# Patient Record
Sex: FEMALE | Race: WHITE | NOT HISPANIC OR LATINO | Employment: PART TIME | URBAN - METROPOLITAN AREA
[De-identification: names, ages, dates, MRNs, and addresses within clinical notes are randomized per-mention and may not be internally consistent; named-entity substitution may affect disease eponyms.]

---

## 2017-01-03 ENCOUNTER — OFFICE VISIT (OUTPATIENT)
Dept: SURGERY | Facility: CLINIC | Age: 37
End: 2017-01-03
Payer: COMMERCIAL

## 2017-01-03 VITALS
DIASTOLIC BLOOD PRESSURE: 80 MMHG | HEART RATE: 98 BPM | TEMPERATURE: 97.7 F | WEIGHT: 188 LBS | HEIGHT: 63 IN | BODY MASS INDEX: 33.31 KG/M2 | SYSTOLIC BLOOD PRESSURE: 144 MMHG

## 2017-01-03 DIAGNOSIS — C50.911 MALIGNANT NEOPLASM OF RIGHT BREAST GREATER THAN OR EQUAL TO 2 CM IN GREATEST DIMENSION (H): Primary | ICD-10-CM

## 2017-01-03 PROCEDURE — 99203 OFFICE O/P NEW LOW 30 MIN: CPT | Performed by: SURGERY

## 2017-01-03 NOTE — NURSING NOTE
"Initial /80 mmHg  Pulse 98  Temp(Src) 97.7  F (36.5  C) (Oral)  Ht 1.6 m (5' 3\")  Wt 85.276 kg (188 lb)  BMI 33.31 kg/m2 Estimated body mass index is 33.31 kg/(m^2) as calculated from the following:    Height as of this encounter: 1.6 m (5' 3\").    Weight as of this encounter: 85.276 kg (188 lb). .    Nona Kent MA    "

## 2017-01-03 NOTE — PROGRESS NOTES
Pt comes to see me for a newly diagnosed right breast cancer.  She felt a mass in October, but because she has had a breast reduction in the past, felt that it was a scar only.  In December, she had a mammogram to assess this and was found to have an invasive ductal breast cancer with metastasis to one right axillary LN on biopsy.  On the mammogram and U/S, the mass measures 2 cm but has surrounding architecture abnormalities up to 4 cm in size and multiple right axillary LN enlargement.  Her tumor is ER and DC positive, HER 2 negative, the LN is ER/DC and HER 2 negative.    She has no family history of breast cancer and is only 36 years old.  She started her menstrual cycle at age 11, had has 2 pregnancies, did not nurse her children because of the reduction and still has her ovaries in place.    On examination:   Right breast:  Bruised from the biopsy, large mass palpable in the upper outer quadrant, possibly from the bleeding from the biopsy.    A/P:  Large right breast cancer with LN mets in a young patient.  Because of the size of the tumor and positive LN, she would benefit from getting neoadjuvant chemotherapy prior to surgical treatment.  I have ordered an MRI for her to assess for the size of tumor.    I did discuss the surgical treatment of breast cancer, breast conservation and axillary LN dissection, since her LN's are positive already and post op radiation.  Since her tumor is large, she may not be a candidate for breast conservation.  An MRI would be helpful to make this determination.  If she is not a candidate for breast conservation, then a modified radical mastectomy and reconstruction would be the best option and I would refer her to our plastic surgeon.      I will check on the results of the MRI before making the definitive surgical decision.  She would also like to get a second opinion and an appointment has been set up for her at the [facility] breast clinic.    30 minutes were spent in  consultation with the patient.  Greater than 50% of the time was spent on counseling/coordination of care, discussing the diagnosis and surgical treatment of breast cancer.  Josh Townsend MD, FACS

## 2017-01-05 ENCOUNTER — ONCOLOGY VISIT (OUTPATIENT)
Dept: ONCOLOGY | Facility: CLINIC | Age: 37
End: 2017-01-05
Attending: INTERNAL MEDICINE
Payer: COMMERCIAL

## 2017-01-05 ENCOUNTER — HOSPITAL ENCOUNTER (OUTPATIENT)
Dept: LAB | Facility: CLINIC | Age: 37
Discharge: HOME | End: 2017-01-05
Attending: INTERNAL MEDICINE | Admitting: INTERNAL MEDICINE
Payer: COMMERCIAL

## 2017-01-05 VITALS
HEIGHT: 64 IN | TEMPERATURE: 96.9 F | OXYGEN SATURATION: 97 % | HEART RATE: 78 BPM | WEIGHT: 189.4 LBS | DIASTOLIC BLOOD PRESSURE: 83 MMHG | SYSTOLIC BLOOD PRESSURE: 127 MMHG | RESPIRATION RATE: 18 BRPM | BODY MASS INDEX: 32.33 KG/M2

## 2017-01-05 DIAGNOSIS — C50.411 MALIGNANT NEOPLASM OF UPPER-OUTER QUADRANT OF RIGHT FEMALE BREAST (H): Primary | ICD-10-CM

## 2017-01-05 LAB
ALBUMIN SERPL-MCNC: 3.8 G/DL (ref 3.4–5)
ALP SERPL-CCNC: 64 U/L (ref 40–150)
ALT SERPL W P-5'-P-CCNC: 19 U/L (ref 0–50)
ANION GAP SERPL CALCULATED.3IONS-SCNC: 7 MMOL/L (ref 3–14)
AST SERPL W P-5'-P-CCNC: 10 U/L (ref 0–45)
BASOPHILS # BLD AUTO: 0.1 10E9/L (ref 0–0.2)
BASOPHILS NFR BLD AUTO: 0.5 %
BILIRUB SERPL-MCNC: 0.5 MG/DL (ref 0.2–1.3)
BUN SERPL-MCNC: 7 MG/DL (ref 7–30)
CALCIUM SERPL-MCNC: 8.6 MG/DL (ref 8.5–10.1)
CANCER AG27-29 SERPL-ACNC: 21 U/ML (ref 0–39)
CHLORIDE SERPL-SCNC: 105 MMOL/L (ref 94–109)
CO2 SERPL-SCNC: 28 MMOL/L (ref 20–32)
CREAT SERPL-MCNC: 0.66 MG/DL (ref 0.52–1.04)
DIFFERENTIAL METHOD BLD: NORMAL
EOSINOPHIL # BLD AUTO: 0.2 10E9/L (ref 0–0.7)
EOSINOPHIL NFR BLD AUTO: 1.7 %
ERYTHROCYTE [DISTWIDTH] IN BLOOD BY AUTOMATED COUNT: 12.4 % (ref 10–15)
GFR SERPL CREATININE-BSD FRML MDRD: NORMAL ML/MIN/1.7M2
GLUCOSE SERPL-MCNC: 95 MG/DL (ref 70–99)
HCT VFR BLD AUTO: 43.6 % (ref 35–47)
HGB BLD-MCNC: 14.6 G/DL (ref 11.7–15.7)
IMM GRANULOCYTES # BLD: 0 10E9/L (ref 0–0.4)
IMM GRANULOCYTES NFR BLD: 0.3 %
LYMPHOCYTES # BLD AUTO: 2.4 10E9/L (ref 0.8–5.3)
LYMPHOCYTES NFR BLD AUTO: 23.7 %
MCH RBC QN AUTO: 29.4 PG (ref 26.5–33)
MCHC RBC AUTO-ENTMCNC: 33.5 G/DL (ref 31.5–36.5)
MCV RBC AUTO: 88 FL (ref 78–100)
MONOCYTES # BLD AUTO: 0.7 10E9/L (ref 0–1.3)
MONOCYTES NFR BLD AUTO: 7.1 %
NEUTROPHILS # BLD AUTO: 6.7 10E9/L (ref 1.6–8.3)
NEUTROPHILS NFR BLD AUTO: 66.7 %
PLATELET # BLD AUTO: 306 10E9/L (ref 150–450)
POTASSIUM SERPL-SCNC: 4.1 MMOL/L (ref 3.4–5.3)
PROT SERPL-MCNC: 7.2 G/DL (ref 6.8–8.8)
RBC # BLD AUTO: 4.97 10E12/L (ref 3.8–5.2)
SODIUM SERPL-SCNC: 140 MMOL/L (ref 133–144)
WBC # BLD AUTO: 10.1 10E9/L (ref 4–11)

## 2017-01-05 PROCEDURE — 86300 IMMUNOASSAY TUMOR CA 15-3: CPT | Performed by: INTERNAL MEDICINE

## 2017-01-05 PROCEDURE — 36415 COLL VENOUS BLD VENIPUNCTURE: CPT | Performed by: INTERNAL MEDICINE

## 2017-01-05 PROCEDURE — 85025 COMPLETE CBC W/AUTO DIFF WBC: CPT | Performed by: INTERNAL MEDICINE

## 2017-01-05 PROCEDURE — 80053 COMPREHEN METABOLIC PANEL: CPT | Performed by: INTERNAL MEDICINE

## 2017-01-05 PROCEDURE — 99211 OFF/OP EST MAY X REQ PHY/QHP: CPT

## 2017-01-05 PROCEDURE — 99215 OFFICE O/P EST HI 40 MIN: CPT | Performed by: INTERNAL MEDICINE

## 2017-01-05 ASSESSMENT — PAIN SCALES - GENERAL: PAINLEVEL: MILD PAIN (2)

## 2017-01-05 NOTE — MR AVS SNAPSHOT
After Visit Summary   1/5/2017    Diana Wilson    MRN: [id number]           Patient Information     Date Of Birth          1980        Visit Information        Provider Department      1/5/2017 8:00 AM Maribel Jin MD [facility]        Today's Diagnoses     Malignant neoplasm of upper-outer quadrant of right female breast (H)    -  1       Care Instructions    Dr Jin Order A PET scan to be done ASAP along with blood work today.   We will set you up to see Dr Pearl after PET for placement.   Also a part of the work up prior to initiating treatment you will get a Echocardiogram.   Genetic work up will be starte.     Will see you back after work. If you have any questions in the interim during the work up period please call [phone] Morena or Robert Stewart         Follow-ups after your visit        Your next 10 appointments already scheduled     Jan 06, 2017  3:00 PM   Ech Complete with [id number]   [facility] Echocardiography ([facility])    [address] [address] [phone]           1.  Please bring or wear a comfortable two-piece outfit. 2.  You may eat, drink and take your normal medicines. 3.  For any questions that cannot be answered, please contact the ordering physician            Jan 09, 2017  9:00 AM   MR BREAST BILATERAL W/O & W CONTRAST with [id number]   [facility] MRI ([facility])    [address] [facility] [address] [phone]           Take your medicines as usual, unless your doctor tells you not to. Bring a list of your current medicines to your exam (including vitamins, minerals and over-the-counter drugs).  The timing of your exam may depend on the start of your last period. If you re in menopause, you may have the exam anytime.  Please bring any previous mammograms or breast MRIs from other facilities to the MRI dept. Do not mail these items to us.  You will have contrast for this exam. To  prepare:   The day before your exam, drink extra fluids at least six 8-ounce glasses (unless your doctor tells you to restrict your fluids).   Have a blood test (creatinine test) within 30 days of your exam. Go to your clinic or Diagnostic Imaging Department for this test.  The MRI machine uses a strong magnet. Please wear clothes without metal (snaps, zippers). A sweatsuit works well, or we may give you a hospital gown.  Please remove any body piercings and hair extensions before you arrive. You will also remove watches, jewelry, hairpins, wallets, dentures, partial dental plates and hearing aids. You may wear contact lenses, and you may be able to wear your rings. We have a safe place to keep your personal items, but it is safer to leave them at home.   **IMPORTANT** THE INSTRUCTIONS BELOW ARE ONLY FOR THOSE PATIENTS WHO HAVE BEEN TOLD THEY WILL RECEIVE SEDATION OR GENERAL ANESTHESIA DURING THEIR MRI PROCEDURE:  IF YOU WILL RECEIVE SEDATION (take medicine to help you relax during your exam)   You must get the medicine from your doctor before you arrive. Bring the medicine to the exam. Do not take it at home.   Arrive one hour early. Bring someone who can take you home after the test. Your medicine will make you sleepy. After the exam, you may not drive, take a bus or take a taxi by yourself.   No eating 8 hours before your exam. You may have clear liquids up until 4 hours before your exam. (Clear liquids include water, clear tea, black coffee and fruit juice without pulp.)  IF YOU WILL RECEIVE ANESTHESIA (be asleep for your exam)   Arrive 1 1/2 hours early. Bring someone who can take you home after the test. You may not drive, take a bus or take a taxi by yourself.   No eating 8 hours before your exam. You may have clear liquids up until 4 hours before your exam. (Clear liquids include water, clear tea, black coffee and fruit juice without pulp.)  If you have any questions, please contact your Imaging Department  exam site.            Jimenez 10, 2017  8:30 AM   PE NPET ONCOLOGY (EYES TO THIGHS) with [id number]   [facility] ([facility]    [address] [address] [place] [address] [phone]           Tell your doctor:   If there is any chance you may be pregnant or if you are breastfeeding.   If you have problems lying in small spaces (claustrophobia). If you do, your doctor may give you medicine to help you relax. If you have diabetes:   Have your exam early in the morning. Your blood glucose will go up as the day goes by.   Your glucose level must be 180 or less at the start of the exam. Please take any medicines you need to ensure this blood glucose level. 24 hours before your scan: Don t do any heavy exercise. (No jogging, aerobics or other workouts.) Exercise will make your pictures less accurate. 6 hours before your scan:   Stop all food and liquids (except water).   Do not chew gum or suck on mints.   If you need to take medicine with food, you may take it with a few crackers.  Please call your Imaging Department at your exam site with any questions.            Jan 13, 2017  2:15 PM   (Arrive by 2:00 PM)   NEW WITH ROOM with Claudia Willis GC,  2 116 CONSULT [facility] ([facility])    [address] [address] [phone]            Jan 16, 2017  9:00 AM   Return Visit with Maribel Jin MD   [facility] ([facility])    [facility]  [address]  [facility] [address] [phone]              Future tests that were ordered for you today     Open Future Orders        Priority Expected Expires Ordered    Echocardiogram Routine  1/5/2018 1/5/2017            Who to contact     If you have questions or need follow up information about today's clinic visit or your schedule please contact [facility] directly at [phone].  Normal or  "non-critical lab and imaging results will be communicated to you by MyChart, letter or phone within 4 business days after the clinic has received the results. If you do not hear from us within 7 days, please contact the clinic through Walk-in Appointment Scheduler or phone. If you have a critical or abnormal lab result, we will notify you by phone as soon as possible.  Submit refill requests through Walk-in Appointment Scheduler or call your pharmacy and they will forward the refill request to us. Please allow 3 business days for your refill to be completed.          Additional Information About Your Visit        Walk-in Appointment Scheduler Information     Walk-in Appointment Scheduler gives you secure access to your electronic health record. If you see a primary care provider, you can also send messages to your care team and make appointments. If you have questions, please call your primary care clinic.  If you do not have a primary care provider, please call [phone] and they will assist you.        Care EveryWhere ID     This is your Care EveryWhere ID. This could be used by other organizations to access your [place] medical records  [id number]        Your Vitals Were     Pulse Temperature Respirations    78 96.9  F (36.1  C) (Tympanic) 18    Height BMI (Body Mass Index) Pulse Oximetry    1.626 m (5' 4\") 32.49 kg/m2 97%    Breastfeeding?          No         Blood Pressure from Last 3 Encounters:   01/05/17 127/83   01/03/17 144/80   12/13/16 122/76    Weight from Last 3 Encounters:   01/05/17 85.911 kg (189 lb 6.4 oz)   01/03/17 85.276 kg (188 lb)   12/13/16 85.412 kg (188 lb 4.8 oz)              We Performed the Following     Ca27.29  breast tumor marker     CBC with platelets differential     Comprehensive metabolic panel     PE NPET Oncology (Eyes To Thighs)          Today's Medication Changes          These changes are accurate as of: 1/5/17  9:38 AM.  If you have any questions, ask your nurse or doctor.               Stop taking these medicines if you haven't already. Please contact " your care team if you have questions.     MULTIVITAMIN PO   Stopped by:  Maribel Jin MD           UNABLE TO FIND   Stopped by:  Maribel Jin MD                    Primary Care Provider Office Phone # Fax #    Yenifer Lorraine Ridley [phone]       [facility] [address] [address] [address]        Thank you!     Thank you for choosing [facility] CANCER [facility]  for your care. Our goal is always to provide you with excellent care. Hearing back from our patients is one way we can continue to improve our services. Please take a few minutes to complete the written survey that you may receive in the mail after your visit with us. Thank you!             Your Updated Medication List - Protect others around you: Learn how to safely use, store and throw away your medicines at www.disposemymeds.org.          This list is accurate as of: 1/5/17  9:38 AM.  Always use your most recent med list.                   Brand Name Dispense Instructions for use    ADVIL 200 MG capsule   Generic drug:  ibuprofen      Take 200 mg by mouth every 4 hours as needed.       albuterol 108 (90 BASE) MCG/ACT Inhaler    PROAIR HFA/PROVENTIL HFA/VENTOLIN HFA    1 Inhaler    Inhale 2 puffs into the lungs every 6 hours as needed for shortness of breath / dyspnea       ALPRAZolam 0.5 MG tablet    XANAX    15 tablet    Take 1 tablet (0.5 mg) by mouth 3 times daily as needed for anxiety For Dental appointments       cyclobenzaprine 10 MG tablet    FLEXERIL    30 tablet    Take 1 tablet (10 mg) by mouth 3 times daily as needed for muscle spasms       famotidine 20 MG tablet    PEPCID     Take 20 mg by mouth daily as needed       fluticasone 50 MCG/ACT spray    FLONASE    3 Bottle    Spray 1-2 sprays into both nostrils daily       loratadine 10 MG tablet    CLARITIN    90 tablet    Take 1 tablet (10 mg) by mouth daily       montelukast 10 MG tablet    SINGULAIR    90 tablet    Take 1 tablet (10 mg) by mouth At Bedtime

## 2017-01-05 NOTE — PROGRESS NOTES
" ONCOLOGY Follow up visit       COMPLAINT AND REASON FOR CONSULTATION:  Newly diagnosed right breast cancer, hx of TIFFANY due Celiac disease on IV iron prn     REFERRING/Primay PHYSICIAN:  Dr. Yenifer Ridley       HERB Wilson is a 36-year-old premenopausal woman, self felt breast in her right armpit and right breast in 10/2016.  She observed it and due to persistency of the palpable lesions went on to have workup with diagnostic mammogram end of 12/2016, which identified 2 cm ill-defined density  associated with indeterminate microcalcification measuring about 3.2 cm, so the whole area spans about 4 cm in biggest dimension.   Ultrasound to the site which is the upper outer quadrant of the right breast identified 2 cm irregular solid mass; at the right axilla shows several abnormal lymph nodes, the largest one measures 1.8 cm in maximum dimension.  Ultrasound-guided biopsy of right breast was done 12/27/2016, identified grade 3 invasive ductal cancer, angiolymphatic invasion not identified.  ER/[place] 99% positive, HER-2/kelle negative, associated with high-grade DCIS.    Right axillary ultrasound-guided biopsy indicating metastatic carcinoma positive for spread from breast primary, ER/[place] both negative.  HER-2/kelle is negative.      Other than palpable right breast abnormalities, the patient has no breast pain, no skin changes, no nipple changes on the right side.  No abnormalities identified on the left breast by clinical, self-exam and mammogram.      PAST MEDICAL HISTORY:  Restless legs, mild intermittent asthma, celiac disease diagnosed 02/2016, hx of TIFFANY     SOCIAL HISTORY:  Works part-time.  Lives with her .  They have 2 kids, first pregnancy age 21.  She did not do breast feeding because of bilateral breast reduction.  She does office work.      FAMILY HISTORY:  \"Full of cancer\" from the mother's side including colon, lung, pancreatic, gastric cancer.  According to the patient none of them survive older than " 60 years old.      REVIEW OF SYSTEMS:   GENERAL: She is in her usual state of health.  She has no weight loss.  No B symptoms. NEURO:   No headache, double vision, or focal weakness.  No neuropathy.   SKIN:  No chronic skin rash or skin infection.   CARDIOVASCULAR:  No palpitations, no chest pressure, no dyspnea on exertion.   PULMONARY:  No shortness of breath, no pleurisy, no cough, no hemoptysis. Asthma.   GI:  No abdominal pain, nausea, vomiting, constipation.  No diarrhea.  No bright red blood per rectum.   :  No urgency, frequency.  No recurrent urinary tract infection.  No kidney problems.   RHEUMATOLOGY/MUSCULOSKELETAL SYSTEM:  No arthritis.  No joint pain.  No muscle ache.   ENDOCRINE:  No history of diabetes or thyroid problem.  No complaints of hot flashes.   HEMATOLOGY:  No history of bleeding or thrombosis episode.   IMMUNOLOGY:  No recurrent fever or chills episode.  No recurrent infectious episode.   BREASTS/GYN:  No history of vaginal bleeding/discharge/dryness.  No breast pain or nipple discharge.  Celiac disease diagnosed 02/2016.  Premenopausal diagnosed with breast cancer at age 36 in 12/2016.      PHYSICAL EXAMINATION:   GENERAL APPEARANCE:  He young woman, looks like her stated age, very upbeat, not in acute distress.   HEENT: The patient is normocephalic, atraumatic. Pupils are equally reactive to light.  Sclerae are anicteric.  Moist oral mucosa.  Negative pharynx.  No oral thrush.   NECK:  Supple.  No jugular venous distention.  Thyroid is not palpable.   LYMPH NODES:  Superficial lymphadenopathy is not appreciable in the bilateral cervical, supraclavicular, axillary or inguinal areas.   CARDIOVASCULAR:  S1, S2 regular with no murmurs or gallops.  No carotid or abdominal bruits.   PULMONARY:  Lungs are clear to auscultation and percussion bilaterally.  There is no wheezing or rhonchi.   GASTROINTESTINAL:  Abdomen is soft, nontender.  No hepatosplenomegaly.  No signs of ascites.  No mass  appreciable.   MUSCULOSKELETAL/EXTREMITIES:  No edema.  No cyanotic changes.  No signs of joint deformity.  No lymphedema.   NEUROLOGIC:  Cranial nerves II-XII are grossly intact.  Sensation intact.  Muscle strength and muscle tone symmetrical, 5/5 throughout.   BACK:  No spinal or paraspinal tenderness.  No CVA tenderness.   SKIN:  No petechiae.  No rash.  No signs of cellulitis.   BREASTS:  Bilateral breast exam reviewed.  Glandular tissue on both breasts in general, readily palpable upper outer quadrant of the right breast about 2-3 cm palpable lesion with vague palpable right axillary lymphadenopathy.  She has ecchymosis after the biopsy.      LABORATORY DATA:    Today's labs cbc diff/cmp/marker are nl.      October 2016, ferritin and hemoglobin normal.      End of 12/2016  right breast was done 12/27/2016, identified grade 3 invasive ductal cancer, angiolymphatic invasion not identified.  ER/[place] 99% positive, HER-2/kelle negative, associated with high-grade DCIS.    Right axillary ultrasound-guided biopsy indicating metastatic carcinoma positive for spread from breast primary, ER/[place] both negative.  HER-2/kelle is negative.       CURRENT IMAGING:    diagnostic mammogram end of 12/2016, which identified 2 cm ill-defined density  associated with indeterminate microcalcification measuring about 3.2 cm, so the whole area spans about 4 cm in biggest dimension.   Ultrasound to the site which is the upper outer quadrant of the right breast identified 2 cm irregular solid mass; at the right axilla shows several abnormal lymph nodes, the largest one measures 1.8 cm in maximum dimension.      OLD DATA REVIEWED WITH SUMMARY:    DEXA scan from 03/2016 identified mild osteopenia lumbar spine   left mammogram 2007 that was negative.      ASSESSMENT AND PLAN:    12/2106 newly dx breast cancer is a high-grade invasive ductal cancer on her right breast, reviewed is ER/[place] 99% positive, HER-2/kelle negative cancer with associated  high-grade DCIS.  The lymph nodes biopsy also came back positive breast cancer but is triple negative disease. She is only 36 years old.      She will benefit from further staging workup with pertinent lab work and PET scan, would recommend that.      I indicated to her due to her large tumor volume on presentation, it will be in her benefit to consider neoadjuvant chemotherapy over adjuvant chemotherapy.  She has met with Dr. Townsend and has pretty much made up her mind on the sequence of the treatment.    I indicated to her the standard of care in this country is to use the most effective FDA approved dose-dense AC regimen followed by dose-dense Taxol.  The second portion of this neoadjuvant treatment regimen can also be altered if her genetic testing result is positive for BRCA1 and 2 mutation.    I explained to her how dose-dense AC is given Adriamycin we provided at 60 mg/m2, Cytoxan will be 600 mg/m2, both given on day 1 every 2 weeks with growth factor support and total of 4 cycles.  We talked about the side effects including nausea, vomiting, hair loss.  We talked about the rare but existing side effect in terms of heart damage and a chance of acute leukemia down the line.    She has made an informed decision to proceed with dose-dense AC in the neoadjuvant setting followed by taxane plus or minus platinum depending on her genetic testing without.     She needs to be monitored closely during this chemo.     She will also benefit to seek genetic counseling ASAP.        If the PET is negative for distal disease, we will arrange port placement for her.      She is also getting a [facility].  She is also informed the need of adjuvant radiation based on her initial presentation in terms of lymph node involvement.  She is very upbeat with this difficult diagnosis.  Lots of encouragement is provided.         MARGE MCNEAL MD             D: 01/05/2017 09:09   T: 01/05/2017 09:40   MT: JAYDEN      Name:      NATAN HU   MRN:      [id number]        Account:      [id number]   :      1980           Visit Date:   2017      Document: [id number]

## 2017-01-05 NOTE — PATIENT INSTRUCTIONS
Dr Jin Order A PET scan to be done ASAP along with blood work today.   We will set you up to see Dr Pearl after PET for placement.   Also a part of the work up prior to initiating treatment you will get a Echocardiogram.   Genetic work up will be starte.     Will see you back after work. If you have any questions in the interim during the work up period please call [phone] Morena or Juliana.      Pat

## 2017-01-05 NOTE — NURSING NOTE
"Diana Wilson is a 36 year old female who presents for:  Chief Complaint   Patient presents with     Oncology Clinic Visit     New Patient - Breast CA         Initial Vitals:  /83 mmHg  Pulse 78  Temp(Src) 96.9  F (36.1  C) (Tympanic)  Resp 18  Ht 1.626 m (5' 4\")  Wt 85.911 kg (189 lb 6.4 oz)  BMI 32.49 kg/m2  SpO2 97%  Breastfeeding? No Estimated body mass index is 32.49 kg/(m^2) as calculated from the following:    Height as of this encounter: 1.626 m (5' 4\").    Weight as of this encounter: 85.911 kg (189 lb 6.4 oz).. Body surface area is 1.97 meters squared. BP completed using cuff size: regular  Mild Pain (2) No LMP recorded. Allergies and medications reviewed.     Medications: Medication refills not needed today.  Pharmacy name entered into [facility]: [facility] [place]  [place], [place] - [address]    Comments: New Patient - Breast CA.     10  minutes for nursing intake (face to face time)   Sondra Medina CMA          "

## 2017-01-06 ENCOUNTER — MYC MEDICAL ADVICE (OUTPATIENT)
Dept: FAMILY MEDICINE | Facility: CLINIC | Age: 37
End: 2017-01-06

## 2017-01-06 ENCOUNTER — HOSPITAL ENCOUNTER (OUTPATIENT)
Dept: CARDIOLOGY | Facility: CLINIC | Age: 37
Discharge: HOME | End: 2017-01-06
Attending: INTERNAL MEDICINE | Admitting: INTERNAL MEDICINE
Payer: COMMERCIAL

## 2017-01-06 ENCOUNTER — TELEPHONE (OUTPATIENT)
Dept: ONCOLOGY | Facility: CLINIC | Age: 37
End: 2017-01-06

## 2017-01-06 DIAGNOSIS — C50.411 MALIGNANT NEOPLASM OF UPPER-OUTER QUADRANT OF RIGHT FEMALE BREAST (H): ICD-10-CM

## 2017-01-06 PROCEDURE — 93306 TTE W/DOPPLER COMPLETE: CPT

## 2017-01-06 PROCEDURE — 0399T ZZHC MYOCARDIAL STRAIN IMAGING: CPT | Performed by: INTERNAL MEDICINE

## 2017-01-06 PROCEDURE — 93306 TTE W/DOPPLER COMPLETE: CPT | Mod: 26 | Performed by: INTERNAL MEDICINE

## 2017-01-09 ENCOUNTER — HOSPITAL ENCOUNTER (OUTPATIENT)
Dept: MRI IMAGING | Facility: CLINIC | Age: 37
Discharge: HOME | End: 2017-01-09
Attending: SURGERY | Admitting: SURGERY
Payer: COMMERCIAL

## 2017-01-09 DIAGNOSIS — C50.911 MALIGNANT NEOPLASM OF RIGHT BREAST GREATER THAN OR EQUAL TO 2 CM IN GREATEST DIMENSION (H): ICD-10-CM

## 2017-01-09 PROCEDURE — A9585 GADOBUTROL INJECTION: HCPCS | Performed by: SURGERY

## 2017-01-09 PROCEDURE — 25500045 ZZH RX 255: Performed by: SURGERY

## 2017-01-09 PROCEDURE — 0159T MR BREAST BILATERAL W/O & W CONTRAST: CPT

## 2017-01-09 PROCEDURE — 27210995 ZZH RX 272: Performed by: SURGERY

## 2017-01-09 RX ORDER — ACYCLOVIR 200 MG/1
60 CAPSULE ORAL ONCE
Status: COMPLETED | OUTPATIENT
Start: 2017-01-09 | End: 2017-01-09

## 2017-01-09 RX ORDER — GADOBUTROL 604.72 MG/ML
10 INJECTION INTRAVENOUS ONCE
Status: COMPLETED | OUTPATIENT
Start: 2017-01-09 | End: 2017-01-09

## 2017-01-09 RX ADMIN — SODIUM CHLORIDE 60 ML: 9 INJECTION, SOLUTION INTRAMUSCULAR; INTRAVENOUS; SUBCUTANEOUS at 09:22

## 2017-01-09 RX ADMIN — GADOBUTROL 10 ML: 604.72 INJECTION INTRAVENOUS at 09:22

## 2017-01-10 ENCOUNTER — RADIANT APPOINTMENT (OUTPATIENT)
Dept: PET IMAGING | Facility: CLINIC | Age: 37
End: 2017-01-10
Attending: INTERNAL MEDICINE
Payer: COMMERCIAL

## 2017-01-10 ENCOUNTER — OFFICE VISIT (OUTPATIENT)
Dept: FAMILY MEDICINE | Facility: CLINIC | Age: 37
End: 2017-01-10
Payer: COMMERCIAL

## 2017-01-10 ENCOUNTER — ANESTHESIA EVENT (OUTPATIENT)
Dept: SURGERY | Facility: CLINIC | Age: 37
End: 2017-01-10
Payer: COMMERCIAL

## 2017-01-10 VITALS
HEIGHT: 64 IN | HEART RATE: 79 BPM | BODY MASS INDEX: 32.64 KG/M2 | WEIGHT: 191.2 LBS | DIASTOLIC BLOOD PRESSURE: 91 MMHG | TEMPERATURE: 97.1 F | SYSTOLIC BLOOD PRESSURE: 139 MMHG

## 2017-01-10 DIAGNOSIS — R11.0 NAUSEA: ICD-10-CM

## 2017-01-10 DIAGNOSIS — C50.911 INVASIVE DUCTAL CARCINOMA OF BREAST, RIGHT (H): ICD-10-CM

## 2017-01-10 DIAGNOSIS — C50.411 MALIGNANT NEOPLASM OF UPPER-OUTER QUADRANT OF RIGHT FEMALE BREAST (H): ICD-10-CM

## 2017-01-10 DIAGNOSIS — F41.8 SITUATIONAL ANXIETY: ICD-10-CM

## 2017-01-10 DIAGNOSIS — Z01.818 PREOP GENERAL PHYSICAL EXAM: Primary | ICD-10-CM

## 2017-01-10 DIAGNOSIS — F41.9 ANXIETY: ICD-10-CM

## 2017-01-10 DIAGNOSIS — G47.09 OTHER INSOMNIA: ICD-10-CM

## 2017-01-10 PROCEDURE — 99214 OFFICE O/P EST MOD 30 MIN: CPT | Performed by: FAMILY MEDICINE

## 2017-01-10 PROCEDURE — A9552 F18 FDG: HCPCS | Performed by: RADIOLOGY

## 2017-01-10 PROCEDURE — 78815 PET IMAGE W/CT SKULL-THIGH: CPT | Mod: PI | Performed by: RADIOLOGY

## 2017-01-10 RX ORDER — SCOLOPAMINE TRANSDERMAL SYSTEM 1 MG/1
PATCH, EXTENDED RELEASE TRANSDERMAL
Qty: 1 PATCH | Refills: 0 | Status: SHIPPED
Start: 2017-01-10 | End: 2017-06-30

## 2017-01-10 RX ORDER — ALPRAZOLAM 0.5 MG
0.5 TABLET ORAL 3 TIMES DAILY PRN
Qty: 15 TABLET | Refills: 0 | Status: SHIPPED | OUTPATIENT
Start: 2017-01-10 | End: 2017-01-30

## 2017-01-10 RX ORDER — HYDROXYZINE HYDROCHLORIDE 10 MG/1
10-50 TABLET, FILM COATED ORAL EVERY 6 HOURS PRN
Qty: 60 TABLET | Refills: 1 | Status: SHIPPED | OUTPATIENT
Start: 2017-01-10 | End: 2017-06-30

## 2017-01-10 ASSESSMENT — LIFESTYLE VARIABLES: TOBACCO_USE: 1

## 2017-01-10 NOTE — MR AVS SNAPSHOT
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 This includes signs of a cold or flu (such as a sore throat, runny nose, cough, rash or fever).    Do not smoke, drink alcohol or take over the counter medicine (unless your surgeon or primary care doctor tells you to) for the 24 hours before and after surgery.    If you take prescribed drugs: Follow your doctor s orders about which medicines to take and which to stop until after surgery.    Eating and drinking prior to surgery: follow the instructions from your surgeon    Take a shower or bath the night before surgery. Use the soap your surgeon gave you to gently clean your skin. If you do not have soap from your surgeon, use your regular soap. Do not shave or scrub the surgery site.  Wear clean pajamas and have clean sheets on your bed.         Follow-ups after your visit        Your next 10 appointments already scheduled     Jan 11, 2017   Procedure with Michael Townsend MD   [facility] Per[facility] (--)    [address] [place] [address]   [phone]           The medical center is located at [address] [place] [address]. (between [address] in [place], [place] north of [place]).            Jan 13, 2017  2:15 PM   (Arrive by 2:00 PM)   NEW WITH ROOM with Claudia Willis GC,  2 116 CONSULT [facility])    [address] [facility]  2nd Floor  [facility] [address]   [phone]            Jan 16, 2017  8:00 AM   Level 4 with ROOM 7 [facility] ([facility])    [facility]  [address]  [facility] [address]   [phone]            Jan 16, 2017  8:45 AM   Return Visit with Maribel Jin MD   [facility] ([facility])    [facility]  [address]  [facility] [address]   [phone]              Who to contact     Normal or non-critical lab and imaging results will be communicated to you by Arachnyshart, letter or phone within 4 business days after the clinic has received the results. If you do not hear from us within 7 days, please contact the clinic through Arachnyshart or phone. If you have a critical or abnormal lab result, we will notify you by phone as soon as possible.  Submit refill requests through [employer] or call your pharmacy and they will forward the refill request to us. Please allow 3 business days for your refill to be completed.          If you need to speak with a  for additional information , please call: [phone]             Additional Information About Your Visit        [employer] Information     [employer] gives you secure access to your electronic health record. If you see a primary care provider, you can also send messages to your care team and make appointments. If you have questions, please call your primary care clinic.  If you do not have a primary care provider, please call [phone] and they will assist you.        Care EveryWhere ID     This is your Care EveryWhere ID. This could be used by other organizations to access your [place] medical records  [id number]        Your Vitals Were     Pulse Temperature Height BMI (Body Mass Index)          79 97.1  F (36.2  C) (Tympanic) 5' 4\" (1.626 m) 32.80 kg/m2         Blood " Pressure from Last 3 Encounters:   01/10/17 139/91   01/05/17 127/83   01/03/17 144/80    Weight from Last 3 Encounters:   01/10/17 191 lb 3.2 oz (86.728 kg)   01/05/17 189 lb 6.4 oz (85.911 kg)   01/03/17 188 lb (85.276 kg)              Today, you had the following     No orders found for display         Today's Medication Changes          These changes are accurate as of: 1/10/17  3:34 PM.  If you have any questions, ask your nurse or doctor.               Start taking these medicines.        Dose/Directions    hydrOXYzine 10 MG tablet   Commonly known as:  ATARAX   Used for:  Anxiety, Situational anxiety   Started by:  Yenifer Ridley MD        Dose:  10-50 mg   Take 1-5 tablets (10-50 mg) by mouth every 6 hours as needed for anxiety   Quantity:  60 tablet   Refills:  1       scopolamine 72 hr patch   Commonly known as:  TRANSDERM   Used for:  Preop general physical exam, Nausea   Started by:  Yenifer Ridley MD        Place 1 patch onto the skin every 72 hours.  Apply to hairless area behind one ear at least 4 hours before travel.  Remove old patch and change every 3 days.   Quantity:  1 patch   Refills:  0         These medicines have changed or have updated prescriptions.        Dose/Directions    ALPRAZolam 0.5 MG tablet   Commonly known as:  XANAX   This may have changed:  additional instructions   Used for:  Situational anxiety, Invasive ductal carcinoma of breast, right (H)   Changed by:  Yenifer Ridley MD        Dose:  0.5 mg   Take 1 tablet (0.5 mg) by mouth 3 times daily as needed for anxiety   Quantity:  15 tablet   Refills:  0            Where to get your medicines      These medications were sent to [facility] [place], [place] - [address]  [address] [address] [address]     Phone:  [phone]    - hydrOXYzine 10 MG tablet  - scopolamine 72 hr patch      Some of these will need a paper prescription and others can be bought over the counter.  Ask your  nurse if you have questions.     Bring a paper prescription for each of these medications    - ALPRAZolam 0.5 MG tablet             Primary Care Provider Office Phone # Fax #    Yenifer Ridley [phone]       [facility] [address] [facility] [address]        Thank you!     Thank you for choosing [facility]  for your care. Our goal is always to provide you with excellent care. Hearing back from our patients is one way we can continue to improve our services. Please take a few minutes to complete the written survey that you may receive in the mail after your visit with us. Thank you!             Your Updated Medication List - Protect others around you: Learn how to safely use, store and throw away your medicines at www.disposemymeds.org.          This list is accurate as of: 1/10/17  3:34 PM.  Always use your most recent med list.                   Brand Name Dispense Instructions for use    ADVIL 200 MG capsule   Generic drug:  ibuprofen      Take 200 mg by mouth every 4 hours as needed.       albuterol 108 (90 BASE) MCG/ACT Inhaler    PROAIR HFA/PROVENTIL HFA/VENTOLIN HFA    1 Inhaler    Inhale 2 puffs into the lungs every 6 hours as needed for shortness of breath / dyspnea       ALPRAZolam 0.5 MG tablet    XANAX    15 tablet    Take 1 tablet (0.5 mg) by mouth 3 times daily as needed for anxiety       cyclobenzaprine 10 MG tablet    FLEXERIL    30 tablet    Take 1 tablet (10 mg) by mouth 3 times daily as needed for muscle spasms       famotidine 20 MG tablet    PEPCID     Take 20 mg by mouth daily as needed       fluticasone 50 MCG/ACT spray    FLONASE    3 Bottle    Spray 1-2 sprays into both nostrils daily       hydrOXYzine 10 MG tablet    ATARAX    60 tablet    Take 1-5 tablets (10-50 mg) by mouth every 6 hours as needed for anxiety       loratadine 10 MG tablet    CLARITIN    90 tablet    Take 1 tablet (10 mg) by mouth daily       montelukast 10 MG tablet     SINGULAIR    90 tablet    Take 1 tablet (10 mg) by mouth At Bedtime       scopolamine 72 hr patch    TRANSDERM    1 patch    Place 1 patch onto the skin every 72 hours.  Apply to hairless area behind one ear at least 4 hours before travel.  Remove old patch and change every 3 days.

## 2017-01-10 NOTE — PROGRESS NOTES
[facility]  [address] [facility] [address]  [phone]  Dept: [phone]    PRE-OP EVALUATION:  Today's date: 1/10/2017    Daina Wilson (: 1980) presents for pre-operative evaluation assessment as requested by  .  She requires evaluation and anesthesia risk assessment prior to undergoing surgery/procedure for treatment of right breast cancer .  Proposed procedure: INSERT PORT VASCULAR ACCESS     Date of Surgery/ Procedure: 2017  Time of Surgery/ Procedure: 2:00 pm  Hospital/Surgical Facility: [facility]   Primary Physician: Yenifer Ridley  Type of Anesthesia Anticipated: General    Patient has a Health Care Directive or Living Will:  YES - Patient will send a copy     1. NO - Do you have a history of heart attack, stroke, stent, bypass or surgery on an artery in the head, neck, heart or legs?  2. NO - Do you ever have any pain or discomfort in your chest?  3. NO - Do you have a history of  Heart Failure?  4. NO - Are you troubled by shortness of breath when: walking on the level, up a slight hill or at night?  5. NO - Do you currently have a cold, bronchitis or other respiratory infection?  6. NO - Do you have a cough, shortness of breath or wheezing?  7. NO - Do you sometimes get pains in the calves of your legs when you walk?   8. Yes - Do you or anyone in your family have previous history of blood clots? Mother- post surgical blood clot. No personal blood clot   9. NO - Do you or does anyone in your family have a serious bleeding problem such as prolonged bleeding following surgeries or cuts?  10. Yes - Have you ever had problems with anemia or been told to take iron pills? Had anemia with celiac sprue diagnosis   11. Yes - Have you had any abnormal blood loss such as black, tarry or bloody stools, or abnormal vaginal bleeding? Vaginal bleeding in the past- had ablation done.   12. NO - Have you ever had a blood transfusion?  13. Yes - Have  you or any of your relatives ever had problems with anesthesia? Nausea (patient)   14. NO - Do you have sleep apnea, excessive snoring or daytime drowsiness?  15. NO - Do you have any prosthetic heart valves?  16. NO - Do you have prosthetic joints?  17. NO - Is there any chance that you may be pregnant?      HPI:                                                      Brief HPI related to upcoming procedure: right breast cancer and axillary nodes positive. Getting port placed for chemo     High anxiety -   Struggling to sleep - ambien doesn't work. Trazodone doesn't work. Hasn't tried lunesta.   Melatonin - taking 1-2 tablets  Using intermittent xanax for anxiety. Anxiety has been high since diagnosis     See problem list for active medical problems.  Problems all longstanding and stable, except as noted/documented.  See ROS for pertinent symptoms related to these conditions.                                                                                                  .    MEDICAL HISTORY:                                                      Patient Active Problem List    Diagnosis Date Noted     Invasive ductal carcinoma of breast, right (H) 12/30/2016     Priority: Medium     Intestinal malabsorption 02/16/2016     Priority: Medium     Patient declines flu vaccine 11/01/2013     Priority: Medium     Health Care Home 05/14/2013     Priority: Low     EMERGENCY CARE PLAN  May 14, 2013: No current Care Coordination follow up planned. Please refer if Care Coordination services are needed.    Presenting Problem Signs and Symptoms Treatment Plan   Questions or concerns   during clinic hours   I will call my clinic directly:  [address] [id number] [phone].    Questions or concerns outside clinic hours   I will call the 24 hour nurse line at   [phone] or [facility].   Need to schedule an appointment   I will call the 24 hour scheduling team at [phone] or my  clinic directly at [phone].    Same day treatment     I will call my clinic first, nurse line if after hours, urgent care and express care if needed.   Clinic care coordination services (regular clinic hours)     I will call a clinic care coordinator directly:     Darron Gaming RN  Dariana Arthur, Fri - [phone]  Wed, Thurs - [phone]    Marlin Nelson, SW:    [phone]    Or call my clinic at [phone] and ask to speak with care coordination.   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) 01/06/2017     Anxiety 07/15/2014     Insomnia 07/17/2012     RLS (restless legs syndrome) 06/28/2012     Iron deficiency anemia 03/14/2012     Major depressive disorder, recurrent episode, mild (H) 08/05/2011      mainly postpartum, had tried Zoloft and anafranil    [facility]          CARDIOVASCULAR SCREENING; LDL GOAL LESS THAN 130 10/31/2010     Obesity 09/08/2008     Wt Readings from Last 5 Encounters:   07/11/11 172 lb (78.019 kg)   12/20/10 174 lb (78.926 kg)   07/26/10 166 lb (75.297 kg)   11/12/09 170 lb (77.111 kg)   11/03/09 169 lb (76.658 kg)     Body mass index is 29.99 kg/(m^2).        Smoker 07/14/2008     Mild intermittent asthma             never has had PFT's, MDI with colds and at times exercise       TRICHOTILLOMANIA              well controlled        Past Medical History   Diagnosis Date     Transient hypertension of pregnancy, antepartum      PIH with last birth     Fibroadenosis of breast      Other and unspecified ovarian cyst 03/19/2003     RIGHT ovarian cyst     Nongonococcal urethritis (JALIL) due to  chlamydia trachomatis 01/03/2002     Excessive or frequent menstruation 03/19/2003     Encounter for insertion or removal of intrauterine contraceptive device 1/8/2008     Past Surgical History   Procedure Laterality Date     Surgical history of -   1997     Breast reduction     C appendectomy  1991     Dental surgery  09/13/2010 and 10/12/2010     Root canals     Dilation and curettage, hysteroscopy, ablate endometrium novasure, combined  3/20/2012     Procedure:COMBINED DILATION AND CURETTAGE, HYSTEROSCOPY, ABLATE ENDOMETRIUM NOVASURE; Hysteroscopy with Endometrial Ablation Novasure; Surgeon:GERRI PURCELL; Location:[place] OR     Esophagoscopy, gastroscopy, duodenoscopy (egd), combined N/A 2/18/2016     Procedure: COMBINED ESOPHAGOSCOPY, GASTROSCOPY, DUODENOSCOPY (EGD);  Surgeon: Jose L Wilson MD;  Location: [facility]     Current Outpatient Prescriptions   Medication Sig Dispense Refill     ALPRAZolam (XANAX) 0.5 MG tablet Take 1 tablet (0.5 mg) by mouth 3 times daily as needed for anxiety For Dental appointments 15 tablet 0     albuterol (PROAIR HFA, PROVENTIL HFA, VENTOLIN HFA) 108 (90 BASE) MCG/ACT inhaler Inhale 2 puffs into the lungs every 6 hours as needed for shortness of breath / dyspnea 1 Inhaler 11     fluticasone (FLONASE) 50 MCG/ACT nasal spray Spray 1-2 sprays into both nostrils daily 3 Bottle 3     loratadine (CLARITIN) 10 MG tablet Take 1 tablet (10 mg) by mouth daily 90 tablet 3     montelukast (SINGULAIR) 10 MG tablet Take 1 tablet (10 mg) by mouth At Bedtime 90 tablet 3     famotidine (PEPCID) 20 MG tablet Take 20 mg by mouth daily as needed        cyclobenzaprine (FLEXERIL) 10 MG tablet Take 1 tablet (10 mg) by mouth 3 times daily as needed for muscle spasms 30 tablet 0     ibuprofen (ADVIL) 200 MG capsule Take 200 mg by mouth every 4 hours as needed.       OTC products: no recent use of OTC ASA, NSAIDS or Steroids and no use of herbal medications or other supplements    Allergies   Allergen  "Reactions     Ambien      hallucinations     Amoxicillin GI Disturbance     Erythromycin GI Disturbance     Penicillins Nausea and Vomiting and Hives     Hydrocodone Other (See Comments)     Out of body experience, \"creepy-crawly\" skin       Latex Allergy: NO    Social History   Substance Use Topics     Smoking status: Former Smoker -- 0.10 packs/day     Types: Cigarettes     Smokeless tobacco: Never Used      Comment: Socially- quit smoking 10/01/2016     Alcohol Use: 0.0 oz/week     0 Standard drinks or equivalent per week      Comment: Socially     History   Drug Use No       REVIEW OF SYSTEMS:                                                    Constitutional, neuro, ENT, endocrine, pulmonary, cardiac, gastrointestinal, genitourinary, musculoskeletal, integument and psychiatric systems are negative, except as otherwise noted.    EXAM:                                                    /91 mmHg  Pulse 79  Temp(Src) 97.1  F (36.2  C) (Tympanic)  Ht 5' 4\" (1.626 m)  Wt 191 lb 3.2 oz (86.728 kg)  BMI 32.80 kg/m2    GENERAL APPEARANCE: healthy, alert and no distress     EYES: EOMI,- PERRL     HENT: ear canals and TM's normal and nose and mouth without ulcers or lesions     NECK: no adenopathy, no asymmetry, masses, or scars and thyroid normal to palpation     RESP: lungs clear to auscultation - no rales, rhonchi or wheezes     CV: regular rates and rhythm, normal S1 S2, no S3 or S4 and no murmur, click or rub -     ABDOMEN:  soft, nontender, no HSM or masses and bowel sounds normal     MS: extremities normal- no gross deformities noted, no evidence of inflammation in joints, FROM in all extremities.     NEURO: Normal strength and tone, sensory exam grossly normal, mentation intact and speech normal     PSYCH: mentation appears normal. and affect normal/bright    DIAGNOSTICS:                                                    EKG: Not indicated due to non-vascular surgery and low risk of event (age <65 and " without cardiac risk factors)    Recent Labs   Lab Test  01/05/17   0934  10/24/16   0837   04/19/16   0803  03/02/16   0759   HGB  14.6  14.0   < >  13.2   --    PLT  306   --    --   321   --    INR   --    --    --    --   0.95   NA  140   --    --    --    --    POTASSIUM  4.1   --    --    --    --    CR  0.66   --    --    --    --     < > = values in this interval not displayed.        IMPRESSION:                                                    Reason for surgery/procedure:  right breast cancer -Proposed procedure: INSERT PORT VASCULAR ACCESS   Diagnosis/reason for consult:   Pre-op consultation  Anemia secondary to celiac disease  History of ablation   Anxiety  Insomnia     The proposed surgical procedure is considered INTERMEDIATE risk.    REVISED CARDIAC RISK INDEX  The patient has the following serious cardiovascular risks for perioperative complications such as (MI, PE, VFib and 3  AV Block):  No serious cardiac risks  INTERPRETATION: 0 risks: Class I (very low risk - 0.4% complication rate)    The patient has the following additional risks for perioperative complications:  No identified additional risks    RECOMMENDATIONS:                                                      APPROVAL GIVEN to proceed with proposed procedure, without further diagnostic evaluation     Anxiety - discussed benzos. Ok to use intermittently, for severe anxiety, especially during this difficult time, but we should avoid this med regularly or long term.  I discussed trial of hydroxyzine for anxiety, and for sleep. Discussed risks/benefits/side effects with the patient.     Signed Electronically by: Yenifer Ridley MD    Copy of this evaluation report is provided to requesting physician.    [facility] Preop Guidelines

## 2017-01-10 NOTE — NURSING NOTE
"Chief Complaint   Patient presents with     Pre-Op Exam       Initial /91 mmHg  Pulse 79  Temp(Src) 97.1  F (36.2  C) (Tympanic)  Ht 5' 4\" (1.626 m)  Wt 191 lb 3.2 oz (86.728 kg)  BMI 32.80 kg/m2 Estimated body mass index is 32.8 kg/(m^2) as calculated from the following:    Height as of this encounter: 5' 4\" (1.626 m).    Weight as of this encounter: 191 lb 3.2 oz (86.728 kg).  BP completed using cuff size: mar Villeda LPN    "

## 2017-01-11 ENCOUNTER — APPOINTMENT (OUTPATIENT)
Dept: GENERAL RADIOLOGY | Facility: CLINIC | Age: 37
End: 2017-01-11
Attending: SURGERY
Payer: COMMERCIAL

## 2017-01-11 ENCOUNTER — ANESTHESIA (OUTPATIENT)
Dept: SURGERY | Facility: CLINIC | Age: 37
End: 2017-01-11
Payer: COMMERCIAL

## 2017-01-11 ENCOUNTER — HOSPITAL ENCOUNTER (OUTPATIENT)
Facility: CLINIC | Age: 37
Discharge: HOME | End: 2017-01-11
Attending: SURGERY | Admitting: SURGERY
Payer: COMMERCIAL

## 2017-01-11 ENCOUNTER — SURGERY (OUTPATIENT)
Age: 37
End: 2017-01-11

## 2017-01-11 VITALS
SYSTOLIC BLOOD PRESSURE: 108 MMHG | WEIGHT: 191.2 LBS | TEMPERATURE: 98.1 F | DIASTOLIC BLOOD PRESSURE: 59 MMHG | OXYGEN SATURATION: 94 % | BODY MASS INDEX: 32.64 KG/M2 | HEART RATE: 66 BPM | RESPIRATION RATE: 16 BRPM | HEIGHT: 64 IN

## 2017-01-11 DIAGNOSIS — G89.18 POST-OP PAIN: Primary | ICD-10-CM

## 2017-01-11 LAB — HCG UR QL: NEGATIVE

## 2017-01-11 PROCEDURE — 25000125 ZZHC RX 250: Performed by: NURSE ANESTHETIST, CERTIFIED REGISTERED

## 2017-01-11 PROCEDURE — 40000305 ZZH STATISTIC PRE PROC ASSESS I: Performed by: SURGERY

## 2017-01-11 PROCEDURE — 40000277 XR SURGERY CARM FLUORO LESS THAN 5 MIN W STILLS

## 2017-01-11 PROCEDURE — 40000940 XR CHEST PORT 1 VW

## 2017-01-11 PROCEDURE — 36000054 ZZH SURGERY LEVEL 2 W FLUORO 1ST 30 MIN: Performed by: SURGERY

## 2017-01-11 PROCEDURE — 77001 FLUOROGUIDE FOR VEIN DEVICE: CPT | Mod: 26 | Performed by: SURGERY

## 2017-01-11 PROCEDURE — 25000125 ZZHC RX 250: Performed by: SURGERY

## 2017-01-11 PROCEDURE — 25000128 H RX IP 250 OP 636: Performed by: SURGERY

## 2017-01-11 PROCEDURE — 37000008 ZZH ANESTHESIA TECHNICAL FEE, 1ST 30 MIN: Performed by: SURGERY

## 2017-01-11 PROCEDURE — 25800025 ZZH RX 258: Performed by: NURSE ANESTHETIST, CERTIFIED REGISTERED

## 2017-01-11 PROCEDURE — 36561 INSERT TUNNELED CV CATH: CPT | Performed by: SURGERY

## 2017-01-11 PROCEDURE — C1788 PORT, INDWELLING, IMP: HCPCS | Performed by: SURGERY

## 2017-01-11 PROCEDURE — 71000027 ZZH RECOVERY PHASE 2 EACH 15 MINS: Performed by: SURGERY

## 2017-01-11 PROCEDURE — 37000009 ZZH ANESTHESIA TECHNICAL FEE, EACH ADDTL 15 MIN: Performed by: SURGERY

## 2017-01-11 PROCEDURE — 81025 URINE PREGNANCY TEST: CPT | Performed by: NURSE ANESTHETIST, CERTIFIED REGISTERED

## 2017-01-11 PROCEDURE — 36000052 ZZH SURGERY LEVEL 2 EA 15 ADDTL MIN: Performed by: SURGERY

## 2017-01-11 PROCEDURE — 27210794 ZZH OR GENERAL SUPPLY STERILE: Performed by: SURGERY

## 2017-01-11 PROCEDURE — 27210995 ZZH RX 272: Performed by: SURGERY

## 2017-01-11 DEVICE — CATH PORT MRI POWERPORT 8FR SL 1808000: Type: IMPLANTABLE DEVICE | Site: CHEST  WALL | Status: FUNCTIONAL

## 2017-01-11 RX ORDER — FENTANYL CITRATE 50 UG/ML
25-50 INJECTION, SOLUTION INTRAMUSCULAR; INTRAVENOUS EVERY 5 MIN PRN
Status: DISCONTINUED | OUTPATIENT
Start: 2017-01-11 | End: 2017-01-11 | Stop reason: HOSPADM

## 2017-01-11 RX ORDER — CEFAZOLIN SODIUM 2 G/100ML
2 INJECTION, SOLUTION INTRAVENOUS
Status: COMPLETED | OUTPATIENT
Start: 2017-01-11 | End: 2017-01-11

## 2017-01-11 RX ORDER — KETOROLAC TROMETHAMINE 30 MG/ML
30 INJECTION, SOLUTION INTRAMUSCULAR; INTRAVENOUS EVERY 6 HOURS PRN
Status: DISCONTINUED | OUTPATIENT
Start: 2017-01-11 | End: 2017-01-11 | Stop reason: HOSPADM

## 2017-01-11 RX ORDER — ONDANSETRON 4 MG/1
4 TABLET, ORALLY DISINTEGRATING ORAL EVERY 30 MIN PRN
Status: DISCONTINUED | OUTPATIENT
Start: 2017-01-11 | End: 2017-01-11 | Stop reason: HOSPADM

## 2017-01-11 RX ORDER — LIDOCAINE HYDROCHLORIDE 10 MG/ML
INJECTION, SOLUTION INFILTRATION; PERINEURAL PRN
Status: DISCONTINUED | OUTPATIENT
Start: 2017-01-11 | End: 2017-01-11

## 2017-01-11 RX ORDER — FENTANYL CITRATE 50 UG/ML
25-50 INJECTION, SOLUTION INTRAMUSCULAR; INTRAVENOUS
Status: DISCONTINUED | OUTPATIENT
Start: 2017-01-11 | End: 2017-01-11 | Stop reason: HOSPADM

## 2017-01-11 RX ORDER — MEPERIDINE HYDROCHLORIDE 25 MG/ML
12.5 INJECTION INTRAMUSCULAR; INTRAVENOUS; SUBCUTANEOUS
Status: DISCONTINUED | OUTPATIENT
Start: 2017-01-11 | End: 2017-01-11 | Stop reason: HOSPADM

## 2017-01-11 RX ORDER — SODIUM CHLORIDE, SODIUM LACTATE, POTASSIUM CHLORIDE, CALCIUM CHLORIDE 600; 310; 30; 20 MG/100ML; MG/100ML; MG/100ML; MG/100ML
1000 INJECTION, SOLUTION INTRAVENOUS CONTINUOUS
Status: DISCONTINUED | OUTPATIENT
Start: 2017-01-11 | End: 2017-01-11 | Stop reason: HOSPADM

## 2017-01-11 RX ORDER — HYDROMORPHONE HYDROCHLORIDE 1 MG/ML
.3-.5 INJECTION, SOLUTION INTRAMUSCULAR; INTRAVENOUS; SUBCUTANEOUS EVERY 10 MIN PRN
Status: DISCONTINUED | OUTPATIENT
Start: 2017-01-11 | End: 2017-01-11 | Stop reason: HOSPADM

## 2017-01-11 RX ORDER — ACETAMINOPHEN 650 MG/1
650 SUPPOSITORY RECTAL EVERY 4 HOURS PRN
Status: DISCONTINUED | OUTPATIENT
Start: 2017-01-11 | End: 2017-01-11 | Stop reason: HOSPADM

## 2017-01-11 RX ORDER — ALBUTEROL SULFATE 0.83 MG/ML
2.5 SOLUTION RESPIRATORY (INHALATION) EVERY 4 HOURS PRN
Status: DISCONTINUED | OUTPATIENT
Start: 2017-01-11 | End: 2017-01-11 | Stop reason: HOSPADM

## 2017-01-11 RX ORDER — ONDANSETRON 2 MG/ML
4 INJECTION INTRAMUSCULAR; INTRAVENOUS EVERY 30 MIN PRN
Status: DISCONTINUED | OUTPATIENT
Start: 2017-01-11 | End: 2017-01-11 | Stop reason: HOSPADM

## 2017-01-11 RX ORDER — CEFAZOLIN SODIUM 1 G/3ML
1 INJECTION, POWDER, FOR SOLUTION INTRAMUSCULAR; INTRAVENOUS SEE ADMIN INSTRUCTIONS
Status: DISCONTINUED | OUTPATIENT
Start: 2017-01-11 | End: 2017-01-11 | Stop reason: HOSPADM

## 2017-01-11 RX ORDER — PROCHLORPERAZINE MALEATE 10 MG
10 TABLET ORAL EVERY 6 HOURS PRN
Qty: 30 TABLET | Refills: 3 | Status: SHIPPED | OUTPATIENT
Start: 2017-01-11 | End: 2017-03-01

## 2017-01-11 RX ORDER — FENTANYL CITRATE 50 UG/ML
INJECTION, SOLUTION INTRAMUSCULAR; INTRAVENOUS PRN
Status: DISCONTINUED | OUTPATIENT
Start: 2017-01-11 | End: 2017-01-11

## 2017-01-11 RX ORDER — LIDOCAINE 40 MG/G
CREAM TOPICAL
Status: DISCONTINUED | OUTPATIENT
Start: 2017-01-11 | End: 2017-01-11 | Stop reason: HOSPADM

## 2017-01-11 RX ORDER — PROPOFOL 10 MG/ML
INJECTION, EMULSION INTRAVENOUS CONTINUOUS PRN
Status: DISCONTINUED | OUTPATIENT
Start: 2017-01-11 | End: 2017-01-11

## 2017-01-11 RX ORDER — PROMETHAZINE HYDROCHLORIDE 25 MG/ML
12.5 INJECTION, SOLUTION INTRAMUSCULAR; INTRAVENOUS
Status: DISCONTINUED | OUTPATIENT
Start: 2017-01-11 | End: 2017-01-11 | Stop reason: HOSPADM

## 2017-01-11 RX ORDER — DEXAMETHASONE SODIUM PHOSPHATE 4 MG/ML
4 INJECTION, SOLUTION INTRA-ARTICULAR; INTRALESIONAL; INTRAMUSCULAR; INTRAVENOUS; SOFT TISSUE EVERY 10 MIN PRN
Status: DISCONTINUED | OUTPATIENT
Start: 2017-01-11 | End: 2017-01-11 | Stop reason: HOSPADM

## 2017-01-11 RX ORDER — OXYCODONE AND ACETAMINOPHEN 5; 325 MG/1; MG/1
1 TABLET ORAL EVERY 4 HOURS PRN
Qty: 20 TABLET | Refills: 0 | Status: SHIPPED | OUTPATIENT
Start: 2017-01-11 | End: 2017-06-30

## 2017-01-11 RX ORDER — LORAZEPAM 0.5 MG/1
0.5 TABLET ORAL EVERY 4 HOURS PRN
Qty: 30 TABLET | Refills: 3 | Status: SHIPPED | OUTPATIENT
Start: 2017-01-11 | End: 2017-03-01

## 2017-01-11 RX ORDER — DEXAMETHASONE 4 MG/1
8 TABLET ORAL DAILY
Qty: 6 TABLET | Refills: 3 | Status: SHIPPED | OUTPATIENT
Start: 2017-01-11 | End: 2017-01-14

## 2017-01-11 RX ADMIN — HEPARIN SODIUM 10 ML: 1000 INJECTION, SOLUTION INTRAVENOUS; SUBCUTANEOUS at 15:00

## 2017-01-11 RX ADMIN — LIDOCAINE HYDROCHLORIDE 50 MG: 10 INJECTION, SOLUTION INFILTRATION; PERINEURAL at 14:34

## 2017-01-11 RX ADMIN — CEFAZOLIN SODIUM 2 G: 2 INJECTION, SOLUTION INTRAVENOUS at 14:31

## 2017-01-11 RX ADMIN — FENTANYL CITRATE 100 MCG: 50 INJECTION, SOLUTION INTRAMUSCULAR; INTRAVENOUS at 14:32

## 2017-01-11 RX ADMIN — SODIUM CHLORIDE, POTASSIUM CHLORIDE, SODIUM LACTATE AND CALCIUM CHLORIDE 1000 ML: 600; 310; 30; 20 INJECTION, SOLUTION INTRAVENOUS at 13:42

## 2017-01-11 RX ADMIN — Medication 2 TABLET: at 15:26

## 2017-01-11 RX ADMIN — MIDAZOLAM HYDROCHLORIDE 2 MG: 1 INJECTION, SOLUTION INTRAMUSCULAR; INTRAVENOUS at 14:32

## 2017-01-11 RX ADMIN — PROPOFOL 200 MCG/KG/MIN: 10 INJECTION, EMULSION INTRAVENOUS at 14:34

## 2017-01-11 RX ADMIN — LIDOCAINE HYDROCHLORIDE 1 ML: 10 INJECTION, SOLUTION INFILTRATION; PERINEURAL at 13:42

## 2017-01-11 RX ADMIN — LIDOCAINE HYDROCHLORIDE 10 ML: 10 INJECTION, SOLUTION INFILTRATION; PERINEURAL at 15:00

## 2017-01-11 NOTE — H&P (VIEW-ONLY)
[facility]  [address] [address]  [phone]  Dept: [phone]    PRE-OP EVALUATION:  Today's date: 1/10/2017    Diana Wilson (: 1980) presents for pre-operative evaluation assessment as requested by  .  She requires evaluation and anesthesia risk assessment prior to undergoing surgery/procedure for treatment of right breast cancer .  Proposed procedure: INSERT PORT VASCULAR ACCESS     Date of Surgery/ Procedure: 2017  Time of Surgery/ Procedure: 2:00 pm  Hospital/Surgical Facility: [facility]   Primary Physician: Yenifer Ridley  Type of Anesthesia Anticipated: General    Patient has a Health Care Directive or Living Will:  YES - Patient will send a copy     1. NO - Do you have a history of heart attack, stroke, stent, bypass or surgery on an artery in the head, neck, heart or legs?  2. NO - Do you ever have any pain or discomfort in your chest?  3. NO - Do you have a history of  Heart Failure?  4. NO - Are you troubled by shortness of breath when: walking on the level, up a slight hill or at night?  5. NO - Do you currently have a cold, bronchitis or other respiratory infection?  6. NO - Do you have a cough, shortness of breath or wheezing?  7. NO - Do you sometimes get pains in the calves of your legs when you walk?   8. Yes - Do you or anyone in your family have previous history of blood clots? Mother- post surgical blood clot. No personal blood clot   9. NO - Do you or does anyone in your family have a serious bleeding problem such as prolonged bleeding following surgeries or cuts?  10. Yes - Have you ever had problems with anemia or been told to take iron pills? Had anemia with celiac sprue diagnosis   11. Yes - Have you had any abnormal blood loss such as black, tarry or bloody stools, or abnormal vaginal bleeding? Vaginal bleeding in the past- had ablation done.   12. NO - Have you ever had a blood transfusion?  13. Yes - Have  you or any of your relatives ever had problems with anesthesia? Nausea (patient)   14. NO - Do you have sleep apnea, excessive snoring or daytime drowsiness?  15. NO - Do you have any prosthetic heart valves?  16. NO - Do you have prosthetic joints?  17. NO - Is there any chance that you may be pregnant?      HPI:                                                      Brief HPI related to upcoming procedure: right breast cancer and axillary nodes positive. Getting port placed for chemo     High anxiety -   Struggling to sleep - ambien doesn't work. Trazodone doesn't work. Hasn't tried lunesta.   Melatonin - taking 1-2 tablets  Using intermittent xanax for anxiety. Anxiety has been high since diagnosis     See problem list for active medical problems.  Problems all longstanding and stable, except as noted/documented.  See ROS for pertinent symptoms related to these conditions.                                                                                                  .    MEDICAL HISTORY:                                                      Patient Active Problem List    Diagnosis Date Noted     Invasive ductal carcinoma of breast, right (H) 12/30/2016     Priority: Medium     Intestinal malabsorption 02/16/2016     Priority: Medium     Patient declines flu vaccine 11/01/2013     Priority: Medium     Health Care Home 05/14/2013     Priority: Low     EMERGENCY CARE PLAN  May 14, 2013: No current Care Coordination follow up planned. Please refer if Care Coordination services are needed.    Presenting Problem Signs and Symptoms Treatment Plan   Questions or concerns   during clinic hours   I will call my clinic directly:  [address] [id number] [phone].    Questions or concerns outside clinic hours   I will call the 24 hour nurse line at   [phone] or [facility].   Need to schedule an appointment   I will call the 24 hour scheduling team at [phone] or my  clinic directly at [phone].    Same day treatment     I will call my clinic first, nurse line if after hours, urgent care and express care if needed.   Clinic care coordination services (regular clinic hours)     I will call a clinic care coordinator directly:     Darron Gaming RN  Dariana Arthur, Fri - [phone]  Wed, Thurs - [phone]    Marlin Nelson, SW:    [phone]    Or call my clinic at [phone] and ask to speak with care coordination.   Crisis Services: Behavioral or Mental Health  C[facility] Connection 24 Hour Phone Line  [phone]    [facility] 24 Hour Crisis Services  [phone]    [facility] (Behavioral Health Providers) Network [phone]    [facility]   [phone]       Emergency treatment -- Immediately    CAll 911              Malignant neoplasm of upper-outer quadrant of right female breast (H) 01/06/2017     Anxiety 07/15/2014     Insomnia 07/17/2012     RLS (restless legs syndrome) 06/28/2012     Iron deficiency anemia 03/14/2012     Major depressive disorder, recurrent episode, mild (H) 08/05/2011      mainly postpartum, had tried Zoloft and anafranil    [facility] pt ed program          CARDIOVASCULAR SCREENING; LDL GOAL LESS THAN 130 10/31/2010     Obesity 09/08/2008     Wt Readings from Last 5 Encounters:   07/11/11 172 lb (78.019 kg)   12/20/10 174 lb (78.926 kg)   07/26/10 166 lb (75.297 kg)   11/12/09 170 lb (77.111 kg)   11/03/09 169 lb (76.658 kg)     Body mass index is 29.99 kg/(m^2).        Smoker 07/14/2008     Mild intermittent asthma             never has had PFT's, MDI with colds and at times exercise       TRICHOTILLOMANIA              well controlled        Past Medical History   Diagnosis Date     Transient hypertension of pregnancy, antepartum      PIH with last birth     Fibroadenosis of breast      Other and unspecified ovarian cyst 03/19/2003     RIGHT ovarian cyst     Nongonococcal urethritis (JALIL) due to  chlamydia trachomatis 01/03/2002     Excessive or frequent menstruation 03/19/2003     Encounter for insertion or removal of intrauterine contraceptive device 1/8/2008     Past Surgical History   Procedure Laterality Date     Surgical history of -   1997     Breast reduction     C appendectomy  1991     Dental surgery  09/13/2010 and 10/12/2010     Root canals     Dilation and curettage, hysteroscopy, ablate endometrium novasure, combined  3/20/2012     Procedure:COMBINED DILATION AND CURETTAGE, HYSTEROSCOPY, ABLATE ENDOMETRIUM NOVASURE; Hysteroscopy with Endometrial Ablation Novasure; Surgeon:GERRI PURCELL; Location:[facility] OR     Esophagoscopy, gastroscopy, duodenoscopy (egd), combined N/A 2/18/2016     Procedure: COMBINED ESOPHAGOSCOPY, GASTROSCOPY, DUODENOSCOPY (EGD);  Surgeon: Jose L Wilson MD;  Location: [facility]     Current Outpatient Prescriptions   Medication Sig Dispense Refill     ALPRAZolam (XANAX) 0.5 MG tablet Take 1 tablet (0.5 mg) by mouth 3 times daily as needed for anxiety For Dental appointments 15 tablet 0     albuterol (PROAIR HFA, PROVENTIL HFA, VENTOLIN HFA) 108 (90 BASE) MCG/ACT inhaler Inhale 2 puffs into the lungs every 6 hours as needed for shortness of breath / dyspnea 1 Inhaler 11     fluticasone (FLONASE) 50 MCG/ACT nasal spray Spray 1-2 sprays into both nostrils daily 3 Bottle 3     loratadine (CLARITIN) 10 MG tablet Take 1 tablet (10 mg) by mouth daily 90 tablet 3     montelukast (SINGULAIR) 10 MG tablet Take 1 tablet (10 mg) by mouth At Bedtime 90 tablet 3     famotidine (PEPCID) 20 MG tablet Take 20 mg by mouth daily as needed        cyclobenzaprine (FLEXERIL) 10 MG tablet Take 1 tablet (10 mg) by mouth 3 times daily as needed for muscle spasms 30 tablet 0     ibuprofen (ADVIL) 200 MG capsule Take 200 mg by mouth every 4 hours as needed.       OTC products: no recent use of OTC ASA, NSAIDS or Steroids and no use of herbal medications or other supplements    Allergies   Allergen  "Reactions     Ambien      hallucinations     Amoxicillin GI Disturbance     Erythromycin GI Disturbance     Penicillins Nausea and Vomiting and Hives     Hydrocodone Other (See Comments)     Out of body experience, \"creepy-crawly\" skin       Latex Allergy: NO    Social History   Substance Use Topics     Smoking status: Former Smoker -- 0.10 packs/day     Types: Cigarettes     Smokeless tobacco: Never Used      Comment: Socially- quit smoking 10/01/2016     Alcohol Use: 0.0 oz/week     0 Standard drinks or equivalent per week      Comment: Socially     History   Drug Use No       REVIEW OF SYSTEMS:                                                    Constitutional, neuro, ENT, endocrine, pulmonary, cardiac, gastrointestinal, genitourinary, musculoskeletal, integument and psychiatric systems are negative, except as otherwise noted.    EXAM:                                                    /91 mmHg  Pulse 79  Temp(Src) 97.1  F (36.2  C) (Tympanic)  Ht 5' 4\" (1.626 m)  Wt 191 lb 3.2 oz (86.728 kg)  BMI 32.80 kg/m2    GENERAL APPEARANCE: healthy, alert and no distress     EYES: EOMI,- PERRL     HENT: ear canals and TM's normal and nose and mouth without ulcers or lesions     NECK: no adenopathy, no asymmetry, masses, or scars and thyroid normal to palpation     RESP: lungs clear to auscultation - no rales, rhonchi or wheezes     CV: regular rates and rhythm, normal S1 S2, no S3 or S4 and no murmur, click or rub -     ABDOMEN:  soft, nontender, no HSM or masses and bowel sounds normal     MS: extremities normal- no gross deformities noted, no evidence of inflammation in joints, FROM in all extremities.     NEURO: Normal strength and tone, sensory exam grossly normal, mentation intact and speech normal     PSYCH: mentation appears normal. and affect normal/bright    DIAGNOSTICS:                                                    EKG: Not indicated due to non-vascular surgery and low risk of event (age <65 and " without cardiac risk factors)    Recent Labs   Lab Test  01/05/17   0934  10/24/16   0837   04/19/16   0803  03/02/16   0759   HGB  14.6  14.0   < >  13.2   --    PLT  306   --    --   321   --    INR   --    --    --    --   0.95   NA  140   --    --    --    --    POTASSIUM  4.1   --    --    --    --    CR  0.66   --    --    --    --     < > = values in this interval not displayed.        IMPRESSION:                                                    Reason for surgery/procedure:  right breast cancer -Proposed procedure: INSERT PORT VASCULAR ACCESS   Diagnosis/reason for consult:   Pre-op consultation  Anemia secondary to celiac disease  History of ablation   Anxiety  Insomnia     The proposed surgical procedure is considered INTERMEDIATE risk.    REVISED CARDIAC RISK INDEX  The patient has the following serious cardiovascular risks for perioperative complications such as (MI, PE, VFib and 3  AV Block):  No serious cardiac risks  INTERPRETATION: 0 risks: Class I (very low risk - 0.4% complication rate)    The patient has the following additional risks for perioperative complications:  No identified additional risks    RECOMMENDATIONS:                                                      APPROVAL GIVEN to proceed with proposed procedure, without further diagnostic evaluation     Anxiety - discussed benzos. Ok to use intermittently, for severe anxiety, especially during this difficult time, but we should avoid this med regularly or long term.  I discussed trial of hydroxyzine for anxiety, and for sleep. Discussed risks/benefits/side effects with the patient.     Signed Electronically by: Yenifer Ridley MD    Copy of this evaluation report is provided to requesting physician.    [facility] Preop Guidelines

## 2017-01-11 NOTE — IP AVS SNAPSHOT
MRN:[id number]                      After Visit Summary   1/11/2017    Daina Wilson    MRN: [id number]           Thank you!     Thank you for choosing [facility] for your care. Our goal is always to provide you with excellent care. Hearing back from our patients is one way we can continue to improve our services. Please take a few minutes to complete the written survey that you may receive in the mail after you visit with us. Thank you!        Patient Information     Date Of Birth          1980        About your hospital stay     You were admitted on:  January 11, 2017 You last received care in the:  [facility] PreOP/Phase II    You were discharged on:  January 11, 2017       Who to Call     For medical emergencies, please call 911.  For non-urgent questions about your medical care, please call your primary care provider or clinic, [phone]  For questions related to your surgery, please call your surgery clinic        Attending Provider     Provider    Michael Townsend MD       Primary Care Provider Office Phone # Fax #    Yenifer Lorraine Ridley [phone]       [facility] [address] [facility] [address]        After Care Instructions     Diet Instructions       Resume pre-procedure diet            No Alcohol       For 24 hours post procedure            No driving or operating machinery        until the day after procedure            Shower        No shower for 24 hours post procedure. May shower Postoperative Day (POD)  1            Weight bearing status - As tolerated                 Your next 10 appointments already scheduled     Jan 13, 2017  2:15 PM   (Arrive by 2:00 PM)   NEW WITH ROOM with Claudia Willis GC,  2 116 CONSULT [facility] ([facility])    [address] [place] [address]  2nd Floor  [facility] [address] [phone]            Jan 16, 2017  8:00 AM   Level 4 with ROOM 7 [facility]  Cancer Infusion ([facility])    [facility]  [address]  [facility] [address]   [phone]            Jan 16, 2017  8:45 AM   Return Visit with Maribel Jin MD   [facility] ([facility])    [facility]  [address]  [facility] [address]   [phone]              Further instructions from your care team                           Same Day Surgery Discharge Instructions  Special Precautions After Surgery - Adult    1. It is not unusual to feel lightheaded or faint, up to 24 hours after surgery or while taking pain medication.  If you have these symptoms; sit for a few minutes before standing and have someone assist you when getting up.  2. You should rest and relax for the next 24 hours and must have someone stay with you for at least 24 hours after your discharge.  3. DO NOT DRIVE any vehicle or operate mechanical equipment for 24 hours following the end of your surgery.  DO NOT DRIVE while taking narcotic pain medications that have been prescribed by your physician.  If you had a limb operated on, you must be able to use it fully to drive.  4. DO NOT drink alcoholic beverages for 24 hours following surgery or while taking prescription pain medication.  5. Drink clear liquids (apple juice, ginger ale, broth, 7-Up, etc.).  Progress to your regular diet as you feel able.  6. Any questions call your physician and do not make important decisions for 24 hours.  __________________________________________________________________________________________________________________________________  IMPORTANT NUMBERS:    [facility] Main Number:  [phone], [phone]  Pharmacy:  [phone]  Same Day Surgery:  [phone], Monday - Friday until 8:30 p.m.  Urgent Care:  [phone]  Emergency Room:  [phone]      Surgery Specialty Clinic:  [phone]           Pending Results     No orders found from 1/10/2017  "to 1/12/2017.            Admission Information        Provider Department Dept Phone    1/11/2017 Michael Townsend MD [facility] Preop/Phase -[phone]      Your Vitals Were     Blood Pressure Pulse Temperature    100/61 mmHg 66 98.1  F (36.7  C) (Oral)    Respirations Height Weight    16 1.626 m (5' 4.02\") 86.728 kg (191 lb 3.2 oz)    BMI (Body Mass Index) Pulse Oximetry       32.80 kg/m2 92%       MyChart Information     [employer] gives you secure access to your electronic health record. If you see a primary care provider, you can also send messages to your care team and make appointments. If you have questions, please call your primary care clinic.  If you do not have a primary care provider, please call [phone] and they will assist you.        Care EveryWhere ID     This is your Care EveryWhere ID. This could be used by other organizations to access your [facility] medical records  [id number]           Review of your medicines      START taking        Dose / Directions    dexamethasone 4 MG tablet   Commonly known as:  DECADRON   Used for:  Malignant neoplasm of upper-outer quadrant of right female breast (H)        Dose:  8 mg   Take 2 tablets (8 mg) by mouth daily for 3 days Start on Day 2.   Quantity:  6 tablet   Refills:  3       LORazepam 0.5 MG tablet   Commonly known as:  ATIVAN   Used for:  Malignant neoplasm of upper-outer quadrant of right female breast (H)        Dose:  0.5 mg   Take 1 tablet (0.5 mg) by mouth every 4 hours as needed (Anxiety, Nausea/Vomiting or Sleep)   Quantity:  30 tablet   Refills:  3       oxyCODONE-acetaminophen 5-325 MG per tablet   Commonly known as:  PERCOCET   Used for:  Post-op pain        Dose:  1 tablet   Take 1 tablet by mouth every 4 hours as needed for pain   Quantity:  20 tablet   Refills:  0       prochlorperazine 10 MG tablet   Commonly known as:  COMPAZINE   Used for:  Malignant neoplasm of upper-outer quadrant of right female breast (H)        Dose:  10 mg   Take 1 " tablet (10 mg) by mouth every 6 hours as needed (Nausea/Vomiting)   Quantity:  30 tablet   Refills:  3         CONTINUE these medicines which have NOT CHANGED        Dose / Directions    ADVIL 200 MG capsule   Generic drug:  ibuprofen        Dose:  200 mg   Take 200 mg by mouth every 4 hours as needed.   Refills:  0       albuterol 108 (90 BASE) MCG/ACT Inhaler   Commonly known as:  PROAIR HFA/PROVENTIL HFA/VENTOLIN HFA   Used for:  Mild persistent asthma with exacerbation        Dose:  2 puff   Inhale 2 puffs into the lungs every 6 hours as needed for shortness of breath / dyspnea   Quantity:  1 Inhaler   Refills:  11       ALPRAZolam 0.5 MG tablet   Commonly known as:  XANAX   Used for:  Situational anxiety, Invasive ductal carcinoma of breast, right (H)        Dose:  0.5 mg   Take 1 tablet (0.5 mg) by mouth 3 times daily as needed for anxiety   Quantity:  15 tablet   Refills:  0       cyclobenzaprine 10 MG tablet   Commonly known as:  FLEXERIL   Used for:  Right lumbar pain, Sacroiliac joint dysfunction of right side        Dose:  10 mg   Take 1 tablet (10 mg) by mouth 3 times daily as needed for muscle spasms   Quantity:  30 tablet   Refills:  0       famotidine 20 MG tablet   Commonly known as:  PEPCID        Dose:  20 mg   Take 20 mg by mouth daily as needed   Refills:  0       fluticasone 50 MCG/ACT spray   Commonly known as:  FLONASE   Used for:  Environmental allergies        Dose:  1-2 spray   Spray 1-2 sprays into both nostrils daily   Quantity:  3 Bottle   Refills:  3       hydrOXYzine 10 MG tablet   Commonly known as:  ATARAX   Used for:  Anxiety, Situational anxiety        Dose:  10-50 mg   Take 1-5 tablets (10-50 mg) by mouth every 6 hours as needed for anxiety   Quantity:  60 tablet   Refills:  1       loratadine 10 MG tablet   Commonly known as:  CLARITIN   Used for:  Mild persistent asthma with exacerbation        Dose:  10 mg   Take 1 tablet (10 mg) by mouth daily   Quantity:  90 tablet   Refills:   3       montelukast 10 MG tablet   Commonly known as:  SINGULAIR   Used for:  Environmental allergies        Dose:  10 mg   Take 1 tablet (10 mg) by mouth At Bedtime   Quantity:  90 tablet   Refills:  3       scopolamine 72 hr patch   Commonly known as:  TRANSDERM   Used for:  Preop general physical exam, Nausea        Place 1 patch onto the skin every 72 hours.  Apply to hairless area behind one ear at least 4 hours before travel.  Remove old patch and change every 3 days.   Quantity:  1 patch   Refills:  0            Where to get your medicines      These medications were sent to [facility] [place] - [address]  [address] [facility] [place] [address]     Phone:  [phone]    - dexamethasone 4 MG tablet  - prochlorperazine 10 MG tablet      Some of these will need a paper prescription and others can be bought over the counter. Ask your nurse if you have questions.     Bring a paper prescription for each of these medications    - LORazepam 0.5 MG tablet  - oxyCODONE-acetaminophen 5-325 MG per tablet             Protect others around you: Learn how to safely use, store and throw away your medicines at www.disposemymeds.org.             Medication List: This is a list of all your medications and when to take them. Check marks below indicate your daily home schedule. Keep this list as a reference.      Medications           Morning Afternoon Evening Bedtime As Needed    ADVIL 200 MG capsule   Take 200 mg by mouth every 4 hours as needed.   Generic drug:  ibuprofen                                albuterol 108 (90 BASE) MCG/ACT Inhaler   Commonly known as:  PROAIR HFA/PROVENTIL HFA/VENTOLIN HFA   Inhale 2 puffs into the lungs every 6 hours as needed for shortness of breath / dyspnea                                ALPRAZolam 0.5 MG tablet   Commonly known as:  XANAX   Take 1 tablet (0.5 mg) by mouth 3 times daily as needed for anxiety                                cyclobenzaprine 10 MG  tablet   Commonly known as:  FLEXERIL   Take 1 tablet (10 mg) by mouth 3 times daily as needed for muscle spasms                                dexamethasone 4 MG tablet   Commonly known as:  DECADRON   Take 2 tablets (8 mg) by mouth daily for 3 days Start on Day 2.                                famotidine 20 MG tablet   Commonly known as:  PEPCID   Take 20 mg by mouth daily as needed                                fluticasone 50 MCG/ACT spray   Commonly known as:  FLONASE   Spray 1-2 sprays into both nostrils daily                                hydrOXYzine 10 MG tablet   Commonly known as:  ATARAX   Take 1-5 tablets (10-50 mg) by mouth every 6 hours as needed for anxiety                                loratadine 10 MG tablet   Commonly known as:  CLARITIN   Take 1 tablet (10 mg) by mouth daily                                LORazepam 0.5 MG tablet   Commonly known as:  ATIVAN   Take 1 tablet (0.5 mg) by mouth every 4 hours as needed (Anxiety, Nausea/Vomiting or Sleep)                                montelukast 10 MG tablet   Commonly known as:  SINGULAIR   Take 1 tablet (10 mg) by mouth At Bedtime                                oxyCODONE-acetaminophen 5-325 MG per tablet   Commonly known as:  PERCOCET   Take 1 tablet by mouth every 4 hours as needed for pain                                prochlorperazine 10 MG tablet   Commonly known as:  COMPAZINE   Take 1 tablet (10 mg) by mouth every 6 hours as needed (Nausea/Vomiting)                                scopolamine 72 hr patch   Commonly known as:  TRANSDERM   Place 1 patch onto the skin every 72 hours.  Apply to hairless area behind one ear at least 4 hours before travel.  Remove old patch and change every 3 days.

## 2017-01-11 NOTE — PROGRESS NOTES
Quick Note:    Message left on patient's personal voicemail with results of PET scan. Advised to call back with questions or concerns. Direct line provided.  ______

## 2017-01-11 NOTE — IP AVS SNAPSHOT
[facility] PreOP/Phase II    [address] [facility] [address]    Phone:  [phone]    Fax:  [phone]                                       After Visit Summary   1/11/2017    Diana Wilson    MRN: [id number]           After Visit Summary Signature Page     I have received my discharge instructions, and my questions have been answered. I have discussed any challenges I see with this plan with the nurse or doctor.    ..........................................................................................................................................  Patient/Patient Representative Signature      ..........................................................................................................................................  Patient Representative Print Name and Relationship to Patient    ..................................................               ................................................  Date                                            Time    ..........................................................................................................................................  Reviewed by Signature/Title    ...................................................              ..............................................  Date                                                            Time

## 2017-01-11 NOTE — OP NOTE
Pre Op Diagnosis:  Breast Cancer requiring chemotherapy  Post Op Diagnosis:   Same  Procedure: Power port placement  Anesthesia:  Local MAC  Surgeon:  Cary    Indication for surgery:  This is a 37 yo female with a cancer requiring IV access for chemotherapy.  I discussed power port placement with her.  Risks and benefits were explained, including but not limited to bleeding, infection, and anesthesia. She seems to understand and consented to proceed with the procedure.     Under local MAC anesthesia, the patient's chest was prepped and draped in the usual manner.  1% lidocaine was used to infiltrate the chest wall under the clavicle on the right side.  The patient was placed in Trendelenburg and the subclavian vein was accessed after one pass to the needle.  The wire was placed and the needle was removed over the wire.  The tip of the wire was checked under fluoroscopy and was noted to be in the correct position.    1% lidocaine was then used to infiltrate over the chest wall prior to making the pocket for the port.  An incision was made with a 15 blade knife and a pocket was made with electrocautery.   The port was placed in the pocket.    The catheter was then tunneled from the pocket to the wire insertion site.  The dilator and sheath was placed over the wire, the wire and dilator was removed.  The catheter was placed in the sheath, and the sheath was removed over the catheter.  There was good blood return from the catheter and it flushed easily.  The tip was checked under fluoroscopy and was noted to be in good position.      The catheter was cut at the 20 cm marta and connected to the port.  There was good blood return from the port and the port flushed easily.      The incision was then closed with 3-0 vicryl in the deep tissue and the skin sites were closed with 4-0 subcuticular Monocryl stitch.  Dermabond was applied.  A CXR will be done in the [facility].    Josh Townsend MD, FACS

## 2017-01-11 NOTE — ANESTHESIA POSTPROCEDURE EVALUATION
Patient: Diana Wilson    INSERT PORT VASCULAR ACCESS (N/A Neck)  Additional InformationProcedure(s):  Power Port Placement - Wound Class: I-Clean    Diagnosis:right breast cancer  Diagnosis Additional Information: No value filed.    Anesthesia Type:  MAC    Note:  Anesthesia Post Evaluation    Patient location during evaluation: Bedside  Patient participation: Able to fully participate in evaluation  Level of consciousness: awake and alert  Pain management: adequate  Airway patency: patent  Cardiovascular status: acceptable  Respiratory status: acceptable  Hydration status: acceptable  PONV: none     Anesthetic complications: None          Last vitals:  Filed Vitals:    01/11/17 1300   BP: 133/91   Pulse: 66   Temp: 36.4  C (97.6  F)   Resp: 16   SpO2: 93%       Electronically Signed By: Suad Howard CRNA, APRN CRNA  January 11, 2017  3:01 PM

## 2017-01-11 NOTE — ANESTHESIA PREPROCEDURE EVALUATION
Anesthesia Evaluation     . Pt has had prior anesthetic. Type: General and MAC    History of anesthetic complications  - PONV    ROS/MED HX    ENT/Pulmonary:     (+)tobacco use, Past use asthma , . .    Neurologic: Comment: RLS    (+)other neuro restless leg syndrome    Cardiovascular:     (+) hypertension----. : . . . :. . Previous cardiac testing Echodate:[id number]:Interpretation Summary     The visual ejection fraction is estimated at 60-65%.  Normal global strain -24%.date: results: date: results: date: results:          METS/Exercise Tolerance:     Hematologic:     (+) Anemia, -      Musculoskeletal:         GI/Hepatic: Comment: Celiac sprue  Intestinal malabsorption - neg GI/hepatic ROS       Renal/Genitourinary:  - ROS Renal section negative       Endo:     (+) Obesity, .      Psychiatric: Comment: insomnia    (+) psychiatric history anxiety and depression      Infectious Disease:         Malignancy:   (+) Malignancy History of Breast  Breast CA Active status post. Right breast cancer        Other: Comment: Ovarian cyst  trichotillomania   - neg other ROS           Physical Exam  Normal systems: cardiovascular, pulmonary and dental    Airway   Mallampati: II  TM distance: >3 FB  Neck ROM: full    Dental     Cardiovascular       Pulmonary                     Anesthesia Plan      History & Physical Review  History and physical reviewed and following examination; no interval change.    ASA Status:  2 .    NPO Status:  > 8 hours    Plan for MAC   PONV prophylaxis:  Ondansetron (or other 5HT-3) and Dexamethasone or Solumedrol       Postoperative Care  Postoperative pain management:  IV analgesics and Oral pain medications.      Consents  Anesthetic plan, risks, benefits and alternatives discussed with:  Patient..                          .

## 2017-01-11 NOTE — ADDENDUM NOTE
Addendum  created 01/11/17 1504 by Suad Howard APRN CRNA    Modules edited: Orders, PRL Based Order Sets

## 2017-01-11 NOTE — DISCHARGE INSTRUCTIONS
Same Day Surgery Discharge Instructions  Special Precautions After Surgery - Adult    1. It is not unusual to feel lightheaded or faint, up to 24 hours after surgery or while taking pain medication.  If you have these symptoms; sit for a few minutes before standing and have someone assist you when getting up.  2. You should rest and relax for the next 24 hours and must have someone stay with you for at least 24 hours after your discharge.  3. DO NOT DRIVE any vehicle or operate mechanical equipment for 24 hours following the end of your surgery.  DO NOT DRIVE while taking narcotic pain medications that have been prescribed by your physician.  If you had a limb operated on, you must be able to use it fully to drive.  4. DO NOT drink alcoholic beverages for 24 hours following surgery or while taking prescription pain medication.  5. Drink clear liquids (apple juice, ginger ale, broth, 7-Up, etc.).  Progress to your regular diet as you feel able.  6. Any questions call your physician and do not make important decisions for 24 hours.  __________________________________________________________________________________________________________________________________  IMPORTANT NUMBERS:    [facility] Main Number:  [phone], [phone]  Pharmacy:  [phone]  Same Day Surgery:  [phone], Monday - Friday until 8:30 p.m.  Urgent Care:  [phone]  Emergency Room:  [phone]      Sur[facility] Specialty Clinic:  [phone]

## 2017-01-12 ENCOUNTER — TRANSFERRED RECORDS (OUTPATIENT)
Dept: HEALTH INFORMATION MANAGEMENT | Facility: CLINIC | Age: 37
End: 2017-01-12

## 2017-01-13 ENCOUNTER — OFFICE VISIT (OUTPATIENT)
Dept: ONCOLOGY | Facility: CLINIC | Age: 37
End: 2017-01-13
Attending: GENETIC COUNSELOR, MS
Payer: COMMERCIAL

## 2017-01-13 ENCOUNTER — TRANSFERRED RECORDS (OUTPATIENT)
Dept: HEALTH INFORMATION MANAGEMENT | Facility: CLINIC | Age: 37
End: 2017-01-13

## 2017-01-13 DIAGNOSIS — D50.9 IRON DEFICIENCY ANEMIA, UNSPECIFIED IRON DEFICIENCY ANEMIA TYPE: ICD-10-CM

## 2017-01-13 DIAGNOSIS — Z80.0 FAMILY HISTORY OF PANCREATIC CANCER: ICD-10-CM

## 2017-01-13 DIAGNOSIS — C50.911 MALIGNANT NEOPLASM OF RIGHT FEMALE BREAST, UNSPECIFIED SITE OF BREAST: ICD-10-CM

## 2017-01-13 DIAGNOSIS — C50.911 MALIGNANT NEOPLASM OF RIGHT FEMALE BREAST, UNSPECIFIED SITE OF BREAST: Primary | ICD-10-CM

## 2017-01-13 LAB — MISCELLANEOUS TEST: NORMAL

## 2017-01-13 PROCEDURE — 96040 ZZH GENETIC COUNSELING, EACH 30 MINUTES: CPT | Mod: ZF | Performed by: GENETIC COUNSELOR, MS

## 2017-01-13 PROCEDURE — 36415 COLL VENOUS BLD VENIPUNCTURE: CPT

## 2017-01-13 NOTE — NURSING NOTE
Chief Complaint   Patient presents with     Blood Draw     Patient hx of breast CA. Labs obtained via right arm antecubital area and sent to laboratory department.

## 2017-01-13 NOTE — Clinical Note
1/13/2017       RE: Diana Wilson  [address] [place] [id number]     Dear Colleague,    Thank you for referring your patient, Diana Wilson, to the [facility]. Please see a copy of my visit note below.    1/13/2017    Referring Provider: Maribel Jin MD    Presenting Information:   I met with Diana Wilson today for genetic counseling at the Cancer Risk Management Program at the [facility] to discuss her personal history of breast cancer and family history of pancreatic and several other types of cancer.  She is here today to discuss this history and genetic testing options.    Personal History:  Diana is a 36 year old female. 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 Of note, the tumor cells identified in her lymph nodes are ER/FL/Her2-.  Treatment will most likely begin with chemotherapy. She had her first menstrual period at age 11, her first child at age 22-23, and is premenopausal. She used oral contraceptive pills for less than one year and briefly used hormone replacement therapy in her teenage years to address PMS symptoms. Her uterus and ovaries are intact. Her last pelvic exam in August 2016 and Pap smear in July 2015 were normal. She had one mammogram in August 2007 to address a possible left breast mass that was normal; her most recent mammogram in December 2016 identified this cancer. She recently had a breast MRI in January 2017. She reports having one transvaginal ultrasound, D&C procedure, and uterine ablation in March 2012 to address abnormal bleeding. She had one upper endoscopy in February 2016 that identified signs of Celiac disease. She does not regularly do any other cancer screening. She reports having multiple skin tags. She smoked cigarettes socially and quit in 2016. She drinks alcohol socially.    Family History: (Please see scanned pedigree for detailed family history  "information)    Diana's mother was diagnosed with pancreatic cancer at age 58 and passed away at age 59; treatment included chemotherapy and surgery. She had a history of smoking. Of note, Diana recalls being told that her mother also had squamous cell carcinoma identified in her lymph nodes and an \"internal melanoma\". Records regarding her cancer diagnosis are being requested as Diana's father signed a Release of Information (LIDIA) form today.    Three maternal great uncles (through her grandmother) were diagnosed with lung cancer at older ages and had a history of smoking.    One maternal great uncle (through her grandmother) was diagnosed with colorectal cancer in his 50's and passed away in his 60's. Treatment included chemotherapy.    Several maternal first cousins once removed (through her grandmother) have been diagnosed with cancer, including one diagnosed with breast cancer in her 40's (currently in her 60's), one with a melanoma, and one with colorectal cancer diagnosed at age 55.    Diana reports that there is no paternal family history of cancer.    Her maternal ethnicity is American, English, and Scandinavian. Her paternal ethnicity is Yakut, Turkish, and Upper sorbian.  Ashkenazi Orthodoxy ancestry was denied.    Discussion:    Diana's personal history of breast cancer and family history of pancreatic and several other types of cancer are suggestive of a possible hereditary cancer syndrome.    We discussed the natural history and genetics of several hereditary cancer syndromes, including Hereditary Breast and Ovarian Cancer (HBOC) syndrome. A detailed handout regarding these syndromes and the information we discussed was provided to Diana at the end of our appointment today and can be found in the after visit summary.     Topics included: inheritance pattern, cancer risks, cancer screening recommendations, and also risks, benefits and limitations of testing.    We reviewed the features of sporadic, familial, and " hereditary cancers. Diana's personal and family history presents with several features consistent with hereditary cancers, as she was diagnosed under age 49, her mother was diagnosed with a related cancer (and possibly other primary cancers), and there are multiple maternal extended relatives diagnosed with potentially related cancers. That being said, she also has several relatives that have never been diagnosed with a cancer or were diagnosed with cancers associated with significant environmental risk factors (lung cancer and smoking, etc.).    We reviewed that the most common cause of hereditary breast cancer is HBOC syndrome, caused by mutations in the BRCA1 and BRCA2. We also reviewed the increased risks for other cancers, including pancreatic cancer and melanoma. Based on her personal and family history, Diana meets current National Comprehensive Cancer Network (NCCN) criteria for genetic testing of BRCA1 and BRCA2.    We also reviewed that there are several other genes/syndromes associated with increased risks for the cancers seen in Diana's family. For example, mutations in the PALB2 and TYLER genes are associated with breast and pancreatic cancer risk. As many of these genes/syndromes can present with overlapping features in a family history, it would be reasonable for Diana to consider genetic testing that addresses several of these genes/syndromes at one time.  We reviewed genetic testing options for hereditary breast cancer: guidelines-based high and moderate risk panel testing (BRCAplus-Expanded, 8 genes) and expanded high and moderate risk panel testing (BreastNext, 17 genes). Diana expressed interest in learning as much information as possible from the testing. She opted for the BreastNext panel.  Genetic testing is available for 17 genes associated with increased risk for breast cancer: BreastNext (TYLER, BARD1, BRCA1, BRCA2, BRIP1, CDH1, CHEK2, MRE11A, MUTYH, NBN, NF1, PALB2, PTEN, RAD50, RAD51C,  RAD51D, and TP53).  We discussed that some of the genes in the BreastNext panel are associated with specific syndromes and published management guidelines: Hereditary Breast and Ovarian Cancer syndrome (BRCA1, BRCA2), Hereditary Diffuse Gastric Cancer (CDH1), Cowden syndrome (PTEN), Li Fraumeni syndrome (TP53), MUTYH Associated Polyposis (MUTYH), and Neurofibromatosis type 1 (NF1).   The TYLER, BRIP1, CHEK2, NBN, PALB2, RAD51C, and RAD51D genes are associated with moderate breast cancer risk and have published management guidelines for either breast and/or ovarian cancer risk management.    The remaining genes (BARD1, MRE11A, and RAD50) are associated with moderate breast cancer risk and may allow us to make medical recommendations when mutations are identified.    Diana was provided with a detailed brochure from [facility] explaining the BreastNext testing.    Consent was obtained and genetic testing for BreastNext was sent to [facility]. Turn around time: approximately 4-5 weeks. As treatment decisions may be made based on the BRCA1 and BRCA2 test results, I will request that the laboratory rush this testing. As such, these initial test results should be available in approximately 7-14 days.     The information from this test may determine cancer screening and/or surgical decision-making for Diana and her relatives.    Screening recommendations based upon family history:    Formal cancer screening recommendations will be made after Diana's test results are available.    Plan:  1) Today Diana elected to proceed with genetic testing using the BreastNext gene panel offered by [facility].  2) I will request that genetic testing of the BRCA1 and BRCA2 genes be rushed, meaning the results should be available in approximately 7-14 days. The results of the complete BreastNext genetic test should be available in approximately 4-5 weeks.  3) Diana will first be called with her BRCA1 and BRCA2 genetic  testing results. She will then return to clinic to discuss the complete BreastNext test results.    Face to face time: 45 minutes    Claudia Willis MS, [facility]  Certified Genetic Counselor  Office: [phone]  Pager: [phone]

## 2017-01-13 NOTE — Clinical Note
Cancer Risk Management  Program Locations    [facility]  [facility]  Mailing Address  Cancer Risk Management Program  [address]  [place] [address]    New patient appointments  [phone]  January 20, 2017    Diana Wilson  [address]  [facility] [id number]      Dear Diana,    It was a pleasure meeting with you at the [facility] on January 13, 2017. Here is a copy of the progress note from your recent genetic counseling visit to the Cancer Risk Management Program. If you have any additional questions, please feel free to call.    1/13/2017    Referring Provider: Maribel Jin MD    Presenting Information:   I met with Diana Wilson today for genetic counseling at the Cancer Risk Management Program at the [facility] to discuss her personal history of breast cancer and family history of pancreatic and several other types of cancer.  She is here today to discuss this history and genetic testing options.    Personal History:  Diana is a 36 year old female. She was diagnosed with invasive ductal carcinoma with DCIS of the right breast at age 36; the tumor is ER/MD+ and Her2-. Of note, the tumor cells identified in her lymph nodes are ER/MD/Her2-.  Treatment will most likely begin with chemotherapy. She had her first menstrual period at age 11, her first child at age 22-23, and is premenopausal. She used oral contraceptive pills for less than one year and briefly used hormone replacement therapy in her teenage years to address PMS symptoms. Her uterus and ovaries are intact. Her last pelvic exam in August 2016 and Pap smear in July 2015 were normal. She had one mammogram in August 2007 to address a possible left breast mass that was normal; her most recent mammogram in December  "2016 identified this cancer. She recently had a breast MRI in January 2017. She reports having one transvaginal ultrasound, D&C procedure, and uterine ablation in March 2012 to address abnormal bleeding. She had one upper endoscopy in February 2016 that identified signs of Celiac disease. She does not regularly do any other cancer screening. She reports having multiple skin tags. She smoked cigarettes socially and quit in 2016. She drinks alcohol socially.    Family History: (Please see scanned pedigree for detailed family history information)    Diana's mother was diagnosed with pancreatic cancer at age 58 and passed away at age 59; treatment included chemotherapy and surgery. She had a history of smoking. Of note, Diaan recalls being told that her mother also had squamous cell carcinoma identified in her lymph nodes and an \"internal melanoma\". Records regarding her cancer diagnosis are being requested as Diana's father signed a Release of Information (LIDIA) form today.    Three maternal great uncles (through her grandmother) were diagnosed with lung cancer at older ages and had a history of smoking.    One maternal great uncle (through her grandmother) was diagnosed with colorectal cancer in his 50's and passed away in his 60's. Treatment included chemotherapy.    Several maternal first cousins once removed (through her grandmother) have been diagnosed with cancer, including one diagnosed with breast cancer in her 40's (currently in her 60's), one with a melanoma, and one with colorectal cancer diagnosed at age 55.    Diana reports that there is no paternal family history of cancer.    Her maternal ethnicity is [place]ym[place], English, and Scandinavian. Her paternal ethnicity is Macedonian, Croatian, and Slovenian.  Ashkenazi Temple ancestry was denied.    Discussion:    Diana's personal history of breast cancer and family history of pancreatic and several other types of cancer are suggestive of a possible hereditary cancer " syndrome.    We discussed the natural history and genetics of several hereditary cancer syndromes, including Hereditary Breast and Ovarian Cancer (HBOC) syndrome. A detailed handout regarding these syndromes and the information we discussed was provided to Diana at the end of our appointment today and can be found in the after visit summary.     Topics included: inheritance pattern, cancer risks, cancer screening recommendations, and also risks, benefits and limitations of testing.    We reviewed the features of sporadic, familial, and hereditary cancers. Diana's personal and family history presents with several features consistent with hereditary cancers, as she was diagnosed under age 49, her mother was diagnosed with a related cancer (and possibly other primary cancers), and there are multiple maternal extended relatives diagnosed with potentially related cancers. That being said, she also has several relatives that have never been diagnosed with a cancer or were diagnosed with cancers associated with significant environmental risk factors (lung cancer and smoking, etc.).    We reviewed that the most common cause of hereditary breast cancer is HBOC syndrome, caused by mutations in the BRCA1 and BRCA2. We also reviewed the increased risks for other cancers, including pancreatic cancer and melanoma. Based on her personal and family history, Diana meets current National Comprehensive Cancer Network (NCCN) criteria for genetic testing of BRCA1 and BRCA2.    We also reviewed that there are several other genes/syndromes associated with increased risks for the cancers seen in Diana's family. For example, mutations in the PALB2 and TYLER genes are associated with breast and pancreatic cancer risk. As many of these genes/syndromes can present with overlapping features in a family history, it would be reasonable for Diana to consider genetic testing that addresses several of these genes/syndromes at one time.  We reviewed  genetic testing options for hereditary breast cancer: guidelines-based high and moderate risk panel testing (BRCAplus-Expanded, 8 genes) and expanded high and moderate risk panel testing (BreastNext, 17 genes). Diana expressed interest in learning as much information as possible from the testing. She opted for the BreastNext panel.  Genetic testing is available for 17 genes associated with increased risk for breast cancer: BreastNext (TYLER, BARD1, BRCA1, BRCA2, BRIP1, CDH1, CHEK2, MRE11A, MUTYH, NBN, NF1, PALB2, PTEN, RAD50, RAD51C, RAD51D, and TP53).  We discussed that some of the genes in the BreastNext panel are associated with specific syndromes and published management guidelines: Hereditary Breast and Ovarian Cancer syndrome (BRCA1, BRCA2), Hereditary Diffuse Gastric Cancer (CDH1), Cowden syndrome (PTEN), Li Fraumeni syndrome (TP53), MUTYH Associated Polyposis (MUTYH), and Neurofibromatosis type 1 (NF1).   The TYLER, BRIP1, CHEK2, NBN, PALB2, RAD51C, and RAD51D genes are associated with moderate breast cancer risk and have published management guidelines for either breast and/or ovarian cancer risk management.    The remaining genes (BARD1, MRE11A, and RAD50) are associated with moderate breast cancer risk and may allow us to make medical recommendations when mutations are identified.    Diana was provided with a detailed brochure from [facility] explaining the BreastNext testing.    Consent was obtained and genetic testing for BreastNext was sent to [facility]. Turn around time: approximately 4-5 weeks. As treatment decisions may be made based on the BRCA1 and BRCA2 test results, I will request that the laboratory rush this testing. As such, these initial test results should be available in approximately 7-14 days.     The information from this test may determine cancer screening and/or surgical decision-making for Diana and her relatives.    Screening recommendations based upon family  history:    Formal cancer screening recommendations will be made after Diana's test results are available.    Plan:  1) Today Diana elected to proceed with genetic testing using the BreastNext gene panel offered by [facility].  2) I will request that genetic testing of the BRCA1 and BRCA2 genes be rushed, meaning the results should be available in approximately 7-14 days. The results of the complete BreastNext genetic test should be available in approximately 4-5 weeks.  3) Diana will first be called with her BRCA1 and BRCA2 genetic testing results. She will then return to clinic to discuss the complete BreastNext test results.    Face to face time: 45 minutes    Claudia Willis MS, [facility]  Certified Genetic Counselor  Office: [phone]  Pager: [phone]

## 2017-01-13 NOTE — MR AVS SNAPSHOT
After Visit Summary   1/13/2017    Diana Wilson    MRN: [id number]           Patient Information     Date Of Birth          1980        Visit Information        Provider Department      1/13/2017 2:15 PM Claudia Willis GC;  2 [address] CONSULT [facility]        Today's Diagnoses     Malignant neoplasm of right female breast, unspecified site of breast (H)    -  1     Family history of pancreatic cancer           Care Instructions      Assessing Cancer Risk  Only about 5-10% of cancers are thought to be due to an inherited cancer susceptibility gene.    These families often have:    Several people with the same or related types of cancer    Cancers diagnosed at a young age (before age 50)    Individuals with more than one primary cancer    Multiple generations of the family affected with cancer    Some people may be candidates for genetic testing of more than one gene.  For these families, genetic testing using a multi-gene cancer panel may be offered.  These panels may test genes known to increase the risk for breast (and other) cancers: TYLER, BRCA1, BRCA2, CDH1, CHEK2, PALB2, PTEN, and TP53.  The purpose of this handout is to serve as a brief summary of the high/moderate-risk breast cancer genes which have published clinical management guidelines for individuals who are found to carry a mutation.    Hereditary Breast and Ovarian Cancer Syndrome (HBOC)  A single mutation in one of the copies of BRCA1 or BRCA2 increases the risk for breast and ovarian cancer, among others.  The risk for pancreatic cancer and melanoma may also be slightly increased in some families.  The tables below list the chance that someone with a BRCA mutation would develop cancer in his or her lifetime1,2,3,4.          Women   Men     General Population BRCA+    General Population BRCA+   Breast  12% 40-80%  Breast <1% ~7%   Ovarian  1-2% 10-40%  Prostate 16% 20%     A person s ethnic background is also  important to consider, as individuals of Ashkenazi [employer] ancestry have a higher chance of having a BRCA gene mutation.  There are three BRCA mutations that occur more frequently in this population.    Li-Fraumeni Syndrome (LFS)  LFS is a cancer predisposition syndrome. Individuals with LFS are at an increased risk for developing cancer at a young age. The general lifetime risk for development of cancer is 50% by age 30 and 90% by age 60.  LFS is caused by a mutation in the TP53 gene.  A single mutation in one of the copies of TP53 increases the risk for multiple cancers.     Core Cancers: Sarcomas, Breast, Brain, Lung, Leukemias/Lymphomas, Adrenocortical carcinomas  Other Cancers: Gastrointestinal, Thyroid, Skin, Genitourinary        Cowden Syndrome  Cowden syndrome is a hereditary condition that increases the risk for breast, thyroid, endometrial, and kidney cancer.  Cowden syndrome is caused by a mutation in the PTEN gene.  A single mutation in one of the copies of PTEN causes Cowden syndrome and increases cancer risk.  The table below shows the chance that someone with a PTEN mutation would develop cancer in their lifetime5,6.  Other benign features seen in some individuals with Cowden syndrome include benign skin lesions (facial papules, keratoses, lipomas), learning disability, autism, thyroid nodules, colon polyps, and larger head size.      Lifetime Cancer Risk   Cancer Type General Population Cowden Syndrome   Breast  12% 25-50%*   Thyroid  1% 35%   Renal 1-2% 35%   Endometrial  2-3% 28%   Colon 5% 9%   Melanoma 2-3% >5%     *One recent study found breast cancer risk to be increased to 85%    Hereditary Diffuse Gastric Cancer (HDGC)  Currently, one gene is known to cause hereditary diffuse gastric cancer: CDH1.  Individuals with HDGC are at increased risk for diffuse gastric cancer and lobular breast cancer. Of people diagnosed with HDGC, 30-50% have a mutation in the CDH1 gene.  This suggests there are  likely other genes that may cause HDGC that have not been identified yet.      Lifetime Cancer Risks    General Pop HDGC    Diffuse Gastric  <1% ~80%   Breast 12% 39-52%     Additional Genes Associated with Increased Breast Cancer Risk  PALB2  Mutations in PALB2 have been shown to increase the risk of breast cancer up to 33-58% in some families; where individuals fall within this risk range is dependent upon family history.9 PALB2 mutations have also been associated with increased risk for pancreatic cancer, although this risk has not been quantified yet.  Individuals who inherit two PALB2 mutations--one from their mother and one from their father--have a condition called Fanconi Anemia.  This rare autosomal recessive condition is associated with short stature, developmental delay, bone marrow failure, and increased risk for childhood cancers.    TYLER  TYLER is a moderate-risk breast cancer gene. Women who have a mutation in TYLER can have between a 2-4 fold increased risk for breast cancer compared to the general population.10  TYLER mutations have also been associated with increased risk for pancreatic cancer, however an estimate of this cancer risk is not well understood.11  Individuals who inherit two TYLER mutations have a condition called ataxia-telangiectasia (AT).  This rare autosomal recessive condition affects the nervous system and immune system, and is associated with progressive cerebellar ataxia beginning in childhood.  Individuals with ataxia-telangiectasia often have a weakened immune system and have an increased risk for childhood cancers.           CHEK2   CHEK2 is a moderate-risk breast cancer gene.  Women who have a mutation in CHEK2 have around a 2-fold increased risk for breast cancer compared to the general population, and this risk may be higher depending upon family history.12,13,14  Mutations in CHEK2 have also been shown to increase the risk of a number of other cancers, including colon and  prostate, however these cancer risks are currently not well understood.         Inheritance   All of the genes reviewed above are inherited in an autosomal dominant pattern.  This means that if a parent has a mutation, each of his or her children will have a 50% chance of inheriting that same mutation.  Therefore, each child--male or female--would have a 50% chance of being at increased risk for developing cancer.      Image obtained from Genetics Home Reference, 2013     Mutations in some genes can occur de marley, which means that a person s mutation occurred for the first time in them and was not inherited from a parent.  Now that they have the mutation, however, it can be passed on to future generations.    Genetic Testing  Genetic testing involves a blood test and will look for any harmful mutations within genes that are associated with increased cancer risk.  If possible, it is recommended that the person(s) who has had cancer be tested before other family members.  That person will give us the most useful information about whether or not a specific gene is associated with the cancer in the family.    Results  There are three possible results of genetic testing:    Positive--a harmful mutation was identified in one or more of the genes    Negative--no mutation was identified in any of the genes on this panel    Variant of unknown significance--a variation in one of the genes was identified, but it is unclear how this impacts cancer risk in the family    Advantages and Disadvantages  There are advantages and disadvantages to genetic testing.  Advantages    May clarify your cancer risk    Can help you make medical decisions    May explain the cancers in your family    May give useful information to your family members (if you share your results)    Disadvantages    Possible negative emotional impact of learning about inherited cancer risk    Uncertainty in interpreting a negative test result in some  situations    Possible genetic discrimination concerns (see below)    Genetic Information Nondiscrimination Act (RADHA)  RADHA is a federal law that protects individuals from health insurance or employment discrimination based on a genetic test result alone.  Although rare, there are currently no legal discrimination protections in terms of life insurance, long term care, or disability insurances.  Visit the National Human RedSeguro Research [place] genome.gov/56466644 to learn more.    Reducing Cancer Risk  Each of the genes listed within this handout have nationally recognized cancer screening guidelines that would be recommended for individuals who test positive.  In addition to increased cancer screening, surgeries may be offered or recommended to reduce cancer risk.  Recommendations are based upon an individual s genetic test result as well as their personal and family history of cancer.    Questions to Think About Regarding Genetic Testing    What effect will the test result have on me and my relationship with my family members if I have an inherited gene mutation?  If I don t have a gene mutation?    Should I share my test results, and how will my family react to this news, which may also affect them?    Are my children ready to learn new information that may one day affect their own health?      Resources  FORCE: Facing Our Risk of Cancer Empowered facingourrisk.org   Bright Pink bebrightpink.org   Li-Fraumeni Syndrome Association lfsassociation.org   PTEN World PerMicrowGetAFive.Loopcam   No stomach for cancer, Inc. nostomachforcancer.org   Stomach cancer relief network scrnet.org   Collaborative Group of the Americas on Inherited Colorectal Cancer (CGA) cgaicc.com    Cancer Care cancercare.org   American Cancer Society (ACS) cancer.org   National Cancer [place] (NCI) cancer.gov     Cancer Risk Management Program [phone] [[email or web address]]-CANCER ([phone])  ? Cornelia Duffy MS, [facility]  [phone]  ? Kendy Pedroza MS,  [facility]  [phone]  ? Claudia Willis, MS, [facility]  [phone]  ? Hillary Taylor, MS, [facility]  [phone]    References  1. Kat Shell PDP, Juana S, Ramona RAMIREZ, Angie JE, Teri JL, Chseter N, Tai H, Seema O, India A, Ajini B, Radisusan P, Manjuliano S, Devonte DM, Andrews N, Sowmya E, Thanh H, Haim E, Srini J, Bassem J, Akiko B, Tulinius H, Thorlacius S, Eerola H, Nevgilmar H, Agnieszka K, Regan OP. Average risks of breast and ovarian cancer associated with BRCA1 or BRCA2 mutations detected in case series unselected for family history: a combined analysis of 222 studies. Am J Hum Nat. 2003;72:1117-30.  2. Elvia N, Elvira M, Federico G.  BRCA1 and BRCA2 Hereditary Breast and Ovarian Cancer. Gene Reviews online. 2013.  3. Angel YC, Zander S, Neo G, Strickland S. Breast cancer risk among male BRCA1 and BRCA2 mutation carriers. J Natl Cancer Inst. 2007;99:1811-4.  4. Jose DG, Rashad I, Diego J, Erica E, Adam ER, Shyla F. Risk of breast cancer in male BRCA2 carriers. J Med Nat. 2010;47:710-1.  5. Reza MH, Jorge J, Jd J, Dane LA, Contreras MS, Eng C. Lifetime cancer risks in individuals with germline PTEN mutations. Clin Cancer Res. 2012;18:400-7.  6. Pilgladyski R. Cowden Syndrome: A Critical Review of the Clinical Literature. J Nat . 2009:18:13-27.  7. National Comprehensive Cancer Network. Clinical practice guidelines in oncology, colorectal cancer screening. Available online (registration required). 2013.  8. National Cancer [place]. SEER Cancer Stat Fact Sheets.  December 2013.  9. Juliette VALENZUELA et al. Breast-Cancer Risk in Families with Mutations in PALB2. NEJM. 2014; 371(6):497-506.  10. Marbella MARQUES, Danilo D, Harmony S, Rosy P, Hector T, Conner M, Frederick B, Solange H, Jillian R, Dulce Maria K, José Miguel L, Jose ESQUIVEL, Devonte D, Slim DF, Joe BECK, The Breast Cancer Susceptibility Collaboration (UK) & Truman MOSS. TYLER mutations that cause ataxia-telangiectasia are breast  cancer susceptibility alleles. Nature Genetics. 2006;38:873-875  11. Roe N , Yong Y, Nara J, Teetee L, Luz Maria GM , Jeannie ML, Ada S, Yoo AG, Syngal S, Bang ML, Silviano J , Andrea R, Abisai SZ, Loulou JR, Beatrice VE, Ranjith M, Vongozistein B, Tanya N, Virgie RH, Dorian KW, and Harvey AP. TYLER mutations in patients with hereditary pancreatic cancer. Cancer Discover. 2012;2:41-46  12. CHEK2 Breast Cancer Case-Control Consortium. CHEK2*1100delC and susceptibility to breast cancer: A collaborative analysis involving 10,860 breast cancer cases and 9,065 controls from 10 studies. Am J Hum Nat, 74 (2004), pp. 9636-6483  13. Adama T, Susan S, Jose Cruz K, et al. Spectrum of Mutations in BRCA1, BRCA2, CHEK2, and TP53 in Families at High Risk of Breast Cancer. DUNIA. 2006;295(12):8488-9172.   14. Hanane C, Martin D, Comfort A, et al. Risk of breast cancer in women with a CHEK2 mutation with and without a family history of breast cancer. J Clin Oncol. 2011;29:4723-6105            Follow-ups after your visit        Follow-up notes from your care team     Return in about 4 weeks (around 2/10/2017).      Your next 10 appointments already scheduled     Jan 13, 2017  3:30 PM   [facility] with  [facility]   [facility] ([facility] [place])    [address] [place] [id number] [phone]            Jan 16, 2017  8:00 AM   Level 4 with ROOM 7 [facility])    [facility]  [address]  [facility] [address]   [phone]            Jan 16, 2017  8:45 AM   Return Visit with Maribel Jin MD   [facility] ([facility])    [facility]  [address]  [facility] [address]   [phone]              Future tests that were ordered for you today     Open Future Orders        Priority Expected Expires  Ordered    BreastNext genetic testing, Rolanda test: Laboratory Miscellaneous Order Routine  1/13/2018 1/13/2017            Who to contact     If you have questions or need follow up information about today's clinic visit or your schedule please contact [facility] directly at [phone].  Normal or non-critical lab and imaging results will be communicated to you by MyChart, letter or phone within 4 business days after the clinic has received the results. If you do not hear from us within 7 days, please contact the clinic through MyChart or phone. If you have a critical or abnormal lab result, we will notify you by phone as soon as possible.  Submit refill requests through [employer] or call your pharmacy and they will forward the refill request to us. Please allow 3 business days for your refill to be completed.          Additional Information About Your Visit        MyChart Information     [employer] gives you secure access to your electronic health record. If you see a primary care provider, you can also send messages to your care team and make appointments. If you have questions, please call your primary care clinic.  If you do not have a primary care provider, please call [phone] and they will assist you.        Care EveryWhere ID     This is your Care EveryWhere ID. This could be used by other organizations to access your [place] medical records  [id number]         Blood Pressure from Last 3 Encounters:   01/11/17 108/59   01/10/17 139/91   01/05/17 127/83    Weight from Last 3 Encounters:   01/11/17 86.728 kg (191 lb 3.2 oz)   01/10/17 86.728 kg (191 lb 3.2 oz)   01/05/17 85.911 kg (189 lb 6.4 oz)               Primary Care Provider Office Phone # Fax #    Yenifer Ridley [phone]       [facility] [address] [facility] [address]        Thank you!     Thank you for choosing [facility]  for your care. Our goal is always to provide  you with excellent care. Hearing back from our patients is one way we can continue to improve our services. Please take a few minutes to complete the written survey that you may receive in the mail after your visit with us. Thank you!             Your Updated Medication List - Protect others around you: Learn how to safely use, store and throw away your medicines at www.disposemymeds.org.          This list is accurate as of: 1/13/17  3:21 PM.  Always use your most recent med list.                   Brand Name Dispense Instructions for use    ADVIL 200 MG capsule   Generic drug:  ibuprofen      Take 200 mg by mouth every 4 hours as needed.       albuterol 108 (90 BASE) MCG/ACT Inhaler    PROAIR HFA/PROVENTIL HFA/VENTOLIN HFA    1 Inhaler    Inhale 2 puffs into the lungs every 6 hours as needed for shortness of breath / dyspnea       ALPRAZolam 0.5 MG tablet    XANAX    15 tablet    Take 1 tablet (0.5 mg) by mouth 3 times daily as needed for anxiety       cyclobenzaprine 10 MG tablet    FLEXERIL    30 tablet    Take 1 tablet (10 mg) by mouth 3 times daily as needed for muscle spasms       dexamethasone 4 MG tablet    DECADRON    6 tablet    Take 2 tablets (8 mg) by mouth daily for 3 days Start on Day 2.       famotidine 20 MG tablet    PEPCID     Take 20 mg by mouth daily as needed       fluticasone 50 MCG/ACT spray    FLONASE    3 Bottle    Spray 1-2 sprays into both nostrils daily       hydrOXYzine 10 MG tablet    ATARAX    60 tablet    Take 1-5 tablets (10-50 mg) by mouth every 6 hours as needed for anxiety       loratadine 10 MG tablet    CLARITIN    90 tablet    Take 1 tablet (10 mg) by mouth daily       LORazepam 0.5 MG tablet    ATIVAN    30 tablet    Take 1 tablet (0.5 mg) by mouth every 4 hours as needed (Anxiety, Nausea/Vomiting or Sleep)       montelukast 10 MG tablet    SINGULAIR    90 tablet    Take 1 tablet (10 mg) by mouth At Bedtime       oxyCODONE-acetaminophen 5-325 MG per tablet    PERCOCET    20  tablet    Take 1 tablet by mouth every 4 hours as needed for pain       prochlorperazine 10 MG tablet    COMPAZINE    30 tablet    Take 1 tablet (10 mg) by mouth every 6 hours as needed (Nausea/Vomiting)       scopolamine 72 hr patch    TRANSDERM    1 patch    Place 1 patch onto the skin every 72 hours.  Apply to hairless area behind one ear at least 4 hours before travel.  Remove old patch and change every 3 days.

## 2017-01-13 NOTE — TELEPHONE ENCOUNTER
"Chemotherapy Education    Patient is a 36 year old female here today for chemotherapy education, accompanied by .  Pt has a cancer diagnosis of breast cancer and their main concern is \"surviving\".  Their Oncologist is Adrian Jin, and PCP is Dr. Ridley.  Reviewed the following with the patient and their support person:  Treatment goal: Neoadjuvant  Treatment regimen & duration: (AC) doxorubicin, cytoxan day 1 every 14 days x 4 cycles followed potentially by paclitaxel weekly x 12 (to be determined by pathology) and rationale for strict adherence to this schedule, including specific medication names including pre-treatment medications and at home scheduled or as needed medications, delivery methods,.  Potential side effects: Side effects and management; including skin changes/hand-foot syndrome, anemia, neutropenia, thrombocytopenia, diarrhea/constipation, hair loss syndrome, memory changes/ \"chemobrain\", mouth sores, taste changes, neuropathy, fatigue, myelosuppression, sexuality/infertility, and risk of extravasation or infiltration.  Infection prevention, and monitoring of lab values, what lab tests and what changes of these values meant, along with the possibility of hydration or blood product transfusion, or the need to defer or hold treatment.    Chemotherapy information, including ways it is excreted from the body and cleaning and containment of vomitus or other bodily fluid, use of the bathroom, sexual health and intimacy, what to do if needing to miss a treatment, when to call a provider and the need for staff to wear protective equipment.  Importance of Central line care (port) or IV site care.  Proper use of the take home infusion pump, troubleshooting, and who to call if there is a malfunction.  Written information: Written information including the \"Getting Ready for Chemotherapy: What to Expect, Before, During, and After your Treatment\" booklet, specific drug information guides printed from " "Chemocare.com, NCI booklets \"Eating Hints: Before, During, and After Cancer Treatment\" and \"Chemotherapy and You\".  Also, a folder with information on when to contact the provider, various programs offered at [facility], and our business card with contact information given; Oncology Clinic, RN Case Manager, and the after hours Nurse Advise Line.  General orientation to the Medical Oncology department, Infusion Services department, Huc/scheduling, bathrooms and usual flow of the treatment day provided as well as introduction to the Infusion nurses.  No barriers to learning identified. Patient and family verbalized understanding of all written and verbal information. All questions answered to patients satisfaction.   Other concerns: Reviewed dexamethasone use and take home medications.    Pt instructed to call with further questions or concerns.  Patient states understanding and is in agreement with this plan.  Copy of appointments, and after visit summary (AVS) given to patient. Patient discharged ambulatory.    Face to Face time with patient: 45 min    Juliana Garcia RN, BSN, OCN  Oncology Hematology   Breast Cancer Navigator  [facility]  [email or web address]  Phone [phone]    "

## 2017-01-13 NOTE — PATIENT INSTRUCTIONS
Assessing Cancer Risk  Only about 5-10% of cancers are thought to be due to an inherited cancer susceptibility gene.    These families often have:    Several people with the same or related types of cancer    Cancers diagnosed at a young age (before age 50)    Individuals with more than one primary cancer    Multiple generations of the family affected with cancer    Some people may be candidates for genetic testing of more than one gene.  For these families, genetic testing using a multi-gene cancer panel may be offered.  These panels may test genes known to increase the risk for breast (and other) cancers: TYLER, BRCA1, BRCA2, CDH1, CHEK2, PALB2, PTEN, and TP53.  The purpose of this handout is to serve as a brief summary of the high/moderate-risk breast cancer genes which have published clinical management guidelines for individuals who are found to carry a mutation.    Hereditary Breast and Ovarian Cancer Syndrome (HBOC)  A single mutation in one of the copies of BRCA1 or BRCA2 increases the risk for breast and ovarian cancer, among others.  The risk for pancreatic cancer and melanoma may also be slightly increased in some families.  The tables below list the chance that someone with a BRCA mutation would develop cancer in his or her lifetime1,2,3,4.          Women   Men     General Population BRCA+    General Population BRCA+   Breast  12% 40-80%  Breast <1% ~7%   Ovarian  1-2% 10-40%  Prostate 16% 20%     A person s ethnic background is also important to consider, as individuals of Ashkenazi [place] ancestry have a higher chance of having a BRCA gene mutation.  There are three BRCA mutations that occur more frequently in this population.    Li-Fraumeni Syndrome (LFS)  LFS is a cancer predisposition syndrome. Individuals with LFS are at an increased risk for developing cancer at a young age. The general lifetime risk for development of cancer is 50% by age 30 and 90% by age 60.  LFS is caused by a mutation in the  TP53 gene.  A single mutation in one of the copies of TP53 increases the risk for multiple cancers.     Core Cancers: Sarcomas, Breast, Brain, Lung, Leukemias/Lymphomas, Adrenocortical carcinomas  Other Cancers: Gastrointestinal, Thyroid, Skin, Genitourinary        Cowden Syndrome  Cowden syndrome is a hereditary condition that increases the risk for breast, thyroid, endometrial, and kidney cancer.  Cowden syndrome is caused by a mutation in the PTEN gene.  A single mutation in one of the copies of PTEN causes Cowden syndrome and increases cancer risk.  The table below shows the chance that someone with a PTEN mutation would develop cancer in their lifetime5,6.  Other benign features seen in some individuals with Cowden syndrome include benign skin lesions (facial papules, keratoses, lipomas), learning disability, autism, thyroid nodules, colon polyps, and larger head size.      Lifetime Cancer Risk   Cancer Type General Population Cowden Syndrome   Breast  12% 25-50%*   Thyroid  1% 35%   Renal 1-2% 35%   Endometrial  2-3% 28%   Colon 5% 9%   Melanoma 2-3% >5%     *One recent study found breast cancer risk to be increased to 85%    Hereditary Diffuse Gastric Cancer (HDGC)  Currently, one gene is known to cause hereditary diffuse gastric cancer: CDH1.  Individuals with HDGC are at increased risk for diffuse gastric cancer and lobular breast cancer. Of people diagnosed with HDGC, 30-50% have a mutation in the CDH1 gene.  This suggests there are likely other genes that may cause HDGC that have not been identified yet.      Lifetime Cancer Risks    General Pop HDGC    Diffuse Gastric  <1% ~80%   Breast 12% 39-52%     Additional Genes Associated with Increased Breast Cancer Risk  PALB2  Mutations in PALB2 have been shown to increase the risk of breast cancer up to 33-58% in some families; where individuals fall within this risk range is dependent upon family history.9 PALB2 mutations have also been associated with  increased risk for pancreatic cancer, although this risk has not been quantified yet.  Individuals who inherit two PALB2 mutations--one from their mother and one from their father--have a condition called Fanconi Anemia.  This rare autosomal recessive condition is associated with short stature, developmental delay, bone marrow failure, and increased risk for childhood cancers.    TYLER  TYLER is a moderate-risk breast cancer gene. Women who have a mutation in TYLER can have between a 2-4 fold increased risk for breast cancer compared to the general population.10  TYLER mutations have also been associated with increased risk for pancreatic cancer, however an estimate of this cancer risk is not well understood.11  Individuals who inherit two TYLER mutations have a condition called ataxia-telangiectasia (AT).  This rare autosomal recessive condition affects the nervous system and immune system, and is associated with progressive cerebellar ataxia beginning in childhood.  Individuals with ataxia-telangiectasia often have a weakened immune system and have an increased risk for childhood cancers.           CHEK2   CHEK2 is a moderate-risk breast cancer gene.  Women who have a mutation in CHEK2 have around a 2-fold increased risk for breast cancer compared to the general population, and this risk may be higher depending upon family history.12,13,14  Mutations in CHEK2 have also been shown to increase the risk of a number of other cancers, including colon and prostate, however these cancer risks are currently not well understood.         Inheritance   All of the genes reviewed above are inherited in an autosomal dominant pattern.  This means that if a parent has a mutation, each of his or her children will have a 50% chance of inheriting that same mutation.  Therefore, each child--male or female--would have a 50% chance of being at increased risk for developing cancer.      Image obtained from [employer], 2013      Mutations in some genes can occur de marley, which means that a person s mutation occurred for the first time in them and was not inherited from a parent.  Now that they have the mutation, however, it can be passed on to future generations.    Genetic Testing  Genetic testing involves a blood test and will look for any harmful mutations within genes that are associated with increased cancer risk.  If possible, it is recommended that the person(s) who has had cancer be tested before other family members.  That person will give us the most useful information about whether or not a specific gene is associated with the cancer in the family.    Results  There are three possible results of genetic testing:    Positive--a harmful mutation was identified in one or more of the genes    Negative--no mutation was identified in any of the genes on this panel    Variant of unknown significance--a variation in one of the genes was identified, but it is unclear how this impacts cancer risk in the family    Advantages and Disadvantages  There are advantages and disadvantages to genetic testing.  Advantages    May clarify your cancer risk    Can help you make medical decisions    May explain the cancers in your family    May give useful information to your family members (if you share your results)    Disadvantages    Possible negative emotional impact of learning about inherited cancer risk    Uncertainty in interpreting a negative test result in some situations    Possible genetic discrimination concerns (see below)    Genetic Information Nondiscrimination Act (RADHA)  RADHA is a federal law that protects individuals from health insurance or employment discrimination based on a genetic test result alone.  Although rare, there are currently no legal discrimination protections in terms of life insurance, long term care, or disability insurances.  Visit the National Human Genome Research [place] genome.gov/64025021 to learn  more.    Reducing Cancer Risk  Each of the genes listed within this handout have nationally recognized cancer screening guidelines that would be recommended for individuals who test positive.  In addition to increased cancer screening, surgeries may be offered or recommended to reduce cancer risk.  Recommendations are based upon an individual s genetic test result as well as their personal and family history of cancer.    Questions to Think About Regarding Genetic Testing    What effect will the test result have on me and my relationship with my family members if I have an inherited gene mutation?  If I don t have a gene mutation?    Should I share my test results, and how will my family react to this news, which may also affect them?    Are my children ready to learn new information that may one day affect their own health?      Resources  FORCE: Facing Our Risk of Cancer Empowered facingourrisk.org   Bright Pink bebrightpink.org   Li-Fraumeni Syndrome Association lfsassociation.org   PTEN World PTENworld.com   No stomach for cancer, Inc. nostomachforcancer.org   Stomach cancer relief network scrnet.org   Collaborative Group of the Americas on Inherited Colorectal Cancer (CGA) cgaicc.com    Cancer Care cancercare.org   American Cancer Society (ACS) cancer.org   National Cancer [place] (NCI) cancer.gov     Cancer Risk Management Program [phone] ([phone])  ? Cornelia Clive, MS, [facility]  [phone]  ? Kendy Yovany, MS, [facility]  [phone]  ? Claudia Willis, MS, [facility]  [phone]  ? Hillary Taylor, MS, [facility]  [phone]    References  1. Kat Shell PDP, Juana S, Ramona RAMIREZ, Angie JE, Teri JL, Chester N, Tai H, Seema O, India A, Betty B, Tristen P, Manjuliano S, Devonte DM, Darryl N, Sowmya STEVE, Thanh LUTHER, Haim E, Srini J, Bassem J, Akiko B, Anita H, Thorlacius S, Eerola H, Olman H, Agnieszka K, Regan OP. Average risks of breast and ovarian cancer associated with BRCA1 or BRCA2  mutations detected in case series unselected for family history: a combined analysis of 222 studies. Am J Hum Nat. 2003;72:1117-30.  2. Elvia N, Elvira M, Federico G.  BRCA1 and BRCA2 Hereditary Breast and Ovarian Cancer. Gene Reviews online. 2013.  3. Angel YC, Zander S, Neo G, Em S. Breast cancer risk among male BRCA1 and BRCA2 mutation carriers. J Natl Cancer Inst. 2007;99:1811-4.  4. Jose ESQUIVEL, Rashad I, Diego J, Erica E, Adam ER, Shyla F. Risk of breast cancer in male BRCA2 carriers. J Med Nat. 2010;47:710-1.  5. Reza MH, Jorge J, dJ J, Dane GALE, Contreras BRICE, Monique C. Lifetime cancer risks in individuals with germline PTEN mutations. Clin Cancer Res. 2012;18:400-7.  6. Anjaliki R. Cowden Syndrome: A Critical Review of the Clinical Literature. J Nat . 2009:18:13-27.  7. National Comprehensive Cancer Network. Clinical practice guidelines in oncology, colorectal cancer screening. Available online (registration required). 2013.  8. National Cancer [place]. SEER Cancer Stat Fact Sheets.  December 2013.  9. Juliette VAZ., et al. Breast-Cancer Risk in Families with Mutations in PALB2. NEJM. 2014; 371(6):497-506.  10. Marbella MARQUES, Danilo D, Harmony S, Rosy P, Hector T, Conner M, Frederick B, Solange H, Jillian R, Dulce Maria K, José Miguel L, Jose ESQUIVEL, Devonte D, Slim DF, Joe MR, The Breast Cancer Susceptibility Collaboration (UK) & Truman N. TYLER mutations that cause ataxia-telangiectasia are breast cancer susceptibility alleles. Nature Genetics. 2006;38:873-875  11. Roe N , Yong Y, Nara J, Teetee L, Luz Maria GM , Jeannie ML, Ada S, Yoo AG, Syngal S, Bang ML, Silviano J , Andrea R, Abisai SZ, Loulou JR, Beatrice VE, Ranjith M, Vogelstein B, Tanya N, Virgie RH, Dorian KW, and Harvey AP. TYLER mutations in patients with hereditary pancreatic cancer. Cancer Discover. 2012;2:41-46  12. CHEK2 Breast Cancer Case-Control Consortium. CHEK2*1100delC and susceptibility to  breast cancer: A collaborative analysis involving 10,860 breast cancer cases and 9,065 controls from 10 studies. Am J Hum Nat, 74 (2004), pp. 4926-1232  13. Adama T, Susan S, Jose Cruz K, et al. Spectrum of Mutations in BRCA1, BRCA2, CHEK2, and TP53 in Families at High Risk of Breast Cancer. DUNIA. 2006;295(12):6541-3977.   14. Hanane THIBODEAUX, Martin MACIAS, Comfort MARQUES, et al. Risk of breast cancer in women with a CHEK2 mutation with and without a family history of breast cancer. J Clin Oncol. 2011;29:2544-5118

## 2017-01-13 NOTE — PROGRESS NOTES
1/13/2017    Referring Provider: Maribel Jin MD    Presenting Information:   I met with Diana Wilson today for genetic counseling at the Cancer Risk Management Program at the [facility] to discuss her personal history of breast cancer and family history of pancreatic and several other types of cancer.  She is here today to discuss this history and genetic testing options.    Personal History:  Diana is a 36 year old female. She was diagnosed with invasive ductal carcinoma with DCIS of the right breast at age 36; the tumor is ER/VT+ and Her2-. Of note, the tumor cells identified in her lymph nodes are ER/VT/Her2-.  Treatment will most likely begin with chemotherapy. She had her first menstrual period at age 11, her first child at age 22-23, and is premenopausal. She used oral contraceptive pills for less than one year and briefly used hormone replacement therapy in her teenage years to address PMS symptoms. Her uterus and ovaries are intact. Her last pelvic exam in August 2016 and Pap smear in July 2015 were normal. She had one mammogram in August 2007 to address a possible left breast mass that was normal; her most recent mammogram in December 2016 identified this cancer. She recently had a breast MRI in January 2017. She reports having one transvaginal ultrasound, D&C procedure, and uterine ablation in March 2012 to address abnormal bleeding. She had one upper endoscopy in February 2016 that identified signs of Celiac disease. She does not regularly do any other cancer screening. She reports having multiple skin tags. She smoked cigarettes socially and quit in 2016. She drinks alcohol socially.    Family History: (Please see scanned pedigree for detailed family history information)    Diana's mother was diagnosed with pancreatic cancer at age 58 and passed away at age 59; treatment included chemotherapy and surgery. She had a history of smoking. Of note, Diana recalls being told that her mother also had  "squamous cell carcinoma identified in her lymph nodes and an \"internal melanoma\". Records regarding her cancer diagnosis are being requested as Diana's father signed a Release of Information (LIDIA) form today.    Three maternal great uncles (through her grandmother) were diagnosed with lung cancer at older ages and had a history of smoking.    One maternal great uncle (through her grandmother) was diagnosed with colorectal cancer in his 50's and passed away in his 60's. Treatment included chemotherapy.    Several maternal first cousins once removed (through her grandmother) have been diagnosed with cancer, including one diagnosed with breast cancer in her 40's (currently in her 60's), one with a melanoma, and one with colorectal cancer diagnosed at age 55.    Diana reports that there is no paternal family history of cancer.    Her maternal ethnicity is Micronesian, English, and Scandinavian. Her paternal ethnicity is [place], [place], and Lu[place].  Ashkenazi [place] ancestry was denied.    Discussion:    Diana's personal history of breast cancer and family history of pancreatic and several other types of cancer are suggestive of a possible hereditary cancer syndrome.    We discussed the natural history and genetics of several hereditary cancer syndromes, including Hereditary Breast and Ovarian Cancer (HBOC) syndrome. A detailed handout regarding these syndromes and the information we discussed was provided to Diana at the end of our appointment today and can be found in the after visit summary.     Topics included: inheritance pattern, cancer risks, cancer screening recommendations, and also risks, benefits and limitations of testing.    We reviewed the features of sporadic, familial, and hereditary cancers. Diana's personal and family history presents with several features consistent with hereditary cancers, as she was diagnosed under age 50, her mother was diagnosed with a related cancer (and possibly other primary " cancers), and there are multiple maternal extended relatives diagnosed with potentially related cancers. That being said, she also has several relatives that have never been diagnosed with a cancer or were diagnosed with cancers associated with significant environmental risk factors (lung cancer and smoking, etc.).    We reviewed that the most common cause of hereditary breast cancer is HBOC syndrome, caused by mutations in the BRCA1 and BRCA2. We also reviewed the increased risks for other cancers, including pancreatic cancer and melanoma. Based on her personal and family history, Diana meets current National Comprehensive Cancer Network (NCCN) criteria for genetic testing of BRCA1 and BRCA2.    We also reviewed that there are several other genes/syndromes associated with increased risks for the cancers seen in Diana's family. For example, mutations in the PALB2 and TYLER genes are associated with breast and pancreatic cancer risk. As many of these genes/syndromes can present with overlapping features in a family history, it would be reasonable for Diana to consider genetic testing that addresses several of these genes/syndromes at one time.  We reviewed genetic testing options for hereditary breast cancer: guidelines-based high and moderate risk panel testing (BRCAplus-Expanded, 8 genes) and expanded high and moderate risk panel testing (BreastNext, 17 genes). Diana expressed interest in learning as much information as possible from the testing. She opted for the BreastNext panel.  Genetic testing is available for 17 genes associated with increased risk for breast cancer: BreastNext (TYLER, BARD1, BRCA1, BRCA2, BRIP1, CDH1, CHEK2, MRE11A, MUTYH, NBN, NF1, PALB2, PTEN, RAD50, RAD51C, RAD51D, and TP53).  We discussed that some of the genes in the BreastNext panel are associated with specific syndromes and published management guidelines: Hereditary Breast and Ovarian Cancer syndrome (BRCA1, BRCA2), Hereditary Diffuse  Gastric Cancer (CDH1), Cowden syndrome (PTEN), Li Fraumeni syndrome (TP53), MUTYH Associated Polyposis (MUTYH), and Neurofibromatosis type 1 (NF1).   The TYLER, BRIP1, CHEK2, NBN, PALB2, RAD51C, and RAD51D genes are associated with moderate breast cancer risk and have published management guidelines for either breast and/or ovarian cancer risk management.    The remaining genes (BARD1, MRE11A, and RAD50) are associated with moderate breast cancer risk and may allow us to make medical recommendations when mutations are identified.    Diana was provided with a detailed brochure from [facility] explaining the BreastNext testing.    Consent was obtained and genetic testing for BreastNext was sent to [facility]. Turn around time: approximately 4-5 weeks. As treatment decisions may be made based on the BRCA1 and BRCA2 test results, I will request that the laboratory rush this testing. As such, these initial test results should be available in approximately 7-14 days.     The information from this test may determine cancer screening and/or surgical decision-making for Diana and her relatives.    Screening recommendations based upon family history:    Formal cancer screening recommendations will be made after Diana's test results are available.    Plan:  1) Today Diana elected to proceed with genetic testing using the BreastNext gene panel offered by [facility].  2) I will request that genetic testing of the BRCA1 and BRCA2 genes be rushed, meaning the results should be available in approximately 7-14 days. The results of the complete BreastNext genetic test should be available in approximately 4-5 weeks.  3) Diana will first be called with her BRCA1 and BRCA2 genetic testing results. She will then return to clinic to discuss the complete BreastNext test results.    Face to face time: 45 minutes    Claudia Willis MS, [place]  Certified Genetic Counselor  Office: [phone]  Pager: [phone]

## 2017-01-16 ENCOUNTER — INFUSION THERAPY VISIT (OUTPATIENT)
Dept: INFUSION THERAPY | Facility: CLINIC | Age: 37
End: 2017-01-16
Attending: INTERNAL MEDICINE
Payer: COMMERCIAL

## 2017-01-16 ENCOUNTER — OFFICE VISIT (OUTPATIENT)
Dept: SPIRITUAL SERVICES | Facility: CLINIC | Age: 37
End: 2017-01-16

## 2017-01-16 ENCOUNTER — ONCOLOGY VISIT (OUTPATIENT)
Dept: ONCOLOGY | Facility: CLINIC | Age: 37
End: 2017-01-16
Attending: INTERNAL MEDICINE
Payer: COMMERCIAL

## 2017-01-16 VITALS
WEIGHT: 191.2 LBS | OXYGEN SATURATION: 93 % | TEMPERATURE: 97.6 F | DIASTOLIC BLOOD PRESSURE: 82 MMHG | HEART RATE: 116 BPM | RESPIRATION RATE: 18 BRPM | BODY MASS INDEX: 32.64 KG/M2 | HEIGHT: 64 IN | SYSTOLIC BLOOD PRESSURE: 136 MMHG

## 2017-01-16 DIAGNOSIS — C50.911 MALIGNANT NEOPLASM OF RIGHT FEMALE BREAST, UNSPECIFIED SITE OF BREAST: Primary | ICD-10-CM

## 2017-01-16 DIAGNOSIS — Z95.828 PORTACATH IN PLACE: Primary | ICD-10-CM

## 2017-01-16 DIAGNOSIS — C50.411 MALIGNANT NEOPLASM OF UPPER-OUTER QUADRANT OF RIGHT FEMALE BREAST (H): ICD-10-CM

## 2017-01-16 PROCEDURE — 25000128 H RX IP 250 OP 636: Performed by: INTERNAL MEDICINE

## 2017-01-16 PROCEDURE — 99211 OFF/OP EST MAY X REQ PHY/QHP: CPT

## 2017-01-16 PROCEDURE — 96375 TX/PRO/DX INJ NEW DRUG ADDON: CPT

## 2017-01-16 PROCEDURE — 25000125 ZZHC RX 250: Performed by: INTERNAL MEDICINE

## 2017-01-16 PROCEDURE — 25000125 ZZHC RX 250

## 2017-01-16 PROCEDURE — 96411 CHEMO IV PUSH ADDL DRUG: CPT

## 2017-01-16 PROCEDURE — 96377 APPLICATON ON-BODY INJECTOR: CPT | Mod: XS

## 2017-01-16 PROCEDURE — 99214 OFFICE O/P EST MOD 30 MIN: CPT | Performed by: INTERNAL MEDICINE

## 2017-01-16 PROCEDURE — 96367 TX/PROPH/DG ADDL SEQ IV INF: CPT

## 2017-01-16 PROCEDURE — 40000269 ZZH STATISTIC NO CHARGE FACILITY FEE

## 2017-01-16 PROCEDURE — 96413 CHEMO IV INFUSION 1 HR: CPT

## 2017-01-16 RX ORDER — LIDOCAINE/PRILOCAINE 2.5 %-2.5%
CREAM (GRAM) TOPICAL PRN
Qty: 30 G | Refills: 1 | Status: SHIPPED | OUTPATIENT
Start: 2017-01-16 | End: 2017-07-31

## 2017-01-16 RX ORDER — DOXORUBICIN HYDROCHLORIDE 2 MG/ML
60 INJECTION, SOLUTION INTRAVENOUS ONCE
Status: COMPLETED | OUTPATIENT
Start: 2017-01-16 | End: 2017-01-16

## 2017-01-16 RX ORDER — LIDOCAINE 40 MG/G
CREAM TOPICAL ONCE
Status: COMPLETED | OUTPATIENT
Start: 2017-01-16 | End: 2017-01-16

## 2017-01-16 RX ORDER — LIDOCAINE/PRILOCAINE 2.5 %-2.5%
CREAM (GRAM) TOPICAL ONCE
Status: DISCONTINUED | OUTPATIENT
Start: 2017-01-16 | End: 2017-01-16 | Stop reason: CLARIF

## 2017-01-16 RX ORDER — PALONOSETRON 0.05 MG/ML
0.25 INJECTION, SOLUTION INTRAVENOUS ONCE
Status: COMPLETED | OUTPATIENT
Start: 2017-01-16 | End: 2017-01-16

## 2017-01-16 RX ORDER — DEXAMETHASONE 4 MG/1
4 TABLET ORAL
COMMUNITY
End: 2017-06-30

## 2017-01-16 RX ORDER — HEPARIN SODIUM (PORCINE) LOCK FLUSH IV SOLN 100 UNIT/ML 100 UNIT/ML
SOLUTION INTRAVENOUS
Status: COMPLETED
Start: 2017-01-16 | End: 2017-01-16

## 2017-01-16 RX ORDER — HEPARIN SODIUM (PORCINE) LOCK FLUSH IV SOLN 100 UNIT/ML 100 UNIT/ML
5 SOLUTION INTRAVENOUS
Status: DISCONTINUED | OUTPATIENT
Start: 2017-01-16 | End: 2017-01-16 | Stop reason: HOSPADM

## 2017-01-16 RX ADMIN — SODIUM CHLORIDE 250 ML: 9 INJECTION, SOLUTION INTRAVENOUS at 09:28

## 2017-01-16 RX ADMIN — PALONOSETRON HYDROCHLORIDE 0.25 MG: 0.25 INJECTION INTRAVENOUS at 10:20

## 2017-01-16 RX ADMIN — LIDOCAINE: 40 CREAM TOPICAL at 08:18

## 2017-01-16 RX ADMIN — CYCLOPHOSPHAMIDE 1180 MG: 1 INJECTION, POWDER, FOR SOLUTION INTRAVENOUS; ORAL at 10:40

## 2017-01-16 RX ADMIN — DOXORUBICIN HYDROCHLORIDE 118 MG: 2 INJECTION, SOLUTION INTRAVENOUS at 10:27

## 2017-01-16 RX ADMIN — HEPARIN SODIUM (PORCINE) LOCK FLUSH IV SOLN 100 UNIT/ML 5 ML: 100 SOLUTION at 11:27

## 2017-01-16 RX ADMIN — PEGFILGRASTIM 6 MG: KIT SUBCUTANEOUS at 11:27

## 2017-01-16 RX ADMIN — DEXAMETHASONE SODIUM PHOSPHATE 12 MG: 10 INJECTION, SOLUTION INTRAMUSCULAR; INTRAVENOUS at 09:43

## 2017-01-16 RX ADMIN — SODIUM CHLORIDE, PRESERVATIVE FREE 5 ML: 5 INJECTION INTRAVENOUS at 11:27

## 2017-01-16 RX ADMIN — FAMOTIDINE 20 MG: 20 INJECTION, SOLUTION INTRAVENOUS at 09:29

## 2017-01-16 RX ADMIN — SODIUM CHLORIDE 150 MG: 9 INJECTION, SOLUTION INTRAVENOUS at 09:58

## 2017-01-16 ASSESSMENT — PAIN SCALES - GENERAL: PAINLEVEL: MILD PAIN (2)

## 2017-01-16 NOTE — MR AVS SNAPSHOT
After Visit Summary   1/16/2017    Diana Wilson    MRN: [id number]           Patient Information     Date Of Birth          1980        Visit Information        Provider Department      1/16/2017 8:45 AM Maribel Jin MD [facility]        Care Instructions    We would like to see you back in 2 weeks with Cycle 2.  Continue with Cycle 1 of treatment today.   When you are in need of a refill, please call your pharmacy and they will send us a request.  Copy of appointments, and after visit summary (AVS) given to patient.    If you have any questions during business hours (M-F 8 AM- 4PM), please call Mark Mendez, RN, BSN, OCN or Juliana Garcia RN, BSN, OCN Oncology Hematology Care Coordinators at [facility] [phone].   For questions after business hours, or on holidays/weekends, please call our after hours Nurse Triage line [phone]. Thank you.            Follow-ups after your visit        Your next 10 appointments already scheduled     Jan 30, 2017  8:00 AM   Level 3 with ROOM 1 [facility]    [facility]  [address]  [facility] [address] [phone]            Jan 30, 2017  8:45 AM   Return Visit with Maribel Jin MD   [facility]    [facility]  [address]  [place] [address] [phone]              Who to contact     If you have questions or need follow up information about today's clinic visit or your schedule please contact [facility] directly at [phone].  Normal or non-critical lab and imaging results will be communicated to you by MyChart, letter or phone within 4 business days after the clinic has received the results. If you do not hear from us within 7 days, please contact the clinic through MyChart or phone. If you have a critical or abnormal lab result, we will notify  "you by phone as soon as possible.  Submit refill requests through Meetmeals or call your pharmacy and they will forward the refill request to us. Please allow 3 business days for your refill to be completed.          Additional Information About Your Visit        [employer] Information     Meetmeals gives you secure access to your electronic health record. If you see a primary care provider, you can also send messages to your care team and make appointments. If you have questions, please call your primary care clinic.  If you do not have a primary care provider, please call [phone] and they will assist you.        Care EveryWhere ID     This is your Care EveryWhere ID. This could be used by other organizations to access your [facility] medical records  [id number]        Your Vitals Were     Pulse Temperature Respirations    116 97.6  F (36.4  C) (Tympanic) 18    Height BMI (Body Mass Index) Pulse Oximetry    1.626 m (5' 4.02\") 32.80 kg/m2 93%    Breastfeeding?          No         Blood Pressure from Last 3 Encounters:   01/16/17 136/82   01/11/17 108/59   01/10/17 139/91    Weight from Last 3 Encounters:   01/16/17 86.728 kg (191 lb 3.2 oz)   01/11/17 86.728 kg (191 lb 3.2 oz)   01/10/17 86.728 kg (191 lb 3.2 oz)              Today, you had the following     No orders found for display         Today's Medication Changes          These changes are accurate as of: 1/16/17  9:50 AM.  If you have any questions, ask your nurse or doctor.               Start taking these medicines.        Dose/Directions    lidocaine-prilocaine cream   Commonly known as:  EMLA   Used for:  Portacath in place        Apply topically as needed for other (place dime size amount over port site prior to tx and cover with tegaderm)   Quantity:  30 g   Refills:  1            Where to get your medicines      These medications were sent to [facility] [place], [place] - [address]  [address] [facility] [facility] [address]     Phone:  " [phone]    - lidocaine-prilocaine cream             Primary Care Provider Office Phone # Fax #    Yenifer Ridley [phone]       [facility] [address] [address] [facility] [address] [address]        Thank you!     Thank you for choosing [facility]  for your care. Our goal is always to provide you with excellent care. Hearing back from our patients is one way we can continue to improve our services. Please take a few minutes to complete the written survey that you may receive in the mail after your visit with us. Thank you!             Your Updated Medication List - Protect others around you: Learn how to safely use, store and throw away your medicines at www.disposemymeds.org.          This list is accurate as of: 1/16/17  9:50 AM.  Always use your most recent med list.                   Brand Name Dispense Instructions for use    ADVIL 200 MG capsule   Generic drug:  ibuprofen      Take 200 mg by mouth every 4 hours as needed.       albuterol 108 (90 BASE) MCG/ACT Inhaler    PROAIR HFA/PROVENTIL HFA/VENTOLIN HFA    1 Inhaler    Inhale 2 puffs into the lungs every 6 hours as needed for shortness of breath / dyspnea       ALPRAZolam 0.5 MG tablet    XANAX    15 tablet    Take 1 tablet (0.5 mg) by mouth 3 times daily as needed for anxiety       cyclobenzaprine 10 MG tablet    FLEXERIL    30 tablet    Take 1 tablet (10 mg) by mouth 3 times daily as needed for muscle spasms       dexamethasone 4 MG tablet    DECADRON     Take 4 mg by mouth       famotidine 20 MG tablet    PEPCID     Take 20 mg by mouth daily as needed       fluticasone 50 MCG/ACT spray    FLONASE    3 Bottle    Spray 1-2 sprays into both nostrils daily       hydrOXYzine 10 MG tablet    ATARAX    60 tablet    Take 1-5 tablets (10-50 mg) by mouth every 6 hours as needed for anxiety       lidocaine-prilocaine cream    EMLA    30 g    Apply topically as needed for other (place dime size amount over port site prior to tx  and cover with tegaderm)       loratadine 10 MG tablet    CLARITIN    90 tablet    Take 1 tablet (10 mg) by mouth daily       LORazepam 0.5 MG tablet    ATIVAN    30 tablet    Take 1 tablet (0.5 mg) by mouth every 4 hours as needed (Anxiety, Nausea/Vomiting or Sleep)       montelukast 10 MG tablet    SINGULAIR    90 tablet    Take 1 tablet (10 mg) by mouth At Bedtime       oxyCODONE-acetaminophen 5-325 MG per tablet    PERCOCET    20 tablet    Take 1 tablet by mouth every 4 hours as needed for pain       prochlorperazine 10 MG tablet    COMPAZINE    30 tablet    Take 1 tablet (10 mg) by mouth every 6 hours as needed (Nausea/Vomiting)       scopolamine 72 hr patch    TRANSDERM    1 patch    Place 1 patch onto the skin every 72 hours.  Apply to hairless area behind one ear at least 4 hours before travel.  Remove old patch and change every 3 days.

## 2017-01-16 NOTE — PROGRESS NOTES
" ONCOLOGY Follow up visit       COMPLAINT AND REASON FOR CONSULTATION:  12/2016 diagnosed right breast cancer, hx of TIFFANY due Celiac disease on IV iron prn     REFERRING/[facility] PHYSICIAN:  Dr. Yenifer Ridley       HERB Wilson is a 36-year-old premenopausal woman, self felt breast in her right armpit and right breast in 10/2016.  She observed it and due to persistency of the palpable lesions went on to have workup with diagnostic mammogram end of 12/2016, which identified 2 cm ill-defined density  associated with indeterminate microcalcification measuring about 3.2 cm, so the whole area spans about 4 cm in biggest dimension.   Ultrasound to the site which is the upper outer quadrant of the right breast identified 2 cm irregular solid mass; at the right axilla shows several abnormal lymph nodes, the largest one measures 1.8 cm in maximum dimension.  Ultrasound-guided biopsy of right breast was done 12/27/2016, identified grade 3 invasive ductal cancer, angiolymphatic invasion not identified.  ER/OR 99% positive, HER-2/kelle negative, associated with high-grade DCIS.    Right axillary ultrasound-guided biopsy indicating metastatic carcinoma positive for spread from breast primary, ER/OR both negative.  HER-2/kelle is negative.      She has clinical T1N1 disease. She made informed decision to proceed with neoadjuvant DDAC C1D1 1/16/2017.      PAST MEDICAL HISTORY:  Restless legs, mild intermittent asthma, celiac disease diagnosed 02/2016, hx of TIFFANY     SOCIAL HISTORY:  Works part-time.  Lives with her .  They have 2 kids, first pregnancy age 22.  She did not do breast feeding because of bilateral breast reduction.  She does office work.      FAMILY HISTORY:  \"Full of cancer\" from the mother's side including colon, lung, pancreatic, gastric cancer.  According to the patient none of them survive older than 60 years old.      REVIEW OF SYSTEMS:   GENERAL: She is in her usual state of health.  She has no weight loss. " " No B symptoms. NEURO:   No headache, double vision, or focal weakness.  No neuropathy.   SKIN:  No chronic skin rash or skin infection.   CARDIOVASCULAR:  No palpitations, no chest pressure, no dyspnea on exertion.   PULMONARY:  No shortness of breath, no pleurisy, no cough, no hemoptysis. Asthma.   GI:  No abdominal pain, nausea, vomiting, constipation.  No diarrhea.  No bright red blood per rectum.   :  No urgency, frequency.  No recurrent urinary tract infection.  No kidney problems.   RHEUMATOLOGY/MUSCULOSKELETAL SYSTEM:  No arthritis.  No joint pain.  No muscle ache.   ENDOCRINE:  No history of diabetes or thyroid problem.  No complaints of hot flashes.   HEMATOLOGY:  No history of bleeding or thrombosis episode.   IMMUNOLOGY:  No recurrent fever or chills episode.  No recurrent infectious episode.   BREASTS/GYN:  No history of vaginal bleeding/discharge/dryness.  No breast pain or nipple discharge.  Celiac disease diagnosed 02/2016.  Premenopausal diagnosed with breast cancer at age 36 in 12/2016.      PHYSICAL EXAMINATION:   Blood pressure 136/82, pulse 116, temperature 97.6  F (36.4  C), temperature source Tympanic, resp. rate 18, height 1.626 m (5' 4.02\"), weight 86.728 kg (191 lb 3.2 oz), SpO2 93 %, not currently breastfeeding.  GENERAL APPEARANCE:  He young woman, looks like her stated age, very upbeat, not in acute distress.   HEENT: The patient is normocephalic, atraumatic. Pupils are equally reactive to light.  Sclerae are anicteric.  Moist oral mucosa.  Negative pharynx.  No oral thrush.   NECK:  Supple.  No jugular venous distention.  Thyroid is not palpable.   LYMPH NODES:  Superficial lymphadenopathy is not appreciable in the bilateral cervical, supraclavicular, axillary or inguinal areas.   CARDIOVASCULAR:  S1, S2 regular with no murmurs or gallops.  No carotid or abdominal bruits.   PULMONARY:  Lungs are clear to auscultation and percussion bilaterally.  There is no wheezing or rhonchi. "   GASTROINTESTINAL:  Abdomen is soft, nontender.  No hepatosplenomegaly.  No signs of ascites.  No mass appreciable.   MUSCULOSKELETAL/EXTREMITIES:  No edema.  No cyanotic changes.  No signs of joint deformity.  No lymphedema.   NEUROLOGIC:  Cranial nerves II-XII are grossly intact.  Sensation intact.  Muscle strength and muscle tone symmetrical, 5/5 throughout.   BACK:  No spinal or paraspinal tenderness.  No CVA tenderness.   SKIN:  No petechiae.  No rash.  No signs of cellulitis.   BREASTS:  Bilateral breast exam reviewed.  Glandular tissue on both breasts in general, readily palpable upper outer quadrant of the right breast about 2-3 cm palpable lesion with vague palpable right axillary lymphadenopathy.  She has ecchymosis after the biopsy.      LABORATORY DATA:   baseline labs cbc diff/cmp/marker are nl.     End of 2016  right breast was done 2016, identified grade 3 invasive ductal cancer, angiolymphatic invasion not identified.  ER/RI 99% positive, HER-2/kelle negative, associated with high-grade DCIS.    Right axillary ultrasound-guided biopsy indicating metastatic carcinoma positive for spread from breast primary, ER/RI both negative.  HER-2/kelle is negative.       CURRENT IMAGIN/2017 baseline Echo LV function nl, EF 60-65%     PET 2017  1. Hypermetabolic mass in the right breast consistent with known malignancy.  2. Two enlarged hypermetabolic metastatic lymph nodes in the right axilla.  3. No additional worrisome hypermetabolic areas in the neck, chest, abdomen, or pelvis.    diagnostic mammogram end of 2016, which identified 2 cm ill-defined density  associated with indeterminate microcalcification measuring about 3.2 cm, so the whole area spans about 4 cm in biggest dimension.   Ultrasound to the site which is the upper outer quadrant of the right breast identified 2 cm irregular solid mass; at the right axilla shows several abnormal lymph nodes, the largest one measures 1.8 cm in  maximum dimension.      OLD DATA REVIEWED WITH SUMMARY:    DEXA scan from 03/2016 identified mild osteopenia lumbar spine   left mammogram 2007 that was negative.      ASSESSMENT AND PLAN:    12/2016 newly dx breast cancer,  high-grade invasive ductal cancer on her right breast, reviewed is ER/GA 99% positive, HER-2/kelle negative cancer with associated high-grade DCIS.  The lymph nodes biopsy also came back positive breast cancer but is triple negative disease. She is only 36 years old. She has cT2N1 disesae.      I indicated to her due to her large tumor volume on presentation, it will be in her benefit to consider neoadjuvant chemotherapy over adjuvant chemotherapy.  She has met with Dr. Townsend and has pretty much made up her mind on the sequence of the treatment.   I indicated to her the standard of care in this country is to use the most effective FDA approved dose-dense AC regimen followed by dose-dense Taxol.  The second portion of this neoadjuvant treatment regimen can also be altered if her genetic testing result is positive for BRCA1 and 2 mutation.    I explained to her how dose-dense AC is given Adriamycin we provided at 60 mg/m2, Cytoxan will be 600 mg/m2, both given on day 1 every 2 weeks with growth factor support and total of 4 cycles.  We talked about the side effects including nausea, vomiting, hair loss.  We talked about the rare but existing side effect in terms of heart damage and a chance of acute leukemia down the line.    She has made an informed decision to proceed with dose-dense AC in the neoadjuvant setting followed by taxane plus or minus platinum depending on her genetic testing without.     She needs to be monitored closely during this chemo.     She made informed decision to proceed C1D1 1/16/2017.     She saw genetic counseling and we need to f/u the test results.      She is also informed the need of adjuvant radiation based on her initial presentation in terms of lymph node  involvement.  She is very upbeat with this difficult diagnosis.  Lots of encouragement is provided.     Total time is 25 minutes, more than 15 minutes spent in counseling regarding her work up data, tx recommendation, side effects etc.

## 2017-01-16 NOTE — PATIENT INSTRUCTIONS
We would like to see you back in 2 weeks with Cycle 2.  Continue with Cycle 1 of treatment today.   When you are in need of a refill, please call your pharmacy and they will send us a request.  Copy of appointments, and after visit summary (AVS) given to patient.    If you have any questions during business hours (M-F 8 AM- 4PM), please call Mark Mendez RN, BSN, OCN or Juliana Garcia RN, BSN, OCN Oncology Hematology Care Coordinators at [facility] [phone].   For questions after business hours, or on holidays/weekends, please call our after hours Nurse Triage line [phone]. Thank you.

## 2017-01-16 NOTE — NURSING NOTE
"Diana Wilson is a 36 year old female who presents for:  Chief Complaint   Patient presents with     Oncology Clinic Visit     Recheck Breast CA, review ECHO, PET, Port placement and Genetic visit        Initial Vitals:  /82 mmHg  Pulse 116  Temp(Src) 97.6  F (36.4  C) (Tympanic)  Resp 18  Ht 1.626 m (5' 4.02\")  Wt 86.728 kg (191 lb 3.2 oz)  BMI 32.80 kg/m2  SpO2 93%  Breastfeeding? No Estimated body mass index is 32.8 kg/(m^2) as calculated from the following:    Height as of this encounter: 1.626 m (5' 4.02\").    Weight as of this encounter: 86.728 kg (191 lb 3.2 oz).. Body surface area is 1.98 meters squared. BP completed using cuff size: regular  Mild Pain (2) No LMP recorded. Allergies and medications reviewed.     Medications: Medication refills not needed today.  Pharmacy name entered into [facility]: [facility] [place]  [place], [place] - [address] [address]    Comments: Recheck Breast CA, review ECHO, PET, Port placement and Genetic visit.     10  minutes for nursing intake (face to face time)   Sondra Medina CMA          "

## 2017-01-16 NOTE — PROGRESS NOTES
Infusion Nursing Note:  Diana Wilson presents today for C1D1 Adriamycin, Cytoxan.    Patient seen by provider today: Yes: Dr. Jin    Note: Doses verified with protocol and BSA prior to administration.    Intravenous Access:  Implanted Port. First time accessed.    Treatment Conditions:  HGB     14.6   1/5/2017  HGB     10.0   12/11/2003  WBC     10.1   1/5/2017   ANEU      6.7   1/5/2017  PLT      306   1/5/2017  PLT      335   12/11/2003     NA      140   1/5/2017                POTASSIUM      4.1   1/5/2017        MAG      2.3   3/2/2016         CR     0.66   1/5/2017                DENY      8.6   1/5/2017             BILITOTAL      0.5   1/5/2017        ALBUMIN      3.8   1/5/2017                 ALT       19   1/5/2017        AST       10   1/5/2017  Results reviewed, labs MET treatment parameters, ok to proceed with treatment with baseline results.      Post Infusion Assessment:  Patient tolerated infusion without incident.  Blood return noted pre and post infusion.  Blood return noted during administration every 5cc during Adriamycin administration.  No evidence of extravasations.  Access discontinued per protocol.    Discharge Plan:   On pro Neulasta injector applied to right arm.  Pt given instructions and verbal teaching about the on body injector. Questions answered and pt verbalizes understanding of teaching.  Patient discharged in stable condition accompanied by: .  Departure Mode: Ambulatory.    Rhonda Ovalles RN

## 2017-01-16 NOTE — PROGRESS NOTES
SPIRITUAL HEALTH SERVICES  SPIRITUAL ASSESSMENT Progress Note  [facility]    PRIMARY FOCUS:     Emotional/spiritual/Jehovah's witness distress    Support for coping    ILLNESS CIRCUMSTANCES:   Reflective conversation shared with Diana and , Fabian, which integrated elements of illness and munira narratives.     Context of Serious Illness/Symptom(s) - Breast cancer     Resources for Support -  Fabian and their Orthodox and new  were mentioned.    DISTRESS:     Emotional/Existential/Relational Distress - Recent cancer diagnosis; today was first chemo    Spiritual/Synagogue Distress - None    Social/Cultural/Economic Distress - Fabian stated he was hoping for a job offer    SPIRITUAL/Mu-ism COPING:     Congregation/Munira - Living [facility] in [place]    Spiritual Practice(s) - Diana showed me her prayer quilt and stated how much it meant to her that people are praying and have prayed    Emotional/Existential/Relational Connections - She also stated that she was on the Call Committee for a new  and just a month after  Rekha (not sure of name) arrived - Diana was diagnosed with cancer    GOALS OF CARE:    Goals of Care - Cancer free and a long life is the goal    Meaning/Sense-Making - NA    PLAN: I will check back with pt and family during future infusion appointments    Vijay Jacob M.A, [facility]  Staff   Pager [phone]

## 2017-01-20 ENCOUNTER — TELEPHONE (OUTPATIENT)
Dept: ONCOLOGY | Facility: CLINIC | Age: 37
End: 2017-01-20

## 2017-01-20 NOTE — TELEPHONE ENCOUNTER
1/20/2017    I called Diana today with the results of her BRCA1 and BRCA2 genetic test, which did not identify a pathogenic mutation in either of these two genes. It was normal/negative. As such, she does not carry an identifiable gene mutation associated with a diagnosis of Hereditary Breast and Ovarian Cancer syndrome. She cannot pass on a mutation in these genes to her children based on this test result.    The remaining results from the BreastNext gene panel are pending at this time and should be available in approximately two weeks. Diana will return to clinic at that time to review her complete genetic testing results. A results letter will be dictated at that time.    Diana was surprised, but pleased to hear these results. She denied having any other questions at this time and confirmed that she had my contact information.    Claudia Willis MS, [facility]  Certified Genetic Counselor  Office: [phone]  Pager: [phone]

## 2017-01-30 ENCOUNTER — ONCOLOGY VISIT (OUTPATIENT)
Dept: ONCOLOGY | Facility: CLINIC | Age: 37
End: 2017-01-30
Attending: INTERNAL MEDICINE
Payer: COMMERCIAL

## 2017-01-30 ENCOUNTER — HOSPITAL ENCOUNTER (OUTPATIENT)
Facility: CLINIC | Age: 37
Discharge: HOME | End: 2017-01-30
Attending: INTERNAL MEDICINE | Admitting: INTERNAL MEDICINE
Payer: COMMERCIAL

## 2017-01-30 ENCOUNTER — INFUSION THERAPY VISIT (OUTPATIENT)
Dept: INFUSION THERAPY | Facility: CLINIC | Age: 37
End: 2017-01-30
Attending: INTERNAL MEDICINE
Payer: COMMERCIAL

## 2017-01-30 ENCOUNTER — OFFICE VISIT (OUTPATIENT)
Dept: SPIRITUAL SERVICES | Facility: CLINIC | Age: 37
End: 2017-01-30

## 2017-01-30 VITALS — SYSTOLIC BLOOD PRESSURE: 119 MMHG | DIASTOLIC BLOOD PRESSURE: 64 MMHG | HEART RATE: 86 BPM

## 2017-01-30 VITALS
RESPIRATION RATE: 18 BRPM | HEIGHT: 64 IN | TEMPERATURE: 97.3 F | OXYGEN SATURATION: 97 % | BODY MASS INDEX: 32.93 KG/M2 | WEIGHT: 192.9 LBS | HEART RATE: 88 BPM | SYSTOLIC BLOOD PRESSURE: 122 MMHG | DIASTOLIC BLOOD PRESSURE: 77 MMHG

## 2017-01-30 DIAGNOSIS — C50.911 MALIGNANT NEOPLASM OF RIGHT FEMALE BREAST, UNSPECIFIED SITE OF BREAST: Primary | ICD-10-CM

## 2017-01-30 DIAGNOSIS — M89.8X9 BONE PAIN: ICD-10-CM

## 2017-01-30 DIAGNOSIS — Z80.0 FAMILY HISTORY OF PANCREATIC CANCER: ICD-10-CM

## 2017-01-30 DIAGNOSIS — C50.411 MALIGNANT NEOPLASM OF UPPER-OUTER QUADRANT OF RIGHT FEMALE BREAST (H): Primary | ICD-10-CM

## 2017-01-30 LAB
BASOPHILS # BLD AUTO: 0.1 10E9/L (ref 0–0.2)
BASOPHILS NFR BLD AUTO: 0.3 %
DIFFERENTIAL METHOD BLD: ABNORMAL
EOSINOPHIL # BLD AUTO: 0 10E9/L (ref 0–0.7)
EOSINOPHIL NFR BLD AUTO: 0.1 %
ERYTHROCYTE [DISTWIDTH] IN BLOOD BY AUTOMATED COUNT: 12.4 % (ref 10–15)
HCT VFR BLD AUTO: 41.4 % (ref 35–47)
HGB BLD-MCNC: 13.9 G/DL (ref 11.7–15.7)
IMM GRANULOCYTES # BLD: 0.7 10E9/L (ref 0–0.4)
IMM GRANULOCYTES NFR BLD: 4.4 %
LYMPHOCYTES # BLD AUTO: 1.3 10E9/L (ref 0.8–5.3)
LYMPHOCYTES NFR BLD AUTO: 8.8 %
MCH RBC QN AUTO: 29.6 PG (ref 26.5–33)
MCHC RBC AUTO-ENTMCNC: 33.6 G/DL (ref 31.5–36.5)
MCV RBC AUTO: 88 FL (ref 78–100)
MONOCYTES # BLD AUTO: 0.2 10E9/L (ref 0–1.3)
MONOCYTES NFR BLD AUTO: 1.3 %
NEUTROPHILS # BLD AUTO: 12.8 10E9/L (ref 1.6–8.3)
NEUTROPHILS NFR BLD AUTO: 85.1 %
PLATELET # BLD AUTO: 274 10E9/L (ref 150–450)
RBC # BLD AUTO: 4.7 10E12/L (ref 3.8–5.2)
WBC # BLD AUTO: 15.1 10E9/L (ref 4–11)

## 2017-01-30 PROCEDURE — 96411 CHEMO IV PUSH ADDL DRUG: CPT

## 2017-01-30 PROCEDURE — 25000128 H RX IP 250 OP 636: Performed by: INTERNAL MEDICINE

## 2017-01-30 PROCEDURE — 25000125 ZZHC RX 250: Performed by: INTERNAL MEDICINE

## 2017-01-30 PROCEDURE — 96413 CHEMO IV INFUSION 1 HR: CPT

## 2017-01-30 PROCEDURE — 96377 APPLICATON ON-BODY INJECTOR: CPT | Mod: XU

## 2017-01-30 PROCEDURE — 96375 TX/PRO/DX INJ NEW DRUG ADDON: CPT

## 2017-01-30 PROCEDURE — 85025 COMPLETE CBC W/AUTO DIFF WBC: CPT | Performed by: INTERNAL MEDICINE

## 2017-01-30 PROCEDURE — 99214 OFFICE O/P EST MOD 30 MIN: CPT | Performed by: INTERNAL MEDICINE

## 2017-01-30 PROCEDURE — 96367 TX/PROPH/DG ADDL SEQ IV INF: CPT

## 2017-01-30 PROCEDURE — 99211 OFF/OP EST MAY X REQ PHY/QHP: CPT

## 2017-01-30 RX ORDER — DOXORUBICIN HYDROCHLORIDE 2 MG/ML
60 INJECTION, SOLUTION INTRAVENOUS ONCE
Status: COMPLETED | OUTPATIENT
Start: 2017-01-30 | End: 2017-01-30

## 2017-01-30 RX ORDER — ALPRAZOLAM 0.5 MG
TABLET ORAL
COMMUNITY
Start: 2017-01-10 | End: 2017-06-30

## 2017-01-30 RX ORDER — PALONOSETRON 0.05 MG/ML
0.25 INJECTION, SOLUTION INTRAVENOUS ONCE
Status: COMPLETED | OUTPATIENT
Start: 2017-01-30 | End: 2017-01-30

## 2017-01-30 RX ORDER — HEPARIN SODIUM (PORCINE) LOCK FLUSH IV SOLN 100 UNIT/ML 100 UNIT/ML
5 SOLUTION INTRAVENOUS
Status: DISCONTINUED | OUTPATIENT
Start: 2017-01-30 | End: 2017-01-30 | Stop reason: HOSPADM

## 2017-01-30 RX ADMIN — SODIUM CHLORIDE 150 MG: 9 INJECTION, SOLUTION INTRAVENOUS at 10:36

## 2017-01-30 RX ADMIN — SODIUM CHLORIDE, PRESERVATIVE FREE 5 ML: 5 INJECTION INTRAVENOUS at 12:48

## 2017-01-30 RX ADMIN — CYCLOPHOSPHAMIDE 1180 MG: 1 INJECTION, POWDER, FOR SOLUTION INTRAVENOUS; ORAL at 11:32

## 2017-01-30 RX ADMIN — PEGFILGRASTIM 6 MG: KIT SUBCUTANEOUS at 12:50

## 2017-01-30 RX ADMIN — DEXAMETHASONE SODIUM PHOSPHATE 12 MG: 10 INJECTION, SOLUTION INTRAMUSCULAR; INTRAVENOUS at 10:21

## 2017-01-30 RX ADMIN — DOXORUBICIN HYDROCHLORIDE 118 MG: 2 INJECTION, SOLUTION INTRAVENOUS at 11:20

## 2017-01-30 RX ADMIN — SODIUM CHLORIDE 250 ML: 9 INJECTION, SOLUTION INTRAVENOUS at 10:04

## 2017-01-30 RX ADMIN — FAMOTIDINE 20 MG: 20 INJECTION, SOLUTION INTRAVENOUS at 10:04

## 2017-01-30 RX ADMIN — PALONOSETRON HYDROCHLORIDE 0.25 MG: 0.25 INJECTION INTRAVENOUS at 10:17

## 2017-01-30 ASSESSMENT — PAIN SCALES - GENERAL: PAINLEVEL: SEVERE PAIN (7)

## 2017-01-30 NOTE — PROGRESS NOTES
" ONCOLOGY Follow up visit       COMPLAINT AND REASON FOR CONSULTATION:  12/2016 diagnosed right breast cancer, hx of TIFFANY due Celiac disease on IV iron prn     REFERRING/[facility] PHYSICIAN:  Dr. Yenifer ESTEVEZ  Diana Wilson is a 36-year-old premenopausal woman, self felt breast in her right armpit and right breast in 10/2016.  She observed it and due to persistency of the palpable lesions went on to have workup with diagnostic mammogram end of 12/2016, which identified 2 cm ill-defined density  associated with indeterminate microcalcification measuring about 3.2 cm, so the whole area spans about 4 cm in biggest dimension.   Ultrasound to the site which is the upper outer quadrant of the right breast identified 2 cm irregular solid mass; at the right axilla shows several abnormal lymph nodes, the largest one measures 1.8 cm in maximum dimension.  Ultrasound-guided biopsy of right breast was done 12/27/2016, identified grade 3 invasive ductal cancer, angiolymphatic invasion not identified.  ER/OH 99% positive, HER-2/kelle negative, associated with high-grade DCIS.    Right axillary ultrasound-guided biopsy indicating metastatic carcinoma positive for spread from breast primary, ER/OH both negative.  HER-2/kelle is negative.      She has clinical T1N1 disease. She made informed decision to proceed with neoadjuvant DDAC C1D1 1/16/2017.      PAST MEDICAL HISTORY:  Restless legs, mild intermittent asthma, celiac disease diagnosed 02/2016, hx of TIFFANY     SOCIAL HISTORY:  Works part-time.  Lives with her .  They have 2 kids, first pregnancy age 22.  She did not do breast feeding because of bilateral breast reduction.  She does office work.      FAMILY HISTORY:  \"Full of cancer\" from the mother's side including colon, lung, pancreatic, gastric cancer.  According to the patient none of them survive older than 60 years old.      REVIEW OF SYSTEMS  Slight nausea, lots of bone pain, very upbeat.        PHYSICAL " "EXAMINATION:   Blood pressure 122/77, pulse 88, temperature 97.3  F (36.3  C), temperature source Tympanic, resp. rate 18, height 1.626 m (5' 4.02\"), weight 87.499 kg (192 lb 14.4 oz), SpO2 97 %, not currently breastfeeding.  GENERAL APPEARANCE:  He young woman, looks like her stated age, very upbeat, not in acute distress.   HEENT: The patient is normocephalic, atraumatic. Pupils are equally reactive to light.  Sclerae are anicteric.  Moist oral mucosa.  Negative pharynx.  No oral thrush.   NECK:  Supple.  No jugular venous distention.  Thyroid is not palpable.   LYMPH NODES:  Superficial lymphadenopathy is not appreciable in the bilateral cervical, supraclavicular, axillary or inguinal areas.   CARDIOVASCULAR:  S1, S2 regular with no murmurs or gallops.  No carotid or abdominal bruits.   PULMONARY:  Lungs are clear to auscultation and percussion bilaterally.  There is no wheezing or rhonchi.   GASTROINTESTINAL:  Abdomen is soft, nontender.  No hepatosplenomegaly.  No signs of ascites.  No mass appreciable.   MUSCULOSKELETAL/EXTREMITIES:  No edema.  No cyanotic changes.  No signs of joint deformity.  No lymphedema.   NEUROLOGIC:  Cranial nerves II-XII are grossly intact.  Sensation intact.  Muscle strength and muscle tone symmetrical, 5/5 throughout.   BACK:  No spinal or paraspinal tenderness.  No CVA tenderness.   SKIN:  No petechiae.  No rash.  No signs of cellulitis.   BREASTS:  Bilateral breast exam reviewed.  Glandular tissue on both breasts in general, readily palpable upper outer quadrant of the right breast about 2-3 cm palpable lesion with vague palpable right axillary lymphadenopathy.  She has ecchymosis after the biopsy.      LABORATORY DATA:   C2D1 cbc diff is fine    baseline labs cbc diff/cmp/marker are nl.      CURRENT IMAGIN/2017 baseline Echo LV function nl, EF 60-65%     PET 2017  1. Hypermetabolic mass in the right breast consistent with known malignancy.  2. Two enlarged hypermetabolic " metastatic lymph nodes in the right axilla.  3. No additional worrisome hypermetabolic areas in the neck, chest, abdomen, or pelvis.    Old data reviewed with summary  End of 12/2016  right breast was done 12/27/2016, identified grade 3 invasive ductal cancer, angiolymphatic invasion not identified.  ER/RI 99% positive, HER-2/kelle negative, associated with high-grade DCIS.    Right axillary ultrasound-guided biopsy indicating metastatic carcinoma positive for spread from breast primary, ER/RI both negative.  HER-2/kelle is negative.        diagnostic mammogram end of 12/2016, which identified 2 cm ill-defined density  associated with indeterminate microcalcification measuring about 3.2 cm, so the whole area spans about 4 cm in biggest dimension.   Ultrasound to the site which is the upper outer quadrant of the right breast identified 2 cm irregular solid mass; at the right axilla shows several abnormal lymph nodes, the largest one measures 1.8 cm in maximum dimension.      DEXA scan from 03/2016 identified mild osteopenia lumbar spine        ASSESSMENT AND PLAN:    1.   12/2016 newly dx breast cancer,  high-grade invasive ductal cancer on her right breast, reviewed is ER/RI 99% positive, HER-2/kelle negative cancer with associated high-grade DCIS.  The lymph nodes biopsy also came back positive breast cancer but is triple negative disease. She is only 36 years old. She has cT2N1 disesae.      She has made an informed decision to proceed with neoadjuvant dose-dense AC followed by taxane plus or minus platinum depending on her genetic testing without. C1D1 1/16/2017.     She needs to be monitored closely during this chemo.    She is also informed the need of adjuvant radiation based on her initial presentation in terms of lymph node involvement.  She is very upbeat with this difficult diagnosis.  Lots of encouragement is provided.          2. FH of pancreatic Ca with her personal hx of breast cancer. She saw genetic  counseling and we need to f/u the test results.      3. Severe bone pain from neulasta. Advice claritin and percocet prn.

## 2017-01-30 NOTE — PATIENT INSTRUCTIONS
We would like to see you back with your next cycle (Cycle 3).    When you are in need of a refill, please call your pharmacy and they will send us a request.  Copy of appointments, and after visit summary (AVS) given to patient.    If you have any questions during business hours (M-F 8 AM- 4PM), please call Mark Mendez RN, BSN, OCN or Juliana Garcia RN, BSN, OCN Oncology Hematology Care Coordinators at [facility] [phone].   For questions after business hours, or on holidays/weekends, please call our after hours Nurse Triage line [phone]. Thank you.

## 2017-01-30 NOTE — MR AVS SNAPSHOT
After Visit Summary   1/30/2017    Diana Wilson    MRN: [id number]           Patient Information     Date Of Birth          1980        Visit Information        Provider Department      1/30/2017 8:45 AM Maribel Jin MD [facility]        Today's Diagnoses     Malignant neoplasm of right female breast, unspecified site of breast (H)    -  1     Family history of pancreatic cancer         Bone pain           Care Instructions    We would like to see you back with your next cycle (Cycle 3).    When you are in need of a refill, please call your pharmacy and they will send us a request.  Copy of appointments, and after visit summary (AVS) given to patient.    If you have any questions during business hours (M-F 8 AM- 4PM), please call Mark Mendez RN, BSN, OCN or Juliana Garcia RN, BSN, OCN Oncology Hematology Care Coordinators at [facility] [phone].   For questions after business hours, or on holidays/weekends, please call our after hours Nurse Triage line [phone]. Thank you.            Follow-ups after your visit        Your next 10 appointments already scheduled     Feb 14, 2017  8:00 AM   Level 3 with ROOM 6 [facility] ([facility])    [facility]  [address]  [facility] [address]   [phone]            Feb 14, 2017  8:30 AM   Return Visit with Maribel Jin MD   [facility] ([facility])    [facility]  [address]  [place] [place] [address] [phone]              Who to contact     If you have questions or need follow up information about today's clinic visit or your schedule please contact [facility] directly at [phone].  Normal or non-critical lab and imaging results will be communicated to you by MyChart, letter or phone within 4 business days after the clinic has received the results. If you do not hear  "from us within 7 days, please contact the clinic through [employer] or phone. If you have a critical or abnormal lab result, we will notify you by phone as soon as possible.  Submit refill requests through [employer] or call your pharmacy and they will forward the refill request to us. Please allow 3 business days for your refill to be completed.          Additional Information About Your Visit        [employer] Information     [employer] gives you secure access to your electronic health record. If you see a primary care provider, you can also send messages to your care team and make appointments. If you have questions, please call your primary care clinic.  If you do not have a primary care provider, please call [phone] and they will assist you.        Care EveryWhere ID     This is your Care EveryWhere ID. This could be used by other organizations to access your [facility] medical records  [id number]        Your Vitals Were     Pulse Temperature Respirations    88 97.3  F (36.3  C) (Tympanic) 18    Height BMI (Body Mass Index) Pulse Oximetry    1.626 m (5' 4.02\") 33.09 kg/m2 97%    Breastfeeding?          No         Blood Pressure from Last 3 Encounters:   01/30/17 122/77   01/16/17 136/82   01/11/17 108/59    Weight from Last 3 Encounters:   01/30/17 87.499 kg (192 lb 14.4 oz)   01/16/17 86.728 kg (191 lb 3.2 oz)   01/11/17 86.728 kg (191 lb 3.2 oz)              Today, you had the following     No orders found for display         Today's Medication Changes          These changes are accurate as of: 1/30/17 10:58 AM.  If you have any questions, ask your nurse or doctor.               These medicines have changed or have updated prescriptions.        Dose/Directions    montelukast 10 MG tablet   Commonly known as:  SINGULAIR   This may have changed:  additional instructions   Used for:  Environmental allergies        Dose:  10 mg   Take 1 tablet (10 mg) by mouth At Bedtime   Quantity:  90 tablet   Refills:  3         Stop " taking these medicines if you haven't already. Please contact your care team if you have questions.     ADVIL 200 MG capsule   Generic drug:  ibuprofen   Stopped by:  Maribel Jin MD                    Primary Care Provider Office Phone # Fax #    Yenifer Lorraine Ridley [phone]       [facility] [address] [facility] [place] [address]        Thank you!     Thank you for choosing [facility]  for your care. Our goal is always to provide you with excellent care. Hearing back from our patients is one way we can continue to improve our services. Please take a few minutes to complete the written survey that you may receive in the mail after your visit with us. Thank you!             Your Updated Medication List - Protect others around you: Learn how to safely use, store and throw away your medicines at www.disposemymeds.org.          This list is accurate as of: 1/30/17 10:58 AM.  Always use your most recent med list.                   Brand Name Dispense Instructions for use    albuterol 108 (90 BASE) MCG/ACT Inhaler    PROAIR HFA/PROVENTIL HFA/VENTOLIN HFA    1 Inhaler    Inhale 2 puffs into the lungs every 6 hours as needed for shortness of breath / dyspnea       ALPRAZolam 0.5 MG tablet    XANAX         cyclobenzaprine 10 MG tablet    FLEXERIL    30 tablet    Take 1 tablet (10 mg) by mouth 3 times daily as needed for muscle spasms       dexamethasone 4 MG tablet    DECADRON     Take 4 mg by mouth       famotidine 20 MG tablet    PEPCID     Take 20 mg by mouth daily as needed       fluticasone 50 MCG/ACT spray    FLONASE    3 Bottle    Spray 1-2 sprays into both nostrils daily       hydrOXYzine 10 MG tablet    ATARAX    60 tablet    Take 1-5 tablets (10-50 mg) by mouth every 6 hours as needed for anxiety       lidocaine-prilocaine cream    EMLA    30 g    Apply topically as needed for other (place dime size amount over port site prior to tx and cover with tegaderm)       loratadine 10  MG tablet    CLARITIN    90 tablet    Take 1 tablet (10 mg) by mouth daily       LORazepam 0.5 MG tablet    ATIVAN    30 tablet    Take 1 tablet (0.5 mg) by mouth every 4 hours as needed (Anxiety, Nausea/Vomiting or Sleep)       montelukast 10 MG tablet    SINGULAIR    90 tablet    Take 1 tablet (10 mg) by mouth At Bedtime       NAPROXEN SODIUM PO      Take 220 mg by mouth       oxyCODONE-acetaminophen 5-325 MG per tablet    PERCOCET    20 tablet    Take 1 tablet by mouth every 4 hours as needed for pain       prochlorperazine 10 MG tablet    COMPAZINE    30 tablet    Take 1 tablet (10 mg) by mouth every 6 hours as needed (Nausea/Vomiting)       scopolamine 72 hr patch    TRANSDERM    1 patch    Place 1 patch onto the skin every 72 hours.  Apply to hairless area behind one ear at least 4 hours before travel.  Remove old patch and change every 3 days.

## 2017-01-30 NOTE — PROGRESS NOTES
Infusion Nursing Note:  Diana CROWLEY Wilson presents today for C2D1 A/C.    Patient seen by provider today: Yes: Dr. Jin     present during visit today: Not Applicable.    Note: N/A.    Intravenous Access:  Labs drawn without difficulty.  Implanted Port.    Treatment Conditions:  HGB     13.9   1/30/2017  WBC     15.1   1/30/2017   ANEU     12.8   1/30/2017  PLT      274   1/30/2017     Results reviewed, labs MET treatment parameters, ok to proceed with treatment.      Post Infusion Assessment:  Patient tolerated A/C infusion without incident.  Blood return noted pre and post infusion.  Site patent and intact, free from redness, edema or discomfort.  No evidence of extravasations.  Access discontinued per protocol.    On pro Neulasta injector applied to left upper arm.  Pt given instructions and verbal teaching about the on body injector. Questions answered and pt verbalizes understanding of teaching.    Discharge Plan:   Patient discharged in stable condition accompanied by: Aunt.  Departure Mode: Ambulatory.  Pt to return on 2/14/17 at 8:00 am for C3D1.     Nora Isaac RN

## 2017-01-30 NOTE — PROGRESS NOTES
"SPIRITUAL HEALTH SERVICES  SPIRITUAL ASSESSMENT Progress Note  [place] [facility]    Diana welcomed a follow up visit.  She stated that her , Fabian, wasn't with her because he was starting his new job today.  Her aunt, Tanna, was present.  Diana reflected again on her [employer] support, [employer], and the prayer quilt she has from them.  She also affirmed that she has a good support system.  Her aunt Tanna stated, \"plus she has a great attitude\" and Diana stated, \"I have spunk.\"  She stated she has had minimal side effects - some hair thinning - but she said with confidence \"I'm going to beat this.\"    Vijay Jacob M.A, [facility]  Staff   Pager [phone]    "

## 2017-01-30 NOTE — NURSING NOTE
"Diana Wilson is a 36 year old female who presents for:  Chief Complaint   Patient presents with     Oncology Clinic Visit     2 week recheck Breast CA, Labs & Chemo today        Initial Vitals:  /77 mmHg  Pulse 88  Temp(Src) 97.3  F (36.3  C) (Tympanic)  Resp 18  Ht 1.626 m (5' 4.02\")  Wt 87.499 kg (192 lb 14.4 oz)  BMI 33.09 kg/m2  SpO2 97%  Breastfeeding? No Estimated body mass index is 33.09 kg/(m^2) as calculated from the following:    Height as of this encounter: 1.626 m (5' 4.02\").    Weight as of this encounter: 87.499 kg (192 lb 14.4 oz).. Body surface area is 1.99 meters squared. BP completed using cuff size: large  Severe Pain (7) No LMP recorded. Allergies and medications reviewed.     Medications: Medication refills not needed today.  Pharmacy name entered into [email or web address]: [facility] [place]  [place], [place] - [address] [address]    Comments: 2 week recheck Breast CA, Labs & Chemo today. Patient reports pain 7/10 after Nueasta.   7  minutes for nursing intake (face to face time)   Sondra Medina CMA          "

## 2017-02-01 LAB — LAB SCANNED RESULT: NORMAL

## 2017-02-07 ENCOUNTER — MYC MEDICAL ADVICE (OUTPATIENT)
Dept: ONCOLOGY | Facility: CLINIC | Age: 37
End: 2017-02-07

## 2017-02-07 DIAGNOSIS — C50.911 INVASIVE DUCTAL CARCINOMA OF BREAST, RIGHT (H): Primary | ICD-10-CM

## 2017-02-07 DIAGNOSIS — C50.411 MALIGNANT NEOPLASM OF UPPER-OUTER QUADRANT OF RIGHT FEMALE BREAST (H): ICD-10-CM

## 2017-02-07 RX ORDER — ONDANSETRON 4 MG/1
4-8 TABLET, ORALLY DISINTEGRATING ORAL EVERY 6 HOURS PRN
Qty: 20 TABLET | Refills: 3 | Status: SHIPPED | OUTPATIENT
Start: 2017-02-07 | End: 2017-06-30

## 2017-02-14 ENCOUNTER — ONCOLOGY VISIT (OUTPATIENT)
Dept: ONCOLOGY | Facility: CLINIC | Age: 37
End: 2017-02-14
Attending: INTERNAL MEDICINE
Payer: COMMERCIAL

## 2017-02-14 ENCOUNTER — HOSPITAL ENCOUNTER (OUTPATIENT)
Facility: CLINIC | Age: 37
Discharge: HOME | End: 2017-02-14
Attending: INTERNAL MEDICINE | Admitting: INTERNAL MEDICINE
Payer: COMMERCIAL

## 2017-02-14 ENCOUNTER — INFUSION THERAPY VISIT (OUTPATIENT)
Dept: INFUSION THERAPY | Facility: CLINIC | Age: 37
End: 2017-02-14
Attending: INTERNAL MEDICINE
Payer: COMMERCIAL

## 2017-02-14 VITALS
TEMPERATURE: 97.2 F | HEART RATE: 105 BPM | BODY MASS INDEX: 32.95 KG/M2 | SYSTOLIC BLOOD PRESSURE: 130 MMHG | DIASTOLIC BLOOD PRESSURE: 75 MMHG | HEIGHT: 64 IN | WEIGHT: 193 LBS

## 2017-02-14 DIAGNOSIS — R11.0 NAUSEA: ICD-10-CM

## 2017-02-14 DIAGNOSIS — C50.411 MALIGNANT NEOPLASM OF UPPER-OUTER QUADRANT OF RIGHT FEMALE BREAST (H): Primary | ICD-10-CM

## 2017-02-14 DIAGNOSIS — R19.7 DIARRHEA, UNSPECIFIED TYPE: ICD-10-CM

## 2017-02-14 DIAGNOSIS — M89.8X9 BONE PAIN: ICD-10-CM

## 2017-02-14 LAB
ALBUMIN SERPL-MCNC: 3.8 G/DL (ref 3.4–5)
ALP SERPL-CCNC: 70 U/L (ref 40–150)
ALT SERPL W P-5'-P-CCNC: 34 U/L (ref 0–50)
ANION GAP SERPL CALCULATED.3IONS-SCNC: 9 MMOL/L (ref 3–14)
AST SERPL W P-5'-P-CCNC: 8 U/L (ref 0–45)
BASOPHILS # BLD AUTO: 0 10E9/L (ref 0–0.2)
BASOPHILS NFR BLD AUTO: 0 %
BILIRUB SERPL-MCNC: 0.4 MG/DL (ref 0.2–1.3)
BUN SERPL-MCNC: 8 MG/DL (ref 7–30)
CALCIUM SERPL-MCNC: 8.8 MG/DL (ref 8.5–10.1)
CHLORIDE SERPL-SCNC: 106 MMOL/L (ref 94–109)
CO2 SERPL-SCNC: 24 MMOL/L (ref 20–32)
CREAT SERPL-MCNC: 0.64 MG/DL (ref 0.52–1.04)
DIFFERENTIAL METHOD BLD: ABNORMAL
EOSINOPHIL # BLD AUTO: 0.2 10E9/L (ref 0–0.7)
EOSINOPHIL NFR BLD AUTO: 1 %
ERYTHROCYTE [DISTWIDTH] IN BLOOD BY AUTOMATED COUNT: 12.9 % (ref 10–15)
GFR SERPL CREATININE-BSD FRML MDRD: ABNORMAL ML/MIN/1.7M2
GLUCOSE SERPL-MCNC: 194 MG/DL (ref 70–99)
HCT VFR BLD AUTO: 40.6 % (ref 35–47)
HGB BLD-MCNC: 13.7 G/DL (ref 11.7–15.7)
LYMPHOCYTES # BLD AUTO: 1.5 10E9/L (ref 0.8–5.3)
LYMPHOCYTES NFR BLD AUTO: 9 %
MCH RBC QN AUTO: 29.5 PG (ref 26.5–33)
MCHC RBC AUTO-ENTMCNC: 33.7 G/DL (ref 31.5–36.5)
MCV RBC AUTO: 87 FL (ref 78–100)
METAMYELOCYTES # BLD: 0.2 10E9/L
METAMYELOCYTES NFR BLD MANUAL: 1 %
MONOCYTES # BLD AUTO: 0.3 10E9/L (ref 0–1.3)
MONOCYTES NFR BLD AUTO: 2 %
NEUTROPHILS # BLD AUTO: 14.8 10E9/L (ref 1.6–8.3)
NEUTROPHILS NFR BLD AUTO: 87 %
NRBC # BLD AUTO: 0.2 10*3/UL
NRBC BLD AUTO-RTO: 1 /100
PLATELET # BLD AUTO: 282 10E9/L (ref 150–450)
PLATELET # BLD EST: NORMAL 10*3/UL
POLYCHROMASIA BLD QL SMEAR: SLIGHT
POTASSIUM SERPL-SCNC: 4 MMOL/L (ref 3.4–5.3)
PROT SERPL-MCNC: 7.2 G/DL (ref 6.8–8.8)
RBC # BLD AUTO: 4.65 10E12/L (ref 3.8–5.2)
SODIUM SERPL-SCNC: 139 MMOL/L (ref 133–144)
WBC # BLD AUTO: 17 10E9/L (ref 4–11)

## 2017-02-14 PROCEDURE — 99214 OFFICE O/P EST MOD 30 MIN: CPT | Performed by: INTERNAL MEDICINE

## 2017-02-14 PROCEDURE — 25000125 ZZHC RX 250: Performed by: INTERNAL MEDICINE

## 2017-02-14 PROCEDURE — 96367 TX/PROPH/DG ADDL SEQ IV INF: CPT

## 2017-02-14 PROCEDURE — 25000128 H RX IP 250 OP 636

## 2017-02-14 PROCEDURE — 85025 COMPLETE CBC W/AUTO DIFF WBC: CPT | Performed by: INTERNAL MEDICINE

## 2017-02-14 PROCEDURE — 96375 TX/PRO/DX INJ NEW DRUG ADDON: CPT

## 2017-02-14 PROCEDURE — 96413 CHEMO IV INFUSION 1 HR: CPT

## 2017-02-14 PROCEDURE — 96377 APPLICATON ON-BODY INJECTOR: CPT | Mod: 59

## 2017-02-14 PROCEDURE — 25000128 H RX IP 250 OP 636: Performed by: INTERNAL MEDICINE

## 2017-02-14 PROCEDURE — 80053 COMPREHEN METABOLIC PANEL: CPT | Performed by: INTERNAL MEDICINE

## 2017-02-14 RX ORDER — HEPARIN SODIUM (PORCINE) LOCK FLUSH IV SOLN 100 UNIT/ML 100 UNIT/ML
SOLUTION INTRAVENOUS
Status: DISCONTINUED
Start: 2017-02-14 | End: 2017-02-14 | Stop reason: HOSPADM

## 2017-02-14 RX ORDER — LORAZEPAM 2 MG/ML
0.5 INJECTION INTRAMUSCULAR EVERY 4 HOURS PRN
Status: CANCELLED
Start: 2017-02-14

## 2017-02-14 RX ORDER — MEPERIDINE HYDROCHLORIDE 25 MG/ML
25 INJECTION INTRAMUSCULAR; INTRAVENOUS; SUBCUTANEOUS EVERY 30 MIN PRN
Status: CANCELLED | OUTPATIENT
Start: 2017-02-14

## 2017-02-14 RX ORDER — DOXORUBICIN HYDROCHLORIDE 2 MG/ML
60 INJECTION, SOLUTION INTRAVENOUS ONCE
Status: CANCELLED | OUTPATIENT
Start: 2017-02-14

## 2017-02-14 RX ORDER — METHYLPREDNISOLONE SODIUM SUCCINATE 125 MG/2ML
125 INJECTION, POWDER, LYOPHILIZED, FOR SOLUTION INTRAMUSCULAR; INTRAVENOUS
Status: CANCELLED
Start: 2017-02-14

## 2017-02-14 RX ORDER — SODIUM CHLORIDE 9 MG/ML
1000 INJECTION, SOLUTION INTRAVENOUS CONTINUOUS PRN
Status: CANCELLED
Start: 2017-02-14

## 2017-02-14 RX ORDER — HEPARIN SODIUM (PORCINE) LOCK FLUSH IV SOLN 100 UNIT/ML 100 UNIT/ML
SOLUTION INTRAVENOUS
Status: COMPLETED
Start: 2017-02-14 | End: 2017-02-14

## 2017-02-14 RX ORDER — EPINEPHRINE 0.3 MG/.3ML
0.3 INJECTION SUBCUTANEOUS EVERY 5 MIN PRN
Status: CANCELLED | OUTPATIENT
Start: 2017-02-14

## 2017-02-14 RX ORDER — DOXORUBICIN HYDROCHLORIDE 2 MG/ML
60 INJECTION, SOLUTION INTRAVENOUS ONCE
Status: COMPLETED | OUTPATIENT
Start: 2017-02-14 | End: 2017-02-14

## 2017-02-14 RX ORDER — ALBUTEROL SULFATE 0.83 MG/ML
2.5 SOLUTION RESPIRATORY (INHALATION)
Status: CANCELLED | OUTPATIENT
Start: 2017-02-14

## 2017-02-14 RX ORDER — ALBUTEROL SULFATE 90 UG/1
1-2 AEROSOL, METERED RESPIRATORY (INHALATION)
Status: CANCELLED
Start: 2017-02-14

## 2017-02-14 RX ORDER — PALONOSETRON 0.05 MG/ML
0.25 INJECTION, SOLUTION INTRAVENOUS ONCE
Status: COMPLETED | OUTPATIENT
Start: 2017-02-14 | End: 2017-02-14

## 2017-02-14 RX ORDER — DIPHENHYDRAMINE HYDROCHLORIDE 50 MG/ML
50 INJECTION INTRAMUSCULAR; INTRAVENOUS
Status: CANCELLED
Start: 2017-02-14

## 2017-02-14 RX ADMIN — PALONOSETRON HYDROCHLORIDE 0.25 MG: 0.25 INJECTION INTRAVENOUS at 10:07

## 2017-02-14 RX ADMIN — DOXORUBICIN HYDROCHLORIDE 118 MG: 2 INJECTION, SOLUTION INTRAVENOUS at 10:32

## 2017-02-14 RX ADMIN — SODIUM CHLORIDE 250 ML: 9 INJECTION, SOLUTION INTRAVENOUS at 09:10

## 2017-02-14 RX ADMIN — PEGFILGRASTIM 6 MG: KIT SUBCUTANEOUS at 11:40

## 2017-02-14 RX ADMIN — FAMOTIDINE 20 MG: 20 INJECTION, SOLUTION INTRAVENOUS at 09:10

## 2017-02-14 RX ADMIN — CYCLOPHOSPHAMIDE 1180 MG: 1 INJECTION, POWDER, FOR SOLUTION INTRAVENOUS; ORAL at 10:57

## 2017-02-14 RX ADMIN — SODIUM CHLORIDE, PRESERVATIVE FREE 500 UNITS: 5 INJECTION INTRAVENOUS at 11:36

## 2017-02-14 RX ADMIN — SODIUM CHLORIDE 150 MG: 9 INJECTION, SOLUTION INTRAVENOUS at 09:42

## 2017-02-14 RX ADMIN — DEXAMETHASONE SODIUM PHOSPHATE 12 MG: 10 INJECTION, SOLUTION INTRAMUSCULAR; INTRAVENOUS at 09:26

## 2017-02-14 ASSESSMENT — PAIN SCALES - GENERAL: PAINLEVEL: NO PAIN (0)

## 2017-02-14 NOTE — MR AVS SNAPSHOT
After Visit Summary   2/14/2017    Diana Wilson    MRN: [id number]           Patient Information     Date Of Birth          1980        Visit Information        Provider Department      2/14/2017 8:30 AM Maribel Jin MD [facility]        Today's Diagnoses     Malignant neoplasm of upper-outer quadrant of right female breast (H)    -  1    Bone pain        Nausea        Diarrhea, unspecified type          Care Instructions    We would like to see you back with cycle 4 of chemotherapy.   When you are in need of a refill, please call your pharmacy and they will send us a request.  Copy of appointments, and after visit summary (AVS) given to patient.  If you have any questions please call Morena Henry RN, BSN, OCN Oncology Hematology  [facility] [phone]. For questions after business hours, or on holidays/weekends, please call our after hours Nurse Triage line [phone]. Thank you.           Follow-ups after your visit        Your next 10 appointments already scheduled     Feb 28, 2017  8:00 AM CST   Level 3 with ROOM 5 [facility] ([facility])    [facility] [address] [address]   [phone]            Feb 28, 2017  8:30 AM CST   Return Visit with Maribel Jin MD   [facility] ([facility])    [facility] [address] [address] [phone]            Mar 01, 2017 10:15 AM CST   Return Visit with Claudia Willis GC   [facility] ([facility])    [facility] [address] [address] [phone]              Who to contact     If you have questions or need follow up information about today's clinic visit or your schedule please contact [facility] directly at [phone].  Normal or  "non-critical lab and imaging results will be communicated to you by MyChart, letter or phone within 4 business days after the clinic has received the results. If you do not hear from us within 7 days, please contact the clinic through [employer] or phone. If you have a critical or abnormal lab result, we will notify you by phone as soon as possible.  Submit refill requests through [employer] or call your pharmacy and they will forward the refill request to us. Please allow 3 business days for your refill to be completed.          Additional Information About Your Visit        [employer] Information     [employer] gives you secure access to your electronic health record. If you see a primary care provider, you can also send messages to your care team and make appointments. If you have questions, please call your primary care clinic.  If you do not have a primary care provider, please call [phone] and they will assist you.        Care EveryWhere ID     This is your Care EveryWhere ID. This could be used by other organizations to access your [facility] medical records  [id number]        Your Vitals Were     Pulse Temperature Height Breastfeeding? BMI (Body Mass Index)       105 97.2  F (36.2  C) (Tympanic) 1.626 m (5' 4.02\") No 33.11 kg/m2        Blood Pressure from Last 3 Encounters:   02/14/17 130/75   01/30/17 122/77   01/30/17 119/64    Weight from Last 3 Encounters:   02/14/17 87.5 kg (193 lb)   01/30/17 87.5 kg (192 lb 14.4 oz)   01/16/17 86.7 kg (191 lb 3.2 oz)              Today, you had the following     No orders found for display       Primary Care Provider Office Phone # Fax #    Yenifer Ridley [phone]       [facility] [address] [facility] [address]        Thank you!     Thank you for choosing [facility]  for your care. Our goal is always to provide you with excellent care. Hearing back from our patients is one way we can continue to improve our services. Please " take a few minutes to complete the written survey that you may receive in the mail after your visit with us. Thank you!             Your Updated Medication List - Protect others around you: Learn how to safely use, store and throw away your medicines at www.disposemymeds.org.          This list is accurate as of: 2/14/17  9:10 AM.  Always use your most recent med list.                   Brand Name Dispense Instructions for use    albuterol 108 (90 BASE) MCG/ACT Inhaler    PROAIR HFA/PROVENTIL HFA/VENTOLIN HFA    1 Inhaler    Inhale 2 puffs into the lungs every 6 hours as needed for shortness of breath / dyspnea       ALPRAZolam 0.5 MG tablet    XANAX     Reported on 2/14/2017       cyclobenzaprine 10 MG tablet    FLEXERIL    30 tablet    Take 1 tablet (10 mg) by mouth 3 times daily as needed for muscle spasms       dexamethasone 4 MG tablet    DECADRON     Take 4 mg by mouth       famotidine 20 MG tablet    PEPCID     Take 20 mg by mouth daily as needed       fluticasone 50 MCG/ACT spray    FLONASE    3 Bottle    Spray 1-2 sprays into both nostrils daily       hydrOXYzine 10 MG tablet    ATARAX    60 tablet    Take 1-5 tablets (10-50 mg) by mouth every 6 hours as needed for anxiety       lidocaine-prilocaine cream    EMLA    30 g    Apply topically as needed for other (place dime size amount over port site prior to tx and cover with tegaderm)       loratadine 10 MG tablet    CLARITIN    90 tablet    Take 1 tablet (10 mg) by mouth daily       LORazepam 0.5 MG tablet    ATIVAN    30 tablet    Take 1 tablet (0.5 mg) by mouth every 4 hours as needed (Anxiety, Nausea/Vomiting or Sleep)       montelukast 10 MG tablet    SINGULAIR    90 tablet    Take 1 tablet (10 mg) by mouth At Bedtime       NAPROXEN SODIUM PO      Take 220 mg by mouth Reported on 2/14/2017       ondansetron 4 MG ODT tab    ZOFRAN ODT    20 tablet    Take 1-2 tablets (4-8 mg) by mouth every 6 hours as needed for nausea       oxyCODONE-acetaminophen 5-325  MG per tablet    PERCOCET    20 tablet    Take 1 tablet by mouth every 4 hours as needed for pain       prochlorperazine 10 MG tablet    COMPAZINE    30 tablet    Take 1 tablet (10 mg) by mouth every 6 hours as needed (Nausea/Vomiting)       scopolamine 72 hr patch    TRANSDERM    1 patch    Place 1 patch onto the skin every 72 hours.  Apply to hairless area behind one ear at least 4 hours before travel.  Remove old patch and change every 3 days.

## 2017-02-14 NOTE — PROGRESS NOTES
Infusion Nursing Note:  Diana Wilson presents today for PAC labs, MD appt., and chemotherapy.    Patient seen by provider today: Yes: Dr. Jin.   present during visit today: Not Applicable.    Note: Labs drawn via her port per [place] protocol. MD appt. Today. Labs WNL's. Premeds and chemotherapy infused without complications. Port flushed per [place] protocol. Pt. To return on 2/28/17 for labs and chemotherapy. On pro Neulasta injector applied to left arm.  Pt given instructions and verbal teaching about the on body injector. Questions answered and pt verbalizes understanding of teaching.      Intravenous Access:  Labs drawn without difficulty.  Implanted Port.    Treatment Conditions:  Lab Results   Component Value Date    HGB 13.7 02/14/2017    HGB 4.68 12/11/2003     Lab Results   Component Value Date    WBC 17.0 02/14/2017      Lab Results   Component Value Date    ANEU 14.8 02/14/2017     Lab Results   Component Value Date     02/14/2017    PLT 16.2 12/11/2003      Lab Results   Component Value Date     02/14/2017                   Lab Results   Component Value Date    POTASSIUM 4.0 02/14/2017           Lab Results   Component Value Date    MAG 2.3 03/02/2016            Lab Results   Component Value Date    CR 0.64 02/14/2017                   Lab Results   Component Value Date    DENY 8.8 02/14/2017                Lab Results   Component Value Date    BILITOTAL 0.4 02/14/2017           Lab Results   Component Value Date    ALBUMIN 3.8 02/14/2017                    Lab Results   Component Value Date    ALT 34 02/14/2017           Lab Results   Component Value Date    AST 8 02/14/2017     Results reviewed, labs MET treatment parameters, ok to proceed with treatment.      Post Infusion Assessment:  Patient tolerated infusion without incident.  Blood return noted pre and post infusion.  Blood return noted during administration every 2 cc. Of Adriamycin.  Site patent and intact, free from  redness, edema or discomfort.  No evidence of extravasations.  Access discontinued per protocol.    Discharge Plan:   Patient and/or family verbalized understanding of discharge instructions and all questions answered.  Patient discharged in stable condition accompanied by: father.  Departure Mode: Ambulatory.    Gina Galan RN

## 2017-02-14 NOTE — PATIENT INSTRUCTIONS
We would like to see you back with cycle 4 of chemotherapy.   When you are in need of a refill, please call your pharmacy and they will send us a request.  Copy of appointments, and after visit summary (AVS) given to patient.  If you have any questions please call Morena Henry RN, BSN, OCN Oncology Hematology  [facility] [phone]. For questions after business hours, or on holidays/weekends, please call our after hours Nurse Triage line [phone]. Thank you.

## 2017-02-14 NOTE — PROGRESS NOTES
" ONCOLOGY Follow up visit       COMPLAINT AND REASON FOR CONSULTATION:  12/2016 diagnosed right breast cancer on neoadjuvant DD AC   hx of TIFFANY due Celiac disease on IV iron prn     REFERRING/[facility] PHYSICIAN:  Dr. Yenifer Ridley       HERB Wilson is a 36-year-old premenopausal woman, self felt breast in her right armpit and right breast in 10/2016.  She observed it and due to persistency of the palpable lesions went on to have workup with diagnostic mammogram end of 12/2016, which identified 2 cm ill-defined density  associated with indeterminate microcalcification measuring about 3.2 cm, so the whole area spans about 4 cm in biggest dimension.   Ultrasound to the site which is the upper outer quadrant of the right breast identified 2 cm irregular solid mass; at the right axilla shows several abnormal lymph nodes, the largest one measures 1.8 cm in maximum dimension.  Ultrasound-guided biopsy of right breast was done 12/27/2016, identified grade 3 invasive ductal cancer, angiolymphatic invasion not identified.  ER/SC 99% positive, HER-2/kelle negative, associated with high-grade DCIS.    Right axillary ultrasound-guided biopsy indicating metastatic carcinoma positive for spread from breast primary, ER/SC both negative.  HER-2/kelle is negative.      She has clinical T1N1 disease. She made informed decision to proceed with neoadjuvant DDAC C1D1 1/16/2017.     She is tested negative for BRCA1/2 with [facility].      PAST MEDICAL HISTORY:  Restless legs, mild intermittent asthma, celiac disease diagnosed 02/2016, hx of TIFFANY     SOCIAL HISTORY:  Works part-time.  Lives with her .  They have 2 kids, first pregnancy age 22.  She did not do breast feeding because of bilateral breast reduction.  She does office work.      FAMILY HISTORY:  \"Full of cancer\" from the mother's side including colon, lung, pancreatic, gastric cancer.  According to the patient none of them survive older than 60 years old.      REVIEW OF " "SYSTEMS  severe nausea and diarrhea C2D6, lots of bone pain      PHYSICAL EXAMINATION:   Blood pressure 130/75, pulse 105, temperature 97.2  F (36.2  C), temperature source Tympanic, height 1.626 m (5' 4.02\"), weight 87.5 kg (193 lb), not currently breastfeeding.  GENERAL APPEARANCE:  He young woman, looks like her stated age, very upbeat, not in acute distress.   HEENT: The patient is normocephalic, atraumatic. Pupils are equally reactive to light.  Sclerae are anicteric.  Moist oral mucosa.  Negative pharynx.  No oral thrush.   NECK:  Supple.  No jugular venous distention.  Thyroid is not palpable.   LYMPH NODES:  Superficial lymphadenopathy is not appreciable in the bilateral cervical, supraclavicular, axillary or inguinal areas.   CARDIOVASCULAR:  S1, S2 regular with no murmurs or gallops.  No carotid or abdominal bruits.   PULMONARY:  Lungs are clear to auscultation and percussion bilaterally.  There is no wheezing or rhonchi.   GASTROINTESTINAL:  Abdomen is soft, nontender.  No hepatosplenomegaly.  No signs of ascites.  No mass appreciable.   MUSCULOSKELETAL/EXTREMITIES:  No edema.  No cyanotic changes.  No signs of joint deformity.  No lymphedema.   NEUROLOGIC:  Cranial nerves II-XII are grossly intact.  Sensation intact.  Muscle strength and muscle tone symmetrical, 5/5 throughout.   BACK:  No spinal or paraspinal tenderness.  No CVA tenderness.   SKIN:  No petechiae.  No rash.  No signs of cellulitis.   BREASTS:  Bilateral breast exam reviewed.  Glandular tissue on both breasts in general, readily palpable upper outer quadrant of the right breast about 2-3 cm palpable lesion with vague palpable right axillary lymphadenopathy.  She has ecchymosis after the biopsy.      LABORATORY DATA:   C3D1 cbc diff/cMP are   baseline labs cbc diff/cmp/marker are nl.      CURRENT IMAGIN/2017 baseline Echo LV function nl, EF 60-65%     PET 2017  1. Hypermetabolic mass in the right breast consistent with known " malignancy.  2. Two enlarged hypermetabolic metastatic lymph nodes in the right axilla.  3. No additional worrisome hypermetabolic areas in the neck, chest, abdomen, or pelvis.    Old data reviewed with summary  End of 12/2016  right breast was done 12/27/2016, identified grade 3 invasive ductal cancer, angiolymphatic invasion not identified.  ER/NY 99% positive, HER-2/kelle negative, associated with high-grade DCIS.    Right axillary ultrasound-guided biopsy indicating metastatic carcinoma positive for spread from breast primary, ER/NY both negative.  HER-2/kelle is negative.        diagnostic mammogram end of 12/2016, which identified 2 cm ill-defined density  associated with indeterminate microcalcification measuring about 3.2 cm, so the whole area spans about 4 cm in biggest dimension.   Ultrasound to the site which is the upper outer quadrant of the right breast identified 2 cm irregular solid mass; at the right axilla shows several abnormal lymph nodes, the largest one measures 1.8 cm in maximum dimension.      DEXA scan from 03/2016 identified mild osteopenia lumbar spine        ASSESSMENT AND PLAN:    1. 12/2016 newly dx breast cancer,  high-grade invasive ductal cancer on her right breast, ER/NY 99% positive, HER-2/kelle negative cancer with associated high-grade DCIS.  The lymph nodes biopsy also came back positive breast cancer but is triple negative disease. She is only 36 years old. She has cT2N1 disesae.      She has made an informed decision to proceed with neoadjuvant dose-dense AC C1D1 1/16/2017, followed by taxane. Would still like to add platinum despite her genetic testing is negative. She is young and healthy and likely will be able to handle carbo associated extra BM suppression.      She needs to be monitored closely during this chemo.    She is also informed the need of adjuvant radiation based on her initial presentation in terms of lymph node involvement.  She is very upbeat with this difficult  diagnosis.  Lots of encouragement is provided.     She has scheduled post AC breast MRI at [facility].        2. Delayed nausea on C2D6, needs H2 blocker hydroxyzine for he nausea. She said it works miracle for her base on her prior experience.   She also had diarrhea for one day - encourage po fluid intake.      3. bone pain from neulasta. Advice claritin and percocet prn.

## 2017-02-14 NOTE — NURSING NOTE
"Diana Wilson is a 36 year old female who presents for:  Chief Complaint   Patient presents with     Oncology Clinic Visit     2 wek recheck Breast CA, Labs & Chemo today        Initial Vitals:  /75 (BP Location: Right arm, Patient Position: Chair, Cuff Size: Adult Regular)  Pulse 105  Temp 97.2  F (36.2  C) (Tympanic)  Ht 1.626 m (5' 4.02\")  Wt 87.5 kg (193 lb)  Breastfeeding? No  BMI 33.11 kg/m2 Estimated body mass index is 33.11 kg/(m^2) as calculated from the following:    Height as of this encounter: 1.626 m (5' 4.02\").    Weight as of this encounter: 87.5 kg (193 lb).. Body surface area is 1.99 meters squared. BP completed using cuff size: regular  No Pain (0) No LMP recorded. Allergies and medications reviewed.     Medications: Medication refills not needed today.  Pharmacy name entered into [facility]: [facility] [place], [place] - [address]    Comments: 2 week recheck Breast CA, Labs & Chemo today.     7  minutes for nursing intake (face to face time)   Sondra Medina CMA        "

## 2017-02-14 NOTE — MR AVS SNAPSHOT
After Visit Summary   2/14/2017    Diana Wilson    MRN: [id number]           Patient Information     Date Of Birth          1980        Visit Information        Provider Department      2/14/2017 8:00 AM ROOM 6 [facility]        Today's Diagnoses     Malignant neoplasm of upper-outer quadrant of right female breast (H)    -  1       Follow-ups after your visit        Your next 10 appointments already scheduled     Feb 28, 2017  8:00 AM CST   Level 3 with ROOM 5 [facility] ([facility])    [facility]  [address] [place] [address] [address]  [facility] [address]   [phone]            Feb 28, 2017  8:30 AM CST   Return Visit with Maribel Jin MD   [facility] ([facility])    [facility] [place] [address]  [facility] [address]   [phone]            Mar 01, 2017 10:15 AM CST   Return Visit with Claudia Willis GC   Can[facility] Risk Management [facility] ([facility])    [facility]  [place] [address] [facility] [address]   [phone]              Who to contact     If you have questions or need follow up information about today's clinic visit or your schedule please contact [facility] directly at [phone].  Normal or non-critical lab and imaging results will be communicated to you by MyChart, letter or phone within 4 business days after the clinic has received the results. If you do not hear from us within 7 days, please contact the clinic through MyChart or phone. If you have a critical or abnormal lab result, we will notify you by phone as soon as possible.  Submit refill requests through [employer] or call your pharmacy and they will forward the refill request to us. Please allow 3 business days for your refill to be completed.          Additional Information About Your Visit        SiteWithart Information     [employer] gives you  secure access to your electronic health record. If you see a primary care provider, you can also send messages to your care team and make appointments. If you have questions, please call your primary care clinic.  If you do not have a primary care provider, please call [phone] and they will assist you.        Care EveryWhere ID     This is your Care EveryWhere ID. This could be used by other organizations to access your [facility] medical records  [id number]         Blood Pressure from Last 3 Encounters:   02/14/17 130/75   01/30/17 122/77   01/30/17 119/64    Weight from Last 3 Encounters:   02/14/17 87.5 kg (193 lb)   01/30/17 87.5 kg (192 lb 14.4 oz)   01/16/17 86.7 kg (191 lb 3.2 oz)              We Performed the Following     CBC with platelets differential     Central Venous Catheter: implanted port (Port-A-Cath, Power Port)     Comprehensive metabolic panel        Primary Care Provider Office Phone # Fax #    Yenifer Ridley [phone]       [facility] [address] [facility] [address]        Thank you!     Thank you for choosing [facility]  for your care. Our goal is always to provide you with excellent care. Hearing back from our patients is one way we can continue to improve our services. Please take a few minutes to complete the written survey that you may receive in the mail after your visit with us. Thank you!             Your Updated Medication List - Protect others around you: Learn how to safely use, store and throw away your medicines at www.disposemymeds.org.          This list is accurate as of: 2/14/17  4:17 PM.  Always use your most recent med list.                   Brand Name Dispense Instructions for use    albuterol 108 (90 BASE) MCG/ACT Inhaler    PROAIR HFA/PROVENTIL HFA/VENTOLIN HFA    1 Inhaler    Inhale 2 puffs into the lungs every 6 hours as needed for shortness of breath / dyspnea       ALPRAZolam 0.5 MG tablet    XANAX     Reported  on 2/14/2017       cyclobenzaprine 10 MG tablet    FLEXERIL    30 tablet    Take 1 tablet (10 mg) by mouth 3 times daily as needed for muscle spasms       dexamethasone 4 MG tablet    DECADRON     Take 4 mg by mouth       famotidine 20 MG tablet    PEPCID     Take 20 mg by mouth daily as needed       fluticasone 50 MCG/ACT spray    FLONASE    3 Bottle    Spray 1-2 sprays into both nostrils daily       hydrOXYzine 10 MG tablet    ATARAX    60 tablet    Take 1-5 tablets (10-50 mg) by mouth every 6 hours as needed for anxiety       lidocaine-prilocaine cream    EMLA    30 g    Apply topically as needed for other (place dime size amount over port site prior to tx and cover with tegaderm)       loratadine 10 MG tablet    CLARITIN    90 tablet    Take 1 tablet (10 mg) by mouth daily       LORazepam 0.5 MG tablet    ATIVAN    30 tablet    Take 1 tablet (0.5 mg) by mouth every 4 hours as needed (Anxiety, Nausea/Vomiting or Sleep)       montelukast 10 MG tablet    SINGULAIR    90 tablet    Take 1 tablet (10 mg) by mouth At Bedtime       NAPROXEN SODIUM PO      Take 220 mg by mouth Reported on 2/14/2017       ondansetron 4 MG ODT tab    ZOFRAN ODT    20 tablet    Take 1-2 tablets (4-8 mg) by mouth every 6 hours as needed for nausea       oxyCODONE-acetaminophen 5-325 MG per tablet    PERCOCET    20 tablet    Take 1 tablet by mouth every 4 hours as needed for pain       prochlorperazine 10 MG tablet    COMPAZINE    30 tablet    Take 1 tablet (10 mg) by mouth every 6 hours as needed (Nausea/Vomiting)       scopolamine 72 hr patch    TRANSDERM    1 patch    Place 1 patch onto the skin every 72 hours.  Apply to hairless area behind one ear at least 4 hours before travel.  Remove old patch and change every 3 days.

## 2017-02-23 ENCOUNTER — MYC MEDICAL ADVICE (OUTPATIENT)
Dept: ONCOLOGY | Facility: CLINIC | Age: 37
End: 2017-02-23

## 2017-02-23 ENCOUNTER — HOSPITAL ENCOUNTER (OUTPATIENT)
Dept: LAB | Facility: CLINIC | Age: 37
Discharge: HOME | End: 2017-02-23
Attending: INTERNAL MEDICINE | Admitting: INTERNAL MEDICINE
Payer: COMMERCIAL

## 2017-02-23 DIAGNOSIS — R19.7 DIARRHEA: Primary | ICD-10-CM

## 2017-02-23 DIAGNOSIS — K12.30 MUCOSITIS: ICD-10-CM

## 2017-02-23 LAB
C DIFF TOX B STL QL: NORMAL
SPECIMEN SOURCE: NORMAL

## 2017-02-23 PROCEDURE — 87177 OVA AND PARASITES SMEARS: CPT | Performed by: INTERNAL MEDICINE

## 2017-02-23 PROCEDURE — 87493 C DIFF AMPLIFIED PROBE: CPT | Performed by: INTERNAL MEDICINE

## 2017-02-23 PROCEDURE — 87209 SMEAR COMPLEX STAIN: CPT | Performed by: INTERNAL MEDICINE

## 2017-02-23 NOTE — TELEPHONE ENCOUNTER
Called Pt regarding all of her symptoms and asked that she stop by the clinic before noon today for Dr. Jin to perform a quick assessment. Pt verbalized understanding and stated that she will be here around 10:30 today.

## 2017-02-24 ENCOUNTER — TELEPHONE (OUTPATIENT)
Dept: ONCOLOGY | Facility: CLINIC | Age: 37
End: 2017-02-24

## 2017-02-24 LAB
MICRO REPORT STATUS: NORMAL
O+P STL MICRO: NORMAL
SPECIMEN SOURCE: NORMAL

## 2017-02-24 NOTE — TELEPHONE ENCOUNTER
Called and spoke to Pt regarding the results of her stool specimens and Dr. Jin's recommendations for her ongoing nausea. Informed her that her stool came back negative for C-diff and ova and parasite and instructed her to take immodium OTC for persistent diarrhea and reviewed how to take it. Pt verbalized understanding stating that she is hesitant to take the immodium since she is prone to constipation but will give it a try if needed.     Asked Pt if she was taking ativan in addition to her zofran and compazine for nausea and pt stated that she did only at night since it makes her sleepy and she works during the day. Informed her that Dr. Jin is willing to prescribe her marinol for her nausea if she was interested but pt refused when she heard that it could also be used as an appetite stimulant. PT stated that she feels a little better today and will see how the weekend goes.    Told pt to give us a call with any other que/conerns, confirmed that she had the after hours RN on-call number as well as her f/u appt with Dr. Jin prior to her next infusion on Tuesday 2/28/17. Pt verbalized understanding and agreed with the plan.

## 2017-02-24 NOTE — TELEPHONE ENCOUNTER
Called and left  for Pt to return my call regarding lab result and recommendations per Dr. Jin regarding recent symptoms she has been experiencing.

## 2017-02-28 ENCOUNTER — HOSPITAL ENCOUNTER (OUTPATIENT)
Facility: CLINIC | Age: 37
Discharge: HOME | End: 2017-02-28
Attending: INTERNAL MEDICINE | Admitting: INTERNAL MEDICINE
Payer: COMMERCIAL

## 2017-02-28 ENCOUNTER — ONCOLOGY VISIT (OUTPATIENT)
Dept: ONCOLOGY | Facility: CLINIC | Age: 37
End: 2017-02-28
Attending: INTERNAL MEDICINE
Payer: COMMERCIAL

## 2017-02-28 ENCOUNTER — INFUSION THERAPY VISIT (OUTPATIENT)
Dept: INFUSION THERAPY | Facility: CLINIC | Age: 37
End: 2017-02-28
Attending: INTERNAL MEDICINE
Payer: COMMERCIAL

## 2017-02-28 VITALS — SYSTOLIC BLOOD PRESSURE: 116 MMHG | HEART RATE: 82 BPM | DIASTOLIC BLOOD PRESSURE: 72 MMHG

## 2017-02-28 VITALS
BODY MASS INDEX: 33.03 KG/M2 | RESPIRATION RATE: 18 BRPM | OXYGEN SATURATION: 97 % | SYSTOLIC BLOOD PRESSURE: 115 MMHG | HEART RATE: 106 BPM | TEMPERATURE: 97.9 F | WEIGHT: 193.5 LBS | HEIGHT: 64 IN | DIASTOLIC BLOOD PRESSURE: 76 MMHG

## 2017-02-28 DIAGNOSIS — R11.0 NAUSEA: ICD-10-CM

## 2017-02-28 DIAGNOSIS — R19.7 DIARRHEA, UNSPECIFIED TYPE: ICD-10-CM

## 2017-02-28 DIAGNOSIS — C50.411 MALIGNANT NEOPLASM OF UPPER-OUTER QUADRANT OF RIGHT FEMALE BREAST (H): Primary | ICD-10-CM

## 2017-02-28 DIAGNOSIS — K12.30 MUCOSITIS: ICD-10-CM

## 2017-02-28 DIAGNOSIS — M89.8X9 BONE PAIN: ICD-10-CM

## 2017-02-28 LAB
BASOPHILS # BLD AUTO: 0 10E9/L (ref 0–0.2)
BASOPHILS NFR BLD AUTO: 0 %
DIFFERENTIAL METHOD BLD: ABNORMAL
EOSINOPHIL # BLD AUTO: 0.1 10E9/L (ref 0–0.7)
EOSINOPHIL NFR BLD AUTO: 1 %
ERYTHROCYTE [DISTWIDTH] IN BLOOD BY AUTOMATED COUNT: 13.8 % (ref 10–15)
HCT VFR BLD AUTO: 39.4 % (ref 35–47)
HGB BLD-MCNC: 13.3 G/DL (ref 11.7–15.7)
LYMPHOCYTES # BLD AUTO: 2 10E9/L (ref 0.8–5.3)
LYMPHOCYTES NFR BLD AUTO: 16 %
MCH RBC QN AUTO: 29.8 PG (ref 26.5–33)
MCHC RBC AUTO-ENTMCNC: 33.8 G/DL (ref 31.5–36.5)
MCV RBC AUTO: 88 FL (ref 78–100)
METAMYELOCYTES # BLD: 0.1 10E9/L
METAMYELOCYTES NFR BLD MANUAL: 1 %
MONOCYTES # BLD AUTO: 0.3 10E9/L (ref 0–1.3)
MONOCYTES NFR BLD AUTO: 2 %
MYELOCYTES # BLD: 0.6 10E9/L
MYELOCYTES NFR BLD MANUAL: 5 %
NEUTROPHILS # BLD AUTO: 9.5 10E9/L (ref 1.6–8.3)
NEUTROPHILS NFR BLD AUTO: 75 %
PLATELET # BLD AUTO: 312 10E9/L (ref 150–450)
PLATELET # BLD EST: NORMAL 10*3/UL
RBC # BLD AUTO: 4.47 10E12/L (ref 3.8–5.2)
RBC MORPH BLD: NORMAL
WBC # BLD AUTO: 12.7 10E9/L (ref 4–11)

## 2017-02-28 PROCEDURE — 96413 CHEMO IV INFUSION 1 HR: CPT

## 2017-02-28 PROCEDURE — 85025 COMPLETE CBC W/AUTO DIFF WBC: CPT | Performed by: INTERNAL MEDICINE

## 2017-02-28 PROCEDURE — 25000125 ZZHC RX 250: Performed by: INTERNAL MEDICINE

## 2017-02-28 PROCEDURE — 96375 TX/PRO/DX INJ NEW DRUG ADDON: CPT

## 2017-02-28 PROCEDURE — 96367 TX/PROPH/DG ADDL SEQ IV INF: CPT

## 2017-02-28 PROCEDURE — 96377 APPLICATON ON-BODY INJECTOR: CPT | Mod: XU

## 2017-02-28 PROCEDURE — 25000128 H RX IP 250 OP 636: Performed by: INTERNAL MEDICINE

## 2017-02-28 PROCEDURE — 96411 CHEMO IV PUSH ADDL DRUG: CPT

## 2017-02-28 PROCEDURE — 99215 OFFICE O/P EST HI 40 MIN: CPT | Performed by: INTERNAL MEDICINE

## 2017-02-28 PROCEDURE — 99211 OFF/OP EST MAY X REQ PHY/QHP: CPT | Mod: 25

## 2017-02-28 RX ORDER — DOXORUBICIN HYDROCHLORIDE 2 MG/ML
60 INJECTION, SOLUTION INTRAVENOUS ONCE
Status: CANCELLED | OUTPATIENT
Start: 2017-02-28

## 2017-02-28 RX ORDER — SODIUM CHLORIDE 9 MG/ML
1000 INJECTION, SOLUTION INTRAVENOUS CONTINUOUS PRN
Status: CANCELLED
Start: 2017-02-28

## 2017-02-28 RX ORDER — PALONOSETRON 0.05 MG/ML
0.25 INJECTION, SOLUTION INTRAVENOUS ONCE
Status: CANCELLED
Start: 2017-02-28

## 2017-02-28 RX ORDER — DOXORUBICIN HYDROCHLORIDE 2 MG/ML
60 INJECTION, SOLUTION INTRAVENOUS ONCE
Status: COMPLETED | OUTPATIENT
Start: 2017-02-28 | End: 2017-02-28

## 2017-02-28 RX ORDER — DIPHENHYDRAMINE HYDROCHLORIDE 50 MG/ML
50 INJECTION INTRAMUSCULAR; INTRAVENOUS
Status: CANCELLED
Start: 2017-02-28

## 2017-02-28 RX ORDER — EPINEPHRINE 0.3 MG/.3ML
0.3 INJECTION SUBCUTANEOUS EVERY 5 MIN PRN
Status: CANCELLED | OUTPATIENT
Start: 2017-02-28

## 2017-02-28 RX ORDER — HEPARIN SODIUM (PORCINE) LOCK FLUSH IV SOLN 100 UNIT/ML 100 UNIT/ML
5 SOLUTION INTRAVENOUS
Status: CANCELLED | OUTPATIENT
Start: 2017-02-28

## 2017-02-28 RX ORDER — MEPERIDINE HYDROCHLORIDE 25 MG/ML
25 INJECTION INTRAMUSCULAR; INTRAVENOUS; SUBCUTANEOUS EVERY 30 MIN PRN
Status: CANCELLED | OUTPATIENT
Start: 2017-02-28

## 2017-02-28 RX ORDER — PALONOSETRON 0.05 MG/ML
0.25 INJECTION, SOLUTION INTRAVENOUS ONCE
Status: COMPLETED | OUTPATIENT
Start: 2017-02-28 | End: 2017-02-28

## 2017-02-28 RX ORDER — LORAZEPAM 2 MG/ML
0.5 INJECTION INTRAMUSCULAR EVERY 4 HOURS PRN
Status: CANCELLED
Start: 2017-02-28

## 2017-02-28 RX ORDER — BLACK COHOSH ROOT EXTRACT 80 MG
CAPSULE ORAL
COMMUNITY
End: 2017-06-30

## 2017-02-28 RX ORDER — ALBUTEROL SULFATE 0.83 MG/ML
2.5 SOLUTION RESPIRATORY (INHALATION)
Status: CANCELLED | OUTPATIENT
Start: 2017-02-28

## 2017-02-28 RX ORDER — HEPARIN SODIUM (PORCINE) LOCK FLUSH IV SOLN 100 UNIT/ML 100 UNIT/ML
5 SOLUTION INTRAVENOUS
Status: DISCONTINUED | OUTPATIENT
Start: 2017-02-28 | End: 2017-02-28 | Stop reason: HOSPADM

## 2017-02-28 RX ORDER — METHYLPREDNISOLONE SODIUM SUCCINATE 125 MG/2ML
125 INJECTION, POWDER, LYOPHILIZED, FOR SOLUTION INTRAMUSCULAR; INTRAVENOUS
Status: CANCELLED
Start: 2017-02-28

## 2017-02-28 RX ORDER — ALBUTEROL SULFATE 90 UG/1
1-2 AEROSOL, METERED RESPIRATORY (INHALATION)
Status: CANCELLED
Start: 2017-02-28

## 2017-02-28 RX ADMIN — PALONOSETRON HYDROCHLORIDE 0.25 MG: 0.25 INJECTION INTRAVENOUS at 10:28

## 2017-02-28 RX ADMIN — FAMOTIDINE 20 MG: 20 INJECTION, SOLUTION INTRAVENOUS at 09:28

## 2017-02-28 RX ADMIN — SODIUM CHLORIDE 250 ML: 9 INJECTION, SOLUTION INTRAVENOUS at 09:28

## 2017-02-28 RX ADMIN — DEXAMETHASONE SODIUM PHOSPHATE 12 MG: 10 INJECTION, SOLUTION INTRAMUSCULAR; INTRAVENOUS at 09:40

## 2017-02-28 RX ADMIN — SODIUM CHLORIDE, PRESERVATIVE FREE 5 ML: 5 INJECTION INTRAVENOUS at 11:41

## 2017-02-28 RX ADMIN — SODIUM CHLORIDE 150 MG: 9 INJECTION, SOLUTION INTRAVENOUS at 09:55

## 2017-02-28 RX ADMIN — DOXORUBICIN HYDROCHLORIDE 118 MG: 2 INJECTION, SOLUTION INTRAVENOUS at 10:31

## 2017-02-28 RX ADMIN — PEGFILGRASTIM 6 MG: KIT SUBCUTANEOUS at 11:40

## 2017-02-28 RX ADMIN — CYCLOPHOSPHAMIDE 1180 MG: 1 INJECTION, POWDER, FOR SOLUTION INTRAVENOUS; ORAL at 10:52

## 2017-02-28 ASSESSMENT — PAIN SCALES - GENERAL: PAINLEVEL: NO PAIN (0)

## 2017-02-28 NOTE — MR AVS SNAPSHOT
After Visit Summary   2/28/2017    Diana Wilson    MRN: [id number]           Patient Information     Date Of Birth          1980        Visit Information        Provider Department      2/28/2017 8:30 AM Maribel Jin MD [facility]        Today's Diagnoses     Malignant neoplasm of upper-outer quadrant of right female breast (H)    -  1    Mucositis        Diarrhea, unspecified type        Bone pain        Nausea          Care Instructions    Today is your last day of AC chemotherapy. You will start Carboplatin/taxol on March 14, 2017. And Dr. Jin would like to see you back with your 2nd cycle of this treatment.    When you are in need of a refill, please call your pharmacy and they will send us a request.  Copy of appointments, and after visit summary (AVS) given to patient.  If you have any questions please call Morena Henry RN, BSN, OCN Oncology Hematology  [facility] [phone]. For questions after business hours, or on holidays/weekends, please call our after hours Nurse Triage line [phone]. Thank you.           Follow-ups after your visit        Your next 10 appointments already scheduled     Mar 01, 2017 10:15 AM CST   Return Visit with Claudia Willis GC   Cancer Risk Management Program ([facility])    [facility]  [address] [address] [phone]            Mar 14, 2017  8:00 AM CDT   Level 6 with ROOM 10 [facility] ([facility])    [facility] [address]  [facility] [address]   [phone]              Who to contact     If you have questions or need follow up information about today's clinic visit or your schedule please contact [facility] directly at [phone].  Normal or non-critical lab and imaging results will be communicated to you by MyChart, letter or phone within  "4 business days after the clinic has received the results. If you do not hear from us within 7 days, please contact the clinic through [employer] or phone. If you have a critical or abnormal lab result, we will notify you by phone as soon as possible.  Submit refill requests through [employer] or call your pharmacy and they will forward the refill request to us. Please allow 3 business days for your refill to be completed.          Additional Information About Your Visit        [employer] Information     [employer] gives you secure access to your electronic health record. If you see a primary care provider, you can also send messages to your care team and make appointments. If you have questions, please call your primary care clinic.  If you do not have a primary care provider, please call [phone] and they will assist you.        Care EveryWhere ID     This is your Care EveryWhere ID. This could be used by other organizations to access your [place] medical records  [id number]        Your Vitals Were     Pulse Temperature Respirations Height Pulse Oximetry Breastfeeding?    106 97.9  F (36.6  C) (Tympanic) 18 1.626 m (5' 4.02\") 97% No    BMI (Body Mass Index)                   33.2 kg/m2            Blood Pressure from Last 3 Encounters:   02/28/17 115/76   02/14/17 130/75   01/30/17 122/77    Weight from Last 3 Encounters:   02/28/17 87.8 kg (193 lb 8 oz)   02/14/17 87.5 kg (193 lb)   01/30/17 87.5 kg (192 lb 14.4 oz)              Today, you had the following     No orders found for display       Primary Care Provider Office Phone # Fax #    Yenifer Ridley [phone]       [facility] [address] [facility] [address]        Thank you!     Thank you for choosing [facility]  for your care. Our goal is always to provide you with excellent care. Hearing back from our patients is one way we can continue to improve our services. Please take a few minutes to complete the written " survey that you may receive in the mail after your visit with us. Thank you!             Your Updated Medication List - Protect others around you: Learn how to safely use, store and throw away your medicines at www.disposemymeds.org.          This list is accurate as of: 2/28/17  9:25 AM.  Always use your most recent med list.                   Brand Name Dispense Instructions for use    ACIDOPHILUS PROBIOTIC 100 MG Caps          albuterol 108 (90 BASE) MCG/ACT Inhaler    PROAIR HFA/PROVENTIL HFA/VENTOLIN HFA    1 Inhaler    Inhale 2 puffs into the lungs every 6 hours as needed for shortness of breath / dyspnea       ALPRAZolam 0.5 MG tablet    XANAX     Reported on 2/14/2017       cyclobenzaprine 10 MG tablet    FLEXERIL    30 tablet    Take 1 tablet (10 mg) by mouth 3 times daily as needed for muscle spasms       dexamethasone 4 MG tablet    DECADRON     Take 4 mg by mouth       famotidine 20 MG tablet    PEPCID     Take 20 mg by mouth daily as needed       fluticasone 50 MCG/ACT spray    FLONASE    3 Bottle    Spray 1-2 sprays into both nostrils daily       hydrOXYzine 10 MG tablet    ATARAX    60 tablet    Take 1-5 tablets (10-50 mg) by mouth every 6 hours as needed for anxiety       lidocaine 2 % solution    XYLOCAINE     Reported on 2/28/2017       lidocaine visc 2% 2.5mL/5mL & maalox/mylanta w/ simeth 2.5mL/5mL & diphenhydrAMINE 5mg/5mL Susp suspension    MAGIC Mouthwash    240 mL    Swish and swallow 10 mLs in mouth every 6 hours as needed for mouth sores       lidocaine-prilocaine cream    EMLA    30 g    Apply topically as needed for other (place dime size amount over port site prior to tx and cover with tegaderm)       loratadine 10 MG tablet    CLARITIN    90 tablet    Take 1 tablet (10 mg) by mouth daily       LORazepam 0.5 MG tablet    ATIVAN    30 tablet    Take 1 tablet (0.5 mg) by mouth every 4 hours as needed (Anxiety, Nausea/Vomiting or Sleep)       montelukast 10 MG tablet    SINGULAIR    90  tablet    Take 1 tablet (10 mg) by mouth At Bedtime       MULTI COMPLETE PO          NAPROXEN SODIUM PO      Take 220 mg by mouth Reported on 2/14/2017       ondansetron 4 MG ODT tab    ZOFRAN ODT    20 tablet    Take 1-2 tablets (4-8 mg) by mouth every 6 hours as needed for nausea       oxyCODONE-acetaminophen 5-325 MG per tablet    PERCOCET    20 tablet    Take 1 tablet by mouth every 4 hours as needed for pain       prochlorperazine 10 MG tablet    COMPAZINE    30 tablet    Take 1 tablet (10 mg) by mouth every 6 hours as needed (Nausea/Vomiting)       scopolamine 72 hr patch    TRANSDERM    1 patch    Place 1 patch onto the skin every 72 hours.  Apply to hairless area behind one ear at least 4 hours before travel.  Remove old patch and change every 3 days.       VITAMIN B 12 PO

## 2017-02-28 NOTE — PROGRESS NOTES
Infusion Nursing Note:  Diana Wilson presents today for C4D1 A/C.    Patient seen by provider today: Yes: Dr. Jin   present during visit today: Not Applicable.    Note: N/A.    Intravenous Access:  Implanted Port.    Treatment Conditions:  Lab Results   Component Value Date    HGB 13.3 02/28/2017    HGB 4.68 12/11/2003     Lab Results   Component Value Date    WBC 12.7 02/28/2017      Lab Results   Component Value Date    ANEU 9.5 02/28/2017     Lab Results   Component Value Date     02/28/2017    PLT 16.2 12/11/2003      Results reviewed, labs MET treatment parameters, ok to proceed with treatment.      Post Infusion Assessment:  Patient tolerated infusion without incident.  Blood return noted pre and post infusion.  Blood return noted during administration every 5ml during Adriamycin.  Site patent and intact, free from redness, edema or discomfort.  No evidence of extravasations.  Access discontinued per protocol.    Discharge Plan:   Copy of AVS given to patient and/or family.  Patient will return 3/14/17 for next appointment.  Patient discharged in stable condition accompanied by: sister.  Departure Mode: Ambulatory.    Rhonda Ovalles RN

## 2017-02-28 NOTE — MR AVS SNAPSHOT
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 (H)    -  1       Follow-ups after your visit        Your next 10 appointments already scheduled     Mar 01, 2017 10:15 AM CST   Return Visit with Claudia Willis GC   Cancer Risk Management Program ([facility])    [facility]  [facility]  [address] [facility] [address]   [phone]            Mar 14, 2017  8:00 AM CDT   Level 6 with ROOM 10 [facility] ([facility])    [facility]  [address] [facility] [address]  [facility] [address]   [phone]              Who to contact     If you have questions or need follow up information about today's clinic visit or your schedule please contact [facility] directly at [phone].  Normal or non-critical lab and imaging results will be communicated to you by [employer], letter or phone within 4 business days after the clinic has received the results. If you do not hear from us within 7 days, please contact the clinic through [employer] or phone. If you have a critical or abnormal lab result, we will notify you by phone as soon as possible.  Submit refill requests through [employer] or call your pharmacy and they will forward the refill request to us. Please allow 3 business days for your refill to be completed.          Additional Information About Your Visit        [employer] Information     [employer] gives you secure access to your electronic health record. If you see a primary care provider, you can also send messages to your care team and make appointments. If you have questions, please call your primary care clinic.  If you  do not have a primary care provider, please call [phone] and they will assist you.        Care EveryWhere ID     This is your Care EveryWhere ID. This could be used by other organizations to access your [place] medical records  [id number]        Your Vitals Were     Pulse                   82            Blood Pressure from Last 3 Encounters:   02/28/17 115/76   02/28/17 116/72   02/14/17 130/75    Weight from Last 3 Encounters:   02/28/17 87.8 kg (193 lb 8 oz)   02/14/17 87.5 kg (193 lb)   01/30/17 87.5 kg (192 lb 14.4 oz)              We Performed the Following     CBC with platelets differential     Treatment Conditions        Primary Care Provider Office Phone # Fax #    Yenifer Ridley [phone]       [facility] [address] [facility] [address]        Thank you!     Thank you for choosing [facility]  for your care. Our goal is always to provide you with excellent care. Hearing back from our patients is one way we can continue to improve our services. Please take a few minutes to complete the written survey that you may receive in the mail after your visit with us. Thank you!             Your Updated Medication List - Protect others around you: Learn how to safely use, store and throw away your medicines at www.disposemymeds.org.          This list is accurate as of: 2/28/17  1:38 PM.  Always use your most recent med list.                   Brand Name Dispense Instructions for use    ACIDOPHILUS PROBIOTIC 100 MG Caps          albuterol 108 (90 BASE) MCG/ACT Inhaler    PROAIR HFA/PROVENTIL HFA/VENTOLIN HFA    1 Inhaler    Inhale 2 puffs into the lungs every 6 hours as needed for shortness of breath / dyspnea       ALPRAZolam 0.5 MG tablet    XANAX     Reported on 2/14/2017       cyclobenzaprine 10 MG tablet    FLEXERIL    30 tablet    Take 1 tablet (10 mg) by mouth 3 times daily as needed for muscle spasms       dexamethasone 4 MG tablet    DECADRON      Take 4 mg by mouth       famotidine 20 MG tablet    PEPCID     Take 20 mg by mouth daily as needed       fluticasone 50 MCG/ACT spray    FLONASE    3 Bottle    Spray 1-2 sprays into both nostrils daily       hydrOXYzine 10 MG tablet    ATARAX    60 tablet    Take 1-5 tablets (10-50 mg) by mouth every 6 hours as needed for anxiety       lidocaine 2 % solution    XYLOCAINE     Reported on 2/28/2017       lidocaine visc 2% 2.5mL/5mL & maalox/mylanta w/ simeth 2.5mL/5mL & diphenhydrAMINE 5mg/5mL Susp suspension    MAGIC Mouthwash    240 mL    Swish and swallow 10 mLs in mouth every 6 hours as needed for mouth sores       lidocaine-prilocaine cream    EMLA    30 g    Apply topically as needed for other (place dime size amount over port site prior to tx and cover with tegaderm)       loratadine 10 MG tablet    CLARITIN    90 tablet    Take 1 tablet (10 mg) by mouth daily       LORazepam 0.5 MG tablet    ATIVAN    30 tablet    Take 1 tablet (0.5 mg) by mouth every 4 hours as needed (Anxiety, Nausea/Vomiting or Sleep)       montelukast 10 MG tablet    SINGULAIR    90 tablet    Take 1 tablet (10 mg) by mouth At Bedtime       MULTI COMPLETE PO          NAPROXEN SODIUM PO      Take 220 mg by mouth Reported on 2/14/2017       ondansetron 4 MG ODT tab    ZOFRAN ODT    20 tablet    Take 1-2 tablets (4-8 mg) by mouth every 6 hours as needed for nausea       oxyCODONE-acetaminophen 5-325 MG per tablet    PERCOCET    20 tablet    Take 1 tablet by mouth every 4 hours as needed for pain       prochlorperazine 10 MG tablet    COMPAZINE    30 tablet    Take 1 tablet (10 mg) by mouth every 6 hours as needed (Nausea/Vomiting)       scopolamine 72 hr patch    TRANSDERM    1 patch    Place 1 patch onto the skin every 72 hours.  Apply to hairless area behind one ear at least 4 hours before travel.  Remove old patch and change every 3 days.       VITAMIN B 12 PO

## 2017-02-28 NOTE — PROGRESS NOTES
" ONCOLOGY Follow up visit       COMPLAINT AND REASON FOR CONSULTATION:  12/2016 diagnosed right breast cancer on neoadjuvant DD AC   hx of TIFFANY due Celiac disease on IV iron prn     REFERRING/[facility] PHYSICIAN:  Dr. Yenifer Ridley       HERB Wilson is a 36-year-old premenopausal woman, self felt breast in her right armpit and right breast in 10/2016.  She observed it and due to persistency of the palpable lesions went on to have workup with diagnostic mammogram end of 12/2016, which identified 2 cm ill-defined density  associated with indeterminate microcalcification measuring about 3.2 cm, so the whole area spans about 4 cm in biggest dimension.   Ultrasound to the site which is the upper outer quadrant of the right breast identified 2 cm irregular solid mass; at the right axilla shows several abnormal lymph nodes, the largest one measures 1.8 cm in maximum dimension.  Ultrasound-guided biopsy of right breast was done 12/27/2016, identified grade 3 invasive ductal cancer, angiolymphatic invasion not identified.  ER/NH 99% positive, HER-2/kelle negative, associated with high-grade DCIS.    Right axillary ultrasound-guided biopsy indicating metastatic carcinoma positive for spread from breast primary, ER/NH both negative.  HER-2/kelle is negative.      She has clinical T1N1 disease. She made informed decision to proceed with neoadjuvant DDAC C1D1 1/16/2017.     She is tested negative for BRCA1/2 with [employer].      PAST MEDICAL HISTORY:  Restless legs, mild intermittent asthma, celiac disease diagnosed 02/2016, hx of TIFFANY     SOCIAL HISTORY:  Works part-time.  Lives with her .  They have 2 kids, first pregnancy age 22.  She did not do breast feeding because of bilateral breast reduction.  She does office work.      FAMILY HISTORY:  \"Full of cancer\" from the mother's side including colon, lung, pancreatic, gastric cancer.  According to the patient none of them survive older than 60 years old.      REVIEW OF " "SYSTEMS  severe nausea and diarrhea , lots of bone pain, also reports mouth soar     PHYSICAL EXAMINATION:   Blood pressure 115/76, pulse 106, temperature 97.9  F (36.6  C), temperature source Tympanic, resp. rate 18, height 1.626 m (5' 4.02\"), weight 87.8 kg (193 lb 8 oz), SpO2 97 %, not currently breastfeeding.  GENERAL APPEARANCE:  He young woman, looks like her stated age, very upbeat, not in acute distress.   HEENT: The patient is normocephalic, atraumatic. Pupils are equally reactive to light.  Sclerae are anicteric.  Moist oral mucosa.  Erythematous pharynx.  No oral thrush.   NECK:  Supple.  No jugular venous distention.  Thyroid is not palpable.   LYMPH NODES:  Superficial lymphadenopathy is not appreciable in the bilateral cervical, supraclavicular, axillary or inguinal areas.   CARDIOVASCULAR:  S1, S2 regular with no murmurs or gallops.  No carotid or abdominal bruits.   PULMONARY:  Lungs are clear to auscultation and percussion bilaterally.  There is no wheezing or rhonchi.   GASTROINTESTINAL:  Abdomen is soft, nontender.  No hepatosplenomegaly.  No signs of ascites.  No mass appreciable.   MUSCULOSKELETAL/EXTREMITIES:  No edema.  No cyanotic changes.  No signs of joint deformity.  No lymphedema.   NEUROLOGIC:  Cranial nerves II-XII are grossly intact.  Sensation intact.  Muscle strength and muscle tone symmetrical, 5/5 throughout.   BACK:  No spinal or paraspinal tenderness.  No CVA tenderness.   SKIN:  No petechiae.  No rash.  No signs of cellulitis.   BREASTS:  Bilateral breast exam reviewed.  Glandular tissue on both breasts in general, readily palpable upper outer quadrant of the right breast about 2-3 cm palpable lesion with vague palpable right axillary lymphadenopathy.  She has ecchymosis after the biopsy.      LABORATORY DATA:   C4D1 cbc diff/cMP are   baseline labs cbc diff/cmp/marker are nl.      CURRENT IMAGIN/2017 baseline Echo LV function nl, EF 60-65%     PET 2017  1. " Hypermetabolic mass in the right breast consistent with known malignancy.  2. Two enlarged hypermetabolic metastatic lymph nodes in the right axilla.  3. No additional worrisome hypermetabolic areas in the neck, chest, abdomen, or pelvis.    Old data reviewed with summary  End of 12/2016  right breast was done 12/27/2016, identified grade 3 invasive ductal cancer, angiolymphatic invasion not identified.  ER/NC 99% positive, HER-2/kelle negative, associated with high-grade DCIS.    Right axillary ultrasound-guided biopsy indicating metastatic carcinoma positive for spread from breast primary, ER/NC both negative.  HER-2/kelle is negative.      Diagnostic mammogram end of 12/2016, which identified 2 cm ill-defined density  associated with indeterminate microcalcification measuring about 3.2 cm, so the whole area spans about 4 cm in biggest dimension.   Ultrasound to the site which is the upper outer quadrant of the right breast identified 2 cm irregular solid mass; at the right axilla shows several abnormal lymph nodes, the largest one measures 1.8 cm in maximum dimension.      DEXA scan from 03/2016 identified mild osteopenia lumbar spine        ASSESSMENT AND PLAN:    1. 12/2016 newly dx breast cancer,  high-grade invasive ductal cancer on her right breast, ER/NC 99% positive, HER-2/kelle negative cancer with associated high-grade DCIS.  The lymph nodes biopsy also came back positive breast cancer but is triple negative disease. She is only 36 years old. She has cT2N1 disesae.      She has made an informed decision to proceed with neoadjuvant dose-dense AC C1D1 1/16/2017, followed by taxane. Would still like to add platinum despite her genetic testing is negative. She is young and healthy and likely will be able to handle carbo associated extra BM suppression.      She is to have f/u MRI next wk at [facility].     She needs to be monitored closely during this chemo.    She is also informed the need of adjuvant radiation  based on her initial presentation in terms of lymph node involvement.  She is very upbeat with this difficult diagnosis.  Lots of encouragement is provided.       2. Delayed nausea , H2 blocker hydroxyzine did not work for her post C3. Advice compazine, ODT zofran and ativan.         3. bone pain from neulasta. Advice claritin and percocet prn.         4. Diarrhea - advice imodium prn, and encourage po fluid intake.    5. Mucositis. Advice magic mouth wash.

## 2017-02-28 NOTE — NURSING NOTE
"Diana Wilson is a 36 year old female who presents for:  Chief Complaint   Patient presents with     Oncology Clinic Visit     2 week recheck Breast CA, Labs & Chemo today        Initial Vitals:  /76 (BP Location: Right arm, Patient Position: Chair, Cuff Size: Adult Large)  Pulse 106  Temp 97.9  F (36.6  C) (Tympanic)  Resp 18  Ht 1.626 m (5' 4.02\")  Wt 87.8 kg (193 lb 8 oz)  SpO2 97%  Breastfeeding? No  BMI 33.2 kg/m2 Estimated body mass index is 33.2 kg/(m^2) as calculated from the following:    Height as of this encounter: 1.626 m (5' 4.02\").    Weight as of this encounter: 87.8 kg (193 lb 8 oz).. Body surface area is 1.99 meters squared. BP completed using cuff size: large  No Pain (0) No LMP recorded. Allergies and medications reviewed.     Medications: Medication refills not needed today.  Pharmacy name entered into [facility]: [facility] [place] - [id number]    Comments: 2 week recheck Breast CA, Labs & Chemo today.     7  minutes for nursing intake (face to face time)   Sondra Medina CMA          "

## 2017-02-28 NOTE — PATIENT INSTRUCTIONS
Today is your last day of AC chemotherapy. You will start Carboplatin/taxol on March 14, 2017. And Dr. Jin would like to see you back with your 2nd cycle of this treatment.    When you are in need of a refill, please call your pharmacy and they will send us a request.  Copy of appointments, and after visit summary (AVS) given to patient.  If you have any questions please call Morena Henry RN, BSN, OCN Oncology Hematology  [facility] [phone]. For questions after business hours, or on holidays/weekends, please call our after hours Nurse Triage line [phone]. Thank you.

## 2017-03-01 ENCOUNTER — ONCOLOGY VISIT (OUTPATIENT)
Dept: ONCOLOGY | Facility: CLINIC | Age: 37
End: 2017-03-01
Attending: GENETIC COUNSELOR, MS
Payer: COMMERCIAL

## 2017-03-01 DIAGNOSIS — Z80.0 FAMILY HISTORY OF PANCREATIC CANCER: ICD-10-CM

## 2017-03-01 DIAGNOSIS — C50.911 MALIGNANT NEOPLASM OF RIGHT FEMALE BREAST, UNSPECIFIED SITE OF BREAST: Primary | ICD-10-CM

## 2017-03-01 PROCEDURE — 96040 ZZH GENETIC COUNSELING, EACH 30 MINUTES: CPT | Performed by: GENETIC COUNSELOR, MS

## 2017-03-01 NOTE — LETTER
Cancer Risk Management  Program Locations    [facility]  Mailing Address  Cancer Risk Management Program  [address]  [place] [address]    New patient appointments  [phone]  March 2, 2017    Diana Wilson  [facility] [address] [facility] [address]      Dear Diana,    It was a pleasure meeting with you again at the [facility] on March 1, 2017. Here is a copy of the progress note from your recent genetic counseling visit to the Cancer Risk Management Program. If you have any additional questions, please feel free to call.    3/1/2017    Referring Provider: Maribel Jin MD    Presenting Information:  Diana Wilson returned to the Cancer Risk Management Program at the [facility] to discuss her genetic testing results. Her blood was drawn on 1/13/2017.  BRCA1/2 Analyses with the BreastNext gene panel was ordered from [employer] This testing was done because of her personal history of breast cancer and family history of pancreatic cancer.    She was previously contacted with her negative BRCA1 and BRCA2 genetic testing results on 1/20/2017, as treatment decisions were going to potentially be made based on the results. Please see the telephone note from 1/20/2017 for more information.    Genetic Testing Result: NEGATIVE  Diana is negative for mutations in the TYLER, BARD1, BRCA1, BRCA2, BRIP1, CDH1, CHEK2, MRE11A, MUTYH, NBN, NF1, PALB2, PTEN, RAD50, RAD51C, RAD51D, and TP53 genes by sequencing and deletion/duplication analysis. No mutations were found in any of the 17 genes analyzed.    She does not have an identifiable mutation associated with Hereditary Breast and Ovarian Cancer Syndrome (BRCA1, BRCA2), Li Fraumeni Syndrome (TP53), Hereditary Diffuse Gastric Cancer  "(CDH1), Cowden Syndrome (PTEN), Neurofibromatosis type 1 (NF1) or MUTYH Associated Polyposis (MAP).    A copy of the test report can be found in the Laboratory tab, dated 1/13/2017, and named \"[facility] [place]\". The report is scanned in as a linked document.    Interpretation:  We discussed several different interpretations of this negative test result.    1. One explanation may be that there is a different gene or combination of genes and environment that are associated with the cancers in Diana and/or her relatives. We reviewed that additional genetic testing may be available in the future that does identify a genetic/hereditary explanation for Diana's breast cancer. As such, she is encouraged to contact me annually to review any new genetic testing options that may be appropriate for her.  2. It is possible that another relative, such as her mother and/or maternal first cousin with breast cancer in her 40's, did have a mutation in one of these 17 genes and Diana did not inherit it. If that is the case, Rigobertos breast cancer may be a sporadic cancer caused by random cellular changes.  3. There is also a small possibility that there is a mutation in one of these genes, and we could not find it with our current testing methods.       Screening:  Based on this negative test result, it is important for Diana and her relatives to refer back to the family history for appropriate cancer screening.      Diana should continue to follow all breast cancer treatment and screening recommendations as made by her medical providers.    Diana s close female relatives remain at increased risk for breast cancer given their family history. Breast cancer screening is generally recommended to begin approximately 10 years younger than the earliest age of breast cancer diagnosis in the family, or at age 40, whichever comes first. In this family, screening may begin at age 27. Breast screening options should be discussed with an " individual's primary care provider and a genetic counselor, to determine what screening is appropriate, and if additional screening (such as breast MRI) is necessary based on personal/family history factors. As breast screening recommendations may change in the future, Diana's daughter is encouraged to discuss the most current breast screening recommendations when she is nearing age 27.    Other population cancer screening options, such as those recommended by the American Cancer Society and the National Comprehensive Cancer Network (NCCN), are appropriate for Diana and her family. For Diana, this includes regular pelvic exams and Pap smears as recommended by her primary OB-GYN provider. Also, unless otherwise recommended by her medical providers, Diana should consider having her first screening colonoscopy at age 50 and repeated every 10 years (or based on the results of her exams). These screening recommendations may change if there are changes to Diana's personal and/or family history. Final screening recommendations should be made by each individual's managing physician.      Inheritance:  We reviewed autosomal dominant inheritance and the fact that mutations do not skip generations. Because Diana does not have an identifiable mutation in any of these 17 genes, she did not pass on a mutation in these genes to her children.    Additional Testing:  We also reviewed the records I received regarding her mother's diagnosis of pancreatic cancer. Per these records, her mother was diagnosed with adenosquamous carcinoma of the pancreas. Of note, her mother's records also indicate that there may be a more significant maternal family history of colon cancer than previously reported by Diana. This history now includes two paternal great uncles that passed away from colon cancer in their 50's and a paternal first cousin once removed with colon cancer in his 50's. As such, we discussed that this family history may now be more  concerning for the genetic condition Bhat syndrome.     Bhat syndrome is caused by mutations in one of five different genes (MSH2, MLH1, MSH6, PMS2, and EPCAM) and is associated with increased risk for several different cancers. These cancers include colon, uterine, ovarian, stomach, small bowel, hepatobiliary tract, urinary tract, brain, and pancreatic cancer. Published screening and/or surgery guidelines are available to help manage these risks.    Due to this family history, it may now be reasonable for Diana to consider genetic testing for Bhat syndrome, though her mother's type of pancreatic cancer is not typical for Bhat syndrome. Diana expressed interest in genetic testing for this condition, if covered by insurance. As such, I will contact [employer] to discuss whether testing for Bhat syndrome can be added and expected insurance coverage for this additional testing. If this is not possible, Diana could consider addressing this history at another time. Another family member, such as her sister, could also pursue testing that includes Bhat syndrome. I will contact Diana by the end of this week to discuss her options once I speak to [employer]. Diana was in agreement with this plan.    Summary:  We do not have an explanation for Diana's breast cancer. Because of that, it is important that she continue with cancer screening based on her personal and family history as discussed above.    Genetic testing is rapidly advancing, and new cancer susceptibility genes will most likely be identified in the future.  Therefore, I encouraged Diana to contact me annually or if there are changes in her personal or family history.  This may change how we assess her cancer risk, screening, and the testing we would offer.    Plan:  1. I provided Diana with a copy of her test results today.    2. She plans to follow-up with her medical providers, including Dr. Jin.  3. I will contact [employer] to better  understand the possibility of adding Bhat syndrome to her most recent genetic testing. Diana will be contacted at the end of this week with this information.  3. She should contact me annually, or sooner if her family history changes.    If Diana has any further questions, I encouraged her to contact me at [phone].    Time spent face to face: 25 minutes    Claudia Willis MS, [facility]  Certified Genetic Counselor  Office: [phone]  Pager: [phone]

## 2017-03-01 NOTE — PROGRESS NOTES
Medication Education    Patient is a 36 year old female here today for Chemotherapy education, accompanied by her sister.  Pt has a diagnosis of breast cancer and their main concern is side effects and her treatment regimen.  Their Oncologist/Hematologist is Dr. Jin.  Reviewed the following with the patient and their support person:    Medication regimen; Carboplatin on day 1 and Taxol on days 1,8,15 both every 21 days x 4 cycles and rationale for strict adherence, specific medication names, delivery methods, and side effects and management, Infection prevention, and monitoring of lab values if necessary, and the possibility of the provider needing to change, modify, or discontinue to drug.    General orientation   Patient received written information including  specific drug information guides printed from [employer].  Also, information on when to contact the provider, with contact information given; Oncology Clinic, RN Case Manager, and the after hours Nurse Advise Line.  Other concerns:   Pt instructed to call with further questions or concerns.  Patient states understanding and is in agreement with this plan.  Copy of appointments, and after visit summary (AVS) given to patient. Patient discharged to home by foot.    Face to Face time with patient: 5 min.    Morena Henry RN, BSN, OCN  Oncology Hematology   Breast Cancer Navigator  [facility]  Phone [phone]

## 2017-03-01 NOTE — PROGRESS NOTES
"3/1/2017    Referring Provider: Maribel Jin MD    Presenting Information:  Diana Wilson returned to the Cancer Risk Management Program at the [facility] to discuss her genetic testing results. Her blood was drawn on 1/13/2017.  BRCA1/2 Analyses with the BreastNext gene panel was ordered from [email or web address]. This testing was done because of her personal history of breast cancer and family history of pancreatic cancer.    She was previously contacted with her negative BRCA1 and BRCA2 genetic testing results on 1/20/2017, as treatment decisions were going to potentially be made based on the results. Please see the telephone note from 1/20/2017 for more information.    Genetic Testing Result: NEGATIVE  Diana is negative for mutations in the TYLER, BARD1, BRCA1, BRCA2, BRIP1, CDH1, CHEK2, MRE11A, MUTYH, NBN, NF1, PALB2, PTEN, RAD50, RAD51C, RAD51D, and TP53 genes by sequencing and deletion/duplication analysis. No mutations were found in any of the 17 genes analyzed.    She does not have an identifiable mutation associated with Hereditary Breast and Ovarian Cancer Syndrome (BRCA1, BRCA2), Li Fraumeni Syndrome (TP53), Hereditary Diffuse Gastric Cancer (CDH1), Cowden Syndrome (PTEN), Neurofibromatosis type 1 (NF1) or MUTYH Associated Polyposis (MAP).    A copy of the test report can be found in the Laboratory tab, dated 1/13/2017, and named \"[facility]\". The report is scanned in as a linked document.    Interpretation:  We discussed several different interpretations of this negative test result.    1. One explanation may be that there is a different gene or combination of genes and environment that are associated with the cancers in Diana and/or her relatives. We reviewed that additional genetic testing may be available in the future that does identify a genetic/hereditary explanation for Diana's breast cancer. As such, she is encouraged to contact me annually to review any new genetic testing options that " may be appropriate for her.  2. It is possible that another relative, such as her mother and/or maternal first cousin with breast cancer in her 40's, did have a mutation in one of these 17 genes and Diana did not inherit it. If that is the case, Diana's breast cancer may be a sporadic cancer caused by random cellular changes.  3. There is also a small possibility that there is a mutation in one of these genes, and we could not find it with our current testing methods.       Screening:  Based on this negative test result, it is important for Diana and her relatives to refer back to the family history for appropriate cancer screening.      Diana should continue to follow all breast cancer treatment and screening recommendations as made by her medical providers.    Diana s close female relatives remain at increased risk for breast cancer given their family history. Breast cancer screening is generally recommended to begin approximately 10 years younger than the earliest age of breast cancer diagnosis in the family, or at age 40, whichever comes first. In this family, screening may begin at age 27. Breast screening options should be discussed with an individual's primary care provider and a genetic counselor, to determine what screening is appropriate, and if additional screening (such as breast MRI) is necessary based on personal/family history factors. As breast screening recommendations may change in the future, Diana's daughter is encouraged to discuss the most current breast screening recommendations when she is nearing age 27.    Other population cancer screening options, such as those recommended by the American Cancer Society and the National Comprehensive Cancer Network (NCCN), are appropriate for Diana and her family. For Diana, this includes regular pelvic exams and Pap smears as recommended by her primary OB-GYN provider. Also, unless otherwise recommended by her medical providers, Diana should consider  having her first screening colonoscopy at age 50 and repeated every 10 years (or based on the results of her exams). These screening recommendations may change if there are changes to Diana's personal and/or family history. Final screening recommendations should be made by each individual's managing physician.      Inheritance:  We reviewed autosomal dominant inheritance and the fact that mutations do not skip generations. Because Diana does not have an identifiable mutation in any of these 17 genes, she did not pass on a mutation in these genes to her children.    Additional Testing:  We also reviewed the records I received regarding her mother's diagnosis of pancreatic cancer. Per these records, her mother was diagnosed with adenosquamous carcinoma of the pancreas. Of note, her mother's records also indicate that there may be a more significant maternal family history of colon cancer than previously reported by Diana. This history now includes two paternal great uncles that passed away from colon cancer in their 50's and a paternal first cousin once removed with colon cancer in his 50's. As such, we discussed that this family history may now be more concerning for the genetic condition Bhat syndrome.     Bhat syndrome is caused by mutations in one of five different genes (MSH2, MLH1, MSH6, PMS2, and EPCAM) and is associated with increased risk for several different cancers. These cancers include colon, uterine, ovarian, stomach, small bowel, hepatobiliary tract, urinary tract, brain, and pancreatic cancer. Published screening and/or surgery guidelines are available to help manage these risks.    Due to this family history, it may now be reasonable for Diana to consider genetic testing for Bhat syndrome, though her mother's type of pancreatic cancer is not typical for Bhat syndrome. Diana expressed interest in genetic testing for this condition, if covered by insurance. As such, I will contact [employer] to  discuss whether testing for Bhat syndrome can be added and expected insurance coverage for this additional testing. If this is not possible, Diana could consider addressing this history at another time. Another family member, such as her sister, could also pursue testing that includes Bhat syndrome. I will contact Diana by the end of this week to discuss her options once I speak to [employer]. Diana was in agreement with this plan.    Summary:  We do not have an explanation for Diana's breast cancer. Because of that, it is important that she continue with cancer screening based on her personal and family history as discussed above.    Genetic testing is rapidly advancing, and new cancer susceptibility genes will most likely be identified in the future.  Therefore, I encouraged Diana to contact me annually or if there are changes in her personal or family history.  This may change how we assess her cancer risk, screening, and the testing we would offer.    Plan:  1. I provided Diana with a copy of her test results today.    2. She plans to follow-up with her medical providers, including Dr. Jin.  3. I will contact [employer] to better understand the possibility of adding Bhat syndrome to her most recent genetic testing. Diana will be contacted at the end of this week with this information.  3. She should contact me annually, or sooner if her family history changes.    If Diana has any further questions, I encouraged her to contact me at [phone].    Time spent face to face: 25 minutes    Claudia Willis MS, [facility]  Certified Genetic Counselor  Office: [phone]  Pager: [phone]

## 2017-03-03 ENCOUNTER — TELEPHONE (OUTPATIENT)
Dept: ONCOLOGY | Facility: CLINIC | Age: 37
End: 2017-03-03

## 2017-03-03 ENCOUNTER — TELEPHONE (OUTPATIENT)
Dept: FAMILY MEDICINE | Facility: CLINIC | Age: 37
End: 2017-03-03
Payer: COMMERCIAL

## 2017-03-03 DIAGNOSIS — R30.0 DYSURIA: Primary | ICD-10-CM

## 2017-03-03 DIAGNOSIS — R30.0 DYSURIA: ICD-10-CM

## 2017-03-03 LAB
ALBUMIN UR-MCNC: NEGATIVE MG/DL
APPEARANCE UR: CLEAR
BACTERIA #/AREA URNS HPF: ABNORMAL /HPF
BILIRUB UR QL STRIP: NEGATIVE
COLOR UR AUTO: YELLOW
GLUCOSE UR STRIP-MCNC: NEGATIVE MG/DL
HGB UR QL STRIP: ABNORMAL
KETONES UR STRIP-MCNC: NEGATIVE MG/DL
LEUKOCYTE ESTERASE UR QL STRIP: ABNORMAL
NITRATE UR QL: NEGATIVE
PH UR STRIP: 6 PH (ref 5–7)
RBC #/AREA URNS AUTO: ABNORMAL /HPF (ref 0–2)
SP GR UR STRIP: 1.02 (ref 1–1.03)
URN SPEC COLLECT METH UR: ABNORMAL
UROBILINOGEN UR STRIP-ACNC: 0.2 EU/DL (ref 0.2–1)
WBC #/AREA URNS AUTO: ABNORMAL /HPF (ref 0–2)

## 2017-03-03 PROCEDURE — 81001 URINALYSIS AUTO W/SCOPE: CPT | Performed by: FAMILY MEDICINE

## 2017-03-03 PROCEDURE — 87186 SC STD MICRODIL/AGAR DIL: CPT | Performed by: FAMILY MEDICINE

## 2017-03-03 PROCEDURE — 87086 URINE CULTURE/COLONY COUNT: CPT | Performed by: FAMILY MEDICINE

## 2017-03-03 RX ORDER — CIPROFLOXACIN 250 MG/1
250 TABLET, FILM COATED ORAL 2 TIMES DAILY
Qty: 14 TABLET | Refills: 0 | Status: SHIPPED | OUTPATIENT
Start: 2017-03-03 | End: 2017-03-10

## 2017-03-03 NOTE — TELEPHONE ENCOUNTER
SUBJECTIVE:                                                    Diana Wilson is a 36 year old female who presents to clinic today for the following health issues:    URINARY TRACT SYMPTOMS     Onset: 1    Description:   Painful urination (Dysuria): YES  Blood in urine (Hematuria): no   Delay in urine (Hesitency): no     Intensity: moderate    Progression of Symptoms:  worsening    Accompanying Signs & Symptoms:  Fever/chills: no   Flank pain no   Nausea and vomiting: YES  Any vaginal symptoms: none  Abdominal/Pelvic Pain: no    History:   History of frequent UTI's: YES  History of kidney stones: no   Sexually Active: no   Possibility of pregnancy: No       Therapies Tried and outcome: none    Patient is feeling really crummy today. She does not know if she is able to come in. She had just finished a round of chemo and now having the urinary symptoms on top of it is making her feel miserable. There are no available appointments. Would you like her be seen or have a urine sample or other?  Francia Epps RN

## 2017-03-03 NOTE — TELEPHONE ENCOUNTER
Per Dr. Mcdonald: OK to order cipro 250 mg BID x7 days and order urine culture. Script sent. Culture ordered. Patient notified.   Francia Epps RN

## 2017-03-03 NOTE — TELEPHONE ENCOUNTER
Reason for call:  Patient reporting a symptom    Symptom or request: UTI    Duration (how long have symptoms been present): woke up this morning with it.      Have you been treated for this before? unknown    Additional comments: She states that she just finished with a round of chemotherapy and doesn't feel well to come in.     Phone Number patient can be reached at:  Home number on file [phone] (home)    Best Time:  Did not say    Can we leave a detailed message on this number:  Not Applicable    Call taken on 3/3/2017 at 8:30 AM by Pricila Leal

## 2017-03-03 NOTE — TELEPHONE ENCOUNTER
"Patient calling to report that she will be calling her PCP in regards to her urinary symptoms.  She is experiencing cloudy urine and dysuria.  Denies fever.  Patient reports she experiences bladder infections \"all the time\".  Advised patient to go to urgent care if unable to reach PCP today as Dr. Jin is out of the office and we don't want her symptoms to progress.  Patient states understanding and is in agreement with this plan.  "

## 2017-03-03 NOTE — TELEPHONE ENCOUNTER
Please review labs and advise for next step. Thank you.  Francia Epps RN    Component      Latest Ref Rng & Units 3/3/2017   Color Urine       Yellow   Appearance Urine       Clear   Glucose Urine      NEG mg/dL Negative   Bilirubin Urine      NEG Negative   Ketones Urine      NEG mg/dL Negative   Specific Gravity Urine      1.003 - 1.035 1.025   Blood Urine      NEG Trace (A)   pH Urine      5.0 - 7.0 pH 6.0   Protein Albumin Urine      NEG mg/dL Negative   Urobilinogen Urine      0.2 - 1.0 EU/dL 0.2   Nitrite Urine      NEG Negative   Leukocyte Esterase Urine      NEG Trace (A)   Source       Midstream Urine   WBC Urine      0 - 2 /HPF O - 2 . . .   RBC Urine      0 - 2 /HPF O - 2 . . .   Bacteria Urine      NEG /HPF Few (A) . . .

## 2017-03-03 NOTE — TELEPHONE ENCOUNTER
I am fine if she can just get a urine sample somewhere so we can do a u/a and culture. With her on the chemo she may have a resistant organism. Gwendolyn Mcdonald M.D.'

## 2017-03-10 ENCOUNTER — MYC MEDICAL ADVICE (OUTPATIENT)
Dept: FAMILY MEDICINE | Facility: CLINIC | Age: 37
End: 2017-03-10
Payer: COMMERCIAL

## 2017-03-10 DIAGNOSIS — R30.0 DYSURIA: ICD-10-CM

## 2017-03-10 DIAGNOSIS — N30.01 ACUTE CYSTITIS WITH HEMATURIA: Primary | ICD-10-CM

## 2017-03-10 DIAGNOSIS — B37.31 YEAST INFECTION OF THE VAGINA: ICD-10-CM

## 2017-03-10 LAB
ALBUMIN UR-MCNC: NEGATIVE MG/DL
AMORPH CRY #/AREA URNS HPF: ABNORMAL /HPF
APPEARANCE UR: CLEAR
BACTERIA #/AREA URNS HPF: ABNORMAL /HPF
BILIRUB UR QL STRIP: NEGATIVE
COLOR UR AUTO: YELLOW
GLUCOSE UR STRIP-MCNC: NEGATIVE MG/DL
HGB UR QL STRIP: ABNORMAL
KETONES UR STRIP-MCNC: NEGATIVE MG/DL
LEUKOCYTE ESTERASE UR QL STRIP: NEGATIVE
NITRATE UR QL: NEGATIVE
NON-SQ EPI CELLS #/AREA URNS LPF: ABNORMAL /LPF
PH UR STRIP: 6 PH (ref 5–7)
RBC #/AREA URNS AUTO: ABNORMAL /HPF (ref 0–2)
SP GR UR STRIP: >1.03 (ref 1–1.03)
URN SPEC COLLECT METH UR: ABNORMAL
UROBILINOGEN UR STRIP-ACNC: 0.2 EU/DL (ref 0.2–1)
WBC #/AREA URNS AUTO: ABNORMAL /HPF (ref 0–2)

## 2017-03-10 PROCEDURE — 87086 URINE CULTURE/COLONY COUNT: CPT | Performed by: PHYSICIAN ASSISTANT

## 2017-03-10 PROCEDURE — 87088 URINE BACTERIA CULTURE: CPT | Performed by: FAMILY MEDICINE

## 2017-03-10 PROCEDURE — 81001 URINALYSIS AUTO W/SCOPE: CPT | Performed by: FAMILY MEDICINE

## 2017-03-10 RX ORDER — SULFAMETHOXAZOLE/TRIMETHOPRIM 800-160 MG
1 TABLET ORAL 2 TIMES DAILY
Qty: 6 TABLET | Refills: 0 | Status: SHIPPED | OUTPATIENT
Start: 2017-03-10 | End: 2017-03-13

## 2017-03-10 RX ORDER — CIPROFLOXACIN 250 MG/1
250 TABLET, FILM COATED ORAL 2 TIMES DAILY
Qty: 6 TABLET | Refills: 0 | Status: SHIPPED | OUTPATIENT
Start: 2017-03-10 | End: 2017-03-10

## 2017-03-10 RX ORDER — FLUCONAZOLE 150 MG/1
150 TABLET ORAL ONCE
Qty: 1 TABLET | Refills: 0 | Status: SHIPPED | OUTPATIENT
Start: 2017-03-10 | End: 2017-03-10

## 2017-03-10 NOTE — TELEPHONE ENCOUNTER
Spoke with patient. She is going to be working late tonight and cannot bring in a sample. She also needs an antibiotic that she can take an antacid with because she has chemo on Tuesday. Please advise. Thank you.  Francia Epps RN

## 2017-03-10 NOTE — TELEPHONE ENCOUNTER
Can send in a few more days of cipro. However would be good for her to drop off a urine sample to culture over the weekend to make sure a different bacteria isn't growing.  Ana Laura Hua PA-C

## 2017-03-10 NOTE — TELEPHONE ENCOUNTER
Spoke with Rufina. If she cannot get in to drop off a urine sample, it will have to do. It is ok to give cipro 3 days. She will be off of this before she starts her Chemo on Tuesday.  Called patient back. She was VERY upset about the care she is getting when Dr. Ridley is out of the office. She expected a response sooner than when she did. She sent a message early in the morning through [email or web address] and did not get a response until the afternoon. Patient did not want to be exposed to other patients in the clinic if she has to drop off a urine sample.   Patient will  the Cipro and take it for 3 days. Patient hung up on RN.    Patient called back and said that she has to come in anyway. She cannot take the cipro with the magic mouthwash. She is wanting a different antibiotic. Rufina was on the phone with pharmacy. Pharmacist informed Rufina that it is ok for patient to take cipro and the magic mouth wash as long as it is not taken together at the same time. Patient still would prefer a different antibiotic. Rufina did switch the antibiotic and I informed patient. She will come to [place] to get the new script.     Patient came to clinic, asked patient if she is able to leave a urine sample. She said she would and will  her scripts.  Francia Epps RN

## 2017-03-10 NOTE — TELEPHONE ENCOUNTER
I spoke to pharmacist on the phone - the concern patient is referring to is that magic mouthwash antacid can reduce effectiveness of cipro. She just needs to take them 2 hours apart and if she doesn't swallow it, it should be fine.  Per RN, the patient would just like a different medication than the cipro. She does not want to come in to clinic to be exposed to germs. She will come to pharmacy only.  I sent in bactrim which shows good susceptibilities. Recommended repeat UA to have cultured to make sure there isn't another bacteria causing this. Pharmacy will recommend patient drops off sample.  I also sent in diflucan per patient request. Last liver function tests okay.  I called pharmacy, patient had left - was going to tell her results of urine test. We will just culture it.  Ana Laura Hua PA-C

## 2017-03-11 LAB
BACTERIA SPEC CULT: ABNORMAL
MICRO REPORT STATUS: ABNORMAL
MICROORGANISM SPEC CULT: ABNORMAL
SPECIMEN SOURCE: ABNORMAL

## 2017-03-12 LAB
BACTERIA SPEC CULT: ABNORMAL
MICRO REPORT STATUS: ABNORMAL
SPECIMEN SOURCE: ABNORMAL

## 2017-03-14 ENCOUNTER — INFUSION THERAPY VISIT (OUTPATIENT)
Dept: INFUSION THERAPY | Facility: CLINIC | Age: 37
End: 2017-03-14
Attending: INTERNAL MEDICINE
Payer: COMMERCIAL

## 2017-03-14 ENCOUNTER — HOSPITAL ENCOUNTER (OUTPATIENT)
Facility: CLINIC | Age: 37
Discharge: HOME | End: 2017-03-14
Attending: INTERNAL MEDICINE | Admitting: INTERNAL MEDICINE
Payer: COMMERCIAL

## 2017-03-14 VITALS
DIASTOLIC BLOOD PRESSURE: 67 MMHG | BODY MASS INDEX: 33.84 KG/M2 | HEIGHT: 64 IN | WEIGHT: 198.2 LBS | SYSTOLIC BLOOD PRESSURE: 109 MMHG | HEART RATE: 76 BPM | TEMPERATURE: 96.4 F

## 2017-03-14 DIAGNOSIS — C50.411 MALIGNANT NEOPLASM OF UPPER-OUTER QUADRANT OF RIGHT FEMALE BREAST (H): Primary | ICD-10-CM

## 2017-03-14 LAB
ALBUMIN SERPL-MCNC: 3.6 G/DL (ref 3.4–5)
ALP SERPL-CCNC: 80 U/L (ref 40–150)
ALT SERPL W P-5'-P-CCNC: 25 U/L (ref 0–50)
ANION GAP SERPL CALCULATED.3IONS-SCNC: 6 MMOL/L (ref 3–14)
AST SERPL W P-5'-P-CCNC: 8 U/L (ref 0–45)
BASOPHILS # BLD AUTO: 0.2 10E9/L (ref 0–0.2)
BASOPHILS NFR BLD AUTO: 2 %
BILIRUB SERPL-MCNC: 0.3 MG/DL (ref 0.2–1.3)
BUN SERPL-MCNC: 9 MG/DL (ref 7–30)
CALCIUM SERPL-MCNC: 8.5 MG/DL (ref 8.5–10.1)
CHLORIDE SERPL-SCNC: 107 MMOL/L (ref 94–109)
CO2 SERPL-SCNC: 28 MMOL/L (ref 20–32)
CREAT SERPL-MCNC: 0.62 MG/DL (ref 0.52–1.04)
DIFFERENTIAL METHOD BLD: ABNORMAL
EOSINOPHIL # BLD AUTO: 0 10E9/L (ref 0–0.7)
EOSINOPHIL NFR BLD AUTO: 0 %
ERYTHROCYTE [DISTWIDTH] IN BLOOD BY AUTOMATED COUNT: 14.5 % (ref 10–15)
GFR SERPL CREATININE-BSD FRML MDRD: ABNORMAL ML/MIN/1.7M2
GLUCOSE SERPL-MCNC: 156 MG/DL (ref 70–99)
HCT VFR BLD AUTO: 35.6 % (ref 35–47)
HGB BLD-MCNC: 11.9 G/DL (ref 11.7–15.7)
LYMPHOCYTES # BLD AUTO: 2.1 10E9/L (ref 0.8–5.3)
LYMPHOCYTES NFR BLD AUTO: 22 %
MCH RBC QN AUTO: 30.1 PG (ref 26.5–33)
MCHC RBC AUTO-ENTMCNC: 33.4 G/DL (ref 31.5–36.5)
MCV RBC AUTO: 90 FL (ref 78–100)
METAMYELOCYTES # BLD: 0.4 10E9/L
METAMYELOCYTES NFR BLD MANUAL: 4 %
MONOCYTES # BLD AUTO: 0.5 10E9/L (ref 0–1.3)
MONOCYTES NFR BLD AUTO: 5 %
MYELOCYTES # BLD: 0.1 10E9/L
MYELOCYTES NFR BLD MANUAL: 1 %
NEUTROPHILS # BLD AUTO: 6.4 10E9/L (ref 1.6–8.3)
NEUTROPHILS NFR BLD AUTO: 66 %
PLATELET # BLD AUTO: 210 10E9/L (ref 150–450)
PLATELET # BLD EST: NORMAL 10*3/UL
POTASSIUM SERPL-SCNC: 3.5 MMOL/L (ref 3.4–5.3)
PROT SERPL-MCNC: 6.8 G/DL (ref 6.8–8.8)
RBC # BLD AUTO: 3.95 10E12/L (ref 3.8–5.2)
RBC MORPH BLD: NORMAL
SODIUM SERPL-SCNC: 141 MMOL/L (ref 133–144)
WBC # BLD AUTO: 9.7 10E9/L (ref 4–11)

## 2017-03-14 PROCEDURE — 96417 CHEMO IV INFUS EACH ADDL SEQ: CPT

## 2017-03-14 PROCEDURE — 25000128 H RX IP 250 OP 636: Performed by: INTERNAL MEDICINE

## 2017-03-14 PROCEDURE — 96375 TX/PRO/DX INJ NEW DRUG ADDON: CPT

## 2017-03-14 PROCEDURE — 80053 COMPREHEN METABOLIC PANEL: CPT | Performed by: INTERNAL MEDICINE

## 2017-03-14 PROCEDURE — 25000128 H RX IP 250 OP 636

## 2017-03-14 PROCEDURE — 85025 COMPLETE CBC W/AUTO DIFF WBC: CPT | Performed by: INTERNAL MEDICINE

## 2017-03-14 PROCEDURE — 96413 CHEMO IV INFUSION 1 HR: CPT

## 2017-03-14 PROCEDURE — 96367 TX/PROPH/DG ADDL SEQ IV INF: CPT

## 2017-03-14 PROCEDURE — 25000125 ZZHC RX 250: Performed by: INTERNAL MEDICINE

## 2017-03-14 RX ORDER — METHYLPREDNISOLONE SODIUM SUCCINATE 125 MG/2ML
125 INJECTION, POWDER, LYOPHILIZED, FOR SOLUTION INTRAMUSCULAR; INTRAVENOUS
Status: CANCELLED
Start: 2017-03-21

## 2017-03-14 RX ORDER — MEPERIDINE HYDROCHLORIDE 25 MG/ML
25 INJECTION INTRAMUSCULAR; INTRAVENOUS; SUBCUTANEOUS EVERY 30 MIN PRN
Status: CANCELLED | OUTPATIENT
Start: 2017-03-28

## 2017-03-14 RX ORDER — EPINEPHRINE 0.3 MG/.3ML
0.3 INJECTION SUBCUTANEOUS EVERY 5 MIN PRN
Status: CANCELLED | OUTPATIENT
Start: 2017-03-21

## 2017-03-14 RX ORDER — ALBUTEROL SULFATE 0.83 MG/ML
2.5 SOLUTION RESPIRATORY (INHALATION)
Status: CANCELLED | OUTPATIENT
Start: 2017-03-14

## 2017-03-14 RX ORDER — SODIUM CHLORIDE 9 MG/ML
1000 INJECTION, SOLUTION INTRAVENOUS CONTINUOUS PRN
Status: CANCELLED
Start: 2017-03-28

## 2017-03-14 RX ORDER — LORAZEPAM 2 MG/ML
0.5 INJECTION INTRAMUSCULAR EVERY 4 HOURS PRN
Status: DISCONTINUED | OUTPATIENT
Start: 2017-03-14 | End: 2017-03-14 | Stop reason: HOSPADM

## 2017-03-14 RX ORDER — METHYLPREDNISOLONE SODIUM SUCCINATE 125 MG/2ML
125 INJECTION, POWDER, LYOPHILIZED, FOR SOLUTION INTRAMUSCULAR; INTRAVENOUS
Status: CANCELLED
Start: 2017-03-28

## 2017-03-14 RX ORDER — DIPHENHYDRAMINE HCL 50 MG
50 CAPSULE ORAL ONCE
Status: CANCELLED
Start: 2017-03-21

## 2017-03-14 RX ORDER — LORAZEPAM 2 MG/ML
0.5 INJECTION INTRAMUSCULAR EVERY 4 HOURS PRN
Status: CANCELLED
Start: 2017-03-21

## 2017-03-14 RX ORDER — MEPERIDINE HYDROCHLORIDE 25 MG/ML
25 INJECTION INTRAMUSCULAR; INTRAVENOUS; SUBCUTANEOUS EVERY 30 MIN PRN
Status: CANCELLED | OUTPATIENT
Start: 2017-03-21

## 2017-03-14 RX ORDER — ALBUTEROL SULFATE 0.83 MG/ML
2.5 SOLUTION RESPIRATORY (INHALATION)
Status: CANCELLED | OUTPATIENT
Start: 2017-03-28

## 2017-03-14 RX ORDER — DIPHENHYDRAMINE HYDROCHLORIDE 50 MG/ML
50 INJECTION INTRAMUSCULAR; INTRAVENOUS
Status: CANCELLED
Start: 2017-03-28

## 2017-03-14 RX ORDER — MEPERIDINE HYDROCHLORIDE 25 MG/ML
25 INJECTION INTRAMUSCULAR; INTRAVENOUS; SUBCUTANEOUS EVERY 30 MIN PRN
Status: CANCELLED | OUTPATIENT
Start: 2017-03-14

## 2017-03-14 RX ORDER — ALBUTEROL SULFATE 90 UG/1
1-2 AEROSOL, METERED RESPIRATORY (INHALATION)
Status: CANCELLED
Start: 2017-03-28

## 2017-03-14 RX ORDER — ALBUTEROL SULFATE 90 UG/1
1-2 AEROSOL, METERED RESPIRATORY (INHALATION)
Status: CANCELLED
Start: 2017-03-21

## 2017-03-14 RX ORDER — DIPHENHYDRAMINE HYDROCHLORIDE 50 MG/ML
50 INJECTION INTRAMUSCULAR; INTRAVENOUS
Status: CANCELLED
Start: 2017-03-21

## 2017-03-14 RX ORDER — METHYLPREDNISOLONE SODIUM SUCCINATE 125 MG/2ML
125 INJECTION, POWDER, LYOPHILIZED, FOR SOLUTION INTRAMUSCULAR; INTRAVENOUS
Status: CANCELLED
Start: 2017-03-14

## 2017-03-14 RX ORDER — DIPHENHYDRAMINE HYDROCHLORIDE 50 MG/ML
50 INJECTION INTRAMUSCULAR; INTRAVENOUS
Status: CANCELLED
Start: 2017-03-14

## 2017-03-14 RX ORDER — HEPARIN SODIUM (PORCINE) LOCK FLUSH IV SOLN 100 UNIT/ML 100 UNIT/ML
SOLUTION INTRAVENOUS
Status: COMPLETED
Start: 2017-03-14 | End: 2017-03-14

## 2017-03-14 RX ORDER — SODIUM CHLORIDE 9 MG/ML
1000 INJECTION, SOLUTION INTRAVENOUS CONTINUOUS PRN
Status: CANCELLED
Start: 2017-03-21

## 2017-03-14 RX ORDER — EPINEPHRINE 0.3 MG/.3ML
0.3 INJECTION SUBCUTANEOUS EVERY 5 MIN PRN
Status: CANCELLED | OUTPATIENT
Start: 2017-03-14

## 2017-03-14 RX ORDER — ALBUTEROL SULFATE 0.83 MG/ML
2.5 SOLUTION RESPIRATORY (INHALATION)
Status: CANCELLED | OUTPATIENT
Start: 2017-03-21

## 2017-03-14 RX ORDER — DIPHENHYDRAMINE HCL 50 MG
50 CAPSULE ORAL ONCE
Status: CANCELLED
Start: 2017-03-28

## 2017-03-14 RX ORDER — SODIUM CHLORIDE 9 MG/ML
1000 INJECTION, SOLUTION INTRAVENOUS CONTINUOUS PRN
Status: CANCELLED
Start: 2017-03-14

## 2017-03-14 RX ORDER — ALBUTEROL SULFATE 90 UG/1
1-2 AEROSOL, METERED RESPIRATORY (INHALATION)
Status: CANCELLED
Start: 2017-03-14

## 2017-03-14 RX ORDER — DIPHENHYDRAMINE HCL 50 MG
50 CAPSULE ORAL ONCE
Status: CANCELLED
Start: 2017-03-14

## 2017-03-14 RX ORDER — LORAZEPAM 2 MG/ML
0.5 INJECTION INTRAMUSCULAR EVERY 4 HOURS PRN
Status: CANCELLED
Start: 2017-03-28

## 2017-03-14 RX ORDER — EPINEPHRINE 0.3 MG/.3ML
0.3 INJECTION SUBCUTANEOUS EVERY 5 MIN PRN
Status: CANCELLED | OUTPATIENT
Start: 2017-03-28

## 2017-03-14 RX ADMIN — DEXAMETHASONE SODIUM PHOSPHATE: 10 INJECTION, SOLUTION INTRAMUSCULAR; INTRAVENOUS at 09:50

## 2017-03-14 RX ADMIN — CARBOPLATIN 750 MG: 10 INJECTION, SOLUTION INTRAVENOUS at 11:31

## 2017-03-14 RX ADMIN — SODIUM CHLORIDE 250 ML: 9 INJECTION, SOLUTION INTRAVENOUS at 09:13

## 2017-03-14 RX ADMIN — FAMOTIDINE 20 MG: 20 INJECTION, SOLUTION INTRAVENOUS at 09:13

## 2017-03-14 RX ADMIN — SODIUM CHLORIDE, PRESERVATIVE FREE 500 UNITS: 5 INJECTION INTRAVENOUS at 12:20

## 2017-03-14 RX ADMIN — PACLITAXEL 159 MG: 6 INJECTION, SOLUTION INTRAVENOUS at 10:21

## 2017-03-14 RX ADMIN — LORAZEPAM 0.5 MG: 2 INJECTION, SOLUTION INTRAMUSCULAR; INTRAVENOUS at 09:56

## 2017-03-14 RX ADMIN — DIPHENHYDRAMINE HYDROCHLORIDE 50 MG: 50 INJECTION, SOLUTION INTRAMUSCULAR; INTRAVENOUS at 09:29

## 2017-03-14 NOTE — PROGRESS NOTES
Infusion Nursing Note:  Diana Wilson presents today for C1D1 Carbo/Taxol.     Patient seen by provider today: No   present during visit today: Not Applicable.    Note: Did go over common side effects of carbo and taxol with pt.  Pt verbalized understanding of information given and has no questions or concerns at this time.  Encouraged pt to call us should any arise.     Intravenous Access:  Labs drawn without difficulty.  Implanted Port.    Treatment Conditions:  Lab Results   Component Value Date    HGB 11.9 03/14/2017    HGB 4.68 12/11/2003     Lab Results   Component Value Date    WBC 9.7 03/14/2017      Lab Results   Component Value Date    ANEU 6.4 03/14/2017     Lab Results   Component Value Date     03/14/2017    PLT 16.2 12/11/2003      Lab Results   Component Value Date     03/14/2017                   Lab Results   Component Value Date    POTASSIUM 3.5 03/14/2017           Lab Results   Component Value Date    MAG 2.3 03/02/2016            Lab Results   Component Value Date    CR 0.62 03/14/2017                   Lab Results   Component Value Date    DENY 8.5 03/14/2017                Lab Results   Component Value Date    BILITOTAL 0.3 03/14/2017           Lab Results   Component Value Date    ALBUMIN 3.6 03/14/2017                    Lab Results   Component Value Date    ALT 25 03/14/2017           Lab Results   Component Value Date    AST 8 03/14/2017     Results reviewed, labs MET treatment parameters, ok to proceed with treatment.      Post Infusion Assessment:  Patient tolerated C1D1 Taxol and Carbo infusion without incident.  Blood return noted pre and post infusion.  Site patent and intact, free from redness, edema or discomfort.  No evidence of extravasations.  Access discontinued per protocol.    Discharge Plan:   Patient discharged in stable condition accompanied by: sister.  Departure Mode: Ambulatory.  Pt to return on 3/21/17 at 8:00 a:m for C1D8 Taxol.     Nora ORELLANA  TRACEY Isaac

## 2017-03-14 NOTE — MR AVS SNAPSHOT
After Visit Summary   3/14/2017    Diana Wilson    MRN: [id number]           Patient Information     Date Of Birth          1980        Visit Information        Provider Department      3/14/2017 8:00 AM ROOM 10 [facility] Cancer Infusion        Today's Diagnoses     Malignant neoplasm of upper-outer quadrant of right female breast (H)    -  1       Follow-ups after your visit        Your next 10 appointments already scheduled     Mar 21, 2017  8:00 AM CDT   Level 2 with ROOM 1 [facility] Infusion ([facility])    [facility]  [address]  [place] [address]   [phone]            Mar 27, 2017  9:00 AM CDT   Level 2 with ROOM 5 [facility]fusion ([facility])    [facility]  [address]  [place] [address]   [phone]            Apr 04, 2017  8:00 AM CDT   Level 6 with ROOM 10 [facility] ([facility])    [facility]  [address]  [facility] [address]   [phone]            Apr 04, 2017  9:00 AM CDT   Return Visit with Maribel Jin MD   [facility] ([facility])    [facility]  [address]  [place] [address]   [phone]            Apr 11, 2017  8:00 AM CDT   Level 2 with ROOM 2 [facility] ([facility])    [facility]  [address]  [facility] [address]   [phone]            Apr 18, 2017  8:00 AM CDT   Level 2 with ROOM 2 [facility] ([facility])    [facility]  [address]  [place] [address]   [phone]              Who to contact     If you have questions or need follow up information about today's clinic visit or your schedule please contact [facility] CANCER INFUSION directly at [phone].  Normal or  "non-critical lab and imaging results will be communicated to you by MyChart, letter or phone within 4 business days after the clinic has received the results. If you do not hear from us within 7 days, please contact the clinic through [employer] or phone. If you have a critical or abnormal lab result, we will notify you by phone as soon as possible.  Submit refill requests through [employer] or call your pharmacy and they will forward the refill request to us. Please allow 3 business days for your refill to be completed.          Additional Information About Your Visit        [employer] Information     [employer] gives you secure access to your electronic health record. If you see a primary care provider, you can also send messages to your care team and make appointments. If you have questions, please call your primary care clinic.  If you do not have a primary care provider, please call [phone] and they will assist you.        Care EveryWhere ID     This is your Care EveryWhere ID. This could be used by other organizations to access your [facility] medical records  [id number]        Your Vitals Were     Pulse Temperature Height BMI (Body Mass Index)          76 96.4  F (35.8  C) (Oral) 1.626 m (5' 4.02\") 34 kg/m2         Blood Pressure from Last 3 Encounters:   03/14/17 109/67   02/28/17 115/76   02/28/17 116/72    Weight from Last 3 Encounters:   03/14/17 89.9 kg (198 lb 3.2 oz)   02/28/17 87.8 kg (193 lb 8 oz)   02/14/17 87.5 kg (193 lb)              We Performed the Following     CBC with platelets differential     Comprehensive metabolic panel     Treatment Conditions        Primary Care Provider Office Phone # Fax #    Yenifer Ridley [phone]       [facility] [address] [facility] [address]        Thank you!     Thank you for choosing [facility]  for your care. Our goal is always to provide you with excellent care. Hearing back from our patients is one way we can " continue to improve our services. Please take a few minutes to complete the written survey that you may receive in the mail after your visit with us. Thank you!             Your Updated Medication List - Protect others around you: Learn how to safely use, store and throw away your medicines at www.disposemymeds.org.          This list is accurate as of: 3/14/17  3:49 PM.  Always use your most recent med list.                   Brand Name Dispense Instructions for use    ACIDOPHILUS PROBIOTIC 100 MG Caps          albuterol 108 (90 BASE) MCG/ACT Inhaler    PROAIR HFA/PROVENTIL HFA/VENTOLIN HFA    1 Inhaler    Inhale 2 puffs into the lungs every 6 hours as needed for shortness of breath / dyspnea       ALPRAZolam 0.5 MG tablet    XANAX     Reported on 2/14/2017       cyclobenzaprine 10 MG tablet    FLEXERIL    30 tablet    Take 1 tablet (10 mg) by mouth 3 times daily as needed for muscle spasms       dexamethasone 4 MG tablet    DECADRON     Take 4 mg by mouth       famotidine 20 MG tablet    PEPCID     Take 20 mg by mouth daily as needed       fluticasone 50 MCG/ACT spray    FLONASE    3 Bottle    Spray 1-2 sprays into both nostrils daily       hydrOXYzine 10 MG tablet    ATARAX    60 tablet    Take 1-5 tablets (10-50 mg) by mouth every 6 hours as needed for anxiety       lidocaine 2 % solution    XYLOCAINE     Reported on 2/28/2017       lidocaine visc 2% 2.5mL/5mL & maalox/mylanta w/ simeth 2.5mL/5mL & diphenhydrAMINE 5mg/5mL Susp suspension    MAGIC Mouthwash    240 mL    Swish and swallow 10 mLs in mouth every 6 hours as needed for mouth sores       lidocaine-prilocaine cream    EMLA    30 g    Apply topically as needed for other (place dime size amount over port site prior to tx and cover with tegaderm)       loratadine 10 MG tablet    CLARITIN    90 tablet    Take 1 tablet (10 mg) by mouth daily       montelukast 10 MG tablet    SINGULAIR    90 tablet    Take 1 tablet (10 mg) by mouth At Bedtime       MULTI  COMPLETE PO          NAPROXEN SODIUM PO      Take 220 mg by mouth Reported on 2/14/2017       ondansetron 4 MG ODT tab    ZOFRAN ODT    20 tablet    Take 1-2 tablets (4-8 mg) by mouth every 6 hours as needed for nausea       oxyCODONE-acetaminophen 5-325 MG per tablet    PERCOCET    20 tablet    Take 1 tablet by mouth every 4 hours as needed for pain       scopolamine 72 hr patch    TRANSDERM    1 patch    Place 1 patch onto the skin every 72 hours.  Apply to hairless area behind one ear at least 4 hours before travel.  Remove old patch and change every 3 days.       VITAMIN B 12 PO

## 2017-03-21 ENCOUNTER — HOSPITAL ENCOUNTER (OUTPATIENT)
Facility: CLINIC | Age: 37
Discharge: HOME | End: 2017-03-21
Attending: INTERNAL MEDICINE | Admitting: INTERNAL MEDICINE
Payer: COMMERCIAL

## 2017-03-21 ENCOUNTER — INFUSION THERAPY VISIT (OUTPATIENT)
Dept: INFUSION THERAPY | Facility: CLINIC | Age: 37
End: 2017-03-21
Attending: INTERNAL MEDICINE
Payer: COMMERCIAL

## 2017-03-21 VITALS
BODY MASS INDEX: 34.14 KG/M2 | SYSTOLIC BLOOD PRESSURE: 118 MMHG | DIASTOLIC BLOOD PRESSURE: 74 MMHG | HEART RATE: 83 BPM | WEIGHT: 199 LBS | TEMPERATURE: 96.5 F

## 2017-03-21 DIAGNOSIS — C50.411 MALIGNANT NEOPLASM OF UPPER-OUTER QUADRANT OF RIGHT FEMALE BREAST (H): Primary | ICD-10-CM

## 2017-03-21 LAB
BASOPHILS # BLD AUTO: 0.1 10E9/L (ref 0–0.2)
BASOPHILS NFR BLD AUTO: 1.9 %
DIFFERENTIAL METHOD BLD: ABNORMAL
EOSINOPHIL # BLD AUTO: 0 10E9/L (ref 0–0.7)
EOSINOPHIL NFR BLD AUTO: 0.7 %
ERYTHROCYTE [DISTWIDTH] IN BLOOD BY AUTOMATED COUNT: 14.5 % (ref 10–15)
HCT VFR BLD AUTO: 31.7 % (ref 35–47)
HGB BLD-MCNC: 10.6 G/DL (ref 11.7–15.7)
IMM GRANULOCYTES # BLD: 0 10E9/L (ref 0–0.4)
IMM GRANULOCYTES NFR BLD: 0.5 %
LYMPHOCYTES # BLD AUTO: 1.3 10E9/L (ref 0.8–5.3)
LYMPHOCYTES NFR BLD AUTO: 23 %
MCH RBC QN AUTO: 30 PG (ref 26.5–33)
MCHC RBC AUTO-ENTMCNC: 33.4 G/DL (ref 31.5–36.5)
MCV RBC AUTO: 90 FL (ref 78–100)
MONOCYTES # BLD AUTO: 0.6 10E9/L (ref 0–1.3)
MONOCYTES NFR BLD AUTO: 10.3 %
NEUTROPHILS # BLD AUTO: 3.7 10E9/L (ref 1.6–8.3)
NEUTROPHILS NFR BLD AUTO: 63.6 %
PLATELET # BLD AUTO: 281 10E9/L (ref 150–450)
RBC # BLD AUTO: 3.53 10E12/L (ref 3.8–5.2)
WBC # BLD AUTO: 5.7 10E9/L (ref 4–11)

## 2017-03-21 PROCEDURE — 96375 TX/PRO/DX INJ NEW DRUG ADDON: CPT

## 2017-03-21 PROCEDURE — 85025 COMPLETE CBC W/AUTO DIFF WBC: CPT | Performed by: INTERNAL MEDICINE

## 2017-03-21 PROCEDURE — 25000125 ZZHC RX 250: Performed by: INTERNAL MEDICINE

## 2017-03-21 PROCEDURE — 96413 CHEMO IV INFUSION 1 HR: CPT

## 2017-03-21 PROCEDURE — 25000128 H RX IP 250 OP 636: Performed by: INTERNAL MEDICINE

## 2017-03-21 RX ORDER — HEPARIN SODIUM (PORCINE) LOCK FLUSH IV SOLN 100 UNIT/ML 100 UNIT/ML
5 SOLUTION INTRAVENOUS
Status: DISCONTINUED | OUTPATIENT
Start: 2017-03-21 | End: 2017-03-21 | Stop reason: HOSPADM

## 2017-03-21 RX ADMIN — DEXAMETHASONE SODIUM PHOSPHATE 20 MG: 10 INJECTION, SOLUTION INTRAMUSCULAR; INTRAVENOUS at 09:07

## 2017-03-21 RX ADMIN — PACLITAXEL 159 MG: 6 INJECTION, SOLUTION INTRAVENOUS at 09:21

## 2017-03-21 RX ADMIN — SODIUM CHLORIDE 250 ML: 9 INJECTION, SOLUTION INTRAVENOUS at 08:41

## 2017-03-21 RX ADMIN — SODIUM CHLORIDE, PRESERVATIVE FREE 5 ML: 5 INJECTION INTRAVENOUS at 10:39

## 2017-03-21 RX ADMIN — FAMOTIDINE 20 MG: 20 INJECTION, SOLUTION INTRAVENOUS at 08:53

## 2017-03-21 RX ADMIN — DIPHENHYDRAMINE HYDROCHLORIDE 50 MG: 50 INJECTION, SOLUTION INTRAMUSCULAR; INTRAVENOUS at 08:39

## 2017-03-21 NOTE — MR AVS SNAPSHOT
After Visit Summary   3/21/2017    Diana Wilson    MRN: [id number]           Patient Information     Date Of Birth          1980        Visit Information        Provider Department      3/21/2017 8:00 AM ROOM 1 [facility] Cancer Infusion        Today's Diagnoses     Malignant neoplasm of upper-outer quadrant of right female breast (H)    -  1       Follow-ups after your visit        Your next 10 appointments already scheduled     Mar 27, 2017  9:00 AM CDT   Level 2 with ROOM 5 [facility] Infusion ([facility])    [facility]  [address]  [facility] [address]   [phone]            Apr 04, 2017  8:00 AM CDT   Level 6 with ROOM 10 [facility] Infusion ([facility])    [facility]  [address]  [facility] [address]   [phone]            Apr 04, 2017  9:00 AM CDT   Return Visit with Maribel Jin MD   [facility] ([facility])    [facility]  [address]  [facility] [address]   [phone]            Apr 11, 2017  8:00 AM CDT   Level 2 with ROOM 2 [facility]fusion ([facility])    [facility]  [address]  [facility] [address]   [phone]            Apr 18, 2017  8:00 AM CDT   Level 2 with ROOM 2 [facility] Infusion ([facility])    [facility]  [address]  [facility] [address]   [phone]              Who to contact     If you have questions or need follow up information about today's clinic visit or your schedule please contact [facility] directly at [phone].  Normal or non-critical lab and imaging results will be communicated to you by MyChart, letter or phone within 4 business days after the clinic has received the results. If you do not hear from us within 7 days, please contact the  clinic through [employer] or phone. If you have a critical or abnormal lab result, we will notify you by phone as soon as possible.  Submit refill requests through [employer] or call your pharmacy and they will forward the refill request to us. Please allow 3 business days for your refill to be completed.          Additional Information About Your Visit        "[employer] Information     [employer] gives you secure access to your electronic health record. If you see a primary care provider, you can also send messages to your care team and make appointments. If you have questions, please call your primary care clinic.  If you do not have a primary care provider, please call [phone] and they will assist you.        Care EveryWhere ID     This is your Care EveryWhere ID. This could be used by other organizations to access your [facility] medical records  [id number]        Your Vitals Were     Pulse Temperature BMI (Body Mass Index)             83 96.5  F (35.8  C) (Oral) 34.14 kg/m2          Blood Pressure from Last 3 Encounters:   03/21/17 118/74   03/14/17 109/67   02/28/17 115/76    Weight from Last 3 Encounters:   03/21/17 90.3 kg (199 lb)   03/14/17 89.9 kg (198 lb 3.2 oz)   02/28/17 87.8 kg (193 lb 8 oz)              We Performed the Following     CBC with platelets differential     Central Venous Catheter: implanted port (Port-A-Cath, Power Port)        Primary Care Provider Office Phone # Fax #    Yenifer Ridley [phone]       [facility] [address] [facility] [address]        Thank you!     Thank you for choosing [facility]  for your care. Our goal is always to provide you with excellent care. Hearing back from our patients is one way we can continue to improve our services. Please take a few minutes to complete the written survey that you may receive in the mail after your visit with us. Thank you!             Your Updated Medication List - Protect others around  you: Learn how to safely use, store and throw away your medicines at www.disposemymeds.org.          This list is accurate as of: 3/21/17 10:45 AM.  Always use your most recent med list.                   Brand Name Dispense Instructions for use    ACIDOPHILUS PROBIOTIC 100 MG Caps          albuterol 108 (90 BASE) MCG/ACT Inhaler    PROAIR HFA/PROVENTIL HFA/VENTOLIN HFA    1 Inhaler    Inhale 2 puffs into the lungs every 6 hours as needed for shortness of breath / dyspnea       ALPRAZolam 0.5 MG tablet    XANAX     Reported on 2/14/2017       cyclobenzaprine 10 MG tablet    FLEXERIL    30 tablet    Take 1 tablet (10 mg) by mouth 3 times daily as needed for muscle spasms       dexamethasone 4 MG tablet    DECADRON     Take 4 mg by mouth       famotidine 20 MG tablet    PEPCID     Take 20 mg by mouth daily as needed       fluticasone 50 MCG/ACT spray    FLONASE    3 Bottle    Spray 1-2 sprays into both nostrils daily       hydrOXYzine 10 MG tablet    ATARAX    60 tablet    Take 1-5 tablets (10-50 mg) by mouth every 6 hours as needed for anxiety       lidocaine 2 % solution    XYLOCAINE     Reported on 2/28/2017       lidocaine visc 2% 2.5mL/5mL & maalox/mylanta w/ simeth 2.5mL/5mL & diphenhydrAMINE 5mg/5mL Susp suspension    MAGIC Mouthwash    240 mL    Swish and swallow 10 mLs in mouth every 6 hours as needed for mouth sores       lidocaine-prilocaine cream    EMLA    30 g    Apply topically as needed for other (place dime size amount over port site prior to tx and cover with tegaderm)       loratadine 10 MG tablet    CLARITIN    90 tablet    Take 1 tablet (10 mg) by mouth daily       montelukast 10 MG tablet    SINGULAIR    90 tablet    Take 1 tablet (10 mg) by mouth At Bedtime       MULTI COMPLETE PO          NAPROXEN SODIUM PO      Take 220 mg by mouth Reported on 2/14/2017       ondansetron 4 MG ODT tab    ZOFRAN ODT    20 tablet    Take 1-2 tablets (4-8 mg) by mouth every 6 hours as needed for nausea        oxyCODONE-acetaminophen 5-325 MG per tablet    PERCOCET    20 tablet    Take 1 tablet by mouth every 4 hours as needed for pain       scopolamine 72 hr patch    TRANSDERM    1 patch    Place 1 patch onto the skin every 72 hours.  Apply to hairless area behind one ear at least 4 hours before travel.  Remove old patch and change every 3 days.       VITAMIN B 12 PO

## 2017-03-21 NOTE — PROGRESS NOTES
Infusion Nursing Note:  Diana Wilson presents today for Taxol.    Patient seen by provider today: No   present during visit today: Not Applicable.    Note: N/A.    Intravenous Access:  Implanted Port.    Treatment Conditions:  Results reviewed, labs MET treatment parameters, ok to proceed with treatment.      Post Infusion Assessment:  Patient tolerated infusion without incident.  Blood return noted pre and post infusion.  Site patent and intact, free from redness, edema or discomfort.  No evidence of extravasations.  Access discontinued per protocol.    Discharge Plan:   Patient discharged in stable condition accompanied by: father.    Ana Laura Christopher RN

## 2017-03-22 ENCOUNTER — TELEPHONE (OUTPATIENT)
Dept: ONCOLOGY | Facility: CLINIC | Age: 37
End: 2017-03-22

## 2017-03-22 ENCOUNTER — HOSPITAL ENCOUNTER (OUTPATIENT)
Dept: LAB | Facility: CLINIC | Age: 37
Discharge: HOME | End: 2017-03-22
Attending: INTERNAL MEDICINE | Admitting: INTERNAL MEDICINE
Payer: COMMERCIAL

## 2017-03-22 DIAGNOSIS — R30.9 PAIN WITH URINATION: ICD-10-CM

## 2017-03-22 DIAGNOSIS — R30.9 PAIN WITH URINATION: Primary | ICD-10-CM

## 2017-03-22 LAB
ALBUMIN UR-MCNC: NEGATIVE MG/DL
APPEARANCE UR: CLEAR
BILIRUB UR QL STRIP: NEGATIVE
COLOR UR AUTO: ABNORMAL
GLUCOSE UR STRIP-MCNC: NEGATIVE MG/DL
HGB UR QL STRIP: NEGATIVE
KETONES UR STRIP-MCNC: NEGATIVE MG/DL
LEUKOCYTE ESTERASE UR QL STRIP: NEGATIVE
MUCOUS THREADS #/AREA URNS LPF: PRESENT /LPF
NITRATE UR QL: NEGATIVE
PH UR STRIP: 5.5 PH (ref 5–7)
RBC #/AREA URNS AUTO: 0 /HPF (ref 0–2)
SP GR UR STRIP: 1 (ref 1–1.03)
URN SPEC COLLECT METH UR: ABNORMAL
UROBILINOGEN UR STRIP-MCNC: NORMAL MG/DL (ref 0–2)
WBC #/AREA URNS AUTO: <1 /HPF (ref 0–2)

## 2017-03-22 PROCEDURE — 81001 URINALYSIS AUTO W/SCOPE: CPT | Performed by: INTERNAL MEDICINE

## 2017-03-22 RX ORDER — PHENAZOPYRIDINE HYDROCHLORIDE 200 MG/1
200 TABLET, FILM COATED ORAL 3 TIMES DAILY PRN
Qty: 30 TABLET | Refills: 1 | Status: SHIPPED | OUTPATIENT
Start: 2017-03-22 | End: 2017-06-30

## 2017-03-22 NOTE — TELEPHONE ENCOUNTER
"Called Pt to let her know that her urinalysis came back negative for a UTI. Asked her if she had any vaginal discharge suggesting that maybe this was from a yeast infection. Pt states that she'd had multiple yeast infections in the past and that she doesn't believe that this is the case. Pt states that this burning sensation starting shortly after receiving her taxol infusion yesterday and states that she feels as though there are \"granuales\" in her bladder when she urinates. Told pt that I would further review her symptoms with Dr. Millan and see if he has any other recommendations.     Informed her that Dr. Millan recommends that she try Pyridium for the pain and that we would send over a prescription for this to her pharmacy. Told her to give us a call back if her symptoms worsen and/or do not improve, if she develops a fever or with any other que/concerns. Pt verbalized understanding and agreed with the plan.    "

## 2017-03-24 NOTE — TELEPHONE ENCOUNTER
Called Pt to f/u on her symptoms since prescribing pyridium a few days ago for her painful urination. Pt reports an improvement in her symptoms stating that she has been taking the pyridium regularly but states that it has not completely resolved.     Advised pt to continue the pyridium as needed over the weekend but to try and stop it on Sunday the day before her next chemotherapy infusion to see if her symptoms have improved on their own or if we need to do further testing. Told her that I would touch base with her regarding her symptoms while she is here at that time. Pt verbalized understanding and agreed with the plan.

## 2017-03-27 ENCOUNTER — HOSPITAL ENCOUNTER (OUTPATIENT)
Facility: CLINIC | Age: 37
Discharge: HOME | End: 2017-03-27
Attending: INTERNAL MEDICINE | Admitting: INTERNAL MEDICINE
Payer: COMMERCIAL

## 2017-03-27 ENCOUNTER — INFUSION THERAPY VISIT (OUTPATIENT)
Dept: INFUSION THERAPY | Facility: CLINIC | Age: 37
End: 2017-03-27
Attending: INTERNAL MEDICINE
Payer: COMMERCIAL

## 2017-03-27 VITALS
SYSTOLIC BLOOD PRESSURE: 118 MMHG | HEART RATE: 82 BPM | DIASTOLIC BLOOD PRESSURE: 78 MMHG | TEMPERATURE: 96.3 F | RESPIRATION RATE: 16 BRPM

## 2017-03-27 DIAGNOSIS — C50.411 MALIGNANT NEOPLASM OF UPPER-OUTER QUADRANT OF RIGHT FEMALE BREAST (H): Primary | ICD-10-CM

## 2017-03-27 LAB
BASOPHILS # BLD AUTO: 0 10E9/L (ref 0–0.2)
BASOPHILS NFR BLD AUTO: 0.6 %
DIFFERENTIAL METHOD BLD: ABNORMAL
EOSINOPHIL # BLD AUTO: 0 10E9/L (ref 0–0.7)
EOSINOPHIL NFR BLD AUTO: 0.9 %
ERYTHROCYTE [DISTWIDTH] IN BLOOD BY AUTOMATED COUNT: 15.3 % (ref 10–15)
HCT VFR BLD AUTO: 23.9 % (ref 35–47)
HGB BLD-MCNC: 7.8 G/DL (ref 11.7–15.7)
IMM GRANULOCYTES # BLD: 0 10E9/L (ref 0–0.4)
IMM GRANULOCYTES NFR BLD: 0.3 %
LYMPHOCYTES # BLD AUTO: 0.9 10E9/L (ref 0.8–5.3)
LYMPHOCYTES NFR BLD AUTO: 24.7 %
MCH RBC QN AUTO: 30.6 PG (ref 26.5–33)
MCHC RBC AUTO-ENTMCNC: 32.6 G/DL (ref 31.5–36.5)
MCV RBC AUTO: 94 FL (ref 78–100)
MONOCYTES # BLD AUTO: 0.4 10E9/L (ref 0–1.3)
MONOCYTES NFR BLD AUTO: 10.1 %
NEUTROPHILS # BLD AUTO: 2.2 10E9/L (ref 1.6–8.3)
NEUTROPHILS NFR BLD AUTO: 63.4 %
PLATELET # BLD AUTO: 183 10E9/L (ref 150–450)
RBC # BLD AUTO: 2.55 10E12/L (ref 3.8–5.2)
WBC # BLD AUTO: 3.5 10E9/L (ref 4–11)

## 2017-03-27 PROCEDURE — 25000125 ZZHC RX 250: Performed by: INTERNAL MEDICINE

## 2017-03-27 PROCEDURE — 25000128 H RX IP 250 OP 636: Performed by: INTERNAL MEDICINE

## 2017-03-27 PROCEDURE — 85025 COMPLETE CBC W/AUTO DIFF WBC: CPT | Performed by: INTERNAL MEDICINE

## 2017-03-27 PROCEDURE — 96375 TX/PRO/DX INJ NEW DRUG ADDON: CPT

## 2017-03-27 PROCEDURE — 96413 CHEMO IV INFUSION 1 HR: CPT

## 2017-03-27 PROCEDURE — 25000128 H RX IP 250 OP 636

## 2017-03-27 RX ORDER — HEPARIN SODIUM (PORCINE) LOCK FLUSH IV SOLN 100 UNIT/ML 100 UNIT/ML
SOLUTION INTRAVENOUS
Status: COMPLETED
Start: 2017-03-27 | End: 2017-03-27

## 2017-03-27 RX ADMIN — SODIUM CHLORIDE, PRESERVATIVE FREE 500 UNITS: 5 INJECTION INTRAVENOUS at 11:55

## 2017-03-27 RX ADMIN — FAMOTIDINE 20 MG: 20 INJECTION, SOLUTION INTRAVENOUS at 10:02

## 2017-03-27 RX ADMIN — PACLITAXEL 159 MG: 6 INJECTION, SOLUTION INTRAVENOUS at 10:48

## 2017-03-27 RX ADMIN — DEXAMETHASONE SODIUM PHOSPHATE 20 MG: 10 INJECTION, SOLUTION INTRAMUSCULAR; INTRAVENOUS at 10:16

## 2017-03-27 RX ADMIN — SODIUM CHLORIDE 250 ML: 9 INJECTION, SOLUTION INTRAVENOUS at 10:01

## 2017-03-27 RX ADMIN — DIPHENHYDRAMINE HYDROCHLORIDE 50 MG: 50 INJECTION, SOLUTION INTRAMUSCULAR; INTRAVENOUS at 10:36

## 2017-03-27 NOTE — MR AVS SNAPSHOT
After Visit Summary   3/27/2017    Diana Wilson    MRN: [id number]           Patient Information     Date Of Birth          1980        Visit Information        Provider Department      3/27/2017 9:00 AM ROOM 5 [facility]        Today's Diagnoses     Malignant neoplasm of upper-outer quadrant of right female breast (H)    -  1       Follow-ups after your visit        Your next 10 appointments already scheduled     Apr 04, 2017  8:00 AM CDT   Level 6 with ROOM 10 [facility] ([facility])    [facility]  [address]  [facility] [address]   [phone]            Apr 04, 2017  9:00 AM CDT   Return Visit with Maribel Jin MD   [facility] ([facility])    [facility]  [address]  [facility] [address]   [phone]            Apr 11, 2017  8:00 AM CDT   Level 2 with ROOM 2 [facility] ([facility])    [facility]  [address]  [facility] [address]   [phone]            Apr 18, 2017  8:00 AM CDT   Level 2 with ROOM 2 [facility] ([facility])    [facility]  [address]  [facility] [address]   [phone]              Who to contact     If you have questions or need follow up information about today's clinic visit or your schedule please contact [facility] directly at [phone].  Normal or non-critical lab and imaging results will be communicated to you by MyChart, letter or phone within 4 business days after the clinic has received the results. If you do not hear from us within 7 days, please contact the clinic through [employer] or phone. If you have a critical or abnormal lab result, we will notify you by phone as soon as possible.  Submit refill requests through [email or web address] or call your pharmacy and they will forward the  refill request to us. Please allow 3 business days for your refill to be completed.          Additional Information About Your Visit        [employer] Information     [employer] gives you secure access to your electronic health record. If you see a primary care provider, you can also send messages to your care team and make appointments. If you have questions, please call your primary care clinic.  If you do not have a primary care provider, please call [phone] and they will assist you.        Care EveryWhere ID     This is your Care EveryWhere ID. This could be used by other organizations to access your [place] medical records  [id number]        Your Vitals Were     Pulse Temperature Respirations             82 96.3  F (35.7  C) 16          Blood Pressure from Last 3 Encounters:   03/27/17 118/78   03/21/17 118/74   03/14/17 109/67    Weight from Last 3 Encounters:   03/21/17 90.3 kg (199 lb)   03/14/17 89.9 kg (198 lb 3.2 oz)   02/28/17 87.8 kg (193 lb 8 oz)              We Performed the Following     CBC with platelets differential     Treatment Conditions        Primary Care Provider Office Phone # Fax #    Yenifer Ridley [phone]       [facility] [address] [facility] [address]        Thank you!     Thank you for choosing [facility]  for your care. Our goal is always to provide you with excellent care. Hearing back from our patients is one way we can continue to improve our services. Please take a few minutes to complete the written survey that you may receive in the mail after your visit with us. Thank you!             Your Updated Medication List - Protect others around you: Learn how to safely use, store and throw away your medicines at www.disposemymeds.org.          This list is accurate as of: 3/27/17 12:06 PM.  Always use your most recent med list.                   Brand Name Dispense Instructions for use    ACIDOPHILUS PROBIOTIC 100 MG Caps           albuterol 108 (90 BASE) MCG/ACT Inhaler    PROAIR HFA/PROVENTIL HFA/VENTOLIN HFA    1 Inhaler    Inhale 2 puffs into the lungs every 6 hours as needed for shortness of breath / dyspnea       ALPRAZolam 0.5 MG tablet    XANAX     Reported on 2/14/2017       cyclobenzaprine 10 MG tablet    FLEXERIL    30 tablet    Take 1 tablet (10 mg) by mouth 3 times daily as needed for muscle spasms       dexamethasone 4 MG tablet    DECADRON     Take 4 mg by mouth       famotidine 20 MG tablet    PEPCID     Take 20 mg by mouth daily as needed       fluticasone 50 MCG/ACT spray    FLONASE    3 Bottle    Spray 1-2 sprays into both nostrils daily       hydrOXYzine 10 MG tablet    ATARAX    60 tablet    Take 1-5 tablets (10-50 mg) by mouth every 6 hours as needed for anxiety       lidocaine 2 % solution    XYLOCAINE     Reported on 2/28/2017       lidocaine visc 2% 2.5mL/5mL & maalox/mylanta w/ simeth 2.5mL/5mL & diphenhydrAMINE 5mg/5mL Susp suspension    MAGIC Mouthwash    240 mL    Swish and swallow 10 mLs in mouth every 6 hours as needed for mouth sores       lidocaine-prilocaine cream    EMLA    30 g    Apply topically as needed for other (place dime size amount over port site prior to tx and cover with tegaderm)       loratadine 10 MG tablet    CLARITIN    90 tablet    Take 1 tablet (10 mg) by mouth daily       montelukast 10 MG tablet    SINGULAIR    90 tablet    Take 1 tablet (10 mg) by mouth At Bedtime       MULTI COMPLETE PO          NAPROXEN SODIUM PO      Take 220 mg by mouth Reported on 2/14/2017       ondansetron 4 MG ODT tab    ZOFRAN ODT    20 tablet    Take 1-2 tablets (4-8 mg) by mouth every 6 hours as needed for nausea       oxyCODONE-acetaminophen 5-325 MG per tablet    PERCOCET    20 tablet    Take 1 tablet by mouth every 4 hours as needed for pain       phenazopyridine 200 MG tablet    PYRIDIUM    30 tablet    Take 1 tablet (200 mg) by mouth 3 times daily as needed for irritation       scopolamine 72 hr patch     TRANSDERM    1 patch    Place 1 patch onto the skin every 72 hours.  Apply to hairless area behind one ear at least 4 hours before travel.  Remove old patch and change every 3 days.       VITAMIN B 12 PO

## 2017-03-27 NOTE — PROGRESS NOTES
Infusion Nursing Note:  Diana Wilson presents today for Taxol.    Patient seen by provider today: No   present during visit today: Not Applicable.    Note: N/A.    Intravenous Access:  Implanted Port.    Treatment Conditions:  Results reviewed, labs MET treatment parameters, ok to proceed with treatment.      Post Infusion Assessment:  Patient tolerated infusion without incident.    Discharge Plan:   Patient discharged in stable condition accompanied by: friend.    Elliott Adam RN

## 2017-03-28 ENCOUNTER — TELEPHONE (OUTPATIENT)
Dept: ONCOLOGY | Facility: CLINIC | Age: 37
End: 2017-03-28

## 2017-03-28 NOTE — TELEPHONE ENCOUNTER
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 Per [employer], Diana's deductible and coinsurance has been met 100%. Her estimated out of pocket cost for the additional testing will be $0. There will also be enough remaining sample to do the test.    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 We previously reviewed the cancer risks and screening recommendations associated with Bhat syndrome, as well as the implications of a positive, negative, and variant of uncertain significance result. She denied having any other questions at this time. Results should be available in approximately 3-4 weeks and I will first call her with the results. If interested, she will also be invited back to clinic to review the results in person.    Claudia Willis MS, [facility] [place]  Certified Genetic Counselor  Office: [phone]  Pager: [phone]

## 2017-04-04 ENCOUNTER — ONCOLOGY VISIT (OUTPATIENT)
Dept: ONCOLOGY | Facility: CLINIC | Age: 37
End: 2017-04-04
Attending: INTERNAL MEDICINE
Payer: COMMERCIAL

## 2017-04-04 ENCOUNTER — INFUSION THERAPY VISIT (OUTPATIENT)
Dept: INFUSION THERAPY | Facility: CLINIC | Age: 37
End: 2017-04-04
Attending: INTERNAL MEDICINE
Payer: COMMERCIAL

## 2017-04-04 ENCOUNTER — HOSPITAL ENCOUNTER (OUTPATIENT)
Facility: CLINIC | Age: 37
Discharge: HOME | End: 2017-04-04
Attending: INTERNAL MEDICINE | Admitting: INTERNAL MEDICINE
Payer: COMMERCIAL

## 2017-04-04 VITALS
DIASTOLIC BLOOD PRESSURE: 79 MMHG | TEMPERATURE: 97.8 F | WEIGHT: 202.5 LBS | HEIGHT: 64 IN | SYSTOLIC BLOOD PRESSURE: 131 MMHG | HEART RATE: 94 BPM | BODY MASS INDEX: 34.57 KG/M2 | RESPIRATION RATE: 18 BRPM | OXYGEN SATURATION: 100 %

## 2017-04-04 DIAGNOSIS — C50.411 MALIGNANT NEOPLASM OF UPPER-OUTER QUADRANT OF RIGHT FEMALE BREAST (H): ICD-10-CM

## 2017-04-04 DIAGNOSIS — R53.83 OTHER FATIGUE: ICD-10-CM

## 2017-04-04 DIAGNOSIS — R30.9 PAIN WITH URINATION: ICD-10-CM

## 2017-04-04 DIAGNOSIS — C50.411 MALIGNANT NEOPLASM OF UPPER-OUTER QUADRANT OF RIGHT FEMALE BREAST (H): Primary | ICD-10-CM

## 2017-04-04 DIAGNOSIS — G47.00 INSOMNIA, UNSPECIFIED TYPE: Primary | ICD-10-CM

## 2017-04-04 LAB
ALBUMIN SERPL-MCNC: 3.4 G/DL (ref 3.4–5)
ALP SERPL-CCNC: 72 U/L (ref 40–150)
ALT SERPL W P-5'-P-CCNC: 57 U/L (ref 0–50)
ANION GAP SERPL CALCULATED.3IONS-SCNC: 9 MMOL/L (ref 3–14)
AST SERPL W P-5'-P-CCNC: 19 U/L (ref 0–45)
BASOPHILS # BLD AUTO: 0.1 10E9/L (ref 0–0.2)
BASOPHILS NFR BLD AUTO: 1.1 %
BILIRUB SERPL-MCNC: 0.5 MG/DL (ref 0.2–1.3)
BUN SERPL-MCNC: 7 MG/DL (ref 7–30)
CALCIUM SERPL-MCNC: 8.7 MG/DL (ref 8.5–10.1)
CHLORIDE SERPL-SCNC: 109 MMOL/L (ref 94–109)
CO2 SERPL-SCNC: 26 MMOL/L (ref 20–32)
CREAT SERPL-MCNC: 0.67 MG/DL (ref 0.52–1.04)
DIFFERENTIAL METHOD BLD: ABNORMAL
EOSINOPHIL # BLD AUTO: 0 10E9/L (ref 0–0.7)
EOSINOPHIL NFR BLD AUTO: 0.9 %
ERYTHROCYTE [DISTWIDTH] IN BLOOD BY AUTOMATED COUNT: 17.6 % (ref 10–15)
GFR SERPL CREATININE-BSD FRML MDRD: ABNORMAL ML/MIN/1.7M2
GLUCOSE SERPL-MCNC: 146 MG/DL (ref 70–99)
HCT VFR BLD AUTO: 32.4 % (ref 35–47)
HGB BLD-MCNC: 10.7 G/DL (ref 11.7–15.7)
IMM GRANULOCYTES # BLD: 0 10E9/L (ref 0–0.4)
IMM GRANULOCYTES NFR BLD: 0.4 %
LYMPHOCYTES # BLD AUTO: 1.3 10E9/L (ref 0.8–5.3)
LYMPHOCYTES NFR BLD AUTO: 29.1 %
MCH RBC QN AUTO: 31.4 PG (ref 26.5–33)
MCHC RBC AUTO-ENTMCNC: 33 G/DL (ref 31.5–36.5)
MCV RBC AUTO: 95 FL (ref 78–100)
MONOCYTES # BLD AUTO: 0.5 10E9/L (ref 0–1.3)
MONOCYTES NFR BLD AUTO: 11.8 %
NEUTROPHILS # BLD AUTO: 2.6 10E9/L (ref 1.6–8.3)
NEUTROPHILS NFR BLD AUTO: 56.7 %
PLATELET # BLD AUTO: 207 10E9/L (ref 150–450)
POTASSIUM SERPL-SCNC: 3.5 MMOL/L (ref 3.4–5.3)
PROT SERPL-MCNC: 6.5 G/DL (ref 6.8–8.8)
RBC # BLD AUTO: 3.41 10E12/L (ref 3.8–5.2)
SODIUM SERPL-SCNC: 144 MMOL/L (ref 133–144)
WBC # BLD AUTO: 4.5 10E9/L (ref 4–11)

## 2017-04-04 PROCEDURE — 25000125 ZZHC RX 250: Performed by: INTERNAL MEDICINE

## 2017-04-04 PROCEDURE — 85025 COMPLETE CBC W/AUTO DIFF WBC: CPT | Performed by: INTERNAL MEDICINE

## 2017-04-04 PROCEDURE — 80053 COMPREHEN METABOLIC PANEL: CPT | Performed by: INTERNAL MEDICINE

## 2017-04-04 PROCEDURE — 96375 TX/PRO/DX INJ NEW DRUG ADDON: CPT

## 2017-04-04 PROCEDURE — 99214 OFFICE O/P EST MOD 30 MIN: CPT | Performed by: INTERNAL MEDICINE

## 2017-04-04 PROCEDURE — 96367 TX/PROPH/DG ADDL SEQ IV INF: CPT

## 2017-04-04 PROCEDURE — 96413 CHEMO IV INFUSION 1 HR: CPT

## 2017-04-04 PROCEDURE — 25000128 H RX IP 250 OP 636: Performed by: INTERNAL MEDICINE

## 2017-04-04 PROCEDURE — 96417 CHEMO IV INFUS EACH ADDL SEQ: CPT

## 2017-04-04 PROCEDURE — 25000128 H RX IP 250 OP 636

## 2017-04-04 PROCEDURE — 99211 OFF/OP EST MAY X REQ PHY/QHP: CPT | Mod: 25

## 2017-04-04 RX ORDER — LORAZEPAM 2 MG/ML
0.5 INJECTION INTRAMUSCULAR EVERY 4 HOURS PRN
Status: CANCELLED
Start: 2017-04-04

## 2017-04-04 RX ORDER — LORAZEPAM 0.5 MG/1
TABLET ORAL
COMMUNITY
Start: 2017-03-01 | End: 2017-04-04

## 2017-04-04 RX ORDER — SODIUM CHLORIDE 9 MG/ML
1000 INJECTION, SOLUTION INTRAVENOUS CONTINUOUS PRN
Status: CANCELLED
Start: 2017-04-18

## 2017-04-04 RX ORDER — DIPHENHYDRAMINE HYDROCHLORIDE 50 MG/ML
50 INJECTION INTRAMUSCULAR; INTRAVENOUS
Status: CANCELLED
Start: 2017-04-04

## 2017-04-04 RX ORDER — LORAZEPAM 2 MG/ML
0.5 INJECTION INTRAMUSCULAR EVERY 4 HOURS PRN
Status: CANCELLED
Start: 2017-04-18

## 2017-04-04 RX ORDER — ALBUTEROL SULFATE 0.83 MG/ML
2.5 SOLUTION RESPIRATORY (INHALATION)
Status: CANCELLED | OUTPATIENT
Start: 2017-04-18

## 2017-04-04 RX ORDER — EPINEPHRINE 0.3 MG/.3ML
0.3 INJECTION SUBCUTANEOUS EVERY 5 MIN PRN
Status: CANCELLED | OUTPATIENT
Start: 2017-04-18

## 2017-04-04 RX ORDER — ALBUTEROL SULFATE 90 UG/1
1-2 AEROSOL, METERED RESPIRATORY (INHALATION)
Status: CANCELLED
Start: 2017-04-04

## 2017-04-04 RX ORDER — DIPHENHYDRAMINE HYDROCHLORIDE 50 MG/ML
50 INJECTION INTRAMUSCULAR; INTRAVENOUS
Status: CANCELLED
Start: 2017-04-11

## 2017-04-04 RX ORDER — MEPERIDINE HYDROCHLORIDE 25 MG/ML
25 INJECTION INTRAMUSCULAR; INTRAVENOUS; SUBCUTANEOUS EVERY 30 MIN PRN
Status: CANCELLED | OUTPATIENT
Start: 2017-04-04

## 2017-04-04 RX ORDER — MEPERIDINE HYDROCHLORIDE 25 MG/ML
25 INJECTION INTRAMUSCULAR; INTRAVENOUS; SUBCUTANEOUS EVERY 30 MIN PRN
Status: CANCELLED | OUTPATIENT
Start: 2017-04-11

## 2017-04-04 RX ORDER — DIPHENHYDRAMINE HCL 50 MG
50 CAPSULE ORAL ONCE
Status: CANCELLED
Start: 2017-04-18

## 2017-04-04 RX ORDER — ALBUTEROL SULFATE 0.83 MG/ML
2.5 SOLUTION RESPIRATORY (INHALATION)
Status: CANCELLED | OUTPATIENT
Start: 2017-04-04

## 2017-04-04 RX ORDER — MEPERIDINE HYDROCHLORIDE 25 MG/ML
25 INJECTION INTRAMUSCULAR; INTRAVENOUS; SUBCUTANEOUS EVERY 30 MIN PRN
Status: CANCELLED | OUTPATIENT
Start: 2017-04-18

## 2017-04-04 RX ORDER — LORAZEPAM 2 MG/ML
0.5 INJECTION INTRAMUSCULAR EVERY 4 HOURS PRN
Status: CANCELLED
Start: 2017-04-11

## 2017-04-04 RX ORDER — ALBUTEROL SULFATE 90 UG/1
1-2 AEROSOL, METERED RESPIRATORY (INHALATION)
Status: CANCELLED
Start: 2017-04-18

## 2017-04-04 RX ORDER — DIPHENHYDRAMINE HCL 50 MG
50 CAPSULE ORAL ONCE
Status: CANCELLED
Start: 2017-04-11

## 2017-04-04 RX ORDER — HEPARIN SODIUM (PORCINE) LOCK FLUSH IV SOLN 100 UNIT/ML 100 UNIT/ML
SOLUTION INTRAVENOUS
Status: COMPLETED
Start: 2017-04-04 | End: 2017-04-04

## 2017-04-04 RX ORDER — EPINEPHRINE 0.3 MG/.3ML
0.3 INJECTION SUBCUTANEOUS EVERY 5 MIN PRN
Status: CANCELLED | OUTPATIENT
Start: 2017-04-11

## 2017-04-04 RX ORDER — LORAZEPAM 0.5 MG/1
0.5 TABLET ORAL
Qty: 30 TABLET | Refills: 1 | Status: SHIPPED | OUTPATIENT
Start: 2017-04-04 | End: 2017-11-22

## 2017-04-04 RX ORDER — ALBUTEROL SULFATE 90 UG/1
1-2 AEROSOL, METERED RESPIRATORY (INHALATION)
Status: CANCELLED
Start: 2017-04-11

## 2017-04-04 RX ORDER — SODIUM CHLORIDE 9 MG/ML
1000 INJECTION, SOLUTION INTRAVENOUS CONTINUOUS PRN
Status: CANCELLED
Start: 2017-04-11

## 2017-04-04 RX ORDER — METHYLPREDNISOLONE SODIUM SUCCINATE 125 MG/2ML
125 INJECTION, POWDER, LYOPHILIZED, FOR SOLUTION INTRAMUSCULAR; INTRAVENOUS
Status: CANCELLED
Start: 2017-04-18

## 2017-04-04 RX ORDER — SODIUM CHLORIDE 9 MG/ML
1000 INJECTION, SOLUTION INTRAVENOUS CONTINUOUS PRN
Status: CANCELLED
Start: 2017-04-04

## 2017-04-04 RX ORDER — METHYLPREDNISOLONE SODIUM SUCCINATE 125 MG/2ML
125 INJECTION, POWDER, LYOPHILIZED, FOR SOLUTION INTRAMUSCULAR; INTRAVENOUS
Status: CANCELLED
Start: 2017-04-11

## 2017-04-04 RX ORDER — DIPHENHYDRAMINE HYDROCHLORIDE 50 MG/ML
50 INJECTION INTRAMUSCULAR; INTRAVENOUS
Status: CANCELLED
Start: 2017-04-18

## 2017-04-04 RX ORDER — METHYLPREDNISOLONE SODIUM SUCCINATE 125 MG/2ML
125 INJECTION, POWDER, LYOPHILIZED, FOR SOLUTION INTRAMUSCULAR; INTRAVENOUS
Status: CANCELLED
Start: 2017-04-04

## 2017-04-04 RX ORDER — ALBUTEROL SULFATE 0.83 MG/ML
2.5 SOLUTION RESPIRATORY (INHALATION)
Status: CANCELLED | OUTPATIENT
Start: 2017-04-11

## 2017-04-04 RX ORDER — EPINEPHRINE 0.3 MG/.3ML
0.3 INJECTION SUBCUTANEOUS EVERY 5 MIN PRN
Status: CANCELLED | OUTPATIENT
Start: 2017-04-04

## 2017-04-04 RX ADMIN — FAMOTIDINE 20 MG: 20 INJECTION, SOLUTION INTRAVENOUS at 09:24

## 2017-04-04 RX ADMIN — SODIUM CHLORIDE 250 ML: 9 INJECTION, SOLUTION INTRAVENOUS at 09:16

## 2017-04-04 RX ADMIN — SODIUM CHLORIDE, PRESERVATIVE FREE 500 UNITS: 5 INJECTION INTRAVENOUS at 12:23

## 2017-04-04 RX ADMIN — PACLITAXEL 159 MG: 6 INJECTION, SOLUTION INTRAVENOUS at 10:22

## 2017-04-04 RX ADMIN — DIPHENHYDRAMINE HYDROCHLORIDE 50 MG: 50 INJECTION, SOLUTION INTRAMUSCULAR; INTRAVENOUS at 09:56

## 2017-04-04 RX ADMIN — CARBOPLATIN 300 MG: 10 INJECTION, SOLUTION INTRAVENOUS at 11:48

## 2017-04-04 RX ADMIN — DEXAMETHASONE SODIUM PHOSPHATE: 10 INJECTION, SOLUTION INTRAMUSCULAR; INTRAVENOUS at 09:40

## 2017-04-04 ASSESSMENT — PAIN SCALES - GENERAL: PAINLEVEL: EXTREME PAIN (8)

## 2017-04-04 NOTE — NURSING NOTE
"Diana Wilson is a 36 year old female who presents for:  Chief Complaint   Patient presents with     Oncology Clinic Visit     Recheck Breast CA, Labs & Chemo today        Initial Vitals:  /79 (BP Location: Right arm, Patient Position: Chair, Cuff Size: Adult Large)  Pulse 94  Temp 97.8  F (36.6  C) (Tympanic)  Resp 18  Ht 1.626 m (5' 4.02\")  Wt 91.9 kg (202 lb 8 oz)  SpO2 100%  Breastfeeding? No  BMI 34.74 kg/m2 Estimated body mass index is 34.74 kg/(m^2) as calculated from the following:    Height as of this encounter: 1.626 m (5' 4.02\").    Weight as of this encounter: 91.9 kg (202 lb 8 oz).. Body surface area is 2.04 meters squared. BP completed using cuff size: large  Extreme Pain (8) No LMP recorded. Allergies and medications reviewed.     Medications: Medication refills needed today.  Pharmacy name entered into [facility]: [facility] [place]  [place] - [address]    Comments:  Recheck Breast CA, Labs & Chemo today. Please refill Ativan today.   7 minutes for nursing intake (face to face time)   Sondra Medina CMA        "

## 2017-04-04 NOTE — MR AVS SNAPSHOT
After Visit Summary   4/4/2017    Diana Wilson    MRN: [id number]           Patient Information     Date Of Birth          1980        Visit Information        Provider Department      4/4/2017 9:00 AM Maribel Jin MD [facility]        Today's Diagnoses     Insomnia, unspecified type    -  1    Pain with urination        Other fatigue        Malignant neoplasm of upper-outer quadrant of right female breast (H)          Care Instructions    We would like to see you back with cycle 3 day of of your next chemotherapy treatment.     When you are in need of a refill, please call your pharmacy and they will send us a request.  Copy of appointments, and after visit summary (AVS) given to patient.  If you have any questions please call Morena Henry RN, BSN, OCN Oncology Hematology  [facility] [phone]. For questions after business hours, or on holidays/weekends, please call our after hours Nurse Triage line [phone]. Thank you.           Follow-ups after your visit        Your next 10 appointments already scheduled     Apr 11, 2017  9:00 AM CDT   Level 2 with ROOM 2 [facility] ([facility])    [facility]  [address]  [facility] [address]   [phone]            Apr 18, 2017  8:00 AM CDT   Level 2 with ROOM 2 [facility] ([facility])    [facility]  [address]  [facility] [address]   [phone]            Apr 25, 2017  8:30 AM CDT   Level 6 with ROOM 5 [facility] ([facility])    [facility]  [address]  [facility] [address]   [phone]            Apr 25, 2017  9:15 AM CDT   Return Visit with Maribel Jin MD   [facility] ([facility])    [facility]  [address]  [place]  "[address]   [phone]            May 02, 2017  8:30 AM CDT   Level 2 with ROOM 6 [facility] ([facility])    [facility]  [address] [address]  [facility] [address]   [phone]            May 09, 2017  8:30 AM CDT   Level 2 with ROOM 7 [facility] ([facility])    [facility]  [address]  [facility] [address]   [phone]              Who to contact     If you have questions or need follow up information about today's clinic visit or your schedule please contact [facility] directly at [phone].  Normal or non-critical lab and imaging results will be communicated to you by [employer], letter or phone within 4 business days after the clinic has received the results. If you do not hear from us within 7 days, please contact the clinic through [employer] or phone. If you have a critical or abnormal lab result, we will notify you by phone as soon as possible.  Submit refill requests through [employer] or call your pharmacy and they will forward the refill request to us. Please allow 3 business days for your refill to be completed.          Additional Information About Your Visit        [employer] Information     [employer] gives you secure access to your electronic health record. If you see a primary care provider, you can also send messages to your care team and make appointments. If you have questions, please call your primary care clinic.  If you do not have a primary care provider, please call [phone] and they will assist you.        Care EveryWhere ID     This is your Care EveryWhere ID. This could be used by other organizations to access your [place] medical records  [id number]        Your Vitals Were     Pulse Temperature Respirations Height Pulse Oximetry Breastfeeding?    94 97.8  F (36.6  C) (Tympanic) 18 1.626 m (5' 4.02\") 100% No    BMI (Body Mass Index)                   " 34.74 kg/m2            Blood Pressure from Last 3 Encounters:   04/04/17 131/79   03/27/17 118/78   03/21/17 118/74    Weight from Last 3 Encounters:   04/04/17 91.9 kg (202 lb 8 oz)   03/21/17 90.3 kg (199 lb)   03/14/17 89.9 kg (198 lb 3.2 oz)              Today, you had the following     No orders found for display         Today's Medication Changes          These changes are accurate as of: 4/4/17  9:26 AM.  If you have any questions, ask your nurse or doctor.               These medicines have changed or have updated prescriptions.        Dose/Directions    LORazepam 0.5 MG tablet   Commonly known as:  ATIVAN   This may have changed:    - how much to take  - how to take this  - when to take this  - reasons to take this   Used for:  Insomnia, unspecified type   Changed by:  Maribel Jin MD        Dose:  0.5 mg   Take 1 tablet (0.5 mg) by mouth nightly as needed for anxiety   Quantity:  30 tablet   Refills:  1            Where to get your medicines      Some of these will need a paper prescription and others can be bought over the counter.  Ask your nurse if you have questions.     Bring a paper prescription for each of these medications     LORazepam 0.5 MG tablet                Primary Care Provider Office Phone # Fax #    Yenifer Ridley [phone]       [facility] [id number] [place] [address]        Thank you!     Thank you for choosing [facility]  for your care. Our goal is always to provide you with excellent care. Hearing back from our patients is one way we can continue to improve our services. Please take a few minutes to complete the written survey that you may receive in the mail after your visit with us. Thank you!             Your Updated Medication List - Protect others around you: Learn how to safely use, store and throw away your medicines at www.disposemymeds.org.          This list is accurate as of: 4/4/17  9:26 AM.  Always use your most recent  med list.                   Brand Name Dispense Instructions for use    ACIDOPHILUS PROBIOTIC 100 MG Caps          albuterol 108 (90 BASE) MCG/ACT Inhaler    PROAIR HFA/PROVENTIL HFA/VENTOLIN HFA    1 Inhaler    Inhale 2 puffs into the lungs every 6 hours as needed for shortness of breath / dyspnea       ALPRAZolam 0.5 MG tablet    XANAX     Reported on 2/14/2017       cyclobenzaprine 10 MG tablet    FLEXERIL    30 tablet    Take 1 tablet (10 mg) by mouth 3 times daily as needed for muscle spasms       dexamethasone 4 MG tablet    DECADRON     Take 4 mg by mouth       famotidine 20 MG tablet    PEPCID     Take 20 mg by mouth daily as needed       fluticasone 50 MCG/ACT spray    FLONASE    3 Bottle    Spray 1-2 sprays into both nostrils daily       hydrOXYzine 10 MG tablet    ATARAX    60 tablet    Take 1-5 tablets (10-50 mg) by mouth every 6 hours as needed for anxiety       lidocaine 2 % solution    XYLOCAINE     Reported on 2/28/2017       lidocaine visc 2% 2.5mL/5mL & maalox/mylanta w/ simeth 2.5mL/5mL & diphenhydrAMINE 5mg/5mL Susp suspension    MAGIC Mouthwash    240 mL    Swish and swallow 10 mLs in mouth every 6 hours as needed for mouth sores       lidocaine-prilocaine cream    EMLA    30 g    Apply topically as needed for other (place dime size amount over port site prior to tx and cover with tegaderm)       loratadine 10 MG tablet    CLARITIN    90 tablet    Take 1 tablet (10 mg) by mouth daily       LORazepam 0.5 MG tablet    ATIVAN    30 tablet    Take 1 tablet (0.5 mg) by mouth nightly as needed for anxiety       montelukast 10 MG tablet    SINGULAIR    90 tablet    Take 1 tablet (10 mg) by mouth At Bedtime       MULTI COMPLETE PO          NAPROXEN SODIUM PO      Take 220 mg by mouth Reported on 2/14/2017       ondansetron 4 MG ODT tab    ZOFRAN ODT    20 tablet    Take 1-2 tablets (4-8 mg) by mouth every 6 hours as needed for nausea       oxyCODONE-acetaminophen 5-325 MG per tablet    PERCOCET    20  tablet    Take 1 tablet by mouth every 4 hours as needed for pain       phenazopyridine 200 MG tablet    PYRIDIUM    30 tablet    Take 1 tablet (200 mg) by mouth 3 times daily as needed for irritation       scopolamine 72 hr patch    TRANSDERM    1 patch    Place 1 patch onto the skin every 72 hours.  Apply to hairless area behind one ear at least 4 hours before travel.  Remove old patch and change every 3 days.       VITAMIN B 12 PO

## 2017-04-04 NOTE — MR AVS SNAPSHOT
After Visit Summary   4/4/2017    Diana Wilson    MRN: [id number]           Patient Information     Date Of Birth          1980        Visit Information        Provider Department      4/4/2017 8:00 AM ROOM 10 [facility]        Today's Diagnoses     Malignant neoplasm of upper-outer quadrant of right female breast (H)    -  1       Follow-ups after your visit        Your next 10 appointments already scheduled     Apr 11, 2017  9:00 AM CDT   Level 2 with ROOM 2 [facility] ([facility])    [facility]  [address]  [place] [address]   [phone]            Apr 18, 2017  8:00 AM CDT   Level 2 with ROOM 2 [facility] ([facility])    [facility]  [address]  [place] [address]   [phone]            Apr 25, 2017  8:30 AM CDT   Level 6 with ROOM 5 [facility] ([facility])    [facility]  [address]  [facility] [address]   [phone]            Apr 25, 2017  9:15 AM CDT   Return Visit with Maribel Jin MD   [facility] ([facility])    [facility]  [address]  [facility] [address]   [phone]            May 02, 2017  8:30 AM CDT   Level 2 with ROOM 6 [facility] ([facility])    [facility]  [address]  [facility] [address]   [phone]            May 09, 2017  8:30 AM CDT   Level 2 with ROOM 7 [facility] ([facility])    [facility]  [address]  [place] [address]   [phone]              Who to contact     If you have questions or need follow up information about today's clinic visit or your schedule please contact [facility]ION directly at [phone].  Normal or  non-critical lab and imaging results will be communicated to you by Retail Rockethart, letter or phone within 4 business days after the clinic has received the results. If you do not hear from us within 7 days, please contact the clinic through Kowlooniat or phone. If you have a critical or abnormal lab result, we will notify you by phone as soon as possible.  Submit refill requests through Senseonics or call your pharmacy and they will forward the refill request to us. Please allow 3 business days for your refill to be completed.          Additional Information About Your Visit        Senseonics Information     Senseonics gives you secure access to your electronic health record. If you see a primary care provider, you can also send messages to your care team and make appointments. If you have questions, please call your primary care clinic.  If you do not have a primary care provider, please call [phone] and they will assist you.        Care EveryWhere ID     This is your Care EveryWhere ID. This could be used by other organizations to access your [place] medical records  [id number]         Blood Pressure from Last 3 Encounters:   04/04/17 131/79   03/27/17 118/78   03/21/17 118/74    Weight from Last 3 Encounters:   04/04/17 91.9 kg (202 lb 8 oz)   03/21/17 90.3 kg (199 lb)   03/14/17 89.9 kg (198 lb 3.2 oz)              We Performed the Following     CBC with platelets differential     Comprehensive metabolic panel     Treatment Conditions          Today's Medication Changes          These changes are accurate as of: 4/4/17 12:31 PM.  If you have any questions, ask your nurse or doctor.               These medicines have changed or have updated prescriptions.        Dose/Directions    LORazepam 0.5 MG tablet   Commonly known as:  ATIVAN   This may have changed:    - how much to take  - how to take this  - when to take this  - reasons to take this   Used for:  Insomnia, unspecified type   Changed by:  Maribel Jin MD         Dose:  0.5 mg   Take 1 tablet (0.5 mg) by mouth nightly as needed for anxiety   Quantity:  30 tablet   Refills:  1            Where to get your medicines      Some of these will need a paper prescription and others can be bought over the counter.  Ask your nurse if you have questions.     Bring a paper prescription for each of these medications     LORazepam 0.5 MG tablet                Primary Care Provider Office Phone # Fax #    Yenifer Ridley [phone]       [facility] [address] [facility] [address]        Thank you!     Thank you for choosing [facility]  for your care. Our goal is always to provide you with excellent care. Hearing back from our patients is one way we can continue to improve our services. Please take a few minutes to complete the written survey that you may receive in the mail after your visit with us. Thank you!             Your Updated Medication List - Protect others around you: Learn how to safely use, store and throw away your medicines at www.disposemymeds.org.          This list is accurate as of: 4/4/17 12:31 PM.  Always use your most recent med list.                   Brand Name Dispense Instructions for use    ACIDOPHILUS PROBIOTIC 100 MG Caps          albuterol 108 (90 BASE) MCG/ACT Inhaler    PROAIR HFA/PROVENTIL HFA/VENTOLIN HFA    1 Inhaler    Inhale 2 puffs into the lungs every 6 hours as needed for shortness of breath / dyspnea       ALPRAZolam 0.5 MG tablet    XANAX     Reported on 2/14/2017       cyclobenzaprine 10 MG tablet    FLEXERIL    30 tablet    Take 1 tablet (10 mg) by mouth 3 times daily as needed for muscle spasms       dexamethasone 4 MG tablet    DECADRON     Take 4 mg by mouth       famotidine 20 MG tablet    PEPCID     Take 20 mg by mouth daily as needed       fluticasone 50 MCG/ACT spray    FLONASE    3 Bottle    Spray 1-2 sprays into both nostrils daily       hydrOXYzine 10 MG tablet    ATARAX    60 tablet     Take 1-5 tablets (10-50 mg) by mouth every 6 hours as needed for anxiety       lidocaine 2 % solution    XYLOCAINE     Reported on 2/28/2017       lidocaine visc 2% 2.5mL/5mL & maalox/mylanta w/ simeth 2.5mL/5mL & diphenhydrAMINE 5mg/5mL Susp suspension    MAGIC Mouthwash    240 mL    Swish and swallow 10 mLs in mouth every 6 hours as needed for mouth sores       lidocaine-prilocaine cream    EMLA    30 g    Apply topically as needed for other (place dime size amount over port site prior to tx and cover with tegaderm)       loratadine 10 MG tablet    CLARITIN    90 tablet    Take 1 tablet (10 mg) by mouth daily       LORazepam 0.5 MG tablet    ATIVAN    30 tablet    Take 1 tablet (0.5 mg) by mouth nightly as needed for anxiety       montelukast 10 MG tablet    SINGULAIR    90 tablet    Take 1 tablet (10 mg) by mouth At Bedtime       MULTI COMPLETE PO          NAPROXEN SODIUM PO      Take 220 mg by mouth Reported on 2/14/2017       ondansetron 4 MG ODT tab    ZOFRAN ODT    20 tablet    Take 1-2 tablets (4-8 mg) by mouth every 6 hours as needed for nausea       oxyCODONE-acetaminophen 5-325 MG per tablet    PERCOCET    20 tablet    Take 1 tablet by mouth every 4 hours as needed for pain       phenazopyridine 200 MG tablet    PYRIDIUM    30 tablet    Take 1 tablet (200 mg) by mouth 3 times daily as needed for irritation       scopolamine 72 hr patch    TRANSDERM    1 patch    Place 1 patch onto the skin every 72 hours.  Apply to hairless area behind one ear at least 4 hours before travel.  Remove old patch and change every 3 days.       VITAMIN B 12 PO

## 2017-04-04 NOTE — PROGRESS NOTES
" ONCOLOGY Follow up visit       COMPLAINT AND REASON FOR CONSULTATION:  12/2016 diagnosed right breast cancer on neoadjuvant DD AC   hx of TIFFANY due Celiac disease on IV iron prn     REFERRING/[facility] PHYSICIAN:  Dr. Yenifer Ridley       HERB Wilson is a 36-year-old premenopausal woman, self felt breast in her right armpit and right breast in 10/2016.  She observed it and due to persistency of the palpable lesions went on to have workup with diagnostic mammogram end of 12/2016, which identified 2 cm ill-defined density  associated with indeterminate microcalcification measuring about 3.2 cm, so the whole area spans about 4 cm in biggest dimension.   Ultrasound to the site which is the upper outer quadrant of the right breast identified 2 cm irregular solid mass; at the right axilla shows several abnormal lymph nodes, the largest one measures 1.8 cm in maximum dimension.  Ultrasound-guided biopsy of right breast was done 12/27/2016, identified grade 3 invasive ductal cancer, angiolymphatic invasion not identified.  ER/MD 99% positive, HER-2/kelle negative, associated with high-grade DCIS.    Right axillary ultrasound-guided biopsy indicating metastatic carcinoma positive for spread from breast primary, ER/MD both negative.  HER-2/kelle is negative.      She has clinical T1N1 disease. She made informed decision to proceed with neoadjuvant DDAC C1D1 1/16/2017. She has good clinical response post DD AC x4.   Then went on to have wkly taxol + carbo  with informed decision in March.     She is tested negative for BRCA1/2 with [facility].      PAST MEDICAL HISTORY:  Restless legs, mild intermittent asthma, celiac disease diagnosed 02/2016, hx of TIFFANY     SOCIAL HISTORY:  Works part-time.  Lives with her .  They have 2 kids, first pregnancy age 21.  She did not do breast feeding because of bilateral breast reduction.  She does office work.      FAMILY HISTORY:  \"Full of cancer\" from the mother's side including colon, " "lung, pancreatic, gastric cancer.  According to the patient none of them survive older than 60 years old.      REVIEW OF SYSTEMS  Archness, bladder irritation, and fatigue     PHYSICAL EXAMINATION:   Blood pressure 131/79, pulse 94, temperature 97.8  F (36.6  C), temperature source Tympanic, resp. rate 18, height 1.626 m (5' 4.02\"), weight 91.9 kg (202 lb 8 oz), SpO2 100 %, not currently breastfeeding.  GENERAL APPEARANCE:  He young woman, looks like her stated age, very upbeat, not in acute distress.   HEENT: The patient is normocephalic, atraumatic. Pupils are equally reactive to light.  Sclerae are anicteric.  Moist oral mucosa.  - pharynx.  No oral thrush.   NECK:  Supple.  No jugular venous distention.  Thyroid is not palpable.   LYMPH NODES:  Superficial lymphadenopathy is not appreciable in the bilateral cervical, supraclavicular, axillary or inguinal areas.   CARDIOVASCULAR:  S1, S2 regular with no murmurs or gallops.  No carotid or abdominal bruits.   PULMONARY:  Lungs are clear to auscultation and percussion bilaterally.  There is no wheezing or rhonchi.   GASTROINTESTINAL:  Abdomen is soft, nontender.  No hepatosplenomegaly.  No signs of ascites.  No mass appreciable.   MUSCULOSKELETAL/EXTREMITIES:  No edema.  No cyanotic changes.  No signs of joint deformity.  No lymphedema.   NEUROLOGIC:  Cranial nerves II-XII are grossly intact.  Sensation intact.  Muscle strength and muscle tone symmetrical, 5/5 throughout.   BACK:  No spinal or paraspinal tenderness.  No CVA tenderness.   SKIN:  No petechiae.  No rash.  No signs of cellulitis.   BREASTS:  Bilateral breast exam reviewed.  Glandular tissue on both breasts in general, no longer palpable upper outer quadrant of the right breast mass, no longer palpable right axillary lymphadenopathy.  She has ecchymosis after the biopsy.      LABORATORY DATA:   C5D1 cbc diff/cMP are fine, wbc dropped by half comparing to C4D1    baseline labs cbc diff/cmp/marker are nl. "      CURRENT IMAGING:   Breast MRI at [facility] post DD AC 3/2017:   1. Improvement in biopsy-proven RIGHT-sided breast cancer at 10 o'clock. The malignancy has decreased in size (2.4 cm versus 3.3 cm previously)and   demonstrates more favorable enhancement kinetics.     2.  No residual enlarged RIGHT axillary lymph nodes.    1/2017 baseline Echo LV function nl, EF 60-65%     PET 1/2017  1. Hypermetabolic mass in the right breast consistent with known malignancy.  2. Two enlarged hypermetabolic metastatic lymph nodes in the right axilla.  3. No additional worrisome hypermetabolic areas in the neck, chest, abdomen, or pelvis.    Old data reviewed with summary  End of 12/2016  right breast was done 12/27/2016, identified grade 3 invasive ductal cancer, angiolymphatic invasion not identified.  ER/WI 99% positive, HER-2/kelle negative, associated with high-grade DCIS.    Right axillary ultrasound-guided biopsy indicating metastatic carcinoma positive for spread from breast primary, ER/WI both negative.  HER-2/kelle is negative.      Diagnostic mammogram end of 12/2016, which identified 2 cm ill-defined density  associated with indeterminate microcalcification measuring about 3.2 cm, so the whole area spans about 4 cm in biggest dimension.   Ultrasound to the site which is the upper outer quadrant of the right breast identified 2 cm irregular solid mass; at the right axilla shows several abnormal lymph nodes, the largest one measures 1.8 cm in maximum dimension.      DEXA scan from 03/2016 identified mild osteopenia lumbar spine        ASSESSMENT AND PLAN:    1.   12/2016 newly dx breast cancer,  high-grade invasive ductal cancer on her right breast, ER/WI 99% positive, HER-2/kelle negative cancer with associated high-grade DCIS.  The lymph nodes biopsy also came back positive breast cancer but is triple negative disease. She is only 36 years old. She has cT2N1 disesae.      She has made an informed decision to proceed with  neoadjuvant dose-dense AC C1D1 1/16/2017, followed by taxane. She would still like to add platinum despite her genetic testing is negative. She is young and healthy and likely will be able to handle carbo associated extra BM suppression.      Due to severe fatigue and significant drop of her wbc post C1 Carbo AUC 5-6 and wkly taxol, will change to wkly carbo of AUC2 starting C2.     She needs to be monitored closely during this chemo.    She is also informed the need of adjuvant radiation based on her initial presentation in terms of lymph node involvement.        2. dysuria with taxol infusion, she has IV pepcid and benadryl, add oral Claritin.      3. insomina - advice ativan prn po qhs.       4. Severe fatigue with q 3 wks carbo dosing, change to wkly carbo starting C2.

## 2017-04-04 NOTE — PATIENT INSTRUCTIONS
We would like to see you back with cycle 3 day of of your next chemotherapy treatment.     When you are in need of a refill, please call your pharmacy and they will send us a request.  Copy of appointments, and after visit summary (AVS) given to patient.  If you have any questions please call Morena Henry RN, BSN, OCN Oncology Hematology  [facility] [phone]. For questions after business hours, or on holidays/weekends, please call our after hours Nurse Triage line [phone]. Thank you.

## 2017-04-04 NOTE — PROGRESS NOTES
Infusion Nursing Note:  Diana Wilson presents today for Taxol, Carbo.    Patient seen by provider today: Yes: Dr. Jin   present during visit today: Not Applicable.    Note: N/A.    Intravenous Access:  Implanted Port.    Treatment Conditions:  Results reviewed, labs MET treatment parameters, ok to proceed with treatment.      Post Infusion Assessment:  Patient tolerated infusion without incident.    Discharge Plan:   Patient discharged in stable condition accompanied by: friend.    Elliott Adam RN

## 2017-04-11 ENCOUNTER — INFUSION THERAPY VISIT (OUTPATIENT)
Dept: INFUSION THERAPY | Facility: CLINIC | Age: 37
End: 2017-04-11
Attending: INTERNAL MEDICINE
Payer: COMMERCIAL

## 2017-04-11 ENCOUNTER — HOSPITAL ENCOUNTER (OUTPATIENT)
Facility: CLINIC | Age: 37
Discharge: HOME | End: 2017-04-11
Attending: INTERNAL MEDICINE | Admitting: INTERNAL MEDICINE
Payer: COMMERCIAL

## 2017-04-11 VITALS
DIASTOLIC BLOOD PRESSURE: 73 MMHG | HEART RATE: 93 BPM | WEIGHT: 204 LBS | SYSTOLIC BLOOD PRESSURE: 114 MMHG | BODY MASS INDEX: 35 KG/M2

## 2017-04-11 DIAGNOSIS — C50.411 MALIGNANT NEOPLASM OF UPPER-OUTER QUADRANT OF RIGHT FEMALE BREAST (H): Primary | ICD-10-CM

## 2017-04-11 LAB
BASOPHILS # BLD AUTO: 0.1 10E9/L (ref 0–0.2)
BASOPHILS NFR BLD AUTO: 0.8 %
CREAT SERPL-MCNC: 0.54 MG/DL (ref 0.52–1.04)
DIFFERENTIAL METHOD BLD: ABNORMAL
EOSINOPHIL # BLD AUTO: 0.1 10E9/L (ref 0–0.7)
EOSINOPHIL NFR BLD AUTO: 1.9 %
ERYTHROCYTE [DISTWIDTH] IN BLOOD BY AUTOMATED COUNT: 17.3 % (ref 10–15)
GFR SERPL CREATININE-BSD FRML MDRD: NORMAL ML/MIN/1.7M2
HCT VFR BLD AUTO: 30.9 % (ref 35–47)
HGB BLD-MCNC: 10.3 G/DL (ref 11.7–15.7)
IMM GRANULOCYTES # BLD: 0.1 10E9/L (ref 0–0.4)
IMM GRANULOCYTES NFR BLD: 0.7 %
LYMPHOCYTES # BLD AUTO: 1.3 10E9/L (ref 0.8–5.3)
LYMPHOCYTES NFR BLD AUTO: 18.3 %
MCH RBC QN AUTO: 32.1 PG (ref 26.5–33)
MCHC RBC AUTO-ENTMCNC: 33.3 G/DL (ref 31.5–36.5)
MCV RBC AUTO: 96 FL (ref 78–100)
MONOCYTES # BLD AUTO: 0.5 10E9/L (ref 0–1.3)
MONOCYTES NFR BLD AUTO: 6.9 %
NEUTROPHILS # BLD AUTO: 5.1 10E9/L (ref 1.6–8.3)
NEUTROPHILS NFR BLD AUTO: 71.4 %
PLATELET # BLD AUTO: 251 10E9/L (ref 150–450)
RBC # BLD AUTO: 3.21 10E12/L (ref 3.8–5.2)
WBC # BLD AUTO: 7.2 10E9/L (ref 4–11)

## 2017-04-11 PROCEDURE — 96413 CHEMO IV INFUSION 1 HR: CPT

## 2017-04-11 PROCEDURE — 96375 TX/PRO/DX INJ NEW DRUG ADDON: CPT

## 2017-04-11 PROCEDURE — 82565 ASSAY OF CREATININE: CPT | Performed by: INTERNAL MEDICINE

## 2017-04-11 PROCEDURE — 25000125 ZZHC RX 250: Performed by: INTERNAL MEDICINE

## 2017-04-11 PROCEDURE — 85025 COMPLETE CBC W/AUTO DIFF WBC: CPT | Performed by: INTERNAL MEDICINE

## 2017-04-11 PROCEDURE — 25000128 H RX IP 250 OP 636

## 2017-04-11 PROCEDURE — 96417 CHEMO IV INFUS EACH ADDL SEQ: CPT

## 2017-04-11 PROCEDURE — 96367 TX/PROPH/DG ADDL SEQ IV INF: CPT

## 2017-04-11 PROCEDURE — 25000128 H RX IP 250 OP 636: Performed by: INTERNAL MEDICINE

## 2017-04-11 RX ORDER — ONDANSETRON 2 MG/ML
8 INJECTION INTRAMUSCULAR; INTRAVENOUS ONCE
Status: CANCELLED
Start: 2017-04-18 | End: 2017-04-18

## 2017-04-11 RX ORDER — HEPARIN SODIUM (PORCINE) LOCK FLUSH IV SOLN 100 UNIT/ML 100 UNIT/ML
5 SOLUTION INTRAVENOUS
Status: DISCONTINUED | OUTPATIENT
Start: 2017-04-11 | End: 2017-04-11 | Stop reason: HOSPADM

## 2017-04-11 RX ORDER — ONDANSETRON 2 MG/ML
INJECTION INTRAMUSCULAR; INTRAVENOUS
Status: COMPLETED
Start: 2017-04-11 | End: 2017-04-11

## 2017-04-11 RX ORDER — ONDANSETRON 2 MG/ML
8 INJECTION INTRAMUSCULAR; INTRAVENOUS ONCE
Status: COMPLETED | OUTPATIENT
Start: 2017-04-11 | End: 2017-04-11

## 2017-04-11 RX ORDER — HEPARIN SODIUM (PORCINE) LOCK FLUSH IV SOLN 100 UNIT/ML 100 UNIT/ML
5 SOLUTION INTRAVENOUS
Status: CANCELLED | OUTPATIENT
Start: 2017-04-18

## 2017-04-11 RX ADMIN — SODIUM CHLORIDE, PRESERVATIVE FREE 5 ML: 5 INJECTION INTRAVENOUS at 13:27

## 2017-04-11 RX ADMIN — DEXAMETHASONE SODIUM PHOSPHATE 20 MG: 10 INJECTION, SOLUTION INTRAMUSCULAR; INTRAVENOUS at 10:40

## 2017-04-11 RX ADMIN — ONDANSETRON 8 MG: 2 INJECTION INTRAMUSCULAR; INTRAVENOUS at 11:15

## 2017-04-11 RX ADMIN — CARBOPLATIN 300 MG: 10 INJECTION, SOLUTION INTRAVENOUS at 12:42

## 2017-04-11 RX ADMIN — PACLITAXEL 159 MG: 6 INJECTION, SOLUTION INTRAVENOUS at 11:36

## 2017-04-11 RX ADMIN — FAMOTIDINE 20 MG: 20 INJECTION, SOLUTION INTRAVENOUS at 11:14

## 2017-04-11 RX ADMIN — DIPHENHYDRAMINE HYDROCHLORIDE 50 MG: 50 INJECTION, SOLUTION INTRAMUSCULAR; INTRAVENOUS at 10:51

## 2017-04-11 NOTE — MR AVS SNAPSHOT
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)    [facility]  [address]  [facility] [address]   [phone]            Apr 25, 2017  8:30 AM CDT   Level 6 with ROOM 5 [facility] ([facility])    [facility]  [address]  [facility] [address]   [phone]            Apr 25, 2017  9:15 AM CDT   Return Visit with Maribel Jin MD   [facility] ([facility])    [facility]  [address]  [facility] [address]   [phone]            May 02, 2017  8:30 AM CDT   Level 2 with ROOM 6 [facility] ([facility])    [facility]  [address]  [facility] [address]   [phone]            May 09, 2017  8:30 AM CDT   Level 2 with ROOM 7 [facility] ([facility])    [facility]  [address]  [facility] [address]   [phone]              Who to contact     If you have questions or need follow up information about today's clinic visit or your schedule please contact [facility] directly at [phone].  Normal or non-critical lab and imaging results will be communicated to you by MyChart, letter or phone within 4 business days after the clinic has received the results. If you do not hear from us within 7 days, please contact the  clinic through Oncothyreon or phone. If you have a critical or abnormal lab result, we will notify you by phone as soon as possible.  Submit refill requests through Oncothyreon or call your pharmacy and they will forward the refill request to us. Please allow 3 business days for your refill to be completed.          Additional Information About Your Visit        Motallyhart Information     Oncothyreon gives you secure access to your electronic health record. If you see a primary care provider, you can also send messages to your care team and make appointments. If you have questions, please call your primary care clinic.  If you do not have a primary care provider, please call [phone] and they will assist you.        Care EveryWhere ID     This is your Care EveryWhere ID. This could be used by other organizations to access your [place] medical records  [id number]        Your Vitals Were     Pulse BMI (Body Mass Index)                93 35 kg/m2           Blood Pressure from Last 3 Encounters:   04/11/17 114/73   04/04/17 131/79   03/27/17 118/78    Weight from Last 3 Encounters:   04/11/17 92.5 kg (204 lb)   04/04/17 91.9 kg (202 lb 8 oz)   03/21/17 90.3 kg (199 lb)              We Performed the Following     CBC with platelets differential     Creatinine        Primary Care Provider Office Phone # Fax #    Yenifer Ridley [phone]       [facility] [address] [facility] [address]        Thank you!     Thank you for choosing [facility]  for your care. Our goal is always to provide you with excellent care. Hearing back from our patients is one way we can continue to improve our services. Please take a few minutes to complete the written survey that you may receive in the mail after your visit with us. Thank you!             Your Updated Medication List - Protect others around you: Learn how to safely use, store and throw away your medicines at www.disposemymeds.org.           This list is accurate as of: 4/11/17  3:56 PM.  Always use your most recent med list.                   Brand Name Dispense Instructions for use    ACIDOPHILUS PROBIOTIC 100 MG Caps          albuterol 108 (90 BASE) MCG/ACT Inhaler    PROAIR HFA/PROVENTIL HFA/VENTOLIN HFA    1 Inhaler    Inhale 2 puffs into the lungs every 6 hours as needed for shortness of breath / dyspnea       ALPRAZolam 0.5 MG tablet    XANAX     Reported on 2/14/2017       cyclobenzaprine 10 MG tablet    FLEXERIL    30 tablet    Take 1 tablet (10 mg) by mouth 3 times daily as needed for muscle spasms       dexamethasone 4 MG tablet    DECADRON     Take 4 mg by mouth       famotidine 20 MG tablet    PEPCID     Take 20 mg by mouth daily as needed       fluticasone 50 MCG/ACT spray    FLONASE    3 Bottle    Spray 1-2 sprays into both nostrils daily       hydrOXYzine 10 MG tablet    ATARAX    60 tablet    Take 1-5 tablets (10-50 mg) by mouth every 6 hours as needed for anxiety       lidocaine 2 % solution    XYLOCAINE     Reported on 2/28/2017       lidocaine visc 2% 2.5mL/5mL & maalox/mylanta w/ simeth 2.5mL/5mL & diphenhydrAMINE 5mg/5mL Susp suspension    MAGIC Mouthwash    240 mL    Swish and swallow 10 mLs in mouth every 6 hours as needed for mouth sores       lidocaine-prilocaine cream    EMLA    30 g    Apply topically as needed for other (place dime size amount over port site prior to tx and cover with tegaderm)       loratadine 10 MG tablet    CLARITIN    90 tablet    Take 1 tablet (10 mg) by mouth daily       LORazepam 0.5 MG tablet    ATIVAN    30 tablet    Take 1 tablet (0.5 mg) by mouth nightly as needed for anxiety       montelukast 10 MG tablet    SINGULAIR    90 tablet    Take 1 tablet (10 mg) by mouth At Bedtime       MULTI COMPLETE PO          NAPROXEN SODIUM PO      Take 220 mg by mouth Reported on 2/14/2017       ondansetron 4 MG ODT tab    ZOFRAN ODT    20 tablet    Take 1-2 tablets (4-8 mg) by mouth every 6 hours as needed for  nausea       oxyCODONE-acetaminophen 5-325 MG per tablet    PERCOCET    20 tablet    Take 1 tablet by mouth every 4 hours as needed for pain       phenazopyridine 200 MG tablet    PYRIDIUM    30 tablet    Take 1 tablet (200 mg) by mouth 3 times daily as needed for irritation       scopolamine 72 hr patch    TRANSDERM    1 patch    Place 1 patch onto the skin every 72 hours.  Apply to hairless area behind one ear at least 4 hours before travel.  Remove old patch and change every 3 days.       VITAMIN B 12 PO

## 2017-04-11 NOTE — PROGRESS NOTES
Infusion Nursing Note:  Diana Wilson presents today for Taxol and carbo cycle 2 day 8.    Patient seen by provider today: No   present during visit today: Not Applicable.    Note: N/A.    Intravenous Access:  Labs drawn without difficulty.  Implanted Port.    Treatment Conditions:  Lab Results   Component Value Date    HGB 10.3 04/11/2017     Lab Results   Component Value Date    WBC 7.2 04/11/2017      Lab Results   Component Value Date    ANEU 5.1 04/11/2017     Lab Results   Component Value Date     04/11/2017      Results reviewed, labs MET treatment parameters, ok to proceed with treatment.      Post Infusion Assessment:  Patient tolerated infusion without incident.  Blood return noted pre and post infusion.  No evidence of extravasations.    Discharge Plan:   Patient discharged in stable condition accompanied by: father.  Departure Mode: Ambulatory.  Pt will return on Tues 4/18 for next chemo.    Melissa Adan RN

## 2017-04-18 ENCOUNTER — INFUSION THERAPY VISIT (OUTPATIENT)
Dept: INFUSION THERAPY | Facility: CLINIC | Age: 37
End: 2017-04-18
Attending: INTERNAL MEDICINE
Payer: COMMERCIAL

## 2017-04-18 ENCOUNTER — HOSPITAL ENCOUNTER (OUTPATIENT)
Facility: CLINIC | Age: 37
Discharge: HOME | End: 2017-04-18
Attending: INTERNAL MEDICINE | Admitting: INTERNAL MEDICINE
Payer: COMMERCIAL

## 2017-04-18 VITALS
SYSTOLIC BLOOD PRESSURE: 112 MMHG | BODY MASS INDEX: 35.34 KG/M2 | WEIGHT: 206 LBS | DIASTOLIC BLOOD PRESSURE: 72 MMHG | TEMPERATURE: 97.2 F | HEART RATE: 76 BPM

## 2017-04-18 DIAGNOSIS — C50.411 MALIGNANT NEOPLASM OF UPPER-OUTER QUADRANT OF RIGHT FEMALE BREAST (H): Primary | ICD-10-CM

## 2017-04-18 LAB
BASOPHILS # BLD AUTO: 0.1 10E9/L (ref 0–0.2)
BASOPHILS NFR BLD AUTO: 1 %
CREAT SERPL-MCNC: 0.66 MG/DL (ref 0.52–1.04)
DIFFERENTIAL METHOD BLD: ABNORMAL
EOSINOPHIL # BLD AUTO: 0.1 10E9/L (ref 0–0.7)
EOSINOPHIL NFR BLD AUTO: 1.4 %
ERYTHROCYTE [DISTWIDTH] IN BLOOD BY AUTOMATED COUNT: 17.5 % (ref 10–15)
GFR SERPL CREATININE-BSD FRML MDRD: NORMAL ML/MIN/1.7M2
HCT VFR BLD AUTO: 30.8 % (ref 35–47)
HGB BLD-MCNC: 10.1 G/DL (ref 11.7–15.7)
IMM GRANULOCYTES # BLD: 0 10E9/L (ref 0–0.4)
IMM GRANULOCYTES NFR BLD: 0.4 %
LYMPHOCYTES # BLD AUTO: 1.7 10E9/L (ref 0.8–5.3)
LYMPHOCYTES NFR BLD AUTO: 33.5 %
MCH RBC QN AUTO: 32.3 PG (ref 26.5–33)
MCHC RBC AUTO-ENTMCNC: 32.8 G/DL (ref 31.5–36.5)
MCV RBC AUTO: 98 FL (ref 78–100)
MONOCYTES # BLD AUTO: 0.4 10E9/L (ref 0–1.3)
MONOCYTES NFR BLD AUTO: 7.2 %
NEUTROPHILS # BLD AUTO: 2.8 10E9/L (ref 1.6–8.3)
NEUTROPHILS NFR BLD AUTO: 56.5 %
PLATELET # BLD AUTO: 333 10E9/L (ref 150–450)
RBC # BLD AUTO: 3.13 10E12/L (ref 3.8–5.2)
WBC # BLD AUTO: 5 10E9/L (ref 4–11)

## 2017-04-18 PROCEDURE — 96417 CHEMO IV INFUS EACH ADDL SEQ: CPT

## 2017-04-18 PROCEDURE — 25000128 H RX IP 250 OP 636: Performed by: INTERNAL MEDICINE

## 2017-04-18 PROCEDURE — 25000125 ZZHC RX 250: Performed by: INTERNAL MEDICINE

## 2017-04-18 PROCEDURE — 96375 TX/PRO/DX INJ NEW DRUG ADDON: CPT

## 2017-04-18 PROCEDURE — 96413 CHEMO IV INFUSION 1 HR: CPT

## 2017-04-18 PROCEDURE — 82565 ASSAY OF CREATININE: CPT | Performed by: INTERNAL MEDICINE

## 2017-04-18 PROCEDURE — 85025 COMPLETE CBC W/AUTO DIFF WBC: CPT | Performed by: INTERNAL MEDICINE

## 2017-04-18 RX ORDER — HEPARIN SODIUM (PORCINE) LOCK FLUSH IV SOLN 100 UNIT/ML 100 UNIT/ML
5 SOLUTION INTRAVENOUS
Status: DISCONTINUED | OUTPATIENT
Start: 2017-04-18 | End: 2017-04-18 | Stop reason: HOSPADM

## 2017-04-18 RX ADMIN — SODIUM CHLORIDE, PRESERVATIVE FREE 5 ML: 5 INJECTION INTRAVENOUS at 11:25

## 2017-04-18 RX ADMIN — DEXAMETHASONE SODIUM PHOSPHATE: 10 INJECTION, SOLUTION INTRAMUSCULAR; INTRAVENOUS at 09:08

## 2017-04-18 RX ADMIN — DIPHENHYDRAMINE HYDROCHLORIDE 50 MG: 50 INJECTION, SOLUTION INTRAMUSCULAR; INTRAVENOUS at 09:18

## 2017-04-18 RX ADMIN — PACLITAXEL 159 MG: 6 INJECTION, SOLUTION INTRAVENOUS at 09:40

## 2017-04-18 RX ADMIN — CARBOPLATIN 300 MG: 10 INJECTION, SOLUTION INTRAVENOUS at 10:50

## 2017-04-18 RX ADMIN — SODIUM CHLORIDE 250 ML: 9 INJECTION, SOLUTION INTRAVENOUS at 09:08

## 2017-04-18 RX ADMIN — FAMOTIDINE 20 MG: 20 INJECTION, SOLUTION INTRAVENOUS at 09:32

## 2017-04-18 NOTE — MR AVS SNAPSHOT
After Visit Summary   4/18/2017    Diana Wilson    MRN: [id number]           Patient Information     Date Of Birth          1980        Visit Information        Provider Department      4/18/2017 8:00 AM ROOM 2 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 ([facility])    [facility]  [address]  [facility] [address]   [phone]            Apr 25, 2017  9:15 AM CDT   Return Visit with Maribel Jin MD   [facility] ([facility])    [facility]  [address]  [facility] [address]   [phone]            May 02, 2017  8:30 AM CDT   Level 2 with ROOM 6 [facility] ([facility])    [facility]  [address]  [facility] [address]   [phone]            May 09, 2017  8:30 AM CDT   Level 2 with ROOM 7 [facility] ([facility])    [facility]  [address]  [facility] [address]   [phone]              Who to contact     If you have questions or need follow up information about today's clinic visit or your schedule please contact [facility] directly at [phone].  Normal or non-critical lab and imaging results will be communicated to you by MyChart, letter or phone within 4 business days after the clinic has received the results. If you do not hear from us within 7 days, please contact the clinic through [email or web address] or phone. If you have a critical or abnormal lab result, we will notify you by phone as soon as possible.  Submit refill requests through Paladion or call your pharmacy and they will forward the  refill request to us. Please allow 3 business days for your refill to be completed.          Additional Information About Your Visit        Excel Business Intelligencehart Information     Exergyn gives you secure access to your electronic health record. If you see a primary care provider, you can also send messages to your care team and make appointments. If you have questions, please call your primary care clinic.  If you do not have a primary care provider, please call [phone] and they will assist you.        Care EveryWhere ID     This is your Care EveryWhere ID. This could be used by other organizations to access your [facility] medical records  [id number]        Your Vitals Were     Pulse Temperature BMI (Body Mass Index)             76 97.2  F (36.2  C) (Oral) 35.34 kg/m2          Blood Pressure from Last 3 Encounters:   04/18/17 112/72   04/11/17 114/73   04/04/17 131/79    Weight from Last 3 Encounters:   04/18/17 93.4 kg (206 lb)   04/11/17 92.5 kg (204 lb)   04/04/17 91.9 kg (202 lb 8 oz)              We Performed the Following     CBC with platelets differential     Creatinine        Primary Care Provider Office Phone # Fax #    Yenifer Ridley [phone]       [facility] [address] [facility] [address]        Thank you!     Thank you for choosing [facility]  for your care. Our goal is always to provide you with excellent care. Hearing back from our patients is one way we can continue to improve our services. Please take a few minutes to complete the written survey that you may receive in the mail after your visit with us. Thank you!             Your Updated Medication List - Protect others around you: Learn how to safely use, store and throw away your medicines at www.disposemymeds.org.          This list is accurate as of: 4/18/17 11:40 AM.  Always use your most recent med list.                   Brand Name Dispense Instructions for use    ACIDOPHILUS PROBIOTIC 100 MG Caps           albuterol 108 (90 BASE) MCG/ACT Inhaler    PROAIR HFA/PROVENTIL HFA/VENTOLIN HFA    1 Inhaler    Inhale 2 puffs into the lungs every 6 hours as needed for shortness of breath / dyspnea       ALPRAZolam 0.5 MG tablet    XANAX     Reported on 2/14/2017       cyclobenzaprine 10 MG tablet    FLEXERIL    30 tablet    Take 1 tablet (10 mg) by mouth 3 times daily as needed for muscle spasms       dexamethasone 4 MG tablet    DECADRON     Take 4 mg by mouth       famotidine 20 MG tablet    PEPCID     Take 20 mg by mouth daily as needed       fluticasone 50 MCG/ACT spray    FLONASE    3 Bottle    Spray 1-2 sprays into both nostrils daily       hydrOXYzine 10 MG tablet    ATARAX    60 tablet    Take 1-5 tablets (10-50 mg) by mouth every 6 hours as needed for anxiety       lidocaine 2 % solution    XYLOCAINE     Reported on 2/28/2017       lidocaine visc 2% 2.5mL/5mL & maalox/mylanta w/ simeth 2.5mL/5mL & diphenhydrAMINE 5mg/5mL Susp suspension    [facility]    240 mL    Swish and swallow 10 mLs in mouth every 6 hours as needed for mouth sores       lidocaine-prilocaine cream    EMLA    30 g    Apply topically as needed for other (place dime size amount over port site prior to tx and cover with tegaderm)       loratadine 10 MG tablet    CLARITIN    90 tablet    Take 1 tablet (10 mg) by mouth daily       LORazepam 0.5 MG tablet    ATIVAN    30 tablet    Take 1 tablet (0.5 mg) by mouth nightly as needed for anxiety       montelukast 10 MG tablet    SINGULAIR    90 tablet    Take 1 tablet (10 mg) by mouth At Bedtime       MULTI COMPLETE PO          NAPROXEN SODIUM PO      Take 220 mg by mouth Reported on 2/14/2017       ondansetron 4 MG ODT tab    ZOFRAN ODT    20 tablet    Take 1-2 tablets (4-8 mg) by mouth every 6 hours as needed for nausea       oxyCODONE-acetaminophen 5-325 MG per tablet    PERCOCET    20 tablet    Take 1 tablet by mouth every 4 hours as needed for pain       phenazopyridine 200 MG  tablet    PYRIDIUM    30 tablet    Take 1 tablet (200 mg) by mouth 3 times daily as needed for irritation       scopolamine 72 hr patch    TRANSDERM    1 patch    Place 1 patch onto the skin every 72 hours.  Apply to hairless area behind one ear at least 4 hours before travel.  Remove old patch and change every 3 days.       VITAMIN B 12 PO

## 2017-04-18 NOTE — PROGRESS NOTES
Infusion Nursing Note:  Diana Wilson presents today for Taxol carbo.    Patient seen by provider today: No   present during visit today: Not Applicable.    Note: N/A.    Intravenous Access:  Labs drawn without difficulty.  Implanted Port.    Treatment Conditions:  Lab Results   Component Value Date    HGB 10.1 04/18/2017     Lab Results   Component Value Date    WBC 5.0 04/18/2017      Lab Results   Component Value Date    ANEU 2.8 04/18/2017     Lab Results   Component Value Date     04/18/2017      Lab Results   Component Value Date     04/04/2017                   Lab Results   Component Value Date    POTASSIUM 3.5 04/04/2017           Lab Results   Component Value Date    MAG 2.3 03/02/2016            Lab Results   Component Value Date    CR 0.66 04/18/2017                   Lab Results   Component Value Date    DENY 8.7 04/04/2017                Lab Results   Component Value Date    BILITOTAL 0.5 04/04/2017           Lab Results   Component Value Date    ALBUMIN 3.4 04/04/2017                    Lab Results   Component Value Date    ALT 57 04/04/2017           Lab Results   Component Value Date    AST 19 04/04/2017     Results reviewed, labs MET treatment parameters, ok to proceed with treatment.      Post Infusion Assessment:  Patient tolerated infusion without incident.  Blood return noted pre and post infusion.  Access discontinued per protocol.    Discharge Plan:   Patient discharged in stable condition accompanied by: friend.  Next chemo scheduled for 4/25 along with MD visit.  Melissa Adan RN

## 2017-04-25 ENCOUNTER — HOSPITAL ENCOUNTER (OUTPATIENT)
Facility: CLINIC | Age: 37
Discharge: HOME | End: 2017-04-25
Attending: INTERNAL MEDICINE | Admitting: INTERNAL MEDICINE
Payer: COMMERCIAL

## 2017-04-25 ENCOUNTER — ONCOLOGY VISIT (OUTPATIENT)
Dept: ONCOLOGY | Facility: CLINIC | Age: 37
End: 2017-04-25
Attending: INTERNAL MEDICINE
Payer: COMMERCIAL

## 2017-04-25 ENCOUNTER — INFUSION THERAPY VISIT (OUTPATIENT)
Dept: INFUSION THERAPY | Facility: CLINIC | Age: 37
End: 2017-04-25
Attending: INTERNAL MEDICINE
Payer: COMMERCIAL

## 2017-04-25 VITALS
OXYGEN SATURATION: 98 % | TEMPERATURE: 97.4 F | WEIGHT: 207.7 LBS | BODY MASS INDEX: 35.46 KG/M2 | RESPIRATION RATE: 18 BRPM | HEART RATE: 102 BPM | DIASTOLIC BLOOD PRESSURE: 75 MMHG | SYSTOLIC BLOOD PRESSURE: 133 MMHG | HEIGHT: 64 IN

## 2017-04-25 VITALS — SYSTOLIC BLOOD PRESSURE: 119 MMHG | DIASTOLIC BLOOD PRESSURE: 65 MMHG | HEART RATE: 85 BPM

## 2017-04-25 DIAGNOSIS — R30.9 PAIN WITH URINATION: ICD-10-CM

## 2017-04-25 DIAGNOSIS — G62.9 NEUROPATHY: ICD-10-CM

## 2017-04-25 DIAGNOSIS — C50.411 MALIGNANT NEOPLASM OF UPPER-OUTER QUADRANT OF RIGHT FEMALE BREAST (H): Primary | ICD-10-CM

## 2017-04-25 DIAGNOSIS — K12.30 MUCOSITIS: ICD-10-CM

## 2017-04-25 DIAGNOSIS — R45.7 EMOTIONAL STRESS: ICD-10-CM

## 2017-04-25 DIAGNOSIS — G47.00 INSOMNIA, UNSPECIFIED TYPE: ICD-10-CM

## 2017-04-25 DIAGNOSIS — R11.0 NAUSEA: ICD-10-CM

## 2017-04-25 LAB
ALBUMIN SERPL-MCNC: 3.5 G/DL (ref 3.4–5)
ALP SERPL-CCNC: 72 U/L (ref 40–150)
ALT SERPL W P-5'-P-CCNC: 49 U/L (ref 0–50)
ANION GAP SERPL CALCULATED.3IONS-SCNC: 11 MMOL/L (ref 3–14)
AST SERPL W P-5'-P-CCNC: 21 U/L (ref 0–45)
BASOPHILS # BLD AUTO: 0.1 10E9/L (ref 0–0.2)
BASOPHILS NFR BLD AUTO: 0.9 %
BILIRUB SERPL-MCNC: 0.5 MG/DL (ref 0.2–1.3)
BUN SERPL-MCNC: 8 MG/DL (ref 7–30)
CALCIUM SERPL-MCNC: 8.7 MG/DL (ref 8.5–10.1)
CHLORIDE SERPL-SCNC: 106 MMOL/L (ref 94–109)
CO2 SERPL-SCNC: 26 MMOL/L (ref 20–32)
CREAT SERPL-MCNC: 0.68 MG/DL (ref 0.52–1.04)
DIFFERENTIAL METHOD BLD: ABNORMAL
EOSINOPHIL # BLD AUTO: 0.1 10E9/L (ref 0–0.7)
EOSINOPHIL NFR BLD AUTO: 1.3 %
ERYTHROCYTE [DISTWIDTH] IN BLOOD BY AUTOMATED COUNT: 17.4 % (ref 10–15)
GFR SERPL CREATININE-BSD FRML MDRD: ABNORMAL ML/MIN/1.7M2
GLUCOSE SERPL-MCNC: 147 MG/DL (ref 70–99)
HCT VFR BLD AUTO: 32.3 % (ref 35–47)
HGB BLD-MCNC: 10.6 G/DL (ref 11.7–15.7)
IMM GRANULOCYTES # BLD: 0 10E9/L (ref 0–0.4)
IMM GRANULOCYTES NFR BLD: 0.7 %
LYMPHOCYTES # BLD AUTO: 1.9 10E9/L (ref 0.8–5.3)
LYMPHOCYTES NFR BLD AUTO: 34 %
MCH RBC QN AUTO: 32.7 PG (ref 26.5–33)
MCHC RBC AUTO-ENTMCNC: 32.8 G/DL (ref 31.5–36.5)
MCV RBC AUTO: 100 FL (ref 78–100)
MONOCYTES # BLD AUTO: 0.6 10E9/L (ref 0–1.3)
MONOCYTES NFR BLD AUTO: 10.8 %
NEUTROPHILS # BLD AUTO: 2.9 10E9/L (ref 1.6–8.3)
NEUTROPHILS NFR BLD AUTO: 52.3 %
PLATELET # BLD AUTO: 275 10E9/L (ref 150–450)
POTASSIUM SERPL-SCNC: 3.7 MMOL/L (ref 3.4–5.3)
PROT SERPL-MCNC: 6.6 G/DL (ref 6.8–8.8)
RBC # BLD AUTO: 3.24 10E12/L (ref 3.8–5.2)
SODIUM SERPL-SCNC: 143 MMOL/L (ref 133–144)
WBC # BLD AUTO: 5.6 10E9/L (ref 4–11)

## 2017-04-25 PROCEDURE — 96413 CHEMO IV INFUSION 1 HR: CPT

## 2017-04-25 PROCEDURE — 99211 OFF/OP EST MAY X REQ PHY/QHP: CPT | Mod: 25

## 2017-04-25 PROCEDURE — 96549 UNLISTED CHEMOTHERAPY PX: CPT

## 2017-04-25 PROCEDURE — 96375 TX/PRO/DX INJ NEW DRUG ADDON: CPT

## 2017-04-25 PROCEDURE — 25000128 H RX IP 250 OP 636: Performed by: INTERNAL MEDICINE

## 2017-04-25 PROCEDURE — 25000125 ZZHC RX 250: Performed by: INTERNAL MEDICINE

## 2017-04-25 PROCEDURE — 99215 OFFICE O/P EST HI 40 MIN: CPT | Performed by: INTERNAL MEDICINE

## 2017-04-25 PROCEDURE — 80053 COMPREHEN METABOLIC PANEL: CPT | Performed by: INTERNAL MEDICINE

## 2017-04-25 PROCEDURE — 85025 COMPLETE CBC W/AUTO DIFF WBC: CPT | Performed by: INTERNAL MEDICINE

## 2017-04-25 RX ORDER — SODIUM CHLORIDE 9 MG/ML
1000 INJECTION, SOLUTION INTRAVENOUS CONTINUOUS PRN
Status: CANCELLED
Start: 2017-05-09

## 2017-04-25 RX ORDER — METHYLPREDNISOLONE SODIUM SUCCINATE 125 MG/2ML
125 INJECTION, POWDER, LYOPHILIZED, FOR SOLUTION INTRAMUSCULAR; INTRAVENOUS
Status: CANCELLED
Start: 2017-05-02

## 2017-04-25 RX ORDER — HEPARIN SODIUM (PORCINE) LOCK FLUSH IV SOLN 100 UNIT/ML 100 UNIT/ML
5 SOLUTION INTRAVENOUS
Status: CANCELLED | OUTPATIENT
Start: 2017-05-02

## 2017-04-25 RX ORDER — METHYLPREDNISOLONE SODIUM SUCCINATE 125 MG/2ML
125 INJECTION, POWDER, LYOPHILIZED, FOR SOLUTION INTRAMUSCULAR; INTRAVENOUS
Status: CANCELLED
Start: 2017-04-25

## 2017-04-25 RX ORDER — LORAZEPAM 2 MG/ML
0.5 INJECTION INTRAMUSCULAR EVERY 4 HOURS PRN
Status: CANCELLED
Start: 2017-05-09

## 2017-04-25 RX ORDER — PROCHLORPERAZINE MALEATE 10 MG
TABLET ORAL
COMMUNITY
Start: 2017-01-11 | End: 2017-04-25

## 2017-04-25 RX ORDER — HEPARIN SODIUM (PORCINE) LOCK FLUSH IV SOLN 100 UNIT/ML 100 UNIT/ML
5 SOLUTION INTRAVENOUS
Status: DISCONTINUED | OUTPATIENT
Start: 2017-04-25 | End: 2017-04-25 | Stop reason: HOSPADM

## 2017-04-25 RX ORDER — DULOXETIN HYDROCHLORIDE 60 MG/1
60 CAPSULE, DELAYED RELEASE ORAL DAILY
Qty: 30 CAPSULE | Refills: 1 | Status: SHIPPED | OUTPATIENT
Start: 2017-04-25 | End: 2017-05-09 | Stop reason: ALTCHOICE

## 2017-04-25 RX ORDER — LORAZEPAM 2 MG/ML
0.5 INJECTION INTRAMUSCULAR EVERY 4 HOURS PRN
Status: CANCELLED
Start: 2017-05-02

## 2017-04-25 RX ORDER — ALBUTEROL SULFATE 90 UG/1
1-2 AEROSOL, METERED RESPIRATORY (INHALATION)
Status: CANCELLED
Start: 2017-05-09

## 2017-04-25 RX ORDER — PROCHLORPERAZINE MALEATE 10 MG
10 TABLET ORAL EVERY 6 HOURS PRN
Qty: 90 TABLET | Refills: 1 | Status: SHIPPED | OUTPATIENT
Start: 2017-04-25 | End: 2017-06-30

## 2017-04-25 RX ORDER — DIPHENHYDRAMINE HCL 50 MG
50 CAPSULE ORAL ONCE
Status: CANCELLED
Start: 2017-05-02

## 2017-04-25 RX ORDER — DEXAMETHASONE 0.5 MG/5ML
1 SOLUTION ORAL DAILY
Qty: 240 ML | Refills: 1 | Status: SHIPPED | OUTPATIENT
Start: 2017-04-25 | End: 2017-06-30

## 2017-04-25 RX ORDER — DIPHENHYDRAMINE HYDROCHLORIDE 50 MG/ML
50 INJECTION INTRAMUSCULAR; INTRAVENOUS
Status: CANCELLED
Start: 2017-05-02

## 2017-04-25 RX ORDER — ALBUTEROL SULFATE 0.83 MG/ML
2.5 SOLUTION RESPIRATORY (INHALATION)
Status: CANCELLED | OUTPATIENT
Start: 2017-05-09

## 2017-04-25 RX ORDER — SODIUM CHLORIDE 9 MG/ML
1000 INJECTION, SOLUTION INTRAVENOUS CONTINUOUS PRN
Status: CANCELLED
Start: 2017-04-25

## 2017-04-25 RX ORDER — DIPHENHYDRAMINE HCL 50 MG
50 CAPSULE ORAL ONCE
Status: CANCELLED
Start: 2017-05-09

## 2017-04-25 RX ORDER — ALBUTEROL SULFATE 90 UG/1
1-2 AEROSOL, METERED RESPIRATORY (INHALATION)
Status: CANCELLED
Start: 2017-04-25

## 2017-04-25 RX ORDER — EPINEPHRINE 0.3 MG/.3ML
0.3 INJECTION SUBCUTANEOUS EVERY 5 MIN PRN
Status: CANCELLED | OUTPATIENT
Start: 2017-04-25

## 2017-04-25 RX ORDER — HEPARIN SODIUM (PORCINE) LOCK FLUSH IV SOLN 100 UNIT/ML 100 UNIT/ML
5 SOLUTION INTRAVENOUS
Status: CANCELLED | OUTPATIENT
Start: 2017-05-09

## 2017-04-25 RX ORDER — EPINEPHRINE 0.3 MG/.3ML
0.3 INJECTION SUBCUTANEOUS EVERY 5 MIN PRN
Status: CANCELLED | OUTPATIENT
Start: 2017-05-09

## 2017-04-25 RX ORDER — ALBUTEROL SULFATE 0.83 MG/ML
2.5 SOLUTION RESPIRATORY (INHALATION)
Status: CANCELLED | OUTPATIENT
Start: 2017-04-25

## 2017-04-25 RX ORDER — EPINEPHRINE 0.3 MG/.3ML
0.3 INJECTION SUBCUTANEOUS EVERY 5 MIN PRN
Status: CANCELLED | OUTPATIENT
Start: 2017-05-02

## 2017-04-25 RX ORDER — DIPHENHYDRAMINE HYDROCHLORIDE 50 MG/ML
50 INJECTION INTRAMUSCULAR; INTRAVENOUS
Status: CANCELLED
Start: 2017-05-09

## 2017-04-25 RX ORDER — MEPERIDINE HYDROCHLORIDE 25 MG/ML
25 INJECTION INTRAMUSCULAR; INTRAVENOUS; SUBCUTANEOUS EVERY 30 MIN PRN
Status: CANCELLED | OUTPATIENT
Start: 2017-05-09

## 2017-04-25 RX ORDER — HEPARIN SODIUM (PORCINE) LOCK FLUSH IV SOLN 100 UNIT/ML 100 UNIT/ML
5 SOLUTION INTRAVENOUS
Status: CANCELLED | OUTPATIENT
Start: 2017-04-25

## 2017-04-25 RX ORDER — MEPERIDINE HYDROCHLORIDE 25 MG/ML
25 INJECTION INTRAMUSCULAR; INTRAVENOUS; SUBCUTANEOUS EVERY 30 MIN PRN
Status: CANCELLED | OUTPATIENT
Start: 2017-04-25

## 2017-04-25 RX ORDER — DIPHENHYDRAMINE HYDROCHLORIDE 50 MG/ML
50 INJECTION INTRAMUSCULAR; INTRAVENOUS
Status: CANCELLED
Start: 2017-04-25

## 2017-04-25 RX ORDER — METHYLPREDNISOLONE SODIUM SUCCINATE 125 MG/2ML
125 INJECTION, POWDER, LYOPHILIZED, FOR SOLUTION INTRAMUSCULAR; INTRAVENOUS
Status: CANCELLED
Start: 2017-05-09

## 2017-04-25 RX ORDER — LORAZEPAM 2 MG/ML
0.5 INJECTION INTRAMUSCULAR EVERY 4 HOURS PRN
Status: CANCELLED
Start: 2017-04-25

## 2017-04-25 RX ORDER — ALBUTEROL SULFATE 0.83 MG/ML
2.5 SOLUTION RESPIRATORY (INHALATION)
Status: CANCELLED | OUTPATIENT
Start: 2017-05-02

## 2017-04-25 RX ORDER — MEPERIDINE HYDROCHLORIDE 25 MG/ML
25 INJECTION INTRAMUSCULAR; INTRAVENOUS; SUBCUTANEOUS EVERY 30 MIN PRN
Status: CANCELLED | OUTPATIENT
Start: 2017-05-02

## 2017-04-25 RX ORDER — ALBUTEROL SULFATE 90 UG/1
1-2 AEROSOL, METERED RESPIRATORY (INHALATION)
Status: CANCELLED
Start: 2017-05-02

## 2017-04-25 RX ORDER — SODIUM CHLORIDE 9 MG/ML
1000 INJECTION, SOLUTION INTRAVENOUS CONTINUOUS PRN
Status: CANCELLED
Start: 2017-05-02

## 2017-04-25 RX ADMIN — CARBOPLATIN 300 MG: 10 INJECTION, SOLUTION INTRAVENOUS at 11:42

## 2017-04-25 RX ADMIN — FAMOTIDINE 20 MG: 20 INJECTION, SOLUTION INTRAVENOUS at 09:48

## 2017-04-25 RX ADMIN — DEXAMETHASONE SODIUM PHOSPHATE: 10 INJECTION, SOLUTION INTRAMUSCULAR; INTRAVENOUS at 10:01

## 2017-04-25 RX ADMIN — DIPHENHYDRAMINE HYDROCHLORIDE 50 MG: 50 INJECTION, SOLUTION INTRAMUSCULAR; INTRAVENOUS at 10:15

## 2017-04-25 RX ADMIN — SODIUM CHLORIDE 250 ML: 9 INJECTION, SOLUTION INTRAVENOUS at 09:44

## 2017-04-25 RX ADMIN — PACLITAXEL 159 MG: 6 INJECTION, SOLUTION INTRAVENOUS at 10:33

## 2017-04-25 RX ADMIN — SODIUM CHLORIDE, PRESERVATIVE FREE 5 ML: 5 INJECTION INTRAVENOUS at 12:25

## 2017-04-25 ASSESSMENT — PAIN SCALES - GENERAL: PAINLEVEL: MODERATE PAIN (5)

## 2017-04-25 NOTE — NURSING NOTE
"Diana Wilson is a 36 year old female who presents for:  Chief Complaint   Patient presents with     Oncology Clinic Visit     3 week recheck Breast CA, labs & Chemo today        Initial Vitals:  /75 (BP Location: Right arm, Patient Position: Chair, Cuff Size: Adult Large)  Pulse 102  Temp 97.4  F (36.3  C) (Tympanic)  Resp 18  Ht 1.626 m (5' 4.02\")  Wt 94.2 kg (207 lb 11.2 oz)  SpO2 98%  Breastfeeding? No  BMI 35.63 kg/m2 Estimated body mass index is 35.63 kg/(m^2) as calculated from the following:    Height as of this encounter: 1.626 m (5' 4.02\").    Weight as of this encounter: 94.2 kg (207 lb 11.2 oz).. Body surface area is 2.06 meters squared. BP completed using cuff size: large  Moderate Pain (5) No LMP recorded. Allergies and medications reviewed.     Medications: MEDICATION REFILLS NEEDED TODAY.  Pharmacy name entered into [facility]: [facility] [place] - [id number] [address]    Comments: 3 week recheck Breast CA, labs & Chemo today. Patient reports pin inside mouth & Neuropathy in hands & feet.     7  minutes for nursing intake (face to face time)   Sondra Medina CMA        "

## 2017-04-25 NOTE — MR AVS SNAPSHOT
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 Jin recommends Cycle 3 day 1 of chemotherapy today and would like to see you back with cycle 4 day 1.  When you are in need of a refill, please call your pharmacy and they will send us a request.  Copy of appointments, and after visit summary (AVS) given to patient.  If you have any questions please call Morena Henry RN, BSN, OCN Oncology Hematology  [facility] [phone]. For questions after business hours, or on holidays/weekends, please call our after hours Nurse Triage line [phone]. Thank you.           Follow-ups after your visit        Your next 10 appointments already scheduled     May 02, 2017  8:30 AM CDT   Level 2 with ROOM 6 [facility] ([facility])    [facility]  [address]  [place] [address]   [phone]            May 09, 2017  8:30 AM CDT   Level 2 with ROOM 7 [facility] ([facility])    [facility]  [address]  [place] [address]   [phone]            May 16, 2017  8:00 AM CDT   Level 3 with ROOM 6 [facility] ([facility])    [facility]  [address]  [place] [address]   [phone]            May 16, 2017  8:30 AM CDT   Return Visit with Maribel Jin MD   [facility] ([facility])    [facility]  [address]  [facility] [address]   [phone]            May 23, 2017  8:30 AM CDT   Level 3 with ROOM 9 [facility] ([facility])    [facility]  [address]  [facility] [address]   [phone]            May 30, 2017  8:30 AM CDT   Level 3 with ROOM 7 [facility] ([facility])    [facility]  [address]  [facility] [address]   [phone]              Who to contact     If you have questions or need follow up information about today's clinic visit or your schedule please contact [facility] directly at [phone].  Normal or non-critical lab and imaging results will be communicated to you by Bloom Studiohart, letter or phone within 4 business days after the clinic has received the results. If you do not hear from us within 7 days, please contact the clinic through Bloom Studiohart or phone. If you have a critical or abnormal lab result, we will notify you by phone as soon as possible.  Submit refill requests through Poolami or call your pharmacy and they will forward the refill request to us. Please allow 3 business days for your refill to be completed.          Additional Information About Your Visit        Bloom StudioharPlex Systems Information     Poolami gives you secure access to your electronic health record. If you see a primary care provider, you can also send messages to your care team and make appointments. If you have questions, please call your primary care clinic.  If you do not have a primary care provider, please call [phone] and they will assist you.        Care EveryWhere ID     This is your Care EveryWhere ID. This could be used by other organizations to access your [facility] medical records  [id number]        Your Vitals Were     Pulse Temperature Respirations Height Pulse Oximetry Breastfeeding?    102 97.4  F (36.3  C) (Tympanic) 18  "1.626 m (5' 4.02\") 98% No    BMI (Body Mass Index)                   35.63 kg/m2            Blood Pressure from Last 3 Encounters:   04/25/17 133/75   04/18/17 112/72   04/11/17 114/73    Weight from Last 3 Encounters:   04/25/17 94.2 kg (207 lb 11.2 oz)   04/18/17 93.4 kg (206 lb)   04/11/17 92.5 kg (204 lb)              Today, you had the following     No orders found for display         Today's Medication Changes          These changes are accurate as of: 4/25/17 10:43 AM.  If you have any questions, ask your nurse or doctor.               Start taking these medicines.        Dose/Directions    DULoxetine 60 MG EC capsule   Commonly known as:  CYMBALTA   Used for:  Neuropathy (H)   Started by:  Maribel Jin MD        Dose:  60 mg   Take 1 capsule (60 mg) by mouth daily   Quantity:  30 capsule   Refills:  1         These medicines have changed or have updated prescriptions.        Dose/Directions    * dexamethasone 4 MG tablet   Commonly known as:  DECADRON   This may have changed:  Another medication with the same name was added. Make sure you understand how and when to take each.   Changed by:  Maribel Jin MD        Dose:  4 mg   Take 4 mg by mouth   Refills:  0       * dexamethasone 0.1 MG/ML solution   This may have changed:  You were already taking a medication with the same name, and this prescription was added. Make sure you understand how and when to take each.   Used for:  Mucositis   Changed by:  Maribel Jin MD        Dose:  1 mg   Take 10 mLs (1 mg) by mouth daily   Quantity:  240 mL   Refills:  1       prochlorperazine 10 MG tablet   Commonly known as:  COMPAZINE   This may have changed:    - how much to take  - how to take this  - when to take this  - reasons to take this   Used for:  Nausea   Changed by:  Maribel Jin MD        Dose:  10 mg   Take 1 tablet (10 mg) by mouth every 6 hours as needed for nausea or vomiting   Quantity:  90 tablet   Refills:  1       * Notice:  This list has 2 " medication(s) that are the same as other medications prescribed for you. Read the directions carefully, and ask your doctor or other care provider to review them with you.         Where to get your medicines      These medications were sent to [facility] [place], [place] - [address]  [address] [place] [facility] [address]     Phone:  [phone]     dexamethasone 0.1 MG/ML solution    DULoxetine 60 MG EC capsule    prochlorperazine 10 MG tablet                Primary Care Provider Office Phone # Fax #    Yenifer Ridley [phone]       [facility] [address] [facility] [address]        Thank you!     Thank you for choosing [facility]  for your care. Our goal is always to provide you with excellent care. Hearing back from our patients is one way we can continue to improve our services. Please take a few minutes to complete the written survey that you may receive in the mail after your visit with us. Thank you!             Your Updated Medication List - Protect others around you: Learn how to safely use, store and throw away your medicines at www.disposemymeds.org.          This list is accurate as of: 4/25/17 10:43 AM.  Always use your most recent med list.                   Brand Name Dispense Instructions for use    ACIDOPHILUS PROBIOTIC 100 MG Caps          albuterol 108 (90 BASE) MCG/ACT Inhaler    PROAIR HFA/PROVENTIL HFA/VENTOLIN HFA    1 Inhaler    Inhale 2 puffs into the lungs every 6 hours as needed for shortness of breath / dyspnea       ALPRAZolam 0.5 MG tablet    XANAX     Reported on 2/14/2017       cyclobenzaprine 10 MG tablet    FLEXERIL    30 tablet    Take 1 tablet (10 mg) by mouth 3 times daily as needed for muscle spasms       * dexamethasone 4 MG tablet    DECADRON     Take 4 mg by mouth       * dexamethasone 0.1 MG/ML solution     240 mL    Take 10 mLs (1 mg) by mouth daily       DULoxetine 60 MG EC capsule    CYMBALTA    30  capsule    Take 1 capsule (60 mg) by mouth daily       famotidine 20 MG tablet    PEPCID     Take 20 mg by mouth daily as needed       fluticasone 50 MCG/ACT spray    FLONASE    3 Bottle    Spray 1-2 sprays into both nostrils daily       hydrOXYzine 10 MG tablet    ATARAX    60 tablet    Take 1-5 tablets (10-50 mg) by mouth every 6 hours as needed for anxiety       lidocaine 2 % solution    XYLOCAINE     Reported on 2/28/2017       lidocaine visc 2% 2.5mL/5mL & maalox/mylanta w/ simeth 2.5mL/5mL & diphenhydrAMINE 5mg/5mL Susp suspension    [facility]    240 mL    Swish and swallow 10 mLs in mouth every 6 hours as needed for mouth sores       lidocaine-prilocaine cream    EMLA    30 g    Apply topically as needed for other (place dime size amount over port site prior to tx and cover with tegaderm)       loratadine 10 MG tablet    CLARITIN    90 tablet    Take 1 tablet (10 mg) by mouth daily       LORazepam 0.5 MG tablet    ATIVAN    30 tablet    Take 1 tablet (0.5 mg) by mouth nightly as needed for anxiety       montelukast 10 MG tablet    SINGULAIR    90 tablet    Take 1 tablet (10 mg) by mouth At Bedtime       MULTI COMPLETE PO          NAPROXEN SODIUM PO      Take 220 mg by mouth Reported on 2/14/2017       ondansetron 4 MG ODT tab    ZOFRAN ODT    20 tablet    Take 1-2 tablets (4-8 mg) by mouth every 6 hours as needed for nausea       oxyCODONE-acetaminophen 5-325 MG per tablet    PERCOCET    20 tablet    Take 1 tablet by mouth every 4 hours as needed for pain       phenazopyridine 200 MG tablet    PYRIDIUM    30 tablet    Take 1 tablet (200 mg) by mouth 3 times daily as needed for irritation       prochlorperazine 10 MG tablet    COMPAZINE    90 tablet    Take 1 tablet (10 mg) by mouth every 6 hours as needed for nausea or vomiting       scopolamine 72 hr patch    TRANSDERM    1 patch    Place 1 patch onto the skin every 72 hours.  Apply to hairless area behind one ear at least 4 hours before  travel.  Remove old patch and change every 3 days.       VITAMIN B 12 PO          * Notice:  This list has 2 medication(s) that are the same as other medications prescribed for you. Read the directions carefully, and ask your doctor or other care provider to review them with you.

## 2017-04-25 NOTE — MR AVS SNAPSHOT
After Visit Summary   4/25/2017    Diana Wilson    MRN: [id number]           Patient Information     Date Of Birth          1980        Visit Information        Provider Department      4/25/2017 8:30 AM ROOM 5 [facility]        Today's Diagnoses     Malignant neoplasm of upper-outer quadrant of right female breast (H)    -  1       Follow-ups after your visit        Your next 10 appointments already scheduled     May 02, 2017  8:30 AM CDT   Level 2 with ROOM 6 [facility] ([facility])    [facility]  [address]  [place] [address]   [phone]            May 09, 2017  8:30 AM CDT   Level 2 with ROOM 7 [facility] ([facility])    [facility]  [address]  [place] [address]   [phone]            May 16, 2017  8:00 AM CDT   Level 3 with ROOM 6 [facility] ([facility])    [facility]  [address]  [facility] [address]   [phone]            May 16, 2017  8:30 AM CDT   Return Visit with Maribel Jin MD   [facility] ([facility])    [facility]  [address]  [facility] [address]   [phone]            May 23, 2017  8:30 AM CDT   Level 3 with ROOM 9 [facility] ([facility])    [facility]  [address]  [facility] [address]   [phone]            May 30, 2017  8:30 AM CDT   Level 3 with ROOM 7 [facility] ([facility])    [facility]  [address]  [facility] [address]   [phone]              Who to contact     If you have questions or need follow up information about today's clinic visit or your schedule please contact [facility] directly at [phone].  Normal or  non-critical lab and imaging results will be communicated to you by MyChart, letter or phone within 4 business days after the clinic has received the results. If you do not hear from us within 7 days, please contact the clinic through [employer] or phone. If you have a critical or abnormal lab result, we will notify you by phone as soon as possible.  Submit refill requests through [employer] or call your pharmacy and they will forward the refill request to us. Please allow 3 business days for your refill to be completed.          Additional Information About Your Visit        [employer] Information     [employer] gives you secure access to your electronic health record. If you see a primary care provider, you can also send messages to your care team and make appointments. If you have questions, please call your primary care clinic.  If you do not have a primary care provider, please call [phone] and they will assist you.        Care EveryWhere ID     This is your Care EveryWhere ID. This could be used by other organizations to access your [place] medical records  [id number]        Your Vitals Were     Pulse                   85            Blood Pressure from Last 3 Encounters:   04/25/17 133/75   04/25/17 119/65   04/18/17 112/72    Weight from Last 3 Encounters:   04/25/17 94.2 kg (207 lb 11.2 oz)   04/18/17 93.4 kg (206 lb)   04/11/17 92.5 kg (204 lb)              We Performed the Following     CBC with platelets differential     Comprehensive metabolic panel     Treatment Conditions          Today's Medication Changes          These changes are accurate as of: 4/25/17  1:16 PM.  If you have any questions, ask your nurse or doctor.               Start taking these medicines.        Dose/Directions    DULoxetine 60 MG EC capsule   Commonly known as:  CYMBALTA   Used for:  Neuropathy (H)   Started by:  Maribel Jin MD        Dose:  60 mg   Take 1 capsule (60 mg) by mouth daily   Quantity:  30 capsule   Refills:  1          These medicines have changed or have updated prescriptions.        Dose/Directions    * dexamethasone 4 MG tablet   Commonly known as:  DECADRON   This may have changed:  Another medication with the same name was added. Make sure you understand how and when to take each.   Changed by:  Maribel Jin MD        Dose:  4 mg   Take 4 mg by mouth   Refills:  0       * dexamethasone 0.1 MG/ML solution   This may have changed:  You were already taking a medication with the same name, and this prescription was added. Make sure you understand how and when to take each.   Used for:  Mucositis   Changed by:  Maribel Jin MD        Dose:  1 mg   Take 10 mLs (1 mg) by mouth daily   Quantity:  240 mL   Refills:  1       prochlorperazine 10 MG tablet   Commonly known as:  COMPAZINE   This may have changed:    - how much to take  - how to take this  - when to take this  - reasons to take this   Used for:  Nausea   Changed by:  Maribel Jin MD        Dose:  10 mg   Take 1 tablet (10 mg) by mouth every 6 hours as needed for nausea or vomiting   Quantity:  90 tablet   Refills:  1       * Notice:  This list has 2 medication(s) that are the same as other medications prescribed for you. Read the directions carefully, and ask your doctor or other care provider to review them with you.         Where to get your medicines      [facility] [address] [facility]  [address] [facility] [address]     Phone:  [phone]     dexamethasone 0.1 MG/ML solution    DULoxetine 60 MG EC capsule    prochlorperazine 10 MG tablet                Primary Care Provider Office Phone # Fax #    Yenifer Ridley [phone]       [facility] [address] [facility] [address]        Thank you!     Thank you for choosing [facility]  for your care. Our goal is always to provide you with excellent care. Hearing back from our patients is one way we can  continue to improve our services. Please take a few minutes to complete the written survey that you may receive in the mail after your visit with us. Thank you!             Your Updated Medication List - Protect others around you: Learn how to safely use, store and throw away your medicines at www.disposemymeds.org.          This list is accurate as of: 4/25/17  1:16 PM.  Always use your most recent med list.                   Brand Name Dispense Instructions for use    ACIDOPHILUS PROBIOTIC 100 MG Caps          albuterol 108 (90 BASE) MCG/ACT Inhaler    PROAIR HFA/PROVENTIL HFA/VENTOLIN HFA    1 Inhaler    Inhale 2 puffs into the lungs every 6 hours as needed for shortness of breath / dyspnea       ALPRAZolam 0.5 MG tablet    XANAX     Reported on 2/14/2017       cyclobenzaprine 10 MG tablet    FLEXERIL    30 tablet    Take 1 tablet (10 mg) by mouth 3 times daily as needed for muscle spasms       * dexamethasone 4 MG tablet    DECADRON     Take 4 mg by mouth       * dexamethasone 0.1 MG/ML solution     240 mL    Take 10 mLs (1 mg) by mouth daily       DULoxetine 60 MG EC capsule    CYMBALTA    30 capsule    Take 1 capsule (60 mg) by mouth daily       famotidine 20 MG tablet    PEPCID     Take 20 mg by mouth daily as needed       fluticasone 50 MCG/ACT spray    FLONASE    3 Bottle    Spray 1-2 sprays into both nostrils daily       hydrOXYzine 10 MG tablet    ATARAX    60 tablet    Take 1-5 tablets (10-50 mg) by mouth every 6 hours as needed for anxiety       lidocaine 2 % solution    XYLOCAINE     Reported on 2/28/2017       lidocaine visc 2% 2.5mL/5mL & maalox/mylanta w/ simeth 2.5mL/5mL & diphenhydrAMINE 5mg/5mL Susp suspension    [facility]    240 mL    Swish and swallow 10 mLs in mouth every 6 hours as needed for mouth sores       lidocaine-prilocaine cream    EMLA    30 g    Apply topically as needed for other (place dime size amount over port site prior to tx and cover with tegaderm)        loratadine 10 MG tablet    CLARITIN    90 tablet    Take 1 tablet (10 mg) by mouth daily       LORazepam 0.5 MG tablet    ATIVAN    30 tablet    Take 1 tablet (0.5 mg) by mouth nightly as needed for anxiety       montelukast 10 MG tablet    SINGULAIR    90 tablet    Take 1 tablet (10 mg) by mouth At Bedtime       MULTI COMPLETE PO          NAPROXEN SODIUM PO      Take 220 mg by mouth Reported on 2/14/2017       ondansetron 4 MG ODT tab    ZOFRAN ODT    20 tablet    Take 1-2 tablets (4-8 mg) by mouth every 6 hours as needed for nausea       oxyCODONE-acetaminophen 5-325 MG per tablet    PERCOCET    20 tablet    Take 1 tablet by mouth every 4 hours as needed for pain       phenazopyridine 200 MG tablet    PYRIDIUM    30 tablet    Take 1 tablet (200 mg) by mouth 3 times daily as needed for irritation       prochlorperazine 10 MG tablet    COMPAZINE    90 tablet    Take 1 tablet (10 mg) by mouth every 6 hours as needed for nausea or vomiting       scopolamine 72 hr patch    TRANSDERM    1 patch    Place 1 patch onto the skin every 72 hours.  Apply to hairless area behind one ear at least 4 hours before travel.  Remove old patch and change every 3 days.       VITAMIN B 12 PO          * Notice:  This list has 2 medication(s) that are the same as other medications prescribed for you. Read the directions carefully, and ask your doctor or other care provider to review them with you.

## 2017-04-25 NOTE — PROGRESS NOTES
" ONCOLOGY Follow up visit       COMPLAINT AND REASON FOR CONSULTATION:  12/2016 diagnosed right breast cancer on neoadjuvant DD AC   hx of TIFFANY due Celiac disease on IV iron prn     REFERRING/[facility] PHYSICIAN:  Dr. Yenifer Ridley       HERB Wilson is a 36-year-old premenopausal woman, self felt breast in her right armpit and right breast in 10/2016.  She observed it and due to persistency of the palpable lesions went on to have workup with diagnostic mammogram end of 12/2016, which identified 2 cm ill-defined density  associated with indeterminate microcalcification measuring about 3.2 cm, so the whole area spans about 4 cm in biggest dimension.   Ultrasound to the site which is the upper outer quadrant of the right breast identified 2 cm irregular solid mass; at the right axilla shows several abnormal lymph nodes, the largest one measures 1.8 cm in maximum dimension.  Ultrasound-guided biopsy of right breast was done 12/27/2016, identified grade 3 invasive ductal cancer, angiolymphatic invasion not identified.  ER/VT 99% positive, HER-2/kelle negative, associated with high-grade DCIS.    Right axillary ultrasound-guided biopsy indicating metastatic carcinoma positive for spread from breast primary, ER/VT both negative.  HER-2/kelle is negative.      She has clinical T1N1 disease. She made informed decision to proceed with neoadjuvant DDAC C1D1 1/16/2017. She has good clinical response post DD AC x4.   Then went on to have wkly taxol + carbo  with informed decision in March.     She is tested negative for BRCA1/2 with [facility].      PAST MEDICAL HISTORY:  Restless legs, mild intermittent asthma, celiac disease diagnosed 02/2016, hx of TIFFANY     SOCIAL HISTORY:  Works part-time.  Lives with her .  They have 2 kids, first pregnancy age 22.  She did not do breast feeding because of bilateral breast reduction.  She does office work.      FAMILY HISTORY:  \"Full of cancer\" from the mother's side including colon, " "lung, pancreatic, gastric cancer.  According to the patient none of them survive older than 60 years old.      REVIEW OF SYSTEMS  Archness, bladder irritation, and fatigue.  + nausea needs compazine around the clock and use zofran prn.   Reports pin inside mouth & Neuropathy in hands & feet starts.   She claims under lots of stress and breaks out into tears.      PHYSICAL EXAMINATION:   Blood pressure 133/75, pulse 102, temperature 97.4  F (36.3  C), temperature source Tympanic, resp. rate 18, height 1.626 m (5' 4.02\"), weight 94.2 kg (207 lb 11.2 oz), SpO2 98 %, not currently breastfeeding.  GENERAL APPEARANCE:  He young woman, looks like her stated age, very upbeat, not in acute distress.   HEENT: The patient is normocephalic, atraumatic. Pupils are equally reactive to light.  Sclerae are anicteric. erythematous oral mucosa.  - pharynx.  No oral thrush.   NECK:  Supple.  No jugular venous distention.  Thyroid is not palpable.   LYMPH NODES:  Superficial lymphadenopathy is not appreciable in the bilateral cervical, supraclavicular, axillary or inguinal areas.   CARDIOVASCULAR:  S1, S2 regular with no murmurs or gallops.  No carotid or abdominal bruits.   PULMONARY:  Lungs are clear to auscultation and percussion bilaterally.  There is no wheezing or rhonchi.   GASTROINTESTINAL:  Abdomen is soft, nontender.  No hepatosplenomegaly.  No signs of ascites.  No mass appreciable.   MUSCULOSKELETAL/EXTREMITIES:  No edema.  No cyanotic changes.  No signs of joint deformity.  No lymphedema.   NEUROLOGIC:  Cranial nerves II-XII are grossly intact.  Sensation intact.  Muscle strength and muscle tone symmetrical, 5/5 throughout.   BACK:  No spinal or paraspinal tenderness.  No CVA tenderness.   SKIN:  No petechiae.  No rash.  No signs of cellulitis.   BREASTS:  Bilateral breast exam reviewed.  Glandular tissue on both breasts in general, no longer palpable upper outer quadrant of the right breast mass, no longer palpable right " axillary lymphadenopathy. Right breast feels really soft.      LABORATORY DATA:   baseline labs cbc diff/cmp/marker are nl.  Wbc maintained from C2.      CURRENT IMAGING:   [facility]:   1. Improvement in biopsy-proven RIGHT-sided breast cancer at 10 o'clock. The malignancy has decreased in size (2.4 cm versus 3.3 cm previously)and   demonstrates more favorable enhancement kinetics.     2.  No residual enlarged RIGHT axillary lymph nodes.    1/2017 baseline Echo LV function nl, EF 60-65%     PET 1/2017  1. Hypermetabolic mass in the right breast consistent with known malignancy.  2. Two enlarged hypermetabolic metastatic lymph nodes in the right axilla.  3. No additional worrisome hypermetabolic areas in the neck, chest, abdomen, or pelvis.    Old data reviewed with summary  End of 12/2016  right breast was done 12/27/2016, identified grade 3 invasive ductal cancer, angiolymphatic invasion not identified.  ER/WI 99% positive, HER-2/kelle negative, associated with high-grade DCIS.    Right axillary ultrasound-guided biopsy indicating metastatic carcinoma positive for spread from breast primary, ER/WI both negative.  HER-2/kelle is negative.      Diagnostic mammogram end of 12/2016, which identified 2 cm ill-defined density  associated with indeterminate microcalcification measuring about 3.2 cm, so the whole area spans about 4 cm in biggest dimension.   Ultrasound to the site which is the upper outer quadrant of the right breast identified 2 cm irregular solid mass; at the right axilla shows several abnormal lymph nodes, the largest one measures 1.8 cm in maximum dimension.      DEXA scan from 03/2016 identified mild osteopenia lumbar spine        ASSESSMENT AND PLAN:    1.   12/2016 newly dx breast cancer,  high-grade invasive ductal cancer on her right breast, ER/WI 99% positive, HER-2/kelle negative cancer with associated high-grade DCIS.  The lymph nodes biopsy also came back positive breast  cancer but is triple negative disease. She is only 36 years old. She has cT2N1 disesae.      She has made an informed decision to proceed with neoadjuvant dose-dense AC C1D1 1/16/2017, followed by taxane. She would still like to add platinum despite her genetic testing is negative. She is young and healthy and likely will be able to handle carbo associated extra BM suppression.      Due to severe fatigue and significant drop of her wbc post C1 Carbo AUC 5-6 and wkly taxol, will change to wkly carbo of AUC2 starting C2.     She needs to be monitored closely during this chemo.    She is also informed the need of adjuvant radiation based on her initial presentation in terms of lymph node involvement.      She has surgery scheduled at [facility] on 7/12/2017.    2. dysuria with taxol infusion, she has IV pepcid and benadryl, add oral Claritin. This has helped. Yet she needs to take the claritin everyday.      3. 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 She is advised on cymbalta.      4. Mucositis. Magic mouth wash is too strong for her. Will try10 ml of ETOH free dex 0.1mg/1 ml, swish in mouth for 2 min then spit, 4 x per day.     5. Emotional stress. psycho support is given. She is informed on the psycology support we have here.

## 2017-04-25 NOTE — PROGRESS NOTES
Infusion Nursing Note:  Diana Wilson presents today for C3D1 Taxol/Carboplatin.    Patient seen by provider today: Yes: Dr. Jin   present during visit today: Not Applicable.    Note: N/A.    Intravenous Access:  Implanted Port.    Treatment Conditions:  Lab Results   Component Value Date    HGB 10.6 04/25/2017     Lab Results   Component Value Date    WBC 5.6 04/25/2017      Lab Results   Component Value Date    ANEU 2.9 04/25/2017     Lab Results   Component Value Date     04/25/2017      Lab Results   Component Value Date     04/25/2017                   Lab Results   Component Value Date    POTASSIUM 3.7 04/25/2017           Lab Results   Component Value Date    MAG 2.3 03/02/2016            Lab Results   Component Value Date    CR 0.68 04/25/2017                   Lab Results   Component Value Date    DENY 8.7 04/25/2017                Lab Results   Component Value Date    BILITOTAL 0.5 04/25/2017           Lab Results   Component Value Date    ALBUMIN 3.5 04/25/2017                    Lab Results   Component Value Date    ALT 49 04/25/2017           Lab Results   Component Value Date    AST 21 04/25/2017     Results reviewed, labs MET treatment parameters, ok to proceed with treatment.      Post Infusion Assessment:  Patient tolerated infusion without incident.  Blood return noted pre and post infusion.  Site patent and intact, free from redness, edema or discomfort.  No evidence of extravasations.  Access discontinued per protocol.    Discharge Plan:   Patient discharged in stable condition accompanied by: sister.  Departure Mode: Ambulatory.    Rhonda Ovalles RN

## 2017-04-25 NOTE — PATIENT INSTRUCTIONS
Dr. Jin recommends Cycle 3 day 1 of chemotherapy today and would like to see you back with cycle 4 day 1.  When you are in need of a refill, please call your pharmacy and they will send us a request.  Copy of appointments, and after visit summary (AVS) given to patient.  If you have any questions please call Morena Henry RN, BSN, OCN Oncology Hematology  [facility] [phone]. For questions after business hours, or on holidays/weekends, please call our after hours Nurse Triage line [phone]. Thank you.

## 2017-04-27 ENCOUNTER — TELEPHONE (OUTPATIENT)
Dept: ONCOLOGY | Facility: CLINIC | Age: 37
End: 2017-04-27

## 2017-04-27 LAB — LAB SCANNED RESULT: NORMAL

## 2017-04-27 NOTE — LETTER
"    Cancer Risk Management  Program [facility]  [facility]  Mailing Address  Cancer Risk Management Program  [facility]  [address]  [place] [address]    New patient appointments  [phone]  May 5, 2017    Diana Wilson  [address]  [facility] [address]      Dear Diana,    It was a pleasure speaking with you over the phone recently regarding your genetic testing results. Here is a copy of the progress note summarizing our conversation. If you have any additional questions, please feel free to call.    4/27/2017    Referring Provider: Maribel Jin MD    Presenting Information:  I spoke to Diana by phone today to discuss her additional genetic testing results. Her blood was originally drawn on 1/13/2017. HNPCC/Bhat Syndrome Analyses was ordered from [facility]. This testing was done because of Diana's personal history of breast cancer and family history of pancreatic and colon cancer.    Of note, Diana initially pursued genetic testing using the BreastNext gene panel on 1/13/2017; please see the clinic note from 3/1/2017 for more information regarding the negative/normal BreastNext gene panel result. At that time, records were requested regarding her mother's cancer diagnosis which revealed an additional maternal family history of colon. As such, Diana opted to pursue additional genetic testing for Bhat syndrome.     Genetic Testing Result: NEGATIVE  Diana is negative for mutations in the MSH2, MLH1, MSH6, PMS2, and EPCAM genes. No mutations were found in these five genes by sequencing and deletions/duplications analyses (EPCAM by deletions/duplication only).      A copy of the test report can be found in the Laboratory tab, dated 1/13/2017, and named \"SEND OUTS MISC TEST\". The report is " scanned in as a linked document.    Interpretation:  We discussed several different interpretations of this negative test result.    1. One explanation may be that there is a different gene or combination of genes and environment that are associated with the cancers in Diana and/or her relatives. We again reviewed that additional genetic testing may be available in the future that does identify a genetic/hereditary explanation for Diana's personal and family history of cancer. As such, Diana is still encouraged to contact me annually to review any new genetic testing options that may be appropriate for her.  2. It is again possible that another relative, such as her mother and/or maternal relatives with colon cancer, did have a mutation in one of these five Bhat syndrome genes and Diana did not inherit it. If this is the case, Diana would not be expected to have the same increased cancer risks that those relatives carried. It may also mean that her breast cancer is a sporadic cancer caused by random cellular changes.  3. There is also a small possibility that there is a mutation in one of these genes, and we could not find it with our current testing methods.       Screening:  Based on this negative test result, it is important for Diana and her relatives to refer back to the family history for appropriate cancer screening.      Diana and her relatives are still encouraged to follow all cancer screening recommendations as discussed during our results appointment on 3/1/2017; please see that clinic note for more information.      Inheritance:  We reviewed the autosomal dominant inheritance of mutations in these five genes. We discussed that Diana did not pass on an identifiable mutation in these genes to her children based on this test result.  Mutations in these gene do not skip generations.      Additional Testing:  Although Diana's genetic testing result was negative, other relatives may still carry a gene  mutation associated with pancreatic and colon cancer risk that is unrelated to Diana's personal history of breast cancer. As such, Diana's sister could consider meeting with a genetic counselor to review the family history and genetic testing options.    Summary:  We still do not have an explanation for Diana's breast cancer and family history of pancreatic and colon cancer. Because of that, it is important that she continue with cancer screening based on her personal and family history as discussed above.    Genetic testing is rapidly advancing, and new cancer susceptibility genes will most likely be identified in the future. Therefore, I encouraged Diana to contact me annually or if there are changes in her personal or family history.  This may change how we assess her cancer risk, screening, and the testing we would offer.    Plan:  1. Diana will be mailed a copy of these new test results.  2. She plans to continue following up with her medical providers, including Dr. Jin.  3. She should contact me annually, or sooner if her family history changes.    If Diana has any further questions, I encouraged her to contact me at [phone].    Claudia Willis MS, [facility]  Certified Genetic Counselor  Office: [phone]  Pager: [phone]

## 2017-04-27 NOTE — TELEPHONE ENCOUNTER
"4/27/2017    Referring Provider: Maribel Jin MD    Presenting Information:  I spoke to Diana by phone today to discuss her additional genetic testing results. Her blood was originally drawn on 1/13/2017. HNPCC/Bhat Syndrome Analyses was ordered from [facility]. This testing was done because of Diana's personal history of breast cancer and family history of pancreatic and colon cancer.    Of note, Diana initially pursued genetic testing using the BreastNext gene panel on 1/13/2017; please see the clinic note from 3/1/2017 for more information regarding the negative/normal BreastNext gene panel result. At that time, records were requested regarding her mother's cancer diagnosis which revealed an additional maternal family history of colon. As such, Diana opted to pursue additional genetic testing for Bhat syndrome.     Genetic Testing Result: NEGATIVE  Diana is negative for mutations in the MSH2, MLH1, MSH6, PMS2, and EPCAM genes. No mutations were found in these five genes by sequencing and deletions/duplications analyses (EPCAM by deletions/duplication only).      A copy of the test report can be found in the Laboratory tab, dated 1/13/2017, and named \"[facility]\". The report is scanned in as a linked document.    Interpretation:  We discussed several different interpretations of this negative test result.    1. One explanation may be that there is a different gene or combination of genes and environment that are associated with the cancers in Diana and/or her relatives. We again reviewed that additional genetic testing may be available in the future that does identify a genetic/hereditary explanation for Diana's personal and family history of cancer. As such, Diana is still encouraged to contact me annually to review any new genetic testing options that may be appropriate for her.  2. It is again possible that another relative, such as her mother and/or maternal relatives with colon cancer, did have " a mutation in one of these five Bhat syndrome genes and Diana did not inherit it. If this is the case, Diana would not be expected to have the same increased cancer risks that those relatives carried. It may also mean that her breast cancer is a sporadic cancer caused by random cellular changes.  3. There is also a small possibility that there is a mutation in one of these genes, and we could not find it with our current testing methods.       Screening:  Based on this negative test result, it is important for Diana and her relatives to refer back to the family history for appropriate cancer screening.      Diana and her relatives are still encouraged to follow all cancer screening recommendations as discussed during our results appointment on 3/1/2017; please see that clinic note for more information.      Inheritance:  We reviewed the autosomal dominant inheritance of mutations in these five genes. We discussed that Diana did not pass on an identifiable mutation in these genes to her children based on this test result.  Mutations in these gene do not skip generations.      Additional Testing:  Although Diana's genetic testing result was negative, other relatives may still carry a gene mutation associated with pancreatic and colon cancer risk that is unrelated to Diana's personal history of breast cancer. As such, Diana's sister could consider meeting with a genetic counselor to review the family history and genetic testing options.    Summary:  We still do not have an explanation for Diana's breast cancer and family history of pancreatic and colon cancer. Because of that, it is important that she continue with cancer screening based on her personal and family history as discussed above.    Genetic testing is rapidly advancing, and new cancer susceptibility genes will most likely be identified in the future. Therefore, I encouraged Diana to contact me annually or if there are changes in her personal or family  history.  This may change how we assess her cancer risk, screening, and the testing we would offer.    Plan:  1. Diana will be mailed a copy of these new test results.  2. She plans to continue following up with her medical providers, including Dr. Jin.  3. She should contact me annually, or sooner if her family history changes.    If Diana has any further questions, I encouraged her to contact me at [phone].    Claudia Willis MS, [facility]  Certified Genetic Counselor  Office: [phone]  Pager: [phone]

## 2017-04-27 NOTE — Clinical Note
"Please print and send a copy of this letter and the patient's genetic testing report to the patient.    Please enclose test report: \"Send outs misc test [[id number]] (Order [id number]) \"  "

## 2017-05-02 ENCOUNTER — HOSPITAL ENCOUNTER (OUTPATIENT)
Facility: CLINIC | Age: 37
Discharge: HOME | End: 2017-05-02
Attending: INTERNAL MEDICINE | Admitting: INTERNAL MEDICINE
Payer: COMMERCIAL

## 2017-05-02 ENCOUNTER — INFUSION THERAPY VISIT (OUTPATIENT)
Dept: INFUSION THERAPY | Facility: CLINIC | Age: 37
End: 2017-05-02
Attending: INTERNAL MEDICINE
Payer: COMMERCIAL

## 2017-05-02 VITALS
DIASTOLIC BLOOD PRESSURE: 84 MMHG | RESPIRATION RATE: 16 BRPM | HEART RATE: 88 BPM | BODY MASS INDEX: 34.59 KG/M2 | WEIGHT: 201.6 LBS | SYSTOLIC BLOOD PRESSURE: 134 MMHG | TEMPERATURE: 97.2 F

## 2017-05-02 DIAGNOSIS — C50.411 MALIGNANT NEOPLASM OF UPPER-OUTER QUADRANT OF RIGHT FEMALE BREAST (H): Primary | ICD-10-CM

## 2017-05-02 DIAGNOSIS — G62.9 NEUROPATHY: ICD-10-CM

## 2017-05-02 LAB
BASOPHILS # BLD AUTO: 0 10E9/L (ref 0–0.2)
BASOPHILS NFR BLD AUTO: 0.5 %
CREAT SERPL-MCNC: 0.67 MG/DL (ref 0.52–1.04)
DIFFERENTIAL METHOD BLD: ABNORMAL
EOSINOPHIL # BLD AUTO: 0 10E9/L (ref 0–0.7)
EOSINOPHIL NFR BLD AUTO: 1.1 %
ERYTHROCYTE [DISTWIDTH] IN BLOOD BY AUTOMATED COUNT: 16.1 % (ref 10–15)
GFR SERPL CREATININE-BSD FRML MDRD: NORMAL ML/MIN/1.7M2
HCT VFR BLD AUTO: 32.6 % (ref 35–47)
HGB BLD-MCNC: 10.8 G/DL (ref 11.7–15.7)
IMM GRANULOCYTES # BLD: 0 10E9/L (ref 0–0.4)
IMM GRANULOCYTES NFR BLD: 0.8 %
LYMPHOCYTES # BLD AUTO: 1.3 10E9/L (ref 0.8–5.3)
LYMPHOCYTES NFR BLD AUTO: 34.4 %
MCH RBC QN AUTO: 32.5 PG (ref 26.5–33)
MCHC RBC AUTO-ENTMCNC: 33.1 G/DL (ref 31.5–36.5)
MCV RBC AUTO: 98 FL (ref 78–100)
MONOCYTES # BLD AUTO: 0.4 10E9/L (ref 0–1.3)
MONOCYTES NFR BLD AUTO: 9.8 %
NEUTROPHILS # BLD AUTO: 2 10E9/L (ref 1.6–8.3)
NEUTROPHILS NFR BLD AUTO: 53.4 %
PLATELET # BLD AUTO: 224 10E9/L (ref 150–450)
RBC # BLD AUTO: 3.32 10E12/L (ref 3.8–5.2)
WBC # BLD AUTO: 3.7 10E9/L (ref 4–11)

## 2017-05-02 PROCEDURE — 25000132 ZZH RX MED GY IP 250 OP 250 PS 637

## 2017-05-02 PROCEDURE — 96413 CHEMO IV INFUSION 1 HR: CPT

## 2017-05-02 PROCEDURE — 96375 TX/PRO/DX INJ NEW DRUG ADDON: CPT

## 2017-05-02 PROCEDURE — 25000132 ZZH RX MED GY IP 250 OP 250 PS 637: Performed by: INTERNAL MEDICINE

## 2017-05-02 PROCEDURE — 25000125 ZZHC RX 250: Performed by: INTERNAL MEDICINE

## 2017-05-02 PROCEDURE — 85025 COMPLETE CBC W/AUTO DIFF WBC: CPT | Performed by: INTERNAL MEDICINE

## 2017-05-02 PROCEDURE — 25000128 H RX IP 250 OP 636: Performed by: INTERNAL MEDICINE

## 2017-05-02 PROCEDURE — 82565 ASSAY OF CREATININE: CPT | Performed by: INTERNAL MEDICINE

## 2017-05-02 PROCEDURE — 96367 TX/PROPH/DG ADDL SEQ IV INF: CPT

## 2017-05-02 RX ORDER — HEPARIN SODIUM (PORCINE) LOCK FLUSH IV SOLN 100 UNIT/ML 100 UNIT/ML
5 SOLUTION INTRAVENOUS
Status: DISCONTINUED | OUTPATIENT
Start: 2017-05-02 | End: 2017-05-02 | Stop reason: HOSPADM

## 2017-05-02 RX ORDER — ACETAMINOPHEN 325 MG/1
650 TABLET ORAL ONCE
Status: COMPLETED | OUTPATIENT
Start: 2017-05-02 | End: 2017-05-02

## 2017-05-02 RX ORDER — GABAPENTIN 300 MG/1
CAPSULE ORAL
Qty: 90 CAPSULE | Refills: 0 | Status: SHIPPED | OUTPATIENT
Start: 2017-05-02 | End: 2017-06-06

## 2017-05-02 RX ORDER — ACETAMINOPHEN 325 MG/1
TABLET ORAL
Status: DISCONTINUED
Start: 2017-05-02 | End: 2017-05-02 | Stop reason: HOSPADM

## 2017-05-02 RX ORDER — ACETAMINOPHEN 325 MG/1
TABLET ORAL
Status: DISCONTINUED
Start: 2017-05-02 | End: 2017-05-02 | Stop reason: WASHOUT

## 2017-05-02 RX ADMIN — PACLITAXEL 159 MG: 6 INJECTION, SOLUTION INTRAVENOUS at 10:47

## 2017-05-02 RX ADMIN — SODIUM CHLORIDE, PRESERVATIVE FREE 5 ML: 5 INJECTION INTRAVENOUS at 12:29

## 2017-05-02 RX ADMIN — FAMOTIDINE 20 MG: 20 INJECTION, SOLUTION INTRAVENOUS at 09:56

## 2017-05-02 RX ADMIN — SODIUM CHLORIDE 1000 ML: 9 INJECTION, SOLUTION INTRAVENOUS at 09:22

## 2017-05-02 RX ADMIN — DEXAMETHASONE SODIUM PHOSPHATE: 10 INJECTION, SOLUTION INTRAMUSCULAR; INTRAVENOUS at 10:11

## 2017-05-02 RX ADMIN — DIPHENHYDRAMINE HYDROCHLORIDE 50 MG: 50 INJECTION, SOLUTION INTRAMUSCULAR; INTRAVENOUS at 10:24

## 2017-05-02 RX ADMIN — ACETAMINOPHEN 650 MG: 325 TABLET, FILM COATED ORAL at 11:23

## 2017-05-02 NOTE — MR AVS SNAPSHOT
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  [address]  [facility] [address]   [phone]            May 16, 2017  8:00 AM CDT   Level 3 with ROOM 6 [facility] ([facility])    [facility]  [address]  [facility] [address]   [phone]            May 16, 2017  8:30 AM CDT   Return Visit with Maribel Jin MD   [facility] ([facility])    [facility]  [address]  [facility] [address]   [phone]            May 23, 2017  8:30 AM CDT   Level 3 with ROOM 9 [facility] ([facility])    [facility]  [address]  [facility] [address]   [phone]            May 30, 2017  8:30 AM CDT   Level 3 with ROOM 7 [facility] ([facility])    [facility]  [address]  [facility] [address]   [phone]              Who to contact     If you have questions or need follow up information about today's clinic visit or your schedule please contact [facility] directly at [phone].  Normal or non-critical lab and imaging results will be communicated to you by MyChart, letter or phone within 4 business days after the clinic has received the results. If you do not hear from us within 7 days,  please contact the clinic through [facility] or phone. If you have a critical or abnormal lab result, we will notify you by phone as soon as possible.  Submit refill requests through [employer] or call your pharmacy and they will forward the refill request to us. Please allow 3 business days for your refill to be completed.          Additional Information About Your Visit        [employer] Information     [employer] gives you secure access to your electronic health record. If you see a primary care provider, you can also send messages to your care team and make appointments. If you have questions, please call your primary care clinic.  If you do not have a primary care provider, please call [phone] and they will assist you.        Care EveryWhere ID     This is your Care EveryWhere ID. This could be used by other organizations to access your [facility] medical records  [id number]        Your Vitals Were     Pulse Temperature Respirations BMI (Body Mass Index)          88 97.2  F (36.2  C) 16 34.59 kg/m2         Blood Pressure from Last 3 Encounters:   05/02/17 134/84   04/25/17 133/75   04/25/17 119/65    Weight from Last 3 Encounters:   05/02/17 91.4 kg (201 lb 9.6 oz)   04/25/17 94.2 kg (207 lb 11.2 oz)   04/18/17 93.4 kg (206 lb)              We Performed the Following     CBC with platelets differential     Creatinine     Treatment Conditions          Today's Medication Changes          These changes are accurate as of: 5/2/17  1:13 PM.  If you have any questions, ask your nurse or doctor.               Start taking these medicines.        Dose/Directions    gabapentin 300 MG capsule   Commonly known as:  NEURONTIN   Used for:  Neuropathy (H)        Take 1 tablet (300 mg) every night for 1-3 days, then 1 tablet twice daily for 1-3 days, then 1 tablet three times daily   Quantity:  90 capsule   Refills:  0            Where to get your medicines      These medications were sent to [facility] -  [address]  [address] [facility] [address]     Phone:  [phone]     gabapentin 300 MG capsule                Primary Care Provider Office Phone # Fax #    Yenifer Ridley [phone]       [facility] [address] [facility] [address]        Thank you!     Thank you for choosing [facility]  for your care. Our goal is always to provide you with excellent care. Hearing back from our patients is one way we can continue to improve our services. Please take a few minutes to complete the written survey that you may receive in the mail after your visit with us. Thank you!             Your Updated Medication List - Protect others around you: Learn how to safely use, store and throw away your medicines at www.disposemymeds.org.          This list is accurate as of: 5/2/17  1:13 PM.  Always use your most recent med list.                   Brand Name Dispense Instructions for use    ACIDOPHILUS PROBIOTIC 100 MG Caps          albuterol 108 (90 BASE) MCG/ACT Inhaler    PROAIR HFA/PROVENTIL HFA/VENTOLIN HFA    1 Inhaler    Inhale 2 puffs into the lungs every 6 hours as needed for shortness of breath / dyspnea       ALPRAZolam 0.5 MG tablet    XANAX     Reported on 2/14/2017       cyclobenzaprine 10 MG tablet    FLEXERIL    30 tablet    Take 1 tablet (10 mg) by mouth 3 times daily as needed for muscle spasms       * dexamethasone 4 MG tablet    DECADRON     Take 4 mg by mouth       * dexamethasone 0.1 MG/ML solution     240 mL    Take 10 mLs (1 mg) by mouth daily       DULoxetine 60 MG EC capsule    CYMBALTA    30 capsule    Take 1 capsule (60 mg) by mouth daily       famotidine 20 MG tablet    PEPCID     Take 20 mg by mouth daily as needed       fluticasone 50 MCG/ACT spray    FLONASE    3 Bottle    Spray 1-2 sprays into both nostrils daily       gabapentin 300 MG capsule    NEURONTIN    90 capsule    Take 1 tablet (300 mg) every night for 1-3 days, then 1  tablet twice daily for 1-3 days, then 1 tablet three times daily       hydrOXYzine 10 MG tablet    ATARAX    60 tablet    Take 1-5 tablets (10-50 mg) by mouth every 6 hours as needed for anxiety       lidocaine 2 % solution    XYLOCAINE     Reported on 2/28/2017       lidocaine visc 2% 2.5mL/5mL & maalox/mylanta w/ simeth 2.5mL/5mL & diphenhydrAMINE 5mg/5mL Susp suspension    [facility]    240 mL    Swish and swallow 10 mLs in mouth every 6 hours as needed for mouth sores       lidocaine-prilocaine cream    EMLA    30 g    Apply topically as needed for other (place dime size amount over port site prior to tx and cover with tegaderm)       loratadine 10 MG tablet    CLARITIN    90 tablet    Take 1 tablet (10 mg) by mouth daily       LORazepam 0.5 MG tablet    ATIVAN    30 tablet    Take 1 tablet (0.5 mg) by mouth nightly as needed for anxiety       montelukast 10 MG tablet    SINGULAIR    90 tablet    Take 1 tablet (10 mg) by mouth At Bedtime       MULTI COMPLETE PO          NAPROXEN SODIUM PO      Take 220 mg by mouth Reported on 2/14/2017       ondansetron 4 MG ODT tab    ZOFRAN ODT    20 tablet    Take 1-2 tablets (4-8 mg) by mouth every 6 hours as needed for nausea       oxyCODONE-acetaminophen 5-325 MG per tablet    PERCOCET    20 tablet    Take 1 tablet by mouth every 4 hours as needed for pain       phenazopyridine 200 MG tablet    PYRIDIUM    30 tablet    Take 1 tablet (200 mg) by mouth 3 times daily as needed for irritation       prochlorperazine 10 MG tablet    COMPAZINE    90 tablet    Take 1 tablet (10 mg) by mouth every 6 hours as needed for nausea or vomiting       scopolamine 72 hr patch    TRANSDERM    1 patch    Place 1 patch onto the skin every 72 hours.  Apply to hairless area behind one ear at least 4 hours before travel.  Remove old patch and change every 3 days.       VITAMIN B 12 PO          * Notice:  This list has 2 medication(s) that are the same as other medications prescribed  for you. Read the directions carefully, and ask your doctor or other care provider to review them with you.

## 2017-05-02 NOTE — PROGRESS NOTES
Infusion Nursing Note:  Diana Wilson presents today for Taxol (Carbo held).    Patient seen by provider today: No   present during visit today: Not Applicable.    Note: Due to Pt's side effect profile & quality of life issues, TORB given by Dr. Jin to hold Carbo this wk, give 1L NS with today's Taxol, D/C Cymbalta, start Neurontin. Pt & spouse agree with this plan.    Intravenous Access:  Implanted Port.    Treatment Conditions:  Results reviewed, labs MET treatment parameters, ok to proceed with treatment.      Post Infusion Assessment:  Patient tolerated infusion without incident.    Discharge Plan:   Patient discharged in stable condition accompanied by: .    Elliott Adam RN

## 2017-05-09 ENCOUNTER — INFUSION THERAPY VISIT (OUTPATIENT)
Dept: INFUSION THERAPY | Facility: CLINIC | Age: 37
End: 2017-05-09
Attending: INTERNAL MEDICINE
Payer: COMMERCIAL

## 2017-05-09 ENCOUNTER — HOSPITAL ENCOUNTER (OUTPATIENT)
Facility: CLINIC | Age: 37
Discharge: HOME | End: 2017-05-09
Attending: INTERNAL MEDICINE | Admitting: INTERNAL MEDICINE
Payer: COMMERCIAL

## 2017-05-09 VITALS
DIASTOLIC BLOOD PRESSURE: 65 MMHG | OXYGEN SATURATION: 98 % | SYSTOLIC BLOOD PRESSURE: 113 MMHG | WEIGHT: 207.4 LBS | BODY MASS INDEX: 35.58 KG/M2 | HEART RATE: 97 BPM | TEMPERATURE: 96.7 F

## 2017-05-09 DIAGNOSIS — C50.411 MALIGNANT NEOPLASM OF UPPER-OUTER QUADRANT OF RIGHT FEMALE BREAST (H): Primary | ICD-10-CM

## 2017-05-09 LAB
BASOPHILS # BLD AUTO: 0 10E9/L (ref 0–0.2)
BASOPHILS NFR BLD AUTO: 0.7 %
CREAT SERPL-MCNC: 0.62 MG/DL (ref 0.52–1.04)
DIFFERENTIAL METHOD BLD: ABNORMAL
EOSINOPHIL # BLD AUTO: 0.1 10E9/L (ref 0–0.7)
EOSINOPHIL NFR BLD AUTO: 1.4 %
ERYTHROCYTE [DISTWIDTH] IN BLOOD BY AUTOMATED COUNT: 15.8 % (ref 10–15)
GFR SERPL CREATININE-BSD FRML MDRD: NORMAL ML/MIN/1.7M2
HCT VFR BLD AUTO: 28.9 % (ref 35–47)
HGB BLD-MCNC: 9.6 G/DL (ref 11.7–15.7)
IMM GRANULOCYTES # BLD: 0 10E9/L (ref 0–0.4)
IMM GRANULOCYTES NFR BLD: 0.7 %
LYMPHOCYTES # BLD AUTO: 1.5 10E9/L (ref 0.8–5.3)
LYMPHOCYTES NFR BLD AUTO: 35.6 %
MCH RBC QN AUTO: 33.6 PG (ref 26.5–33)
MCHC RBC AUTO-ENTMCNC: 33.2 G/DL (ref 31.5–36.5)
MCV RBC AUTO: 101 FL (ref 78–100)
MONOCYTES # BLD AUTO: 0.5 10E9/L (ref 0–1.3)
MONOCYTES NFR BLD AUTO: 11.7 %
NEUTROPHILS # BLD AUTO: 2.1 10E9/L (ref 1.6–8.3)
NEUTROPHILS NFR BLD AUTO: 49.9 %
PLATELET # BLD AUTO: 173 10E9/L (ref 150–450)
RBC # BLD AUTO: 2.86 10E12/L (ref 3.8–5.2)
WBC # BLD AUTO: 4.3 10E9/L (ref 4–11)

## 2017-05-09 PROCEDURE — 25000125 ZZHC RX 250: Performed by: INTERNAL MEDICINE

## 2017-05-09 PROCEDURE — 99213 OFFICE O/P EST LOW 20 MIN: CPT | Mod: 25

## 2017-05-09 PROCEDURE — 85025 COMPLETE CBC W/AUTO DIFF WBC: CPT | Performed by: INTERNAL MEDICINE

## 2017-05-09 PROCEDURE — S0028 INJECTION, FAMOTIDINE, 20 MG: HCPCS | Performed by: INTERNAL MEDICINE

## 2017-05-09 PROCEDURE — 96413 CHEMO IV INFUSION 1 HR: CPT

## 2017-05-09 PROCEDURE — 25000128 H RX IP 250 OP 636: Performed by: INTERNAL MEDICINE

## 2017-05-09 PROCEDURE — 96417 CHEMO IV INFUS EACH ADDL SEQ: CPT

## 2017-05-09 PROCEDURE — 96376 TX/PRO/DX INJ SAME DRUG ADON: CPT

## 2017-05-09 PROCEDURE — 25000128 H RX IP 250 OP 636

## 2017-05-09 PROCEDURE — 96372 THER/PROPH/DIAG INJ SC/IM: CPT

## 2017-05-09 PROCEDURE — 82565 ASSAY OF CREATININE: CPT | Performed by: INTERNAL MEDICINE

## 2017-05-09 PROCEDURE — 96375 TX/PRO/DX INJ NEW DRUG ADDON: CPT

## 2017-05-09 PROCEDURE — 96367 TX/PROPH/DG ADDL SEQ IV INF: CPT

## 2017-05-09 RX ORDER — DIPHENHYDRAMINE HYDROCHLORIDE 50 MG/ML
50 INJECTION INTRAMUSCULAR; INTRAVENOUS
Status: COMPLETED | OUTPATIENT
Start: 2017-05-09 | End: 2017-05-09

## 2017-05-09 RX ORDER — METHYLPREDNISOLONE SODIUM SUCCINATE 125 MG/2ML
125 INJECTION, POWDER, LYOPHILIZED, FOR SOLUTION INTRAMUSCULAR; INTRAVENOUS
Status: DISCONTINUED | OUTPATIENT
Start: 2017-05-09 | End: 2017-05-09 | Stop reason: HOSPADM

## 2017-05-09 RX ORDER — HEPARIN SODIUM (PORCINE) LOCK FLUSH IV SOLN 100 UNIT/ML 100 UNIT/ML
5 SOLUTION INTRAVENOUS
Status: DISCONTINUED | OUTPATIENT
Start: 2017-05-09 | End: 2017-05-09 | Stop reason: HOSPADM

## 2017-05-09 RX ORDER — HEPARIN SODIUM (PORCINE) LOCK FLUSH IV SOLN 100 UNIT/ML 100 UNIT/ML
SOLUTION INTRAVENOUS
Status: COMPLETED
Start: 2017-05-09 | End: 2017-05-09

## 2017-05-09 RX ADMIN — PACLITAXEL 159 MG: 6 INJECTION, SOLUTION INTRAVENOUS at 11:02

## 2017-05-09 RX ADMIN — DEXAMETHASONE SODIUM PHOSPHATE: 10 INJECTION, SOLUTION INTRAMUSCULAR; INTRAVENOUS at 10:18

## 2017-05-09 RX ADMIN — CARBOPLATIN 300 MG: 10 INJECTION, SOLUTION INTRAVENOUS at 12:12

## 2017-05-09 RX ADMIN — HYDROCORTISONE SODIUM SUCCINATE 100 MG: 100 INJECTION, POWDER, FOR SOLUTION INTRAMUSCULAR; INTRAVENOUS at 13:05

## 2017-05-09 RX ADMIN — FAMOTIDINE 20 MG: 20 INJECTION, SOLUTION INTRAVENOUS at 10:49

## 2017-05-09 RX ADMIN — DIPHENHYDRAMINE HYDROCHLORIDE 50 MG: 50 INJECTION, SOLUTION INTRAMUSCULAR; INTRAVENOUS at 12:51

## 2017-05-09 RX ADMIN — SODIUM CHLORIDE, PRESERVATIVE FREE 5 ML: 5 INJECTION INTRAVENOUS at 14:10

## 2017-05-09 RX ADMIN — DIPHENHYDRAMINE HYDROCHLORIDE 50 MG: 50 INJECTION, SOLUTION INTRAMUSCULAR; INTRAVENOUS at 10:34

## 2017-05-09 RX ADMIN — SODIUM CHLORIDE, PRESERVATIVE FREE 500 UNITS: 5 INJECTION INTRAVENOUS at 08:45

## 2017-05-09 RX ADMIN — ALTEPLASE 2 MG: 2.2 INJECTION, POWDER, LYOPHILIZED, FOR SOLUTION INTRAVENOUS at 09:00

## 2017-05-09 RX ADMIN — SODIUM CHLORIDE 250 ML: 9 INJECTION, SOLUTION INTRAVENOUS at 10:15

## 2017-05-09 RX ADMIN — METHYLPREDNISOLONE SODIUM SUCCINATE 125 MG: 125 INJECTION, POWDER, FOR SOLUTION INTRAMUSCULAR; INTRAVENOUS at 12:51

## 2017-05-09 RX ADMIN — EPINEPHRINE 1 MG: 1 INJECTION, SOLUTION INTRAMUSCULAR; SUBCUTANEOUS at 13:03

## 2017-05-09 NOTE — MR AVS SNAPSHOT
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 ([facility])    [facility]  [address]  [facility] [address]   [phone]            May 23, 2017  8:30 AM CDT   Level 3 with ROOM 9 [facility] ([facility])    [facility]  [address]  [facility] [address]   [phone]            May 30, 2017  8:30 AM CDT   Level 3 with ROOM 7 [facility] ([facility])    [facility]  [address]  [facility] [address]   [phone]              Who to contact     If you have questions or need follow up information about today's clinic visit or your schedule please contact [facility] directly at [phone].  Normal or non-critical lab and imaging results will be communicated to you by MyChart, letter or phone within 4 business days after the clinic has received the results. If you do not hear from us within 7 days, please contact the clinic through [employer] or phone. If you have a critical or abnormal lab result, we will notify you by phone as soon as possible.  Submit refill requests through [employer] or call your pharmacy and they will forward the  refill request to us. Please allow 3 business days for your refill to be completed.          Additional Information About Your Visit        [place] [employer] Information     [employer] gives you secure access to your electronic health record. If you see a primary care provider, you can also send messages to your care team and make appointments. If you have questions, please call your primary care clinic.  If you do not have a primary care provider, please call [phone] and they will assist you.        Care EveryWhere ID     This is your Care EveryWhere ID. This could be used by other organizations to access your [place] medical records  [id number]        Your Vitals Were     Pulse Temperature Pulse Oximetry BMI (Body Mass Index)          97 96.7  F (35.9  C) (Oral) 98% 35.58 kg/m2         Blood Pressure from Last 3 Encounters:   05/09/17 113/65   05/02/17 134/84   04/25/17 133/75    Weight from Last 3 Encounters:   05/09/17 94.1 kg (207 lb 6.4 oz)   05/02/17 91.4 kg (201 lb 9.6 oz)   04/25/17 94.2 kg (207 lb 11.2 oz)              We Performed the Following     CBC with platelets differential     Creatinine          Today's Medication Changes          These changes are accurate as of: 5/9/17  3:54 PM.  If you have any questions, ask your nurse or doctor.               Stop taking these medicines if you haven't already. Please contact your care team if you have questions.     DULoxetine 60 MG EC capsule   Commonly known as:  CYMBALTA                    Primary Care Provider Office Phone # Fax #    Yenifer Ridley [phone]       [facility] [address] [facility] [address]        Thank you!     Thank you for choosing [facility]  for your care. Our goal is always to provide you with excellent care. Hearing back from our patients is one way we can continue to improve our services. Please take a few minutes to complete the written survey that you may receive in the mail after  your visit with us. Thank you!             Your Updated Medication List - Protect others around you: Learn how to safely use, store and throw away your medicines at www.disposemymeds.org.          This list is accurate as of: 5/9/17  3:54 PM.  Always use your most recent med list.                   Brand Name Dispense Instructions for use    ACIDOPHILUS PROBIOTIC 100 MG Caps          albuterol 108 (90 BASE) MCG/ACT Inhaler    PROAIR HFA/PROVENTIL HFA/VENTOLIN HFA    1 Inhaler    Inhale 2 puffs into the lungs every 6 hours as needed for shortness of breath / dyspnea       ALPRAZolam 0.5 MG tablet    XANAX     Reported on 2/14/2017       cyclobenzaprine 10 MG tablet    FLEXERIL    30 tablet    Take 1 tablet (10 mg) by mouth 3 times daily as needed for muscle spasms       * dexamethasone 4 MG tablet    DECADRON     Take 4 mg by mouth       * dexamethasone 0.1 MG/ML solution     240 mL    Take 10 mLs (1 mg) by mouth daily       famotidine 20 MG tablet    PEPCID     Take 20 mg by mouth daily as needed       fluticasone 50 MCG/ACT spray    FLONASE    3 Bottle    Spray 1-2 sprays into both nostrils daily       gabapentin 300 MG capsule    NEURONTIN    90 capsule    Take 1 tablet (300 mg) every night for 1-3 days, then 1 tablet twice daily for 1-3 days, then 1 tablet three times daily       hydrOXYzine 10 MG tablet    ATARAX    60 tablet    Take 1-5 tablets (10-50 mg) by mouth every 6 hours as needed for anxiety       lidocaine 2 % solution    XYLOCAINE     Reported on 2/28/2017       lidocaine visc 2% 2.5mL/5mL & maalox/mylanta w/ simeth 2.5mL/5mL & diphenhydrAMINE 5mg/5mL Susp suspension    [facility]    240 mL    Swish and swallow 10 mLs in mouth every 6 hours as needed for mouth sores       lidocaine-prilocaine cream    EMLA    30 g    Apply topically as needed for other (place dime size amount over port site prior to tx and cover with tegaderm)       loratadine 10 MG tablet    CLARITIN    90 tablet     Take 1 tablet (10 mg) by mouth daily       LORazepam 0.5 MG tablet    ATIVAN    30 tablet    Take 1 tablet (0.5 mg) by mouth nightly as needed for anxiety       montelukast 10 MG tablet    SINGULAIR    90 tablet    Take 1 tablet (10 mg) by mouth At Bedtime       MULTI COMPLETE PO          NAPROXEN SODIUM PO      Take 220 mg by mouth Reported on 2/14/2017       ondansetron 4 MG ODT tab    ZOFRAN ODT    20 tablet    Take 1-2 tablets (4-8 mg) by mouth every 6 hours as needed for nausea       oxyCODONE-acetaminophen 5-325 MG per tablet    PERCOCET    20 tablet    Take 1 tablet by mouth every 4 hours as needed for pain       phenazopyridine 200 MG tablet    PYRIDIUM    30 tablet    Take 1 tablet (200 mg) by mouth 3 times daily as needed for irritation       prochlorperazine 10 MG tablet    COMPAZINE    90 tablet    Take 1 tablet (10 mg) by mouth every 6 hours as needed for nausea or vomiting       scopolamine 72 hr patch    TRANSDERM    1 patch    Place 1 patch onto the skin every 72 hours.  Apply to hairless area behind one ear at least 4 hours before travel.  Remove old patch and change every 3 days.       VITAMIN B 12 PO          * Notice:  This list has 2 medication(s) that are the same as other medications prescribed for you. Read the directions carefully, and ask your doctor or other care provider to review them with you.

## 2017-05-09 NOTE — PROGRESS NOTES
Infusion Nursing Note:  Diana Wilson presents today for PAC labs and chemotherapy.      Patient seen by provider today: No   present during visit today: Not Applicable.    Note: Labs drawn from her port after instilling alteplase in her port for 30 minutes. Labs WNL's. Premeds infused. IV Taxol infused over 60 minutes via her port per [place] protocol without complications. IV Carboplatin infused. She had about 20 cc's left of the carboplatin when she started to get itchy palms and heaviness in her chest. The carboplatin was stopped and saline was ran wide open. O2 started at 3 LPM. She turned red on her face, neck, and chest. She was given IV Benadryl 50 mg, Iv Solcortef, IV Solumedrol, and Epi IM. Dr. Jin saw her and assessed how she was doing. Pt. Stated that she felt shaky after the epi was given. Her redness was subsiding and her chest heaviness subsided. Her breathing was easier and her vital signs were normal. Her port was flushed per [place] protocol. Pt. Was instructed to call 911 at home if she had any resurgence of side effects and to take PO benadryl for any itching. Pt. Verbalized understanding. 45 minutes face to face time for the reaction to Carboplatin.    Intravenous Access:  Labs drawn without difficulty.  Implanted Port.    Treatment Conditions:  Lab Results   Component Value Date    HGB 9.6 05/09/2017     Lab Results   Component Value Date    WBC 4.3 05/09/2017      Lab Results   Component Value Date    ANEU 2.1 05/09/2017     Lab Results   Component Value Date     05/09/2017      Lab Results   Component Value Date     04/25/2017                   Lab Results   Component Value Date    POTASSIUM 3.7 04/25/2017           Lab Results   Component Value Date    MAG 2.3 03/02/2016            Lab Results   Component Value Date    CR 0.62 05/09/2017                   Lab Results   Component Value Date    DENY 8.7 04/25/2017                Lab Results   Component Value Date     BILITOTAL 0.5 04/25/2017           Lab Results   Component Value Date    ALBUMIN 3.5 04/25/2017                    Lab Results   Component Value Date    ALT 49 04/25/2017           Lab Results   Component Value Date    AST 21 04/25/2017     Results reviewed, labs MET treatment parameters, ok to proceed with treatment.      Post Infusion Assessment:  Blood return noted pre and post infusion.  Site patent and intact, free from redness, edema or discomfort.  No evidence of extravasations.  Access discontinued per protocol.    Discharge Plan:   Patient and/or family verbalized understanding of discharge instructions and all questions answered.  Patient discharged in stable condition accompanied by: father.  Departure Mode: Ambulatory.    Gina Galan RN

## 2017-05-16 ENCOUNTER — HOSPITAL ENCOUNTER (OUTPATIENT)
Facility: CLINIC | Age: 37
Discharge: HOME | End: 2017-05-16
Attending: INTERNAL MEDICINE | Admitting: INTERNAL MEDICINE
Payer: COMMERCIAL

## 2017-05-16 ENCOUNTER — INFUSION THERAPY VISIT (OUTPATIENT)
Dept: INFUSION THERAPY | Facility: CLINIC | Age: 37
End: 2017-05-16
Attending: INTERNAL MEDICINE
Payer: COMMERCIAL

## 2017-05-16 ENCOUNTER — ONCOLOGY VISIT (OUTPATIENT)
Dept: ONCOLOGY | Facility: CLINIC | Age: 37
End: 2017-05-16
Attending: INTERNAL MEDICINE
Payer: COMMERCIAL

## 2017-05-16 VITALS — DIASTOLIC BLOOD PRESSURE: 59 MMHG | HEART RATE: 95 BPM | SYSTOLIC BLOOD PRESSURE: 109 MMHG

## 2017-05-16 VITALS
BODY MASS INDEX: 36.12 KG/M2 | TEMPERATURE: 98.1 F | SYSTOLIC BLOOD PRESSURE: 125 MMHG | OXYGEN SATURATION: 97 % | DIASTOLIC BLOOD PRESSURE: 71 MMHG | WEIGHT: 211.6 LBS | HEART RATE: 121 BPM | RESPIRATION RATE: 18 BRPM | HEIGHT: 64 IN

## 2017-05-16 DIAGNOSIS — K12.30 MUCOSITIS: ICD-10-CM

## 2017-05-16 DIAGNOSIS — C50.411 MALIGNANT NEOPLASM OF UPPER-OUTER QUADRANT OF RIGHT FEMALE BREAST (H): Primary | ICD-10-CM

## 2017-05-16 DIAGNOSIS — G62.9 NEUROPATHY: ICD-10-CM

## 2017-05-16 DIAGNOSIS — R30.9 PAIN WITH URINATION: ICD-10-CM

## 2017-05-16 LAB
ALBUMIN SERPL-MCNC: 3.3 G/DL (ref 3.4–5)
ALP SERPL-CCNC: 68 U/L (ref 40–150)
ALT SERPL W P-5'-P-CCNC: 37 U/L (ref 0–50)
ANION GAP SERPL CALCULATED.3IONS-SCNC: 12 MMOL/L (ref 3–14)
AST SERPL W P-5'-P-CCNC: 14 U/L (ref 0–45)
BASOPHILS # BLD AUTO: 0 10E9/L (ref 0–0.2)
BASOPHILS NFR BLD AUTO: 0.5 %
BILIRUB SERPL-MCNC: 0.3 MG/DL (ref 0.2–1.3)
BUN SERPL-MCNC: 7 MG/DL (ref 7–30)
CALCIUM SERPL-MCNC: 8.2 MG/DL (ref 8.5–10.1)
CHLORIDE SERPL-SCNC: 109 MMOL/L (ref 94–109)
CO2 SERPL-SCNC: 22 MMOL/L (ref 20–32)
CREAT SERPL-MCNC: 0.63 MG/DL (ref 0.52–1.04)
DIFFERENTIAL METHOD BLD: ABNORMAL
EOSINOPHIL # BLD AUTO: 0.1 10E9/L (ref 0–0.7)
EOSINOPHIL NFR BLD AUTO: 1.6 %
ERYTHROCYTE [DISTWIDTH] IN BLOOD BY AUTOMATED COUNT: 15.3 % (ref 10–15)
GFR SERPL CREATININE-BSD FRML MDRD: ABNORMAL ML/MIN/1.7M2
GLUCOSE SERPL-MCNC: 160 MG/DL (ref 70–99)
HCT VFR BLD AUTO: 29.2 % (ref 35–47)
HGB BLD-MCNC: 9.7 G/DL (ref 11.7–15.7)
IMM GRANULOCYTES # BLD: 0 10E9/L (ref 0–0.4)
IMM GRANULOCYTES NFR BLD: 0.5 %
LYMPHOCYTES # BLD AUTO: 1.5 10E9/L (ref 0.8–5.3)
LYMPHOCYTES NFR BLD AUTO: 32.8 %
MCH RBC QN AUTO: 33.8 PG (ref 26.5–33)
MCHC RBC AUTO-ENTMCNC: 33.2 G/DL (ref 31.5–36.5)
MCV RBC AUTO: 102 FL (ref 78–100)
MONOCYTES # BLD AUTO: 0.3 10E9/L (ref 0–1.3)
MONOCYTES NFR BLD AUTO: 6.1 %
NEUTROPHILS # BLD AUTO: 2.6 10E9/L (ref 1.6–8.3)
NEUTROPHILS NFR BLD AUTO: 58.5 %
PLATELET # BLD AUTO: 166 10E9/L (ref 150–450)
POTASSIUM SERPL-SCNC: 3.5 MMOL/L (ref 3.4–5.3)
PROT SERPL-MCNC: 6.5 G/DL (ref 6.8–8.8)
RBC # BLD AUTO: 2.87 10E12/L (ref 3.8–5.2)
SODIUM SERPL-SCNC: 143 MMOL/L (ref 133–144)
WBC # BLD AUTO: 4.4 10E9/L (ref 4–11)

## 2017-05-16 PROCEDURE — 25000125 ZZHC RX 250: Performed by: INTERNAL MEDICINE

## 2017-05-16 PROCEDURE — 96413 CHEMO IV INFUSION 1 HR: CPT

## 2017-05-16 PROCEDURE — S0028 INJECTION, FAMOTIDINE, 20 MG: HCPCS | Performed by: INTERNAL MEDICINE

## 2017-05-16 PROCEDURE — 99211 OFF/OP EST MAY X REQ PHY/QHP: CPT | Mod: 25

## 2017-05-16 PROCEDURE — 96375 TX/PRO/DX INJ NEW DRUG ADDON: CPT

## 2017-05-16 PROCEDURE — 80053 COMPREHEN METABOLIC PANEL: CPT | Performed by: INTERNAL MEDICINE

## 2017-05-16 PROCEDURE — 99214 OFFICE O/P EST MOD 30 MIN: CPT | Performed by: INTERNAL MEDICINE

## 2017-05-16 PROCEDURE — 85025 COMPLETE CBC W/AUTO DIFF WBC: CPT | Performed by: INTERNAL MEDICINE

## 2017-05-16 PROCEDURE — 25000128 H RX IP 250 OP 636: Performed by: INTERNAL MEDICINE

## 2017-05-16 RX ORDER — HEPARIN SODIUM (PORCINE) LOCK FLUSH IV SOLN 100 UNIT/ML 100 UNIT/ML
5 SOLUTION INTRAVENOUS
Status: CANCELLED | OUTPATIENT
Start: 2017-05-30

## 2017-05-16 RX ORDER — DIPHENHYDRAMINE HYDROCHLORIDE 50 MG/ML
50 INJECTION INTRAMUSCULAR; INTRAVENOUS
Status: CANCELLED
Start: 2017-05-23

## 2017-05-16 RX ORDER — METHYLPREDNISOLONE SODIUM SUCCINATE 125 MG/2ML
125 INJECTION, POWDER, LYOPHILIZED, FOR SOLUTION INTRAMUSCULAR; INTRAVENOUS
Status: CANCELLED
Start: 2017-05-30

## 2017-05-16 RX ORDER — DIPHENHYDRAMINE HYDROCHLORIDE 50 MG/ML
50 INJECTION INTRAMUSCULAR; INTRAVENOUS
Status: CANCELLED
Start: 2017-05-16

## 2017-05-16 RX ORDER — METHYLPREDNISOLONE SODIUM SUCCINATE 125 MG/2ML
125 INJECTION, POWDER, LYOPHILIZED, FOR SOLUTION INTRAMUSCULAR; INTRAVENOUS
Status: CANCELLED
Start: 2017-05-23

## 2017-05-16 RX ORDER — ALBUTEROL SULFATE 0.83 MG/ML
2.5 SOLUTION RESPIRATORY (INHALATION)
Status: CANCELLED | OUTPATIENT
Start: 2017-05-30

## 2017-05-16 RX ORDER — SODIUM CHLORIDE 9 MG/ML
1000 INJECTION, SOLUTION INTRAVENOUS CONTINUOUS PRN
Status: CANCELLED
Start: 2017-05-16

## 2017-05-16 RX ORDER — LORAZEPAM 2 MG/ML
0.5 INJECTION INTRAMUSCULAR EVERY 4 HOURS PRN
Status: CANCELLED
Start: 2017-05-16

## 2017-05-16 RX ORDER — ALBUTEROL SULFATE 90 UG/1
1-2 AEROSOL, METERED RESPIRATORY (INHALATION)
Status: CANCELLED
Start: 2017-05-23

## 2017-05-16 RX ORDER — DIPHENHYDRAMINE HCL 50 MG
50 CAPSULE ORAL ONCE
Status: CANCELLED
Start: 2017-05-23

## 2017-05-16 RX ORDER — EPINEPHRINE 0.3 MG/.3ML
0.3 INJECTION SUBCUTANEOUS EVERY 5 MIN PRN
Status: CANCELLED | OUTPATIENT
Start: 2017-05-30

## 2017-05-16 RX ORDER — SODIUM CHLORIDE 9 MG/ML
1000 INJECTION, SOLUTION INTRAVENOUS CONTINUOUS PRN
Status: CANCELLED
Start: 2017-05-23

## 2017-05-16 RX ORDER — ALBUTEROL SULFATE 90 UG/1
1-2 AEROSOL, METERED RESPIRATORY (INHALATION)
Status: CANCELLED
Start: 2017-05-16

## 2017-05-16 RX ORDER — MEPERIDINE HYDROCHLORIDE 25 MG/ML
25 INJECTION INTRAMUSCULAR; INTRAVENOUS; SUBCUTANEOUS EVERY 30 MIN PRN
Status: CANCELLED | OUTPATIENT
Start: 2017-05-23

## 2017-05-16 RX ORDER — MEPERIDINE HYDROCHLORIDE 25 MG/ML
25 INJECTION INTRAMUSCULAR; INTRAVENOUS; SUBCUTANEOUS EVERY 30 MIN PRN
Status: CANCELLED | OUTPATIENT
Start: 2017-05-30

## 2017-05-16 RX ORDER — ALBUTEROL SULFATE 0.83 MG/ML
2.5 SOLUTION RESPIRATORY (INHALATION)
Status: CANCELLED | OUTPATIENT
Start: 2017-05-23

## 2017-05-16 RX ORDER — MEPERIDINE HYDROCHLORIDE 25 MG/ML
25 INJECTION INTRAMUSCULAR; INTRAVENOUS; SUBCUTANEOUS EVERY 30 MIN PRN
Status: CANCELLED | OUTPATIENT
Start: 2017-05-16

## 2017-05-16 RX ORDER — EPINEPHRINE 0.3 MG/.3ML
0.3 INJECTION SUBCUTANEOUS EVERY 5 MIN PRN
Status: CANCELLED | OUTPATIENT
Start: 2017-05-23

## 2017-05-16 RX ORDER — SODIUM CHLORIDE 9 MG/ML
1000 INJECTION, SOLUTION INTRAVENOUS CONTINUOUS PRN
Status: CANCELLED
Start: 2017-05-30

## 2017-05-16 RX ORDER — LORAZEPAM 2 MG/ML
0.5 INJECTION INTRAMUSCULAR EVERY 4 HOURS PRN
Status: CANCELLED
Start: 2017-05-30

## 2017-05-16 RX ORDER — LORAZEPAM 2 MG/ML
0.5 INJECTION INTRAMUSCULAR EVERY 4 HOURS PRN
Status: CANCELLED
Start: 2017-05-23

## 2017-05-16 RX ORDER — ALBUTEROL SULFATE 0.83 MG/ML
2.5 SOLUTION RESPIRATORY (INHALATION)
Status: CANCELLED | OUTPATIENT
Start: 2017-05-16

## 2017-05-16 RX ORDER — DIPHENHYDRAMINE HYDROCHLORIDE 50 MG/ML
50 INJECTION INTRAMUSCULAR; INTRAVENOUS
Status: CANCELLED
Start: 2017-05-30

## 2017-05-16 RX ORDER — DIPHENHYDRAMINE HCL 50 MG
50 CAPSULE ORAL ONCE
Status: CANCELLED
Start: 2017-05-30

## 2017-05-16 RX ORDER — HEPARIN SODIUM (PORCINE) LOCK FLUSH IV SOLN 100 UNIT/ML 100 UNIT/ML
5 SOLUTION INTRAVENOUS
Status: DISCONTINUED | OUTPATIENT
Start: 2017-05-16 | End: 2017-05-16 | Stop reason: HOSPADM

## 2017-05-16 RX ORDER — HEPARIN SODIUM (PORCINE) LOCK FLUSH IV SOLN 100 UNIT/ML 100 UNIT/ML
5 SOLUTION INTRAVENOUS
Status: CANCELLED | OUTPATIENT
Start: 2017-05-23

## 2017-05-16 RX ORDER — EPINEPHRINE 0.3 MG/.3ML
0.3 INJECTION SUBCUTANEOUS EVERY 5 MIN PRN
Status: CANCELLED | OUTPATIENT
Start: 2017-05-16

## 2017-05-16 RX ORDER — ALBUTEROL SULFATE 90 UG/1
1-2 AEROSOL, METERED RESPIRATORY (INHALATION)
Status: CANCELLED
Start: 2017-05-30

## 2017-05-16 RX ORDER — METHYLPREDNISOLONE SODIUM SUCCINATE 125 MG/2ML
125 INJECTION, POWDER, LYOPHILIZED, FOR SOLUTION INTRAMUSCULAR; INTRAVENOUS
Status: CANCELLED
Start: 2017-05-16

## 2017-05-16 RX ADMIN — SODIUM CHLORIDE 250 ML: 9 INJECTION, SOLUTION INTRAVENOUS at 09:22

## 2017-05-16 RX ADMIN — DEXAMETHASONE SODIUM PHOSPHATE: 10 INJECTION, SOLUTION INTRAMUSCULAR; INTRAVENOUS at 09:36

## 2017-05-16 RX ADMIN — PACLITAXEL 159 MG: 6 INJECTION, SOLUTION INTRAVENOUS at 10:06

## 2017-05-16 RX ADMIN — FAMOTIDINE 20 MG: 20 INJECTION, SOLUTION INTRAVENOUS at 09:51

## 2017-05-16 RX ADMIN — SODIUM CHLORIDE, PRESERVATIVE FREE 5 ML: 5 INJECTION INTRAVENOUS at 11:14

## 2017-05-16 RX ADMIN — DIPHENHYDRAMINE HYDROCHLORIDE 50 MG: 50 INJECTION, SOLUTION INTRAMUSCULAR; INTRAVENOUS at 09:22

## 2017-05-16 ASSESSMENT — PAIN SCALES - GENERAL: PAINLEVEL: MILD PAIN (2)

## 2017-05-16 NOTE — MR AVS SNAPSHOT
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)    -  1       Follow-ups after your visit        Your next 10 appointments already scheduled     May 23, 2017  8:30 AM CDT   Level 3 with ROOM 9 [facility] ([facility])    [facility]  [address]  [facility] [address]   [phone]            May 30, 2017  8:30 AM CDT   Level 3 with ROOM 7 [facility] ([facility])    [facility]  [address]  [facility] [address]   [phone]            Jul 25, 2017 11:15 AM CDT   Return Visit with Maribel Jin MD   [facility] ([facility])    [facility]  [address]  [facility] [address]   [phone]              Who to contact     If you have questions or need follow up information about today's clinic visit or your schedule please contact [facility] directly at [phone].  Normal or non-critical lab and imaging results will be communicated to you by MyChart, letter or phone within 4 business days after the clinic has received the results. If you do not hear from us within 7 days, please contact the clinic through MyChart or phone. If you have a critical or abnormal lab result, we will notify you by phone as soon as possible.  Submit refill requests through [employer] or call your pharmacy and they will forward the refill request to us. Please allow 3 business days for your refill to be completed.          Additional Information About Your Visit        [employer] Information     [employer] gives you secure access to your electronic  health record. If you see a primary care provider, you can also send messages to your care team and make appointments. If you have questions, please call your primary care clinic.  If you do not have a primary care provider, please call [phone] and they will assist you.        Care EveryWhere ID     This is your Care EveryWhere ID. This could be used by other organizations to access your [place] medical records  [id number]        Your Vitals Were     Pulse                   95            Blood Pressure from Last 3 Encounters:   05/16/17 125/71   05/16/17 109/59   05/09/17 113/65    Weight from Last 3 Encounters:   05/16/17 96 kg (211 lb 9.6 oz)   05/09/17 94.1 kg (207 lb 6.4 oz)   05/02/17 91.4 kg (201 lb 9.6 oz)              We Performed the Following     CBC with platelets differential     Comprehensive metabolic panel     Treatment Conditions        Primary Care Provider Office Phone # Fax #    Yenifer Ridley [phone]       [facility] [address] [facility] [address]        Thank you!     Thank you for choosing [facility]  for your care. Our goal is always to provide you with excellent care. Hearing back from our patients is one way we can continue to improve our services. Please take a few minutes to complete the written survey that you may receive in the mail after your visit with us. Thank you!             Your Updated Medication List - Protect others around you: Learn how to safely use, store and throw away your medicines at www.disposemymeds.org.          This list is accurate as of: 5/16/17  3:13 PM.  Always use your most recent med list.                   Brand Name Dispense Instructions for use    ACIDOPHILUS PROBIOTIC 100 MG Caps          albuterol 108 (90 BASE) MCG/ACT Inhaler    PROAIR HFA/PROVENTIL HFA/VENTOLIN HFA    1 Inhaler    Inhale 2 puffs into the lungs every 6 hours as needed for shortness of breath / dyspnea       ALPRAZolam 0.5 MG  tablet    XANAX     Reported on 5/16/2017       cyclobenzaprine 10 MG tablet    FLEXERIL    30 tablet    Take 1 tablet (10 mg) by mouth 3 times daily as needed for muscle spasms       * dexamethasone 4 MG tablet    DECADRON     Take 4 mg by mouth Reported on 5/16/2017       * dexamethasone 0.1 MG/ML solution     240 mL    Take 10 mLs (1 mg) by mouth daily       famotidine 20 MG tablet    PEPCID     Take 20 mg by mouth daily as needed       fluticasone 50 MCG/ACT spray    FLONASE    3 Bottle    Spray 1-2 sprays into both nostrils daily       gabapentin 300 MG capsule    NEURONTIN    90 capsule    Take 1 tablet (300 mg) every night for 1-3 days, then 1 tablet twice daily for 1-3 days, then 1 tablet three times daily       hydrOXYzine 10 MG tablet    ATARAX    60 tablet    Take 1-5 tablets (10-50 mg) by mouth every 6 hours as needed for anxiety       lidocaine 2 % solution    XYLOCAINE     Reported on 5/16/2017       lidocaine visc 2% 2.5mL/5mL & maalox/mylanta w/ simeth 2.5mL/5mL & diphenhydrAMINE 5mg/5mL Susp suspension    [facility]    240 mL    Swish and swallow 10 mLs in mouth every 6 hours as needed for mouth sores       lidocaine-prilocaine cream    EMLA    30 g    Apply topically as needed for other (place dime size amount over port site prior to tx and cover with tegaderm)       loratadine 10 MG tablet    CLARITIN    90 tablet    Take 1 tablet (10 mg) by mouth daily       LORazepam 0.5 MG tablet    ATIVAN    30 tablet    Take 1 tablet (0.5 mg) by mouth nightly as needed for anxiety       montelukast 10 MG tablet    SINGULAIR    90 tablet    Take 1 tablet (10 mg) by mouth At Bedtime       MULTI COMPLETE PO          NAPROXEN SODIUM PO      Take 220 mg by mouth Reported on 5/16/2017       ondansetron 4 MG ODT tab    ZOFRAN ODT    20 tablet    Take 1-2 tablets (4-8 mg) by mouth every 6 hours as needed for nausea       oxyCODONE-acetaminophen 5-325 MG per tablet    PERCOCET    20 tablet    Take 1  tablet by mouth every 4 hours as needed for pain       phenazopyridine 200 MG tablet    PYRIDIUM    30 tablet    Take 1 tablet (200 mg) by mouth 3 times daily as needed for irritation       prochlorperazine 10 MG tablet    COMPAZINE    90 tablet    Take 1 tablet (10 mg) by mouth every 6 hours as needed for nausea or vomiting       scopolamine 72 hr patch    TRANSDERM    1 patch    Place 1 patch onto the skin every 72 hours.  Apply to hairless area behind one ear at least 4 hours before travel.  Remove old patch and change every 3 days.       VITAMIN B 12 PO          * Notice:  This list has 2 medication(s) that are the same as other medications prescribed for you. Read the directions carefully, and ask your doctor or other care provider to review them with you.

## 2017-05-16 NOTE — PROGRESS NOTES
Infusion Nursing Note:  Diana Wilson presents today for C4D1 Taxol .    Patient seen by provider today: Yes: Dr. Jin   present during visit today: Not Applicable.    Note: N/A.    Intravenous Access:  Labs drawn without difficulty.  Implanted Port.    Treatment Conditions:  Lab Results   Component Value Date    HGB 9.7 05/16/2017     Lab Results   Component Value Date    WBC 4.4 05/16/2017      Lab Results   Component Value Date    ANEU 2.6 05/16/2017     Lab Results   Component Value Date     05/16/2017      Lab Results   Component Value Date     05/16/2017                   Lab Results   Component Value Date    POTASSIUM 3.5 05/16/2017           Lab Results   Component Value Date    MAG 2.3 03/02/2016            Lab Results   Component Value Date    CR 0.63 05/16/2017                   Lab Results   Component Value Date    DENY 8.2 05/16/2017                Lab Results   Component Value Date    BILITOTAL 0.3 05/16/2017           Lab Results   Component Value Date    ALBUMIN 3.3 05/16/2017                    Lab Results   Component Value Date    ALT 37 05/16/2017           Lab Results   Component Value Date    AST 14 05/16/2017     Results reviewed, labs MET treatment parameters, ok to proceed with treatment.      Post Infusion Assessment:  Patient tolerated Taxol infusion without incident.  Blood return noted pre and post infusion.  Site patent and intact, free from redness, edema or discomfort.  No evidence of extravasations.  Access discontinued per protocol.    Discharge Plan:   Patient discharged in stable condition accompanied by: self and Aunt..  Departure Mode: Ambulatory.  Pt to return on 5/23/17 at 8:30 am for C4D8 Taxol.    Nora Isaac RN

## 2017-05-16 NOTE — NURSING NOTE
"Oncology Rooming Note    May 16, 2017 8:24 AM   Diana Wilson is a 36 year old female who presents for:    Chief Complaint   Patient presents with     Oncology Clinic Visit     3 week recheck Breast CA,Labs & Chemo today     Initial Vitals: /71 (BP Location: Right arm, Patient Position: Chair, Cuff Size: Adult Large)  Pulse 121  Temp 98.1  F (36.7  C) (Tympanic)  Resp 18  Ht 1.632 m (5' 4.25\")  Wt 96 kg (211 lb 9.6 oz)  SpO2 97%  Breastfeeding? No  BMI 36.04 kg/m2 Estimated body mass index is 36.04 kg/(m^2) as calculated from the following:    Height as of this encounter: 1.632 m (5' 4.25\").    Weight as of this encounter: 96 kg (211 lb 9.6 oz). Body surface area is 2.09 meters squared.  Mild Pain (2) Comment: Data Unavailable   No LMP recorded.  Allergies reviewed: Yes  Medications reviewed: Yes    Medications: Medication refills not needed today.  Pharmacy name entered into [facility]: [facility] [place] - [id number] [address]    Clinical concerns: 3 week recheck Breast CA,Labs & Chemo today.    7minutes for nursing intake (face to face time)     Sondra Medina CMA              "

## 2017-05-16 NOTE — MR AVS SNAPSHOT
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 Jin recommends that you receive your last cycle of chemotherapy and would like to see you back approximately 2 weeks after your surgery in July at [facility].  When you are in need of a refill, please call your pharmacy and they will send us a request.  Copy of appointments, and after visit summary (AVS) given to patient.  If you have any questions please call Morena Henry RN, BSN, OCN Oncology Hematology  [facility] [phone]. For questions after business hours, or on holidays/weekends, please call our after hours Nurse Triage line [phone]. Thank you.         Chemo today.   She will have surgery in July with Darlene.,   F/u with path.         Follow-ups after your visit        Your next 10 appointments already scheduled     May 23, 2017  8:30 AM CDT   Level 3 with ROOM 9 [facility] ([facility])    [facility]  [address]  [facility] [address]   [phone]            May 30, 2017  8:30 AM CDT   Level 3 with ROOM 7 [facility] ([facility])    [facility]  [address]  [place] [address]   [phone]            Jul 25, 2017 11:15 AM CDT   Return Visit with Maribel Jin MD   [facility] ([facility])    [facility]  [address]  [facility] [address]   [phone]              Who to contact     If you have questions or need  "follow up information about today's clinic visit or your schedule please contact [facility] directly at [phone].  Normal or non-critical lab and imaging results will be communicated to you by MyChart, letter or phone within 4 business days after the clinic has received the results. If you do not hear from us within 7 days, please contact the clinic through RAREFORMhart or phone. If you have a critical or abnormal lab result, we will notify you by phone as soon as possible.  Submit refill requests through Prior Knowledge or call your pharmacy and they will forward the refill request to us. Please allow 3 business days for your refill to be completed.          Additional Information About Your Visit        RAREFORMhart Information     Prior Knowledge gives you secure access to your electronic health record. If you see a primary care provider, you can also send messages to your care team and make appointments. If you have questions, please call your primary care clinic.  If you do not have a primary care provider, please call [phone] and they will assist you.        Care EveryWhere ID     This is your Care EveryWhere ID. This could be used by other organizations to access your [place] medical records  [id number]        Your Vitals Were     Pulse Temperature Respirations Height Pulse Oximetry Breastfeeding?    121 98.1  F (36.7  C) (Tympanic) 18 1.632 m (5' 4.25\") 97% No    BMI (Body Mass Index)                   36.04 kg/m2            Blood Pressure from Last 3 Encounters:   05/16/17 125/71   05/09/17 113/65   05/02/17 134/84    Weight from Last 3 Encounters:   05/16/17 96 kg (211 lb 9.6 oz)   05/09/17 94.1 kg (207 lb 6.4 oz)   05/02/17 91.4 kg (201 lb 9.6 oz)              Today, you had the following     No orders found for display       Primary Care Provider Office Phone # Fax #    Yenifer Ridley [phone]       [facility] [address] [facility] [address]        Thank you!     " Thank you for choosing [facility]  for your care. Our goal is always to provide you with excellent care. Hearing back from our patients is one way we can continue to improve our services. Please take a few minutes to complete the written survey that you may receive in the mail after your visit with us. Thank you!             Your Updated Medication List - Protect others around you: Learn how to safely use, store and throw away your medicines at www.disposemymeds.org.          This list is accurate as of: 5/16/17  9:23 AM.  Always use your most recent med list.                   Brand Name Dispense Instructions for use    ACIDOPHILUS PROBIOTIC 100 MG Caps          albuterol 108 (90 BASE) MCG/ACT Inhaler    PROAIR HFA/PROVENTIL HFA/VENTOLIN HFA    1 Inhaler    Inhale 2 puffs into the lungs every 6 hours as needed for shortness of breath / dyspnea       ALPRAZolam 0.5 MG tablet    XANAX     Reported on 5/16/2017       cyclobenzaprine 10 MG tablet    FLEXERIL    30 tablet    Take 1 tablet (10 mg) by mouth 3 times daily as needed for muscle spasms       * dexamethasone 4 MG tablet    DECADRON     Take 4 mg by mouth Reported on 5/16/2017       * dexamethasone 0.1 MG/ML solution     240 mL    Take 10 mLs (1 mg) by mouth daily       famotidine 20 MG tablet    PEPCID     Take 20 mg by mouth daily as needed       fluticasone 50 MCG/ACT spray    FLONASE    3 Bottle    Spray 1-2 sprays into both nostrils daily       gabapentin 300 MG capsule    NEURONTIN    90 capsule    Take 1 tablet (300 mg) every night for 1-3 days, then 1 tablet twice daily for 1-3 days, then 1 tablet three times daily       hydrOXYzine 10 MG tablet    ATARAX    60 tablet    Take 1-5 tablets (10-50 mg) by mouth every 6 hours as needed for anxiety       lidocaine 2 % solution    XYLOCAINE     Reported on 5/16/2017       lidocaine visc 2% 2.5mL/5mL & maalox/mylanta w/ simeth 2.5mL/5mL & diphenhydrAMINE 5mg/5mL Susp suspension    MAGIC Mouthwash  HOSPITAL    240 mL    Swish and swallow 10 mLs in mouth every 6 hours as needed for mouth sores       lidocaine-prilocaine cream    EMLA    30 g    Apply topically as needed for other (place dime size amount over port site prior to tx and cover with tegaderm)       loratadine 10 MG tablet    CLARITIN    90 tablet    Take 1 tablet (10 mg) by mouth daily       LORazepam 0.5 MG tablet    ATIVAN    30 tablet    Take 1 tablet (0.5 mg) by mouth nightly as needed for anxiety       montelukast 10 MG tablet    SINGULAIR    90 tablet    Take 1 tablet (10 mg) by mouth At Bedtime       MULTI COMPLETE PO          NAPROXEN SODIUM PO      Take 220 mg by mouth Reported on 5/16/2017       ondansetron 4 MG ODT tab    ZOFRAN ODT    20 tablet    Take 1-2 tablets (4-8 mg) by mouth every 6 hours as needed for nausea       oxyCODONE-acetaminophen 5-325 MG per tablet    PERCOCET    20 tablet    Take 1 tablet by mouth every 4 hours as needed for pain       phenazopyridine 200 MG tablet    PYRIDIUM    30 tablet    Take 1 tablet (200 mg) by mouth 3 times daily as needed for irritation       prochlorperazine 10 MG tablet    COMPAZINE    90 tablet    Take 1 tablet (10 mg) by mouth every 6 hours as needed for nausea or vomiting       scopolamine 72 hr patch    TRANSDERM    1 patch    Place 1 patch onto the skin every 72 hours.  Apply to hairless area behind one ear at least 4 hours before travel.  Remove old patch and change every 3 days.       VITAMIN B 12 PO          * Notice:  This list has 2 medication(s) that are the same as other medications prescribed for you. Read the directions carefully, and ask your doctor or other care provider to review them with you.

## 2017-05-16 NOTE — PATIENT INSTRUCTIONS
Dr. Jin recommends that you receive your last cycle of chemotherapy and would like to see you back approximately 2 weeks after your surgery in July at [facility].  When you are in need of a refill, please call your pharmacy and they will send us a request.  Copy of appointments, and after visit summary (AVS) given to patient.  If you have any questions please call Morena Henry RN, BSN, OCN Oncology Hematology  [facility] [phone]. For questions after business hours, or on holidays/weekends, please call our after hours Nurse Triage line [phone]. Thank you.         Chemo today.   She will have surgery in July with Darlene.,   F/u with lou.

## 2017-05-16 NOTE — PROGRESS NOTES
ONCOLOGY Follow up visit       COMPLAINT AND REASON FOR CONSULTATION:  12/2016 diagnosed right breast cancer on neoadjuvant DD AC   hx of TIFFANY due Celiac disease on IV iron prn     REFERRING/[facility] PHYSICIAN:  Dr. Yenifer Ridley       HERB Wilson is a 36-year-old premenopausal woman, self felt breast in her right armpit and right breast in 10/2016.  She observed it and due to persistency of the palpable lesions went on to have workup with diagnostic mammogram end of 12/2016, which identified 2 cm ill-defined density  associated with indeterminate microcalcification measuring about 3.2 cm, so the whole area spans about 4 cm in biggest dimension.   Ultrasound to the site which is the upper outer quadrant of the right breast identified 2 cm irregular solid mass; at the right axilla shows several abnormal lymph nodes, the largest one measures 1.8 cm in maximum dimension.  Ultrasound-guided biopsy of right breast was done 12/27/2016, identified grade 3 invasive ductal cancer, angiolymphatic invasion not identified.  ER/CA 99% positive, HER-2/kelle negative, associated with high-grade DCIS.    Right axillary ultrasound-guided biopsy indicating metastatic carcinoma positive for spread from breast primary, ER/CA both negative.  HER-2/kelle is negative.      She has clinical T1N1 disease. She made informed decision to proceed with neoadjuvant DDAC C1D1 1/16/2017. She has good clinical response post DD AC x4.   Then went on to have wkly taxol + carbo  with informed decision in March.   She has carbo hypersensitivity reaction in early May. It was d/c after.     She is tested negative for BRCA1/2 with [facility].      PAST MEDICAL HISTORY:  Restless legs, mild intermittent asthma, celiac disease diagnosed 02/2016, hx of TIFFANY     SOCIAL HISTORY:  Works part-time.  Lives with her .  They have 2 kids, first pregnancy age 21.  She did not do breast feeding because of bilateral breast reduction.  She does office work.     "  FAMILY HISTORY:  \"Full of cancer\" from the mother's side including colon, lung, pancreatic, gastric cancer.  According to the patient none of them survive older than 60 years old.      REVIEW OF SYSTEMS  Archness, bladder irritation, and fatigue.  + nausea needs compazine around the clock and use zofran prn.   Reports pin inside mouth & Neuropathy in hands & feet starts.      PHYSICAL EXAMINATION:   Blood pressure 125/71, pulse 121, temperature 98.1  F (36.7  C), temperature source Tympanic, resp. rate 18, height 1.632 m (5' 4.25\"), weight 96 kg (211 lb 9.6 oz), SpO2 97 %, not currently breastfeeding.  GENERAL APPEARANCE:  He young woman, looks like her stated age, very upbeat, not in acute distress.   HEENT: The patient is normocephalic, atraumatic. Pupils are equally reactive to light.  Sclerae are anicteric. erythematous oral mucosa.  - pharynx.  No oral thrush.   NECK:  Supple.  No jugular venous distention.  Thyroid is not palpable.   LYMPH NODES:  Superficial lymphadenopathy is not appreciable in the bilateral cervical, supraclavicular, axillary or inguinal areas.   CARDIOVASCULAR:  S1, S2 regular with no murmurs or gallops.  No carotid or abdominal bruits.   PULMONARY:  Lungs are clear to auscultation and percussion bilaterally.  There is no wheezing or rhonchi.   GASTROINTESTINAL:  Abdomen is soft, nontender.  No hepatosplenomegaly.  No signs of ascites.  No mass appreciable.   MUSCULOSKELETAL/EXTREMITIES:  No edema.  No cyanotic changes.  No signs of joint deformity.  No lymphedema.   NEUROLOGIC:  Cranial nerves II-XII are grossly intact.  Sensation intact.  Muscle strength and muscle tone symmetrical, 5/5 throughout.   BACK:  No spinal or paraspinal tenderness.  No CVA tenderness.   SKIN:  No petechiae.  No rash.  No signs of cellulitis.   BREASTS:  Bilateral breast exam reviewed.  Glandular tissue on both breasts in general, no longer palpable upper outer quadrant of the right breast mass, no longer " palpable right axillary lymphadenopathy. Right breast feels really soft.      LABORATORY DATA:   baseline labs cbc diff/cmp/marker are nl.  Wbc maintained from C2.      CURRENT IMAGING:   [facility]2017:   1. Improvement in biopsy-proven RIGHT-sided breast cancer at 10 o'clock. The malignancy has decreased in size (2.4 cm versus 3.3 cm previously)and   demonstrates more favorable enhancement kinetics.     2.  No residual enlarged RIGHT axillary lymph nodes.    1/2017 baseline Echo LV function nl, EF 60-65%     PET 1/2017  1. Hypermetabolic mass in the right breast consistent with known malignancy.  2. Two enlarged hypermetabolic metastatic lymph nodes in the right axilla.  3. No additional worrisome hypermetabolic areas in the neck, chest, abdomen, or pelvis.    Old data reviewed with summary  End of 12/2016  right breast was done 12/27/2016, identified grade 3 invasive ductal cancer, angiolymphatic invasion not identified.  ER/NE 99% positive, HER-2/kelle negative, associated with high-grade DCIS.    Right axillary ultrasound-guided biopsy indicating metastatic carcinoma positive for spread from breast primary, ER/NE both negative.  HER-2/kelle is negative.      Diagnostic mammogram end of 12/2016, which identified 2 cm ill-defined density  associated with indeterminate microcalcification measuring about 3.2 cm, so the whole area spans about 4 cm in biggest dimension.   Ultrasound to the site which is the upper outer quadrant of the right breast identified 2 cm irregular solid mass; at the right axilla shows several abnormal lymph nodes, the largest one measures 1.8 cm in maximum dimension.      DEXA scan from 03/2016 identified mild osteopenia lumbar spine        ASSESSMENT AND PLAN:    1.   12/2016 newly dx breast cancer,  high-grade invasive ductal cancer on her right breast, ER/NE 99% positive, HER-2/kelle negative cancer with associated high-grade DCIS.  The lymph nodes biopsy also came back  positive breast cancer but is triple negative disease. She is only 36 years old. She has cT2N1 disesae.      She has made an informed decision to proceed with neoadjuvant dose-dense AC C1D1 1/16/2017, followed by taxane. She would  like to add platinum despite her genetic testing is negative.   Due to severe fatigue and significant drop of her wbc post C1 Carbo AUC 5-6 and wkly taxol, changed to wkly carbo of AUC2 starting C2.   She has carbo hypersensitivity reaction in early May. It was d/c after.       She needs to be monitored closely during this chemo.    She is also informed the need of adjuvant radiation based on her initial presentation in terms of lymph node involvement.      [facility]    2. dysuria with taxol infusion, she has IV pepcid and benadryl, add oral Claritin. This has helped. Yet she needs to take the claritin everyday.      3. 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 She could not tolerate cymbalta. Advised gabapentin, which is helping.      4. Mucositis. Magic mouth wash is too strong for her. Advised 10 ml of ETOH free dex 0.1mg/1 ml, swish in mouth for 2 min then spit, 4 x per day. This helps her.

## 2017-05-23 ENCOUNTER — INFUSION THERAPY VISIT (OUTPATIENT)
Dept: INFUSION THERAPY | Facility: CLINIC | Age: 37
End: 2017-05-23
Attending: INTERNAL MEDICINE
Payer: COMMERCIAL

## 2017-05-23 ENCOUNTER — HOSPITAL ENCOUNTER (OUTPATIENT)
Facility: CLINIC | Age: 37
Discharge: HOME | End: 2017-05-23
Attending: INTERNAL MEDICINE | Admitting: INTERNAL MEDICINE
Payer: COMMERCIAL

## 2017-05-23 VITALS — TEMPERATURE: 97.8 F | SYSTOLIC BLOOD PRESSURE: 124 MMHG | DIASTOLIC BLOOD PRESSURE: 65 MMHG | HEART RATE: 93 BPM

## 2017-05-23 DIAGNOSIS — C50.411 MALIGNANT NEOPLASM OF UPPER-OUTER QUADRANT OF RIGHT FEMALE BREAST (H): Primary | ICD-10-CM

## 2017-05-23 LAB
BASOPHILS # BLD AUTO: 0 10E9/L (ref 0–0.2)
BASOPHILS NFR BLD AUTO: 0.3 %
CREAT SERPL-MCNC: 0.64 MG/DL (ref 0.52–1.04)
DIFFERENTIAL METHOD BLD: ABNORMAL
EOSINOPHIL # BLD AUTO: 0 10E9/L (ref 0–0.7)
EOSINOPHIL NFR BLD AUTO: 1.2 %
ERYTHROCYTE [DISTWIDTH] IN BLOOD BY AUTOMATED COUNT: 15.7 % (ref 10–15)
GFR SERPL CREATININE-BSD FRML MDRD: NORMAL ML/MIN/1.7M2
HCT VFR BLD AUTO: 28.8 % (ref 35–47)
HGB BLD-MCNC: 9.5 G/DL (ref 11.7–15.7)
IMM GRANULOCYTES # BLD: 0 10E9/L (ref 0–0.4)
IMM GRANULOCYTES NFR BLD: 0.3 %
LYMPHOCYTES # BLD AUTO: 1.3 10E9/L (ref 0.8–5.3)
LYMPHOCYTES NFR BLD AUTO: 39.1 %
MCH RBC QN AUTO: 33.7 PG (ref 26.5–33)
MCHC RBC AUTO-ENTMCNC: 33 G/DL (ref 31.5–36.5)
MCV RBC AUTO: 102 FL (ref 78–100)
MONOCYTES # BLD AUTO: 0.2 10E9/L (ref 0–1.3)
MONOCYTES NFR BLD AUTO: 7 %
NEUTROPHILS # BLD AUTO: 1.7 10E9/L (ref 1.6–8.3)
NEUTROPHILS NFR BLD AUTO: 52.1 %
PLATELET # BLD AUTO: 262 10E9/L (ref 150–450)
RBC # BLD AUTO: 2.82 10E12/L (ref 3.8–5.2)
WBC # BLD AUTO: 3.3 10E9/L (ref 4–11)

## 2017-05-23 PROCEDURE — 96375 TX/PRO/DX INJ NEW DRUG ADDON: CPT

## 2017-05-23 PROCEDURE — 25000128 H RX IP 250 OP 636: Performed by: INTERNAL MEDICINE

## 2017-05-23 PROCEDURE — 96413 CHEMO IV INFUSION 1 HR: CPT

## 2017-05-23 PROCEDURE — 25000125 ZZHC RX 250: Performed by: INTERNAL MEDICINE

## 2017-05-23 PROCEDURE — 85025 COMPLETE CBC W/AUTO DIFF WBC: CPT | Performed by: INTERNAL MEDICINE

## 2017-05-23 PROCEDURE — S0028 INJECTION, FAMOTIDINE, 20 MG: HCPCS | Performed by: INTERNAL MEDICINE

## 2017-05-23 PROCEDURE — 82565 ASSAY OF CREATININE: CPT | Performed by: INTERNAL MEDICINE

## 2017-05-23 RX ORDER — HEPARIN SODIUM (PORCINE) LOCK FLUSH IV SOLN 100 UNIT/ML 100 UNIT/ML
5 SOLUTION INTRAVENOUS
Status: DISCONTINUED | OUTPATIENT
Start: 2017-05-23 | End: 2017-05-23 | Stop reason: HOSPADM

## 2017-05-23 RX ADMIN — SODIUM CHLORIDE 250 ML: 9 INJECTION, SOLUTION INTRAVENOUS at 08:52

## 2017-05-23 RX ADMIN — FAMOTIDINE 20 MG: 20 INJECTION, SOLUTION INTRAVENOUS at 08:55

## 2017-05-23 RX ADMIN — DEXAMETHASONE SODIUM PHOSPHATE: 10 INJECTION, SOLUTION INTRAMUSCULAR; INTRAVENOUS at 09:15

## 2017-05-23 RX ADMIN — SODIUM CHLORIDE, PRESERVATIVE FREE 5 ML: 5 INJECTION INTRAVENOUS at 10:55

## 2017-05-23 RX ADMIN — DIPHENHYDRAMINE HYDROCHLORIDE 50 MG: 50 INJECTION, SOLUTION INTRAMUSCULAR; INTRAVENOUS at 09:28

## 2017-05-23 RX ADMIN — PACLITAXEL 159 MG: 6 INJECTION, SOLUTION INTRAVENOUS at 09:42

## 2017-05-23 NOTE — MR AVS SNAPSHOT
After Visit Summary   5/23/2017    Diana Wilson    MRN: [id number]           Patient Information     Date Of Birth          1980        Visit Information        Provider Department      5/23/2017 8:30 AM ROOM 9 [facility]        Today's Diagnoses     Malignant neoplasm of upper-outer quadrant of right female breast (H)    -  1       Follow-ups after your visit        Your next 10 appointments already scheduled     May 30, 2017  8:30 AM CDT   Level 3 with ROOM 7 [facility]fusion ([facility])    Counts include 234 beds at the [facility]  [address]  [facility] [address]   [phone]            Jul 25, 2017 11:15 AM CDT   Return Visit with Maribel Jin MD   [facility] ([facility])    Counts include 234 beds at the [facility]  [address]  [facility] [address]   [phone]              Who to contact     If you have questions or need follow up information about today's clinic visit or your schedule please contact [facility] directly at [phone].  Normal or non-critical lab and imaging results will be communicated to you by Oleryhart, letter or phone within 4 business days after the clinic has received the results. If you do not hear from us within 7 days, please contact the clinic through MOGt or phone. If you have a critical or abnormal lab result, we will notify you by phone as soon as possible.  Submit refill requests through Hadapt or call your pharmacy and they will forward the refill request to us. Please allow 3 business days for your refill to be completed.          Additional Information About Your Visit        MyChart Information     Hadapt gives you secure access to your electronic health record. If you see a primary care provider, you can also send messages to your care team and make appointments. If you have questions, please call your primary care clinic.  If you do not have a primary care  provider, please call [phone] and they will assist you.        Care EveryWhere ID     This is your Care EveryWhere ID. This could be used by other organizations to access your [place] medical records  [id number]        Your Vitals Were     Pulse Temperature                93 97.8  F (36.6  C) (Oral)           Blood Pressure from Last 3 Encounters:   05/23/17 124/65   05/16/17 125/71   05/16/17 109/59    Weight from Last 3 Encounters:   05/16/17 96 kg (211 lb 9.6 oz)   05/09/17 94.1 kg (207 lb 6.4 oz)   05/02/17 91.4 kg (201 lb 9.6 oz)              We Performed the Following     CBC with platelets differential     Creatinine     Treatment Conditions        Primary Care Provider Office Phone # Fax #    Yenifer Ridley [phone]       [facility] [address] [facility] [address]        Thank you!     Thank you for choosing [facility]  for your care. Our goal is always to provide you with excellent care. Hearing back from our patients is one way we can continue to improve our services. Please take a few minutes to complete the written survey that you may receive in the mail after your visit with us. Thank you!             Your Updated Medication List - Protect others around you: Learn how to safely use, store and throw away your medicines at www.disposemymeds.org.          This list is accurate as of: 5/23/17  2:36 PM.  Always use your most recent med list.                   Brand Name Dispense Instructions for use    ACIDOPHILUS PROBIOTIC 100 MG Caps          albuterol 108 (90 BASE) MCG/ACT Inhaler    PROAIR HFA/PROVENTIL HFA/VENTOLIN HFA    1 Inhaler    Inhale 2 puffs into the lungs every 6 hours as needed for shortness of breath / dyspnea       ALPRAZolam 0.5 MG tablet    XANAX     Reported on 5/16/2017       cyclobenzaprine 10 MG tablet    FLEXERIL    30 tablet    Take 1 tablet (10 mg) by mouth 3 times daily as needed for muscle spasms       * dexamethasone 4  MG tablet    DECADRON     Take 4 mg by mouth Reported on 5/16/2017       * dexamethasone 0.1 MG/ML solution     240 mL    Take 10 mLs (1 mg) by mouth daily       famotidine 20 MG tablet    PEPCID     Take 20 mg by mouth daily as needed       fluticasone 50 MCG/ACT spray    FLONASE    3 Bottle    Spray 1-2 sprays into both nostrils daily       gabapentin 300 MG capsule    NEURONTIN    90 capsule    Take 1 tablet (300 mg) every night for 1-3 days, then 1 tablet twice daily for 1-3 days, then 1 tablet three times daily       hydrOXYzine 10 MG tablet    ATARAX    60 tablet    Take 1-5 tablets (10-50 mg) by mouth every 6 hours as needed for anxiety       lidocaine 2 % solution    XYLOCAINE     Reported on 5/16/2017       lidocaine visc 2% 2.5mL/5mL & maalox/mylanta w/ simeth 2.5mL/5mL & diphenhydrAMINE 5mg/5mL Susp suspension    [facility]    240 mL    Swish and swallow 10 mLs in mouth every 6 hours as needed for mouth sores       lidocaine-prilocaine cream    EMLA    30 g    Apply topically as needed for other (place dime size amount over port site prior to tx and cover with tegaderm)       loratadine 10 MG tablet    CLARITIN    90 tablet    Take 1 tablet (10 mg) by mouth daily       LORazepam 0.5 MG tablet    ATIVAN    30 tablet    Take 1 tablet (0.5 mg) by mouth nightly as needed for anxiety       montelukast 10 MG tablet    SINGULAIR    90 tablet    Take 1 tablet (10 mg) by mouth At Bedtime       MULTI COMPLETE PO          NAPROXEN SODIUM PO      Take 220 mg by mouth Reported on 5/16/2017       ondansetron 4 MG ODT tab    ZOFRAN ODT    20 tablet    Take 1-2 tablets (4-8 mg) by mouth every 6 hours as needed for nausea       oxyCODONE-acetaminophen 5-325 MG per tablet    PERCOCET    20 tablet    Take 1 tablet by mouth every 4 hours as needed for pain       phenazopyridine 200 MG tablet    PYRIDIUM    30 tablet    Take 1 tablet (200 mg) by mouth 3 times daily as needed for irritation       prochlorperazine  10 MG tablet    COMPAZINE    90 tablet    Take 1 tablet (10 mg) by mouth every 6 hours as needed for nausea or vomiting       scopolamine 72 hr patch    TRANSDERM    1 patch    Place 1 patch onto the skin every 72 hours.  Apply to hairless area behind one ear at least 4 hours before travel.  Remove old patch and change every 3 days.       VITAMIN B 12 PO          * Notice:  This list has 2 medication(s) that are the same as other medications prescribed for you. Read the directions carefully, and ask your doctor or other care provider to review them with you.

## 2017-05-23 NOTE — PROGRESS NOTES
Infusion Nursing Note:  Diana Wilson presents today for C4D8.    Patient seen by provider today: No   present during visit today: Not Applicable.    Note: N/A.    Intravenous Access:  Implanted Port.    Treatment Conditions:  Lab Results   Component Value Date    HGB 9.5 05/23/2017     Lab Results   Component Value Date    WBC 3.3 05/23/2017      Lab Results   Component Value Date    ANEU 1.7 05/23/2017     Lab Results   Component Value Date     05/23/2017      Results reviewed, labs MET treatment parameters, ok to proceed with treatment.      Post Infusion Assessment:  Patient tolerated infusion without incident.  Blood return noted pre and post infusion.  Site patent and intact, free from redness, edema or discomfort.  No evidence of extravasations.  Access discontinued per protocol.    Discharge Plan:   Patient discharged in stable condition accompanied by: father.  Departure Mode: Ambulatory.    Rhonda Ovalles RN

## 2017-05-30 ENCOUNTER — HOSPITAL ENCOUNTER (OUTPATIENT)
Facility: CLINIC | Age: 37
Discharge: HOME | End: 2017-05-30
Attending: INTERNAL MEDICINE | Admitting: INTERNAL MEDICINE
Payer: COMMERCIAL

## 2017-05-30 ENCOUNTER — INFUSION THERAPY VISIT (OUTPATIENT)
Dept: INFUSION THERAPY | Facility: CLINIC | Age: 37
End: 2017-05-30
Attending: INTERNAL MEDICINE
Payer: COMMERCIAL

## 2017-05-30 VITALS — HEART RATE: 86 BPM | SYSTOLIC BLOOD PRESSURE: 120 MMHG | TEMPERATURE: 96.9 F | DIASTOLIC BLOOD PRESSURE: 76 MMHG

## 2017-05-30 DIAGNOSIS — C50.411 MALIGNANT NEOPLASM OF UPPER-OUTER QUADRANT OF RIGHT FEMALE BREAST (H): Primary | ICD-10-CM

## 2017-05-30 PROBLEM — D70.2 NEUTROPENIA, DRUG-INDUCED (H): Status: ACTIVE | Noted: 2017-05-30

## 2017-05-30 LAB
BASOPHILS # BLD AUTO: 0 10E9/L (ref 0–0.2)
BASOPHILS NFR BLD AUTO: 0.6 %
CREAT SERPL-MCNC: 0.64 MG/DL (ref 0.52–1.04)
DIFFERENTIAL METHOD BLD: ABNORMAL
EOSINOPHIL # BLD AUTO: 0 10E9/L (ref 0–0.7)
EOSINOPHIL NFR BLD AUTO: 1.2 %
ERYTHROCYTE [DISTWIDTH] IN BLOOD BY AUTOMATED COUNT: 16.6 % (ref 10–15)
GFR SERPL CREATININE-BSD FRML MDRD: NORMAL ML/MIN/1.7M2
HCT VFR BLD AUTO: 28.6 % (ref 35–47)
HGB BLD-MCNC: 9.3 G/DL (ref 11.7–15.7)
IMM GRANULOCYTES # BLD: 0.1 10E9/L (ref 0–0.4)
IMM GRANULOCYTES NFR BLD: 1.5 %
LYMPHOCYTES # BLD AUTO: 1.7 10E9/L (ref 0.8–5.3)
LYMPHOCYTES NFR BLD AUTO: 50.5 %
MCH RBC QN AUTO: 34.1 PG (ref 26.5–33)
MCHC RBC AUTO-ENTMCNC: 32.5 G/DL (ref 31.5–36.5)
MCV RBC AUTO: 105 FL (ref 78–100)
MONOCYTES # BLD AUTO: 0.4 10E9/L (ref 0–1.3)
MONOCYTES NFR BLD AUTO: 11.9 %
NEUTROPHILS # BLD AUTO: 1.1 10E9/L (ref 1.6–8.3)
NEUTROPHILS NFR BLD AUTO: 34.3 %
PLATELET # BLD AUTO: 326 10E9/L (ref 150–450)
RBC # BLD AUTO: 2.73 10E12/L (ref 3.8–5.2)
WBC # BLD AUTO: 3.3 10E9/L (ref 4–11)

## 2017-05-30 PROCEDURE — 25000128 H RX IP 250 OP 636: Performed by: INTERNAL MEDICINE

## 2017-05-30 PROCEDURE — 82565 ASSAY OF CREATININE: CPT | Performed by: INTERNAL MEDICINE

## 2017-05-30 PROCEDURE — 96375 TX/PRO/DX INJ NEW DRUG ADDON: CPT

## 2017-05-30 PROCEDURE — 85025 COMPLETE CBC W/AUTO DIFF WBC: CPT | Performed by: INTERNAL MEDICINE

## 2017-05-30 PROCEDURE — S0028 INJECTION, FAMOTIDINE, 20 MG: HCPCS | Performed by: INTERNAL MEDICINE

## 2017-05-30 PROCEDURE — 96413 CHEMO IV INFUSION 1 HR: CPT

## 2017-05-30 PROCEDURE — 96367 TX/PROPH/DG ADDL SEQ IV INF: CPT

## 2017-05-30 PROCEDURE — 25000125 ZZHC RX 250: Performed by: INTERNAL MEDICINE

## 2017-05-30 RX ORDER — HEPARIN SODIUM (PORCINE) LOCK FLUSH IV SOLN 100 UNIT/ML 100 UNIT/ML
5 SOLUTION INTRAVENOUS
Status: DISCONTINUED | OUTPATIENT
Start: 2017-05-30 | End: 2017-05-30 | Stop reason: HOSPADM

## 2017-05-30 RX ADMIN — SODIUM CHLORIDE 250 ML: 9 INJECTION, SOLUTION INTRAVENOUS at 08:59

## 2017-05-30 RX ADMIN — PACLITAXEL 159 MG: 6 INJECTION, SOLUTION INTRAVENOUS at 10:01

## 2017-05-30 RX ADMIN — DIPHENHYDRAMINE HYDROCHLORIDE 50 MG: 50 INJECTION, SOLUTION INTRAMUSCULAR; INTRAVENOUS at 09:08

## 2017-05-30 RX ADMIN — SODIUM CHLORIDE, PRESERVATIVE FREE 5 ML: 5 INJECTION INTRAVENOUS at 11:10

## 2017-05-30 RX ADMIN — FAMOTIDINE 20 MG: 20 INJECTION, SOLUTION INTRAVENOUS at 08:59

## 2017-05-30 RX ADMIN — DEXAMETHASONE SODIUM PHOSPHATE: 10 INJECTION, SOLUTION INTRAMUSCULAR; INTRAVENOUS at 09:20

## 2017-05-30 NOTE — PROGRESS NOTES
Infusion Nursing Note:  Diana Wilson presents today for Taxol.    Patient seen by provider today: No   present during visit today: Not Applicable.    Note: N/A.    Intravenous Access:  Implanted Port.    Treatment Conditions:  Lab Results   Component Value Date    HGB 9.3 05/30/2017     Lab Results   Component Value Date    WBC 3.3 05/30/2017      Lab Results   Component Value Date    ANEU 1.1 05/30/2017     Lab Results   Component Value Date     05/30/2017      Lab Results   Component Value Date     05/16/2017                   Lab Results   Component Value Date    POTASSIUM 3.5 05/16/2017           Lab Results   Component Value Date    MAG 2.3 03/02/2016            Lab Results   Component Value Date    CR 0.64 05/30/2017                   Lab Results   Component Value Date    DENY 8.2 05/16/2017                Lab Results   Component Value Date    BILITOTAL 0.3 05/16/2017           Lab Results   Component Value Date    ALBUMIN 3.3 05/16/2017                    Lab Results   Component Value Date    ALT 37 05/16/2017           Lab Results   Component Value Date    AST 14 05/16/2017     Results reviewed, labs did NOT meet treatment parameters, Dr Jin notified .  Proceed with chemo, Neulasta inj tomorrow.      Post Infusion Assessment:  Patient tolerated infusion without incident.  Blood return noted pre and post infusion.  Site patent and intact, free from redness, edema or discomfort.  No evidence of extravasations.  Access discontinued per protocol.    Discharge Plan:   Patient discharged in stable condition accompanied by: father.    Ana Laura Christopher RN

## 2017-05-30 NOTE — MR AVS SNAPSHOT
After Visit Summary   5/30/2017    Diana Wilson    MRN: [id number]           Patient Information     Date Of Birth          1980        Visit Information        Provider Department      5/30/2017 8:30 AM ROOM 7 [facility]        Today's Diagnoses     Malignant neoplasm of upper-outer quadrant of right female breast (H)    -  1       Follow-ups after your visit        Your next 10 appointments already scheduled     May 31, 2017  1:30 PM CDT   Level O with ROOM 10 [facility] ([facility])    [facility]  [address]  [facility] [address]   [phone]            Jul 25, 2017 11:15 AM CDT   Return Visit with Maribel Jin MD   [facility] ([facility])    [facility]  [address]  [facility] [address]   [phone]              Who to contact     If you have questions or need follow up information about today's clinic visit or your schedule please contact [facility] directly at [phone].  Normal or non-critical lab and imaging results will be communicated to you by [employer], letter or phone within 4 business days after the clinic has received the results. If you do not hear from us within 7 days, please contact the clinic through [employer] or phone. If you have a critical or abnormal lab result, we will notify you by phone as soon as possible.  Submit refill requests through [employer] or call your pharmacy and they will forward the refill request to us. Please allow 3 business days for your refill to be completed.          Additional Information About Your Visit        MyChart Information     [employer] gives you secure access to your electronic health record. If you see a primary care provider, you can also send messages to your care team and make appointments. If you have questions, please call your primary care clinic.  If you do not have a primary care  provider, please call [phone] and they will assist you.        Care EveryWhere ID     This is your Care EveryWhere ID. This could be used by other organizations to access your [place] medical records  [id number]        Your Vitals Were     Pulse Temperature                86 96.9  F (36.1  C) (Oral)           Blood Pressure from Last 3 Encounters:   05/30/17 120/76   05/23/17 124/65   05/16/17 125/71    Weight from Last 3 Encounters:   05/16/17 96 kg (211 lb 9.6 oz)   05/09/17 94.1 kg (207 lb 6.4 oz)   05/02/17 91.4 kg (201 lb 9.6 oz)              We Performed the Following     CBC with platelets differential     Creatinine        Primary Care Provider Office Phone # Fax #    Yenifer Ridley [phone]       [facility] [address] [facility] [address]        Thank you!     Thank you for choosing [facility]  for your care. Our goal is always to provide you with excellent care. Hearing back from our patients is one way we can continue to improve our services. Please take a few minutes to complete the written survey that you may receive in the mail after your visit with us. Thank you!             Your Updated Medication List - Protect others around you: Learn how to safely use, store and throw away your medicines at www.disposemymeds.org.          This list is accurate as of: 5/30/17 11:26 AM.  Always use your most recent med list.                   Brand Name Dispense Instructions for use    ACIDOPHILUS PROBIOTIC 100 MG Caps          albuterol 108 (90 BASE) MCG/ACT Inhaler    PROAIR HFA/PROVENTIL HFA/VENTOLIN HFA    1 Inhaler    Inhale 2 puffs into the lungs every 6 hours as needed for shortness of breath / dyspnea       ALPRAZolam 0.5 MG tablet    XANAX     Reported on 5/16/2017       cyclobenzaprine 10 MG tablet    FLEXERIL    30 tablet    Take 1 tablet (10 mg) by mouth 3 times daily as needed for muscle spasms       * dexamethasone 4 MG tablet    DECADRON      Take 4 mg by mouth Reported on 5/16/2017       * dexamethasone 0.1 MG/ML solution     240 mL    Take 10 mLs (1 mg) by mouth daily       famotidine 20 MG tablet    PEPCID     Take 20 mg by mouth daily as needed       fluticasone 50 MCG/ACT spray    FLONASE    3 Bottle    Spray 1-2 sprays into both nostrils daily       gabapentin 300 MG capsule    NEURONTIN    90 capsule    Take 1 tablet (300 mg) every night for 1-3 days, then 1 tablet twice daily for 1-3 days, then 1 tablet three times daily       hydrOXYzine 10 MG tablet    ATARAX    60 tablet    Take 1-5 tablets (10-50 mg) by mouth every 6 hours as needed for anxiety       lidocaine 2 % solution    XYLOCAINE     Reported on 5/16/2017       lidocaine visc 2% 2.5mL/5mL & maalox/mylanta w/ simeth 2.5mL/5mL & diphenhydrAMINE 5mg/5mL Susp suspension    [facility] Mouthwash [facility]    240 mL    Swish and swallow 10 mLs in mouth every 6 hours as needed for mouth sores       lidocaine-prilocaine cream    EMLA    30 g    Apply topically as needed for other (place dime size amount over port site prior to tx and cover with tegaderm)       loratadine 10 MG tablet    CLARITIN    90 tablet    Take 1 tablet (10 mg) by mouth daily       LORazepam 0.5 MG tablet    ATIVAN    30 tablet    Take 1 tablet (0.5 mg) by mouth nightly as needed for anxiety       montelukast 10 MG tablet    SINGULAIR    90 tablet    Take 1 tablet (10 mg) by mouth At Bedtime       MULTI COMPLETE PO          NAPROXEN SODIUM PO      Take 220 mg by mouth Reported on 5/16/2017       ondansetron 4 MG ODT tab    ZOFRAN ODT    20 tablet    Take 1-2 tablets (4-8 mg) by mouth every 6 hours as needed for nausea       oxyCODONE-acetaminophen 5-325 MG per tablet    PERCOCET    20 tablet    Take 1 tablet by mouth every 4 hours as needed for pain       phenazopyridine 200 MG tablet    PYRIDIUM    30 tablet    Take 1 tablet (200 mg) by mouth 3 times daily as needed for irritation       prochlorperazine 10 MG tablet    COMPAZINE     90 tablet    Take 1 tablet (10 mg) by mouth every 6 hours as needed for nausea or vomiting       scopolamine 72 hr patch    TRANSDERM    1 patch    Place 1 patch onto the skin every 72 hours.  Apply to hairless area behind one ear at least 4 hours before travel.  Remove old patch and change every 3 days.       VITAMIN B 12 PO          * Notice:  This list has 2 medication(s) that are the same as other medications prescribed for you. Read the directions carefully, and ask your doctor or other care provider to review them with you.

## 2017-05-31 ENCOUNTER — INFUSION THERAPY VISIT (OUTPATIENT)
Dept: INFUSION THERAPY | Facility: CLINIC | Age: 37
End: 2017-05-31
Attending: INTERNAL MEDICINE
Payer: COMMERCIAL

## 2017-05-31 VITALS — DIASTOLIC BLOOD PRESSURE: 74 MMHG | HEART RATE: 92 BPM | SYSTOLIC BLOOD PRESSURE: 126 MMHG | TEMPERATURE: 96.2 F

## 2017-05-31 DIAGNOSIS — D70.2 NEUTROPENIA, DRUG-INDUCED (H): Primary | ICD-10-CM

## 2017-05-31 DIAGNOSIS — C50.411 MALIGNANT NEOPLASM OF UPPER-OUTER QUADRANT OF RIGHT FEMALE BREAST (H): ICD-10-CM

## 2017-05-31 PROCEDURE — 25000128 H RX IP 250 OP 636: Performed by: INTERNAL MEDICINE

## 2017-05-31 PROCEDURE — 96372 THER/PROPH/DIAG INJ SC/IM: CPT

## 2017-05-31 RX ADMIN — PEGFILGRASTIM 6 MG: 6 INJECTION SUBCUTANEOUS at 13:31

## 2017-05-31 NOTE — MR AVS SNAPSHOT
After Visit Summary   5/31/2017    Diana Wilson    MRN: [id number]           Patient Information     Date Of Birth          1980        Visit Information        Provider Department      5/31/2017 1:30 PM ROOM 10 [facility]fusion        Today's Diagnoses     Neutropenia, drug-induced (H)    -  1    Malignant neoplasm of upper-outer quadrant of right female breast (H)           Follow-ups after your visit        Your next 10 appointments already scheduled     Jul 25, 2017 11:15 AM CDT   Return Visit with Maribel Jin MD   [facility] ([facility])    [facility]  [address]  [facility] [address] [phone]              Who to contact     If you have questions or need follow up information about today's clinic visit or your schedule please contact [facility]CER [facility] directly at [phone].  Normal or non-critical lab and imaging results will be communicated to you by MyChart, letter or phone within 4 business days after the clinic has received the results. If you do not hear from us within 7 days, please contact the clinic through [employer] or phone. If you have a critical or abnormal lab result, we will notify you by phone as soon as possible.  Submit refill requests through [employer] or call your pharmacy and they will forward the refill request to us. Please allow 3 business days for your refill to be completed.          Additional Information About Your Visit        MyChart Information     [employer] gives you secure access to your electronic health record. If you see a primary care provider, you can also send messages to your care team and make appointments. If you have questions, please call your primary care clinic.  If you do not have a primary care provider, please call [phone] and they will assist you.        Care EveryWhere ID     This is your Care EveryWhere ID. This could be used by other organizations to access  your [place] medical records  [id number]        Your Vitals Were     Pulse Temperature                92 96.2  F (35.7  C) (Oral)           Blood Pressure from Last 3 Encounters:   05/31/17 126/74   05/30/17 120/76   05/23/17 124/65    Weight from Last 3 Encounters:   05/16/17 96 kg (211 lb 9.6 oz)   05/09/17 94.1 kg (207 lb 6.4 oz)   05/02/17 91.4 kg (201 lb 9.6 oz)              Today, you had the following     No orders found for display       Primary Care Provider Office Phone # Fax #    Yenifer Ridley [phone]       [facility] [address] [facility] [address]        Thank you!     Thank you for choosing [facility]  for your care. Our goal is always to provide you with excellent care. Hearing back from our patients is one way we can continue to improve our services. Please take a few minutes to complete the written survey that you may receive in the mail after your visit with us. Thank you!             Your Updated Medication List - Protect others around you: Learn how to safely use, store and throw away your medicines at www.disposemymeds.org.          This list is accurate as of: 5/31/17  1:41 PM.  Always use your most recent med list.                   Brand Name Dispense Instructions for use    ACIDOPHILUS PROBIOTIC 100 MG Caps          albuterol 108 (90 BASE) MCG/ACT Inhaler    PROAIR HFA/PROVENTIL HFA/VENTOLIN HFA    1 Inhaler    Inhale 2 puffs into the lungs every 6 hours as needed for shortness of breath / dyspnea       ALPRAZolam 0.5 MG tablet    XANAX     Reported on 5/16/2017       cyclobenzaprine 10 MG tablet    FLEXERIL    30 tablet    Take 1 tablet (10 mg) by mouth 3 times daily as needed for muscle spasms       * dexamethasone 4 MG tablet    DECADRON     Take 4 mg by mouth Reported on 5/16/2017       * dexamethasone 0.1 MG/ML solution     240 mL    Take 10 mLs (1 mg) by mouth daily       famotidine 20 MG tablet    PEPCID     Take 20 mg by mouth  daily as needed       fluticasone 50 MCG/ACT spray    FLONASE    3 Bottle    Spray 1-2 sprays into both nostrils daily       gabapentin 300 MG capsule    NEURONTIN    90 capsule    Take 1 tablet (300 mg) every night for 1-3 days, then 1 tablet twice daily for 1-3 days, then 1 tablet three times daily       hydrOXYzine 10 MG tablet    ATARAX    60 tablet    Take 1-5 tablets (10-50 mg) by mouth every 6 hours as needed for anxiety       lidocaine 2 % solution    XYLOCAINE     Reported on 5/16/2017       lidocaine visc 2% 2.5mL/5mL & maalox/mylanta w/ simeth 2.5mL/5mL & diphenhydrAMINE 5mg/5mL Susp suspension    [facility]    240 mL    Swish and swallow 10 mLs in mouth every 6 hours as needed for mouth sores       lidocaine-prilocaine cream    EMLA    30 g    Apply topically as needed for other (place dime size amount over port site prior to tx and cover with tegaderm)       loratadine 10 MG tablet    CLARITIN    90 tablet    Take 1 tablet (10 mg) by mouth daily       LORazepam 0.5 MG tablet    ATIVAN    30 tablet    Take 1 tablet (0.5 mg) by mouth nightly as needed for anxiety       montelukast 10 MG tablet    SINGULAIR    90 tablet    Take 1 tablet (10 mg) by mouth At Bedtime       MULTI COMPLETE PO          NAPROXEN SODIUM PO      Take 220 mg by mouth Reported on 5/16/2017       ondansetron 4 MG ODT tab    ZOFRAN ODT    20 tablet    Take 1-2 tablets (4-8 mg) by mouth every 6 hours as needed for nausea       oxyCODONE-acetaminophen 5-325 MG per tablet    PERCOCET    20 tablet    Take 1 tablet by mouth every 4 hours as needed for pain       phenazopyridine 200 MG tablet    PYRIDIUM    30 tablet    Take 1 tablet (200 mg) by mouth 3 times daily as needed for irritation       prochlorperazine 10 MG tablet    COMPAZINE    90 tablet    Take 1 tablet (10 mg) by mouth every 6 hours as needed for nausea or vomiting       scopolamine 72 hr patch    TRANSDERM    1 patch    Place 1 patch onto the skin every 72  hours.  Apply to hairless area behind one ear at least 4 hours before travel.  Remove old patch and change every 3 days.       VITAMIN B 12 PO          * Notice:  This list has 2 medication(s) that are the same as other medications prescribed for you. Read the directions carefully, and ask your doctor or other care provider to review them with you.

## 2017-06-03 ENCOUNTER — NURSE TRIAGE (OUTPATIENT)
Dept: NURSING | Facility: CLINIC | Age: 37
End: 2017-06-03

## 2017-06-03 ENCOUNTER — APPOINTMENT (OUTPATIENT)
Dept: GENERAL RADIOLOGY | Facility: CLINIC | Age: 37
End: 2017-06-03
Attending: EMERGENCY MEDICINE
Payer: COMMERCIAL

## 2017-06-03 ENCOUNTER — HOSPITAL ENCOUNTER (EMERGENCY)
Facility: CLINIC | Age: 37
Discharge: HOME | End: 2017-06-03
Attending: EMERGENCY MEDICINE | Admitting: EMERGENCY MEDICINE
Payer: COMMERCIAL

## 2017-06-03 VITALS
SYSTOLIC BLOOD PRESSURE: 140 MMHG | HEIGHT: 64 IN | HEART RATE: 120 BPM | DIASTOLIC BLOOD PRESSURE: 87 MMHG | OXYGEN SATURATION: 99 % | BODY MASS INDEX: 35.85 KG/M2 | RESPIRATION RATE: 22 BRPM | TEMPERATURE: 98.4 F | WEIGHT: 210 LBS

## 2017-06-03 DIAGNOSIS — R05.9 COUGH: ICD-10-CM

## 2017-06-03 DIAGNOSIS — R06.02 SOB (SHORTNESS OF BREATH): ICD-10-CM

## 2017-06-03 LAB
ALBUMIN SERPL-MCNC: 3.4 G/DL (ref 3.4–5)
ALP SERPL-CCNC: 114 U/L (ref 40–150)
ALT SERPL W P-5'-P-CCNC: 49 U/L (ref 0–50)
ANION GAP SERPL CALCULATED.3IONS-SCNC: 9 MMOL/L (ref 3–14)
ANISOCYTOSIS BLD QL SMEAR: SLIGHT
AST SERPL W P-5'-P-CCNC: 31 U/L (ref 0–45)
BASE EXCESS BLDV CALC-SCNC: 3.3 MMOL/L
BASOPHILS # BLD AUTO: 0.2 10E9/L (ref 0–0.2)
BASOPHILS NFR BLD AUTO: 1 %
BILIRUB SERPL-MCNC: 0.3 MG/DL (ref 0.2–1.3)
BUN SERPL-MCNC: 9 MG/DL (ref 7–30)
CALCIUM SERPL-MCNC: 9 MG/DL (ref 8.5–10.1)
CHLORIDE SERPL-SCNC: 103 MMOL/L (ref 94–109)
CO2 SERPL-SCNC: 27 MMOL/L (ref 20–32)
CREAT SERPL-MCNC: 0.9 MG/DL (ref 0.52–1.04)
DIFFERENTIAL METHOD BLD: ABNORMAL
EOSINOPHIL # BLD AUTO: 0 10E9/L (ref 0–0.7)
EOSINOPHIL NFR BLD AUTO: 0 %
ERYTHROCYTE [DISTWIDTH] IN BLOOD BY AUTOMATED COUNT: 17.2 % (ref 10–15)
GFR SERPL CREATININE-BSD FRML MDRD: 71 ML/MIN/1.7M2
GLUCOSE SERPL-MCNC: 148 MG/DL (ref 70–99)
HCO3 BLDV-SCNC: 28 MMOL/L (ref 21–28)
HCT VFR BLD AUTO: 25.2 % (ref 35–47)
HGB BLD-MCNC: 8.3 G/DL (ref 11.7–15.7)
LACTATE BLD-SCNC: 2.4 MMOL/L (ref 0.7–2.1)
LACTATE BLD-SCNC: 3.2 MMOL/L (ref 0.7–2.1)
LYMPHOCYTES # BLD AUTO: 4.9 10E9/L (ref 0.8–5.3)
LYMPHOCYTES NFR BLD AUTO: 22 %
MCH RBC QN AUTO: 34.7 PG (ref 26.5–33)
MCHC RBC AUTO-ENTMCNC: 32.9 G/DL (ref 31.5–36.5)
MCV RBC AUTO: 105 FL (ref 78–100)
MONOCYTES # BLD AUTO: 1.8 10E9/L (ref 0–1.3)
MONOCYTES NFR BLD AUTO: 8 %
NEUTROPHILS # BLD AUTO: 15.4 10E9/L (ref 1.6–8.3)
NEUTROPHILS NFR BLD AUTO: 69 %
NRBC # BLD AUTO: 0.4 10*3/UL
NRBC BLD AUTO-RTO: 2 /100
PCO2 BLDV: 41 MM HG (ref 40–50)
PH BLDV: 7.44 PH (ref 7.32–7.43)
PLATELET # BLD AUTO: 288 10E9/L (ref 150–450)
PLATELET # BLD EST: NORMAL 10*3/UL
PO2 BLDV: 32 MM HG (ref 25–47)
POTASSIUM SERPL-SCNC: 3.7 MMOL/L (ref 3.4–5.3)
PROT SERPL-MCNC: 6.5 G/DL (ref 6.8–8.8)
RBC # BLD AUTO: 2.39 10E12/L (ref 3.8–5.2)
SODIUM SERPL-SCNC: 139 MMOL/L (ref 133–144)
WBC # BLD AUTO: 22.3 10E9/L (ref 4–11)

## 2017-06-03 PROCEDURE — 40000275 ZZH STATISTIC RCP TIME EA 10 MIN

## 2017-06-03 PROCEDURE — 87040 BLOOD CULTURE FOR BACTERIA: CPT | Performed by: EMERGENCY MEDICINE

## 2017-06-03 PROCEDURE — 94640 AIRWAY INHALATION TREATMENT: CPT | Mod: 76

## 2017-06-03 PROCEDURE — 99285 EMERGENCY DEPT VISIT HI MDM: CPT | Performed by: EMERGENCY MEDICINE

## 2017-06-03 PROCEDURE — 25000128 H RX IP 250 OP 636: Performed by: EMERGENCY MEDICINE

## 2017-06-03 PROCEDURE — 85025 COMPLETE CBC W/AUTO DIFF WBC: CPT | Performed by: EMERGENCY MEDICINE

## 2017-06-03 PROCEDURE — 96366 THER/PROPH/DIAG IV INF ADDON: CPT

## 2017-06-03 PROCEDURE — 82803 BLOOD GASES ANY COMBINATION: CPT | Performed by: EMERGENCY MEDICINE

## 2017-06-03 PROCEDURE — 25000125 ZZHC RX 250: Performed by: EMERGENCY MEDICINE

## 2017-06-03 PROCEDURE — 83605 ASSAY OF LACTIC ACID: CPT | Mod: 91 | Performed by: EMERGENCY MEDICINE

## 2017-06-03 PROCEDURE — 80053 COMPREHEN METABOLIC PANEL: CPT | Performed by: EMERGENCY MEDICINE

## 2017-06-03 PROCEDURE — 96365 THER/PROPH/DIAG IV INF INIT: CPT

## 2017-06-03 PROCEDURE — 71020 XR CHEST 2 VW: CPT

## 2017-06-03 PROCEDURE — 99284 EMERGENCY DEPT VISIT MOD MDM: CPT | Mod: 25

## 2017-06-03 PROCEDURE — 94640 AIRWAY INHALATION TREATMENT: CPT

## 2017-06-03 RX ORDER — HEPARIN SODIUM (PORCINE) LOCK FLUSH IV SOLN 100 UNIT/ML 100 UNIT/ML
500 SOLUTION INTRAVENOUS
Status: COMPLETED | OUTPATIENT
Start: 2017-06-03 | End: 2017-06-03

## 2017-06-03 RX ORDER — LIDOCAINE 40 MG/G
CREAM TOPICAL
Status: DISCONTINUED | OUTPATIENT
Start: 2017-06-03 | End: 2017-06-04 | Stop reason: HOSPADM

## 2017-06-03 RX ORDER — LEVOFLOXACIN 5 MG/ML
750 INJECTION, SOLUTION INTRAVENOUS EVERY 24 HOURS
Status: DISCONTINUED | OUTPATIENT
Start: 2017-06-03 | End: 2017-06-04 | Stop reason: HOSPADM

## 2017-06-03 RX ORDER — LEVOFLOXACIN 750 MG/1
750 TABLET, FILM COATED ORAL DAILY
Qty: 5 TABLET | Refills: 0 | Status: SHIPPED | OUTPATIENT
Start: 2017-06-03 | End: 2017-06-30

## 2017-06-03 RX ORDER — IPRATROPIUM BROMIDE AND ALBUTEROL SULFATE 2.5; .5 MG/3ML; MG/3ML
3 SOLUTION RESPIRATORY (INHALATION) ONCE
Status: COMPLETED | OUTPATIENT
Start: 2017-06-03 | End: 2017-06-03

## 2017-06-03 RX ORDER — SODIUM CHLORIDE 9 MG/ML
1000 INJECTION, SOLUTION INTRAVENOUS CONTINUOUS
Status: DISCONTINUED | OUTPATIENT
Start: 2017-06-03 | End: 2017-06-04 | Stop reason: HOSPADM

## 2017-06-03 RX ORDER — ALBUTEROL SULFATE 0.83 MG/ML
1 SOLUTION RESPIRATORY (INHALATION) EVERY 6 HOURS PRN
Qty: 75 ML | Refills: 0 | Status: SHIPPED | OUTPATIENT
Start: 2017-06-03 | End: 2017-11-24

## 2017-06-03 RX ADMIN — IPRATROPIUM BROMIDE AND ALBUTEROL SULFATE 3 ML: .5; 3 SOLUTION RESPIRATORY (INHALATION) at 23:20

## 2017-06-03 RX ADMIN — LEVOFLOXACIN 750 MG: 5 INJECTION, SOLUTION INTRAVENOUS at 21:39

## 2017-06-03 RX ADMIN — SODIUM CHLORIDE, PRESERVATIVE FREE 500 UNITS: 5 INJECTION INTRAVENOUS at 23:55

## 2017-06-03 RX ADMIN — SODIUM CHLORIDE 1000 ML: 9 INJECTION, SOLUTION INTRAVENOUS at 21:52

## 2017-06-03 RX ADMIN — IPRATROPIUM BROMIDE AND ALBUTEROL SULFATE 3 ML: .5; 3 SOLUTION RESPIRATORY (INHALATION) at 20:46

## 2017-06-03 RX ADMIN — SODIUM CHLORIDE 1000 ML: 9 INJECTION, SOLUTION INTRAVENOUS at 21:38

## 2017-06-03 ASSESSMENT — ENCOUNTER SYMPTOMS
SHORTNESS OF BREATH: 1
COUGH: 1
ABDOMINAL PAIN: 0
FEVER: 0

## 2017-06-03 NOTE — ED AVS SNAPSHOT
[facility] Emergency Department    [address] [facility] [address]    Phone:  [phone]    Fax:  [phone]                                       Diana Wilson   MRN: [id number]    Department:  [facility] Emergency Department   Date of Visit:  6/3/2017           Patient Information     Date Of Birth          1980        Your diagnoses for this visit were:     Cough     SOB (shortness of breath)        You were seen by Eugenio Hicks MD.        Discharge Instructions       Take antibiotics as prescribed.   Follow up with Oncology if continued symptoms or return if severe worsening of symptoms.          Cough, Chronic, Uncertain Cause (Adult)    Everyone has had a cough as part of the common cold, flu, or bronchitis. This kind of cough occurs along with an achy feeling, low-grade fever, nasal and sinus congestion, and a scratchy or sore throat. This usually gets better in 2 to 3 weeks. A cough that lasts longer than 3 weeks may be due to other causes.  If your cough does not improve over the next 2 weeks, further testing may be needed. Follow up with your healthcare provider as advised. Cough suppressants may be recommended. Based on your exam today, the exact cause of your cough is not certain. Below are some common causes for persistent cough.  Smokers cough   Smoker s cough  doesn t go away. If you continue to smoke, it only gets worse. The cough is from irritation in the air passages. Talk to your healthcare provider about quitting. Medicines or nicotine-replacement products, like gum or the patch, may make quitting easier.  Postnasal drip  A cough that is worse at night may be due to postnasal drip. Excess mucus in the nose drains from the back of your nose to your throat. This triggers the cough reflex. Postnasal drip may be due to a sinus infection or allergy. Common allergens include dust, tobacco smoke (both inhaled and secondhand smoke), environmental pollutants, pollen,  mold, pets, cleaning agents, room deodorizers, and chemical fumes. Over-the-counter antihistamines or decongestants may be helpful for allergies. A sinus infection may requires antibiotic treatment. See your healthcare provider if symptoms continue.  Medicines  Certain prescribed medicines can cause a chronic cough in some people:    ACE inhibitors for high blood pressure. These include benazepril, captopril, enalapril, fosinopril, lisinopril, quinapril, ramipril, and others.    Beta-blockers for high blood pressure and other conditions. These include propranolol, atenolol, metoprolol, nadolol, and others.  Let your healthcare provider know if you are taking any of these.  Asthma  Cough may be the only sign of mild asthma. You may have tests to find out if asthma is causing your cough. You may also take asthma medicine on a trial basis.  Acid reflux (heartburn, GERD)   The esophagus is the tube that carries food from the mouth to the stomach. A valve at its lower end prevents stomach acids from flowing upward. If this valve does not work properly, acid from the stomach enters the esophagus. This may cause a burning pain in the upper abdomen or lower chest, belching, or cough. Symptoms are often worse when lying flat. Avoid eating or drinking before bedtime. Try using extra pillows to raise your upper body, or place 4-inch blocks under the head of your bed. You may try an over-the-counter antacid or an acid-blocking medicine such as famotidine, cimetidine, ranitidine, esomeprazole, lansoprazole, or omeprazole. Stronger medicines for this condition can be prescribed by your healthcare provider.  Follow-up care  Follow up with your healthcare provider, or as advised, if your cough does not improve. Further testing may be needed.  (Note: If an X-ray was taken, a specialist will review it. You will be notified of any new findings that may affect your care.)  When to seek medical advice  Call your healthcare provider  right away if any of these occur:    Mild wheezing or difficulty breathing    Fever of 100.4 F (38 C) or higher, or as directed by your healthcare provider    Unexpected weight loss    Coughing up large amounts of colored sputum    Night sweats (sheets and pajamas get soaking wet)  Call 911, or get immediate medical care  Contact emergency services right away if any of these occur:    Coughing up blood    Moderate to severe trouble breathing or wheezing    5505-1979 [employer]. [address] [place] [address], [place] [id number]. All rights reserved. This information is not intended as a substitute for professional medical care. Always follow your healthcare professional's instructions.          Shortness of Breath (Dyspnea)  Shortness of breath is the feeling that you can't catch your breath or get enough air. It is also known as dyspnea.  Dyspnea can be caused by many different conditions. They include:    Acute asthma attack.    Worsening of chronic lung diseases such as chronic bronchitis and emphysema.     Heart failure. This is when weak heart muscle allows extra fluid to collect in the lungs.    Panic attacks or anxiety. Fear can cause rapid breathing (hyperventilation).    Pneumonia, or an infection in the lung tissue.    Exposure to toxic substances, fumes, smoke, or certain medicines.    Blood clot in the lung (pulmonary embolism). This is often from a piece of blood clot in a deep vein of the leg (deep vein thrombosis) that breaks off and travels to the lungs.     Heart attack or heart-related chest pain (angina).    Anemia.    Collapsed lung (pneumothorax).    Dehydration.    Pregnancy.  Based on your visit today, the exact cause of your shortness of breath is not certain. Your tests don t show any of the serious causes of dyspnea. You may need other tests to find out if you have a serious problem. It s important to watch for any new symptoms or symptoms that get worse. Follow up with your  healthcare provider as directed.  Home care  Follow these tips to take care of yourself at home:    When your symptoms are better, go back to your usual activities.    If you smoke, you should stop. Join a quit-smoking program or ask your healthcare provider for help.    Eat a healthy diet and get plenty of sleep.    Get regular exercise. Talk with your healthcare provider before starting to exercise, especially if you have other medical problems.    Cut down on the amount of caffeine and stimulants you consume.  Follow-up care  Follow up with your healthcare provider, or as advised.  If tests were done, you will be told if your treatment needs to be changed. You can call as directed for the results.  (Note: If an X-ray was taken, a specialist will review it. You will be notified of any new findings that may affect your care.)  Call 911 or get immediate medical care  Shortness of breath may be a sign of a serious medical problem. For example, it may be a problem with your heart or lungs. Call 911 if you have worsening shortness of breath or trouble breathing, especially with any of the symptoms below:    You are confused or it s difficult to wake you.    You faint or lose consciousness.    You have a fast heartbeat, or your heartbeat is irregular.     You are coughing up blood.    You have pain in your chest, arm, shoulder, neck, or upper back.    You break out in a sweat.  When to seek medical advice  Call your healthcare provider right away if any of these occur:    Slight shortness of breath or wheezing     Redness, pain or swelling in your leg, arm, or other body area    Swelling in both legs or ankles    Fast weight gain    Dizziness or weakness    Fever of 100.4 F (38 C) or higher, or as directed by your healthcare provider    6370-4283 [employer]. [address] [place] [address], [place] [address]. All rights reserved. This information is not intended as a substitute for professional medical care.  Always follow your healthcare professional's instructions.          Future Appointments        Provider Department Dept Phone Center    7/25/2017 11:15 AM Maribel Jin MD, MD [facility] [phone] [facility]      24 Hour Appointment Hotline       To make an appointment at any [facility], call [phone] ([phone]). If you don't have a family doctor or clinic, we will help you find one. [facility] are conveniently located to serve the needs of you and your family.             Review of your medicines      START taking        Dose / Directions Last dose taken    levofloxacin 750 MG tablet   Commonly known as:  LEVAQUIN   Dose:  750 mg   Quantity:  5 tablet        Take 1 tablet (750 mg) by mouth daily   Refills:  0          CONTINUE these medicines which may have CHANGED, or have new prescriptions. If we are uncertain of the size of tablets/capsules you have at home, strength may be listed as something that might have changed.        Dose / Directions Last dose taken    * albuterol 108 (90 BASE) MCG/ACT Inhaler   Commonly known as:  PROAIR HFA/PROVENTIL HFA/VENTOLIN HFA   Dose:  2 puff   What changed:  Another medication with the same name was added. Make sure you understand how and when to take each.   Quantity:  1 Inhaler        Inhale 2 puffs into the lungs every 6 hours as needed for shortness of breath / dyspnea   Refills:  11        * albuterol (2.5 MG/3ML) 0.083% neb solution   Dose:  1 vial   What changed:  You were already taking a medication with the same name, and this prescription was added. Make sure you understand how and when to take each.   Quantity:  75 mL        Take 1 vial (2.5 mg) by nebulization every 6 hours as needed for shortness of breath / dyspnea or wheezing   Refills:  0        * Notice:  This list has 2 medication(s) that are the same as other medications prescribed for you. Read the directions carefully, and ask your doctor or other care provider to review  them with you.      Our records show that you are taking the medicines listed below. If these are incorrect, please call your family doctor or clinic.        Dose / Directions Last dose taken    ACIDOPHILUS PROBIOTIC 100 MG Caps        Refills:  0        ALPRAZolam 0.5 MG tablet   Commonly known as:  XANAX        Reported on 5/16/2017   Refills:  0        cyclobenzaprine 10 MG tablet   Commonly known as:  FLEXERIL   Dose:  10 mg   Quantity:  30 tablet        Take 1 tablet (10 mg) by mouth 3 times daily as needed for muscle spasms   Refills:  0        * dexamethasone 4 MG tablet   Commonly known as:  DECADRON   Dose:  4 mg        Take 4 mg by mouth Reported on 5/16/2017   Refills:  0        * dexamethasone 0.1 MG/ML solution   Dose:  1 mg   Quantity:  240 mL        Take 10 mLs (1 mg) by mouth daily   Refills:  1        famotidine 20 MG tablet   Commonly known as:  PEPCID   Dose:  20 mg        Take 20 mg by mouth daily as needed   Refills:  0        fluticasone 50 MCG/ACT spray   Commonly known as:  FLONASE   Dose:  1-2 spray   Quantity:  3 Bottle        Spray 1-2 sprays into both nostrils daily   Refills:  3        gabapentin 300 MG capsule   Commonly known as:  NEURONTIN   Quantity:  90 capsule        Take 1 tablet (300 mg) every night for 1-3 days, then 1 tablet twice daily for 1-3 days, then 1 tablet three times daily   Refills:  0        hydrOXYzine 10 MG tablet   Commonly known as:  ATARAX   Dose:  10-50 mg   Quantity:  60 tablet        Take 1-5 tablets (10-50 mg) by mouth every 6 hours as needed for anxiety   Refills:  1        lidocaine 2 % solution   Commonly known as:  XYLOCAINE        Reported on 5/16/2017   Refills:  0        lidocaine visc 2% 2.5mL/5mL & maalox/mylanta w/ simeth 2.5mL/5mL & diphenhydrAMINE 5mg/5mL Susp suspension   Commonly known as:  [facility] Mouthwash [facility]   Dose:  10 mL   Quantity:  240 mL        Swish and swallow 10 mLs in mouth every 6 hours as needed for mouth sores   Refills:  1         lidocaine-prilocaine cream   Commonly known as:  EMLA   Quantity:  30 g        Apply topically as needed for other (place dime size amount over port site prior to tx and cover with tegaderm)   Refills:  1        loratadine 10 MG tablet   Commonly known as:  CLARITIN   Dose:  10 mg   Quantity:  90 tablet        Take 1 tablet (10 mg) by mouth daily   Refills:  3        LORazepam 0.5 MG tablet   Commonly known as:  ATIVAN   Dose:  0.5 mg   Quantity:  30 tablet        Take 1 tablet (0.5 mg) by mouth nightly as needed for anxiety   Refills:  1        montelukast 10 MG tablet   Commonly known as:  SINGULAIR   Dose:  10 mg   Quantity:  90 tablet        Take 1 tablet (10 mg) by mouth At Bedtime   Refills:  3        MULTI COMPLETE PO        Refills:  0        NAPROXEN SODIUM PO   Dose:  220 mg        Take 220 mg by mouth Reported on 5/16/2017   Refills:  0        ondansetron 4 MG ODT tab   Commonly known as:  ZOFRAN ODT   Dose:  4-8 mg   Quantity:  20 tablet        Take 1-2 tablets (4-8 mg) by mouth every 6 hours as needed for nausea   Refills:  3        oxyCODONE-acetaminophen 5-325 MG per tablet   Commonly known as:  PERCOCET   Dose:  1 tablet   Quantity:  20 tablet        Take 1 tablet by mouth every 4 hours as needed for pain   Refills:  0        phenazopyridine 200 MG tablet   Commonly known as:  PYRIDIUM   Dose:  200 mg   Quantity:  30 tablet        Take 1 tablet (200 mg) by mouth 3 times daily as needed for irritation   Refills:  1        prochlorperazine 10 MG tablet   Commonly known as:  COMPAZINE   Dose:  10 mg   Quantity:  90 tablet        Take 1 tablet (10 mg) by mouth every 6 hours as needed for nausea or vomiting   Refills:  1        scopolamine 72 hr patch   Commonly known as:  TRANSDERM   Quantity:  1 patch        Place 1 patch onto the skin every 72 hours.  Apply to hairless area behind one ear at least 4 hours before travel.  Remove old patch and change every 3 days.   Refills:  0        VITAMIN B 12  PO        Refills:  0        * Notice:  This list has 2 medication(s) that are the same as other medications prescribed for you. Read the directions carefully, and ask your doctor or other care provider to review them with you.            Prescriptions were sent or printed at these locations (2 Prescriptions)                   Other Prescriptions                Printed at Department/Unit printer (2 of 2)         albuterol (2.5 MG/3ML) 0.083% neb solution               levofloxacin (LEVAQUIN) 750 MG tablet                Procedures and tests performed during your visit     Procedure/Test Number of Times Performed    Blood culture 2    Blood gas venous 1    CBC with platelets differential 1    Chest XR,  PA & LAT 1    Comprehensive metabolic panel 1    Lactic acid whole blood 2      Orders Needing Specimen Collection     None      Pending Results     Date and Time Order Name Status Description    6/3/2017 2116 Blood culture In process     6/3/2017 2116 Blood culture In process             Pending Culture Results     Date and Time Order Name Status Description    6/3/2017 2116 Blood culture In process     6/3/2017 2116 Blood culture In process             Pending Results Instructions     If you had any lab results that were not finalized at the time of your Discharge, you can call the ED Lab Result RN at [phone]. You will be contacted by this team for any positive Lab results or changes in treatment. The nurses are available 7 days a week from 10A to 6:30P.  You can leave a message 24 hours per day and they will return your call.        Test Results From Your Hospital Stay        6/3/2017  9:19 PM      Component Results     Component Value Ref Range & Units Status    Sodium 139 133 - 144 mmol/L Final    Potassium 3.7 3.4 - 5.3 mmol/L Final    Chloride 103 94 - 109 mmol/L Final    Carbon Dioxide 27 20 - 32 mmol/L Final    Anion Gap 9 3 - 14 mmol/L Final    Glucose 148 (H) 70 - 99 mg/dL Final    Urea Nitrogen 9 7 -  30 mg/dL Final    Creatinine 0.90 0.52 - 1.04 mg/dL Final    GFR Estimate 71 >60 mL/min/1.7m2 Final    Non  GFR Calc    GFR Estimate If Black 85 >60 mL/min/1.7m2 Final    African American GFR Calc    Calcium 9.0 8.5 - 10.1 mg/dL Final    Bilirubin Total 0.3 0.2 - 1.3 mg/dL Final    Albumin 3.4 3.4 - 5.0 g/dL Final    Protein Total 6.5 (L) 6.8 - 8.8 g/dL Final    Alkaline Phosphatase 114 40 - 150 U/L Final    ALT 49 0 - 50 U/L Final    AST 31 0 - 45 U/L Final         6/3/2017 10:10 PM      Component Results     Component Value Ref Range & Units Status    WBC 22.3 (H) 4.0 - 11.0 10e9/L Final    RBC Count 2.39 (L) 3.8 - 5.2 10e12/L Final    Hemoglobin 8.3 (L) 11.7 - 15.7 g/dL Final    Hematocrit 25.2 (L) 35.0 - 47.0 % Final     (H) 78 - 100 fl Final    MCH 34.7 (H) 26.5 - 33.0 pg Final    MCHC 32.9 31.5 - 36.5 g/dL Final    RDW 17.2 (H) 10.0 - 15.0 % Final    Platelet Count 288 150 - 450 10e9/L Final    Diff Method Manual Differential  Final    % Neutrophils 69.0 % Final    % Lymphocytes 22.0 % Final    % Monocytes 8.0 % Final    % Eosinophils 0.0 % Final    % Basophils 1.0 % Final    Nucleated RBCs 2 (H) 0 /100 Final    Absolute Neutrophil 15.4 (H) 1.6 - 8.3 10e9/L Final    Absolute Lymphocytes 4.9 0.8 - 5.3 10e9/L Final    Absolute Monocytes 1.8 (H) 0.0 - 1.3 10e9/L Final    Absolute Eosinophils 0.0 0.0 - 0.7 10e9/L Final    Absolute Basophils 0.2 0.0 - 0.2 10e9/L Final    Absolute Nucleated RBC 0.4  Final    Anisocytosis Slight  Final    Platelet Estimate Normal  Final         6/3/2017  9:17 PM      Component Results     Component Value Ref Range & Units Status    Lactic Acid 3.2 (H) 0.7 - 2.1 mmol/L Final    Significant value called to and read back by  SCOTTIE RENTERIA RN 06/03/2017 2105 YW           6/3/2017  9:42 PM      Component Results     Component Value Ref Range & Units Status    Ph Venous 7.44 (H) 7.32 - 7.43 pH Final    PCO2 Venous 41 40 - 50 mm Hg Final    PO2 Venous 32 25 - 47 mm  Hg Final    Bicarbonate Venous 28 21 - 28 mmol/L Final    Base Excess Venous 3.3 mmol/L Final    Abnormal Result, Ref range: -7.7 to 1.9         6/3/2017 10:29 PM      Narrative     [facility]   6/3/2017 9:09 PM     HISTORY: Cough.  Chemo 4 days ago for breast cancer.    COMPARISON: 1/11/2017        Impression     IMPRESSION: No active disease. No infiltrates. Central venous port  again noted.    MARTHA UP MD         6/3/2017  9:35 PM         6/3/2017 10:37 PM         6/3/2017 10:58 PM      Component Results     Component Value Ref Range & Units Status    Lactic Acid 2.4 (H) 0.7 - 2.1 mmol/L Final    Consistent with previous result                Thank you for choosing [facility]       Thank you for choosing [facility] for your care. Our goal is always to provide you with excellent care. Hearing back from our patients is one way we can continue to improve our services. Please take a few minutes to complete the written survey that you may receive in the mail after you visit with us. Thank you!        [employer] Information     [employer] gives you secure access to your electronic health record. If you see a primary care provider, you can also send messages to your care team and make appointments. If you have questions, please call your primary care clinic.  If you do not have a primary care provider, please call [phone] and they will assist you.        Care EveryWhere ID     This is your Care EveryWhere ID. This could be used by other organizations to access your [facility] medical records  [id number]        After Visit Summary       This is your record. Keep this with you and show to your community pharmacist(s) and doctor(s) at your next visit.

## 2017-06-03 NOTE — ED AVS SNAPSHOT
[facility] Emergency Department    [address] [facility] [address]    Phone:  [phone]    Fax:  [phone]                                       Diana Wilson   MRN: [id number]    Department:  [facility] Emergency Department   Date of Visit:  6/3/2017           After Visit Summary Signature Page     I have received my discharge instructions, and my questions have been answered. I have discussed any challenges I see with this plan with the nurse or doctor.    ..........................................................................................................................................  Patient/Patient Representative Signature      ..........................................................................................................................................  Patient Representative Print Name and Relationship to Patient    ..................................................               ................................................  Date                                            Time    ..........................................................................................................................................  Reviewed by Signature/Title    ...................................................              ..............................................  Date                                                            Time

## 2017-06-04 NOTE — DISCHARGE INSTRUCTIONS
Take antibiotics as prescribed.   Follow up with Oncology if continued symptoms or return if severe worsening of symptoms.          Cough, Chronic, Uncertain Cause (Adult)    Everyone has had a cough as part of the common cold, flu, or bronchitis. This kind of cough occurs along with an achy feeling, low-grade fever, nasal and sinus congestion, and a scratchy or sore throat. This usually gets better in 2 to 3 weeks. A cough that lasts longer than 3 weeks may be due to other causes.  If your cough does not improve over the next 2 weeks, further testing may be needed. Follow up with your healthcare provider as advised. Cough suppressants may be recommended. Based on your exam today, the exact cause of your cough is not certain. Below are some common causes for persistent cough.  Smokers cough   Smoker s cough  doesn t go away. If you continue to smoke, it only gets worse. The cough is from irritation in the air passages. Talk to your healthcare provider about quitting. Medicines or nicotine-replacement products, like gum or the patch, may make quitting easier.  Postnasal drip  A cough that is worse at night may be due to postnasal drip. Excess mucus in the nose drains from the back of your nose to your throat. This triggers the cough reflex. Postnasal drip may be due to a sinus infection or allergy. Common allergens include dust, tobacco smoke (both inhaled and secondhand smoke), environmental pollutants, pollen, mold, pets, cleaning agents, room deodorizers, and chemical fumes. Over-the-counter antihistamines or decongestants may be helpful for allergies. A sinus infection may requires antibiotic treatment. See your healthcare provider if symptoms continue.  Medicines  Certain prescribed medicines can cause a chronic cough in some people:    ACE inhibitors for high blood pressure. These include benazepril, captopril, enalapril, fosinopril, lisinopril, quinapril, ramipril, and others.    Beta-blockers for high blood  pressure and other conditions. These include propranolol, atenolol, metoprolol, nadolol, and others.  Let your healthcare provider know if you are taking any of these.  Asthma  Cough may be the only sign of mild asthma. You may have tests to find out if asthma is causing your cough. You may also take asthma medicine on a trial basis.  Acid reflux (heartburn, GERD)   The esophagus is the tube that carries food from the mouth to the stomach. A valve at its lower end prevents stomach acids from flowing upward. If this valve does not work properly, acid from the stomach enters the esophagus. This may cause a burning pain in the upper abdomen or lower chest, belching, or cough. Symptoms are often worse when lying flat. Avoid eating or drinking before bedtime. Try using extra pillows to raise your upper body, or place 4-inch blocks under the head of your bed. You may try an over-the-counter antacid or an acid-blocking medicine such as famotidine, cimetidine, ranitidine, esomeprazole, lansoprazole, or omeprazole. Stronger medicines for this condition can be prescribed by your healthcare provider.  Follow-up care  Follow up with your healthcare provider, or as advised, if your cough does not improve. Further testing may be needed.  (Note: If an X-ray was taken, a specialist will review it. You will be notified of any new findings that may affect your care.)  When to seek medical advice  Call your healthcare provider right away if any of these occur:    Mild wheezing or difficulty breathing    Fever of 100.4 F (38 C) or higher, or as directed by your healthcare provider    Unexpected weight loss    Coughing up large amounts of colored sputum    Night sweats (sheets and pajamas get soaking wet)  Call 911, or get immediate medical care  Contact emergency services right away if any of these occur:    Coughing up blood    Moderate to severe trouble breathing or wheezing    1264-3972 [employer]. [address], [place] [address]. All rights reserved. This information is not intended as a substitute for professional medical care. Always follow your healthcare professional's instructions.          Shortness of Breath (Dyspnea)  Shortness of breath is the feeling that you can't catch your breath or get enough air. It is also known as dyspnea.  Dyspnea can be caused by many different conditions. They include:    Acute asthma attack.    Worsening of chronic lung diseases such as chronic bronchitis and emphysema.     Heart failure. This is when weak heart muscle allows extra fluid to collect in the lungs.    Panic attacks or anxiety. Fear can cause rapid breathing (hyperventilation).    Pneumonia, or an infection in the lung tissue.    Exposure to toxic substances, fumes, smoke, or certain medicines.    Blood clot in the lung (pulmonary embolism). This is often from a piece of blood clot in a deep vein of the leg (deep vein thrombosis) that breaks off and travels to the lungs.     Heart attack or heart-related chest pain (angina).    Anemia.    Collapsed lung (pneumothorax).    Dehydration.    Pregnancy.  Based on your visit today, the exact cause of your shortness of breath is not certain. Your tests don t show any of the serious causes of dyspnea. You may need other tests to find out if you have a serious problem. It s important to watch for any new symptoms or symptoms that get worse. Follow up with your healthcare provider as directed.  Home care  Follow these tips to take care of yourself at home:    When your symptoms are better, go back to your usual activities.    If you smoke, you should stop. Join a quit-smoking program or ask your healthcare provider for help.    Eat a healthy diet and get plenty of sleep.    Get regular exercise. Talk with your healthcare provider before starting to exercise, especially if you have other medical problems.    Cut down on the amount of caffeine and stimulants you  consume.  Follow-up care  Follow up with your healthcare provider, or as advised.  If tests were done, you will be told if your treatment needs to be changed. You can call as directed for the results.  (Note: If an X-ray was taken, a specialist will review it. You will be notified of any new findings that may affect your care.)  Call 911 or get immediate medical care  Shortness of breath may be a sign of a serious medical problem. For example, it may be a problem with your heart or lungs. Call 911 if you have worsening shortness of breath or trouble breathing, especially with any of the symptoms below:    You are confused or it s difficult to wake you.    You faint or lose consciousness.    You have a fast heartbeat, or your heartbeat is irregular.     You are coughing up blood.    You have pain in your chest, arm, shoulder, neck, or upper back.    You break out in a sweat.  When to seek medical advice  Call your healthcare provider right away if any of these occur:    Slight shortness of breath or wheezing     Redness, pain or swelling in your leg, arm, or other body area    Swelling in both legs or ankles    Fast weight gain    Dizziness or weakness    Fever of 100.4 F (38 C) or higher, or as directed by your healthcare provider    2635-1274 [employer]. [address] [place] [address], [place] [address]. All rights reserved. This information is not intended as a substitute for professional medical care. Always follow your healthcare professional's instructions.

## 2017-06-04 NOTE — ED NOTES
DATE:  6/3/2017   TIME OF RECEIPT FROM LAB:  2116  LAB TEST:  Lactic Acid  LAB VALUE:  3.2  RESULTS GIVEN WITH READ-BACK TO (PROVIDER):  Eugenio Hicks MD  TIME LAB VALUE REPORTED TO PROVIDER:   2116

## 2017-06-04 NOTE — TELEPHONE ENCOUNTER
Regarding:  Shortness of breath   ----- Message from Yissel Sweeney sent at 6/3/2017  6:53 PM CDT -----  Reason for call:  Patient reporting a symptom    Symptom or request: Shortness of breath and inhaler is not seeming to help her catch breath. Patient is a breast cancer patient.     Duration (how long have symptoms been present): 1 hour    Have you been treated for this before? No    Additional comments: n/a    Phone Number patient can be reached at:  Cell number on file:  Telephone Information:  Mobile         [phone]      Best Time:  anytime    Can we leave a detailed message on this number:  YES    Call taken on 6/3/2017 at 6:51 PM by Yissel Sweeney       Patient called back while I was calling her. Other nurse spoke w/ pt's  and advised ER for severe SOB. Carolin Quiroga RN/FNA'

## 2017-06-04 NOTE — ED NOTES
Call placed to lab re: CBC results.  Per lab it needs to be run manually which will cause a delay.  MD notified

## 2017-06-04 NOTE — ED NOTES
Port to right side of chest accessed with Power Loc 20G x 0.75 in., pt did show this writer her power port card from wallet. Labs drawn off site including blood cultures x 1. Pt transported down to xray. Pt verbalizes feeling less short of breath since receiving breathing treatment.

## 2017-06-04 NOTE — ED PROVIDER NOTES
"  History     Chief Complaint   Patient presents with     Shortness of Breath     HPI  Diana Wilson is a 36 year old female who has past medical history significant for breast cancer, with spread to the lymph nodes, history of asthma, with most recent chemotherapy 4 days ago, presenting to the emergency Department with complaints of cough, in addition to increased amount of shortness of breath.  Patient states her symptoms began yesterday evening, with increased amount of shortness of breath.  This became severely worse throughout the day today.  Her inhaled medications did not help with her shortness of breath.  She denies fever, or chills.  No chest pain.  No abdominal pain.  Nonproductive cough.  No other ill contacts.  Patient has no further chemotherapy scheduled.  No known metastasis with the exception of lymph node involvement.  Patient with no recent medication changes.  She does get steroids only at the time of chemotherapy.    I have reviewed the Medications, Allergies, Past Medical and Surgical History, and Social History in the Epic system.    Review of Systems   Constitutional: Negative for fever.   Respiratory: Positive for cough and shortness of breath.    Cardiovascular: Negative for chest pain.   Gastrointestinal: Negative for abdominal pain.   All other systems reviewed and are negative.      Physical Exam   BP: 151/87  Pulse: 132  Temp: 98.4  F (36.9  C)  Resp: 22  Height: 162.6 cm (5' 4\")  Weight: 95.3 kg (210 lb)  SpO2: 97 %  Physical Exam  /87  Pulse 120  Temp 98.4  F (36.9  C) (Oral)  Resp 22  Ht 1.626 m (5' 4\")  Wt 95.3 kg (210 lb)  SpO2 99%  BMI 36.05 kg/m2  General: alert, interactive, in moderate respiratory distress.  Frequent coughing spells.    Head: atraumatic  Nose: no rhinorrhea or epistaxis  Ears: no external auditory canal discharge or bleeding.    Eyes: Sclera nonicteric. Conjunctiva noninjected. PERRL, EOMI  Mouth: no tonsillar erythema, edema, or exudate  Neck: " supple, no palp LAD  Lungs: Right upper chest wall port in place.  No appreciable wheezing, or rales, however poor inspiratory effort..  CV: tachycardic, S1/S2; peripheral pulses palpable and symmetric  Abdomen: soft, nt, nd, no guarding or rebound. Positive bowel sounds  Extremities: no cyanosis or edema  Skin: no rash or diaphoresis  Neuro:  strength 5/5 in UE and LEs bilaterally, sensation intact to light touch in UE and LEs bilaterally;       ED Course     ED Course     Procedures             Critical Care time:  none                  Labs Ordered and Resulted from Time of ED Arrival Up to the Time of Departure from the ED   COMPREHENSIVE METABOLIC PANEL - Abnormal; Notable for the following:        Result Value    Glucose 148 (*)     Protein Total 6.5 (*)     All other components within normal limits   CBC WITH PLATELETS DIFFERENTIAL - Abnormal; Notable for the following:     WBC 22.3 (*)     RBC Count 2.39 (*)     Hemoglobin 8.3 (*)     Hematocrit 25.2 (*)      (*)     MCH 34.7 (*)     RDW 17.2 (*)     Nucleated RBCs 2 (*)     Absolute Neutrophil 15.4 (*)     Absolute Monocytes 1.8 (*)     All other components within normal limits   LACTIC ACID WHOLE BLOOD - Abnormal; Notable for the following:     Lactic Acid 3.2 (*)     All other components within normal limits   BLOOD GAS VENOUS - Abnormal; Notable for the following:     Ph Venous 7.44 (*)     All other components within normal limits   LACTIC ACID WHOLE BLOOD - Abnormal; Notable for the following:     Lactic Acid 2.4 (*)     All other components within normal limits   PERIPHERAL IV CATHETER   PULSE OXIMETRY NURSING   CARDIAC CONTINUOUS MONITORING   NURSING DRAW AND HOLD   NURSING DRAW AND HOLD   NURSING DRAW AND HOLD   BLOOD CULTURE   BLOOD CULTURE     Results for orders placed or performed during the hospital encounter of 06/03/17 (from the past 24 hour(s))   Comprehensive metabolic panel   Result Value Ref Range    Sodium 139 133 - 144 mmol/L     Potassium 3.7 3.4 - 5.3 mmol/L    Chloride 103 94 - 109 mmol/L    Carbon Dioxide 27 20 - 32 mmol/L    Anion Gap 9 3 - 14 mmol/L    Glucose 148 (H) 70 - 99 mg/dL    Urea Nitrogen 9 7 - 30 mg/dL    Creatinine 0.90 0.52 - 1.04 mg/dL    GFR Estimate 71 >60 mL/min/1.7m2    GFR Estimate If Black 85 >60 mL/min/1.7m2    Calcium 9.0 8.5 - 10.1 mg/dL    Bilirubin Total 0.3 0.2 - 1.3 mg/dL    Albumin 3.4 3.4 - 5.0 g/dL    Protein Total 6.5 (L) 6.8 - 8.8 g/dL    Alkaline Phosphatase 114 40 - 150 U/L    ALT 49 0 - 50 U/L    AST 31 0 - 45 U/L   CBC with platelets differential   Result Value Ref Range    WBC 22.3 (H) 4.0 - 11.0 10e9/L    RBC Count 2.39 (L) 3.8 - 5.2 10e12/L    Hemoglobin 8.3 (L) 11.7 - 15.7 g/dL    Hematocrit 25.2 (L) 35.0 - 47.0 %     (H) 78 - 100 fl    MCH 34.7 (H) 26.5 - 33.0 pg    MCHC 32.9 31.5 - 36.5 g/dL    RDW 17.2 (H) 10.0 - 15.0 %    Platelet Count 288 150 - 450 10e9/L    Diff Method Manual Differential     % Neutrophils 69.0 %    % Lymphocytes 22.0 %    % Monocytes 8.0 %    % Eosinophils 0.0 %    % Basophils 1.0 %    Nucleated RBCs 2 (H) 0 /100    Absolute Neutrophil 15.4 (H) 1.6 - 8.3 10e9/L    Absolute Lymphocytes 4.9 0.8 - 5.3 10e9/L    Absolute Monocytes 1.8 (H) 0.0 - 1.3 10e9/L    Absolute Eosinophils 0.0 0.0 - 0.7 10e9/L    Absolute Basophils 0.2 0.0 - 0.2 10e9/L    Absolute Nucleated RBC 0.4     Anisocytosis Slight     Platelet Estimate Normal    Lactic acid whole blood   Result Value Ref Range    Lactic Acid 3.2 (H) 0.7 - 2.1 mmol/L   Chest XR,  PA & LAT    Narrative    [facility]   6/3/2017 9:09 PM     HISTORY: Cough.  Chemo 4 days ago for breast cancer.    COMPARISON: 1/11/2017      Impression    IMPRESSION: No active disease. No infiltrates. Central venous port  again noted.    MARTHA UP MD   Blood gas venous   Result Value Ref Range    Ph Venous 7.44 (H) 7.32 - 7.43 pH    PCO2 Venous 41 40 - 50 mm Hg    PO2 Venous 32 25 - 47 mm Hg    Bicarbonate Venous 28 21 - 28 mmol/L    Base  Excess Venous 3.3 mmol/L   Lactic acid whole blood   Result Value Ref Range    Lactic Acid 2.4 (H) 0.7 - 2.1 mmol/L        Assessments & Plan (with Medical Decision Making)  36 year old female , with past medical history significant for asthma, T1 N1 invasive breast cancer with neoadjuvant chemotherapy which has been performed between January and May of this year.  Patient's last chemotherapy was Tuesday, 4 days ago.  She subsequently had Neulasta injection 3 days ago.  Patient presents to the emergency department today because of increasing amounts of cough, in addition to shortness of breath which has an present since yesterday evening, worsened throughout the day today.  She denies any fever.  Denies ill contacts.  Patient does have history of asthma, and did use her rescue inhaler without significant relief of the cough and feelings of shortness of breath.  No audible wheezing had been present at home.    Patient arrives tachycardic, with moderate amounts of increased respiratory difficulty upon arrival.  Oxygen saturations are normal.  Temperature is normal.  Patient's port was accessed, cultures drawn, and multiple laboratory tests performed.  Patient with CBC with elevated white blood cell count at 22.3.  This is in the context of recent Neulasta injection 3 days ago.  Lactic acid did show elevated value initially at 3.2, and after IV fluids had decreased to 2.4.  Venous blood gas is normal.  CMP is normal.  Blood cultures had been performed.    Chest x-ray shows no obvious infiltrate.  No signs of pneumonia.    Patient did receive DuoNeb nebulizer, and did report symptomatic improvement.  Based on elevated lactate, in patient with cough and recent chemotherapy, I elected to give Levaquin prior to White blood cell count results being displayed.  These were slightly delayed secondary to the elevated white blood cell count.  However, and is difficult to interpret in the context of recent Neulasta.    Given  patient's chemotherapy status, with worsening cough, shortness of breath, in patient with baseline asthma, she will be treated with Levaquin ×5 days.  Instructed to follow-up with oncology, or return if severe worsening of symptoms.    I did offer patient hospitalization, however she, with her , made the decision that they would prefer to go home.  They are aware of risks of increased amounts of shortness breath, worsening respiratory distress, that may require return to the emergency department.  They are comfortable with this plan, and to request discharge home.  I have provided opiate around nebulizer solution, in addition to Levaquin ×5 days.  Encouraged return if any worsening symptoms.       I have reviewed the nursing notes.    I have reviewed the findings, diagnosis, plan and need for follow up with the patient.    New Prescriptions    ALBUTEROL (2.5 MG/3ML) 0.083% NEB SOLUTION    Take 1 vial (2.5 mg) by nebulization every 6 hours as needed for shortness of breath / dyspnea or wheezing    LEVOFLOXACIN (LEVAQUIN) 750 MG TABLET    Take 1 tablet (750 mg) by mouth daily       Final diagnoses:   Cough   SOB (shortness of breath)       6/3/2017   [facility] EMERGENCY DEPARTMENT     Eugenio Hicks MD  06/04/17 0001

## 2017-06-04 NOTE — ED NOTES
DATE:  6/3/2017   TIME OF RECEIPT FROM LAB:  1183   LAB TEST:  Lactic Acid  LAB VALUE:  2.4  RESULTS GIVEN WITH READ-BACK TO (PROVIDER):  Eugenio Hicks MD  TIME LAB VALUE REPORTED TO PROVIDER:   0339

## 2017-06-04 NOTE — TELEPHONE ENCOUNTER
calling, states that Diana had her last chemo treatment 4 days ago. He states she is having significant SOB. He say she is having trouble catching her breath. Advised to go to ER.   Reason for Disposition    [1] MODERATE difficulty breathing (e.g., speaks in phrases, SOB even at rest, pulse 100-120) AND [2] NEW-onset or WORSE than normal    Additional Information    Negative: [1] Breathing stopped AND [2] hasn't returned    Negative: Choking on something    Negative: Severe difficulty breathing (e.g., struggling for each breath, speaks in single words)    Negative: Bluish lips, tongue, or face now    Negative: Difficult to awaken or acting confused  (e.g., disoriented, slurred speech)    Negative: Passed out (i.e., lost consciousness, collapsed and was not responding)    Negative: Wheezing started suddenly after medicine, an allergic food or bee sting    Negative: Stridor    Negative: Slow, shallow and weak breathing    Negative: Sounds like a life-threatening emergency to the triager    Negative: Chest pain    Negative: [1] Wheezing (high pitched whistling sound) AND [2] previous asthma attacks or use of asthma medicines    Negative: [1] Difficulty breathing AND [2] only present when coughing    Negative: [1] Difficulty breathing AND [2] only from stuffy or runny nose    Protocols used: BREATHING DIFFICULTY-ADULT-AH

## 2017-06-06 DIAGNOSIS — G62.9 NEUROPATHY: ICD-10-CM

## 2017-06-06 RX ORDER — GABAPENTIN 300 MG/1
CAPSULE ORAL
Qty: 90 CAPSULE | Refills: 3 | Status: SHIPPED | OUTPATIENT
Start: 2017-06-06 | End: 2017-07-25

## 2017-06-09 LAB
BACTERIA SPEC CULT: NORMAL
BACTERIA SPEC CULT: NORMAL
Lab: NORMAL
Lab: NORMAL
MICRO REPORT STATUS: NORMAL
MICRO REPORT STATUS: NORMAL
SPECIMEN SOURCE: NORMAL
SPECIMEN SOURCE: NORMAL

## 2017-06-30 ENCOUNTER — OFFICE VISIT (OUTPATIENT)
Dept: FAMILY MEDICINE | Facility: CLINIC | Age: 37
End: 2017-06-30
Payer: COMMERCIAL

## 2017-06-30 VITALS
HEIGHT: 64 IN | DIASTOLIC BLOOD PRESSURE: 81 MMHG | WEIGHT: 215.7 LBS | TEMPERATURE: 97.7 F | HEART RATE: 97 BPM | BODY MASS INDEX: 36.82 KG/M2 | SYSTOLIC BLOOD PRESSURE: 117 MMHG

## 2017-06-30 DIAGNOSIS — D70.2 NEUTROPENIA, DRUG-INDUCED (H): ICD-10-CM

## 2017-06-30 DIAGNOSIS — F41.9 ANXIETY: ICD-10-CM

## 2017-06-30 DIAGNOSIS — C50.411 MALIGNANT NEOPLASM OF UPPER-OUTER QUADRANT OF RIGHT FEMALE BREAST, UNSPECIFIED ESTROGEN RECEPTOR STATUS (H): ICD-10-CM

## 2017-06-30 DIAGNOSIS — R11.0 NAUSEA: ICD-10-CM

## 2017-06-30 DIAGNOSIS — F33.0 MAJOR DEPRESSIVE DISORDER, RECURRENT EPISODE, MILD (H): ICD-10-CM

## 2017-06-30 DIAGNOSIS — C50.911 INVASIVE DUCTAL CARCINOMA OF BREAST, RIGHT (H): ICD-10-CM

## 2017-06-30 DIAGNOSIS — Z01.818 PREOP GENERAL PHYSICAL EXAM: Primary | ICD-10-CM

## 2017-06-30 DIAGNOSIS — F41.8 SITUATIONAL ANXIETY: ICD-10-CM

## 2017-06-30 LAB
BASOPHILS # BLD AUTO: 0 10E9/L (ref 0–0.2)
BASOPHILS NFR BLD AUTO: 0.6 %
DIFFERENTIAL METHOD BLD: NORMAL
EOSINOPHIL # BLD AUTO: 0.3 10E9/L (ref 0–0.7)
EOSINOPHIL NFR BLD AUTO: 4.3 %
ERYTHROCYTE [DISTWIDTH] IN BLOOD BY AUTOMATED COUNT: 14.7 % (ref 10–15)
HCT VFR BLD AUTO: 37.1 % (ref 35–47)
HGB BLD-MCNC: 12 G/DL (ref 11.7–15.7)
LYMPHOCYTES # BLD AUTO: 1.9 10E9/L (ref 0.8–5.3)
LYMPHOCYTES NFR BLD AUTO: 29 %
MCH RBC QN AUTO: 31.3 PG (ref 26.5–33)
MCHC RBC AUTO-ENTMCNC: 32.3 G/DL (ref 31.5–36.5)
MCV RBC AUTO: 97 FL (ref 78–100)
MONOCYTES # BLD AUTO: 0.6 10E9/L (ref 0–1.3)
MONOCYTES NFR BLD AUTO: 9.3 %
NEUTROPHILS # BLD AUTO: 3.7 10E9/L (ref 1.6–8.3)
NEUTROPHILS NFR BLD AUTO: 56.8 %
PLATELET # BLD AUTO: 320 10E9/L (ref 150–450)
RBC # BLD AUTO: 3.84 10E12/L (ref 3.8–5.2)
WBC # BLD AUTO: 6.6 10E9/L (ref 4–11)

## 2017-06-30 PROCEDURE — 85025 COMPLETE CBC W/AUTO DIFF WBC: CPT | Performed by: FAMILY MEDICINE

## 2017-06-30 PROCEDURE — 36415 COLL VENOUS BLD VENIPUNCTURE: CPT | Performed by: FAMILY MEDICINE

## 2017-06-30 PROCEDURE — 99214 OFFICE O/P EST MOD 30 MIN: CPT | Performed by: FAMILY MEDICINE

## 2017-06-30 RX ORDER — HYDROXYZINE HYDROCHLORIDE 10 MG/1
10-50 TABLET, FILM COATED ORAL EVERY 6 HOURS PRN
Qty: 60 TABLET | Refills: 1 | Status: SHIPPED | OUTPATIENT
Start: 2017-06-30 | End: 2017-11-22

## 2017-06-30 RX ORDER — SCOLOPAMINE TRANSDERMAL SYSTEM 1 MG/1
PATCH, EXTENDED RELEASE TRANSDERMAL
Qty: 1 PATCH | Refills: 0 | Status: SHIPPED | OUTPATIENT
Start: 2017-06-30 | End: 2017-11-24

## 2017-06-30 ASSESSMENT — ANXIETY QUESTIONNAIRES
5. BEING SO RESTLESS THAT IT IS HARD TO SIT STILL: SEVERAL DAYS
6. BECOMING EASILY ANNOYED OR IRRITABLE: MORE THAN HALF THE DAYS
1. FEELING NERVOUS, ANXIOUS, OR ON EDGE: MORE THAN HALF THE DAYS
3. WORRYING TOO MUCH ABOUT DIFFERENT THINGS: SEVERAL DAYS
2. NOT BEING ABLE TO STOP OR CONTROL WORRYING: SEVERAL DAYS
GAD7 TOTAL SCORE: 8
7. FEELING AFRAID AS IF SOMETHING AWFUL MIGHT HAPPEN: NOT AT ALL

## 2017-06-30 ASSESSMENT — PATIENT HEALTH QUESTIONNAIRE - PHQ9: 5. POOR APPETITE OR OVEREATING: SEVERAL DAYS

## 2017-06-30 NOTE — PROGRESS NOTES
[facility]  [address] [address]  [phone]  Dept: [phone]    PRE-OP EVALUATION:  Today's date: 2017    Diana Wilson (: 1980) presents for pre-operative evaluation assessment as requested by Dr. Jenifer Olson.  She requires evaluation and anesthesia risk assessment prior to undergoing surgery/procedure for treatment of  Breast cancer   Proposed procedure: bilateral mastectomy with lymph node dissection.    Date of Surgery/ Procedure: 2017  Time of Surgery/ Procedure: [facility]/Surgical Facility: [facility]   Fax number for surgical facility: [phone]   Primary Physician: Yenifer Ridley  Type of Anesthesia Anticipated: General    Patient has a Health Care Directive or Living Will:  Unsure     1. NO - Do you have a history of heart attack, stroke, stent, bypass or surgery on an artery in the head, neck, heart or legs?  2. NO - Do you ever have any pain or discomfort in your chest?  3. NO - Do you have a history of  Heart Failure?  4. NO - Are you troubled by shortness of breath when: walking on the level, up a slight hill or at night?  5. NO - Do you currently have a cold, bronchitis or other respiratory infection?  6. NO - Do you have a cough, shortness of breath or wheezing?  7. NO - Do you sometimes get pains in the calves of your legs when you walk?  8. Yes - Do you or anyone in your family have previous history of blood clots? Mother had clot after GI surgery.    9. NO - Do you or does anyone in your family have a serious bleeding problem such as prolonged bleeding following surgeries or cuts?  10. Yes - Have you ever had problems with anemia or been told to take iron pills?  Ongoing struggle due to celiac dz   11. Yes - Have you had any abnormal blood loss such as black, tarry or bloody stools, or abnormal vaginal bleeding? Heavy periods for many years   12. NO - Have you ever had a blood transfusion?  13. Yes - Have you or any  of your relatives ever had problems with anesthesia?  Vomiting -self. Mother also had n/v with anesthesia   14. NO - Do you have sleep apnea, excessive snoring or daytime drowsiness?  15. NO - Do you have any prosthetic heart valves?  16. NO - Do you have prosthetic joints?  17. NO - Is there any chance that you may be pregnant?      HPI:                                                      Brief HPI related to upcoming procedure: breast cancer - has completed chemo and will have mastectomy +/- lymph node dissection.       See problem list for active medical problems.  Problems all longstanding and stable, except as noted/documented.  See ROS for pertinent symptoms related to these conditions.                                                                                                  .    MEDICAL HISTORY:                                                      Patient Active Problem List    Diagnosis Date Noted     Neutropenia, drug-induced (H) 05/30/2017     Priority: Medium     Pain with urination 03/22/2017     Priority: Medium     Diarrhea 02/23/2017     Priority: Medium     Mucositis 02/23/2017     Priority: Medium     Invasive ductal carcinoma of breast, right (H) 12/30/2016     Priority: Medium     Intestinal malabsorption 02/16/2016     Priority: Medium     Patient declines flu vaccine 11/01/2013     Priority: Medium     Health Care Home 05/14/2013     Priority: Low     EMERGENCY CARE PLAN  May 14, 2013: No current Care Coordination follow up planned. Please refer if Care Coordination services are needed.    Presenting Problem Signs and Symptoms Treatment Plan   Questions or concerns   during clinic hours   I will call my clinic directly:  [address] [id number] [phone].    Questions or concerns outside clinic hours   I will call the 24 hour nurse line at   [phone] or [phone].   Need to schedule an appointment   I will call the 24 hour scheduling  team at [phone] or my clinic directly at [phone].    Same day treatment     I will call my clinic first, nurse line if after hours, urgent care and express care if needed.   Clinic care coordination services (regular clinic hours)     I will call a clinic care coordinator directly:     Darron Gaming RN  Mon, Tues, Fri - [phone]  Wed, Thurs - [phone]    Marlin Nelson, SW:    [phone]    Or call my clinic at [phone] and ask to speak with care coordination.   Crisis Services: Behavioral or Mental Health  [facility] 24 Hour Phone Line  [phone]    [facility] 24 Hour Crisis Services  [phone]    [facility] (Behavioral Health Providers) Network [phone]    [facility]   [phone]       Emergency treatment -- Immediately    [facility]All 911              Malignant neoplasm of upper-outer quadrant of right female breast (H) 01/06/2017     Anxiety 07/15/2014     Insomnia 07/17/2012     RLS (restless legs syndrome) 06/28/2012     Iron deficiency anemia 03/14/2012     Major depressive disorder, recurrent episode, mild (H) 08/05/2011      mainly postpartum, had tried Zoloft and anafranil    [facility] enrolled pt in 6 months pt ed program          CARDIOVASCULAR SCREENING; LDL GOAL LESS THAN 130 10/31/2010     Obesity 09/08/2008     Wt Readings from Last 5 Encounters:   07/11/11 172 lb (78.019 kg)   12/20/10 174 lb (78.926 kg)   07/26/10 166 lb (75.297 kg)   11/12/09 170 lb (77.111 kg)   11/03/09 169 lb (76.658 kg)     Body mass index is 29.99 kg/(m^2).        Smoker 07/14/2008     Mild intermittent asthma             never has had PFT's, MDI with colds and at times exercise       TRICHOTILLOMANIA              well controlled        Past Medical History:   Diagnosis Date     Encounter for insertion or removal of intrauterine contraceptive device 1/8/2008     Excessive or frequent menstruation 03/19/2003     Fibroadenosis of breast      Nongonococcal urethritis (JALIL)  due to chlamydia trachomatis 01/03/2002     Other and unspecified ovarian cyst 03/19/2003    RIGHT ovarian cyst     Transient hypertension of pregnancy, antepartum     PIH with last birth     Past Surgical History:   Procedure Laterality Date     C APPENDECTOMY  1991     DENTAL SURGERY  09/13/2010 and 10/12/2010    Root canals     DILATION AND CURETTAGE, HYSTEROSCOPY, ABLATE ENDOMETRIUM NOVASURE, COMBINED  3/20/2012    Procedure:COMBINED DILATION AND CURETTAGE, HYSTEROSCOPY, ABLATE ENDOMETRIUM NOVASURE; Hysteroscopy with Endometrial Ablation Novasure; Surgeon:GERRI PURCELL; Location:[facility]     ESOPHAGOSCOPY, GASTROSCOPY, DUODENOSCOPY (EGD), COMBINED N/A 2/18/2016    Procedure: COMBINED ESOPHAGOSCOPY, GASTROSCOPY, DUODENOSCOPY (EGD);  Surgeon: Jose L Wilson MD;  Location: [facility]     INSERT PORT VASCULAR ACCESS N/A 1/11/2017    Procedure: INSERT PORT VASCULAR ACCESS;  Surgeon: Michael Townsend MD;  Location: [facility]     SURGICAL HISTORY OF -   1997    Breast reduction     Current Outpatient Prescriptions   Medication Sig Dispense Refill     gabapentin (NEURONTIN) 300 MG capsule Take 1 tablet (300 mg) by mouth three times daily. 90 capsule 3     albuterol (2.5 MG/3ML) 0.083% neb solution Take 1 vial (2.5 mg) by nebulization every 6 hours as needed for shortness of breath / dyspnea or wheezing 75 mL 0     LORazepam (ATIVAN) 0.5 MG tablet Take 1 tablet (0.5 mg) by mouth nightly as needed for anxiety 30 tablet 1     albuterol (PROAIR HFA, PROVENTIL HFA, VENTOLIN HFA) 108 (90 BASE) MCG/ACT inhaler Inhale 2 puffs into the lungs every 6 hours as needed for shortness of breath / dyspnea 1 Inhaler 11     fluticasone (FLONASE) 50 MCG/ACT nasal spray Spray 1-2 sprays into both nostrils daily 3 Bottle 3     loratadine (CLARITIN) 10 MG tablet Take 1 tablet (10 mg) by mouth daily 90 tablet 3     montelukast (SINGULAIR) 10 MG tablet Take 1 tablet (10 mg) by mouth At Bedtime 90 tablet 3     famotidine (PEPCID) 20 MG tablet  "Take 20 mg by mouth daily as needed        lidocaine-prilocaine (EMLA) cream Apply topically as needed for other (place dime size amount over port site prior to tx and cover with tegaderm) 30 g 1     hydrOXYzine (ATARAX) 10 MG tablet Take 1-5 tablets (10-50 mg) by mouth every 6 hours as needed for anxiety (Patient not taking: Reported on 5/16/2017) 60 tablet 1     scopolamine (TRANSDERM) 72 hr patch Place 1 patch onto the skin every 72 hours.  Apply to hairless area behind one ear at least 4 hours before travel.  Remove old patch and change every 3 days. (Patient not taking: Reported on 5/16/2017) 1 patch 0     OTC products: no recent use of OTC ASA, NSAIDS or Steroids and no use of herbal medications or other supplements    Allergies   Allergen Reactions     Carboplatin Shortness Of Breath and Rash     Ambien      hallucinations     Amoxicillin GI Disturbance     Amoxicillin Hives     Erythromycin GI Disturbance     Erythromycin Hives     Hydrocodone-Acetaminophen      Other reaction(s): Hallucinations     Penicillins Nausea and Vomiting and Hives     1-11-17 pt states this was a childhood reaction     Hydrocodone Other (See Comments)     Out of body experience, \"creepy-crawly\" skin       Latex Allergy: NO    Social History   Substance Use Topics     Smoking status: Former Smoker     Packs/day: 0.10     Types: Cigarettes     Smokeless tobacco: Never Used      Comment: Socially- quit smoking 10/01/2016     Alcohol use 0.0 oz/week     0 Standard drinks or equivalent per week      Comment: Socially     History   Drug Use No       REVIEW OF SYSTEMS:                                                    Constitutional, neuro, ENT, endocrine, pulmonary, cardiac, gastrointestinal, genitourinary, musculoskeletal, integument and psychiatric systems are negative, except as otherwise noted.    EXAM:                                                    /81 (BP Location: Right arm, Patient Position: Sitting, Cuff Size: Adult " "Large)  Pulse 97  Temp 97.7  F (36.5  C) (Tympanic)  Ht 5' 4\" (1.626 m)  Wt 215 lb 11.2 oz (97.8 kg)  BMI 37.02 kg/m2    GENERAL APPEARANCE: healthy, alert and no distress     EYES: EOMI, PERRL     HENT: ear canals and TM's normal and nose and mouth without ulcers or lesions     NECK: no adenopathy, no asymmetry, masses, or scars and thyroid normal to palpation     RESP: lungs clear to auscultation - no rales, rhonchi or wheezes     CV: regular rates and rhythm, normal S1 S2, no S3 or S4 and no murmur, click or rub     ABDOMEN:  soft, nontender, no HSM or masses and bowel sounds normal     MS: extremities normal- no gross deformities noted, no evidence of inflammation in joints, FROM in all extremities.     MS: 1+ bilateral  ankle edema     NEURO: Normal strength and tone, sensory exam grossly normal, mentation intact and speech normal     PSYCH: mentation appears normal. and affect normal/bright    DIAGNOSTICS:                                                    EKG: Not indicated due to non-vascular surgery and low risk of event (age <65 and without cardiac risk factors)    Results for orders placed or performed in visit on 06/30/17   CBC with platelets differential   Result Value Ref Range    WBC 6.6 4.0 - 11.0 10e9/L    RBC Count 3.84 3.8 - 5.2 10e12/L    Hemoglobin 12.0 11.7 - 15.7 g/dL    Hematocrit 37.1 35.0 - 47.0 %    MCV 97 78 - 100 fl    MCH 31.3 26.5 - 33.0 pg    MCHC 32.3 31.5 - 36.5 g/dL    RDW 14.7 10.0 - 15.0 %    Platelet Count 320 150 - 450 10e9/L    Diff Method Automated Method     % Neutrophils 56.8 %    % Lymphocytes 29.0 %    % Monocytes 9.3 %    % Eosinophils 4.3 %    % Basophils 0.6 %    Absolute Neutrophil 3.7 1.6 - 8.3 10e9/L    Absolute Lymphocytes 1.9 0.8 - 5.3 10e9/L    Absolute Monocytes 0.6 0.0 - 1.3 10e9/L    Absolute Eosinophils 0.3 0.0 - 0.7 10e9/L    Absolute Basophils 0.0 0.0 - 0.2 10e9/L         Recent Labs   Lab Test  06/03/17   2046  05/30/17   0828   05/16/17   0800   " 03/02/16   0759   HGB  8.3*  9.3*   < >  9.7*   < >   --    PLT  288  326   < >  166   < >   --    INR   --    --    --    --    --   0.95   NA  139   --    --   143   < >   --    POTASSIUM  3.7   --    --   3.5   < >   --    CR  0.90  0.64   < >  0.63   < >   --     < > = values in this interval not displayed.        IMPRESSION:                                                    Reason for surgery/procedure: bilateral mastectomy with lymph node dissection.    Diagnosis/reason for consult:   Pre - op consultation   Anxiety and depression  Recent history of neutropenia secondary to chemotherapy  Celiac disease   Nausea with anesthesia   Seasonal allergies  Mild intermittent asthma       The proposed surgical procedure is considered INTERMEDIATE risk.    REVISED CARDIAC RISK INDEX  The patient has the following serious cardiovascular risks for perioperative complications such as (MI, PE, VFib and 3  AV Block):  No serious cardiac risks  INTERPRETATION: 0 risks: Class I (very low risk - 0.4% complication rate)    The patient has the following additional risks for perioperative complications:  No identified additional risks      RECOMMENDATIONS:                                                        --Patient is to take all scheduled medications on the day of surgery     APPROVAL GIVEN to proceed with proposed procedure, without further diagnostic evaluation       Signed Electronically by: Yenifer Ridley MD    Copy of this evaluation report is provided to requesting physician.    [facility] Preop Guidelines

## 2017-06-30 NOTE — NURSING NOTE
"Chief Complaint   Patient presents with     Pre-Op Exam       Initial /81 (BP Location: Right arm, Patient Position: Sitting, Cuff Size: Adult Large)  Pulse 97  Temp 97.7  F (36.5  C) (Tympanic)  Ht 5' 4\" (1.626 m)  Wt 215 lb 11.2 oz (97.8 kg)  BMI 37.02 kg/m2 Estimated body mass index is 37.02 kg/(m^2) as calculated from the following:    Height as of this encounter: 5' 4\" (1.626 m).    Weight as of this encounter: 215 lb 11.2 oz (97.8 kg).  Medication Reconciliation: complete   Sonia Villeda LPN    "

## 2017-06-30 NOTE — MR AVS SNAPSHOT
After Visit Summary   6/30/2017    Diana Wilson    MRN: [id number]           Patient Information     Date Of Birth          1980        Visit Information        Provider Department      6/30/2017 7:30 AM Yenifer Ridley MD [facility]        Today's Diagnoses     Preop general physical exam    -  1    Malignant neoplasm of upper-outer quadrant of right female breast, unspecified estrogen receptor status (H)        Invasive ductal carcinoma of breast, right (H)        Anxiety        Situational anxiety        Nausea        Major depressive disorder, recurrent episode, mild (H)        Neutropenia, drug-induced (H)          Care Instructions      Before Your Surgery      Call your surgeon if there is any change in your health. This includes signs of a cold or flu (such as a sore throat, runny nose, cough, rash or fever).    Do not smoke, drink alcohol or take over the counter medicine (unless your surgeon or primary care doctor tells you to) for the 24 hours before and after surgery.    If you take prescribed drugs: Follow your doctor s orders about which medicines to take and which to stop until after surgery.    Eating and drinking prior to surgery: follow the instructions from your surgeon    Take a shower or bath the night before surgery. Use the soap your surgeon gave you to gently clean your skin. If you do not have soap from your surgeon, use your regular soap. Do not shave or scrub the surgery site.  Wear clean pajamas and have clean sheets on your bed.           Follow-ups after your visit        Your next 10 appointments already scheduled     Jul 25, 2017 11:15 AM CDT   Return Visit with Maribel Jin MD   [facility] ([facility])    [facility]  [address] [address] [phone]              Who to contact     Normal or non-critical lab and imaging results will be communicated to you by MyChart, letter  "or phone within 4 business days after the clinic has received the results. If you do not hear from us within 7 days, please contact the clinic through [facility] or phone. If you have a critical or abnormal lab result, we will notify you by phone as soon as possible.  Submit refill requests through [facility] or call your pharmacy and they will forward the refill request to us. Please allow 3 business days for your refill to be completed.          If you need to speak with a  for additional information , please call: [phone]             Additional Information About Your Visit        [facility] Information     [facility] gives you secure access to your electronic health record. If you see a primary care provider, you can also send messages to your care team and make appointments. If you have questions, please call your primary care clinic.  If you do not have a primary care provider, please call [phone] and they will assist you.        Care EveryWhere ID     This is your Care EveryWhere ID. This could be used by other organizations to access your [facility] medical records  [id number]        Your Vitals Were     Pulse Temperature Height BMI (Body Mass Index)          97 97.7  F (36.5  C) (Tympanic) 5' 4\" (1.626 m) 37.02 kg/m2         Blood Pressure from Last 3 Encounters:   06/30/17 117/81   06/03/17 140/87   05/31/17 126/74    Weight from Last 3 Encounters:   06/30/17 215 lb 11.2 oz (97.8 kg)   06/03/17 210 lb (95.3 kg)   05/16/17 211 lb 9.6 oz (96 kg)              We Performed the Following     CBC with platelets differential          Today's Medication Changes          These changes are accurate as of: 6/30/17  8:20 AM.  If you have any questions, ask your nurse or doctor.               Stop taking these medicines if you haven't already. Please contact your care team if you have questions.     ACIDOPHILUS PROBIOTIC 100 MG Caps   Stopped by:  Yenifer Ridley MD           ALPRAZolam 0.5 MG " tablet   Commonly known as:  XANAX   Stopped by:  Yenifer Ridley MD           cyclobenzaprine 10 MG tablet   Commonly known as:  FLEXERIL   Stopped by:  Yenifer Ridley MD           dexamethasone 0.1 MG/ML solution   Stopped by:  Yenifer Ridley MD           dexamethasone 4 MG tablet   Commonly known as:  DECADRON   Stopped by:  Yenifer Ridley MD           levofloxacin 750 MG tablet   Commonly known as:  LEVAQUIN   Stopped by:  Yenifer Ridley MD           lidocaine 2 % solution   Commonly known as:  XYLOCAINE   Stopped by:  Yenifer Ridley MD           lidocaine visc 2% 2.5mL/5mL & maalox/mylanta w/ simeth 2.5mL/5mL & diphenhydrAMINE 5mg/5mL Susp suspension   Commonly known as:  [facility]   Stopped by:  Yenifer Ridley MD           MULTI COMPLETE PO   Stopped by:  Yenifer Ridley MD           NAPROXEN SODIUM PO   Stopped by:  Yenifer Ridley MD           ondansetron 4 MG ODT tab   Commonly known as:  ZOFRAN ODT   Stopped by:  Yenifer Ridley MD           oxyCODONE-acetaminophen 5-325 MG per tablet   Commonly known as:  PERCOCET   Stopped by:  Yenifer Ridley MD           phenazopyridine 200 MG tablet   Commonly known as:  PYRIDIUM   Stopped by:  Yenifer Ridley MD           prochlorperazine 10 MG tablet   Commonly known as:  COMPAZINE   Stopped by:  Yenifer Ridley MD           VITAMIN B 12 PO   Stopped by:  Yenifer Ridley MD                Where to get your medicines      These medications were sent to [place], [place] - [address] [facility]  [address] [facility] [address]     Phone:  [phone]     hydrOXYzine 10 MG tablet         Some of these will need a paper prescription and others can be bought over the counter.  Ask your nurse if you have questions.     Bring a paper prescription for each of these medications     scopolamine 72 hr patch                Primary Care Provider  Office Phone # Fax #    Yenifer Lorraine Ridley [phone]       [facility] [address] [address] [facility] [address]        Equal Access to Services     FAUZIA RAMIREZ : Hadii gabriela griffin monseo Sowan, waaxda luqadaha, qaybta kaalmada adeegyada, javier armenta laLorenamarybeth bridges. So [facility] [phone].    ATENCIÓN: Si habla español, tiene a montelongo disposición servicios gratuitos de asistencia lingüística. Llame al [phone].    We comply with applicable federal civil rights laws and [place] laws. We do not discriminate on the basis of race, color, national origin, age, disability sex, sexual orientation or gender identity.            Thank you!     Thank you for choosing [facility]  for your care. Our goal is always to provide you with excellent care. Hearing back from our patients is one way we can continue to improve our services. Please take a few minutes to complete the written survey that you may receive in the mail after your visit with us. Thank you!             Your Updated Medication List - Protect others around you: Learn how to safely use, store and throw away your medicines at www.disposemymeds.org.          This list is accurate as of: 6/30/17  8:20 AM.  Always use your most recent med list.                   Brand Name Dispense Instructions for use Diagnosis    * albuterol 108 (90 BASE) MCG/ACT Inhaler    PROAIR HFA/PROVENTIL HFA/VENTOLIN HFA    1 Inhaler    Inhale 2 puffs into the lungs every 6 hours as needed for shortness of breath / dyspnea    Mild persistent asthma with exacerbation       * albuterol (2.5 MG/3ML) 0.083% neb solution     75 mL    Take 1 vial (2.5 mg) by nebulization every 6 hours as needed for shortness of breath / dyspnea or wheezing        famotidine 20 MG tablet    PEPCID     Take 20 mg by mouth daily as needed        fluticasone 50 MCG/ACT spray    FLONASE    3 Bottle    Spray 1-2 sprays into both nostrils daily    Environmental allergies        gabapentin 300 MG capsule    NEURONTIN    90 capsule    Take 1 tablet (300 mg) by mouth three times daily.    Neuropathy (H)       hydrOXYzine 10 MG tablet    ATARAX    60 tablet    Take 1-5 tablets (10-50 mg) by mouth every 6 hours as needed for anxiety    Anxiety, Situational anxiety       lidocaine-prilocaine cream    EMLA    30 g    Apply topically as needed for other (place dime size amount over port site prior to tx and cover with tegaderm)    Portacath in place       loratadine 10 MG tablet    CLARITIN    90 tablet    Take 1 tablet (10 mg) by mouth daily    Mild persistent asthma with exacerbation       LORazepam 0.5 MG tablet    ATIVAN    30 tablet    Take 1 tablet (0.5 mg) by mouth nightly as needed for anxiety    Insomnia, unspecified type       montelukast 10 MG tablet    SINGULAIR    90 tablet    Take 1 tablet (10 mg) by mouth At Bedtime    Environmental allergies       scopolamine 72 hr patch    TRANSDERM    1 patch    Place 1 patch onto the skin every 72 hours.  Apply to hairless area behind one ear at least 4 hours before travel.  Remove old patch and change every 3 days.    Preop general physical exam, Nausea       * Notice:  This list has 2 medication(s) that are the same as other medications prescribed for you. Read the directions carefully, and ask your doctor or other care provider to review them with you.

## 2017-07-01 ASSESSMENT — PATIENT HEALTH QUESTIONNAIRE - PHQ9: SUM OF ALL RESPONSES TO PHQ QUESTIONS 1-9: 1

## 2017-07-01 ASSESSMENT — ASTHMA QUESTIONNAIRES: ACT_TOTALSCORE: 14

## 2017-07-01 ASSESSMENT — ANXIETY QUESTIONNAIRES: GAD7 TOTAL SCORE: 8

## 2017-07-06 ENCOUNTER — TELEPHONE (OUTPATIENT)
Dept: NUTRITION | Facility: CLINIC | Age: 37
End: 2017-07-06

## 2017-07-06 NOTE — TELEPHONE ENCOUNTER
Reason for Contact: Patient was contacted by phone due to reporting concerns about unintended weight loss or current weight  on the Oncology Distress Screening tool.    Action: A voicemail message was left indicating the reason for the call and the number to use to contact dietitian.     Plan: Will follow-up by calling the patient a second time if the patient does not contact dietitian within 48 hours.    Lita Rogel RDN, LD  Clinical Dietitian  [phone]

## 2017-07-11 ENCOUNTER — TRANSFERRED RECORDS (OUTPATIENT)
Dept: HEALTH INFORMATION MANAGEMENT | Facility: CLINIC | Age: 37
End: 2017-07-11

## 2017-07-12 ENCOUNTER — TRANSFERRED RECORDS (OUTPATIENT)
Dept: HEALTH INFORMATION MANAGEMENT | Facility: CLINIC | Age: 37
End: 2017-07-12

## 2017-07-25 DIAGNOSIS — G62.9 NEUROPATHY: ICD-10-CM

## 2017-07-25 RX ORDER — GABAPENTIN 300 MG/1
CAPSULE ORAL
Qty: 90 CAPSULE | Refills: 3 | Status: SHIPPED | OUTPATIENT
Start: 2017-07-25 | End: 2017-11-16

## 2017-07-31 ENCOUNTER — ONCOLOGY VISIT (OUTPATIENT)
Dept: ONCOLOGY | Facility: CLINIC | Age: 37
End: 2017-07-31
Attending: INTERNAL MEDICINE
Payer: COMMERCIAL

## 2017-07-31 VITALS
OXYGEN SATURATION: 96 % | SYSTOLIC BLOOD PRESSURE: 125 MMHG | RESPIRATION RATE: 18 BRPM | HEIGHT: 64 IN | DIASTOLIC BLOOD PRESSURE: 91 MMHG | BODY MASS INDEX: 35.55 KG/M2 | WEIGHT: 208.2 LBS | TEMPERATURE: 98.6 F | HEART RATE: 95 BPM

## 2017-07-31 DIAGNOSIS — N95.1 HOT FLUSHES, PERIMENOPAUSAL: Primary | ICD-10-CM

## 2017-07-31 DIAGNOSIS — F32.A DEPRESSION, UNSPECIFIED DEPRESSION TYPE: ICD-10-CM

## 2017-07-31 DIAGNOSIS — C50.411 MALIGNANT NEOPLASM OF UPPER-OUTER QUADRANT OF RIGHT BREAST IN FEMALE, ESTROGEN RECEPTOR POSITIVE (H): ICD-10-CM

## 2017-07-31 DIAGNOSIS — Z17.0 MALIGNANT NEOPLASM OF UPPER-OUTER QUADRANT OF RIGHT BREAST IN FEMALE, ESTROGEN RECEPTOR POSITIVE (H): ICD-10-CM

## 2017-07-31 PROCEDURE — 99214 OFFICE O/P EST MOD 30 MIN: CPT | Performed by: INTERNAL MEDICINE

## 2017-07-31 PROCEDURE — 99211 OFF/OP EST MAY X REQ PHY/QHP: CPT

## 2017-07-31 RX ORDER — TRAMADOL HYDROCHLORIDE 50 MG/1
50 TABLET ORAL
COMMUNITY
Start: 2017-07-25 | End: 2017-11-16

## 2017-07-31 RX ORDER — CELECOXIB 200 MG/1
200 CAPSULE ORAL 2 TIMES DAILY
COMMUNITY
Start: 2017-07-25 | End: 2017-11-16

## 2017-07-31 RX ORDER — VENLAFAXINE HYDROCHLORIDE 37.5 MG/1
CAPSULE, EXTENDED RELEASE ORAL
Qty: 30 CAPSULE | Refills: 1 | Status: SHIPPED | OUTPATIENT
Start: 2017-07-31 | End: 2017-11-16

## 2017-07-31 RX ORDER — METHOCARBAMOL 750 MG/1
750 TABLET, FILM COATED ORAL
COMMUNITY
Start: 2017-07-25 | End: 2017-11-16

## 2017-07-31 ASSESSMENT — PAIN SCALES - GENERAL: PAINLEVEL: MILD PAIN (3)

## 2017-07-31 NOTE — PATIENT INSTRUCTIONS
Dr. Jin would like to refer you to Radiation/oncology. We would like to see you back in for a follow up appointment upon competition for radiation therapy.    Prescriptions have been sent to   [facility] [place], [place] - [address]  [address] [place] [address]  Phone: [phone] Fax: [phone]  When you are in need of a refill, please call your pharmacy and they will send us a request.  Copy of appointments, and after visit summary (AVS) given to patient.  If you have any questions please call Josette Smalls RN, BSN Oncology Hematology  [facility] [phone]. For questions after business hours, or on holidays/weekends, please call our after hours Nurse Triage line [phone]. Thank you.             Start effexor for hot flushes and depression.   RT referral for breast cancer  F/u post RT

## 2017-07-31 NOTE — NURSING NOTE
"Oncology Rooming Note    July 31, 2017 11:52 AM   Diana Wilson is a 37 year old female who presents for:    Chief Complaint   Patient presents with     Oncology Clinic Visit     Recheck Breast CA after surgery at [facility]     Initial Vitals: BP (!) 125/91 (BP Location: Right arm, Patient Position: Sitting, Cuff Size: Adult Regular)  Pulse 95  Temp 98.6  F (37  C) (Tympanic)  Resp 18  Ht 1.626 m (5' 4\")  Wt 94.4 kg (208 lb 3.2 oz)  SpO2 96%  Breastfeeding? No  BMI 35.74 kg/m2 Estimated body mass index is 35.74 kg/(m^2) as calculated from the following:    Height as of this encounter: 1.626 m (5' 4\").    Weight as of this encounter: 94.4 kg (208 lb 3.2 oz). Body surface area is 2.06 meters squared.  Mild Pain (3) Comment: Data Unavailable   No LMP recorded.  Allergies reviewed: Yes  Medications reviewed: Yes    Medications: Medication refills not needed today.  Pharmacy name entered into [facility]: [facility] - [id number]    Clinical concerns:  Recheck Breast CA after surgery at [facility]     7 minutes for nursing intake (face to face time)     Sondra Medina CMA              "

## 2017-07-31 NOTE — MR AVS SNAPSHOT
After Visit Summary   7/31/2017    Diana Wilson    MRN: [id number]           Patient Information     Date Of Birth          1980        Visit Information        Provider Department      7/31/2017 11:45 AM Maribel Jin MD [facility]        Today's Diagnoses     Hot flushes, perimenopausal    -  1    Depression, unspecified depression type        Malignant neoplasm of upper-outer quadrant of right breast in female, estrogen receptor positive (H)          Care Instructions    Start effexor for hot flushes and depression.   RT referral for breast cancer  F/u post RT          Follow-ups after your visit        Who to contact     If you have questions or need follow up information about today's clinic visit or your schedule please contact [facility] directly at [phone].  Normal or non-critical lab and imaging results will be communicated to you by [employer], letter or phone within 4 business days after the clinic has received the results. If you do not hear from us within 7 days, please contact the clinic through [employer] or phone. If you have a critical or abnormal lab result, we will notify you by phone as soon as possible.  Submit refill requests through [employer] or call your pharmacy and they will forward the refill request to us. Please allow 3 business days for your refill to be completed.          Additional Information About Your Visit        MyChart Information     [employer] gives you secure access to your electronic health record. If you see a primary care provider, you can also send messages to your care team and make appointments. If you have questions, please call your primary care clinic.  If you do not have a primary care provider, please call [phone] and they will assist you.        Care EveryWhere ID     This is your Care EveryWhere ID. This could be used by other organizations to access your [facility] medical records  [id number]        Your Vitals Were     Pulse  "Temperature Respirations Height Pulse Oximetry Breastfeeding?    95 98.6  F (37  C) (Tympanic) 18 1.626 m (5' 4\") 96% No    BMI (Body Mass Index)                   35.74 kg/m2            Blood Pressure from Last 3 Encounters:   07/31/17 (!) 125/91   06/30/17 117/81   06/03/17 140/87    Weight from Last 3 Encounters:   07/31/17 94.4 kg (208 lb 3.2 oz)   06/30/17 97.8 kg (215 lb 11.2 oz)   06/03/17 95.3 kg (210 lb)              Today, you had the following     No orders found for display         Today's Medication Changes          These changes are accurate as of: 7/31/17 12:17 PM.  If you have any questions, ask your nurse or doctor.               Start taking these medicines.        Dose/Directions    venlafaxine 37.5 MG 24 hr capsule   Commonly known as:  EFFEXOR-XR   Used for:  Hot flushes, perimenopausal, Depression, unspecified depression type   Started by:  Maribel Jin MD        Take 1 capsule daily for 14 days, then take 2 capsules daily.   Quantity:  30 capsule   Refills:  1            Where to get your medicines      These medications were sent to [facility] [place] [address]  [address]  [facility] [address]     Phone:  [phone]     venlafaxine 37.5 MG 24 hr capsule                Primary Care Provider Office Phone # Fax #    Yenifer Lorraine Ridley [phone]       [facility] [address] [facility] [address]        Equal Access to Services     [facility]: Hadii aad ku hadasho Soomaali, waaxda luqadaha, qaybta kaalmada adeegyada, waxay bj valentine . So [facility] [phone].    ATENCIÓN: Si habla español, tiene a montelongo disposición servicios gratuitos de asistencia lingüística. Llame al [phone].    We comply with applicable federal civil rights laws and [place] laws. We do not discriminate on the basis of race, color, national origin, age, disability sex, sexual orientation or gender identity.            Thank " you!     Thank you for choosing [facility]  for your care. Our goal is always to provide you with excellent care. Hearing back from our patients is one way we can continue to improve our services. Please take a few minutes to complete the written survey that you may receive in the mail after your visit with us. Thank you!             Your Updated Medication List - Protect others around you: Learn how to safely use, store and throw away your medicines at www.disposemymeds.org.          This list is accurate as of: 7/31/17 12:17 PM.  Always use your most recent med list.                   Brand Name Dispense Instructions for use Diagnosis    * albuterol 108 (90 BASE) MCG/ACT Inhaler    PROAIR HFA/PROVENTIL HFA/VENTOLIN HFA    1 Inhaler    Inhale 2 puffs into the lungs every 6 hours as needed for shortness of breath / dyspnea    Mild persistent asthma with exacerbation       * albuterol (2.5 MG/3ML) 0.083% neb solution     75 mL    Take 1 vial (2.5 mg) by nebulization every 6 hours as needed for shortness of breath / dyspnea or wheezing        celecoxib 200 MG capsule    celeBREX     Take 200 mg by mouth 2 times daily        fluticasone 50 MCG/ACT spray    FLONASE    3 Bottle    Spray 1-2 sprays into both nostrils daily    Environmental allergies       gabapentin 300 MG capsule    NEURONTIN    90 capsule    Take 1 tablet (300 mg) by mouth three times daily.    Neuropathy (H)       hydrOXYzine 10 MG tablet    ATARAX    60 tablet    Take 1-5 tablets (10-50 mg) by mouth every 6 hours as needed for anxiety    Anxiety, Situational anxiety       loratadine 10 MG tablet    CLARITIN    90 tablet    Take 1 tablet (10 mg) by mouth daily    Mild persistent asthma with exacerbation       LORazepam 0.5 MG tablet    ATIVAN    30 tablet    Take 1 tablet (0.5 mg) by mouth nightly as needed for anxiety    Insomnia, unspecified type       magnesium hydroxide 400 MG/5ML suspension    MILK OF MAGNESIA     Take 30 mLs by mouth         methocarbamol 750 MG tablet    ROBAXIN     Take 750 mg by mouth        montelukast 10 MG tablet    SINGULAIR    90 tablet    Take 1 tablet (10 mg) by mouth At Bedtime    Environmental allergies       scopolamine 72 hr patch    TRANSDERM    1 patch    Place 1 patch onto the skin every 72 hours.  Apply to hairless area behind one ear at least 4 hours before travel.  Remove old patch and change every 3 days.    Preop general physical exam, Nausea       traMADol 50 MG tablet    ULTRAM     Take 50 mg by mouth        venlafaxine 37.5 MG 24 hr capsule    EFFEXOR-XR    30 capsule    Take 1 capsule daily for 14 days, then take 2 capsules daily.    Hot flushes, perimenopausal, Depression, unspecified depression type       * Notice:  This list has 2 medication(s) that are the same as other medications prescribed for you. Read the directions carefully, and ask your doctor or other care provider to review them with you.

## 2017-07-31 NOTE — PROGRESS NOTES
ONCOLOGY Follow up visit       COMPLAINT AND REASON FOR CONSULTATION:  12/2016 diagnosed right breast cancer LN positive disease     REFERRING/[facility] PHYSICIAN:  Dr. Yenifer Ridley       HERB Wilson is a 36-year-old premenopausal woman, self felt breast lumpin her right armpit and right breast in 10/2016.   Diagnostic mammogram end of 12/2016, which identified 2 cm ill-defined density  associated with indeterminate measuring about 3.2 cm, so the whole area spans about 4 cm in biggest dimension.   Ultrasound to the site which is the upper outer quadrant of the right breast identified 2 cm irregular solid mass; at the right axilla shows several abnormal lymph nodes, the largest one measures 1.8 cm in maximum dimension.    Ultrasound-guided biopsy of right breast was done 12/27/2016, identified grade 3 invasive ductal cancer, angiolymphatic invasion not identified.  ER/AL 99% positive, HER-2/kelle negative, associated with high-grade DCIS.    Right axillary ultrasound-guided biopsy indicating metastatic carcinoma positive for spread from breast primary, ER/AL both negative.  HER-2/kelle is negative.      She has clinical T1N1 disease. She made informed decision to proceed with neoadjuvant DDAC C1D1 1/16/2017. She has good clinical response post DD AC x4. Then went on to have wkly taxol + carbo  with informed decision in March.   She has carbo hypersensitivity reaction in early May (during C3). It was d/c after.     She is tested negative for BRCA1/2 with [facility].     7/2017 she had double mastectomies at [facility]. Right side found no residual invasive carcinoma, +grade III DCIS, 1mm in greatest dimension. 5 sLN all negative. She had ypTis(DCIS)pN0 disease. Left breast no cancer.     PAST MEDICAL HISTORY:  Restless legs, mild intermittent asthma, celiac disease diagnosed 02/2016, hx of TIFFANY     SOCIAL HISTORY:  Works part-time.  Lives with her .  They have 2 kids, first pregnancy age 22.  She did not do breast  "feeding because of bilateral breast reduction.  She does office work.      FAMILY HISTORY:  \"Full of cancer\" from the mother's side including colon, lung, pancreatic, gastric cancer.  According to the patient none of them survive older than 60 years old.      REVIEW OF SYSTEMS  Her right mastectomy scar did not heal well, so re excision was done.   She is very emotional, teary, reports miserable hot flushes.       PHYSICAL EXAMINATION:   Blood pressure (!) 125/91, pulse 95, temperature 98.6  F (37  C), temperature source Tympanic, resp. rate 18, height 1.626 m (5' 4\"), weight 94.4 kg (208 lb 3.2 oz), SpO2 96 %, not currently breastfeeding.    GENERAL APPEARANCE:  He young woman, looks like her stated age, very upbeat, not in acute distress.   HEENT: The patient is normocephalic, atraumatic. Pupils are equally reactive to light.  Sclerae are anicteric. erythematous oral mucosa.  - pharynx.  No oral thrush.   NECK:  Supple.  No jugular venous distention.  Thyroid is not palpable.   LYMPH NODES:  Superficial lymphadenopathy is not appreciable in the bilateral cervical, supraclavicular, axillary or inguinal areas.   CARDIOVASCULAR:  S1, S2 regular with no murmurs or gallops.  No carotid or abdominal bruits.   PULMONARY:  Lungs are clear to auscultation and percussion bilaterally.  There is no wheezing or rhonchi.   GASTROINTESTINAL:  Abdomen is soft, nontender.  No hepatosplenomegaly.  No signs of ascites.  No mass appreciable.   MUSCULOSKELETAL/EXTREMITIES:  No edema.  No cyanotic changes.  No signs of joint deformity.  No lymphedema.   NEUROLOGIC:  Cranial nerves II-XII are grossly intact.  Sensation intact.  Muscle strength and muscle tone symmetrical, 5/5 throughout.   BACK:  No spinal or paraspinal tenderness.  No CVA tenderness.   SKIN:  No petechiae.  No rash.  No signs of cellulitis.   BREASTS:  Bilateral breast exam reviewed.  Bilateral mastectomies scars well healed.      LABORATORY DATA REVIEWED  7/2017 " double mastectomies at [facility]. Right side: no residual invasive carcinoma, +grade III DCIS, 1mm in greatest dimension. 5 sLN all negative. She had ypTis(DCIS)pN0 disease. Left breast no cancer.      CURRENT IMAGING:   Breast MRI at [facility] post DD AC 3/2017:   1. Improvement in biopsy-proven RIGHT-sided breast cancer at 10 o'clock. The malignancy has decreased in size (2.4 cm versus 3.3 cm previously)and demonstrates more favorable enhancement kinetics.     2.  No residual enlarged RIGHT axillary lymph nodes.    1/2017 baseline Echo LV function nl, EF 60-65%     PET 1/2017  1. Hypermetabolic mass in the right breast consistent with known malignancy.  2. Two enlarged hypermetabolic metastatic lymph nodes in the right axilla.  3. No additional worrisome hypermetabolic areas in the neck, chest, abdomen, or pelvis.    Old data reviewed with summary  End of 12/2016  right breast was done 12/27/2016, identified grade 3 invasive ductal cancer, angiolymphatic invasion not identified.  ER/MD 99% positive, HER-2/kelle negative, associated with high-grade DCIS.    Right axillary ultrasound-guided biopsy indicating metastatic carcinoma positive for spread from breast primary, ER/MD both negative.  HER-2/kelle is negative.      Diagnostic mammogram end of 12/2016, which identified 2 cm ill-defined density  associated with indeterminate microcalcification measuring about 3.2 cm, so the whole area spans about 4 cm in biggest dimension.   Ultrasound to the site which is the upper outer quadrant of the right breast identified 2 cm irregular solid mass; at the right axilla shows several abnormal lymph nodes, the largest one measures 1.8 cm in maximum dimension.      DEXA scan from 03/2016 identified mild osteopenia lumbar spine        ASSESSMENT AND PLAN:    1.   12/2016 newly dx breast cancer,  high-grade invasive ductal cancer on her right breast, ER/MD 99% positive, HER-2/kelle negative cancer with associated high-grade DCIS.  The lymph  nodes biopsy also came back positive breast cancer but is triple negative disease. She is only 36 years old. She had cT2N1 disesae.      S/p DD AC and wkly taxol, carbo was used partially dropped due   To hypersensitivity reaction.   She had pCR for invasive cancer with mastectomies in 7/2017.     She is also informed the need of adjuvant radiation based on her initial presentation in terms of lymph node involvement.      Will do the referral.   She will benefit from anti hormone therapy post RT. Tamoxifen is the only choice due to young age. May consider ovarian ablation depend on her tolerance to tamoxifen. This will start post RT.       2.  Severe hot flushes - we talked about the options of tx it.   She is open for prescription med.       3. New depression developed post mastectomies. She is crying in the visit. She is open to try Effexor. This can potentially also helps her hot flushes.

## 2017-08-15 DIAGNOSIS — N95.1 HOT FLUSHES, PERIMENOPAUSAL: Primary | ICD-10-CM

## 2017-08-15 DIAGNOSIS — F32.A DEPRESSION, UNSPECIFIED DEPRESSION TYPE: ICD-10-CM

## 2017-08-15 RX ORDER — VENLAFAXINE 37.5 MG/1
TABLET ORAL
Qty: 60 TABLET | Refills: 1 | Status: SHIPPED | OUTPATIENT
Start: 2017-08-15 | End: 2017-09-25

## 2017-08-25 ENCOUNTER — OFFICE VISIT (OUTPATIENT)
Dept: RADIATION THERAPY | Facility: OUTPATIENT CENTER | Age: 37
End: 2017-08-25

## 2017-08-25 VITALS
SYSTOLIC BLOOD PRESSURE: 110 MMHG | HEART RATE: 100 BPM | WEIGHT: 206.4 LBS | DIASTOLIC BLOOD PRESSURE: 71 MMHG | OXYGEN SATURATION: 96 % | RESPIRATION RATE: 16 BRPM | BODY MASS INDEX: 35.43 KG/M2

## 2017-08-25 DIAGNOSIS — C50.811 MALIGNANT NEOPLASM OF OVERLAPPING SITES OF RIGHT BREAST IN FEMALE, ESTROGEN RECEPTOR NEGATIVE (H): Primary | ICD-10-CM

## 2017-08-25 DIAGNOSIS — Z17.1 MALIGNANT NEOPLASM OF OVERLAPPING SITES OF RIGHT BREAST IN FEMALE, ESTROGEN RECEPTOR NEGATIVE (H): Primary | ICD-10-CM

## 2017-08-25 ASSESSMENT — PAIN SCALES - GENERAL: PAINLEVEL: MODERATE PAIN (4)

## 2017-08-25 NOTE — NURSING NOTE
REASON FOR APPOINTMENT   Right sided breast cancer, s/p neoadjuvant chemo and bilateral mastectomy with reconstruction/expanders.  Here to discuss radiation therapy. See epic for details.     PERSONAL HISTORY OF CANCER   Previous Cancer ? no   Prior Radiation ? no   Prior Chemotherapy ? no   Prior Hormonal Therapy ? no     RECENT IMAGING STUDIES  See [facility]    REFERRALS NEEDED  Not at this time  Already seen for lymphedema/PT  At [facility].  Sees on a weekly basis.    VITALS  /71 (Patient Position: Left side, Cuff Size: Adult Large)  Pulse 100  Resp 16  Wt 93.6 kg (206 lb 6.4 oz)  SpO2 96%  Breastfeeding? No  BMI 35.43 kg/m2    PACEMAKER/IMPLANTED CARDIAC DEVICE : No    PAIN  Rates 2-4/10 right breast/chest wall, axilla. Very tight, pulling. Limited ROM.  Avoids percocet due to se, takes advil   Also body aches from chemotherapy    PSYCHOSOCIAL  Marital Status:   Patient lives in [place], [place] with her  Raul.  Number of children: 2: boy 15 yo and a girl 12 yo  Working status: part time . Has just started back to work on a part time basis.  Do you feel safe in your home? Yes    REVIEW OF SYSTEMS  Skin:  Incision still healing, has an area of bleeding last evening. Swollen/edema.  Eyes: negative  Ears/Nose/Throat: negative  Respiratory: No shortness of breath, dyspnea on exertion, cough, or hemoptysis  Cardiovascular: edema in feet  Gastrointestinal: constipation  Genitourinary: nocturia and incontinence  Musculoskeletal: joint pain and muscular weakness  Neurologic: occasional ha, neuropathy, dizziness  Psychiatric: has been very teary, now on antidepressants.   Hematologic/Lymphatic/Immunologic: night sweats  Endocrine: heat intolerance and hot flashes    WOMEN ONLY  Any chance you may be pregnant: No  Age at first period: 12  Date of last period: not sure  Number of pregnancies: 2  Birth Control pills: Yes  If yes, for how long: short  time  Discussed pregnancy prevention    Radiation Oncology Patient Teaching    Current Concern: still not healed, limited rom    Person involved with teaching: Patient and   Patient asked Questions: Yes  Patient was cooperative: Yes  Patient was receptive (willing to accept information given): Yes    Education Assessment  Comprehension ability: High  Knowledge level: Medium  Factors affecting teaching: None    Education Materials Given  Radiation Therapy and You  Caring for Your Skin During Radiation ...  Disease Specific Booklet  Eating Hints  Diet and Nutrition Information    Educational Topics Discussed  Side effects, Medications, Pain management, Wound care, Activity, Nutrition, Adjustment to illness, Community resources and When to call MD/RN    Response To Teaching  Verbalizes understanding    Do you have an advanced directive or living will? no  Are you DNR/DNI? no

## 2017-08-25 NOTE — LETTER
2017      RE: Diana Wilson  [address] [place] [id number]       RADIATION ONCOLOGY CONSULT NOTE  DATE OF CONSULTATION: 2017  CSN: [id number]    PATIENT NAME: Diana Wilson  MRN: [id number]  : 1980    Maribel Jin MD  [facility]  [address] [place] [address]    Dear Dr. Jin:    Thank you very much for referring this patient for consideration of radiotherapy. As you know Ms. Wilson is a 37 year old female with a diagnosis of locally advanced breast cancer. She was seen today in consultation with Dr. Durham for consideration of post-mastectomy RT.       HISTORY OF PRESENT ILLNESS:  Ms. Diana Wilson is a very pleasant now menopausal, BRCA1/2-, 37 year old woman with diagnosis of ER-/SC-/HER2- cT2 N1 M0 G3 / ypTis N0 IDC of the RIGHT breast s/p neoadjuvant chemotherapy (ddAC + carbo/taxol qWk) + bilateral mastectomies with pCR.  Her initial lymph node biopsy showed triple-negative disease.        Ms. Wilson s oncologic history began in 10/2016 at which time she felt a lump in her right armpit and right breast. She proceeded to a diagnostic mammogram on 16 which demonstrated an 4 x 2.9 x 2.9 cm area of abnormality inside of which were indetermindate calcifications and a 2cm ill-defined density.  US on this date showed an irregular, 2 cm solid mass with microcalcifications with several right axillary LNs on US the largest of which was 1.8cm.      She had an ultrasound-guided biopsy on 2016.  Pathology from this procedure ([id number]) demonstrated ER+/SC+/HER2- Grade 3 IDC in the background of G3 DCIS without LVSI. The lymph node biopsy was positive for metastatic carcinoma, however the receptor staining was ER-/SC-/HER2- for the lymph node.     An MRI of the both breasts was done on 17.  This verified the aforementioned biopsy-proven right breast mass measuring 2.5 x 3.3 x 3.0 cm.  No other enhancing foci were seen in either breast.  There were 2 right  axillary LN also identified, one of which with correlation to the aforementioned biopsy lymph node.         A PET/CT was done on 1/10/17.  This again redemonstrated the primary breast mass with SUV 22.6.  And again, two FDG avid right axillary lymph nodes measuring 16mm (SUVmax 6.2) and 13mm (SUVmax 6.7) on CT. No other sites of distant metastases were identified.         She therefore started neoadjuvant chemotherapy with ddAC on 1/16/17.  She had a good clinical response to this component of neoadjuvant chemotherapy.  She then underwent weekly carboplatinum + paclitaxel.  Carboplatin was discontinued during cycle 3 in early May after having a hypersensitivity reaction.     After chemotherapy she had a post-chemotherapy MRI on 3/8/17 which showed good clinical treatment response.  She had no residual FDG avidity in the lymph nodes of there right breast.  There was Improvement in the biopsy-proven primary mass from 3.3 cm on previous study to 2.4 cm with more favorable enhancement kinetics.     She was tested for BRCA 1/2 mutations with [facility] and was negative.     She underwent bilateral mastectomies by Dr. Jenifer Olson on 7/21/17.  Pathology from [facility] ([id number]) demonstrated no residual invasive carcinoma in the right breast specimen.  There was however a residual 1mm focus of G3 DCIS.  The left mastectomy specimen was benign. There were no sentinel lymph nodes positive for metastatic carcinoma (0/5 sentinel LN + for carcinoma). There was no LVSI. As such she was staged ypTis N0 (sn) / Stage 0.      She has had ovarian ablation and is dealing with symptoms of menopause including hot flashes.  She is otherwise also dealing with some recent depression after surgery.  Effexor has helped with this by her report.  She reports she is doing well emotionally today on interview.  She also reports that she had one small episode of bleeding last night after applying cocoa butter to her breast.  She has  minimal tenderness, however she does have some pain when she moves her arm.  She continues physical therapy and is progressing.     All pertinent labs, imaging, and pathology findings have been reviewed with details above.     REVIEW OF SYSTEMS: A 10 point review of systems was obtained. Pertinent findings are noted in the HPI and nursing note from today, but are otherwise unremarkable.     CHEMOTHERAPY HISTORY: None    RADIATION THERAPY HISTORY: None    RELATIVE CONTRAINDICATIONS TO RT: No scleroderma    IMPLANTABLE CARDIAC DEVICE: None    PREGNANCY RISK: s/p ovarian ablation.    PAST MEDICAL /SURGICAL HISTORY:  Past Medical History:   Diagnosis Date     Depressive disorder 1995    And Anxiety     Encounter for insertion or removal of intrauterine contraceptive device 1/8/2008     Excessive or frequent menstruation 03/19/2003     Fibroadenosis of breast      Invasive ductal carcinoma of breast, right (H)      Malignant neoplasm of upper-outer quadrant of right female breast (H)      Neutropenia, drug-induced (H)      Nongonococcal urethritis (JALIL) due to chlamydia trachomatis 01/03/2002     Other and unspecified ovarian cyst 03/19/2003    RIGHT ovarian cyst     Transient hypertension of pregnancy, antepartum     PIH with last birth     Uncomplicated asthma 2002     Past Surgical History:   Procedure Laterality Date     BIOPSY  2/2016    EDG with biopsy     BREAST SURGERY  1997    Bilateral reduction     C APPENDECTOMY  1991     DENTAL SURGERY  09/13/2010 and 10/12/2010    Root canals     DILATION AND CURETTAGE, HYSTEROSCOPY, ABLATE ENDOMETRIUM NOVASURE, COMBINED  3/20/2012    Procedure:COMBINED DILATION AND CURETTAGE, HYSTEROSCOPY, ABLATE ENDOMETRIUM NOVASURE; Hysteroscopy with Endometrial Ablation Novasure; Surgeon:GERRI PURCELL; Location:[facility]     ESOPHAGOSCOPY, GASTROSCOPY, DUODENOSCOPY (EGD), COMBINED N/A 2/18/2016    Procedure: COMBINED ESOPHAGOSCOPY, GASTROSCOPY, DUODENOSCOPY (EGD);  Surgeon: Jose L Wilson  MD KEO;  Location: [facility]     INSERT PORT VASCULAR ACCESS N/A 1/11/2017    Procedure: INSERT PORT VASCULAR ACCESS;  Surgeon: Michael Townsend MD;  Location: [facility]     SURGICAL HISTORY OF -   1997    Breast reduction       ALLERGIES:  Allergies as of 08/25/2017 - Vijay as Reviewed 07/31/2017   Allergen Reaction Noted     Carboplatin Shortness Of Breath, Rash, and Anaphylaxis 05/09/2017     Ambien  07/13/2012     Amoxicillin GI Disturbance 01/05/2006     Amoxicillin Hives 01/16/2017     Erythromycin GI Disturbance 01/05/2006     Erythromycin Hives 01/16/2017     Hydrocodone-acetaminophen  01/16/2017     Penicillins Nausea and Vomiting and Hives 01/05/2006     Zolpidem  05/24/2017     Hydrocodone Other (See Comments) 09/01/2015       MEDICATIONS:  Current Outpatient Prescriptions   Medication Sig Dispense Refill     venlafaxine (EFFEXOR) 37.5 MG tablet Take 1 tablet (37.5 mg) in the morning and 2 tablets (75 mg) in the evening. 60 tablet 1     methocarbamol (ROBAXIN) 750 MG tablet Take 750 mg by mouth       celecoxib (CELEBREX) 200 MG capsule Take 200 mg by mouth 2 times daily        magnesium hydroxide (MILK OF MAGNESIA) 400 MG/5ML suspension Take 30 mLs by mouth       traMADol (ULTRAM) 50 MG tablet Take 50 mg by mouth       venlafaxine (EFFEXOR-XR) 37.5 MG 24 hr capsule Take 1 capsule daily for 14 days, then take 2 capsules daily. 30 capsule 1     gabapentin (NEURONTIN) 300 MG capsule Take 1 tablet (300 mg) by mouth three times daily. 90 capsule 3     hydrOXYzine (ATARAX) 10 MG tablet Take 1-5 tablets (10-50 mg) by mouth every 6 hours as needed for anxiety (Patient not taking: Reported on 7/31/2017) 60 tablet 1     scopolamine (TRANSDERM) 72 hr patch Place 1 patch onto the skin every 72 hours.  Apply to hairless area behind one ear at least 4 hours before travel.  Remove old patch and change every 3 days. (Patient not taking: Reported on 7/31/2017) 1 patch 0     albuterol (2.5 MG/3ML) 0.083% neb solution Take 1  vial (2.5 mg) by nebulization every 6 hours as needed for shortness of breath / dyspnea or wheezing 75 mL 0     LORazepam (ATIVAN) 0.5 MG tablet Take 1 tablet (0.5 mg) by mouth nightly as needed for anxiety 30 tablet 1     albuterol (PROAIR HFA, PROVENTIL HFA, VENTOLIN HFA) 108 (90 BASE) MCG/ACT inhaler Inhale 2 puffs into the lungs every 6 hours as needed for shortness of breath / dyspnea 1 Inhaler 11     fluticasone (FLONASE) 50 MCG/ACT nasal spray Spray 1-2 sprays into both nostrils daily 3 Bottle 3     loratadine (CLARITIN) 10 MG tablet Take 1 tablet (10 mg) by mouth daily 90 tablet 3     montelukast (SINGULAIR) 10 MG tablet Take 1 tablet (10 mg) by mouth At Bedtime 90 tablet 3        FAMILY HISTORY:  Family History   Problem Relation Age of Onset     DIABETES Mother      hypoglycemic     Allergies Mother      Arthritis Mother      Depression Mother      GASTROINTESTINAL DISEASE Mother      Neurologic Disorder Mother      Psychotic Disorder Mother      panic disorder     CANCER Mother 56     pancreatic, colon, liver and skin.     Cancer - colorectal Mother      Other Cancer Mother      Pancreatic     Anxiety Disorder Mother      MENTAL ILLNESS Mother      Hypertension Father      C.A.D. Paternal Grandfather      congestive heart failure     CEREBROVASCULAR DISEASE Paternal Grandfather      age 79     DIABETES Paternal Grandfather      Hypertension Paternal Grandfather      Alzheimer Disease Paternal Grandfather      Arthritis Paternal Grandfather      Circulatory Paternal Grandfather      Cardiovascular Paternal Grandfather      HEART DISEASE Paternal Grandfather      Unknown/Adopted Maternal Grandmother      CEREBROVASCULAR DISEASE Maternal Grandmother      Unknown/Adopted Maternal Grandfather      CANCER Maternal Grandfather      Obesity Paternal Grandmother        SOCIAL HISTORY:  Social History     Social History     Marital status:      Spouse name: Alfredo Wilson     Number of children: 2     Years  of education: 15     Occupational History      Other     [employer]     Social History Main Topics     Smoking status: Former Smoker     Packs/day: 0.50     Years: 10.00     Types: Cigarettes     Quit date: 10/1/2016     Smokeless tobacco: Never Used      Comment: Socially- quit smoking 10/01/2016     Alcohol use 0.0 oz/week      Comment: Occassional     Drug use: No     Sexual activity: Yes     Partners: Male     Birth control/ protection: Female Surgical      Comment:  has had a vasectomy     Other Topics Concern     Seat Belt Yes     Parent/Sibling W/ Cabg, Mi Or Angioplasty Before 65f 55m? No     Social History Narrative       PHYSICAL EXAM:  Vital Signs: /71 (Patient Position: Left side, Cuff Size: Adult Large)  Pulse 100  Resp 16  Wt 93.6 kg (206 lb 6.4 oz)  SpO2 96%  Breastfeeding? No  BMI 35.43 kg/m2  Gen: Alert, in NAD  Eyes: EOMI, sclera anicteric  HENT      Head: NC/AT     Ears: No external auricular lesions     Nose/sinus: No rhinorrhea or epistaxis     Oral Cavity/Oropharynx: MMM, no thrush noted  Pulm: Breathing comfortably on room air, no audible wheezes or ronchi, cta b/l  CV: Well-perfused, no cyanosis  Abdominal: Soft, nondistended  Skin: Normal color and turgor  Neurologic: normal gait  Back: Without tenderness to spinal percussion  Breast: Her right chest wall has well healed surgical scars.  There is skin changes noted especially over the areas of incision.  This skin is more red and pigmented than the rest of the tissue of the chest wall.  There is no discharge from her surgical scars bilaterally.  She is status post bilateral mastectomies.  No additional tenderness to palpation.    Psych:    Orientation: Alert, attentive, and oriented to time, place, and person   Affect: Affect was appropriate to the situation and showed normal range and stability. No anxious mood   Speech: Speech was clear with normal fluency, rate, tone, and volume.   Memory: Although not  formally assessed, immediate, recent, and remote memory appeared to be intact.    Insight and Judgement:  demonstrates adequate awareness of the issues discussed and was able to come to reasonable conclusions.   Thought: Thought patterns were coherent and logical.       ECOG PERFORMANCE STATUS: 0    RADIOLOGY AND LABORATORIES: Relevant studies reviewed as above outlined in HPI.      IMPRESSION:  In summary, Ms. Diana Wilson is a very pleasant BRCA1/2-, 37 year old woman with diagnosis of ER-/RI-/HER2- cT2 N1 M0 G3 / ypTis N0 IDC of the RIGHT breast s/p neoadjuvant chemotherapy (ddAC + carbo/taxol qWk) + bilateral mastectomies with pCR.  Her initial lymph node biopsy showed triple-negative disease while her initial breast pathology showed HR+ disease.  She had a very small focus of 1mm residual DCIS. Given that she initially presented with cN+ disease she is a candidate for PMRT.     RECOMMENDATION:   We reviewed the rationale, logistics, risks, benefits, and potential side effects of the proposed treatment in detail with Ms. Wilson. We discussed that our objective for this treatment would be for overall cure in combination with surgery and chemotherapy. We discussed a 28 fraction treatment to be delivered to the chest wall and lymph node regions at risk in the breast including the level 1,2, and 3 of the axilla and the SCV given her initial LN positivity.  We will not plan on delivering targeted radiation to the IM arnold chain.    We counseled her regarding enrollment on NSABP B51, a trial with a goal to assess if patients who achieve pCR after neoadjuvant chemotherapy can safely omit radiation from their treatment plan.  Ms. Wilson, however, did not want enrollment on this trial and as such we recommended PMRT.    Ms. Wilson had many questions during our conversation today, which were answered to the best of our ability. By the end of our consultation today, the patient decided to proceed with plans for PMRT and signed  an informed consent to that effect.  Given that she still requires time to heal and time to improve her shoulder ROM we will have her return in 1 week for assessment with possible simulation CT.      Roberto Nielson MD  Radiation Oncology Resident, PGY-3  [facility]  Phone: [phone]    Ms. Wilson was seen and evaluated with staff, Dr. Durham. Thank you, Dr. Jin, for allowing us to participate in this patient's care. Please feel free to call with any questions or concerns.      I have personally examined the patient, reviewed and edited the resident's note and agree with the plan of care.    Caitlin Durham M.D., Ph.D.   Adjunct    Radiation Oncologist   [facility]   Phone: [phone]      CC  Patient Care Team:  Yenifer Ridley MD as PCP - General (Family Practice)  Eugenio Ruelas MD as MD (Plastic Surgery)  Adore Olson MD, MD as MD (Surgery)  Maribel Jin MD as MD (Oncology)

## 2017-08-25 NOTE — PROGRESS NOTES
RADIATION ONCOLOGY CONSULT NOTE  DATE OF CONSULTATION: 2017  CSN: [id number]    PATIENT NAME: Diana Wilson  MRN: [id number]  : 1980    Maribel Jin MD  [facility] [address] [place] [address]    Dear Dr. Jin:    Thank you very much for referring this patient for consideration of radiotherapy. As you know Ms. Wilson is a 37 year old female with a diagnosis of locally advanced breast cancer. She was seen today in consultation with Dr. Durham for consideration of post-mastectomy RT.       HISTORY OF PRESENT ILLNESS:  Ms. Diana Wilson is a very pleasant now menopausal, BRCA1/2-, 37 year old woman with diagnosis of ER-/MD-/HER2- cT2 N1 M0 G3 / ypTis N0 IDC of the RIGHT breast s/p neoadjuvant chemotherapy (ddAC + carbo/taxol qWk) + bilateral mastectomies with pCR.  Her initial lymph node biopsy showed triple-negative disease.        Ms. Wilson s oncologic history began in 10/2016 at which time she felt a lump in her right armpit and right breast. She proceeded to a diagnostic mammogram on 16 which demonstrated an 4 x 2.9 x 2.9 cm area of abnormality inside of which were indetermindate calcifications and a 2cm ill-defined density.  US on this date showed an irregular, 2 cm solid mass with microcalcifications with several right axillary LNs on US the largest of which was 1.8cm.      She had an ultrasound-guided biopsy on 2016.  Pathology from this procedure ([id number]) demonstrated ER+/MD+/HER2- Grade 3 IDC in the background of G3 DCIS without LVSI. The lymph node biopsy was positive for metastatic carcinoma, however the receptor staining was ER-/MD-/HER2- for the lymph node.     An MRI of the both breasts was done on 17.  This verified the aforementioned biopsy-proven right breast mass measuring 2.5 x 3.3 x 3.0 cm.  No other enhancing foci were seen in either breast.  There were 2 right axillary LN also identified, one of which with correlation to the aforementioned  biopsy lymph node.         A PET/CT was done on 1/10/17.  This again redemonstrated the primary breast mass with SUV 22.6.  And again, two FDG avid right axillary lymph nodes measuring 16mm (SUVmax 6.2) and 13mm (SUVmax 6.7) on CT. No other sites of distant metastases were identified.         She therefore started neoadjuvant chemotherapy with ddAC on 1/16/17.  She had a good clinical response to this component of neoadjuvant chemotherapy.  She then underwent weekly carboplatinum + paclitaxel.  Carboplatin was discontinued during cycle 3 in early May after having a hypersensitivity reaction.     After chemotherapy she had a post-chemotherapy MRI on 3/8/17 which showed good clinical treatment response.  She had no residual FDG avidity in the lymph nodes of there right breast.  There was Improvement in the biopsy-proven primary mass from 3.3 cm on previous study to 2.4 cm with more favorable enhancement kinetics.     She was tested for BRCA 1/2 mutations with [facility] and was negative.     She underwent bilateral mastectomies by Dr. Jenifer Olson on 7/21/17.  Pathology from [facility] ([id number]) demonstrated no residual invasive carcinoma in the right breast specimen.  There was however a residual 1mm focus of G3 DCIS.  The left mastectomy specimen was benign. There were no sentinel lymph nodes positive for metastatic carcinoma (0/5 sentinel LN + for carcinoma). There was no LVSI. As such she was staged ypTis N0 (sn) / Stage 0.      She has had ovarian ablation and is dealing with symptoms of menopause including hot flashes.  She is otherwise also dealing with some recent depression after surgery.  Effexor has helped with this by her report.  She reports she is doing well emotionally today on interview.  She also reports that she had one small episode of bleeding last night after applying cocoa butter to her breast.  She has minimal tenderness, however she does have some pain when she moves her arm.  She  continues physical therapy and is progressing.     All pertinent labs, imaging, and pathology findings have been reviewed with details above.     REVIEW OF SYSTEMS: A 10 point review of systems was obtained. Pertinent findings are noted in the HPI and nursing note from today, but are otherwise unremarkable.     CHEMOTHERAPY HISTORY: None    RADIATION THERAPY HISTORY: None    RELATIVE CONTRAINDICATIONS TO RT: No scleroderma    IMPLANTABLE CARDIAC DEVICE: None    PREGNANCY RISK: s/p ovarian ablation.    PAST MEDICAL /SURGICAL HISTORY:  Past Medical History:   Diagnosis Date     Depressive disorder 1995    And Anxiety     Encounter for insertion or removal of intrauterine contraceptive device 1/8/2008     Excessive or frequent menstruation 03/19/2003     Fibroadenosis of breast      Invasive ductal carcinoma of breast, right (H)      Malignant neoplasm of upper-outer quadrant of right female breast (H)      Neutropenia, drug-induced (H)      Nongonococcal urethritis (JALIL) due to chlamydia trachomatis 01/03/2002     Other and unspecified ovarian cyst 03/19/2003    RIGHT ovarian cyst     Transient hypertension of pregnancy, antepartum     PIH with last birth     Uncomplicated asthma 2002     Past Surgical History:   Procedure Laterality Date     BIOPSY  2/2016    EDG with biopsy     BREAST SURGERY  1997    Bilateral reduction     C APPENDECTOMY  1991     DENTAL SURGERY  09/13/2010 and 10/12/2010    Root canals     DILATION AND CURETTAGE, HYSTEROSCOPY, ABLATE ENDOMETRIUM NOVASURE, COMBINED  3/20/2012    Procedure:COMBINED DILATION AND CURETTAGE, HYSTEROSCOPY, ABLATE ENDOMETRIUM NOVASURE; Hysteroscopy with Endometrial Ablation Novasure; Surgeon:GERRI PURCELL; Location:[facility]     ESOPHAGOSCOPY, GASTROSCOPY, DUODENOSCOPY (EGD), COMBINED N/A 2/18/2016    Procedure: COMBINED ESOPHAGOSCOPY, GASTROSCOPY, DUODENOSCOPY (EGD);  Surgeon: Jose L Wilson MD;  Location: [facility]     INSERT PORT VASCULAR ACCESS N/A 1/11/2017     Procedure: INSERT PORT VASCULAR ACCESS;  Surgeon: Michael Townsend MD;  Location: [facility]     SURGICAL HISTORY OF -   1997    Breast reduction       ALLERGIES:  Allergies as of 08/25/2017 - Vijay as Reviewed 07/31/2017   Allergen Reaction Noted     Carboplatin Shortness Of Breath, Rash, and Anaphylaxis 05/09/2017     Ambien  07/13/2012     Amoxicillin GI Disturbance 01/05/2006     Amoxicillin Hives 01/16/2017     Erythromycin GI Disturbance 01/05/2006     Erythromycin Hives 01/16/2017     Hydrocodone-acetaminophen  01/16/2017     Penicillins Nausea and Vomiting and Hives 01/05/2006     Zolpidem  05/24/2017     Hydrocodone Other (See Comments) 09/01/2015       MEDICATIONS:  Current Outpatient Prescriptions   Medication Sig Dispense Refill     venlafaxine (EFFEXOR) 37.5 MG tablet Take 1 tablet (37.5 mg) in the morning and 2 tablets (75 mg) in the evening. 60 tablet 1     methocarbamol (ROBAXIN) 750 MG tablet Take 750 mg by mouth       celecoxib (CELEBREX) 200 MG capsule Take 200 mg by mouth 2 times daily        magnesium hydroxide (MILK OF MAGNESIA) 400 MG/5ML suspension Take 30 mLs by mouth       traMADol (ULTRAM) 50 MG tablet Take 50 mg by mouth       venlafaxine (EFFEXOR-XR) 37.5 MG 24 hr capsule Take 1 capsule daily for 14 days, then take 2 capsules daily. 30 capsule 1     gabapentin (NEURONTIN) 300 MG capsule Take 1 tablet (300 mg) by mouth three times daily. 90 capsule 3     hydrOXYzine (ATARAX) 10 MG tablet Take 1-5 tablets (10-50 mg) by mouth every 6 hours as needed for anxiety (Patient not taking: Reported on 7/31/2017) 60 tablet 1     scopolamine (TRANSDERM) 72 hr patch Place 1 patch onto the skin every 72 hours.  Apply to hairless area behind one ear at least 4 hours before travel.  Remove old patch and change every 3 days. (Patient not taking: Reported on 7/31/2017) 1 patch 0     albuterol (2.5 MG/3ML) 0.083% neb solution Take 1 vial (2.5 mg) by nebulization every 6 hours as needed for shortness of  breath / dyspnea or wheezing 75 mL 0     LORazepam (ATIVAN) 0.5 MG tablet Take 1 tablet (0.5 mg) by mouth nightly as needed for anxiety 30 tablet 1     albuterol (PROAIR HFA, PROVENTIL HFA, VENTOLIN HFA) 108 (90 BASE) MCG/ACT inhaler Inhale 2 puffs into the lungs every 6 hours as needed for shortness of breath / dyspnea 1 Inhaler 11     fluticasone (FLONASE) 50 MCG/ACT nasal spray Spray 1-2 sprays into both nostrils daily 3 Bottle 3     loratadine (CLARITIN) 10 MG tablet Take 1 tablet (10 mg) by mouth daily 90 tablet 3     montelukast (SINGULAIR) 10 MG tablet Take 1 tablet (10 mg) by mouth At Bedtime 90 tablet 3        FAMILY HISTORY:  Family History   Problem Relation Age of Onset     DIABETES Mother      hypoglycemic     Allergies Mother      Arthritis Mother      Depression Mother      GASTROINTESTINAL DISEASE Mother      Neurologic Disorder Mother      Psychotic Disorder Mother      panic disorder     CANCER Mother 56     pancreatic, colon, liver and skin.     Cancer - colorectal Mother      Other Cancer Mother      Pancreatic     Anxiety Disorder Mother      MENTAL ILLNESS Mother      Hypertension Father      C.A.D. Paternal Grandfather      congestive heart failure     CEREBROVASCULAR DISEASE Paternal Grandfather      age 79     DIABETES Paternal Grandfather      Hypertension Paternal Grandfather      Alzheimer Disease Paternal Grandfather      Arthritis Paternal Grandfather      Circulatory Paternal Grandfather      Cardiovascular Paternal Grandfather      HEART DISEASE Paternal Grandfather      Unknown/Adopted Maternal Grandmother      CEREBROVASCULAR DISEASE Maternal Grandmother      Unknown/Adopted Maternal Grandfather      CANCER Maternal Grandfather      Obesity Paternal Grandmother        SOCIAL HISTORY:  Social History     Social History     Marital status:      Spouse name: Alfredo Wilson     Number of children: 2     Years of education: 15     Occupational History      Other      [employer]     Social History Main Topics     Smoking status: Former Smoker     Packs/day: 0.50     Years: 10.00     Types: Cigarettes     Quit date: 10/1/2016     Smokeless tobacco: Never Used      Comment: Socially- quit smoking 10/01/2016     Alcohol use 0.0 oz/week      Comment: Occassional     Drug use: No     Sexual activity: Yes     Partners: Male     Birth control/ protection: Female Surgical      Comment:  has had a vasectomy     Other Topics Concern     Seat Belt Yes     Parent/Sibling W/ Cabg, Mi Or Angioplasty Before 65f 55m? No     Social History Narrative       PHYSICAL EXAM:  Vital Signs: /71 (Patient Position: Left side, Cuff Size: Adult Large)  Pulse 100  Resp 16  Wt 93.6 kg (206 lb 6.4 oz)  SpO2 96%  Breastfeeding? No  BMI 35.43 kg/m2  Gen: Alert, in NAD  Eyes: EOMI, sclera anicteric  HENT      Head: NC/AT     Ears: No external auricular lesions     Nose/sinus: No rhinorrhea or epistaxis     Oral Cavity/Oropharynx: MMM, no thrush noted  Pulm: Breathing comfortably on room air, no audible wheezes or ronchi, cta b/l  CV: Well-perfused, no cyanosis  Abdominal: Soft, nondistended  Skin: Normal color and turgor  Neurologic: normal gait  Back: Without tenderness to spinal percussion  Breast: Her right chest wall has well healed surgical scars.  There is skin changes noted especially over the areas of incision.  This skin is more red and pigmented than the rest of the tissue of the chest wall.  There is no discharge from her surgical scars bilaterally.  She is status post bilateral mastectomies.  No additional tenderness to palpation.    Psych:    Orientation: Alert, attentive, and oriented to time, place, and person   Affect: Affect was appropriate to the situation and showed normal range and stability. No anxious mood   Speech: Speech was clear with normal fluency, rate, tone, and volume.   Memory: Although not formally assessed, immediate, recent, and remote memory appeared to be  intact.    Insight and Judgement:  demonstrates adequate awareness of the issues discussed and was able to come to reasonable conclusions.   Thought: Thought patterns were coherent and logical.       ECOG PERFORMANCE STATUS: 0    RADIOLOGY AND LABORATORIES: Relevant studies reviewed as above outlined in HPI.      IMPRESSION:  In summary, Ms. Diana Wilson is a very pleasant BRCA1/2-, 37 year old woman with diagnosis of ER-/SC-/HER2- cT2 N1 M0 G3 / ypTis N0 IDC of the RIGHT breast s/p neoadjuvant chemotherapy (ddAC + carbo/taxol qWk) + bilateral mastectomies with pCR.  Her initial lymph node biopsy showed triple-negative disease while her initial breast pathology showed HR+ disease.  She had a very small focus of 1mm residual DCIS. Given that she initially presented with cN+ disease she is a candidate for PMRT.     RECOMMENDATION:   We reviewed the rationale, logistics, risks, benefits, and potential side effects of the proposed treatment in detail with Ms. Wilson. We discussed that our objective for this treatment would be for overall cure in combination with surgery and chemotherapy. We discussed a 28 fraction treatment to be delivered to the chest wall and lymph node regions at risk in the breast including the level 1,2, and 3 of the axilla and the SCV given her initial LN positivity.  We will not plan on delivering targeted radiation to the IM arnold chain.    We counseled her regarding enrollment on NSABP B51, a trial with a goal to assess if patients who achieve pCR after neoadjuvant chemotherapy can safely omit radiation from their treatment plan.  Ms. Wilson, however, did not want enrollment on this trial and as such we recommended PMRT.    Ms. Wilson had many questions during our conversation today, which were answered to the best of our ability. By the end of our consultation today, the patient decided to proceed with plans for PMRT and signed an informed consent to that effect.  Given that she still requires  time to heal and time to improve her shoulder ROM we will have her return in 1 week for assessment with possible simulation CT.      Roberto Nielson MD  Radiation Oncology Resident, PGY-3  [facility]  Phone: [phone]    Ms. Wilson was seen and evaluated with staff, Dr. Durham. Thank you, Dr. Jin, for allowing us to participate in this patient's care. Please feel free to call with any questions or concerns.      I have personally examined the patient, reviewed and edited the resident's note and agree with the plan of care.    Caitlin Durham M.D., Ph.D.   Adjunct    Radiation Oncologist   [facility]   [facility]   Phone: [phone]      CC  Patient Care Team:  Yenifer Ridley MD as PCP - General (Family Practice)  Eugenio Ruelas MD as MD (Plastic Surgery)  Adore Olson MD, MD as MD (Surgery)  Caitlin Durham MD as MD (Radiation Oncology)  Maribel Jin MD as MD (Oncology)

## 2017-08-25 NOTE — MR AVS SNAPSHOT
After Visit Summary   8/25/2017    Diana Wilson    MRN: [id number]           Patient Information     Date Of Birth          1980        Visit Information        Provider Department      8/25/2017 9:15 AM Caitlin Durham MD [facility]        Today's Diagnoses     Malignant neoplasm of overlapping sites of right breast in female, estrogen receptor negative (H)    -  1       Follow-ups after your visit        Your next 10 appointments already scheduled     Sep 01, 2017 10:15 AM CDT   Return Visit with Caitlin Durham MD   Rad[facility] ([facility])    [address], [address]  [place] [address]   [phone]            Sep 01, 2017 10:30 AM CDT   SIMULATION with Caitlin Durham MD, [facility]   [facility] ([facility])    [address]  [place] [address]   [phone]              Who to contact     Please call your clinic at [phone] to:    Ask questions about your health    Make or cancel appointments    Discuss your medicines    Learn about your test results    Speak to your doctor   If you have compliments or concerns about an experience at your clinic, or if you wish to file a complaint, please contact [facility] Patient Relations at [phone] or email us at [email or web address]         Additional Information About Your Visit        MyChart Information     [employer] gives you secure access to your electronic health record. If you see a primary care provider, you can also send messages to your care team and make appointments. If you have questions, please call your primary care clinic.  If you do not have a primary care provider, please call [phone] and they will assist you.      [employer] is an electronic gateway that provides easy, online access to your medical records. With [employer], you can request a clinic appointment, read your test results, renew a prescription or  communicate with your care team.     To access your existing account, please contact your [facility] or call [phone] for assistance.        Care EveryWhere ID     This is your Care EveryWhere ID. This could be used by other organizations to access your [facility] medical records  [id number]        Your Vitals Were     Pulse Respirations Pulse Oximetry Breastfeeding? BMI (Body Mass Index)       100 16 96% No 35.43 kg/m2        Blood Pressure from Last 3 Encounters:   08/25/17 110/71   07/31/17 (!) 125/91   06/30/17 117/81    Weight from Last 3 Encounters:   08/25/17 93.6 kg (206 lb 6.4 oz)   07/31/17 94.4 kg (208 lb 3.2 oz)   06/30/17 97.8 kg (215 lb 11.2 oz)              Today, you had the following     No orders found for display       Primary Care Provider Office Phone # Fax #    Yenifer Ridley [phone] [address] [facility] [address]        Equal Access to Services     SCOTT RAMIREZ AH: Hadii aad ku hadasho Soomaali, waaxda luqadaha, qaybta kaalmada adeegyada, waxkamari lorenzo haymarybeth valentine . So [facility] [phone].    ATENCIÓN: Si habla español, tiene a montelongo disposición servicios gratuitos de asistencia lingüística. [facility] [phone].    We comply with applicable federal civil rights laws and [place] laws. We do not discriminate on the basis of race, color, national origin, age, disability sex, sexual orientation or gender identity.            Thank you!     Thank you for choosing [facility]  for your care. Our goal is always to provide you with excellent care. Hearing back from our patients is one way we can continue to improve our services. Please take a few minutes to complete the written survey that you may receive in the mail after your visit with us. Thank you!             Your Updated Medication List - Protect others around you: Learn how to safely use, store and throw away your medicines at  www.disposemymeds.org.          This list is accurate as of: 8/25/17 11:12 AM.  Always use your most recent med list.                   Brand Name Dispense Instructions for use Diagnosis    * albuterol 108 (90 BASE) MCG/ACT Inhaler    PROAIR HFA/PROVENTIL HFA/VENTOLIN HFA    1 Inhaler    Inhale 2 puffs into the lungs every 6 hours as needed for shortness of breath / dyspnea    Mild persistent asthma with exacerbation       * albuterol (2.5 MG/3ML) 0.083% neb solution     75 mL    Take 1 vial (2.5 mg) by nebulization every 6 hours as needed for shortness of breath / dyspnea or wheezing        celecoxib 200 MG capsule    celeBREX     Take 200 mg by mouth 2 times daily        fluticasone 50 MCG/ACT spray    FLONASE    3 Bottle    Spray 1-2 sprays into both nostrils daily    Environmental allergies       gabapentin 300 MG capsule    NEURONTIN    90 capsule    Take 1 tablet (300 mg) by mouth three times daily.    Neuropathy (H)       hydrOXYzine 10 MG tablet    ATARAX    60 tablet    Take 1-5 tablets (10-50 mg) by mouth every 6 hours as needed for anxiety    Anxiety, Situational anxiety       loratadine 10 MG tablet    CLARITIN    90 tablet    Take 1 tablet (10 mg) by mouth daily    Mild persistent asthma with exacerbation       LORazepam 0.5 MG tablet    ATIVAN    30 tablet    Take 1 tablet (0.5 mg) by mouth nightly as needed for anxiety    Insomnia, unspecified type       magnesium hydroxide 400 MG/5ML suspension    MILK OF MAGNESIA     Take 30 mLs by mouth        methocarbamol 750 MG tablet    ROBAXIN     Take 750 mg by mouth        montelukast 10 MG tablet    SINGULAIR    90 tablet    Take 1 tablet (10 mg) by mouth At Bedtime    Environmental allergies       scopolamine 72 hr patch    TRANSDERM    1 patch    Place 1 patch onto the skin every 72 hours.  Apply to hairless area behind one ear at least 4 hours before travel.  Remove old patch and change every 3 days.    Preop general physical exam, Nausea       traMADol  50 MG tablet    ULTRAM     Take 50 mg by mouth        * venlafaxine 37.5 MG 24 hr capsule    EFFEXOR-XR    30 capsule    Take 1 capsule daily for 14 days, then take 2 capsules daily.    Hot flushes, perimenopausal, Depression, unspecified depression type       * venlafaxine 37.5 MG tablet    EFFEXOR    60 tablet    Take 1 tablet (37.5 mg) in the morning and 2 tablets (75 mg) in the evening.    Hot flushes, perimenopausal, Depression, unspecified depression type       * Notice:  This list has 4 medication(s) that are the same as other medications prescribed for you. Read the directions carefully, and ask your doctor or other care provider to review them with you.

## 2017-09-15 ENCOUNTER — OFFICE VISIT (OUTPATIENT)
Dept: RADIATION THERAPY | Facility: OUTPATIENT CENTER | Age: 37
End: 2017-09-15

## 2017-09-15 VITALS
SYSTOLIC BLOOD PRESSURE: 116 MMHG | BODY MASS INDEX: 36.22 KG/M2 | WEIGHT: 211 LBS | RESPIRATION RATE: 18 BRPM | DIASTOLIC BLOOD PRESSURE: 77 MMHG | HEART RATE: 97 BPM | OXYGEN SATURATION: 96 %

## 2017-09-15 DIAGNOSIS — C50.811 MALIGNANT NEOPLASM OF OVERLAPPING SITES OF RIGHT BREAST IN FEMALE, ESTROGEN RECEPTOR NEGATIVE (H): Primary | ICD-10-CM

## 2017-09-15 DIAGNOSIS — Z17.1 MALIGNANT NEOPLASM OF OVERLAPPING SITES OF RIGHT BREAST IN FEMALE, ESTROGEN RECEPTOR NEGATIVE (H): Primary | ICD-10-CM

## 2017-09-15 NOTE — MR AVS SNAPSHOT
After Visit Summary   9/15/2017    Diana Wilson    MRN: [id number]           Patient Information     Date Of Birth          1980        Visit Information        Provider Department      9/15/2017 8:45 AM Nithya Silver MD [facility]        Today's Diagnoses     Malignant neoplasm of overlapping sites of right breast in female, estrogen receptor negative (H)    -  1       Follow-ups after your visit        Your next 10 appointments already scheduled     Sep 25, 2017 12:30 PM CDT   Linear Accelerator with [facility]   [facility] ([facility])    [address], [address]  [facility] [address]   [phone]            Sep 26, 2017  2:45 PM CDT   Linear Accelerator with [facility] Rad [facility]   [facility] ([facility])    [address], [address]  [facility] [address]   [phone]            Sep 27, 2017  2:15 PM CDT   Linear Accelerator with [facility]   [facility] ([facility])    [address], [address]  [facility] [address]   [phone]            Sep 28, 2017  2:45 PM CDT   Linear Accelerator with [facility]   [facility] ([facility])    [address], [address]  [facility] [address]   [phone]            Sep 29, 2017  2:15 PM CDT   Linear Accelerator with [facility]   [facility] ([facility])    [address], [address]  [facility] [address]   [phone]            Oct 02, 2017  2:15 PM CDT   Linear Accelerator with [facility]   [facility] ([facility])    [address], [address]  [place] [address]   [phone]            Oct 03, 2017  2:15 PM CDT   Linear Accelerator with [facility]   [facility] ([facility])    [address], [address]  [facility] [address]   [phone]            Oct 04, 2017  2:15 PM CDT   Linear  Accelerator with [facility]   [facility] ([facility])    [address], [address]  [facility] [place] [address]   [phone]            Oct 04, 2017  2:30 PM CDT   ON TREATMENT VISIT with Caitlin Durham MD   [facility] ([facility])    [address], [address]  [facility] [place] [address]   [phone]            Oct 05, 2017  2:15 PM CDT   Linear Accelerator with [facility]   [facility] [place] ([facility])    [address]  [facility] [place] [address]   [phone]              Who to contact     Please call your clinic at [phone] to:    Ask questions about your health    Make or cancel appointments    Discuss your medicines    Learn about your test results    Speak to your doctor   If you have compliments or concerns about an experience at your clinic, or if you wish to file a complaint, please contact [facility] Patient Relations at [phone] or email us at [email or web address]         Additional Information About Your Visit        Clinician [employer] Information     [employer] gives you secure access to your electronic health record. If you see a primary care provider, you can also send messages to your care team and make appointments. If you have questions, please call your primary care clinic.  If you do not have a primary care provider, please call [phone] and they will assist you.      [employer] is an electronic gateway that provides easy, online access to your medical records. With [employer], you can request a clinic appointment, read your test results, renew a prescription or communicate with your care team.     To access your existing account, please contact your [facility] or call [phone] for assistance.        Care EveryWhere ID     This is your Care EveryWhere ID. This could be used by other organizations to access your [facility]  records  [id number]        Your Vitals Were     Pulse Respirations Pulse Oximetry BMI (Body Mass Index)          97 18 96% 36.22 kg/m2         Blood Pressure from Last 3 Encounters:   09/15/17 116/77   08/25/17 110/71   07/31/17 (!) 125/91    Weight from Last 3 Encounters:   09/15/17 95.7 kg (211 lb)   08/25/17 93.6 kg (206 lb 6.4 oz)   07/31/17 94.4 kg (208 lb 3.2 oz)              Today, you had the following     No orders found for display       Primary Care Provider Office Phone # Fax #    Yenifer Lorraine Ridley [phone] [address] [place] [address]        Equal Access to Services     FAUZIA RAMIREZ : Sandra sheao Sowan, waaxda luqadaha, qaybta kaalmada adeegyada, javier valentine . So [facility] [phone].    ATENCIÓN: Si habla español, tiene a montelongo disposición servicios gratuitos de asistencia lingüística. [facility] [phone].    We comply with applicable federal civil rights laws and [place] laws. We do not discriminate on the basis of race, color, national origin, age, disability sex, sexual orientation or gender identity.            Thank you!     Thank you for choosing [facility]  for your care. Our goal is always to provide you with excellent care. Hearing back from our patients is one way we can continue to improve our services. Please take a few minutes to complete the written survey that you may receive in the mail after your visit with us. Thank you!             Your Updated Medication List - Protect others around you: Learn how to safely use, store and throw away your medicines at www.disposemymeds.org.          This list is accurate as of: 9/15/17  4:40 PM.  Always use your most recent med list.                   Brand Name Dispense Instructions for use Diagnosis    * albuterol 108 (90 BASE) MCG/ACT Inhaler    PROAIR HFA/PROVENTIL HFA/VENTOLIN HFA    1 Inhaler    Inhale 2 puffs into the lungs every 6 hours as needed for  shortness of breath / dyspnea    Mild persistent asthma with exacerbation       * albuterol (2.5 MG/3ML) 0.083% neb solution     75 mL    Take 1 vial (2.5 mg) by nebulization every 6 hours as needed for shortness of breath / dyspnea or wheezing        celecoxib 200 MG capsule    celeBREX     Take 200 mg by mouth 2 times daily        fluticasone 50 MCG/ACT spray    FLONASE    3 Bottle    Spray 1-2 sprays into both nostrils daily    Environmental allergies       gabapentin 300 MG capsule    NEURONTIN    90 capsule    Take 1 tablet (300 mg) by mouth three times daily.    Neuropathy (H)       hydrOXYzine 10 MG tablet    ATARAX    60 tablet    Take 1-5 tablets (10-50 mg) by mouth every 6 hours as needed for anxiety    Anxiety, Situational anxiety       loratadine 10 MG tablet    CLARITIN    90 tablet    Take 1 tablet (10 mg) by mouth daily    Mild persistent asthma with exacerbation       LORazepam 0.5 MG tablet    ATIVAN    30 tablet    Take 1 tablet (0.5 mg) by mouth nightly as needed for anxiety    Insomnia, unspecified type       magnesium hydroxide 400 MG/5ML suspension    MILK OF MAGNESIA     Take 30 mLs by mouth        methocarbamol 750 MG tablet    ROBAXIN     Take 750 mg by mouth        montelukast 10 MG tablet    SINGULAIR    90 tablet    Take 1 tablet (10 mg) by mouth At Bedtime    Environmental allergies       scopolamine 72 hr patch    TRANSDERM    1 patch    Place 1 patch onto the skin every 72 hours.  Apply to hairless area behind one ear at least 4 hours before travel.  Remove old patch and change every 3 days.    Preop general physical exam, Nausea       traMADol 50 MG tablet    ULTRAM     Take 50 mg by mouth        * venlafaxine 37.5 MG 24 hr capsule    EFFEXOR-XR    30 capsule    Take 1 capsule daily for 14 days, then take 2 capsules daily.    Hot flushes, perimenopausal, Depression, unspecified depression type       * venlafaxine 37.5 MG tablet    EFFEXOR    60 tablet    Take 1 tablet (37.5 mg) in the  morning and 2 tablets (75 mg) in the evening.    Hot flushes, perimenopausal, Depression, unspecified depression type       * Notice:  This list has 4 medication(s) that are the same as other medications prescribed for you. Read the directions carefully, and ask your doctor or other care provider to review them with you.

## 2017-09-15 NOTE — NURSING NOTE
Patient returns to radiation clinic for potential CT sim and treatment planning. She reports R breast/chestwall has healed well and is feeling better. She states that surgeon gave ok to start radiation - wound was evaled and last filling done.  MD's in room to discuss plan with pt.

## 2017-09-15 NOTE — LETTER
9/15/2017      RE: Diana Wilson  [address] [place] [id number]       SIMULATION    Patient Name: Diana Wilson  MRN: [id number],  Age: 37 year old,  Gender: female  Encounter Date: 9/15/2017,  Today's Date: 9/15/2017    DIAGNOSIS:  Breast cancer    INDICATION:  The breast radiation therapy is recommended for the patient  for better  local control and better survival.    CONSENT:  The possible risks and side effects of radiation therapy has been discussed with the patient in detail and at great length. Questions are answered to patient's satisfaction. The written consent was obtained.    SIMULATION:  Patient is in a supne position with arm up.   Tentative isocenter is set up in mid breast.  We will acquire CT information to help us to better locate the target and design the radiation therapy field.    BLOCKS:  Custom blocks will be drawn to minimize radiation to normal tissues and to protect normal organs including, but not limited to, lungs, heart, liver, bone, and soft tissues.        I will consider to use 3D-conformal technique to help us to better locate the target and to protect normal tissues.    Nithya Silver   Pager [phone]  Date: 9/15/2017  Time: 4:14 PM    Nithya Silver MD

## 2017-09-15 NOTE — LETTER
9/15/2017      RE: Diana Wilson  1972 [address] [place] [id number]       RADIATION ONCOLOGY CONSULT NOTE  DATE OF VISIT: 9/15/17    PATIENT NAME: Diana Wilson  MRN: [id number]  : 1980    Maribel Jin MD  [facility]  [address] [place] [address]    Dear Dr. Jin:    Thank you very much for referring this patient for consideration of radiotherapy. As you know Ms. Wilson is a 37 year old female with a diagnosis of locally advanced breast cancer. She is being seen today for re-evaluation and simulation.       HISTORY OF PRESENT ILLNESS:  Ms. Diana Wilson is a very pleasant now menopausal, BRCA1/2-, 37 year old woman with diagnosis of ER-/WV-/HER2- cT2 N1 M0 G3 / ypTis N0 IDC of the RIGHT breast s/p neoadjuvant chemotherapy (ddAC + carbo/taxol qWk) + bilateral mastectomies with pCR.  Her initial lymph node biopsy showed triple-negative disease.        Ms. Wilson s oncologic history began in 10/2016 at which time she felt a lump in her right armpit and right breast. She proceeded to a diagnostic mammogram on 16 which demonstrated an 4 x 2.9 x 2.9 cm area of abnormality inside of which were indetermindate calcifications and a 2cm ill-defined density.  US on this date showed an irregular, 2 cm solid mass with microcalcifications with several right axillary LNs on US the largest of which was 1.8cm.      She had an ultrasound-guided biopsy on 2016.  Pathology from this procedure ([id number]) demonstrated ER+/WV+/HER2- Grade 3 IDC in the background of G3 DCIS without LVSI. The lymph node biopsy was positive for metastatic carcinoma, however the receptor staining was ER-/WV-/HER2- for the lymph node.     An MRI of the both breasts was done on 17.  This verified the aforementioned biopsy-proven right breast mass measuring 2.5 x 3.3 x 3.0 cm.  No other enhancing foci were seen in either breast.  There were 2 right axillary LN also identified, one of which with correlation to  the aforementioned biopsy lymph node.         A PET/CT was done on 1/10/17.  This again redemonstrated the primary breast mass with SUV 22.6.  And again, two FDG avid right axillary lymph nodes measuring 16mm (SUVmax 6.2) and 13mm (SUVmax 6.7) on CT. No other sites of distant metastases were identified.         She therefore started neoadjuvant chemotherapy with ddAC on 1/16/17.  She had a good clinical response to this component of neoadjuvant chemotherapy.  She then underwent weekly carboplatinum + paclitaxel.  Carboplatin was discontinued during cycle 3 in early May after having a hypersensitivity reaction.     After chemotherapy she had a post-chemotherapy MRI on 3/8/17 which showed good clinical treatment response.  She had no residual FDG avidity in the lymph nodes of there right breast.  There was Improvement in the biopsy-proven primary mass from 3.3 cm on previous study to 2.4 cm with more favorable enhancement kinetics.     She was tested for BRCA 1/2 mutations with [facility] and was negative.     She underwent bilateral mastectomies by Dr. Jenifer Olson on 7/21/17.  Pathology from [facility] ([id number]) demonstrated no residual invasive carcinoma in the right breast specimen.  There was however a residual 1mm focus of G3 DCIS.  The left mastectomy specimen was benign. There were no sentinel lymph nodes positive for metastatic carcinoma (0/5 sentinel LN + for carcinoma). There was no LVSI. As such she was staged ypTis N0 (sn) / Stage 0.      She has had ovarian ablation and is dealing with symptoms of menopause including hot flashes.  She is being treated with Effexor which is helping also with her comorbid depression.      Subjectively today she reports otherwise she is in her usual state of health. She noted that she has no pain in her arm.  She had good ROM at her shoulder joints bilaterally.  Her wound was well healed and her right expander had been expanded fully with her left expander left  deflated for RT administration.     All pertinent labs, imaging, and pathology findings have been reviewed with details above.     REVIEW OF SYSTEMS: A 10 point review of systems was obtained. Pertinent findings are noted in the HPI and nursing note from today, but are otherwise unremarkable.     CHEMOTHERAPY HISTORY: None    RADIATION THERAPY HISTORY: None    RELATIVE CONTRAINDICATIONS TO RT: No scleroderma    IMPLANTABLE CARDIAC DEVICE: None    PREGNANCY RISK: s/p ovarian ablation.    PAST MEDICAL /SURGICAL HISTORY:  Past Medical History:   Diagnosis Date     Depressive disorder 1995    And Anxiety     Encounter for insertion or removal of intrauterine contraceptive device 1/8/2008     Excessive or frequent menstruation 03/19/2003     Fibroadenosis of breast      Invasive ductal carcinoma of breast, right (H)      Malignant neoplasm of upper-outer quadrant of right female breast (H)      Neutropenia, drug-induced (H)      Nongonococcal urethritis (JALIL) due to chlamydia trachomatis 01/03/2002     Other and unspecified ovarian cyst 03/19/2003    RIGHT ovarian cyst     Transient hypertension of pregnancy, antepartum     PIH with last birth     Uncomplicated asthma 2002     Past Surgical History:   Procedure Laterality Date     BIOPSY  2/2016    EDG with biopsy     BREAST SURGERY  1997    Bilateral reduction     C APPENDECTOMY  1991     DENTAL SURGERY  09/13/2010 and 10/12/2010    Root canals     DILATION AND CURETTAGE, HYSTEROSCOPY, ABLATE ENDOMETRIUM NOVASURE, COMBINED  3/20/2012    Procedure:COMBINED DILATION AND CURETTAGE, HYSTEROSCOPY, ABLATE ENDOMETRIUM NOVASURE; Hysteroscopy with Endometrial Ablation Novasure; Surgeon:GERRI PURCELL; Location:[facility]     ESOPHAGOSCOPY, GASTROSCOPY, DUODENOSCOPY (EGD), COMBINED N/A 2/18/2016    Procedure: COMBINED ESOPHAGOSCOPY, GASTROSCOPY, DUODENOSCOPY (EGD);  Surgeon: Jose L Wilson MD;  Location: [facility]     INSERT PORT VASCULAR ACCESS N/A 1/11/2017    Procedure: INSERT PORT  VASCULAR ACCESS;  Surgeon: Michael Townsend MD;  Location: [facility]     SURGICAL HISTORY OF -   1997    Breast reduction       ALLERGIES:  Allergies as of 09/15/2017 - Vijay as Reviewed 08/25/2017   Allergen Reaction Noted     Carboplatin Shortness Of Breath, Rash, and Anaphylaxis 05/09/2017     Ambien  07/13/2012     Amoxicillin GI Disturbance 01/05/2006     Amoxicillin Hives 01/16/2017     Erythromycin GI Disturbance 01/05/2006     Erythromycin Hives 01/16/2017     Hydrocodone-acetaminophen  01/16/2017     Penicillins Nausea and Vomiting and Hives 01/05/2006     Zolpidem  05/24/2017     Hydrocodone Other (See Comments) 09/01/2015       MEDICATIONS:  Current Outpatient Prescriptions   Medication Sig Dispense Refill     venlafaxine (EFFEXOR) 37.5 MG tablet Take 1 tablet (37.5 mg) in the morning and 2 tablets (75 mg) in the evening. 60 tablet 1     methocarbamol (ROBAXIN) 750 MG tablet Take 750 mg by mouth       celecoxib (CELEBREX) 200 MG capsule Take 200 mg by mouth 2 times daily        magnesium hydroxide (MILK OF MAGNESIA) 400 MG/5ML suspension Take 30 mLs by mouth       traMADol (ULTRAM) 50 MG tablet Take 50 mg by mouth       venlafaxine (EFFEXOR-XR) 37.5 MG 24 hr capsule Take 1 capsule daily for 14 days, then take 2 capsules daily. 30 capsule 1     gabapentin (NEURONTIN) 300 MG capsule Take 1 tablet (300 mg) by mouth three times daily. 90 capsule 3     hydrOXYzine (ATARAX) 10 MG tablet Take 1-5 tablets (10-50 mg) by mouth every 6 hours as needed for anxiety (Patient not taking: Reported on 7/31/2017) 60 tablet 1     scopolamine (TRANSDERM) 72 hr patch Place 1 patch onto the skin every 72 hours.  Apply to hairless area behind one ear at least 4 hours before travel.  Remove old patch and change every 3 days. (Patient not taking: Reported on 7/31/2017) 1 patch 0     albuterol (2.5 MG/3ML) 0.083% neb solution Take 1 vial (2.5 mg) by nebulization every 6 hours as needed for shortness of breath / dyspnea or  wheezing 75 mL 0     LORazepam (ATIVAN) 0.5 MG tablet Take 1 tablet (0.5 mg) by mouth nightly as needed for anxiety 30 tablet 1     albuterol (PROAIR HFA, PROVENTIL HFA, VENTOLIN HFA) 108 (90 BASE) MCG/ACT inhaler Inhale 2 puffs into the lungs every 6 hours as needed for shortness of breath / dyspnea 1 Inhaler 11     fluticasone (FLONASE) 50 MCG/ACT nasal spray Spray 1-2 sprays into both nostrils daily 3 Bottle 3     loratadine (CLARITIN) 10 MG tablet Take 1 tablet (10 mg) by mouth daily 90 tablet 3     montelukast (SINGULAIR) 10 MG tablet Take 1 tablet (10 mg) by mouth At Bedtime 90 tablet 3        FAMILY HISTORY:  Family History   Problem Relation Age of Onset     DIABETES Mother      hypoglycemic     Allergies Mother      Arthritis Mother      Depression Mother      GASTROINTESTINAL DISEASE Mother      Neurologic Disorder Mother      Psychotic Disorder Mother      panic disorder     CANCER Mother 56     pancreatic, colon, liver and skin.     Cancer - colorectal Mother      Other Cancer Mother      Pancreatic     Anxiety Disorder Mother      MENTAL ILLNESS Mother      Hypertension Father      C.A.D. Paternal Grandfather      congestive heart failure     CEREBROVASCULAR DISEASE Paternal Grandfather      age 79     DIABETES Paternal Grandfather      Hypertension Paternal Grandfather      Alzheimer Disease Paternal Grandfather      Arthritis Paternal Grandfather      Circulatory Paternal Grandfather      Cardiovascular Paternal Grandfather      HEART DISEASE Paternal Grandfather      Unknown/Adopted Maternal Grandmother      CEREBROVASCULAR DISEASE Maternal Grandmother      Unknown/Adopted Maternal Grandfather      CANCER Maternal Grandfather      Obesity Paternal Grandmother        SOCIAL HISTORY:  Social History     Social History     Marital status:      Spouse name: Alfredo Wilson     Number of children: 2     Years of education: 15     Occupational History      Other     [employer]      Social History Main Topics     Smoking status: Former Smoker     Packs/day: 0.50     Years: 10.00     Types: Cigarettes     Quit date: 10/1/2016     Smokeless tobacco: Never Used      Comment: Socially- quit smoking 10/01/2016     Alcohol use 0.0 oz/week      Comment: Occassional     Drug use: No     Sexual activity: Yes     Partners: Male     Birth control/ protection: Female Surgical      Comment:  has had a vasectomy     Other Topics Concern     Seat Belt Yes     Parent/Sibling W/ Cabg, Mi Or Angioplasty Before 65f 55m? No     Social History Narrative       PHYSICAL EXAM:  Vital Signs: /77  Pulse 97  Resp 18  Wt 95.7 kg (211 lb)  SpO2 96%  BMI 36.22 kg/m2  Gen: Alert, in NAD  Eyes: EOMI, sclera anicteric  HENT      Head: NC/AT     Ears: No external auricular lesions     Nose/sinus: No rhinorrhea or epistaxis     Oral Cavity/Oropharynx: MMM, no thrush noted  Pulm: Breathing comfortably on room air, no audible wheezes or ronchi, cta b/l  CV: Well-perfused, no cyanosis  Abdominal: Soft, nondistended  Skin: Normal color and turgor  Neurologic: normal gait  Back: Without tenderness to spinal percussion  Breast: Her right chest wall has well healed surgical scars.  There is skin changes noted especially over the areas of incision.  This skin is more red and pigmented than the rest of the tissue of the chest wall.  There is no discharge from her surgical scars bilaterally.  She is status post bilateral mastectomies.  No additional tenderness to palpation.    Psych:    Orientation: Alert, attentive, and oriented to time, place, and person   Affect: Affect was appropriate to the situation and showed normal range and stability. No anxious mood   Speech: Speech was clear with normal fluency, rate, tone, and volume.   Memory: Although not formally assessed, immediate, recent, and remote memory appeared to be intact.    Insight and Judgement:  demonstrates adequate awareness of the issues discussed and  was able to come to reasonable conclusions.   Thought: Thought patterns were coherent and logical.       ECOG PERFORMANCE STATUS: 0    RADIOLOGY AND LABORATORIES: Relevant studies reviewed as above outlined in HPI.      IMPRESSION:  In summary, Ms. Diana Wilson is a very pleasant BRCA1/2-, 37 year old woman with diagnosis of ER-/LA-/HER2- cT2 N1 M0 G3 / ypTis N0 IDC of the RIGHT breast s/p neoadjuvant chemotherapy (ddAC + carbo/taxol qWk) + bilateral mastectomies with pCR.  Her initial lymph node biopsy showed triple-negative disease while her initial breast pathology showed HR+ disease.  She had a very small focus of 1mm residual DCIS. Given that she initially presented with cN+ disease she is a candidate for PMRT.     RECOMMENDATION:   We reviewed the rationale, logistics, risks, benefits, and potential side effects of the proposed treatment in detail with Ms. Wilson.     We discussed that our objective for this treatment would be for overall cure in combination with surgery and chemotherapy.     We discussed a 28 fraction treatment to be delivered to the chest wall and lymph node regions at risk in the breast including the level 1,2, and 3 of the axilla and the SCV given her initial LN positivity.  We will not plan on delivering targeted radiation to the IM arnold chain.    Ms. Wilson had decided at her consultation to proceed with PMRT and signed an informed consent to that effect during that visit.   She was simulated in our department today     Roberto Nielson MD  Radiation Oncology Resident, PGY-3  [facility]  Phone: [phone]      Ms. Wilson was seen and evaluated with staff, Dr. Silver. Thank you, Dr. Jin, for allowing us to participate in this patient's care. Please feel free to call with any questions or concerns.    I was personally present with the resident.  I  discussed the case with the resident and agree with the findings and plan of care as documented in the  resident's note. The risks and benefits of radiotherapy were discussed with the patient.  All questions were answered.  Please do not hesitate to call me if you have questions or concerns    Nithya Silver MD  Pager  [phone]  Clinic   [phone]  [facility]        CC  Patient Care Team:  Yenifer Ridley MD as PCP - General (Family Practice)  Eugenio Ruelas MD as MD (Plastic Surgery)  Adore Olson MD, MD as MD (Surgery)  Caitlin Durham MD as MD (Radiation Oncology)  Maribel Jin MD as MD (Oncology)

## 2017-09-15 NOTE — MR AVS SNAPSHOT
After Visit Summary   9/15/2017    Diana Wilson    MRN: [id number]           Patient Information     Date Of Birth          1980        Visit Information        Provider Department      9/15/2017 9:15 AM Tech, Lr Ump Rad; Nithya Silver MD [facility]        Today's Diagnoses     Malignant neoplasm of overlapping sites of right breast in female, estrogen receptor negative (H)    -  1       Follow-ups after your visit        Your next 10 appointments already scheduled     Sep 25, 2017 12:30 PM CDT   Linear Accelerator with Lr Ump Rad Tech   [facility] ([facility])    [address], [address]  [place] [address]   [phone]            Sep 26, 2017  2:45 PM CDT   Linear Accelerator with Lr Ump Rad Tech   [facility] ([facility])    [address], [address]  [facility] [address]   [phone]            Sep 27, 2017  2:15 PM CDT   Linear Accelerator with Lr Ump Rad Tech   [facility] ([facility])    [address], [address]  [place] [address]   [phone]            Sep 28, 2017  2:45 PM CDT   Linear Accelerator with Lr Ump Rad Tech   [facility] ([facility])    [address], [address]  [place] [address]   [phone]            Sep 29, 2017  2:15 PM CDT   Linear Accelerator with Lr Ump Rad Tech   [facility] ([facility])    [address], [address]  [place] [address]   [phone]            Oct 02, 2017  2:15 PM CDT   Linear Accelerator with Lr Ump Rad Tech   [facility] ([facility])    [address], [address]  [place] [address]   [phone]            Oct 03, 2017  2:15 PM CDT   Linear Accelerator with Lr Ump Rad Tech   [facility] ([facility])    [address], [address]  [place] [address]   [phone]            Oct 04, 2017  2:15  PM CDT   Linear Accelerator with Lr [facility]   [facility] ([facility])    [address], [address]  [facility] [address]   [phone]            Oct 04, 2017  2:30 PM CDT   ON TREATMENT VISIT with Caitlin Durham MD   [facility] ([facility])    [address], [address]  [facility] [address]   [phone]            Oct 05, 2017  2:15 PM CDT   Linear Accelerator with Lr [facility]   [facility] ([facility])    [address]  [facility] [address]   [phone]              Who to contact     Please call your clinic at [phone] to:    Ask questions about your health    Make or cancel appointments    Discuss your medicines    Learn about your test results    Speak to your doctor   If you have compliments or concerns about an experience at your clinic, or if you wish to file a complaint, please contact [facility] Patient Relations at [phone] or email us at [email or web address]         Additional Information About Your Visit        [email or web address] Information     [email or web address] gives you secure access to your electronic health record. If you see a primary care provider, you can also send messages to your care team and make appointments. If you have questions, please call your primary care clinic.  If you do not have a primary care provider, please call [phone] and they will assist you.      Tongda is an electronic gateway that provides easy, online access to your medical records. With Tongda, you can request a clinic appointment, read your test results, renew a prescription or communicate with your care team.     To access your existing account, please contact your [facility] or call [phone] for assistance.        Care EveryWhere ID     This is your Care EveryWhere ID. This could be used by other organizations to access your [place]  medical records  [id number]         Blood Pressure from Last 3 Encounters:   09/15/17 116/77   08/25/17 110/71   07/31/17 (!) 125/91    Weight from Last 3 Encounters:   09/15/17 95.7 kg (211 lb)   08/25/17 93.6 kg (206 lb 6.4 oz)   07/31/17 94.4 kg (208 lb 3.2 oz)              Today, you had the following     No orders found for display       Primary Care Provider Office Phone # Fax #    Yenifer Ridley [phone]  [place] [address]        Equal Access to Services     [facility] : Hadii gabriela griffin hadprashantho Sowan, waaxda luqadaha, qaybta kaalmada isela, javier valentine . So [facility] [phone].    ATENCIÓN: Si habla español, tiene a montelongo disposición servicios gratuitos de asistencia lingüística. [facility] [phone].    We comply with applicable federal civil rights laws and [place] laws. We do not discriminate on the basis of race, color, national origin, age, disability sex, sexual orientation or gender identity.            Thank you!     Thank you for choosing [facility]  for your care. Our goal is always to provide you with excellent care. Hearing back from our patients is one way we can continue to improve our services. Please take a few minutes to complete the written survey that you may receive in the mail after your visit with us. Thank you!             Your Updated Medication List - Protect others around you: Learn how to safely use, store and throw away your medicines at www.disposemymeds.org.          This list is accurate as of: 9/15/17  4:15 PM.  Always use your most recent med list.                   Brand Name Dispense Instructions for use Diagnosis    * albuterol 108 (90 BASE) MCG/ACT Inhaler    PROAIR HFA/PROVENTIL HFA/VENTOLIN HFA    1 Inhaler    Inhale 2 puffs into the lungs every 6 hours as needed for shortness of breath / dyspnea    Mild persistent asthma with exacerbation       * albuterol (2.5 MG/3ML)  0.083% neb solution     75 mL    Take 1 vial (2.5 mg) by nebulization every 6 hours as needed for shortness of breath / dyspnea or wheezing        celecoxib 200 MG capsule    celeBREX     Take 200 mg by mouth 2 times daily        fluticasone 50 MCG/ACT spray    FLONASE    3 Bottle    Spray 1-2 sprays into both nostrils daily    Environmental allergies       gabapentin 300 MG capsule    NEURONTIN    90 capsule    Take 1 tablet (300 mg) by mouth three times daily.    Neuropathy (H)       hydrOXYzine 10 MG tablet    ATARAX    60 tablet    Take 1-5 tablets (10-50 mg) by mouth every 6 hours as needed for anxiety    Anxiety, Situational anxiety       loratadine 10 MG tablet    CLARITIN    90 tablet    Take 1 tablet (10 mg) by mouth daily    Mild persistent asthma with exacerbation       LORazepam 0.5 MG tablet    ATIVAN    30 tablet    Take 1 tablet (0.5 mg) by mouth nightly as needed for anxiety    Insomnia, unspecified type       magnesium hydroxide 400 MG/5ML suspension    MILK OF MAGNESIA     Take 30 mLs by mouth        methocarbamol 750 MG tablet    ROBAXIN     Take 750 mg by mouth        montelukast 10 MG tablet    SINGULAIR    90 tablet    Take 1 tablet (10 mg) by mouth At Bedtime    Environmental allergies       scopolamine 72 hr patch    TRANSDERM    1 patch    Place 1 patch onto the skin every 72 hours.  Apply to hairless area behind one ear at least 4 hours before travel.  Remove old patch and change every 3 days.    Preop general physical exam, Nausea       traMADol 50 MG tablet    ULTRAM     Take 50 mg by mouth        * venlafaxine 37.5 MG 24 hr capsule    EFFEXOR-XR    30 capsule    Take 1 capsule daily for 14 days, then take 2 capsules daily.    Hot flushes, perimenopausal, Depression, unspecified depression type       * venlafaxine 37.5 MG tablet    EFFEXOR    60 tablet    Take 1 tablet (37.5 mg) in the morning and 2 tablets (75 mg) in the evening.    Hot flushes, perimenopausal, Depression, unspecified  depression type       * Notice:  This list has 4 medication(s) that are the same as other medications prescribed for you. Read the directions carefully, and ask your doctor or other care provider to review them with you.

## 2017-09-15 NOTE — PROGRESS NOTES
RADIATION ONCOLOGY CONSULT NOTE  DATE OF VISIT: 9/15/17    PATIENT NAME: Diana Wilson  MRN: [id number]  : 1980    Maribel Jin MD  [facility]  [address] [place] [address]    Dear Dr. Jin:    Thank you very much for referring this patient for consideration of radiotherapy. As you know Ms. Wilson is a 37 year old female with a diagnosis of locally advanced breast cancer. She is being seen today for re-evaluation and simulation.       HISTORY OF PRESENT ILLNESS:  Ms. Diana Wilson is a very pleasant now menopausal, BRCA1/2-, 37 year old woman with diagnosis of ER-/MD-/HER2- cT2 N1 M0 G3 / ypTis N0 IDC of the RIGHT breast s/p neoadjuvant chemotherapy (ddAC + carbo/taxol qWk) + bilateral mastectomies with pCR.  Her initial lymph node biopsy showed triple-negative disease.        Ms. Wilson s oncologic history began in 10/2016 at which time she felt a lump in her right armpit and right breast. She proceeded to a diagnostic mammogram on 16 which demonstrated an 4 x 2.9 x 2.9 cm area of abnormality inside of which were indetermindate calcifications and a 2cm ill-defined density.  US on this date showed an irregular, 2 cm solid mass with microcalcifications with several right axillary LNs on US the largest of which was 1.8cm.      She had an ultrasound-guided biopsy on 2016.  Pathology from this procedure ([id number]) demonstrated ER+/MD+/HER2- Grade 3 IDC in the background of G3 DCIS without LVSI. The lymph node biopsy was positive for metastatic carcinoma, however the receptor staining was ER-/MD-/HER2- for the lymph node.     An MRI of the both breasts was done on 17.  This verified the aforementioned biopsy-proven right breast mass measuring 2.5 x 3.3 x 3.0 cm.  No other enhancing foci were seen in either breast.  There were 2 right axillary LN also identified, one of which with correlation to the aforementioned biopsy lymph node.         A PET/CT was done on 1/10/17.  This  again redemonstrated the primary breast mass with SUV 22.6.  And again, two FDG avid right axillary lymph nodes measuring 16mm (SUVmax 6.2) and 13mm (SUVmax 6.7) on CT. No other sites of distant metastases were identified.         She therefore started neoadjuvant chemotherapy with ddAC on 1/16/17.  She had a good clinical response to this component of neoadjuvant chemotherapy.  She then underwent weekly carboplatinum + paclitaxel.  Carboplatin was discontinued during cycle 3 in early May after having a hypersensitivity reaction.     After chemotherapy she had a post-chemotherapy MRI on 3/8/17 which showed good clinical treatment response.  She had no residual FDG avidity in the lymph nodes of there right breast.  There was Improvement in the biopsy-proven primary mass from 3.3 cm on previous study to 2.4 cm with more favorable enhancement kinetics.     She was tested for BRCA 1/2 mutations with [facility] and was negative.     She underwent bilateral mastectomies by Dr. Jenifer Olson on 7/21/17.  Pathology from [facility] ([id number]) demonstrated no residual invasive carcinoma in the right breast specimen.  There was however a residual 1mm focus of G3 DCIS.  The left mastectomy specimen was benign. There were no sentinel lymph nodes positive for metastatic carcinoma (0/5 sentinel LN + for carcinoma). There was no LVSI. As such she was staged ypTis N0 (sn) / Stage 0.      She has had ovarian ablation and is dealing with symptoms of menopause including hot flashes.  She is being treated with Effexor which is helping also with her comorbid depression.      Subjectively today she reports otherwise she is in her usual state of health. She noted that she has no pain in her arm.  She had good ROM at her shoulder joints bilaterally.  Her wound was well healed and her right expander had been expanded fully with her left expander left deflated for RT administration.     All pertinent labs, imaging, and pathology  findings have been reviewed with details above.     REVIEW OF SYSTEMS: A 10 point review of systems was obtained. Pertinent findings are noted in the HPI and nursing note from today, but are otherwise unremarkable.     CHEMOTHERAPY HISTORY: None    RADIATION THERAPY HISTORY: None    RELATIVE CONTRAINDICATIONS TO RT: No scleroderma    IMPLANTABLE CARDIAC DEVICE: None    PREGNANCY RISK: s/p ovarian ablation.    PAST MEDICAL /SURGICAL HISTORY:  Past Medical History:   Diagnosis Date     Depressive disorder 1995    And Anxiety     Encounter for insertion or removal of intrauterine contraceptive device 1/8/2008     Excessive or frequent menstruation 03/19/2003     Fibroadenosis of breast      Invasive ductal carcinoma of breast, right (H)      Malignant neoplasm of upper-outer quadrant of right female breast (H)      Neutropenia, drug-induced (H)      Nongonococcal urethritis (JALIL) due to chlamydia trachomatis 01/03/2002     Other and unspecified ovarian cyst 03/19/2003    RIGHT ovarian cyst     Transient hypertension of pregnancy, antepartum     PIH with last birth     Uncomplicated asthma 2002     Past Surgical History:   Procedure Laterality Date     BIOPSY  2/2016    EDG with biopsy     BREAST SURGERY  1997    Bilateral reduction     C APPENDECTOMY  1991     DENTAL SURGERY  09/13/2010 and 10/12/2010    Root canals     DILATION AND CURETTAGE, HYSTEROSCOPY, ABLATE ENDOMETRIUM NOVASURE, COMBINED  3/20/2012    Procedure:COMBINED DILATION AND CURETTAGE, HYSTEROSCOPY, ABLATE ENDOMETRIUM NOVASURE; Hysteroscopy with Endometrial Ablation Novasure; Surgeon:GERRI PURCELL; Location:[facility]     ESOPHAGOSCOPY, GASTROSCOPY, DUODENOSCOPY (EGD), COMBINED N/A 2/18/2016    Procedure: COMBINED ESOPHAGOSCOPY, GASTROSCOPY, DUODENOSCOPY (EGD);  Surgeon: Jose L Wilson MD;  Location: [facility]     INSERT PORT VASCULAR ACCESS N/A 1/11/2017    Procedure: INSERT PORT VASCULAR ACCESS;  Surgeon: Michael Townsend MD;  Location: [facility]      SURGICAL HISTORY OF -   1997    Breast reduction       ALLERGIES:  Allergies as of 09/15/2017 - Vijay as Reviewed 08/25/2017   Allergen Reaction Noted     Carboplatin Shortness Of Breath, Rash, and Anaphylaxis 05/09/2017     Ambien  07/13/2012     Amoxicillin GI Disturbance 01/05/2006     Amoxicillin Hives 01/16/2017     Erythromycin GI Disturbance 01/05/2006     Erythromycin Hives 01/16/2017     Hydrocodone-acetaminophen  01/16/2017     Penicillins Nausea and Vomiting and Hives 01/05/2006     Zolpidem  05/24/2017     Hydrocodone Other (See Comments) 09/01/2015       MEDICATIONS:  Current Outpatient Prescriptions   Medication Sig Dispense Refill     venlafaxine (EFFEXOR) 37.5 MG tablet Take 1 tablet (37.5 mg) in the morning and 2 tablets (75 mg) in the evening. 60 tablet 1     methocarbamol (ROBAXIN) 750 MG tablet Take 750 mg by mouth       celecoxib (CELEBREX) 200 MG capsule Take 200 mg by mouth 2 times daily        magnesium hydroxide (MILK OF MAGNESIA) 400 MG/5ML suspension Take 30 mLs by mouth       traMADol (ULTRAM) 50 MG tablet Take 50 mg by mouth       venlafaxine (EFFEXOR-XR) 37.5 MG 24 hr capsule Take 1 capsule daily for 14 days, then take 2 capsules daily. 30 capsule 1     gabapentin (NEURONTIN) 300 MG capsule Take 1 tablet (300 mg) by mouth three times daily. 90 capsule 3     hydrOXYzine (ATARAX) 10 MG tablet Take 1-5 tablets (10-50 mg) by mouth every 6 hours as needed for anxiety (Patient not taking: Reported on 7/31/2017) 60 tablet 1     scopolamine (TRANSDERM) 72 hr patch Place 1 patch onto the skin every 72 hours.  Apply to hairless area behind one ear at least 4 hours before travel.  Remove old patch and change every 3 days. (Patient not taking: Reported on 7/31/2017) 1 patch 0     albuterol (2.5 MG/3ML) 0.083% neb solution Take 1 vial (2.5 mg) by nebulization every 6 hours as needed for shortness of breath / dyspnea or wheezing 75 mL 0     LORazepam (ATIVAN) 0.5 MG tablet Take 1 tablet (0.5 mg) by  mouth nightly as needed for anxiety 30 tablet 1     albuterol (PROAIR HFA, PROVENTIL HFA, VENTOLIN HFA) 108 (90 BASE) MCG/ACT inhaler Inhale 2 puffs into the lungs every 6 hours as needed for shortness of breath / dyspnea 1 Inhaler 11     fluticasone (FLONASE) 50 MCG/ACT nasal spray Spray 1-2 sprays into both nostrils daily 3 Bottle 3     loratadine (CLARITIN) 10 MG tablet Take 1 tablet (10 mg) by mouth daily 90 tablet 3     montelukast (SINGULAIR) 10 MG tablet Take 1 tablet (10 mg) by mouth At Bedtime 90 tablet 3        FAMILY HISTORY:  Family History   Problem Relation Age of Onset     DIABETES Mother      hypoglycemic     Allergies Mother      Arthritis Mother      Depression Mother      GASTROINTESTINAL DISEASE Mother      Neurologic Disorder Mother      Psychotic Disorder Mother      panic disorder     CANCER Mother 56     pancreatic, colon, liver and skin.     Cancer - colorectal Mother      Other Cancer Mother      Pancreatic     Anxiety Disorder Mother      MENTAL ILLNESS Mother      Hypertension Father      C.A.D. Paternal Grandfather      congestive heart failure     CEREBROVASCULAR DISEASE Paternal Grandfather      age 79     DIABETES Paternal Grandfather      Hypertension Paternal Grandfather      Alzheimer Disease Paternal Grandfather      Arthritis Paternal Grandfather      Circulatory Paternal Grandfather      Cardiovascular Paternal Grandfather      HEART DISEASE Paternal Grandfather      Unknown/Adopted Maternal Grandmother      CEREBROVASCULAR DISEASE Maternal Grandmother      Unknown/Adopted Maternal Grandfather      CANCER Maternal Grandfather      Obesity Paternal Grandmother        SOCIAL HISTORY:  Social History     Social History     Marital status:      Spouse name: Alfredo Wilson     Number of children: 2     Years of education: 15     Occupational History      Other     [employer]     Social History Main Topics     Smoking status: Former Smoker     Packs/day: 0.50      Years: 10.00     Types: Cigarettes     Quit date: 10/1/2016     Smokeless tobacco: Never Used      Comment: Socially- quit smoking 10/01/2016     Alcohol use 0.0 oz/week      Comment: Occassional     Drug use: No     Sexual activity: Yes     Partners: Male     Birth control/ protection: Female Surgical      Comment:  has had a vasectomy     Other Topics Concern     Seat Belt Yes     Parent/Sibling W/ Cabg, Mi Or Angioplasty Before 65f 55m? No     Social History Narrative       PHYSICAL EXAM:  Vital Signs: /77  Pulse 97  Resp 18  Wt 95.7 kg (211 lb)  SpO2 96%  BMI 36.22 kg/m2  Gen: Alert, in NAD  Eyes: EOMI, sclera anicteric  HENT      Head: NC/AT     Ears: No external auricular lesions     Nose/sinus: No rhinorrhea or epistaxis     Oral Cavity/Oropharynx: MMM, no thrush noted  Pulm: Breathing comfortably on room air, no audible wheezes or ronchi, cta b/l  CV: Well-perfused, no cyanosis  Abdominal: Soft, nondistended  Skin: Normal color and turgor  Neurologic: normal gait  Back: Without tenderness to spinal percussion  Breast: Her right chest wall has well healed surgical scars.  There is skin changes noted especially over the areas of incision.  This skin is more red and pigmented than the rest of the tissue of the chest wall.  There is no discharge from her surgical scars bilaterally.  She is status post bilateral mastectomies.  No additional tenderness to palpation.    Psych:    Orientation: Alert, attentive, and oriented to time, place, and person   Affect: Affect was appropriate to the situation and showed normal range and stability. No anxious mood   Speech: Speech was clear with normal fluency, rate, tone, and volume.   Memory: Although not formally assessed, immediate, recent, and remote memory appeared to be intact.    Insight and Judgement:  demonstrates adequate awareness of the issues discussed and was able to come to reasonable conclusions.   Thought: Thought patterns were coherent  and logical.       ECOG PERFORMANCE STATUS: 0    RADIOLOGY AND LABORATORIES: Relevant studies reviewed as above outlined in HPI.      IMPRESSION:  In summary, Ms. Diana Wilson is a very pleasant BRCA1/2-, 37 year old woman with diagnosis of ER-/AL-/HER2- cT2 N1 M0 G3 / ypTis N0 IDC of the RIGHT breast s/p neoadjuvant chemotherapy (ddAC + carbo/taxol qWk) + bilateral mastectomies with pCR.  Her initial lymph node biopsy showed triple-negative disease while her initial breast pathology showed HR+ disease.  She had a very small focus of 1mm residual DCIS. Given that she initially presented with cN+ disease she is a candidate for PMRT.     RECOMMENDATION:   We reviewed the rationale, logistics, risks, benefits, and potential side effects of the proposed treatment in detail with Ms. Wilson.     We discussed that our objective for this treatment would be for overall cure in combination with surgery and chemotherapy.     We discussed a 28 fraction treatment to be delivered to the chest wall and lymph node regions at risk in the breast including the level 1,2, and 3 of the axilla and the SCV given her initial LN positivity.  We will not plan on delivering targeted radiation to the IM arnold chain.    Ms. Wilson had decided at her consultation to proceed with PMRT and signed an informed consent to that effect during that visit.   She was simulated in our department today     Roberto Nielson MD  Radiation Oncology Resident, PGY-3  [facility]  Phone: [phone]      Ms. Wilson was seen and evaluated with staff, Dr. Silver. Thank you, Dr. Jin, for allowing us to participate in this patient's care. Please feel free to call with any questions or concerns.    I was personally present with the resident.  I  discussed the case with the resident and agree with the findings and plan of care as documented in the resident's note. The risks and benefits of radiotherapy were discussed with the patient.  All  questions were answered.  Please do not hesitate to call me if you have questions or concerns    Nithya Silver MD  Pager  [phone]  Clinic   [phone]  [facility]                  CC  Patient Care Team:  Yenifer Ridley MD as PCP - General (Family Practice)  Eugenio Ruelas MD as MD (Plastic Surgery)  Adore Olson MD, MD as MD (Surgery)  Caitlin Durham MD as MD (Radiation Oncology)  Maribel Jin MD as MD (Oncology)

## 2017-09-25 DIAGNOSIS — F32.A DEPRESSION, UNSPECIFIED DEPRESSION TYPE: ICD-10-CM

## 2017-09-25 DIAGNOSIS — N95.1 HOT FLUSHES, PERIMENOPAUSAL: ICD-10-CM

## 2017-09-25 RX ORDER — VENLAFAXINE 37.5 MG/1
TABLET ORAL
Qty: 90 TABLET | Refills: 1 | Status: SHIPPED | OUTPATIENT
Start: 2017-09-25 | End: 2017-11-16

## 2017-10-04 ENCOUNTER — OFFICE VISIT (OUTPATIENT)
Dept: RADIATION THERAPY | Facility: OUTPATIENT CENTER | Age: 37
End: 2017-10-04

## 2017-10-04 VITALS — WEIGHT: 211.8 LBS | BODY MASS INDEX: 36.36 KG/M2

## 2017-10-04 DIAGNOSIS — Z17.1 MALIGNANT NEOPLASM OF OVERLAPPING SITES OF RIGHT BREAST IN FEMALE, ESTROGEN RECEPTOR NEGATIVE (H): Primary | ICD-10-CM

## 2017-10-04 DIAGNOSIS — C50.811 MALIGNANT NEOPLASM OF OVERLAPPING SITES OF RIGHT BREAST IN FEMALE, ESTROGEN RECEPTOR NEGATIVE (H): Primary | ICD-10-CM

## 2017-10-04 NOTE — LETTER
10/4/2017      RE: Diana Wilson  1972 [address] [place] [id number]       [facility]  SPECIALIZING IN BREAKTHROUGHS  Radiation Oncology    On Treatment Visit Note      Diana Wilson      Date: 10/4/2017   MRN: [id number]   : 1980  Diagnosis: T2N1 breast cancer, s/p neoadjuvant chemo and bilateral mastectomy      Reason for Visit:  On Radiation Treatment Visit     Treatment Summary to Date  Treatment Site: right breast Current Dose: 900/6040 cGy Fractions:       Chemotherapy  Chemo concurrent with radx?: No    Subjective:       Nursing ROS:   Nutrition Alteration  Diet Type: Patient's Preference  Skin  Skin Reaction: 0 - No changes  Skin Note: using jay butter 3 times per day  Cardiovascular  Respiratory effort: 1 - Normal - without distress  Genitourinary  Urinary Status: 0 - Normal  Pain Assessment  0-10 Pain Scale: 0    Objective:   Wt 96.1 kg (211 lb 12.8 oz)  BMI 36.36 kg/m2      Assessment:    Tolerating radiation therapy well.  All questions and concerns addressed.    Plan:   1. Continue current therapy.      Mosaiq chart and setup information reviewed  Ports checked  Educational Topic Discussed  Additional Instructions: [facility]

## 2017-10-04 NOTE — MR AVS SNAPSHOT
After Visit Summary   10/4/2017    Diana Wilson    MRN: [id number]           Patient Information     Date Of Birth          1980        Visit Information        Provider Department      10/4/2017 2:30 PM Caitlin Durham MD [facility]        Today's Diagnoses     Malignant neoplasm of overlapping sites of right breast in female, estrogen receptor negative (H)    -  1       Follow-ups after your visit        Your next 10 appointments already scheduled     Oct 05, 2017  2:15 PM CDT   Linear Accelerator with Lr [facility] Rad Tech   [facility] ([facility])    [address], [address]  [place] [address]   [phone]            Oct 06, 2017  2:15 PM CDT   Linear Accelerator with Lr [facility] Rad Tech   [facility] ([facility])    [address], [address]  [place] [address]   [phone]            Oct 09, 2017  2:15 PM CDT   Linear Accelerator with Lr [facility] Rad Tech   [facility] ([facility])    [address], [address]  [facility] [address]   [phone]            Oct 10, 2017  2:15 PM CDT   Linear Accelerator with Lr [facility] Rad Tech   [facility] ([facility])    [address], [address]  [place] [address]   [phone]            Oct 11, 2017  2:15 PM CDT   Linear Accelerator with Lr [facility] Rad Tech   [facility] ([facility])    [address], [address]  [place] [address]   [phone]            Oct 11, 2017  2:30 PM CDT   ON TREATMENT VISIT with Caitlin Durham MD   [facility] ([facility])    [address], [address]  [place] [address]   [phone]            Oct 12, 2017  2:15 PM CDT   Linear Accelerator with Lr [facility] Rad Tech   [facility] ([facility])    [address]  [place] [address]   [phone]            Oct 13, 2017  2:15 PM CDT   Linear Accelerator with  Lr [facility]   [facility]    [address], [address]  [facility] [address]   [phone]            Oct 16, 2017  2:15 PM CDT   Linear Accelerator with Lr [facility]   [facility] Therapy Center ([facility])    [address], [address]  [facility] [address]   [phone]            Oct 17, 2017  2:15 PM CDT   Linear Accelerator with Lr [facility]   [facility] ([facility])    [address], [address]  [facility] [address]   [phone]              Who to contact     Please call your clinic at [phone] to:    Ask questions about your health    Make or cancel appointments    Discuss your medicines    Learn about your test results    Speak to your doctor   If you have compliments or concerns about an experience at your clinic, or if you wish to file a complaint, please contact [facility] Patient Relations at [phone] or email us at [email or web address]         Additional Information About Your Visit        [employer] Information     [employer] gives you secure access to your electronic health record. If you see a primary care provider, you can also send messages to your care team and make appointments. If you have questions, please call your primary care clinic.  If you do not have a primary care provider, please call [phone] and they will assist you.      [employer] is an electronic gateway that provides easy, online access to your medical records. With [employer], you can request a clinic appointment, read your test results, renew a prescription or communicate with your care team.     To access your existing account, please contact your [facility] Clinic or call [phone] for assistance.        Care EveryWhere ID     This is your Care EveryWhere ID. This could be used by other organizations to access your [place] medical records  [id number]         Your Vitals Were     BMI (Body Mass Index)                   36.36 kg/m2            Blood Pressure from Last 3 Encounters:   09/15/17 116/77   08/25/17 110/71   07/31/17 (!) 125/91    Weight from Last 3 Encounters:   10/04/17 96.1 kg (211 lb 12.8 oz)   09/15/17 95.7 kg (211 lb)   08/25/17 93.6 kg (206 lb 6.4 oz)              Today, you had the following     No orders found for display       Primary Care Provider Office Phone # Fax #    Yenifer Ridley [phone] [address] [facility] [address]        Equal Access to Services     FAUZIA RAMIREZ : Hadii gabriela Mills, wajim doan, ranjan kaalmada isela, javier bridges. So [facility] [phone].    ATENCIÓN: Si habla español, tiene a montelongo disposición servicios gratuitos de asistencia lingüística. Llame al [phone].    We comply with applicable federal civil rights laws and [place] laws. We do not discriminate on the basis of race, color, national origin, age, disability, sex, sexual orientation, or gender identity.            Thank you!     Thank you for choosing [facility]  for your care. Our goal is always to provide you with excellent care. Hearing back from our patients is one way we can continue to improve our services. Please take a few minutes to complete the written survey that you may receive in the mail after your visit with us. Thank you!             Your Updated Medication List - Protect others around you: Learn how to safely use, store and throw away your medicines at www.disposemymeds.org.          This list is accurate as of: 10/4/17  3:21 PM.  Always use your most recent med list.                   Brand Name Dispense Instructions for use Diagnosis    * albuterol 108 (90 BASE) MCG/ACT Inhaler    PROAIR HFA/PROVENTIL HFA/VENTOLIN HFA    1 Inhaler    Inhale 2 puffs into the lungs every 6 hours as needed for shortness of breath / dyspnea    Mild persistent asthma  with exacerbation       * albuterol (2.5 MG/3ML) 0.083% neb solution     75 mL    Take 1 vial (2.5 mg) by nebulization every 6 hours as needed for shortness of breath / dyspnea or wheezing        celecoxib 200 MG capsule    celeBREX     Take 200 mg by mouth 2 times daily        fluticasone 50 MCG/ACT spray    FLONASE    3 Bottle    Spray 1-2 sprays into both nostrils daily    Environmental allergies       gabapentin 300 MG capsule    NEURONTIN    90 capsule    Take 1 tablet (300 mg) by mouth three times daily.    Neuropathy       hydrOXYzine 10 MG tablet    ATARAX    60 tablet    Take 1-5 tablets (10-50 mg) by mouth every 6 hours as needed for anxiety    Anxiety, Situational anxiety       loratadine 10 MG tablet    CLARITIN    90 tablet    Take 1 tablet (10 mg) by mouth daily    Mild persistent asthma with exacerbation       LORazepam 0.5 MG tablet    ATIVAN    30 tablet    Take 1 tablet (0.5 mg) by mouth nightly as needed for anxiety    Insomnia, unspecified type       magnesium hydroxide 400 MG/5ML suspension    MILK OF MAGNESIA     Take 30 mLs by mouth        methocarbamol 750 MG tablet    ROBAXIN     Take 750 mg by mouth        montelukast 10 MG tablet    SINGULAIR    90 tablet    Take 1 tablet (10 mg) by mouth At Bedtime    Environmental allergies       scopolamine 72 hr patch    TRANSDERM    1 patch    Place 1 patch onto the skin every 72 hours.  Apply to hairless area behind one ear at least 4 hours before travel.  Remove old patch and change every 3 days.    Preop general physical exam, Nausea       traMADol 50 MG tablet    ULTRAM     Take 50 mg by mouth        * venlafaxine 37.5 MG 24 hr capsule    EFFEXOR-XR    30 capsule    Take 1 capsule daily for 14 days, then take 2 capsules daily.    Hot flushes, perimenopausal, Depression, unspecified depression type       * venlafaxine 37.5 MG tablet    EFFEXOR    90 tablet    Take 1 tablet (37.5 mg) in the morning and 2 tablets (75 mg) in the evening.    Hot  flushes, perimenopausal, Depression, unspecified depression type       * Notice:  This list has 4 medication(s) that are the same as other medications prescribed for you. Read the directions carefully, and ask your doctor or other care provider to review them with you.

## 2017-10-04 NOTE — PROGRESS NOTES
[facility] PHYSICIANS  SPECIALIZING IN BREAKTHROUGHS  Radiation Oncology    On Treatment Visit Note      Diana Wilson      Date: 10/4/2017   MRN: [id number]   : 1980  Diagnosis: T2N1 breast cancer, s/p neoadjuvant chemo and bilateral mastectomy      Reason for Visit:  On Radiation Treatment Visit     Treatment Summary to Date  Treatment Site: right breast Current Dose: 900/6040 cGy Fractions:       Chemotherapy  Chemo concurrent with radx?: No    Subjective:       Nursing ROS:   Nutrition Alteration  Diet Type: Patient's Preference  Skin  Skin Reaction: 0 - No changes  Skin Note: using jay butter 3 times per day  Cardiovascular  Respiratory effort: 1 - Normal - without distress  Genitourinary  Urinary Status: 0 - Normal  Pain Assessment  0-10 Pain Scale: 0    Objective:   Wt 96.1 kg (211 lb 12.8 oz)  BMI 36.36 kg/m2      Assessment:    Tolerating radiation therapy well.  All questions and concerns addressed.    Plan:   1. Continue current therapy.      Mosaiq chart and setup information reviewed  Ports checked  Educational Topic Discussed  Additional Instructions: [facility]

## 2017-10-11 ENCOUNTER — OFFICE VISIT (OUTPATIENT)
Dept: RADIATION THERAPY | Facility: OUTPATIENT CENTER | Age: 37
End: 2017-10-11

## 2017-10-11 VITALS
OXYGEN SATURATION: 97 % | HEART RATE: 95 BPM | BODY MASS INDEX: 36.6 KG/M2 | SYSTOLIC BLOOD PRESSURE: 132 MMHG | WEIGHT: 213.2 LBS | RESPIRATION RATE: 18 BRPM | DIASTOLIC BLOOD PRESSURE: 85 MMHG

## 2017-10-11 DIAGNOSIS — C50.811 MALIGNANT NEOPLASM OF OVERLAPPING SITES OF RIGHT BREAST IN FEMALE, ESTROGEN RECEPTOR NEGATIVE (H): Primary | ICD-10-CM

## 2017-10-11 DIAGNOSIS — Z17.1 MALIGNANT NEOPLASM OF OVERLAPPING SITES OF RIGHT BREAST IN FEMALE, ESTROGEN RECEPTOR NEGATIVE (H): Primary | ICD-10-CM

## 2017-10-11 ASSESSMENT — PAIN SCALES - GENERAL: PAINLEVEL: NO PAIN (0)

## 2017-10-11 NOTE — PROGRESS NOTES
[facility] PHYSICIANS  SPECIALIZING IN BREAKTHROUGHS  Radiation Oncology    On Treatment Visit Note      Diana Wilson      Date: 10/11/2017   MRN: [id number]   : 1980  Diagnosis: T2N1 breast cancer, s/p neoadjuvant chemo and bilateral mastectomy      Reason for Visit:  On Radiation Treatment Visit     Treatment Summary to Date  Treatment Site: right breast Current Dose: 1080/6040 cGy Fractions: 10/33      Chemotherapy  Chemo concurrent with radx?: No    Subjective:       Nursing ROS:   Nutrition Alteration  Diet Type: Patient's Preference  Skin  Skin Reaction: 1 - Faint erythema or dry desquamation  Skin Note: using jay butter 3 times per day  Cardiovascular  Respiratory effort: 1 - Normal - without distress  Genitourinary  Urinary Status: 0 - Normal  Pain Assessment  0-10 Pain Scale: 0    Objective:   /85  Pulse 95  Resp 18  Wt 96.7 kg (213 lb 3.2 oz)  SpO2 97%  BMI 36.6 kg/m2    Assessment:    Tolerating radiation therapy well.  All questions and concerns addressed.    Plan:   1. Continue current therapy.        Mosaiq chart and setup information reviewed  Ports checked   Educational Topic Discussed  Additional Instructions: [facility]

## 2017-10-11 NOTE — LETTER
10/11/2017      RE: Diana Wilson  1972 [address] [place] [id number]       [facility] PHYSICIANS  SPECIALIZING IN BREAKTHROUGHS  Radiation Oncology    On Treatment Visit Note      Diana Wilson      Date: 10/11/2017   MRN: [id number]   : 1980  Diagnosis: T2N1 breast cancer, s/p neoadjuvant chemo and bilateral mastectomy      Reason for Visit:  On Radiation Treatment Visit     Treatment Summary to Date  Treatment Site: right breast Current Dose: 1080/6040 cGy Fractions: 10/33      Chemotherapy  Chemo concurrent with radx?: No    Subjective:       Nursing ROS:   Nutrition Alteration  Diet Type: Patient's Preference  Skin  Skin Reaction: 1 - Faint erythema or dry desquamation  Skin Note: using jay butter 3 times per day  Cardiovascular  Respiratory effort: 1 - Normal - without distress  Genitourinary  Urinary Status: 0 - Normal  Pain Assessment  0-10 Pain Scale: 0    Objective:   /85  Pulse 95  Resp 18  Wt 96.7 kg (213 lb 3.2 oz)  SpO2 97%  BMI 36.6 kg/m2    Assessment:    Tolerating radiation therapy well.  All questions and concerns addressed.    Plan:   1. Continue current therapy.        Mosaiq chart and setup information reviewed  Ports checked   Educational Topic Discussed  Additional Instructions: sees lymphedema/PT at [facility] Denilson Durham MD

## 2017-10-11 NOTE — MR AVS SNAPSHOT
After Visit Summary   10/11/2017    Diana Wilson    MRN: [id number]           Patient Information     Date Of Birth          1980        Visit Information        Provider Department      10/11/2017 2:30 PM Caitlin Durham MD [facility]        Today's Diagnoses     Malignant neoplasm of overlapping sites of right breast in female, estrogen receptor negative (H)    -  1       Follow-ups after your visit        Your next 10 appointments already scheduled     Oct 12, 2017  2:15 PM CDT   Linear Accelerator with [facility] Rad Tech   Rad[facility] ([facility])    [address], [address]  [place] [address]   [phone]            Oct 13, 2017  2:15 PM CDT   Linear Accelerator with [facility] Rad [facility]   Rad[facility] Center ([facility])    [address], [address]  [place] [address]   [phone]            Oct 16, 2017  2:15 PM CDT   Linear Accelerator with [facility] Rad Tech   [facility] ([facility])    [address], [address]  [facility] [address]   [phone]            Oct 17, 2017  2:15 PM CDT   Linear Accelerator with [facility] Rad Tech   [facility] ([facility])    [address], [address]  [place] [address]   [phone]            Oct 18, 2017  2:15 PM CDT   Linear Accelerator with [facility] Rad Tech   [facility] ([facility])    [address]  [place] [address]   [phone]            Oct 18, 2017  2:30 PM CDT   ON TREATMENT VISIT with Caitlin Durham MD   [facility] ([facility])    [address], [address]  [place] [address]   [phone]            Oct 19, 2017  2:15 PM CDT   Linear Accelerator with [facility] Rad Tech   Rad[facility] ([facility])    [address]  [place] [address]   [phone]            Oct 20, 2017  2:15 PM CDT   Linear Accelerator  with [facility]   Radiation Therapy Center ([facility])    [address]  [facility] [address]   [phone]            Oct 23, 2017  2:15 PM CDT   Linear Accelerator with [facility]   Radiation Therapy Center ([facility])    [address]  [facility] [address]   [phone]            Oct 24, 2017  2:15 PM CDT   Linear Accelerator with [facility]   Radiation Therapy [place] ([facility])    [address]  [facility] [address]   [phone]              Who to contact     Please call your clinic at [phone] to:    Ask questions about your health    Make or cancel appointments    Discuss your medicines    Learn about your test results    Speak to your doctor   If you have compliments or concerns about an experience at your clinic, or if you wish to file a complaint, please contact [facility] Patient Relations at [phone] or email us at [email or web address]         Additional Information About Your Visit        FanIQhart Information     [employer] gives you secure access to your electronic health record. If you see a primary care provider, you can also send messages to your care team and make appointments. If you have questions, please call your primary care clinic.  If you do not have a primary care provider, please call [phone] and they will assist you.      [employer] is an electronic gateway that provides easy, online access to your medical records. With [employer], you can request a clinic appointment, read your test results, renew a prescription or communicate with your care team.     To access your existing account, please contact your [facility] or call [phone] for assistance.        Care EveryWhere ID     This is your Care EveryWhere ID. This could be used by other organizations to access your [place] medical records  [id number]         Your Vitals Were     Pulse Respirations Pulse Oximetry BMI (Body Mass Index)          95 18 97% 36.6 kg/m2         Blood Pressure from Last 3 Encounters:   10/11/17 132/85   09/15/17 116/77   08/25/17 110/71    Weight from Last 3 Encounters:   10/11/17 96.7 kg (213 lb 3.2 oz)   10/04/17 96.1 kg (211 lb 12.8 oz)   09/15/17 95.7 kg (211 lb)              Today, you had the following     No orders found for display       Primary Care Provider Office Phone # Fax #    Yenifer Lorraine Ridley [phone] [address] [facility] [address]        Equal Access to Services     FAUZIA RAMIREZ : Hadii gabriela Mills, wavandanada franki, qaybta kaalmada aderosayabell, javier bridges. So [facility] [phone].    ATENCIÓN: Si habla español, tiene a montelongo disposición servicios gratuitos de asistencia lingüística. [facility] [phone].    We comply with applicable federal civil rights laws and [place] laws. We do not discriminate on the basis of race, color, national origin, age, disability, sex, sexual orientation, or gender identity.            Thank you!     Thank you for choosing [facility]  for your care. Our goal is always to provide you with excellent care. Hearing back from our patients is one way we can continue to improve our services. Please take a few minutes to complete the written survey that you may receive in the mail after your visit with us. Thank you!             Your Updated Medication List - Protect others around you: Learn how to safely use, store and throw away your medicines at www.disposemymeds.org.          This list is accurate as of: 10/11/17  3:25 PM.  Always use your most recent med list.                   Brand Name Dispense Instructions for use Diagnosis    * albuterol 108 (90 BASE) MCG/ACT Inhaler    PROAIR HFA/PROVENTIL HFA/VENTOLIN HFA    1 Inhaler    Inhale 2 puffs into the lungs every 6 hours as needed for shortness of breath /  dyspnea    Mild persistent asthma with exacerbation       * albuterol (2.5 MG/3ML) 0.083% neb solution     75 mL    Take 1 vial (2.5 mg) by nebulization every 6 hours as needed for shortness of breath / dyspnea or wheezing        celecoxib 200 MG capsule    celeBREX     Take 200 mg by mouth 2 times daily        fluticasone 50 MCG/ACT spray    FLONASE    3 Bottle    Spray 1-2 sprays into both nostrils daily    Environmental allergies       gabapentin 300 MG capsule    NEURONTIN    90 capsule    Take 1 tablet (300 mg) by mouth three times daily.    Neuropathy       hydrOXYzine 10 MG tablet    ATARAX    60 tablet    Take 1-5 tablets (10-50 mg) by mouth every 6 hours as needed for anxiety    Anxiety, Situational anxiety       loratadine 10 MG tablet    CLARITIN    90 tablet    Take 1 tablet (10 mg) by mouth daily    Mild persistent asthma with exacerbation       LORazepam 0.5 MG tablet    ATIVAN    30 tablet    Take 1 tablet (0.5 mg) by mouth nightly as needed for anxiety    Insomnia, unspecified type       magnesium hydroxide 400 MG/5ML suspension    MILK OF MAGNESIA     Take 30 mLs by mouth        methocarbamol 750 MG tablet    ROBAXIN     Take 750 mg by mouth        montelukast 10 MG tablet    SINGULAIR    90 tablet    Take 1 tablet (10 mg) by mouth At Bedtime    Environmental allergies       scopolamine 72 hr patch    TRANSDERM    1 patch    Place 1 patch onto the skin every 72 hours.  Apply to hairless area behind one ear at least 4 hours before travel.  Remove old patch and change every 3 days.    Preop general physical exam, Nausea       traMADol 50 MG tablet    ULTRAM     Take 50 mg by mouth        * venlafaxine 37.5 MG 24 hr capsule    EFFEXOR-XR    30 capsule    Take 1 capsule daily for 14 days, then take 2 capsules daily.    Hot flushes, perimenopausal, Depression, unspecified depression type       * venlafaxine 37.5 MG tablet    EFFEXOR    90 tablet    Take 1 tablet (37.5 mg) in the morning and 2 tablets (75  mg) in the evening.    Hot flushes, perimenopausal, Depression, unspecified depression type       * Notice:  This list has 4 medication(s) that are the same as other medications prescribed for you. Read the directions carefully, and ask your doctor or other care provider to review them with you.

## 2017-10-18 ENCOUNTER — OFFICE VISIT (OUTPATIENT)
Dept: RADIATION THERAPY | Facility: OUTPATIENT CENTER | Age: 37
End: 2017-10-18

## 2017-10-18 VITALS
DIASTOLIC BLOOD PRESSURE: 80 MMHG | HEART RATE: 94 BPM | BODY MASS INDEX: 36.36 KG/M2 | RESPIRATION RATE: 18 BRPM | OXYGEN SATURATION: 97 % | WEIGHT: 211.8 LBS | SYSTOLIC BLOOD PRESSURE: 134 MMHG

## 2017-10-18 DIAGNOSIS — C50.811 MALIGNANT NEOPLASM OF OVERLAPPING SITES OF RIGHT BREAST IN FEMALE, ESTROGEN RECEPTOR NEGATIVE (H): Primary | ICD-10-CM

## 2017-10-18 DIAGNOSIS — Z17.1 MALIGNANT NEOPLASM OF OVERLAPPING SITES OF RIGHT BREAST IN FEMALE, ESTROGEN RECEPTOR NEGATIVE (H): Primary | ICD-10-CM

## 2017-10-18 ASSESSMENT — PAIN SCALES - GENERAL: PAINLEVEL: NO PAIN (1)

## 2017-10-18 NOTE — LETTER
10/18/2017      RE: Diana Wilson  1972 [address] [place] [id number]       [facility] PHYSICIANS  SPECIALIZING IN BREAKTHROUGHS  Radiation Oncology    On Treatment Visit Note      Diana Wilson      Date: 10/18/2017   MRN: [id number]   : 1980  Diagnosis: T2N1 breast cancer, s/p neoadjuvant chemo and bilateral mastectomy      Reason for Visit:  On Radiation Treatment Visit     Treatment Summary to Date  Treatment Site: right breast Current Dose: 2700/6040 cGy Fractions: 15/33      Chemotherapy  Chemo concurrent with radx?: No    Subjective:       Nursing ROS:   Nutrition Alteration  Diet Type: Patient's Preference  Skin  Skin Reaction: 1 - Faint erythema or dry desquamation  Skin Note: using jay butter 3 times per day  Cardiovascular  Respiratory effort: 1 - Normal - without distress  Genitourinary  Urinary Status: 0 - Normal  Pain Assessment  0-10 Pain Scale: 1  Pain Note: still has numbness s/p surgery. mild tenderness at surgery/radiation site    Objective:   /80  Pulse 94  Resp 18  Wt 96.1 kg (211 lb 12.8 oz)  SpO2 97%  BMI 36.36 kg/m2    Assessment:    Tolerating radiation therapy well.  All questions and concerns addressed.    Plan:   1. Continue current therapy.      Mosaiq chart and setup information reviewed  MVCT/IGRT images checked  Educational Topic Discussed  Additional Instructions: [facility]

## 2017-10-18 NOTE — PROGRESS NOTES
[facility] PHYSICIANS  SPECIALIZING IN BREAKTHROUGHS  Radiation Oncology    On Treatment Visit Note      Diana Wilson      Date: 10/18/2017   MRN: [id number]   : 1980  Diagnosis: T2N1 breast cancer, s/p neoadjuvant chemo and bilateral mastectomy      Reason for Visit:  On Radiation Treatment Visit     Treatment Summary to Date  Treatment Site: right breast Current Dose: 2700/6040 cGy Fractions: 15/33      Chemotherapy  Chemo concurrent with radx?: No    Subjective:       Nursing ROS:   Nutrition Alteration  Diet Type: Patient's Preference  Skin  Skin Reaction: 1 - Faint erythema or dry desquamation  Skin Note: using jay butter 3 times per day  Cardiovascular  Respiratory effort: 1 - Normal - without distress  Genitourinary  Urinary Status: 0 - Normal  Pain Assessment  0-10 Pain Scale: 1  Pain Note: still has numbness s/p surgery. mild tenderness at surgery/radiation site    Objective:   /80  Pulse 94  Resp 18  Wt 96.1 kg (211 lb 12.8 oz)  SpO2 97%  BMI 36.36 kg/m2    Assessment:    Tolerating radiation therapy well.  All questions and concerns addressed.    Plan:   1. Continue current therapy.      Mosaiq chart and setup information reviewed  MVCT/IGRT images checked  Educational Topic Discussed  Additional Instructions: sees lymphedema/PT at [facility] Denilson Durham MD

## 2017-10-18 NOTE — MR AVS SNAPSHOT
After Visit Summary   10/18/2017    Diana Wilson    MRN: [id number]           Patient Information     Date Of Birth          1980        Visit Information        Provider Department      10/18/2017 2:30 PM Caitlin Durham MD [facility]        Today's Diagnoses     Malignant neoplasm of overlapping sites of right breast in female, estrogen receptor negative (H)    -  1       Follow-ups after your visit        Your next 10 appointments already scheduled     Oct 19, 2017  2:15 PM CDT   Linear Accelerator with Lr [facility] Rad Tech   Radiation Therapy Center ([facility])    [address], [address]  [place] [address]   [phone]            Oct 20, 2017  2:15 PM CDT   Linear Accelerator with Lr [facility] Rad Tech   [facility] ([facility])    [address], [address]  [place] [address]   [phone]            Oct 23, 2017  2:15 PM CDT   Linear Accelerator with Lr [facility] Rad Tech   [facility] ([facility])    [address], [address]  [place] [address]   [phone]            Oct 24, 2017  2:15 PM CDT   Linear Accelerator with Lr [facility] Rad Tech   [facility] ([facility])    [address], [address]  [place] [address]   [phone]            Oct 25, 2017  2:15 PM CDT   Linear Accelerator with Lr [facility] Rad Tech   [facility] ([facility])    [address], [address]  [place] [address]   [phone]            Oct 25, 2017  2:30 PM CDT   ON TREATMENT VISIT with Caitlin Durham MD   [facility] ([facility])    [address], [address]  [place] [address]   [phone]            Oct 26, 2017  2:15 PM CDT   Linear Accelerator with Lr [facility] Rad Tech   [facility] ([facility])    [address]  [place] [address]   [phone]            Oct 27, 2017  2:15 PM CDT   Linear Accelerator  with Lr [facility]   Radiation Therapy Center ([facility])    [address], [address]  [facility] [address]   [phone]            Oct 30, 2017  2:15 PM CDT   Linear Accelerator with Lr [facility]   Radiation Therapy Center ([facility])    [address], [address]  [facility] [address]   [phone]            Oct 31, 2017  2:15 PM CDT   Linear Accelerator with Lr [facility]   Radiation Therapy [place] ([facility])    [address], [address]  [facility] [address]   [phone]              Who to contact     Please call your clinic at [phone] to:    Ask questions about your health    Make or cancel appointments    Discuss your medicines    Learn about your test results    Speak to your doctor   If you have compliments or concerns about an experience at your clinic, or if you wish to file a complaint, please contact [facility] Patient Relations at [phone] or email us at [email or web address]         Additional Information About Your Visit        Digital Lumenshart Information     Scicasts gives you secure access to your electronic health record. If you see a primary care provider, you can also send messages to your care team and make appointments. If you have questions, please call your primary care clinic.  If you do not have a primary care provider, please call [phone] and they will assist you.      Scicasts is an electronic gateway that provides easy, online access to your medical records. With Scicasts, you can request a clinic appointment, read your test results, renew a prescription or communicate with your care team.     To access your existing account, please contact your [facility] or call [phone] for assistance.        Care EveryWhere ID     This is your Care EveryWhere ID. This could be used by other organizations to access your [place] medical records  [id number]         Your Vitals Were     Pulse Respirations Pulse Oximetry BMI (Body Mass Index)          94 18 97% 36.36 kg/m2         Blood Pressure from Last 3 Encounters:   10/18/17 134/80   10/11/17 132/85   09/15/17 116/77    Weight from Last 3 Encounters:   10/18/17 96.1 kg (211 lb 12.8 oz)   10/11/17 96.7 kg (213 lb 3.2 oz)   10/04/17 96.1 kg (211 lb 12.8 oz)              Today, you had the following     No orders found for display       Primary Care Provider Office Phone # Fax #    Yenifer Ridley [phone] [address] [facility] [place] [address]        Equal Access to Services     FAUZIA RAMIREZ : Hadii gabriela sheao Sowan, waaxda luqadaha, qaybta kaalmada adeegyada, javier valentine . So [facility] [phone].    ATENCIÓN: Si habla español, tiene a montelongo disposición servicios gratuitos de asistencia lingüística. [facility] [phone].    We comply with applicable federal civil rights laws and [place] laws. We do not discriminate on the basis of race, color, national origin, age, disability, sex, sexual orientation, or gender identity.            Thank you!     Thank you for choosing [facility]  for your care. Our goal is always to provide you with excellent care. Hearing back from our patients is one way we can continue to improve our services. Please take a few minutes to complete the written survey that you may receive in the mail after your visit with us. Thank you!             Your Updated Medication List - Protect others around you: Learn how to safely use, store and throw away your medicines at www.disposemymeds.org.          This list is accurate as of: 10/18/17  3:31 PM.  Always use your most recent med list.                   Brand Name Dispense Instructions for use Diagnosis    * albuterol 108 (90 BASE) MCG/ACT Inhaler    PROAIR HFA/PROVENTIL HFA/VENTOLIN HFA    1 Inhaler    Inhale 2 puffs into the lungs every 6 hours as needed for shortness of  breath / dyspnea    Mild persistent asthma with exacerbation       * albuterol (2.5 MG/3ML) 0.083% neb solution     75 mL    Take 1 vial (2.5 mg) by nebulization every 6 hours as needed for shortness of breath / dyspnea or wheezing        celecoxib 200 MG capsule    celeBREX     Take 200 mg by mouth 2 times daily        fluticasone 50 MCG/ACT spray    FLONASE    3 Bottle    Spray 1-2 sprays into both nostrils daily    Environmental allergies       gabapentin 300 MG capsule    NEURONTIN    90 capsule    Take 1 tablet (300 mg) by mouth three times daily.    Neuropathy       hydrOXYzine 10 MG tablet    ATARAX    60 tablet    Take 1-5 tablets (10-50 mg) by mouth every 6 hours as needed for anxiety    Anxiety, Situational anxiety       loratadine 10 MG tablet    CLARITIN    90 tablet    Take 1 tablet (10 mg) by mouth daily    Mild persistent asthma with exacerbation       LORazepam 0.5 MG tablet    ATIVAN    30 tablet    Take 1 tablet (0.5 mg) by mouth nightly as needed for anxiety    Insomnia, unspecified type       magnesium hydroxide 400 MG/5ML suspension    MILK OF MAGNESIA     Take 30 mLs by mouth        methocarbamol 750 MG tablet    ROBAXIN     Take 750 mg by mouth        montelukast 10 MG tablet    SINGULAIR    90 tablet    Take 1 tablet (10 mg) by mouth At Bedtime    Environmental allergies       scopolamine 72 hr patch    TRANSDERM    1 patch    Place 1 patch onto the skin every 72 hours.  Apply to hairless area behind one ear at least 4 hours before travel.  Remove old patch and change every 3 days.    Preop general physical exam, Nausea       traMADol 50 MG tablet    ULTRAM     Take 50 mg by mouth        * venlafaxine 37.5 MG 24 hr capsule    EFFEXOR-XR    30 capsule    Take 1 capsule daily for 14 days, then take 2 capsules daily.    Hot flushes, perimenopausal, Depression, unspecified depression type       * venlafaxine 37.5 MG tablet    EFFEXOR    90 tablet    Take 1 tablet (37.5 mg) in the morning and 2  tablets (75 mg) in the evening.    Hot flushes, perimenopausal, Depression, unspecified depression type       * Notice:  This list has 4 medication(s) that are the same as other medications prescribed for you. Read the directions carefully, and ask your doctor or other care provider to review them with you.

## 2017-10-25 ENCOUNTER — OFFICE VISIT (OUTPATIENT)
Dept: RADIATION THERAPY | Facility: OUTPATIENT CENTER | Age: 37
End: 2017-10-25

## 2017-10-25 VITALS
RESPIRATION RATE: 18 BRPM | WEIGHT: 212.2 LBS | SYSTOLIC BLOOD PRESSURE: 117 MMHG | BODY MASS INDEX: 36.42 KG/M2 | OXYGEN SATURATION: 96 % | HEART RATE: 92 BPM | DIASTOLIC BLOOD PRESSURE: 83 MMHG

## 2017-10-25 DIAGNOSIS — Z17.1 MALIGNANT NEOPLASM OF OVERLAPPING SITES OF RIGHT BREAST IN FEMALE, ESTROGEN RECEPTOR NEGATIVE (H): Primary | ICD-10-CM

## 2017-10-25 DIAGNOSIS — C50.811 MALIGNANT NEOPLASM OF OVERLAPPING SITES OF RIGHT BREAST IN FEMALE, ESTROGEN RECEPTOR NEGATIVE (H): Primary | ICD-10-CM

## 2017-10-25 NOTE — PROGRESS NOTES
[facility] PHYSICIANS  SPECIALIZING IN BREAKTHROUGHS  Radiation Oncology    On Treatment Visit Note      Diana Wilson      Date: 10/25/2017   MRN: [id number]   : 1980  Diagnosis: T2N1 breast cancer, s/p neoadjuvant chemo and bilateral mastectomy      Reason for Visit:  On Radiation Treatment Visit     Treatment Summary to Date  Treatment Site: right breast Current Dose: 3600/6040 cGy Fractions:       Chemotherapy  Chemo concurrent with radx?: No    Subjective:       Nursing ROS:   Nutrition Alteration  Diet Type: Patient's Preference  Skin  Skin Reaction: 2 - Moderate to brisk erythema, patchy moist desquamation, mostly confined to skin folds and creases, moderate edema  Skin Note: cocoa butter during day, aquaphor at night. open area under axilla - reviewed some skin care ideas  Cardiovascular  Respiratory effort: 1 - Normal - without distress  Genitourinary  Urinary Status: 0 - Normal  Pain Assessment  0-10 Pain Scale: 3  Pain Note: burning in rt axilla/open skin    Objective:   /83  Pulse 92  Resp 18  Wt 96.3 kg (212 lb 3.2 oz)  SpO2 96%  BMI 36.42 kg/m2  There is a small focal spot skin breakdown in the axillary region.    Assessment:    Tolerating radiation therapy well with worsening skin reaction due to the radiation therapy.  All questions and concerns addressed.    Plan:   1. Continue current therapy.    2. We have discussed with the patient's skin care. We will DC the bolus from now on.      Mosaiq chart and setup information reviewed  MVCT/IGRT images checked  Educational Topic Discussed  Additional Instructions: sees lymphedema/PT at [facility] Denilson Durham MD

## 2017-10-25 NOTE — LETTER
10/25/2017      RE: Diana Wilson  1972 [address] [place] [id number]       [facility] PHYSICIANS  SPECIALIZING IN BREAKTHROUGHS  Radiation Oncology    On Treatment Visit Note      Diana Wilson      Date: 10/25/2017   MRN: [id number]   : 1980  Diagnosis: T2N1 breast cancer, s/p neoadjuvant chemo and bilateral mastectomy      Reason for Visit:  On Radiation Treatment Visit     Treatment Summary to Date  Treatment Site: right breast Current Dose: 3600/6040 cGy Fractions:       Chemotherapy  Chemo concurrent with radx?: No    Subjective:       Nursing ROS:   Nutrition Alteration  Diet Type: Patient's Preference  Skin  Skin Reaction: 2 - Moderate to brisk erythema, patchy moist desquamation, mostly confined to skin folds and creases, moderate edema  Skin Note: cocoa butter during day, aquaphor at night. open area under axilla - reviewed some skin care ideas  Cardiovascular  Respiratory effort: 1 - Normal - without distress  Genitourinary  Urinary Status: 0 - Normal  Pain Assessment  0-10 Pain Scale: 3  Pain Note: burning in rt axilla/open skin    Objective:   /83  Pulse 92  Resp 18  Wt 96.3 kg (212 lb 3.2 oz)  SpO2 96%  BMI 36.42 kg/m2  There is a small focal spot skin breakdown in the axillary region.    Assessment:    Tolerating radiation therapy well with worsening skin reaction due to the radiation therapy.  All questions and concerns addressed.    Plan:   1. Continue current therapy.    2. We have discussed with the patient's skin care. We will DC the bolus from now on.      Mosaiq chart and setup information reviewed  MVCT/IGRT images checked  Educational Topic Discussed  Additional Instructions: [facility] Caitlin Call MD

## 2017-10-25 NOTE — LETTER
Date:October 30, 2017      Provider requested that no letter be sent. Do not send.       [facility] Health Information

## 2017-10-25 NOTE — MR AVS SNAPSHOT
After Visit Summary   10/25/2017    Diana Wilson    MRN: [id number]           Patient Information     Date Of Birth          1980        Visit Information        Provider Department      10/25/2017 2:30 PM Caitlin Durham MD [facility]        Today's Diagnoses     Malignant neoplasm of overlapping sites of right breast in female, estrogen receptor negative (H)    -  1       Follow-ups after your visit        Your next 10 appointments already scheduled     Oct 26, 2017  2:15 PM CDT   Linear Accelerator with Lr [facility] Rad Tech   Radiation Therapy Center ([facility])    [address], [address]  [facility] [address]   [phone]            Oct 27, 2017  2:15 PM CDT   Linear Accelerator with Lr [facility] Rad Tech   Radiation Therapy Center ([facility])    [address]  [facility] [address]   [phone]            Oct 30, 2017  2:15 PM CDT   Linear Accelerator with Lr [facility] Rad Tech   Radiation Therapy Center ([facility])    [address]  [facility] [address]   [phone]            Oct 31, 2017  2:15 PM CDT   Linear Accelerator with Lr [facility] Rad Tech   Radiation Therapy Center ([facility])    [address]  [facility] [address]   [phone]            Nov 01, 2017  2:15 PM CDT   Linear Accelerator with Lr [facility] Rad Tech   Radiation Therapy Center ([facility])    [address]  [facility] [address]   [phone]            Nov 01, 2017  2:30 PM CDT   ON TREATMENT VISIT with Sondra Mccollum MD   Rad[facility] Therapy Center ([facility])    [address]  [facility] [address]   [phone]            Nov 02, 2017  2:15 PM CDT   Linear Accelerator with Lr [facility] Rad Tech   Radiation Therapy Center ([facility])    [address]  [facility] [address]   [phone]            Nov 03, 2017  2:15 PM CDT   Linear  Accelerator with Lr [facility]   [facility]    [address]  [facility] [address]   [phone]            Nov 06, 2017  2:15 PM CST   Linear Accelerator with Lr [facility] Rad [facility]   Radiation Therapy Center ([facility])    [address]  [facility] [address]   [phone]            Nov 07, 2017  2:15 PM CST   Linear Accelerator with Lr [facility] Rad [facility]   Radiation Therapy Center ([facility])    [address]  [facility] [address]   [phone]              Who to contact     Please call your clinic at [phone] to:    Ask questions about your health    Make or cancel appointments    Discuss your medicines    Learn about your test results    Speak to your doctor   If you have compliments or concerns about an experience at your clinic, or if you wish to file a complaint, please contact [facility] Patient Relations at [phone] or email us at [email or web address]         Additional Information About Your Visit        Met[employer] AGhart Information     MyTinks gives you secure access to your electronic health record. If you see a primary care provider, you can also send messages to your care team and make appointments. If you have questions, please call your primary care clinic.  If you do not have a primary care provider, please call [phone] and they will assist you.      MyTinks is an electronic gateway that provides easy, online access to your medical records. With MyTinks, you can request a clinic appointment, read your test results, renew a prescription or communicate with your care team.     To access your existing account, please contact your [facility] or call [phone] for assistance.        Care EveryWhere ID     This is your Care EveryWhere ID. This could be used by other organizations to access your [facility]  records  [id number]        Your Vitals Were     Pulse Respirations Pulse Oximetry BMI (Body Mass Index)          92 18 96% 36.42 kg/m2         Blood Pressure from Last 3 Encounters:   10/25/17 117/83   10/18/17 134/80   10/11/17 132/85    Weight from Last 3 Encounters:   10/25/17 96.3 kg (212 lb 3.2 oz)   10/18/17 96.1 kg (211 lb 12.8 oz)   10/11/17 96.7 kg (213 lb 3.2 oz)              Today, you had the following     No orders found for display       Primary Care Provider Office Phone # Fax #    Yenifer Ridley [phone] [address] [place] [address]        Equal Access to Services     FAUZIA RAMIREZ : Hadii aad ku hadasho Sowan, waaxda luqadaha, qaybta kaalmada adeegyada, javier valentine . So [facility] [phone].    ATENCIÓN: Si habla español, tiene a montelongo disposición servicios gratuitos de asistencia lingüística. [facility] [phone].    We comply with applicable federal civil rights laws and [place] laws. We do not discriminate on the basis of race, color, national origin, age, disability, sex, sexual orientation, or gender identity.            Thank you!     Thank you for choosing [facility]  for your care. Our goal is always to provide you with excellent care. Hearing back from our patients is one way we can continue to improve our services. Please take a few minutes to complete the written survey that you may receive in the mail after your visit with us. Thank you!             Your Updated Medication List - Protect others around you: Learn how to safely use, store and throw away your medicines at www.disposemymeds.org.          This list is accurate as of: 10/25/17  3:46 PM.  Always use your most recent med list.                   Brand Name Dispense Instructions for use Diagnosis    * albuterol 108 (90 BASE) MCG/ACT Inhaler    PROAIR HFA/PROVENTIL HFA/VENTOLIN HFA    1 Inhaler    Inhale 2 puffs into the lungs every 6 hours as  needed for shortness of breath / dyspnea    Mild persistent asthma with exacerbation       * albuterol (2.5 MG/3ML) 0.083% neb solution     75 mL    Take 1 vial (2.5 mg) by nebulization every 6 hours as needed for shortness of breath / dyspnea or wheezing        celecoxib 200 MG capsule    celeBREX     Take 200 mg by mouth 2 times daily        fluticasone 50 MCG/ACT spray    FLONASE    3 Bottle    Spray 1-2 sprays into both nostrils daily    Environmental allergies       gabapentin 300 MG capsule    NEURONTIN    90 capsule    Take 1 tablet (300 mg) by mouth three times daily.    Neuropathy       hydrOXYzine 10 MG tablet    ATARAX    60 tablet    Take 1-5 tablets (10-50 mg) by mouth every 6 hours as needed for anxiety    Anxiety, Situational anxiety       loratadine 10 MG tablet    CLARITIN    90 tablet    Take 1 tablet (10 mg) by mouth daily    Mild persistent asthma with exacerbation       LORazepam 0.5 MG tablet    ATIVAN    30 tablet    Take 1 tablet (0.5 mg) by mouth nightly as needed for anxiety    Insomnia, unspecified type       magnesium hydroxide 400 MG/5ML suspension    MILK OF MAGNESIA     Take 30 mLs by mouth        methocarbamol 750 MG tablet    ROBAXIN     Take 750 mg by mouth        montelukast 10 MG tablet    SINGULAIR    90 tablet    Take 1 tablet (10 mg) by mouth At Bedtime    Environmental allergies       scopolamine 72 hr patch    TRANSDERM    1 patch    Place 1 patch onto the skin every 72 hours.  Apply to hairless area behind one ear at least 4 hours before travel.  Remove old patch and change every 3 days.    Preop general physical exam, Nausea       traMADol 50 MG tablet    ULTRAM     Take 50 mg by mouth        * venlafaxine 37.5 MG 24 hr capsule    EFFEXOR-XR    30 capsule    Take 1 capsule daily for 14 days, then take 2 capsules daily.    Hot flushes, perimenopausal, Depression, unspecified depression type       * venlafaxine 37.5 MG tablet    EFFEXOR    90 tablet    Take 1 tablet (37.5 mg)  in the morning and 2 tablets (75 mg) in the evening.    Hot flushes, perimenopausal, Depression, unspecified depression type       * Notice:  This list has 4 medication(s) that are the same as other medications prescribed for you. Read the directions carefully, and ask your doctor or other care provider to review them with you.

## 2017-10-26 DIAGNOSIS — L58.9 RADIATION-INDUCED DERMATITIS: ICD-10-CM

## 2017-10-26 DIAGNOSIS — C50.919 BREAST CANCER (H): Primary | ICD-10-CM

## 2017-10-26 DIAGNOSIS — C50.911 INVASIVE DUCTAL CARCINOMA OF BREAST, RIGHT (H): Primary | ICD-10-CM

## 2017-10-26 RX ORDER — FOAM BANDAGE 2" X 2"
BANDAGE TOPICAL
Qty: 5 EACH | Refills: 6 | Status: SHIPPED | OUTPATIENT
Start: 2017-10-26 | End: 2017-11-22

## 2017-10-26 RX ORDER — OXYCODONE AND ACETAMINOPHEN 5; 325 MG/1; MG/1
1 TABLET ORAL EVERY 6 HOURS PRN
Qty: 10 TABLET | Refills: 0 | Status: SHIPPED | OUTPATIENT
Start: 2017-10-26 | End: 2017-11-22

## 2017-11-01 ENCOUNTER — OFFICE VISIT (OUTPATIENT)
Dept: RADIATION THERAPY | Facility: OUTPATIENT CENTER | Age: 37
End: 2017-11-01

## 2017-11-01 VITALS
SYSTOLIC BLOOD PRESSURE: 120 MMHG | BODY MASS INDEX: 37.08 KG/M2 | WEIGHT: 216 LBS | DIASTOLIC BLOOD PRESSURE: 87 MMHG | HEART RATE: 96 BPM

## 2017-11-01 DIAGNOSIS — C50.811 MALIGNANT NEOPLASM OF OVERLAPPING SITES OF RIGHT BREAST IN FEMALE, ESTROGEN RECEPTOR NEGATIVE (H): Primary | ICD-10-CM

## 2017-11-01 DIAGNOSIS — Z17.1 MALIGNANT NEOPLASM OF OVERLAPPING SITES OF RIGHT BREAST IN FEMALE, ESTROGEN RECEPTOR NEGATIVE (H): Primary | ICD-10-CM

## 2017-11-01 NOTE — PROGRESS NOTES
[facility] PHYSICIANS  SPECIALIZING IN BREAKTHROUGHS  Radiation Oncology    On Treatment Visit Note      Diana Wilson      Date: 2017   MRN: [id number]   : 1980  Diagnosis: T2N1 breast cancer, s/p neoadjuvant chemo and bilateral mastectomy      Reason for Visit:  On Radiation Treatment Visit     Treatment Summary to Date  Treatment Site: right breast Current Dose: 4500/6040 cGy Fractions:       Chemotherapy  Chemo concurrent with radx?: No    Subjective:     Has axillary peeling with discomfort.  Erythema on the rest of her treated skin.  C/o fatigue and odynophagia.    Nursing ROS:   Nutrition Alteration  Diet Type: Patient's Preference  Skin  Skin Reaction: 2 - Moderate to brisk erythema, patchy moist desquamation, mostly confined to skin folds and creases, moderate edema  Skin Note: using mepilex with good relief  Cardiovascular  Respiratory effort: 1 - Normal - without distress  Genitourinary  Urinary Status: 0 - Normal  Pain Assessment  Pain Note: burning in rt axilla/open skin      Objective:   /87  Pulse 96  Wt 216 lb  BMI 37.08 kg/m2  NAD  A&O x3  +erythema in treated breast with dry desquamation in axillary fold. Hyperpigmented skin in lateral inframammary fold. No wet desquamation.    Labs:  CBC RESULTS:   Recent Labs   Lab Test  17   0816   WBC  6.6   RBC  3.84   HGB  12.0   HCT  37.1   MCV  97   MCH  31.3   MCHC  32.3   RDW  14.7   PLT  320     ELECTROLYTES:  Recent Labs   Lab Test  17   2046   NA  139   POTASSIUM  3.7   CHLORIDE  103   DENY  9.0   CO2  27   BUN  9   CR  0.90   GLC  148*       Assessment:    Tolerating radiation therapy well.  All questions and concerns addressed.    Plan:   1. Continue current therapy.    2. If pt develops wet desquamation recommend triple abx cream BID (absorb for 10 minutes) then apply mepilex.  3. Rest for fatigue, increasing hydration should help  4. Tums for esophagitis.      Mosaiq chart and setup information  reviewed  Ports checked    Medication Review  Med Note: has percocet if needed, may take maalox for heartburn    Educational Topic Discussed  Additional Instructions: [facility]

## 2017-11-01 NOTE — LETTER
2017      RE: Diana Wilson  1972 [address] [place] [id number]       [facility] PHYSICIANS  SPECIALIZING IN BREAKTHROUGHS  Radiation Oncology    On Treatment Visit Note      Diana Wilson      Date: 2017   MRN: [id number]   : 1980  Diagnosis: T2N1 breast cancer, s/p neoadjuvant chemo and bilateral mastectomy      Reason for Visit:  On Radiation Treatment Visit     Treatment Summary to Date  Treatment Site: right breast Current Dose: 4500/6040 cGy Fractions:       Chemotherapy  Chemo concurrent with radx?: No    Subjective:     Has axillary peeling with discomfort.  Erythema on the rest of her treated skin.  C/o fatigue and odynophagia.    Nursing ROS:   Nutrition Alteration  Diet Type: Patient's Preference  Skin  Skin Reaction: 2 - Moderate to brisk erythema, patchy moist desquamation, mostly confined to skin folds and creases, moderate edema  Skin Note: using mepilex with good relief  Cardiovascular  Respiratory effort: 1 - Normal - without distress  Genitourinary  Urinary Status: 0 - Normal  Pain Assessment  Pain Note: burning in rt axilla/open skin      Objective:   /87  Pulse 96  Wt 216 lb  BMI 37.08 kg/m2  NAD  A&O x3  +erythema in treated breast with dry desquamation in axillary fold. Hyperpigmented skin in lateral inframammary fold. No wet desquamation.    Labs:  CBC RESULTS:   Recent Labs   Lab Test  17   0816   WBC  6.6   RBC  3.84   HGB  12.0   HCT  37.1   MCV  97   MCH  31.3   MCHC  32.3   RDW  14.7   PLT  320     ELECTROLYTES:  Recent Labs   Lab Test  17   2046   NA  139   POTASSIUM  3.7   CHLORIDE  103   DENY  9.0   CO2  27   BUN  9   CR  0.90   GLC  148*       Assessment:    Tolerating radiation therapy well.  All questions and concerns addressed.    Plan:   1. Continue current therapy.    2. If pt develops wet desquamation recommend triple abx cream BID (absorb for 10 minutes) then apply mepilex.  3. Rest for fatigue, increasing  hydration should help  4. Tums for esophagitis.      Mosaiq chart and setup information reviewed  Ports checked    Medication Review  Med Note: has percocet if needed, may take maalox for heartburn    Educational Topic Discussed  Additional Instructions: [facility]

## 2017-11-01 NOTE — MR AVS SNAPSHOT
After Visit Summary   11/1/2017    Diana Wilson    MRN: [id number]           Patient Information     Date Of Birth          1980        Visit Information        Provider Department      11/1/2017 2:30 PM Sondra Mccollum MD [facility]        Today's Diagnoses     Malignant neoplasm of overlapping sites of right breast in female, estrogen receptor negative (H)    -  1       Follow-ups after your visit        Your next 10 appointments already scheduled     Nov 02, 2017  2:15 PM CDT   Linear Accelerator with Lr [facility] Rad Tech   [facility] ([facility])    [address], [address]  [facility] [address]   [phone]            Nov 03, 2017  2:15 PM CDT   Linear Accelerator with Lr [facility] Rad [facility]   [facility] ([facility])    [address], [address]  [facility] [address]   [phone]            Nov 06, 2017  2:15 PM CST   Linear Accelerator with Lr [facility] Rad Tech   [facility] ([facility])    [address], [address]  [facility] [address]   [phone]            Nov 07, 2017  2:15 PM CST   Linear Accelerator with Lr [facility] Rad Tech   [facility] ([facility])    [address], [address]  [facility] [address]   [phone]            Nov 08, 2017  2:15 PM CST   Linear Accelerator with Lr [facility] Rad [facility]   [facility] ([facility])    [address], [address]  [facility] [address]   [phone]            Nov 08, 2017  2:30 PM CST   ON TREATMENT VISIT with Aleksey Terrell MD   [facility] ([facility])    [address], [address]  [facility] [address]   [phone]            Nov 09, 2017  2:15 PM CST   Linear Accelerator with Lr [facility] Rad Tech   Rad[facility] Therapy Center ([facility])    [address], [address]  [facility] [address]   [phone]            Nov 10, 2017  2:15 PM CST   Linear  Accelerator with Lr [facility]   Radiation Therapy [place] ([facility])    [address], [address]  [facility] [place] [place] [address]   [phone]            Nov 13, 2017  2:15 PM CST   Linear Accelerator with Lr [facility]   Radiation Therapy [place] ([facility])    [address], [address]  [facility] [place] [place] [address]   [phone]              Who to contact     Please call your clinic at [phone] to:    Ask questions about your health    Make or cancel appointments    Discuss your medicines    Learn about your test results    Speak to your doctor   If you have compliments or concerns about an experience at your clinic, or if you wish to file a complaint, please contact [facility] Patient Relations at [phone] or email us at [email or web address]         Additional Information About Your Visit        [employer] My Meds Information     App[employer] gives you secure access to your electronic health record. If you see a primary care provider, you can also send messages to your care team and make appointments. If you have questions, please call your primary care clinic.  If you do not have a primary care provider, please call [phone] and they will assist you.      Patient Feed is an electronic gateway that provides easy, online access to your medical records. With Patient Feed, you can request a clinic appointment, read your test results, renew a prescription or communicate with your care team.     To access your existing account, please contact your [facility] or call [phone] for assistance.        Care EveryWhere ID     This is your Care EveryWhere ID. This could be used by other organizations to access your [facility] medical records  [id number]        Your Vitals Were     Pulse BMI (Body Mass Index)                96 37.08 kg/m2           Blood Pressure from Last 3 Encounters:   11/01/17 120/87   10/25/17 117/83   10/18/17 134/80     Weight from Last 3 Encounters:   11/01/17 216 lb   10/25/17 212 lb 3.2 oz   10/18/17 211 lb 12.8 oz              Today, you had the following     No orders found for display       Primary Care Provider Office Phone # Fax #    Yenifer Ridley [phone] [address] [place] [address]        Equal Access to Services     [facility]: Hadii aad ku hadasho Soomaali, waaxda luqadaha, qaybta kaalmada adeegyada, waxay idiin hayaan adeeg geovany laLorenaaan . So [facility] [phone].    ATENCIÓN: Si habla español, tiene a montelongo disposición servicios gratuitos de asistencia lingüística. Llame al [phone].    We comply with applicable federal civil rights laws and [place] laws. We do not discriminate on the basis of race, color, national origin, age, disability, sex, sexual orientation, or gender identity.            Thank you!     Thank you for choosing [facility]  for your care. Our goal is always to provide you with excellent care. Hearing back from our patients is one way we can continue to improve our services. Please take a few minutes to complete the written survey that you may receive in the mail after your visit with us. Thank you!             Your Updated Medication List - Protect others around you: Learn how to safely use, store and throw away your medicines at www.disposemymeds.org.          This list is accurate as of: 11/1/17  3:01 PM.  Always use your most recent med list.                   Brand Name Dispense Instructions for use Diagnosis    * albuterol 108 (90 BASE) MCG/ACT Inhaler    PROAIR HFA/PROVENTIL HFA/VENTOLIN HFA    1 Inhaler    Inhale 2 puffs into the lungs every 6 hours as needed for shortness of breath / dyspnea    Mild persistent asthma with exacerbation       * albuterol (2.5 MG/3ML) 0.083% neb solution     75 mL    Take 1 vial (2.5 mg) by nebulization every 6 hours as needed for shortness of breath / dyspnea or wheezing        celecoxib 200 MG  capsule    celeBREX     Take 200 mg by mouth 2 times daily        fluticasone 50 MCG/ACT spray    FLONASE    3 Bottle    Spray 1-2 sprays into both nostrils daily    Environmental allergies       Foam Dressing Non-Bordered Pads     5 each    dispense as brand name Mepilex.    Breast cancer (H), Radiation-induced dermatitis       gabapentin 300 MG capsule    NEURONTIN    90 capsule    Take 1 tablet (300 mg) by mouth three times daily.    Neuropathy       hydrOXYzine 10 MG tablet    ATARAX    60 tablet    Take 1-5 tablets (10-50 mg) by mouth every 6 hours as needed for anxiety    Anxiety, Situational anxiety       loratadine 10 MG tablet    CLARITIN    90 tablet    Take 1 tablet (10 mg) by mouth daily    Mild persistent asthma with exacerbation       LORazepam 0.5 MG tablet    ATIVAN    30 tablet    Take 1 tablet (0.5 mg) by mouth nightly as needed for anxiety    Insomnia, unspecified type       magnesium hydroxide 400 MG/5ML suspension    MILK OF MAGNESIA     Take 30 mLs by mouth        methocarbamol 750 MG tablet    ROBAXIN     Take 750 mg by mouth        montelukast 10 MG tablet    SINGULAIR    90 tablet    Take 1 tablet (10 mg) by mouth At Bedtime    Environmental allergies       oxyCODONE-acetaminophen 5-325 MG per tablet    PERCOCET    10 tablet    Take 1 tablet by mouth every 6 hours as needed for moderate to severe pain    Invasive ductal carcinoma of breast, right (H)       scopolamine 72 hr patch    TRANSDERM    1 patch    Place 1 patch onto the skin every 72 hours.  Apply to hairless area behind one ear at least 4 hours before travel.  Remove old patch and change every 3 days.    Preop general physical exam, Nausea       traMADol 50 MG tablet    ULTRAM     Take 50 mg by mouth        * venlafaxine 37.5 MG 24 hr capsule    EFFEXOR-XR    30 capsule    Take 1 capsule daily for 14 days, then take 2 capsules daily.    Hot flushes, perimenopausal, Depression, unspecified depression type       * venlafaxine 37.5 MG  tablet    EFFEXOR    90 tablet    Take 1 tablet (37.5 mg) in the morning and 2 tablets (75 mg) in the evening.    Hot flushes, perimenopausal, Depression, unspecified depression type       * Notice:  This list has 4 medication(s) that are the same as other medications prescribed for you. Read the directions carefully, and ask your doctor or other care provider to review them with you.

## 2017-11-10 ENCOUNTER — OFFICE VISIT (OUTPATIENT)
Dept: RADIATION THERAPY | Facility: OUTPATIENT CENTER | Age: 37
End: 2017-11-10

## 2017-11-10 VITALS
HEART RATE: 107 BPM | RESPIRATION RATE: 18 BRPM | WEIGHT: 212.4 LBS | DIASTOLIC BLOOD PRESSURE: 87 MMHG | OXYGEN SATURATION: 96 % | BODY MASS INDEX: 36.46 KG/M2 | SYSTOLIC BLOOD PRESSURE: 124 MMHG

## 2017-11-10 DIAGNOSIS — C50.411 MALIGNANT NEOPLASM OF UPPER-OUTER QUADRANT OF RIGHT BREAST IN FEMALE, ESTROGEN RECEPTOR NEGATIVE (H): Primary | ICD-10-CM

## 2017-11-10 DIAGNOSIS — Z17.1 MALIGNANT NEOPLASM OF UPPER-OUTER QUADRANT OF RIGHT BREAST IN FEMALE, ESTROGEN RECEPTOR NEGATIVE (H): Primary | ICD-10-CM

## 2017-11-10 RX ORDER — SILVER SULFADIAZINE 10 MG/G
CREAM TOPICAL 2 TIMES DAILY
Qty: 400 G | Refills: 3 | Status: SHIPPED | OUTPATIENT
Start: 2017-11-10 | End: 2017-12-10

## 2017-11-10 NOTE — MR AVS SNAPSHOT
After Visit Summary   11/10/2017    Diana Wilson    MRN: [id number]           Patient Information     Date Of Birth          1980        Visit Information        Provider Department      11/10/2017 10:15 AM Sondra Mccollum MD [facility]        Today's Diagnoses     Malignant neoplasm of upper-outer quadrant of right breast in female, estrogen receptor negative (H)    -  1       Follow-ups after your visit        Your next 10 appointments already scheduled     Nov 21, 2017 10:30 AM CST   Office Visit with Preeti Tirado MD   [facility] ([facility]    [address] [facility] [address]   [phone]           Bring a current list of meds and any records pertaining to this visit. For Physicals, please bring immunization records and any forms needing to be filled out. Please arrive 10 minutes early to complete paperwork.            Nov 22, 2017  3:00 PM CST   Ech Complete with [id number] Echocardiography ([facility])    [address] [facility] [address] [phone]           1. Please bring or wear a comfortable two-piece outfit. 2. You may eat, drink and take your normal medicines. 3. For any questions that cannot be answered, please contact the ordering physician            Nov 22, 2017  4:00 PM CST   CT CHEST/ABDOMEN/PELVIS W CONTRAST with [id number]   [facility] CT ([facility])    [address] [facility] [address]   [phone]           Please bring any scans or X-rays taken at other hospitals, if similar tests were done. Also bring a list of your medicines, including vitamins, minerals and over-the-counter drugs. It is safest to leave personal items at home.  Be sure to tell your doctor:   If you have any allergies.   If there s any chance you are pregnant.   If you are breastfeeding.   If you have any special needs.  You may have contrast for this  exam. To prepare:   Do not eat or drink for 2 hours before your exam. If you need to take medicine, you may take it with small sips of water. (We may ask you to take liquid medicine as well.)   The day before your exam, drink extra fluids at least six 8-ounce glasses (unless your doctor tells you to restrict your fluids).  Patients over 70 or patients with diabetes or kidney problems:   If you haven t had a blood test (creatinine test) within the last 30 days, go to your clinic or Diagnostic Imaging Department for this test.  If you have diabetes:   If your kidney function is normal, continue taking your metformin (Avandamet, Glucophage, Glucovance, Metaglip) on the day of your exam.   If your kidney function is abnormal, wait 48 hours before restarting this medicine.  You will have oral contrast for this exam:   You will drink the contrast at home. Get this from your clinic or Diagnostic Imaging Department. Please follow the directions given.  Please wear loose clothing, such as a sweat suit or jogging clothes. Avoid snaps, zippers and other metal. We may ask you to undress and put on a hospital gown.  If you have any questions, please call the Imaging Department where you will have your exam.            Dec 04, 2017  2:00 PM CST   Return Visit with COLLEEN Sheikh CNP   [facility] ([facility])    [address] [place] [address], [address]  [facility] [address] [phone]              Future tests that were ordered for you today     Open Future Orders        Priority Expected Expires Ordered    Echocardiogram Complete Routine  11/16/2018 11/16/2017    CT Chest/Abdomen/Pelvis w Contrast Routine  11/16/2018 11/16/2017            Who to contact     Please call your clinic at [phone] to:    Ask questions about your health    Make or cancel appointments    Discuss your medicines    Learn about your test results    Speak to your doctor   If you have compliments or concerns about an  experience at your clinic, or if you wish to file a complaint, please contact [facility] Patient Relations at [phone] or email us at [email or web address]         Additional Information About Your Visit        [employer] Information     Cellyt gives you secure access to your electronic health record. If you see a primary care provider, you can also send messages to your care team and make appointments. If you have questions, please call your primary care clinic.  If you do not have a primary care provider, please call [phone] and they will assist you.      [employer] is an electronic gateway that provides easy, online access to your medical records. With [employer], you can request a clinic appointment, read your test results, renew a prescription or communicate with your care team.     To access your existing account, please contact your [facility] or call [phone] for assistance.        Care EveryWhere ID     This is your Care EveryWhere ID. This could be used by other organizations to access your [facility] medical records  [id number]        Your Vitals Were     Pulse Respirations Pulse Oximetry BMI (Body Mass Index)          107 18 96% 36.46 kg/m2         Blood Pressure from Last 3 Encounters:   No data found for BP    Weight from Last 3 Encounters:   No data found for Wt              Today, you had the following     No orders found for display         Today's Medication Changes          These changes are accurate as of: 11/10/17 11:59 PM.  If you have any questions, ask your nurse or doctor.               Start taking these medicines.        Dose/Directions    silver sulfADIAZINE 1 % cream   Commonly known as:  SILVADENE   Used for:  Malignant neoplasm of upper-outer quadrant of right breast in female, estrogen receptor negative (H)   Started by:  Sondra Mccollum MD        Apply topically 2 times daily Apply to erythematous peeling  areas BID   Quantity:  400 g   Refills:  3            Where to get your medicines      These medications were sent to [facility] [place] [facility] [place] [place] - [address] [facility]  [address] [facility] [address]     Phone:  [phone]     silver sulfADIAZINE 1 % cream                Primary Care Provider Office Phone # Fax #    Yenifer Lorraine Ridley [phone] [address] [address] [facility] [address]        Equal Access to Services     FAUZIA RAMIREZ : Hadii aad ku hadasho Soomaali, waaxda luqadaha, qaybta kaalmada adeegyada, waxay idiin hayaan adeeg kharash la'aan . So [facility] [phone].    ATENCIÓN: Si habla español, tiene a montelongo disposición servicios gratuitos de asistencia lingüística. [facility] [phone].    We comply with applicable federal civil rights laws and [place] laws. We do not discriminate on the basis of race, color, national origin, age, disability, sex, sexual orientation, or gender identity.            Thank you!     Thank you for choosing [facility]  for your care. Our goal is always to provide you with excellent care. Hearing back from our patients is one way we can continue to improve our services. Please take a few minutes to complete the written survey that you may receive in the mail after your visit with us. Thank you!             Your Updated Medication List - Protect others around you: Learn how to safely use, store and throw away your medicines at www.disposemymeds.org.          This list is accurate as of: 11/10/17 11:59 PM.  Always use your most recent med list.                   Brand Name Dispense Instructions for use Diagnosis    * albuterol 108 (90 BASE) MCG/ACT Inhaler    PROAIR HFA/PROVENTIL HFA/VENTOLIN HFA    1 Inhaler    Inhale 2 puffs into the lungs every 6 hours as needed for shortness of breath / dyspnea    Mild persistent asthma with exacerbation       * albuterol (2.5 MG/3ML) 0.083% neb solution     75 mL    Take 1 vial (2.5 mg)  by nebulization every 6 hours as needed for shortness of breath / dyspnea or wheezing        fluticasone 50 MCG/ACT spray    FLONASE    3 Bottle    Spray 1-2 sprays into both nostrils daily    Environmental allergies       Foam Dressing Non-Bordered Pads     5 each    dispense as brand name Mepilex.    Breast cancer (H), Radiation-induced dermatitis       hydrOXYzine 10 MG tablet    ATARAX    60 tablet    Take 1-5 tablets (10-50 mg) by mouth every 6 hours as needed for anxiety    Anxiety, Situational anxiety       loratadine 10 MG tablet    CLARITIN    90 tablet    Take 1 tablet (10 mg) by mouth daily    Mild persistent asthma with exacerbation       LORazepam 0.5 MG tablet    ATIVAN    30 tablet    Take 1 tablet (0.5 mg) by mouth nightly as needed for anxiety    Insomnia, unspecified type       magnesium hydroxide 400 MG/5ML suspension    MILK OF MAGNESIA     Take 30 mLs by mouth        montelukast 10 MG tablet    SINGULAIR    90 tablet    Take 1 tablet (10 mg) by mouth At Bedtime    Environmental allergies       * oxyCODONE-acetaminophen 5-325 MG per tablet    PERCOCET    10 tablet    Take 1 tablet by mouth every 6 hours as needed for moderate to severe pain    Invasive ductal carcinoma of breast, right (H)       * oxyCODONE-acetaminophen 5-325 MG per tablet    PERCOCET    30 tablet    Take 1 tablet by mouth every 6 hours as needed for pain maximum 4 tablet(s) per day    Malignant neoplasm of upper-outer quadrant of right breast in female, estrogen receptor negative (H)       scopolamine 72 hr patch    TRANSDERM    1 patch    Place 1 patch onto the skin every 72 hours.  Apply to hairless area behind one ear at least 4 hours before travel.  Remove old patch and change every 3 days.    Preop general physical exam, Nausea       silver sulfADIAZINE 1 % cream    SILVADENE    400 g    Apply topically 2 times daily Apply to erythematous peeling areas BID    Malignant neoplasm of upper-outer quadrant of right breast in  female, estrogen receptor negative (H)       * Notice:  This list has 4 medication(s) that are the same as other medications prescribed for you. Read the directions carefully, and ask your doctor or other care provider to review them with you.

## 2017-11-10 NOTE — PROGRESS NOTES
[facility] PHYSICIANS  SPECIALIZING IN BREAKTHROUGHS  Radiation Oncology    On Treatment Visit Note      Diana Wilson      Date: 11/10/2017   MRN: [id number]   : 1980  Diagnosis: T2N1 breast cancer, s/p neoadjuvant chemo and bilateral mastectomy      Reason for Visit:  On Radiation Treatment Visit     Treatment Summary to Date  Treatment Site: right breast Current Dose: 5440/6040 cGy Fractions:       Chemotherapy  Chemo concurrent with radx?: No    Subjective:     Pt has significant erythema and desquamation within lateral breast field.  Taking percocet 1 QD. Bacitracin BID    Nursing ROS:   Nutrition Alteration  Diet Type: Patient's Preference  Skin  Skin Reaction: 2 - Moderate to brisk erythema, patchy moist desquamation, mostly confined to skin folds and creases, moderate edema  Skin Note: changed to silvadene and aquaphor per md  Cardiovascular  Respiratory effort: 1 - Normal - without distress  Genitourinary  Urinary Status: 0 - Normal     Pain Assessment  0-10 Pain Scale: 4  Pain Note: burning in rt axilla/open skin      Objective:   /87  Pulse 107  Resp 18  Wt 96.3 kg (212 lb 6.4 oz)  SpO2 96%  BMI 36.46 kg/m2  NAD  +erythema and desquamation at lateral breast field.      Labs:  CBC RESULTS:   Recent Labs   Lab Test  17   0816   WBC  6.6   RBC  3.84   HGB  12.0   HCT  37.1   MCV  97   MCH  31.3   MCHC  32.3   RDW  14.7   PLT  320     ELECTROLYTES:  Recent Labs   Lab Test  17   2046   NA  139   POTASSIUM  3.7   CHLORIDE  103   DENY  9.0   CO2  27   BUN  9   CR  0.90   GLC  148*       Assessment:    Tolerating radiation therapy well.  All questions and concerns addressed.     Plan:   1. Continue current therapy.    2. Revised scar boost to encompass approximate tumor bed only  3. D/c bacitracin. Start SSD cream BID  4. Refill for percocet given.      Mosaiq chart and setup information reviewed  Ports checked    Medication Review  Med Note: refill of percocet  given    Educational Topic Discussed  Additional Instructions: [facility]hed[facility]       29-May-2017 16:11

## 2017-11-10 NOTE — LETTER
11/10/2017      RE: Diana Wilson  1972 [address] [place] [address]       [facility] PHYSICIANS  SPECIALIZING IN BREAKTHROUGHS  Radiation Oncology    On Treatment Visit Note      Diana Wilson      Date: 11/10/2017   MRN: [id number]   : 1980  Diagnosis: T2N1 breast cancer, s/p neoadjuvant chemo and bilateral mastectomy      Reason for Visit:  On Radiation Treatment Visit     Treatment Summary to Date  Treatment Site: right breast Current Dose: 5440/6040 cGy Fractions:       Chemotherapy  Chemo concurrent with radx?: No    Subjective:     Pt has significant erythema and desquamation within lateral breast field.  Taking percocet 1 QD. Bacitracin BID    Nursing ROS:   Nutrition Alteration  Diet Type: Patient's Preference  Skin  Skin Reaction: 2 - Moderate to brisk erythema, patchy moist desquamation, mostly confined to skin folds and creases, moderate edema  Skin Note: changed to silvadene and aquaphor per md  Cardiovascular  Respiratory effort: 1 - Normal - without distress  Genitourinary  Urinary Status: 0 - Normal     Pain Assessment  0-10 Pain Scale: 4  Pain Note: burning in rt axilla/open skin      Objective:   /87  Pulse 107  Resp 18  Wt 96.3 kg (212 lb 6.4 oz)  SpO2 96%  BMI 36.46 kg/m2  NAD  +erythema and desquamation at lateral breast field.      Labs:  CBC RESULTS:   Recent Labs   Lab Test  17   0816   WBC  6.6   RBC  3.84   HGB  12.0   HCT  37.1   MCV  97   MCH  31.3   MCHC  32.3   RDW  14.7   PLT  320     ELECTROLYTES:  Recent Labs   Lab Test  17   2046   NA  139   POTASSIUM  3.7   CHLORIDE  103   DENY  9.0   CO2  27   BUN  9   CR  0.90   GLC  148*       Assessment:    Tolerating radiation therapy well.  All questions and concerns addressed.     Plan:   1. Continue current therapy.    2. Revised scar boost to encompass approximate tumor bed only  3. D/c bacitracin. Start SSD cream BID  4. Refill for percocet given.      Mosaiq chart and setup  information reviewed  Ports checked    Medication Review  Med Note: refill of percocet given    Educational Topic Discussed  Additional Instructions: [facility]

## 2017-11-15 ENCOUNTER — OFFICE VISIT (OUTPATIENT)
Dept: RADIATION THERAPY | Facility: OUTPATIENT CENTER | Age: 37
End: 2017-11-15

## 2017-11-15 VITALS
BODY MASS INDEX: 36.22 KG/M2 | DIASTOLIC BLOOD PRESSURE: 91 MMHG | SYSTOLIC BLOOD PRESSURE: 126 MMHG | WEIGHT: 211 LBS | OXYGEN SATURATION: 96 % | RESPIRATION RATE: 18 BRPM | HEART RATE: 102 BPM

## 2017-11-15 DIAGNOSIS — C50.411 MALIGNANT NEOPLASM OF UPPER-OUTER QUADRANT OF RIGHT BREAST IN FEMALE, ESTROGEN RECEPTOR NEGATIVE (H): Primary | ICD-10-CM

## 2017-11-15 DIAGNOSIS — Z17.1 MALIGNANT NEOPLASM OF UPPER-OUTER QUADRANT OF RIGHT BREAST IN FEMALE, ESTROGEN RECEPTOR NEGATIVE (H): Primary | ICD-10-CM

## 2017-11-15 ASSESSMENT — PAIN SCALES - GENERAL: PAINLEVEL: NO PAIN (1)

## 2017-11-15 NOTE — MR AVS SNAPSHOT
After Visit Summary   11/15/2017    Diana Wilson    MRN: [id number]           Patient Information     Date Of Birth          1980        Visit Information        Provider Department      11/15/2017 2:30 PM Aleksey Terrell MD [facility]        Today's Diagnoses     Malignant neoplasm of upper-outer quadrant of right breast in female, estrogen receptor negative (H)    -  1       Follow-ups after your visit        Your next 10 appointments already scheduled     Nov 15, 2017  2:15 PM CST   Linear Accelerator with Lr [facility]   Radiation Therapy Center ([facility])    [address], [address]  [place] [facility] [address]   [phone]            Nov 15, 2017  2:30 PM CST   ON TREATMENT VISIT with Aleksey Terrell MD   Radiation Therapy Center ([facility])    [address], [address]  [place] [facility] [address]   [phone]            Nov 16, 2017  3:15 PM CST   Return Visit with Maribel Jin MD   [facility] ([facility])    [facility]  [address]  [place] [facility] [address]   [phone]            Dec 04, 2017  2:00 PM CST   Return Visit with COLLEEN Sheikh CNP   Radiation Therapy Center ([facility])    [address], [address] [address]   [phone]              Who to contact     Please call your clinic at [phone] to:    Ask questions about your health    Make or cancel appointments    Discuss your medicines    Learn about your test results    Speak to your doctor   If you have compliments or concerns about an experience at your clinic, or if you wish to file a complaint, please contact [facility] Patient Relations at [phone] or email us at [email or web address]         Additional Information About Your Visit        MyChart Information     [employer] gives you secure access to your electronic health record. If  you see a primary care provider, you can also send messages to your care team and make appointments. If you have questions, please call your primary care clinic.  If you do not have a primary care provider, please call [phone] and they will assist you.      [employer] is an electronic gateway that provides easy, online access to your medical records. With [employer], you can request a clinic appointment, read your test results, renew a prescription or communicate with your care team.     To access your existing account, please contact your [facility] or call [phone] for assistance.        Care EveryWhere ID     This is your Care EveryWhere ID. This could be used by other organizations to access your [place] medical records  [id number]        Your Vitals Were     Pulse Respirations Pulse Oximetry BMI (Body Mass Index)          102 18 96% 36.22 kg/m2         Blood Pressure from Last 3 Encounters:   11/15/17 (!) 126/91   11/10/17 124/87   11/01/17 120/87    Weight from Last 3 Encounters:   11/15/17 95.7 kg (211 lb)   11/10/17 96.3 kg (212 lb 6.4 oz)   11/01/17 98 kg (216 lb)              Today, you had the following     No orders found for display       Primary Care Provider Office Phone # Fax #    Yenifer Lorraine Ridley [phone] [address] [facility] [address]        Equal Access to Services     FAUZIA RAMIREZ : Hadii gabriela sheao Sowan, waaxda luqadaha, qaybta kaalmada isela, javier valentine . So [facility] [phone].    ATENCIÓN: Si habla español, tiene a montelongo disposición servicios gratuitos de asistencia lingüística. Mackenzie al [phone].    We comply with applicable federal civil rights laws and [place] laws. We do not discriminate on the basis of race, color, national origin, age, disability, sex, sexual orientation, or gender identity.            Thank you!     Thank you for choosing [facility]  for  your care. Our goal is always to provide you with excellent care. Hearing back from our patients is one way we can continue to improve our services. Please take a few minutes to complete the written survey that you may receive in the mail after your visit with us. Thank you!             Your Updated Medication List - Protect others around you: Learn how to safely use, store and throw away your medicines at www.disposemymeds.org.          This list is accurate as of: 11/15/17  2:09 PM.  Always use your most recent med list.                   Brand Name Dispense Instructions for use Diagnosis    * albuterol 108 (90 BASE) MCG/ACT Inhaler    PROAIR HFA/PROVENTIL HFA/VENTOLIN HFA    1 Inhaler    Inhale 2 puffs into the lungs every 6 hours as needed for shortness of breath / dyspnea    Mild persistent asthma with exacerbation       * albuterol (2.5 MG/3ML) 0.083% neb solution     75 mL    Take 1 vial (2.5 mg) by nebulization every 6 hours as needed for shortness of breath / dyspnea or wheezing        celecoxib 200 MG capsule    celeBREX     Take 200 mg by mouth 2 times daily        fluticasone 50 MCG/ACT spray    FLONASE    3 Bottle    Spray 1-2 sprays into both nostrils daily    Environmental allergies       Foam Dressing Non-Bordered Pads     5 each    dispense as brand name Mepilex.    Breast cancer (H), Radiation-induced dermatitis       gabapentin 300 MG capsule    NEURONTIN    90 capsule    Take 1 tablet (300 mg) by mouth three times daily.    Neuropathy       hydrOXYzine 10 MG tablet    ATARAX    60 tablet    Take 1-5 tablets (10-50 mg) by mouth every 6 hours as needed for anxiety    Anxiety, Situational anxiety       loratadine 10 MG tablet    CLARITIN    90 tablet    Take 1 tablet (10 mg) by mouth daily    Mild persistent asthma with exacerbation       LORazepam 0.5 MG tablet    ATIVAN    30 tablet    Take 1 tablet (0.5 mg) by mouth nightly as needed for anxiety    Insomnia, unspecified type       magnesium  hydroxide 400 MG/5ML suspension    MILK OF MAGNESIA     Take 30 mLs by mouth        methocarbamol 750 MG tablet    ROBAXIN     Take 750 mg by mouth        montelukast 10 MG tablet    SINGULAIR    90 tablet    Take 1 tablet (10 mg) by mouth At Bedtime    Environmental allergies       * oxyCODONE-acetaminophen 5-325 MG per tablet    PERCOCET    10 tablet    Take 1 tablet by mouth every 6 hours as needed for moderate to severe pain    Invasive ductal carcinoma of breast, right (H)       * oxyCODONE-acetaminophen 5-325 MG per tablet    PERCOCET    30 tablet    Take 1 tablet by mouth every 6 hours as needed for pain maximum 4 tablet(s) per day    Malignant neoplasm of upper-outer quadrant of right breast in female, estrogen receptor negative (H)       scopolamine 72 hr patch    TRANSDERM    1 patch    Place 1 patch onto the skin every 72 hours.  Apply to hairless area behind one ear at least 4 hours before travel.  Remove old patch and change every 3 days.    Preop general physical exam, Nausea       silver sulfADIAZINE 1 % cream    SILVADENE    400 g    Apply topically 2 times daily Apply to erythematous peeling areas BID    Malignant neoplasm of upper-outer quadrant of right breast in female, estrogen receptor negative (H)       traMADol 50 MG tablet    ULTRAM     Take 50 mg by mouth        * venlafaxine 37.5 MG 24 hr capsule    EFFEXOR-XR    30 capsule    Take 1 capsule daily for 14 days, then take 2 capsules daily.    Hot flushes, perimenopausal, Depression, unspecified depression type       * venlafaxine 37.5 MG tablet    EFFEXOR    90 tablet    Take 1 tablet (37.5 mg) in the morning and 2 tablets (75 mg) in the evening.    Hot flushes, perimenopausal, Depression, unspecified depression type       * Notice:  This list has 6 medication(s) that are the same as other medications prescribed for you. Read the directions carefully, and ask your doctor or other care provider to review them with you.

## 2017-11-15 NOTE — PROGRESS NOTES
Radiation Oncology Completion Summary    Diana Wilson completed radiation therapy on November 15, 2017. In summary, she is a 37 year old year-old patient with ER-/AZ-/HER2- cT2 N1 M0 G3 / ypTis N0 IDC of the RIGHT breast, triple negative, s/p neoadjuvant chemotherapy (ddAC + carbo/taxol qWk) + bilateral mastectomies with pCR. She was referred for adjuvant radiation therapy.    Treatment summary  Site:    Dose:   # of Treatments:  Rt chestwall, SCV, axilla 50.4 Gy  28  Rt chestwall boost  10 Gy   5      Overall, she tolerated radiation therapy well with max G3 moist desquamation of the skin of the R chestwall/axilla managed with silvadene, and erythema of the R chestwall managed with aquaphor.    She will follow up with our clinic in ~1 month, and also see Dr. Jin.    Aleksey Terrell M.D.  Attending Physician  Radiation Oncology

## 2017-11-15 NOTE — LETTER
11/15/2017      RE: Diana Wilson  1972 [address] [place] [id number]       [facility] PHYSICIANS  SPECIALIZING IN BREAKTHROUGHS  Radiation Oncology    On Treatment Visit Note      Diana Wilson      Date: 11/15/2017   MRN: [id number]   : 1980  Diagnosis: T2N1 breast cancer, s/p neoadjuvant chemo and bilateral mastectomy      Reason for Visit:  On Radiation Treatment Visit     Treatment Summary to Date  Treatment Site: right breast Current Dose: 6040/6040 cGy Fractions: 33/33      Chemotherapy  Chemo concurrent with radx?: No    Subjective:   Completes RT today. Skin in R chestwall/axilla is healing, no moist desquamation. Using silvadene. Energy level is good.    Nursing ROS:   Nutrition Alteration  Diet Type: Patient's Preference  Skin  Skin Reaction: 2 - Moderate to brisk erythema, patchy moist desquamation, mostly confined to skin folds and creases, moderate edema  Skin Note: skin healing very well since starting Silvadene cream last week.        Cardiovascular  Respiratory effort: 1 - Normal - without distress     Genitourinary  Urinary Status: 0 - Normal     Pain Assessment  0-10 Pain Scale: 1  Pain Note: skin pain relieved with silvadene cream      Objective:   BP (!) 126/91  Pulse 102  Resp 18  Wt 95.7 kg (211 lb)  SpO2 96%  BMI 36.22 kg/m2  NAD  Dry desquamation in R axilla and around scar and inferior breast/inframammary fold, and erythema in R reconstructed breast, no moist desquamation    Labs:  CBC RESULTS:   Recent Labs   Lab Test  17   0816   WBC  6.6   RBC  3.84   HGB  12.0   HCT  37.1   MCV  97   MCH  31.3   MCHC  32.3   RDW  14.7   PLT  320     ELECTROLYTES:  Recent Labs   Lab Test  17   2046   NA  139   POTASSIUM  3.7   CHLORIDE  103   DENY  9.0   CO2  27   BUN  9   CR  0.90   GLC  148*       Assessment:    Tolerating radiation therapy well.  All questions and concerns addressed.    Plan:   Completes RT today. Will follow up with Dr. Jin tomorrow. RTC  in ~2-4 weeks. Cont silvadene on peeling areas and aquaphor on remaining skin.    Mosaiq chart and setup information reviewed           Educational Topic Discussed  Additional Instructions: sees lymphedema/PT at [facility]  Education Instructions: sees Dr. Jin tomorrow for follow up. reviewed what to expect now that radiation is done      Aleksey Terrell MD

## 2017-11-15 NOTE — PROGRESS NOTES
[facility] PHYSICIANS  SPECIALIZING IN BREAKTHROUGHS  Radiation Oncology    On Treatment Visit Note      Diana Wilson      Date: 11/15/2017   MRN: [id number]   : 1980  Diagnosis: T2N1 breast cancer, s/p neoadjuvant chemo and bilateral mastectomy      Reason for Visit:  On Radiation Treatment Visit     Treatment Summary to Date  Treatment Site: right breast Current Dose: 6040/6040 cGy Fractions: 33/33      Chemotherapy  Chemo concurrent with radx?: No    Subjective:   Completes RT today. Skin in R chestwall/axilla is healing, no moist desquamation. Using silvadene. Energy level is good.    Nursing ROS:   Nutrition Alteration  Diet Type: Patient's Preference  Skin  Skin Reaction: 2 - Moderate to brisk erythema, patchy moist desquamation, mostly confined to skin folds and creases, moderate edema  Skin Note: skin healing very well since starting Silvadene cream last week.        Cardiovascular  Respiratory effort: 1 - Normal - without distress     Genitourinary  Urinary Status: 0 - Normal     Pain Assessment  0-10 Pain Scale: 1  Pain Note: skin pain relieved with silvadene cream      Objective:   BP (!) 126/91  Pulse 102  Resp 18  Wt 95.7 kg (211 lb)  SpO2 96%  BMI 36.22 kg/m2  NAD  Dry desquamation in R axilla and around scar and inferior breast/inframammary fold, and erythema in R reconstructed breast, no moist desquamation    Labs:  CBC RESULTS:   Recent Labs   Lab Test  17   0816   WBC  6.6   RBC  3.84   HGB  12.0   HCT  37.1   MCV  97   MCH  31.3   MCHC  32.3   RDW  14.7   PLT  320     ELECTROLYTES:  Recent Labs   Lab Test  17   2046   NA  139   POTASSIUM  3.7   CHLORIDE  103   DENY  9.0   CO2  27   BUN  9   CR  0.90   GLC  148*       Assessment:    Tolerating radiation therapy well.  All questions and concerns addressed.    Plan:   Completes RT today. Will follow up with Dr. Jin tomorrow. RTC in ~2-4 weeks. Cont silvadene on peeling areas and aquaphor on remaining  skin.    Mosaiq chart and setup information reviewed           Educational Topic Discussed  Additional Instructions: sees lymphedema/PT at [facility]  Education Instructions: sees Dr. Jin tomorrow for follow up. reviewed what to expect now that radiation is done      Aleksey Terrell MD

## 2017-11-15 NOTE — MR AVS SNAPSHOT
After Visit Summary   11/15/2017    Diana Wilson    MRN: [id number]           Patient Information     Date Of Birth          1980        Visit Information        Provider Department      11/15/2017 2:15 PM Tech, Lr [facility] Therapy [facility]        Today's Diagnoses     Malignant neoplasm of upper-outer quadrant of right breast in female, estrogen receptor negative (H)    -  1       Follow-ups after your visit        Your next 10 appointments already scheduled     Nov 15, 2017  2:15 PM CST   Linear Accelerator with Lr [facility]   Radiation Therapy Center ([facility])    [address]  [facility] [place] [place] [address]   [phone]            Nov 15, 2017  2:30 PM CST   ON TREATMENT VISIT with Aleksey Terrell MD   Radiation Therapy Center ([facility])    [address]  [facility] [place] [place] [address]   [phone]            Nov 16, 2017  3:15 PM CST   Return Visit with Maribel Jin MD   [facility] ([facility])    [facility]  [address]  [facility] [place] [place] [address]   [phone]            Dec 04, 2017  2:00 PM CST   Return Visit with COLLEEN Sheikh CNP   Radiation Therapy [place] ([facility])    [address], [address] [address]   [phone]              Who to contact     Please call your clinic at [phone] to:    Ask questions about your health    Make or cancel appointments    Discuss your medicines    Learn about your test results    Speak to your doctor   If you have compliments or concerns about an experience at your clinic, or if you wish to file a complaint, please contact [facility] Patient Relations at [phone] or email us at [email or web address]         Additional Information About Your Visit        MyChart Information     [employer] gives you secure access to your electronic health record. If you  see a primary care provider, you can also send messages to your care team and make appointments. If you have questions, please call your primary care clinic.  If you do not have a primary care provider, please call [phone] and they will assist you.      [employer] is an electronic gateway that provides easy, online access to your medical records. With [employer], you can request a clinic appointment, read your test results, renew a prescription or communicate with your care team.     To access your existing account, please contact your [facility] or call [phone] for assistance.        Care EveryWhere ID     This is your Care EveryWhere ID. This could be used by other organizations to access your [place] medical records  [id number]         Blood Pressure from Last 3 Encounters:   11/10/17 124/87   11/01/17 120/87   10/25/17 117/83    Weight from Last 3 Encounters:   11/10/17 96.3 kg (212 lb 6.4 oz)   11/01/17 98 kg (216 lb)   10/25/17 96.3 kg (212 lb 3.2 oz)              Today, you had the following     No orders found for display       Primary Care Provider Office Phone # Fax #    Yenifer Ridley [phone] [address] [facility] [address]        Equal Access to Services     FAUZIA RAMIREZ AH: Hadii gabriela ku hadasho Soomaali, waaxda luqadaha, qaybta kaalmada adeegyada, waxay bj haymiayn quin bridges. So [facility] [phone].    ATENCIÓN: Si habla español, tiene a montelongo disposición servicios gratuitos de asistencia lingüística. Llame al [phone].    We comply with applicable federal civil rights laws and [place] laws. We do not discriminate on the basis of race, color, national origin, age, disability, sex, sexual orientation, or gender identity.            Thank you!     Thank you for choosing [facility]  for your care. Our goal is always to provide you with excellent care. Hearing back from our patients is one way we can  continue to improve our services. Please take a few minutes to complete the written survey that you may receive in the mail after your visit with us. Thank you!             Your Updated Medication List - Protect others around you: Learn how to safely use, store and throw away your medicines at www.disposemymeds.org.          This list is accurate as of: 11/15/17  2:13 PM.  Always use your most recent med list.                   Brand Name Dispense Instructions for use Diagnosis    * albuterol 108 (90 BASE) MCG/ACT Inhaler    PROAIR HFA/PROVENTIL HFA/VENTOLIN HFA    1 Inhaler    Inhale 2 puffs into the lungs every 6 hours as needed for shortness of breath / dyspnea    Mild persistent asthma with exacerbation       * albuterol (2.5 MG/3ML) 0.083% neb solution     75 mL    Take 1 vial (2.5 mg) by nebulization every 6 hours as needed for shortness of breath / dyspnea or wheezing        celecoxib 200 MG capsule    celeBREX     Take 200 mg by mouth 2 times daily        fluticasone 50 MCG/ACT spray    FLONASE    3 Bottle    Spray 1-2 sprays into both nostrils daily    Environmental allergies       Foam Dressing Non-Bordered Pads     5 each    dispense as brand name Mepilex.    Breast cancer (H), Radiation-induced dermatitis       gabapentin 300 MG capsule    NEURONTIN    90 capsule    Take 1 tablet (300 mg) by mouth three times daily.    Neuropathy       hydrOXYzine 10 MG tablet    ATARAX    60 tablet    Take 1-5 tablets (10-50 mg) by mouth every 6 hours as needed for anxiety    Anxiety, Situational anxiety       loratadine 10 MG tablet    CLARITIN    90 tablet    Take 1 tablet (10 mg) by mouth daily    Mild persistent asthma with exacerbation       LORazepam 0.5 MG tablet    ATIVAN    30 tablet    Take 1 tablet (0.5 mg) by mouth nightly as needed for anxiety    Insomnia, unspecified type       magnesium hydroxide 400 MG/5ML suspension    MILK OF MAGNESIA     Take 30 mLs by mouth        methocarbamol 750 MG tablet     ROBAXIN     Take 750 mg by mouth        montelukast 10 MG tablet    SINGULAIR    90 tablet    Take 1 tablet (10 mg) by mouth At Bedtime    Environmental allergies       * oxyCODONE-acetaminophen 5-325 MG per tablet    PERCOCET    10 tablet    Take 1 tablet by mouth every 6 hours as needed for moderate to severe pain    Invasive ductal carcinoma of breast, right (H)       * oxyCODONE-acetaminophen 5-325 MG per tablet    PERCOCET    30 tablet    Take 1 tablet by mouth every 6 hours as needed for pain maximum 4 tablet(s) per day    Malignant neoplasm of upper-outer quadrant of right breast in female, estrogen receptor negative (H)       scopolamine 72 hr patch    TRANSDERM    1 patch    Place 1 patch onto the skin every 72 hours.  Apply to hairless area behind one ear at least 4 hours before travel.  Remove old patch and change every 3 days.    Preop general physical exam, Nausea       silver sulfADIAZINE 1 % cream    SILVADENE    400 g    Apply topically 2 times daily Apply to erythematous peeling areas BID    Malignant neoplasm of upper-outer quadrant of right breast in female, estrogen receptor negative (H)       traMADol 50 MG tablet    ULTRAM     Take 50 mg by mouth        * venlafaxine 37.5 MG 24 hr capsule    EFFEXOR-XR    30 capsule    Take 1 capsule daily for 14 days, then take 2 capsules daily.    Hot flushes, perimenopausal, Depression, unspecified depression type       * venlafaxine 37.5 MG tablet    EFFEXOR    90 tablet    Take 1 tablet (37.5 mg) in the morning and 2 tablets (75 mg) in the evening.    Hot flushes, perimenopausal, Depression, unspecified depression type       * Notice:  This list has 6 medication(s) that are the same as other medications prescribed for you. Read the directions carefully, and ask your doctor or other care provider to review them with you.

## 2017-11-16 ENCOUNTER — ONCOLOGY VISIT (OUTPATIENT)
Dept: ONCOLOGY | Facility: CLINIC | Age: 37
End: 2017-11-16
Attending: INTERNAL MEDICINE
Payer: COMMERCIAL

## 2017-11-16 ENCOUNTER — HOSPITAL ENCOUNTER (OUTPATIENT)
Dept: LAB | Facility: CLINIC | Age: 37
Discharge: HOME | End: 2017-11-16
Attending: INTERNAL MEDICINE | Admitting: INTERNAL MEDICINE
Payer: COMMERCIAL

## 2017-11-16 VITALS
HEIGHT: 64 IN | SYSTOLIC BLOOD PRESSURE: 136 MMHG | DIASTOLIC BLOOD PRESSURE: 77 MMHG | WEIGHT: 216.1 LBS | RESPIRATION RATE: 20 BRPM | BODY MASS INDEX: 36.89 KG/M2 | TEMPERATURE: 97.5 F | HEART RATE: 89 BPM | OXYGEN SATURATION: 96 %

## 2017-11-16 DIAGNOSIS — C50.411 MALIGNANT NEOPLASM OF UPPER-OUTER QUADRANT OF RIGHT BREAST IN FEMALE, ESTROGEN RECEPTOR POSITIVE (H): Primary | ICD-10-CM

## 2017-11-16 DIAGNOSIS — Z17.0 MALIGNANT NEOPLASM OF UPPER-OUTER QUADRANT OF RIGHT BREAST IN FEMALE, ESTROGEN RECEPTOR POSITIVE (H): Primary | ICD-10-CM

## 2017-11-16 DIAGNOSIS — N95.1 HOT FLUSHES, PERIMENOPAUSAL: ICD-10-CM

## 2017-11-16 DIAGNOSIS — F32.A DEPRESSION, UNSPECIFIED DEPRESSION TYPE: ICD-10-CM

## 2017-11-16 LAB
ALBUMIN SERPL-MCNC: 4 G/DL (ref 3.4–5)
ALP SERPL-CCNC: 109 U/L (ref 40–150)
ALT SERPL W P-5'-P-CCNC: 31 U/L (ref 0–50)
ANION GAP SERPL CALCULATED.3IONS-SCNC: 7 MMOL/L (ref 3–14)
AST SERPL W P-5'-P-CCNC: 18 U/L (ref 0–45)
BASOPHILS # BLD AUTO: 0 10E9/L (ref 0–0.2)
BASOPHILS NFR BLD AUTO: 0.2 %
BILIRUB SERPL-MCNC: 0.3 MG/DL (ref 0.2–1.3)
BUN SERPL-MCNC: 11 MG/DL (ref 7–30)
CALCIUM SERPL-MCNC: 9.1 MG/DL (ref 8.5–10.1)
CANCER AG27-29 SERPL-ACNC: 14 U/ML (ref 0–39)
CHLORIDE SERPL-SCNC: 103 MMOL/L (ref 94–109)
CO2 SERPL-SCNC: 26 MMOL/L (ref 20–32)
CREAT SERPL-MCNC: 0.57 MG/DL (ref 0.52–1.04)
DIFFERENTIAL METHOD BLD: ABNORMAL
EOSINOPHIL # BLD AUTO: 0.2 10E9/L (ref 0–0.7)
EOSINOPHIL NFR BLD AUTO: 3.8 %
ERYTHROCYTE [DISTWIDTH] IN BLOOD BY AUTOMATED COUNT: 15.5 % (ref 10–15)
GFR SERPL CREATININE-BSD FRML MDRD: >90 ML/MIN/1.7M2
GLUCOSE SERPL-MCNC: 81 MG/DL (ref 70–99)
HCT VFR BLD AUTO: 37.3 % (ref 35–47)
HGB BLD-MCNC: 12.5 G/DL (ref 11.7–15.7)
IMM GRANULOCYTES # BLD: 0 10E9/L (ref 0–0.4)
IMM GRANULOCYTES NFR BLD: 0.4 %
LYMPHOCYTES # BLD AUTO: 1 10E9/L (ref 0.8–5.3)
LYMPHOCYTES NFR BLD AUTO: 21.4 %
MCH RBC QN AUTO: 28.2 PG (ref 26.5–33)
MCHC RBC AUTO-ENTMCNC: 33.5 G/DL (ref 31.5–36.5)
MCV RBC AUTO: 84 FL (ref 78–100)
MONOCYTES # BLD AUTO: 0.4 10E9/L (ref 0–1.3)
MONOCYTES NFR BLD AUTO: 8.8 %
NEUTROPHILS # BLD AUTO: 3.1 10E9/L (ref 1.6–8.3)
NEUTROPHILS NFR BLD AUTO: 65.4 %
PLATELET # BLD AUTO: 143 10E9/L (ref 150–450)
POTASSIUM SERPL-SCNC: 4.1 MMOL/L (ref 3.4–5.3)
PROT SERPL-MCNC: 7.5 G/DL (ref 6.8–8.8)
RBC # BLD AUTO: 4.43 10E12/L (ref 3.8–5.2)
SODIUM SERPL-SCNC: 136 MMOL/L (ref 133–144)
WBC # BLD AUTO: 4.8 10E9/L (ref 4–11)

## 2017-11-16 PROCEDURE — 99211 OFF/OP EST MAY X REQ PHY/QHP: CPT

## 2017-11-16 PROCEDURE — 86300 IMMUNOASSAY TUMOR CA 15-3: CPT | Performed by: INTERNAL MEDICINE

## 2017-11-16 PROCEDURE — 99214 OFFICE O/P EST MOD 30 MIN: CPT | Performed by: INTERNAL MEDICINE

## 2017-11-16 PROCEDURE — 85025 COMPLETE CBC W/AUTO DIFF WBC: CPT | Performed by: INTERNAL MEDICINE

## 2017-11-16 PROCEDURE — 36415 COLL VENOUS BLD VENIPUNCTURE: CPT | Performed by: INTERNAL MEDICINE

## 2017-11-16 PROCEDURE — 80053 COMPREHEN METABOLIC PANEL: CPT | Performed by: INTERNAL MEDICINE

## 2017-11-16 RX ORDER — VENLAFAXINE 37.5 MG/1
TABLET ORAL
Qty: 90 TABLET | Refills: 1 | Status: SHIPPED | OUTPATIENT
Start: 2017-11-16 | End: 2018-02-15

## 2017-11-16 ASSESSMENT — PAIN SCALES - GENERAL: PAINLEVEL: NO PAIN (0)

## 2017-11-16 NOTE — LETTER
"    11/16/2017         RE: Diana Wilson  [address] [place] [id number]        Dear Colleague,    Thank you for referring your patient, Diana Wilson, to the [facility]. Please see a copy of my visit note below.    ONCOLOGY Follow up visit     COMPLAINT AND REASON FOR CONSULTATION:  12/2016 diagnosed right breast cancer LN positive disease     REFERRING/[facility] PHYSICIAN:  Dr. Yenifer ESTEVEZ  She presented at age 36 breast lumpin her right armpit and right breast in 10/2016.   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  ER/PA 99% positive, HER-2/kelle negative, associated with high-grade DCIS.    Right axillary ultrasound-guided biopsy indicating metastatic carcinoma positive for spread from breast primary, ER/PA both negative.  HER-2/kelle is negative.      She had clinical T1N1 disease. She made informed decision to proceed with neoadjuvant DDAC C1D1 1/16/2017. She has good clinical response post DD AC x4. Then went on to have wkly taxol + carbo  with informed decision in March.   She has carbo hypersensitivity reaction in early May (during C3). It was d/c after.     She is tested negative for BRCA1/2 with [employer].     7/2017 she had double mastectomies at [facility]. Right side found no residual invasive carcinoma, +grade III DCIS, 1mm in greatest dimension. 5 sLN all negative. She had ypTis(DCIS)pN0 disease. Left breast no cancer.     She finished post mastectomy RT in 11/2017.      PAST MEDICAL HISTORY:  Restless legs, mild intermittent asthma, celiac disease diagnosed 02/2016, hx of TIFFANY     SOCIAL HISTORY:  Works part-time.  Lives with her .  They have 2 kids, first pregnancy age 21.  She did not do breast feeding because of bilateral breast reduction.  She does office work.      FAMILY HISTORY:  \"Full of cancer\" from the mother's side including colon, lung, pancreatic, gastric cancer.  " "According to the patient none of them survive older than 60 years old.      REVIEW OF SYSTEMS  Her right mastectomy scar did not heal well, so re excision was done.   She is very upbeat, + hot flushes.       PHYSICAL EXAMINATION:   Blood pressure 136/77, pulse 89, temperature 97.5  F (36.4  C), temperature source Tympanic, resp. rate 20, height 1.626 m (5' 4.02\"), weight 98 kg (216 lb 1.6 oz), SpO2 96 %, not currently breastfeeding.    GENERAL APPEARANCE:  He young woman, looks like her stated age, very upbeat, not in acute distress.   HEENT: The patient is normocephalic, atraumatic. Pupils are equally reactive to light.  Sclerae are anicteric. erythematous oral mucosa.  - pharynx.  No oral thrush.   NECK:  Supple.  No jugular venous distention.  Thyroid is not palpable.   LYMPH NODES:  Superficial lymphadenopathy is not appreciable in the bilateral cervical, supraclavicular, axillary or inguinal areas.   CARDIOVASCULAR:  S1, S2 regular with no murmurs or gallops.  No carotid or abdominal bruits.   PULMONARY:  Lungs are clear to auscultation and percussion bilaterally.  There is no wheezing or rhonchi.   GASTROINTESTINAL:  Abdomen is soft, nontender.  No hepatosplenomegaly.  No signs of ascites.  No mass appreciable.   MUSCULOSKELETAL/EXTREMITIES:  No edema.  No cyanotic changes.  No signs of joint deformity.  No lymphedema.   NEUROLOGIC:  Cranial nerves II-XII are grossly intact.  Sensation intact.  Muscle strength and muscle tone symmetrical, 5/5 throughout.   BACK:  No spinal or paraspinal tenderness.  No CVA tenderness.   SKIN:  No petechiae.  No rash.  No signs of cellulitis.   BREASTS:  Bilateral breast exam reviewed.  Bilateral mastectomies scars well healed. She is partially expanded has marked erythematous changes on right side from RT.      LABORATORY DATA REVIEWED  7/2017 double mastectomies at [facility]. Right side: no residual invasive carcinoma, +grade III DCIS, 1mm in greatest dimension. 5 sLN all " negative. She had ypTis(DCIS)pN0 disease. Left breast no cancer.      CURRENT IMAGING:   Breast MRI at [facility] post DD AC 3/2017:   1. Improvement in biopsy-proven RIGHT-sided breast cancer at 10 o'clock. The malignancy has decreased in size (2.4 cm versus 3.3 cm previously)and demonstrates more favorable enhancement kinetics.     2.  No residual enlarged RIGHT axillary lymph nodes.    1/2017 baseline Echo LV function nl, EF 60-65%     PET 1/2017  1. Hypermetabolic mass in the right breast consistent with known malignancy.  2. Two enlarged hypermetabolic metastatic lymph nodes in the right axilla.  3. No additional worrisome hypermetabolic areas in the neck, chest, abdomen, or pelvis.    Old data reviewed with summary  End of 12/2016  right breast was done 12/27/2016, identified grade 3 invasive ductal cancer, angiolymphatic invasion not identified.  ER/IN 99% positive, HER-2/kelle negative, associated with high-grade DCIS.    Right axillary ultrasound-guided biopsy indicating metastatic carcinoma positive for spread from breast primary, ER/IN both negative.  HER-2/kelle is negative.      Diagnostic mammogram end of 12/2016, which identified 2 cm ill-defined density  associated with indeterminate microcalcification measuring about 3.2 cm, so the whole area spans about 4 cm in biggest dimension.   Ultrasound to the site which is the upper outer quadrant of the right breast identified 2 cm irregular solid mass; at the right axilla shows several abnormal lymph nodes, the largest one measures 1.8 cm in maximum dimension.      DEXA scan from 03/2016 identified mild osteopenia lumbar spine        ASSESSMENT AND PLAN:    1.   12/2016  dx breast cancer, cT2N1 disesae.      S/p DD AC and wkly taxol, carbo was used partially dropped due   To hypersensitivity reaction.   She had pCR for invasive cancer with mastectomies in 7/2017.   S/p RT till 11/2017.     She will benefit from anti hormone therapy post RT. Tamoxifen is the only  choice due to young age. We discussed the role of ovarian ablation. She wants the most aggressive form of anti hormone therapy.   Will refer her to gyn for oophorectomies.   After that AI will be tried first, of poor tolerance, can try tamoxifen then.     We discussed in detail side effects of these strong anti hormone therapy for a young women like her. She wants to go for it.     2.  hot flushes - advice Effexor, this has helped a lot.     3. Overweight - weight loss is meaningful for her in terms of rate of recurrence. She is interested in weight loss trial after epoophorectomies.     Again, thank you for allowing me to participate in the care of your patient.        Sincerely,        Maribel Jin MD, MD

## 2017-11-16 NOTE — PATIENT INSTRUCTIONS
Dr. Jin would like to refer you to GYN for possible pyophylactic surgery. We would like you to have labs today and schedule CT and Echo. We would like to see you back in for a follow up appointment after oophorectomy. We would also like to refer you to the weight loss trial nurse.  When you are in need of a refill, please call your pharmacy and they will send us a request.  Copy of appointments, and after visit summary (AVS) given to patient.  If you have any questions please call Josette Smalls RN, BSN Oncology Hematology  [facility] [phone]. For questions after business hours, or on holidays/weekends, please call our after hours Nurse Triage line [phone]. Thank you.           Gyn referral for prophylactic oophorectomy.   Labs today, CT and Echo.   F/u post oophorectomy.   Connect her to weight loss trial nurse.

## 2017-11-16 NOTE — MR AVS SNAPSHOT
After Visit Summary   11/16/2017    Diana Wilson    MRN: [id number]           Patient Information     Date Of Birth          1980        Visit Information        Provider Department      11/16/2017 3:15 PM Maribel Jin MD [facility]        Today's Diagnoses     Malignant neoplasm of upper-outer quadrant of right breast in female, estrogen receptor positive (H)    -  1    Hot flushes, perimenopausal        Depression, unspecified depression type          Care Instructions    Dr. Jin would like to refer you to GYN for possible pyophylactic surgery. We would like you to have labs today and schedule CT and Echo. We would like to see you back in for a follow up appointment after oophorectomy. We would also like to refer you to the weight loss trial nurse.  When you are in need of a refill, please call your pharmacy and they will send us a request.  Copy of appointments, and after visit summary (AVS) given to patient.  If you have any questions please call Josette Smalls RN, BSN Oncology Hematology  [facility] [phone]. For questions after business hours, or on holidays/weekends, please call our after hours Nurse Triage line [phone]. Thank you.           Gyn referral for prophylactic oophorectomy.   Labs today, CT and Echo.   F/u post oophorectomy.   Connect her to weight loss trial nurse.           Follow-ups after your visit        Additional Services     OB/GYN REFERRAL       Your provider has referred you to:  [facility]: [facility] [phone]   [email or web address]    Please be aware that coverage of these services is subject to the terms and limitations of your health insurance plan.  Call member services at your health plan with any benefit or coverage questions.      Please bring the following with you to your appointment:    (1) Any X-Rays, CTs or MRIs which have been performed.  Contact the  facility where they were done to arrange for  prior to your scheduled appointment.   (2) List of current medications   (3) This referral request   (4) Any documents/labs given to you for this referral    Phrophylactic oophorectomy                  Your next 10 appointments already scheduled     Nov 21, 2017 10:30 AM CST   [facility]    [address] [address] [phone]           Bring a current list of meds and any records pertaining to this visit. For Physicals, please bring immunization records and any forms needing to be filled out. Please arrive 10 minutes early to complete paperwork.            Nov 22, 2017  3:00 PM CST   Ech Complete with [id number]   [facility] Echocardiography ([facility])    [address] [address] [phone]           1. Please bring or wear a comfortable two-piece outfit. 2. You may eat, drink and take your normal medicines. 3. For any questions that cannot be answered, please contact the ordering physician            Nov 22, 2017  4:00 PM CST   CT CHEST/ABDOMEN/PELVIS W CONTRAST with [email or web address]   [facility] CT ([facility])    [address] [address] [phone]           Please bring any scans or X-rays taken at other hospitals, if similar tests were done. Also bring a list of your medicines, including vitamins, minerals and over-the-counter drugs. It is safest to leave personal items at home.  Be sure to tell your doctor:   If you have any allergies.   If there s any chance you are pregnant.   If you are breastfeeding.   If you have any special needs.  You may have contrast for this exam. To prepare:   Do not eat or drink for 2 hours before your exam. If you need to take medicine, you may take it with small sips of water. (We may ask you to take liquid medicine as well.)   The  day before your exam, drink extra fluids at least six 8-ounce glasses (unless your doctor tells you to restrict your fluids).  Patients over 70 or patients with diabetes or kidney problems:   If you haven t had a blood test (creatinine test) within the last 30 days, go to your clinic or Diagnostic Imaging Department for this test.  If you have diabetes:   If your kidney function is normal, continue taking your metformin (Avandamet, Glucophage, Glucovance, Metaglip) on the day of your exam.   If your kidney function is abnormal, wait 48 hours before restarting this medicine.  You will have oral contrast for this exam:   You will drink the contrast at home. Get this from your clinic or Diagnostic Imaging Department. Please follow the directions given.  Please wear loose clothing, such as a sweat suit or jogging clothes. Avoid snaps, zippers and other metal. We may ask you to undress and put on a hospital gown.  If you have any questions, please call the Imaging Department where you will have your exam.            Dec 04, 2017  2:00 PM CST   Return Visit with COLLEEN Sheikh CNP   [facility] ([facility])    [address], [address]  [place] [address]   [phone]              Future tests that were ordered for you today     Open Future Orders        Priority Expected Expires Ordered    Echocardiogram Complete Routine  11/16/2018 11/16/2017    CT Chest/Abdomen/Pelvis w Contrast Routine  11/16/2018 11/16/2017            Who to contact     If you have questions or need follow up information about today's clinic visit or your schedule please contact [facility] directly at [phone].  Normal or non-critical lab and imaging results will be communicated to you by MyChart, letter or phone within 4 business days after the clinic has received the results. If you do not hear from us within 7 days, please contact the clinic through MyChart or phone. If you have a critical  "or abnormal lab result, we will notify you by phone as soon as possible.  Submit refill requests through [employer] or call your pharmacy and they will forward the refill request to us. Please allow 3 business days for your refill to be completed.          Additional Information About Your Visit        [employer] Information     [employer] gives you secure access to your electronic health record. If you see a primary care provider, you can also send messages to your care team and make appointments. If you have questions, please call your primary care clinic.  If you do not have a primary care provider, please call [phone] and they will assist you.        Care EveryWhere ID     This is your Care EveryWhere ID. This could be used by other organizations to access your [facility] medical records  [id number]        Your Vitals Were     Pulse Temperature Respirations Height Pulse Oximetry Breastfeeding?    89 97.5  F (36.4  C) (Tympanic) 20 1.626 m (5' 4.02\") 96% No    BMI (Body Mass Index)                   37.07 kg/m2            Blood Pressure from Last 3 Encounters:   11/16/17 136/77   11/15/17 (!) 126/91   11/10/17 124/87    Weight from Last 3 Encounters:   11/16/17 98 kg (216 lb 1.6 oz)   11/15/17 95.7 kg (211 lb)   11/10/17 96.3 kg (212 lb 6.4 oz)              We Performed the Following     Ca27.29  breast tumor marker     CBC with platelets differential     Comprehensive metabolic panel     OB/GYN REFERRAL          Where to get your medicines      These medications were sent to [facility] - [address]  [address] [address]     Phone:  [phone]     venlafaxine 37.5 MG tablet          Primary Care Provider Office Phone # Fax #    Yenifer Ridley [phone] [address] [address]        Equal Access to Services     FAUZIA RAMIREZ AH: Hadii gabriela sheao Soomaali, waaxda luqadaha, qaybta kaalmada adeegyada, javier lorenzo " juanita alfredrosa ephraimchristiano laLorenaaagareth ah. So [facility] [phone].    ATENCIÓN: Si dianelysla carlos, tiene a montelongo disposición servicios gratuitos de asistencia lingüística. Mackenzie al [phone].    We comply with applicable federal civil rights laws and [place] laws. We do not discriminate on the basis of race, color, national origin, age, disability, sex, sexual orientation, or gender identity.            Thank you!     Thank you for choosing [facility]  for your care. Our goal is always to provide you with excellent care. Hearing back from our patients is one way we can continue to improve our services. Please take a few minutes to complete the written survey that you may receive in the mail after your visit with us. Thank you!             Your Updated Medication List - Protect others around you: Learn how to safely use, store and throw away your medicines at www.disposemymeds.org.          This list is accurate as of: 11/16/17  4:23 PM.  Always use your most recent med list.                   Brand Name Dispense Instructions for use Diagnosis    * albuterol 108 (90 BASE) MCG/ACT Inhaler    PROAIR HFA/PROVENTIL HFA/VENTOLIN HFA    1 Inhaler    Inhale 2 puffs into the lungs every 6 hours as needed for shortness of breath / dyspnea    Mild persistent asthma with exacerbation       * albuterol (2.5 MG/3ML) 0.083% neb solution     75 mL    Take 1 vial (2.5 mg) by nebulization every 6 hours as needed for shortness of breath / dyspnea or wheezing        ALEVE PO      Take 220 mg by mouth daily as needed for moderate pain        fluticasone 50 MCG/ACT spray    FLONASE    3 Bottle    Spray 1-2 sprays into both nostrils daily    Environmental allergies       Foam Dressing Non-Bordered Pads     5 each    dispense as brand name Mepilex.    Breast cancer (H), Radiation-induced dermatitis       hydrOXYzine 10 MG tablet    ATARAX    60 tablet    Take 1-5 tablets (10-50 mg) by mouth every 6 hours as needed for anxiety    Anxiety,  Situational anxiety       loratadine 10 MG tablet    CLARITIN    90 tablet    Take 1 tablet (10 mg) by mouth daily    Mild persistent asthma with exacerbation       LORazepam 0.5 MG tablet    ATIVAN    30 tablet    Take 1 tablet (0.5 mg) by mouth nightly as needed for anxiety    Insomnia, unspecified type       magnesium hydroxide 400 MG/5ML suspension    MILK OF MAGNESIA     Take 30 mLs by mouth        montelukast 10 MG tablet    SINGULAIR    90 tablet    Take 1 tablet (10 mg) by mouth At Bedtime    Environmental allergies       * oxyCODONE-acetaminophen 5-325 MG per tablet    PERCOCET    10 tablet    Take 1 tablet by mouth every 6 hours as needed for moderate to severe pain    Invasive ductal carcinoma of breast, right (H)       * oxyCODONE-acetaminophen 5-325 MG per tablet    PERCOCET    30 tablet    Take 1 tablet by mouth every 6 hours as needed for pain maximum 4 tablet(s) per day    Malignant neoplasm of upper-outer quadrant of right breast in female, estrogen receptor negative (H)       scopolamine 72 hr patch    TRANSDERM    1 patch    Place 1 patch onto the skin every 72 hours.  Apply to hairless area behind one ear at least 4 hours before travel.  Remove old patch and change every 3 days.    Preop general physical exam, Nausea       silver sulfADIAZINE 1 % cream    SILVADENE    400 g    Apply topically 2 times daily Apply to erythematous peeling areas BID    Malignant neoplasm of upper-outer quadrant of right breast in female, estrogen receptor negative (H)       venlafaxine 37.5 MG tablet    EFFEXOR    90 tablet    Take 1 tablet (37.5 mg) in the morning and 2 tablets (75 mg) in the evening.    Hot flushes, perimenopausal, Depression, unspecified depression type       * Notice:  This list has 4 medication(s) that are the same as other medications prescribed for you. Read the directions carefully, and ask your doctor or other care provider to review them with you.

## 2017-11-16 NOTE — PROGRESS NOTES
"ONCOLOGY Follow up visit     COMPLAINT AND REASON FOR CONSULTATION:  12/2016 diagnosed right breast cancer LN positive disease     REFERRING/[facility] PHYSICIAN:  Dr. Yenifer ESTEVEZ  She presented at age 36 breast lumpin her right armpit and right breast in 10/2016.   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  ER/FL 99% positive, HER-2/kelle negative, associated with high-grade DCIS.    Right axillary ultrasound-guided biopsy indicating metastatic carcinoma positive for spread from breast primary, ER/FL both negative.  HER-2/kelle is negative.      She had clinical T1N1 disease. She made informed decision to proceed with neoadjuvant DDAC C1D1 1/16/2017. She has good clinical response post DD AC x4. Then went on to have wkly taxol + carbo  with informed decision in March.   She has carbo hypersensitivity reaction in early May (during C3). It was d/c after.     She is tested negative for BRCA1/2 with [facility].     7/2017 she had double mastectomies at [facility]. Right side found no residual invasive carcinoma, +grade III DCIS, 1mm in greatest dimension. 5 sLN all negative. She had ypTis(DCIS)pN0 disease. Left breast no cancer.     She finished post mastectomy RT in 11/2017.      PAST MEDICAL HISTORY:  Restless legs, mild intermittent asthma, celiac disease diagnosed 02/2016, hx of TIFFANY     SOCIAL HISTORY:  Works part-time.  Lives with her .  They have 2 kids, first pregnancy age 22.  She did not do breast feeding because of bilateral breast reduction.  She does office work.      FAMILY HISTORY:  \"Full of cancer\" from the mother's side including colon, lung, pancreatic, gastric cancer.  According to the patient none of them survive older than 60 years old.      REVIEW OF SYSTEMS  Her right mastectomy scar did not heal well, so re excision was done.   She is very upbeat, + hot flushes.       PHYSICAL EXAMINATION:   Blood " "pressure 136/77, pulse 89, temperature 97.5  F (36.4  C), temperature source Tympanic, resp. rate 20, height 1.626 m (5' 4.02\"), weight 98 kg (216 lb 1.6 oz), SpO2 96 %, not currently breastfeeding.    GENERAL APPEARANCE:  He young woman, looks like her stated age, very upbeat, not in acute distress.   HEENT: The patient is normocephalic, atraumatic. Pupils are equally reactive to light.  Sclerae are anicteric. erythematous oral mucosa.  - pharynx.  No oral thrush.   NECK:  Supple.  No jugular venous distention.  Thyroid is not palpable.   LYMPH NODES:  Superficial lymphadenopathy is not appreciable in the bilateral cervical, supraclavicular, axillary or inguinal areas.   CARDIOVASCULAR:  S1, S2 regular with no murmurs or gallops.  No carotid or abdominal bruits.   PULMONARY:  Lungs are clear to auscultation and percussion bilaterally.  There is no wheezing or rhonchi.   GASTROINTESTINAL:  Abdomen is soft, nontender.  No hepatosplenomegaly.  No signs of ascites.  No mass appreciable.   MUSCULOSKELETAL/EXTREMITIES:  No edema.  No cyanotic changes.  No signs of joint deformity.  No lymphedema.   NEUROLOGIC:  Cranial nerves II-XII are grossly intact.  Sensation intact.  Muscle strength and muscle tone symmetrical, 5/5 throughout.   BACK:  No spinal or paraspinal tenderness.  No CVA tenderness.   SKIN:  No petechiae.  No rash.  No signs of cellulitis.   BREASTS:  Bilateral breast exam reviewed.  Bilateral mastectomies scars well healed. She is partially expanded has marked erythematous changes on right side from RT.      LABORATORY DATA REVIEWED  [facility] Right side: no residual invasive carcinoma, +grade III DCIS, 1mm in greatest dimension. 5 sLN all negative. She had ypTis(DCIS)pN0 disease. Left breast no cancer.      CURRENT IMAGING:   [facility] DD AC 3/2017:   1. Improvement in biopsy-proven RIGHT-sided breast cancer at 10 o'clock. The malignancy has decreased in size (2.4 " cm versus 3.3 cm previously)and demonstrates more favorable enhancement kinetics.     2.  No residual enlarged RIGHT axillary lymph nodes.    1/2017 baseline Echo LV function nl, EF 60-65%     PET 1/2017  1. Hypermetabolic mass in the right breast consistent with known malignancy.  2. Two enlarged hypermetabolic metastatic lymph nodes in the right axilla.  3. No additional worrisome hypermetabolic areas in the neck, chest, abdomen, or pelvis.    Old data reviewed with summary  End of 12/2016  right breast was done 12/27/2016, identified grade 3 invasive ductal cancer, angiolymphatic invasion not identified.  ER/TN 99% positive, HER-2/kelle negative, associated with high-grade DCIS.    Right axillary ultrasound-guided biopsy indicating metastatic carcinoma positive for spread from breast primary, ER/TN both negative.  HER-2/kelle is negative.      Diagnostic mammogram end of 12/2016, which identified 2 cm ill-defined density  associated with indeterminate microcalcification measuring about 3.2 cm, so the whole area spans about 4 cm in biggest dimension.   Ultrasound to the site which is the upper outer quadrant of the right breast identified 2 cm irregular solid mass; at the right axilla shows several abnormal lymph nodes, the largest one measures 1.8 cm in maximum dimension.      DEXA scan from 03/2016 identified mild osteopenia lumbar spine        ASSESSMENT AND PLAN:    1. 12/2016  dx breast cancer, cT2N1 disesae.      S/p DD AC and wkly taxol, carbo was used partially dropped due   To hypersensitivity reaction.   She had pCR for invasive cancer with mastectomies in 7/2017.   S/p RT till 11/2017.     She will benefit from anti hormone therapy post RT. Tamoxifen is the only choice due to young age. We discussed the role of ovarian ablation. She wants the most aggressive form of anti hormone therapy.   Will refer her to gyn for oophorectomies.   After that AI will be tried first, of poor tolerance, can try tamoxifen  then.     We discussed in detail side effects of these strong anti hormone therapy for a young women like her. She wants to go for it.     2.  hot flushes - advice Effexor, this has helped a lot.     3. Overweight - weight loss is meaningful for her in terms of rate of recurrence. She is interested in weight loss trial after epoophorectomies.

## 2017-11-16 NOTE — NURSING NOTE
"Oncology Rooming Note    November 16, 2017 3:34 PM   Diana Wilson is a 37 year old female who presents for:    Chief Complaint   Patient presents with     Oncology Clinic Visit     Recheck Breast CA post RT      Initial Vitals: /77 (BP Location: Right arm, Patient Position: Sitting, Cuff Size: Adult Large)  Pulse 89  Temp 97.5  F (36.4  C) (Tympanic)  Resp 20  Ht 1.626 m (5' 4.02\")  Wt 98 kg (216 lb 1.6 oz)  SpO2 96%  Breastfeeding? No  BMI 37.07 kg/m2 Estimated body mass index is 37.07 kg/(m^2) as calculated from the following:    Height as of this encounter: 1.626 m (5' 4.02\").    Weight as of this encounter: 98 kg (216 lb 1.6 oz). Body surface area is 2.1 meters squared.  No Pain (0) Comment: Data Unavailable   No LMP recorded.  Allergies reviewed: Yes  Medications reviewed: Yes    Medications: MEDICATION REFILLS NEEDED TODAY. Provider was notified.  Pharmacy name entered into [facility]: [facility] - [id number]    Clinical concerns: Recheck Breast CA post RT .     7  minutes for nursing intake (face to face time)     Sondra Medina CMA              "

## 2017-11-17 ENCOUNTER — TELEPHONE (OUTPATIENT)
Dept: ONCOLOGY | Facility: CLINIC | Age: 37
End: 2017-11-17

## 2017-11-21 ENCOUNTER — OFFICE VISIT (OUTPATIENT)
Dept: OBGYN | Facility: CLINIC | Age: 37
End: 2017-11-21
Payer: COMMERCIAL

## 2017-11-21 ENCOUNTER — TELEPHONE (OUTPATIENT)
Dept: ONCOLOGY | Facility: CLINIC | Age: 37
End: 2017-11-21

## 2017-11-21 VITALS
HEART RATE: 103 BPM | HEIGHT: 64 IN | SYSTOLIC BLOOD PRESSURE: 137 MMHG | BODY MASS INDEX: 36.33 KG/M2 | DIASTOLIC BLOOD PRESSURE: 86 MMHG | WEIGHT: 212.8 LBS

## 2017-11-21 DIAGNOSIS — Z71.89 SURGICAL COUNSELING VISIT: ICD-10-CM

## 2017-11-21 DIAGNOSIS — C50.911 MALIGNANT NEOPLASM OF RIGHT BREAST IN FEMALE, ESTROGEN RECEPTOR POSITIVE, UNSPECIFIED SITE OF BREAST (H): Primary | ICD-10-CM

## 2017-11-21 DIAGNOSIS — Z17.0 MALIGNANT NEOPLASM OF RIGHT BREAST IN FEMALE, ESTROGEN RECEPTOR POSITIVE, UNSPECIFIED SITE OF BREAST (H): Primary | ICD-10-CM

## 2017-11-21 DIAGNOSIS — Z01.419 ENCOUNTER FOR GYNECOLOGICAL EXAMINATION WITHOUT ABNORMAL FINDING: ICD-10-CM

## 2017-11-21 PROCEDURE — 87624 HPV HI-RISK TYP POOLED RSLT: CPT | Performed by: OBSTETRICS & GYNECOLOGY

## 2017-11-21 PROCEDURE — 99204 OFFICE O/P NEW MOD 45 MIN: CPT | Performed by: OBSTETRICS & GYNECOLOGY

## 2017-11-21 PROCEDURE — G0145 SCR C/V CYTO,THINLAYER,RESCR: HCPCS | Performed by: OBSTETRICS & GYNECOLOGY

## 2017-11-21 RX ORDER — IOPAMIDOL 755 MG/ML
100 INJECTION, SOLUTION INTRAVASCULAR ONCE
Status: COMPLETED | OUTPATIENT
Start: 2017-11-22 | End: 2017-11-22

## 2017-11-21 NOTE — PROGRESS NOTES
37 year old   here from Dr. Jin.  She has no menstrual cycles since she had an endometrial ablation.  No pain and no bleeding issues.  Her mom  age 52 of pancreatic cancer.  She is having severe hot flashes after the chemotherapy she received.  She gained 40lbs.  She wishes to be aggressive with her therapy and is planned per Dr. Jin's notes for adnexal removal and Aromatase inhibitors.  The Effexor is working great for her depression and her hot flashes.      Reviewed with her her medical history and prior notes and interventions  She and her sister both had genetic workups which were negative.      See Dr. Jin's notes below    2016 newly dx breast cancer,  high-grade invasive ductal cancer on her right breast, ER/NJ 99% positive, HER-2/kelle negative cancer with associated high-grade DCIS.  The lymph nodes biopsy also came back positive breast cancer but is triple negative disease. She is only 36 years old. She had cT2N1 disesae.       S/p DD AC and wkly taxol, carbo was used partially dropped due   To hypersensitivity reaction.   She had pCR for invasive cancer with mastectomies in 2017.     Lab Results   Component Value Date    PAP NIL 2015     Past Medical History:   Diagnosis Date     Depressive disorder     And Anxiety     Encounter for insertion or removal of intrauterine contraceptive device 2008     Excessive or frequent menstruation 2003     Fibroadenosis of breast      Invasive ductal carcinoma of breast, right (H)      Malignant neoplasm of upper-outer quadrant of right female breast (H)      Neutropenia, drug-induced (H)      Nongonococcal urethritis (JALIL) due to chlamydia trachomatis 2002     Other and unspecified ovarian cyst 2003    RIGHT ovarian cyst     Transient hypertension of pregnancy, antepartum     PIH with last birth     Uncomplicated asthma      Past Surgical History:   Procedure Laterality Date     BIOPSY  2016    EDG with biopsy     BREAST  "SURGERY      Bilateral reduction     C APPENDECTOMY       DENTAL SURGERY  2010 and 10/12/2010    Root canals     DILATION AND CURETTAGE, HYSTEROSCOPY, ABLATE ENDOMETRIUM NOVASURE, COMBINED  3/20/2012    Procedure:COMBINED DILATION AND CURETTAGE, HYSTEROSCOPY, ABLATE ENDOMETRIUM NOVASURE; Hysteroscopy with Endometrial Ablation Novasure; Surgeon:GERRI PURCELL; Location:[facility]     ESOPHAGOSCOPY, GASTROSCOPY, DUODENOSCOPY (EGD), COMBINED N/A 2016    Procedure: COMBINED ESOPHAGOSCOPY, GASTROSCOPY, DUODENOSCOPY (EGD);  Surgeon: Jose L Wilson MD;  Location: [facility]     INSERT PORT VASCULAR ACCESS N/A 2017    Procedure: INSERT PORT VASCULAR ACCESS;  Surgeon: Micheal Townsend MD;  Location: [facility] OR     SURGICAL HISTORY OF -       Breast reduction     Ob/gyn-history of  x 2 largest 8 lbs  meds-reviewed   ROS: 10 point ROS neg other than the symptoms noted above in the HPI.  /86 (BP Location: Left arm, Patient Position: Chair, Cuff Size: Adult Large)  Pulse 103  Ht 5' 4\" (1.626 m)  Wt 212 lb 12.8 oz (96.5 kg)  LMP   BMI 36.53 kg/m2  Alert and orientedx3, in NAD  Pelvic exam: normal vagina and vulva, normal cervix without lesions or tenderness, uterus normal size anteverted, adenxa normal in size without tenderness.  Adequate gynecoid pelvis        ASSESSMENT / PLAN:  (C50.911,  Z17.0) Malignant neoplasm of right breast in female, estrogen receptor positive, unspecified site of breast (H)  (primary encounter diagnosis)  Comment: reviewed Dr. Jin's notes and recommendations for ovarian removal for future oncologic treatment of this patient, negative genetic evaluation noted.  Risks of hormonal and adnexal removal versus the benefits of decreased risks of ovarian cancer/masses/cysts.  Permanent surgical menopause discussed, although presently in chemo induced menopause.    Plan: she understands and desires their removal.  Letter placed for a laparoscopic bilateral " salpingoophorectomy.  No need for uterine removal at this time.  Pending CT scan evaluation    (Z71.89) Surgical counseling visit  Comment: see above  Plan: done    (Z01.419) Encounter for routine gynecological examination  Comment: pap done since pending gynecologic surgery.  Plan: Pap imaged thin layer screen with HPV -         recommended age 30 - 65 years (select HPV order        below), HPV High Risk Types DNA Cervical  45 minutes was spent face to face with the patient today discussing her history, diagnosis, and follow-up plan as noted above.  Over 50% of the visit was spent in counseling and coordination of care.     Total Visit Time: 45 minutes.            Preeti Tirado MD

## 2017-11-21 NOTE — MR AVS SNAPSHOT
After Visit Summary   11/21/2017    Diana Wilson    MRN: [id number]           Patient Information     Date Of Birth          1980        Visit Information        Provider Department      11/21/2017 10:30 AM Preeti Tirado MD [facility]        Today's Diagnoses     Malignant neoplasm of right breast in female, estrogen receptor positive, unspecified site of breast (H)    -  1    Surgical counseling visit        Encounter for gynecological examination without abnormal finding           Follow-ups after your visit        Your next 10 appointments already scheduled     Nov 22, 2017  3:00 PM CST   Ech Complete with [id number]   [facility] Echocardiography ([facility])    [address] [address]   [phone]           1. Please bring or wear a comfortable two-piece outfit. 2. You may eat, drink and take your normal medicines. 3. For any questions that cannot be answered, please contact the ordering physician            Nov 22, 2017  4:00 PM CST   CT CHEST/ABDOMEN/PELVIS W CONTRAST with W[facility]CT1   [facility] CT ([facility])    [address] [address]   [phone]           Please bring any scans or X-rays taken at other hospitals, if similar tests were done. Also bring a list of your medicines, including vitamins, minerals and over-the-counter drugs. It is safest to leave personal items at home.  Be sure to tell your doctor:   If you have any allergies.   If there s any chance you are pregnant.   If you are breastfeeding.   If you have any special needs.  You may have contrast for this exam. To prepare:   Do not eat or drink for 2 hours before your exam. If you need to take medicine, you may take it with small sips of water. (We may ask you to take liquid medicine as well.)   The day before your exam, drink extra fluids at least six 8-ounce glasses (unless your doctor tells you to restrict  your fluids).  Patients over 70 or patients with diabetes or kidney problems:   If you haven t had a blood test (creatinine test) within the last 30 days, go to your clinic or Diagnostic Imaging Department for this test.  If you have diabetes:   If your kidney function is normal, continue taking your metformin (Avandamet, Glucophage, Glucovance, Metaglip) on the day of your exam.   If your kidney function is abnormal, wait 48 hours before restarting this medicine.  You will have oral contrast for this exam:   You will drink the contrast at home. Get this from your clinic or Diagnostic Imaging Department. Please follow the directions given.  Please wear loose clothing, such as a sweat suit or jogging clothes. Avoid snaps, zippers and other metal. We may ask you to undress and put on a hospital gown.  If you have any questions, please call the Imaging Department where you will have your exam.            Nov 24, 2017 10:00 AM CST   Pre-[facility]    [address] [address] [phone]            Nov 28, 2017   Procedure with Preeti Tirado MD   [facility] (--)    [address] [address] [phone]           The medical center is located at [address] (between [address] in [place], [place] north of [place]).            Dec 04, 2017  2:00 PM CST   Return Visit with COLLEEN Sheikh CNP   [facility])    [address], [address]  [place] [address] [phone]              Who to contact     If you have questions or need follow up information about today's clinic visit or your schedule please contact [facility] directly at [phone].  Normal or non-critical lab and imaging results will be communicated to you by MyChart, letter or phone within 4 business days after the  "clinic has received the results. If you do not hear from us within 7 days, please contact the clinic through [employer] or phone. If you have a critical or abnormal lab result, we will notify you by phone as soon as possible.  Submit refill requests through [employer] or call your pharmacy and they will forward the refill request to us. Please allow 3 business days for your refill to be completed.          Additional Information About Your Visit        [employer] Information     [employer] gives you secure access to your electronic health record. If you see a primary care provider, you can also send messages to your care team and make appointments. If you have questions, please call your primary care clinic.  If you do not have a primary care provider, please call [phone] and they will assist you.        Care EveryWhere ID     This is your Care EveryWhere ID. This could be used by other organizations to access your [facility] medical records  [id number]        Your Vitals Were     Pulse Height BMI (Body Mass Index)             103 5' 4\" (1.626 m) 36.53 kg/m2          Blood Pressure from Last 3 Encounters:   11/21/17 137/86   11/16/17 136/77   11/15/17 (!) 126/91    Weight from Last 3 Encounters:   11/21/17 212 lb 12.8 oz (96.5 kg)   11/16/17 216 lb 1.6 oz (98 kg)   11/15/17 211 lb (95.7 kg)              We Performed the Following     HPV High Risk Types DNA Cervical     Pap imaged thin layer screen with HPV - recommended age 30 - 65 years (select HPV order below)        Primary Care Provider Office Phone # Fax #    Yenifer Ridley [phone] [id number]  [place] [address]        Equal Access to Services     [facility] ASHLEY : Hadii gabriela kearns Sowan, waaxda luqadaha, qaybta kaalmajavier olivo. So [facility] [phone].    ATENCIÓN: Si habla español, tiene a montelongo disposición servicios gratuitos de asistencia lingüística. Llame al [phone].    We " comply with applicable federal civil rights laws and [place] laws. We do not discriminate on the basis of race, color, national origin, age, disability, sex, sexual orientation, or gender identity.            Thank you!     Thank you for choosing [facility]  for your care. Our goal is always to provide you with excellent care. Hearing back from our patients is one way we can continue to improve our services. Please take a few minutes to complete the written survey that you may receive in the mail after your visit with us. Thank you!             Your Updated Medication List - Protect others around you: Learn how to safely use, store and throw away your medicines at www.disposemymeds.org.          This list is accurate as of: 11/21/17  2:02 PM.  Always use your most recent med list.                   Brand Name Dispense Instructions for use Diagnosis    * albuterol 108 (90 BASE) MCG/ACT Inhaler    PROAIR HFA/PROVENTIL HFA/VENTOLIN HFA    1 Inhaler    Inhale 2 puffs into the lungs every 6 hours as needed for shortness of breath / dyspnea    Mild persistent asthma with exacerbation       * albuterol (2.5 MG/3ML) 0.083% neb solution     75 mL    Take 1 vial (2.5 mg) by nebulization every 6 hours as needed for shortness of breath / dyspnea or wheezing        ALEVE PO      Take 220 mg by mouth daily as needed for moderate pain        fluticasone 50 MCG/ACT spray    FLONASE    3 Bottle    Spray 1-2 sprays into both nostrils daily    Environmental allergies       Foam Dressing Non-Bordered Pads     5 each    dispense as brand name Mepilex.    Breast cancer (H), Radiation-induced dermatitis       hydrOXYzine 10 MG tablet    ATARAX    60 tablet    Take 1-5 tablets (10-50 mg) by mouth every 6 hours as needed for anxiety    Anxiety, Situational anxiety       loratadine 10 MG tablet    CLARITIN    90 tablet    Take 1 tablet (10 mg) by mouth daily    Mild persistent asthma with exacerbation       LORazepam 0.5 MG  tablet    ATIVAN    30 tablet    Take 1 tablet (0.5 mg) by mouth nightly as needed for anxiety    Insomnia, unspecified type       magnesium hydroxide 400 MG/5ML suspension    MILK OF MAGNESIA     Take 30 mLs by mouth        montelukast 10 MG tablet    SINGULAIR    90 tablet    Take 1 tablet (10 mg) by mouth At Bedtime    Environmental allergies       * oxyCODONE-acetaminophen 5-325 MG per tablet    PERCOCET    10 tablet    Take 1 tablet by mouth every 6 hours as needed for moderate to severe pain    Invasive ductal carcinoma of breast, right (H)       * oxyCODONE-acetaminophen 5-325 MG per tablet    PERCOCET    30 tablet    Take 1 tablet by mouth every 6 hours as needed for pain maximum 4 tablet(s) per day    Malignant neoplasm of upper-outer quadrant of right breast in female, estrogen receptor negative (H)       scopolamine 72 hr patch    TRANSDERM    1 patch    Place 1 patch onto the skin every 72 hours.  Apply to hairless area behind one ear at least 4 hours before travel.  Remove old patch and change every 3 days.    Preop general physical exam, Nausea       silver sulfADIAZINE 1 % cream    SILVADENE    400 g    Apply topically 2 times daily Apply to erythematous peeling areas BID    Malignant neoplasm of upper-outer quadrant of right breast in female, estrogen receptor negative (H)       venlafaxine 37.5 MG tablet    EFFEXOR    90 tablet    Take 1 tablet (37.5 mg) in the morning and 2 tablets (75 mg) in the evening.    Hot flushes, perimenopausal, Depression, unspecified depression type       * Notice:  This list has 4 medication(s) that are the same as other medications prescribed for you. Read the directions carefully, and ask your doctor or other care provider to review them with you.

## 2017-11-21 NOTE — LETTER
SURGERYPLANNING/SCHEDULING WORKSHEET                              [facility]  [address] [address]  [phone]                          Diana Wilson                :  1980  MRN:  [id number]  Phone: [phone] (home)    Same Day Surgery [phone]   Surgeon: Preeti Tirado MD  Diagnosis:   History of breast cancer with recommended salpingoophorectomy  Allergies:  Carboplatin; Ambien; Amoxicillin; Amoxicillin; Erythromycin; Erythromycin; Hydrocodone-acetaminophen; Penicillins; Zolpidem; and Hydrocodone   A preoperative evaluation by the primary care provider has been requested to summarize and modify, where possible, medical risks to the proposed surgery.  Specific risks requiring evaluation include   Patient Active Problem List    Diagnosis     Neutropenia, drug-induced (H)     Pain with urination     Diarrhea     Mucositis     Malignant neoplasm of upper-outer quadrant of right female breast (H)     Invasive ductal carcinoma of breast, right (H)     Intestinal malabsorption     Anxiety     Patient declines flu vaccine     Insomnia     RLS (restless legs syndrome)     Iron deficiency anemia     Major depressive disorder, recurrent episode, mild (H)     CARDIOVASCULAR SCREENING; LDL GOAL LESS THAN 130     Obesity     Mild intermittent asthma     TRICHOTILLOMANIA     Health Care Home     ====================================================  Surgical Procedure:  Laparoscopic Bilateral Salpingo Oopherectomy  Length of Procedure:  45min  Type of anesthesia:  General  The proposed surgical procedure is considered LOW risk.  Date of Procedure:__74-30-10______    Time: __12:00pm____________       Informed Consent Obtained and Signed:  NO  ====================================================  Instructions to Same Day Surgery Staff  Pneumoboots  Special Equipment: Ligassure  Preop Antibiotic:   none  Preop Pain Meds:  Toradol 30mg IVP  X one dose  Oxycontin 10mg po X one dose  PreOp Orders:  Routine Standing Orders.  ====================================================  Instructions to the patient:  Preop physical exam scheduled (within 30 days or 7 days prior) with:   __FP/PCP__________________  Clinic:  ____________________                                         Date______________Time_________________________  Come to the hospital at: ______11:00am__________________________  HOME PREPARATION:   Shower with Hibiclens the night before and the morning of surgery, gently cleaning skin from neck to feet  Bathe and brush teeth the morning of surgery.  Take medications with a sip of water the morning of surgery:   Check with  if taking insulin.  May have  a light meal, toast and clear liquids, up to 8 hrs before surgery  May have clear liquids (liquids one can read through) up to 2 hrs before surgery  NOTHING after 2 hrs before surgery  Stop aspirin 7-10 days before surgery  Stop NSAIDS (Ibuproven, Naproxen, etc) 5 days before surgery  Stop Plavix 7-10 days before surgery      Preeti Tirado MD    11/21/2017

## 2017-11-21 NOTE — NURSING NOTE
"Chief Complaint   Patient presents with     Consult       Initial /86 (BP Location: Left arm, Patient Position: Chair, Cuff Size: Adult Large)  Pulse 103  Ht 5' 4\" (1.626 m)  Wt 212 lb 12.8 oz (96.5 kg)  LMP   BMI 36.53 kg/m2 Estimated body mass index is 36.53 kg/(m^2) as calculated from the following:    Height as of this encounter: 5' 4\" (1.626 m).    Weight as of this encounter: 212 lb 12.8 oz (96.5 kg).  Medication Reconciliation: complete     Sydnee Brandon LPN      "

## 2017-11-22 ENCOUNTER — HOSPITAL ENCOUNTER (OUTPATIENT)
Dept: CT IMAGING | Facility: CLINIC | Age: 37
End: 2017-11-22
Attending: INTERNAL MEDICINE
Payer: COMMERCIAL

## 2017-11-22 ENCOUNTER — HOSPITAL ENCOUNTER (OUTPATIENT)
Dept: CARDIOLOGY | Facility: CLINIC | Age: 37
Discharge: HOME | End: 2017-11-22
Attending: INTERNAL MEDICINE | Admitting: INTERNAL MEDICINE
Payer: COMMERCIAL

## 2017-11-22 DIAGNOSIS — Z17.0 MALIGNANT NEOPLASM OF UPPER-OUTER QUADRANT OF RIGHT BREAST IN FEMALE, ESTROGEN RECEPTOR POSITIVE (H): ICD-10-CM

## 2017-11-22 DIAGNOSIS — C50.411 MALIGNANT NEOPLASM OF UPPER-OUTER QUADRANT OF RIGHT BREAST IN FEMALE, ESTROGEN RECEPTOR POSITIVE (H): ICD-10-CM

## 2017-11-22 DIAGNOSIS — N95.1 HOT FLUSHES, PERIMENOPAUSAL: ICD-10-CM

## 2017-11-22 DIAGNOSIS — F32.A DEPRESSION, UNSPECIFIED DEPRESSION TYPE: ICD-10-CM

## 2017-11-22 PROCEDURE — 74177 CT ABD & PELVIS W/CONTRAST: CPT

## 2017-11-22 PROCEDURE — 71260 CT THORAX DX C+: CPT

## 2017-11-22 PROCEDURE — 25000128 H RX IP 250 OP 636: Performed by: RADIOLOGY

## 2017-11-22 PROCEDURE — 0399T ZZHC MYOCARDIAL STRAIN IMAGING: CPT | Performed by: INTERNAL MEDICINE

## 2017-11-22 PROCEDURE — 93306 TTE W/DOPPLER COMPLETE: CPT

## 2017-11-22 PROCEDURE — 93306 TTE W/DOPPLER COMPLETE: CPT | Mod: 26 | Performed by: INTERNAL MEDICINE

## 2017-11-22 PROCEDURE — 25000125 ZZHC RX 250: Performed by: RADIOLOGY

## 2017-11-22 RX ADMIN — IOPAMIDOL 100 ML: 755 INJECTION, SOLUTION INTRAVENOUS at 15:52

## 2017-11-22 RX ADMIN — SODIUM CHLORIDE 66 ML: 9 INJECTION, SOLUTION INTRAVENOUS at 15:52

## 2017-11-24 ENCOUNTER — OFFICE VISIT (OUTPATIENT)
Dept: FAMILY MEDICINE | Facility: CLINIC | Age: 37
End: 2017-11-24
Payer: COMMERCIAL

## 2017-11-24 VITALS
DIASTOLIC BLOOD PRESSURE: 73 MMHG | HEART RATE: 95 BPM | TEMPERATURE: 97.9 F | BODY MASS INDEX: 36.4 KG/M2 | SYSTOLIC BLOOD PRESSURE: 119 MMHG | WEIGHT: 213.2 LBS | HEIGHT: 64 IN

## 2017-11-24 DIAGNOSIS — C50.911 INVASIVE DUCTAL CARCINOMA OF BREAST, RIGHT (H): ICD-10-CM

## 2017-11-24 DIAGNOSIS — Z80.3 FAMILY HISTORY OF MALIGNANT NEOPLASM OF BREAST: ICD-10-CM

## 2017-11-24 DIAGNOSIS — R11.0 NAUSEA: ICD-10-CM

## 2017-11-24 DIAGNOSIS — T50.905A DRUG-INDUCED LOW PLATELET COUNT: ICD-10-CM

## 2017-11-24 DIAGNOSIS — I89.0 LYMPHEDEMA OF RIGHT UPPER EXTREMITY: ICD-10-CM

## 2017-11-24 DIAGNOSIS — D69.59 DRUG-INDUCED LOW PLATELET COUNT: ICD-10-CM

## 2017-11-24 DIAGNOSIS — Z17.0 MALIGNANT NEOPLASM OF UPPER-OUTER QUADRANT OF RIGHT BREAST IN FEMALE, ESTROGEN RECEPTOR POSITIVE (H): ICD-10-CM

## 2017-11-24 DIAGNOSIS — Z01.818 PREOP GENERAL PHYSICAL EXAM: Primary | ICD-10-CM

## 2017-11-24 DIAGNOSIS — C50.411 MALIGNANT NEOPLASM OF UPPER-OUTER QUADRANT OF RIGHT BREAST IN FEMALE, ESTROGEN RECEPTOR POSITIVE (H): ICD-10-CM

## 2017-11-24 PROBLEM — R30.9 PAIN WITH URINATION: Status: RESOLVED | Noted: 2017-03-22 | Resolved: 2017-11-24

## 2017-11-24 PROBLEM — D70.2 NEUTROPENIA, DRUG-INDUCED (H): Status: RESOLVED | Noted: 2017-05-30 | Resolved: 2017-11-24

## 2017-11-24 PROBLEM — R19.7 DIARRHEA: Status: RESOLVED | Noted: 2017-02-23 | Resolved: 2017-11-24

## 2017-11-24 PROBLEM — K12.30 MUCOSITIS: Status: RESOLVED | Noted: 2017-02-23 | Resolved: 2017-11-24

## 2017-11-24 LAB
COPATH REPORT: NORMAL
ERYTHROCYTE [DISTWIDTH] IN BLOOD BY AUTOMATED COUNT: 15 % (ref 10–15)
HCT VFR BLD AUTO: 37.7 % (ref 35–47)
HGB BLD-MCNC: 12.7 G/DL (ref 11.7–15.7)
MCH RBC QN AUTO: 28.5 PG (ref 26.5–33)
MCHC RBC AUTO-ENTMCNC: 33.7 G/DL (ref 31.5–36.5)
MCV RBC AUTO: 85 FL (ref 78–100)
PAP: NORMAL
PLATELET # BLD AUTO: 243 10E9/L (ref 150–450)
RBC # BLD AUTO: 4.45 10E12/L (ref 3.8–5.2)
WBC # BLD AUTO: 4.7 10E9/L (ref 4–11)

## 2017-11-24 PROCEDURE — 36415 COLL VENOUS BLD VENIPUNCTURE: CPT | Performed by: FAMILY MEDICINE

## 2017-11-24 PROCEDURE — 99214 OFFICE O/P EST MOD 30 MIN: CPT | Performed by: FAMILY MEDICINE

## 2017-11-24 PROCEDURE — 85027 COMPLETE CBC AUTOMATED: CPT | Performed by: FAMILY MEDICINE

## 2017-11-24 RX ORDER — SCOLOPAMINE TRANSDERMAL SYSTEM 1 MG/1
PATCH, EXTENDED RELEASE TRANSDERMAL
Qty: 1 PATCH | Refills: 0 | Status: SHIPPED | OUTPATIENT
Start: 2017-11-24 | End: 2018-03-08

## 2017-11-24 NOTE — MR AVS SNAPSHOT
After Visit Summary   11/24/2017    Diana Wilson    MRN: [id number]           Patient Information     Date Of Birth          1980        Visit Information        Provider Department      11/24/2017 10:00 AM Yenifer Ridley MD [facility]        Today's Diagnoses     Preop general physical exam    -  1    Nausea        Invasive ductal carcinoma of breast, right (H)        Malignant neoplasm of upper-outer quadrant of right breast in female, estrogen receptor positive (H)        Family history of malignant neoplasm of breast        Drug-induced low platelet count        Lymphedema of right upper extremity          Care Instructions      Before Your Surgery      Call your surgeon if there is any change in your health. This includes signs of a cold or flu (such as a sore throat, runny nose, cough, rash or fever).    Do not smoke, drink alcohol or take over the counter medicine (unless your surgeon or primary care doctor tells you to) for the 24 hours before and after surgery.    If you take prescribed drugs: Follow your doctor s orders about which medicines to take and which to stop until after surgery.    Eating and drinking prior to surgery: follow the instructions from your surgeon    Take a shower or bath the night before surgery. Use the soap your surgeon gave you to gently clean your skin. If you do not have soap from your surgeon, use your regular soap. Do not shave or scrub the surgery site.  Wear clean pajamas and have clean sheets on your bed.           Follow-ups after your visit        Your next 10 appointments already scheduled     Nov 28, 2017   Procedure with Preeti Tirado MD   [facility] (--)    [address] [place] [id number] [phone]           The medical center is located at [address] (between [address] in [place], [place] north of [place]).            Dec 04, 2017  2:00 PM CST   Return  Visit with COLLEEN Sheikh CNP   [facility] ([facility])    [address], [address]  [facility] [address]   [phone]            Dec 12, 2017 10:45 AM CST   Return Visit with Maribel Jin MD   [facility] ([facility])    [facility]  [address]  [facility] [place] [address]   [phone]            Dec 13, 2017  9:40 AM CST   LAB with [facility] ([facility])    [address] [facility] [address]   [phone]           Please do not eat 10-12 hours before your appointment if you are coming in fasting for labs on lipids, cholesterol, or glucose (sugar). This does not apply to pregnant women. Water, hot tea and black coffee (with nothing added) are okay. Do not drink other fluids, diet soda or chew gum.              Who to contact     Normal or non-critical lab and imaging results will be communicated to you by [employer], letter or phone within 4 business days after the clinic has received the results. If you do not hear from us within 7 days, please contact the clinic through [employer] or phone. If you have a critical or abnormal lab result, we will notify you by phone as soon as possible.  Submit refill requests through [employer] or call your pharmacy and they will forward the refill request to us. Please allow 3 business days for your refill to be completed.          If you need to speak with a  for additional information , please call: [phone]             Additional Information About Your Visit        [employer] Information     [employer] gives you secure access to your electronic health record. If you see a primary care provider, you can also send messages to your care team and make appointments. If you have questions, please call your primary care clinic.  If you do not have a primary care provider, please call [phone] and they will assist you.    "     Care EveryWhere ID     This is your Care EveryWhere ID. This could be used by other organizations to access your [facility] medical records  [id number]        Your Vitals Were     Pulse Temperature Height BMI (Body Mass Index)          95 97.9  F (36.6  C) (Tympanic) 5' 4\" (1.626 m) 36.6 kg/m2         Blood Pressure from Last 3 Encounters:   11/24/17 119/73   11/21/17 137/86   11/16/17 136/77    Weight from Last 3 Encounters:   11/24/17 213 lb 3.2 oz (96.7 kg)   11/21/17 212 lb 12.8 oz (96.5 kg)   11/16/17 216 lb 1.6 oz (98 kg)              We Performed the Following     CBC with platelets          Today's Medication Changes          These changes are accurate as of: 11/24/17  1:19 PM.  If you have any questions, ask your nurse or doctor.               These medicines have changed or have updated prescriptions.        Dose/Directions    albuterol 108 (90 BASE) MCG/ACT Inhaler   Commonly known as:  PROAIR HFA/PROVENTIL HFA/VENTOLIN HFA   This may have changed:  Another medication with the same name was removed. Continue taking this medication, and follow the directions you see here.   Used for:  Mild persistent asthma with exacerbation   Changed by:  Yenifer Ridley MD        Dose:  2 puff   Inhale 2 puffs into the lungs every 6 hours as needed for shortness of breath / dyspnea   Quantity:  1 Inhaler   Refills:  11            Where to get your medicines      Some of these will need a paper prescription and others can be bought over the counter.  Ask your nurse if you have questions.     Bring a paper prescription for each of these medications     scopolamine 72 hr patch                Primary Care Provider Office Phone # Fax #    Yenifer Ridley [phone] [address] [place] [address]        Equal Access to Services     FAUZIA RAMIREZ AH: Hadii gabriela griffin hadasho Soomaali, waaxda luqadaha, qaybta kaalmada adeegyada, javier valentine ah. So wac " [phone].    ATENCIÓN: Si jaylin gonzalez, tiene a montelongo disposición servicios gratuitos de asistencia lingüística. Mackenzie beyer [phone].    We comply with applicable federal civil rights laws and [place] laws. We do not discriminate on the basis of race, color, national origin, age, disability, sex, sexual orientation, or gender identity.            Thank you!     Thank you for choosing [facility]  for your care. Our goal is always to provide you with excellent care. Hearing back from our patients is one way we can continue to improve our services. Please take a few minutes to complete the written survey that you may receive in the mail after your visit with us. Thank you!             Your Updated Medication List - Protect others around you: Learn how to safely use, store and throw away your medicines at www.disposemymeds.org.          This list is accurate as of: 11/24/17  1:19 PM.  Always use your most recent med list.                   Brand Name Dispense Instructions for use Diagnosis    albuterol 108 (90 BASE) MCG/ACT Inhaler    PROAIR HFA/PROVENTIL HFA/VENTOLIN HFA    1 Inhaler    Inhale 2 puffs into the lungs every 6 hours as needed for shortness of breath / dyspnea    Mild persistent asthma with exacerbation       ALEVE PO      Take 220 mg by mouth daily as needed for moderate pain        fluticasone 50 MCG/ACT spray    FLONASE    3 Bottle    Spray 1-2 sprays into both nostrils daily    Environmental allergies       loratadine 10 MG tablet    CLARITIN    90 tablet    Take 1 tablet (10 mg) by mouth daily    Mild persistent asthma with exacerbation       magnesium hydroxide 400 MG/5ML suspension    MILK OF MAGNESIA     Take 30 mLs by mouth        montelukast 10 MG tablet    SINGULAIR    90 tablet    Take 1 tablet (10 mg) by mouth At Bedtime    Environmental allergies       scopolamine 72 hr patch    TRANSDERM    1 patch    Place 1 patch onto the skin every 72 hours.  Apply to hairless area behind  one ear at least 4 hours before travel.  Remove old patch and change every 3 days.    Preop general physical exam, Nausea, Invasive ductal carcinoma of breast, right (H), Malignant neoplasm of upper-outer quadrant of right breast in female, estrogen receptor positive (H), Family history of malignant neoplasm of breast       silver sulfADIAZINE 1 % cream    SILVADENE    400 g    Apply topically 2 times daily Apply to erythematous peeling areas BID    Malignant neoplasm of upper-outer quadrant of right breast in female, estrogen receptor negative (H)       venlafaxine 37.5 MG tablet    EFFEXOR    90 tablet    Take 1 tablet (37.5 mg) in the morning and 2 tablets (75 mg) in the evening.    Hot flushes, perimenopausal, Depression, unspecified depression type

## 2017-11-24 NOTE — NURSING NOTE
"Chief Complaint   Patient presents with     Pre-Op Exam       Initial /73 (BP Location: Left arm, Patient Position: Sitting, Cuff Size: Adult Large)  Pulse 95  Temp 97.9  F (36.6  C) (Tympanic)  Ht 5' 4\" (1.626 m)  Wt 213 lb 3.2 oz (96.7 kg)  BMI 36.6 kg/m2 Estimated body mass index is 36.6 kg/(m^2) as calculated from the following:    Height as of this encounter: 5' 4\" (1.626 m).    Weight as of this encounter: 213 lb 3.2 oz (96.7 kg).  Medication Reconciliation: complete   Sonia Villeda LPN    "

## 2017-11-24 NOTE — PROGRESS NOTES
[facility]  [address] [facility] [address]  [phone]  Dept: [phone]    PRE-OP EVALUATION:  Today's date: 2017    Diana Wilson (: 1980) presents for pre-operative evaluation assessment as requested by Dr. Gonzalez .  She requires evaluation and anesthesia risk assessment prior to undergoing surgery/procedure for treatment of  Prophylactic oophorectomy due to strong family history of breast cancer and estrogen receptor positive tumor      Proposed procedure: Laparoscopic bilateral salpingo oophorectomy    Date of Surgery/ Procedure: 2017  Time of Surgery/ Procedure: 11:45am  Hospital/Surgical Facility: [facility]   Primary Physician: Yenifer Ridley  Type of Anesthesia Anticipated: General    Patient has a Health Care Directive or Living Will:  NO    1. NO - Do you have a history of heart attack, stroke, stent, bypass or surgery on an artery in the head, neck, heart or legs?  2. NO - Do you ever have any pain or discomfort in your chest?  3. NO - Do you have a history of  Heart Failure?  4. NO - Are you troubled by shortness of breath when: walking on the level, up a slight hill or at night?  5. NO - Do you currently have a cold, bronchitis or other respiratory infection?  6. NO - Do you have a cough, shortness of breath or wheezing?  7. NO - Do you sometimes get pains in the calves of your legs when you walk?  8. Yes - Do you or anyone in your family have previous history of blood clots? - mother, post surgical   9. NO - Do you or does anyone in your family have a serious bleeding problem such as prolonged bleeding following surgeries or cuts?  10. Yes - Have you ever had problems with anemia or been told to take iron pills? - self, in the past   11. Yes - Have you had any abnormal blood loss such as black, tarry or bloody stools, or abnormal vaginal bleeding?  12. NO - Have you ever had a blood transfusion?  13. Yes - Have you or any of your  relatives ever had problems with anesthesia? -personal history of nausea   14. NO - Do you have sleep apnea, excessive snoring or daytime drowsiness?  15. NO - Do you have any prosthetic heart valves?  16. NO - Do you have prosthetic joints?  17. NO - Is there any chance that you may be pregnant?        HPI:                                                      Brief HPI related to upcoming procedure:   Malignant neoplasm of right breast in female, estrogen receptor positive, unspecified site of breast (H)  (primary encounter diagnosis)  Comment: reviewed Dr. Jin's notes and recommendations for ovarian removal for future oncologic treatment of this patient, negative genetic evaluation noted.      See problem list for active medical problems.  Problems all longstanding and stable, except as noted/documented.  See ROS for pertinent symptoms related to these conditions.                                                                                                  .    MEDICAL HISTORY:                                                    Patient Active Problem List    Diagnosis Date Noted     Lymphedema of right upper extremity 11/24/2017     Priority: Medium     Malignant neoplasm of upper-outer quadrant of right female breast (H) 01/06/2017     Priority: Medium     Invasive ductal carcinoma of breast, right (H) 12/30/2016     Priority: Medium     Intestinal malabsorption 02/16/2016     Priority: Medium     Anxiety 07/15/2014     Priority: Medium     Insomnia 07/17/2012     Priority: Medium     Iron deficiency anemia 03/14/2012     Priority: Medium     Major depressive disorder, recurrent episode, mild (H) 08/05/2011     Priority: Medium      mainly postpartum, had tried Zoloft and anafranil    [facility] enrolled pt in 6 months pt ed program          CARDIOVASCULAR SCREENING; LDL GOAL LESS THAN 130 10/31/2010     Priority: Medium     Obesity 09/08/2008     Priority: Medium     Wt Readings from Last 5 Encounters:    07/11/11 172 lb (78.019 kg)   12/20/10 174 lb (78.926 kg)   07/26/10 166 lb (75.297 kg)   11/12/09 170 lb (77.111 kg)   11/03/09 169 lb (76.658 kg)     Body mass index is 29.99 kg/(m^2).        Mild intermittent asthma      Priority: Medium            never has had PFT's, MDI with colds and at times exercise       Health Care Home 05/14/2013     Priority: Low     EMERGENCY CARE PLAN  May 14, 2013: No current Care Coordination follow up planned. Please refer if Care Coordination services are needed.    Presenting Problem Signs and Symptoms Treatment Plan   Questions or concerns   during clinic hours   I will call my clinic directly:  [address] [address]  [phone].    Questions or concerns outside clinic hours   I will call the 24 hour nurse line at   [phone] or [facility].   Need to schedule an appointment   I will call the 24 hour scheduling team at [phone] or my clinic directly at [phone].    Same day treatment     I will call my clinic first, nurse line if after hours, urgent care and express care if needed.   Clinic care coordination services (regular clinic hours)     I will call a clinic care coordinator directly:     Darron Gaming RN  Mon, Tues, Fri - [phone]  Wed, Thurs - [phone]    Marlin Nelson :    [phone]    Or call my clinic at [phone] and ask to speak with care coordination.   Crisis Services: Behavioral or Mental Health  [facility] 24 Hour Phone Line  [phone]    [facility] 24 Hour Crisis Services  [phone]    [facility] (Behavioral Health Providers) Network [phone]    [facility]   [phone]       Emergency treatment -- Immediately    [facility] 911               Past Medical History:   Diagnosis Date     Depressive disorder 1995    And Anxiety     Encounter for insertion or removal of intrauterine contraceptive device 1/8/2008     Excessive or frequent menstruation 03/19/2003      Fibroadenosis of breast      Invasive ductal carcinoma of breast, right (H)      Malignant neoplasm of upper-outer quadrant of right female breast (H)      Neutropenia, drug-induced (H)      Nongonococcal urethritis (JALIL) due to chlamydia trachomatis 01/03/2002     Other and unspecified ovarian cyst 03/19/2003    RIGHT ovarian cyst     Transient hypertension of pregnancy, antepartum     PIH with last birth     Uncomplicated asthma 2002     Past Surgical History:   Procedure Laterality Date     BIOPSY  2/2016    EDG with biopsy     BREAST SURGERY  1997    Bilateral reduction     C APPENDECTOMY  1991     DENTAL SURGERY  09/13/2010 and 10/12/2010    Root canals     DILATION AND CURETTAGE, HYSTEROSCOPY, ABLATE ENDOMETRIUM NOVASURE, COMBINED  3/20/2012    Procedure:COMBINED DILATION AND CURETTAGE, HYSTEROSCOPY, ABLATE ENDOMETRIUM NOVASURE; Hysteroscopy with Endometrial Ablation Novasure; Surgeon:GERRI PURCELL; Location:[facility]     ESOPHAGOSCOPY, GASTROSCOPY, DUODENOSCOPY (EGD), COMBINED N/A 2/18/2016    Procedure: COMBINED ESOPHAGOSCOPY, GASTROSCOPY, DUODENOSCOPY (EGD);  Surgeon: Jose L Wilson MD;  Location: [facility]     INSERT PORT VASCULAR ACCESS N/A 1/11/2017    Procedure: INSERT PORT VASCULAR ACCESS;  Surgeon: Michael Townsend MD;  Location: [facility]     SURGICAL HISTORY OF -   1997    Breast reduction     Current Outpatient Prescriptions   Medication Sig Dispense Refill     scopolamine (TRANSDERM) 72 hr patch Place 1 patch onto the skin every 72 hours.  Apply to hairless area behind one ear at least 4 hours before travel.  Remove old patch and change every 3 days. 1 patch 0     venlafaxine (EFFEXOR) 37.5 MG tablet Take 1 tablet (37.5 mg) in the morning and 2 tablets (75 mg) in the evening. 90 tablet 1     magnesium hydroxide (MILK OF MAGNESIA) 400 MG/5ML suspension Take 30 mLs by mouth       fluticasone (FLONASE) 50 MCG/ACT nasal spray Spray 1-2 sprays into both nostrils daily 3 Bottle 3     loratadine  "(CLARITIN) 10 MG tablet Take 1 tablet (10 mg) by mouth daily 90 tablet 3     Naproxen Sodium (ALEVE PO) Take 220 mg by mouth daily as needed for moderate pain       silver sulfADIAZINE (SILVADENE) 1 % cream Apply topically 2 times daily Apply to erythematous peeling areas  g 3     [DISCONTINUED] albuterol (2.5 MG/3ML) 0.083% neb solution Take 1 vial (2.5 mg) by nebulization every 6 hours as needed for shortness of breath / dyspnea or wheezing (Patient not taking: Reported on 11/21/2017) 75 mL 0     albuterol (PROAIR HFA, PROVENTIL HFA, VENTOLIN HFA) 108 (90 BASE) MCG/ACT inhaler Inhale 2 puffs into the lungs every 6 hours as needed for shortness of breath / dyspnea (Patient not taking: Reported on 11/21/2017) 1 Inhaler 11     montelukast (SINGULAIR) 10 MG tablet Take 1 tablet (10 mg) by mouth At Bedtime 90 tablet 3     OTC products: no recent use of OTC ASA, NSAIDS or Steroids and no use of herbal medications or other supplements    Allergies   Allergen Reactions     Carboplatin Shortness Of Breath, Rash and Anaphylaxis     Other reaction(s): Flushing, Tachycardia     Ambien      hallucinations     Amoxicillin GI Disturbance     Erythromycin GI Disturbance     Hydrocodone-Acetaminophen      Other reaction(s): Hallucinations     Penicillins Nausea and Vomiting and Hives     1-11-17 pt states this was a childhood reaction     Zolpidem      Other reaction(s): Hallucinations     Hydrocodone Other (See Comments)     Out of body experience, \"creepy-crawly\" skin       Latex Allergy: NO    Social History   Substance Use Topics     Smoking status: Former Smoker     Packs/day: 0.50     Years: 10.00     Types: Cigarettes     Quit date: 10/1/2016     Smokeless tobacco: Never Used      Comment: Socially- quit smoking 10/01/2016     Alcohol use 0.0 oz/week      Comment: Occassional     History   Drug Use No       REVIEW OF SYSTEMS:                                                    Constitutional, neuro, ENT, endocrine, " "pulmonary, cardiac, gastrointestinal, genitourinary, musculoskeletal, integument and psychiatric systems are negative, except as otherwise noted.      EXAM:                                                    /73 (BP Location: Left arm, Patient Position: Sitting, Cuff Size: Adult Large)  Pulse 95  Temp 97.9  F (36.6  C) (Tympanic)  Ht 5' 4\" (1.626 m)  Wt 213 lb 3.2 oz (96.7 kg)  BMI 36.6 kg/m2    GENERAL APPEARANCE: healthy, alert and no distress     EYES: EOMI, PERRL     HENT: ear canals and TM's normal and nose and mouth without ulcers or lesions     NECK: no adenopathy, no asymmetry, masses, or scars and thyroid normal to palpation     RESP: lungs clear to auscultation - no rales, rhonchi or wheezes     CV: regular rates and rhythm, normal S1 S2, no S3 or S4 and no murmur, click or rub     ABDOMEN:  soft, nontender, no HSM or masses and bowel sounds normal     MS: extremities normal- no gross deformities noted, no evidence of inflammation in joints, FROM in all extremities.     NEURO: Normal strength and tone, sensory exam grossly normal, mentation intact and speech normal     PSYCH: mentation appears normal. and affect normal/bright    DIAGNOSTICS:                                                    EKG: Not indicated due to non-vascular surgery and low risk of event (age <65 and without cardiac risk factors)    Normal bmp last week  Low platelets on cbc last week - recheck today to confirm stability.    Results for orders placed or performed in visit on 11/24/17   CBC with platelets   Result Value Ref Range    WBC 4.7 4.0 - 11.0 10e9/L    RBC Count 4.45 3.8 - 5.2 10e12/L    Hemoglobin 12.7 11.7 - 15.7 g/dL    Hematocrit 37.7 35.0 - 47.0 %    MCV 85 78 - 100 fl    MCH 28.5 26.5 - 33.0 pg    MCHC 33.7 31.5 - 36.5 g/dL    RDW 15.0 10.0 - 15.0 %    Platelet Count 243 150 - 450 10e9/L         Recent Labs   Lab Test  11/16/17   1643  06/30/17   0816  06/03/17   2046   03/02/16   0759   HGB  12.5  12.0  8.3*  "  < >   --    PLT  143*  320  288   < >   --    INR   --    --    --    --   0.95   NA  136   --   139   < >   --    POTASSIUM  4.1   --   3.7   < >   --    CR  0.57   --   0.90   < >   --     < > = values in this interval not displayed.      Component      Latest Ref Rng & Units 11/16/2017   WBC      4.0 - 11.0 10e9/L 4.8   RBC Count      3.8 - 5.2 10e12/L 4.43   Hemoglobin      11.7 - 15.7 g/dL 12.5   Hematocrit      35.0 - 47.0 % 37.3   MCV      78 - 100 fl 84   MCH      26.5 - 33.0 pg 28.2   MCHC      31.5 - 36.5 g/dL 33.5   RDW      10.0 - 15.0 % 15.5 (H)   Platelet Count      150 - 450 10e9/L 143 (L)   Diff Method       Automated Method   % Neutrophils      % 65.4   % Lymphocytes      % 21.4   % Monocytes      % 8.8   % Eosinophils      % 3.8   % Basophils      % 0.2   % Immature Granulocytes      % 0.4   Absolute Neutrophil      1.6 - 8.3 10e9/L 3.1   Absolute Lymphocytes      0.8 - 5.3 10e9/L 1.0   Absolute Monocytes      0.0 - 1.3 10e9/L 0.4   Absolute Eosinophils      0.0 - 0.7 10e9/L 0.2   Absolute Basophils      0.0 - 0.2 10e9/L 0.0   Abs Immature Granulocytes      0 - 0.4 10e9/L 0.0   Sodium      133 - 144 mmol/L 136   Potassium      3.4 - 5.3 mmol/L 4.1   Chloride      94 - 109 mmol/L 103   Carbon Dioxide      20 - 32 mmol/L 26   Anion Gap      3 - 14 mmol/L 7   Glucose      70 - 99 mg/dL 81   Urea Nitrogen      7 - 30 mg/dL 11   Creatinine      0.52 - 1.04 mg/dL 0.57   GFR Estimate      >60 mL/min/1.7m2 >90   GFR Estimate If Black      >60 mL/min/1.7m2 >90   Calcium      8.5 - 10.1 mg/dL 9.1   Bilirubin Total      0.2 - 1.3 mg/dL 0.3   Albumin      3.4 - 5.0 g/dL 4.0   Protein Total      6.8 - 8.8 g/dL 7.5   Alkaline Phosphatase      40 - 150 U/L 109   ALT      0 - 50 U/L 31   AST      0 - 45 U/L 18       IMPRESSION:                                                    Reason for surgery/procedure: bilaterall oophorectomy    Diagnosis/reason for consult:   Pre-op consultation  Breast ca, estrogen  receptor positive in the breast. Has done surgery, chemo, and radiation  Anxiety  Insomnia  Celiac sprue  TIFFANY, history of   MDD, recurrent, mild  obesity    The proposed surgical procedure is considered INTERMEDIATE risk.    REVISED CARDIAC RISK INDEX  The patient has the following serious cardiovascular risks for perioperative complications such as (MI, PE, VFib and 3  AV Block):  No serious cardiac risks  INTERPRETATION: 0 risks: Class I (very low risk - 0.4% complication rate)    The patient has the following additional risks for perioperative complications:  No identified additional risks        RECOMMENDATIONS:                                                        --Patient is to take all scheduled medications on the day of surgery    No nsaids or asa within 7 days of surgery     APPROVAL GIVEN to proceed with proposed procedure, without further diagnostic evaluation       Signed Electronically by: Yenifer Ridley MD    Copy of this evaluation report is provided to requesting physician.    [facility] Preop Guidelines

## 2017-11-27 ENCOUNTER — ANESTHESIA EVENT (OUTPATIENT)
Dept: SURGERY | Facility: CLINIC | Age: 37
End: 2017-11-27
Payer: COMMERCIAL

## 2017-11-27 LAB
FINAL DIAGNOSIS: NORMAL
HPV HR 12 DNA CVX QL NAA+PROBE: NEGATIVE
HPV16 DNA SPEC QL NAA+PROBE: NEGATIVE
HPV18 DNA SPEC QL NAA+PROBE: NEGATIVE
SPECIMEN DESCRIPTION: NORMAL

## 2017-11-28 ENCOUNTER — HOSPITAL ENCOUNTER (OUTPATIENT)
Facility: CLINIC | Age: 37
Discharge: HOME | End: 2017-11-28
Attending: OBSTETRICS & GYNECOLOGY | Admitting: OBSTETRICS & GYNECOLOGY
Payer: COMMERCIAL

## 2017-11-28 ENCOUNTER — ANESTHESIA (OUTPATIENT)
Dept: SURGERY | Facility: CLINIC | Age: 37
End: 2017-11-28
Payer: COMMERCIAL

## 2017-11-28 ENCOUNTER — SURGERY (OUTPATIENT)
Age: 37
End: 2017-11-28

## 2017-11-28 VITALS
OXYGEN SATURATION: 92 % | TEMPERATURE: 97.7 F | DIASTOLIC BLOOD PRESSURE: 86 MMHG | HEIGHT: 64 IN | HEART RATE: 94 BPM | SYSTOLIC BLOOD PRESSURE: 124 MMHG | RESPIRATION RATE: 16 BRPM | WEIGHT: 212 LBS | BODY MASS INDEX: 36.19 KG/M2

## 2017-11-28 DIAGNOSIS — Z98.890 S/P LAPAROSCOPY: Primary | ICD-10-CM

## 2017-11-28 LAB
ABO + RH BLD: NORMAL
ABO + RH BLD: NORMAL
B-HCG SERPL-ACNC: 2 IU/L (ref 0–5)
BLD GP AB SCN SERPL QL: NORMAL
BLOOD BANK CMNT PATIENT-IMP: NORMAL
HGB BLD-MCNC: 12.6 G/DL (ref 11.7–15.7)
SPECIMEN EXP DATE BLD: NORMAL

## 2017-11-28 PROCEDURE — 88305 TISSUE EXAM BY PATHOLOGIST: CPT | Performed by: OBSTETRICS & GYNECOLOGY

## 2017-11-28 PROCEDURE — 37000008 ZZH ANESTHESIA TECHNICAL FEE, 1ST 30 MIN: Performed by: OBSTETRICS & GYNECOLOGY

## 2017-11-28 PROCEDURE — 36000056 ZZH SURGERY LEVEL 3 1ST 30 MIN: Performed by: OBSTETRICS & GYNECOLOGY

## 2017-11-28 PROCEDURE — 85018 HEMOGLOBIN: CPT | Performed by: OBSTETRICS & GYNECOLOGY

## 2017-11-28 PROCEDURE — 58661 LAPAROSCOPY REMOVE ADNEXA: CPT | Performed by: OBSTETRICS & GYNECOLOGY

## 2017-11-28 PROCEDURE — 25000128 H RX IP 250 OP 636: Performed by: OBSTETRICS & GYNECOLOGY

## 2017-11-28 PROCEDURE — 27210794 ZZH OR GENERAL SUPPLY STERILE: Performed by: OBSTETRICS & GYNECOLOGY

## 2017-11-28 PROCEDURE — 37000009 ZZH ANESTHESIA TECHNICAL FEE, EACH ADDTL 15 MIN: Performed by: OBSTETRICS & GYNECOLOGY

## 2017-11-28 PROCEDURE — 71000012 ZZH RECOVERY PHASE 1 LEVEL 1 FIRST HR: Performed by: OBSTETRICS & GYNECOLOGY

## 2017-11-28 PROCEDURE — 40000306 ZZH STATISTIC PRE PROC ASSESS II: Performed by: OBSTETRICS & GYNECOLOGY

## 2017-11-28 PROCEDURE — 25000125 ZZHC RX 250: Performed by: OBSTETRICS & GYNECOLOGY

## 2017-11-28 PROCEDURE — 71000027 ZZH RECOVERY PHASE 2 EACH 15 MINS: Performed by: OBSTETRICS & GYNECOLOGY

## 2017-11-28 PROCEDURE — 86901 BLOOD TYPING SEROLOGIC RH(D): CPT | Performed by: OBSTETRICS & GYNECOLOGY

## 2017-11-28 PROCEDURE — 84702 CHORIONIC GONADOTROPIN TEST: CPT | Performed by: OBSTETRICS & GYNECOLOGY

## 2017-11-28 PROCEDURE — 25000128 H RX IP 250 OP 636: Performed by: NURSE ANESTHETIST, CERTIFIED REGISTERED

## 2017-11-28 PROCEDURE — 58661 LAPAROSCOPY REMOVE ADNEXA: CPT | Mod: AS | Performed by: PHYSICIAN ASSISTANT

## 2017-11-28 PROCEDURE — 25000132 ZZH RX MED GY IP 250 OP 250 PS 637: Performed by: OBSTETRICS & GYNECOLOGY

## 2017-11-28 PROCEDURE — 25000566 ZZH SEVOFLURANE, EA 15 MIN: Performed by: OBSTETRICS & GYNECOLOGY

## 2017-11-28 PROCEDURE — 86850 RBC ANTIBODY SCREEN: CPT | Performed by: OBSTETRICS & GYNECOLOGY

## 2017-11-28 PROCEDURE — 36000058 ZZH SURGERY LEVEL 3 EA 15 ADDTL MIN: Performed by: OBSTETRICS & GYNECOLOGY

## 2017-11-28 PROCEDURE — 86900 BLOOD TYPING SEROLOGIC ABO: CPT | Performed by: OBSTETRICS & GYNECOLOGY

## 2017-11-28 PROCEDURE — 25000125 ZZHC RX 250: Performed by: NURSE ANESTHETIST, CERTIFIED REGISTERED

## 2017-11-28 PROCEDURE — 88305 TISSUE EXAM BY PATHOLOGIST: CPT | Mod: 26 | Performed by: OBSTETRICS & GYNECOLOGY

## 2017-11-28 RX ORDER — OXYCODONE HYDROCHLORIDE 5 MG/1
5-10 TABLET ORAL EVERY 4 HOURS PRN
Status: DISCONTINUED | OUTPATIENT
Start: 2017-11-28 | End: 2017-11-28 | Stop reason: HOSPADM

## 2017-11-28 RX ORDER — OXYCODONE HCL 10 MG/1
10 TABLET, FILM COATED, EXTENDED RELEASE ORAL
Status: COMPLETED | OUTPATIENT
Start: 2017-11-28 | End: 2017-11-28

## 2017-11-28 RX ORDER — OXYCODONE HYDROCHLORIDE 5 MG/1
5 TABLET ORAL
Status: CANCELLED | OUTPATIENT
Start: 2017-11-28

## 2017-11-28 RX ORDER — ALBUTEROL SULFATE 0.83 MG/ML
SOLUTION RESPIRATORY (INHALATION)
COMMUNITY
Start: 2017-06-03 | End: 2020-12-21

## 2017-11-28 RX ORDER — HYDROMORPHONE HYDROCHLORIDE 1 MG/ML
.3-.5 INJECTION, SOLUTION INTRAMUSCULAR; INTRAVENOUS; SUBCUTANEOUS EVERY 10 MIN PRN
Status: DISCONTINUED | OUTPATIENT
Start: 2017-11-28 | End: 2017-11-28 | Stop reason: HOSPADM

## 2017-11-28 RX ORDER — FENTANYL CITRATE 50 UG/ML
INJECTION, SOLUTION INTRAMUSCULAR; INTRAVENOUS PRN
Status: DISCONTINUED | OUTPATIENT
Start: 2017-11-28 | End: 2017-11-28

## 2017-11-28 RX ORDER — ONDANSETRON 4 MG/1
4 TABLET, ORALLY DISINTEGRATING ORAL
Status: CANCELLED | OUTPATIENT
Start: 2017-11-28

## 2017-11-28 RX ORDER — DEXAMETHASONE SODIUM PHOSPHATE 4 MG/ML
INJECTION, SOLUTION INTRA-ARTICULAR; INTRALESIONAL; INTRAMUSCULAR; INTRAVENOUS; SOFT TISSUE PRN
Status: DISCONTINUED | OUTPATIENT
Start: 2017-11-28 | End: 2017-11-28

## 2017-11-28 RX ORDER — MEPERIDINE HYDROCHLORIDE 25 MG/ML
12.5 INJECTION INTRAMUSCULAR; INTRAVENOUS; SUBCUTANEOUS
Status: DISCONTINUED | OUTPATIENT
Start: 2017-11-28 | End: 2017-11-28 | Stop reason: HOSPADM

## 2017-11-28 RX ORDER — FENTANYL CITRATE 50 UG/ML
25-50 INJECTION, SOLUTION INTRAMUSCULAR; INTRAVENOUS
Status: DISCONTINUED | OUTPATIENT
Start: 2017-11-28 | End: 2017-11-28 | Stop reason: HOSPADM

## 2017-11-28 RX ORDER — NALOXONE HYDROCHLORIDE 0.4 MG/ML
.1-.4 INJECTION, SOLUTION INTRAMUSCULAR; INTRAVENOUS; SUBCUTANEOUS
Status: DISCONTINUED | OUTPATIENT
Start: 2017-11-28 | End: 2017-11-28 | Stop reason: HOSPADM

## 2017-11-28 RX ORDER — ACETAMINOPHEN 325 MG/1
650 TABLET ORAL EVERY 4 HOURS PRN
Qty: 100 TABLET | Refills: 0 | Status: SHIPPED | OUTPATIENT
Start: 2017-11-28 | End: 2022-04-12

## 2017-11-28 RX ORDER — IBUPROFEN 600 MG/1
600 TABLET, FILM COATED ORAL EVERY 6 HOURS PRN
Qty: 30 TABLET | Refills: 0 | Status: SHIPPED | OUTPATIENT
Start: 2017-11-28 | End: 2020-12-14

## 2017-11-28 RX ORDER — KETOROLAC TROMETHAMINE 30 MG/ML
30 INJECTION, SOLUTION INTRAMUSCULAR; INTRAVENOUS ONCE
Status: COMPLETED | OUTPATIENT
Start: 2017-11-28 | End: 2017-11-28

## 2017-11-28 RX ORDER — SODIUM CHLORIDE, SODIUM LACTATE, POTASSIUM CHLORIDE, CALCIUM CHLORIDE 600; 310; 30; 20 MG/100ML; MG/100ML; MG/100ML; MG/100ML
INJECTION, SOLUTION INTRAVENOUS CONTINUOUS
Status: DISCONTINUED | OUTPATIENT
Start: 2017-11-28 | End: 2017-11-28 | Stop reason: HOSPADM

## 2017-11-28 RX ORDER — ONDANSETRON 2 MG/ML
INJECTION INTRAMUSCULAR; INTRAVENOUS PRN
Status: DISCONTINUED | OUTPATIENT
Start: 2017-11-28 | End: 2017-11-28

## 2017-11-28 RX ORDER — HYDROXYZINE HYDROCHLORIDE 25 MG/1
25 TABLET, FILM COATED ORAL
Status: CANCELLED | OUTPATIENT
Start: 2017-11-28

## 2017-11-28 RX ORDER — BUPIVACAINE HYDROCHLORIDE AND EPINEPHRINE 2.5; 5 MG/ML; UG/ML
INJECTION, SOLUTION EPIDURAL; INFILTRATION; INTRACAUDAL; PERINEURAL PRN
Status: DISCONTINUED | OUTPATIENT
Start: 2017-11-28 | End: 2017-11-28 | Stop reason: HOSPADM

## 2017-11-28 RX ORDER — ONDANSETRON 4 MG/1
4 TABLET, ORALLY DISINTEGRATING ORAL EVERY 30 MIN PRN
Status: DISCONTINUED | OUTPATIENT
Start: 2017-11-28 | End: 2017-11-28 | Stop reason: HOSPADM

## 2017-11-28 RX ORDER — ACETAMINOPHEN 325 MG/1
975 TABLET ORAL ONCE
Status: COMPLETED | OUTPATIENT
Start: 2017-11-28 | End: 2017-11-28

## 2017-11-28 RX ORDER — ONDANSETRON 2 MG/ML
4 INJECTION INTRAMUSCULAR; INTRAVENOUS EVERY 30 MIN PRN
Status: DISCONTINUED | OUTPATIENT
Start: 2017-11-28 | End: 2017-11-28 | Stop reason: HOSPADM

## 2017-11-28 RX ORDER — ALBUTEROL SULFATE 0.83 MG/ML
2.5 SOLUTION RESPIRATORY (INHALATION) EVERY 4 HOURS PRN
Status: DISCONTINUED | OUTPATIENT
Start: 2017-11-28 | End: 2017-11-28 | Stop reason: HOSPADM

## 2017-11-28 RX ORDER — IBUPROFEN 600 MG/1
600 TABLET, FILM COATED ORAL
Status: CANCELLED | OUTPATIENT
Start: 2017-11-28

## 2017-11-28 RX ORDER — LIDOCAINE 40 MG/G
CREAM TOPICAL
Status: DISCONTINUED | OUTPATIENT
Start: 2017-11-28 | End: 2017-11-28 | Stop reason: HOSPADM

## 2017-11-28 RX ORDER — OXYCODONE HYDROCHLORIDE 5 MG/1
5-10 TABLET ORAL
Qty: 30 TABLET | Refills: 0 | Status: SHIPPED | OUTPATIENT
Start: 2017-11-28 | End: 2018-01-05

## 2017-11-28 RX ORDER — PROPOFOL 10 MG/ML
INJECTION, EMULSION INTRAVENOUS PRN
Status: DISCONTINUED | OUTPATIENT
Start: 2017-11-28 | End: 2017-11-28

## 2017-11-28 RX ADMIN — FENTANYL CITRATE 50 MCG: 50 INJECTION INTRAMUSCULAR; INTRAVENOUS at 13:39

## 2017-11-28 RX ADMIN — FENTANYL CITRATE 100 MCG: 50 INJECTION, SOLUTION INTRAMUSCULAR; INTRAVENOUS at 11:17

## 2017-11-28 RX ADMIN — ONDANSETRON 4 MG: 2 INJECTION INTRAMUSCULAR; INTRAVENOUS at 13:31

## 2017-11-28 RX ADMIN — DEXAMETHASONE SODIUM PHOSPHATE 8 MG: 4 INJECTION, SOLUTION INTRA-ARTICULAR; INTRALESIONAL; INTRAMUSCULAR; INTRAVENOUS; SOFT TISSUE at 11:17

## 2017-11-28 RX ADMIN — SODIUM CHLORIDE, POTASSIUM CHLORIDE, SODIUM LACTATE AND CALCIUM CHLORIDE: 600; 310; 30; 20 INJECTION, SOLUTION INTRAVENOUS at 11:40

## 2017-11-28 RX ADMIN — ONDANSETRON 4 MG: 2 INJECTION INTRAMUSCULAR; INTRAVENOUS at 11:17

## 2017-11-28 RX ADMIN — PROCHLORPERAZINE EDISYLATE 10 MG: 5 INJECTION INTRAMUSCULAR; INTRAVENOUS at 14:38

## 2017-11-28 RX ADMIN — PROPOFOL 200 MG: 10 INJECTION, EMULSION INTRAVENOUS at 11:17

## 2017-11-28 RX ADMIN — BUPIVACAINE HYDROCHLORIDE AND EPINEPHRINE BITARTRATE 30 ML: 2.5; .0091 INJECTION, SOLUTION EPIDURAL; INFILTRATION; INTRACAUDAL; PERINEURAL at 11:57

## 2017-11-28 RX ADMIN — HYDROMORPHONE HYDROCHLORIDE 0.5 MG: 1 INJECTION, SOLUTION INTRAMUSCULAR; INTRAVENOUS; SUBCUTANEOUS at 12:34

## 2017-11-28 RX ADMIN — KETOROLAC TROMETHAMINE 30 MG: 30 INJECTION, SOLUTION INTRAMUSCULAR at 10:35

## 2017-11-28 RX ADMIN — LIDOCAINE HYDROCHLORIDE 1 ML: 10 INJECTION, SOLUTION EPIDURAL; INFILTRATION; INTRACAUDAL; PERINEURAL at 10:34

## 2017-11-28 RX ADMIN — HYDROMORPHONE HYDROCHLORIDE 0.5 MG: 1 INJECTION, SOLUTION INTRAMUSCULAR; INTRAVENOUS; SUBCUTANEOUS at 12:45

## 2017-11-28 RX ADMIN — MIDAZOLAM HYDROCHLORIDE 2 MG: 1 INJECTION, SOLUTION INTRAMUSCULAR; INTRAVENOUS at 12:12

## 2017-11-28 RX ADMIN — FENTANYL CITRATE 100 MCG: 50 INJECTION, SOLUTION INTRAMUSCULAR; INTRAVENOUS at 11:13

## 2017-11-28 RX ADMIN — FENTANYL CITRATE 100 MCG: 50 INJECTION, SOLUTION INTRAMUSCULAR; INTRAVENOUS at 12:16

## 2017-11-28 RX ADMIN — OXYCODONE HYDROCHLORIDE 10 MG: 5 TABLET ORAL at 12:38

## 2017-11-28 RX ADMIN — FENTANYL CITRATE 50 MCG: 50 INJECTION INTRAMUSCULAR; INTRAVENOUS at 13:53

## 2017-11-28 RX ADMIN — FENTANYL CITRATE 150 MCG: 50 INJECTION, SOLUTION INTRAMUSCULAR; INTRAVENOUS at 11:11

## 2017-11-28 RX ADMIN — BUPIVACAINE HYDROCHLORIDE AND EPINEPHRINE BITARTRATE 20 ML: 2.5; .0091 INJECTION, SOLUTION EPIDURAL; INFILTRATION; INTRACAUDAL; PERINEURAL at 12:04

## 2017-11-28 RX ADMIN — FENTANYL CITRATE 100 MCG: 50 INJECTION, SOLUTION INTRAMUSCULAR; INTRAVENOUS at 11:57

## 2017-11-28 RX ADMIN — OXYCODONE HYDROCHLORIDE 10 MG: 10 TABLET, FILM COATED, EXTENDED RELEASE ORAL at 10:35

## 2017-11-28 RX ADMIN — SODIUM CHLORIDE, POTASSIUM CHLORIDE, SODIUM LACTATE AND CALCIUM CHLORIDE: 600; 310; 30; 20 INJECTION, SOLUTION INTRAVENOUS at 10:34

## 2017-11-28 RX ADMIN — MIDAZOLAM HYDROCHLORIDE 2 MG: 1 INJECTION, SOLUTION INTRAMUSCULAR; INTRAVENOUS at 11:11

## 2017-11-28 RX ADMIN — ACETAMINOPHEN 975 MG: 325 TABLET, FILM COATED ORAL at 10:35

## 2017-11-28 RX ADMIN — MIDAZOLAM HYDROCHLORIDE 2 MG: 1 INJECTION, SOLUTION INTRAMUSCULAR; INTRAVENOUS at 11:15

## 2017-11-28 ASSESSMENT — LIFESTYLE VARIABLES: TOBACCO_USE: 1

## 2017-11-28 NOTE — INTERVAL H&P NOTE
No interval changes.  Plan for a  Laparoscopic Bilateral Salpingo Oopherectomy. Risks/benefits/alternatives discussed including the side affects and risks of surgical menopause. She understands and desires to proceed  Preeti Tirado MD

## 2017-11-28 NOTE — IP AVS SNAPSHOT
[facility] Pre[facility]/Phase II    [address] [address]    Phone:  [phone]    Fax:  [phone]                                       After Visit Summary   11/28/2017    Diana Wilson    MRN: [id number]           After Visit Summary Signature Page     I have received my discharge instructions, and my questions have been answered. I have discussed any challenges I see with this plan with the nurse or doctor.    ..........................................................................................................................................  Patient/Patient Representative Signature      ..........................................................................................................................................  Patient Representative Print Name and Relationship to Patient    ..................................................               ................................................  Date                                            Time    ..........................................................................................................................................  Reviewed by Signature/Title    ...................................................              ..............................................  Date                                                            Time

## 2017-11-28 NOTE — H&P (VIEW-ONLY)
[facility]  [address] [facility] [address]  [phone]  Dept: [phone]    PRE-OP EVALUATION:  Today's date: 2017    Diana Wilson (: 1980) presents for pre-operative evaluation assessment as requested by Dr. Gonzalez .  She requires evaluation and anesthesia risk assessment prior to undergoing surgery/procedure for treatment of  Prophylactic oophorectomy due to strong family history of breast cancer and estrogen receptor positive tumor      Proposed procedure: Laparoscopic bilateral salpingo oophorectomy    Date of Surgery/ Procedure: 2017  Time of Surgery/ Procedure: 11:45am  Hospital/Surgical Facility: [facility]   Primary Physician: Yenifer Ridley  Type of Anesthesia Anticipated: General    Patient has a Health Care Directive or Living Will:  NO    1. NO - Do you have a history of heart attack, stroke, stent, bypass or surgery on an artery in the head, neck, heart or legs?  2. NO - Do you ever have any pain or discomfort in your chest?  3. NO - Do you have a history of  Heart Failure?  4. NO - Are you troubled by shortness of breath when: walking on the level, up a slight hill or at night?  5. NO - Do you currently have a cold, bronchitis or other respiratory infection?  6. NO - Do you have a cough, shortness of breath or wheezing?  7. NO - Do you sometimes get pains in the calves of your legs when you walk?  8. Yes - Do you or anyone in your family have previous history of blood clots? - mother, post surgical   9. NO - Do you or does anyone in your family have a serious bleeding problem such as prolonged bleeding following surgeries or cuts?  10. Yes - Have you ever had problems with anemia or been told to take iron pills? - self, in the past   11. Yes - Have you had any abnormal blood loss such as black, tarry or bloody stools, or abnormal vaginal bleeding?  12. NO - Have you ever had a blood transfusion?  13. Yes - Have you or any of your  relatives ever had problems with anesthesia? -personal history of nausea   14. NO - Do you have sleep apnea, excessive snoring or daytime drowsiness?  15. NO - Do you have any prosthetic heart valves?  16. NO - Do you have prosthetic joints?  17. NO - Is there any chance that you may be pregnant?        HPI:                                                      Brief HPI related to upcoming procedure:   Malignant neoplasm of right breast in female, estrogen receptor positive, unspecified site of breast (H)  (primary encounter diagnosis)  Comment: reviewed Dr. Jin's notes and recommendations for ovarian removal for future oncologic treatment of this patient, negative genetic evaluation noted.      See problem list for active medical problems.  Problems all longstanding and stable, except as noted/documented.  See ROS for pertinent symptoms related to these conditions.                                                                                                  .    MEDICAL HISTORY:                                                    Patient Active Problem List    Diagnosis Date Noted     Lymphedema of right upper extremity 11/24/2017     Priority: Medium     Malignant neoplasm of upper-outer quadrant of right female breast (H) 01/06/2017     Priority: Medium     Invasive ductal carcinoma of breast, right (H) 12/30/2016     Priority: Medium     Intestinal malabsorption 02/16/2016     Priority: Medium     Anxiety 07/15/2014     Priority: Medium     Insomnia 07/17/2012     Priority: Medium     Iron deficiency anemia 03/14/2012     Priority: Medium     Major depressive disorder, recurrent episode, mild (H) 08/05/2011     Priority: Medium      mainly postpartum, had tried Zoloft and anafranil    [facility] enrolled pt in 6 months pt ed program          CARDIOVASCULAR SCREENING; LDL GOAL LESS THAN 130 10/31/2010     Priority: Medium     Obesity 09/08/2008     Priority: Medium     Wt Readings from Last 5 Encounters:    07/11/11 172 lb (78.019 kg)   12/20/10 174 lb (78.926 kg)   07/26/10 166 lb (75.297 kg)   11/12/09 170 lb (77.111 kg)   11/03/09 169 lb (76.658 kg)     Body mass index is 29.99 kg/(m^2).        Mild intermittent asthma      Priority: Medium            never has had PFT's, MDI with colds and at times exercise       Health Care Home 05/14/2013     Priority: Low     EMERGENCY CARE PLAN  May 14, 2013: No current Care Coordination follow up planned. Please refer if Care Coordination services are needed.    Presenting Problem Signs and Symptoms Treatment Plan   Questions or concerns   during clinic hours   I will call my clinic directly:  [address] [address]  [phone].    Questions or concerns outside clinic hours   I will call the 24 hour nurse line at   [phone] or [facility].   Need to schedule an appointment   I will call the 24 hour scheduling team at [phone] or my clinic directly at [phone].    Same day treatment     I will call my clinic first, nurse line if after hours, urgent care and express care if needed.   Clinic care coordination services (regular clinic hours)     I will call a clinic care coordinator directly:     Darron Gaming RN  Mon, Tues, Fri - [phone]  Wed, Thurs - [phone]    Marlin Nelson :    [phone]    Or call my clinic at [phone] and ask to speak with care coordination.   Crisis Services: Behavioral or Mental Health  [facility] 24 Hour Phone Line  [phone]    [facility] 24 Hour Crisis Services  [phone]    [facility] (Behavioral Health Providers) [facility] [phone]    [facility]   [phone]       Emergency treatment -- Immediately    [facility] 911               Past Medical History:   Diagnosis Date     Depressive disorder 1995    And Anxiety     Encounter for insertion or removal of intrauterine contraceptive device 1/8/2008     Excessive or frequent menstruation 03/19/2003      Fibroadenosis of breast      Invasive ductal carcinoma of breast, right (H)      Malignant neoplasm of upper-outer quadrant of right female breast (H)      Neutropenia, drug-induced (H)      Nongonococcal urethritis (JALIL) due to chlamydia trachomatis 01/03/2002     Other and unspecified ovarian cyst 03/19/2003    RIGHT ovarian cyst     Transient hypertension of pregnancy, antepartum     PIH with last birth     Uncomplicated asthma 2002     Past Surgical History:   Procedure Laterality Date     BIOPSY  2/2016    EDG with biopsy     BREAST SURGERY  1997    Bilateral reduction     C APPENDECTOMY  1991     DENTAL SURGERY  09/13/2010 and 10/12/2010    Root canals     DILATION AND CURETTAGE, HYSTEROSCOPY, ABLATE ENDOMETRIUM NOVASURE, COMBINED  3/20/2012    Procedure:COMBINED DILATION AND CURETTAGE, HYSTEROSCOPY, ABLATE ENDOMETRIUM NOVASURE; Hysteroscopy with Endometrial Ablation Novasure; Surgeon:GERRI PURCELL; Location:[facility]     ESOPHAGOSCOPY, GASTROSCOPY, DUODENOSCOPY (EGD), COMBINED N/A 2/18/2016    Procedure: COMBINED ESOPHAGOSCOPY, GASTROSCOPY, DUODENOSCOPY (EGD);  Surgeon: Jose L Wilson MD;  Location: [facility]     INSERT PORT VASCULAR ACCESS N/A 1/11/2017    Procedure: INSERT PORT VASCULAR ACCESS;  Surgeon: Michael Townsend MD;  Location: [facility]     SURGICAL HISTORY OF -   1997    Breast reduction     Current Outpatient Prescriptions   Medication Sig Dispense Refill     scopolamine (TRANSDERM) 72 hr patch Place 1 patch onto the skin every 72 hours.  Apply to hairless area behind one ear at least 4 hours before travel.  Remove old patch and change every 3 days. 1 patch 0     venlafaxine (EFFEXOR) 37.5 MG tablet Take 1 tablet (37.5 mg) in the morning and 2 tablets (75 mg) in the evening. 90 tablet 1     magnesium hydroxide (MILK OF MAGNESIA) 400 MG/5ML suspension Take 30 mLs by mouth       fluticasone (FLONASE) 50 MCG/ACT nasal spray Spray 1-2 sprays into both nostrils daily 3 Bottle 3     loratadine  "(CLARITIN) 10 MG tablet Take 1 tablet (10 mg) by mouth daily 90 tablet 3     Naproxen Sodium (ALEVE PO) Take 220 mg by mouth daily as needed for moderate pain       silver sulfADIAZINE (SILVADENE) 1 % cream Apply topically 2 times daily Apply to erythematous peeling areas  g 3     [DISCONTINUED] albuterol (2.5 MG/3ML) 0.083% neb solution Take 1 vial (2.5 mg) by nebulization every 6 hours as needed for shortness of breath / dyspnea or wheezing (Patient not taking: Reported on 11/21/2017) 75 mL 0     albuterol (PROAIR HFA, PROVENTIL HFA, VENTOLIN HFA) 108 (90 BASE) MCG/ACT inhaler Inhale 2 puffs into the lungs every 6 hours as needed for shortness of breath / dyspnea (Patient not taking: Reported on 11/21/2017) 1 Inhaler 11     montelukast (SINGULAIR) 10 MG tablet Take 1 tablet (10 mg) by mouth At Bedtime 90 tablet 3     OTC products: no recent use of OTC ASA, NSAIDS or Steroids and no use of herbal medications or other supplements    Allergies   Allergen Reactions     Carboplatin Shortness Of Breath, Rash and Anaphylaxis     Other reaction(s): Flushing, Tachycardia     Ambien      hallucinations     Amoxicillin GI Disturbance     Erythromycin GI Disturbance     Hydrocodone-Acetaminophen      Other reaction(s): Hallucinations     Penicillins Nausea and Vomiting and Hives     1-11-17 pt states this was a childhood reaction     Zolpidem      Other reaction(s): Hallucinations     Hydrocodone Other (See Comments)     Out of body experience, \"creepy-crawly\" skin       Latex Allergy: NO    Social History   Substance Use Topics     Smoking status: Former Smoker     Packs/day: 0.50     Years: 10.00     Types: Cigarettes     Quit date: 10/1/2016     Smokeless tobacco: Never Used      Comment: Socially- quit smoking 10/01/2016     Alcohol use 0.0 oz/week      Comment: Occassional     History   Drug Use No       REVIEW OF SYSTEMS:                                                    Constitutional, neuro, ENT, endocrine, " "pulmonary, cardiac, gastrointestinal, genitourinary, musculoskeletal, integument and psychiatric systems are negative, except as otherwise noted.      EXAM:                                                    /73 (BP Location: Left arm, Patient Position: Sitting, Cuff Size: Adult Large)  Pulse 95  Temp 97.9  F (36.6  C) (Tympanic)  Ht 5' 4\" (1.626 m)  Wt 213 lb 3.2 oz (96.7 kg)  BMI 36.6 kg/m2    GENERAL APPEARANCE: healthy, alert and no distress     EYES: EOMI, PERRL     HENT: ear canals and TM's normal and nose and mouth without ulcers or lesions     NECK: no adenopathy, no asymmetry, masses, or scars and thyroid normal to palpation     RESP: lungs clear to auscultation - no rales, rhonchi or wheezes     CV: regular rates and rhythm, normal S1 S2, no S3 or S4 and no murmur, click or rub     ABDOMEN:  soft, nontender, no HSM or masses and bowel sounds normal     MS: extremities normal- no gross deformities noted, no evidence of inflammation in joints, FROM in all extremities.     NEURO: Normal strength and tone, sensory exam grossly normal, mentation intact and speech normal     PSYCH: mentation appears normal. and affect normal/bright    DIAGNOSTICS:                                                    EKG: Not indicated due to non-vascular surgery and low risk of event (age <65 and without cardiac risk factors)    Normal bmp last week  Low platelets on cbc last week - recheck today to confirm stability.    Results for orders placed or performed in visit on 11/24/17   CBC with platelets   Result Value Ref Range    WBC 4.7 4.0 - 11.0 10e9/L    RBC Count 4.45 3.8 - 5.2 10e12/L    Hemoglobin 12.7 11.7 - 15.7 g/dL    Hematocrit 37.7 35.0 - 47.0 %    MCV 85 78 - 100 fl    MCH 28.5 26.5 - 33.0 pg    MCHC 33.7 31.5 - 36.5 g/dL    RDW 15.0 10.0 - 15.0 %    Platelet Count 243 150 - 450 10e9/L         Recent Labs   Lab Test  11/16/17   1643  06/30/17   0816  06/03/17   2046   03/02/16   0759   HGB  12.5  12.0  8.3*  "  < >   --    PLT  143*  320  288   < >   --    INR   --    --    --    --   0.95   NA  136   --   139   < >   --    POTASSIUM  4.1   --   3.7   < >   --    CR  0.57   --   0.90   < >   --     < > = values in this interval not displayed.      Component      Latest Ref Rng & Units 11/16/2017   WBC      4.0 - 11.0 10e9/L 4.8   RBC Count      3.8 - 5.2 10e12/L 4.43   Hemoglobin      11.7 - 15.7 g/dL 12.5   Hematocrit      35.0 - 47.0 % 37.3   MCV      78 - 100 fl 84   MCH      26.5 - 33.0 pg 28.2   MCHC      31.5 - 36.5 g/dL 33.5   RDW      10.0 - 15.0 % 15.5 (H)   Platelet Count      150 - 450 10e9/L 143 (L)   Diff Method       Automated Method   % Neutrophils      % 65.4   % Lymphocytes      % 21.4   % Monocytes      % 8.8   % Eosinophils      % 3.8   % Basophils      % 0.2   % Immature Granulocytes      % 0.4   Absolute Neutrophil      1.6 - 8.3 10e9/L 3.1   Absolute Lymphocytes      0.8 - 5.3 10e9/L 1.0   Absolute Monocytes      0.0 - 1.3 10e9/L 0.4   Absolute Eosinophils      0.0 - 0.7 10e9/L 0.2   Absolute Basophils      0.0 - 0.2 10e9/L 0.0   Abs Immature Granulocytes      0 - 0.4 10e9/L 0.0   Sodium      133 - 144 mmol/L 136   Potassium      3.4 - 5.3 mmol/L 4.1   Chloride      94 - 109 mmol/L 103   Carbon Dioxide      20 - 32 mmol/L 26   Anion Gap      3 - 14 mmol/L 7   Glucose      70 - 99 mg/dL 81   Urea Nitrogen      7 - 30 mg/dL 11   Creatinine      0.52 - 1.04 mg/dL 0.57   GFR Estimate      >60 mL/min/1.7m2 >90   GFR Estimate If Black      >60 mL/min/1.7m2 >90   Calcium      8.5 - 10.1 mg/dL 9.1   Bilirubin Total      0.2 - 1.3 mg/dL 0.3   Albumin      3.4 - 5.0 g/dL 4.0   Protein Total      6.8 - 8.8 g/dL 7.5   Alkaline Phosphatase      40 - 150 U/L 109   ALT      0 - 50 U/L 31   AST      0 - 45 U/L 18       IMPRESSION:                                                    Reason for surgery/procedure: bilaterall oophorectomy    Diagnosis/reason for consult:   Pre-op consultation  Breast ca, estrogen  receptor positive in the breast. Has done surgery, chemo, and radiation  Anxiety  Insomnia  Celiac sprue  TIFFANY, history of   MDD, recurrent, mild  obesity    The proposed surgical procedure is considered INTERMEDIATE risk.    REVISED CARDIAC RISK INDEX  The patient has the following serious cardiovascular risks for perioperative complications such as (MI, PE, VFib and 3  AV Block):  No serious cardiac risks  INTERPRETATION: 0 risks: Class I (very low risk - 0.4% complication rate)    The patient has the following additional risks for perioperative complications:  No identified additional risks        RECOMMENDATIONS:                                                        --Patient is to take all scheduled medications on the day of surgery    No nsaids or asa within 7 days of surgery     APPROVAL GIVEN to proceed with proposed procedure, without further diagnostic evaluation       Signed Electronically by: Yenifer Ridley MD    Copy of this evaluation report is provided to requesting physician.    [facility] Preop Guidelines

## 2017-11-28 NOTE — IP AVS SNAPSHOT
MRN:[id number]                      After Visit Summary   11/28/2017    Diana Wilson    MRN: [id number]           Thank you!     Thank you for choosing [facility] for your care. Our goal is always to provide you with excellent care. Hearing back from our patients is one way we can continue to improve our services. Please take a few minutes to complete the written survey that you may receive in the mail after you visit with us. Thank you!        Patient Information     Date Of Birth          1980        About your hospital stay     You were admitted on:  November 28, 2017 You last received care in the:  [facility] PreOP/Phase II    You were discharged on:  November 28, 2017       Who to Call     For medical emergencies, please call 911.  For non-urgent questions about your medical care, please call your primary care provider or clinic, [phone]  For questions related to your surgery, please call your surgery clinic        Attending Provider     Provider Preeti Joseph MD OB/Gyn       Primary Care Provider Office Phone # Fax #    Yenifer Ridley [phone]      After Care Instructions     Discharge Instructions       Pelvic Rest. No tampons, douching or intercourse for  2  Weeks. No driving while on narcotics.                  Your next 10 appointments already scheduled     Dec 04, 2017  2:00 PM CST   Return Visit with COLLEEN Sheikh CNP   [facility]    [address], [address]  [place] [facility] [place] [address]   [phone]            Dec 12, 2017 10:45 AM CST   Return Visit with Maribel Jin MD   [facility] ([facility])    [facility]  [address]  [place] [place] [facility] [place] [address]   [phone]            Dec 13, 2017  9:40 AM CST   LAB with [facility] ([facility])    [address]  [place] [address]   [phone]           Please do not eat 10-12 hours before your appointment if you are coming in fasting for labs on lipids, cholesterol, or glucose (sugar). This does not apply to pregnant women. Water, hot tea and black coffee (with nothing added) are okay. Do not drink other fluids, diet soda or chew gum.              Further instructions from your care team                           Same Day Surgery Discharge Instructions  Special Precautions After Surgery - Adult    1. It is not unusual to feel lightheaded or faint, up to 24 hours after surgery or while taking pain medication.  If you have these symptoms; sit for a few minutes before standing and have someone assist you when getting up.  2. You should rest and relax for the next 24 hours and must have someone stay with you for at least 24 hours after your discharge.  3. DO NOT DRIVE any vehicle or operate mechanical equipment for 24 hours following the end of your surgery.  DO NOT DRIVE while taking narcotic pain medications that have been prescribed by your physician.  If you had a limb operated on, you must be able to use it fully to drive.  4. DO NOT drink alcoholic beverages for 24 hours following surgery or while taking prescription pain medication.  5. Drink clear liquids (apple juice, ginger ale, broth, 7-Up, etc.).  Progress to your regular diet as you feel able.  6. Any questions call your physician and do not make important decisions for 24 hours.    ACTIVITY  ? Up as tolerated with rest periods as needed.     INCISIONAL CARE  ? May shower late tomorrow.     Call for an appointment to return to the clinic in 2-4 weeks.    Medications:  ? Had 2 pain pills at 12:40pm.  May also use Ibuprofen or Aleve over-the-counter will taking the pain pills - follow bottle directions.     Additional discharge instructions:  Cold pack off & on to incision to decrease swelling & pain & warm pack OK to lower back as  "needed.  __________________________________________________________________________________________________________________________________  IMPORTANT NUMBERS:    [facility] Main Number:  [phone], [phone]  Pharmacy:  [phone]  Same Day Surgery:  [phone], Monday - Friday until 8:30 p.m.  Urgent Care:  [phone]  Emergency Room:  [phone]      [facility]:  [phone]                                                                             [facility]:  [phone] option 1  [facility]:  [phone]     OB Clinic:  [phone]   [facility]:  [phone]   Home Medical Equipment: [phone]  [facility]:  [phone]        Pending Results     Date and Time Order Name Status Description    11/28/2017 1152 Surgical pathology exam In process             Admission Information     Date & Time Provider Department Dept. Phone    11/28/2017 Preeti Tirado MD [facility] PreOP/Phase [phone]      Your Vitals Were     Blood Pressure Pulse Temperature Respirations Height Weight    125/84 94 97.7  F (36.5  C) (Oral) 14 1.626 m (5' 4\") 96.2 kg (212 lb)    Last Period Pulse Oximetry BMI (Body Mass Index)             11/28/2012 92% 36.39 kg/m2         MyChart Information     [employer] gives you secure access to your electronic health record. If you see a primary care provider, you can also send messages to your care team and make appointments. If you have questions, please call your primary care clinic.  If you do not have a primary care provider, please call [phone] and they will assist you.        Care EveryWhere ID     This is your Care EveryWhere ID. This could be used by other organizations to access your [facility] medical records  [id number]        Equal Access to Services     FAUZIA RAMIREZ AH: ming Crawford, javier alaniz " ephraimelinkinga ramirez'aan ah. So [facility] [phone].    ATENCIÓN: Si jaylin gonzalez, tiene a montelongo disposición servicios gratuitos de asistencia lingüística. Mackenzie al [phone].    We comply with applicable federal civil rights laws and [place] laws. We do not discriminate on the basis of race, color, national origin, age, disability, sex, sexual orientation, or gender identity.               Review of your medicines      START taking        Dose / Directions    acetaminophen 325 MG tablet   Commonly known as:  TYLENOL   Used for:  S/P laparoscopy        Dose:  650 mg   Take 2 tablets (650 mg) by mouth every 4 hours as needed for other (mild pain)   Quantity:  100 tablet   Refills:  0       ibuprofen 600 MG tablet   Commonly known as:  ADVIL/MOTRIN   Used for:  S/P laparoscopy        Dose:  600 mg   Take 1 tablet (600 mg) by mouth every 6 hours as needed for pain (mild)   Quantity:  30 tablet   Refills:  0       oxyCODONE IR 5 MG tablet   Commonly known as:  ROXICODONE   Used for:  S/P laparoscopy        Dose:  5-10 mg   Take 1-2 tablets (5-10 mg) by mouth every 3 hours as needed for pain or other (Moderate to Severe)   Quantity:  30 tablet   Refills:  0         CONTINUE these medicines which have NOT CHANGED        Dose / Directions    * albuterol 108 (90 BASE) MCG/ACT Inhaler   Commonly known as:  PROAIR HFA/PROVENTIL HFA/VENTOLIN HFA   Used for:  Mild persistent asthma with exacerbation        Dose:  2 puff   Inhale 2 puffs into the lungs every 6 hours as needed for shortness of breath / dyspnea   Quantity:  1 Inhaler   Refills:  11       * albuterol (2.5 MG/3ML) 0.083% neb solution        Refills:  0       ALEVE PO        Dose:  220 mg   Take 220 mg by mouth daily as needed for moderate pain   Refills:  0       fluticasone 50 MCG/ACT spray   Commonly known as:  FLONASE   Used for:  Environmental allergies        Dose:  1-2 spray   Spray 1-2 sprays into both nostrils daily   Quantity:  3 Bottle   Refills:  3       loratadine  10 MG tablet   Commonly known as:  CLARITIN   Used for:  Mild persistent asthma with exacerbation        Dose:  10 mg   Take 1 tablet (10 mg) by mouth daily   Quantity:  90 tablet   Refills:  3       magnesium hydroxide 400 MG/5ML suspension   Commonly known as:  MILK OF MAGNESIA        Dose:  30 mL   Take 30 mLs by mouth   Refills:  0       montelukast 10 MG tablet   Commonly known as:  SINGULAIR   Used for:  Environmental allergies        Dose:  10 mg   Take 1 tablet (10 mg) by mouth At Bedtime   Quantity:  90 tablet   Refills:  3       scopolamine 72 hr patch   Commonly known as:  TRANSDERM   Used for:  Preop general physical exam, Nausea, Invasive ductal carcinoma of breast, right (H), Malignant neoplasm of upper-outer quadrant of right breast in female, estrogen receptor positive (H), Family history of malignant neoplasm of breast        Place 1 patch onto the skin every 72 hours.  Apply to hairless area behind one ear at least 4 hours before travel.  Remove old patch and change every 3 days.   Quantity:  1 patch   Refills:  0       silver sulfADIAZINE 1 % cream   Commonly known as:  SILVADENE   Used for:  Malignant neoplasm of upper-outer quadrant of right breast in female, estrogen receptor negative (H)        Apply topically 2 times daily Apply to erythematous peeling areas BID   Quantity:  400 g   Refills:  3       venlafaxine 37.5 MG tablet   Commonly known as:  EFFEXOR   Used for:  Hot flushes, perimenopausal, Depression, unspecified depression type        Take 1 tablet (37.5 mg) in the morning and 2 tablets (75 mg) in the evening.   Quantity:  90 tablet   Refills:  1       * Notice:  This list has 2 medication(s) that are the same as other medications prescribed for you. Read the directions carefully, and ask your doctor or other care provider to review them with you.         Where to get your medicines      These medications were sent to [facility] [place], [place] - [address]  [facility] [address]     Phone:  [phone]     acetaminophen 325 MG tablet    ibuprofen 600 MG tablet         Some of these will need a paper prescription and others can be bought over the counter. Ask your nurse if you have questions.     Bring a paper prescription for each of these medications     oxyCODONE IR 5 MG tablet                Protect others around you: Learn how to safely use, store and throw away your medicines at www.disposemymeds.org.             Medication List: This is a list of all your medications and when to take them. Check marks below indicate your daily home schedule. Keep this list as a reference.      Medications           Morning Afternoon Evening Bedtime As Needed    acetaminophen 325 MG tablet   Commonly known as:  TYLENOL   Take 2 tablets (650 mg) by mouth every 4 hours as needed for other (mild pain)   Last time this was given:  975 mg on 11/28/2017 10:35 AM                                * albuterol 108 (90 BASE) MCG/ACT Inhaler   Commonly known as:  PROAIR HFA/PROVENTIL HFA/VENTOLIN HFA   Inhale 2 puffs into the lungs every 6 hours as needed for shortness of breath / dyspnea                                * albuterol (2.5 MG/3ML) 0.083% neb solution                                ALEVE PO   Take 220 mg by mouth daily as needed for moderate pain                                fluticasone 50 MCG/ACT spray   Commonly known as:  FLONASE   Spray 1-2 sprays into both nostrils daily                                ibuprofen 600 MG tablet   Commonly known as:  ADVIL/MOTRIN   Take 1 tablet (600 mg) by mouth every 6 hours as needed for pain (mild)                                loratadine 10 MG tablet   Commonly known as:  CLARITIN   Take 1 tablet (10 mg) by mouth daily                                magnesium hydroxide 400 MG/5ML suspension   Commonly known as:  MILK OF MAGNESIA   Take 30 mLs by mouth                                montelukast 10 MG tablet   Commonly known as:   SINGULAIR   Take 1 tablet (10 mg) by mouth At Bedtime                                oxyCODONE IR 5 MG tablet   Commonly known as:  ROXICODONE   Take 1-2 tablets (5-10 mg) by mouth every 3 hours as needed for pain or other (Moderate to Severe)   Last time this was given:  10 mg on 11/28/2017 12:38 PM                                scopolamine 72 hr patch   Commonly known as:  TRANSDERM   Place 1 patch onto the skin every 72 hours.  Apply to hairless area behind one ear at least 4 hours before travel.  Remove old patch and change every 3 days.                                silver sulfADIAZINE 1 % cream   Commonly known as:  SILVADENE   Apply topically 2 times daily Apply to erythematous peeling areas BID                                venlafaxine 37.5 MG tablet   Commonly known as:  EFFEXOR   Take 1 tablet (37.5 mg) in the morning and 2 tablets (75 mg) in the evening.                                * Notice:  This list has 2 medication(s) that are the same as other medications prescribed for you. Read the directions carefully, and ask your doctor or other care provider to review them with you.              More Information        Pelvic Laparoscopy    Laparoscopy is a type of surgery done using very small incisions. This type of surgery is possible because of the laparoscope (a long, slender tool with a camera and light). It lets your surgeon see inside the stomach. To perform the surgery, special instruments are inserted into the stomach through small incisions. Pelvic laparoscopy is often used to diagnose and treat the causes of pelvic problems, such as pain and infertility. Laparoscopy often involves:    A short hospital stay. (You can most likely go home the same day.)    A quick recovery    Minimal anesthesia    Small external scars    Mild to moderate postoperative pain  Getting ready  To prepare for surgery:    Tell your surgeon about any medicines you take. Include herbs, supplements, and over-the-counter  medicines. You may need to stop taking certain medicines, such as aspirin, for 7 to 10 days before surgery.    Do not eat or drink anything after the midnight before surgery.    Arrange for a ride home after surgery.  Before the procedure  You will most likely be given general anesthesia to make you sleep during the procedure. A catheter may be inserted to drain urine from the bladder.   How pelvic laparoscopy is done  One or more small (quarter- or half-inch) incisions are made near the navel or the pubic hairline, or on either side of the lower belly. The laparoscope is inserted through an incision. It sends images to a video screen, allowing the surgeon a close-up view of the organs. Gas is used to inflate the belly, allowing the surgeon room to see and work. Depending on what is found, surgery to treat the problem may be done at this time.  After the procedure    You ll be taken to a post-op area to wake up and recover from anesthesia.    You may feel some shoulder pain. This is due to irritation from the gas used to inflate the belly.    You may have some discharge from the vagina. If so, ask the nurse for a pad.    You will be asked to walk around to improve breathing and blood flow.    If you had a catheter, it will most likely be removed before you go home.    You can go home as soon as you recover from anesthesia and your condition is stable.  Your recovery  Recovery time depends on which procedure was done through the laparoscope. Your recovery from pelvic laparoscopy may take up to 6 weeks. If it was a simple procedure, like tubal ligation, then 2 weeks is reasonable. For laparoscopic hysterectomies, the recovery may be closer to 6 weeks. While you recover, be sure to follow your healthcare provider s instructions. During this time:    Take pain medicine as prescribed.    Start eating solid food when you feel ready. To avoid constipation, eat fruits, vegetables, and whole grains. Drink plenty of  fluids.    Don t lift anything heavy until your healthcare provider says it s safe.    Take it easy for a few days. Ask your healthcare provider when you can return to work, exercise, and sex.    Arrange for a follow-up visit with your healthcare provider to discuss the results of the procedure.  Call your healthcare provider  Contact your healthcare provider right away if you have any of the following:    Have chills, or a fever of 100.4 F (38 C) or higher, or as directed by your healthcare provider    Notice that the incision is red, swollen, or draining    Have heavy, bright-red vaginal bleeding or a smelly discharge    Have difficulty urinating    Experience severe belly pain or bloating    Have leg pain, redness, or swelling    Have persistent nausea or vomiting    Are not improving daily    Fainting    Trouble breathing   Date Last Reviewed: 12/1/2016 2000-2017 [employer]. [address] [place] [address]. All rights reserved. This information is not intended as a substitute for professional medical care. Always follow your healthcare professional's instructions.

## 2017-11-28 NOTE — PROGRESS NOTES
Has hot flashes from early menopause secondary to radiation - cold washcloth to face & cool air blowing on face.

## 2017-11-28 NOTE — ANESTHESIA PREPROCEDURE EVALUATION
Anesthesia Evaluation     . Pt has had prior anesthetic. Type: General    No history of anesthetic complications          ROS/MED HX    ENT/Pulmonary:     (+)tobacco use, Past use asthma , . .    Neurologic:       Cardiovascular:  - neg cardiovascular ROS   (+) hypertension----. : . . . :. .       METS/Exercise Tolerance:  >4 METS   Hematologic:         Musculoskeletal:  - neg musculoskeletal ROS       GI/Hepatic:     (+) Other GI/Hepatic       Renal/Genitourinary:  - ROS Renal section negative       Endo:     (+) Obesity, .      Psychiatric:  - neg psychiatric ROS       Infectious Disease:  - neg infectious disease ROS       Malignancy:   (+) Malignancy History of Breast          Other:                     Physical Exam  Normal systems: cardiovascular, pulmonary and dental    Airway   Mallampati: I  TM distance: >3 FB  Neck ROM: full    Dental     Cardiovascular   Rhythm and rate: regular and normal      Pulmonary    breath sounds clear to auscultation                    Anesthesia Plan      History & Physical Review  History and physical reviewed and following examination; no interval change.    ASA Status:  3 .    NPO Status:  > 6 hours    Plan for General and ETT with Intravenous induction. Maintenance will be Balanced.    PONV prophylaxis:  Ondansetron (or other 5HT-3), Dexamethasone or Solumedrol and Scopolamine patch       Postoperative Care      Consents  Anesthetic plan, risks, benefits and alternatives discussed with:  Patient..                          .

## 2017-11-28 NOTE — OP NOTE
DATE OF PROCEDURE:        PREOPERATIVE DIAGNOSES:  History of breast cancer with recommended salpingo-oophorectomy.      POSTOPERATIVE DIAGNOSIS:  History of breast cancer with recommended salpingo-oophorectomy.      PROCEDURE:  Laparoscopic bilateral salpingo-oophorectomy.      SURGEON:  Preeti Tirado MD      ASSISTANT:  ADY Mendez.  A surgical assistant was required for this surgery for his experience with retraction, achieving hemostasis and wound closure.      ANESTHESIA:  General endotracheal.      ESTIMATED BLOOD LOSS:  10 mL.      BLOOD TRANSFUSION:  None given.      URINE OUTPUT:  Clear urine in the Simon removed at the end of the procedure.      DRAINS:  None.      SPECIMENS:  Bilateral tubes and ovaries.      FINDINGS:  Normal pelvis.      COMPLICATIONS:  None.      CONDITION:  Stable.      DETAILS OF PROCEDURE:  Diana Wilson had a breast cancer history and for further treatment options per her oncologist she desired surgical menopause.  Risks, benefits, alternatives discussed and she desired to proceed.  She was taken back to the operating room, placed under general anesthesia, positioned, she was placed in low lithotomy position and prepped and draped, Simon was placed, speculum was placed, cervix grasped with single-tooth tenaculum.  She was noted to have a stenotic cervix due to her prior ablation procedure, so a Rosa Elena cannula was placed.  Speculum was removed, gloves were changed, attention was then paid to her abdomen.  Local anesthesia in all incisions of 10 mL of 0.25% Marcaine with epinephrine were placed. A incision was made under the umbilicus and entered with the Visiport.  After entry with the Visiport port, pneumoperitoneum was created and incisions were made on the right and left lower quadrant clear of the vessels with the Visiport.  At this point in time, the pelvis and abdomen was visualized and noted to be otherwise normal.  The left tube and ovary were elevated and the  Infundibulopelvic ligament was grasped with the LigaSure and serially transected with the LigaSure to just proximal to the uterus and excised off.  Good hemostasis was noted.  The same procedure was performed on the opposing side.  No bleeding was noted.  The ureters were noted to be normal in their course and visualized and the left port was enlarged to 11.  An 11 trocar was placed.    EndoCatch bag was placed.  Specimens were placed in and removed.  Fascial closure device was used to close that fascia and then the subcutaneous suture with 2-0 Vicryl was used to close the deep space.  All the ports were removed under direct visualization.  Good hemostasis was noted.  Skin was closed with 4-0 Vicryl subcuticular sutures and then SecureSeal.  The vaginal instruments were removed, and the Simon was removed.  Sponge, lap and instrument counts correct x3.  The patient was taken to recovery in stable condition.         TOSHA CHURCH MD             D: 2017 12:34   T: 2017 14:31   MT: MERYL#[id number]      Name:     NATAN HU   MRN:      [id number]        Account:        [id number]   :      1980           Procedure Date: 2017      Document: [id number]

## 2017-11-28 NOTE — DISCHARGE INSTRUCTIONS
Same Day Surgery Discharge Instructions  Special Precautions After Surgery - Adult    1. It is not unusual to feel lightheaded or faint, up to 24 hours after surgery or while taking pain medication.  If you have these symptoms; sit for a few minutes before standing and have someone assist you when getting up.  2. You should rest and relax for the next 24 hours and must have someone stay with you for at least 24 hours after your discharge.  3. DO NOT DRIVE any vehicle or operate mechanical equipment for 24 hours following the end of your surgery.  DO NOT DRIVE while taking narcotic pain medications that have been prescribed by your physician.  If you had a limb operated on, you must be able to use it fully to drive.  4. DO NOT drink alcoholic beverages for 24 hours following surgery or while taking prescription pain medication.  5. Drink clear liquids (apple juice, ginger ale, broth, 7-Up, etc.).  Progress to your regular diet as you feel able.  6. Any questions call your physician and do not make important decisions for 24 hours.    ACTIVITY  ? Up as tolerated with rest periods as needed.     INCISIONAL CARE  ? May shower late tomorrow.     Call for an appointment to return to the clinic in 2-4 weeks.    Medications:  ? Had 2 pain pills at 12:40pm.  May also use Ibuprofen or Aleve over-the-counter will taking the pain pills - follow bottle directions.     Additional discharge instructions:  Cold pack off & on to incision to decrease swelling & pain & warm pack OK to lower back as needed.  __________________________________________________________________________________________________________________________________  IMPORTANT NUMBERS:    [facility] Main Number:  [phone], [phone]  Pharmacy:  [phone]  Same Day Surgery:  [phone], Monday - Friday until 8:30 p.m.  Urgent Care:  [phone]  Emergency Room:  [phone]      [facility]:  [phone]                                                                              [facility]:  [phone] option 1  [facility]:  [phone]     OB Clinic:  [phone]   [facility] Specialty Clinic:  [phone]   Home Medical Equipment: [phone]  [facility]:  [phone]

## 2017-11-28 NOTE — ANESTHESIA POSTPROCEDURE EVALUATION
Patient: Diana Wilson    Procedure(s):  Laparoscopic bilateral salpingo oophorectomy - Wound Class: II-Clean Contaminated    Diagnosis:Hx breast cancer with recommended salpingo oophorectomy  Diagnosis Additional Information: No value filed.    Anesthesia Type:  General, ETT    Note:  Anesthesia Post Evaluation    Patient location during evaluation: Bedside  Patient participation: Able to fully participate in evaluation  Level of consciousness: awake and alert  Pain management: adequate  Airway patency: patent  Cardiovascular status: acceptable  Respiratory status: acceptable  Hydration status: acceptable  PONV: none     Anesthetic complications: None          Last vitals:  Vitals:    11/28/17 1230 11/28/17 1238 11/28/17 1245   BP: 126/87  137/84   Pulse:      Resp: 16  14   Temp:      SpO2: 92% 93% 93%         Electronically Signed By: COLLEEN Alex CRNA  November 28, 2017  1:00 PM

## 2017-11-30 LAB — COPATH REPORT: NORMAL

## 2017-11-30 NOTE — PROGRESS NOTES
Diana  Your results are normal.  If you have any other questions or concerns, please followup in the office or contact us on mychart or evisit.    Preeti Tirado

## 2017-12-04 ENCOUNTER — OFFICE VISIT (OUTPATIENT)
Dept: RADIATION THERAPY | Facility: OUTPATIENT CENTER | Age: 37
End: 2017-12-04

## 2017-12-04 VITALS
RESPIRATION RATE: 18 BRPM | HEART RATE: 97 BPM | OXYGEN SATURATION: 96 % | DIASTOLIC BLOOD PRESSURE: 80 MMHG | SYSTOLIC BLOOD PRESSURE: 114 MMHG

## 2017-12-04 DIAGNOSIS — Z17.0 MALIGNANT NEOPLASM OF UPPER-OUTER QUADRANT OF RIGHT BREAST IN FEMALE, ESTROGEN RECEPTOR POSITIVE (H): Primary | ICD-10-CM

## 2017-12-04 DIAGNOSIS — C50.411 MALIGNANT NEOPLASM OF UPPER-OUTER QUADRANT OF RIGHT BREAST IN FEMALE, ESTROGEN RECEPTOR POSITIVE (H): Primary | ICD-10-CM

## 2017-12-04 NOTE — LETTER
2017      RE: Diana Wilson  1972 [address] [place] [id number]       Radiation Oncology Follow-up Visit  2017    Diana Wilson  MRN: [id number]   : 1980     DISEASE TREATED:   Stage IIA (c T1N1) ER/DC+, Her-2 negative, grade 3 in right breast, right axillary node was ER/DC/HER2 negative, s/p neoadjuvant DDAC x 4 then weekly carbo/taxol, s/p bilateral mastectomy, ypTis(DCIS) pN0 disease.  BRCA1/2 negative.      RADIATION THERAPY DELIVERED:   5040 cGy to right chest wall, axillary and supraclavicular nodes with 1,000 cGy boost to right chest wall for total dose of 6,040 cGy completed 11/15/17.    INTERVAL SINCE COMPLETION OF RADIATION THERAPY:   1 month    SUBJECTIVE:   Diana Wilson is a 37 year old female who is here today for routine 1 month follow up after completing radiation therapy.  She has seen healing of her skin reaction and has had resolution of her fatigue.  She continues to moisturize.  Denies any cough or shortness of breath related to possible risk of interstitial pneumonitis.  She has been doing her stretching and range of motion exercises for chest and shoulder.  She does lymphedema therapy at home and wears a complerssion sleeve, but forgot it today.  She is also doing cancer rehab therapy.  She had oophorectomy last week and is recovering well from that.  She has expanders in currently and will have reconstruction in March.   She will see Dr. Jin next week to talk about AI therapy.  Her cat  yesterday, so she is feeling sad about that lose, but overall emotionally seems well with all she has been through.    ROS:  Complete review of systems is negative except for symptoms discussed in subjective above.    Current Outpatient Prescriptions   Medication     albuterol (2.5 MG/3ML) 0.083% neb solution     acetaminophen (TYLENOL) 325 MG tablet     ibuprofen (ADVIL/MOTRIN) 600 MG tablet     oxyCODONE IR (ROXICODONE) 5 MG tablet     scopolamine (TRANSDERM) 72 hr patch  "    Naproxen Sodium (ALEVE PO)     venlafaxine (EFFEXOR) 37.5 MG tablet     silver sulfADIAZINE (SILVADENE) 1 % cream     magnesium hydroxide (MILK OF MAGNESIA) 400 MG/5ML suspension     albuterol (PROAIR HFA, PROVENTIL HFA, VENTOLIN HFA) 108 (90 BASE) MCG/ACT inhaler     fluticasone (FLONASE) 50 MCG/ACT nasal spray     loratadine (CLARITIN) 10 MG tablet     montelukast (SINGULAIR) 10 MG tablet     No current facility-administered medications for this visit.           Allergies   Allergen Reactions     Carboplatin Shortness Of Breath, Rash and Anaphylaxis     Other reaction(s): Flushing, Tachycardia     Ambien      hallucinations     Amoxicillin GI Disturbance     Erythromycin GI Disturbance     Hydrocodone-Acetaminophen      Other reaction(s): Hallucinations     Penicillins Nausea and Vomiting and Hives     1-11-17 pt states this was a childhood reaction     Zolpidem      Other reaction(s): Hallucinations     Hydrocodone Other (See Comments)     Out of body experience, \"creepy-crawly\" skin        Past Medical History:   Diagnosis Date     Depressive disorder 1995    And Anxiety     Encounter for insertion or removal of intrauterine contraceptive device 1/8/2008     Excessive or frequent menstruation 03/19/2003     Fibroadenosis of breast      Invasive ductal carcinoma of breast, right (H)      Malignant neoplasm of upper-outer quadrant of right female breast (H)      Neutropenia, drug-induced (H)      Nongonococcal urethritis (JALIL) due to chlamydia trachomatis 01/03/2002     Other and unspecified ovarian cyst 03/19/2003    RIGHT ovarian cyst     PONV (postoperative nausea and vomiting)      Transient hypertension of pregnancy, antepartum     PIH with last birth     Uncomplicated asthma 2002         PHYSICAL EXAM:  /80  Pulse 97  Resp 18  LMP 11/28/2012  SpO2 96%  Gen: Alert, in NAD  Pulm: No wheezing, stridor or respiratory distress  CV: Well-perfused, no cyanosis, no pedal edema  Skin: Normal color and " turgor  Psychiatric: Appropriate mood and affect  BREAST:  Skin reaction has healed nicely.  No masses or discharge bilaterally.  Expander in place.      LABS AND IMAGING:  Reviewed.    IMPRESSION:   Ms. Wilson is a 37 year old female with a stage IIA (c T1N1) ER/OK+, Her-2 negative, grade 3 in right breast, right axillary node was ER/OK/HER2 negative, s/p neoadjuvant DDAC x 4 then weekly carbo/taxol, s/p bilateral mastectomy, ypTis(DCIS) pN0 disease.  BRCA1/2 negative. Now status post radiation therapy.    PLAN:   Patient has recovered nicely from acute side effects of radiation therapy.  Discussed long term care with continued moisturizing of the treated breast, stretching and range of motion exercises, sun screen, and the rare possibility of interstitial pneumonitis.    Patient will follow up with medical oncology for continued care and imaging.  She will follow up here as needed.  Discussed to come in with any concerns or questions that may develop.  She has had tough year and we wish her the best.      Dimple Davis NP  [facility]    CC:  Patient Care Team:  Yenifer Ridley MD as PCP - General (Family Practice)  Eugenio Ruelas MD as MD (Plastic Surgery)  Adore Olson MD, MD as MD (Surgery)  Caitlin Durham MD as MD (Radiation Oncology)  Maribel Jin MD as MD (Oncology)

## 2017-12-04 NOTE — MR AVS SNAPSHOT
After Visit Summary   12/4/2017    Diana Wilson    MRN: [id number]           Patient Information     Date Of Birth          1980        Visit Information        Provider Department      12/4/2017 2:00 PM Dimple Davis APRN [facility]        Today's Diagnoses     Malignant neoplasm of upper-outer quadrant of right breast in female, estrogen receptor positive (H)    -  1       Follow-ups after your visit        Your next 10 appointments already scheduled     Dec 12, 2017 10:45 AM CST   Return Visit with Maribel Jin MD   [facility] ([facility])    [facility]  [address]  [facility] [address]   [phone]            Dec 13, 2017  9:40 AM CST   LAB with [facility] ([facility])    [address]  [facility] [address]   [phone]           Please do not eat 10-12 hours before your appointment if you are coming in fasting for labs on lipids, cholesterol, or glucose (sugar). This does not apply to pregnant women. Water, hot tea and black coffee (with nothing added) are okay. Do not drink other fluids, diet soda or chew gum.              Who to contact     Please call your clinic at [phone] to:    Ask questions about your health    Make or cancel appointments    Discuss your medicines    Learn about your test results    Speak to your doctor   If you have compliments or concerns about an experience at your clinic, or if you wish to file a complaint, please contact [facility] Patient Relations at [phone] or email us at [email or web address]         Additional Information About Your Visit        MyChart Information     Photobuckethart gives you secure access to your electronic health record. If you see a primary care provider, you can also send messages to your care team and make appointments. If you have questions, please call  your primary care clinic.  If you do not have a primary care provider, please call [phone] and they will assist you.      Definition 6 is an electronic gateway that provides easy, online access to your medical records. With Definition 6, you can request a clinic appointment, read your test results, renew a prescription or communicate with your care team.     To access your existing account, please contact your [facility] or call [phone] for assistance.        Care EveryWhere ID     This is your Care EveryWhere ID. This could be used by other organizations to access your [facility] medical records  [id number]        Your Vitals Were     Pulse Respirations Last Period Pulse Oximetry          97 18 11/28/2012 96%         Blood Pressure from Last 3 Encounters:   12/04/17 114/80   11/28/17 124/86   11/24/17 119/73    Weight from Last 3 Encounters:   11/28/17 96.2 kg (212 lb)   11/24/17 96.7 kg (213 lb 3.2 oz)   11/21/17 96.5 kg (212 lb 12.8 oz)              Today, you had the following     No orders found for display       Primary Care Provider Office Phone # Fax #    Yenifer Ridley [phone] [address] [address]        Equal Access to Services     FAUZIA RAMIREZ AH: Hadii aad ku hadasho Soomaali, waaxda luqadaha, qaybta kaalmada adeegyada, waxay catalinain haymiyan quin bridges. So [facility] [phone].    ATENCIÓN: Si habla español, tiene a montelongo disposición servicios gratuitos de asistencia lingüística. Llfaith al [phone].    We comply with applicable federal civil rights laws and [place] laws. We do not discriminate on the basis of race, color, national origin, age, disability, sex, sexual orientation, or gender identity.            Thank you!     Thank you for choosing [facility]  for your care. Our goal is always to provide you with excellent care. Hearing back from our patients is one way we can continue to improve our  services. Please take a few minutes to complete the written survey that you may receive in the mail after your visit with us. Thank you!             Your Updated Medication List - Protect others around you: Learn how to safely use, store and throw away your medicines at www.disposemymeds.org.          This list is accurate as of: 12/4/17  3:01 PM.  Always use your most recent med list.                   Brand Name Dispense Instructions for use Diagnosis    acetaminophen 325 MG tablet    TYLENOL    100 tablet    Take 2 tablets (650 mg) by mouth every 4 hours as needed for other (mild pain)    S/P laparoscopy       * albuterol 108 (90 BASE) MCG/ACT Inhaler    PROAIR HFA/PROVENTIL HFA/VENTOLIN HFA    1 Inhaler    Inhale 2 puffs into the lungs every 6 hours as needed for shortness of breath / dyspnea    Mild persistent asthma with exacerbation       * albuterol (2.5 MG/3ML) 0.083% neb solution           ALEVE PO      Take 220 mg by mouth daily as needed for moderate pain        fluticasone 50 MCG/ACT spray    FLONASE    3 Bottle    Spray 1-2 sprays into both nostrils daily    Environmental allergies       ibuprofen 600 MG tablet    ADVIL/MOTRIN    30 tablet    Take 1 tablet (600 mg) by mouth every 6 hours as needed for pain (mild)    S/P laparoscopy       loratadine 10 MG tablet    CLARITIN    90 tablet    Take 1 tablet (10 mg) by mouth daily    Mild persistent asthma with exacerbation       magnesium hydroxide 400 MG/5ML suspension    MILK OF MAGNESIA     Take 30 mLs by mouth        montelukast 10 MG tablet    SINGULAIR    90 tablet    Take 1 tablet (10 mg) by mouth At Bedtime    Environmental allergies       oxyCODONE IR 5 MG tablet    ROXICODONE    30 tablet    Take 1-2 tablets (5-10 mg) by mouth every 3 hours as needed for pain or other (Moderate to Severe)    S/P laparoscopy       scopolamine 72 hr patch    TRANSDERM    1 patch    Place 1 patch onto the skin every 72 hours.  Apply to hairless area behind one ear at  least 4 hours before travel.  Remove old patch and change every 3 days.    Preop general physical exam, Nausea, Invasive ductal carcinoma of breast, right (H), Malignant neoplasm of upper-outer quadrant of right breast in female, estrogen receptor positive (H), Family history of malignant neoplasm of breast       silver sulfADIAZINE 1 % cream    SILVADENE    400 g    Apply topically 2 times daily Apply to erythematous peeling areas BID    Malignant neoplasm of upper-outer quadrant of right breast in female, estrogen receptor negative (H)       venlafaxine 37.5 MG tablet    EFFEXOR    90 tablet    Take 1 tablet (37.5 mg) in the morning and 2 tablets (75 mg) in the evening.    Hot flushes, perimenopausal, Depression, unspecified depression type       * Notice:  This list has 2 medication(s) that are the same as other medications prescribed for you. Read the directions carefully, and ask your doctor or other care provider to review them with you.

## 2017-12-04 NOTE — PROGRESS NOTES
Radiation Oncology Follow-up Visit  2017    Diana Wilson  MRN: [id number]   : 1980     DISEASE TREATED:   Stage IIA (c T1N1) ER/RI+, Her-2 negative, grade 3 in right breast, right axillary node was ER/RI/HER2 negative, s/p neoadjuvant DDAC x 4 then weekly carbo/taxol, s/p bilateral mastectomy, ypTis(DCIS) pN0 disease.  BRCA1/2 negative.      RADIATION THERAPY DELIVERED:   5040 cGy to right chest wall, axillary and supraclavicular nodes with 1,000 cGy boost to right chest wall for total dose of 6,040 cGy completed 11/15/17.    INTERVAL SINCE COMPLETION OF RADIATION THERAPY:   1 month    SUBJECTIVE:   Diana Wilson is a 37 year old female who is here today for routine 1 month follow up after completing radiation therapy.  She has seen healing of her skin reaction and has had resolution of her fatigue.  She continues to moisturize.  Denies any cough or shortness of breath related to possible risk of interstitial pneumonitis.  She has been doing her stretching and range of motion exercises for chest and shoulder.  She does lymphedema therapy at home and wears a complerssion sleeve, but forgot it today.  She is also doing cancer rehab therapy.  She had oophorectomy last week and is recovering well from that.  She has expanders in currently and will have reconstruction in March.   She will see Dr. Jin next week to talk about AI therapy.  Her cat  yesterday, so she is feeling sad about that lose, but overall emotionally seems well with all she has been through.    ROS:  Complete review of systems is negative except for symptoms discussed in subjective above.    Current Outpatient Prescriptions   Medication     albuterol (2.5 MG/3ML) 0.083% neb solution     acetaminophen (TYLENOL) 325 MG tablet     ibuprofen (ADVIL/MOTRIN) 600 MG tablet     oxyCODONE IR (ROXICODONE) 5 MG tablet     scopolamine (TRANSDERM) 72 hr patch     Naproxen Sodium (ALEVE PO)     venlafaxine (EFFEXOR) 37.5 MG tablet     silver  "sulfADIAZINE (SILVADENE) 1 % cream     magnesium hydroxide (MILK OF MAGNESIA) 400 MG/5ML suspension     albuterol (PROAIR HFA, PROVENTIL HFA, VENTOLIN HFA) 108 (90 BASE) MCG/ACT inhaler     fluticasone (FLONASE) 50 MCG/ACT nasal spray     loratadine (CLARITIN) 10 MG tablet     montelukast (SINGULAIR) 10 MG tablet     No current facility-administered medications for this visit.           Allergies   Allergen Reactions     Carboplatin Shortness Of Breath, Rash and Anaphylaxis     Other reaction(s): Flushing, Tachycardia     Ambien      hallucinations     Amoxicillin GI Disturbance     Erythromycin GI Disturbance     Hydrocodone-Acetaminophen      Other reaction(s): Hallucinations     Penicillins Nausea and Vomiting and Hives     1-11-17 pt states this was a childhood reaction     Zolpidem      Other reaction(s): Hallucinations     Hydrocodone Other (See Comments)     Out of body experience, \"creepy-crawly\" skin        Past Medical History:   Diagnosis Date     Depressive disorder 1995    And Anxiety     Encounter for insertion or removal of intrauterine contraceptive device 1/8/2008     Excessive or frequent menstruation 03/19/2003     Fibroadenosis of breast      Invasive ductal carcinoma of breast, right (H)      Malignant neoplasm of upper-outer quadrant of right female breast (H)      Neutropenia, drug-induced (H)      Nongonococcal urethritis (JALIL) due to chlamydia trachomatis 01/03/2002     Other and unspecified ovarian cyst 03/19/2003    RIGHT ovarian cyst     PONV (postoperative nausea and vomiting)      Transient hypertension of pregnancy, antepartum     PIH with last birth     Uncomplicated asthma 2002         PHYSICAL EXAM:  /80  Pulse 97  Resp 18  LMP 11/28/2012  SpO2 96%  Gen: Alert, in NAD  Pulm: No wheezing, stridor or respiratory distress  CV: Well-perfused, no cyanosis, no pedal edema  Skin: Normal color and turgor  Psychiatric: Appropriate mood and affect  BREAST:  Skin reaction has healed " nicely.  No masses or discharge bilaterally.  Expander in place.      LABS AND IMAGING:  Reviewed.    IMPRESSION:   Ms. Wilson is a 37 year old female with a stage IIA (c T1N1) ER/DC+, Her-2 negative, grade 3 in right breast, right axillary node was ER/DC/HER2 negative, s/p neoadjuvant DDAC x 4 then weekly carbo/taxol, s/p bilateral mastectomy, ypTis(DCIS) pN0 disease.  BRCA1/2 negative. Now status post radiation therapy.    PLAN:   Patient has recovered nicely from acute side effects of radiation therapy.  Discussed long term care with continued moisturizing of the treated breast, stretching and range of motion exercises, sun screen, and the rare possibility of interstitial pneumonitis.    Patient will follow up with medical oncology for continued care and imaging.  She will follow up here as needed.  Discussed to come in with any concerns or questions that may develop.  She has had tough year and we wish her the best.      Dimple Davis NP  [facility]    CC:  Patient Care Team:  Yenifer Ridley MD as PCP - General (Family Practice)  Eugenio Ruelas MD as MD (Plastic Surgery)  Adore Olson MD, MD as MD (Surgery)  Caitlin Durham MD as MD (Radiation Oncology)  Maribel Jin MD as MD (Oncology)

## 2017-12-04 NOTE — NURSING NOTE
FOLLOW-UP VISIT    Patient Name: Diana Wilson      : 1980     Age: 37 year old        ______________________________________________________________________________     Chief Complaint   Patient presents with     Radiation Therapy     follow up     /80  Pulse 97  Resp 18  LMP 2012  SpO2 96%     Date Radiation Completed: 11/15/17  R chestwall/axilla    Pain  Denies pain related to chestwall. Did have surgical procedure last week (oopherectomy)    Meds  Current Med List Reviewed: Yes  Medication Note:     Skin: Warm  Dry  Intact    Range of Motion: adequate    Respiratory: No shortness of breath, dyspnea on exertion, cough, or hemoptysis    Hormone Therapy: Yes    Lymphedema Follow up: has lymphedema sleeve and exercises    Energy Level: lower than normal but wanting to be more active and will start being more active after a little more recovery time from surgery      Appointments:     DATE  Oncologist: Dr. Jin  17   Surgeon:    Primary:      Other Notes:

## 2017-12-11 DIAGNOSIS — C50.911 INVASIVE DUCTAL CARCINOMA OF BREAST, RIGHT (H): Primary | ICD-10-CM

## 2017-12-13 ENCOUNTER — HOSPITAL ENCOUNTER (OUTPATIENT)
Dept: LAB | Facility: CLINIC | Age: 37
Discharge: HOME | End: 2017-12-13
Attending: INTERNAL MEDICINE | Admitting: INTERNAL MEDICINE
Payer: COMMERCIAL

## 2017-12-13 ENCOUNTER — ALLIED HEALTH/NURSE VISIT (OUTPATIENT)
Dept: ONCOLOGY | Facility: CLINIC | Age: 37
End: 2017-12-13

## 2017-12-13 DIAGNOSIS — C50.911 INVASIVE DUCTAL CARCINOMA OF BREAST, RIGHT (H): Primary | ICD-10-CM

## 2017-12-13 DIAGNOSIS — C50.911 INVASIVE DUCTAL CARCINOMA OF BREAST, RIGHT (H): ICD-10-CM

## 2017-12-13 LAB — GLUCOSE SERPL-MCNC: 94 MG/DL (ref 70–99)

## 2017-12-13 PROCEDURE — 82947 ASSAY GLUCOSE BLOOD QUANT: CPT | Performed by: INTERNAL MEDICINE

## 2017-12-13 PROCEDURE — 36415 COLL VENOUS BLD VENIPUNCTURE: CPT | Performed by: INTERNAL MEDICINE

## 2017-12-13 NOTE — NURSING NOTE
Today, Mrs. Wilson was here to discuss/consent to [facility] clinical trial:  [id number] Breast Cancer Weight Loss Study (BWEL). See research tab for consent information. Briefly, I reviewed the consent, including study goals, schedule, risks, and optional consent for blood work and tissue submission.  After her questions were answered she did sign the informed consent. I provided Mrs. Wilson with a signed copy of the consent.  After completing the consent, I obtained contact information and Mrs. Wilson completed the baseline QOL questionnaire, hip and waist measurements and height and weight were recorded and labs were drawn and processed per protocol.

## 2017-12-14 ENCOUNTER — TELEPHONE (OUTPATIENT)
Dept: ONCOLOGY | Facility: CLINIC | Age: 37
End: 2017-12-14

## 2017-12-21 ENCOUNTER — ONCOLOGY VISIT (OUTPATIENT)
Dept: ONCOLOGY | Facility: CLINIC | Age: 37
End: 2017-12-21
Attending: INTERNAL MEDICINE
Payer: COMMERCIAL

## 2017-12-21 DIAGNOSIS — Z17.0 MALIGNANT NEOPLASM OF UPPER-OUTER QUADRANT OF RIGHT BREAST IN FEMALE, ESTROGEN RECEPTOR POSITIVE (H): Primary | ICD-10-CM

## 2017-12-21 DIAGNOSIS — N95.1 HOT FLUSHES, PERIMENOPAUSAL: ICD-10-CM

## 2017-12-21 DIAGNOSIS — C50.411 MALIGNANT NEOPLASM OF UPPER-OUTER QUADRANT OF RIGHT BREAST IN FEMALE, ESTROGEN RECEPTOR POSITIVE (H): Primary | ICD-10-CM

## 2017-12-21 DIAGNOSIS — E66.3 OVERWEIGHT: ICD-10-CM

## 2017-12-21 PROCEDURE — 99214 OFFICE O/P EST MOD 30 MIN: CPT | Performed by: INTERNAL MEDICINE

## 2017-12-21 PROCEDURE — 99211 OFF/OP EST MAY X REQ PHY/QHP: CPT

## 2017-12-21 RX ORDER — ANASTROZOLE 1 MG/1
1 TABLET ORAL DAILY
Qty: 30 TABLET | Refills: 3 | Status: SHIPPED | OUTPATIENT
Start: 2017-12-21 | End: 2018-03-08

## 2017-12-21 ASSESSMENT — PAIN SCALES - GENERAL: PAINLEVEL: NO PAIN (0)

## 2017-12-21 NOTE — PROGRESS NOTES
"ONCOLOGY Follow up visit     COMPLAINT AND REASON FOR CONSULTATION:  12/2016 diagnosed right breast cancer LN positive disease     REFERRING/[facility] PHYSICIAN:  Dr. Yenifer ESTEVEZ  She presented at age 36 breast lump in her right armpit and right breast in 10/2016.   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  ER/ME 99% positive, HER-2/kelle negative, associated with high-grade DCIS.    Right axillary ultrasound-guided biopsy indicating metastatic carcinoma positive for spread from breast primary, ER/ME both negative.  HER-2/kelle is negative.      She had clinical T1N1 disease. She made informed decision to proceed with neoadjuvant DDAC C1D1 1/16/2017. She has good clinical response post DD AC x4. Then went on to have wkly taxol + carbo  with informed decision in March.   She has carbo hypersensitivity reaction in early May (during C3). It was d/c after.     She is tested negative for BRCA1/2 with [employer].     7/2017 she had double mastectomies at [facility]. Right side found no residual invasive carcinoma, +grade III DCIS, 1mm in greatest dimension. 5 sLN all negative. She had ypTis(DCIS)pN0 disease. Left breast no cancer.     She finished post mastectomy RT in 11/2017.    She had BSO 11/2017. Then started Arimidex in 1/2018.     PAST MEDICAL HISTORY:  Restless legs, mild intermittent asthma, celiac disease diagnosed 02/2016, hx of TIFFANY     SOCIAL HISTORY:  Works part-time.  Lives with her .  They have 2 kids, first pregnancy age 21.  She did not do breast feeding because of bilateral breast reduction.  She does office work.      FAMILY HISTORY:  \"Full of cancer\" from the mother's side including colon, lung, pancreatic, gastric cancer.  According to the patient none of them survive older than 60 years old.      REVIEW OF SYSTEMS  She is very upbeat, + hot flushes.      PHYSICAL EXAMINATION:   Blood pressure 120/85, pulse " "85, temperature 97.2  F (36.2  C), resp. rate 18, height 1.626 m (5' 4\"), weight 96.3 kg (212 lb 4.8 oz), last menstrual period 11/28/2012, SpO2 94 %, not currently breastfeeding.      GENERAL APPEARANCE:  He young woman, looks like her stated age, very upbeat, not in acute distress.   HEENT: The patient is normocephalic, atraumatic. Pupils are equally reactive to light.  Sclerae are anicteric. erythematous oral mucosa.  - pharynx.  No oral thrush.   NECK:  Supple.  No jugular venous distention.  Thyroid is not palpable.   LYMPH NODES:  Superficial lymphadenopathy is not appreciable in the bilateral cervical, supraclavicular, axillary or inguinal areas.   CARDIOVASCULAR:  S1, S2 regular with no murmurs or gallops.  No carotid or abdominal bruits.   PULMONARY:  Lungs are clear to auscultation and percussion bilaterally.  There is no wheezing or rhonchi.   GASTROINTESTINAL:  Abdomen is soft, nontender.  No hepatosplenomegaly.  No signs of ascites.  No mass appreciable.   MUSCULOSKELETAL/EXTREMITIES:  No edema.  No cyanotic changes.  No signs of joint deformity.  No lymphedema.   NEUROLOGIC:  Cranial nerves II-XII are grossly intact.  Sensation intact.  Muscle strength and muscle tone symmetrical, 5/5 throughout.   BACK:  No spinal or paraspinal tenderness.  No CVA tenderness.   SKIN:  No petechiae.  No rash.  No signs of cellulitis.   BREASTS:  Bilateral breast exam reviewed.  Bilateral mastectomies scars well healed. She is partially expanded has marked erythematous changes on right side from RT.      LABORATORY DATA REVIEWED  7/2017 double mastectomies at [facility]. Right side: no residual invasive carcinoma, +grade III DCIS, 1mm in greatest dimension. 5 sLN all negative. She had ypTis(DCIS)pN0 disease. Left breast no cancer.        CURRENT IMAGING Reviewed  CT body 11/2017: no mets.     Breast MRI at [facility] post DD AC 3/2017:   1. Improvement in biopsy-proven RIGHT-sided breast cancer at 10 o'clock. The malignancy has " decreased in size (2.4 cm versus 3.3 cm previously)and demonstrates more favorable enhancement kinetics.     2.  No residual enlarged RIGHT axillary lymph nodes.    1/2017 baseline Echo LV function nl, EF 60-65%     PET 1/2017  1. Hypermetabolic mass in the right breast consistent with known malignancy.  2. Two enlarged hypermetabolic metastatic lymph nodes in the right axilla.  3. No additional worrisome hypermetabolic areas in the neck, chest, abdomen, or pelvis.    Old data reviewed with summary  End of 12/2016  right breast was done 12/27/2016, identified grade 3 invasive ductal cancer, angiolymphatic invasion not identified.  ER/OK 99% positive, HER-2/kelle negative, associated with high-grade DCIS.    Right axillary ultrasound-guided biopsy indicating metastatic carcinoma positive for spread from breast primary, ER/OK both negative.  HER-2/kelle is negative.      Diagnostic mammogram end of 12/2016, which identified 2 cm ill-defined density  associated with indeterminate microcalcification measuring about 3.2 cm, so the whole area spans about 4 cm in biggest dimension.   Ultrasound to the site which is the upper outer quadrant of the right breast identified 2 cm irregular solid mass; at the right axilla shows several abnormal lymph nodes, the largest one measures 1.8 cm in maximum dimension.      DEXA scan from 03/2016 identified mild osteopenia lumbar spine        ASSESSMENT AND PLAN:    1. 12/2016  dx breast cancer, cT2N1 disesae.      S/p DD AC and wkly taxol, carbo was used partially dropped due to hypersensitivity reaction.   She had pCR for invasive cancer with mastectomies in 7/2017.   S/p RT till 11/2017.     She wants the most aggressive form of anti hormone therapy. She had BSO 11/2017.  Base on Text/Soft trial data, advice AI first, if poor tolerance, can try tamoxifen then.     We discussed in detail side effects of these strong anti hormone therapy for a young women like her. She wants to go for it.      2.  hot flushes - advice Effexor, this has helped a lot.     3. Overweight - weight loss is meaningful for her in terms of rate of recurrence. She is interested in and enrolled in [facility] program.

## 2017-12-21 NOTE — LETTER
"    12/21/2017         RE: Diana Wilson  [address] [place] [id number]        Dear Colleague,    Thank you for referring your patient, Diana Wilson, to the [facility]. Please see a copy of my visit note below.    ONCOLOGY Follow up visit     COMPLAINT AND REASON FOR CONSULTATION:  12/2016 diagnosed right breast cancer LN positive disease     REFERRING/[facility] PHYSICIAN:  Dr. Yenifer ESTEVEZ  She presented at age 36 breast lump in her right armpit and right breast in 10/2016.   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  ER/GA 99% positive, HER-2/kelle negative, associated with high-grade DCIS.    Right axillary ultrasound-guided biopsy indicating metastatic carcinoma positive for spread from breast primary, ER/GA both negative.  HER-2/kelle is negative.      She had clinical T1N1 disease. She made informed decision to proceed with neoadjuvant DDAC C1D1 1/16/2017. She has good clinical response post DD AC x4. Then went on to have wkly taxol + carbo  with informed decision in March.   She has carbo hypersensitivity reaction in early May (during C3). It was d/c after.     She is tested negative for BRCA1/2 with [employer].     7/2017 she had double mastectomies at [facility]. Right side found no residual invasive carcinoma, +grade III DCIS, 1mm in greatest dimension. 5 sLN all negative. She had ypTis(DCIS)pN0 disease. Left breast no cancer.     She finished post mastectomy RT in 11/2017.    She had BSO 11/2017. Then started Arimidex in 1/2018.     PAST MEDICAL HISTORY:  Restless legs, mild intermittent asthma, celiac disease diagnosed 02/2016, hx of TIFFANY     SOCIAL HISTORY:  Works part-time.  Lives with her .  They have 2 kids, first pregnancy age 22.  She did not do breast feeding because of bilateral breast reduction.  She does office work.      FAMILY HISTORY:  \"Full of cancer\" from the mother's side " "including colon, lung, pancreatic, gastric cancer.  According to the patient none of them survive older than 60 years old.      REVIEW OF SYSTEMS  She is very upbeat, + hot flushes.      PHYSICAL EXAMINATION:   Blood pressure 120/85, pulse 85, temperature 97.2  F (36.2  C), resp. rate 18, height 1.626 m (5' 4\"), weight 96.3 kg (212 lb 4.8 oz), last menstrual period 11/28/2012, SpO2 94 %, not currently breastfeeding.      GENERAL APPEARANCE:  He young woman, looks like her stated age, very upbeat, not in acute distress.   HEENT: The patient is normocephalic, atraumatic. Pupils are equally reactive to light.  Sclerae are anicteric. erythematous oral mucosa.  - pharynx.  No oral thrush.   NECK:  Supple.  No jugular venous distention.  Thyroid is not palpable.   LYMPH NODES:  Superficial lymphadenopathy is not appreciable in the bilateral cervical, supraclavicular, axillary or inguinal areas.   CARDIOVASCULAR:  S1, S2 regular with no murmurs or gallops.  No carotid or abdominal bruits.   PULMONARY:  Lungs are clear to auscultation and percussion bilaterally.  There is no wheezing or rhonchi.   GASTROINTESTINAL:  Abdomen is soft, nontender.  No hepatosplenomegaly.  No signs of ascites.  No mass appreciable.   MUSCULOSKELETAL/EXTREMITIES:  No edema.  No cyanotic changes.  No signs of joint deformity.  No lymphedema.   NEUROLOGIC:  Cranial nerves II-XII are grossly intact.  Sensation intact.  Muscle strength and muscle tone symmetrical, 5/5 throughout.   BACK:  No spinal or paraspinal tenderness.  No CVA tenderness.   SKIN:  No petechiae.  No rash.  No signs of cellulitis.   BREASTS:  Bilateral breast exam reviewed.  Bilateral mastectomies scars well healed. She is partially expanded has marked erythematous changes on right side from RT.      LABORATORY DATA REVIEWED  7/2017 double mastectomies at [facility]. Right side: no residual invasive carcinoma, +grade III DCIS, 1mm in greatest dimension. 5 sLN all negative. She had " ypTis(DCIS)pN0 disease. Left breast no cancer.        CURRENT IMAGING Reviewed  CT body 11/2017: no mets.     Breast MRI at [facility] post DD AC 3/2017:   1. Improvement in biopsy-proven RIGHT-sided breast cancer at 10 o'clock. The malignancy has decreased in size (2.4 cm versus 3.3 cm previously)and demonstrates more favorable enhancement kinetics.     2.  No residual enlarged RIGHT axillary lymph nodes.    1/2017 baseline Echo LV function nl, EF 60-65%     PET 1/2017  1. Hypermetabolic mass in the right breast consistent with known malignancy.  2. Two enlarged hypermetabolic metastatic lymph nodes in the right axilla.  3. No additional worrisome hypermetabolic areas in the neck, chest, abdomen, or pelvis.    Old data reviewed with summary  End of 12/2016  right breast was done 12/27/2016, identified grade 3 invasive ductal cancer, angiolymphatic invasion not identified.  ER/IN 99% positive, HER-2/kelle negative, associated with high-grade DCIS.    Right axillary ultrasound-guided biopsy indicating metastatic carcinoma positive for spread from breast primary, ER/IN both negative.  HER-2/kelle is negative.      Diagnostic mammogram end of 12/2016, which identified 2 cm ill-defined density  associated with indeterminate microcalcification measuring about 3.2 cm, so the whole area spans about 4 cm in biggest dimension.   Ultrasound to the site which is the upper outer quadrant of the right breast identified 2 cm irregular solid mass; at the right axilla shows several abnormal lymph nodes, the largest one measures 1.8 cm in maximum dimension.      DEXA scan from 03/2016 identified mild osteopenia lumbar spine        ASSESSMENT AND PLAN:    1.   12/2016  dx breast cancer, cT2N1 disesae.      S/p DD AC and wkly taxol, carbo was used partially dropped due to hypersensitivity reaction.   She had pCR for invasive cancer with mastectomies in 7/2017.   S/p RT till 11/2017.     She wants the most aggressive form of anti hormone  therapy. She had BSO 11/2017.  Base on Text/Soft trial data, advice AI first, if poor tolerance, can try tamoxifen then.     We discussed in detail side effects of these strong anti hormone therapy for a young women like her. She wants to go for it.     2.  hot flushes - advice Effexor, this has helped a lot.     3. Overweight - weight loss is meaningful for her in terms of rate of recurrence. She is interested in and enrolled in [facility] program.     Again, thank you for allowing me to participate in the care of your patient.        Sincerely,        Maribel Jin MD, MD

## 2017-12-21 NOTE — NURSING NOTE
"Oncology Rooming Note    December 21, 2017 2:54 PM   Diana Wilson is a 37 year old female who presents for:    Chief Complaint   Patient presents with     Oncology Clinic Visit     F/u breast cancer, review labs, pathology, CT scan     Initial Vitals: /85 (BP Location: Right arm, Patient Position: Sitting, Cuff Size: Adult Regular)  Pulse 85  Temp 97.2  F (36.2  C)  Resp 18  Ht 1.626 m (5' 4\")  Wt 96.3 kg (212 lb 4.8 oz)  LMP 11/28/2012  SpO2 94%  BMI 36.44 kg/m2 Estimated body mass index is 36.44 kg/(m^2) as calculated from the following:    Height as of this encounter: 1.626 m (5' 4\").    Weight as of this encounter: 96.3 kg (212 lb 4.8 oz). Body surface area is 2.09 meters squared.  No Pain (0) Comment: Data Unavailable   Patient's last menstrual period was 11/28/2012.  Allergies reviewed: Yes  Medications reviewed: Yes    Medications: Medication refills not needed today.  Pharmacy name entered into [facility]: [facility]  [place] - [id number] [address]    Clinical concerns: 2 week f/u breast cancer, Ct scan and pathology report    5 minutes for nursing intake (face to face time)     Josette Smalls RN              "

## 2017-12-21 NOTE — MR AVS SNAPSHOT
After Visit Summary   12/21/2017    Diana Wilson    MRN: [id number]           Patient Information     Date Of Birth          1980        Visit Information        Provider Department      12/21/2017 2:30 PM Maribel Jin MD [facility]        Today's Diagnoses     Malignant neoplasm of upper-outer quadrant of right breast in female, estrogen receptor positive (H)    -  1    Hot flushes, perimenopausal        Overweight          Care Instructions    Dr. Jin would like to refer you to cancer rehab program and to start Arimidex. We would like to see you back in 3 months for a follow up appointment with labs prior.     A prescription has been sent to [facility] [place], [place] - [address]  When you are in need of a refill, please call your pharmacy and they will send us a request.      Copy of appointments, and after visit summary (AVS) given to patient.  If you have any questions please call Josette Smalls RN, BSN Oncology Hematology  [facility] [phone]. For questions after business hours, or on holidays/weekends, please call our after hours Nurse Triage line [phone]. Thank you.           Refer to cancer rehab program.   Start AI. 3 months f/u with labs.           Follow-ups after your visit        Additional Services     PHYSICAL THERAPY REFERRAL       *This therapy referral will be filtered to a centralized scheduling office at [facility] and the patient will receive a call to schedule an appointment at a [facility] location most convenient for them. *     [facility] provides Physical Therapy evaluation and treatment and many specialty services across the [facility] system.  If requesting a specialty program, please choose from the list below.    If you have not heard from the scheduling office within 2 business days, please call [phone] for all locations, with the exception of  "[place], please call [phone].  Treatment: Evaluation & Treatment  Special Instructions/Modalities: Cancer Rehab  Special Programs: Cancer Rehabilitation (PT and OT Evaluate & Treat)    Please be aware that coverage of these services is subject to the terms and limitations of your health insurance plan.  Call member services at your health plan with any benefit or coverage questions.      **Note to Provider:  If you are referring outside of [place] for the therapy appointment, please list the name of the location in the \"special instructions\" above, print the referral and give to the patient to schedule the appointment.                  Your next 10 appointments already scheduled     Mar 08, 2018 10:00 AM CST   Pre-Op physical with Yenifer Ridley MD   [facility])    [address] [address] [phone]            Mar 08, 2018 10:30 AM CST   LAB with [facility] ([facility]    [address] [address] [phone]           Please do not eat 10-12 hours before your appointment if you are coming in fasting for labs on lipids, cholesterol, or glucose (sugar). This does not apply to pregnant women. Water, hot tea and black coffee (with nothing added) are okay. Do not drink other fluids, diet soda or chew gum.            Mar 15, 2018  3:15 PM CDT   Return Visit with Maribel Jin MD   [facility] ([facility])    [facility]  [address]  [facility] [facility] [address]   [phone]            Mar 16, 2018   Procedure with Eugenio Ruelas MD   [facility] (--)    [address]  [facility] [address] [phone]              Future tests that were ordered for you today     Open Future Orders        Priority Expected Expires Ordered    Ca27.29  breast tumor marker Routine 3/1/2018 4/30/2018 12/21/2017    CBC with " "platelets differential Routine 3/1/2018 4/30/2018 12/21/2017    Comprehensive metabolic panel Routine 3/1/2018 4/30/2018 12/21/2017            Who to contact     If you have questions or need follow up information about today's clinic visit or your schedule please contact [facility] directly at [phone].  Normal or non-critical lab and imaging results will be communicated to you by MyChart, letter or phone within 4 business days after the clinic has received the results. If you do not hear from us within 7 days, please contact the clinic through FOBOhart or phone. If you have a critical or abnormal lab result, we will notify you by phone as soon as possible.  Submit refill requests through SpreadShout or call your pharmacy and they will forward the refill request to us. Please allow 3 business days for your refill to be completed.          Additional Information About Your Visit        FOBOhart Information     SpreadShout gives you secure access to your electronic health record. If you see a primary care provider, you can also send messages to your care team and make appointments. If you have questions, please call your primary care clinic.  If you do not have a primary care provider, please call [phone] and they will assist you.        Care EveryWhere ID     This is your Care EveryWhere ID. This could be used by other organizations to access your [facility] medical records  [id number]        Your Vitals Were     Pulse Temperature Respirations Height Last Period Pulse Oximetry    85 97.2  F (36.2  C) 18 1.626 m (5' 4\") 11/28/2012 94%    BMI (Body Mass Index)                   36.44 kg/m2            Blood Pressure from Last 3 Encounters:   12/21/17 120/85   12/04/17 114/80   11/28/17 124/86    Weight from Last 3 Encounters:   12/21/17 96.3 kg (212 lb 4.8 oz)   11/28/17 96.2 kg (212 lb)   11/24/17 96.7 kg (213 lb 3.2 oz)              We Performed the Following     PHYSICAL THERAPY REFERRAL          Today's " Medication Changes          These changes are accurate as of: 12/21/17  3:35 PM.  If you have any questions, ask your nurse or doctor.               Start taking these medicines.        Dose/Directions    anastrozole 1 MG tablet   Commonly known as:  ARIMIDEX   Used for:  Malignant neoplasm of upper-outer quadrant of right breast in female, estrogen receptor positive (H)   Started by:  Maribel Jin MD        Dose:  1 mg   Take 1 tablet (1 mg) by mouth daily   Quantity:  30 tablet   Refills:  3            Where to get your medicines      These medications were sent to [facility] [place], [place] - [address] [address]  [address] [facility] [address]     Phone:  [phone]     anastrozole 1 MG tablet                Primary Care Provider Office Phone # Fax #    Yenifer Ridley [phone] [address] [address] [address]        Equal Access to Services     [facility]: Hadii gabriela griffin hadasho Soomaali, waaxda luqadaha, qaybta kaalmada adeegyada, javier valentine . So [facility] [phone].    ATENCIÓN: Si habla español, tiene a montelongo disposición servicios gratuitos de asistencia lingüística. Llame al [phone].    We comply with applicable federal civil rights laws and [place] laws. We do not discriminate on the basis of race, color, national origin, age, disability, sex, sexual orientation, or gender identity.            Thank you!     Thank you for choosing [facility]  for your care. Our goal is always to provide you with excellent care. Hearing back from our patients is one way we can continue to improve our services. Please take a few minutes to complete the written survey that you may receive in the mail after your visit with us. Thank you!             Your Updated Medication List - Protect others around you: Learn how to safely use, store and throw away your medicines at www.disposemymeds.org.          This list is accurate as of:  12/21/17  3:35 PM.  Always use your most recent med list.                   Brand Name Dispense Instructions for use Diagnosis    acetaminophen 325 MG tablet    TYLENOL    100 tablet    Take 2 tablets (650 mg) by mouth every 4 hours as needed for other (mild pain)    S/P laparoscopy       * albuterol 108 (90 BASE) MCG/ACT Inhaler    PROAIR HFA/PROVENTIL HFA/VENTOLIN HFA    1 Inhaler    Inhale 2 puffs into the lungs every 6 hours as needed for shortness of breath / dyspnea    Mild persistent asthma with exacerbation       * albuterol (2.5 MG/3ML) 0.083% neb solution           ALEVE PO      Take 220 mg by mouth daily as needed for moderate pain        anastrozole 1 MG tablet    ARIMIDEX    30 tablet    Take 1 tablet (1 mg) by mouth daily    Malignant neoplasm of upper-outer quadrant of right breast in female, estrogen receptor positive (H)       fluticasone 50 MCG/ACT spray    FLONASE    3 Bottle    Spray 1-2 sprays into both nostrils daily    Environmental allergies       ibuprofen 600 MG tablet    ADVIL/MOTRIN    30 tablet    Take 1 tablet (600 mg) by mouth every 6 hours as needed for pain (mild)    S/P laparoscopy       loratadine 10 MG tablet    CLARITIN    90 tablet    Take 1 tablet (10 mg) by mouth daily    Mild persistent asthma with exacerbation       magnesium hydroxide 400 MG/5ML suspension    MILK OF MAGNESIA     Take 30 mLs by mouth        montelukast 10 MG tablet    SINGULAIR    90 tablet    Take 1 tablet (10 mg) by mouth At Bedtime    Environmental allergies       oxyCODONE IR 5 MG tablet    ROXICODONE    30 tablet    Take 1-2 tablets (5-10 mg) by mouth every 3 hours as needed for pain or other (Moderate to Severe)    S/P laparoscopy       scopolamine 72 hr patch    TRANSDERM    1 patch    Place 1 patch onto the skin every 72 hours.  Apply to hairless area behind one ear at least 4 hours before travel.  Remove old patch and change every 3 days.    Preop general physical exam, Nausea, Invasive ductal  carcinoma of breast, right (H), Malignant neoplasm of upper-outer quadrant of right breast in female, estrogen receptor positive (H), Family history of malignant neoplasm of breast       venlafaxine 37.5 MG tablet    EFFEXOR    90 tablet    Take 1 tablet (37.5 mg) in the morning and 2 tablets (75 mg) in the evening.    Hot flushes, perimenopausal, Depression, unspecified depression type       * Notice:  This list has 2 medication(s) that are the same as other medications prescribed for you. Read the directions carefully, and ask your doctor or other care provider to review them with you.

## 2017-12-21 NOTE — PATIENT INSTRUCTIONS
Dr. Jin would like to refer you to cancer rehab program and to start Arimidex. We would like to see you back in 3 months for a follow up appointment with labs prior.     A prescription has been sent to [facility] [place], [place] - [address]  When you are in need of a refill, please call your pharmacy and they will send us a request.      Copy of appointments, and after visit summary (AVS) given to patient.  If you have any questions please call Josette Smalls RN, BSN Oncology Hematology  [facility] [phone]. For questions after business hours, or on holidays/weekends, please call our after hours Nurse Triage line [phone]. Thank you.           Refer to cancer rehab program.   Start AI. 3 months f/u with labs.

## 2017-12-22 VITALS
BODY MASS INDEX: 36.25 KG/M2 | TEMPERATURE: 97.2 F | OXYGEN SATURATION: 94 % | HEIGHT: 64 IN | HEART RATE: 85 BPM | SYSTOLIC BLOOD PRESSURE: 120 MMHG | RESPIRATION RATE: 18 BRPM | DIASTOLIC BLOOD PRESSURE: 85 MMHG | WEIGHT: 212.3 LBS

## 2018-01-03 ENCOUNTER — HOSPITAL ENCOUNTER (OUTPATIENT)
Dept: PHYSICAL THERAPY | Facility: CLINIC | Age: 38
Setting detail: THERAPIES SERIES
End: 2018-01-03
Attending: INTERNAL MEDICINE
Payer: COMMERCIAL

## 2018-01-03 DIAGNOSIS — Z17.0 MALIGNANT NEOPLASM OF UPPER-OUTER QUADRANT OF RIGHT BREAST IN FEMALE, ESTROGEN RECEPTOR POSITIVE (H): Primary | ICD-10-CM

## 2018-01-03 DIAGNOSIS — C50.411 MALIGNANT NEOPLASM OF UPPER-OUTER QUADRANT OF RIGHT BREAST IN FEMALE, ESTROGEN RECEPTOR POSITIVE (H): Primary | ICD-10-CM

## 2018-01-03 PROCEDURE — 40000360 ZZHC STATISTIC PT CANCER REHAB VISIT: Performed by: PHYSICAL THERAPIST

## 2018-01-03 PROCEDURE — 97140 MANUAL THERAPY 1/> REGIONS: CPT | Mod: GP | Performed by: PHYSICAL THERAPIST

## 2018-01-03 PROCEDURE — 97162 PT EVAL MOD COMPLEX 30 MIN: CPT | Mod: GP | Performed by: PHYSICAL THERAPIST

## 2018-01-03 PROCEDURE — 97535 SELF CARE MNGMENT TRAINING: CPT | Mod: GP | Performed by: PHYSICAL THERAPIST

## 2018-01-04 NOTE — PROGRESS NOTES
01/03/18 1000   General Information   Type of Visit Initial OP Ortho PT Evaluation   Start of Care Date 01/03/18   Referring Physician Ge   Patient/Family Goals Statement Better Energy, Return to running. Help to lose weight.    Orders Evaluate and Treat   Orders Comment CA Rehab    Date of Order 12/21/17   Insurance Type Health [employer] Access   Insurance Comments/Visits Authorized Auth'd - No limits    Medical Diagnosis R Breast CA - CA Rehab    Surgical/Medical history reviewed (Asthma, Anemia, Depr, Menop, Prev smoker, B Masec 7-17. )   Precautions/Limitations no known precautions/limitations   Special Instructions On multiple meds.    Body Part(s)   Body Part(s) Cervical Spine   Presentation and Etiology   Pertinent history of current problem (include personal factors and/or comorbidities that impact the POC) Dx'd Dec 30th, 2016 w/ R Breast CA, had 20 weeks of chemo, had double masectomy in July 2017, Radiation Fall of 2017. Is currently doing expanders and will have implants on March 16th. Was doing CA Rehab through another provider, Lymphedema also.  Before CA was running 3 miles about 3 x/week. Family is helping w/ HH duties.    Impairments F. Decreased strength and endurance;R. Other;I. Impaired skin integrity   Impairment comment Lymphedema   Functional Limitations perform required work activities;perform desired leisure / sports activities   Symptom Location Fatigue, loss of strength. Swelling R ARmpit area.    How/Where did it occur From insidious onset   Onset date of current episode/exacerbation 12/21/17  (Last MD recheck, date of order. )   Chronicity Chronic   Pain rating (0-10 point scale) Denies pain   Pain/symptoms eased by C. Rest   Progression of symptoms since onset: Improved   Prior Level of Function   Functional Level Prior Comment Independent w/ ADL's.    Current Level of Function   Current Community Support Family/friend caregiver   Patient role/employment history A. Employed;C. Homemaker  (Part  time - . )   Living environment [facility]   Fall Risk Screen   Fall screen completed by PT   Have you fallen 2 or more times in the past year? No   Have you fallen and had an injury in the past year? No   Timed Up and Go score (seconds) Walks quickly into dept.    Is patient a fall risk? No   Functional Scales   Functional Scales (FACIT  25 )   Vital Signs   Vital Signs Pulse;BP;SpO2   Pulse 83   /86   SpO2 97 %   Cervical Spine   Observation No acute distress.    Integumentary  Scars over B breasts, well healed.    Posture Head forward    Cervical/Thoracic/Shoulder ROM Comments Flex R 133, L 138; Abd R 146, L 163; ER R 62, L 66; IR R T10, L T7.    Shoulder/Wrist/Hand Strength Comments  Strength:   R 54, L 50    Planned Therapy Interventions   Planned Therapy Interventions Comment STM, Develop Aerobics, Strenghtening and Flexibility HEP, Teach self cares.    Clinical Impression   Criteria for Skilled Therapeutic Interventions Met yes, treatment indicated   PT Diagnosis CRF, Limited U/E ROM and strength. Need for lymphedema evaluation.    Influenced by the following impairments Swelling, Weakness, Fatigue    Functional limitations due to impairments HH duties, Working normal hours impaired, Ability to Run/Exercise as previously.    Clinical Presentation Evolving/Changing   Clinical Presentation Rationale FACIT,  Strength, ROM, Scar mobility and care. Hx of Asthma, Depression, Previous smoker.    Clinical Decision Making (Complexity) Moderate complexity   Therapy Frequency 2 times/Week   Predicted Duration of Therapy Intervention (days/wks) 1 month, then reassess, 8 visits.    Risk & Benefits of therapy have been explained Yes   Patient, Family & other staff in agreement with plan of care Yes   Clinical Impression Comments CRF, Need for lymphedema evaluation.    Education Assessment   Preferred Learning Style Listening;Demonstration;Pictures/video   Barriers to Learning No barriers    ORTHO GOALS   PT Ortho Eval Goals 1;2;3;4   Ortho Goal 1   Goal Identifier 1   Goal Description STG: Improve FACIT score to 30 or better for improved energy for daily functions.    Target Date 02/05/18   Ortho Goal 2   Goal Identifier 2   Goal Description STG: Improve R U/E ROM to be equal to L at initial evaluation for better reach, use of arms.    Target Date 02/05/18   Ortho Goal 3   Goal Identifier 3   Goal Description STG: Improve R  strength to 60 or better for improved strength of R U/E.    Target Date 02/05/18   Ortho Goal 4   Goal Identifier 4   Goal Description LRTG: Pt will be independent w/HEP and self cares for managing scars of breast area in preparation for implants in March.    Target Date 02/23/18   Total Evaluation Time   Total Evaluation Time 60 Total (Eval x 30, Self Cares x 20, MT x 10)    Cristina Rooney, PT, [facility] (#[id number])  [facility]           [phone]  Fax          [phone]  Appt #      [phone]

## 2018-01-04 NOTE — PROGRESS NOTES
CA Ex Flow Sheet    Date/Exercise          Bike   UBE          Treadmill          U/E Strengthening                   L/E Strengthening                   Stretches for Flexibility.                                                                BP Limits: Systolic <90 or >200 / Diastolic < 65 or > 120 mm /Hg   HR Limits:  BPS.   O2 <88%    -    Glucose between 70 to 250.   WBC normal 5000-23126.  > 5000 ok for light ex progress to resistive.  < 5000 if has fever no ex unless cleared by MD  Hemoglobin:  10-12 low impact / low intesity aerobics/ activity  8-10 Monitor vitals.  Can do ROM, no aerobic activity  < 8 Red flag--discuss cont w/ MD  Platelets: no theraband if platelets variable.  Norm: 130,000 to 400,000  > 06193 resistive ex ok, light wts  30,000-50,000 walking, bike. No prolonged stretching  20,000 -30,000 AROM only, walking as tolerated  < 20,0000 AAROM, AROM (no reistive ex or antigravity)

## 2018-01-05 ENCOUNTER — OFFICE VISIT (OUTPATIENT)
Dept: FAMILY MEDICINE | Facility: CLINIC | Age: 38
End: 2018-01-05
Payer: COMMERCIAL

## 2018-01-05 VITALS
SYSTOLIC BLOOD PRESSURE: 130 MMHG | HEIGHT: 64 IN | TEMPERATURE: 98.2 F | HEART RATE: 95 BPM | DIASTOLIC BLOOD PRESSURE: 101 MMHG | BODY MASS INDEX: 37.39 KG/M2 | WEIGHT: 219 LBS

## 2018-01-05 DIAGNOSIS — J20.9 BRONCHITIS WITH BRONCHOSPASM: Primary | ICD-10-CM

## 2018-01-05 PROCEDURE — 99214 OFFICE O/P EST MOD 30 MIN: CPT | Performed by: FAMILY MEDICINE

## 2018-01-05 RX ORDER — PREDNISONE 20 MG/1
40 TABLET ORAL DAILY
Qty: 10 TABLET | Refills: 0 | Status: SHIPPED | OUTPATIENT
Start: 2018-01-05 | End: 2018-01-10

## 2018-01-05 RX ORDER — AZITHROMYCIN 250 MG/1
TABLET, FILM COATED ORAL
Qty: 6 TABLET | Refills: 0 | Status: SHIPPED | OUTPATIENT
Start: 2018-01-05 | End: 2018-03-08

## 2018-01-05 ASSESSMENT — ANXIETY QUESTIONNAIRES
2. NOT BEING ABLE TO STOP OR CONTROL WORRYING: NOT AT ALL
7. FEELING AFRAID AS IF SOMETHING AWFUL MIGHT HAPPEN: NOT AT ALL
5. BEING SO RESTLESS THAT IT IS HARD TO SIT STILL: NOT AT ALL
GAD7 TOTAL SCORE: 3
3. WORRYING TOO MUCH ABOUT DIFFERENT THINGS: NOT AT ALL
6. BECOMING EASILY ANNOYED OR IRRITABLE: SEVERAL DAYS
1. FEELING NERVOUS, ANXIOUS, OR ON EDGE: SEVERAL DAYS

## 2018-01-05 ASSESSMENT — PATIENT HEALTH QUESTIONNAIRE - PHQ9: 5. POOR APPETITE OR OVEREATING: SEVERAL DAYS

## 2018-01-05 NOTE — PROGRESS NOTES
"SUBJECTIVE:  Diana Wilson is a 37 year old female who presents with the following concerns;     Thursday after trice, cold.              Symptoms: cc Present Absent Comment   Fever/Chills   x    Fatigue  x     Headache  x     Muscle or Body  Aches  x     Eye Irritation   x    Sneezing   x    Nasal Danny/Drg  x  mild   Sinus Pressure/Pain   x    Dental pain   x    Sore Throat  x  Pain with coughing   Swollen Glands   x    Ear Pain/Fullness   x    Cough x      Wheeze  x  Bad asthma attack last Thursday,    Chest Discomfort  x     Shortness of breath   x    Abdominal pain   x    Emesis    x    Diarrhea   x    Other   x      Symptom duration:  2 week    Symptom severity:     Treatments tried:  dayquil, nyquil, robitussin dm, advil cold and sinus.    Contacts:   was treated but not tested for influenza         Problem list and histories reviewed & adjusted, as indicated.        ROS:  Constitutional, HEENT, cardiovascular, pulmonary, gi and gu systems are negative, except as otherwise noted.      OBJECTIVE:                                                    BP (!) 130/101  Pulse 95  Temp 98.2  F (36.8  C) (Tympanic)  Ht 5' 4\" (1.626 m)  Wt 219 lb (99.3 kg)  BMI 37.59 kg/m2 Body mass index is 37.59 kg/(m^2).   GENERAL: healthy, alert and no distress  EYES: Eyes grossly normal to inspection, extraocular movements - intact, and PERRL  HENT: ear canals- normal; TMs- normal; Nose- normal; Mouth- no ulcers, no lesions  NECK: no tenderness, no adenopathy, no asymmetry, no masses, no stiffness; thyroid- normal to palpation  RESP: lungs with end expiratory wheezes bilaterally - no rales, no rhonchi  CV: regular rates and rhythm, normal S1 S2, no S3 or S4 and no murmur, no click or rub -  ABDOMEN: soft, no tenderness, no  hepatosplenomegaly, no masses, normal bowel sounds       ASSESSMENT/PLAN:                                                    ASSESSMENT/PLAN:    (J20.9) Bronchitis with bronchospasm  (primary " encounter diagnosis)  Plan: predniSONE (DELTASONE) 20 MG tablet,         azithromycin (ZITHROMAX) 250 MG tablet, use nebs/inhaler qid      F/U in clinic/ER if issues arise    Assessment and plan reviewed. Questions answered      Rosangeal Thompson MD  [facility]

## 2018-01-05 NOTE — NURSING NOTE
"Chief Complaint   Patient presents with     Cold Symptoms       Initial BP (!) 130/101  Pulse 95  Temp 98.2  F (36.8  C) (Tympanic)  Ht 5' 4\" (1.626 m)  Wt 219 lb (99.3 kg)  BMI 37.59 kg/m2 Estimated body mass index is 37.59 kg/(m^2) as calculated from the following:    Height as of this encounter: 5' 4\" (1.626 m).    Weight as of this encounter: 219 lb (99.3 kg).  Medication Reconciliation: complete   Nery Wright CMA    "

## 2018-01-05 NOTE — PATIENT INSTRUCTIONS
Acute Bronchitis                  What is acute bronchitis?   Bronchitis is an infection of the air passages--that is, the tubes that connect the windpipe to the lungs. It causes swelling and irritation of the airways. With acute bronchitis you usually have a cough that produces phlegm and pain behind the breastbone when you breathe deeply or cough.   How does it occur?   Bronchitis often occurs with viral infections of the respiratory tract, such as colds and flu. Bronchitis may also be caused by bacterial infections. It may occur with childhood illnesses such as measles and whooping cough.   Attacks are most frequent during the winter or when the level of air pollution is high.   Infants, young children, older adults, smokers, and people with heart disease or lung disease (including asthma and allergies) are most likely to get acute bronchitis.   What are the symptoms?   Symptoms may include:   a deep cough that produces yellowish or greenish phlegm   pain behind the breastbone when you breathe deeply or cough   wheezing   feeling short of breath   fever   chills   headache   sore muscles.   How is it diagnosed?   Your healthcare provider will ask about your symptoms and examine you. You may have tests, such as:   a test of phlegm to look for bacteria   chest X-ray   blood tests.   How is it treated?   Acute bronchitis often does not require medical treatment. Resting at home and drinking plenty of fluids to keep the mucus loose may be all you need to do to get better in a few days. If your symptoms are severe or you have other health problems (such as heart or lung disease or diabetes), you may need to take antibiotics.   How long will the effects last?   Most of the time acute bronchitis clears up in a few days. Your cough may slowly get better in 1 to 2 weeks.   It may take you longer to recover if:   You are a smoker.   You live in an area where air pollution is a problem.   You have a heart  or lung disease.   You have any other continuing health problems.   How can I take care of myself?   You can help yourself by:   following the full treatment your healthcare provider recommends   using a vaporizer, humidifier, or steam from hot water to add moisture to the air   drinking plenty of liquids   taking cough medicine if recommended by your healthcare provider   resting in bed   taking aspirin or acetaminophen to reduce fever and relieve headache and muscle pain (Check with your healthcare provider before you give any medicine that contains aspirin or salicylates to a child or teen. This includes medicines like baby aspirin, some cold medicines, and Pepto Bismol. Children and teens who take aspirin are at risk for a serious illness called Reye's syndrome.)   eating healthy meals.   Call your healthcare provider if:   You have trouble breathing.   You have a fever of 101.5?F (38.6?C) or higher.   You cough up blood.   Your symptoms are getting worse instead of better.   You don't start to feel better after 3 days of treatment.   You have any symptoms that concern you.   How can I help prevent acute bronchitis?   To reduce your risk of getting a respiratory infection:   Do not smoke.   Wash your hands often, especially when you are around people with colds (upper respiratory infections).   If you have asthma or allergies, keep your symptoms under good control.   Get regular exercise.   Eat healthy foods.       Published by [employer].  This content is reviewed periodically and is subject to change as new health information becomes available. The information is intended to inform and educate and is not a replacement for medical evaluation, advice, diagnosis or treatment by a healthcare professional.   Developed by [employer].   ? 2010 [employer] and/or its affiliates. All Rights Reserved.   Copyright   [employer] Systems 2011        Please take the zithromax and prednisone as directed.  Recommend  you use your nebulizer for a few days and transition back to the inhaler as able.

## 2018-01-05 NOTE — MR AVS SNAPSHOT
After Visit Summary   1/5/2018    Diana Wilson    MRN: [id number]           Patient Information     Date Of Birth          1980        Visit Information        Provider Department      1/5/2018 11:00 AM Rosangela Thompson MD [facility]        Today's Diagnoses     Bronchitis with bronchospasm    -  1      Care Instructions                       Acute Bronchitis                  What is acute bronchitis?   Bronchitis is an infection of the air passages--that is, the tubes that connect the windpipe to the lungs. It causes swelling and irritation of the airways. With acute bronchitis you usually have a cough that produces phlegm and pain behind the breastbone when you breathe deeply or cough.   How does it occur?   Bronchitis often occurs with viral infections of the respiratory tract, such as colds and flu. Bronchitis may also be caused by bacterial infections. It may occur with childhood illnesses such as measles and whooping cough.   Attacks are most frequent during the winter or when the level of air pollution is high.   Infants, young children, older adults, smokers, and people with heart disease or lung disease (including asthma and allergies) are most likely to get acute bronchitis.   What are the symptoms?   Symptoms may include:   a deep cough that produces yellowish or greenish phlegm   pain behind the breastbone when you breathe deeply or cough   wheezing   feeling short of breath   fever   chills   headache   sore muscles.   How is it diagnosed?   Your healthcare provider will ask about your symptoms and examine you. You may have tests, such as:   a test of phlegm to look for bacteria   chest X-ray   blood tests.   How is it treated?   Acute bronchitis often does not require medical treatment. Resting at home and drinking plenty of fluids to keep the mucus loose may be all you need to do to get better in a few days. If your symptoms are severe or you have other health  problems (such as heart or lung disease or diabetes), you may need to take antibiotics.   How long will the effects last?   Most of the time acute bronchitis clears up in a few days. Your cough may slowly get better in 1 to 2 weeks.   It may take you longer to recover if:   You are a smoker.   You live in an area where air pollution is a problem.   You have a heart or lung disease.   You have any other continuing health problems.   How can I take care of myself?   You can help yourself by:   following the full treatment your healthcare provider recommends   using a vaporizer, humidifier, or steam from hot water to add moisture to the air   drinking plenty of liquids   taking cough medicine if recommended by your healthcare provider   resting in bed   taking aspirin or acetaminophen to reduce fever and relieve headache and muscle pain (Check with your healthcare provider before you give any medicine that contains aspirin or salicylates to a child or teen. This includes medicines like baby aspirin, some cold medicines, and Pepto Bismol. Children and teens who take aspirin are at risk for a serious illness called Reye's syndrome.)   eating healthy meals.   Call your healthcare provider if:   You have trouble breathing.   You have a fever of 101.5?F (38.6?C) or higher.   You cough up blood.   Your symptoms are getting worse instead of better.   You don't start to feel better after 3 days of treatment.   You have any symptoms that concern you.   How can I help prevent acute bronchitis?   To reduce your risk of getting a respiratory infection:   Do not smoke.   Wash your hands often, especially when you are around people with colds (upper respiratory infections).   If you have asthma or allergies, keep your symptoms under good control.   Get regular exercise.   Eat healthy foods.       Published by [employer].  This content is reviewed periodically and is subject to change as new health information becomes available. The  information is intended to inform and educate and is not a replacement for medical evaluation, advice, diagnosis or treatment by a healthcare professional.   Developed by [employer].   ? 2010 [facility] and/or its affiliates. All Rights Reserved.   Copyright   Clinical smart[employer] Systems 2011        Please take the zithromax and prednisone as directed.  Recommend you use your nebulizer for a few days and transition back to the inhaler as able.          Follow-ups after your visit        Your next 10 appointments already scheduled     Jan 17, 2018  9:00 AM CST   Lymphedema Evaluation with Nona Jett, PT   [facility] ([facility])    [address] [place]  [facility] [address]   [phone]            Mar 08, 2018 10:00 AM CST   Pre-Op physical with Yenifer Ridley MD   [facility] ([facility])    [address] [facility] [address]   [phone]            Mar 08, 2018 10:30 AM CST   LAB with [facility])    [address] [place] [address]   [phone]           Please do not eat 10-12 hours before your appointment if you are coming in fasting for labs on lipids, cholesterol, or glucose (sugar). This does not apply to pregnant women. Water, hot tea and black coffee (with nothing added) are okay. Do not drink other fluids, diet soda or chew gum.            Mar 15, 2018  3:15 PM CDT   Return Visit with Maribel Jin MD   [facility] ([facility])    [facility]  [address]  [facility] [address]   [phone]            Mar 16, 2018   Procedure with Eugenio Ruelas MD   [facility] (--)    [address]  [place] [address] [phone]              Who to contact     Normal or non-critical lab and imaging results will be communicated to you by MyChart, letter or phone within 4 business  "days after the clinic has received the results. If you do not hear from us within 7 days, please contact the clinic through [facility] or phone. If you have a critical or abnormal lab result, we will notify you by phone as soon as possible.  Submit refill requests through [facility] or call your pharmacy and they will forward the refill request to us. Please allow 3 business days for your refill to be completed.          If you need to speak with a  for additional information , please call: [phone]             Additional Information About Your Visit        [facility] Information     [facility] gives you secure access to your electronic health record. If you see a primary care provider, you can also send messages to your care team and make appointments. If you have questions, please call your primary care clinic.  If you do not have a primary care provider, please call [phone] and they will assist you.        Care EveryWhere ID     This is your Care EveryWhere ID. This could be used by other organizations to access your [facility] medical records  [id number]        Your Vitals Were     Pulse Temperature Height BMI (Body Mass Index)          95 98.2  F (36.8  C) (Tympanic) 5' 4\" (1.626 m) 37.59 kg/m2         Blood Pressure from Last 3 Encounters:   01/05/18 (!) 130/101   12/21/17 120/85   12/04/17 114/80    Weight from Last 3 Encounters:   01/05/18 219 lb (99.3 kg)   12/21/17 212 lb 4.8 oz (96.3 kg)   11/28/17 212 lb (96.2 kg)              Today, you had the following     No orders found for display         Today's Medication Changes          These changes are accurate as of: 1/5/18 11:42 AM.  If you have any questions, ask your nurse or doctor.               Start taking these medicines.        Dose/Directions    azithromycin 250 MG tablet   Commonly known as:  ZITHROMAX   Used for:  Bronchitis with bronchospasm        Two tablets first day, then one tablet daily for four days.   Quantity:  6 tablet "   Refills:  0       predniSONE 20 MG tablet   Commonly known as:  DELTASONE   Used for:  Bronchitis with bronchospasm        Dose:  40 mg   Take 2 tablets (40 mg) by mouth daily for 5 days   Quantity:  10 tablet   Refills:  0         These medicines have changed or have updated prescriptions.        Dose/Directions    montelukast 10 MG tablet   Commonly known as:  SINGULAIR   This may have changed:    - when to take this  - additional instructions   Used for:  Environmental allergies        Dose:  10 mg   Take 1 tablet (10 mg) by mouth At Bedtime   Quantity:  90 tablet   Refills:  3            Where to get your medicines      These medications were sent to [facility] [place], [place] - [address] [facility]  [address] [address] [facility] [address]     Phone:  [phone]     azithromycin 250 MG tablet    predniSONE 20 MG tablet                Primary Care Provider Office Phone # Fax #    Yenifer Ridley [phone]       [address] [facility] [address]        Equal Access to Services     [facility] AH: Hadii gabriela ku hadasho Soomaali, waaxda luqadaha, qaybta kaalmada adeegyada, waxay catalinain haymiyan quin valentine . So [facility] [phone].    ATENCIÓN: Si dianelysla espsincere, tiene a montelongo disposición servicios gratuitos de asistencia lingüística. [facility] [phone].    We comply with applicable federal civil rights laws and [place] laws. We do not discriminate on the basis of race, color, national origin, age, disability, sex, sexual orientation, or gender identity.            Thank you!     Thank you for choosing [facility]  for your care. Our goal is always to provide you with excellent care. Hearing back from our patients is one way we can continue to improve our services. Please take a few minutes to complete the written survey that you may receive in the mail after your visit with us. Thank you!             Your Updated Medication List - Protect others around you:  Learn how to safely use, store and throw away your medicines at www.disposemymeds.org.          This list is accurate as of: 1/5/18 11:42 AM.  Always use your most recent med list.                   Brand Name Dispense Instructions for use Diagnosis    acetaminophen 325 MG tablet    TYLENOL    100 tablet    Take 2 tablets (650 mg) by mouth every 4 hours as needed for other (mild pain)    S/P laparoscopy       * albuterol 108 (90 BASE) MCG/ACT Inhaler    PROAIR HFA/PROVENTIL HFA/VENTOLIN HFA    1 Inhaler    Inhale 2 puffs into the lungs every 6 hours as needed for shortness of breath / dyspnea    Mild persistent asthma with exacerbation       * albuterol (2.5 MG/3ML) 0.083% neb solution           ALEVE PO      Take 220 mg by mouth daily as needed for moderate pain        anastrozole 1 MG tablet    ARIMIDEX    30 tablet    Take 1 tablet (1 mg) by mouth daily    Malignant neoplasm of upper-outer quadrant of right breast in female, estrogen receptor positive (H)       azithromycin 250 MG tablet    ZITHROMAX    6 tablet    Two tablets first day, then one tablet daily for four days.    Bronchitis with bronchospasm       fluticasone 50 MCG/ACT spray    FLONASE    3 Bottle    Spray 1-2 sprays into both nostrils daily    Environmental allergies       ibuprofen 600 MG tablet    ADVIL/MOTRIN    30 tablet    Take 1 tablet (600 mg) by mouth every 6 hours as needed for pain (mild)    S/P laparoscopy       loratadine 10 MG tablet    CLARITIN    90 tablet    Take 1 tablet (10 mg) by mouth daily    Mild persistent asthma with exacerbation       montelukast 10 MG tablet    SINGULAIR    90 tablet    Take 1 tablet (10 mg) by mouth At Bedtime    Environmental allergies       PEPCID PO      Take by mouth daily        predniSONE 20 MG tablet    DELTASONE    10 tablet    Take 2 tablets (40 mg) by mouth daily for 5 days    Bronchitis with bronchospasm       scopolamine 72 hr patch    TRANSDERM    1 patch    Place 1 patch onto the skin every  72 hours.  Apply to hairless area behind one ear at least 4 hours before travel.  Remove old patch and change every 3 days.    Preop general physical exam, Nausea, Invasive ductal carcinoma of breast, right (H), Malignant neoplasm of upper-outer quadrant of right breast in female, estrogen receptor positive (H), Family history of malignant neoplasm of breast       venlafaxine 37.5 MG tablet    EFFEXOR    90 tablet    Take 1 tablet (37.5 mg) in the morning and 2 tablets (75 mg) in the evening.    Hot flushes, perimenopausal, Depression, unspecified depression type       * Notice:  This list has 2 medication(s) that are the same as other medications prescribed for you. Read the directions carefully, and ask your doctor or other care provider to review them with you.

## 2018-01-09 ASSESSMENT — PATIENT HEALTH QUESTIONNAIRE - PHQ9: SUM OF ALL RESPONSES TO PHQ QUESTIONS 1-9: 4

## 2018-01-10 ASSESSMENT — ANXIETY QUESTIONNAIRES: GAD7 TOTAL SCORE: 3

## 2018-01-10 ASSESSMENT — ASTHMA QUESTIONNAIRES: ACT_TOTALSCORE: 25

## 2018-01-17 ENCOUNTER — HOSPITAL ENCOUNTER (OUTPATIENT)
Dept: PHYSICAL THERAPY | Facility: CLINIC | Age: 38
Setting detail: THERAPIES SERIES
End: 2018-01-17
Attending: INTERNAL MEDICINE
Payer: COMMERCIAL

## 2018-01-17 PROCEDURE — 97161 PT EVAL LOW COMPLEX 20 MIN: CPT | Mod: GP | Performed by: PHYSICAL THERAPIST

## 2018-01-17 PROCEDURE — 40000099 ZZH STATISTIC LYMPHEDEMA VISIT: Performed by: PHYSICAL THERAPIST

## 2018-01-17 PROCEDURE — 97140 MANUAL THERAPY 1/> REGIONS: CPT | Mod: GP | Performed by: PHYSICAL THERAPIST

## 2018-01-17 NOTE — PROGRESS NOTES
LYMPHEDEMA / EDEMA REHAB EVALUATION  [facility]     Patient: Diana Wilson  : 1980  Referring Provider: Dr. Davin MD    Visit Type  Type of visit: Initial Edema Evaluation    Discipline  Discipline: PT       present: No     General Information   Start of care: 18   Orders: Evaluate and treat as indicated    Order date: 18   Medical diagnosis: RUE lymphedema   Onset of illness / date of surgery:  (2017)   Edema onset:  (2017; immediately after surgery)   Affected body parts: RUE   Edema etiology: Surgery, Chemo, Radiation, Cancer with lymph node dissection   Location - Cancer with lymph node dissection: R-axilla   Location - Radiation: R-upper quadrant (axilla and chest wall)   Radiation comments: completed 2017   Chemotherapy comments: completed   Edema etiology comments: R-breast cancer diagnosed , tests revealed metastatic carcinoma positive for spread from breast primary; had and completed chemo; 2017 double mastectomy at [facility] --> R-side found no residual invasive carcinoma an dL-breast no cancer;  finished post-mastectomy radiation 2017; overweight; planning on reconstruction in 2018; 5 LN removed   Pertinent history of current problem (PT: include personal factors and/or comorbidities that impact the POC; OT: include additional occupational profile info): started lymphedema therapy/treatment with [facility] and now transferring back to ;  started lymph treatment ~mid-2017, fit for sleeve only, no treatment; currently has expanders in bilaterally; does daily stretching with theraband; hasn't been consistently wearing compression sleeve since xmas due to getting influenza   Surgical / medical history reviewed: Yes   Edema special tests:  (no history of DVTs)   Prior level of functional mobility: Independent with functional mobility and ADL, no AD   Prior treatment: Compression garments (Juzo sleeve and gauntlet  20-30mmHg)       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; office work)       Living environment: House / [facility]   Living environment comments:  and 2 kids   Current assistive devices:  (none)          Fall Screening   denies any falls    Precautions   Precautions comments: no known precautions; has finished chemo and radiation and had B mastectomy; doing normal 3 month follow-ups at this point    Patient / Family Goals  Patient / family goals statement: keep life as normal as possible; manage lymphedema     Pain   denies any pain today     Vital Signs  Vital signs: Pulse, SPO2  Pulse: 88bpm   SPO2: 96%     Cognitive Status  Orientation: Orientation to person, place and time  Level of consciousness: Alert  Follows commands and answers questions: 100% of the time  Personal safety and judgement: Intact  Memory: Intact     Edema Exam / Assessment  Skin condition: Non-pitting, Intact  Skin condition comments: RUE skin intact throughout with non-pitting edema present througout RUE (proximal > distal) and fullness felt/palpated in R-upper back and under axilla      Scar: Yes      Scar comments: B mastectomy  Ulceration: No       Capillary refill: Symmetrical     Radial pulse: Symmetrical      Stemmer sign: Negative     Girth Measurements  Girth Measurements: Refer to separate girth measurement flowsheet    Volume UE  Right UE (mL): 2166.83  Left UE (mL): 1999.44  UE volume comparison: RUE volume greater than LUE volume  % difference: 7.7%    Range of Motion  ROM: No deficits were identified  ROM comments: functional RUE ROM, GH flex and abduction slightly limited currently due to expanders    Strength   Strength comments: functional RUE strength    Posture  Posture: Normal     Palpation  Palpation: denies any sensitivities during palpation    Activities of Daily Living  Activities of Daily Living: Independent with functional mobility and ADL; no AD    Sensory  Sensory perception: Light  touch  Light touch: Impaired   Sensory perception comments: numbness in hands from chemotherapy    Vascular Assessment   Vascular Assessment Comments: no known concerns    Coordination  Coordination: Gross motor coordination appropriate       Muscle Tone  Muscle tone: No deficits were identified       Planned Edema Interventions  Planned edema interventions: Manual lymph drainage, Gradient compression bandaging, Fit for compression garment, Exercises, Precautions to prevent infection / exacerbation, Education, Manual therapy, Skin care / precautions, Home management program development       Clinical Impression  Criteria for skilled therapeutic intervention met: Yes  Therapy diagnosis: RUE and R-upper back lymphedema     Influenced by the following impairments / conditions: Stage 2  Functional limitations due to impairments / conditions: pt denies, however, does report noticing RUE gets fatigued more quickly than normal with activity        Treatment frequency: 2 times / week  Treatment duration: 12 weeks  Patient / family and/or staff in agreement with plan of care: Yes  Risks and benefits of therapy have been explained: Yes       Education Assessment  Preferred learning style: Listening  Barriers to learning: No barriers    Edema Goals  Goal 1  Goal identifier: stg  Goal description: pt to have at least daytime tolerance to compression sleeve and gauntlet for lymphedema containment  Target date: 01/31/18       Goal 2  Goal identifier: stg  Goal description: pt to be independent with self-MLD to RUE for lymphedema reduction response  Target date: 01/31/18       Goal 3  Goal identifier: ltg  Goal description: pt to be independent with longterm RUE lymphedema management via HEP, elevation, compression garment wear and self-MLD  Target date: 04/17/18       Goal 4  Goal identifier: ltg  Goal description: pt to at least 5 point improvement on LLIS due to decreased lymphedema reductions and discomfort reduciton  response  Target date: 04/17/18        Total Evaluation Time  Total evaluation time: 15

## 2018-01-24 ENCOUNTER — HOSPITAL ENCOUNTER (OUTPATIENT)
Dept: PHYSICAL THERAPY | Facility: CLINIC | Age: 38
Setting detail: THERAPIES SERIES
End: 2018-01-24
Attending: INTERNAL MEDICINE
Payer: COMMERCIAL

## 2018-01-24 PROCEDURE — 97140 MANUAL THERAPY 1/> REGIONS: CPT | Mod: GP | Performed by: PHYSICAL THERAPIST

## 2018-01-24 PROCEDURE — 40000099 ZZH STATISTIC LYMPHEDEMA VISIT: Performed by: PHYSICAL THERAPIST

## 2018-01-26 ENCOUNTER — HOSPITAL ENCOUNTER (OUTPATIENT)
Dept: PHYSICAL THERAPY | Facility: CLINIC | Age: 38
Setting detail: THERAPIES SERIES
End: 2018-01-26
Attending: INTERNAL MEDICINE
Payer: COMMERCIAL

## 2018-01-26 PROCEDURE — 40000099 ZZH STATISTIC LYMPHEDEMA VISIT: Performed by: REHABILITATION PRACTITIONER

## 2018-01-26 PROCEDURE — 97140 MANUAL THERAPY 1/> REGIONS: CPT | Mod: GP | Performed by: REHABILITATION PRACTITIONER

## 2018-01-31 ENCOUNTER — HOSPITAL ENCOUNTER (OUTPATIENT)
Dept: PHYSICAL THERAPY | Facility: CLINIC | Age: 38
Setting detail: THERAPIES SERIES
End: 2018-01-31
Attending: INTERNAL MEDICINE
Payer: COMMERCIAL

## 2018-01-31 PROCEDURE — 97140 MANUAL THERAPY 1/> REGIONS: CPT | Mod: GP | Performed by: PHYSICAL THERAPIST

## 2018-01-31 PROCEDURE — 40000099 ZZH STATISTIC LYMPHEDEMA VISIT: Performed by: PHYSICAL THERAPIST

## 2018-02-02 ENCOUNTER — HOSPITAL ENCOUNTER (OUTPATIENT)
Dept: PHYSICAL THERAPY | Facility: CLINIC | Age: 38
Setting detail: THERAPIES SERIES
End: 2018-02-02
Attending: INTERNAL MEDICINE
Payer: COMMERCIAL

## 2018-02-02 PROCEDURE — 97140 MANUAL THERAPY 1/> REGIONS: CPT | Mod: GP | Performed by: REHABILITATION PRACTITIONER

## 2018-02-02 PROCEDURE — 40000099 ZZH STATISTIC LYMPHEDEMA VISIT: Performed by: REHABILITATION PRACTITIONER

## 2018-02-07 ENCOUNTER — HOSPITAL ENCOUNTER (OUTPATIENT)
Dept: PHYSICAL THERAPY | Facility: CLINIC | Age: 38
Setting detail: THERAPIES SERIES
End: 2018-02-07
Attending: INTERNAL MEDICINE
Payer: COMMERCIAL

## 2018-02-07 PROCEDURE — 97140 MANUAL THERAPY 1/> REGIONS: CPT | Mod: GP | Performed by: PHYSICAL THERAPIST

## 2018-02-07 PROCEDURE — 40000099 ZZH STATISTIC LYMPHEDEMA VISIT: Performed by: PHYSICAL THERAPIST

## 2018-02-09 ENCOUNTER — HOSPITAL ENCOUNTER (OUTPATIENT)
Dept: PHYSICAL THERAPY | Facility: CLINIC | Age: 38
Setting detail: THERAPIES SERIES
End: 2018-02-09
Attending: INTERNAL MEDICINE
Payer: COMMERCIAL

## 2018-02-09 PROCEDURE — 40000099 ZZH STATISTIC LYMPHEDEMA VISIT: Performed by: REHABILITATION PRACTITIONER

## 2018-02-09 PROCEDURE — 97140 MANUAL THERAPY 1/> REGIONS: CPT | Mod: GP | Performed by: REHABILITATION PRACTITIONER

## 2018-02-14 ENCOUNTER — HOSPITAL ENCOUNTER (OUTPATIENT)
Dept: PHYSICAL THERAPY | Facility: CLINIC | Age: 38
Setting detail: THERAPIES SERIES
End: 2018-02-14
Attending: INTERNAL MEDICINE
Payer: COMMERCIAL

## 2018-02-14 PROCEDURE — 40000099 ZZH STATISTIC LYMPHEDEMA VISIT: Performed by: REHABILITATION PRACTITIONER

## 2018-02-14 PROCEDURE — 97140 MANUAL THERAPY 1/> REGIONS: CPT | Mod: GP | Performed by: REHABILITATION PRACTITIONER

## 2018-02-15 DIAGNOSIS — N95.1 HOT FLUSHES, PERIMENOPAUSAL: ICD-10-CM

## 2018-02-15 DIAGNOSIS — F32.A DEPRESSION, UNSPECIFIED DEPRESSION TYPE: ICD-10-CM

## 2018-02-15 RX ORDER — VENLAFAXINE 37.5 MG/1
TABLET ORAL
Qty: 90 TABLET | Refills: 1 | Status: SHIPPED | OUTPATIENT
Start: 2018-02-15 | End: 2018-04-24

## 2018-02-19 NOTE — PROGRESS NOTES
Outpatient Physical Therapy Progress Note     Patient: Diana Wilson  : 1980    Beginning/End Dates of Reporting Period:  2018 to 2018    Referring Provider: Dr. Davin MD    Therapy Diagnosis: RUE, R-breast and R-upper back/scapular region lymphedema      Client Self Report: changing medication to Tomoxafen due to fatigue but will be giving pt a break from meds for a while before starting on new med.    Objective Measurements:  Objective Measure: girth  Details: RUE +5.2% from last measurements on 18       Outcome Measures (most recent score):   LLIS = 20 on date of initial evaluation; pt will again complete LLIS at time of discharge    Goals:  Goal Identifier stg   Goal Description pt to have at least daytime tolerance to compression sleeve and gauntlet for lymphedema containment   Target Date 18   Date Met  18   Progress:     Goal Identifier stg   Goal Description pt to be independent with self-MLD to RUE for lymphedema reduction response   Target Date 18   Date Met  18   Progress:     Goal Identifier ltg   Goal Description pt to be independent with longterm RUE lymphedema management via HEP, elevation, compression garment wear and self-MLD   Target Date 18   Date Met      Progress:     Goal Identifier ltg   Goal Description pt to at least 5 point improvement on LLIS due to decreased lymphedema reductions and discomfort reduciton response   Target Date 18   Date Met      Progress:     Progress Toward Goals:   Progress limited due to every other week infusions, will continue to monitor RUE closely up until reconstruction surgery and then will also likely require ongoing treatment after. Pt with immediate decrease in discomfort in R-upper back post-MLD. Continues to do well with home program and consistent/compliant with coming to clinic appts.       Plan:  Continue therapy per current plan of care.    Discharge:  No

## 2018-02-19 NOTE — ADDENDUM NOTE
Encounter addended by: Nona Jett, PT on: 2/19/2018  1:44 PM<BR>     Actions taken: Flowsheet accepted, Sign clinical note

## 2018-02-21 ENCOUNTER — HOSPITAL ENCOUNTER (OUTPATIENT)
Dept: PHYSICAL THERAPY | Facility: CLINIC | Age: 38
Setting detail: THERAPIES SERIES
End: 2018-02-21
Attending: INTERNAL MEDICINE
Payer: COMMERCIAL

## 2018-02-21 PROCEDURE — 97140 MANUAL THERAPY 1/> REGIONS: CPT | Mod: GP | Performed by: PHYSICAL THERAPIST

## 2018-02-21 PROCEDURE — 40000099 ZZH STATISTIC LYMPHEDEMA VISIT: Performed by: PHYSICAL THERAPIST

## 2018-02-23 ENCOUNTER — HOSPITAL ENCOUNTER (OUTPATIENT)
Dept: PHYSICAL THERAPY | Facility: CLINIC | Age: 38
Setting detail: THERAPIES SERIES
End: 2018-02-23
Attending: INTERNAL MEDICINE
Payer: COMMERCIAL

## 2018-02-23 PROCEDURE — 97140 MANUAL THERAPY 1/> REGIONS: CPT | Mod: GP | Performed by: REHABILITATION PRACTITIONER

## 2018-02-23 PROCEDURE — 40000099 ZZH STATISTIC LYMPHEDEMA VISIT: Performed by: REHABILITATION PRACTITIONER

## 2018-02-28 ENCOUNTER — HOSPITAL ENCOUNTER (OUTPATIENT)
Dept: PHYSICAL THERAPY | Facility: CLINIC | Age: 38
Setting detail: THERAPIES SERIES
End: 2018-02-28
Attending: INTERNAL MEDICINE
Payer: COMMERCIAL

## 2018-02-28 PROCEDURE — 97140 MANUAL THERAPY 1/> REGIONS: CPT | Mod: GP | Performed by: PHYSICAL THERAPIST

## 2018-02-28 PROCEDURE — 40000099 ZZH STATISTIC LYMPHEDEMA VISIT: Performed by: PHYSICAL THERAPIST

## 2018-03-02 ENCOUNTER — HOSPITAL ENCOUNTER (OUTPATIENT)
Dept: PHYSICAL THERAPY | Facility: CLINIC | Age: 38
Setting detail: THERAPIES SERIES
End: 2018-03-02
Attending: INTERNAL MEDICINE
Payer: COMMERCIAL

## 2018-03-02 PROCEDURE — 97140 MANUAL THERAPY 1/> REGIONS: CPT | Mod: GP | Performed by: REHABILITATION PRACTITIONER

## 2018-03-02 PROCEDURE — 40000099 ZZH STATISTIC LYMPHEDEMA VISIT: Performed by: REHABILITATION PRACTITIONER

## 2018-03-07 ENCOUNTER — HOSPITAL ENCOUNTER (OUTPATIENT)
Dept: PHYSICAL THERAPY | Facility: CLINIC | Age: 38
Setting detail: THERAPIES SERIES
End: 2018-03-07
Attending: INTERNAL MEDICINE
Payer: COMMERCIAL

## 2018-03-07 PROCEDURE — 40000099 ZZH STATISTIC LYMPHEDEMA VISIT: Performed by: PHYSICAL THERAPIST

## 2018-03-07 PROCEDURE — 97140 MANUAL THERAPY 1/> REGIONS: CPT | Mod: GP | Performed by: PHYSICAL THERAPIST

## 2018-03-08 ENCOUNTER — OFFICE VISIT (OUTPATIENT)
Dept: FAMILY MEDICINE | Facility: CLINIC | Age: 38
End: 2018-03-08
Payer: COMMERCIAL

## 2018-03-08 VITALS
OXYGEN SATURATION: 97 % | DIASTOLIC BLOOD PRESSURE: 84 MMHG | BODY MASS INDEX: 37.95 KG/M2 | HEART RATE: 92 BPM | HEIGHT: 64 IN | SYSTOLIC BLOOD PRESSURE: 113 MMHG | WEIGHT: 222.3 LBS | TEMPERATURE: 98.1 F

## 2018-03-08 DIAGNOSIS — C50.411 MALIGNANT NEOPLASM OF UPPER-OUTER QUADRANT OF RIGHT BREAST IN FEMALE, ESTROGEN RECEPTOR POSITIVE (H): ICD-10-CM

## 2018-03-08 DIAGNOSIS — Z17.0 MALIGNANT NEOPLASM OF UPPER-OUTER QUADRANT OF RIGHT BREAST IN FEMALE, ESTROGEN RECEPTOR POSITIVE (H): ICD-10-CM

## 2018-03-08 DIAGNOSIS — Z01.818 PREOP GENERAL PHYSICAL EXAM: Primary | ICD-10-CM

## 2018-03-08 DIAGNOSIS — J45.31 MILD PERSISTENT ASTHMA WITH EXACERBATION: ICD-10-CM

## 2018-03-08 DIAGNOSIS — F41.9 ANXIETY: ICD-10-CM

## 2018-03-08 DIAGNOSIS — R11.0 NAUSEA: ICD-10-CM

## 2018-03-08 DIAGNOSIS — C50.911 INVASIVE DUCTAL CARCINOMA OF BREAST, RIGHT (H): ICD-10-CM

## 2018-03-08 DIAGNOSIS — Z80.3 FAMILY HISTORY OF MALIGNANT NEOPLASM OF BREAST: ICD-10-CM

## 2018-03-08 LAB
ALBUMIN SERPL-MCNC: 4.4 G/DL (ref 3.4–5)
ALP SERPL-CCNC: 108 U/L (ref 40–150)
ALT SERPL W P-5'-P-CCNC: 23 U/L (ref 0–50)
ANION GAP SERPL CALCULATED.3IONS-SCNC: 7 MMOL/L (ref 3–14)
AST SERPL W P-5'-P-CCNC: 21 U/L (ref 0–45)
BASOPHILS # BLD AUTO: 0 10E9/L (ref 0–0.2)
BASOPHILS NFR BLD AUTO: 0.1 %
BILIRUB SERPL-MCNC: 0.6 MG/DL (ref 0.2–1.3)
BUN SERPL-MCNC: 11 MG/DL (ref 7–30)
CALCIUM SERPL-MCNC: 9.1 MG/DL (ref 8.5–10.1)
CANCER AG27-29 SERPL-ACNC: 29 U/ML (ref 0–39)
CHLORIDE SERPL-SCNC: 102 MMOL/L (ref 94–109)
CO2 SERPL-SCNC: 27 MMOL/L (ref 20–32)
CREAT SERPL-MCNC: 1.09 MG/DL (ref 0.52–1.04)
DIFFERENTIAL METHOD BLD: ABNORMAL
EOSINOPHIL # BLD AUTO: 0 10E9/L (ref 0–0.7)
EOSINOPHIL NFR BLD AUTO: 0.4 %
ERYTHROCYTE [DISTWIDTH] IN BLOOD BY AUTOMATED COUNT: 15.1 % (ref 10–15)
GFR SERPL CREATININE-BSD FRML MDRD: 56 ML/MIN/1.7M2
GLUCOSE SERPL-MCNC: 75 MG/DL (ref 70–99)
HCT VFR BLD AUTO: 39.6 % (ref 35–47)
HGB BLD-MCNC: 13.1 G/DL (ref 11.7–15.7)
LYMPHOCYTES # BLD AUTO: 1 10E9/L (ref 0.8–5.3)
LYMPHOCYTES NFR BLD AUTO: 13 %
MCH RBC QN AUTO: 30.5 PG (ref 26.5–33)
MCHC RBC AUTO-ENTMCNC: 33.1 G/DL (ref 31.5–36.5)
MCV RBC AUTO: 92 FL (ref 78–100)
MONOCYTES # BLD AUTO: 0.4 10E9/L (ref 0–1.3)
MONOCYTES NFR BLD AUTO: 4.8 %
NEUTROPHILS # BLD AUTO: 6.5 10E9/L (ref 1.6–8.3)
NEUTROPHILS NFR BLD AUTO: 81.7 %
PLATELET # BLD AUTO: 261 10E9/L (ref 150–450)
POTASSIUM SERPL-SCNC: 3.7 MMOL/L (ref 3.4–5.3)
PROT SERPL-MCNC: 7.8 G/DL (ref 6.8–8.8)
RBC # BLD AUTO: 4.29 10E12/L (ref 3.8–5.2)
SODIUM SERPL-SCNC: 136 MMOL/L (ref 133–144)
WBC # BLD AUTO: 8 10E9/L (ref 4–11)

## 2018-03-08 PROCEDURE — 36415 COLL VENOUS BLD VENIPUNCTURE: CPT | Performed by: INTERNAL MEDICINE

## 2018-03-08 PROCEDURE — 99214 OFFICE O/P EST MOD 30 MIN: CPT | Performed by: FAMILY MEDICINE

## 2018-03-08 PROCEDURE — 85025 COMPLETE CBC W/AUTO DIFF WBC: CPT | Performed by: INTERNAL MEDICINE

## 2018-03-08 PROCEDURE — 80053 COMPREHEN METABOLIC PANEL: CPT | Performed by: INTERNAL MEDICINE

## 2018-03-08 PROCEDURE — 86300 IMMUNOASSAY TUMOR CA 15-3: CPT | Performed by: INTERNAL MEDICINE

## 2018-03-08 RX ORDER — LORAZEPAM 0.5 MG/1
0.5 TABLET ORAL EVERY 8 HOURS PRN
Qty: 20 TABLET | Refills: 0 | Status: SHIPPED | OUTPATIENT
Start: 2018-03-08 | End: 2018-06-05

## 2018-03-08 RX ORDER — SCOLOPAMINE TRANSDERMAL SYSTEM 1 MG/1
PATCH, EXTENDED RELEASE TRANSDERMAL
Qty: 1 PATCH | Refills: 0 | Status: SHIPPED | OUTPATIENT
Start: 2018-03-08 | End: 2018-12-13

## 2018-03-08 RX ORDER — ALBUTEROL SULFATE 90 UG/1
2 AEROSOL, METERED RESPIRATORY (INHALATION) EVERY 6 HOURS PRN
Qty: 1 INHALER | Refills: 11 | Status: SHIPPED | OUTPATIENT
Start: 2018-03-08 | End: 2020-09-01

## 2018-03-08 ASSESSMENT — ANXIETY QUESTIONNAIRES
6. BECOMING EASILY ANNOYED OR IRRITABLE: SEVERAL DAYS
1. FEELING NERVOUS, ANXIOUS, OR ON EDGE: MORE THAN HALF THE DAYS
3. WORRYING TOO MUCH ABOUT DIFFERENT THINGS: SEVERAL DAYS
7. FEELING AFRAID AS IF SOMETHING AWFUL MIGHT HAPPEN: MORE THAN HALF THE DAYS
5. BEING SO RESTLESS THAT IT IS HARD TO SIT STILL: NOT AT ALL
GAD7 TOTAL SCORE: 8
2. NOT BEING ABLE TO STOP OR CONTROL WORRYING: SEVERAL DAYS

## 2018-03-08 ASSESSMENT — PATIENT HEALTH QUESTIONNAIRE - PHQ9: 5. POOR APPETITE OR OVEREATING: SEVERAL DAYS

## 2018-03-08 NOTE — PROGRESS NOTES
[facility]  [address] [address] [address]  [phone]  Dept: [phone]    PRE-OP EVALUATION:  Today's date: 3/8/2018    Diana Wilson (: 1980) presents for pre-operative evaluation assessment as requested by Dr. GILBERTO Ruelas.    She requires evaluation and anesthesia risk assessment prior to undergoing surgery/procedure for treatment of s/p mastectomy for breast cancer .    Proposed Surgery/ Procedure: COMBINED RECONSTRUCT BREAST BILATERAL, IMPLANT PROSTHESIS BILATERAL      Date of Surgery/ Procedure: 2018  Time of Surgery/ Procedure: 07:30 am   Hospital/Surgical Facility:  [facility]   Primary Physician: Yenifer Ridley  Type of Anesthesia Anticipated: General    Patient has a Health Care Directive or Living Will:  YES  : living will     1. NO - Do you have a history of heart attack, stroke, stent, bypass or surgery on an artery in the head, neck, heart or legs?  2. NO - Do you ever have any pain or discomfort in your chest?   3. NO - Do you have a history of  Heart Failure?  4. NO - Are you troubled by shortness of breath when: walking on the level, up a slight hill or at night?  5. Yes - Do you currently have a cold, bronchitis or other respiratory infection? Has a cold  - ongoing for 2 days, no fever/chills. +cough, runny nose, sore throat  6. Yes - Do you have a cough, shortness of breath or wheezing? Coughing   7. NO - Do you sometimes get pains in the calves of your legs when you walk?  8. Yes - Do you or anyone in your family have previous history of blood clots? Mother   9. NO - Do you or does anyone in your family have a serious bleeding problem such as prolonged bleeding following surgeries or cuts?  10. Yes - Have you ever had problems with anemia or been told to take iron pills?  History of anemia due to sprue  11. Yes- Have you had any abnormal blood loss such as black, tarry or bloody stools, or abnormal vaginal bleeding? Abnormal vaginal bleeding  - historical. Had uterine ablation.   12. NO - Have you ever had a blood transfusion?  13. Yes - Have you or any of your relatives ever had problems with anesthesia? Nausea -personal. Uses scopolamine patch during surgery.   14. NO - Do you have sleep apnea, excessive snoring or daytime drowsiness?  15. NO - Do you have any prosthetic heart valves?  16. NO - Do you have prosthetic joints?  17. NO - Is there any chance that you may be pregnant?      HPI:     HPI related to upcoming procedure: s/p mastectomy. Has expanders in at this time.       See problem list for active medical problems.  Problems all longstanding and stable, except as noted/documented.  See ROS for pertinent symptoms related to these conditions.                                                                                                  .    MEDICAL HISTORY:     Patient Active Problem List    Diagnosis Date Noted     Lymphedema of right upper extremity 11/24/2017     Priority: Medium     Malignant neoplasm of upper-outer quadrant of right female breast (H) 01/06/2017     Priority: Medium     Invasive ductal carcinoma of breast, right (H) 12/30/2016     Priority: Medium     Intestinal malabsorption 02/16/2016     Priority: Medium     Anxiety 07/15/2014     Priority: Medium     Insomnia 07/17/2012     Priority: Medium     Iron deficiency anemia 03/14/2012     Priority: Medium     Major depressive disorder, recurrent episode, mild (H) 08/05/2011     Priority: Medium      mainly postpartum, had tried Zoloft and anafranil    [facility] enrolled pt in 6 months pt ed program          CARDIOVASCULAR SCREENING; LDL GOAL LESS THAN 130 10/31/2010     Priority: Medium     Obesity 09/08/2008     Priority: Medium     Wt Readings from Last 5 Encounters:   07/11/11 172 lb (78.019 kg)   12/20/10 174 lb (78.926 kg)   07/26/10 166 lb (75.297 kg)   11/12/09 170 lb (77.111 kg)   11/03/09 169 lb (76.658 kg)     Body mass index is 29.99 kg/(m^2).        Mild  intermittent asthma      Priority: Medium            never has had PFT's, MDI with colds and at times exercise       Health Care Home 05/14/2013     Priority: Low     EMERGENCY CARE PLAN  May 14, 2013: No current Care Coordination follow up planned. Please refer if Care Coordination services are needed.    Presenting Problem Signs and Symptoms Treatment Plan   Questions or concerns   during clinic hours   I will call my clinic directly:  [address] [address]  [phone].    Questions or concerns outside clinic hours   I will call the 24 hour nurse line at   [phone] or [facility].   Need to schedule an appointment   I will call the 24 hour scheduling team at [phone] or my clinic directly at [phone].    Same day treatment     I will call my clinic first, nurse line if after hours, urgent care and express care if needed.   Clinic care coordination services (regular clinic hours)     I will call a clinic care coordinator directly:     Darron Gaming RN  Mon, Tues, Fri - [phone]  Wed, Thurs - [phone]    Marlin Nelson :    [phone]    Or call my clinic at [phone] and ask to speak with care coordination.   Crisis Services: Behavioral or Mental Health  [facility] 24 Hour Phone Line  [phone]    [facility] 24 Hour Crisis Services  [phone]    [facility] (Behavioral Health Providers) Network [phone]    [facility]   [phone]       Emergency treatment -- Immediately    [facility] 561               Past Medical History:   Diagnosis Date     Depressive disorder 1995    And Anxiety     Encounter for insertion or removal of intrauterine contraceptive device 1/8/2008     Excessive or frequent menstruation 03/19/2003     Fibroadenosis of breast      Invasive ductal carcinoma of breast, right (H)      Malignant neoplasm of upper-outer quadrant of right female breast (H)      Neutropenia, drug-induced (H)       Nongonococcal urethritis (JALIL) due to chlamydia trachomatis 01/03/2002     Other and unspecified ovarian cyst 03/19/2003    RIGHT ovarian cyst     PONV (postoperative nausea and vomiting)      Transient hypertension of pregnancy, antepartum     PIH with last birth     Uncomplicated asthma 2002     Past Surgical History:   Procedure Laterality Date     BIOPSY  2/2016    EDG with biopsy     BREAST SURGERY  1997    Bilateral reduction     C APPENDECTOMY  1991     DENTAL SURGERY  09/13/2010 and 10/12/2010    Root canals     DILATION AND CURETTAGE, HYSTEROSCOPY, ABLATE ENDOMETRIUM NOVASURE, COMBINED  3/20/2012    Procedure:COMBINED DILATION AND CURETTAGE, HYSTEROSCOPY, ABLATE ENDOMETRIUM NOVASURE; Hysteroscopy with Endometrial Ablation Novasure; Surgeon:GERRI PURCELL; Location:[facility]     ESOPHAGOSCOPY, GASTROSCOPY, DUODENOSCOPY (EGD), COMBINED N/A 2/18/2016    Procedure: COMBINED ESOPHAGOSCOPY, GASTROSCOPY, DUODENOSCOPY (EGD);  Surgeon: Jose L Wilson MD;  Location: [facility]     INSERT PORT VASCULAR ACCESS N/A 1/11/2017    Procedure: INSERT PORT VASCULAR ACCESS;  Surgeon: Michael Townsend MD;  Location: [facility]     LAPAROSCOPIC SALPINGO-OOPHORECTOMY Bilateral 11/28/2017    Procedure: LAPAROSCOPIC SALPINGO-OOPHORECTOMY;  Laparoscopic bilateral salpingo oophorectomy;  Surgeon: Preeti Tirado MD;  Location: [facility]     SURGICAL HISTORY OF -   1997    Breast reduction     Current Outpatient Prescriptions   Medication Sig Dispense Refill     venlafaxine (EFFEXOR) 37.5 MG tablet Take 1 tablet (37.5 mg) in the morning and 2 tablets (75 mg) in the evening. 90 tablet 1     montelukast (SINGULAIR) 10 MG tablet TAKE ONE TABLET BY MOUTH AT BEDTIME 90 tablet 2     Famotidine (PEPCID PO) Take by mouth daily       azithromycin (ZITHROMAX) 250 MG tablet Two tablets first day, then one tablet daily for four days. 6 tablet 0     anastrozole (ARIMIDEX) 1 MG tablet Take 1 tablet (1 mg) by mouth daily 30 tablet 3      "albuterol (2.5 MG/3ML) 0.083% neb solution        acetaminophen (TYLENOL) 325 MG tablet Take 2 tablets (650 mg) by mouth every 4 hours as needed for other (mild pain) 100 tablet 0     ibuprofen (ADVIL/MOTRIN) 600 MG tablet Take 1 tablet (600 mg) by mouth every 6 hours as needed for pain (mild) 30 tablet 0     scopolamine (TRANSDERM) 72 hr patch Place 1 patch onto the skin every 72 hours.  Apply to hairless area behind one ear at least 4 hours before travel.  Remove old patch and change every 3 days. 1 patch 0     Naproxen Sodium (ALEVE PO) Take 220 mg by mouth daily as needed for moderate pain       albuterol (PROAIR HFA, PROVENTIL HFA, VENTOLIN HFA) 108 (90 BASE) MCG/ACT inhaler Inhale 2 puffs into the lungs every 6 hours as needed for shortness of breath / dyspnea 1 Inhaler 11     fluticasone (FLONASE) 50 MCG/ACT nasal spray Spray 1-2 sprays into both nostrils daily 3 Bottle 3     loratadine (CLARITIN) 10 MG tablet Take 1 tablet (10 mg) by mouth daily 90 tablet 3     OTC products: no recent use of OTC ASA, NSAIDS or Steroids and no use of herbal medications or other supplements    Allergies   Allergen Reactions     Carboplatin Shortness Of Breath, Rash and Anaphylaxis     Other reaction(s): Flushing, Tachycardia     Ambien      hallucinations     Amoxicillin GI Disturbance     Erythromycin GI Disturbance     Hydrocodone-Acetaminophen      Other reaction(s): Hallucinations     Penicillins Nausea and Vomiting and Hives     1-11-17 pt states this was a childhood reaction     Zolpidem      Other reaction(s): Hallucinations     Hydrocodone Other (See Comments)     Out of body experience, \"creepy-crawly\" skin       Latex Allergy: NO    Social History   Substance Use Topics     Smoking status: Former Smoker     Packs/day: 0.50     Years: 10.00     Types: Cigarettes     Quit date: 10/1/2016     Smokeless tobacco: Never Used      Comment: Socially- quit smoking 10/01/2016     Alcohol use 0.0 oz/week      Comment: " "Occassional     History   Drug Use No       REVIEW OF SYSTEMS:   Constitutional, neuro, ENT, endocrine, pulmonary, cardiac, gastrointestinal, genitourinary, musculoskeletal, integument and psychiatric systems are negative, except as otherwise noted.  Currently with mild cold - cough, sore throat, congestion.     EXAM:   /84 (BP Location: Right arm, Patient Position: Sitting, Cuff Size: Adult Large)  Pulse 92  Temp 98.1  F (36.7  C) (Tympanic)  Ht 5' 4\" (1.626 m)  Wt 222 lb 4.8 oz (100.8 kg)  SpO2 97%  BMI 38.16 kg/m2    GENERAL APPEARANCE: healthy, alert and no distress     EYES: EOMI, PERRL     HENT: ear canals and TM's normal and nose and mouth without ulcers or lesions     NECK: no adenopathy, no asymmetry, masses, or scars and thyroid normal to palpation     RESP: lungs clear to auscultation - no rales, rhonchi or wheezes     CV: regular rates and rhythm, normal S1 S2, no S3 or S4 and no murmur, click or rub     ABDOMEN:  soft, nontender, no HSM or masses and bowel sounds normal     MS: extremities normal- no gross deformities noted, no evidence of inflammation in joints, FROM in all extremities.     NEURO: Normal strength and tone, sensory exam grossly normal, mentation intact and speech normal     PSYCH: mentation appears normal. and affect normal/bright     LYMPHATICS: No cervical adenopathy    DIAGNOSTICS:   EKG: Not indicated due to non-vascular surgery and low risk of event (age <65 and without cardiac risk factors)    Component      Latest Ref Rng & Units 3/8/2018   WBC      4.0 - 11.0 10e9/L 8.0   RBC Count      3.8 - 5.2 10e12/L 4.29   Hemoglobin      11.7 - 15.7 g/dL 13.1   Hematocrit      35.0 - 47.0 % 39.6   MCV      78 - 100 fl 92   MCH      26.5 - 33.0 pg 30.5   MCHC      31.5 - 36.5 g/dL 33.1   RDW      10.0 - 15.0 % 15.1 (H)   Platelet Count      150 - 450 10e9/L 261   Diff Method       Automated Method   % Neutrophils      % 81.7   % Lymphocytes      % 13.0   % Monocytes      % 4.8 "   % Eosinophils      % 0.4   % Basophils      % 0.1   Absolute Neutrophil      1.6 - 8.3 10e9/L 6.5   Absolute Lymphocytes      0.8 - 5.3 10e9/L 1.0   Absolute Monocytes      0.0 - 1.3 10e9/L 0.4   Absolute Eosinophils      0.0 - 0.7 10e9/L 0.0   Absolute Basophils      0.0 - 0.2 10e9/L 0.0   Sodium      133 - 144 mmol/L 136   Potassium      3.4 - 5.3 mmol/L 3.7   Chloride      94 - 109 mmol/L 102   Carbon Dioxide      20 - 32 mmol/L 27   Anion Gap      3 - 14 mmol/L 7   Glucose      70 - 99 mg/dL 75   Urea Nitrogen      7 - 30 mg/dL 11   Creatinine      0.52 - 1.04 mg/dL 1.09 (H)   GFR Estimate      >60 mL/min/1.7m2 56 (L)   GFR Estimate If Black      >60 mL/min/1.7m2 68   Calcium      8.5 - 10.1 mg/dL 9.1   Bilirubin Total      0.2 - 1.3 mg/dL 0.6   Albumin      3.4 - 5.0 g/dL 4.4   Protein Total      6.8 - 8.8 g/dL 7.8   Alkaline Phosphatase      40 - 150 U/L 108   ALT      0 - 50 U/L 23   AST      0 - 45 U/L 21       Recent Labs   Lab Test  11/28/17   1000  11/24/17   1049  11/16/17   1643   06/03/17   2046   03/02/16   0759   HGB  12.6  12.7  12.5   < >  8.3*   < >   --    PLT   --   243  143*   < >  288   < >   --    INR   --    --    --    --    --    --   0.95   NA   --    --   136   --   139   < >   --    POTASSIUM   --    --   4.1   --   3.7   < >   --    CR   --    --   0.57   --   0.90   < >   --     < > = values in this interval not displayed.        IMPRESSION:     Reason for surgery/procedure: COMBINED RECONSTRUCT BREAST BILATERAL, IMPLANT PROSTHESIS BILATERAL    Diagnosis/reason for consult:   Pre-op consultation  Celiac sprue  Mild asthma  Anxiety  Breast cancer  MDD, recurrent   TIFFANY, history of   insomnia    The proposed surgical procedure is considered INTERMEDIATE risk.    REVISED CARDIAC RISK INDEX  The patient has the following serious cardiovascular risks for perioperative complications such as (MI, PE, VFib and 3  AV Block):  No serious cardiac risks  INTERPRETATION: 0 risks: Class I (very  low risk - 0.4% complication rate)    The patient has the following additional risks for perioperative complications:  No identified additional risks      RECOMMENDATIONS:       --We reviewed day of surgery medications    -- she will use scopolamine patch for nausea    --Currently has UPPER RESPIRATORY INFECTION, mild. Should be fine for surgery since it is a week away. If symptoms persist/change/worsen, should be re-evaluated prior to surgery.       APPROVAL GIVEN to proceed with proposed procedure, without further diagnostic evaluation       Signed Electronically by: Yenifer Ridley MD    Copy of this evaluation report is provided to requesting physician.    [facility] Preop Guidelines

## 2018-03-08 NOTE — LETTER
My Asthma Action Plan  Name: Diana Wilson   YOB: 1980  Date: 3/8/2018   My doctor: Yenifer Ridley MD   My clinic: [facility]        My Control Medicine: None  My Rescue Medicine: Albuterol (Proair/Ventolin/Proventil) inhaler Albuterol   My Asthma Severity: mild intermittent  Avoid your asthma triggers:                GREEN ZONE   Good Control    I feel good    No cough or wheeze    Can work, sleep and play without asthma symptoms       Take your asthma control medicine every day.     1. If exercise triggers your asthma, take your rescue medication    15 minutes before exercise or sports, and    During exercise if you have asthma symptoms  2. Spacer to use with inhaler: If you have a spacer, make sure to use it with your inhaler             YELLOW ZONE Getting Worse  I have ANY of these:    I do not feel good    Cough or wheeze    Chest feels tight    Wake up at night   1. Keep taking your Green Zone medications  2. Start taking your rescue medicine:    every 20 minutes for up to 1 hour. Then every 4 hours for 24-48 hours.  3. If you stay in the Yellow Zone for more than 12-24 hours, contact your doctor.  4. If you do not return to the Green Zone in 12-24 hours or you get worse, start taking your oral steroid medicine if prescribed by your provider.           RED ZONE Medical Alert - Get Help  I have ANY of these:    I feel awful    Medicine is not helping    Breathing getting harder    Trouble walking or talking    Nose opens wide to breathe       1. Take your rescue medicine NOW  2. If your provider has prescribed an oral steroid medicine, start taking it NOW  3. Call your doctor NOW  4. If you are still in the Red Zone after 20 minutes and you have not reached your doctor:    Take your rescue medicine again and    Call 911 or go to the emergency room right away    See your regular doctor within 2 weeks of an Emergency Room or Urgent Care visit for follow-up treatment.         Electronically signed by: Sonia Villeda, March 8, 2018    Annual Reminders:  Meet with Asthma Educator,  Flu Shot in the Fall, consider Pneumonia Vaccination for patients with asthma (aged 19 and older).    Pharmacy: [facility] [place], [place] - [address]                    Asthma Triggers  How To Control Things That Make Your Asthma Worse    Triggers are things that make your asthma worse.  Look at the list below to help you find your triggers and what you can do about them.  You can help prevent asthma flare-ups by staying away from your triggers.      Trigger                                                          What you can do   Cigarette Smoke  Tobacco smoke can make asthma worse. Do not allow smoking in your home, car or around you.  Be sure no one smokes at a child s day care or school.  If you smoke, ask your health care provider for ways to help you quit.  Ask family members to quit too.  Ask your health care provider for a referral to Quit Plan to help you quit smoking, or call 6-638-794-PLAN.     Colds, Flu, Bronchitis  These are common triggers of asthma. Wash your hands often.  Don t touch your eyes, nose or mouth.  Get a flu shot every year.     Dust Mites  These are tiny bugs that live in cloth or carpet. They are too small to see. Wash sheets and blankets in hot water every week.   Encase pillows and mattress in dust mite proof covers.  Avoid having carpet if you can. If you have carpet, vacuum weekly.   Use a dust mask and HEPA vacuum.   Pollen and Outdoor Mold  Some people are allergic to trees, grass, or weed pollen, or molds. Try to keep your windows closed.  Limit time out doors when pollen count is high.   Ask you health care provider about taking medicine during allergy season.     Animal Dander  Some people are allergic to skin flakes, urine or saliva from pets with fur or feathers. Keep pets with fur or feathers out of your home.    If you can t keep the pet  outdoors, then keep the pet out of your bedroom.  Keep the bedroom door closed.  Keep pets off cloth furniture and away from stuffed toys.     Mice, Rats, and Cockroaches  Some people are allergic to the waste from these pests.   Cover food and garbage.  Clean up spills and food crumbs.  Store grease in the refrigerator.   Keep food out of the bedroom.   Indoor Mold  This can be a trigger if your home has high moisture. Fix leaking faucets, pipes, or other sources of water.   Clean moldy surfaces.  Dehumidify basement if it is damp and smelly.   Smoke, Strong Odors, and Sprays  These can reduce air quality. Stay away from strong odors and sprays, such as perfume, powder, hair spray, paints, smoke incense, paint, cleaning products, candles and new carpet.   Exercise or Sports  Some people with asthma have this trigger. Be active!  Ask your doctor about taking medicine before sports or exercise to prevent symptoms.    Warm up for 5-10 minutes before and after sports or exercise.     Other Triggers of Asthma  Cold air:  Cover your nose and mouth with a scarf.  Sometimes laughing or crying can be a trigger.  Some medicines and food can trigger asthma.

## 2018-03-08 NOTE — MR AVS SNAPSHOT
After Visit Summary   3/8/2018    Diana Wilson    MRN: [id number]           Patient Information     Date Of Birth          1980        Visit Information        Provider Department      3/8/2018 10:00 AM Yenifer Ridley MD [facility]        Today's Diagnoses     Preop general physical exam    -  1    Invasive ductal carcinoma of breast, right (H)        Malignant neoplasm of upper-outer quadrant of right breast in female, estrogen receptor positive (H)        Anxiety        Nausea        Malignant neoplasm of upper-outer quadrant of right breast in female, estrogen receptor positive (H)        Family history of malignant neoplasm of breast        Mild persistent asthma with exacerbation          Care Instructions      Before Your Surgery      Call your surgeon if there is any change in your health. This includes signs of a cold or flu (such as a sore throat, runny nose, cough, rash or fever).    Do not smoke, drink alcohol or take over the counter medicine (unless your surgeon or primary care doctor tells you to) for the 24 hours before and after surgery.    If you take prescribed drugs: Follow your doctor s orders about which medicines to take and which to stop until after surgery.    Eating and drinking prior to surgery: follow the instructions from your surgeon    Take a shower or bath the night before surgery. Use the soap your surgeon gave you to gently clean your skin. If you do not have soap from your surgeon, use your regular soap. Do not shave or scrub the surgery site.  Wear clean pajamas and have clean sheets on your bed.           Follow-ups after your visit        Your next 10 appointments already scheduled     Mar 14, 2018 10:00 AM CDT   Lymphedema Treatment with Nona Jett PT   [facility] ([facility])    [address] [facility] [address]   [phone]            Mar 15, 2018  3:15 PM CDT   Return Visit with Maribel Vee  "MD Davin   [facility] ([facility])    [facility]  [address]  [facility] [address]   [phone]            Mar 16, 2018   Procedure with Eugenio Ruelas MD   [facility] (--)    [address]  [place] [address]   [phone]            Mar 21, 2018 10:00 AM CDT   Lymphedema Treatment with Nona Jett PT   [facility] ([facility])    [address]  [facility] [address]   [phone]              Who to contact     Normal or non-critical lab and imaging results will be communicated to you by Alethhart, letter or phone within 4 business days after the clinic has received the results. If you do not hear from us within 7 days, please contact the clinic through Alethhart or phone. If you have a critical or abnormal lab result, we will notify you by phone as soon as possible.  Submit refill requests through Ubooly or call your pharmacy and they will forward the refill request to us. Please allow 3 business days for your refill to be completed.          If you need to speak with a  for additional information , please call: [phone]             Additional Information About Your Visit        Ubooly Information     Ubooly gives you secure access to your electronic health record. If you see a primary care provider, you can also send messages to your care team and make appointments. If you have questions, please call your primary care clinic.  If you do not have a primary care provider, please call [phone] and they will assist you.        Care EveryWhere ID     This is your Care EveryWhere ID. This could be used by other organizations to access your [place] medical records  [id number]        Your Vitals Were     Pulse Temperature Height Pulse Oximetry BMI (Body Mass Index)       92 98.1  F (36.7  C) (Tympanic) 5' 4\" (1.626 m) 97% 38.16 kg/m2     "    Blood Pressure from Last 3 Encounters:   03/08/18 113/84   01/05/18 (!) 130/101   12/21/17 120/85    Weight from Last 3 Encounters:   03/08/18 222 lb 4.8 oz (100.8 kg)   01/05/18 219 lb (99.3 kg)   12/21/17 212 lb 4.8 oz (96.3 kg)              We Performed the Following     Asthma Action Plan (AAP)     DEPRESSION ACTION PLAN (DAP)          Today's Medication Changes          These changes are accurate as of 3/8/18 11:59 PM.  If you have any questions, ask your nurse or doctor.               Start taking these medicines.        Dose/Directions    LORazepam 0.5 MG tablet   Commonly known as:  ATIVAN   Used for:  Anxiety   Started by:  Yenifer Ridley MD        Dose:  0.5 mg   Take 1 tablet (0.5 mg) by mouth every 8 hours as needed for anxiety   Quantity:  20 tablet   Refills:  0         Stop taking these medicines if you haven't already. Please contact your care team if you have questions.     anastrozole 1 MG tablet   Commonly known as:  ARIMIDEX   Stopped by:  Yenifer Ridley MD           azithromycin 250 MG tablet   Commonly known as:  ZITHROMAX   Stopped by:  Yenifer Ridley MD           loratadine 10 MG tablet   Commonly known as:  CLARITIN   Stopped by:  Yenifer Ridley MD                Where to get your medicines      These medications were sent to [facility] [address] [facility]  [address] [facility] [address]     Phone:  [phone]     albuterol 108 (90 BASE) MCG/ACT Inhaler         Some of these will need a paper prescription and others can be bought over the counter.  Ask your nurse if you have questions.     Bring a paper prescription for each of these medications     LORazepam 0.5 MG tablet    scopolamine 72 hr patch                Primary Care Provider Office Phone # Fax #    Yenifer Ridley [phone] [address] [facility] [address]        Equal Access to Services     FAUZIA RAMIREZ AH: Sandra griffin  som [place], naidada lualonadaha, qaoliviata kadandre weiss, javier catalinain hayaan alfredrosa ephraimchristiano lashireengareth yuliana. So [facility] [phone].    ATENCIÓN: Si jaylin gonzalez, tiene a montelongo disposición servicios gratuitos de asistencia lingüística. Mackenzie al [phone].    We comply with applicable federal civil rights laws and [place] laws. We do not discriminate on the basis of race, color, national origin, age, disability, sex, sexual orientation, or gender identity.            Thank you!     Thank you for choosing [facility]  for your care. Our goal is always to provide you with excellent care. Hearing back from our patients is one way we can continue to improve our services. Please take a few minutes to complete the written survey that you may receive in the mail after your visit with us. Thank you!             Your Updated Medication List - Protect others around you: Learn how to safely use, store and throw away your medicines at www.disposemymeds.org.          This list is accurate as of 3/8/18 11:59 PM.  Always use your most recent med list.                   Brand Name Dispense Instructions for use Diagnosis    acetaminophen 325 MG tablet    TYLENOL    100 tablet    Take 2 tablets (650 mg) by mouth every 4 hours as needed for other (mild pain)    S/P laparoscopy       * albuterol (2.5 MG/3ML) 0.083% neb solution           * albuterol 108 (90 BASE) MCG/ACT Inhaler    PROAIR HFA/PROVENTIL HFA/VENTOLIN HFA    1 Inhaler    Inhale 2 puffs into the lungs every 6 hours as needed for shortness of breath / dyspnea    Mild persistent asthma with exacerbation       ALEVE PO      Take 220 mg by mouth daily as needed for moderate pain        fluticasone 50 MCG/ACT spray    FLONASE    3 Bottle    Spray 1-2 sprays into both nostrils daily    Environmental allergies       GABAPENTIN PO           ibuprofen 600 MG tablet    ADVIL/MOTRIN    30 tablet    Take 1 tablet (600 mg) by mouth every 6 hours as needed for pain (mild)    S/P  laparoscopy       LORazepam 0.5 MG tablet    ATIVAN    20 tablet    Take 1 tablet (0.5 mg) by mouth every 8 hours as needed for anxiety    Anxiety       montelukast 10 MG tablet    SINGULAIR    90 tablet    TAKE ONE TABLET BY MOUTH AT BEDTIME    Environmental allergies       MULTIVITAMIN PO           PEPCID PO      Take by mouth daily        scopolamine 72 hr patch    TRANSDERM    1 patch    Place 1 patch onto the skin every 72 hours.  Apply to hairless area behind one ear at least 4 hours before travel.  Remove old patch and change every 3 days.    Preop general physical exam, Nausea, Invasive ductal carcinoma of breast, right (H), Family history of malignant neoplasm of breast, Malignant neoplasm of upper-outer quadrant of right breast in female, estrogen receptor positive (H)       venlafaxine 37.5 MG tablet    EFFEXOR    90 tablet    Take 1 tablet (37.5 mg) in the morning and 2 tablets (75 mg) in the evening.    Hot flushes, perimenopausal, Depression, unspecified depression type       ZYRTEC PO           * Notice:  This list has 2 medication(s) that are the same as other medications prescribed for you. Read the directions carefully, and ask your doctor or other care provider to review them with you.

## 2018-03-08 NOTE — LETTER
My Depression Action Plan  Name: Diana Wilson   Date of Birth 1980  Date: 3/8/2018    My doctor: Yenifer Ridley   My clinic: [address] [address] [phone]          GREEN    ZONE   Good Control    What it looks like:     Things are going generally well. You have normal up s and down s. You may even feel depressed from time to time, but bad moods usually last less than a day.   What you need to do:  1. Continue to care for yourself (see self care plan)  2. Check your depression survival kit and update it as needed  3. Follow your physician s recommendations including any medication.  4. Do not stop taking medication unless you consult with your physician first.           YELLOW         ZONE Getting Worse    What it looks like:     Depression is starting to interfere with your life.     It may be hard to get out of bed; you may be starting to isolate yourself from others.    Symptoms of depression are starting to last most all day and this has happened for several days.     You may have suicidal thoughts but they are not constant.   What you need to do:     1. Call your care team, your response to treatment will improve if you keep your care team informed of your progress. Yellow periods are signs an adjustment may need to be made.     2. Continue your self-care, even if you have to fake it!    3. Talk to someone in your support network    4. Open up your depression survival kit           RED    ZONE Medical Alert - Get Help    What it looks like:     Depression is seriously interfering with your life.     You may experience these or other symptoms: You can t get out of bed most days, can t work or engage in other necessary activities, you have trouble taking care of basic hygiene, or basic responsibilities, thoughts of suicide or death that will not go away, self-injurious behavior.     What you need to do:  1. Call your care team and request a  same-day appointment. If they are not available (weekends or after hours) call your local crisis line, emergency room or 911.      Electronically signed by: Sonia Villeda, March 8, 2018    Depression Self Care Plan / Survival Kit    Self-Care for Depression  Here s the deal. Your body and mind are really not as separate as most people think.  What you do and think affects how you feel and how you feel influences what you do and think. This means if you do things that people who feel good do, it will help you feel better.  Sometimes this is all it takes.  There is also a place for medication and therapy depending on how severe your depression is, so be sure to consult with your medical provider and/ or Behavioral Health Consultant if your symptoms are worsening or not improving.     In order to better manage my stress, I will:    Exercise  Get some form of exercise, every day. This will help reduce pain and release endorphins, the  feel good  chemicals in your brain. This is almost as good as taking antidepressants!  This is not the same as joining a gym and then never going! (they count on that by the way ) It can be as simple as just going for a walk or doing some gardening, anything that will get you moving.      Hygiene   Maintain good hygiene (Get out of bed in the morning, Make your bed, Brush your teeth, Take a shower, and Get dressed like you were going to work, even if you are unemployed).  If your clothes don't fit try to get ones that do.    Diet  I will strive to eat foods that are good for me, drink plenty of water, and avoid excessive sugar, caffeine, alcohol, and other mood-altering substances.  Some foods that are helpful in depression are: complex carbohydrates, B vitamins, flaxseed, fish or fish oil, fresh fruits and vegetables.    Psychotherapy  I agree to participate in Individual Therapy (if recommended).    Medication  If prescribed medications, I agree to take them.  Missing doses can  result in serious side effects.  I understand that drinking alcohol, or other illicit drug use, may cause potential side effects.  I will not stop my medication abruptly without first discussing it with my provider.    Staying Connected With Others  I will stay in touch with my friends, family members, and my primary care provider/team.    Use your imagination  Be creative.  We all have a creative side; it doesn t matter if it s oil painting, sand castles, or mud pies! This will also kick up the endorphins.    Witness Beauty  (AKA stop and smell the roses) Take a look outside, even in mid-winter. Notice colors, textures. Watch the squirrels and birds.     Service to others  Be of service to others.  There is always someone else in need.  By helping others we can  get out of ourselves  and remember the really important things.  This also provides opportunities for practicing all the other parts of the program.    Humor  Laugh and be silly!  Adjust your TV habits for less news and crime-drama and more comedy.    Control your stress  Try breathing deep, massage therapy, biofeedback, and meditation. Find time to relax each day.     My support system    Clinic Contact:  Phone number:    Contact 1:  Phone number:    Contact 2:  Phone number:    Amish/:  Phone number:    Therapist:  Phone number:    Local crisis center:    Phone number:    Other community support:  Phone number:

## 2018-03-08 NOTE — NURSING NOTE
"Chief Complaint   Patient presents with     Pre-Op Exam       Initial /84 (BP Location: Right arm, Patient Position: Sitting, Cuff Size: Adult Large)  Pulse 92  Temp 98.1  F (36.7  C) (Tympanic)  Ht 5' 4\" (1.626 m)  Wt 222 lb 4.8 oz (100.8 kg)  SpO2 97%  BMI 38.16 kg/m2 Estimated body mass index is 38.16 kg/(m^2) as calculated from the following:    Height as of this encounter: 5' 4\" (1.626 m).    Weight as of this encounter: 222 lb 4.8 oz (100.8 kg).  Medication Reconciliation: complete   Sonia Villeda LPN    "

## 2018-03-09 ASSESSMENT — PATIENT HEALTH QUESTIONNAIRE - PHQ9: SUM OF ALL RESPONSES TO PHQ QUESTIONS 1-9: 3

## 2018-03-09 ASSESSMENT — ANXIETY QUESTIONNAIRES: GAD7 TOTAL SCORE: 8

## 2018-03-09 ASSESSMENT — ASTHMA QUESTIONNAIRES: ACT_TOTALSCORE: 17

## 2018-03-13 NOTE — H&P (VIEW-ONLY)
[facility]  [address] [facility] [address]  [phone]  Dept: [phone]    PRE-OP EVALUATION:  Today's date: 3/8/2018    Diana Wilson (: 1980) presents for pre-operative evaluation assessment as requested by Dr. GILBERTO Ruelas.    She requires evaluation and anesthesia risk assessment prior to undergoing surgery/procedure for treatment of s/p mastectomy for breast cancer .    Proposed Surgery/ Procedure: COMBINED RECONSTRUCT BREAST BILATERAL, IMPLANT PROSTHESIS BILATERAL      Date of Surgery/ Procedure: 2018  Time of Surgery/ Procedure: 07:30 am   Hospital/Surgical Facility:  [facility]   Primary Physician: Yenifer Ridley  Type of Anesthesia Anticipated: General    Patient has a Health Care Directive or Living Will:  YES  : living will     1. NO - Do you have a history of heart attack, stroke, stent, bypass or surgery on an artery in the head, neck, heart or legs?  2. NO - Do you ever have any pain or discomfort in your chest?   3. NO - Do you have a history of  Heart Failure?  4. NO - Are you troubled by shortness of breath when: walking on the level, up a slight hill or at night?  5. Yes - Do you currently have a cold, bronchitis or other respiratory infection? Has a cold  - ongoing for 2 days, no fever/chills. +cough, runny nose, sore throat  6. Yes - Do you have a cough, shortness of breath or wheezing? Coughing   7. NO - Do you sometimes get pains in the calves of your legs when you walk?  8. Yes - Do you or anyone in your family have previous history of blood clots? Mother   9. NO - Do you or does anyone in your family have a serious bleeding problem such as prolonged bleeding following surgeries or cuts?  10. Yes - Have you ever had problems with anemia or been told to take iron pills?  History of anemia due to sprue  11. Yes- Have you had any abnormal blood loss such as black, tarry or bloody stools, or abnormal vaginal bleeding? Abnormal vaginal bleeding  - historical. Had uterine ablation.   12. NO - Have you ever had a blood transfusion?  13. Yes - Have you or any of your relatives ever had problems with anesthesia? Nausea -personal. Uses scopolamine patch during surgery.   14. NO - Do you have sleep apnea, excessive snoring or daytime drowsiness?  15. NO - Do you have any prosthetic heart valves?  16. NO - Do you have prosthetic joints?  17. NO - Is there any chance that you may be pregnant?      HPI:     HPI related to upcoming procedure: s/p mastectomy. Has expanders in at this time.       See problem list for active medical problems.  Problems all longstanding and stable, except as noted/documented.  See ROS for pertinent symptoms related to these conditions.                                                                                                  .    MEDICAL HISTORY:     Patient Active Problem List    Diagnosis Date Noted     Lymphedema of right upper extremity 11/24/2017     Priority: Medium     Malignant neoplasm of upper-outer quadrant of right female breast (H) 01/06/2017     Priority: Medium     Invasive ductal carcinoma of breast, right (H) 12/30/2016     Priority: Medium     Intestinal malabsorption 02/16/2016     Priority: Medium     Anxiety 07/15/2014     Priority: Medium     Insomnia 07/17/2012     Priority: Medium     Iron deficiency anemia 03/14/2012     Priority: Medium     Major depressive disorder, recurrent episode, mild (H) 08/05/2011     Priority: Medium      mainly postpartum, had tried Zoloft and anafranil    [facility] enrolled pt in 6 months pt ed program          CARDIOVASCULAR SCREENING; LDL GOAL LESS THAN 130 10/31/2010     Priority: Medium     Obesity 09/08/2008     Priority: Medium     Wt Readings from Last 5 Encounters:   07/11/11 172 lb (78.019 kg)   12/20/10 174 lb (78.926 kg)   07/26/10 166 lb (75.297 kg)   11/12/09 170 lb (77.111 kg)   11/03/09 169 lb (76.658 kg)     Body mass index is 29.99 kg/(m^2).        Mild  intermittent asthma      Priority: Medium            never has had PFT's, MDI with colds and at times exercise       Health Care Home 05/14/2013     Priority: Low     EMERGENCY CARE PLAN  May 14, 2013: No current Care Coordination follow up planned. Please refer if Care Coordination services are needed.    Presenting Problem Signs and Symptoms Treatment Plan   Questions or concerns   during clinic hours   I will call my clinic directly:  [address] [address]  [phone].    Questions or concerns outside clinic hours   I will call the 24 hour nurse line at   [phone] or [facility].   Need to schedule an appointment   I will call the 24 hour scheduling team at [phone] or my clinic directly at [phone].    Same day treatment     I will call my clinic first, nurse line if after hours, urgent care and express care if needed.   Clinic care coordination services (regular clinic hours)     I will call a clinic care coordinator directly:     Darron Gaming RN  Mon, Tues, Fri - [phone]  Wed, Thurs - [phone]    Marlin Nelson :    [phone]    Or call my clinic at [phone] and ask to speak with care coordination.   Crisis Services: Behavioral or Mental Health  [facility] 24 Hour Phone Line  [phone]    [facility] 24 Hour Crisis Services  [phone]    [facility] (Behavioral Health Providers) Network [phone]    [facility]   [phone]       Emergency treatment -- Immediately    [facility] [address]               Past Medical History:   Diagnosis Date     Depressive disorder 1995    And Anxiety     Encounter for insertion or removal of intrauterine contraceptive device 1/8/2008     Excessive or frequent menstruation 03/19/2003     Fibroadenosis of breast      Invasive ductal carcinoma of breast, right (H)      Malignant neoplasm of upper-outer quadrant of right female breast (H)      Neutropenia, drug-induced (H)       Nongonococcal urethritis (JALIL) due to chlamydia trachomatis 01/03/2002     Other and unspecified ovarian cyst 03/19/2003    RIGHT ovarian cyst     PONV (postoperative nausea and vomiting)      Transient hypertension of pregnancy, antepartum     PIH with last birth     Uncomplicated asthma 2002     Past Surgical History:   Procedure Laterality Date     BIOPSY  2/2016    EDG with biopsy     BREAST SURGERY  1997    Bilateral reduction     C APPENDECTOMY  1991     DENTAL SURGERY  09/13/2010 and 10/12/2010    Root canals     DILATION AND CURETTAGE, HYSTEROSCOPY, ABLATE ENDOMETRIUM NOVASURE, COMBINED  3/20/2012    Procedure:COMBINED DILATION AND CURETTAGE, HYSTEROSCOPY, ABLATE ENDOMETRIUM NOVASURE; Hysteroscopy with Endometrial Ablation Novasure; Surgeon:GERRI PURCELL; Location:[facility]     ESOPHAGOSCOPY, GASTROSCOPY, DUODENOSCOPY (EGD), COMBINED N/A 2/18/2016    Procedure: COMBINED ESOPHAGOSCOPY, GASTROSCOPY, DUODENOSCOPY (EGD);  Surgeon: Jose L Wilson MD;  Location: [facility]     INSERT PORT VASCULAR ACCESS N/A 1/11/2017    Procedure: INSERT PORT VASCULAR ACCESS;  Surgeon: Michael Townsend MD;  Location: [facility]     LAPAROSCOPIC SALPINGO-OOPHORECTOMY Bilateral 11/28/2017    Procedure: LAPAROSCOPIC SALPINGO-OOPHORECTOMY;  Laparoscopic bilateral salpingo oophorectomy;  Surgeon: Preeti Tirado MD;  Location: [facility] OR     SURGICAL HISTORY OF -   1997    Breast reduction     Current Outpatient Prescriptions   Medication Sig Dispense Refill     venlafaxine (EFFEXOR) 37.5 MG tablet Take 1 tablet (37.5 mg) in the morning and 2 tablets (75 mg) in the evening. 90 tablet 1     montelukast (SINGULAIR) 10 MG tablet TAKE ONE TABLET BY MOUTH AT BEDTIME 90 tablet 2     Famotidine (PEPCID PO) Take by mouth daily       azithromycin (ZITHROMAX) 250 MG tablet Two tablets first day, then one tablet daily for four days. 6 tablet 0     anastrozole (ARIMIDEX) 1 MG tablet Take 1 tablet (1 mg) by mouth daily 30 tablet 3      "albuterol (2.5 MG/3ML) 0.083% neb solution        acetaminophen (TYLENOL) 325 MG tablet Take 2 tablets (650 mg) by mouth every 4 hours as needed for other (mild pain) 100 tablet 0     ibuprofen (ADVIL/MOTRIN) 600 MG tablet Take 1 tablet (600 mg) by mouth every 6 hours as needed for pain (mild) 30 tablet 0     scopolamine (TRANSDERM) 72 hr patch Place 1 patch onto the skin every 72 hours.  Apply to hairless area behind one ear at least 4 hours before travel.  Remove old patch and change every 3 days. 1 patch 0     Naproxen Sodium (ALEVE PO) Take 220 mg by mouth daily as needed for moderate pain       albuterol (PROAIR HFA, PROVENTIL HFA, VENTOLIN HFA) 108 (90 BASE) MCG/ACT inhaler Inhale 2 puffs into the lungs every 6 hours as needed for shortness of breath / dyspnea 1 Inhaler 11     fluticasone (FLONASE) 50 MCG/ACT nasal spray Spray 1-2 sprays into both nostrils daily 3 Bottle 3     loratadine (CLARITIN) 10 MG tablet Take 1 tablet (10 mg) by mouth daily 90 tablet 3     OTC products: no recent use of OTC ASA, NSAIDS or Steroids and no use of herbal medications or other supplements    Allergies   Allergen Reactions     Carboplatin Shortness Of Breath, Rash and Anaphylaxis     Other reaction(s): Flushing, Tachycardia     Ambien      hallucinations     Amoxicillin GI Disturbance     Erythromycin GI Disturbance     Hydrocodone-Acetaminophen      Other reaction(s): Hallucinations     Penicillins Nausea and Vomiting and Hives     1-11-17 pt states this was a childhood reaction     Zolpidem      Other reaction(s): Hallucinations     Hydrocodone Other (See Comments)     Out of body experience, \"creepy-crawly\" skin       Latex Allergy: NO    Social History   Substance Use Topics     Smoking status: Former Smoker     Packs/day: 0.50     Years: 10.00     Types: Cigarettes     Quit date: 10/1/2016     Smokeless tobacco: Never Used      Comment: Socially- quit smoking 10/01/2016     Alcohol use 0.0 oz/week      Comment: " "Occassional     History   Drug Use No       REVIEW OF SYSTEMS:   Constitutional, neuro, ENT, endocrine, pulmonary, cardiac, gastrointestinal, genitourinary, musculoskeletal, integument and psychiatric systems are negative, except as otherwise noted.  Currently with mild cold - cough, sore throat, congestion.     EXAM:   /84 (BP Location: Right arm, Patient Position: Sitting, Cuff Size: Adult Large)  Pulse 92  Temp 98.1  F (36.7  C) (Tympanic)  Ht 5' 4\" (1.626 m)  Wt 222 lb 4.8 oz (100.8 kg)  SpO2 97%  BMI 38.16 kg/m2    GENERAL APPEARANCE: healthy, alert and no distress     EYES: EOMI, PERRL     HENT: ear canals and TM's normal and nose and mouth without ulcers or lesions     NECK: no adenopathy, no asymmetry, masses, or scars and thyroid normal to palpation     RESP: lungs clear to auscultation - no rales, rhonchi or wheezes     CV: regular rates and rhythm, normal S1 S2, no S3 or S4 and no murmur, click or rub     ABDOMEN:  soft, nontender, no HSM or masses and bowel sounds normal     MS: extremities normal- no gross deformities noted, no evidence of inflammation in joints, FROM in all extremities.     NEURO: Normal strength and tone, sensory exam grossly normal, mentation intact and speech normal     PSYCH: mentation appears normal. and affect normal/bright     LYMPHATICS: No cervical adenopathy    DIAGNOSTICS:   EKG: Not indicated due to non-vascular surgery and low risk of event (age <65 and without cardiac risk factors)    Component      Latest Ref Rng & Units 3/8/2018   WBC      4.0 - 11.0 10e9/L 8.0   RBC Count      3.8 - 5.2 10e12/L 4.29   Hemoglobin      11.7 - 15.7 g/dL 13.1   Hematocrit      35.0 - 47.0 % 39.6   MCV      78 - 100 fl 92   MCH      26.5 - 33.0 pg 30.5   MCHC      31.5 - 36.5 g/dL 33.1   RDW      10.0 - 15.0 % 15.1 (H)   Platelet Count      150 - 450 10e9/L 261   Diff Method       Automated Method   % Neutrophils      % 81.7   % Lymphocytes      % 13.0   % Monocytes      % 4.8 "   % Eosinophils      % 0.4   % Basophils      % 0.1   Absolute Neutrophil      1.6 - 8.3 10e9/L 6.5   Absolute Lymphocytes      0.8 - 5.3 10e9/L 1.0   Absolute Monocytes      0.0 - 1.3 10e9/L 0.4   Absolute Eosinophils      0.0 - 0.7 10e9/L 0.0   Absolute Basophils      0.0 - 0.2 10e9/L 0.0   Sodium      133 - 144 mmol/L 136   Potassium      3.4 - 5.3 mmol/L 3.7   Chloride      94 - 109 mmol/L 102   Carbon Dioxide      20 - 32 mmol/L 27   Anion Gap      3 - 14 mmol/L 7   Glucose      70 - 99 mg/dL 75   Urea Nitrogen      7 - 30 mg/dL 11   Creatinine      0.52 - 1.04 mg/dL 1.09 (H)   GFR Estimate      >60 mL/min/1.7m2 56 (L)   GFR Estimate If Black      >60 mL/min/1.7m2 68   Calcium      8.5 - 10.1 mg/dL 9.1   Bilirubin Total      0.2 - 1.3 mg/dL 0.6   Albumin      3.4 - 5.0 g/dL 4.4   Protein Total      6.8 - 8.8 g/dL 7.8   Alkaline Phosphatase      40 - 150 U/L 108   ALT      0 - 50 U/L 23   AST      0 - 45 U/L 21       Recent Labs   Lab Test  11/28/17   1000  11/24/17   1049  11/16/17   1643   06/03/17   2046   03/02/16   0759   HGB  12.6  12.7  12.5   < >  8.3*   < >   --    PLT   --   243  143*   < >  288   < >   --    INR   --    --    --    --    --    --   0.95   NA   --    --   136   --   139   < >   --    POTASSIUM   --    --   4.1   --   3.7   < >   --    CR   --    --   0.57   --   0.90   < >   --     < > = values in this interval not displayed.        IMPRESSION:     Reason for surgery/procedure: COMBINED RECONSTRUCT BREAST BILATERAL, IMPLANT PROSTHESIS BILATERAL    Diagnosis/reason for consult:   Pre-op consultation  Celiac sprue  Mild asthma  Anxiety  Breast cancer  MDD, recurrent   TIFFANY, history of   insomnia    The proposed surgical procedure is considered INTERMEDIATE risk.    REVISED CARDIAC RISK INDEX  The patient has the following serious cardiovascular risks for perioperative complications such as (MI, PE, VFib and 3  AV Block):  No serious cardiac risks  INTERPRETATION: 0 risks: Class I (very  low risk - 0.4% complication rate)    The patient has the following additional risks for perioperative complications:  No identified additional risks      RECOMMENDATIONS:       --We reviewed day of surgery medications    -- she will use scopolamine patch for nausea    --Currently has UPPER RESPIRATORY INFECTION, mild. Should be fine for surgery since it is a week away. If symptoms persist/change/worsen, should be re-evaluated prior to surgery.       APPROVAL GIVEN to proceed with proposed procedure, without further diagnostic evaluation       Signed Electronically by: Yenifer Ridley MD    Copy of this evaluation report is provided to requesting physician.    [facility] Preop Guidelines

## 2018-03-14 ENCOUNTER — HOSPITAL ENCOUNTER (OUTPATIENT)
Dept: PHYSICAL THERAPY | Facility: CLINIC | Age: 38
Setting detail: THERAPIES SERIES
End: 2018-03-14
Attending: INTERNAL MEDICINE
Payer: COMMERCIAL

## 2018-03-14 PROCEDURE — 97140 MANUAL THERAPY 1/> REGIONS: CPT | Mod: GP | Performed by: PHYSICAL THERAPIST

## 2018-03-14 PROCEDURE — 40000099 ZZH STATISTIC LYMPHEDEMA VISIT: Performed by: PHYSICAL THERAPIST

## 2018-03-14 NOTE — PROGRESS NOTES
Outpatient Physical Therapy Progress Note     Patient: Diana Wilson  : 1980    Beginning/End Dates of Reporting Period:  2018 to 3/14/2018    Referring Provider: Dr. Davin MD    Therapy Diagnosis: RUE, axillary and upper back lymphedema      Client Self Report: surgery is in 2 days, I can't wait    Objective Measurements:  Objective Measure: girth  Details: RUE +1.7% since last measured on 3/7/18  and -6.3% since      Outcome Measures (most recent score):   LLIS = 20 on date of initial evaluation; pt will again complete LLIS at time of discharge     Goals:  Goal Identifier stg   Goal Description pt to have at least daytime tolerance to compression sleeve and gauntlet for lymphedema containment   Target Date 18   Date Met  18   Progress:     Goal Identifier stg   Goal Description pt to be independent with self-MLD to RUE for lymphedema reduction response   Target Date 18   Date Met  18   Progress:     Goal Identifier ltg   Goal Description pt to be independent with longterm RUE lymphedema management via HEP, elevation, compression garment wear and self-MLD   Target Date 18   Date Met      Progress:     Goal Identifier ltg   Goal Description pt to at least 5 point improvement on LLIS due to decreased lymphedema reductions and discomfort reduciton response   Target Date 18   Date Met      Progress:     Progress Toward Goals:   Patient's arm has made good reductions since initial evaluation, however, upper back and distal to R-axilla remain full between treatments but do respond well to self-MLD and MLD with [employer] in clinic. Pt continues to benefit from 2x/week appts in clinic until reconstruction surgery on 3/16/18 and then will re-assess and change frequency as/if needed/appropirate.     Plan:  Continue therapy per current plan of care.    Discharge:  No

## 2018-03-15 ENCOUNTER — ONCOLOGY VISIT (OUTPATIENT)
Dept: ONCOLOGY | Facility: CLINIC | Age: 38
End: 2018-03-15
Attending: INTERNAL MEDICINE
Payer: COMMERCIAL

## 2018-03-15 VITALS
SYSTOLIC BLOOD PRESSURE: 120 MMHG | BODY MASS INDEX: 37.59 KG/M2 | OXYGEN SATURATION: 96 % | WEIGHT: 220.2 LBS | HEART RATE: 77 BPM | DIASTOLIC BLOOD PRESSURE: 87 MMHG | TEMPERATURE: 97.6 F | HEIGHT: 64 IN | RESPIRATION RATE: 16 BRPM

## 2018-03-15 DIAGNOSIS — E66.3 OVERWEIGHT: ICD-10-CM

## 2018-03-15 DIAGNOSIS — Z17.0 MALIGNANT NEOPLASM OF UPPER-OUTER QUADRANT OF RIGHT BREAST IN FEMALE, ESTROGEN RECEPTOR POSITIVE (H): Primary | ICD-10-CM

## 2018-03-15 DIAGNOSIS — C50.411 MALIGNANT NEOPLASM OF UPPER-OUTER QUADRANT OF RIGHT BREAST IN FEMALE, ESTROGEN RECEPTOR POSITIVE (H): Primary | ICD-10-CM

## 2018-03-15 DIAGNOSIS — N95.1 HOT FLUSHES, PERIMENOPAUSAL: ICD-10-CM

## 2018-03-15 PROCEDURE — 99214 OFFICE O/P EST MOD 30 MIN: CPT | Performed by: INTERNAL MEDICINE

## 2018-03-15 PROCEDURE — G0463 HOSPITAL OUTPT CLINIC VISIT: HCPCS

## 2018-03-15 RX ORDER — TAMOXIFEN CITRATE 20 MG/1
20 TABLET ORAL DAILY
Qty: 90 TABLET | Refills: 1 | Status: SHIPPED | OUTPATIENT
Start: 2018-03-15 | End: 2018-09-17

## 2018-03-15 ASSESSMENT — PAIN SCALES - GENERAL: PAINLEVEL: NO PAIN (0)

## 2018-03-15 NOTE — PROGRESS NOTES
"ONCOLOGY Follow up visit     COMPLAINT AND REASON FOR VISIT:    12/2016 diagnosed right breast cancer LN positive disease        HPI  She presented at age 36 breast lump in her right armpit and right breast in 10/2016.   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  ER/HI 99% positive, HER-2/kelle negative, associated with high-grade DCIS.    Right axillary ultrasound-guided biopsy indicating metastatic carcinoma positive for spread from breast primary, ER/HI both negative.  HER-2/kelle is negative.      She had clinical T2N1 disease. She made informed decision to proceed with neoadjuvant DDAC C1D1 1/16/2017. She has good clinical response post DD AC x4. Then went on to have wkly taxol + carbo  with informed decision in March.   She has carbo hypersensitivity reaction in early May (during C3). It was d/c after.     She is tested negative for BRCA1/2 with [employer].     7/2017 she had double mastectomies at [facility]. Right side found no residual invasive carcinoma, +grade III DCIS, 1mm in greatest dimension. 5 sLN all negative. She had ypTis(DCIS)pN0 disease. Left breast no cancer.     She finished post mastectomy RT in 11/2017.    She had BSO 11/2017. Then started Arimidex in 1/2018. She quit in Feb due to side effects and willing to try tamoxifen.   She is also on ELL trial for weight loss.     PAST MEDICAL HISTORY:  Restless legs, mild intermittent asthma, celiac disease diagnosed 02/2016, hx of TIFFANY     SOCIAL HISTORY:  Works part-time.  Lives with her .  They have 2 kids, first pregnancy age 22.  She did not do breast feeding because of bilateral breast reduction.  She does office work.      FAMILY HISTORY:  \"Full of cancer\" from the mother's side including colon, lung, pancreatic, gastric cancer.  According to the patient none of them survive older than 60 years old.      REVIEW OF SYSTEMS  She is very upbeat, + hot flushes. " "Very anxious.      PHYSICAL EXAMINATION:   Blood pressure 120/87, pulse 77, temperature 97.6  F (36.4  C), temperature source Tympanic, resp. rate 16, height 1.626 m (5' 4.02\"), weight 99.9 kg (220 lb 3.2 oz), SpO2 96 %, not currently breastfeeding.    GENERAL APPEARANCE:  He young woman, looks like her stated age, very upbeat, not in acute distress.   HEENT: The patient is normocephalic, atraumatic. Pupils are equally reactive to light.  Sclerae are anicteric. erythematous oral mucosa.  - pharynx.  No oral thrush.   NECK:  Supple.  No jugular venous distention.  Thyroid is not palpable.   LYMPH NODES:  Superficial lymphadenopathy is not appreciable in the bilateral cervical, supraclavicular, axillary or inguinal areas.   CARDIOVASCULAR:  S1, S2 regular with no murmurs or gallops.  No carotid or abdominal bruits.   PULMONARY:  Lungs are clear to auscultation and percussion bilaterally.  There is no wheezing or rhonchi.   GASTROINTESTINAL:  Abdomen is soft, nontender.  No hepatosplenomegaly.  No signs of ascites.  No mass appreciable.   MUSCULOSKELETAL/EXTREMITIES:  No edema.  No cyanotic changes.  No signs of joint deformity.  No lymphedema.   NEUROLOGIC:  Cranial nerves II-XII are grossly intact.  Sensation intact.  Muscle strength and muscle tone symmetrical, 5/5 throughout.   BACK:  No spinal or paraspinal tenderness.  No CVA tenderness.   SKIN:  No petechiae.  No rash.  No signs of cellulitis.   BREASTS:  Bilateral breast exam reviewed.  Bilateral mastectomies scars well healed. She is partially expanded has marked erythematous changes on right side from RT.      LABORATORY DATA REVIEWED  CBC DIFF/CMP/marker are good.       Old data reviewed with summary  7/2017 double mastectomies at [facility]. Right side: no residual invasive carcinoma, +grade III DCIS, 1mm in greatest dimension. 5 sLN all negative. She had ypTis(DCIS)pN0 disease. Left breast no cancer.       CT body 11/2017: no mets.     Breast MRI at [facility] post " DD AC 3/2017:   1. Improvement in biopsy-proven RIGHT-sided breast cancer at 10 o'clock. The malignancy has decreased in size (2.4 cm versus 3.3 cm previously)and demonstrates more favorable enhancement kinetics.     2.  No residual enlarged RIGHT axillary lymph nodes.    1/2017 baseline Echo LV function nl, EF 60-65%     PET 1/2017  1. Hypermetabolic mass in the right breast consistent with known malignancy.  2. Two enlarged hypermetabolic metastatic lymph nodes in the right axilla.  3. No additional worrisome hypermetabolic areas in the neck, chest, abdomen, or pelvis.      DEXA scan from 03/2016 identified mild osteopenia lumbar spine        ASSESSMENT AND PLAN:    1.   12/2016  dx breast cancer, cT2N1 disesae.      S/p DD AC and wkly taxol, carbo was used partially dropped due to hypersensitivity reaction.   She had pCR for invasive cancer with mastectomies in 7/2017.   S/p RT till 11/2017.     She wants the most aggressive form of anti hormone therapy. She had BSO 11/2017.  She did not tolerate Arimdiex off it after 1 month.     Recommend her to consider tamoxifen. We discuss the mechanism, side effects. She is willing to try.   She demands to have CT scan with her follow up visit.   I explained to her the ASCO guideline in terms how to follow up breast cancer.     2.  hot flushes - advice Effexor, this has helped a lot.     3. Overweight - weight loss is meaningful for her in terms of rate of recurrence. She is interested in and enrolled in [employer] program.

## 2018-03-15 NOTE — PATIENT INSTRUCTIONS
We would like to see you back in 3 months for a follow up appointment with labs and CT prior. When you are in need of a refill, please call your pharmacy and they will send us a request.  Copy of appointments, and after visit summary (AVS) given to patient.  If you have any questions please call Josette Smalls RN, BSN Oncology Hematology  [facility] [phone]. For questions after business hours, or on holidays/weekends, please call our after hours Nurse Triage line [phone]. Thank you.           3 months f/u with fasting labs and CT.

## 2018-03-15 NOTE — MR AVS SNAPSHOT
After Visit Summary   3/15/2018    Diana Wilson    MRN: [id number]           Patient Information     Date Of Birth          1980        Visit Information        Provider Department      3/15/2018 3:15 PM Maribel Jin MD [facility]        Today's Diagnoses     Malignant neoplasm of upper-outer quadrant of right breast in female, estrogen receptor positive (H)    -  1    Hot flushes, perimenopausal        Overweight          Care Instructions    We would like to see you back in 3 months for a follow up appointment with labs and CT prior. When you are in need of a refill, please call your pharmacy and they will send us a request.  Copy of appointments, and after visit summary (AVS) given to patient.  If you have any questions please call Josette Smalls RN, BSN Oncology Hematology  [facility] [phone]. For questions after business hours, or on holidays/weekends, please call our after hours Nurse Triage line [phone]. Thank you.           3 months f/u with fasting labs and CT.           Follow-ups after your visit        Your next 10 appointments already scheduled     Mar 16, 2018   Procedure with Eugenio Ruelas MD   [facility] (--)    [address]  [facility] [id number] [phone]            Mar 21, 2018 10:00 AM CDT   Lymphedema Treatment with Nona Jett, KATE   [facility] Lymphedema ([facility])    [address] [facility] [address] [phone]            Jun 06, 2018  9:00 AM CDT   LAB with HU LAB   [facility] ([facility])    [address] [facility] [id number] [phone]           Please do not eat 10-12 hours before your appointment if you are coming in fasting for labs on lipids, cholesterol, or glucose (sugar). This does not apply to pregnant women. Water, hot tea and black coffee (with nothing added) are okay. Do not  drink other fluids, diet soda or chew gum.            Jun 13, 2018  9:45 AM CDT   (Arrive by 9:30 AM)   CT CHEST/ABDOMEN/PELVIS W CONTRAST with WYCT1   [facility] ([facility])    [address]  [facility] [address]   [phone]           Please bring any scans or X-rays taken at other hospitals, if similar tests were done. Also bring a list of your medicines, including vitamins, minerals and over-the-counter drugs. It is safest to leave personal items at home.  Be sure to tell your doctor:   If you have any allergies.   If there s any chance you are pregnant.   If you are breastfeeding.  You may have contrast for this exam. To prepare:   Do not eat or drink for 2 hours before your exam. If you need to take medicine, you may take it with small sips of water. (We may ask you to take liquid medicine as well.)   The day before your exam, drink extra fluids at least six 8-ounce glasses (unless your doctor tells you to restrict your fluids).   You will be given instructions on how to drink the contrast.  Patients over 70 or patients with diabetes or kidney problems:   If you haven t had a blood test (creatinine test) within the last 30 days, the Cardiologist/Radiologist may require you to get this test prior to your exam.  If you have diabetes:   Continue to take your metformin medication on the day of your exam  Please wear loose clothing, such as a sweat suit or jogging clothes. Avoid snaps, zippers and other metal. We may ask you to undress and put on a hospital gown.  If you have any questions, please call the Imaging Department where you will have your exam.            Jun 14, 2018  3:00 PM CDT   Return Visit with Maribel Jin MD   [facility] ([facility])    [facility]  [address]  [facility] [address]   [phone]              Future tests that were ordered for you today     Open Future Orders        Priority Expected  "Expires Ordered    CT Chest/Abdomen/Pelvis w Contrast Routine 5/1/2018 3/15/2019 3/15/2018    Ca27.29  breast tumor marker Routine 6/1/2018 7/31/2018 3/15/2018    CBC with platelets differential Routine 6/1/2018 7/31/2018 3/15/2018    Comprehensive metabolic panel Routine 6/1/2018 7/31/2018 3/15/2018            Who to contact     If you have questions or need follow up information about today's clinic visit or your schedule please contact [facility] directly at [phone].  Normal or non-critical lab and imaging results will be communicated to you by LotLinxhart, letter or phone within 4 business days after the clinic has received the results. If you do not hear from us within 7 days, please contact the clinic through StudyTubet or phone. If you have a critical or abnormal lab result, we will notify you by phone as soon as possible.  Submit refill requests through Shoppilot or call your pharmacy and they will forward the refill request to us. Please allow 3 business days for your refill to be completed.          Additional Information About Your Visit        MyChart Information     Shoppilot gives you secure access to your electronic health record. If you see a primary care provider, you can also send messages to your care team and make appointments. If you have questions, please call your primary care clinic.  If you do not have a primary care provider, please call [phone] and they will assist you.        Care EveryWhere ID     This is your Care EveryWhere ID. This could be used by other organizations to access your [place] medical records  [id number]        Your Vitals Were     Pulse Temperature Respirations Height Pulse Oximetry Breastfeeding?    77 97.6  F (36.4  C) (Tympanic) 16 1.626 m (5' 4.02\") 96% No    BMI (Body Mass Index)                   37.78 kg/m2            Blood Pressure from Last 3 Encounters:   03/15/18 120/87   03/08/18 113/84   01/05/18 (!) 130/101    Weight from Last 3 Encounters: "   03/15/18 99.9 kg (220 lb 3.2 oz)   03/08/18 100.8 kg (222 lb 4.8 oz)   01/05/18 99.3 kg (219 lb)                 Today's Medication Changes          These changes are accurate as of 3/15/18  4:04 PM.  If you have any questions, ask your nurse or doctor.               Start taking these medicines.        Dose/Directions    tamoxifen 20 MG tablet   Commonly known as:  NOLVADEX   Used for:  Malignant neoplasm of upper-outer quadrant of right breast in female, estrogen receptor positive (H)   Started by:  Maribel Jin MD        Dose:  20 mg   Take 1 tablet (20 mg) by mouth daily   Quantity:  90 tablet   Refills:  1            Where to get your medicines      These medications were [facility] [place] [facility] [place] [address]  [address] [address] [facility] [address]     Phone:  [phone]     tamoxifen 20 MG tablet                Primary Care Provider Office Phone # Fax #    Yenifer Ridley [phone] [address] [facility] [facility] [address]        Equal Access to Services     [facility]: Hadii gabriela ku hadasho Soomaali, waaxda luqadaha, qaybta kaalmada adeegyabell, javier bridges. So [facility] [phone].    ATENCIÓN: Si habla español, tiene a montelongo disposición servicios gratuitos de asistencia lingüística. [facility] [phone].    We comply with applicable federal civil rights laws and [place] laws. We do not discriminate on the basis of race, color, national origin, age, disability, sex, sexual orientation, or gender identity.            Thank you!     Thank you for choosing [facility]  for your care. Our goal is always to provide you with excellent care. Hearing back from our patients is one way we can continue to improve our services. Please take a few minutes to complete the written survey that you may receive in the mail after your visit with us. Thank you!             Your Updated Medication List - Protect others around you:  Learn how to safely use, store and throw away your medicines at www.disposemymeds.org.          This list is accurate as of 3/15/18  4:04 PM.  Always use your most recent med list.                   Brand Name Dispense Instructions for use Diagnosis    acetaminophen 325 MG tablet    TYLENOL    100 tablet    Take 2 tablets (650 mg) by mouth every 4 hours as needed for other (mild pain)    S/P laparoscopy       * albuterol (2.5 MG/3ML) 0.083% neb solution           * albuterol 108 (90 BASE) MCG/ACT Inhaler    PROAIR HFA/PROVENTIL HFA/VENTOLIN HFA    1 Inhaler    Inhale 2 puffs into the lungs every 6 hours as needed for shortness of breath / dyspnea    Mild persistent asthma with exacerbation       ALEVE PO      Take 220 mg by mouth daily as needed for moderate pain        fluticasone 50 MCG/ACT spray    FLONASE    3 Bottle    Spray 1-2 sprays into both nostrils daily    Environmental allergies       GABAPENTIN PO           ibuprofen 600 MG tablet    ADVIL/MOTRIN    30 tablet    Take 1 tablet (600 mg) by mouth every 6 hours as needed for pain (mild)    S/P laparoscopy       LORazepam 0.5 MG tablet    ATIVAN    20 tablet    Take 1 tablet (0.5 mg) by mouth every 8 hours as needed for anxiety    Anxiety       montelukast 10 MG tablet    SINGULAIR    90 tablet    TAKE ONE TABLET BY MOUTH AT BEDTIME    Environmental allergies       MULTIVITAMIN PO           PEPCID PO      Take by mouth daily        scopolamine 72 hr patch    TRANSDERM    1 patch    Place 1 patch onto the skin every 72 hours.  Apply to hairless area behind one ear at least 4 hours before travel.  Remove old patch and change every 3 days.    Preop general physical exam, Nausea, Invasive ductal carcinoma of breast, right (H), Family history of malignant neoplasm of breast, Malignant neoplasm of upper-outer quadrant of right breast in female, estrogen receptor positive (H)       tamoxifen 20 MG tablet    NOLVADEX    90 tablet    Take 1 tablet (20 mg) by mouth  daily    Malignant neoplasm of upper-outer quadrant of right breast in female, estrogen receptor positive (H)       venlafaxine 37.5 MG tablet    EFFEXOR    90 tablet    Take 1 tablet (37.5 mg) in the morning and 2 tablets (75 mg) in the evening.    Hot flushes, perimenopausal, Depression, unspecified depression type       ZYRTEC PO           * Notice:  This list has 2 medication(s) that are the same as other medications prescribed for you. Read the directions carefully, and ask your doctor or other care provider to review them with you.

## 2018-03-15 NOTE — LETTER
3/15/2018         RE: Diana Wilson  [address] [place] [id number]        Dear Colleague,    Thank you for referring your patient, Diana Wilson, to the [facility]. Please see a copy of my visit note below.    ONCOLOGY Follow up visit     COMPLAINT AND REASON FOR VISIT:    12/2016 diagnosed right breast cancer LN positive disease        HPI  She presented at age 36 breast lump in her right armpit and right breast in 10/2016.   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  ER/TX 99% positive, HER-2/kelle negative, associated with high-grade DCIS.    Right axillary ultrasound-guided biopsy indicating metastatic carcinoma positive for spread from breast primary, ER/TX both negative.  HER-2/kelle is negative.      She had clinical T2N1 disease. She made informed decision to proceed with neoadjuvant DDAC C1D1 1/16/2017. She has good clinical response post DD AC x4. Then went on to have wkly taxol + carbo  with informed decision in March.   She has carbo hypersensitivity reaction in early May (during C3). It was d/c after.     She is tested negative for BRCA1/2 with [employer].     7/2017 she had double mastectomies at [facility]. Right side found no residual invasive carcinoma, +grade III DCIS, 1mm in greatest dimension. 5 sLN all negative. She had ypTis(DCIS)pN0 disease. Left breast no cancer.     She finished post mastectomy RT in 11/2017.    She had BSO 11/2017. Then started Arimidex in 1/2018. She quit in Feb due to side effects and willing to try tamoxifen.   She is also on ELL trial for weight loss.     PAST MEDICAL HISTORY:  Restless legs, mild intermittent asthma, celiac disease diagnosed 02/2016, hx of TIFFANY     SOCIAL HISTORY:  Works part-time.  Lives with her .  They have 2 kids, first pregnancy age 21.  She did not do breast feeding because of bilateral breast reduction.  She does office work.     "  FAMILY HISTORY:  \"Full of cancer\" from the mother's side including colon, lung, pancreatic, gastric cancer.  According to the patient none of them survive older than 60 years old.      REVIEW OF SYSTEMS  She is very upbeat, + hot flushes. Very anxious.      PHYSICAL EXAMINATION:   Blood pressure 120/87, pulse 77, temperature 97.6  F (36.4  C), temperature source Tympanic, resp. rate 16, height 1.626 m (5' 4.02\"), weight 99.9 kg (220 lb 3.2 oz), SpO2 96 %, not currently breastfeeding.    GENERAL APPEARANCE:  He young woman, looks like her stated age, very upbeat, not in acute distress.   HEENT: The patient is normocephalic, atraumatic. Pupils are equally reactive to light.  Sclerae are anicteric. erythematous oral mucosa.  - pharynx.  No oral thrush.   NECK:  Supple.  No jugular venous distention.  Thyroid is not palpable.   LYMPH NODES:  Superficial lymphadenopathy is not appreciable in the bilateral cervical, supraclavicular, axillary or inguinal areas.   CARDIOVASCULAR:  S1, S2 regular with no murmurs or gallops.  No carotid or abdominal bruits.   PULMONARY:  Lungs are clear to auscultation and percussion bilaterally.  There is no wheezing or rhonchi.   GASTROINTESTINAL:  Abdomen is soft, nontender.  No hepatosplenomegaly.  No signs of ascites.  No mass appreciable.   MUSCULOSKELETAL/EXTREMITIES:  No edema.  No cyanotic changes.  No signs of joint deformity.  No lymphedema.   NEUROLOGIC:  Cranial nerves II-XII are grossly intact.  Sensation intact.  Muscle strength and muscle tone symmetrical, 5/5 throughout.   BACK:  No spinal or paraspinal tenderness.  No CVA tenderness.   SKIN:  No petechiae.  No rash.  No signs of cellulitis.   BREASTS:  Bilateral breast exam reviewed.  Bilateral mastectomies scars well healed. She is partially expanded has marked erythematous changes on right side from RT.      LABORATORY DATA REVIEWED  CBC DIFF/CMP/marker are good.       Old data reviewed with summary  7/2017 double " mastectomies at [facility]. Right side: no residual invasive carcinoma, +grade III DCIS, 1mm in greatest dimension. 5 sLN all negative. She had ypTis(DCIS)pN0 disease. Left breast no cancer.       CT body 11/2017: no mets.     Breast MRI at [facility] post DD AC 3/2017:   1. Improvement in biopsy-proven RIGHT-sided breast cancer at 10 o'clock. The malignancy has decreased in size (2.4 cm versus 3.3 cm previously)and demonstrates more favorable enhancement kinetics.     2.  No residual enlarged RIGHT axillary lymph nodes.    1/2017 baseline Echo LV function nl, EF 60-65%     PET 1/2017  1. Hypermetabolic mass in the right breast consistent with known malignancy.  2. Two enlarged hypermetabolic metastatic lymph nodes in the right axilla.  3. No additional worrisome hypermetabolic areas in the neck, chest, abdomen, or pelvis.      DEXA scan from 03/2016 identified mild osteopenia lumbar spine        ASSESSMENT AND PLAN:    1.   12/2016  dx breast cancer, cT2N1 disesae.      S/p DD AC and wkly taxol, carbo was used partially dropped due to hypersensitivity reaction.   She had pCR for invasive cancer with mastectomies in 7/2017.   S/p RT till 11/2017.     She wants the most aggressive form of anti hormone therapy. She had BSO 11/2017.  She did not tolerate Arimdiex off it after 1 month.     Recommend her to consider tamoxifen. We discuss the mechanism, side effects. She is willing to try.   She demands to have CT scan with her follow up visit.   I explained to her the ASCO guideline in terms how to follow up breast cancer.     2.  hot flushes - advice Effexor, this has helped a lot.     3. Overweight - weight loss is meaningful for her in terms of rate of recurrence. She is interested in and enrolled in [employer] program.     Again, thank you for allowing me to participate in the care of your patient.        Sincerely,        Maribel Jin MD, MD

## 2018-03-15 NOTE — NURSING NOTE
"Oncology Rooming Note    March 15, 2018 3:32 PM   Diana Wilson is a 37 year old female who presents for:    Chief Complaint   Patient presents with     Oncology Clinic Visit     2 week Recheck Breast CA, review labs     Initial Vitals: /87 (BP Location: Left arm, Patient Position: Sitting, Cuff Size: Adult Large)  Pulse 77  Temp 97.6  F (36.4  C) (Tympanic)  Resp 16  Ht 1.626 m (5' 4.02\")  Wt 99.9 kg (220 lb 3.2 oz)  SpO2 96%  Breastfeeding? No  BMI 37.78 kg/m2 Estimated body mass index is 37.78 kg/(m^2) as calculated from the following:    Height as of this encounter: 1.626 m (5' 4.02\").    Weight as of this encounter: 99.9 kg (220 lb 3.2 oz). Body surface area is 2.12 meters squared.  No Pain (0) Comment: Data Unavailable   No LMP recorded. Patient has had an ablation.  Allergies reviewed: Yes  Medications reviewed: Yes    Medications: Medication refills not needed today.  Pharmacy name entered into [facility]: [facility] [place] [address]    Clinical concerns:  2 week Recheck Breast CA, review labs.      1. Ready to start Tamoxifen.     7 minutes for nursing intake (face to face time)     Sondra Medina CMA              "

## 2018-03-16 ENCOUNTER — HOSPITAL ENCOUNTER (OUTPATIENT)
Facility: CLINIC | Age: 38
Discharge: HOME | End: 2018-03-16
Attending: PLASTIC SURGERY | Admitting: PLASTIC SURGERY
Payer: COMMERCIAL

## 2018-03-16 ENCOUNTER — ANESTHESIA EVENT (OUTPATIENT)
Dept: SURGERY | Facility: CLINIC | Age: 38
End: 2018-03-16
Payer: COMMERCIAL

## 2018-03-16 ENCOUNTER — SURGERY (OUTPATIENT)
Age: 38
End: 2018-03-16

## 2018-03-16 ENCOUNTER — ANESTHESIA (OUTPATIENT)
Dept: SURGERY | Facility: CLINIC | Age: 38
End: 2018-03-16
Payer: COMMERCIAL

## 2018-03-16 VITALS
HEART RATE: 74 BPM | RESPIRATION RATE: 16 BRPM | DIASTOLIC BLOOD PRESSURE: 99 MMHG | BODY MASS INDEX: 37.59 KG/M2 | OXYGEN SATURATION: 93 % | WEIGHT: 220.2 LBS | HEIGHT: 64 IN | SYSTOLIC BLOOD PRESSURE: 131 MMHG | TEMPERATURE: 96.8 F

## 2018-03-16 DIAGNOSIS — Z17.0 MALIGNANT NEOPLASM OF UPPER-OUTER QUADRANT OF RIGHT BREAST IN FEMALE, ESTROGEN RECEPTOR POSITIVE (H): Primary | ICD-10-CM

## 2018-03-16 DIAGNOSIS — C50.411 MALIGNANT NEOPLASM OF UPPER-OUTER QUADRANT OF RIGHT BREAST IN FEMALE, ESTROGEN RECEPTOR POSITIVE (H): Primary | ICD-10-CM

## 2018-03-16 PROCEDURE — L8600 IMPLANT BREAST SILICONE/EQ: HCPCS | Performed by: PLASTIC SURGERY

## 2018-03-16 PROCEDURE — 25000125 ZZHC RX 250: Performed by: PLASTIC SURGERY

## 2018-03-16 PROCEDURE — 37000009 ZZH ANESTHESIA TECHNICAL FEE, EACH ADDTL 15 MIN: Performed by: PLASTIC SURGERY

## 2018-03-16 PROCEDURE — 25000132 ZZH RX MED GY IP 250 OP 250 PS 637: Performed by: ANESTHESIOLOGY

## 2018-03-16 PROCEDURE — 25000128 H RX IP 250 OP 636: Performed by: ANESTHESIOLOGY

## 2018-03-16 PROCEDURE — 27210794 ZZH OR GENERAL SUPPLY STERILE: Performed by: PLASTIC SURGERY

## 2018-03-16 PROCEDURE — 27210995 ZZH RX 272: Performed by: PLASTIC SURGERY

## 2018-03-16 PROCEDURE — 25000128 H RX IP 250 OP 636: Performed by: NURSE ANESTHETIST, CERTIFIED REGISTERED

## 2018-03-16 PROCEDURE — 71000012 ZZH RECOVERY PHASE 1 LEVEL 1 FIRST HR: Performed by: PLASTIC SURGERY

## 2018-03-16 PROCEDURE — 25000125 ZZHC RX 250: Performed by: NURSE ANESTHETIST, CERTIFIED REGISTERED

## 2018-03-16 PROCEDURE — 36000058 ZZH SURGERY LEVEL 3 EA 15 ADDTL MIN: Performed by: PLASTIC SURGERY

## 2018-03-16 PROCEDURE — 25000128 H RX IP 250 OP 636: Performed by: PLASTIC SURGERY

## 2018-03-16 PROCEDURE — 71000027 ZZH RECOVERY PHASE 2 EACH 15 MINS: Performed by: PLASTIC SURGERY

## 2018-03-16 PROCEDURE — 71000013 ZZH RECOVERY PHASE 1 LEVEL 1 EA ADDTL HR: Performed by: PLASTIC SURGERY

## 2018-03-16 PROCEDURE — 37000008 ZZH ANESTHESIA TECHNICAL FEE, 1ST 30 MIN: Performed by: PLASTIC SURGERY

## 2018-03-16 PROCEDURE — 36000056 ZZH SURGERY LEVEL 3 1ST 30 MIN: Performed by: PLASTIC SURGERY

## 2018-03-16 PROCEDURE — 40000170 ZZH STATISTIC PRE-PROCEDURE ASSESSMENT II: Performed by: PLASTIC SURGERY

## 2018-03-16 DEVICE — IMPLANTABLE DEVICE: Type: IMPLANTABLE DEVICE | Site: BREAST | Status: FUNCTIONAL

## 2018-03-16 RX ORDER — CEFAZOLIN SODIUM 1 G/3ML
INJECTION, POWDER, FOR SOLUTION INTRAMUSCULAR; INTRAVENOUS
Status: DISCONTINUED
Start: 2018-03-16 | End: 2018-03-16 | Stop reason: HOSPADM

## 2018-03-16 RX ORDER — EPINEPHRINE 1 MG/ML
INJECTION, SOLUTION INTRAMUSCULAR; SUBCUTANEOUS
Status: DISCONTINUED
Start: 2018-03-16 | End: 2018-03-16 | Stop reason: HOSPADM

## 2018-03-16 RX ORDER — DEXAMETHASONE SODIUM PHOSPHATE 4 MG/ML
INJECTION, SOLUTION INTRA-ARTICULAR; INTRALESIONAL; INTRAMUSCULAR; INTRAVENOUS; SOFT TISSUE PRN
Status: DISCONTINUED | OUTPATIENT
Start: 2018-03-16 | End: 2018-03-16

## 2018-03-16 RX ORDER — FENTANYL CITRATE 50 UG/ML
25-50 INJECTION, SOLUTION INTRAMUSCULAR; INTRAVENOUS
Status: DISCONTINUED | OUTPATIENT
Start: 2018-03-16 | End: 2018-03-16 | Stop reason: HOSPADM

## 2018-03-16 RX ORDER — OXYCODONE AND ACETAMINOPHEN 5; 325 MG/1; MG/1
1-2 TABLET ORAL EVERY 4 HOURS PRN
Qty: 30 TABLET | Refills: 0 | Status: SHIPPED | OUTPATIENT
Start: 2018-03-16 | End: 2018-06-14

## 2018-03-16 RX ORDER — BUPIVACAINE HYDROCHLORIDE 2.5 MG/ML
INJECTION, SOLUTION EPIDURAL; INFILTRATION; INTRACAUDAL
Status: DISCONTINUED
Start: 2018-03-16 | End: 2018-03-16 | Stop reason: HOSPADM

## 2018-03-16 RX ORDER — PROPOFOL 10 MG/ML
INJECTION, EMULSION INTRAVENOUS PRN
Status: DISCONTINUED | OUTPATIENT
Start: 2018-03-16 | End: 2018-03-16

## 2018-03-16 RX ORDER — SODIUM CHLORIDE, SODIUM LACTATE, POTASSIUM CHLORIDE, CALCIUM CHLORIDE 600; 310; 30; 20 MG/100ML; MG/100ML; MG/100ML; MG/100ML
INJECTION, SOLUTION INTRAVENOUS CONTINUOUS PRN
Status: DISCONTINUED | OUTPATIENT
Start: 2018-03-16 | End: 2018-03-16

## 2018-03-16 RX ORDER — BUPIVACAINE HYDROCHLORIDE AND EPINEPHRINE 2.5; 5 MG/ML; UG/ML
INJECTION, SOLUTION INFILTRATION; PERINEURAL PRN
Status: DISCONTINUED | OUTPATIENT
Start: 2018-03-16 | End: 2018-03-16 | Stop reason: HOSPADM

## 2018-03-16 RX ORDER — FENTANYL CITRATE 50 UG/ML
INJECTION, SOLUTION INTRAMUSCULAR; INTRAVENOUS PRN
Status: DISCONTINUED | OUTPATIENT
Start: 2018-03-16 | End: 2018-03-16

## 2018-03-16 RX ORDER — PROPOFOL 10 MG/ML
INJECTION, EMULSION INTRAVENOUS CONTINUOUS PRN
Status: DISCONTINUED | OUTPATIENT
Start: 2018-03-16 | End: 2018-03-16

## 2018-03-16 RX ORDER — LIDOCAINE HYDROCHLORIDE 20 MG/ML
INJECTION, SOLUTION INFILTRATION; PERINEURAL PRN
Status: DISCONTINUED | OUTPATIENT
Start: 2018-03-16 | End: 2018-03-16

## 2018-03-16 RX ORDER — NALOXONE HYDROCHLORIDE 0.4 MG/ML
.1-.4 INJECTION, SOLUTION INTRAMUSCULAR; INTRAVENOUS; SUBCUTANEOUS
Status: DISCONTINUED | OUTPATIENT
Start: 2018-03-16 | End: 2018-03-16 | Stop reason: HOSPADM

## 2018-03-16 RX ORDER — ONDANSETRON 2 MG/ML
4 INJECTION INTRAMUSCULAR; INTRAVENOUS EVERY 30 MIN PRN
Status: DISCONTINUED | OUTPATIENT
Start: 2018-03-16 | End: 2018-03-16 | Stop reason: HOSPADM

## 2018-03-16 RX ORDER — KETOROLAC TROMETHAMINE 30 MG/ML
30 INJECTION, SOLUTION INTRAMUSCULAR; INTRAVENOUS ONCE
Status: COMPLETED | OUTPATIENT
Start: 2018-03-16 | End: 2018-03-16

## 2018-03-16 RX ORDER — EPHEDRINE SULFATE 50 MG/ML
INJECTION, SOLUTION INTRAMUSCULAR; INTRAVENOUS; SUBCUTANEOUS PRN
Status: DISCONTINUED | OUTPATIENT
Start: 2018-03-16 | End: 2018-03-16

## 2018-03-16 RX ORDER — OXYCODONE AND ACETAMINOPHEN 5; 325 MG/1; MG/1
1 TABLET ORAL ONCE
Status: COMPLETED | OUTPATIENT
Start: 2018-03-16 | End: 2018-03-16

## 2018-03-16 RX ORDER — SODIUM CHLORIDE, SODIUM LACTATE, POTASSIUM CHLORIDE, CALCIUM CHLORIDE 600; 310; 30; 20 MG/100ML; MG/100ML; MG/100ML; MG/100ML
INJECTION, SOLUTION INTRAVENOUS CONTINUOUS
Status: DISCONTINUED | OUTPATIENT
Start: 2018-03-16 | End: 2018-03-16 | Stop reason: HOSPADM

## 2018-03-16 RX ORDER — CLINDAMYCIN PHOSPHATE 900 MG/50ML
900 INJECTION, SOLUTION INTRAVENOUS SEE ADMIN INSTRUCTIONS
Status: DISCONTINUED | OUTPATIENT
Start: 2018-03-16 | End: 2018-03-16 | Stop reason: HOSPADM

## 2018-03-16 RX ORDER — ONDANSETRON 4 MG/1
4 TABLET, ORALLY DISINTEGRATING ORAL EVERY 30 MIN PRN
Status: DISCONTINUED | OUTPATIENT
Start: 2018-03-16 | End: 2018-03-16 | Stop reason: HOSPADM

## 2018-03-16 RX ORDER — ONDANSETRON 2 MG/ML
INJECTION INTRAMUSCULAR; INTRAVENOUS PRN
Status: DISCONTINUED | OUTPATIENT
Start: 2018-03-16 | End: 2018-03-16

## 2018-03-16 RX ORDER — FENTANYL CITRATE 50 UG/ML
25-50 INJECTION, SOLUTION INTRAMUSCULAR; INTRAVENOUS EVERY 5 MIN PRN
Status: DISCONTINUED | OUTPATIENT
Start: 2018-03-16 | End: 2018-03-16 | Stop reason: HOSPADM

## 2018-03-16 RX ORDER — CLINDAMYCIN PHOSPHATE 900 MG/50ML
900 INJECTION, SOLUTION INTRAVENOUS
Status: COMPLETED | OUTPATIENT
Start: 2018-03-16 | End: 2018-03-16

## 2018-03-16 RX ADMIN — FENTANYL CITRATE 50 MCG: 50 INJECTION, SOLUTION INTRAMUSCULAR; INTRAVENOUS at 09:15

## 2018-03-16 RX ADMIN — PROPOFOL 200 MCG/KG/MIN: 10 INJECTION, EMULSION INTRAVENOUS at 07:35

## 2018-03-16 RX ADMIN — GENTAMICIN SULFATE 1000 ML: 40 INJECTION, SOLUTION INTRAMUSCULAR; INTRAVENOUS at 08:32

## 2018-03-16 RX ADMIN — FENTANYL CITRATE 25 MCG: 50 INJECTION, SOLUTION INTRAMUSCULAR; INTRAVENOUS at 08:28

## 2018-03-16 RX ADMIN — DEXAMETHASONE SODIUM PHOSPHATE 4 MG: 4 INJECTION, SOLUTION INTRA-ARTICULAR; INTRALESIONAL; INTRAMUSCULAR; INTRAVENOUS; SOFT TISSUE at 08:56

## 2018-03-16 RX ADMIN — PHENYLEPHRINE HYDROCHLORIDE 100 MCG: 10 INJECTION INTRAVENOUS at 08:47

## 2018-03-16 RX ADMIN — PHENYLEPHRINE HYDROCHLORIDE 100 MCG: 10 INJECTION INTRAVENOUS at 08:42

## 2018-03-16 RX ADMIN — FENTANYL CITRATE 50 MCG: 50 INJECTION, SOLUTION INTRAMUSCULAR; INTRAVENOUS at 09:44

## 2018-03-16 RX ADMIN — Medication 10 MG: at 07:42

## 2018-03-16 RX ADMIN — LIDOCAINE HYDROCHLORIDE 100 MG: 20 INJECTION, SOLUTION INFILTRATION; PERINEURAL at 07:35

## 2018-03-16 RX ADMIN — PROPOFOL 200 MG: 10 INJECTION, EMULSION INTRAVENOUS at 07:35

## 2018-03-16 RX ADMIN — KETOROLAC TROMETHAMINE 30 MG: 30 INJECTION, SOLUTION INTRAMUSCULAR at 09:55

## 2018-03-16 RX ADMIN — BUPIVACAINE HYDROCHLORIDE AND EPINEPHRINE BITARTRATE 20 ML: 2.5; .005 INJECTION, SOLUTION EPIDURAL; INFILTRATION; INTRACAUDAL; PERINEURAL at 08:53

## 2018-03-16 RX ADMIN — PHENYLEPHRINE HYDROCHLORIDE 100 MCG: 10 INJECTION INTRAVENOUS at 07:55

## 2018-03-16 RX ADMIN — SODIUM CHLORIDE, POTASSIUM CHLORIDE, SODIUM LACTATE AND CALCIUM CHLORIDE: 600; 310; 30; 20 INJECTION, SOLUTION INTRAVENOUS at 07:29

## 2018-03-16 RX ADMIN — PHENYLEPHRINE HYDROCHLORIDE 100 MCG: 10 INJECTION INTRAVENOUS at 08:07

## 2018-03-16 RX ADMIN — PHENYLEPHRINE HYDROCHLORIDE 100 MCG: 10 INJECTION INTRAVENOUS at 08:01

## 2018-03-16 RX ADMIN — PHENYLEPHRINE HYDROCHLORIDE 100 MCG: 10 INJECTION INTRAVENOUS at 07:48

## 2018-03-16 RX ADMIN — PHENYLEPHRINE HYDROCHLORIDE 100 MCG: 10 INJECTION INTRAVENOUS at 08:16

## 2018-03-16 RX ADMIN — FENTANYL CITRATE 25 MCG: 50 INJECTION, SOLUTION INTRAMUSCULAR; INTRAVENOUS at 07:50

## 2018-03-16 RX ADMIN — Medication 5 MG: at 07:58

## 2018-03-16 RX ADMIN — MIDAZOLAM 2 MG: 1 INJECTION INTRAMUSCULAR; INTRAVENOUS at 07:30

## 2018-03-16 RX ADMIN — SODIUM CHLORIDE, POTASSIUM CHLORIDE, SODIUM LACTATE AND CALCIUM CHLORIDE: 600; 310; 30; 20 INJECTION, SOLUTION INTRAVENOUS at 08:55

## 2018-03-16 RX ADMIN — PHENYLEPHRINE HYDROCHLORIDE 100 MCG: 10 INJECTION INTRAVENOUS at 07:45

## 2018-03-16 RX ADMIN — PHENYLEPHRINE HYDROCHLORIDE 100 MCG: 10 INJECTION INTRAVENOUS at 08:22

## 2018-03-16 RX ADMIN — Medication 5 MG: at 08:13

## 2018-03-16 RX ADMIN — Medication 5 MG: at 07:44

## 2018-03-16 RX ADMIN — ONDANSETRON 4 MG: 2 INJECTION INTRAMUSCULAR; INTRAVENOUS at 08:56

## 2018-03-16 RX ADMIN — OXYCODONE HYDROCHLORIDE AND ACETAMINOPHEN 1 TABLET: 5; 325 TABLET ORAL at 10:13

## 2018-03-16 RX ADMIN — FENTANYL CITRATE 50 MCG: 50 INJECTION, SOLUTION INTRAMUSCULAR; INTRAVENOUS at 07:35

## 2018-03-16 RX ADMIN — CLINDAMYCIN PHOSPHATE 900 MG: 18 INJECTION, SOLUTION INTRAVENOUS at 07:36

## 2018-03-16 RX ADMIN — FENTANYL CITRATE 50 MCG: 50 INJECTION, SOLUTION INTRAMUSCULAR; INTRAVENOUS at 09:35

## 2018-03-16 ASSESSMENT — LIFESTYLE VARIABLES: TOBACCO_USE: 1

## 2018-03-16 NOTE — ANESTHESIA POSTPROCEDURE EVALUATION
Patient: Diana Wilson    Procedure(s):  BILATERAL SECOND STAGE BREAST RECONSTRUCTION WITH SILICONE GEL IMPLANT - Wound Class: I-Clean    Diagnosis:HX OF BILATERAL MASTECTOMIES  Diagnosis Additional Information: No value filed.    Anesthesia Type:  General, LMA    Note:  Anesthesia Post Evaluation    Patient location during evaluation: PACU  Patient participation: Able to fully participate in evaluation  Level of consciousness: awake  Pain management: adequate  Airway patency: patent  Cardiovascular status: acceptable  Respiratory status: acceptable  Hydration status: acceptable  PONV: none     Anesthetic complications: None          Last vitals:  Vitals:    03/16/18 1013 03/16/18 1015 03/16/18 1045   BP:  130/89 (!) 131/99   Pulse:      Resp: 16 16 16   Temp:      SpO2: 95%  93%         Electronically Signed By: Donavan Winters MD  March 16, 2018  11:50 AM

## 2018-03-16 NOTE — IP AVS SNAPSHOT
[facility]    [address]    [place] [address]    Phone:  [phone]    Fax:  [phone]                                       After Visit Summary   3/16/2018    Diana Wilson    MRN: [id number]           After Visit Summary Signature Page     I have received my discharge instructions, and my questions have been answered. I have discussed any challenges I see with this plan with the nurse or doctor.    ..........................................................................................................................................  Patient/Patient Representative Signature      ..........................................................................................................................................  Patient Representative Print Name and Relationship to Patient    ..................................................               ................................................  Date                                            Time    ..........................................................................................................................................  Reviewed by Signature/Title    ...................................................              ..............................................  Date                                                            Time

## 2018-03-16 NOTE — ANESTHESIA PREPROCEDURE EVALUATION
Anesthesia Evaluation     . Pt has had prior anesthetic.     History of anesthetic complications   - PONV        ROS/MED HX    ENT/Pulmonary:     (+)tobacco use, Past use quit 2016 packs/day  Mild Persistent asthma Treatment: Inhaler prn,  , . .    Neurologic:       Cardiovascular:         METS/Exercise Tolerance:     Hematologic:         Musculoskeletal:         GI/Hepatic:         Renal/Genitourinary:         Endo:     (+) Obesity (BMI 38), .      Psychiatric:     (+) psychiatric history anxiety and depression      Infectious Disease:         Malignancy:   (+) Malignancy History of Breast  Breast CA Remission status post Surgery and Chemo.         Other:                     Physical Exam  Normal systems: cardiovascular and pulmonary    Airway   Mallampati: II  TM distance: <3 FB  Neck ROM: full    Dental     Cardiovascular       Pulmonary                     Anesthesia Plan      History & Physical Review  History and physical reviewed and following examination; no interval change.    ASA Status:  3 .    NPO Status:  > 8 hours    Plan for General and LMA with Intravenous and Propofol induction. Maintenance will be TIVA.    PONV prophylaxis:  Ondansetron (or other 5HT-3) and Dexamethasone or Solumedrol  Has Scopolamine patch on  IV Tylenol      Postoperative Care  Postoperative pain management:  IV analgesics and Oral pain medications.      Consents  Anesthetic plan, risks, benefits and alternatives discussed with:  Patient..        DPreop diagnosis: HX OF BILATERAL MASTECTOMIES  Procedure(s):  COMBINED RECONSTRUCT BREAST BILATERAL, IMPLANT PROSTHESIS BILATERAL  Allergies   Allergen Reactions     Carboplatin Shortness Of Breath, Rash and Anaphylaxis     Other reaction(s): Flushing, Tachycardia     Ambien      hallucinations     Amoxicillin GI Disturbance     Erythromycin GI Disturbance     Hydrocodone-Acetaminophen      Other reaction(s): Hallucinations     Penicillins Nausea and Vomiting and Hives     1-11-17 pt  "states this was a childhood reaction     Zolpidem      Other reaction(s): Hallucinations     Hydrocodone Other (See Comments)     Out of body experience, \"creepy-crawly\" skin        No current facility-administered medications on file prior to encounter.   Current Outpatient Prescriptions on File Prior to Encounter:  fluticasone (FLONASE) 50 MCG/ACT nasal spray Spray 1-2 sprays into both nostrils daily   albuterol (2.5 MG/3ML) 0.083% neb solution    acetaminophen (TYLENOL) 325 MG tablet Take 2 tablets (650 mg) by mouth every 4 hours as needed for other (mild pain)   ibuprofen (ADVIL/MOTRIN) 600 MG tablet Take 1 tablet (600 mg) by mouth every 6 hours as needed for pain (mild)   Naproxen Sodium (ALEVE PO) Take 220 mg by mouth daily as needed for moderate pain     Hemoglobin   Date Value Ref Range Status   03/08/2018 13.1 11.7 - 15.7 g/dL Final     INR   Date Value Ref Range Status   03/02/2016 0.95 0.86 - 1.14 Final     Potassium   Date Value Ref Range Status   03/08/2018 3.7 3.4 - 5.3 mmol/L Final                       .  "

## 2018-03-16 NOTE — IP AVS SNAPSHOT
MRN:[id number]                      After Visit Summary   3/16/2018    Diana Wilson    MRN: [id number]           Thank you!     Thank you for choosing [facility] for your care. Our goal is always to provide you with excellent care. Hearing back from our patients is one way we can continue to improve our services. Please take a few minutes to complete the written survey that you may receive in the mail after you visit with us. Thank you!        Patient Information     Date Of Birth          1980        About your hospital stay     You were admitted on:  March 16, 2018 You last received care in the:  [facility] Same Day Surgery    You were discharged on:  March 16, 2018       Who to Call     For medical emergencies, please call 911.  For non-urgent questions about your medical care, please call your primary care provider or clinic, [phone]  For questions related to your surgery, please call your surgery clinic        Attending Provider     Provider Eugenio Mijares MD Plastic Surgery       Primary Care Provider Office Phone # Fax #    Yenifer Lorraine Ridley [phone]      Your next 10 appointments already scheduled     Mar 21, 2018 10:00 AM CDT   Lymphedema Treatment with Nona Jett PT   [facility] Lymphedema ([facility])    [address] [facility] [address]   [phone]            Jun 06, 2018  9:00 AM CDT   LAB with [facility] ([facility])    [address] [facility] [address] [phone]           Please do not eat 10-12 hours before your appointment if you are coming in fasting for labs on lipids, cholesterol, or glucose (sugar). This does not apply to pregnant women. Water, hot tea and black coffee (with nothing added) are okay. Do not drink other fluids, diet soda or chew gum.            Jun 13, 2018  9:45 AM CDT   (Arrive by 9:30 AM)   CT  CHEST/ABDOMEN/PELVIS W CONTRAST with [facility]   [facility] ([facility])    [address] [place]  [facility] [address]   [phone]           Please bring any scans or X-rays taken at other hospitals, if similar tests were done. Also bring a list of your medicines, including vitamins, minerals and over-the-counter drugs. It is safest to leave personal items at home.  Be sure to tell your doctor:   If you have any allergies.   If there s any chance you are pregnant.   If you are breastfeeding.  You may have contrast for this exam. To prepare:   Do not eat or drink for 2 hours before your exam. If you need to take medicine, you may take it with small sips of water. (We may ask you to take liquid medicine as well.)   The day before your exam, drink extra fluids at least six 8-ounce glasses (unless your doctor tells you to restrict your fluids).   You will be given instructions on how to drink the contrast.  Patients over 70 or patients with diabetes or kidney problems:   If you haven t had a blood test (creatinine test) within the last 30 days, the Cardiologist/Radiologist may require you to get this test prior to your exam.  If you have diabetes:   Continue to take your metformin medication on the day of your exam  Please wear loose clothing, such as a sweat suit or jogging clothes. Avoid snaps, zippers and other metal. We may ask you to undress and put on a hospital gown.  If you have any questions, please call the Imaging Department where you will have your exam.            Jun 14, 2018  3:00 PM CDT   Return Visit with Maribel Jin MD   [facility] ([facility])    [facility]  [address]  [facility] [address]   [phone]              Further instructions from your care team       Today you received Toradol, an antiinflammatory medication similar to Ibuprofen.  You should not take other antiinflammatory medication, such as  Ibuprofen, Motrin, Advil, Aleve, Naprosyn, etc, until 4pm.       Same Day Surgery Discharge Instructions for  Sedation and General Anesthesia       It's not unusual to feel dizzy, light-headed or faint for up to 24 hours after surgery or while taking pain medication.  If you have these symptoms: sit for a few minutes before standing and have someone assist you when you get up to walk or use the bathroom.      You should rest and relax for the next 24 hours. We recommend you make arrangements to have an adult stay with you for at least 24 hours after your discharge.  Avoid hazardous and strenuous activity.      DO NOT DRIVE any vehicle or operate mechanical equipment for 24 hours following the end of your surgery.  Even though you may feel normal, your reactions may be affected by the medication you have received.      Do not drink alcoholic beverages for 24 hours following surgery.       Slowly progress to your regular diet as you feel able. It's not unusual to feel nauseated and/or vomit after receiving anesthesia.  If you develop these symptoms, drink clear liquids (apple juice, ginger ale, broth, 7-up, etc. ) until you feel better.  If your nausea and vomiting persists for 24 hours, please notify your surgeon.        All narcotic pain medications, along with inactivity and anesthesia, can cause constipation. Drinking plenty of liquids and increasing fiber intake will help.      For any questions of a medical nature, call your surgeon.      Do not make important decisions for 24 hours.      If you had general anesthesia, you may have a sore throat for a couple of days related to the breathing tube used during surgery.  You may use Cepacol lozenges to help with this discomfort.  If it worsens or if you develop a fever, contact your surgeon.       If you feel your pain is not well managed with the pain medications prescribed by your surgeon, please contact your surgeon's office to let them know so they can address  "your concerns.       Discharge Instructions following Breast Surgery  [facility] Same Day Surgery    Diet:   Resume diet as tolerated.  Drink plenty of fluids to prevent constipation.    Activity:   Gentle rotation & stretching of your arms/shoulders will prevent stiffness in joints   Increase activity gradually   No heavy lifting greater than 10 pounds & no strenuous activity until  approved by surgeon    Bathing/Incision Care:   You may shower as directed by surgeon   Pat incisions dry.  No lotions, powders or perfumes to incisions   Tape dressings (steri strips) will fall off in 7-10 days (if present)    What to expect:   A tingly or itchy sensation around the incision is a normal sign of healing   Some clear, pink drainage from incisions is normal.      Notify your surgeon for the following signs & symptoms:   Redness, warmth, or swelling of the incision    Foul smelling or increased drainage   Chills or temperature greater than 101 F   Pain not controlled by pain medications        **If you have questions or concerns about your procedure,   call Dr Ruelas at [phone]**      1-2 weeks w/ Dr. Ruelas.  Call [phone].  No heavy lifting or strenuous activity.  10 lb lifting limit.  Wear sports bra.  May shower and remove dressing tomorrow.      Pending Results     No orders found from 3/14/2018 to 3/17/2018.            Admission Information     Date & Time Provider Department Dept. Phone    3/16/2018 Eugenio Ruelas MD [facility] Same Day Surgery [phone]      Your Vitals Were     Blood Pressure Pulse Temperature Respirations Height Weight    131/93 74 96.8  F (36  C) 16 1.626 m (5' 4\") 99.9 kg (220 lb 3.2 oz)    Last Period Pulse Oximetry BMI (Body Mass Index)             08/05/2016 (Exact Date) 95% 37.8 kg/m2         [employer] Information     [employer] gives you secure access to your electronic health record. If you see a primary care provider, you can also send messages to your " care team and make appointments. If you have questions, please call your primary care clinic.  If you do not have a primary care provider, please call [phone] and they will assist you.        Care EveryWhere ID     This is your Care EveryWhere ID. This could be used by other organizations to access your [facility] medical records  [id number]        Equal Access to Services     FAUZIA RAMIREZ : [place], wajim doan, qaoliviata beto weiss, javier valentine . So [facility] [phone].    ATENCIÓN: Si habla español, tiene a montelongo disposición servicios gratuitos de asistencia lingüística. Mackenzie al [phone].    We comply with applicable federal civil rights laws and [place] laws. We do not discriminate on the basis of race, color, national origin, age, disability, sex, sexual orientation, or gender identity.               Review of your medicines      START taking        Dose / Directions    oxyCODONE-acetaminophen 5-325 MG per tablet   Commonly known as:  PERCOCET   Used for:  Malignant neoplasm of upper-outer quadrant of right breast in female, estrogen receptor positive (H)        Dose:  1-2 tablet   Take 1-2 tablets by mouth every 4 hours as needed for pain   Quantity:  30 tablet   Refills:  0         CONTINUE these medicines which have NOT CHANGED        Dose / Directions    acetaminophen 325 MG tablet   Commonly known as:  TYLENOL   Used for:  S/P laparoscopy        Dose:  650 mg   Take 2 tablets (650 mg) by mouth every 4 hours as needed for other (mild pain)   Quantity:  100 tablet   Refills:  0       * albuterol (2.5 MG/3ML) 0.083% neb solution        Refills:  0       * albuterol 108 (90 BASE) MCG/ACT Inhaler   Commonly known as:  PROAIR HFA/PROVENTIL HFA/VENTOLIN HFA   Used for:  Mild persistent asthma with exacerbation        Dose:  2 puff   Inhale 2 puffs into the lungs every 6 hours as needed for shortness of breath / dyspnea   Quantity:  1 Inhaler    Refills:  11       ALEVE PO        Dose:  220 mg   Take 220 mg by mouth daily as needed for moderate pain   Refills:  0       fluticasone 50 MCG/ACT spray   Commonly known as:  FLONASE   Used for:  Environmental allergies        Dose:  1-2 spray   Spray 1-2 sprays into both nostrils daily   Quantity:  3 Bottle   Refills:  3       GABAPENTIN PO        Refills:  0       ibuprofen 600 MG tablet   Commonly known as:  ADVIL/MOTRIN   Used for:  S/P laparoscopy        Dose:  600 mg   Take 1 tablet (600 mg) by mouth every 6 hours as needed for pain (mild)   Quantity:  30 tablet   Refills:  0       LORazepam 0.5 MG tablet   Commonly known as:  ATIVAN   Used for:  Anxiety        Dose:  0.5 mg   Take 1 tablet (0.5 mg) by mouth every 8 hours as needed for anxiety   Quantity:  20 tablet   Refills:  0       montelukast 10 MG tablet   Commonly known as:  SINGULAIR   Used for:  Environmental allergies        TAKE ONE TABLET BY MOUTH AT BEDTIME   Quantity:  90 tablet   Refills:  2       MULTIVITAMIN PO        Refills:  0       PEPCID PO        Take by mouth daily   Refills:  0       scopolamine 72 hr patch   Commonly known as:  TRANSDERM   Used for:  Preop general physical exam, Nausea, Invasive ductal carcinoma of breast, right (H), Family history of malignant neoplasm of breast, Malignant neoplasm of upper-outer quadrant of right breast in female, estrogen receptor positive (H)        Place 1 patch onto the skin every 72 hours.  Apply to hairless area behind one ear at least 4 hours before travel.  Remove old patch and change every 3 days.   Quantity:  1 patch   Refills:  0       tamoxifen 20 MG tablet   Commonly known as:  NOLVADEX   Used for:  Malignant neoplasm of upper-outer quadrant of right breast in female, estrogen receptor positive (H)        Dose:  20 mg   Take 1 tablet (20 mg) by mouth daily   Quantity:  90 tablet   Refills:  1       venlafaxine 37.5 MG tablet   Commonly known as:  EFFEXOR   Used for:  Hot flushes,  perimenopausal, Depression, unspecified depression type        Take 1 tablet (37.5 mg) in the morning and 2 tablets (75 mg) in the evening.   Quantity:  90 tablet   Refills:  1       ZYRTEC PO        Refills:  0       * Notice:  This list has 2 medication(s) that are the same as other medications prescribed for you. Read the directions carefully, and ask your doctor or other care provider to review them with you.         Where to get your medicines      Some of these will need a paper prescription and others can be bought over the counter. Ask your nurse if you have questions.     Bring a paper prescription for each of these medications     oxyCODONE-acetaminophen 5-325 MG per tablet                Protect others around you: Learn how to safely use, store and throw away your medicines at www.disposemymeds.org.        Information about OPIOIDS     PRESCRIPTION OPIOIDS: WHAT YOU NEED TO KNOW    Prescription opioids can be used to help relieve moderate to severe pain and are often prescribed following a surgery or injury, or for certain health conditions. These medications can be an important part of treatment but also come with serious risks. It is important to work with your health care provider to make sure you are getting the safest, most effective care.    WHAT ARE THE RISKS AND SIDE EFFECTS OF OPIOID USE?  Prescription opioids carry serious risks of addiction and overdose, especially with prolonged use. An opioid overdose, often marked by slowed breathing can cause sudden death. The use of prescription opioids can have a number of side effects as well, even when taken as directed:      Tolerance - meaning you might need to take more of a medication for the same pain relief    Physical dependence - meaning you have symptoms of withdrawal when a medication is stopped    Increased sensitivity to pain    Constipation    Nausea, vomiting, and dry mouth    Sleepiness and dizziness    Confusion    Depression    Low  levels of testosterone that can result in lower sex drive, energy, and strength    Itching and sweating    RISKS ARE GREATER WITH:    History of drug misuse, substance use disorder, or overdose    Mental health conditions (such as depression or anxiety)    Sleep apnea    Older age (65 years or older)    Pregnancy    Avoid alcohol while taking prescription opioids.   Also, unless specifically advised by your health care provider, medications to avoid include:    Benzodiazepines (such as Xanax or Valium)    Muscle relaxants (such as Soma or Flexeril)    Hypnotics (such as Ambien or Lunesta)    Other prescription opioids    KNOW YOUR OPTIONS:  Talk to your health care provider about ways to manage your pain that do not involve prescription opioids. Some of these options may actually work better and have fewer risks and side effects:    Pain relievers such as acetaminophen, ibuprofen, and naproxen    Some medications that are also used for depression or seizures    Physical therapy and exercise    Cognitive behavioral therapy, a psychological, goal-directed approach, in which patients learn how to modify physical, behavioral, and emotional triggers of pain and stress    IF YOU ARE PRESCRIBED OPIOIDS FOR PAIN:    Never take opioids in greater amounts or more often than prescribed    Follow up with your primary health care provider and work together to create a plan on how to manage your pain.    Talk about ways to help manage your pain that do not involve prescription opioids    Talk about all concerns and side effects    Help prevent misuse and abuse    Never sell or share prescription opioids    Never use another person's prescription opioids    Store prescription opioids in a secure place and out of reach of others (this may include visitors, children, friends, and family)    Visit www.cdc.gov/drugoverdose to learn about risks of opioid abuse and overdose    If you believe you may be struggling with addiction, tell  your health care provider and ask for guidance or call [facility]'s National Helpline at 1-568-587-HELP    LEARN MORE / www.cdc.gov/drugoverdose/prescribing/guideline.html    Safely dispose of unused prescription opioids: Find your local drug take-back programs and more information about the importance of safe disposal at www.doseofreality.mn.gov             Medication List: This is a list of all your medications and when to take them. Check marks below indicate your daily home schedule. Keep this list as a reference.      Medications           Morning Afternoon Evening Bedtime As Needed    acetaminophen 325 MG tablet   Commonly known as:  TYLENOL   Take 2 tablets (650 mg) by mouth every 4 hours as needed for other (mild pain)                                * albuterol (2.5 MG/3ML) 0.083% neb solution                                * albuterol 108 (90 BASE) MCG/ACT Inhaler   Commonly known as:  PROAIR HFA/PROVENTIL HFA/VENTOLIN HFA   Inhale 2 puffs into the lungs every 6 hours as needed for shortness of breath / dyspnea                                ALEVE PO   Take 220 mg by mouth daily as needed for moderate pain                                fluticasone 50 MCG/ACT spray   Commonly known as:  FLONASE   Spray 1-2 sprays into both nostrils daily                                GABAPENTIN PO                                ibuprofen 600 MG tablet   Commonly known as:  ADVIL/MOTRIN   Take 1 tablet (600 mg) by mouth every 6 hours as needed for pain (mild)                                LORazepam 0.5 MG tablet   Commonly known as:  ATIVAN   Take 1 tablet (0.5 mg) by mouth every 8 hours as needed for anxiety                                montelukast 10 MG tablet   Commonly known as:  SINGULAIR   TAKE ONE TABLET BY MOUTH AT BEDTIME                                MULTIVITAMIN PO                                oxyCODONE-acetaminophen 5-325 MG per tablet   Commonly known as:  PERCOCET   Take 1-2 tablets by mouth every 4 hours as  needed for pain   Last time this was given:  1 tablet on 3/16/2018 10:13 AM                                PEPCID PO   Take by mouth daily                                scopolamine 72 hr patch   Commonly known as:  TRANSDERM   Place 1 patch onto the skin every 72 hours.  Apply to hairless area behind one ear at least 4 hours before travel.  Remove old patch and change every 3 days.                                tamoxifen 20 MG tablet   Commonly known as:  NOLVADEX   Take 1 tablet (20 mg) by mouth daily                                venlafaxine 37.5 MG tablet   Commonly known as:  EFFEXOR   Take 1 tablet (37.5 mg) in the morning and 2 tablets (75 mg) in the evening.                                ZYRTEC PO                                * Notice:  This list has 2 medication(s) that are the same as other medications prescribed for you. Read the directions carefully, and ask your doctor or other care provider to review them with you.

## 2018-03-16 NOTE — ANESTHESIA CARE TRANSFER NOTE
Patient: Diana Wilson    Procedure(s):  BILATERAL SECOND STAGE BREAST RECONSTRUCTION WITH SILICONE GEL IMPLANT - Wound Class: I-Clean    Diagnosis: HX OF BILATERAL MASTECTOMIES  Diagnosis Additional Information: No value filed.    Anesthesia Type:   General, LMA     Note:  Airway :Face Mask  Patient transferred to:PACU  Comments: 912:  To par responsive, dentition unchanged from pre-op.Handoff Report: Identifed the Patient, Identified the Reponsible Provider, Reviewed the pertinent medical history, Discussed the surgical course, Reviewed Intra-OP anesthesia mangement and issues during anesthesia, Set expectations for post-procedure period and Allowed opportunity for questions and acknowledgement of understanding      Vitals: (Last set prior to Anesthesia Care Transfer)    CRNA VITALS  3/16/2018 0841 - 3/16/2018 0911      3/16/2018             Pulse: 86    SpO2: 97 %    Resp Rate (set): 10                Electronically Signed By: COLLEEN Pascual CRNA  March 16, 2018  9:11 AM

## 2018-03-16 NOTE — DISCHARGE INSTRUCTIONS
Today you received Toradol, an antiinflammatory medication similar to Ibuprofen.  You should not take other antiinflammatory medication, such as Ibuprofen, Motrin, Advil, Aleve, Naprosyn, etc, until 4pm.       Same Day Surgery Discharge Instructions for  Sedation and General Anesthesia       It's not unusual to feel dizzy, light-headed or faint for up to 24 hours after surgery or while taking pain medication.  If you have these symptoms: sit for a few minutes before standing and have someone assist you when you get up to walk or use the bathroom.      You should rest and relax for the next 24 hours. We recommend you make arrangements to have an adult stay with you for at least 24 hours after your discharge.  Avoid hazardous and strenuous activity.      DO NOT DRIVE any vehicle or operate mechanical equipment for 24 hours following the end of your surgery.  Even though you may feel normal, your reactions may be affected by the medication you have received.      Do not drink alcoholic beverages for 24 hours following surgery.       Slowly progress to your regular diet as you feel able. It's not unusual to feel nauseated and/or vomit after receiving anesthesia.  If you develop these symptoms, drink clear liquids (apple juice, ginger ale, broth, 7-up, etc. ) until you feel better.  If your nausea and vomiting persists for 24 hours, please notify your surgeon.        All narcotic pain medications, along with inactivity and anesthesia, can cause constipation. Drinking plenty of liquids and increasing fiber intake will help.      For any questions of a medical nature, call your surgeon.      Do not make important decisions for 24 hours.      If you had general anesthesia, you may have a sore throat for a couple of days related to the breathing tube used during surgery.  You may use Cepacol lozenges to help with this discomfort.  If it worsens or if you develop a fever, contact your surgeon.       If you feel your pain is  not well managed with the pain medications prescribed by your surgeon, please contact your surgeon's office to let them know so they can address your concerns.       Discharge Instructions following Breast Surgery  [facility] Same Day [facility]    Diet:   Resume diet as tolerated.  Drink plenty of fluids to prevent constipation.    Activity:   Gentle rotation & stretching of your arms/shoulders will prevent stiffness in joints   Increase activity gradually   No heavy lifting greater than 10 pounds & no strenuous activity until  approved by surgeon    Bathing/Incision Care:   You may shower as directed by surgeon   Pat incisions dry.  No lotions, powders or perfumes to incisions   Tape dressings (steri strips) will fall off in 7-10 days (if present)    What to expect:   A tingly or itchy sensation around the incision is a normal sign of healing   Some clear, pink drainage from incisions is normal.      Notify your surgeon for the following signs & symptoms:   Redness, warmth, or swelling of the incision    Foul smelling or increased drainage   Chills or temperature greater than 101 F   Pain not controlled by pain medications        **If you have questions or concerns about your procedure,   call Dr Ruelas at [phone]**      1-2 weeks w/ Dr. Ruelas.  Call [phone].  No heavy lifting or strenuous activity.  10 lb lifting limit.  Wear sports bra.  May shower and remove dressing tomorrow.

## 2018-03-17 NOTE — OP NOTE
Procedure Date: 03/16/2018      DATE OF PROCEDURE: 03/16/2018      SURGEON: Eugenio Ruelas MD      PREOPERATIVE DIAGNOSES:   1.  History of right breast cancer with acquired absence of bilateral breasts.   2.  History of right chest wall radiation.      POSTOPERATIVE DIAGNOSES:   1.  History of right breast cancer with acquired absence of bilateral breasts.   2.  History of right chest wall radiation.      PROCEDURE:  Bilateral second stage breast reconstruction with silicone gel implants including significant capsular work and soft tissue modification.      TECHNIQUE:  The patient was marked preoperatively.  We discussed the planned adjustments including lifting the left side and dropping the right side in hopes of achieving the best symmetry possible.  We also would treat the infra-axillary areas.  The patient was placed under general LMA anesthesia administered by the Anesthesia department and the breasts were prepped and draped in a sterile fashion.  A timeout was done.        I began on the left side.  I opened the inferior aspect of the mastectomy scar and removed the expander.  I performed a lateral and inferior capsulorrhaphy and released the pocket superiorly to shift this upwards.  I checked with the sizer and found that 800 mL was a good fit and was certainly the largest implant that was available, so this would be the largest size possible.  The sizer was removed and the pocket was irrigated with antibiotic solution, and a final hemostasis achieved with electrocautery.  I injected with 15 mL of 0.25% Marcaine with 1:200,000 epinephrine.  I placed a [place] smooth round ultra high profile 800 mL silicone gel implant with serial #[id number].  I closed the capsule with 2-0 Vicryl and the skin with 3-0 and 4-0 Vicryl.        I turned my attention to the right side.  I used the same technique to open the inferior aspect of the mastectomy scar and remove the expander.  This side required a more extensive  release in the lower pole to drop this pocket down a little bit and shift it slightly medially.  This resulted in a nice improvement.  The same sizer was used and I found that there was a good match with the same volume on the right side.  The sizer was removed and the pocket was prepared in the same fashion.  An identical style and size of implant was placed on the right side with serial #[id number].  The same closure was performed.        I then addressed the infra-axillary areas.  This excess skin had bunched up after her mastectomy and was therefore marked for excision.  A gently curved excision was done on both sides in an elliptical fashion and closed with 3-0 and 4-0 Vicryl after hemostasis.  Sterile dressings were applied.  Anesthesia was reversed and the patient was taken to the recovery room in satisfactory condition.         DAMIÁN COTA MD             D: 2018   T: 2018   MT:       Name:     NATAN HU   MRN:      [id number]        Account:        [id number]   :      1980           Procedure Date: 2018      Document: [id number]

## 2018-03-21 ENCOUNTER — HOSPITAL ENCOUNTER (OUTPATIENT)
Dept: PHYSICAL THERAPY | Facility: CLINIC | Age: 38
Setting detail: THERAPIES SERIES
End: 2018-03-21
Attending: INTERNAL MEDICINE
Payer: COMMERCIAL

## 2018-03-21 PROCEDURE — 97164 PT RE-EVAL EST PLAN CARE: CPT | Mod: GP | Performed by: PHYSICAL THERAPIST

## 2018-03-21 PROCEDURE — 40000099 ZZH STATISTIC LYMPHEDEMA VISIT: Performed by: PHYSICAL THERAPIST

## 2018-03-21 PROCEDURE — 97140 MANUAL THERAPY 1/> REGIONS: CPT | Mod: GP | Performed by: PHYSICAL THERAPIST

## 2018-03-21 NOTE — PROGRESS NOTES
Outpatient Lymphedema Re-evaluation     03/21/18 1100   Rehab Discipline   Discipline PT   Type of Visit   Type of visit Edema Re-evaluation       present No   General Information   Start of care 03/21/18  (today's date, date of re-evaluation)   Referring physician Dr. Davin MD   Orders Evaluate and treat as indicated   Order date 01/03/18  (original order)   Medical diagnosis RUE, R-distal axilla and R-upper back lymphedema   Onset of illness / date of surgery 03/16/18  (B breast reconstruction )   Edema onset (7/2017; immediately after surgery)   Affected body parts RUE;Other  (see above)   Edema etiology Surgery;Chemo;Radiation;Cancer with lymph node dissection   Edema etiology comments see initial eval from 1/17/18 for etiology; re-eval being completed today as pt s/p B breast reconstruction on 3/16/18   Pertinent history of current problem (PT: include personal factors and/or comorbidities that impact the POC; OT: include additional occupational profile info) has been coming to op lymphedema clinic since 1/17/18 with good response in RUE lymphedema, R-distal axilla and R-upper back lymphedema with decreased fullness and achyness s/p MLD in clinic   Surgical / medical history reviewed Yes   Prior level of functional mobility independent with functional mobility, currently limited to 10# weight/lifting restriction per surgeon   Prior treatment Complete decongestive therapy;Compression garments;MLD;Gradient compression bandaging   General observations See initial evaluation from 1/17/18 for past living environment, work history, etc as nothing has changed since that time   Fall Risk Screen   Fall screen completed by PT   Have you fallen 2 or more times in the past year? No   Have you fallen and had an injury in the past year? No   Is patient a fall risk? No   System Outcome Measures   Outcome Measures Lymphedema   Lymphedema Life Impact Scale (score range 0-72). A higher score indicates greater  "impairment. 20  (on date of initial evaluation)   Subjective Report   Patient report of symptoms discomfort at incisional sites B   Precautions   Precautions comments no known precautions at this time; MLD w/i tolerance    Patient / Family Goals   Patient / family goals statement to get back to my normal routine   Pain   Patient currently in pain No   Pain comments pt denies pain today; reports \"it's more achy than painful\"   Cognitive Status   Orientation Orientation to person, place and time   Level of consciousness Alert   Follows commands and answers questions 100% of the time   Personal safety and judgement Intact   Memory Intact   Edema Exam / Assessment   Skin condition comments RUE, distal axilla and R-upper back with non-pitting edema; scars present B from nipple line at lateral breast to upper back in \"U\" or \"smiley face\" shape; scars area still healing (less than a week old), red, raised and scant to little serous drainage (R>L)   Scar Yes   Location see above   Radial pulse Symmetrical   Stemmer sign Negative   Ulceration No   Girth Measurements   Girth Measurements Refer to separate girth measurement flowsheet   Range of Motion   ROM comments didn't formally assess this date as scarring and surgery still less than 1 week old   Strength   Strength comments functional   Posture   Posture Normal   Palpation   Palpation denies any sensitivities   Activities of Daily Living   Activities of Daily Living Independent; limited to 10# lifting restriction from surgeon at this time   Sensory   Sensory perception Light touch   Light touch Impaired   Sensory perception comments decreased sensation at B breasts and under R-axilla along scar line   Coordination   Coordination Gross motor coordination appropriate   Muscle Tone   Muscle tone No deficits were identified   Planned Edema Interventions   Planned edema interventions Manual lymph drainage;Gradient compression bandaging;Fit for compression " garment;Exercises;Precautions to prevent infection / exacerbation;Education;Manual therapy;Wound care / dressing changes;Skin care / precautions;Scar mobilization;Soft tissue mobilization;Myofascial release;Home management program development   Planned edema intervention comments scar massage/mobs not until 6-12 weeks post-surgery pending healing and pending no complications   Clinical Impression   Criteria for skilled therapeutic intervention met Yes   Therapy diagnosis RUE, R-distal axillary and R-upper back lymphedema; B mastectomy and reconstruction scars at risk of adhering/tightness   Influenced by the following impairments / conditions Stage 2   Functional limitations due to impairments / conditions unable to do overhead reaching or ADLs (ex hairwashing) at this annemarie leroy ot recent reconstruction surgery   Clinical Presentation Stable/Uncomplicated   Clinical Presentation Rationale clinical judgement; no complications from surgery, appropriate to resume previously established POC   Clinical Decision Making (Complexity) Low complexity   Treatment frequency 2 times / week   Treatment duration 12 weeks   Patient / family and/or staff in agreement with plan of care Yes   Risks and benefits of therapy have been explained Yes   Education Assessment   Preferred learning style Listening   Barriers to learning No barriers   Goals   Edema Eval Goals 1;2;3;4   Goal 1   Goal identifier 1   Goal description pt to resume daily self-MLD for decreased edema reduction response and improved comfort   Target date 04/20/18   Goal 2   Goal identifier 2   Goal description pt to increase B GH flexion to 170 degrees and GH abduciton to 150 degrees give pt independence with overhead reaching and ADL   Target date 06/19/18   Goal 3   Goal identifier 3   Goal description pt to be independent with longterm RUE lymphedema management via HEP, elevation, compression garment wear and self-MLD or Flexitouch compression pump   Target date  06/19/18   Goal 4   Goal identifier 4   Goal description pt to at least 5 point improvement on LLIS due to decreased lymphedema reductions and discomfort reduciton response   Target date 06/19/18   Total Evaluation Time   Total evaluation time 10

## 2018-03-23 NOTE — PROGRESS NOTES
Outpatient Physical Therapy Discharge Note     Patient: Diana Wilson  : 1980    Beginning/End Dates of Reporting Period:  1/3/18 to 1/3/18 when initially seen. D/C written on 3/23/2018.  Total # of Rx sessions:  1    Referring Provider: Davin Jenkins Diagnosis: R Breast CA,      Client Self Report: Fatigue, loss of strength, Swelling R Armpit area.     Objective Measurements:  Objective Measure: FACIT   Details: 25     Objective Measure: U/E ROM:   Details: Flex R 133, L 138; Abd R 146, L 163; ER R 62, L 66; IR R T10, L T7.     Objective Measure:  Strength:   Details: R 54, L 50    Objective Measure: Vitals:   Details: Before Ex: Pulse 83, O2 97%, /86.      Outcome Measures (most recent score):  FACIT Fatigue Subscale (score out of 52). The higher the score, the better the QOL.: 25     Goals:  Goal Identifier 1   Goal Description  STG: Improve FACIT score to 30 or better for improved energy for daily functions.    Target Date 18   Date Met      Progress:     Goal Identifier 2   Goal Description STG: Improve R U/E ROM to be equal to L at initial evaluation for better reach, use of arms.    Target Date 18   Date Met      Progress:     Goal Identifier 3   Goal Description STG: Improve R  strength to 60 or better for improved strength of R U/E.    Target Date 18   Date Met      Progress:     Goal Identifier 4   Goal Description LRTG: Pt will be independent w/HEP and self cares for managing scars of breast area in preparation for implants in March.    Target Date 18   Date Met      Progress:     Progress Toward Goals:   Not assessed this period.    Plan:  Discharge from therapy.    Discharge:    Reason for Discharge: Patient has failed to schedule further appointments.    Equipment Issued:      Discharge Plan: Patient to continue home program.  Pt to follow up w/MD as appropriate.   Cristina Rooney, PT, MTC (#[id number])  [facility]  [place]            [phone]  Fax          [phone]  Appt #      [phone]

## 2018-03-23 NOTE — ADDENDUM NOTE
Encounter addended by: Cristina Rooney, PT on: 3/23/2018  8:25 AM<BR>     Actions taken: Sign clinical note, Episode resolved

## 2018-03-28 ENCOUNTER — HOSPITAL ENCOUNTER (OUTPATIENT)
Dept: PHYSICAL THERAPY | Facility: CLINIC | Age: 38
Setting detail: THERAPIES SERIES
End: 2018-03-28
Attending: INTERNAL MEDICINE
Payer: COMMERCIAL

## 2018-03-28 PROCEDURE — 97140 MANUAL THERAPY 1/> REGIONS: CPT | Mod: GP | Performed by: PHYSICAL THERAPIST

## 2018-03-28 PROCEDURE — 40000099 ZZH STATISTIC LYMPHEDEMA VISIT: Performed by: PHYSICAL THERAPIST

## 2018-04-04 ENCOUNTER — HOSPITAL ENCOUNTER (OUTPATIENT)
Dept: PHYSICAL THERAPY | Facility: CLINIC | Age: 38
Setting detail: THERAPIES SERIES
End: 2018-04-04
Attending: INTERNAL MEDICINE
Payer: COMMERCIAL

## 2018-04-04 PROCEDURE — 40000099 ZZH STATISTIC LYMPHEDEMA VISIT: Performed by: PHYSICAL THERAPIST

## 2018-04-04 PROCEDURE — 97140 MANUAL THERAPY 1/> REGIONS: CPT | Mod: GP | Performed by: PHYSICAL THERAPIST

## 2018-04-11 ENCOUNTER — HOSPITAL ENCOUNTER (OUTPATIENT)
Dept: PHYSICAL THERAPY | Facility: CLINIC | Age: 38
Setting detail: THERAPIES SERIES
End: 2018-04-11
Attending: INTERNAL MEDICINE
Payer: COMMERCIAL

## 2018-04-11 PROCEDURE — 40000099 ZZH STATISTIC LYMPHEDEMA VISIT: Performed by: REHABILITATION PRACTITIONER

## 2018-04-11 PROCEDURE — 97140 MANUAL THERAPY 1/> REGIONS: CPT | Mod: GP | Performed by: REHABILITATION PRACTITIONER

## 2018-04-18 ENCOUNTER — HOSPITAL ENCOUNTER (OUTPATIENT)
Dept: PHYSICAL THERAPY | Facility: CLINIC | Age: 38
Setting detail: THERAPIES SERIES
End: 2018-04-18
Attending: INTERNAL MEDICINE
Payer: COMMERCIAL

## 2018-04-18 PROCEDURE — 97140 MANUAL THERAPY 1/> REGIONS: CPT | Mod: GP | Performed by: REHABILITATION PRACTITIONER

## 2018-04-18 PROCEDURE — 40000099 ZZH STATISTIC LYMPHEDEMA VISIT: Performed by: REHABILITATION PRACTITIONER

## 2018-04-24 DIAGNOSIS — N95.1 HOT FLUSHES, PERIMENOPAUSAL: ICD-10-CM

## 2018-04-24 DIAGNOSIS — F32.A DEPRESSION, UNSPECIFIED DEPRESSION TYPE: ICD-10-CM

## 2018-04-24 RX ORDER — VENLAFAXINE 37.5 MG/1
TABLET ORAL
Qty: 90 TABLET | Refills: 1 | Status: SHIPPED | OUTPATIENT
Start: 2018-04-24 | End: 2018-06-21

## 2018-04-24 NOTE — ADDENDUM NOTE
Encounter addended by: Nona Jett, PT on: 4/24/2018  2:25 PM<BR>     Actions taken: Flowsheet accepted, Sign clinical note

## 2018-04-24 NOTE — PROGRESS NOTES
Outpatient Physical Therapy Progress Note     Patient: Diana Wilson  : 1980    Beginning/End Dates of Reporting Period:  3/21/2018 to 2018    Referring Provider: Dr. Davin MD    Therapy Diagnosis: RUE and R-upper back lymphedema      Client Self Report: does not feel much swelling under R arm pit area    Objective Measurements:  Objective Measure: girth  Details: since last measured on 18: RUE -0.6%     Outcome Measures (most recent score):   LLIS = 20 on date of initial evaluation; pt will again complete LLIS at time of discharge    Goals:  Goal Identifier 1   Goal Description pt to resume daily self-MLD for decreased edema reduction response and improved comfort   Target Date 18   Date Met  18   Progress:     Goal Identifier 2   Goal Description pt to increase B GH flexion to 170 degrees and GH abduciton to 150 degrees give pt independence with overhead reaching and ADL   Target Date 18   Date Met      Progress:     Goal Identifier 3   Goal Description pt to be independent with longterm RUE lymphedema management via HEP, elevation, compression garment wear and self-MLD or Flexitouch compression pump   Target Date 18   Date Met      Progress:     Goal Identifier 4   Goal Description pt to at least 5 point improvement on LLIS due to decreased lymphedema reductions and discomfort reduciton response   Target Date 18   Date Met      Progress:     Progress Toward Goals:   Patient making fairly good progress toward goals, performing on MLD at home, reports decreased fullness and decreased ache in R-upper back and have just initiated scar taping and treatment with goal of assisting with increased lymphatic flow and decreased tightness to assist with functional ROM.      Plan:  Continue therapy per current plan of care.    Discharge:  No

## 2018-04-24 NOTE — PROGRESS NOTES
Fax received from  pharmacy in [place] requesting a refill of Effexor on behalf of pt.  Last refill: 2/15/18  # 90 with 0 refills at [facility] pharm.  Last office visit:  3/15/2018  Next office visit:  6/14/2018    This is an appropriate refill, and has been e-prescribed. Josette mSalls, RN, BSN, OCN

## 2018-04-25 ENCOUNTER — HOSPITAL ENCOUNTER (OUTPATIENT)
Dept: PHYSICAL THERAPY | Facility: CLINIC | Age: 38
Setting detail: THERAPIES SERIES
End: 2018-04-25
Attending: INTERNAL MEDICINE
Payer: COMMERCIAL

## 2018-04-25 PROCEDURE — 97140 MANUAL THERAPY 1/> REGIONS: CPT | Mod: GP | Performed by: REHABILITATION PRACTITIONER

## 2018-04-25 PROCEDURE — 40000099 ZZH STATISTIC LYMPHEDEMA VISIT: Performed by: REHABILITATION PRACTITIONER

## 2018-05-02 ENCOUNTER — HOSPITAL ENCOUNTER (OUTPATIENT)
Dept: PHYSICAL THERAPY | Facility: CLINIC | Age: 38
Setting detail: THERAPIES SERIES
End: 2018-05-02
Attending: INTERNAL MEDICINE
Payer: COMMERCIAL

## 2018-05-02 DIAGNOSIS — I89.0 SECONDARY LYMPHEDEMA: Primary | ICD-10-CM

## 2018-05-02 PROCEDURE — 40000099 ZZH STATISTIC LYMPHEDEMA VISIT: Performed by: REHABILITATION PRACTITIONER

## 2018-05-02 PROCEDURE — 97140 MANUAL THERAPY 1/> REGIONS: CPT | Mod: GP | Performed by: REHABILITATION PRACTITIONER

## 2018-05-09 ENCOUNTER — HOSPITAL ENCOUNTER (OUTPATIENT)
Dept: PHYSICAL THERAPY | Facility: CLINIC | Age: 38
Setting detail: THERAPIES SERIES
End: 2018-05-09
Attending: INTERNAL MEDICINE
Payer: COMMERCIAL

## 2018-05-09 PROCEDURE — 40000099 ZZH STATISTIC LYMPHEDEMA VISIT: Performed by: PHYSICAL THERAPIST

## 2018-05-09 PROCEDURE — 97140 MANUAL THERAPY 1/> REGIONS: CPT | Mod: GP | Performed by: PHYSICAL THERAPIST

## 2018-05-16 ENCOUNTER — HOSPITAL ENCOUNTER (OUTPATIENT)
Dept: PHYSICAL THERAPY | Facility: CLINIC | Age: 38
Setting detail: THERAPIES SERIES
End: 2018-05-16
Attending: INTERNAL MEDICINE
Payer: COMMERCIAL

## 2018-05-16 PROCEDURE — 97140 MANUAL THERAPY 1/> REGIONS: CPT | Mod: GP | Performed by: REHABILITATION PRACTITIONER

## 2018-05-16 PROCEDURE — 40000099 ZZH STATISTIC LYMPHEDEMA VISIT: Performed by: REHABILITATION PRACTITIONER

## 2018-05-23 ENCOUNTER — HOSPITAL ENCOUNTER (OUTPATIENT)
Dept: PHYSICAL THERAPY | Facility: CLINIC | Age: 38
Setting detail: THERAPIES SERIES
End: 2018-05-23
Attending: INTERNAL MEDICINE
Payer: COMMERCIAL

## 2018-05-23 PROCEDURE — 40000099 ZZH STATISTIC LYMPHEDEMA VISIT: Performed by: PHYSICAL THERAPIST

## 2018-05-23 PROCEDURE — 97140 MANUAL THERAPY 1/> REGIONS: CPT | Mod: GP | Performed by: PHYSICAL THERAPIST

## 2018-05-29 NOTE — ADDENDUM NOTE
Encounter addended by: Nona Jett, PT on: 5/29/2018  8:55 AM<BR>     Actions taken: Flowsheet data copied forward, Flowsheet accepted, Sign clinical note

## 2018-05-29 NOTE — ADDENDUM NOTE
Encounter addended by: Nona Jett, PT on: 5/29/2018  8:55 AM<BR>     Actions taken: Flowsheet accepted

## 2018-05-29 NOTE — PROGRESS NOTES
Outpatient Physical Therapy Progress Note     Patient: Diana Wilson  : 1980    Beginning/End Dates of Reporting Period:  2018 to 2018    Referring Provider: Dr. Davin MD    Therapy Diagnosis: RUE lymphedema, R-axillary, chest and upper back lymphedema      Client Self Report: upper R arm swollen and R side under scar is swollen pt lifted shirt for therapist to compare sides.    Objective Measurements:  Objective Measure: girth (as last measured )  Details: RUE +2.9%    Objective Measure: ROM (as last measured on /)  Details: flexion 170, abd 165        Outcome Measures (most recent score):   LLIS = 20 on date of initial evaluation; pt will again complete LLIS at time of discharge     Goals:  Goal Identifier 1   Goal Description pt to resume daily self-MLD for decreased edema reduction response and improved comfort   Target Date 18   Date Met  18   Progress:     Goal Identifier 2   Goal Description pt to increase B GH flexion to 170 degrees and GH abduciton to 150 degrees give pt independence with overhead reaching and ADL   Target Date 18   Date Met  18   Progress:     Goal Identifier 3   Goal Description pt to be independent with longterm RUE lymphedema management via HEP, elevation, compression garment wear and self-MLD or Flexitouch compression pump   Target Date 18   Date Met      Progress:     Goal Identifier 4   Goal Description pt to at least 5 point improvement on LLIS due to decreased lymphedema reductions and discomfort reduciton response   Target Date 18   Date Met      Progress:     Progress Toward Goals:   Fair progress being made. Pt's measurements continue to fluctuate and pt is feeling more full (as well as therapist able to palpate increased edema and fullness) just distal to R-axilla in upper flank.  Pt is in the process of getting a Flexitouch compresison pump and being fit for a new sleeve as well as instructed to wear compression  shirts daily.  Current heat/humidity also likely directly contributing to pt's increased measurements/lymphedema.      Plan:  Continue therapy per current plan of care.    Discharge:  No

## 2018-05-30 ENCOUNTER — HOSPITAL ENCOUNTER (OUTPATIENT)
Dept: PHYSICAL THERAPY | Facility: CLINIC | Age: 38
Setting detail: THERAPIES SERIES
End: 2018-05-30
Attending: INTERNAL MEDICINE
Payer: COMMERCIAL

## 2018-05-30 PROCEDURE — 97140 MANUAL THERAPY 1/> REGIONS: CPT | Mod: GP | Performed by: REHABILITATION PRACTITIONER

## 2018-05-30 PROCEDURE — 40000099 ZZH STATISTIC LYMPHEDEMA VISIT: Performed by: REHABILITATION PRACTITIONER

## 2018-05-31 ENCOUNTER — TELEPHONE (OUTPATIENT)
Dept: ONCOLOGY | Facility: CLINIC | Age: 38
End: 2018-05-31

## 2018-06-05 ENCOUNTER — MYC MEDICAL ADVICE (OUTPATIENT)
Dept: FAMILY MEDICINE | Facility: CLINIC | Age: 38
End: 2018-06-05

## 2018-06-05 DIAGNOSIS — F41.9 ANXIETY: ICD-10-CM

## 2018-06-05 RX ORDER — LORAZEPAM 0.5 MG/1
0.5 TABLET ORAL EVERY 8 HOURS PRN
Qty: 20 TABLET | Refills: 0 | Status: SHIPPED | OUTPATIENT
Start: 2018-06-05 | End: 2018-10-23

## 2018-06-06 ENCOUNTER — HOSPITAL ENCOUNTER (OUTPATIENT)
Dept: PHYSICAL THERAPY | Facility: CLINIC | Age: 38
Setting detail: THERAPIES SERIES
End: 2018-06-06
Attending: INTERNAL MEDICINE
Payer: COMMERCIAL

## 2018-06-06 ENCOUNTER — ALLIED HEALTH/NURSE VISIT (OUTPATIENT)
Dept: ONCOLOGY | Facility: CLINIC | Age: 38
End: 2018-06-06

## 2018-06-06 DIAGNOSIS — C50.911 INVASIVE DUCTAL CARCINOMA OF BREAST, RIGHT (H): ICD-10-CM

## 2018-06-06 DIAGNOSIS — Z17.0 MALIGNANT NEOPLASM OF UPPER-OUTER QUADRANT OF RIGHT BREAST IN FEMALE, ESTROGEN RECEPTOR POSITIVE (H): ICD-10-CM

## 2018-06-06 DIAGNOSIS — C50.411 MALIGNANT NEOPLASM OF UPPER-OUTER QUADRANT OF RIGHT BREAST IN FEMALE, ESTROGEN RECEPTOR POSITIVE (H): ICD-10-CM

## 2018-06-06 DIAGNOSIS — C50.911 INVASIVE DUCTAL CARCINOMA OF BREAST, RIGHT (H): Primary | ICD-10-CM

## 2018-06-06 LAB
ALBUMIN SERPL-MCNC: 4.1 G/DL (ref 3.4–5)
ALP SERPL-CCNC: 68 U/L (ref 40–150)
ALT SERPL W P-5'-P-CCNC: 19 U/L (ref 0–50)
ANION GAP SERPL CALCULATED.3IONS-SCNC: 7 MMOL/L (ref 3–14)
AST SERPL W P-5'-P-CCNC: 14 U/L (ref 0–45)
BASOPHILS # BLD AUTO: 0 10E9/L (ref 0–0.2)
BASOPHILS NFR BLD AUTO: 0.5 %
BILIRUB SERPL-MCNC: 0.4 MG/DL (ref 0.2–1.3)
BUN SERPL-MCNC: 13 MG/DL (ref 7–30)
CALCIUM SERPL-MCNC: 9.1 MG/DL (ref 8.5–10.1)
CANCER AG27-29 SERPL-ACNC: 19 U/ML (ref 0–39)
CHLORIDE SERPL-SCNC: 106 MMOL/L (ref 94–109)
CO2 SERPL-SCNC: 26 MMOL/L (ref 20–32)
CREAT SERPL-MCNC: 0.75 MG/DL (ref 0.52–1.04)
DIFFERENTIAL METHOD BLD: NORMAL
EOSINOPHIL # BLD AUTO: 0.2 10E9/L (ref 0–0.7)
EOSINOPHIL NFR BLD AUTO: 2.7 %
ERYTHROCYTE [DISTWIDTH] IN BLOOD BY AUTOMATED COUNT: 12.3 % (ref 10–15)
GFR SERPL CREATININE-BSD FRML MDRD: 86 ML/MIN/1.7M2
GLUCOSE SERPL-MCNC: 94 MG/DL (ref 70–99)
HCT VFR BLD AUTO: 38.3 % (ref 35–47)
HGB BLD-MCNC: 12.7 G/DL (ref 11.7–15.7)
LYMPHOCYTES # BLD AUTO: 1.2 10E9/L (ref 0.8–5.3)
LYMPHOCYTES NFR BLD AUTO: 19.8 %
MCH RBC QN AUTO: 30.1 PG (ref 26.5–33)
MCHC RBC AUTO-ENTMCNC: 33.2 G/DL (ref 31.5–36.5)
MCV RBC AUTO: 91 FL (ref 78–100)
MONOCYTES # BLD AUTO: 0.4 10E9/L (ref 0–1.3)
MONOCYTES NFR BLD AUTO: 6.5 %
NEUTROPHILS # BLD AUTO: 4.2 10E9/L (ref 1.6–8.3)
NEUTROPHILS NFR BLD AUTO: 70.5 %
PLATELET # BLD AUTO: 251 10E9/L (ref 150–450)
POTASSIUM SERPL-SCNC: 4.1 MMOL/L (ref 3.4–5.3)
PROT SERPL-MCNC: 7.3 G/DL (ref 6.8–8.8)
RBC # BLD AUTO: 4.22 10E12/L (ref 3.8–5.2)
SODIUM SERPL-SCNC: 139 MMOL/L (ref 133–144)
WBC # BLD AUTO: 6 10E9/L (ref 4–11)

## 2018-06-06 PROCEDURE — 80053 COMPREHEN METABOLIC PANEL: CPT | Performed by: INTERNAL MEDICINE

## 2018-06-06 PROCEDURE — 40000099 ZZH STATISTIC LYMPHEDEMA VISIT: Performed by: REHABILITATION PRACTITIONER

## 2018-06-06 PROCEDURE — 86300 IMMUNOASSAY TUMOR CA 15-3: CPT | Performed by: INTERNAL MEDICINE

## 2018-06-06 PROCEDURE — 36415 COLL VENOUS BLD VENIPUNCTURE: CPT | Performed by: INTERNAL MEDICINE

## 2018-06-06 PROCEDURE — 97140 MANUAL THERAPY 1/> REGIONS: CPT | Mod: GP | Performed by: REHABILITATION PRACTITIONER

## 2018-06-06 PROCEDURE — 85025 COMPLETE CBC W/AUTO DIFF WBC: CPT | Performed by: INTERNAL MEDICINE

## 2018-06-06 NOTE — TELEPHONE ENCOUNTER
Alex walked to [facility].  Diana came to pharmacy to pick u before I could call her.  Thank you..Pricila Leal

## 2018-06-06 NOTE — MR AVS SNAPSHOT
After Visit Summary   6/6/2018    Diana Wilson    MRN: [id number]           Patient Information     Date Of Birth          1980        Visit Information        Provider Department      6/6/2018 1:05 PM Jessica Holland RN [facility]        Today's Diagnoses     Invasive ductal carcinoma of breast, right (H)    -  1       Follow-ups after your visit        Your next 10 appointments already scheduled     Jun 13, 2018  9:45 AM CDT   CT CHEST/ABDOMEN/PELVIS W CONTRAST with WYCT1   [facility] ([facility])    [address] [address]   [phone]           Please bring any scans or X-rays taken at other hospitals, if similar tests were done. Also bring a list of your medicines, including vitamins, minerals and over-the-counter drugs. It is safest to leave personal items at home.  Be sure to tell your doctor:   If you have any allergies.   If there s any chance you are pregnant.   If you are breastfeeding.  How to prepare:   Do not eat or drink for 2 hours before your exam. If you need to take medicine, you may take it with small sips of water. (We may ask you to take liquid medicine as well.)   Please wear loose clothing, such as a sweat suit or jogging clothes. Avoid snaps, zippers and other metal. We may ask you to undress and put on a hospital gown.  Please arrive 30 minutes early for your CT. Once in the department you might be asked to drink water 15-20 minutes prior to your exam.  If indicated you may be asked to drink an oral contrast in advance of your CT.  If this is the case, the imaging team will let you know or be in contact with you prior to your appointment  Patients over 70 or patients with diabetes or kidney problems:   If you haven t had a blood test (creatinine test) within the last 30 days, the Cardiologist/Radiologist may require you to get this test prior to your exam.  If you have diabetes:   Continue to take your metformin  medication on the day of your exam  If you have any questions, please call the Imaging Department where you will have your exam.            Jun 14, 2018  3:00 PM CDT   Return Visit with Maribel Jin MD   [facility] ([facility])    n Medical [facility]  [address]  [facility] [address]   [phone]            Jul 18, 2018 10:00 AM CDT   Lymphedema Treatment with Nona Jett PT   [facility] Lymphedema ([facility])    [address] [place]  [facility] [address]   [phone]              Who to contact     If you have questions or need follow up information about today's clinic visit or your schedule please contact [facility] directly at [phone].  Normal or non-critical lab and imaging results will be communicated to you by MyChart, letter or phone within 4 business days after the clinic has received the results. If you do not hear from us within 7 days, please contact the clinic through MyChart or phone. If you have a critical or abnormal lab result, we will notify you by phone as soon as possible.  Submit refill requests through [employer] or call your pharmacy and they will forward the refill request to us. Please allow 3 business days for your refill to be completed.          Additional Information About Your Visit        PoweredAnalyticshart Information     [employer] gives you secure access to your electronic health record. If you see a primary care provider, you can also send messages to your care team and make appointments. If you have questions, please call your primary care clinic.  If you do not have a primary care provider, please call [phone] and they will assist you.        Care EveryWhere ID     This is your Care EveryWhere ID. This could be used by other organizations to access your [facility] medical records  [id number]        Your Vitals Were     Last Period                   08/05/2016 (Exact Date)            Blood  Pressure from Last 3 Encounters:   03/16/18 (!) 131/99   03/15/18 120/87   03/08/18 113/84    Weight from Last 3 Encounters:   03/16/18 99.9 kg (220 lb 3.2 oz)   03/15/18 99.9 kg (220 lb 3.2 oz)   03/08/18 100.8 kg (222 lb 4.8 oz)              Today, you had the following     No orders found for display       Primary Care Provider Office Phone # Fax #    Yenifer Ridley [phone] [id number]  [place] [address]        Equal Access to Services     [facility]: Hadii gabriela Mills, wavandanada franki, qaybta kaalmabell weiss, javier valentine . So [facility] [phone].    ATENCIÓN: Si habla español, tiene a montelongo disposición servicios gratuitos de asistencia lingüística. [facility] [phone].    We comply with applicable federal civil rights laws and [place] laws. We do not discriminate on the basis of race, color, national origin, age, disability, sex, sexual orientation, or gender identity.            Thank you!     Thank you for choosing [facility]  for your care. Our goal is always to provide you with excellent care. Hearing back from our patients is one way we can continue to improve our services. Please take a few minutes to complete the written survey that you may receive in the mail after your visit with us. Thank you!             Your Updated Medication List - Protect others around you: Learn how to safely use, store and throw away your medicines at www.disposemymeds.org.          This list is accurate as of 6/6/18  1:11 PM.  Always use your most recent med list.                   Brand Name Dispense Instructions for use Diagnosis    acetaminophen 325 MG tablet    TYLENOL    100 tablet    Take 2 tablets (650 mg) by mouth every 4 hours as needed for other (mild pain)    S/P laparoscopy       * albuterol (2.5 MG/3ML) 0.083% neb solution           * albuterol 108 (90 Base) MCG/ACT Inhaler    PROAIR HFA/PROVENTIL HFA/VENTOLIN HFA    1  Inhaler    Inhale 2 puffs into the lungs every 6 hours as needed for shortness of breath / dyspnea    Mild persistent asthma with exacerbation       ALEVE PO      Take 220 mg by mouth daily as needed for moderate pain        fluticasone 50 MCG/ACT spray    FLONASE    3 Bottle    Spray 1-2 sprays into both nostrils daily    Environmental allergies       GABAPENTIN PO           ibuprofen 600 MG tablet    ADVIL/MOTRIN    30 tablet    Take 1 tablet (600 mg) by mouth every 6 hours as needed for pain (mild)    S/P laparoscopy       LORazepam 0.5 MG tablet    ATIVAN    20 tablet    Take 1 tablet (0.5 mg) by mouth every 8 hours as needed for anxiety    Anxiety       montelukast 10 MG tablet    SINGULAIR    90 tablet    TAKE ONE TABLET BY MOUTH AT BEDTIME    Environmental allergies       MULTIVITAMIN PO           order for DME     1 each    Equipment being ordered: x2 20/30mmHg UE compression sleeves    Secondary lymphedema       oxyCODONE-acetaminophen 5-325 MG per tablet    PERCOCET    30 tablet    Take 1-2 tablets by mouth every 4 hours as needed for pain    Malignant neoplasm of upper-outer quadrant of right breast in female, estrogen receptor positive (H)       PEPCID PO      Take by mouth daily        scopolamine 72 hr patch    TRANSDERM    1 patch    Place 1 patch onto the skin every 72 hours.  Apply to hairless area behind one ear at least 4 hours before travel.  Remove old patch and change every 3 days.    Preop general physical exam, Nausea, Invasive ductal carcinoma of breast, right (H), Family history of malignant neoplasm of breast, Malignant neoplasm of upper-outer quadrant of right breast in female, estrogen receptor positive (H)       tamoxifen 20 MG tablet    NOLVADEX    90 tablet    Take 1 tablet (20 mg) by mouth daily    Malignant neoplasm of upper-outer quadrant of right breast in female, estrogen receptor positive (H)       venlafaxine 37.5 MG tablet    EFFEXOR    90 tablet    Take 1 tablet (37.5 mg) in  the morning and 2 tablets (75 mg) in the evening.    Hot flushes, perimenopausal, Depression, unspecified depression type       ZYRTEC PO           * Notice:  This list has 2 medication(s) that are the same as other medications prescribed for you. Read the directions carefully, and ask your doctor or other care provider to review them with you.

## 2018-06-06 NOTE — NURSING NOTE
Diana Wislon was seen for the 6 month visit for the [facility] weight loss study.  Fasting bloods were drawn per protocol. Completed QOL questionnaire and measurements (weight/waist/hip) were taken per Protocol. Diana will see Dr. Jin next week (June 14) for her follow up appointment. At this visit she denies any new fractures, sprains, tendon/ligament injuries, and orthopedic surgeries. Reviewed schedule for next visit at 12 months.

## 2018-06-12 RX ORDER — IOPAMIDOL 755 MG/ML
100 INJECTION, SOLUTION INTRAVASCULAR ONCE
Status: COMPLETED | OUTPATIENT
Start: 2018-06-13 | End: 2018-06-13

## 2018-06-13 ENCOUNTER — HOSPITAL ENCOUNTER (OUTPATIENT)
Dept: CT IMAGING | Facility: CLINIC | Age: 38
Discharge: HOME | End: 2018-06-13
Attending: INTERNAL MEDICINE | Admitting: INTERNAL MEDICINE
Payer: COMMERCIAL

## 2018-06-13 DIAGNOSIS — C50.411 MALIGNANT NEOPLASM OF UPPER-OUTER QUADRANT OF RIGHT BREAST IN FEMALE, ESTROGEN RECEPTOR POSITIVE (H): ICD-10-CM

## 2018-06-13 DIAGNOSIS — Z17.0 MALIGNANT NEOPLASM OF UPPER-OUTER QUADRANT OF RIGHT BREAST IN FEMALE, ESTROGEN RECEPTOR POSITIVE (H): ICD-10-CM

## 2018-06-13 PROCEDURE — 25000125 ZZHC RX 250: Performed by: RADIOLOGY

## 2018-06-13 PROCEDURE — 71260 CT THORAX DX C+: CPT

## 2018-06-13 PROCEDURE — 25000128 H RX IP 250 OP 636: Performed by: RADIOLOGY

## 2018-06-13 RX ADMIN — SODIUM CHLORIDE 67 ML: 9 INJECTION, SOLUTION INTRAVENOUS at 09:53

## 2018-06-13 RX ADMIN — IOPAMIDOL 100 ML: 755 INJECTION, SOLUTION INTRAVENOUS at 09:53

## 2018-06-14 ENCOUNTER — ONCOLOGY VISIT (OUTPATIENT)
Dept: ONCOLOGY | Facility: CLINIC | Age: 38
End: 2018-06-14
Attending: INTERNAL MEDICINE
Payer: COMMERCIAL

## 2018-06-14 VITALS
TEMPERATURE: 97.3 F | RESPIRATION RATE: 20 BRPM | WEIGHT: 203 LBS | HEIGHT: 64 IN | BODY MASS INDEX: 34.66 KG/M2 | HEART RATE: 72 BPM | SYSTOLIC BLOOD PRESSURE: 120 MMHG | DIASTOLIC BLOOD PRESSURE: 56 MMHG | OXYGEN SATURATION: 95 %

## 2018-06-14 DIAGNOSIS — E04.1 THYROID NODULE: Primary | ICD-10-CM

## 2018-06-14 DIAGNOSIS — F32.A DEPRESSION, UNSPECIFIED DEPRESSION TYPE: ICD-10-CM

## 2018-06-14 DIAGNOSIS — C50.411 MALIGNANT NEOPLASM OF UPPER-OUTER QUADRANT OF RIGHT BREAST IN FEMALE, ESTROGEN RECEPTOR POSITIVE (H): ICD-10-CM

## 2018-06-14 DIAGNOSIS — Z17.0 MALIGNANT NEOPLASM OF UPPER-OUTER QUADRANT OF RIGHT BREAST IN FEMALE, ESTROGEN RECEPTOR POSITIVE (H): ICD-10-CM

## 2018-06-14 DIAGNOSIS — N95.1 HOT FLUSHES, PERIMENOPAUSAL: ICD-10-CM

## 2018-06-14 DIAGNOSIS — N20.0 KIDNEY STONE: ICD-10-CM

## 2018-06-14 PROCEDURE — 99215 OFFICE O/P EST HI 40 MIN: CPT | Performed by: INTERNAL MEDICINE

## 2018-06-14 PROCEDURE — G0463 HOSPITAL OUTPT CLINIC VISIT: HCPCS

## 2018-06-14 RX ORDER — GABAPENTIN 100 MG/1
CAPSULE ORAL
Qty: 90 CAPSULE | Status: CANCELLED | OUTPATIENT
Start: 2018-06-14

## 2018-06-14 RX ORDER — VENLAFAXINE 37.5 MG/1
TABLET ORAL
Qty: 90 TABLET | Refills: 1 | Status: CANCELLED | OUTPATIENT
Start: 2018-06-14

## 2018-06-14 RX ORDER — TAMOXIFEN CITRATE 20 MG/1
20 TABLET ORAL DAILY
Qty: 90 TABLET | Refills: 1 | Status: CANCELLED | OUTPATIENT
Start: 2018-06-14

## 2018-06-14 ASSESSMENT — PAIN SCALES - GENERAL: PAINLEVEL: NO PAIN (0)

## 2018-06-14 NOTE — PATIENT INSTRUCTIONS
We would like to see you back in 3 months for a follow up appointment with labs prior.     When you are in need of a refill, please call your pharmacy and they will send us a request.      Copy of appointments, and after visit summary (AVS) given to patient.      If you have any questions please call Josette Smalls RN, BSN Oncology Hematology  [facility] [phone]. For questions after business hours, or on holidays/weekends, please call our after hours Nurse Triage line [phone]. Thank you.           3 months f/u with labs.

## 2018-06-14 NOTE — MR AVS SNAPSHOT
After Visit Summary   6/14/2018    Diana Wilson    MRN: [id number]           Patient Information     Date Of Birth          1980        Visit Information        Provider Department      6/14/2018 3:00 PM Maribel Jin MD [facility]        Today's Diagnoses     Thyroid nodule    -  1    Malignant neoplasm of upper-outer quadrant of right breast in female, estrogen receptor positive (H)        Hot flushes, perimenopausal        Depression, unspecified depression type        Kidney stone          Care Instructions    We would like to see you back in 3 months for a follow up appointment with labs prior.     When you are in need of a refill, please call your pharmacy and they will send us a request.      Copy of appointments, and after visit summary (AVS) given to patient.      If you have any questions please call Josette Smalls RN, BSN Oncology Hematology  [facility] [phone]. For questions after business hours, or on holidays/weekends, please call our after hours Nurse Triage line [phone]. Thank you.           3 months f/u with labs.           Follow-ups after your visit        Your next 10 appointments already scheduled     Jul 18, 2018 10:00 AM CDT   Lymphedema Treatment with Nona Jett, PT   [facility] Lymphedema ([facility])    [address] [address] [phone]            Sep 12, 2018  8:00 AM CDT   LAB with [facility] ([facility])    [address] [id number] [phone]           Please do not eat 10-12 hours before your appointment if you are coming in fasting for labs on lipids, cholesterol, or glucose (sugar). This does not apply to pregnant women. Water, hot tea and black coffee (with nothing added) are okay. Do not drink other fluids, diet soda or chew gum.            Sep 17, 2018  3:00 PM CDT   Return Visit with Maribel Jin MD  "  [facility] ([facility])    [facility]  [address]  [facility] [address] [phone]              Who to contact     If you have questions or need follow up information about today's clinic visit or your schedule please contact [facility] directly at [phone].  Normal or non-critical lab and imaging results will be communicated to you by MyChart, letter or phone within 4 business days after the clinic has received the results. If you do not hear from us within 7 days, please contact the clinic through apprupthart or phone. If you have a critical or abnormal lab result, we will notify you by phone as soon as possible.  Submit refill requests through "[employer]" or call your pharmacy and they will forward the refill request to us. Please allow 3 business days for your refill to be completed.          Additional Information About Your Visit        appruptharBlogic Information     "[employer]" gives you secure access to your electronic health record. If you see a primary care provider, you can also send messages to your care team and make appointments. If you have questions, please call your primary care clinic.  If you do not have a primary care provider, please call [phone] and they will assist you.        Care EveryWhere ID     This is your Care EveryWhere ID. This could be used by other organizations to access your [facility] medical records  [id number]        Your Vitals Were     Pulse Temperature Respirations Height Last Period Pulse Oximetry    72 97.3  F (36.3  C) (Tympanic) 20 1.632 m (5' 4.25\") 08/05/2016 (Exact Date) 95%    Breastfeeding? BMI (Body Mass Index)                No 34.57 kg/m2           Blood Pressure from Last 3 Encounters:   06/14/18 120/56   03/16/18 (!) 131/99   03/15/18 120/87    Weight from Last 3 Encounters:   06/14/18 92.1 kg (203 lb)   03/16/18 99.9 kg (220 lb 3.2 oz)   03/15/18 99.9 kg (220 lb 3.2 oz)              Today, you " had the following     No orders found for display       Primary Care Provider Office Phone # Fax #    Yenifer Ridley [phone] [id number]  [place] [address]        Equal Access to Services     [facility] : Hadii gabriela ku monseo Sowan, waaxda luqadaha, qaybta kaalmada aderosayada, javier dupreemarybeth bridges. So [facility] [phone].    ATENCIÓN: Si habla español, tiene a montelongo disposición servicios gratuitos de asistencia lingüística. Llame al [phone].    We comply with applicable federal civil rights laws and [place] laws. We do not discriminate on the basis of race, color, national origin, age, disability, sex, sexual orientation, or gender identity.            Thank you!     Thank you for choosing [facility]  for your care. Our goal is always to provide you with excellent care. Hearing back from our patients is one way we can continue to improve our services. Please take a few minutes to complete the written survey that you may receive in the mail after your visit with us. Thank you!             Your Updated Medication List - Protect others around you: Learn how to safely use, store and throw away your medicines at www.disposemymeds.org.          This list is accurate as of 6/14/18  3:48 PM.  Always use your most recent med list.                   Brand Name Dispense Instructions for use Diagnosis    acetaminophen 325 MG tablet    TYLENOL    100 tablet    Take 2 tablets (650 mg) by mouth every 4 hours as needed for other (mild pain)    S/P laparoscopy       * albuterol (2.5 MG/3ML) 0.083% neb solution           * albuterol 108 (90 Base) MCG/ACT Inhaler    PROAIR HFA/PROVENTIL HFA/VENTOLIN HFA    1 Inhaler    Inhale 2 puffs into the lungs every 6 hours as needed for shortness of breath / dyspnea    Mild persistent asthma with exacerbation       fluticasone 50 MCG/ACT spray    FLONASE    3 Bottle    Spray 1-2 sprays into both nostrils daily     Environmental allergies       GABAPENTIN PO           ibuprofen 600 MG tablet    ADVIL/MOTRIN    30 tablet    Take 1 tablet (600 mg) by mouth every 6 hours as needed for pain (mild)    S/P laparoscopy       LORazepam 0.5 MG tablet    ATIVAN    20 tablet    Take 1 tablet (0.5 mg) by mouth every 8 hours as needed for anxiety    Anxiety       montelukast 10 MG tablet    SINGULAIR    90 tablet    TAKE ONE TABLET BY MOUTH AT BEDTIME    Environmental allergies       MULTIVITAMIN PO           order for DME     1 each    Equipment being ordered: x2 20/30mmHg UE compression sleeves    Secondary lymphedema       PEPCID PO      Take by mouth daily        scopolamine 72 hr patch    TRANSDERM    1 patch    Place 1 patch onto the skin every 72 hours.  Apply to hairless area behind one ear at least 4 hours before travel.  Remove old patch and change every 3 days.    Preop general physical exam, Nausea, Invasive ductal carcinoma of breast, right (H), Family history of malignant neoplasm of breast, Malignant neoplasm of upper-outer quadrant of right breast in female, estrogen receptor positive (H)       tamoxifen 20 MG tablet    NOLVADEX    90 tablet    Take 1 tablet (20 mg) by mouth daily    Malignant neoplasm of upper-outer quadrant of right breast in female, estrogen receptor positive (H)       venlafaxine 37.5 MG tablet    EFFEXOR    90 tablet    Take 1 tablet (37.5 mg) in the morning and 2 tablets (75 mg) in the evening.    Hot flushes, perimenopausal, Depression, unspecified depression type       ZYRTEC PO           * Notice:  This list has 2 medication(s) that are the same as other medications prescribed for you. Read the directions carefully, and ask your doctor or other care provider to review them with you.

## 2018-06-14 NOTE — LETTER
6/14/2018         RE: Diana Wilson  [address] [place] [address]        Dear Colleague,    Thank you for referring your patient, Diana Wilson, to the [facility]. Please see a copy of my visit note below.    ONCOLOGY Follow up visit     COMPLAINT AND REASON FOR VISIT:    12/2016 diagnosed right breast cancer LN positive disease        HPI  She presented at age 36 breast lump in her right armpit and right breast in 10/2016.   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  ER/ME 99% positive, HER-2/kelle negative, associated with high-grade DCIS.    Right axillary ultrasound-guided biopsy indicating metastatic carcinoma positive for spread from breast primary, ER/ME both negative.  HER-2/kelle is negative.      She had clinical T2N1 disease. She made informed decision to proceed with neoadjuvant DDAC C1D1 1/16/2017. She has good clinical response post DD AC x4. Then went on to have wkly taxol + carbo  with informed decision in March.   She has carbo hypersensitivity reaction in early May (during C3). It was d/c after.     She is tested negative for BRCA1/2 with [employer].     7/2017 she had [facility] mastectomies at [facility]. Right side found no residual invasive carcinoma, +grade III DCIS, 1mm in greatest dimension. 5 sLN all negative. She had ypTis(DCIS)pN0 disease. Left breast no cancer.     She finished post mastectomy RT in 11/2017.    She had BSO 11/2017. Then started Arimidex in 1/2018. She quit in Feb 2018 due to side effects and willing to try tamoxifen in 3/2018.     She is also on ELL trial for weight loss.     PAST MEDICAL HISTORY:  Restless legs, mild intermittent asthma, celiac disease diagnosed 02/2016, hx of TIFFANY     SOCIAL HISTORY:  Works part-time.  Lives with her .  They have 2 kids, first pregnancy age 21.  She did not do breast feeding because of bilateral breast reduction.  She does  "office work.      FAMILY HISTORY:  \"Full of cancer\" from the mother's side including colon, lung, pancreatic, gastric cancer.  According to the patient none of them survive older than 60 years old.      REVIEW OF SYSTEMS  She is very upbeat, + hot flushes. Calmer.      PHYSICAL EXAMINATION:   Blood pressure 120/56, pulse 72, temperature 97.3  F (36.3  C), temperature source Tympanic, resp. rate 20, height 1.632 m (5' 4.25\"), weight 92.1 kg (203 lb), last menstrual period 08/05/2016, SpO2 95 %, not currently breastfeeding.    GENERAL APPEARANCE:  He young woman, looks like her stated age, very upbeat, not in acute distress.   HEENT: The patient is normocephalic, atraumatic. Pupils are equally reactive to light.  Sclerae are anicteric. erythematous oral mucosa.  - pharynx.  No oral thrush.   NECK:  Supple.  No jugular venous distention.  Thyroid is not palpable.   LYMPH NODES:  Superficial lymphadenopathy is not appreciable in the bilateral cervical, supraclavicular, axillary or inguinal areas.   CARDIOVASCULAR:  S1, S2 regular with no murmurs or gallops.  No carotid or abdominal bruits.   PULMONARY:  Lungs are clear to auscultation and percussion bilaterally.  There is no wheezing or rhonchi.   GASTROINTESTINAL:  Abdomen is soft, nontender.  No hepatosplenomegaly.  No signs of ascites.  No mass appreciable.   MUSCULOSKELETAL/EXTREMITIES:  No edema.  No cyanotic changes.  No signs of joint deformity.  No lymphedema.   NEUROLOGIC:  Cranial nerves II-XII are grossly intact.  Sensation intact.  Muscle strength and muscle tone symmetrical, 5/5 throughout.   BACK:  No spinal or paraspinal tenderness.  No CVA tenderness.   SKIN:  No petechiae.  No rash.  No signs of cellulitis.   BREASTS:  Bilateral breast exam reviewed.  Bilateral mastectomies scars well healed. She is partially expanded has marked erythematous changes on right side from RT.      LABORATORY DATA REVIEWED  CBC DIFF/CMP/marker are good.       Current Image " data reviewed  CT c/a/p 6/2018 - negative. A 1.2 cm cyst or nodule in the left lobe of thyroid gland is Unchanged. Abdomen: A 2 mm nonobstructing calculus in the left kidney is again  seen.      Old data reviewed with summary  7/2017 double mastectomies at [facility]. Right side: no residual invasive carcinoma, +grade III DCIS, 1mm in greatest dimension. 5 sLN all negative. She had ypTis(DCIS)pN0 disease. Left breast no cancer.       CT body 11/2017: no mets.     Breast MRI at [facility] post DD AC 3/2017:   1. Improvement in biopsy-proven RIGHT-sided breast cancer at 10 o'clock. The malignancy has decreased in size (2.4 cm versus 3.3 cm previously)and demonstrates more favorable enhancement kinetics.     2.  No residual enlarged RIGHT axillary lymph nodes.    1/2017 baseline Echo LV function nl, EF 60-65%     PET 1/2017  1. Hypermetabolic mass in the right breast consistent with known malignancy.  2. Two enlarged hypermetabolic metastatic lymph nodes in the right axilla.  3. No additional worrisome hypermetabolic areas in the neck, chest, abdomen, or pelvis.      DEXA scan from 03/2016 identified mild osteopenia lumbar spine        ASSESSMENT AND PLAN:    1.   12/2016  dx breast cancer, cT2N1 disesae.      S/p DD AC and wkly taxol, carbo was used partially dropped due to hypersensitivity reaction.   She had pCR for invasive cancer with mastectomies in 7/2017.   S/p RT till 11/2017.     She had BSO 11/2017. She did not tolerate Arimdiex off it after 1 month.   She started tamoxifen in 3/2018.     She demands to have CT scan with her follow up visit.   I explained to her the ASCO guideline in terms how to follow up breast cancer.     2.  hot flushes - advice Effexor, this has helped a lot.     3. Overweight - weight loss is meaningful for her in terms of rate of recurrence. She is interested in and enrolled in [employer] program.     4. Stable thyroid nodule and small kidney stone on CT - these will be followed up. We discussed not  overdose Ca.     Again, thank you for allowing me to participate in the care of your patient.        Sincerely,        Maribel Jin MD, MD

## 2018-06-14 NOTE — PROGRESS NOTES
"ONCOLOGY Follow up visit     COMPLAINT AND REASON FOR VISIT:    12/2016 diagnosed right breast cancer LN positive disease        HPI  She presented at age 36 breast lump in her right armpit and right breast in 10/2016.   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  ER/KY 99% positive, HER-2/kelle negative, associated with high-grade DCIS.    Right axillary ultrasound-guided biopsy indicating metastatic carcinoma positive for spread from breast primary, ER/KY both negative.  HER-2/kelle is negative.      She had clinical T2N1 disease. She made informed decision to proceed with neoadjuvant DDAC C1D1 1/16/2017. She has good clinical response post DD AC x4. Then went on to have wkly taxol + carbo  with informed decision in March.   She has carbo hypersensitivity reaction in early May (during C3). It was d/c after.     She is tested negative for BRCA1/2 with [facility].     7/2017 she had double mastectomies at [facility]. Right side found no residual invasive carcinoma, +grade III DCIS, 1mm in greatest dimension. 5 sLN all negative. She had ypTis(DCIS)pN0 disease. Left breast no cancer.     She finished post mastectomy RT in 11/2017.    She had BSO 11/2017. Then started Arimidex in 1/2018. She quit in Feb 2018 due to side effects and willing to try tamoxifen in 3/2018.     She is also on ELL trial for weight loss.     PAST MEDICAL HISTORY:  Restless legs, mild intermittent asthma, celiac disease diagnosed 02/2016, hx of TIFFANY     SOCIAL HISTORY:  Works part-time.  Lives with her .  They have 2 kids, first pregnancy age 21.  She did not do breast feeding because of bilateral breast reduction.  She does office work.      FAMILY HISTORY:  \"Full of cancer\" from the mother's side including colon, lung, pancreatic, gastric cancer.  According to the patient none of them survive older than 60 years old.      REVIEW OF SYSTEMS  She is very upbeat, " "+ hot flushes. Calmer.      PHYSICAL EXAMINATION:   Blood pressure 120/56, pulse 72, temperature 97.3  F (36.3  C), temperature source Tympanic, resp. rate 20, height 1.632 m (5' 4.25\"), weight 92.1 kg (203 lb), last menstrual period 08/05/2016, SpO2 95 %, not currently breastfeeding.    GENERAL APPEARANCE:  He young woman, looks like her stated age, very upbeat, not in acute distress.   HEENT: The patient is normocephalic, atraumatic. Pupils are equally reactive to light.  Sclerae are anicteric. erythematous oral mucosa.  - pharynx.  No oral thrush.   NECK:  Supple.  No jugular venous distention.  Thyroid is not palpable.   LYMPH NODES:  Superficial lymphadenopathy is not appreciable in the bilateral cervical, supraclavicular, axillary or inguinal areas.   CARDIOVASCULAR:  S1, S2 regular with no murmurs or gallops.  No carotid or abdominal bruits.   PULMONARY:  Lungs are clear to auscultation and percussion bilaterally.  There is no wheezing or rhonchi.   GASTROINTESTINAL:  Abdomen is soft, nontender.  No hepatosplenomegaly.  No signs of ascites.  No mass appreciable.   MUSCULOSKELETAL/EXTREMITIES:  No edema.  No cyanotic changes.  No signs of joint deformity.  No lymphedema.   NEUROLOGIC:  Cranial nerves II-XII are grossly intact.  Sensation intact.  Muscle strength and muscle tone symmetrical, 5/5 throughout.   BACK:  No spinal or paraspinal tenderness.  No CVA tenderness.   SKIN:  No petechiae.  No rash.  No signs of cellulitis.   BREASTS:  Bilateral breast exam reviewed.  Bilateral mastectomies scars well healed. She is partially expanded has marked erythematous changes on right side from RT.      LABORATORY DATA REVIEWED  CBC DIFF/CMP/marker are good.       Current Image data reviewed  CT c/a/p 6/2018 - negative. A 1.2 cm cyst or nodule in the left lobe of thyroid gland is Unchanged. Abdomen: A 2 mm nonobstructing calculus in the left kidney is again  seen.      Old data reviewed with summary  7/2017 double " mastectomies at [facility]. Right side: no residual invasive carcinoma, +grade III DCIS, 1mm in greatest dimension. 5 sLN all negative. She had ypTis(DCIS)pN0 disease. Left breast no cancer.       CT body 11/2017: no mets.     Breast MRI at [facility] post DD AC 3/2017:   1. Improvement in biopsy-proven RIGHT-sided breast cancer at 10 o'clock. The malignancy has decreased in size (2.4 cm versus 3.3 cm previously)and demonstrates more favorable enhancement kinetics.     2.  No residual enlarged RIGHT axillary lymph nodes.    1/2017 baseline Echo LV function nl, EF 60-65%     PET 1/2017  1. Hypermetabolic mass in the right breast consistent with known malignancy.  2. Two enlarged hypermetabolic metastatic lymph nodes in the right axilla.  3. No additional worrisome hypermetabolic areas in the neck, chest, abdomen, or pelvis.      DEXA scan from 03/2016 identified mild osteopenia lumbar spine        ASSESSMENT AND PLAN:    1.   12/2016  dx breast cancer, cT2N1 disesae.      S/p DD AC and wkly taxol, carbo was used partially dropped due to hypersensitivity reaction.   She had pCR for invasive cancer with mastectomies in 7/2017.   S/p RT till 11/2017.     She had BSO 11/2017. She did not tolerate Arimdiex off it after 1 month.   She started tamoxifen in 3/2018.     She demands to have CT scan with her follow up visit.   I explained to her the ASCO guideline in terms how to follow up breast cancer.     2.  hot flushes - advice Effexor, this has helped a lot.     3. Overweight - weight loss is meaningful for her in terms of rate of recurrence. She is interested in and enrolled in BWEL program.     4. Stable thyroid nodule and small kidney stone on CT - these will be followed up. We discussed not overdose Ca.

## 2018-06-18 ENCOUNTER — TELEPHONE (OUTPATIENT)
Dept: ONCOLOGY | Facility: CLINIC | Age: 38
End: 2018-06-18

## 2018-06-21 DIAGNOSIS — F32.A DEPRESSION, UNSPECIFIED DEPRESSION TYPE: ICD-10-CM

## 2018-06-21 DIAGNOSIS — N95.1 HOT FLUSHES, PERIMENOPAUSAL: ICD-10-CM

## 2018-06-21 RX ORDER — VENLAFAXINE 37.5 MG/1
TABLET ORAL
Qty: 90 TABLET | Refills: 1 | Status: SHIPPED | OUTPATIENT
Start: 2018-06-21 | End: 2018-08-28

## 2018-06-21 NOTE — PROGRESS NOTES
Fax received from [facility] requesting a refill of Effexor on behalf of pt.  Last refill: 04.24.18  # 90 with 1 refills at [facility].  Last office visit:  06.14.18  Next office visit:  09.17.18    This is an appropriate refill, and has been e-prescribed. Josette Smalls RN, BSN, OCN

## 2018-06-25 DIAGNOSIS — G62.9 NEUROPATHY: ICD-10-CM

## 2018-06-25 RX ORDER — GABAPENTIN 300 MG/1
CAPSULE ORAL
Qty: 90 CAPSULE | Refills: 3 | Status: SHIPPED | OUTPATIENT
Start: 2018-06-25 | End: 2018-12-18

## 2018-07-18 ENCOUNTER — HOSPITAL ENCOUNTER (OUTPATIENT)
Dept: PHYSICAL THERAPY | Facility: CLINIC | Age: 38
Setting detail: THERAPIES SERIES
End: 2018-07-18
Attending: INTERNAL MEDICINE
Payer: COMMERCIAL

## 2018-07-18 PROCEDURE — 40000099 ZZH STATISTIC LYMPHEDEMA VISIT: Performed by: PHYSICAL THERAPIST

## 2018-07-18 PROCEDURE — 97140 MANUAL THERAPY 1/> REGIONS: CPT | Mod: GP | Performed by: PHYSICAL THERAPIST

## 2018-07-18 NOTE — PROGRESS NOTES
Outpatient Physical Therapy Discharge Note     Patient: Diana Wilson  : 1980    Beginning/End Dates of Reporting Period:  18 to 2018    Referring Provider: Dr. Davin MD    Therapy Diagnosis: RUE lymphedema      Client Self Report: things are going super well    Objective Measurements:  Objective Measure: RUE girth  Details: RUE -11.7% since last session on 18 and since initial evaluation RLE -16.6%     Outcome Measures (most recent score):  Lymphedema Life Impact Scale (score range 0-72). A higher score indicates greater impairment.: 2    Goals:  Goal Identifier 1   Goal Description pt to resume daily self-MLD for decreased edema reduction response and improved comfort   Target Date 18   Date Met  18   Progress:     Goal Identifier 2   Goal Description pt to increase B GH flexion to 170 degrees and GH abduciton to 150 degrees give pt independence with overhead reaching and ADL   Target Date 18   Date Met  18   Progress:     Goal Identifier 3   Goal Description pt to be independent with longterm RUE lymphedema management via HEP, elevation, compression garment wear and self-MLD or Flexitouch compression pump   Target Date 18   Date Met  18   Progress:     Goal Identifier 4   Goal Description pt to at least 5 point improvement on LLIS due to decreased lymphedema reductions and discomfort reduciton response   Target Date 18   Date Met  18   Progress:     Progress Toward Goals:   Lymphedema goals met      Plan:  Discharge from therapy.    Discharge:    Reason for Discharge: Patient has met all goals.    Equipment Issued: Juzo compression sleeve size 2; gauntlet and Flexitouch compression pump    Discharge Plan: Patient to continue home program.

## 2018-07-18 NOTE — ADDENDUM NOTE
Encounter addended by: Nona Jett, PT on: 7/18/2018 11:02 AM<BR>     Actions taken: Sign clinical note, Episode resolved

## 2018-07-18 NOTE — PROGRESS NOTES
Outpatient Physical Therapy Progress Note     Patient: Diana Wilson  : 1980    Beginning/End Dates of Reporting Period:  18 - 18    Referring Provider: Dr. Davin MD    Therapy Diagnosis: RUE lymphedema      Client Self Report: picked up new sleeve Fifi sz 2, things are going well    Objective Measurements:  Objective Measure: girth  Details: RUE +2.3%        Outcome Measures (most recent score):   LLIS = 20 on date of initial evaluation; pt will again complete LLIS at time of discharge     Goals:  Goal Identifier 1   Goal Description pt to resume daily self-MLD for decreased edema reduction response and improved comfort   Target Date 18   Date Met  18   Progress:     Goal Identifier 2   Goal Description pt to increase B GH flexion to 170 degrees and GH abduciton to 150 degrees give pt independence with overhead reaching and ADL   Target Date 18   Date Met  18   Progress:     Goal Identifier 3   Goal Description pt to be independent with longterm RUE lymphedema management via HEP, elevation, compression garment wear and self-MLD or Flexitouch compression pump   Target Date 18   Date Met  18   Progress:     Goal Identifier 4   Goal Description pt to at least 5 point improvement on LLIS due to decreased lymphedema reductions and discomfort reduciton response   Target Date 18   Date Met      Progress:       Progress Toward Goals:   Good progress being made toward all goals.  Patient is wearing RUE compression sleeve and gauntlet for maintenance as well as has a Flexitouch compression pump and anticipate a few additional sessions to ensure that current compression garment , wear schedule and home program are adequate in achieving longterm edema management for maintenance.  Pt planning to get nipple tattoos in July and instructed to f/u after tattooes primarily healed.      Plan:  Continue therapy per current plan of care.    Discharge:  No

## 2018-08-16 ENCOUNTER — OFFICE VISIT (OUTPATIENT)
Dept: FAMILY MEDICINE | Facility: CLINIC | Age: 38
End: 2018-08-16
Payer: COMMERCIAL

## 2018-08-16 VITALS
TEMPERATURE: 97.9 F | BODY MASS INDEX: 33.25 KG/M2 | WEIGHT: 195.2 LBS | HEART RATE: 80 BPM | DIASTOLIC BLOOD PRESSURE: 64 MMHG | RESPIRATION RATE: 16 BRPM | SYSTOLIC BLOOD PRESSURE: 104 MMHG

## 2018-08-16 DIAGNOSIS — N30.01 ACUTE CYSTITIS WITH HEMATURIA: Primary | ICD-10-CM

## 2018-08-16 LAB
ALBUMIN UR-MCNC: ABNORMAL MG/DL
APPEARANCE UR: CLEAR
BACTERIA #/AREA URNS HPF: ABNORMAL /HPF
BILIRUB UR QL STRIP: NEGATIVE
COLOR UR AUTO: YELLOW
GLUCOSE UR STRIP-MCNC: NEGATIVE MG/DL
HGB UR QL STRIP: ABNORMAL
KETONES UR STRIP-MCNC: NEGATIVE MG/DL
LEUKOCYTE ESTERASE UR QL STRIP: ABNORMAL
NITRATE UR QL: POSITIVE
PH UR STRIP: 5.5 PH (ref 5–7)
RBC #/AREA URNS AUTO: ABNORMAL /HPF
SOURCE: ABNORMAL
SP GR UR STRIP: 1.02 (ref 1–1.03)
UROBILINOGEN UR STRIP-ACNC: 0.2 EU/DL (ref 0.2–1)
WBC #/AREA URNS AUTO: ABNORMAL /HPF

## 2018-08-16 PROCEDURE — 99213 OFFICE O/P EST LOW 20 MIN: CPT | Performed by: PHYSICIAN ASSISTANT

## 2018-08-16 PROCEDURE — 87186 SC STD MICRODIL/AGAR DIL: CPT | Performed by: PHYSICIAN ASSISTANT

## 2018-08-16 PROCEDURE — 81001 URINALYSIS AUTO W/SCOPE: CPT | Performed by: PHYSICIAN ASSISTANT

## 2018-08-16 PROCEDURE — 87088 URINE BACTERIA CULTURE: CPT | Performed by: PHYSICIAN ASSISTANT

## 2018-08-16 PROCEDURE — 87086 URINE CULTURE/COLONY COUNT: CPT | Performed by: PHYSICIAN ASSISTANT

## 2018-08-16 RX ORDER — SULFAMETHOXAZOLE/TRIMETHOPRIM 800-160 MG
1 TABLET ORAL 2 TIMES DAILY
Qty: 6 TABLET | Refills: 0 | Status: SHIPPED | OUTPATIENT
Start: 2018-08-16 | End: 2018-08-19

## 2018-08-16 ASSESSMENT — PATIENT HEALTH QUESTIONNAIRE - PHQ9: 5. POOR APPETITE OR OVEREATING: SEVERAL DAYS

## 2018-08-16 ASSESSMENT — ANXIETY QUESTIONNAIRES
3. WORRYING TOO MUCH ABOUT DIFFERENT THINGS: SEVERAL DAYS
2. NOT BEING ABLE TO STOP OR CONTROL WORRYING: NOT AT ALL
GAD7 TOTAL SCORE: 6
5. BEING SO RESTLESS THAT IT IS HARD TO SIT STILL: SEVERAL DAYS
1. FEELING NERVOUS, ANXIOUS, OR ON EDGE: MORE THAN HALF THE DAYS
6. BECOMING EASILY ANNOYED OR IRRITABLE: SEVERAL DAYS
7. FEELING AFRAID AS IF SOMETHING AWFUL MIGHT HAPPEN: NOT AT ALL

## 2018-08-16 NOTE — MR AVS SNAPSHOT
After Visit Summary   8/16/2018    Diana Wilson    MRN: [id number]           Patient Information     Date Of Birth          1980        Visit Information        Provider Department      8/16/2018 8:00 AM Shantelle Forte PA-C [facility]        Today's Diagnoses     Acute cystitis with hematuria    -  1       Follow-ups after your visit        Your next 10 appointments already scheduled     Sep 12, 2018  8:00 AM CDT   LAB with [facility] ([facility])    [address] [address]   [phone]           Please do not eat 10-12 hours before your appointment if you are coming in fasting for labs on lipids, cholesterol, or glucose (sugar). This does not apply to pregnant women. Water, hot tea and black coffee (with nothing added) are okay. Do not drink other fluids, diet soda or chew gum.            Sep 17, 2018  3:00 PM CDT   Return Visit with Maribel Jin MD   [facility] ([facility])    [facility]  [address]  [place] [address]   [phone]            Oct 02, 2018  8:00 AM CDT   PHYSICAL with Yenifer Ridley MD   [facility] ([facility])    [address] [address]   [phone]              Who to contact     Normal or non-critical lab and imaging results will be communicated to you by MyChart, letter or phone within 4 business days after the clinic has received the results. If you do not hear from us within 7 days, please contact the clinic through MyChart or phone. If you have a critical or abnormal lab result, we will notify you by phone as soon as possible.  Submit refill requests through [email or web address] or call your pharmacy and they will forward the refill request to us. Please allow 3 business days for your refill to be completed.          If you need to speak with a  for additional information , please  call: [phone]             Additional Information About Your Visit        [employer] Information     [employer] gives you secure access to your electronic health record. If you see a primary care provider, you can also send messages to your care team and make appointments. If you have questions, please call your primary care clinic.  If you do not have a primary care provider, please call [phone] and they will assist you.        Care EveryWhere ID     This is your Care EveryWhere ID. This could be used by other organizations to access your [place] medical records  [id number]        Your Vitals Were     Pulse Temperature Respirations Last Period BMI (Body Mass Index)       80 97.9  F (36.6  C) (Tympanic) 16 08/05/2016 (Exact Date) 33.25 kg/m2        Blood Pressure from Last 3 Encounters:   08/16/18 104/64   06/14/18 120/56   03/16/18 (!) 131/99    Weight from Last 3 Encounters:   08/16/18 195 lb 3.2 oz (88.5 kg)   06/14/18 203 lb (92.1 kg)   03/16/18 220 lb 3.2 oz (99.9 kg)              We Performed the Following     *UA reflex to Microscopic and Culture ([place] [facility] (except [place] and [place])     Urine Culture Aerobic Bacterial     Urine Microscopic          Today's Medication Changes          These changes are accurate as of 8/16/18  9:59 AM.  If you have any questions, ask your nurse or doctor.               Start taking these medicines.        Dose/Directions    sulfamethoxazole-trimethoprim 800-160 MG per tablet   Commonly known as:  BACTRIM DS/SEPTRA DS   Used for:  Acute cystitis with hematuria   Started by:  Shantelle Forte PA-C        Dose:  1 tablet   Take 1 tablet by mouth 2 times daily for 3 days   Quantity:  6 tablet   Refills:  0            Where to get your medicines      These medications were sent to [facility] - [address]  [address] [address]     Phone:  [phone]     sulfamethoxazole-trimethoprim 800-160  MG per tablet                Primary Care Provider Office Phone # Fax #    Yenifer Ridley [phone] [address] [address]        Equal Access to Services     FIDELSCOTT NAJERAADARSH : Hadii gabriela ku monseo Lina, waaxda luqadaha, qaybta kaalmada isela, javier maurergareth yuliana. So [facility] [phone].    ATENCIÓN: Si habla español, tiene a montelongo disposición servicios gratuitos de asistencia lingüística. Llame al [phone].    We comply with applicable federal civil rights laws and [place] laws. We do not discriminate on the basis of race, color, national origin, age, disability, sex, sexual orientation, or gender identity.            Thank you!     Thank you for choosing [facility]  for your care. Our goal is always to provide you with excellent care. Hearing back from our patients is one way we can continue to improve our services. Please take a few minutes to complete the written survey that you may receive in the mail after your visit with us. Thank you!             Your Updated Medication List - Protect others around you: Learn how to safely use, store and throw away your medicines at www.disposemymeds.org.          This list is accurate as of 8/16/18  9:59 AM.  Always use your most recent med list.                   Brand Name Dispense Instructions for use Diagnosis    acetaminophen 325 MG tablet    TYLENOL    100 tablet    Take 2 tablets (650 mg) by mouth every 4 hours as needed for other (mild pain)    S/P laparoscopy       * albuterol (2.5 MG/3ML) 0.083% neb solution           * albuterol 108 (90 Base) MCG/ACT inhaler    PROAIR HFA/PROVENTIL HFA/VENTOLIN HFA    1 Inhaler    Inhale 2 puffs into the lungs every 6 hours as needed for shortness of breath / dyspnea    Mild persistent asthma with exacerbation       fluticasone 50 MCG/ACT spray    FLONASE    3 Bottle    Spray 1-2 sprays into both nostrils daily    Environmental allergies        gabapentin 300 MG capsule    NEURONTIN    90 capsule    Take 1 tablet (300 mg) by mouth three times daily.    Neuropathy       ibuprofen 600 MG tablet    ADVIL/MOTRIN    30 tablet    Take 1 tablet (600 mg) by mouth every 6 hours as needed for pain (mild)    S/P laparoscopy       LORazepam 0.5 MG tablet    ATIVAN    20 tablet    Take 1 tablet (0.5 mg) by mouth every 8 hours as needed for anxiety    Anxiety       montelukast 10 MG tablet    SINGULAIR    90 tablet    TAKE ONE TABLET BY MOUTH AT BEDTIME    Environmental allergies       MULTIVITAMIN PO           order for DME     1 each    Equipment being ordered: x2 20/30mmHg UE compression sleeves    Secondary lymphedema       PEPCID PO      Take by mouth daily        scopolamine 72 hr patch    TRANSDERM    1 patch    Place 1 patch onto the skin every 72 hours.  Apply to hairless area behind one ear at least 4 hours before travel.  Remove old patch and change every 3 days.    Preop general physical exam, Nausea, Invasive ductal carcinoma of breast, right (H), Family history of malignant neoplasm of breast, Malignant neoplasm of upper-outer quadrant of right breast in female, estrogen receptor positive (H)       sulfamethoxazole-trimethoprim 800-160 MG per tablet    BACTRIM DS/SEPTRA DS    6 tablet    Take 1 tablet by mouth 2 times daily for 3 days    Acute cystitis with hematuria       tamoxifen 20 MG tablet    NOLVADEX    90 tablet    Take 1 tablet (20 mg) by mouth daily    Malignant neoplasm of upper-outer quadrant of right breast in female, estrogen receptor positive (H)       venlafaxine 37.5 MG tablet    EFFEXOR    90 tablet    Take 1 tablet (37.5 mg) in the morning and 2 tablets (75 mg) in the evening.    Hot flushes, perimenopausal, Depression, unspecified depression type       ZYRTEC PO           * Notice:  This list has 2 medication(s) that are the same as other medications prescribed for you. Read the directions carefully, and ask your doctor or other care  provider to review them with you.

## 2018-08-17 ASSESSMENT — PATIENT HEALTH QUESTIONNAIRE - PHQ9: SUM OF ALL RESPONSES TO PHQ QUESTIONS 1-9: 1

## 2018-08-17 ASSESSMENT — ASTHMA QUESTIONNAIRES: ACT_TOTALSCORE: 24

## 2018-08-17 ASSESSMENT — ANXIETY QUESTIONNAIRES: GAD7 TOTAL SCORE: 6

## 2018-08-19 LAB
BACTERIA SPEC CULT: ABNORMAL
Lab: ABNORMAL
SPECIMEN SOURCE: ABNORMAL

## 2018-08-28 DIAGNOSIS — F32.A DEPRESSION, UNSPECIFIED DEPRESSION TYPE: ICD-10-CM

## 2018-08-28 DIAGNOSIS — N95.1 HOT FLUSHES, PERIMENOPAUSAL: ICD-10-CM

## 2018-08-28 RX ORDER — VENLAFAXINE 37.5 MG/1
TABLET ORAL
Qty: 90 TABLET | Refills: 1 | Status: SHIPPED | OUTPATIENT
Start: 2018-08-28 | End: 2018-09-17

## 2018-08-28 NOTE — PROGRESS NOTES
Fax received from  [facility] requesting a refill of Effexor on behalf of pt.  Last refill: 06.21.18  # 90 with 1 refills at [facility].  Last office visit:  06.14.18  Next office visit:  09.17.18    This is an appropriate refill, and has been e-prescribed. Josette Smalls RN, BSN, OCN

## 2018-09-12 DIAGNOSIS — Z17.0 MALIGNANT NEOPLASM OF UPPER-OUTER QUADRANT OF RIGHT BREAST IN FEMALE, ESTROGEN RECEPTOR POSITIVE (H): ICD-10-CM

## 2018-09-12 DIAGNOSIS — C50.411 MALIGNANT NEOPLASM OF UPPER-OUTER QUADRANT OF RIGHT BREAST IN FEMALE, ESTROGEN RECEPTOR POSITIVE (H): ICD-10-CM

## 2018-09-12 LAB
ALBUMIN SERPL-MCNC: 3.9 G/DL (ref 3.4–5)
ALP SERPL-CCNC: 78 U/L (ref 40–150)
ALT SERPL W P-5'-P-CCNC: 18 U/L (ref 0–50)
ANION GAP SERPL CALCULATED.3IONS-SCNC: 6 MMOL/L (ref 3–14)
AST SERPL W P-5'-P-CCNC: 14 U/L (ref 0–45)
BASOPHILS # BLD AUTO: 0 10E9/L (ref 0–0.2)
BASOPHILS NFR BLD AUTO: 0.5 %
BILIRUB SERPL-MCNC: 0.4 MG/DL (ref 0.2–1.3)
BUN SERPL-MCNC: 10 MG/DL (ref 7–30)
CALCIUM SERPL-MCNC: 8.5 MG/DL (ref 8.5–10.1)
CHLORIDE SERPL-SCNC: 105 MMOL/L (ref 94–109)
CO2 SERPL-SCNC: 27 MMOL/L (ref 20–32)
CREAT SERPL-MCNC: 0.77 MG/DL (ref 0.52–1.04)
DIFFERENTIAL METHOD BLD: NORMAL
EOSINOPHIL # BLD AUTO: 0.1 10E9/L (ref 0–0.7)
EOSINOPHIL NFR BLD AUTO: 1.9 %
ERYTHROCYTE [DISTWIDTH] IN BLOOD BY AUTOMATED COUNT: 13.5 % (ref 10–15)
GFR SERPL CREATININE-BSD FRML MDRD: 84 ML/MIN/1.7M2
GLUCOSE SERPL-MCNC: 93 MG/DL (ref 70–99)
HCT VFR BLD AUTO: 38.3 % (ref 35–47)
HGB BLD-MCNC: 12.9 G/DL (ref 11.7–15.7)
LYMPHOCYTES # BLD AUTO: 1.4 10E9/L (ref 0.8–5.3)
LYMPHOCYTES NFR BLD AUTO: 22.8 %
MCH RBC QN AUTO: 29.7 PG (ref 26.5–33)
MCHC RBC AUTO-ENTMCNC: 33.7 G/DL (ref 31.5–36.5)
MCV RBC AUTO: 88 FL (ref 78–100)
MONOCYTES # BLD AUTO: 0.5 10E9/L (ref 0–1.3)
MONOCYTES NFR BLD AUTO: 7.2 %
NEUTROPHILS # BLD AUTO: 4.2 10E9/L (ref 1.6–8.3)
NEUTROPHILS NFR BLD AUTO: 67.6 %
PLATELET # BLD AUTO: 252 10E9/L (ref 150–450)
POTASSIUM SERPL-SCNC: 4.1 MMOL/L (ref 3.4–5.3)
PROT SERPL-MCNC: 7.3 G/DL (ref 6.8–8.8)
RBC # BLD AUTO: 4.34 10E12/L (ref 3.8–5.2)
SODIUM SERPL-SCNC: 138 MMOL/L (ref 133–144)
WBC # BLD AUTO: 6.2 10E9/L (ref 4–11)

## 2018-09-12 PROCEDURE — 36415 COLL VENOUS BLD VENIPUNCTURE: CPT | Performed by: INTERNAL MEDICINE

## 2018-09-12 PROCEDURE — 85025 COMPLETE CBC W/AUTO DIFF WBC: CPT | Performed by: INTERNAL MEDICINE

## 2018-09-12 PROCEDURE — 80053 COMPREHEN METABOLIC PANEL: CPT | Performed by: INTERNAL MEDICINE

## 2018-09-12 PROCEDURE — 86300 IMMUNOASSAY TUMOR CA 15-3: CPT | Performed by: INTERNAL MEDICINE

## 2018-09-13 LAB — CANCER AG27-29 SERPL-ACNC: 19 U/ML (ref 0–39)

## 2018-09-17 ENCOUNTER — ONCOLOGY VISIT (OUTPATIENT)
Dept: ONCOLOGY | Facility: CLINIC | Age: 38
End: 2018-09-17
Attending: INTERNAL MEDICINE
Payer: COMMERCIAL

## 2018-09-17 VITALS
RESPIRATION RATE: 18 BRPM | HEIGHT: 64 IN | WEIGHT: 195.9 LBS | BODY MASS INDEX: 33.44 KG/M2 | HEART RATE: 69 BPM | SYSTOLIC BLOOD PRESSURE: 128 MMHG | OXYGEN SATURATION: 96 % | DIASTOLIC BLOOD PRESSURE: 77 MMHG | TEMPERATURE: 97.6 F

## 2018-09-17 DIAGNOSIS — G62.9 NEUROPATHY: Primary | ICD-10-CM

## 2018-09-17 DIAGNOSIS — N95.1 HOT FLUSHES, PERIMENOPAUSAL: ICD-10-CM

## 2018-09-17 DIAGNOSIS — C50.411 MALIGNANT NEOPLASM OF UPPER-OUTER QUADRANT OF RIGHT BREAST IN FEMALE, ESTROGEN RECEPTOR POSITIVE (H): ICD-10-CM

## 2018-09-17 DIAGNOSIS — F32.A DEPRESSION, UNSPECIFIED DEPRESSION TYPE: ICD-10-CM

## 2018-09-17 DIAGNOSIS — Z17.0 MALIGNANT NEOPLASM OF UPPER-OUTER QUADRANT OF RIGHT BREAST IN FEMALE, ESTROGEN RECEPTOR POSITIVE (H): ICD-10-CM

## 2018-09-17 PROCEDURE — 99214 OFFICE O/P EST MOD 30 MIN: CPT | Performed by: INTERNAL MEDICINE

## 2018-09-17 PROCEDURE — G0463 HOSPITAL OUTPT CLINIC VISIT: HCPCS

## 2018-09-17 RX ORDER — VENLAFAXINE 37.5 MG/1
TABLET ORAL
Qty: 90 TABLET | Refills: 1 | Status: SHIPPED | OUTPATIENT
Start: 2018-09-17 | End: 2018-10-05

## 2018-09-17 RX ORDER — TAMOXIFEN CITRATE 20 MG/1
20 TABLET ORAL DAILY
Qty: 90 TABLET | Refills: 1 | Status: SHIPPED | OUTPATIENT
Start: 2018-09-17 | End: 2019-03-11

## 2018-09-17 ASSESSMENT — PAIN SCALES - GENERAL: PAINLEVEL: NO PAIN (0)

## 2018-09-17 NOTE — LETTER
9/17/2018         RE: Diana Wilson  [address] [place] [id number]        Dear Colleague,    Thank you for referring your patient, Diana Wilson, to the [facility]. Please see a copy of my visit note below.    ONCOLOGY Follow up visit     COMPLAINT AND REASON FOR VISIT:    12/2016 diagnosed right breast cancer LN positive disease        HPI  She presented at age 36 breast lump in her right armpit and right breast in 10/2016.   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  ER/TN 99% positive, HER-2/kelle negative, associated with high-grade DCIS.    Right axillary ultrasound-guided biopsy indicating metastatic carcinoma positive for spread from breast primary, ER/TN both negative.  HER-2/kelle is negative.      She had clinical T2N1 disease. She made informed decision to proceed with neoadjuvant DDAC C1D1 1/16/2017. She has good clinical response post DD AC x4. Then went on to have wkly taxol + carbo  with informed decision in March.   She has carbo hypersensitivity reaction in early May (during C3). It was d/c after.   She finished chemo end of May 2017.     She is tested negative for BRCA1/2 with BreastNext.     7/2017 she had double mastectomies at [facility]. Right side found no residual invasive carcinoma, +grade III DCIS, 1mm in greatest dimension. 5 sLN all negative. She had ypTis(DCIS)pN0 disease. Left breast no cancer.     She finished post mastectomy RT in 11/2017.    She had BSO 11/2017. Then started Arimidex in 1/2018. She quit in Feb 2018 due to side effects and willing to try tamoxifen in 3/2018.     She is also on ELL trial for weight loss.     PAST MEDICAL HISTORY:  Restless legs, mild intermittent asthma, celiac disease diagnosed 02/2016, hx of TIFFANY     SOCIAL HISTORY:  Works part-time.  Lives with her .  They have 2 kids, first pregnancy age 21.  She did not do breast feeding because of  "bilateral breast reduction.  She does office work.      FAMILY HISTORY:  \"Full of cancer\" from the mother's side including colon, lung, pancreatic, gastric cancer.  According to the patient none of them survive older than 60 years old.      REVIEW OF SYSTEMS  She is very upbeat, + hot flushes. Calmer.   She still has neuropathy on finger.      PHYSICAL EXAMINATION:   Blood pressure 128/77, pulse 69, temperature 97.6  F (36.4  C), temperature source Tympanic, resp. rate 18, height 1.626 m (5' 4\"), weight 88.9 kg (195 lb 14.4 oz), last menstrual period 08/05/2016, SpO2 96 %, not currently breastfeeding.    ECOG 0    GENERAL APPEARANCE:  He young woman, looks like her stated age, very upbeat, not in acute distress.   HEENT: The patient is normocephalic, atraumatic. Pupils are equally reactive to light.  Sclerae are anicteric. erythematous oral mucosa.  - pharynx.  No oral thrush.   NECK:  Supple.  No jugular venous distention.  Thyroid is not palpable.   LYMPH NODES:  Superficial lymphadenopathy is not appreciable in the bilateral cervical, supraclavicular, axillary or inguinal areas.   CARDIOVASCULAR:  S1, S2 regular with no murmurs or gallops.  No carotid or abdominal bruits.   PULMONARY:  Lungs are clear to auscultation and percussion bilaterally.  There is no wheezing or rhonchi.   GASTROINTESTINAL:  Abdomen is soft, nontender.  No hepatosplenomegaly.  No signs of ascites.  No mass appreciable.   MUSCULOSKELETAL/EXTREMITIES:  No edema.  No cyanotic changes.  No signs of joint deformity.  No lymphedema.   NEUROLOGIC:  Cranial nerves II-XII are grossly intact.  Sensation intact.  Muscle strength and muscle tone symmetrical, 5/5 throughout.   BACK:  No spinal or paraspinal tenderness.  No CVA tenderness.   SKIN:  No petechiae.  No rash.  No signs of cellulitis.   BREASTS:  Bilateral breast exam reviewed.  Bilateral mastectomies scars well healed. She is partially expanded has marked erythematous changes on right side " from RT.      LABORATORY DATA REVIEWED  CBC DIFF/CMP/marker are good.       Current Image data reviewed  CT c/a/p 6/2018 - negative. A 1.2 cm cyst or nodule in the left lobe of thyroid gland is Unchanged. Abdomen: A 2 mm nonobstructing calculus in the left kidney is again  seen.      Old data reviewed with summary  7/2017 double mastectomies at [facility]. Right side: no residual invasive carcinoma, +grade III DCIS, 1mm in greatest dimension. 5 sLN all negative. She had ypTis(DCIS)pN0 disease. Left breast no cancer.       CT body 11/2017: no mets.     Breast MRI at [facility] post DD AC 3/2017:   1. Improvement in biopsy-proven RIGHT-sided breast cancer at 10 o'clock. The malignancy has decreased in size (2.4 cm versus 3.3 cm previously)and demonstrates more favorable enhancement kinetics.     2.  No residual enlarged RIGHT axillary lymph nodes.    1/2017 baseline Echo LV function nl, EF 60-65%     PET 1/2017  1. Hypermetabolic mass in the right breast consistent with known malignancy.  2. Two enlarged hypermetabolic metastatic lymph nodes in the right axilla.  3. No additional worrisome hypermetabolic areas in the neck, chest, abdomen, or pelvis.      DEXA scan from 03/2016 identified mild osteopenia lumbar spine        ASSESSMENT AND PLAN:    1.   12/2016  dx breast cancer, cT2N1 disesae.      S/p DD AC and wkly taxol, carbo was used partially dropped due to hypersensitivity reaction.   She had pCR for invasive cancer with mastectomies in 7/2017.   S/p RT till 11/2017.     She had BSO 11/2017. She did not tolerate Arimdiex off it after 1 month.   She started tamoxifen in 3/2018.     She demands to have CT scan or PET with her follow up visit.   I explained to her the ASCO guideline in terms how to follow up breast cancer.     2.  hot flushes - advice Effexor, this has helped a lot.     3. Overweight - weight loss is meaningful for her in terms of rate of recurrence. She is interested in and enrolled in [employer] program.     4.  Neuropathy on fingers -advice gabapentin.     Again, thank you for allowing me to participate in the care of your patient.        Sincerely,        Maribel Jin MD, MD

## 2018-09-17 NOTE — MR AVS SNAPSHOT
After Visit Summary   9/17/2018    Diana Wilson    MRN: [id number]           Patient Information     Date Of Birth          1980        Visit Information        Provider Department      9/17/2018 3:00 PM Maribel Jin MD [facility]        Today's Diagnoses     Neuropathy    -  1    Malignant neoplasm of upper-outer quadrant of right breast in female, estrogen receptor positive (H)        Hot flushes, perimenopausal        Depression, unspecified depression type          Care Instructions        We would like to see you back in December for a follow up appointment with labs and PET scan prior.     A prescription has been sent to [facility], [place] - [address]  When you are in need of a refill, please call your pharmacy and they will send us a request.      Copy of appointments, and after visit summary (AVS) given to patient.      If you have any questions please call Josette Smalls RN, BSN Oncology Hematology  [facility] [phone]. For questions after business hours, or on holidays/weekends, please call our after hours Nurse Triage line [phone]. Thank you.             12/2018 f/u with labs and PET.           Follow-ups after your visit        Your next 10 appointments already scheduled     Oct 02, 2018  8:00 AM CDT   [facility]YS[facility] with [facility]ifer [facility]ley MD   [facility]    [address] [facility] [address] [phone]            Dec 12, 2018  9:10 AM CST   LAB with [facility] ([facility])    [address] [facility] [id number] [phone]           Please do not eat 10-12 hours before your appointment if you are coming in fasting for labs on lipids, cholesterol, or glucose (sugar). This does not apply to pregnant women. Water, hot tea and black coffee (with nothing added) are okay. Do not drink other fluids, diet  soda or chew gum.            Dec 12, 2018 10:00 AM CST   (Arrive by 9:30 AM)   PE NPET ONCOLOGY (EYES TO THIGHS) with [id number]   [facility] Pet CT ([facility])    [address]  [facility] [address]   [phone]           How do I prepare for my exam? (Food and drink instructions) 6 hours before your scan, stop all food and liquids (except water). Do not chew gum or suck on mints. If you need to take medicine with food, you may take it with a few crackers.  How do I prepare for my exam? (Other instructions) 24 hours before your scan, don t do any heavy exercise. (No jogging, aerobics or other workouts.) Exercise will make your pictures less accurate.  What should I wear? Wear loose fitting clothing to your exam.  How long does the exam take?  Most scans will take between 2-3 hours.  What should I bring: Please bring a list of your medicines (including vitamins, minerals and over-the-counter drugs). Leave your valuables at home.  Do I need a :  No  is needed.  What should I do after the exam: No restrictions, You may resume normal activities.  What is this test: Your doctor has ordered a PET scan (positron emission tomography). This will take pictures of the cells and organs in your body. Your doctor may have also ordered a CT scan (computed tomography). This is another way to take pictures of your cells and organs. It is done at the same time as the PET scan. The pictures will help us understand your problem so we can make a treatment plan that fits your needs.  Who should I call with questions: Please call your Imaging Department at your exam site with any questions. Directions, parking instructions, and other information is available on our website, [email or web address].            Dec 13, 2018  1:45 PM CST   Return Visit with Maribel Jin MD   [facility] ([facility])    [facility]  [address] [facility] [address]  [facility]  "[address]   [phone]              Future tests that were ordered for you today     Open Future Orders        Priority Expected Expires Ordered    PET Oncology (Eyes to Thighs) Routine 12/5/2018 9/17/2019 9/17/2018    Ca27.29  breast tumor marker Routine 12/1/2018 12/31/2018 9/17/2018    CBC with platelets differential Routine 12/1/2018 12/31/2018 9/17/2018    Comprehensive metabolic panel Routine 12/1/2018 12/31/2018 9/17/2018            Who to contact     If you have questions or need follow up information about today's clinic visit or your schedule please contact [facility] directly at [phone].  Normal or non-critical lab and imaging results will be communicated to you by [email or web address], letter or phone within 4 business days after the clinic has received the results. If you do not hear from us within 7 days, please contact the clinic through [employer] or phone. If you have a critical or abnormal lab result, we will notify you by phone as soon as possible.  Submit refill requests through [employer] or call your pharmacy and they will forward the refill request to us. Please allow 3 business days for your refill to be completed.          Additional Information About Your Visit        [email or web address] Information     [employer] gives you secure access to your electronic health record. If you see a primary care provider, you can also send messages to your care team and make appointments. If you have questions, please call your primary care clinic.  If you do not have a primary care provider, please call [phone] and they will assist you.        Care EveryWhere ID     This is your Care EveryWhere ID. This could be used by other organizations to access your [facility] medical records  [id number]        Your Vitals Were     Pulse Temperature Respirations Height Last Period Pulse Oximetry    69 97.6  F (36.4  C) (Tympanic) 18 1.626 m (5' 4\") 08/05/2016 (Exact Date) 96%    Breastfeeding? BMI (Body Mass Index)             "    No 33.63 kg/m2           Blood Pressure from Last 3 Encounters:   09/17/18 128/77   08/16/18 104/64   06/14/18 120/56    Weight from Last 3 Encounters:   09/17/18 88.9 kg (195 lb 14.4 oz)   08/16/18 88.5 kg (195 lb 3.2 oz)   06/14/18 92.1 kg (203 lb)                 Where to get your medicines      These medications were sent to [facility] [place] - [address]  [address] [place] [address]     Phone:  [phone]     tamoxifen 20 MG tablet    venlafaxine 37.5 MG tablet          Primary Care Provider Office Phone # Fax #    Yenifer Ridley [phone] [address]  [facility] [address]        Equal Access to Services     [facility]: Hadii gabriela griffin hadasho Soomaali, waaxda luqadaha, qaybta kaalmada aderosayada, javier valentine . So [facility] [phone].    ATENCIÓN: Si habla español, tiene a montelongo disposición servicios gratuitos de asistencia lingüística. [facility] [phone].    We comply with applicable federal civil rights laws and [place] laws. We do not discriminate on the basis of race, color, national origin, age, disability, sex, sexual orientation, or gender identity.            Thank you!     Thank you for choosing [facility]  for your care. Our goal is always to provide you with excellent care. Hearing back from our patients is one way we can continue to improve our services. Please take a few minutes to complete the written survey that you may receive in the mail after your visit with us. Thank you!             Your Updated Medication List - Protect others around you: Learn how to safely use, store and throw away your medicines at www.disposemymeds.org.          This list is accurate as of 9/17/18  3:56 PM.  Always use your most recent med list.                   Brand Name Dispense Instructions for use Diagnosis    acetaminophen 325 MG tablet    TYLENOL    100 tablet    Take 2 tablets (650 mg) by mouth  every 4 hours as needed for other (mild pain)    S/P laparoscopy       * albuterol (2.5 MG/3ML) 0.083% neb solution           * albuterol 108 (90 Base) MCG/ACT inhaler    PROAIR HFA/PROVENTIL HFA/VENTOLIN HFA    1 Inhaler    Inhale 2 puffs into the lungs every 6 hours as needed for shortness of breath / dyspnea    Mild persistent asthma with exacerbation       fluticasone 50 MCG/ACT spray    FLONASE    3 Bottle    Spray 1-2 sprays into both nostrils daily    Environmental allergies       gabapentin 300 MG capsule    NEURONTIN    90 capsule    Take 1 tablet (300 mg) by mouth three times daily.    Neuropathy       ibuprofen 600 MG tablet    ADVIL/MOTRIN    30 tablet    Take 1 tablet (600 mg) by mouth every 6 hours as needed for pain (mild)    S/P laparoscopy       LORazepam 0.5 MG tablet    ATIVAN    20 tablet    Take 1 tablet (0.5 mg) by mouth every 8 hours as needed for anxiety    Anxiety       montelukast 10 MG tablet    SINGULAIR    90 tablet    TAKE ONE TABLET BY MOUTH AT BEDTIME    Environmental allergies       MULTIVITAMIN PO           order for DME     1 each    Equipment being ordered: x2 20/30mmHg UE compression sleeves    Secondary lymphedema       PEPCID PO      Take by mouth daily        scopolamine 72 hr patch    TRANSDERM    1 patch    Place 1 patch onto the skin every 72 hours.  Apply to hairless area behind one ear at least 4 hours before travel.  Remove old patch and change every 3 days.    Preop general physical exam, Nausea, Invasive ductal carcinoma of breast, right (H), Family history of malignant neoplasm of breast, Malignant neoplasm of upper-outer quadrant of right breast in female, estrogen receptor positive (H)       tamoxifen 20 MG tablet    NOLVADEX    90 tablet    Take 1 tablet (20 mg) by mouth daily    Malignant neoplasm of upper-outer quadrant of right breast in female, estrogen receptor positive (H)       venlafaxine 37.5 MG tablet    EFFEXOR    90 tablet    Take 1 tablet (37.5 mg) in  the morning and 2 tablets (75 mg) in the evening.    Hot flushes, perimenopausal, Depression, unspecified depression type       ZYRTEC PO           * Notice:  This list has 2 medication(s) that are the same as other medications prescribed for you. Read the directions carefully, and ask your doctor or other care provider to review them with you.

## 2018-09-17 NOTE — PROGRESS NOTES
"ONCOLOGY Follow up visit     COMPLAINT AND REASON FOR VISIT:    12/2016 diagnosed right breast cancer LN positive disease        HPI  She presented at age 36 breast lump in her right armpit and right breast in 10/2016.   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  ER/GA 99% positive, HER-2/kelle negative, associated with high-grade DCIS.    Right axillary ultrasound-guided biopsy indicating metastatic carcinoma positive for spread from breast primary, ER/GA both negative.  HER-2/kelle is negative.      She had clinical T2N1 disease. She made informed decision to proceed with neoadjuvant DDAC C1D1 1/16/2017. She has good clinical response post DD AC x4. Then went on to have wkly taxol + carbo  with informed decision in March.   She has carbo hypersensitivity reaction in early May (during C3). It was d/c after.   She finished chemo end of May 2017.     She is tested negative for BRCA1/2 with BreastNext.     7/2017 she had double mastectomies at [facility]. Right side found no residual invasive carcinoma, +grade III DCIS, 1mm in greatest dimension. 5 sLN all negative. She had ypTis(DCIS)pN0 disease. Left breast no cancer.     She finished post mastectomy RT in 11/2017.    She had BSO 11/2017. Then started Arimidex in 1/2018. She quit in Feb 2018 due to side effects and willing to try tamoxifen in 3/2018.     She is also on BWELL trial for weight loss.     PAST MEDICAL HISTORY:  Restless legs, mild intermittent asthma, celiac disease diagnosed 02/2016, hx of TIFFANY     SOCIAL HISTORY:  Works part-time.  Lives with her .  They have 2 kids, first pregnancy age 21.  She did not do breast feeding because of bilateral breast reduction.  She does office work.      FAMILY HISTORY:  \"Full of cancer\" from the mother's side including colon, lung, pancreatic, gastric cancer.  According to the patient none of them survive older than 60 years old.    " "  REVIEW OF SYSTEMS  She is very upbeat, + hot flushes. Calmer.   She still has neuropathy on finger.      PHYSICAL EXAMINATION:   Blood pressure 128/77, pulse 69, temperature 97.6  F (36.4  C), temperature source Tympanic, resp. rate 18, height 1.626 m (5' 4\"), weight 88.9 kg (195 lb 14.4 oz), last menstrual period 08/05/2016, SpO2 96 %, not currently breastfeeding.    ECOG 0    GENERAL APPEARANCE:  He young woman, looks like her stated age, very upbeat, not in acute distress.   HEENT: The patient is normocephalic, atraumatic. Pupils are equally reactive to light.  Sclerae are anicteric. erythematous oral mucosa.  - pharynx.  No oral thrush.   NECK:  Supple.  No jugular venous distention.  Thyroid is not palpable.   LYMPH NODES:  Superficial lymphadenopathy is not appreciable in the bilateral cervical, supraclavicular, axillary or inguinal areas.   CARDIOVASCULAR:  S1, S2 regular with no murmurs or gallops.  No carotid or abdominal bruits.   PULMONARY:  Lungs are clear to auscultation and percussion bilaterally.  There is no wheezing or rhonchi.   GASTROINTESTINAL:  Abdomen is soft, nontender.  No hepatosplenomegaly.  No signs of ascites.  No mass appreciable.   MUSCULOSKELETAL/EXTREMITIES:  No edema.  No cyanotic changes.  No signs of joint deformity.  No lymphedema.   NEUROLOGIC:  Cranial nerves II-XII are grossly intact.  Sensation intact.  Muscle strength and muscle tone symmetrical, 5/5 throughout.   BACK:  No spinal or paraspinal tenderness.  No CVA tenderness.   SKIN:  No petechiae.  No rash.  No signs of cellulitis.   BREASTS:  Bilateral breast exam reviewed.  Bilateral mastectomies scars well healed. She is partially expanded has marked erythematous changes on right side from RT.      LABORATORY DATA REVIEWED  CBC DIFF/CMP/marker are good.       Current Image data reviewed  CT c/a/p 6/2018 - negative. A 1.2 cm cyst or nodule in the left lobe of thyroid gland is Unchanged. Abdomen: A 2 mm nonobstructing " calculus in the left kidney is again  seen.      Old data reviewed with summary  7/2017 double mastectomies at [facility]. Right side: no residual invasive carcinoma, +grade III DCIS, 1mm in greatest dimension. 5 sLN all negative. She had ypTis(DCIS)pN0 disease. Left breast no cancer.       CT body 11/2017: no mets.     Breast MRI at [facility] post DD AC 3/2017:   1. Improvement in biopsy-proven RIGHT-sided breast cancer at 10 o'clock. The malignancy has decreased in size (2.4 cm versus 3.3 cm previously)and demonstrates more favorable enhancement kinetics.     2.  No residual enlarged RIGHT axillary lymph nodes.    1/2017 baseline Echo LV function nl, EF 60-65%     PET 1/2017  1. Hypermetabolic mass in the right breast consistent with known malignancy.  2. Two enlarged hypermetabolic metastatic lymph nodes in the right axilla.  3. No additional worrisome hypermetabolic areas in the neck, chest, abdomen, or pelvis.      DEXA scan from 03/2016 identified mild osteopenia lumbar spine        ASSESSMENT AND PLAN:    1.   12/2016  dx breast cancer, cT2N1 disesae.      S/p DD AC and wkly taxol, carbo was used partially dropped due to hypersensitivity reaction.   She had pCR for invasive cancer with mastectomies in 7/2017.   S/p RT till 11/2017.     She had BSO 11/2017. She did not tolerate Arimdiex off it after 1 month.   She started tamoxifen in 3/2018.     She demands to have CT scan or PET with her follow up visit.   I explained to her the ASCO guideline in terms how to follow up breast cancer.     2.  hot flushes - advice Effexor, this has helped a lot.     3. Overweight - weight loss is meaningful for her in terms of rate of recurrence. She is interested in and enrolled in BWEL program.     4. Neuropathy on fingers -advice gabapentin.

## 2018-09-17 NOTE — PATIENT INSTRUCTIONS
We would like to see you back in December for a follow up appointment with labs and PET scan prior.     A prescription has been sent to [facility] [place], [place] - [address]  When you are in need of a refill, please call your pharmacy and they will send us a request.      Copy of appointments, and after visit summary (AVS) given to patient.      If you have any questions please call Josette Smalls RN, BSN Oncology Hematology  [facility] [phone]. For questions after business hours, or on holidays/weekends, please call our after hours Nurse Triage line [phone]. Thank you.             12/2018 f/u with labs and PET.

## 2018-09-17 NOTE — NURSING NOTE
"Oncology Rooming Note    September 17, 2018 3:21 PM   Diana Wilson is a 38 year old female who presents for:    No chief complaint on file.    Initial Vitals: /77 (BP Location: Right arm, Patient Position: Sitting, Cuff Size: Adult Large)  Pulse 69  Temp 97.6  F (36.4  C) (Tympanic)  Resp 18  Ht 1.626 m (5' 4\")  Wt 88.9 kg (195 lb 14.4 oz)  LMP 08/05/2016 (Exact Date)  SpO2 96%  Breastfeeding? No  BMI 33.63 kg/m2 Estimated body mass index is 33.63 kg/(m^2) as calculated from the following:    Height as of this encounter: 1.626 m (5' 4\").    Weight as of this encounter: 88.9 kg (195 lb 14.4 oz). Body surface area is 2 meters squared.  No Pain (0) Comment: Data Unavailable   Patient's last menstrual period was 08/05/2016 (exact date).  Allergies reviewed: Yes  Medications reviewed: Yes    Medications: Medication refills not needed today.  Pharmacy name entered into [facility]: [facility]  [place] - [id number]    Clinical concerns: 3  month recheck Breast CA, review Labs     7  minutes for nursing intake (face to face time)     Sondra Medina CMA                  "

## 2018-10-05 ENCOUNTER — OFFICE VISIT (OUTPATIENT)
Dept: FAMILY MEDICINE | Facility: CLINIC | Age: 38
End: 2018-10-05
Payer: COMMERCIAL

## 2018-10-05 VITALS
WEIGHT: 192.8 LBS | TEMPERATURE: 97.7 F | SYSTOLIC BLOOD PRESSURE: 123 MMHG | HEART RATE: 104 BPM | DIASTOLIC BLOOD PRESSURE: 80 MMHG | HEIGHT: 64 IN | BODY MASS INDEX: 32.91 KG/M2

## 2018-10-05 DIAGNOSIS — C50.411 MALIGNANT NEOPLASM OF UPPER-OUTER QUADRANT OF RIGHT BREAST IN FEMALE, ESTROGEN RECEPTOR POSITIVE (H): ICD-10-CM

## 2018-10-05 DIAGNOSIS — J45.20 MILD INTERMITTENT ASTHMA WITHOUT COMPLICATION: ICD-10-CM

## 2018-10-05 DIAGNOSIS — Z13.220 SCREENING FOR LIPID DISORDERS: ICD-10-CM

## 2018-10-05 DIAGNOSIS — Z00.01 ENCOUNTER FOR ROUTINE ADULT MEDICAL EXAM WITH ABNORMAL FINDINGS: Primary | ICD-10-CM

## 2018-10-05 DIAGNOSIS — C50.911 INVASIVE DUCTAL CARCINOMA OF BREAST, RIGHT (H): ICD-10-CM

## 2018-10-05 DIAGNOSIS — F33.0 MAJOR DEPRESSIVE DISORDER, RECURRENT EPISODE, MILD (H): ICD-10-CM

## 2018-10-05 DIAGNOSIS — Z17.0 MALIGNANT NEOPLASM OF UPPER-OUTER QUADRANT OF RIGHT BREAST IN FEMALE, ESTROGEN RECEPTOR POSITIVE (H): ICD-10-CM

## 2018-10-05 PROCEDURE — 99395 PREV VISIT EST AGE 18-39: CPT | Mod: 25 | Performed by: FAMILY MEDICINE

## 2018-10-05 PROCEDURE — 90714 TD VACC NO PRESV 7 YRS+ IM: CPT | Performed by: FAMILY MEDICINE

## 2018-10-05 PROCEDURE — 90471 IMMUNIZATION ADMIN: CPT | Performed by: FAMILY MEDICINE

## 2018-10-05 PROCEDURE — 99213 OFFICE O/P EST LOW 20 MIN: CPT | Mod: 25 | Performed by: FAMILY MEDICINE

## 2018-10-05 RX ORDER — VENLAFAXINE 75 MG/1
75 TABLET ORAL 2 TIMES DAILY
Qty: 180 TABLET | Refills: 1 | Status: SHIPPED | OUTPATIENT
Start: 2018-10-05 | End: 2019-04-17

## 2018-10-05 ASSESSMENT — ANXIETY QUESTIONNAIRES
3. WORRYING TOO MUCH ABOUT DIFFERENT THINGS: SEVERAL DAYS
6. BECOMING EASILY ANNOYED OR IRRITABLE: NOT AT ALL
7. FEELING AFRAID AS IF SOMETHING AWFUL MIGHT HAPPEN: MORE THAN HALF THE DAYS
2. NOT BEING ABLE TO STOP OR CONTROL WORRYING: SEVERAL DAYS
1. FEELING NERVOUS, ANXIOUS, OR ON EDGE: SEVERAL DAYS
5. BEING SO RESTLESS THAT IT IS HARD TO SIT STILL: SEVERAL DAYS
GAD7 TOTAL SCORE: 7

## 2018-10-05 ASSESSMENT — PATIENT HEALTH QUESTIONNAIRE - PHQ9: 5. POOR APPETITE OR OVEREATING: SEVERAL DAYS

## 2018-10-05 NOTE — MR AVS SNAPSHOT
After Visit Summary   10/5/2018    Diana Wilson    MRN: [id number]           Patient Information     Date Of Birth          1980        Visit Information        Provider Department      10/5/2018 11:30 AM Yenifer Ridley MD [facility]        Today's Diagnoses     Encounter for routine adult medical exam with abnormal findings    -  1    Major depressive disorder, recurrent episode, mild (H)        Invasive ductal carcinoma of breast, right (H)        Malignant neoplasm of upper-outer quadrant of right breast in female, estrogen receptor positive (H)        Mild intermittent asthma without complication        Screening for lipid disorders          Care Instructions      Preventive Health Recommendations  Female Ages 26 - 39  Yearly exam:   See your health care provider every year in order to    Review health changes.     Discuss preventive care.      Review your medicines if you your doctor has prescribed any.    Until age 30: Get a Pap test every three years (more often if you have had an abnormal result).    After age 30: Talk to your doctor about whether you should have a Pap test every 3 years or have a Pap test with HPV screening every 5 years.   You do not need a Pap test if your uterus was removed (hysterectomy) and you have not had cancer.  You should be tested each year for STDs (sexually transmitted diseases), if you're at risk.   Talk to your provider about how often to have your cholesterol checked.  If you are at risk for diabetes, you should have a diabetes test (fasting glucose).  Shots: Get a flu shot each year. Get a tetanus shot every 10 years.   Nutrition:     Eat at least 5 servings of fruits and vegetables each day.    Eat whole-grain bread, whole-wheat pasta and brown rice instead of white grains and rice.    Get adequate Calcium and Vitamin D.     Lifestyle    Exercise at least 150 minutes a week (30 minutes a day, 5 days of the week). This will help you  control your weight and prevent disease.    Limit alcohol to one drink per day.    No smoking.     Wear sunscreen to prevent skin cancer.    See your dentist every six months for an exam and cleaning.            Follow-ups after your visit        Your next 10 appointments already scheduled     Dec 12, 2018  9:10 AM CST   LAB with [facility] ([facility])    [address] [address]   [phone]           Please do not eat 10-12 hours before your appointment if you are coming in fasting for labs on lipids, cholesterol, or glucose (sugar). This does not apply to pregnant women. Water, hot tea and black coffee (with nothing added) are okay. Do not drink other fluids, diet soda or chew gum.            Dec 12, 2018 10:00 AM CST   (Arrive by 9:30 AM)   PE NPET ONCOLOGY (EYES TO THIGHS) with [id number]   [facility] ([facility])    [address] [address]   [phone]           How do I prepare for my exam? (Food and drink instructions) 6 hours before your scan, stop all food and liquids (except water). Do not chew gum or suck on mints. If you need to take medicine with food, you may take it with a few crackers.  How do I prepare for my exam? (Other instructions) If you have diabetes: Have your exam early in the morning. Your blood glucose will go up as the day goes by. Your glucose level must be 180 or less at the start of the exam. Please take any oral diabetic medication you need to ensure this blood glucose level is below 180, but no insulin 4 hours prior to the exam.  * If you are taking insulin in the morning take with breakfast by 6 am and schedule procedure between 12 and 2:15 pm. * If you are taking insulin at night take nightly dose, fast overnight, schedule procedure before 10 am. * If you take insulin both morning and night take morning dose by 6am and schedule procedure between 12 and 2:15 pm.  24  hours before your scan, don t do any heavy exercise. (No jogging, aerobics or other workouts.) Exercise will make your pictures less accurate.  What should I wear? Wear loose fitting clothing to your exam.  How long does the exam take?  Most scans will take between 2-3 hours.  What should I bring: Please bring a list of your medicines (including vitamins, minerals and over-the-counter drugs). Leave your valuables at home.  Do I need a :  No  is needed.  What should I do after the exam: No restrictions, You may resume normal activities.  What is this test: Your doctor has ordered a PET scan (positron emission tomography). This will take pictures of the cells and organs in your body. Your doctor may have also ordered a CT scan (computed tomography). This is another way to take pictures of your cells and organs. It is done at the same time as the PET scan. The pictures will help us understand your problem so we can make a treatment plan that fits your needs.  Who should I call with questions: Please call your Imaging Department at your exam site with any questions. Directions, parking instructions, and other information is available on our website, [email or web address].            Dec 13, 2018  1:45 PM CST   Return Visit with Maribel Jin MD   [facility] ([facility])    [facility]  [address] [address]   [phone]              Future tests that were ordered for you today     Open Future Orders        Priority Expected Expires Ordered    Lipid panel reflex to direct LDL Fasting Routine 9/5/2019 10/5/2019 10/5/2018            Who to contact     Normal or non-critical lab and imaging results will be communicated to you by MyChart, letter or phone within 4 business days after the clinic has received the results. If you do not hear from us within 7 days, please contact the clinic through MyChart or phone. If you have a critical or  "abnormal lab result, we will notify you by phone as soon as possible.  Submit refill requests through [employer] or call your pharmacy and they will forward the refill request to us. Please allow 3 business days for your refill to be completed.          If you need to speak with a  for additional information , please call: [phone]             Additional Information About Your Visit        [employer] Information     [employer] gives you secure access to your electronic health record. If you see a primary care provider, you can also send messages to your care team and make appointments. If you have questions, please call your primary care clinic.  If you do not have a primary care provider, please call [phone] and they will assist you.        Care EveryWhere ID     This is your Care EveryWhere ID. This could be used by other organizations to access your [facility] medical records  [id number]        Your Vitals Were     Pulse Temperature Height Last Period BMI (Body Mass Index)       104 97.7  F (36.5  C) (Tympanic) 5' 4\" (1.626 m) 08/05/2016 (Exact Date) 33.09 kg/m2        Blood Pressure from Last 3 Encounters:   10/05/18 123/80   09/17/18 128/77   08/16/18 104/64    Weight from Last 3 Encounters:   10/05/18 192 lb 12.8 oz (87.5 kg)   09/17/18 195 lb 14.4 oz (88.9 kg)   08/16/18 195 lb 3.2 oz (88.5 kg)                 Today's Medication Changes          These changes are accurate as of 10/5/18 12:36 PM.  If you have any questions, ask your nurse or doctor.               Start taking these medicines.        Dose/Directions    beclomethasone HFA 40 MCG/ACT inhaler   Commonly known as:  QVAR REDIHALER   Used for:  Mild intermittent asthma without complication   Started by:  Yenifre Ridley MD        Dose:  1 puff   Inhale 1 puff into the lungs 2 times daily   Quantity:  31.8 g   Refills:  1         These medicines have changed or have updated prescriptions.        Dose/Directions    venlafaxine 75 " MG tablet   Commonly known as:  EFFEXOR   This may have changed:    - medication strength  - how much to take  - how to take this  - when to take this  - additional instructions   Used for:  Major depressive disorder, recurrent episode, mild (H)   Changed by:  Yenifer Ridley MD        Dose:  75 mg   Take 1 tablet (75 mg) by mouth 2 times daily   Quantity:  180 tablet   Refills:  1            Where to get your medicines      These medications were sent to [facility] [place], [place] - [address]  [address] [place] [facility] [address]     Phone:  [phone]     beclomethasone HFA 40 MCG/ACT inhaler    venlafaxine 75 MG tablet                Primary Care Provider Office Phone # Fax #    Yenifer Ridley [phone] [address] [facility] [address]        Equal Access to Services     [facility]: Hadii gabriela ku hadasho Soomaali, waaxda luqadaha, qaybta kaalmada adeegyada, javier arnoldin hayaaajay valentine . So [facility] [phone].    ATENCIÓN: Si habla español, tiene a montelongo disposición servicios gratuitos de asistencia lingüística. [facility] [phone].    We comply with applicable federal civil rights laws and [place] laws. We do not discriminate on the basis of race, color, national origin, age, disability, sex, sexual orientation, or gender identity.            Thank you!     Thank you for choosing [facility]  for your care. Our goal is always to provide you with excellent care. Hearing back from our patients is one way we can continue to improve our services. Please take a few minutes to complete the written survey that you may receive in the mail after your visit with us. Thank you!             Your Updated Medication List - Protect others around you: Learn how to safely use, store and throw away your medicines at www.disposemymeds.org.          This list is accurate as of 10/5/18 12:36 PM.  Always use your most recent med list.                    Brand Name Dispense Instructions for use Diagnosis    acetaminophen 325 MG tablet    TYLENOL    100 tablet    Take 2 tablets (650 mg) by mouth every 4 hours as needed for other (mild pain)    S/P laparoscopy       * albuterol (2.5 MG/3ML) 0.083% neb solution           * albuterol 108 (90 Base) MCG/ACT inhaler    PROAIR HFA/PROVENTIL HFA/VENTOLIN HFA    1 Inhaler    Inhale 2 puffs into the lungs every 6 hours as needed for shortness of breath / dyspnea    Mild persistent asthma with exacerbation       beclomethasone HFA 40 MCG/ACT inhaler    QVAR REDIHALER    31.8 g    Inhale 1 puff into the lungs 2 times daily    Mild intermittent asthma without complication       fluticasone 50 MCG/ACT spray    FLONASE    3 Bottle    Spray 1-2 sprays into both nostrils daily    Environmental allergies       gabapentin 300 MG capsule    NEURONTIN    90 capsule    Take 1 tablet (300 mg) by mouth three times daily.    Neuropathy       ibuprofen 600 MG tablet    ADVIL/MOTRIN    30 tablet    Take 1 tablet (600 mg) by mouth every 6 hours as needed for pain (mild)    S/P laparoscopy       LORazepam 0.5 MG tablet    ATIVAN    20 tablet    Take 1 tablet (0.5 mg) by mouth every 8 hours as needed for anxiety    Anxiety       montelukast 10 MG tablet    SINGULAIR    90 tablet    TAKE ONE TABLET BY MOUTH AT BEDTIME    Environmental allergies       MULTIVITAMIN PO           order for DME     1 each    Equipment being ordered: x2 20/30mmHg UE compression sleeves    Secondary lymphedema       PEPCID PO      Take by mouth daily        scopolamine 72 hr patch    TRANSDERM    1 patch    Place 1 patch onto the skin every 72 hours.  Apply to hairless area behind one ear at least 4 hours before travel.  Remove old patch and change every 3 days.    Preop general physical exam, Nausea, Invasive ductal carcinoma of breast, right (H), Family history of malignant neoplasm of breast, Malignant neoplasm of upper-outer quadrant of right breast in  female, estrogen receptor positive (H)       tamoxifen 20 MG tablet    NOLVADEX    90 tablet    Take 1 tablet (20 mg) by mouth daily    Malignant neoplasm of upper-outer quadrant of right breast in female, estrogen receptor positive (H)       venlafaxine 75 MG tablet    EFFEXOR    180 tablet    Take 1 tablet (75 mg) by mouth 2 times daily    Major depressive disorder, recurrent episode, mild (H)       ZYRTEC PO           * Notice:  This list has 2 medication(s) that are the same as other medications prescribed for you. Read the directions carefully, and ask your doctor or other care provider to review them with you.

## 2018-10-05 NOTE — LETTER
[facility]  [address] [address]  [phone]        October 11, 2019    Diana Wilson  [facility] [address] [address]              Dear Diana Wilson    This is to remind you that your fasting lab is due.    You may call our office at [phone] to schedule an appointment.    Please disregard this notice if you have already had your labs drawn or made an appointment.        Sincerely,        Yenifer Ridley MD

## 2018-10-05 NOTE — NURSING NOTE
Screening Questionnaire for Adult Immunization    Are you sick today?   No   Do you have allergies to medications, food, a vaccine component or latex?   No   Have you ever had a serious reaction after receiving a vaccination?   No   Do you have a long-term health problem with heart disease, lung disease, asthma, kidney disease, metabolic disease (e.g. diabetes), anemia, or other blood disorder?   Yes- asthma and anemia    Do you have cancer, leukemia, HIV/AIDS, or any other immune system problem?   No   In the past 3 months, have you taken medications that affect  your immune system, such as prednisone, other steroids, or anticancer drugs; drugs for the treatment of rheumatoid arthritis, Crohn s disease, or psoriasis; or have you had radiation treatments?   Yes   Have you had a seizure, or a brain or other nervous system problem?   No   During the past year, have you received a transfusion of blood or blood     products, or been given immune (gamma) globulin or antiviral drug?   No   For women: Are you pregnant or is there a chance you could become        pregnant during the next month?   No   Have you received any vaccinations in the past 4 weeks?   Yes     Immunization questionnaire was positive for at least one answer.  Notified Dr. Yenifer Ridley MD.        Per orders of Dr. Yenifer Ridley, injection of TD given by Sonia CANO LPN. Patient instructed to remain in clinic for 15 minutes afterwards, and to report any adverse reaction to me immediately.     Prior to injection verified patient identity using patient's name and date of birth.  Due to injection administration, patient instructed to remain in clinic for 15 minutes  afterwards, and to report any adverse reaction to me immediately.         Screening performed by Sonia Villeda on 10/5/2018 at 12:44 PM.

## 2018-10-05 NOTE — PROGRESS NOTES
"   SUBJECTIVE:   CC: Diana Wilson is an 38 year old woman who presents for preventive health visit.     Healthy Habits:    Do you get at least three servings of calcium containing foods daily (dairy, green leafy vegetables, etc.)? yes    Amount of exercise or daily activities, outside of work: 6 day(s) per week - be well study    Problems taking medications regularly No    Medication side effects: No    Have you had an eye exam in the past two years? no    Do you see a dentist twice per year? Once a year     Do you have sleep apnea, excessive snoring or daytime drowsiness?no      Breast cancer - in remission. Feeling pretty good overall  In \"Be Well\" Study - weight loss after breast cancer   She likes it. Is getting regular exercise.     Feeling a little anxious, sometimes a little down.   Taking effexor 37.5mg in am and 75mg in afternoon  No si/hi       Asthma   Has been coughing while exercising and sometimes waking in the night to cough. Occasionally has been wheezing so bad that her  has had to get her nebs.       Today's PHQ-2 Score: 0  PHQ-2 ( 1999 Pfizer) 10/5/2018 1/5/2018   Q1: Little interest or pleasure in doing things 0 0   Q2: Feeling down, depressed or hopeless 0 0   PHQ-2 Score 0 0   Q1: Little interest or pleasure in doing things - -   Q2: Feeling down, depressed or hopeless - -   PHQ-2 Score - -       Abuse: Current or Past(Physical, Sexual or Emotional)- No  Do you feel safe in your environment - Yes    Social History   Substance Use Topics     Smoking status: Former Smoker     Packs/day: 0.50     Years: 10.00     Types: Cigarettes     Quit date: 10/1/2016     Smokeless tobacco: Never Used      Comment: Socially- quit smoking 10/01/2016     Alcohol use 0.0 oz/week      Comment: Occassional     If you drink alcohol do you typically have >3 drinks per day or >7 drinks per week? No                     Reviewed orders with patient.  Reviewed health maintenance and updated orders accordingly - " "Yes  Labs reviewed in EPIC    mammo - s/p double mastectomy and repair.     Pertinent mammograms are reviewed under the imaging tab.  History of abnormal Pap smear:   Last 3 Pap and HPV Results:   PAP / HPV Latest Ref Rng & Units 11/21/2017 7/23/2015 7/17/2012   PAP - NIL NIL NIL   HPV 16 DNA NEG:Negative Negative Negative -   HPV 18 DNA NEG:Negative Negative Negative -   OTHER HR HPV NEG:Negative Negative Negative -     PAP / HPV Latest Ref Rng & Units 11/21/2017 7/23/2015 7/17/2012   PAP - NIL NIL NIL   HPV 16 DNA NEG:Negative Negative Negative -   HPV 18 DNA NEG:Negative Negative Negative -   OTHER HR HPV NEG:Negative Negative Negative -     Reviewed and updated as needed this visit by clinical staff       Reviewed and updated as needed this visit by Provider        ROS:  CONSTITUTIONAL: NEGATIVE for fever, chills, change in weight  INTEGUMENTARU/SKIN: NEGATIVE for worrisome rashes, moles or lesions  EYES: NEGATIVE for vision changes or irritation  ENT: NEGATIVE for ear, mouth and throat problems  RESP: NEGATIVE for significant cough or SOB  BREAST: NEGATIVE for masses, tenderness or discharge  CV: NEGATIVE for chest pain, palpitations or peripheral edema  GI: NEGATIVE for nausea, abdominal pain, heartburn, or change in bowel habits  : NEGATIVE for unusual urinary or vaginal symptoms. Periods are regular.  MUSCULOSKELETAL: NEGATIVE for significant arthralgias or myalgia  NEURO: NEGATIVE for weakness, dizziness or paresthesias  PSYCHIATRIC: some anxiety     OBJECTIVE:   /80 (BP Location: Right arm, Patient Position: Sitting, Cuff Size: Adult Regular)  Pulse 104  Temp 97.7  F (36.5  C) (Tympanic)  Ht 5' 4\" (1.626 m)  Wt 192 lb 12.8 oz (87.5 kg)  LMP 08/05/2016 (Exact Date)  BMI 33.09 kg/m2     EXAM:  GENERAL: healthy, alert and no distress  EYES: Eyes grossly normal to inspection, PERRL and conjunctivae and sclerae normal  HENT: ear canals and TM's normal, nose and mouth without ulcers or " lesions  NECK: no adenopathy, no asymmetry, masses, or scars and thyroid normal to palpation  RESP: lungs clear to auscultation - no rales, rhonchi or wheezes  BREAST: normal without masses, tenderness or nipple discharge and no palpable axillary masses or adenopathy  CV: regular rate and rhythm, normal S1 S2, no S3 or S4, no murmur, click or rub, no peripheral edema and peripheral pulses strong  ABDOMEN: soft, nontender, no hepatosplenomegaly, no masses and bowel sounds normal  MS: no gross musculoskeletal defects noted, no edema  SKIN: no suspicious lesions or rashes  NEURO: Normal strength and tone, mentation intact and speech normal  PSYCH: mentation appears normal, affect normal/bright      ASSESSMENT/PLAN:     (Z00.01) Encounter for routine adult medical exam with abnormal findings  (primary encounter diagnosis)  Comment: We discussed exercise 30mins/day, and calcium with vitamin D at 1200mg/day, preferably from dietary sources.  Diet, Weight loss, and Exercise were discussed as well .   Plan: TD PRESERV FREE, IM (7+ YRS), ADMIN 1st VACCINE            (F33.0) Major depressive disorder, recurrent episode, mild (H)  Comment: discussed mood. Will try increasing her effexor to 75 bid.  She has a f/u appointment with onc in 2 months.   Plan: venlafaxine (EFFEXOR) 75 MG tablet            (C50.911) Invasive ductal carcinoma of breast, right (H)  Comment: in remission   Plan:     (C50.411,  Z17.0) Malignant neoplasm of upper-outer quadrant of right breast in female, estrogen receptor positive (H)  Comment: in remission. In be well study   Plan:     (J45.20) Mild intermittent asthma without complication  Comment: asthma symptoms are frequent. Will add steroid inhaler daily and reassess. The patient indicates understanding of these issues and agrees with the plan.   Plan: beclomethasone HFA (QVAR REDIHALER) 40 MCG/ACT         inhaler            (Z13.220) Screening for lipid disorders  Comment:   Plan: Lipid panel reflex  "to direct LDL Fasting              COUNSELING:   Reviewed preventive health counseling, as reflected in patient instructions       Regular exercise       Healthy diet/nutrition    BP Readings from Last 1 Encounters:   09/17/18 128/77     Estimated body mass index is 33.63 kg/(m^2) as calculated from the following:    Height as of 9/17/18: 5' 4\" (1.626 m).    Weight as of 9/17/18: 195 lb 14.4 oz (88.9 kg).      Weight management plan: Discussed healthy diet and exercise guidelines and patient will follow up in 12 months in clinic to re-evaluate.     reports that she quit smoking about 2 years ago. Her smoking use included Cigarettes. She has a 5.00 pack-year smoking history. She has never used smokeless tobacco.      Counseling Resources:  ATP IV Guidelines  Pooled Cohorts Equation Calculator  Breast Cancer Risk Calculator  FRAX Risk Assessment  ICSI Preventive Guidelines  Dietary Guidelines for Americans, 2010  USDA's MyPlate  ASA Prophylaxis  Lung CA Screening    Yenifer Ridley MD  [facility]  "

## 2018-10-06 ASSESSMENT — PATIENT HEALTH QUESTIONNAIRE - PHQ9: SUM OF ALL RESPONSES TO PHQ QUESTIONS 1-9: 2

## 2018-10-06 ASSESSMENT — ASTHMA QUESTIONNAIRES: ACT_TOTALSCORE: 15

## 2018-10-06 ASSESSMENT — ANXIETY QUESTIONNAIRES: GAD7 TOTAL SCORE: 7

## 2018-10-23 ENCOUNTER — TELEPHONE (OUTPATIENT)
Dept: FAMILY MEDICINE | Facility: CLINIC | Age: 38
End: 2018-10-23

## 2018-10-23 DIAGNOSIS — F41.9 ANXIETY: ICD-10-CM

## 2018-10-23 RX ORDER — LORAZEPAM 0.5 MG/1
0.5 TABLET ORAL EVERY 8 HOURS PRN
Qty: 5 TABLET | Refills: 0 | Status: SHIPPED | OUTPATIENT
Start: 2018-10-23 | End: 2019-03-11

## 2018-10-23 NOTE — TELEPHONE ENCOUNTER
Pt has dental procedure 10/25 and hoping for something for her severe anxiety. Pt uses [facility] pharmacy.      Melissa Neri, [place]

## 2018-12-10 ENCOUNTER — TELEPHONE (OUTPATIENT)
Dept: ONCOLOGY | Facility: CLINIC | Age: 38
End: 2018-12-10

## 2018-12-10 NOTE — TELEPHONE ENCOUNTER
Called pt to remind her that I will meet her at 12/13 appointment, with Dr. Jin, to do 12 month clinical trial measurements/visit/QOLs.   No study labs are needed for this visit.   She is in agreement.

## 2018-12-12 ENCOUNTER — TELEPHONE (OUTPATIENT)
Dept: ONCOLOGY | Facility: CLINIC | Age: 38
End: 2018-12-12

## 2018-12-12 ENCOUNTER — HOSPITAL ENCOUNTER (OUTPATIENT)
Dept: PET IMAGING | Facility: CLINIC | Age: 38
Discharge: HOME | End: 2018-12-12
Attending: INTERNAL MEDICINE | Admitting: INTERNAL MEDICINE
Payer: COMMERCIAL

## 2018-12-12 ENCOUNTER — HOSPITAL ENCOUNTER (OUTPATIENT)
Dept: LAB | Facility: CLINIC | Age: 38
End: 2018-12-12
Attending: INTERNAL MEDICINE
Payer: COMMERCIAL

## 2018-12-12 DIAGNOSIS — C50.411 MALIGNANT NEOPLASM OF UPPER-OUTER QUADRANT OF RIGHT BREAST IN FEMALE, ESTROGEN RECEPTOR POSITIVE (H): ICD-10-CM

## 2018-12-12 DIAGNOSIS — Z17.0 MALIGNANT NEOPLASM OF UPPER-OUTER QUADRANT OF RIGHT BREAST IN FEMALE, ESTROGEN RECEPTOR POSITIVE (H): ICD-10-CM

## 2018-12-12 LAB
ALBUMIN SERPL-MCNC: 4 G/DL (ref 3.4–5)
ALP SERPL-CCNC: 65 U/L (ref 40–150)
ALT SERPL W P-5'-P-CCNC: 19 U/L (ref 0–50)
ANION GAP SERPL CALCULATED.3IONS-SCNC: 5 MMOL/L (ref 3–14)
AST SERPL W P-5'-P-CCNC: 10 U/L (ref 0–45)
BASOPHILS # BLD AUTO: 0.1 10E9/L (ref 0–0.2)
BASOPHILS NFR BLD AUTO: 0.8 %
BILIRUB SERPL-MCNC: 0.3 MG/DL (ref 0.2–1.3)
BUN SERPL-MCNC: 11 MG/DL (ref 7–30)
CALCIUM SERPL-MCNC: 8.7 MG/DL (ref 8.5–10.1)
CANCER AG27-29 SERPL-ACNC: 23 U/ML (ref 0–39)
CHLORIDE SERPL-SCNC: 108 MMOL/L (ref 94–109)
CO2 SERPL-SCNC: 31 MMOL/L (ref 20–32)
CREAT SERPL-MCNC: 0.8 MG/DL (ref 0.52–1.04)
DIFFERENTIAL METHOD BLD: NORMAL
EOSINOPHIL # BLD AUTO: 0.1 10E9/L (ref 0–0.7)
EOSINOPHIL NFR BLD AUTO: 1.4 %
ERYTHROCYTE [DISTWIDTH] IN BLOOD BY AUTOMATED COUNT: 12.6 % (ref 10–15)
GFR SERPL CREATININE-BSD FRML MDRD: 80 ML/MIN/1.7M2
GLUCOSE SERPL-MCNC: 89 MG/DL (ref 70–99)
HCT VFR BLD AUTO: 39.3 % (ref 35–47)
HGB BLD-MCNC: 13.1 G/DL (ref 11.7–15.7)
IMM GRANULOCYTES # BLD: 0 10E9/L (ref 0–0.4)
IMM GRANULOCYTES NFR BLD: 0.3 %
LYMPHOCYTES # BLD AUTO: 1.3 10E9/L (ref 0.8–5.3)
LYMPHOCYTES NFR BLD AUTO: 20 %
MCH RBC QN AUTO: 30 PG (ref 26.5–33)
MCHC RBC AUTO-ENTMCNC: 33.3 G/DL (ref 31.5–36.5)
MCV RBC AUTO: 90 FL (ref 78–100)
MONOCYTES # BLD AUTO: 0.4 10E9/L (ref 0–1.3)
MONOCYTES NFR BLD AUTO: 6.5 %
NEUTROPHILS # BLD AUTO: 4.6 10E9/L (ref 1.6–8.3)
NEUTROPHILS NFR BLD AUTO: 71 %
NRBC # BLD AUTO: 0 10*3/UL
NRBC BLD AUTO-RTO: 0 /100
PLATELET # BLD AUTO: 261 10E9/L (ref 150–450)
POTASSIUM SERPL-SCNC: 4.5 MMOL/L (ref 3.4–5.3)
PROT SERPL-MCNC: 7.3 G/DL (ref 6.8–8.8)
RBC # BLD AUTO: 4.37 10E12/L (ref 3.8–5.2)
SODIUM SERPL-SCNC: 144 MMOL/L (ref 133–144)
WBC # BLD AUTO: 6.5 10E9/L (ref 4–11)

## 2018-12-12 PROCEDURE — 80053 COMPREHEN METABOLIC PANEL: CPT | Performed by: INTERNAL MEDICINE

## 2018-12-12 PROCEDURE — 34300033 ZZH RX 343: Performed by: INTERNAL MEDICINE

## 2018-12-12 PROCEDURE — 86300 IMMUNOASSAY TUMOR CA 15-3: CPT | Performed by: INTERNAL MEDICINE

## 2018-12-12 PROCEDURE — 36415 COLL VENOUS BLD VENIPUNCTURE: CPT | Performed by: INTERNAL MEDICINE

## 2018-12-12 PROCEDURE — 78815 PET IMAGE W/CT SKULL-THIGH: CPT | Mod: PS

## 2018-12-12 PROCEDURE — 85025 COMPLETE CBC W/AUTO DIFF WBC: CPT | Performed by: INTERNAL MEDICINE

## 2018-12-12 PROCEDURE — A9552 F18 FDG: HCPCS | Performed by: INTERNAL MEDICINE

## 2018-12-12 RX ADMIN — FLUDEOXYGLUCOSE F-18 12.65 MCI.: 500 INJECTION, SOLUTION INTRAVENOUS at 10:06

## 2018-12-13 ENCOUNTER — TELEPHONE (OUTPATIENT)
Dept: FAMILY MEDICINE | Facility: CLINIC | Age: 38
End: 2018-12-13

## 2018-12-13 ENCOUNTER — ALLIED HEALTH/NURSE VISIT (OUTPATIENT)
Dept: ONCOLOGY | Facility: CLINIC | Age: 38
End: 2018-12-13

## 2018-12-13 ENCOUNTER — ONCOLOGY VISIT (OUTPATIENT)
Dept: ONCOLOGY | Facility: CLINIC | Age: 38
End: 2018-12-13
Attending: INTERNAL MEDICINE
Payer: COMMERCIAL

## 2018-12-13 VITALS
WEIGHT: 193.1 LBS | RESPIRATION RATE: 18 BRPM | BODY MASS INDEX: 32.97 KG/M2 | TEMPERATURE: 97.9 F | HEIGHT: 64 IN | SYSTOLIC BLOOD PRESSURE: 121 MMHG | HEART RATE: 83 BPM | OXYGEN SATURATION: 95 % | DIASTOLIC BLOOD PRESSURE: 83 MMHG

## 2018-12-13 DIAGNOSIS — M85.80 OSTEOPENIA, UNSPECIFIED LOCATION: ICD-10-CM

## 2018-12-13 DIAGNOSIS — Z17.0 MALIGNANT NEOPLASM OF UPPER-OUTER QUADRANT OF RIGHT BREAST IN FEMALE, ESTROGEN RECEPTOR POSITIVE (H): Primary | ICD-10-CM

## 2018-12-13 DIAGNOSIS — N95.1 HOT FLUSHES, PERIMENOPAUSAL: ICD-10-CM

## 2018-12-13 DIAGNOSIS — C50.411 MALIGNANT NEOPLASM OF UPPER-OUTER QUADRANT OF RIGHT BREAST IN FEMALE, ESTROGEN RECEPTOR POSITIVE (H): Primary | ICD-10-CM

## 2018-12-13 DIAGNOSIS — G62.9 NEUROPATHY: ICD-10-CM

## 2018-12-13 DIAGNOSIS — E66.3 OVERWEIGHT: ICD-10-CM

## 2018-12-13 PROCEDURE — 99214 OFFICE O/P EST MOD 30 MIN: CPT | Performed by: INTERNAL MEDICINE

## 2018-12-13 PROCEDURE — G0463 HOSPITAL OUTPT CLINIC VISIT: HCPCS

## 2018-12-13 ASSESSMENT — MIFFLIN-ST. JEOR
SCORE: 1531.93
SCORE: 1532.38

## 2018-12-13 ASSESSMENT — PAIN SCALES - GENERAL
PAINLEVEL: NO PAIN (0)
PAINLEVEL: NO PAIN (0)

## 2018-12-13 NOTE — NURSING NOTE
"Oncology Rooming Note    December 13, 2018 1:59 PM   Diana Wilson is a 38 year old female who presents for:    Chief Complaint   Patient presents with     Oncology Clinic Visit     3 month follow up Malignant neoplasm of upper-outer quadrant of right breast in female, estrogen receptor positive . Review lab and PET scan results.      Initial Vitals: /83 (BP Location: Left arm, Patient Position: Sitting, Cuff Size: Adult Large)   Pulse 83   Temp 97.9  F (36.6  C) (Tympanic)   Resp 18   Ht 1.619 m (5' 3.75\")   Wt 87.6 kg (193 lb 1.6 oz)   LMP 08/05/2016 (Exact Date)   SpO2 95%   BMI 33.41 kg/m   Estimated body mass index is 33.41 kg/m  as calculated from the following:    Height as of this encounter: 1.619 m (5' 3.75\").    Weight as of this encounter: 87.6 kg (193 lb 1.6 oz). Body surface area is 1.98 meters squared.  No Pain (0) Comment: Data Unavailable   Patient's last menstrual period was 08/05/2016 (exact date).  Allergies reviewed: Yes  Medications reviewed: Yes    Medications: Medication refills not needed today.  Pharmacy name entered into [facility]: [facility] [place], [place] - [address]    Clinical concerns:  3 month follow up Malignant neoplasm of upper-outer quadrant of right breast in female, estrogen receptor positive . Review lab and PET scan results.     8 minutes for nursing intake (face to face time)     Irma Cuevas CMA            "

## 2018-12-13 NOTE — TELEPHONE ENCOUNTER
Reason for Call:  Other appointment    Detailed comments: Diana had a PET scan completed on 12/12/18 and apparently there was something found on her lungs.  It is not cancer, but maybe this is why she has been having severe asthma attacks lately.  She'd like you to review her PET scan and see what you think.  Should she be seen by you or someone else regarding this?  Please review and advise. Thank you..Pricila Leal    Phone Number Patient can be reached at: Home number on file [phone] (home)    Best Time: any time    Can we leave a detailed message on this number? YES    Call taken on 12/13/2018 at 2:53 PM by Pricila Leal

## 2018-12-13 NOTE — PROGRESS NOTES
"ONCOLOGY Follow up visit     COMPLAINT AND REASON FOR VISIT:    12/2016 diagnosed right breast cancer LN positive disease        HPI  She presented at age 36 breast lump in her right armpit and right breast in 10/2016.   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  ER/HI 99% positive, HER-2/kelle negative, associated with high-grade DCIS.    Right axillary ultrasound-guided biopsy indicating metastatic carcinoma positive for spread from breast primary, ER/HI both negative.  HER-2/kelle is negative.      She had clinical T2N1 disease. She made informed decision to proceed with neoadjuvant DDAC C1D1 1/16/2017. She has good clinical response post DD AC x4. Then went on to have wkly taxol + carbo  with informed decision in March.   She has carbo hypersensitivity reaction in early May (during C3). It was d/c after.   She finished chemo end of May 2017.     She is tested negative for BRCA1/2 with [employer]Next.     7/2017 she had double mastectomies at [facility]. Right side found no residual invasive carcinoma, +grade III DCIS, 1mm in greatest dimension. 5 sLN all negative. She had ypTis(DCIS)pN0 disease. Left breast no cancer.     She finished post mastectomy RT in 11/2017.    She had BSO 11/2017. Then started Arimidex in 1/2018. She quit in Feb 2018 due to side effects and willing to try tamoxifen in 3/2018.     She is also on BWELL trial for weight loss.     PAST MEDICAL HISTORY:  Restless legs, mild intermittent asthma, celiac disease diagnosed 02/2016, hx of TIFFANY     SOCIAL HISTORY:  Works part-time.  Lives with her .  They have 2 kids, first pregnancy age 21.  She did not do breast feeding because of bilateral breast reduction.  She does office work.      FAMILY HISTORY:  \"Full of cancer\" from the mother's side including colon, lung, pancreatic, gastric cancer.  According to the patient none of them survive older than 60 years old.    " "  REVIEW OF SYSTEMS  She is very upbeat, + hot flushes. Calmer.   She still has neuropathy on finger.   She is trying to loose weight.      PHYSICAL EXAMINATION:   Blood pressure 121/83, pulse 83, temperature 97.9  F (36.6  C), temperature source Tympanic, resp. rate 18, height 1.619 m (5' 3.75\"), weight 87.6 kg (193 lb 1.6 oz), last menstrual period 08/05/2016, SpO2 95 %, not currently breastfeeding.    ECOG 0    GENERAL APPEARANCE:  He young woman, looks like her stated age, very upbeat, not in acute distress.   HEENT: The patient is normocephalic, atraumatic. Pupils are equally reactive to light.  Sclerae are anicteric. erythematous oral mucosa.  - pharynx.  No oral thrush.   NECK:  Supple.  No jugular venous distention.  Thyroid is not palpable.   LYMPH NODES:  Superficial lymphadenopathy is not appreciable in the bilateral cervical, supraclavicular, axillary or inguinal areas.   CARDIOVASCULAR:  S1, S2 regular with no murmurs or gallops.  No carotid or abdominal bruits.   PULMONARY:  Lungs are clear to auscultation and percussion bilaterally.  There is no wheezing or rhonchi.   GASTROINTESTINAL:  Abdomen is soft, nontender.  No hepatosplenomegaly.  No signs of ascites.  No mass appreciable.   MUSCULOSKELETAL/EXTREMITIES:  No edema.  No cyanotic changes.  No signs of joint deformity.  No lymphedema.   NEUROLOGIC:  Cranial nerves II-XII are grossly intact.  Sensation intact.  Muscle strength and muscle tone symmetrical, 5/5 throughout.   BACK:  No spinal or paraspinal tenderness.  No CVA tenderness.   SKIN:  No petechiae.  No rash.  No signs of cellulitis.   BREASTS:  Bilateral breast exam reviewed.  Bilateral mastectomies scars well healed. She is partially expanded has marked erythematous changes on right side from RT.      LABORATORY DATA REVIEWED  CBC DIFF/CMP/marker are good.       Current Image data reviewed  PET 12/2018 - negative    CT c/a/p 6/2018 - negative. A 1.2 cm cyst or nodule in the left lobe of " thyroid gland is Unchanged. Abdomen: A 2 mm nonobstructing calculus in the left kidney is again  seen.      Old data reviewed with summary  7/2017 double mastectomies at [facility]. Right side: no residual invasive carcinoma, +grade III DCIS, 1mm in greatest dimension. 5 sLN all negative. She had ypTis(DCIS)pN0 disease. Left breast no cancer.       CT body 11/2017: no mets.     Breast MRI at [facility] post DD AC 3/2017:   1. Improvement in biopsy-proven RIGHT-sided breast cancer at 10 o'clock. The malignancy has decreased in size (2.4 cm versus 3.3 cm previously)and demonstrates more favorable enhancement kinetics.     2.  No residual enlarged RIGHT axillary lymph nodes.    1/2017 baseline Echo LV function nl, EF 60-65%     PET 1/2017  1. Hypermetabolic mass in the right breast consistent with known malignancy.  2. Two enlarged hypermetabolic metastatic lymph nodes in the right axilla.  3. No additional worrisome hypermetabolic areas in the neck, chest, abdomen, or pelvis.      DEXA scan from 03/2016 identified mild osteopenia lumbar spine        ASSESSMENT AND PLAN:    1.   12/2016  dx breast cancer, cT2N1 disesae.      S/p DD AC and wkly taxol, carbo was used partially dropped due to hypersensitivity reaction.   She had pCR for invasive cancer with mastectomies in 7/2017.   S/p RT till 11/2017.     She had BSO 11/2017. She did not tolerate Arimdiex off it after 1 month.   She started tamoxifen in 3/2018.     I advise her to  Rethink about AI.     She demands to have CT scan or PET with her follow up visit.   I explained to her the ASCO guideline in terms how to follow up breast cancer.     2.  hot flushes - advice Effexor, this has helped a lot.     3. Overweight - weight loss is meaningful for her in terms of rate of recurrence. She is interested in and enrolled in BWEL program.     4. Neuropathy on fingers -advice gabapentin.     5. Osteopenia as baseline by dexa in 2016 - advice vit D./

## 2018-12-13 NOTE — NURSING NOTE
Diana Wilson was seen for the 12 month visit for the [facility] weight loss study. Diana completed QOL questionnaire and measurements (weight/waist/hip) were taken per Protocol. At this visit she denies any new fractures, sprains, tendon/ligament injuries, and orthopedic surgeries. Reviewed schedule for next visit at 18 months.

## 2018-12-13 NOTE — LETTER
12/13/2018         RE: Diana Wilson  [address] [place] [id number]        Dear Colleague,    Thank you for referring your patient, Diana Wilson, to the [facility]. Please see a copy of my visit note below.    ONCOLOGY Follow up visit     COMPLAINT AND REASON FOR VISIT:    12/2016 diagnosed right breast cancer LN positive disease        HPI  She presented at age 36 breast lump in her right armpit and right breast in 10/2016.   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  ER/GA 99% positive, HER-2/kelle negative, associated with high-grade DCIS.    Right axillary ultrasound-guided biopsy indicating metastatic carcinoma positive for spread from breast primary, ER/GA both negative.  HER-2/kelle is negative.      She had clinical T2N1 disease. She made informed decision to proceed with neoadjuvant DDAC C1D1 1/16/2017. She has good clinical response post DD AC x4. Then went on to have wkly taxol + carbo  with informed decision in March.   She has carbo hypersensitivity reaction in early May (during C3). It was d/c after.   She finished chemo end of May 2017.     She is tested negative for BRCA1/2 with [employer].     7/2017 she had double mastectomies at [facility]. Right side found no residual invasive carcinoma, +grade III DCIS, 1mm in greatest dimension. 5 sLN all negative. She had ypTis(DCIS)pN0 disease. Left breast no cancer.     She finished post mastectomy RT in 11/2017.    She had BSO 11/2017. Then started Arimidex in 1/2018. She quit in Feb 2018 due to side effects and willing to try tamoxifen in 3/2018.     She is also on ELL trial for weight loss.     PAST MEDICAL HISTORY:  Restless legs, mild intermittent asthma, celiac disease diagnosed 02/2016, hx of TIFFANY     SOCIAL HISTORY:  Works part-time.  Lives with her .  They have 2 kids, first pregnancy age 22.  She did not do breast feeding because of  "bilateral breast reduction.  She does office work.      FAMILY HISTORY:  \"Full of cancer\" from the mother's side including colon, lung, pancreatic, gastric cancer.  According to the patient none of them survive older than 60 years old.      REVIEW OF SYSTEMS  She is very upbeat, + hot flushes. Calmer.   She still has neuropathy on finger.   She is trying to loose weight.      PHYSICAL EXAMINATION:   Blood pressure 121/83, pulse 83, temperature 97.9  F (36.6  C), temperature source Tympanic, resp. rate 18, height 1.619 m (5' 3.75\"), weight 87.6 kg (193 lb 1.6 oz), last menstrual period 08/05/2016, SpO2 95 %, not currently breastfeeding.    ECOG 0    GENERAL APPEARANCE:  He young woman, looks like her stated age, very upbeat, not in acute distress.   HEENT: The patient is normocephalic, atraumatic. Pupils are equally reactive to light.  Sclerae are anicteric. erythematous oral mucosa.  - pharynx.  No oral thrush.   NECK:  Supple.  No jugular venous distention.  Thyroid is not palpable.   LYMPH NODES:  Superficial lymphadenopathy is not appreciable in the bilateral cervical, supraclavicular, axillary or inguinal areas.   CARDIOVASCULAR:  S1, S2 regular with no murmurs or gallops.  No carotid or abdominal bruits.   PULMONARY:  Lungs are clear to auscultation and percussion bilaterally.  There is no wheezing or rhonchi.   GASTROINTESTINAL:  Abdomen is soft, nontender.  No hepatosplenomegaly.  No signs of ascites.  No mass appreciable.   MUSCULOSKELETAL/EXTREMITIES:  No edema.  No cyanotic changes.  No signs of joint deformity.  No lymphedema.   NEUROLOGIC:  Cranial nerves II-XII are grossly intact.  Sensation intact.  Muscle strength and muscle tone symmetrical, 5/5 throughout.   BACK:  No spinal or paraspinal tenderness.  No CVA tenderness.   SKIN:  No petechiae.  No rash.  No signs of cellulitis.   BREASTS:  Bilateral breast exam reviewed.  Bilateral mastectomies scars well healed. She is partially expanded has marked " erythematous changes on right side from RT.      LABORATORY DATA REVIEWED  CBC DIFF/CMP/marker are good.       Current Image data reviewed  PET 12/2018 - negative    CT c/a/p 6/2018 - negative. A 1.2 cm cyst or nodule in the left lobe of thyroid gland is Unchanged. Abdomen: A 2 mm nonobstructing calculus in the left kidney is again  seen.      Old data reviewed with summary  7/2017 double mastectomies at [facility]. Right side: no residual invasive carcinoma, +grade III DCIS, 1mm in greatest dimension. 5 sLN all negative. She had ypTis(DCIS)pN0 disease. Left breast no cancer.       CT body 11/2017: no mets.     Breast MRI at [facility] post DD AC 3/2017:   1. Improvement in biopsy-proven RIGHT-sided breast cancer at 10 o'clock. The malignancy has decreased in size (2.4 cm versus 3.3 cm previously)and demonstrates more favorable enhancement kinetics.     2.  No residual enlarged RIGHT axillary lymph nodes.    1/2017 baseline Echo LV function nl, EF 60-65%     PET 1/2017  1. Hypermetabolic mass in the right breast consistent with known malignancy.  2. Two enlarged hypermetabolic metastatic lymph nodes in the right axilla.  3. No additional worrisome hypermetabolic areas in the neck, chest, abdomen, or pelvis.      DEXA scan from 03/2016 identified mild osteopenia lumbar spine        ASSESSMENT AND PLAN:    1.   12/2016  dx breast cancer, cT2N1 disesae.      S/p DD AC and wkly taxol, carbo was used partially dropped due to hypersensitivity reaction.   She had pCR for invasive cancer with mastectomies in 7/2017.   S/p RT till 11/2017.     She had BSO 11/2017. She did not tolerate Arimdiex off it after 1 month.   She started tamoxifen in 3/2018.     I advise her to  Rethink about AI.     She demands to have CT scan or PET with her follow up visit.   I explained to her the ASCO guideline in terms how to follow up breast cancer.     2.  hot flushes - advice Effexor, this has helped a lot.     3. Overweight - weight loss is  meaningful for her in terms of rate of recurrence. She is interested in and enrolled in BW program.     4. Neuropathy on fingers -advice gabapentin.     5. Osteopenia as baseline by dexa in 2016 - advice vit D./    Again, thank you for allowing me to participate in the care of your patient.        Sincerely,        Maribel Jin MD, MD

## 2018-12-14 ENCOUNTER — MYC MEDICAL ADVICE (OUTPATIENT)
Dept: FAMILY MEDICINE | Facility: CLINIC | Age: 38
End: 2018-12-14

## 2018-12-14 DIAGNOSIS — J45.20 MILD INTERMITTENT ASTHMA WITHOUT COMPLICATION: Primary | ICD-10-CM

## 2018-12-14 NOTE — TELEPHONE ENCOUNTER
Please call her -  They saw only some mild atelectasis, which is just a bit of lung that is not expanding well.  It may have been related to her body positioning during the exam.  It doesn't indicate any problems.  No further eval is needed of this     C. Lorraine Ridley MD

## 2018-12-14 NOTE — TELEPHONE ENCOUNTER
Let's do spirometry to see where the asthma is.   I will put the order in for her   Then she should f/u in clinic to review results - but know that it takes a couple weeks to get spirometry read and in the chart     PHILIPPE Ridley MD

## 2018-12-18 ENCOUNTER — MYC REFILL (OUTPATIENT)
Dept: ONCOLOGY | Facility: CLINIC | Age: 38
End: 2018-12-18

## 2018-12-18 DIAGNOSIS — G62.9 NEUROPATHY: ICD-10-CM

## 2018-12-19 RX ORDER — GABAPENTIN 300 MG/1
CAPSULE ORAL
Qty: 90 CAPSULE | Refills: 3 | Status: SHIPPED | OUTPATIENT
Start: 2018-12-19 | End: 2019-06-21

## 2018-12-31 ENCOUNTER — HOSPITAL ENCOUNTER (OUTPATIENT)
Dept: RESPIRATORY THERAPY | Facility: CLINIC | Age: 38
Discharge: HOME | End: 2018-12-31
Attending: INTERNAL MEDICINE | Admitting: INTERNAL MEDICINE
Payer: COMMERCIAL

## 2018-12-31 DIAGNOSIS — J45.20 MILD INTERMITTENT ASTHMA WITHOUT COMPLICATION: ICD-10-CM

## 2018-12-31 PROCEDURE — 94060 EVALUATION OF WHEEZING: CPT

## 2018-12-31 PROCEDURE — 94060 EVALUATION OF WHEEZING: CPT | Mod: 26 | Performed by: INTERNAL MEDICINE

## 2018-12-31 PROCEDURE — 25000125 ZZHC RX 250: Performed by: FAMILY MEDICINE

## 2018-12-31 RX ORDER — ALBUTEROL SULFATE 0.83 MG/ML
2.5 SOLUTION RESPIRATORY (INHALATION) ONCE
Status: COMPLETED | OUTPATIENT
Start: 2018-12-31 | End: 2018-12-31

## 2018-12-31 RX ADMIN — ALBUTEROL SULFATE 2.5 MG: 2.5 SOLUTION RESPIRATORY (INHALATION) at 09:40

## 2019-01-01 NOTE — LETTER
11/15/2017      RE: Diana Wilson  [address] [place] [id number]       Radiation Oncology Completion Summary    Diana Wilson completed radiation therapy on November 15, 2017. In summary, she is a 37 year old year-old patient with ER-/RI-/HER2- cT2 N1 M0 G3 / ypTis N0 IDC of the RIGHT breast, triple negative, s/p neoadjuvant chemotherapy (ddAC + carbo/taxol qWk) + bilateral mastectomies with pCR. She was referred for adjuvant radiation therapy.    Treatment summary  Site:    Dose:   # of Treatments:  Rt chestwall, SCV, axilla 50.4 Gy  28  Rt chestwall boost  10 Gy   5      Overall, she tolerated radiation therapy well with max G3 moist desquamation of the skin of the R chestwall/axilla managed with silvadene, and erythema of the R chestwall managed with aquaphor.    She will follow up with our clinic in ~1 month, and also see Dr. Jin.    Aleksey Terrell M.D.  Attending Physician  Radiation Oncology          [facility]    
 Infant (Birth)

## 2019-01-09 LAB
EXPTIME-PRE: 6.8 SEC
FEF2575-%PRED-POST: 108 %
FEF2575-%PRED-PRE: 93 %
FEF2575-POST: 3.45 L/SEC
FEF2575-PRE: 2.99 L/SEC
FEF2575-PRED: 3.19 L/SEC
FEFMAX-%PRED-PRE: 125 %
FEFMAX-PRE: 8.74 L/SEC
FEFMAX-PRED: 6.94 L/SEC
FEV1-%PRED-PRE: 113 %
FEV1-PRE: 3.4 L
FEV1FEV6-PRE: 79 %
FEV1FEV6-PRED: 84 %
FEV1FVC-PRE: 79 %
FEV1FVC-PRED: 82 %
FIFMAX-PRE: 4.13 L/SEC
FVC-%PRED-PRE: 117 %
FVC-PRE: 4.29 L
FVC-PRED: 3.66 L

## 2019-02-05 DIAGNOSIS — Z91.09 ENVIRONMENTAL ALLERGIES: ICD-10-CM

## 2019-02-05 NOTE — TELEPHONE ENCOUNTER
"montelukast (SINGULAIR) 10 MG tablet      Last Written Prescription Date:  2/13/18  Last Fill Quantity: 90,   # refills: 2  Last Office Visit: 10/5/18  Future Office visit:    Next 5 appointments (look out 90 days)    Mar 11, 2019  2:30 PM CDT  Return Visit with Maribel Jin MD  [facility] ([facility]) [facility]  [address]  [facility] [address]  [phone]           Requested Prescriptions   Pending Prescriptions Disp Refills     montelukast (SINGULAIR) 10 MG tablet 90 tablet 2     Sig: Take 1 tablet (10 mg) by mouth    Leukotriene Inhibitors Protocol Failed - 2/5/2019 10:21 AM       Failed - Asthma control assessment score within normal limits in last 6 months    Please review ACT score.          Passed - Patient is age 12 or older    If patient is under 16, ok to refill using age based dosing.          Passed - Medication is active on med list       Passed - Recent (6 mo) or future (30 days) visit within the authorizing provider's specialty    Patient had office visit in the last 6 months or has a visit in the next 30 days with authorizing provider or within the authorizing provider's specialty.  See \"Patient Info\" tab in inbasket, or \"Choose Columns\" in Meds & Orders section of the refill encounter.              "

## 2019-02-06 RX ORDER — MONTELUKAST SODIUM 10 MG/1
TABLET ORAL
Qty: 90 TABLET | Refills: 1 | Status: SHIPPED | OUTPATIENT
Start: 2019-02-06 | End: 2019-07-31

## 2019-03-05 DIAGNOSIS — Z17.0 MALIGNANT NEOPLASM OF UPPER-OUTER QUADRANT OF RIGHT BREAST IN FEMALE, ESTROGEN RECEPTOR POSITIVE (H): ICD-10-CM

## 2019-03-05 DIAGNOSIS — C50.411 MALIGNANT NEOPLASM OF UPPER-OUTER QUADRANT OF RIGHT BREAST IN FEMALE, ESTROGEN RECEPTOR POSITIVE (H): ICD-10-CM

## 2019-03-05 LAB
ALBUMIN SERPL-MCNC: 4.1 G/DL (ref 3.4–5)
ALP SERPL-CCNC: 74 U/L (ref 40–150)
ALT SERPL W P-5'-P-CCNC: 23 U/L (ref 0–50)
ANION GAP SERPL CALCULATED.3IONS-SCNC: 5 MMOL/L (ref 3–14)
AST SERPL W P-5'-P-CCNC: 11 U/L (ref 0–45)
BASOPHILS # BLD AUTO: 0 10E9/L (ref 0–0.2)
BASOPHILS NFR BLD AUTO: 0.3 %
BILIRUB SERPL-MCNC: 0.6 MG/DL (ref 0.2–1.3)
BUN SERPL-MCNC: 10 MG/DL (ref 7–30)
CALCIUM SERPL-MCNC: 9.5 MG/DL (ref 8.5–10.1)
CANCER AG27-29 SERPL-ACNC: 22 U/ML (ref 0–39)
CHLORIDE SERPL-SCNC: 103 MMOL/L (ref 94–109)
CO2 SERPL-SCNC: 30 MMOL/L (ref 20–32)
CREAT SERPL-MCNC: 0.71 MG/DL (ref 0.52–1.04)
DIFFERENTIAL METHOD BLD: NORMAL
EOSINOPHIL # BLD AUTO: 0.1 10E9/L (ref 0–0.7)
EOSINOPHIL NFR BLD AUTO: 2.1 %
ERYTHROCYTE [DISTWIDTH] IN BLOOD BY AUTOMATED COUNT: 12.7 % (ref 10–15)
GFR SERPL CREATININE-BSD FRML MDRD: >90 ML/MIN/{1.73_M2}
GLUCOSE SERPL-MCNC: 58 MG/DL (ref 70–99)
HCT VFR BLD AUTO: 42.5 % (ref 35–47)
HGB BLD-MCNC: 14 G/DL (ref 11.7–15.7)
LYMPHOCYTES # BLD AUTO: 1.6 10E9/L (ref 0.8–5.3)
LYMPHOCYTES NFR BLD AUTO: 25.9 %
MCH RBC QN AUTO: 29.8 PG (ref 26.5–33)
MCHC RBC AUTO-ENTMCNC: 32.9 G/DL (ref 31.5–36.5)
MCV RBC AUTO: 90 FL (ref 78–100)
MONOCYTES # BLD AUTO: 0.4 10E9/L (ref 0–1.3)
MONOCYTES NFR BLD AUTO: 6.4 %
NEUTROPHILS # BLD AUTO: 4.1 10E9/L (ref 1.6–8.3)
NEUTROPHILS NFR BLD AUTO: 65.3 %
PLATELET # BLD AUTO: 277 10E9/L (ref 150–450)
POTASSIUM SERPL-SCNC: 3.9 MMOL/L (ref 3.4–5.3)
PROT SERPL-MCNC: 7.7 G/DL (ref 6.8–8.8)
RBC # BLD AUTO: 4.7 10E12/L (ref 3.8–5.2)
SODIUM SERPL-SCNC: 138 MMOL/L (ref 133–144)
WBC # BLD AUTO: 6.3 10E9/L (ref 4–11)

## 2019-03-05 PROCEDURE — 80053 COMPREHEN METABOLIC PANEL: CPT | Performed by: INTERNAL MEDICINE

## 2019-03-05 PROCEDURE — 86300 IMMUNOASSAY TUMOR CA 15-3: CPT | Performed by: INTERNAL MEDICINE

## 2019-03-05 PROCEDURE — 85025 COMPLETE CBC W/AUTO DIFF WBC: CPT | Performed by: INTERNAL MEDICINE

## 2019-03-05 PROCEDURE — 36415 COLL VENOUS BLD VENIPUNCTURE: CPT | Performed by: INTERNAL MEDICINE

## 2019-03-11 ENCOUNTER — ONCOLOGY VISIT (OUTPATIENT)
Dept: ONCOLOGY | Facility: CLINIC | Age: 39
End: 2019-03-11
Attending: INTERNAL MEDICINE
Payer: COMMERCIAL

## 2019-03-11 VITALS
WEIGHT: 194.5 LBS | OXYGEN SATURATION: 98 % | SYSTOLIC BLOOD PRESSURE: 138 MMHG | RESPIRATION RATE: 16 BRPM | BODY MASS INDEX: 33.2 KG/M2 | DIASTOLIC BLOOD PRESSURE: 82 MMHG | HEIGHT: 64 IN | HEART RATE: 72 BPM | TEMPERATURE: 96.9 F

## 2019-03-11 DIAGNOSIS — N95.1 HOT FLUSHES, PERIMENOPAUSAL: ICD-10-CM

## 2019-03-11 DIAGNOSIS — Z17.0 MALIGNANT NEOPLASM OF UPPER-OUTER QUADRANT OF RIGHT BREAST IN FEMALE, ESTROGEN RECEPTOR POSITIVE (H): ICD-10-CM

## 2019-03-11 DIAGNOSIS — Z78.0 MENOPAUSE: ICD-10-CM

## 2019-03-11 DIAGNOSIS — G62.9 NEUROPATHY: ICD-10-CM

## 2019-03-11 DIAGNOSIS — Z17.0 MALIGNANT NEOPLASM OF UPPER-OUTER QUADRANT OF RIGHT BREAST IN FEMALE, ESTROGEN RECEPTOR POSITIVE (H): Primary | ICD-10-CM

## 2019-03-11 DIAGNOSIS — C50.411 MALIGNANT NEOPLASM OF UPPER-OUTER QUADRANT OF RIGHT BREAST IN FEMALE, ESTROGEN RECEPTOR POSITIVE (H): ICD-10-CM

## 2019-03-11 DIAGNOSIS — M85.80 OSTEOPENIA, UNSPECIFIED LOCATION: ICD-10-CM

## 2019-03-11 DIAGNOSIS — C50.411 MALIGNANT NEOPLASM OF UPPER-OUTER QUADRANT OF RIGHT BREAST IN FEMALE, ESTROGEN RECEPTOR POSITIVE (H): Primary | ICD-10-CM

## 2019-03-11 PROCEDURE — 99214 OFFICE O/P EST MOD 30 MIN: CPT | Performed by: INTERNAL MEDICINE

## 2019-03-11 PROCEDURE — G0463 HOSPITAL OUTPT CLINIC VISIT: HCPCS

## 2019-03-11 RX ORDER — LORATADINE 10 MG/1
10 TABLET ORAL DAILY
COMMUNITY
End: 2020-09-29 | Stop reason: ALTCHOICE

## 2019-03-11 RX ORDER — TAMOXIFEN CITRATE 20 MG/1
20 TABLET ORAL DAILY
Qty: 90 TABLET | Refills: 1 | Status: SHIPPED | OUTPATIENT
Start: 2019-03-11 | End: 2019-06-11

## 2019-03-11 ASSESSMENT — PAIN SCALES - GENERAL: PAINLEVEL: NO PAIN (0)

## 2019-03-11 ASSESSMENT — MIFFLIN-ST. JEOR: SCORE: 1542.25

## 2019-03-11 NOTE — PROGRESS NOTES
Fax received from [facility] requesting a refill of Tamoxifen on behalf of pt.  Last refill: 09.17.18  # 90 with 1 refills at [facility].  Last office visit:  12.13.18  Next office visit:  03.11.19    This is an appropriate refill, and has been e-prescribed. Josette Smalls RN, BSN, OCN

## 2019-03-11 NOTE — LETTER
"    3/11/2019         RE: Diana Wilson  [address] [place] [id number]        Dear Colleague,    Thank you for referring your patient, Diana Wilson, to the [facility]. Please see a copy of my visit note below.    Oncology Rooming Note    March 11, 2019 2:38 PM   Diana Wilson is a 38 year old female who presents for:    Chief Complaint   Patient presents with     Oncology Clinic Visit     3 month recheck Malignant neoplasm of upper-outer quadrant of right breast in female, estrogen receptor positive, review Labs      Initial Vitals: /82 (BP Location: Right arm, Patient Position: Sitting, Cuff Size: Adult Regular)   Pulse 72   Temp 96.9  F (36.1  C) (Tympanic)   Resp 16   Ht 1.626 m (5' 4\")   Wt 88.2 kg (194 lb 8 oz)   LMP 08/05/2016 (Exact Date)   SpO2 98%   BMI 33.39 kg/m    Estimated body mass index is 33.39 kg/m  as calculated from the following:    Height as of this encounter: 1.626 m (5' 4\").    Weight as of this encounter: 88.2 kg (194 lb 8 oz). Body surface area is 2 meters squared.  No Pain (0) Comment: Data Unavailable   Patient's last menstrual period was 08/05/2016 (exact date).  Allergies reviewed: Yes  Medications reviewed: Yes    Medications: MEDICATION REFILLS NEEDED TODAY. Provider was notified.  Pharmacy name entered into [employer]: [facility] [place] - [id number]    Clinical concerns:   3 month recheck Malignant neoplasm of upper-outer quadrant of right breast in female, estrogen receptor positive, review Labs       Sondra Medina CMA                ONCOLOGY Follow up visit     COMPLAINT AND REASON FOR VISIT:    12/2016 diagnosed right breast cancer LN positive disease        HPI  She presented at age 36 breast lump in her right armpit and right breast in 10/2016.   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  ER/KS 99% positive, HER-2/kelle negative, " "associated with high-grade DCIS.    Right axillary ultrasound-guided biopsy indicating metastatic carcinoma positive for spread from breast primary, ER/CT both negative.  HER-2/kelle is negative.      She had clinical T2N1 disease. She made informed decision to proceed with neoadjuvant DDAC C1D1 1/16/2017. She has good clinical response post DD AC x4. Then went on to have wkly taxol + carbo  with informed decision in March.   She had carbo hypersensitivity reaction in early May (during C3). It was d/c after.   She finished chemo end of May 2017.     She was tested negative for BRCA1/2 with [facility].     7/2017 she had double mastectomies at [facility]. Right side found no residual invasive carcinoma, +grade III DCIS, 1mm in greatest dimension. 5 sLN all negative. She had ypTis(DCIS)pN0 disease. Left breast no cancer.     She finished post mastectomy RT in 11/2017.    She had BSO 11/2017. Then started Arimidex in 1/2018. She quit in Feb 2018 due to side effects and willing to try tamoxifen in 3/2018.     She is also on [employer] trial for weight loss.     PAST MEDICAL HISTORY:  Restless legs, mild intermittent asthma, celiac disease diagnosed 02/2016, hx of TIFFANY     SOCIAL HISTORY:  Works part-time.  Lives with her .  They have 2 kids, first pregnancy age 22.  She did not do breast feeding because of bilateral breast reduction.  She does office work.      FAMILY HISTORY:  \"Full of cancer\" from the mother's side including colon, lung, pancreatic, gastric cancer.  According to the patient none of them survive older than 60 years old.      REVIEW OF SYSTEMS  She is very upbeat, Calmer.   + hot flushes.   She still has neuropathy on finger.   She is trying to loose weight.      PHYSICAL EXAMINATION:   Blood pressure 138/82, pulse 72, temperature 96.9  F (36.1  C), temperature source Tympanic, resp. rate 16, height 1.626 m (5' 4\"), weight 88.2 kg (194 lb 8 oz), last menstrual period 08/05/2016, SpO2 98 %, not currently " breastfeeding.    ECOG 0    GENERAL APPEARANCE:  He young woman, looks like her stated age, very upbeat, not in acute distress.   HEENT: The patient is normocephalic, atraumatic. Pupils are equally reactive to light.  Sclerae are anicteric. erythematous oral mucosa.  - pharynx.  No oral thrush.   NECK:  Supple.  No jugular venous distention.  Thyroid is not palpable.   LYMPH NODES:  Superficial lymphadenopathy is not appreciable in the bilateral cervical, supraclavicular, axillary or inguinal areas.   CARDIOVASCULAR:  S1, S2 regular with no murmurs or gallops.  No carotid or abdominal bruits.   PULMONARY:  Lungs are clear to auscultation and percussion bilaterally.  There is no wheezing or rhonchi.   GASTROINTESTINAL:  Abdomen is soft, nontender.  No hepatosplenomegaly.  No signs of ascites.  No mass appreciable.   MUSCULOSKELETAL/EXTREMITIES:  No edema.  No cyanotic changes.  No signs of joint deformity.  No lymphedema.   NEUROLOGIC:  Cranial nerves II-XII are grossly intact.  Sensation intact.  Muscle strength and muscle tone symmetrical, 5/5 throughout.   BACK:  No spinal or paraspinal tenderness.  No CVA tenderness.   SKIN:  No petechiae.  No rash.  No signs of cellulitis.   BREASTS:  Bilateral breast exam reviewed.  Bilateral mastectomies scars well healed. She is partially expanded has marked erythematous changes on right side from RT.      LABORATORY DATA REVIEWED  CBC DIFF/CMP/marker are good.       Current Image data reviewed  PET 12/2018 - negative    CT c/a/p 6/2018 - negative. A 1.2 cm cyst or nodule in the left lobe of thyroid gland is Unchanged. Abdomen: A 2 mm nonobstructing calculus in the left kidney is again  seen.      Old data reviewed with summary  7/2017 double mastectomies at [facility]. Right side: no residual invasive carcinoma, +grade III DCIS, 1mm in greatest dimension. 5 sLN all negative. She had ypTis(DCIS)pN0 disease. Left breast no cancer.       CT body 11/2017: no mets.     Breast MRI at  [facility] post DD AC 3/2017:   1. Improvement in biopsy-proven RIGHT-sided breast cancer at 10 o'clock. The malignancy has decreased in size (2.4 cm versus 3.3 cm previously)and demonstrates more favorable enhancement kinetics.     2.  No residual enlarged RIGHT axillary lymph nodes.    1/2017 baseline Echo LV function nl, EF 60-65%     PET 1/2017  1. Hypermetabolic mass in the right breast consistent with known malignancy.  2. Two enlarged hypermetabolic metastatic lymph nodes in the right axilla.  3. No additional worrisome hypermetabolic areas in the neck, chest, abdomen, or pelvis.      DEXA scan from 03/2016 identified mild osteopenia lumbar spine        ASSESSMENT AND PLAN:    1.   12/2016  dx breast cancer, cT2N1 disesae.      S/p DD AC and wkly taxol, carbo was used partially dropped due to hypersensitivity reaction.   She had pCR for invasive cancer with mastectomies in 7/2017.   S/p RT till 11/2017.     She had BSO 11/2017. She did not tolerate Arimdiex off it after 1 month.   She started tamoxifen in 3/2018.     I advise her to Rethink about AI. She declines.    She demands to have CT scan or PET with her follow up visit.   I explained to her the ASCO guideline in terms how to follow up breast cancer.     2.  hot flushes - advice Effexor, this has helped a lot.     3. Overweight - weight loss is meaningful for her in terms of rate of recurrence. She is interested in and enrolled in BWEL program.     4. Neuropathy on fingers -advice gabapentin.     5. Osteopenia as baseline by dexa in 2016 - advice vit D.  She is due repeat Dexa.   We discussed the bisphosphonate use data. She has interest in it.     Again, thank you for allowing me to participate in the care of your patient.        Sincerely,        Maribel Jin MD, MD

## 2019-03-11 NOTE — PROGRESS NOTES
"Oncology Rooming Note    March 11, 2019 2:38 PM   Diana Wilson is a 38 year old female who presents for:    Chief Complaint   Patient presents with     Oncology Clinic Visit     3 month recheck Malignant neoplasm of upper-outer quadrant of right breast in female, estrogen receptor positive, review Labs      Initial Vitals: /82 (BP Location: Right arm, Patient Position: Sitting, Cuff Size: Adult Regular)   Pulse 72   Temp 96.9  F (36.1  C) (Tympanic)   Resp 16   Ht 1.626 m (5' 4\")   Wt 88.2 kg (194 lb 8 oz)   LMP 08/05/2016 (Exact Date)   SpO2 98%   BMI 33.39 kg/m   Estimated body mass index is 33.39 kg/m  as calculated from the following:    Height as of this encounter: 1.626 m (5' 4\").    Weight as of this encounter: 88.2 kg (194 lb 8 oz). Body surface area is 2 meters squared.  No Pain (0) Comment: Data Unavailable   Patient's last menstrual period was 08/05/2016 (exact date).  Allergies reviewed: Yes  Medications reviewed: Yes    Medications: MEDICATION REFILLS NEEDED TODAY. Provider was notified.  Pharmacy name entered into [employer]: [facility] [place] [place] - [id number]    Clinical concerns:   3 month recheck Malignant neoplasm of upper-outer quadrant of right breast in female, estrogen receptor positive, review Labs       Sondra Medina CMA              "

## 2019-03-11 NOTE — PROGRESS NOTES
"ONCOLOGY Follow up visit     COMPLAINT AND REASON FOR VISIT:    12/2016 diagnosed right breast cancer LN positive disease        HPI  She presented at age 36 breast lump in her right armpit and right breast in 10/2016.   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  ER/SD 99% positive, HER-2/kelle negative, associated with high-grade DCIS.    Right axillary ultrasound-guided biopsy indicating metastatic carcinoma positive for spread from breast primary, ER/SD both negative.  HER-2/kelle is negative.      She had clinical T2N1 disease. She made informed decision to proceed with neoadjuvant DDAC C1D1 1/16/2017. She has good clinical response post DD AC x4. Then went on to have wkly taxol + carbo  with informed decision in March.   She had carbo hypersensitivity reaction in early May (during C3). It was d/c after.   She finished chemo end of May 2017.     She was tested negative for BRCA1/2 with [facility].     7/2017 she had double mastectomies at [facility]. Right side found no residual invasive carcinoma, +grade III DCIS, 1mm in greatest dimension. 5 sLN all negative. She had ypTis(DCIS)pN0 disease. Left breast no cancer.     She finished post mastectomy RT in 11/2017.    She had BSO 11/2017. Then started Arimidex in 1/2018. She quit in Feb 2018 due to side effects and willing to try tamoxifen in 3/2018.     She is also on [employer] trial for weight loss.     PAST MEDICAL HISTORY:  Restless legs, mild intermittent asthma, celiac disease diagnosed 02/2016, hx of TIFFANY     SOCIAL HISTORY:  Works part-time.  Lives with her .  They have 2 kids, first pregnancy age 22.  She did not do breast feeding because of bilateral breast reduction.  She does office work.      FAMILY HISTORY:  \"Full of cancer\" from the mother's side including colon, lung, pancreatic, gastric cancer.  According to the patient none of them survive older than 60 years old.    " "  REVIEW OF SYSTEMS  She is very upbeat, Calmer.   + hot flushes.   She still has neuropathy on finger.   She is trying to loose weight.      PHYSICAL EXAMINATION:   Blood pressure 138/82, pulse 72, temperature 96.9  F (36.1  C), temperature source Tympanic, resp. rate 16, height 1.626 m (5' 4\"), weight 88.2 kg (194 lb 8 oz), last menstrual period 08/05/2016, SpO2 98 %, not currently breastfeeding.    ECOG 0    GENERAL APPEARANCE:  He young woman, looks like her stated age, very upbeat, not in acute distress.   HEENT: The patient is normocephalic, atraumatic. Pupils are equally reactive to light.  Sclerae are anicteric. erythematous oral mucosa.  - pharynx.  No oral thrush.   NECK:  Supple.  No jugular venous distention.  Thyroid is not palpable.   LYMPH NODES:  Superficial lymphadenopathy is not appreciable in the bilateral cervical, supraclavicular, axillary or inguinal areas.   CARDIOVASCULAR:  S1, S2 regular with no murmurs or gallops.  No carotid or abdominal bruits.   PULMONARY:  Lungs are clear to auscultation and percussion bilaterally.  There is no wheezing or rhonchi.   GASTROINTESTINAL:  Abdomen is soft, nontender.  No hepatosplenomegaly.  No signs of ascites.  No mass appreciable.   MUSCULOSKELETAL/EXTREMITIES:  No edema.  No cyanotic changes.  No signs of joint deformity.  No lymphedema.   NEUROLOGIC:  Cranial nerves II-XII are grossly intact.  Sensation intact.  Muscle strength and muscle tone symmetrical, 5/5 throughout.   BACK:  No spinal or paraspinal tenderness.  No CVA tenderness.   SKIN:  No petechiae.  No rash.  No signs of cellulitis.   BREASTS:  Bilateral breast exam reviewed.  Bilateral mastectomies scars well healed. She is partially expanded has marked erythematous changes on right side from RT.      LABORATORY DATA REVIEWED  CBC DIFF/CMP/marker are good.       Current Image data reviewed  PET 12/2018 - negative    CT c/a/p 6/2018 - negative. A 1.2 cm cyst or nodule in the left lobe of " thyroid gland is Unchanged. Abdomen: A 2 mm nonobstructing calculus in the left kidney is again  seen.      Old data reviewed with summary  7/2017 double mastectomies at [facility]. Right side: no residual invasive carcinoma, +grade III DCIS, 1mm in greatest dimension. 5 sLN all negative. She had ypTis(DCIS)pN0 disease. Left breast no cancer.       CT body 11/2017: no mets.     Breast MRI at [facility] post DD AC 3/2017:   1. Improvement in biopsy-proven RIGHT-sided breast cancer at 10 o'clock. The malignancy has decreased in size (2.4 cm versus 3.3 cm previously)and demonstrates more favorable enhancement kinetics.     2.  No residual enlarged RIGHT axillary lymph nodes.    1/2017 baseline Echo LV function nl, EF 60-65%     PET 1/2017  1. Hypermetabolic mass in the right breast consistent with known malignancy.  2. Two enlarged hypermetabolic metastatic lymph nodes in the right axilla.  3. No additional worrisome hypermetabolic areas in the neck, chest, abdomen, or pelvis.      DEXA scan from 03/2016 identified mild osteopenia lumbar spine        ASSESSMENT AND PLAN:    1.   12/2016  dx breast cancer, cT2N1 disesae.      S/p DD AC and wkly taxol, carbo was used partially dropped due to hypersensitivity reaction.   She had pCR for invasive cancer with mastectomies in 7/2017.   S/p RT till 11/2017.     She had BSO 11/2017. She did not tolerate Arimdiex off it after 1 month.   She started tamoxifen in 3/2018.     I advise her to Rethink about AI. She declines.    She demands to have CT scan or PET with her follow up visit.   I explained to her the ASCO guideline in terms how to follow up breast cancer.     2.  hot flushes - advice Effexor, this has helped a lot.     3. Overweight - weight loss is meaningful for her in terms of rate of recurrence. She is interested in and enrolled in BWEL program.     4. Neuropathy on fingers -advice gabapentin.     5. Osteopenia as baseline by dexa in 2016 - advice vit D.  She is due repeat  Dexa.   We discussed the bisphosphonate use data. She has interest in it.

## 2019-03-11 NOTE — PATIENT INSTRUCTIONS
Dr. Jin would like you to have a Dexa scan.     We would like to see you back in 3 months for a follow up appointment with labs prior.     When you are in need of a refill, please call your pharmacy and they will send us a request.      Copy of appointments, and after visit summary (AVS) given to patient.      If you have any questions please call Josette Smalls RN, BSN Oncology Hematology  [facility] [phone]. For questions after business hours, or on holidays/weekends, please call our after hours Nurse Triage line [phone]. Thank you.         3 months f/u with labs/ct. dexa to be done.

## 2019-04-17 DIAGNOSIS — F33.0 MAJOR DEPRESSIVE DISORDER, RECURRENT EPISODE, MILD (H): ICD-10-CM

## 2019-04-17 RX ORDER — VENLAFAXINE 75 MG/1
75 TABLET ORAL 2 TIMES DAILY
Qty: 180 TABLET | Refills: 1 | Status: SHIPPED | OUTPATIENT
Start: 2019-04-17 | End: 2019-10-27

## 2019-04-17 NOTE — TELEPHONE ENCOUNTER
venlafaxine (EFFEXOR) 75 MG tablet      Last Written Prescription Date:  10/5/18  Last Fill Quantity: 180,   # refills: 1  Last Office Visit: 10/5/18  Future Office visit:    Next 5 appointments (look out 90 days)    Jun 11, 2019  2:30 PM CDT  Return Visit with Maribel Jin MD  [facility] ([facility]) [facility]  [address]  [facility] [address]  [phone]           Requested Prescriptions   Pending Prescriptions Disp Refills     venlafaxine (EFFEXOR) 75 MG tablet 180 tablet 1     Sig: Take 1 tablet (75 mg) by mouth 2 times daily       There is no refill protocol information for this order

## 2019-04-23 ENCOUNTER — HOSPITAL ENCOUNTER (OUTPATIENT)
Dept: BONE DENSITY | Facility: CLINIC | Age: 39
Discharge: HOME | End: 2019-04-23
Attending: INTERNAL MEDICINE | Admitting: INTERNAL MEDICINE
Payer: COMMERCIAL

## 2019-04-23 DIAGNOSIS — C50.411 MALIGNANT NEOPLASM OF UPPER-OUTER QUADRANT OF RIGHT BREAST IN FEMALE, ESTROGEN RECEPTOR POSITIVE (H): ICD-10-CM

## 2019-04-23 DIAGNOSIS — Z17.0 MALIGNANT NEOPLASM OF UPPER-OUTER QUADRANT OF RIGHT BREAST IN FEMALE, ESTROGEN RECEPTOR POSITIVE (H): ICD-10-CM

## 2019-04-23 DIAGNOSIS — M85.89 OSTEOPENIA OF MULTIPLE SITES: ICD-10-CM

## 2019-04-23 DIAGNOSIS — Z78.0 MENOPAUSE: ICD-10-CM

## 2019-04-23 PROCEDURE — 77080 DXA BONE DENSITY AXIAL: CPT

## 2019-04-23 RX ORDER — ZOLEDRONIC ACID 0.04 MG/ML
4 INJECTION, SOLUTION INTRAVENOUS ONCE
Status: CANCELLED | OUTPATIENT
Start: 2019-04-29

## 2019-04-23 NOTE — RESULT ENCOUNTER NOTE
Called and reviewed results with the patient per Dr. Jin. Patient had no further questions or concerns at this time and also verbalized understanding. Direct line provided if any further questions/concerns arise.

## 2019-04-29 ENCOUNTER — HOSPITAL ENCOUNTER (OUTPATIENT)
Dept: LAB | Facility: CLINIC | Age: 39
Discharge: HOME | End: 2019-04-29
Attending: INTERNAL MEDICINE | Admitting: INTERNAL MEDICINE
Payer: COMMERCIAL

## 2019-04-29 ENCOUNTER — INFUSION THERAPY VISIT (OUTPATIENT)
Dept: INFUSION THERAPY | Facility: CLINIC | Age: 39
End: 2019-04-29
Attending: INTERNAL MEDICINE
Payer: COMMERCIAL

## 2019-04-29 VITALS — DIASTOLIC BLOOD PRESSURE: 85 MMHG | HEART RATE: 83 BPM | TEMPERATURE: 96.2 F | SYSTOLIC BLOOD PRESSURE: 119 MMHG

## 2019-04-29 DIAGNOSIS — Z17.0 MALIGNANT NEOPLASM OF UPPER-OUTER QUADRANT OF RIGHT BREAST IN FEMALE, ESTROGEN RECEPTOR POSITIVE (H): ICD-10-CM

## 2019-04-29 DIAGNOSIS — C50.411 MALIGNANT NEOPLASM OF UPPER-OUTER QUADRANT OF RIGHT BREAST IN FEMALE, ESTROGEN RECEPTOR POSITIVE (H): ICD-10-CM

## 2019-04-29 DIAGNOSIS — M85.89 OSTEOPENIA OF MULTIPLE SITES: Primary | ICD-10-CM

## 2019-04-29 LAB
ALBUMIN SERPL-MCNC: 3.6 G/DL (ref 3.4–5)
CALCIUM SERPL-MCNC: 8.8 MG/DL (ref 8.5–10.1)
CREAT SERPL-MCNC: 0.72 MG/DL (ref 0.52–1.04)
GFR SERPL CREATININE-BSD FRML MDRD: >90 ML/MIN/{1.73_M2}

## 2019-04-29 PROCEDURE — 25000128 H RX IP 250 OP 636: Performed by: INTERNAL MEDICINE

## 2019-04-29 PROCEDURE — 36415 COLL VENOUS BLD VENIPUNCTURE: CPT

## 2019-04-29 PROCEDURE — 96365 THER/PROPH/DIAG IV INF INIT: CPT

## 2019-04-29 PROCEDURE — 82040 ASSAY OF SERUM ALBUMIN: CPT | Performed by: INTERNAL MEDICINE

## 2019-04-29 PROCEDURE — 36415 COLL VENOUS BLD VENIPUNCTURE: CPT | Performed by: INTERNAL MEDICINE

## 2019-04-29 PROCEDURE — 82565 ASSAY OF CREATININE: CPT | Performed by: INTERNAL MEDICINE

## 2019-04-29 PROCEDURE — 82310 ASSAY OF CALCIUM: CPT | Performed by: INTERNAL MEDICINE

## 2019-04-29 RX ORDER — ZOLEDRONIC ACID 0.04 MG/ML
4 INJECTION, SOLUTION INTRAVENOUS ONCE
Status: COMPLETED | OUTPATIENT
Start: 2019-04-29 | End: 2019-04-29

## 2019-04-29 RX ADMIN — ZOLEDRONIC ACID 4 MG: 0.04 INJECTION, SOLUTION INTRAVENOUS at 08:27

## 2019-04-29 RX ADMIN — SODIUM CHLORIDE 250 ML: 9 INJECTION, SOLUTION INTRAVENOUS at 08:27

## 2019-04-29 NOTE — PROGRESS NOTES
Infusion Nursing Note:  Diana Wilson presents today for labs/Zometa.    Patient seen by provider today: No   present during visit today: Not Applicable.    Note: N/A.    Intravenous Access:  Peripheral IV placed.    Treatment Conditions:  Results reviewed, labs MET treatment parameters, ok to proceed with treatment.      Post Infusion Assessment:  Patient tolerated infusion without incident.  No evidence of extravasations.  Access discontinued per protocol.       Discharge Plan:   Patient discharged in stable condition accompanied by: .  Departure Mode: Ambulatory.    Verito Levy RN

## 2019-05-22 ENCOUNTER — TELEPHONE (OUTPATIENT)
Dept: FAMILY MEDICINE | Facility: CLINIC | Age: 39
End: 2019-05-22

## 2019-05-22 NOTE — TELEPHONE ENCOUNTER
Panel Management Review      Patient has the following on her problem list:     Depression / Dysthymia review    Measure:  Needs PHQ-9 score of 4 or less during index window.  Administer PHQ-9 and if score is 5 or more, send encounter to provider for next steps.    5 - 7 month window range: n/a    PHQ-9 SCORE 3/8/2018 8/16/2018 10/5/2018   PHQ-9 Total Score - - -   PHQ-9 Total Score 3 1 2       If PHQ-9 recheck is 5 or more, route to provider for next steps.    Patient is due for:  PHQ9 and DAP    Asthma review     ACT Total Scores 10/5/2018   ACT TOTAL SCORE -   ASTHMA ER VISITS -   ASTHMA HOSPITALIZATIONS -   ACT TOTAL SCORE (Goal Greater than or Equal to 20) 15   In the past 12 months, how many times did you visit the emergency room for your asthma without being admitted to the hospital? 0   In the past 12 months, how many times were you hospitalized overnight because of your asthma? 0      1. Is Asthma diagnosis on the Problem List? Yes    2. Is Asthma listed on Health Maintenance? Yes    3. Patient is due for:  ACT      Composite cancer screening  Chart review shows that this patient is due/due soon for the following None  Summary:    Patient is due/failing the following:   ACT, DAP and PHQ9    Action needed:   Patient needs to do ACT. and Patient needs to do PHQ9.    Type of outreach:    Sent [employer] message.    Questions for provider review:    None                                                                                                                                    Nery Wright CMA       Chart routed to Care Team .

## 2019-06-03 ENCOUNTER — HOSPITAL ENCOUNTER (OUTPATIENT)
Dept: CT IMAGING | Facility: CLINIC | Age: 39
Discharge: HOME | End: 2019-06-03
Attending: INTERNAL MEDICINE | Admitting: INTERNAL MEDICINE
Payer: COMMERCIAL

## 2019-06-03 DIAGNOSIS — Z17.0 MALIGNANT NEOPLASM OF UPPER-OUTER QUADRANT OF RIGHT BREAST IN FEMALE, ESTROGEN RECEPTOR POSITIVE (H): ICD-10-CM

## 2019-06-03 DIAGNOSIS — C50.411 MALIGNANT NEOPLASM OF UPPER-OUTER QUADRANT OF RIGHT BREAST IN FEMALE, ESTROGEN RECEPTOR POSITIVE (H): ICD-10-CM

## 2019-06-03 PROCEDURE — 71260 CT THORAX DX C+: CPT

## 2019-06-03 PROCEDURE — 25000128 H RX IP 250 OP 636: Performed by: RADIOLOGY

## 2019-06-03 PROCEDURE — 74177 CT ABD & PELVIS W/CONTRAST: CPT

## 2019-06-03 PROCEDURE — 25000125 ZZHC RX 250: Performed by: RADIOLOGY

## 2019-06-03 RX ORDER — IOPAMIDOL 755 MG/ML
95 INJECTION, SOLUTION INTRAVASCULAR ONCE
Status: COMPLETED | OUTPATIENT
Start: 2019-06-03 | End: 2019-06-03

## 2019-06-03 RX ADMIN — IOPAMIDOL 95 ML: 755 INJECTION, SOLUTION INTRAVENOUS at 09:04

## 2019-06-03 RX ADMIN — SODIUM CHLORIDE 64 ML: 9 INJECTION, SOLUTION INTRAVENOUS at 09:04

## 2019-06-05 DIAGNOSIS — Z17.0 MALIGNANT NEOPLASM OF UPPER-OUTER QUADRANT OF RIGHT BREAST IN FEMALE, ESTROGEN RECEPTOR POSITIVE (H): ICD-10-CM

## 2019-06-05 DIAGNOSIS — C50.411 MALIGNANT NEOPLASM OF UPPER-OUTER QUADRANT OF RIGHT BREAST IN FEMALE, ESTROGEN RECEPTOR POSITIVE (H): ICD-10-CM

## 2019-06-05 LAB
ALBUMIN SERPL-MCNC: 3.9 G/DL (ref 3.4–5)
ALP SERPL-CCNC: 64 U/L (ref 40–150)
ALT SERPL W P-5'-P-CCNC: 21 U/L (ref 0–50)
ANION GAP SERPL CALCULATED.3IONS-SCNC: 4 MMOL/L (ref 3–14)
AST SERPL W P-5'-P-CCNC: 12 U/L (ref 0–45)
BASOPHILS # BLD AUTO: 0 10E9/L (ref 0–0.2)
BASOPHILS NFR BLD AUTO: 0.3 %
BILIRUB SERPL-MCNC: 0.3 MG/DL (ref 0.2–1.3)
BUN SERPL-MCNC: 9 MG/DL (ref 7–30)
CALCIUM SERPL-MCNC: 9 MG/DL (ref 8.5–10.1)
CANCER AG27-29 SERPL-ACNC: 19 U/ML (ref 0–39)
CHLORIDE SERPL-SCNC: 109 MMOL/L (ref 94–109)
CO2 SERPL-SCNC: 25 MMOL/L (ref 20–32)
CREAT SERPL-MCNC: 0.68 MG/DL (ref 0.52–1.04)
DIFFERENTIAL METHOD BLD: NORMAL
EOSINOPHIL # BLD AUTO: 0.1 10E9/L (ref 0–0.7)
EOSINOPHIL NFR BLD AUTO: 2.1 %
ERYTHROCYTE [DISTWIDTH] IN BLOOD BY AUTOMATED COUNT: 13 % (ref 10–15)
GFR SERPL CREATININE-BSD FRML MDRD: >90 ML/MIN/{1.73_M2}
GLUCOSE SERPL-MCNC: 110 MG/DL (ref 70–99)
HCT VFR BLD AUTO: 40.2 % (ref 35–47)
HGB BLD-MCNC: 13.4 G/DL (ref 11.7–15.7)
LYMPHOCYTES # BLD AUTO: 1.5 10E9/L (ref 0.8–5.3)
LYMPHOCYTES NFR BLD AUTO: 26 %
MCH RBC QN AUTO: 30.2 PG (ref 26.5–33)
MCHC RBC AUTO-ENTMCNC: 33.3 G/DL (ref 31.5–36.5)
MCV RBC AUTO: 91 FL (ref 78–100)
MONOCYTES # BLD AUTO: 0.3 10E9/L (ref 0–1.3)
MONOCYTES NFR BLD AUTO: 5.7 %
NEUTROPHILS # BLD AUTO: 3.8 10E9/L (ref 1.6–8.3)
NEUTROPHILS NFR BLD AUTO: 65.9 %
PLATELET # BLD AUTO: 249 10E9/L (ref 150–450)
POTASSIUM SERPL-SCNC: 4 MMOL/L (ref 3.4–5.3)
PROT SERPL-MCNC: 7 G/DL (ref 6.8–8.8)
RBC # BLD AUTO: 4.43 10E12/L (ref 3.8–5.2)
SODIUM SERPL-SCNC: 138 MMOL/L (ref 133–144)
WBC # BLD AUTO: 5.8 10E9/L (ref 4–11)

## 2019-06-05 PROCEDURE — 36415 COLL VENOUS BLD VENIPUNCTURE: CPT | Performed by: INTERNAL MEDICINE

## 2019-06-05 PROCEDURE — 86300 IMMUNOASSAY TUMOR CA 15-3: CPT | Performed by: INTERNAL MEDICINE

## 2019-06-05 PROCEDURE — 85025 COMPLETE CBC W/AUTO DIFF WBC: CPT | Performed by: INTERNAL MEDICINE

## 2019-06-05 PROCEDURE — 80053 COMPREHEN METABOLIC PANEL: CPT | Performed by: INTERNAL MEDICINE

## 2019-06-11 ENCOUNTER — ONCOLOGY VISIT (OUTPATIENT)
Dept: ONCOLOGY | Facility: CLINIC | Age: 39
End: 2019-06-11
Attending: INTERNAL MEDICINE
Payer: COMMERCIAL

## 2019-06-11 ENCOUNTER — ALLIED HEALTH/NURSE VISIT (OUTPATIENT)
Dept: ONCOLOGY | Facility: CLINIC | Age: 39
End: 2019-06-11

## 2019-06-11 VITALS
DIASTOLIC BLOOD PRESSURE: 87 MMHG | TEMPERATURE: 97.1 F | OXYGEN SATURATION: 93 % | HEIGHT: 64 IN | RESPIRATION RATE: 16 BRPM | WEIGHT: 197.1 LBS | SYSTOLIC BLOOD PRESSURE: 133 MMHG | HEART RATE: 77 BPM | BODY MASS INDEX: 33.65 KG/M2

## 2019-06-11 DIAGNOSIS — M85.89 OSTEOPENIA OF MULTIPLE SITES: ICD-10-CM

## 2019-06-11 DIAGNOSIS — E66.3 OVERWEIGHT: ICD-10-CM

## 2019-06-11 DIAGNOSIS — Z17.0 MALIGNANT NEOPLASM OF UPPER-OUTER QUADRANT OF RIGHT BREAST IN FEMALE, ESTROGEN RECEPTOR POSITIVE (H): Primary | ICD-10-CM

## 2019-06-11 DIAGNOSIS — N95.1 HOT FLUSHES, PERIMENOPAUSAL: Primary | ICD-10-CM

## 2019-06-11 DIAGNOSIS — C50.411 MALIGNANT NEOPLASM OF UPPER-OUTER QUADRANT OF RIGHT BREAST IN FEMALE, ESTROGEN RECEPTOR POSITIVE (H): ICD-10-CM

## 2019-06-11 DIAGNOSIS — C50.411 MALIGNANT NEOPLASM OF UPPER-OUTER QUADRANT OF RIGHT BREAST IN FEMALE, ESTROGEN RECEPTOR POSITIVE (H): Primary | ICD-10-CM

## 2019-06-11 DIAGNOSIS — G62.9 NEUROPATHY: ICD-10-CM

## 2019-06-11 DIAGNOSIS — Z17.0 MALIGNANT NEOPLASM OF UPPER-OUTER QUADRANT OF RIGHT BREAST IN FEMALE, ESTROGEN RECEPTOR POSITIVE (H): ICD-10-CM

## 2019-06-11 PROCEDURE — 99215 OFFICE O/P EST HI 40 MIN: CPT | Performed by: INTERNAL MEDICINE

## 2019-06-11 RX ORDER — ZOLEDRONIC ACID 0.04 MG/ML
4 INJECTION, SOLUTION INTRAVENOUS ONCE
Status: CANCELLED | OUTPATIENT
Start: 2019-10-26

## 2019-06-11 RX ORDER — TAMOXIFEN CITRATE 20 MG/1
20 TABLET ORAL DAILY
Qty: 90 TABLET | Refills: 1 | Status: SHIPPED | OUTPATIENT
Start: 2019-06-11 | End: 2019-10-28

## 2019-06-11 ASSESSMENT — PAIN SCALES - GENERAL: PAINLEVEL: NO PAIN (0)

## 2019-06-11 ASSESSMENT — MIFFLIN-ST. JEOR
SCORE: 1553.84
SCORE: 1554.29

## 2019-06-11 NOTE — PROGRESS NOTES
"ONCOLOGY Follow up visit     COMPLAINT AND REASON FOR VISIT:    12/2016 diagnosed right breast cancer LN positive disease        HPI  She presented at age 36 breast lump in her right armpit and right breast in 10/2016.   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  ER/VA 99% positive, HER-2/kelle negative, associated with high-grade DCIS.    Right axillary ultrasound-guided biopsy indicating metastatic carcinoma positive for spread from breast primary, ER/VA both negative.  HER-2/kelle is negative.      She had clinical T2N1 disease. She made informed decision to proceed with neoadjuvant DDAC C1D1 1/16/2017. She has good clinical response post DD AC x4. Then went on to have wkly taxol + carbo  with informed decision in March.   She had carbo hypersensitivity reaction in early May (during C3). It was d/c after.   She finished chemo end of May 2017.     She was tested negative for BRCA1/2 with [facility].     7/2017 she had double mastectomies at [facility]. Right side found no residual invasive carcinoma, +grade III DCIS, 1mm in greatest dimension. 5 sLN all negative. She had ypTis(DCIS)pN0 disease. Left breast no cancer.     She finished post mastectomy RT in 11/2017.    She had BSO 11/2017. Then started Arimidex in 1/2018. She quit in Feb 2018 due to side effects and willing to try tamoxifen in 3/2018.     She is also on ELL trial for weight loss.     PAST MEDICAL HISTORY:  Restless legs, mild intermittent asthma, celiac disease diagnosed 02/2016, hx of TIFFANY     SOCIAL HISTORY:  Works part-time.  Lives with her .  They have 2 kids, first pregnancy age 21.  She did not do breast feeding because of bilateral breast reduction.  She does office work.      FAMILY HISTORY:  \"Full of cancer\" from the mother's side including colon, lung, pancreatic, gastric cancer.  According to the patient none of them survive older than 60 years old.    " "  REVIEW OF SYSTEMS  She is very upbeat, Calmer.   + hot flushes on effexor.  She still has neuropathy on finger.   She lost 25 lbs.      PHYSICAL EXAMINATION:   Blood pressure 133/87, pulse 77, temperature 97.1  F (36.2  C), temperature source Tympanic, resp. rate 16, height 1.626 m (5' 4.02\"), weight 89.4 kg (197 lb 1.6 oz), last menstrual period 08/05/2016, SpO2 93 %, not currently breastfeeding.    ECOG 0    GENERAL APPEARANCE:  He young woman, looks like her stated age, very upbeat, not in acute distress.   HEENT: The patient is normocephalic, atraumatic. Pupils are equally reactive to light.  Sclerae are anicteric. erythematous oral mucosa.  - pharynx.  No oral thrush.   NECK:  Supple.  No jugular venous distention.  Thyroid is not palpable.   LYMPH NODES:  Superficial lymphadenopathy is not appreciable in the bilateral cervical, supraclavicular, axillary or inguinal areas.   CARDIOVASCULAR:  S1, S2 regular with no murmurs or gallops.  No carotid or abdominal bruits.   PULMONARY:  Lungs are clear to auscultation and percussion bilaterally.  There is no wheezing or rhonchi.   GASTROINTESTINAL:  Abdomen is soft, nontender.  No hepatosplenomegaly.  No signs of ascites.  No mass appreciable.   MUSCULOSKELETAL/EXTREMITIES:  No edema.  No cyanotic changes.  No signs of joint deformity.  No lymphedema.   NEUROLOGIC:  Cranial nerves II-XII are grossly intact.  Sensation intact.  Muscle strength and muscle tone symmetrical, 5/5 throughout.   BACK:  No spinal or paraspinal tenderness.  No CVA tenderness.   SKIN:  No petechiae.  No rash.  No signs of cellulitis.   BREASTS:  Bilateral breast exam reviewed.  Bilateral mastectomies scars well healed. She has prominent mastectomy scars.        LABORATORY DATA REVIEWED  CBC DIFF/CMP/marker are good.       Current Image data reviewed  dexa 4/2019 - mild osteopenia  CT body 6/2019 - negative      Old data reviewed with summary  PET 12/2018 - negative    CT c/a/p 6/2018 - " negative. A 1.2 cm cyst or nodule in the left lobe of thyroid gland is Unchanged. Abdomen: A 2 mm nonobstructing calculus in the left kidney is again  seen.    DEXA scan from 03/2016 identified mild osteopenia lumbar spine        ASSESSMENT AND PLAN:    1.   12/2016  dx breast cancer, cT2N1 disesae.      S/p DD AC and wkly taxol, carbo was used partially dropped due to hypersensitivity reaction.   She had pCR for invasive cancer with mastectomies in 7/2017.   S/p RT till 11/2017.     She had BSO 11/2017. She did not tolerate Arimdiex off it after 1 month.   She started tamoxifen in 3/2018.     I advise her to Rethink about AI. She declines.    She demands to have CT scan or PET with her follow up visit.   I explained to her the ASCO guideline in terms how to follow up breast cancer.     She is on 4 months f/u with labs.     2.  hot flushes - advice Effexor, this has helped a lot.     3. Overweight - weight loss is meaningful for her in terms of rate of recurrence. She is interested in and enrolled in BWEL program.     4. Neuropathy on fingers -advice gabapentin.     5. Osteopenia as baseline by dexa in 2016 - advice vit D.  dexa found osteopenia in 4/2019.   She is advised on IV zometa.   We discussed the bisphosphonate use with ASCO guideline.

## 2019-06-11 NOTE — LETTER
6/11/2019         RE: Diana Wilson  [address] [facility] [id number]        Dear Colleague,    Thank you for referring your patient, Diana Wilson, to the [facility]. Please see a copy of my visit note below.    ONCOLOGY Follow up visit     COMPLAINT AND REASON FOR VISIT:    12/2016 diagnosed right breast cancer LN positive disease        HPI  She presented at age 36 breast lump in her right armpit and right breast in 10/2016.   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  ER/ND 99% positive, HER-2/kelle negative, associated with high-grade DCIS.    Right axillary ultrasound-guided biopsy indicating metastatic carcinoma positive for spread from breast primary, ER/ND both negative.  HER-2/kelle is negative.      She had clinical T2N1 disease. She made informed decision to proceed with neoadjuvant DDAC C1D1 1/16/2017. She has good clinical response post DD AC x4. Then went on to have wkly taxol + carbo  with informed decision in March.   She had carbo hypersensitivity reaction in early May (during C3). It was d/c after.   She finished chemo end of May 2017.     She was tested negative for BRCA1/2 with [facility].     7/2017 she had double mastectomies at [facility]. Right side found no residual invasive carcinoma, +grade III DCIS, 1mm in greatest dimension. 5 sLN all negative. She had ypTis(DCIS)pN0 disease. Left breast no cancer.     She finished post mastectomy RT in 11/2017.    She had BSO 11/2017. Then started Arimidex in 1/2018. She quit in Feb 2018 due to side effects and willing to try tamoxifen in 3/2018.     She is also on ELL trial for weight loss.     PAST MEDICAL HISTORY:  Restless legs, mild intermittent asthma, celiac disease diagnosed 02/2016, hx of TIFFANY     SOCIAL HISTORY:  Works part-time.  Lives with her .  They have 2 kids, first pregnancy age 22.  She did not do breast feeding because of  "bilateral breast reduction.  She does office work.      FAMILY HISTORY:  \"Full of cancer\" from the mother's side including colon, lung, pancreatic, gastric cancer.  According to the patient none of them survive older than 60 years old.      REVIEW OF SYSTEMS  She is very upbeat, Calmer.   + hot flushes on effexor.  She still has neuropathy on finger.   She lost 25 lbs.      PHYSICAL EXAMINATION:   Blood pressure 133/87, pulse 77, temperature 97.1  F (36.2  C), temperature source Tympanic, resp. rate 16, height 1.626 m (5' 4.02\"), weight 89.4 kg (197 lb 1.6 oz), last menstrual period 08/05/2016, SpO2 93 %, not currently breastfeeding.    ECOG 0    GENERAL APPEARANCE:  He young woman, looks like her stated age, very upbeat, not in acute distress.   HEENT: The patient is normocephalic, atraumatic. Pupils are equally reactive to light.  Sclerae are anicteric. erythematous oral mucosa.  - pharynx.  No oral thrush.   NECK:  Supple.  No jugular venous distention.  Thyroid is not palpable.   LYMPH NODES:  Superficial lymphadenopathy is not appreciable in the bilateral cervical, supraclavicular, axillary or inguinal areas.   CARDIOVASCULAR:  S1, S2 regular with no murmurs or gallops.  No carotid or abdominal bruits.   PULMONARY:  Lungs are clear to auscultation and percussion bilaterally.  There is no wheezing or rhonchi.   GASTROINTESTINAL:  Abdomen is soft, nontender.  No hepatosplenomegaly.  No signs of ascites.  No mass appreciable.   MUSCULOSKELETAL/EXTREMITIES:  No edema.  No cyanotic changes.  No signs of joint deformity.  No lymphedema.   NEUROLOGIC:  Cranial nerves II-XII are grossly intact.  Sensation intact.  Muscle strength and muscle tone symmetrical, 5/5 throughout.   BACK:  No spinal or paraspinal tenderness.  No CVA tenderness.   SKIN:  No petechiae.  No rash.  No signs of cellulitis.   BREASTS:  Bilateral breast exam reviewed.  Bilateral mastectomies scars well healed. She has prominent mastectomy scars. "        LABORATORY DATA REVIEWED  CBC DIFF/CMP/marker are good.       Current Image data reviewed  dexa 4/2019 - mild osteopenia  CT body 6/2019 - negative      Old data reviewed with summary  PET 12/2018 - negative    CT c/a/p 6/2018 - negative. A 1.2 cm cyst or nodule in the left lobe of thyroid gland is Unchanged. Abdomen: A 2 mm nonobstructing calculus in the left kidney is again  seen.    DEXA scan from 03/2016 identified mild osteopenia lumbar spine        ASSESSMENT AND PLAN:    1.   12/2016  dx breast cancer, cT2N1 disesae.      S/p DD AC and wkly taxol, carbo was used partially dropped due to hypersensitivity reaction.   She had pCR for invasive cancer with mastectomies in 7/2017.   S/p RT till 11/2017.     She had BSO 11/2017. She did not tolerate Arimdiex off it after 1 month.   She started tamoxifen in 3/2018.     I advise her to Rethink about AI. She declines.    She demands to have CT scan or PET with her follow up visit.   I explained to her the ASCO guideline in terms how to follow up breast cancer.     She is on 4 months f/u with labs.     2.  hot flushes - advice Effexor, this has helped a lot.     3. Overweight - weight loss is meaningful for her in terms of rate of recurrence. She is interested in and enrolled in [employer] program.     4. Neuropathy on fingers -advice gabapentin.     5. Osteopenia as baseline by dexa in 2016 - advice vit D.  dexa found osteopenia in 4/2019.   She is advised on IV zometa.   We discussed the bisphosphonate use with ASCO guideline.     Again, thank you for allowing me to participate in the care of your patient.        Sincerely,        Maribel Jin MD, MD

## 2019-06-11 NOTE — PATIENT INSTRUCTIONS
We would like to see you back in October for a follow up appointment with labs and Zometa prior.     When you are in need of a refill, please call your pharmacy and they will send us a request.      Copy of appointments, and after visit summary (AVS) given to patient.      If you have any questions please call Josette Smalls RN, BSN Oncology Hematology  [facility] [phone]. For questions after business hours, or on holidays/weekends, please call our after hours Nurse Triage line [phone]. Thank you.         F/u in Oct with labs and zometa.

## 2019-06-13 NOTE — NURSING NOTE
Diana Wilson was seen for the 18 month visit for the [employer] BWEL weight loss study. Diana completed QOL questionnaire and measurements (weight/waist/hip) were taken per Protocol. At this visit she denies any new fractures, sprains, tendon/ligament injuries, and orthopedic surgeries. We will review schedule for next visit at 24 months as Dr. Jin has requested to see her [id number] and this does not align with protocol. Diana is aware that I will contact her with a plan when visit is closer.

## 2019-06-21 DIAGNOSIS — G62.9 NEUROPATHY: ICD-10-CM

## 2019-06-21 RX ORDER — GABAPENTIN 300 MG/1
CAPSULE ORAL
Qty: 90 CAPSULE | Refills: 3 | Status: SHIPPED | OUTPATIENT
Start: 2019-06-21 | End: 2019-10-28

## 2019-06-21 NOTE — PROGRESS NOTES
Fax received from [facility] requesting a refill of Gabepentin on behalf of pt.  Last refill: 12.19.18  # 90 with 3 refills at [facility].  Last office visit:  06.11.18  Next office visit:  October 2019    This is an appropriate refill, and has been e-prescribed. Josette Smalls RN, BSN, OCN

## 2019-06-24 ENCOUNTER — TELEPHONE (OUTPATIENT)
Dept: NUTRITION | Facility: CLINIC | Age: 39
End: 2019-06-24

## 2019-06-24 NOTE — PROGRESS NOTES
Patient reported concerns about unintended weight loss or current weight  on the Oncology Distress Screening tool. Per chart review, the patient is enrolled in a [facility] program for weight loss and she reports a 25# weight loss so will not call the patient at this time.    Shahida Piña RD,LD  Clinical Dietitian

## 2019-07-31 DIAGNOSIS — Z91.09 ENVIRONMENTAL ALLERGIES: ICD-10-CM

## 2019-07-31 RX ORDER — MONTELUKAST SODIUM 10 MG/1
TABLET ORAL
Qty: 90 TABLET | Refills: 0 | Status: SHIPPED | OUTPATIENT
Start: 2019-07-31 | End: 2019-10-27

## 2019-07-31 NOTE — TELEPHONE ENCOUNTER
"MONTELUKAST SODIUM 10MG TABS      Last Written Prescription Date:  2/6/19  Last Fill Quantity: 90,   # refills: 1  Last Office Visit: 10/5/18  Future Office visit:       Requested Prescriptions   Pending Prescriptions Disp Refills     montelukast (SINGULAIR) 10 MG tablet [Pharmacy Med Name: MONTELUKAST SODIUM 10MG TABS] 90 tablet 1     Sig: TAKE ONE TABLET BY MOUTH AT BEDTIME       Leukotriene Inhibitors Protocol Failed - 7/31/2019  5:03 AM        Failed - Asthma control assessment score within normal limits in last 6 months     Please review ACT score.           Failed - Recent (6 mo) or future (30 days) visit within the authorizing provider's specialty     Patient had office visit in the last 6 months or has a visit in the next 30 days with authorizing provider or within the authorizing provider's specialty.  See \"Patient Info\" tab in inbasket, or \"Choose Columns\" in Meds & Orders section of the refill encounter.            Passed - Patient is age 12 or older     If patient is under 16, ok to refill using age based dosing.           Passed - Medication is active on med list          "

## 2019-10-23 DIAGNOSIS — C50.411 MALIGNANT NEOPLASM OF UPPER-OUTER QUADRANT OF RIGHT BREAST IN FEMALE, ESTROGEN RECEPTOR POSITIVE (H): ICD-10-CM

## 2019-10-23 DIAGNOSIS — Z17.0 MALIGNANT NEOPLASM OF UPPER-OUTER QUADRANT OF RIGHT BREAST IN FEMALE, ESTROGEN RECEPTOR POSITIVE (H): ICD-10-CM

## 2019-10-23 LAB
ALBUMIN SERPL-MCNC: 3.8 G/DL (ref 3.4–5)
ALP SERPL-CCNC: 63 U/L (ref 40–150)
ALT SERPL W P-5'-P-CCNC: 23 U/L (ref 0–50)
ANION GAP SERPL CALCULATED.3IONS-SCNC: 4 MMOL/L (ref 3–14)
AST SERPL W P-5'-P-CCNC: 18 U/L (ref 0–45)
BASOPHILS # BLD AUTO: 0 10E9/L (ref 0–0.2)
BASOPHILS NFR BLD AUTO: 0.2 %
BILIRUB SERPL-MCNC: 0.3 MG/DL (ref 0.2–1.3)
BUN SERPL-MCNC: 8 MG/DL (ref 7–30)
CALCIUM SERPL-MCNC: 8.8 MG/DL (ref 8.5–10.1)
CANCER AG27-29 SERPL-ACNC: 22 U/ML (ref 0–39)
CHLORIDE SERPL-SCNC: 107 MMOL/L (ref 94–109)
CO2 SERPL-SCNC: 28 MMOL/L (ref 20–32)
CREAT SERPL-MCNC: 0.78 MG/DL (ref 0.52–1.04)
DIFFERENTIAL METHOD BLD: NORMAL
EOSINOPHIL # BLD AUTO: 0.2 10E9/L (ref 0–0.7)
EOSINOPHIL NFR BLD AUTO: 2.6 %
ERYTHROCYTE [DISTWIDTH] IN BLOOD BY AUTOMATED COUNT: 12.9 % (ref 10–15)
GFR SERPL CREATININE-BSD FRML MDRD: >90 ML/MIN/{1.73_M2}
GLUCOSE SERPL-MCNC: 106 MG/DL (ref 70–99)
HCT VFR BLD AUTO: 38.8 % (ref 35–47)
HGB BLD-MCNC: 13.1 G/DL (ref 11.7–15.7)
LYMPHOCYTES # BLD AUTO: 1.6 10E9/L (ref 0.8–5.3)
LYMPHOCYTES NFR BLD AUTO: 25.3 %
MCH RBC QN AUTO: 30.7 PG (ref 26.5–33)
MCHC RBC AUTO-ENTMCNC: 33.8 G/DL (ref 31.5–36.5)
MCV RBC AUTO: 91 FL (ref 78–100)
MONOCYTES # BLD AUTO: 0.4 10E9/L (ref 0–1.3)
MONOCYTES NFR BLD AUTO: 6.6 %
NEUTROPHILS # BLD AUTO: 4.2 10E9/L (ref 1.6–8.3)
NEUTROPHILS NFR BLD AUTO: 65.3 %
PLATELET # BLD AUTO: 240 10E9/L (ref 150–450)
POTASSIUM SERPL-SCNC: 3.9 MMOL/L (ref 3.4–5.3)
PROT SERPL-MCNC: 7.1 G/DL (ref 6.8–8.8)
RBC # BLD AUTO: 4.27 10E12/L (ref 3.8–5.2)
SODIUM SERPL-SCNC: 139 MMOL/L (ref 133–144)
WBC # BLD AUTO: 6.5 10E9/L (ref 4–11)

## 2019-10-23 PROCEDURE — 85025 COMPLETE CBC W/AUTO DIFF WBC: CPT | Performed by: INTERNAL MEDICINE

## 2019-10-23 PROCEDURE — 80053 COMPREHEN METABOLIC PANEL: CPT | Performed by: INTERNAL MEDICINE

## 2019-10-23 PROCEDURE — 86300 IMMUNOASSAY TUMOR CA 15-3: CPT | Performed by: INTERNAL MEDICINE

## 2019-10-23 PROCEDURE — 36415 COLL VENOUS BLD VENIPUNCTURE: CPT | Performed by: INTERNAL MEDICINE

## 2019-10-27 DIAGNOSIS — F33.0 MAJOR DEPRESSIVE DISORDER, RECURRENT EPISODE, MILD (H): ICD-10-CM

## 2019-10-27 DIAGNOSIS — Z91.09 ENVIRONMENTAL ALLERGIES: ICD-10-CM

## 2019-10-28 ENCOUNTER — INFUSION THERAPY VISIT (OUTPATIENT)
Dept: INFUSION THERAPY | Facility: CLINIC | Age: 39
End: 2019-10-28
Attending: INTERNAL MEDICINE
Payer: COMMERCIAL

## 2019-10-28 ENCOUNTER — ONCOLOGY VISIT (OUTPATIENT)
Dept: ONCOLOGY | Facility: CLINIC | Age: 39
End: 2019-10-28
Attending: INTERNAL MEDICINE
Payer: COMMERCIAL

## 2019-10-28 VITALS
WEIGHT: 197 LBS | SYSTOLIC BLOOD PRESSURE: 135 MMHG | HEART RATE: 88 BPM | BODY MASS INDEX: 33.63 KG/M2 | DIASTOLIC BLOOD PRESSURE: 95 MMHG | OXYGEN SATURATION: 94 % | HEIGHT: 64 IN | RESPIRATION RATE: 16 BRPM | TEMPERATURE: 97.4 F

## 2019-10-28 DIAGNOSIS — Z17.0 MALIGNANT NEOPLASM OF UPPER-OUTER QUADRANT OF RIGHT BREAST IN FEMALE, ESTROGEN RECEPTOR POSITIVE (H): ICD-10-CM

## 2019-10-28 DIAGNOSIS — C50.411 MALIGNANT NEOPLASM OF UPPER-OUTER QUADRANT OF RIGHT BREAST IN FEMALE, ESTROGEN RECEPTOR POSITIVE (H): ICD-10-CM

## 2019-10-28 DIAGNOSIS — G62.9 NEUROPATHY: ICD-10-CM

## 2019-10-28 DIAGNOSIS — E66.3 OVERWEIGHT: ICD-10-CM

## 2019-10-28 DIAGNOSIS — M85.89 OSTEOPENIA OF MULTIPLE SITES: Primary | ICD-10-CM

## 2019-10-28 DIAGNOSIS — M85.89 OSTEOPENIA OF MULTIPLE SITES: ICD-10-CM

## 2019-10-28 DIAGNOSIS — N95.1 HOT FLUSHES, PERIMENOPAUSAL: Primary | ICD-10-CM

## 2019-10-28 PROCEDURE — 96365 THER/PROPH/DIAG IV INF INIT: CPT

## 2019-10-28 PROCEDURE — 99214 OFFICE O/P EST MOD 30 MIN: CPT | Performed by: INTERNAL MEDICINE

## 2019-10-28 PROCEDURE — 25000128 H RX IP 250 OP 636: Performed by: INTERNAL MEDICINE

## 2019-10-28 PROCEDURE — G0463 HOSPITAL OUTPT CLINIC VISIT: HCPCS | Mod: 25

## 2019-10-28 RX ORDER — GABAPENTIN 300 MG/1
CAPSULE ORAL
Qty: 90 CAPSULE | Refills: 3 | Status: SHIPPED | OUTPATIENT
Start: 2019-10-28 | End: 2019-11-19

## 2019-10-28 RX ORDER — MONTELUKAST SODIUM 10 MG/1
TABLET ORAL
Qty: 90 TABLET | Refills: 0 | Status: SHIPPED | OUTPATIENT
Start: 2019-10-28 | End: 2019-11-19

## 2019-10-28 RX ORDER — VENLAFAXINE 75 MG/1
TABLET ORAL
Qty: 180 TABLET | Refills: 0 | Status: SHIPPED | OUTPATIENT
Start: 2019-10-28 | End: 2019-11-19

## 2019-10-28 RX ORDER — TAMOXIFEN CITRATE 20 MG/1
20 TABLET ORAL DAILY
Qty: 90 TABLET | Refills: 1 | Status: SHIPPED | OUTPATIENT
Start: 2019-10-28 | End: 2020-04-27

## 2019-10-28 RX ORDER — ZOLEDRONIC ACID 0.04 MG/ML
4 INJECTION, SOLUTION INTRAVENOUS ONCE
Status: COMPLETED | OUTPATIENT
Start: 2019-10-28 | End: 2019-10-28

## 2019-10-28 RX ADMIN — SODIUM CHLORIDE 250 ML: 9 INJECTION, SOLUTION INTRAVENOUS at 15:52

## 2019-10-28 RX ADMIN — ZOLEDRONIC ACID 4 MG: 0.04 INJECTION, SOLUTION INTRAVENOUS at 15:52

## 2019-10-28 ASSESSMENT — PAIN SCALES - GENERAL: PAINLEVEL: NO PAIN (0)

## 2019-10-28 ASSESSMENT — MIFFLIN-ST. JEOR: SCORE: 1548.59

## 2019-10-28 NOTE — TELEPHONE ENCOUNTER
"VENLAFAXINE HCL 75MG TABS      Last Written Prescription Date:  4/17/19  Last Fill Quantity: 180,   # refills: 1  Last Office Visit: 10/5/18  Future Office visit:    Next 5 appointments (look out 90 days)    Oct 28, 2019  2:30 PM CDT  Return Visit with Maribel Jin MD  [facility]  [address]  [facility] [address]  [phone]           montelukast (SINGULAIR) 10 MG tablet      Last Written Prescription Date:  7/31/19  Last Fill Quantity: 90,   # refills: 0  Last Office Visit: 10/5/18  Future Office visit:    Next 5 appointments (look out 90 days)    Oct 28, 2019  2:30 PM CDT  Return Visit with Maribel Jin MD  [facility]  [address]  [facility] [place] [address]  [phone]           Requested Prescriptions   Pending Prescriptions Disp Refills     venlafaxine (EFFEXOR) 75 MG tablet [Pharmacy Med Name: VENLAFAXINE HCL 75MG TABS] 180 tablet 1     Sig: TAKE ONE TABLET BY MOUTH TWICE A DAY       Serotonin-Norepinephrine Reuptake Inhibitors  Failed - 10/27/2019  5:03 AM        Failed - PHQ-9 score of less than 5 in past 6 months     Please review last PHQ-9 score.           Failed - Recent (6 mo) or future (30 days) visit within the authorizing provider's specialty     Patient had office visit in the last 6 months or has a visit in the next 30 days with authorizing provider or within the authorizing provider's specialty.  See \"Patient Info\" tab in inbasket, or \"Choose Columns\" in Meds & Orders section of the refill encounter.            Passed - Blood pressure under 140/90 in past 12 months     BP Readings from Last 3 Encounters:   06/11/19 133/87   04/29/19 119/85   03/11/19 138/82                 Passed - Medication is active on med list        Passed - Patient is age 18 or older        Passed - No active pregnancy on record        Passed - Normal serum " "creatinine on file in past 12 months     Recent Labs   Lab Test 10/23/19  0839   CR 0.78             Passed - No positive pregnancy test in past 12 months        montelukast (SINGULAIR) 10 MG tablet [Pharmacy Med Name: MONTELUKAST SODIUM 10MG TABS] 90 tablet 0     Sig: TAKE ONE TABLET BY MOUTH AT BEDTIME       Leukotriene Inhibitors Protocol Failed - 10/27/2019  5:03 AM        Failed - Asthma control assessment score within normal limits in last 6 months     Please review ACT score.           Failed - Recent (6 mo) or future (30 days) visit within the authorizing provider's specialty     Patient had office visit in the last 6 months or has a visit in the next 30 days with authorizing provider or within the authorizing provider's specialty.  See \"Patient Info\" tab in inbasket, or \"Choose Columns\" in Meds & Orders section of the refill encounter.            Passed - Patient is age 12 or older     If patient is under 16, ok to refill using age based dosing.           Passed - Medication is active on med list          "

## 2019-10-28 NOTE — LETTER
"    10/28/2019         RE: Diana Wilson  1972 [address] [facility] [id number]        Dear Colleague,    Thank you for referring your patient, Diana Wilson, to the [facility]. Please see a copy of my visit note below.    Oncology Rooming Note    October 28, 2019 2:53 PM   Diana Wilson is a 39 year old female who presents for:    Chief Complaint   Patient presents with     Oncology Clinic Visit     6 month recheck Breast CA, review labs      Initial Vitals: BP (!) 135/95 (BP Location: Right arm, Patient Position: Sitting, Cuff Size: Adult Regular)   Pulse 88   Temp 97.4  F (36.3  C) (Tympanic)   Resp 16   Ht 1.626 m (5' 4\")   Wt 89.4 kg (197 lb)   LMP 08/05/2016 (Exact Date)   SpO2 94%   BMI 33.81 kg/m    Estimated body mass index is 33.81 kg/m  as calculated from the following:    Height as of this encounter: 1.626 m (5' 4\").    Weight as of this encounter: 89.4 kg (197 lb). Body surface area is 2.01 meters squared.  No Pain (0) Comment: Data Unavailable   Patient's last menstrual period was 08/05/2016 (exact date).  Allergies reviewed: Yes  Medications reviewed: Yes    Medications: Medication refills not needed today.  Pharmacy name entered into [facility]: [facility] [place] - [id number]      6 month recheck Breast CA, review labs.     Sondra Median CMA                ONCOLOGY Follow up visit       COMPLAINT AND REASON FOR VISIT:    12/2016 diagnosed right breast cancer LN positive disease        HISTORY OF ONCOLOGY ILLNESS  She presented at age 36 breast lump in her right armpit and right breast in 10/2016.   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  ER/SD 99% positive, HER-2/kelle negative, associated with high-grade DCIS.    Right axillary ultrasound-guided biopsy indicating metastatic carcinoma positive for spread from breast primary, ER/SD both negative.  HER-2/kelle is negative. " "     She had clinical T2N1 disease. She made informed decision to proceed with neoadjuvant DDAC C1D1 2017. She has good clinical response post DD AC x4. Then went on to have wkly taxol + carbo  with informed decision in March.   She had carbo hypersensitivity reaction in early May (during C3). It was d/c after.   She finished chemo end of May 2017.     She was tested negative for BRCA1/2 with [facility].     2017 she had double mastectomies at [facility]. Right side found no residual invasive carcinoma, +grade III DCIS, 1mm in greatest dimension. 5 sLN all negative. She had ypTis(DCIS)pN0 disease. Left breast no cancer.     She finished post mastectomy RT in 2017.    She had BSO 2017. Then started Arimidex in 2018. She quit in 2018 due to side effects and willing to try tamoxifen in 3/2018.     She is also on Omaze trial for weight loss.       INTERVAL HISTORY:  Since the patient's last visit with us, there is no hospital stay/surgery/new diagnosis made.      PAST MEDICAL HISTORY:  Restless legs, mild intermittent asthma, celiac disease diagnosed 2016, hx of TIFFANY     SOCIAL HISTORY:  Works part-time.  Lives with her .  They have 2 kids, first pregnancy age 22.  She did not do breast feeding because of bilateral breast reduction.  She does office work.      FAMILY HISTORY:  \"Full of cancer\" from the mother's side including colon, lung, pancreatic, gastric cancer.  According to the patient none of them survive older than 60 years old.   Grandmother  of cholangial carcinoma at age 94 in 2019.        REVIEW OF SYSTEMS  She is very upbeat, Calmer.   + hot flushes on effexor.  She still has neuropathy on bilateral toes  She lost 25 lbs total, then her weight is stabilized.  Reports she does not like her implants, is very uncomfortable.       PHYSICAL EXAMINATION:   Blood pressure (!) 135/95, pulse 88, temperature 97.4  F (36.3  C), temperature source Tympanic, resp. rate 16, height 1.626 m (5' " "4\"), weight 89.4 kg (197 lb), last menstrual period 08/05/2016, SpO2 94 %, not currently breastfeeding.    ECOG 0    GENERAL APPEARANCE:  He young woman, looks like her stated age, very upbeat, not in acute distress.   HEENT: The patient is normocephalic, atraumatic. Pupils are equally reactive to light.  Sclerae are anicteric. erythematous oral mucosa.  - pharynx.  No oral thrush.   NECK:  Supple.  No jugular venous distention.  Thyroid is not palpable.   LYMPH NODES:  Superficial lymphadenopathy is not appreciable in the bilateral cervical, supraclavicular, axillary or inguinal areas.   CARDIOVASCULAR:  S1, S2 regular with no murmurs or gallops.  No carotid or abdominal bruits.   PULMONARY:  Lungs are clear to auscultation and percussion bilaterally.  There is no wheezing or rhonchi.   GASTROINTESTINAL:  Abdomen is soft, nontender.  No hepatosplenomegaly.  No signs of ascites.  No mass appreciable.   MUSCULOSKELETAL/EXTREMITIES:  No edema.  No cyanotic changes.  No signs of joint deformity.  No lymphedema.   NEUROLOGIC:  Cranial nerves II-XII are grossly intact.  Sensation intact.  Muscle strength and muscle tone symmetrical, 5/5 throughout.   BACK:  No spinal or paraspinal tenderness.  No CVA tenderness.   SKIN:  No petechiae.  No rash.  No signs of cellulitis.   BREASTS:  Bilateral breast exam reviewed.  Bilateral mastectomies scars well healed. She has prominent mastectomy scars.        LABORATORY DATA REVIEWED  CBC DIFF/CMP/marker are good.       Current Image data reviewed  dexa 4/2019 - mild osteopenia  CT body 6/2019 - negative      Old data reviewed with summary  PET 12/2018 - negative    CT c/a/p 6/2018 - negative. A 1.2 cm cyst or nodule in the left lobe of thyroid gland is Unchanged. Abdomen: A 2 mm nonobstructing calculus in the left kidney is again  seen.    DEXA scan from 03/2016 identified mild osteopenia lumbar spine        ASSESSMENT AND PLAN:    1. 12/2016  dx breast cancer, cT2N1 disesae.    "   S/p DD AC and wkly taxol, carbo was used partially dropped due to hypersensitivity reaction.   She had pCR for invasive cancer with mastectomies in 7/2017.   S/p RT till 11/2017.     She had BSO 11/2017. She did not tolerate Arimdiex off it after 1 month.   She started tamoxifen in 3/2018.     I advise her to Rethink about AI. She declines again todayy.     She demands to have CT scan or PET with her follow up visit.   I explained to her the ASCO guideline in terms how to follow up breast cancer.  She finally okays it with CT scan on a yearly basis.    She is on 6 months f/u with labs.     2.  hot flushes - advice Effexor, this has helped a lot.  She would love to stay on it long-term.      3. Overweight - weight loss is meaningful for her in terms of rate of recurrence. She is interested in and enrolled in BWEL program.   Her weight has been plateaued.  She reports consuming minimal calories total daily and exercise regularly, she is doing her best to control her weight.    4. 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  She is to continue gabapentin.  She feels this has been helpful.    5. Osteopenia as baseline by dexa in 2016.   Adviced vit D with proper dosing.   dexa found osteopenia in 4/2019.   She is advised on IV zometa.   We discussed the bisphosphonate use with ASCO guideline.   She made his informed decision to try this April 2019.  She has good tolerance to it so far.  Actually she reports she feels better after Zometa infusion.    Again, thank you for allowing me to participate in the care of your patient.        Sincerely,        Maribel Jin MD, MD

## 2019-10-28 NOTE — PROGRESS NOTES
Infusion Nursing Note:  Diana CROWLEY Steve presents today for Zometa.    Patient seen by provider today: Yes: Dr. Jin   present during visit today: Not Applicable.    Note: N/A.    Intravenous Access:  Peripheral IV placed.    Treatment Conditions:  Lab Results   Component Value Date     10/23/2019                   Lab Results   Component Value Date    POTASSIUM 3.9 10/23/2019           Lab Results   Component Value Date    MAG 2.3 03/02/2016            Lab Results   Component Value Date    CR 0.78 10/23/2019                   Lab Results   Component Value Date    DENY 8.8 10/23/2019                Lab Results   Component Value Date    BILITOTAL 0.3 10/23/2019           Lab Results   Component Value Date    ALBUMIN 3.8 10/23/2019                    Lab Results   Component Value Date    ALT 23 10/23/2019           Lab Results   Component Value Date    AST 18 10/23/2019   Results reviewed, labs MET treatment parameters, ok to proceed with treatment.    Post Infusion Assessment:  Patient tolerated infusion without incident.  Site patent and intact, free from redness, edema or discomfort.  No evidence of extravasations.  Access discontinued per protocol.     Discharge Plan:   Discharge instructions reviewed with: Patient.  Patient and/or family verbalized understanding of discharge instructions and all questions answered.  AVS to patient via [email or web address].  Patient will return 4/27/2019 for next appointment.   Patient discharged in stable condition accompanied by: self.  Departure Mode: Ambulatory.    Carin Matos RN

## 2019-10-28 NOTE — PROGRESS NOTES
"Oncology Rooming Note    October 28, 2019 2:53 PM   Diana Wilson is a 39 year old female who presents for:    Chief Complaint   Patient presents with     Oncology Clinic Visit     6 month recheck Breast CA, review labs      Initial Vitals: BP (!) 135/95 (BP Location: Right arm, Patient Position: Sitting, Cuff Size: Adult Regular)   Pulse 88   Temp 97.4  F (36.3  C) (Tympanic)   Resp 16   Ht 1.626 m (5' 4\")   Wt 89.4 kg (197 lb)   LMP 08/05/2016 (Exact Date)   SpO2 94%   BMI 33.81 kg/m   Estimated body mass index is 33.81 kg/m  as calculated from the following:    Height as of this encounter: 1.626 m (5' 4\").    Weight as of this encounter: 89.4 kg (197 lb). Body surface area is 2.01 meters squared.  No Pain (0) Comment: Data Unavailable   Patient's last menstrual period was 08/05/2016 (exact date).  Allergies reviewed: Yes  Medications reviewed: Yes    Medications: Medication refills not needed today.  Pharmacy name entered into [facility]: [facility]  [place] - [id number]      6 month recheck Breast CA, review labs.     Sondra Medina CMA              "

## 2019-10-28 NOTE — PROGRESS NOTES
"ONCOLOGY Follow up visit       COMPLAINT AND REASON FOR VISIT:    12/2016 diagnosed right breast cancer LN positive disease        HISTORY OF ONCOLOGY ILLNESS  She presented at age 36 breast lump in her right armpit and right breast in 10/2016.   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  ER/OR 99% positive, HER-2/kelle negative, associated with high-grade DCIS.    Right axillary ultrasound-guided biopsy indicating metastatic carcinoma positive for spread from breast primary, ER/OR both negative.  HER-2/kelle is negative.      She had clinical T2N1 disease. She made informed decision to proceed with neoadjuvant DDAC C1D1 1/16/2017. She has good clinical response post DD AC x4. Then went on to have wkly taxol + carbo  with informed decision in March.   She had carbo hypersensitivity reaction in early May (during C3). It was d/c after.   She finished chemo end of May 2017.     She was tested negative for BRCA1/2 with [facility].     7/2017 she had double mastectomies at [facility]. Right side found no residual invasive carcinoma, +grade III DCIS, 1mm in greatest dimension. 5 sLN all negative. She had ypTis(DCIS)pN0 disease. Left breast no cancer.     She finished post mastectomy RT in 11/2017.    She had BSO 11/2017. Then started Arimidex in 1/2018. She quit in Feb 2018 due to side effects and willing to try tamoxifen in 3/2018.     She is also on ELL trial for weight loss.       INTERVAL HISTORY:  Since the patient's last visit with us, there is no hospital stay/surgery/new diagnosis made.      PAST MEDICAL HISTORY:  Restless legs, mild intermittent asthma, celiac disease diagnosed 02/2016, hx of TIFFANY     SOCIAL HISTORY:  Works part-time.  Lives with her .  They have 2 kids, first pregnancy age 22.  She did not do breast feeding because of bilateral breast reduction.  She does office work.      FAMILY HISTORY:  \"Full of cancer\" from " "the mother's side including colon, lung, pancreatic, gastric cancer.  According to the patient none of them survive older than 60 years old.   Grandmother  of cholangial carcinoma at age 94 in 2019.        REVIEW OF SYSTEMS  She is very upbeat, Calmer.   + hot flushes on effexor.  She still has neuropathy on bilateral toes  She lost 25 lbs total, then her weight is stabilized.  Reports she does not like her implants, is very uncomfortable.       PHYSICAL EXAMINATION:   Blood pressure (!) 135/95, pulse 88, temperature 97.4  F (36.3  C), temperature source Tympanic, resp. rate 16, height 1.626 m (5' 4\"), weight 89.4 kg (197 lb), last menstrual period 2016, SpO2 94 %, not currently breastfeeding.    ECOG 0    GENERAL APPEARANCE:  He young woman, looks like her stated age, very upbeat, not in acute distress.   HEENT: The patient is normocephalic, atraumatic. Pupils are equally reactive to light.  Sclerae are anicteric. erythematous oral mucosa.  - pharynx.  No oral thrush.   NECK:  Supple.  No jugular venous distention.  Thyroid is not palpable.   LYMPH NODES:  Superficial lymphadenopathy is not appreciable in the bilateral cervical, supraclavicular, axillary or inguinal areas.   CARDIOVASCULAR:  S1, S2 regular with no murmurs or gallops.  No carotid or abdominal bruits.   PULMONARY:  Lungs are clear to auscultation and percussion bilaterally.  There is no wheezing or rhonchi.   GASTROINTESTINAL:  Abdomen is soft, nontender.  No hepatosplenomegaly.  No signs of ascites.  No mass appreciable.   MUSCULOSKELETAL/EXTREMITIES:  No edema.  No cyanotic changes.  No signs of joint deformity.  No lymphedema.   NEUROLOGIC:  Cranial nerves II-XII are grossly intact.  Sensation intact.  Muscle strength and muscle tone symmetrical, 5/5 throughout.   BACK:  No spinal or paraspinal tenderness.  No CVA tenderness.   SKIN:  No petechiae.  No rash.  No signs of cellulitis.   BREASTS:  Bilateral breast exam reviewed.  Bilateral " mastectomies scars well healed. She has prominent mastectomy scars.        LABORATORY DATA REVIEWED  CBC DIFF/CMP/marker are good.       Current Image data reviewed  dexa 4/2019 - mild osteopenia  CT body 6/2019 - negative      Old data reviewed with summary  PET 12/2018 - negative    CT c/a/p 6/2018 - negative. A 1.2 cm cyst or nodule in the left lobe of thyroid gland is Unchanged. Abdomen: A 2 mm nonobstructing calculus in the left kidney is again  seen.    DEXA scan from 03/2016 identified mild osteopenia lumbar spine        ASSESSMENT AND PLAN:    1.   12/2016  dx breast cancer, cT2N1 disesae.      S/p DD AC and wkly taxol, carbo was used partially dropped due to hypersensitivity reaction.   She had pCR for invasive cancer with mastectomies in 7/2017.   S/p RT till 11/2017.     She had BSO 11/2017. She did not tolerate Arimdiex off it after 1 month.   She started tamoxifen in 3/2018.     I advise her to Rethink about AI. She declines again todayy.     She demands to have CT scan or PET with her follow up visit.   I explained to her the ASCO guideline in terms how to follow up breast cancer.  She finally okays it with CT scan on a yearly basis.    She is on 6 months f/u with labs.     2.  hot flushes - advice Effexor, this has helped a lot.  She would love to stay on it long-term.      3. Overweight - weight loss is meaningful for her in terms of rate of recurrence. She is interested in and enrolled in BWEL program.   Her weight has been plateaued.  She reports consuming minimal calories total daily and exercise regularly, she is doing her best to control her weight.    4. 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  She is to continue gabapentin.  She feels this has been helpful.    5. Osteopenia as baseline by dexa in 2016.   Adviced vit D with proper dosing.   dexa found osteopenia in 4/2019.   She is advised on IV zometa.   We discussed the bisphosphonate use with ASCO guideline.   She made his informed decision to try this April  2019.  She has good tolerance to it so far.  Actually she reports she feels better after Zometa infusion.

## 2019-10-28 NOTE — PATIENT INSTRUCTIONS
Dr. Jin would like you to continue with Zometa today.     We would like to see you back in 6 months for a follow up appointment with Zometa and labs prior.     When you are in need of a refill, please call your pharmacy and they will send us a request.      Copy of appointments, and after visit summary (AVS) given to patient.      If you have any questions please call Josette Smalls RN, BSN Oncology Hematology  [facility] [phone]. For questions after business hours, or on holidays/weekends, please call our after hours Nurse Triage line [phone]. Thank you.         zometa today and then in 6 months with f/u.

## 2019-10-28 NOTE — TELEPHONE ENCOUNTER
Medication is being filled for 1 time refill only due to:  Patient needs to be seen because it has been more than one year since last visit.   Jeremias Raphael RN

## 2019-11-07 ENCOUNTER — HEALTH MAINTENANCE LETTER (OUTPATIENT)
Age: 39
End: 2019-11-07

## 2019-11-19 ENCOUNTER — OFFICE VISIT (OUTPATIENT)
Dept: FAMILY MEDICINE | Facility: CLINIC | Age: 39
End: 2019-11-19
Payer: COMMERCIAL

## 2019-11-19 VITALS
HEART RATE: 90 BPM | DIASTOLIC BLOOD PRESSURE: 85 MMHG | HEIGHT: 64 IN | SYSTOLIC BLOOD PRESSURE: 131 MMHG | BODY MASS INDEX: 33.7 KG/M2 | RESPIRATION RATE: 16 BRPM | WEIGHT: 197.4 LBS | TEMPERATURE: 97.1 F

## 2019-11-19 DIAGNOSIS — Z00.00 ROUTINE GENERAL MEDICAL EXAMINATION AT A HEALTH CARE FACILITY: Primary | ICD-10-CM

## 2019-11-19 DIAGNOSIS — G62.9 NEUROPATHY: ICD-10-CM

## 2019-11-19 DIAGNOSIS — D50.9 IRON DEFICIENCY ANEMIA, UNSPECIFIED IRON DEFICIENCY ANEMIA TYPE: ICD-10-CM

## 2019-11-19 DIAGNOSIS — G47.09 OTHER INSOMNIA: ICD-10-CM

## 2019-11-19 DIAGNOSIS — Z91.09 ENVIRONMENTAL ALLERGIES: ICD-10-CM

## 2019-11-19 DIAGNOSIS — Z17.0 MALIGNANT NEOPLASM OF UPPER-OUTER QUADRANT OF RIGHT BREAST IN FEMALE, ESTROGEN RECEPTOR POSITIVE (H): ICD-10-CM

## 2019-11-19 DIAGNOSIS — F41.9 ANXIETY: ICD-10-CM

## 2019-11-19 DIAGNOSIS — C50.911 INVASIVE DUCTAL CARCINOMA OF BREAST, RIGHT (H): ICD-10-CM

## 2019-11-19 DIAGNOSIS — F33.0 MAJOR DEPRESSIVE DISORDER, RECURRENT EPISODE, MILD (H): ICD-10-CM

## 2019-11-19 DIAGNOSIS — C50.411 MALIGNANT NEOPLASM OF UPPER-OUTER QUADRANT OF RIGHT BREAST IN FEMALE, ESTROGEN RECEPTOR POSITIVE (H): ICD-10-CM

## 2019-11-19 DIAGNOSIS — J45.20 MILD INTERMITTENT ASTHMA WITHOUT COMPLICATION: ICD-10-CM

## 2019-11-19 PROCEDURE — 99396 PREV VISIT EST AGE 40-64: CPT | Performed by: FAMILY MEDICINE

## 2019-11-19 RX ORDER — HYDROXYZINE HYDROCHLORIDE 10 MG/1
10-30 TABLET, FILM COATED ORAL EVERY 8 HOURS PRN
Qty: 90 TABLET | Refills: 0 | Status: SHIPPED | OUTPATIENT
Start: 2019-11-19 | End: 2020-02-21

## 2019-11-19 RX ORDER — GABAPENTIN 300 MG/1
CAPSULE ORAL
Qty: 90 CAPSULE | Refills: 3 | Status: SHIPPED | OUTPATIENT
Start: 2019-11-19 | End: 2020-06-24

## 2019-11-19 RX ORDER — VENLAFAXINE 75 MG/1
75 TABLET ORAL 2 TIMES DAILY
Qty: 180 TABLET | Refills: 3 | Status: SHIPPED | OUTPATIENT
Start: 2019-11-19 | End: 2020-04-27

## 2019-11-19 RX ORDER — MONTELUKAST SODIUM 10 MG/1
1 TABLET ORAL AT BEDTIME
Qty: 90 TABLET | Refills: 3 | Status: SHIPPED | OUTPATIENT
Start: 2019-11-19 | End: 2020-12-14

## 2019-11-19 ASSESSMENT — MIFFLIN-ST. JEOR: SCORE: 1550.4

## 2019-11-19 NOTE — LETTER
My Asthma Action Plan    Name: Diana Wilson   YOB: 1980  Date: 11/19/2019   My doctor: Yenifer Ridley MD   My clinic: [facility]        My Rescue Medicine:   Albuterol inhaler (Proair/Ventolin/Proventil HFA)  2-4 puffs EVERY 4 HOURS as needed. Use a spacer if recommended by your provider.   My Asthma Severity: mild intermittent     Know your asthma triggers:              GREEN ZONE   Good Control    I feel good    No cough or wheeze    Can work, sleep and play without asthma symptoms       Take your asthma control medicine every day.     1. If exercise triggers your asthma, take your rescue medication    15 minutes before exercise or sports, and    During exercise if you have asthma symptoms  2. Spacer to use with inhaler: If you have a spacer, make sure to use it with your inhaler             YELLOW ZONE Getting Worse  I have ANY of these:    I do not feel good    Cough or wheeze    Chest feels tight    Wake up at night   1. Keep taking your Green Zone medications  2. Start taking your rescue medicine:    every 20 minutes for up to 1 hour. Then every 4 hours for 24-48 hours.  3. If you stay in the Yellow Zone for more than 12-24 hours, contact your doctor.  4. If you do not return to the Green Zone in 12-24 hours or you get worse, start taking your oral steroid medicine if prescribed by your provider.           RED ZONE Medical Alert - Get Help  I have ANY of these:    I feel awful    Medicine is not helping    Breathing getting harder    Trouble walking or talking    Nose opens wide to breathe       1. Take your rescue medicine NOW  2. If your provider has prescribed an oral steroid medicine, start taking it NOW  3. Call your doctor NOW  4. If you are still in the Red Zone after 20 minutes and you have not reached your doctor:    Take your rescue medicine again and    Call 911 or go to the emergency room right away    See your regular doctor within 2 weeks of an Emergency Room or  Urgent Care visit for follow-up treatment.          Annual Reminders:  Meet with Asthma Educator,  Flu Shot in the Fall, consider Pneumonia Vaccination for patients with asthma (aged 19 and older).    Pharmacy: [facility] [place] - [phone]                        Asthma Triggers  How To Control Things That Make Your Asthma Worse    Triggers are things that make your asthma worse.  Look at the list below to help you find your triggers and   what you can do about them. You can help prevent asthma flare-ups by staying away from your triggers.      Trigger                                                          What you can do   Cigarette Smoke  Tobacco smoke can make asthma worse. Do not allow smoking in your home, car or around you.  Be sure no one smokes at a child s day care or school.  If you smoke, ask your health care provider for ways to help you quit.  Ask family members to quit too.  Ask your health care provider for a referral to Quit Plan to help you quit smoking, or call 0-077-379-PLAN.     Colds, Flu, Bronchitis  These are common triggers of asthma. Wash your hands often.  Don t touch your eyes, nose or mouth.  Get a flu shot every year.     Dust Mites  These are tiny bugs that live in cloth or carpet. They are too small to see. Wash sheets and blankets in hot water every week.   Encase pillows and mattress in dust mite proof covers.  Avoid having carpet if you can. If you have carpet, vacuum weekly.   Use a dust mask and HEPA vacuum.   Pollen and Outdoor Mold  Some people are allergic to trees, grass, or weed pollen, or molds. Try to keep your windows closed.  Limit time out doors when pollen count is high.   Ask you health care provider about taking medicine during allergy season.     Animal Dander  Some people are allergic to skin flakes, urine or saliva from pets with fur or feathers. Keep pets with fur or feathers out of your home.    If you can t keep the pet outdoors,  then keep the pet out of your bedroom.  Keep the bedroom door closed.  Keep pets off cloth furniture and away from stuffed toys.     Mice, Rats, and Cockroaches  Some people are allergic to the waste from these pests.   Cover food and garbage.  Clean up spills and food crumbs.  Store grease in the refrigerator.   Keep food out of the bedroom.   Indoor Mold  This can be a trigger if your home has high moisture. Fix leaking faucets, pipes, or other sources of water.   Clean moldy surfaces.  Dehumidify basement if it is damp and smelly.   Smoke, Strong Odors, and Sprays  These can reduce air quality. Stay away from strong odors and sprays, such as perfume, powder, hair spray, paints, smoke incense, paint, cleaning products, candles and new carpet.   Exercise or Sports  Some people with asthma have this trigger. Be active!  Ask your doctor about taking medicine before sports or exercise to prevent symptoms.    Warm up for 5-10 minutes before and after sports or exercise.     Other Triggers of Asthma  Cold air:  Cover your nose and mouth with a scarf.  Sometimes laughing or crying can be a trigger.  Some medicines and food can trigger asthma.

## 2019-11-19 NOTE — PROGRESS NOTES
SUBJECTIVE:   CC: Diana Wilson is an 39 year old woman who presents for preventive health visit.     Healthy Habits:    Do you get at least three servings of calcium containing foods daily (dairy, green leafy vegetables, etc.)? yes    Amount of exercise or daily activities, outside of work: 4-5 day(s) per week    Problems taking medications regularly No    Medication side effects: constipation from calcium     Have you had an eye exam in the past two years? no    Do you see a dentist twice per year? yes    Do you have sleep apnea, excessive snoring or daytime drowsiness?no       Today's PHQ-2 Score: 0  PHQ-2 ( 1999 [employer]) 11/19/2019 10/5/2018   Q1: Little interest or pleasure in doing things 0 0   Q2: Feeling down, depressed or hopeless 0 0   PHQ-2 Score 0 0   Q1: Little interest or pleasure in doing things - -   Q2: Feeling down, depressed or hopeless - -   PHQ-2 Score - -       Abuse: Current or Past(Physical, Sexual or Emotional)- No  Do you feel safe in your environment? Yes    Social History     Tobacco Use     Smoking status: Former Smoker     Packs/day: 0.50     Years: 10.00     Pack years: 5.00     Types: Cigarettes     Last attempt to quit: 10/1/2016     Years since quitting: 3.1     Smokeless tobacco: Never Used     Tobacco comment: Socially- quit smoking 10/01/2016   Substance Use Topics     Alcohol use: Yes     Alcohol/week: 0.0 standard drinks     Comment: Occassional     If you drink alcohol do you typically have >3 drinks per day or >7 drinks per week? No                     Reviewed orders with patient.  Reviewed health maintenance and updated orders accordingly - Yes  Labs reviewed in EPIC    Alternate mammogram schedule due to breast cancer history    Pertinent mammograms are reviewed under the imaging tab.  History of abnormal Pap smear:   PAP / HPV Latest Ref Rng & Units 11/21/2017 7/23/2015 7/17/2012   PAP - NIL NIL NIL   HPV 16 DNA NEG:Negative Negative Negative -   HPV 18 DNA NEG:Negative  "Negative Negative -   OTHER HR HPV NEG:Negative Negative Negative -     Reviewed and updated as needed this visit by clinical staff         Reviewed and updated as needed this visit by Provider          ROS:  CONSTITUTIONAL: NEGATIVE for fever, chills, change in weight  INTEGUMENTARU/SKIN: NEGATIVE for worrisome rashes, moles or lesions  EYES: NEGATIVE for vision changes or irritation  ENT: NEGATIVE for ear, mouth and throat problems  RESP: NEGATIVE for significant cough or SOB  BREAST: NEGATIVE for masses, tenderness or discharge  CV: NEGATIVE for chest pain, palpitations or peripheral edema  GI: NEGATIVE for nausea, abdominal pain, heartburn, or change in bowel habits  : NEGATIVE for unusual urinary or vaginal symptoms. Periods are regular.  MUSCULOSKELETAL: NEGATIVE for significant arthralgias or myalgia  NEURO: NEGATIVE for weakness, dizziness or paresthesias  PSYCHIATRIC: NEGATIVE for changes in mood or affect    OBJECTIVE:   /85   Pulse 90   Temp 97.1  F (36.2  C) (Tympanic)   Resp 16   Ht 1.626 m (5' 4\")   Wt 89.5 kg (197 lb 6.4 oz)   LMP 08/05/2016 (Exact Date)   BMI 33.88 kg/m    EXAM:  GENERAL: healthy, alert and no distress  EYES: Eyes grossly normal to inspection, PERRL and conjunctivae and sclerae normal  HENT: ear canals and TM's normal, nose and mouth without ulcers or lesions  NECK: no adenopathy, no asymmetry, masses, or scars and thyroid normal to palpation  RESP: lungs clear to auscultation - no rales, rhonchi or wheezes  BREAST: implant bilateral  CV: regular rate and rhythm, normal S1 S2, no S3 or S4, no murmur, click or rub, no peripheral edema and peripheral pulses strong  ABDOMEN: soft, nontender, no hepatosplenomegaly, no masses and bowel sounds normal  MS: no gross musculoskeletal defects noted, no edema  NEURO: Normal strength and tone, mentation intact and speech normal  PSYCH: mentation appears normal, affect normal/bright        ASSESSMENT/PLAN:   (Z00.00) Routine general " "medical examination at a health care facility  (primary encounter diagnosis)  Comment: We discussed self breast exams, exercise 30mins/day, and calcium with vitamin D at 1200mg/day, preferably from dietary sources.  Diet, Weight loss, and Exercise were discussed as well.   Plan:     (F33.0) Major depressive disorder, recurrent episode, mild (H)  Comment: med is helping and she wants to continue on the effexor bid   Plan: venlafaxine (EFFEXOR) 75 MG tablet            (G62.9) Neuropathy  Comment: med refilled   Plan: gabapentin (NEURONTIN) 300 MG capsule            (Z91.09) Environmental allergies  Comment:   Plan: montelukast (SINGULAIR) 10 MG tablet            (J45.20) Mild intermittent asthma without complication  Comment: stable. Doing well   Plan: Asthma Action Plan (AAP), beclomethasone HFA         (QVAR REDIHALER) 40 MCG/ACT inhaler            (F41.9) Anxiety  Comment: discussed using a prn med for anxiety.  She will try the hydroxyzine prn. Discussed risks/benefits/side effects with the patient.   Plan: hydrOXYzine (ATARAX) 10 MG tablet            (G47.09) Other insomnia  Comment: ok to try the hydroxyzine for nighttime anxiety   Plan:     (C50.911) Invasive ductal carcinoma of breast, right (H)  Comment: in remission   Plan:     (D50.9) Iron deficiency anemia, unspecified iron deficiency anemia type  Comment: has cbc recheck next month   Plan:     (C50.411,  Z17.0) Malignant neoplasm of upper-outer quadrant of right breast in female, estrogen receptor positive (H)  Comment:   Plan:     COUNSELING:   Reviewed preventive health counseling, as reflected in patient instructions       Regular exercise       Healthy diet/nutrition    Estimated body mass index is 33.81 kg/m  as calculated from the following:    Height as of 10/28/19: 1.626 m (5' 4\").    Weight as of 10/28/19: 89.4 kg (197 lb).    Weight management plan: Discussed healthy diet and exercise guidelines     reports that she quit smoking about 3 years " ago. Her smoking use included cigarettes. She has a 5.00 pack-year smoking history. She has never used smokeless tobacco.      Counseling Resources:  ATP IV Guidelines  Pooled Cohorts Equation Calculator  Breast Cancer Risk Calculator  FRAX Risk Assessment  ICSI Preventive Guidelines  Dietary Guidelines for Americans, 2010  [employer]eker's MyPlate  ASA Prophylaxis  Lung CA Screening    Yenifer Ridley MD  [facility]

## 2019-11-20 DIAGNOSIS — Z17.0 MALIGNANT NEOPLASM OF UPPER-OUTER QUADRANT OF RIGHT BREAST IN FEMALE, ESTROGEN RECEPTOR POSITIVE (H): Primary | ICD-10-CM

## 2019-11-20 DIAGNOSIS — C50.411 MALIGNANT NEOPLASM OF UPPER-OUTER QUADRANT OF RIGHT BREAST IN FEMALE, ESTROGEN RECEPTOR POSITIVE (H): Primary | ICD-10-CM

## 2019-11-22 ENCOUNTER — TELEPHONE (OUTPATIENT)
Dept: FAMILY MEDICINE | Facility: CLINIC | Age: 39
End: 2019-11-22

## 2019-11-22 NOTE — TELEPHONE ENCOUNTER
Panel Management Review      Patient has the following on her problem list:     Depression / Dysthymia review    Measure:  Needs PHQ-9 score of 4 or less during index window.  Administer PHQ-9 and if score is 5 or more, send encounter to provider for next steps.    5 - 7 month window range:      PHQ-9 SCORE 3/8/2018 8/16/2018 10/5/2018   PHQ-9 Total Score - - -   PHQ-9 Total Score 3 1 2       If PHQ-9 recheck is 5 or more, route to provider for next steps.    Patient is due for:  PHQ9    Asthma review     ACT Total Scores 10/5/2018   ACT TOTAL SCORE -   ASTHMA ER VISITS -   ASTHMA HOSPITALIZATIONS -   ACT TOTAL SCORE (Goal Greater than or Equal to 20) 15   In the past 12 months, how many times did you visit the emergency room for your asthma without being admitted to the hospital? 0   In the past 12 months, how many times were you hospitalized overnight because of your asthma? 0      1. Is Asthma diagnosis on the Problem List? Yes    2. Is Asthma listed on Health Maintenance? Yes    3. Patient is due for:  ACT      Composite cancer screening  Chart review shows that this patient is due/due soon for the following None  Summary:    Patient is due/failing the following:   ACT and PHQ9    Action needed:   Patient needs to do ACT. and Patient needs to do PHQ9.    Type of outreach:    Sent Sc[employer]er message.    Questions for provider review:    None                                                                                                                                    Nery Wright CMA     Chart routed to self .

## 2019-12-06 NOTE — PROGRESS NOTES
SIMULATION    Patient Name: Diana Wilson  MRN: [id number],  Age: 37 year old,  Gender: female  Encounter Date: 9/15/2017,  Today's Date: 9/15/2017    DIAGNOSIS:  Breast cancer    INDICATION:  The breast radiation therapy is recommended for the patient  for better  local control and better survival.    CONSENT:  The possible risks and side effects of radiation therapy has been discussed with the patient in detail and at great length. Questions are answered to patient's satisfaction. The written consent was obtained.    SIMULATION:  Patient is in a supne position with arm up.   Tentative isocenter is set up in mid breast.  We will acquire CT information to help us to better locate the target and design the radiation therapy field.    BLOCKS:  Custom blocks will be drawn to minimize radiation to normal tissues and to protect normal organs including, but not limited to, lungs, heart, liver, bone, and soft tissues.        I will consider to use 3D-conformal technique to help us to better locate the target and to protect normal tissues.    Nithya Silver   Pager [phone]  Date: 9/15/2017  Time: 4:14 PM  
05-Dec-2019 21:00

## 2019-12-13 DIAGNOSIS — Z00.6 PATIENT IN CANCER RELATED RESEARCH STUDY: Primary | ICD-10-CM

## 2019-12-16 ENCOUNTER — ALLIED HEALTH/NURSE VISIT (OUTPATIENT)
Dept: ONCOLOGY | Facility: CLINIC | Age: 39
End: 2019-12-16

## 2019-12-16 DIAGNOSIS — Z00.6 PATIENT IN CANCER RELATED RESEARCH STUDY: Primary | ICD-10-CM

## 2019-12-16 DIAGNOSIS — Z17.0 MALIGNANT NEOPLASM OF UPPER-OUTER QUADRANT OF RIGHT BREAST IN FEMALE, ESTROGEN RECEPTOR POSITIVE (H): ICD-10-CM

## 2019-12-16 DIAGNOSIS — Z00.6 PATIENT IN CANCER RELATED RESEARCH STUDY: ICD-10-CM

## 2019-12-16 DIAGNOSIS — Z13.220 SCREENING FOR LIPID DISORDERS: ICD-10-CM

## 2019-12-16 DIAGNOSIS — C50.411 MALIGNANT NEOPLASM OF UPPER-OUTER QUADRANT OF RIGHT BREAST IN FEMALE, ESTROGEN RECEPTOR POSITIVE (H): ICD-10-CM

## 2019-12-16 LAB
CHOLEST SERPL-MCNC: 169 MG/DL
GLUCOSE SERPL-MCNC: 103 MG/DL (ref 70–99)
HDLC SERPL-MCNC: 51 MG/DL
LDLC SERPL CALC-MCNC: 96 MG/DL
NONHDLC SERPL-MCNC: 118 MG/DL
RESEARCH KIT COLLECTION: NORMAL
TRIGL SERPL-MCNC: 110 MG/DL

## 2019-12-16 PROCEDURE — 80061 LIPID PANEL: CPT | Performed by: INTERNAL MEDICINE

## 2019-12-16 PROCEDURE — 40000937 ZZHCL STATISTIC RESEARCH KIT COLLECTION: Performed by: INTERNAL MEDICINE

## 2019-12-16 PROCEDURE — 82947 ASSAY GLUCOSE BLOOD QUANT: CPT | Performed by: INTERNAL MEDICINE

## 2019-12-16 PROCEDURE — 36415 COLL VENOUS BLD VENIPUNCTURE: CPT | Performed by: INTERNAL MEDICINE

## 2019-12-20 NOTE — NURSING NOTE
On 12/16/2019, Diana Wilson was seen for the 24 month visit for the [facility] BWEL weight loss study. Diana completed QOL questionnaire and measurements (weight/waist/hip) were taken per Protocol. At this visit she denies any new fractures, sprains, tendon/ligament injuries, and orthopedic surgeries. Fasting glucose and study labs were also collected. We will review schedule for next visit at 30 months as Dr. Jin has requested to see her a2yebtop and this does not align with protocol. Diana is aware that I will contact her with a plan when visit is closer. Her visit with Dr. Jin was completed on 10/28/19. Wt = 89.9kg

## 2020-02-21 ENCOUNTER — OFFICE VISIT (OUTPATIENT)
Dept: FAMILY MEDICINE | Facility: CLINIC | Age: 40
End: 2020-02-21
Payer: COMMERCIAL

## 2020-02-21 VITALS
HEART RATE: 112 BPM | SYSTOLIC BLOOD PRESSURE: 120 MMHG | OXYGEN SATURATION: 94 % | BODY MASS INDEX: 33.63 KG/M2 | TEMPERATURE: 97 F | DIASTOLIC BLOOD PRESSURE: 89 MMHG | WEIGHT: 197 LBS | HEIGHT: 64 IN

## 2020-02-21 DIAGNOSIS — F33.0 MAJOR DEPRESSIVE DISORDER, RECURRENT EPISODE, MILD (H): Primary | ICD-10-CM

## 2020-02-21 DIAGNOSIS — L98.9 FACIAL LESION: ICD-10-CM

## 2020-02-21 DIAGNOSIS — F41.9 ANXIETY: ICD-10-CM

## 2020-02-21 PROCEDURE — 99214 OFFICE O/P EST MOD 30 MIN: CPT | Performed by: FAMILY MEDICINE

## 2020-02-21 RX ORDER — VENLAFAXINE HYDROCHLORIDE 150 MG/1
150 CAPSULE, EXTENDED RELEASE ORAL DAILY
Qty: 90 CAPSULE | Refills: 1 | Status: SHIPPED | OUTPATIENT
Start: 2020-02-21 | End: 2020-08-11

## 2020-02-21 RX ORDER — VENLAFAXINE HYDROCHLORIDE 75 MG/1
75 CAPSULE, EXTENDED RELEASE ORAL DAILY
Qty: 90 CAPSULE | Refills: 1 | Status: SHIPPED | OUTPATIENT
Start: 2020-02-21 | End: 2020-08-11

## 2020-02-21 ASSESSMENT — PATIENT HEALTH QUESTIONNAIRE - PHQ9
SUM OF ALL RESPONSES TO PHQ QUESTIONS 1-9: 6
5. POOR APPETITE OR OVEREATING: MORE THAN HALF THE DAYS

## 2020-02-21 ASSESSMENT — ANXIETY QUESTIONNAIRES
1. FEELING NERVOUS, ANXIOUS, OR ON EDGE: NEARLY EVERY DAY
2. NOT BEING ABLE TO STOP OR CONTROL WORRYING: NEARLY EVERY DAY
3. WORRYING TOO MUCH ABOUT DIFFERENT THINGS: NEARLY EVERY DAY
5. BEING SO RESTLESS THAT IT IS HARD TO SIT STILL: SEVERAL DAYS
7. FEELING AFRAID AS IF SOMETHING AWFUL MIGHT HAPPEN: NEARLY EVERY DAY
GAD7 TOTAL SCORE: 16
6. BECOMING EASILY ANNOYED OR IRRITABLE: SEVERAL DAYS

## 2020-02-21 ASSESSMENT — MIFFLIN-ST. JEOR: SCORE: 1548.59

## 2020-02-21 NOTE — PROGRESS NOTES
SUBJECTIVE:                                                    Diana Wilson is a 39 year old female who presents to clinic today for the following health issues:    1.Skin spot on right cheek - has been there for about six months and is not going away.  Flaky,scabby, small bump  Not itchy. Sometimes bleeds     2.Skin spots on each shoulder  Wants checked . Patient thinks may just be aged spots.   Brown lesions on top of the shoulder. No change. No itch/bleed    3. Anxiety - worsening lately   Currently on effexor 75 bid  No si/hi  Hot flashes are improved overall     Worrying about cancer   History of recent breast cancer. Thinks about recurrence all the time     Review of systems:  No headache   no chest pain  No n/v   Problem list and histories reviewed & adjusted, as indicated.  Additional history: as documented   Patient Active Problem List   Diagnosis     Mild intermittent asthma     Obesity     CARDIOVASCULAR SCREENING; LDL GOAL LESS THAN 130     Major depressive disorder, recurrent episode, mild (H)     Iron deficiency anemia     Insomnia     Health Care Home     Anxiety     Intestinal malabsorption     Invasive ductal carcinoma of breast, right (H)     Lymphedema of right upper extremity     Malignant neoplasm of upper-outer quadrant of right breast in female, estrogen receptor positive (H)     Osteopenia of multiple sites     Current Outpatient Medications   Medication     albuterol (PROAIR HFA/PROVENTIL HFA/VENTOLIN HFA) 108 (90 BASE) MCG/ACT Inhaler     beclomethasone HFA (QVAR REDIHALER) 40 MCG/ACT inhaler     Fluticasone Propionate (FLONASE NA)     gabapentin (NEURONTIN) 300 MG capsule     loratadine (CLARITIN) 10 MG tablet     montelukast (SINGULAIR) 10 MG tablet     Multiple Vitamins-Minerals (MULTIVITAMIN PO)     tamoxifen (NOLVADEX) 20 MG tablet     UNABLE TO FIND     venlafaxine (EFFEXOR XR) 150 MG PO 24 hr capsule     venlafaxine (EFFEXOR XR) 75 MG PO 24 hr capsule     venlafaxine (EFFEXOR) 75 MG  "tablet     acetaminophen (TYLENOL) 325 MG tablet     albuterol (2.5 MG/3ML) 0.083% neb solution     cholecalciferol (VITAMIN D3) 1000 units (25 mcg) capsule     ibuprofen (ADVIL/MOTRIN) 600 MG tablet     order for DME     No current facility-administered medications for this visit.         Allergies   Allergen Reactions     Carboplatin Shortness Of Breath, Rash and Anaphylaxis     Other reaction(s): Flushing, Tachycardia     Ambien      hallucinations     Amoxicillin GI Disturbance     Erythromycin GI Disturbance     Hydrocodone-Acetaminophen      Other reaction(s): Hallucinations     Penicillins Nausea and Vomiting and Hives     1-11-17 pt states this was a childhood reaction     Zolpidem      Other reaction(s): Hallucinations     Hydrocodone Other (See Comments)     Out of body experience, \"creepy-crawly\" skin          OBJECTIVE:                                                    /89   Pulse 112   Temp 97  F (36.1  C) (Tympanic)   Ht 1.626 m (5' 4\")   Wt 89.4 kg (197 lb)   LMP 08/05/2016 (Exact Date)   SpO2 94%   BMI 33.81 kg/m   Body mass index is 33.81 kg/m .   GENERAL - Pt is alert and oriented in no acute distress.  Affect is appropriate. Good eye contact.  PSYCH - Pt makes good eye contact, is clean and well-dressed. Oriented to person,place,and time. Cooperative. No speech abnormalities. No psychomotor agitation or retardation.  Thought process was normal and thought content was free of suicidal/homicidal ideation, obsessions, and delusions. Insight and judgement were good.  Skin - on the right cheek, there is a 2mm maculopapular shiny pink lesion  On the bilateral shoulders, there are numerous flat, smooth homogeneous brown lesions.         ASSESSMENT/PLAN:                                                      (F33.0) Major depressive disorder, recurrent episode, mild (H)  (primary encounter diagnosis)  Comment: discussed with her.  Worry is constant. Will try increasing dose of effexor from 150 " to 225.  Will check in with her in 4-6 weeks in person, or evisit, or phone visit. She will make an appointment with a counselor - doesn't need a referral. The patient indicates understanding of these issues and agrees with the plan.   Plan: venlafaxine (EFFEXOR XR) 150 MG PO 24 hr         capsule, venlafaxine (EFFEXOR XR) 75 MG PO 24         hr capsule            (F41.9) Anxiety  Comment: see above   Plan: venlafaxine (EFFEXOR XR) 150 MG PO 24 hr         capsule, venlafaxine (EFFEXOR XR) 75 MG PO 24         hr capsule            (L98.9) Facial lesion  Comment: I think this is possibly an AK. I offered to freeze it but due to a family history of squamous cell CA, she prefers to go to derm. Referral made. The patient indicates understanding of these issues and agrees with the plan.   Plan: DERMATOLOGY REFERRAL          PHILIPPE Peters MD     [facility]

## 2020-02-22 ASSESSMENT — ASTHMA QUESTIONNAIRES: ACT_TOTALSCORE: 23

## 2020-02-22 ASSESSMENT — ANXIETY QUESTIONNAIRES: GAD7 TOTAL SCORE: 16

## 2020-03-09 ENCOUNTER — TELEPHONE (OUTPATIENT)
Dept: DERMATOLOGY | Facility: CLINIC | Age: 40
End: 2020-03-09

## 2020-03-09 ENCOUNTER — OFFICE VISIT (OUTPATIENT)
Dept: DERMATOLOGY | Facility: CLINIC | Age: 40
End: 2020-03-09
Attending: FAMILY MEDICINE
Payer: COMMERCIAL

## 2020-03-09 ENCOUNTER — OFFICE VISIT (OUTPATIENT)
Dept: DERMATOLOGY | Facility: CLINIC | Age: 40
End: 2020-03-09
Payer: COMMERCIAL

## 2020-03-09 VITALS — OXYGEN SATURATION: 97 % | DIASTOLIC BLOOD PRESSURE: 87 MMHG | HEART RATE: 85 BPM | SYSTOLIC BLOOD PRESSURE: 131 MMHG

## 2020-03-09 DIAGNOSIS — L81.4 LENTIGO: ICD-10-CM

## 2020-03-09 DIAGNOSIS — D18.01 ANGIOMA OF SKIN: ICD-10-CM

## 2020-03-09 DIAGNOSIS — D48.5 NEOPLASM OF UNCERTAIN BEHAVIOR OF SKIN: ICD-10-CM

## 2020-03-09 DIAGNOSIS — L57.0 KERATOSIS: Primary | ICD-10-CM

## 2020-03-09 DIAGNOSIS — D22.9 NEVUS: ICD-10-CM

## 2020-03-09 DIAGNOSIS — L82.1 SEBORRHEIC KERATOSIS: Primary | ICD-10-CM

## 2020-03-09 PROCEDURE — 88331 PATH CONSLTJ SURG 1 BLK 1SPC: CPT | Performed by: DERMATOLOGY

## 2020-03-09 PROCEDURE — 99214 OFFICE O/P EST MOD 30 MIN: CPT | Mod: 25 | Performed by: PHYSICIAN ASSISTANT

## 2020-03-09 PROCEDURE — 88341 IMHCHEM/IMCYTCHM EA ADD ANTB: CPT | Mod: TC | Performed by: PHYSICIAN ASSISTANT

## 2020-03-09 PROCEDURE — 88342 IMHCHEM/IMCYTCHM 1ST ANTB: CPT | Mod: TC | Performed by: PHYSICIAN ASSISTANT

## 2020-03-09 PROCEDURE — 11106 INCAL BX SKN SINGLE LES: CPT | Performed by: PHYSICIAN ASSISTANT

## 2020-03-09 PROCEDURE — 11103 TANGNTL BX SKIN EA SEP/ADDL: CPT | Performed by: PHYSICIAN ASSISTANT

## 2020-03-09 PROCEDURE — 88305 TISSUE EXAM BY PATHOLOGIST: CPT | Mod: TC | Performed by: PHYSICIAN ASSISTANT

## 2020-03-09 NOTE — LETTER
3/9/2020         RE: Diana Wilson  [address] [facility] [address]        Dear Colleague,    Thank you for referring your patient, Diana Wilson, to the [facility]. Please see a copy of my visit note below.    R cheek:There is hyperkeratosis of the epidermis, overlying banal keratinocytes, The dermis is unremarkable.      R cheek keratosis No further treatment      Again, thank you for allowing me to participate in the care of your patient.        Sincerely,        Fabian Telles MD

## 2020-03-09 NOTE — LETTER
"March 19, 2020      Diana Hu  [address] [facility] [address]        Dear ,    We are writing to inform you of your test results.    {results letter list:[id number]}    Resulted Orders   Dermatological path order and indications   Result Value Ref Range    Copath Report       Patient Name: DIANA HU  MR#: [id number]  Specimen #: [id number]  Collected: 3/9/2020  Received: 3/9/2020  Reported: 3/18/2020 18:22  Ordering Phy(s): AG VILLLAOBOS    For improved result formatting, select 'View Enhanced Report Format' under   Linked Documents section.    SPECIMEN(S):  Skin, right areola, shave    FINAL DIAGNOSIS:  Skin, right areola, shave:  - Atypical junctional melanocytic hyperplasia overlying scar - (see   comment)    COMMENT:  These features resemble the recurrent nevus phenomenon; however, given   that both the scar and the melanocytic  proliferation extends to the lateral margin, re-excision is recommended to   assure complete removal.  This case was reviewed at the dermatopathology consensus conference.    I have personally reviewed all specimens and/or slides, including the   listed special stains, and used them  with my medical judgement to determine or confirm the final diagnosis.    Electronically signed out by:    Hussain Melton M.D., PhD, [facility]    CLINICAL HISTORY:  The patient is a 39-year-old female    GROSS:  The specimen is received in formalin with proper patient identification,   labeled \"R areola\" and the specimen  consists of a single, irregular skin shave measuring 0.8 x 0.5 cm.  The   skin surface displays a 0.5 x 0.3 cm  irregular brown lesion.  The resection margin is inked black.  It is   trisected and entirely submitted in  cassette A1.    The specimen is collected and placed in formalin at 1120 on 3/9/2020.   (Dictated by: Racquel CARVALHO [facility]  3/9/2020 04:37 PM)    MICROSCOPIC:  The specimen exhibits an atypical junctional melanocytic proliferation   which is " mainly single celled, above  dermal fibrosis and a mild perivascular inflammatory infiltrate, and   sparse tattoo-like black pigment  deposits. Melan A and Sox10 support the above interpretation.    The technical component of this testing was completed at the [facility], with the professi onal component performed   at the [facility], [address] [place] [address],   [place] [address] ([phone])    CPT Codes:  A: 44595-OZ7.P, 44995-EB6.T, 66844-QIEKG, 47583-UMRPI    COLLECTION SITE:  Client: [facility]  Location: [facility] ()           If you have any questions or concerns, please call the clinic at the number listed above.       Sincerely,        Delicia Heard PA-C

## 2020-03-09 NOTE — TELEPHONE ENCOUNTER
----- Message from Fabian Telles MD sent at 3/9/2020 11:44 AM CDT -----  R cheek keratosis No further treatment

## 2020-03-09 NOTE — PATIENT INSTRUCTIONS
Wound Care Instructions     FOR SUPERFICIAL WOUNDS     [facility] [phone]    [facility] [phone]                       AFTER 24 HOURS YOU SHOULD REMOVE THE BANDAGE AND BEGIN DAILY DRESSING CHANGES AS FOLLOWS:     1) Remove Dressing.     2) Clean and dry the area with tap water using a Q-tip or sterile gauze pad.     3) Apply Vaseline, Aquaphor, Polysporin ointment or Bacitracin ointment over entire wound.  Do NOT use Neosporin ointment.     4) Cover the wound with a band-aid, or a sterile non-stick gauze pad and micropore paper tape      REPEAT THESE INSTRUCTIONS AT LEAST ONCE A DAY UNTIL THE WOUND HAS COMPLETELY HEALED.    It is an old wives tale that a wound heals better when it is exposed to air and allowed to dry out. The wound will heal faster with a better cosmetic result if it is kept moist with ointment and covered with a bandage.    **Do not let the wound dry out.**      Supplies Needed:      *Cotton tipped applicators (Q-tips)    *Polysporin Ointment or Bacitracin Ointment (NOT NEOSPORIN)    *Band-aids or non-stick gauze pads and micropore paper tape.      PATIENT INFORMATION:    During the healing process you will notice a number of changes. All wounds develop a small halo of redness surrounding the wound.  This means healing is occurring. Severe itching with extensive redness usually indicates sensitivity to the ointment or bandage tape used to dress the wound.  You should call our office if this develops.      Swelling  and/or discoloration around your surgical site is common, particularly when performed around the eye.    All wounds normally drain.  The larger the wound the more drainage there will be.  After 7-10 days, you will notice the wound beginning to shrink and new skin will begin to grow.  The wound is healed when you can see skin has formed over the entire area.  A healed wound has a healthy, shiny look to the surface and is red to dark pink in color  to normalize.  Wounds may take approximately 4-6 weeks to heal.  Larger wounds may take 6-8 weeks.  After the wound is healed you may discontinue dressing changes.    You may experience a sensation of tightness as your wound heals. This is normal and will gradually subside.    Your healed wound may be sensitive to temperature changes. This sensitivity improves with time, but if you re having a lot of discomfort, try to avoid temperature extremes.    Patients frequently experience itching after their wound appears to have healed because of the continue healing under the skin.  Plain Vaseline will help relieve the itching.        POSSIBLE COMPLICATIONS    BLEEDIN. Leave the bandage in place.  2. Use tightly rolled up gauze or a cloth to apply direct pressure over the bandage for 30  minutes.  3. Reapply pressure for an additional 30 minutes if necessary  4. Use additional gauze and tape to maintain pressure once the bleeding has stopped.

## 2020-03-09 NOTE — LETTER
3/9/2020         RE: Diana Wilson  1972 [address] [facility] [id number]        Dear Colleague,    Thank you for referring your patient, Diana Wilson, to the [facility]. Please see a copy of my visit note below.    HPI:   Chief complaints: Diana Wilson is a 39 year old female who presents for Full skin cancer screening to rule out skin cancer   Last Skin Exam: n/a      1st Baseline: YES  Personal HX of Skin Cancer: no   Personal HX of Malignant Melanoma: none   Family HX of Skin Cancer / Malignant Melanoma: yes mother with a h/o SCC  Personal HX of Atypical Moles:   none  Risk factors: sun exposure and burns. History of breast CA.   New / Changing lesions: spot on right cheek. It changes in appearance, scabbing, bleeding, flaking. Mole on her nipple - new in the last 2 years.   Social History: here with  who is an EMT. Breast cancer w/ bilateral mastectomy. 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 Unsure if he's able to return.   On review of systems, there are no further skin complaints, patient is feeling otherwise well.  See patient intake sheet.  ROS of the following were done and are negative: Constitutional, Eyes, Ears, Nose,   Mouth, Throat, Cardiovascular, Respiratory, GI, Genitourinary, Musculoskeletal,   Psychiatric, Endocrine, Allergic/Immunologic.    This document serves as a record of the services and decisions personally performed and made by Delicia Bell, MS, PA-C. It was created on her behalf by Alejandra Salgado, a trained medical scribe. The creation of this document is based on the provider's statements to the medical scribe.  Alejandra Salgado 11:10 AM March 9, 2020    PHYSICAL EXAM:   /87   Pulse 85   LMP 08/05/2016 (Exact Date)   SpO2 97%   Skin exam performed as follows: Type 2 skin. Mood appropriate  Alert and Oriented X 3. Well developed, well nourished in no distress.  General appearance: Normal  Head including face: Normal  Eyes:  conjunctiva and lids: Normal  Mouth: Lips, teeth, gums: Normal  Neck: Normal  Chest-breast/axillae: Normal  Back: Normal  Spleen and liver: Normal  Cardiovascular: Exam of peripheral vascular system by observation for swelling, varicosities, edema: Normal  Genitalia: groin, buttocks: Normal  Extremities: digits/nails (clubbing): Normal  Eccrine and Apocrine glands: Normal  Right upper extremity: Normal  Left upper extremity: Normal  Right lower extremity: Normal  Left lower extremity: Normal  Skin: Scalp and body hair: See below    Pt deferred exam of breasts, groin, buttocks: No    Other physical findings:  1. Multiple pigmented macules on extremities and trunk  2. Multiple pigmented macules on face, trunk and extremities  3. Multiple vascular papules on trunk, arms and legs  4. Multiple scattered keratotic plaques  5. 5mm pink papule on right mid cheek  6. 3mm irregular dark brown macule on right areola      Except as noted above, no other signs of skin cancer or melanoma.     ASSESSMENT/PLAN:   Benign Full skin cancer screening today. . Patient with history of none  Advised on monthly self exams and 1 year  Patient Education: Appropriate brochures given.    1. Multiple benign appearing nevi on arms, legs and trunk. Discussed ABCDEs of melanoma and sunscreen.   2. Multiple lentigos on arms, legs and trunk. Advised benign, no treatment needed.  3. Multiple scattered angiomas. Advised benign, no treatment needed.   4. Seborrheic keratosis on arms, legs and trunk. Advised benign, no treatment needed.  5. AK r/o BCC on right cheek. Shave biopsy performed.  Area cleaned and anesthetized with 1% lidocaine with epinephrine.  Dermablade used to remove the lesion and sent to pathology. Bleeding was cauterized. Pt tolerated procedure well with no complications.   6. Atypical nevus on right areola (tattoo). Incisional biopsy performed.  Area cleaned and anesthetized with 1% lidocaine with epinephrine.  Dermablade used to  remove the lesion and sent to pathology. Bleeding was cauterized. Pt tolerated procedure well with no complications.   7. Diana to follow up with Primary Care provider regarding elevated blood pressure.            Follow-up: yearly FSE/PRN sooner    1.) Patient was asked about new and changing moles. YES  2.) Patient received a complete physical skin examination: YES  3.) Patient was counseled to perform a monthly self skin examination: YES  Scribed By: Delicia Bell, MS, PABINTA        Again, thank you for allowing me to participate in the care of your patient.        Sincerely,        Delicia Bell PA-C

## 2020-03-09 NOTE — PROGRESS NOTES
HPI:   Chief complaints: Diana Wilson is a 39 year old female who presents for Full skin cancer screening to rule out skin cancer   Last Skin Exam: n/a      1st Baseline: YES  Personal HX of Skin Cancer: no   Personal HX of Malignant Melanoma: none   Family HX of Skin Cancer / Malignant Melanoma: yes mother with a h/o SCC  Personal HX of Atypical Moles:   none  Risk factors: sun exposure and burns. History of breast CA.   New / Changing lesions: spot on right cheek. It changes in appearance, scabbing, bleeding, flaking. Mole on her nipple - new in the last 2 years.   Social History: here with  who is an EMT. Breast cancer w/ bilateral mastectomy. 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 Unsure if he's able to return.   On review of systems, there are no further skin complaints, patient is feeling otherwise well.  See patient intake sheet.  ROS of the following were done and are negative: Constitutional, Eyes, Ears, Nose,   Mouth, Throat, Cardiovascular, Respiratory, GI, Genitourinary, Musculoskeletal,   Psychiatric, Endocrine, Allergic/Immunologic.    This document serves as a record of the services and decisions personally performed and made by Delicia Bell, MS, PA-C. It was created on her behalf by Alejandra Salgado, a trained medical scribe. The creation of this document is based on the provider's statements to the medical scribe.  Alejandra Salgado 11:10 AM March 9, 2020    PHYSICAL EXAM:   /87   Pulse 85   LMP 08/05/2016 (Exact Date)   SpO2 97%   Skin exam performed as follows: Type 2 skin. Mood appropriate  Alert and Oriented X 3. Well developed, well nourished in no distress.  General appearance: Normal  Head including face: Normal  Eyes: conjunctiva and lids: Normal  Mouth: Lips, teeth, gums: Normal  Neck: Normal  Chest-breast/axillae: Normal  Back: Normal  Spleen and liver: Normal  Cardiovascular: Exam of peripheral vascular system by observation for swelling, varicosities,  edema: Normal  Genitalia: groin, buttocks: Normal  Extremities: digits/nails (clubbing): Normal  Eccrine and Apocrine glands: Normal  Right upper extremity: Normal  Left upper extremity: Normal  Right lower extremity: Normal  Left lower extremity: Normal  Skin: Scalp and body hair: See below    Pt deferred exam of breasts, groin, buttocks: No    Other physical findings:  1. Multiple pigmented macules on extremities and trunk  2. Multiple pigmented macules on face, trunk and extremities  3. Multiple vascular papules on trunk, arms and legs  4. Multiple scattered keratotic plaques  5. 5mm pink papule on right mid cheek  6. 3mm irregular dark brown macule on right areola      Except as noted above, no other signs of skin cancer or melanoma.     ASSESSMENT/PLAN:   Benign Full skin cancer screening today. . Patient with history of none  Advised on monthly self exams and 1 year  Patient Education: Appropriate brochures given.    1. Multiple benign appearing nevi on arms, legs and trunk. Discussed ABCDEs of melanoma and sunscreen.   2. Multiple lentigos on arms, legs and trunk. Advised benign, no treatment needed.  3. Multiple scattered angiomas. Advised benign, no treatment needed.   4. Seborrheic keratosis on arms, legs and trunk. Advised benign, no treatment needed.  5. AK r/o BCC on right cheek. Shave biopsy performed.  Area cleaned and anesthetized with 1% lidocaine with epinephrine.  Dermablade used to remove the lesion and sent to pathology. Bleeding was cauterized. Pt tolerated procedure well with no complications.   6. Atypical nevus on right areola (tattoo). Incisional biopsy performed.  Area cleaned and anesthetized with 1% lidocaine with epinephrine.  Dermablade used to remove the lesion and sent to pathology. Bleeding was cauterized. Pt tolerated procedure well with no complications.   7. Diana to follow up with Primary Care provider regarding elevated blood pressure.            Follow-up: yearly FSE/PRN  sooner    1.) Patient was asked about new and changing moles. YES  2.) Patient received a complete physical skin examination: YES  3.) Patient was counseled to perform a monthly self skin examination: YES  Scribed By: Delicia Bell MS, PA-C

## 2020-03-09 NOTE — PROGRESS NOTES
R cheek:There is hyperkeratosis of the epidermis, overlying banal keratinocytes, The dermis is unremarkable.      R cheek keratosis No further treatment

## 2020-03-18 LAB — COPATH REPORT: NORMAL

## 2020-04-22 ENCOUNTER — E-VISIT (OUTPATIENT)
Dept: FAMILY MEDICINE | Facility: CLINIC | Age: 40
End: 2020-04-22
Payer: COMMERCIAL

## 2020-04-22 ENCOUNTER — MYC MEDICAL ADVICE (OUTPATIENT)
Dept: FAMILY MEDICINE | Facility: CLINIC | Age: 40
End: 2020-04-22

## 2020-04-22 DIAGNOSIS — K21.9 GASTROESOPHAGEAL REFLUX DISEASE, ESOPHAGITIS PRESENCE NOT SPECIFIED: Primary | ICD-10-CM

## 2020-04-22 DIAGNOSIS — C50.411 MALIGNANT NEOPLASM OF UPPER-OUTER QUADRANT OF RIGHT BREAST IN FEMALE, ESTROGEN RECEPTOR POSITIVE (H): ICD-10-CM

## 2020-04-22 DIAGNOSIS — Z17.0 MALIGNANT NEOPLASM OF UPPER-OUTER QUADRANT OF RIGHT BREAST IN FEMALE, ESTROGEN RECEPTOR POSITIVE (H): ICD-10-CM

## 2020-04-22 LAB
ALBUMIN SERPL-MCNC: 3.8 G/DL (ref 3.4–5)
ALP SERPL-CCNC: 60 U/L (ref 40–150)
ALT SERPL W P-5'-P-CCNC: 26 U/L (ref 0–50)
ANION GAP SERPL CALCULATED.3IONS-SCNC: 5 MMOL/L (ref 3–14)
AST SERPL W P-5'-P-CCNC: 14 U/L (ref 0–45)
BASOPHILS # BLD AUTO: 0 10E9/L (ref 0–0.2)
BASOPHILS NFR BLD AUTO: 0.3 %
BILIRUB SERPL-MCNC: 0.3 MG/DL (ref 0.2–1.3)
BUN SERPL-MCNC: 11 MG/DL (ref 7–30)
CALCIUM SERPL-MCNC: 9.2 MG/DL (ref 8.5–10.1)
CANCER AG27-29 SERPL-ACNC: 17 U/ML (ref 0–39)
CHLORIDE SERPL-SCNC: 107 MMOL/L (ref 94–109)
CO2 SERPL-SCNC: 27 MMOL/L (ref 20–32)
CREAT SERPL-MCNC: 0.69 MG/DL (ref 0.52–1.04)
DIFFERENTIAL METHOD BLD: NORMAL
EOSINOPHIL # BLD AUTO: 0.2 10E9/L (ref 0–0.7)
EOSINOPHIL NFR BLD AUTO: 2.6 %
ERYTHROCYTE [DISTWIDTH] IN BLOOD BY AUTOMATED COUNT: 12.9 % (ref 10–15)
GFR SERPL CREATININE-BSD FRML MDRD: >90 ML/MIN/{1.73_M2}
GLUCOSE SERPL-MCNC: 141 MG/DL (ref 70–99)
HCT VFR BLD AUTO: 42 % (ref 35–47)
HGB BLD-MCNC: 13.9 G/DL (ref 11.7–15.7)
LYMPHOCYTES # BLD AUTO: 2.2 10E9/L (ref 0.8–5.3)
LYMPHOCYTES NFR BLD AUTO: 31.5 %
MCH RBC QN AUTO: 29.7 PG (ref 26.5–33)
MCHC RBC AUTO-ENTMCNC: 33.1 G/DL (ref 31.5–36.5)
MCV RBC AUTO: 90 FL (ref 78–100)
MONOCYTES # BLD AUTO: 0.4 10E9/L (ref 0–1.3)
MONOCYTES NFR BLD AUTO: 6.2 %
NEUTROPHILS # BLD AUTO: 4.1 10E9/L (ref 1.6–8.3)
NEUTROPHILS NFR BLD AUTO: 59.4 %
PLATELET # BLD AUTO: 242 10E9/L (ref 150–450)
POTASSIUM SERPL-SCNC: 3.8 MMOL/L (ref 3.4–5.3)
PROT SERPL-MCNC: 6.9 G/DL (ref 6.8–8.8)
RBC # BLD AUTO: 4.68 10E12/L (ref 3.8–5.2)
SODIUM SERPL-SCNC: 139 MMOL/L (ref 133–144)
WBC # BLD AUTO: 6.9 10E9/L (ref 4–11)

## 2020-04-22 PROCEDURE — 99421 OL DIG E/M SVC 5-10 MIN: CPT | Performed by: FAMILY MEDICINE

## 2020-04-22 PROCEDURE — 36415 COLL VENOUS BLD VENIPUNCTURE: CPT | Performed by: INTERNAL MEDICINE

## 2020-04-22 PROCEDURE — 80053 COMPREHEN METABOLIC PANEL: CPT | Performed by: INTERNAL MEDICINE

## 2020-04-22 PROCEDURE — 86300 IMMUNOASSAY TUMOR CA 15-3: CPT | Performed by: INTERNAL MEDICINE

## 2020-04-22 PROCEDURE — 85025 COMPLETE CBC W/AUTO DIFF WBC: CPT | Performed by: INTERNAL MEDICINE

## 2020-04-22 NOTE — PATIENT INSTRUCTIONS
Thank you for choosing us for your care. I have placed an order for a prescription so that you can start treatment. View your full visit summary for details by clicking on the link below. Your pharmacist will able to address any questions you may have about the medication.      If you're not feeling better within 2 weeks please schedule an appointment.  You can schedule an appointment right here in [employer], or call [phone]  If the visit is for the same symptoms as your e-visit, we'll refund the cost of your e-visit if seen within seven days.      Lifestyle Changes for Controlling GERD  When you have GERD, stomach acid feels as if it s backing up toward your mouth. Whether or not you take medicine to control your GERD, your symptoms can often be improved with lifestyle changes. Talk to your healthcare provider about the following suggestions. These suggestions may help you get relief from your symptoms.      Raise your head  Reflux is more likely to strike when you re lying down flat, because stomach fluid can flow backward more easily. Raising the head of your bed 4 to 6 inches can help. To do this:    Slide blocks or books under the legs at the head of your bed. Or, place a wedge under the mattress. Many [employer] can make a suitable wedge for you. The wedge should run from your waist to the top of your head.    Don t just prop your head on several pillows. This increases pressure on your stomach. It can make GERD worse.  Watch your eating habits  Certain foods may increase the acid in your stomach or relax the lower esophageal sphincter. This makes GERD more likely. It s best to avoid the following if they cause you symptoms:    Coffee, tea, and carbonated drinks (with and without caffeine)    Fatty, fried, or spicy food    Mint, chocolate, onions, and tomatoes    Peppermint    Any other foods that seem to irritate your stomach or cause you pain  Relieve the pressure  Tips include the following:    Eat  smaller meals, even if you have to eat more often.    Don t lie down right after you eat. Wait a few hours for your stomach to empty.    Avoid tight belts and tight-fitting clothes.    Lose excess weight.  Tobacco and alcohol  Avoid smoking tobacco and drinking alcohol. They can make GERD symptoms worse.  Date Last Reviewed: 7/1/2016 2000-2019 [employer]. [address] [place] [address], [place] [address]. All rights reserved. This information is not intended as a substitute for professional medical care. Always follow your healthcare professional's instructions.          Medicines for GERD  Gastroesophageal reflux disease (GERD) can be treated with medicine. This may be done with a medicine you can buy over the counter. Or it may be done with a medicine that your healthcare provider has to prescribe. In some cases, both types may be used. Your provider will tell you what is best for your symptoms.  Antacids  Antacids work to weaken the acid in your stomach and can give you quick relief. You can buy many of them with no prescription. Antacids can be high in sodium. This may be a problem if you have high blood pressure. Some antacids also have aluminum. This should be avoided if you have long-term (chronic) kidney disease. So check with your provider first. Take antacids only when you need to, as advised by your provider.  Side effects: Constipation, diarrhea. If you take too much medicine, it can cause calcium to build up.   H-2 blockers  H-2 blockers cause the stomach to make less acid. They are often used both on demand as symptoms occur, and daily to keep symptoms away. Your provider may prescribe them if antacids don t work for you. You can buy some of them over the counter. These come in a lower dosage.  Side effects: Confusion in older adults  Proton-pump inhibitors  These also cause the stomach to make less acid. They reduce stomach acid more than H-2 blockers. They may be used for a short time, or  longer to treat certain conditions. You can buy some of them over the counter. Or your provider may prescribe them. They help control GERD symptoms.  Side effects: Belly (abdominal) pain, diarrhea, upset stomach (nausea). Possible other side effects linked to long-term use and high doses.  Prokinetics  These medicines affect the movement of the digestive tract. They may be recommended if your stomach is emptying too slowly. But in most cases they are not recommended for treating GERD.   Side effects: Tiredness, depression, anxiety, problems with physical movement, belly cramps, constipation, diarrhea, a jittery feeling  Medicines to avoid  Don t take aspirin without your provider s approval. And don t take a nonsteroidal anti-inflammatory drug (NSAID), such as ibuprofen. These reduce the protective lining of your stomach. This can lead to more GERD symptoms. Check with your provider or pharmacist before taking a new medicine.   Date Last Reviewed: 7/1/2016 2000-2019 [employer]. [address] [place] [address], [place] [address]. All rights reserved. This information is not intended as a substitute for professional medical care. Always follow your healthcare professional's instructions.        Avoid eating before sleep, and avoid alcohol and caffeine while symptoms are ongoing.

## 2020-04-27 ENCOUNTER — INFUSION THERAPY VISIT (OUTPATIENT)
Dept: INFUSION THERAPY | Facility: CLINIC | Age: 40
End: 2020-04-27
Attending: INTERNAL MEDICINE
Payer: COMMERCIAL

## 2020-04-27 ENCOUNTER — ONCOLOGY VISIT (OUTPATIENT)
Dept: ONCOLOGY | Facility: CLINIC | Age: 40
End: 2020-04-27
Attending: INTERNAL MEDICINE
Payer: COMMERCIAL

## 2020-04-27 VITALS
HEIGHT: 64 IN | DIASTOLIC BLOOD PRESSURE: 90 MMHG | SYSTOLIC BLOOD PRESSURE: 143 MMHG | OXYGEN SATURATION: 94 % | TEMPERATURE: 97.7 F | RESPIRATION RATE: 16 BRPM | WEIGHT: 202.7 LBS | BODY MASS INDEX: 34.6 KG/M2 | HEART RATE: 104 BPM

## 2020-04-27 DIAGNOSIS — C50.411 MALIGNANT NEOPLASM OF UPPER-OUTER QUADRANT OF RIGHT BREAST IN FEMALE, ESTROGEN RECEPTOR POSITIVE (H): ICD-10-CM

## 2020-04-27 DIAGNOSIS — Z17.0 MALIGNANT NEOPLASM OF UPPER-OUTER QUADRANT OF RIGHT BREAST IN FEMALE, ESTROGEN RECEPTOR POSITIVE (H): Primary | ICD-10-CM

## 2020-04-27 DIAGNOSIS — M85.89 OSTEOPENIA OF MULTIPLE SITES: Primary | ICD-10-CM

## 2020-04-27 DIAGNOSIS — N95.1 HOT FLUSHES, PERIMENOPAUSAL: ICD-10-CM

## 2020-04-27 DIAGNOSIS — Z17.0 MALIGNANT NEOPLASM OF UPPER-OUTER QUADRANT OF RIGHT BREAST IN FEMALE, ESTROGEN RECEPTOR POSITIVE (H): ICD-10-CM

## 2020-04-27 DIAGNOSIS — M85.89 OSTEOPENIA OF MULTIPLE SITES: ICD-10-CM

## 2020-04-27 DIAGNOSIS — C50.411 MALIGNANT NEOPLASM OF UPPER-OUTER QUADRANT OF RIGHT BREAST IN FEMALE, ESTROGEN RECEPTOR POSITIVE (H): Primary | ICD-10-CM

## 2020-04-27 DIAGNOSIS — G62.9 NEUROPATHY: ICD-10-CM

## 2020-04-27 PROCEDURE — 25000128 H RX IP 250 OP 636: Performed by: INTERNAL MEDICINE

## 2020-04-27 PROCEDURE — G0463 HOSPITAL OUTPT CLINIC VISIT: HCPCS | Mod: 25

## 2020-04-27 PROCEDURE — 96365 THER/PROPH/DIAG IV INF INIT: CPT

## 2020-04-27 PROCEDURE — 99214 OFFICE O/P EST MOD 30 MIN: CPT | Performed by: INTERNAL MEDICINE

## 2020-04-27 RX ORDER — ZOLEDRONIC ACID 0.04 MG/ML
4 INJECTION, SOLUTION INTRAVENOUS ONCE
Status: COMPLETED | OUTPATIENT
Start: 2020-04-27 | End: 2020-04-27

## 2020-04-27 RX ORDER — TAMOXIFEN CITRATE 20 MG/1
20 TABLET ORAL DAILY
Qty: 90 TABLET | Refills: 1 | Status: SHIPPED | OUTPATIENT
Start: 2020-04-27 | End: 2020-12-14 | Stop reason: SINTOL

## 2020-04-27 RX ADMIN — ZOLEDRONIC ACID 4 MG: 0.04 INJECTION, SOLUTION INTRAVENOUS at 14:59

## 2020-04-27 ASSESSMENT — PAIN SCALES - GENERAL: PAINLEVEL: NO PAIN (0)

## 2020-04-27 ASSESSMENT — MIFFLIN-ST. JEOR: SCORE: 1574.44

## 2020-04-27 NOTE — PROGRESS NOTES
Infusion Nursing Note:  Diana KEO Wilson presents today for Zometa.    Patient seen by provider today: Yes   present during visit today: Not Applicable.    Note: Labs drawn 4/22.    Intravenous Access:  Peripheral IV placed.    Treatment Conditions:  Results reviewed, labs MET treatment parameters, ok to proceed with treatment.      Post Infusion Assessment:  Patient tolerated infusion without incident.  Blood return noted pre and post infusion.  Site patent and intact, free from redness, edema or discomfort.  No evidence of extravasations.  Access discontinued per protocol.       Discharge Plan:   Patient discharged in stable condition accompanied by: sheron Mukherjee RN

## 2020-04-27 NOTE — PROGRESS NOTES
"Oncology Rooming Note    April 27, 2020 2:12 PM   Diana Wilson is a 39 year old female who presents for:    Chief Complaint   Patient presents with     Oncology Clinic Visit     6 month recheck Malignant neoplasm of upper-outer quadrant of right breast in female, estrogen receptor positive, review labs - Zometa to follow     Initial Vitals: BP (!) 143/90 (BP Location: Left arm, Patient Position: Sitting, Cuff Size: Adult Regular)   Pulse 104   Temp 97.7  F (36.5  C) (Tympanic)   Resp 16   Ht 1.626 m (5' 4\")   Wt 91.9 kg (202 lb 11.2 oz)   LMP 08/05/2016 (Exact Date)   SpO2 94%   BMI 34.79 kg/m   Estimated body mass index is 34.79 kg/m  as calculated from the following:    Height as of this encounter: 1.626 m (5' 4\").    Weight as of this encounter: 91.9 kg (202 lb 11.2 oz). Body surface area is 2.04 meters squared.  No Pain (0) Comment: Data Unavailable   Patient's last menstrual period was 08/05/2016 (exact date).  Allergies reviewed: Yes  Medications reviewed: Yes    Medications: MEDICATION REFILLS NEEDED TODAY. Provider was notified.  Pharmacy name entered into [employer]: [facility] - [id number]    Clinical concerns: 6 month recheck Malignant neoplasm of upper-outer quadrant of right breast in female, estrogen receptor positive, review labs - Zometa to follow.         oSndra Medina CMA              "

## 2020-04-27 NOTE — LETTER
"    4/27/2020         RE: Diana Wilson  1972 [address] [facility] [address]        Dear Colleague,    Thank you for referring your patient, Diana Wilson, to the [facility]. Please see a copy of my visit note below.    Oncology Rooming Note    April 27, 2020 2:12 PM   Diana Wilson is a 39 year old female who presents for:    Chief Complaint   Patient presents with     Oncology Clinic Visit     6 month recheck Malignant neoplasm of upper-outer quadrant of right breast in female, estrogen receptor positive, review labs - Zometa to follow     Initial Vitals: BP (!) 143/90 (BP Location: Left arm, Patient Position: Sitting, Cuff Size: Adult Regular)   Pulse 104   Temp 97.7  F (36.5  C) (Tympanic)   Resp 16   Ht 1.626 m (5' 4\")   Wt 91.9 kg (202 lb 11.2 oz)   LMP 08/05/2016 (Exact Date)   SpO2 94%   BMI 34.79 kg/m   Estimated body mass index is 34.79 kg/m  as calculated from the following:    Height as of this encounter: 1.626 m (5' 4\").    Weight as of this encounter: 91.9 kg (202 lb 11.2 oz). Body surface area is 2.04 meters squared.  No Pain (0) Comment: Data Unavailable   Patient's last menstrual period was 08/05/2016 (exact date).  Allergies reviewed: Yes  Medications reviewed: Yes    Medications: MEDICATION REFILLS NEEDED TODAY. Provider was notified.  Pharmacy name entered into [facility]: [facility] [place], [place] - [id number] [address]    Clinical concerns: 6 month recheck Malignant neoplasm of upper-outer quadrant of right breast in female, estrogen receptor positive, review labs - Zometa to follow.         Sondra Medina [facility]                ONCOLOGY Follow up visit       COMPLAINT AND REASON FOR VISIT:    12/2016 diagnosed right breast cancer LN positive disease        HISTORY OF ONCOLOGY ILLNESS  She presented at age 36 breast lump in her right armpit and right breast in 10/2016.   Work up found upper outer quadrant of the right breast  grade 3 invasive ductal cancer, " "angiolymphatic invasion not identified.  ER/LA 99% positive, HER-2/kelle negative, associated with high-grade DCIS.    Right axillary ultrasound-guided biopsy indicating metastatic carcinoma positive for spread from breast primary, ER/LA both negative.  HER-2/kelle is negative.      She had clinical T2N1 disease. She made informed decision to proceed with neoadjuvant DDAC C1D1 2017. She has good clinical response post DD AC x4. Then went on to have wkly taxol + carbo  with informed decision in March.   She had carbo hypersensitivity reaction in early May (during C3). It was d/c after.   She finished chemo end of May 2017.     She was tested negative for BRCA1/2 with [facility].     2017 she had double mastectomies at [facility]. Right side found no residual invasive carcinoma, +grade III DCIS, 1mm in greatest dimension. 5 sLN all negative. She had ypTis(DCIS)pN0 disease. Left breast no cancer.     She finished post mastectomy RT in 2017.    She had BSO 2017. Then started Arimidex in 2018. She quit in 2018 due to side effects and willing to try tamoxifen in 3/2018.     She is also on Tutellus trial for weight loss.       INTERVAL HISTORY:  She saw derm 3/2020,Skin, right areola, shave pathology found  Atypical junctional melanocytic hyperplasia overlying scar.      PAST MEDICAL HISTORY:  Restless legs, mild intermittent asthma, celiac disease diagnosed 2016, hx of TIFFANY     SOCIAL HISTORY:  Works part-time.  Lives with her .  They have 2 kids, first pregnancy age 21.  She did not do breast feeding because of bilateral breast reduction.  She does office work.      FAMILY HISTORY:  \"Full of cancer\" from the mother's side including colon, lung, pancreatic, gastric cancer.  According to the patient none of them survive older than 60 years old.   Grandmother  of cholangial carcinoma at age 94 in 2019.        REVIEW OF SYSTEMS  She is very upbeat, Calmer.   + hot flushes on effexor.  She still has " "neuropathy on right toes  She lost 25 lbs total, then her weight is stabilized.       PHYSICAL EXAMINATION:   Blood pressure (!) 143/90, pulse 104, temperature 97.7  F (36.5  C), temperature source Tympanic, resp. rate 16, height 1.626 m (5' 4\"), weight 91.9 kg (202 lb 11.2 oz), last menstrual period 08/05/2016, SpO2 94 %, not currently breastfeeding.    ECOG 0    GENERAL APPEARANCE:  He young woman, looks like her stated age, very upbeat, not in acute distress.   HEENT: The patient is normocephalic, atraumatic. Pupils are equally reactive to light.  Sclerae are anicteric. erythematous oral mucosa.  - pharynx.  No oral thrush.   NECK:  Supple.  No jugular venous distention.  Thyroid is not palpable.   LYMPH NODES:  Superficial lymphadenopathy is not appreciable in the bilateral cervical, supraclavicular, axillary or inguinal areas.   CARDIOVASCULAR:  S1, S2 regular with no murmurs or gallops.  No carotid or abdominal bruits.   PULMONARY:  Lungs are clear to auscultation and percussion bilaterally.  There is no wheezing or rhonchi.   GASTROINTESTINAL:  Abdomen is soft, nontender.  No hepatosplenomegaly.  No signs of ascites.  No mass appreciable.   MUSCULOSKELETAL/EXTREMITIES:  No edema.  No cyanotic changes.  No signs of joint deformity.  No lymphedema.   NEUROLOGIC:  Cranial nerves II-XII are grossly intact.  Sensation intact.  Muscle strength and muscle tone symmetrical, 5/5 throughout.   BACK:  No spinal or paraspinal tenderness.  No CVA tenderness.   SKIN:  No petechiae.  No rash.  No signs of cellulitis.   BREASTS:  Bilateral breast exam reviewed.  Bilateral mastectomies scars well healed. She has prominent mastectomy scars.        LABORATORY DATA REVIEWED TODAY  CBC DIFF/CMP/marker are good.       Current Image data reviewed today  dexa 4/2019 - mild osteopenia  CT body 6/2019 - negative      Old data reviewed with summary today  PET 12/2018 - negative    CT c/a/p 6/2018 - negative. A 1.2 cm cyst or nodule in " the left lobe of thyroid gland is Unchanged. Abdomen: A 2 mm nonobstructing calculus in the left kidney is again  seen.    DEXA scan from 03/2016 identified mild osteopenia lumbar spine        ASSESSMENT AND PLAN:    1.   12/2016  dx breast cancer, cT2N1 disesae.      S/p DD AC and wkly taxol, carbo was used partially dropped due to hypersensitivity reaction.   She had pCR for invasive cancer with mastectomies in 7/2017.   S/p RT till 11/2017.     She had BSO 11/2017. She did not tolerate Arimdiex off it after 1 month.   She started tamoxifen in 3/2018.     I advise her to Rethink about AI. She declines again todayy.     She demands to have CT scan or PET with her follow up visit.   I explained to her the ASCO guideline in terms how to follow up breast cancer.    She finally okays it with CT scan on a yearly basis. This is due this summer.     She is on 6 months f/u with labs.     2.  hot flushes - advice Effexor, this has helped a lot.  She would love to stay on it long-term.      3. 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  She is to continue gabapentin.  She feels this has been helpful.      5. Osteopenia as baseline by dexa in 2016.   Adviced vit D with proper dosing.   dexa found osteopenia in 4/2019.   She is advised on IV zometa.   We discussed the bisphosphonate use with ASCO guideline.   She made his informed decision to try this April 2019.  She has good tolerance to it so far.  Actually she reports she feels better after Zometa infusion.  She is due repeat dexa 2011.    Again, thank you for allowing me to participate in the care of your patient.        Sincerely,        Maribel Jin MD, MD

## 2020-04-27 NOTE — PROGRESS NOTES
ONCOLOGY Follow up visit       COMPLAINT AND REASON FOR VISIT:    12/2016 diagnosed right breast cancer LN positive disease        HISTORY OF ONCOLOGY ILLNESS  She presented at age 36 breast lump in her right armpit and right breast in 10/2016.   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  ER/HI 99% positive, HER-2/kelle negative, associated with high-grade DCIS.    Right axillary ultrasound-guided biopsy indicating metastatic carcinoma positive for spread from breast primary, ER/HI both negative.  HER-2/kelle is negative.      She had clinical T2N1 disease. She made informed decision to proceed with neoadjuvant DDAC C1D1 1/16/2017. She has good clinical response post DD AC x4. Then went on to have wkly taxol + carbo  with informed decision in March.   She had carbo hypersensitivity reaction in early May (during C3). It was d/c after.   She finished chemo end of May 2017.     She was tested negative for BRCA1/2 with [facility].     7/2017 she had double mastectomies at [facility]. Right side found no residual invasive carcinoma, +grade III DCIS, 1mm in greatest dimension. 5 sLN all negative. She had ypTis(DCIS)pN0 disease. Left breast no cancer.     She finished post mastectomy RT in 11/2017.    She had BSO 11/2017. Then started Arimidex in 1/2018. She quit in Feb 2018 due to side effects and willing to try tamoxifen in 3/2018.     She is also on ELL trial for weight loss.       INTERVAL HISTORY:  She saw derm 3/2020,Skin, right areola, shave pathology found  Atypical junctional melanocytic hyperplasia overlying scar.      PAST MEDICAL HISTORY:  Restless legs, mild intermittent asthma, celiac disease diagnosed 02/2016, hx of TIFFANY     SOCIAL HISTORY:  Works part-time.  Lives with her .  They have 2 kids, first pregnancy age 21.  She did not do breast feeding because of bilateral breast reduction.  She does office work.      FAMILY  "HISTORY:  \"Full of cancer\" from the mother's side including colon, lung, pancreatic, gastric cancer.  According to the patient none of them survive older than 60 years old.   Grandmother  of cholangial carcinoma at age 94 in 2019.        REVIEW OF SYSTEMS  She is very upbeat, Calmer.   + hot flushes on effexor.  She still has neuropathy on right toes  She lost 25 lbs total, then her weight is stabilized.       PHYSICAL EXAMINATION:   Blood pressure (!) 143/90, pulse 104, temperature 97.7  F (36.5  C), temperature source Tympanic, resp. rate 16, height 1.626 m (5' 4\"), weight 91.9 kg (202 lb 11.2 oz), last menstrual period 2016, SpO2 94 %, not currently breastfeeding.    ECOG 0    GENERAL APPEARANCE:  He young woman, looks like her stated age, very upbeat, not in acute distress.   HEENT: The patient is normocephalic, atraumatic. Pupils are equally reactive to light.  Sclerae are anicteric. erythematous oral mucosa.  - pharynx.  No oral thrush.   NECK:  Supple.  No jugular venous distention.  Thyroid is not palpable.   LYMPH NODES:  Superficial lymphadenopathy is not appreciable in the bilateral cervical, supraclavicular, axillary or inguinal areas.   CARDIOVASCULAR:  S1, S2 regular with no murmurs or gallops.  No carotid or abdominal bruits.   PULMONARY:  Lungs are clear to auscultation and percussion bilaterally.  There is no wheezing or rhonchi.   GASTROINTESTINAL:  Abdomen is soft, nontender.  No hepatosplenomegaly.  No signs of ascites.  No mass appreciable.   MUSCULOSKELETAL/EXTREMITIES:  No edema.  No cyanotic changes.  No signs of joint deformity.  No lymphedema.   NEUROLOGIC:  Cranial nerves II-XII are grossly intact.  Sensation intact.  Muscle strength and muscle tone symmetrical, 5/5 throughout.   BACK:  No spinal or paraspinal tenderness.  No CVA tenderness.   SKIN:  No petechiae.  No rash.  No signs of cellulitis.   BREASTS:  Bilateral breast exam reviewed.  Bilateral mastectomies scars well " healed. She has prominent mastectomy scars.        LABORATORY DATA REVIEWED TODAY  CBC DIFF/CMP/marker are good.       Current Image data reviewed today  dexa 4/2019 - mild osteopenia  CT body 6/2019 - negative      Old data reviewed with summary today  PET 12/2018 - negative    CT c/a/p 6/2018 - negative. A 1.2 cm cyst or nodule in the left lobe of thyroid gland is Unchanged. Abdomen: A 2 mm nonobstructing calculus in the left kidney is again  seen.    DEXA scan from 03/2016 identified mild osteopenia lumbar spine        ASSESSMENT AND PLAN:    1.   12/2016  dx breast cancer, cT2N1 disesae.      S/p DD AC and wkly taxol, carbo was used partially dropped due to hypersensitivity reaction.   She had pCR for invasive cancer with mastectomies in 7/2017.   S/p RT till 11/2017.     She had BSO 11/2017. She did not tolerate Arimdiex off it after 1 month.   She started tamoxifen in 3/2018.     I advise her to Rethink about AI. She declines again todayy.     She demands to have CT scan or PET with her follow up visit.   I explained to her the ASCO guideline in terms how to follow up breast cancer.    She finally okays it with CT scan on a yearly basis. This is due this summer.     She is on 6 months f/u with labs.     2.  hot flushes - advice Effexor, this has helped a lot.  She would love to stay on it long-term.      3. 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  She is to continue gabapentin.  She feels this has been helpful.      5. Osteopenia as baseline by dexa in 2016.   Adviced vit D with proper dosing.   dexa found osteopenia in 4/2019.   She is advised on IV zometa.   We discussed the bisphosphonate use with ASCO guideline.   She made his informed decision to try this April 2019.  She has good tolerance to it so far.  Actually she reports she feels better after Zometa infusion.  She is due repeat dexa 2011.

## 2020-06-08 ENCOUNTER — OFFICE VISIT (OUTPATIENT)
Dept: DERMATOLOGY | Facility: CLINIC | Age: 40
End: 2020-06-08
Payer: COMMERCIAL

## 2020-06-08 VITALS — SYSTOLIC BLOOD PRESSURE: 136 MMHG | HEART RATE: 84 BPM | OXYGEN SATURATION: 96 % | DIASTOLIC BLOOD PRESSURE: 83 MMHG

## 2020-06-08 DIAGNOSIS — D48.5 NEOPLASM OF UNCERTAIN BEHAVIOR OF SKIN: Primary | ICD-10-CM

## 2020-06-08 PROCEDURE — 88331 PATH CONSLTJ SURG 1 BLK 1SPC: CPT | Performed by: DERMATOLOGY

## 2020-06-08 PROCEDURE — 13132 CMPLX RPR F/C/C/M/N/AX/G/H/F: CPT | Performed by: DERMATOLOGY

## 2020-06-08 PROCEDURE — 88342 IMHCHEM/IMCYTCHM 1ST ANTB: CPT | Performed by: DERMATOLOGY

## 2020-06-08 PROCEDURE — 11622 EXC S/N/H/F/G MAL+MRG 1.1-2: CPT | Performed by: DERMATOLOGY

## 2020-06-08 NOTE — NURSING NOTE
Chief Complaint   Patient presents with     Derm Problem     mohs       Vitals:    06/08/20 0816   BP: 136/83   Pulse: 84   SpO2: 96%     Wt Readings from Last 1 Encounters:   04/27/20 91.9 kg (202 lb 11.2 oz)       Audra Willard LPN.................6/8/2020

## 2020-06-08 NOTE — PROGRESS NOTES
Surgical Office Location :   [facility]  [address] [place] [address]

## 2020-06-08 NOTE — LETTER
6/8/2020         RE: Diana Wilson  [address] [facility] [id number]        Dear Colleague,    Thank you for referring your patient, Diana Wilson, to the [facility]. Please see a copy of my visit note below.    Surgical Office Location :   [facility]  [address] [place] [address]      Diana Wilson is a 39 year old year old female patient here today for Pigmented lesion of undetermined biologic behavior on right aerola.  Patient has no other skin complaints today.  Remainder of the HPI, Meds, PMH, Allergies, FH, and SH was reviewed in chart.      Past Medical History:   Diagnosis Date     Depressive disorder 1995    And Anxiety     Encounter for insertion or removal of intrauterine contraceptive device 1/8/2008     Excessive or frequent menstruation 03/19/2003     Fibroadenosis of breast      Invasive ductal carcinoma of breast, right (H)      Malignant neoplasm of upper-outer quadrant of right female breast (H)      Neutropenia, drug-induced (H)      Nongonococcal urethritis (JALIL) due to chlamydia trachomatis 01/03/2002     Other and unspecified ovarian cyst 03/19/2003    RIGHT ovarian cyst     PONV (postoperative nausea and vomiting)      Skin cancer      Transient hypertension of pregnancy, antepartum     PIH with last birth     Uncomplicated asthma 2002       Past Surgical History:   Procedure Laterality Date     BIOPSY  2/2016    EDG with biopsy     BREAST SURGERY  1997    Bilateral reduction     C APPENDECTOMY  1991     DENTAL SURGERY  09/13/2010 and 10/12/2010    Root canals     DILATION AND CURETTAGE, HYSTEROSCOPY, ABLATE ENDOMETRIUM NOVASURE, COMBINED  3/20/2012    Procedure:COMBINED DILATION AND CURETTAGE, HYSTEROSCOPY, ABLATE ENDOMETRIUM NOVASURE; Hysteroscopy with Endometrial Ablation Novasure; Surgeon:GERRI PURCELL; Location:[facility]     ESOPHAGOSCOPY, GASTROSCOPY, DUODENOSCOPY (EGD), COMBINED N/A 2/18/2016    Procedure: COMBINED ESOPHAGOSCOPY,  GASTROSCOPY, DUODENOSCOPY (EGD);  Surgeon: Jose L Wilson MD;  Location: [facility]     INSERT PORT VASCULAR ACCESS N/A 1/11/2017    Procedure: INSERT PORT VASCULAR ACCESS;  Surgeon: Michael Townsend MD;  Location: [facility]     LAPAROSCOPIC SALPINGO-OOPHORECTOMY Bilateral 11/28/2017    Procedure: LAPAROSCOPIC SALPINGO-OOPHORECTOMY;  Laparoscopic bilateral salpingo oophorectomy;  Surgeon: Preeti Tirado MD;  Location: [facility]     RECONSTRUCT BREAST BILATERAL, IMPLANT PROSTHESIS BILATERAL, COMBINED Bilateral 3/16/2018    Procedure: COMBINED RECONSTRUCT BREAST BILATERAL, IMPLANT PROSTHESIS BILATERAL;  BILATERAL SECOND STAGE BREAST RECONSTRUCTION WITH SILICONE GEL IMPLANT;  Surgeon: Eugenio Ruelas MD;  Location: [facility]     SURGICAL HISTORY OF -   1997    Breast reduction        Family History   Problem Relation Age of Onset     Diabetes Mother         hypoglycemic     Allergies Mother      Arthritis Mother      Depression Mother      Gastrointestinal Disease Mother      Neurologic Disorder Mother      Psychotic Disorder Mother         panic disorder     Cancer Mother 56        pancreatic, colon, liver and skin.     Cancer - colorectal Mother      Other Cancer Mother         Pancreatic     Anxiety Disorder Mother      Mental Illness Mother      Hypertension Father      Diabetes Father      C.A.D. Paternal Grandfather         congestive heart failure     Cerebrovascular Disease Paternal Grandfather         age 79     Diabetes Paternal Grandfather      Hypertension Paternal Grandfather      Alzheimer Disease Paternal Grandfather      Arthritis Paternal Grandfather      Circulatory Paternal Grandfather      Cardiovascular Paternal Grandfather      Heart Disease Paternal Grandfather      Neurologic Disorder Paternal Grandfather      Unknown/Adopted Maternal Grandmother      Cerebrovascular Disease Maternal Grandmother      Unknown/Adopted Maternal Grandfather      Cancer Maternal Grandfather       Obesity Paternal Grandmother      Cancer Paternal Grandmother 94        coloangiocancinoma       Social History     Socioeconomic History     Marital status:      Spouse name: Alfredo Wilson     Number of children: 2     Years of education: 15     Highest education level: Not on file   Occupational History     Occupation:      Employer: OTHER     Comment: [employer]   Social Needs     Financial resource strain: Not on file     Food insecurity     Worry: Not on file     Inability: Not on file     Transportation needs     Medical: Not on file     Non-medical: Not on file   Tobacco Use     Smoking status: Former Smoker     Packs/day: 0.50     Years: 10.00     Pack years: 5.00     Types: Cigarettes     Last attempt to quit: 10/1/2016     Years since quitting: 3.6     Smokeless tobacco: Never Used     Tobacco comment: Socially- quit smoking 10/01/2016   Substance and Sexual Activity     Alcohol use: Yes     Alcohol/week: 0.0 standard drinks     Comment: Occassional     Drug use: No     Sexual activity: Yes     Partners: Male     Birth control/protection: Female Surgical     Comment:  has had a vasectomy   Lifestyle     Physical activity     Days per week: Not on file     Minutes per session: Not on file     Stress: Not on file   Relationships     Social connections     Talks on phone: Not on file     Gets together: Not on file     Attends Denominational service: Not on file     Active member of club or organization: Not on file     Attends meetings of clubs or organizations: Not on file     Relationship status: Not on file     Intimate partner violence     Fear of current or ex partner: Not on file     Emotionally abused: Not on file     Physically abused: Not on file     Forced sexual activity: Not on file   Other Topics Concern      Service Not Asked     Blood Transfusions Not Asked     Caffeine Concern Not Asked     Occupational Exposure Not Asked     Hobby Hazards Not Asked      Sleep Concern Not Asked     Stress Concern Not Asked     Weight Concern Not Asked     Special Diet Not Asked     Back Care Not Asked     Exercise Not Asked     Bike Helmet Not Asked     Seat Belt Yes     Self-Exams Not Asked     Parent/sibling w/ CABG, MI or angioplasty before 65F 55M? No   Social History Narrative     Not on file       Outpatient Encounter Medications as of 6/8/2020   Medication Sig Dispense Refill     acetaminophen (TYLENOL) 325 MG tablet Take 2 tablets (650 mg) by mouth every 4 hours as needed for other (mild pain) 100 tablet 0     albuterol (2.5 MG/3ML) 0.083% neb solution        albuterol (PROAIR HFA/PROVENTIL HFA/VENTOLIN HFA) 108 (90 BASE) MCG/ACT Inhaler Inhale 2 puffs into the lungs every 6 hours as needed for shortness of breath / dyspnea 1 Inhaler 11     beclomethasone HFA (QVAR REDIHALER) 40 MCG/ACT inhaler Inhale 1 puff into the lungs 2 times daily 31.8 g 3     cholecalciferol (VITAMIN D3) 1000 units (25 mcg) capsule Take 1 capsule by mouth every other day       Fluticasone Propionate (FLONASE NA)        gabapentin (NEURONTIN) 300 MG capsule Take 1 tablet (300 mg) by mouth three times daily. 90 capsule 3     ibuprofen (ADVIL/MOTRIN) 600 MG tablet Take 1 tablet (600 mg) by mouth every 6 hours as needed for pain (mild) 30 tablet 0     loratadine (CLARITIN) 10 MG tablet Take 10 mg by mouth daily       montelukast (SINGULAIR) 10 MG tablet Take 1 tablet (10 mg) by mouth At Bedtime 90 tablet 3     Multiple Vitamins-Minerals (MULTIVITAMIN PO)        omeprazole (PRILOSEC) 20 MG DR capsule Take 1 capsule (20 mg) by mouth every morning (before breakfast) 30 minutes before meal 30 capsule 2     order for DME Equipment being ordered: x2 20/30mmHg UE compression sleeves 1 each 0     tamoxifen (NOLVADEX) 20 MG tablet Take 1 tablet (20 mg) by mouth daily 90 tablet 1     UNABLE TO FIND MEDICATION NAME: Calcium and vitamin D       venlafaxine (EFFEXOR XR) 150 MG PO 24 hr capsule Take 1 capsule (150 mg)  by mouth daily With the 75mg capsule daily 90 capsule 1     venlafaxine (EFFEXOR XR) 75 MG PO 24 hr capsule Take 1 capsule (75 mg) by mouth daily With the 150mg capsule 90 capsule 1     No facility-administered encounter medications on file as of 6/8/2020.              Review Of Systems  Skin: As above  Eyes: negative  Ears/Nose/Throat: negative  Respiratory: No shortness of breath, dyspnea on exertion, cough, or hemoptysis  Cardiovascular: negative  Gastrointestinal: negative  Genitourinary: negative  Musculoskeletal: negative  Neurologic: negative  Psychiatric: negative  Hematologic/Lymphatic/Immunologic: negative  Endocrine: negative      O:   NAD, WDWN, Alert & Oriented, Mood & Affect wnl, Vitals stable   Here today alone   /83   Pulse 84   LMP 08/05/2016 (Exact Date)   SpO2 96%    General appearance normal   Vitals stable   Alert, oriented and in no acute distress     R aerola 5mm red macule in scar      Eyes: Conjunctivae/lids:Normal     ENT: Lips, buccal mucosa, tongue: normal    MSK:Normal    Cardiovascular: peripheral edema none    Pulm: Breathing Normal    Neuro/Psych: Orientation:Alert and Orientedx3 ; Mood/Affect:normal     MICRO  R aerola:Unremarkable epidermis with parallel bundles of cellular collagen within the superficial dermis.  No concerning areas for malignancy.     A/P:  1. Atypical nevus  Atypical moles are unusual benign moles that may resemble melanoma. People who have them are at increased risk of developing single or multiple melanomas. The higher the number of these moles someone has, the higher the risk; those who have 10 or more have 12 times the risk of developing melanoma compared to the general population. Atypical nevi are found significantly more often in melanoma patients than in the general population.    While atypical moles are considered to be pre-cancerous (more likely to turn into melanoma than regular moles), not everyone who has atypical moles gets melanoma. In  "fact, most moles -- both ordinary and atypical ones -- never become cancerous. Thus the removal of all atypical nevi is unnecessary. In fact, most of the melanomas found on people with atypical moles arise from normal skin and not an atypical mole.    Although a physician bases the initial diagnosis of atypical moles on a physical examination, removing several moles and examining them under a microscope must confirm the diagnosis. This procedure, called a biopsy.    A pathologist will examine the tissue under a microscope and make the precise diagnosis. Diagnosis by biopsy is not exact, and in difficult cases doctors may split 50/50 down the middle as to whether a mole is melanoma or benign. If the pathologist uses the term \"severely dysplastic\" or \"atypical melanocytic hyperplasia\" or offers a long descriptive narrative it means he really is concerned about melanoma, but does not want to call it that.    Most dermatologists usually recommend that all patients with these severely dysplastic moles have them removed. Also many dermatologists recommend removing \"moderate dysplasia\" moles, if the biopsy didn't get all of it. Those with \"mild dysplasia\" can usually be left alone or watched.    EXCISION OF AN, Margins confirmed with FROZEN SECTIONS and MART1 stain AND COMPLEX: After thorough discussion of PGACAC, consent obtained, anesthesia and prep, the margins of the lesion were identified and an elliptical incision was made encompassing the lesion with 3mm margin. The incisions were made through the skin and down to and including the subcutaneous tissue. The lesion was removed en bloc and submitted for frozen section pathologic review. Clear margins obtained (1.1x1.2cm).  The wound edges were widely undermined until adequate tissue mobility was obtained. hemostasis was achieved. The wound edges were then closed in a layered fashion, being careful not to leave any dead space. Postoperative length was 3 cm.   EBL " minimal; complications none; wound care routine. The patient was from the clinic in good condition and will return in one week for wound check.     BENIGN LESIONS DISCUSSED WITH PATIENT:  I discussed the specifics of tumor, prognosis, and genetics of benign lesions.  I explained that treatment of these lesions would be purely cosmetic and not medically neccessary.  I discussed with patient different removal options including excision, cautery and /or laser.      Nature and genetics of benign skin lesions dicussed with patient.  Signs and Symptoms of skin cancer discussed with patient.  Patient encouraged to perform monthly skin exams.  UV precautions reviewed with patient.  Skin care regimen reviewed with patient: Eliminate harsh soaps, i.e. Dial, zest, irsih spring; Mild soaps such as Cetaphil or Dove sensitive skin, avoid hot or cold showers, aggressive use of emollients including vanicream, cetaphil or cerave discussed with patient.    Risks of non-melanoma skin cancer discussed with patient   Return to clinic 6 months      Again, thank you for allowing me to participate in the care of your patient.        Sincerely,        Fabian Telles MD

## 2020-06-08 NOTE — PROGRESS NOTES
Diana Wilson is a 39 year old year old female patient here today for Pigmented lesion of undetermined biologic behavior on right aerola.  Patient has no other skin complaints today.  Remainder of the HPI, Meds, PMH, Allergies, FH, and SH was reviewed in chart.      Past Medical History:   Diagnosis Date     Depressive disorder 1995    And Anxiety     Encounter for insertion or removal of intrauterine contraceptive device 1/8/2008     Excessive or frequent menstruation 03/19/2003     Fibroadenosis of breast      Invasive ductal carcinoma of breast, right (H)      Malignant neoplasm of upper-outer quadrant of right female breast (H)      Neutropenia, drug-induced (H)      Nongonococcal urethritis (JALIL) due to chlamydia trachomatis 01/03/2002     Other and unspecified ovarian cyst 03/19/2003    RIGHT ovarian cyst     PONV (postoperative nausea and vomiting)      Skin cancer      Transient hypertension of pregnancy, antepartum     PIH with last birth     Uncomplicated asthma 2002       Past Surgical History:   Procedure Laterality Date     BIOPSY  2/2016    EDG with biopsy     BREAST SURGERY  1997    Bilateral reduction     C APPENDECTOMY  1991     DENTAL SURGERY  09/13/2010 and 10/12/2010    Root canals     DILATION AND CURETTAGE, HYSTEROSCOPY, ABLATE ENDOMETRIUM NOVASURE, COMBINED  3/20/2012    Procedure:COMBINED DILATION AND CURETTAGE, HYSTEROSCOPY, ABLATE ENDOMETRIUM NOVASURE; Hysteroscopy with Endometrial Ablation Novasure; Surgeon:GERRI PURCELL; Location:[facility]     ESOPHAGOSCOPY, GASTROSCOPY, DUODENOSCOPY (EGD), COMBINED N/A 2/18/2016    Procedure: COMBINED ESOPHAGOSCOPY, GASTROSCOPY, DUODENOSCOPY (EGD);  Surgeon: Jose L Wilson MD;  Location: [facility]     INSERT PORT VASCULAR ACCESS N/A 1/11/2017    Procedure: INSERT PORT VASCULAR ACCESS;  Surgeon: Michael Townsend MD;  Location: [facility]     LAPAROSCOPIC SALPINGO-OOPHORECTOMY Bilateral 11/28/2017    Procedure: LAPAROSCOPIC SALPINGO-OOPHORECTOMY;   Laparoscopic bilateral salpingo oophorectomy;  Surgeon: Preeti Tirado MD;  Location: [facility]     RECONSTRUCT BREAST BILATERAL, IMPLANT PROSTHESIS BILATERAL, COMBINED Bilateral 3/16/2018    Procedure: COMBINED RECONSTRUCT BREAST BILATERAL, IMPLANT PROSTHESIS BILATERAL;  BILATERAL SECOND STAGE BREAST RECONSTRUCTION WITH SILICONE GEL IMPLANT;  Surgeon: Eugenio Ruelas MD;  [facility]     SURGICAL HISTORY OF -   1997    Breast reduction        Family History   Problem Relation Age of Onset     Diabetes Mother         hypoglycemic     Allergies Mother      Arthritis Mother      Depression Mother      Gastrointestinal Disease Mother      Neurologic Disorder Mother      Psychotic Disorder Mother         panic disorder     Cancer Mother 56        pancreatic, colon, liver and skin.     Cancer - colorectal Mother      Other Cancer Mother         Pancreatic     Anxiety Disorder Mother      Mental Illness Mother      Hypertension Father      Diabetes Father      C.A.D. Paternal Grandfather         congestive heart failure     Cerebrovascular Disease Paternal Grandfather         age 79     Diabetes Paternal Grandfather      Hypertension Paternal Grandfather      Alzheimer Disease Paternal Grandfather      Arthritis Paternal Grandfather      Circulatory Paternal Grandfather      Cardiovascular Paternal Grandfather      Heart Disease Paternal Grandfather      Neurologic Disorder Paternal Grandfather      Unknown/Adopted Maternal Grandmother      Cerebrovascular Disease Maternal Grandmother      Unknown/Adopted Maternal Grandfather      Cancer Maternal Grandfather      Obesity Paternal Grandmother      Cancer Paternal Grandmother 94        coloangiocancinoma       Social History     Socioeconomic History     Marital status:      Spouse name: Alfredo Wilson     Number of children: 2     Years of education: 15     Highest education level: Not on file   Occupational History     Occupation: Legal       Employer: OTHER     Comment: [employer]   Social Needs     Financial resource strain: Not on file     Food insecurity     Worry: Not on file     Inability: Not on file     Transportation needs     Medical: Not on file     Non-medical: Not on file   Tobacco Use     Smoking status: Former Smoker     Packs/day: 0.50     Years: 10.00     Pack years: 5.00     Types: Cigarettes     Last attempt to quit: 10/1/2016     Years since quitting: 3.6     Smokeless tobacco: Never Used     Tobacco comment: Socially- quit smoking 10/01/2016   Substance and Sexual Activity     Alcohol use: Yes     Alcohol/week: 0.0 standard drinks     Comment: Occassional     Drug use: No     Sexual activity: Yes     Partners: Male     Birth control/protection: Female Surgical     Comment:  has had a vasectomy   Lifestyle     Physical activity     Days per week: Not on file     Minutes per session: Not on file     Stress: Not on file   Relationships     Social connections     Talks on phone: Not on file     Gets together: Not on file     Attends Anabaptist service: Not on file     Active member of club or organization: Not on file     Attends meetings of clubs or organizations: Not on file     Relationship status: Not on file     Intimate partner violence     Fear of current or ex partner: Not on file     Emotionally abused: Not on file     Physically abused: Not on file     Forced sexual activity: Not on file   Other Topics Concern      Service Not Asked     Blood Transfusions Not Asked     Caffeine Concern Not Asked     Occupational Exposure Not Asked     Hobby Hazards Not Asked     Sleep Concern Not Asked     Stress Concern Not Asked     Weight Concern Not Asked     Special Diet Not Asked     Back Care Not Asked     Exercise Not Asked     Bike Helmet Not Asked     Seat Belt Yes     Self-Exams Not Asked     Parent/sibling w/ CABG, MI or angioplasty before 65F 55M? No   Social History Narrative     Not on file        Outpatient Encounter Medications as of 6/8/2020   Medication Sig Dispense Refill     acetaminophen (TYLENOL) 325 MG tablet Take 2 tablets (650 mg) by mouth every 4 hours as needed for other (mild pain) 100 tablet 0     albuterol (2.5 MG/3ML) 0.083% neb solution        albuterol (PROAIR HFA/PROVENTIL HFA/VENTOLIN HFA) 108 (90 BASE) MCG/ACT Inhaler Inhale 2 puffs into the lungs every 6 hours as needed for shortness of breath / dyspnea 1 Inhaler 11     beclomethasone HFA (QVAR REDIHALER) 40 MCG/ACT inhaler Inhale 1 puff into the lungs 2 times daily 31.8 g 3     cholecalciferol (VITAMIN D3) 1000 units (25 mcg) capsule Take 1 capsule by mouth every other day       Fluticasone Propionate (FLONASE NA)        gabapentin (NEURONTIN) 300 MG capsule Take 1 tablet (300 mg) by mouth three times daily. 90 capsule 3     ibuprofen (ADVIL/MOTRIN) 600 MG tablet Take 1 tablet (600 mg) by mouth every 6 hours as needed for pain (mild) 30 tablet 0     loratadine (CLARITIN) 10 MG tablet Take 10 mg by mouth daily       montelukast (SINGULAIR) 10 MG tablet Take 1 tablet (10 mg) by mouth At Bedtime 90 tablet 3     Multiple Vitamins-Minerals (MULTIVITAMIN PO)        omeprazole (PRILOSEC) 20 MG DR capsule Take 1 capsule (20 mg) by mouth every morning (before breakfast) 30 minutes before meal 30 capsule 2     order for DME Equipment being ordered: x2 20/30mmHg UE compression sleeves 1 each 0     tamoxifen (NOLVADEX) 20 MG tablet Take 1 tablet (20 mg) by mouth daily 90 tablet 1     UNABLE TO FIND MEDICATION NAME: Calcium and vitamin D       venlafaxine (EFFEXOR XR) 150 MG PO 24 hr capsule Take 1 capsule (150 mg) by mouth daily With the 75mg capsule daily 90 capsule 1     venlafaxine (EFFEXOR XR) 75 MG PO 24 hr capsule Take 1 capsule (75 mg) by mouth daily With the 150mg capsule 90 capsule 1     No facility-administered encounter medications on file as of 6/8/2020.              Review Of Systems  Skin: As above  Eyes:  negative  Ears/Nose/Throat: negative  Respiratory: No shortness of breath, dyspnea on exertion, cough, or hemoptysis  Cardiovascular: negative  Gastrointestinal: negative  Genitourinary: negative  Musculoskeletal: negative  Neurologic: negative  Psychiatric: negative  Hematologic/Lymphatic/Immunologic: negative  Endocrine: negative      O:   NAD, WDWN, Alert & Oriented, Mood & Affect wnl, Vitals stable   Here today alone   /83   Pulse 84   LMP 08/05/2016 (Exact Date)   SpO2 96%    General appearance normal   Vitals stable   Alert, oriented and in no acute distress     R aerola 5mm red macule in scar      Eyes: Conjunctivae/lids:Normal     ENT: Lips, buccal mucosa, tongue: normal    MSK:Normal    Cardiovascular: peripheral edema none    Pulm: Breathing Normal    Neuro/Psych: Orientation:Alert and Orientedx3 ; Mood/Affect:normal     MICRO  R aerola:Unremarkable epidermis with parallel bundles of cellular collagen within the superficial dermis.  No concerning areas for malignancy.     A/P:  1. Atypical nevus  Atypical moles are unusual benign moles that may resemble melanoma. People who have them are at increased risk of developing single or multiple melanomas. The higher the number of these moles someone has, the higher the risk; those who have 10 or more have 12 times the risk of developing melanoma compared to the general population. Atypical nevi are found significantly more often in melanoma patients than in the general population.    While atypical moles are considered to be pre-cancerous (more likely to turn into melanoma than regular moles), not everyone who has atypical moles gets melanoma. In fact, most moles -- both ordinary and atypical ones -- never become cancerous. Thus the removal of all atypical nevi is unnecessary. In fact, most of the melanomas found on people with atypical moles arise from normal skin and not an atypical mole.    Although a physician bases the initial diagnosis of atypical  "moles on a physical examination, removing several moles and examining them under a microscope must confirm the diagnosis. This procedure, called a biopsy.    A pathologist will examine the tissue under a microscope and make the precise diagnosis. Diagnosis by biopsy is not exact, and in difficult cases doctors may split 50/50 down the middle as to whether a mole is melanoma or benign. If the pathologist uses the term \"severely dysplastic\" or \"atypical melanocytic hyperplasia\" or offers a long descriptive narrative it means he really is concerned about melanoma, but does not want to call it that.    Most dermatologists usually recommend that all patients with these severely dysplastic moles have them removed. Also many dermatologists recommend removing \"moderate dysplasia\" moles, if the biopsy didn't get all of it. Those with \"mild dysplasia\" can usually be left alone or watched.    EXCISION OF AN, Margins confirmed with FROZEN SECTIONS and MART1 stain AND COMPLEX: After thorough discussion of PGACAC, consent obtained, anesthesia and prep, the margins of the lesion were identified and an elliptical incision was made encompassing the lesion with 3mm margin. The incisions were made through the skin and down to and including the subcutaneous tissue. The lesion was removed en bloc and submitted for frozen section pathologic review. Clear margins obtained (1.1x1.2cm).  The wound edges were widely undermined until adequate tissue mobility was obtained. hemostasis was achieved. The wound edges were then closed in a layered fashion, being careful not to leave any dead space. Postoperative length was 3 cm.   EBL minimal; complications none; wound care routine. The patient was from the clinic in good condition and will return in one week for wound check.     BENIGN LESIONS DISCUSSED WITH PATIENT:  I discussed the specifics of tumor, prognosis, and genetics of benign lesions.  I explained that treatment of these lesions would " be purely cosmetic and not medically neccessary.  I discussed with patient different removal options including excision, cautery and /or laser.      Nature and genetics of benign skin lesions dicussed with patient.  Signs and Symptoms of skin cancer discussed with patient.  Patient encouraged to perform monthly skin exams.  UV precautions reviewed with patient.  Skin care regimen reviewed with patient: Eliminate harsh soaps, i.e. Dial, zest, irsih spring; Mild soaps such as Cetaphil or Dove sensitive skin, avoid hot or cold showers, aggressive use of emollients including vanicream, cetaphil or cerave discussed with patient.    Risks of non-melanoma skin cancer discussed with patient   Return to clinic 6 months

## 2020-06-08 NOTE — PATIENT INSTRUCTIONS
Sutured Wound Care     [facility]: [phone]    [facility]: [phone]      Right breast    ? No strenuous activity for 48 hours. Resume moderate activity in 48 hours. No heavy exercising until you are seen for follow up in one week.     ? Take Tylenol as needed for discomfort.                         ? Do not drink alcoholic beverages for 48 hours.     ? Keep the pressure bandage in place for 24 hours. If the bandage becomes blood tinged or loose, reinforce it with gauze and tape.        (Refer to the reverse side of this page for management of bleeding).    ? Remove pressure bandage in 24 hours     ? Leave the flat bandage in place until your follow up appointment.    ? Keep the bandage dry. Wash around it carefully.    ? If the tape becomes soiled or starts to come off, reinforce it with additional paper tape.    ? Do not smoke for 3 weeks; smoking is detrimental to wound healing.    ? It is normal to have swelling and bruising around the surgical site. The bruising will fade in approximately 10-14 days. Elevate the area to reduce swelling.    ? Numbness, itchiness and sensitivity to temperature changes can occur after surgery and may take up to 18 months to normalize.      POSSIBLE COMPLICATIONS    BLEEDIN. Leave the bandage in place.  2. Use tightly rolled up gauze or a cloth to apply direct pressure over the bandage for 20   minutes.  3. Reapply pressure for an additional 20 minutes if necessary  4. Call the office or go to the nearest emergency room if pressure fails to stop the bleeding.  5. Use additional gauze and tape to maintain pressure once the bleeding has stopped.        PAIN:    1. Post operative pain should slowly get better, never worse.  2. A severe increase in pain may indicate a problem. Call the office if this occurs.    In case of emergency phone:Dr Telles [phone]    **In one week, remove paper tape and steri strips. Wash the incision site gently with  water. Apply new steri strips and paper tape. Leave on for one more week    WOUND CARE INSTRUCTIONS  for  ONE WEEK AFTER SURGERY          1) Leave flat bandage on your skin for one week after today s bandage change.  2) In one week when you remove the bandage, you may resume your regular skin care routine, including washing with mild soap and water, applying moisturizer, make-up and sunscreen.    3) If there are any open or bleeding areas at the incision/graft site you should begin to cover the area with a bandage daily as follows:    1) Clean and dry the area with plain tap water using a Q-tip or sterile gauze pad.  2) Apply Polysporin or Bacitracin ointment to the open area.  3) Cover the wound with a band-aid or a sterile non-stick gauze pad and micropore paper tape.         SIGNS OF INFECTION  - If you notice any of these signs of infection, call your doctor right away: expanding redness around the wound.  - Yellow or greenish-colored pus or cloudy wound drainage.    - Red streaking spreading from the wound.  - Increased swelling, tenderness, or pain around the wound.   - Fever.    Please remember that yellow and clear drainage from a wound can be normal and related to normal wound healing.  Isolated drainage from a wound without a combination of the above features does not indicate infection.       *Once the bandages are removed, the scar will be red and firm (especially in the lip/chin area). This is normal and will fade in time. It might take 6-12 months for this to happen.     *Massaging the area will help the scar soften and fade quicker. Begin to massage the area one month after the bandages have been removed. To massage apply pressure directly and firmly over the scar with the fingertips and move in a circular motion. Massage the area for a few minutes several times a day. Continue to massage the site for several months.    *Approximately 6-8 weeks after surgery it is not uncommon to see the formation of   tender pimple-like  bump along the scar. This is normal. As the scar continues to mature and the stitches underneath the skin begin to dissolve, this might occur. Do not pick or squeeze, this will resolve on it s own. Should one break open producing a small amount of drainage, apply Polysporin or Bacitracin ointment a few times a day until the wound is completely healed.    *Numbness in the surgical area is expected. It might take 12-18 months for the feeling to return to normal. During this time sensations of itchiness, tingling and occasional sharp pains might be noted. These feelings are normal and will subside once the nerves have completely healed.         IN CASE OF EMERGENCY: Dr Telles [phone]     If you were seen in [place] call: [phone]    If you were seen in [place] call: [phone]

## 2020-06-17 ENCOUNTER — TELEPHONE (OUTPATIENT)
Dept: ONCOLOGY | Facility: CLINIC | Age: 40
End: 2020-06-17

## 2020-06-17 NOTE — TELEPHONE ENCOUNTER
Attempted to call pt to complete [facility] clinical trial 30 month questionnaires. I left a phone number where I can be reached and asked that she return my call.

## 2020-06-23 DIAGNOSIS — G62.9 NEUROPATHY: ICD-10-CM

## 2020-06-24 ENCOUNTER — TELEPHONE (OUTPATIENT)
Dept: ONCOLOGY | Facility: CLINIC | Age: 40
End: 2020-06-24

## 2020-06-24 RX ORDER — GABAPENTIN 300 MG/1
CAPSULE ORAL
Qty: 90 CAPSULE | Refills: 3 | Status: SHIPPED | OUTPATIENT
Start: 2020-06-24 | End: 2021-01-05

## 2020-06-24 NOTE — TELEPHONE ENCOUNTER
Routing refill request to provider for review/approval because:  Drug not on the [facility] refill protocol   Jeremias Raphael RN

## 2020-07-14 ENCOUNTER — TELEPHONE (OUTPATIENT)
Dept: ONCOLOGY | Facility: CLINIC | Age: 40
End: 2020-07-14

## 2020-07-14 NOTE — TELEPHONE ENCOUNTER
The 30 month [facility] weight loss study visit for Diana Wilson was conducted by phone due to COVID-19 precautions. An in-person visit with Dr. Jin was conducted on 4-. Study labs were not required for this visit. Waist and hip measurements were not collected as the pt was not in clinic at time of QOLs. Her most recent clinic weight was reported (91.9 kg). Follow-up QOL was completed verbally by phone. Patient believes her weight has been stable over last few months and continues to feel well. Reviewed adverse events for the study; nothing to report at this visit. Pt did have a skin lesion removed from right breast on 6/8/2020. Pathology determined it to be an atypical mole (benign).  No sprains or strains reported. Reviewed study schedule with next visit around December 2020. If able, we will meet in clinic at her next visit in October. She has no other questions at this time.

## 2020-07-16 DIAGNOSIS — K21.9 GASTROESOPHAGEAL REFLUX DISEASE, ESOPHAGITIS PRESENCE NOT SPECIFIED: ICD-10-CM

## 2020-08-08 ENCOUNTER — VIRTUAL VISIT (OUTPATIENT)
Dept: FAMILY MEDICINE | Facility: OTHER | Age: 40
End: 2020-08-08
Payer: COMMERCIAL

## 2020-08-08 PROCEDURE — 99421 OL DIG E/M SVC 5-10 MIN: CPT | Performed by: FAMILY MEDICINE

## 2020-08-08 NOTE — PROGRESS NOTES
"Date: 2020 11:52:09  Clinician: Vijay Holland  Clinician NPI: [id number]  Patient: Diana Wilson  Patient : 1980  Patient Address: [address] [place] [address]  Patient Phone: [phone]  Visit Protocol: URI  Patient Summary:  Diana is a 40 year old ( : 1980 ) female who initiated a Visit for COVID-19 (Coronavirus) evaluation and screening. When asked the question \"Please sign me up to receive news, health information and promotions from [employer].\", Diana responded \"No\".    Diana states her symptoms started 1-2 days ago.   Her symptoms consist of a headache, myalgia, and malaise.   Symptom details   Headache: She states the headache is moderate (4-6 on a 10 point pain scale).    Diana denies having ear pain, chills, nausea, sore throat, teeth pain, ageusia, diarrhea, cough, nasal congestion, vomiting, rhinitis, anosmia, facial pain or pressure, fever, and wheezing. She also denies taking antibiotic medication in the past month and having recent facial or sinus surgery in the past 60 days. She is not experiencing dyspnea.   Precipitating events  She has not recently been exposed to someone with influenza. Diana has been in close contact with the following high risk individuals: adults 65 or older.   Pertinent COVID-19 (Coronavirus) information  In the past 14 days, Diana has not worked in a congregate living setting.   She either works or volunteers as a healthcare worker or a , or works or volunteers in a healthcare facility. She does not provide direct patient care. 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 She does not live with a healthcare worker.   Diana has had a close contact with a laboratory-confirmed COVID-19 patient within 14 days of symptom onset.   Since 2019, Diana and has not had upper respiratory infection or " influenza-like illness. Has not been diagnosed with lab-confirmed COVID-19 test   Pertinent medical history  Diana typically gets a yeast infection when she takes antibiotics. She has used fluconazole (Diflucan) to treat previous yeast infections. 2 doses of fluconazole (Diflucan) has typically been needed for symptoms to resolve in the past.  Diana needs a return to work/school note.   Weight: 200 lbs   Diana does not smoke or use smokeless tobacco.   She denies pregnancy and denies breastfeeding. She does not menstruate.   Additional information as reported by the patient (free text): HX of breast cancer and I have asthma.   Weight: 200 lbs    MEDICATIONS: Zyrtec oral, omeprazole oral, gabapentin oral, venlafaxine oral, tamoxifen oral, ALLERGIES: Penicillins  Clinician Response:  Dear Diana,   Based on your exposure to COVID-19 (coronavirus), we would like to test you for this virus.  1. Please call [phone] to schedule your visit. Explain that you were referred by [facility] to have a COVID-19 test. Be ready to share your [facility] visit ID number.  The following will serve as your written order for this COVID Test, ordered by me, for the indication of suspected COVID [Z20.828]: The test will be ordered in [facility], our electronic health record, after you are scheduled. It will show as ordered and authorized by Juventino Terrell MD.  Order: COVID-19 (coronavirus) PCR for ASYMPTOMATIC EXPOSURE testing from [facility].  If you know you have had close contact with someone who tested positive, you should be quarantined for 14 days after this exposure. You should stay in quarantine for the14 days even if the covid test is negative, the optimal time to test after exposure is 5-7 days from the exposure  Quarantine means   What should I do?  For safety, it's very important to follow these rules. Do this for 14 days after the date you were last exposed to the virus..  Stay home and away from others. Don't go to school or anywhere else.  Generally quarantine means staying home from work but there are some exceptions to this. Please contact your workplace.   No hugging, kissing or shaking hands.  Don't let anyone visit.  Cover your mouth and nose with a mask, tissue or washcloth to avoid spreading germs.  Wash your hands and face often. Use soap and water.  What are the symptoms of COVID-19?  The most common symptoms are cough, fever and trouble breathing. Less common symptoms include headache, body aches, fatigue (feeling very tired), chills, sore throat, stuffy or runny nose, diarrhea (loose poop), loss of taste or smell, belly pain, and nausea or vomiting (feeling sick to your stomach or throwing up).  After 14 days, if you have still don't have symptoms, you likely don't have this virus.  If you develop symptoms, follow these guidelines.  If you're normally healthy: Please start another [facility] visit to report your symptoms. Go to [email or web address].  If you have a serious health problem (like cancer, heart failure, an organ transplant or kidney disease): Call your specialty clinic. Let them know that you might have COVID-19.  2. When it's time for your COVID test:  Stay at least 6 feet away from others. (If someone will drive you to your test, stay in the backseat, as far away from the  as you can.)  Cover your mouth and nose with a mask, tissue or washcloth.  Go straight to the testing site. Don't make any stops on the way there or back.  Please note  Caregivers in these groups are at risk for severe illness due to COVID-19:  o People 65 years and older  o People who live in a nursing home or long-term care facility  o People with chronic disease (lung, heart, cancer, diabetes, kidney, liver, immunologic)  o People who have a weakened immune system, including those who:  Are in cancer treatment  Take medicine that weakens the immune system, such as corticosteroids  Had a bone marrow or organ transplant  Have an immune deficiency  Have poorly  controlled HIV or AIDS  Are obese (body mass index of 40 or higher)  Smoke regularly  Where can I get more information?   [employer] [place] -- About COVID-19: [email or web address]  CDC -- What to Do If You're Sick: www.cdc.gov/coronavirus/2019-ncov/about/steps-when-sick.html  [facility] -- Ending Home Isolation: www.cdc.gov/coronavirus/2019-ncov/hcp/disposition-in-home-patients.html  [facility] -- Caring for Someone: www.cdc.gov/coronavirus/2019-ncov/if-you-are-sick/care-for-someone.html  [facility] -- Interim Guidance for Hospital Discharge to Home: www.[facility].mn.us/diseases/coronavirus/hcp/hospdischarge.pdf  [facility] clinical trials (COVID-19 research studies): clinicalaffairs.[facility]-clinical-trials  Below are the COVID-19 hotlines at the [facility] ([facility]). Interpreters are available.  For health questions: Call 612-951-0234 or 1-488.324.1710 (7 a.m. to 7 p.m.)  For questions about schools and childcare: Call 947-087-4708 or 1-434.588.5061 (7 a.m. to 7 p.m.)    Diagnosis: Other malaise  Diagnosis ICD: R53.81

## 2020-08-09 DIAGNOSIS — Z20.822 ENCOUNTER FOR LABORATORY TESTING FOR COVID-19 VIRUS: Primary | ICD-10-CM

## 2020-08-09 PROCEDURE — U0003 INFECTIOUS AGENT DETECTION BY NUCLEIC ACID (DNA OR RNA); SEVERE ACUTE RESPIRATORY SYNDROME CORONAVIRUS 2 (SARS-COV-2) (CORONAVIRUS DISEASE [COVID-19]), AMPLIFIED PROBE TECHNIQUE, MAKING USE OF HIGH THROUGHPUT TECHNOLOGIES AS DESCRIBED BY CMS-2020-01-R: HCPCS | Performed by: FAMILY MEDICINE

## 2020-08-10 LAB
SARS-COV-2 RNA SPEC QL NAA+PROBE: NOT DETECTED
SPECIMEN SOURCE: NORMAL

## 2020-08-11 DIAGNOSIS — F41.9 ANXIETY: ICD-10-CM

## 2020-08-11 DIAGNOSIS — F33.0 MAJOR DEPRESSIVE DISORDER, RECURRENT EPISODE, MILD (H): ICD-10-CM

## 2020-08-11 RX ORDER — VENLAFAXINE HYDROCHLORIDE 150 MG/1
CAPSULE, EXTENDED RELEASE ORAL
Qty: 90 CAPSULE | Refills: 0 | Status: SHIPPED | OUTPATIENT
Start: 2020-08-11 | End: 2020-09-01

## 2020-08-11 RX ORDER — VENLAFAXINE HYDROCHLORIDE 75 MG/1
CAPSULE, EXTENDED RELEASE ORAL
Qty: 90 CAPSULE | Refills: 0 | Status: SHIPPED | OUTPATIENT
Start: 2020-08-11 | End: 2020-09-01

## 2020-08-11 NOTE — TELEPHONE ENCOUNTER
Medication is being filled for 1 time refill only due to:  Patient needs to be seen because needs follow up and phq 9.   Jeremias Raphael RN

## 2020-09-01 ENCOUNTER — OFFICE VISIT (OUTPATIENT)
Dept: FAMILY MEDICINE | Facility: CLINIC | Age: 40
End: 2020-09-01
Payer: COMMERCIAL

## 2020-09-01 VITALS
SYSTOLIC BLOOD PRESSURE: 132 MMHG | WEIGHT: 203 LBS | TEMPERATURE: 97.8 F | HEIGHT: 64 IN | BODY MASS INDEX: 34.66 KG/M2 | OXYGEN SATURATION: 97 % | HEART RATE: 79 BPM | DIASTOLIC BLOOD PRESSURE: 89 MMHG

## 2020-09-01 DIAGNOSIS — Z17.0 MALIGNANT NEOPLASM OF UPPER-OUTER QUADRANT OF RIGHT BREAST IN FEMALE, ESTROGEN RECEPTOR POSITIVE (H): ICD-10-CM

## 2020-09-01 DIAGNOSIS — Z98.82 H/O BILATERAL BREAST IMPLANTS: ICD-10-CM

## 2020-09-01 DIAGNOSIS — J45.31 MILD PERSISTENT ASTHMA WITH EXACERBATION: ICD-10-CM

## 2020-09-01 DIAGNOSIS — C50.411 MALIGNANT NEOPLASM OF UPPER-OUTER QUADRANT OF RIGHT BREAST IN FEMALE, ESTROGEN RECEPTOR POSITIVE (H): ICD-10-CM

## 2020-09-01 DIAGNOSIS — T85.49XD: ICD-10-CM

## 2020-09-01 DIAGNOSIS — F33.0 MAJOR DEPRESSIVE DISORDER, RECURRENT EPISODE, MILD (H): ICD-10-CM

## 2020-09-01 DIAGNOSIS — F41.9 ANXIETY: ICD-10-CM

## 2020-09-01 DIAGNOSIS — Z23 NEED FOR PROPHYLACTIC VACCINATION AND INOCULATION AGAINST INFLUENZA: ICD-10-CM

## 2020-09-01 DIAGNOSIS — Z01.818 PREOP GENERAL PHYSICAL EXAM: Primary | ICD-10-CM

## 2020-09-01 LAB
ERYTHROCYTE [DISTWIDTH] IN BLOOD BY AUTOMATED COUNT: 12.7 % (ref 10–15)
HCT VFR BLD AUTO: 38.3 % (ref 35–47)
HGB BLD-MCNC: 12.8 G/DL (ref 11.7–15.7)
MCH RBC QN AUTO: 29.9 PG (ref 26.5–33)
MCHC RBC AUTO-ENTMCNC: 33.4 G/DL (ref 31.5–36.5)
MCV RBC AUTO: 90 FL (ref 78–100)
PLATELET # BLD AUTO: 256 10E9/L (ref 150–450)
RBC # BLD AUTO: 4.28 10E12/L (ref 3.8–5.2)
WBC # BLD AUTO: 7.8 10E9/L (ref 4–11)

## 2020-09-01 PROCEDURE — 99215 OFFICE O/P EST HI 40 MIN: CPT | Mod: 25 | Performed by: FAMILY MEDICINE

## 2020-09-01 PROCEDURE — 90471 IMMUNIZATION ADMIN: CPT | Performed by: FAMILY MEDICINE

## 2020-09-01 PROCEDURE — 36415 COLL VENOUS BLD VENIPUNCTURE: CPT | Performed by: FAMILY MEDICINE

## 2020-09-01 PROCEDURE — 85027 COMPLETE CBC AUTOMATED: CPT | Performed by: FAMILY MEDICINE

## 2020-09-01 PROCEDURE — 90686 IIV4 VACC NO PRSV 0.5 ML IM: CPT | Performed by: FAMILY MEDICINE

## 2020-09-01 RX ORDER — SCOLOPAMINE TRANSDERMAL SYSTEM 1 MG/1
1 PATCH, EXTENDED RELEASE TRANSDERMAL
Qty: 1 PATCH | Refills: 0 | Status: SHIPPED | OUTPATIENT
Start: 2020-09-01 | End: 2020-09-29

## 2020-09-01 RX ORDER — VENLAFAXINE HYDROCHLORIDE 150 MG/1
150 CAPSULE, EXTENDED RELEASE ORAL DAILY
Qty: 90 CAPSULE | Refills: 3 | Status: SHIPPED | OUTPATIENT
Start: 2020-09-01 | End: 2021-07-20

## 2020-09-01 RX ORDER — VENLAFAXINE HYDROCHLORIDE 75 MG/1
CAPSULE, EXTENDED RELEASE ORAL
Qty: 90 CAPSULE | Refills: 3 | Status: SHIPPED | OUTPATIENT
Start: 2020-09-01 | End: 2021-07-20

## 2020-09-01 RX ORDER — ALBUTEROL SULFATE 90 UG/1
2 AEROSOL, METERED RESPIRATORY (INHALATION) EVERY 6 HOURS PRN
Qty: 1 INHALER | Refills: 11 | Status: SHIPPED | OUTPATIENT
Start: 2020-09-01 | End: 2021-02-25

## 2020-09-01 ASSESSMENT — PATIENT HEALTH QUESTIONNAIRE - PHQ9
SUM OF ALL RESPONSES TO PHQ QUESTIONS 1-9: 1
5. POOR APPETITE OR OVEREATING: SEVERAL DAYS

## 2020-09-01 ASSESSMENT — ANXIETY QUESTIONNAIRES
5. BEING SO RESTLESS THAT IT IS HARD TO SIT STILL: NOT AT ALL
1. FEELING NERVOUS, ANXIOUS, OR ON EDGE: SEVERAL DAYS
2. NOT BEING ABLE TO STOP OR CONTROL WORRYING: SEVERAL DAYS
6. BECOMING EASILY ANNOYED OR IRRITABLE: MORE THAN HALF THE DAYS
3. WORRYING TOO MUCH ABOUT DIFFERENT THINGS: NOT AT ALL
GAD7 TOTAL SCORE: 5
7. FEELING AFRAID AS IF SOMETHING AWFUL MIGHT HAPPEN: NOT AT ALL

## 2020-09-01 ASSESSMENT — MIFFLIN-ST. JEOR: SCORE: 1570.8

## 2020-09-01 NOTE — PATIENT INSTRUCTIONS

## 2020-09-01 NOTE — PROGRESS NOTES
[facility] (formerly [facility])  [address] [address]  [phone]  Dept: [phone]    PRE-OP EVALUATION:  Today's date: 2020    Diana Wilson (: 1980) presents for pre-operative evaluation assessment as requested by Dr. Ruelas.  She requires evaluation and anesthesia risk assessment prior to undergoing surgery/procedure for treatment of history of breast cancer with reconstruction and failing skin over implant.     Proposed Surgery/ Procedure:REMOVAL OF BILATERAL BREAST RECONSTRUCTION IMPLANTS,  TRAM breast reconstruction.  Date of Surgery/ Procedure: 20  Time of Surgery/ Procedure: 8:45 am   Hospital/Surgical Facility: [facility] Fax Number: [phone]  Primary Physician: Yenifer Peters  Type of Anesthesia Anticipated: General    Preoperative Questionnaire:   No - Have you ever had a heart attack or stroke?  No - Have you ever had surgery on your heart or blood vessels, such as a stent, coronary (heart) bypass, or surgery on an artery in the head, neck, heart, or legs?  Yes - Do you have chest pain when you are physically active? Has asthma - chest pain resolved with inhaler   No - Do you have a history of heart failure?  No - Do you currently have a cold, bronchitis, or symptoms of other respiratory (head and chest) infections?  No - Do you have a cough, shortness of breath, or wheezing?  Yes - Do you or anyone in your family have a history of blood clots? Mother   No - Do you or anyone in your family have a serious bleeding problem, such as long-lasting bleeding after surgeries or cuts?  Yes - Have you ever had anemia or been told to take iron pills? Celiac disease  - was anemic   Yes - Have you had any abnormal blood loss such as black, tarry or bloody stools, or abnormal vaginal bleeding? Prior to having ablation and ovaries removed   No - Have you ever had a blood transfusion?  Yes - Are you willing to have a blood transfusion if it is medically  needed before, during, or after your surgery?  Yes - Have you or anyone in your family ever had problems with anesthesia (sedation for surgery)? Nausea - personal  No - Do you have sleep apnea, excessive snoring, or daytime drowsiness?   No - Do you have any artifical heart valves or other implanted medical devices, such as a pacemaker, defibrillator, or continuous glucose monitor?  No - Do you have any artifical joints?  No - Are you allergic to latex?  No - Is there any chance that you may be pregnant?    Patient has a Health Care Directive or Living Will:  NO    HPI:     HPI related to upcoming procedure: history of breast cancer removed and breast reconstruction. But now, breast tissue is breaking down as internal implant pushes against it. Tissue is too thin, bruised and painful.       See problem list for active medical problems.  Problems all longstanding and stable, except as noted/documented.  See ROS for pertinent symptoms related to these conditions.      MEDICAL HISTORY:     Patient Active Problem List    Diagnosis Date Noted     Osteopenia of multiple sites 04/23/2019     Priority: Medium     Malignant neoplasm of upper-outer quadrant of right breast in female, estrogen receptor positive (H) 03/08/2018     Priority: Medium     ER positive in breast  triple neg in lymph        Lymphedema of right upper extremity 11/24/2017     Priority: Medium     Invasive ductal carcinoma of breast, right (H) 12/30/2016     Priority: Medium     Intestinal malabsorption 02/16/2016     Priority: Medium     Anxiety 07/15/2014     Priority: Medium     Insomnia 07/17/2012     Priority: Medium     Iron deficiency anemia 03/14/2012     Priority: Medium     Major depressive disorder, recurrent episode, mild (H) 08/05/2011     Priority: Medium      mainly postpartum, had tried Zoloft and anafranil    [facility] enrolled pt in 6 months pt ed program          CARDIOVASCULAR SCREENING; LDL GOAL LESS THAN 130 10/31/2010      Priority: Medium     Obesity 09/08/2008     Priority: Medium     Wt Readings from Last 5 Encounters:   07/11/11 172 lb (78.019 kg)   12/20/10 174 lb (78.926 kg)   07/26/10 166 lb (75.297 kg)   11/12/09 170 lb (77.111 kg)   11/03/09 169 lb (76.658 kg)     Body mass index is 29.99 kg/(m^2).        Mild intermittent asthma      Priority: Medium            never has had PFT's, MDI with colds and at times exercise       Health Care Home 05/14/2013     Priority: Low     EMERGENCY CARE PLAN  May 14, 2013: No current Care Coordination follow up planned. Please refer if Care Coordination services are needed.    Presenting Problem Signs and Symptoms Treatment Plan   Questions or concerns   during clinic hours   I will call my clinic directly:  [address] [address]  [phone].    Questions or concerns outside clinic hours   I will call the 24 hour nurse line at   [phone] or 0[facility].   Need to schedule an appointment   I will call the 24 hour scheduling team at [phone] or my clinic directly at [phone].    Same day treatment     I will call my clinic first, nurse line if after hours, urgent care and express care if needed.   Clinic care coordination services (regular clinic hours)     I will call a clinic care coordinator directly:     Darron Gaming RN  Mon, Tues, Fri - [phone]  Wed, Thurs - [phone]    Marlin Nelson :    [phone]    Or call my clinic at [phone] and ask to speak with care coordination.   Crisis Services: Behavioral or Mental Health  [facility] 24 Hour Phone Line  [phone]    [facility] 24 Hour Crisis Services  [phone]    [facility] ([facility]al Health Providers[facility] Network [phone]    [facility]   [phone]       Emergency treatment -- Immediately    [facility] 911               Past Medical History:   Diagnosis Date     Depressive disorder 1995    And Anxiety     Encounter for insertion or removal  of intrauterine contraceptive device 1/8/2008     Excessive or frequent menstruation 03/19/2003     Fibroadenosis of breast      Invasive ductal carcinoma of breast, right (H)      Malignant neoplasm of upper-outer quadrant of right female breast (H)      Neutropenia, drug-induced (H)      Nongonococcal urethritis (JALIL) due to chlamydia trachomatis 01/03/2002     Other and unspecified ovarian cyst 03/19/2003    RIGHT ovarian cyst     PONV (postoperative nausea and vomiting)      Skin cancer      Transient hypertension of pregnancy, antepartum     PIH with last birth     Uncomplicated asthma 2002     Past Surgical History:   Procedure Laterality Date     BIOPSY  2/2016    EDG with biopsy     BREAST SURGERY  1997    Bilateral reduction     C APPENDECTOMY  1991     DENTAL SURGERY  09/13/2010 and 10/12/2010    Root canals     DILATION AND CURETTAGE, HYSTEROSCOPY, ABLATE ENDOMETRIUM NOVASURE, COMBINED  3/20/2012    Procedure:COMBINED DILATION AND CURETTAGE, HYSTEROSCOPY, ABLATE ENDOMETRIUM NOVASURE; Hysteroscopy with Endometrial Ablation Novasure; Surgeon:GERRI PURCELL; Location:[facility]     ESOPHAGOSCOPY, GASTROSCOPY, DUODENOSCOPY (EGD), COMBINED N/A 2/18/2016    Procedure: COMBINED ESOPHAGOSCOPY, GASTROSCOPY, DUODENOSCOPY (EGD);  Surgeon: Jose L Wilson MD;  Location: [facility]     INSERT PORT VASCULAR ACCESS N/A 1/11/2017    Procedure: INSERT PORT VASCULAR ACCESS;  Surgeon: Michael Townsend MD;  Location: [facility]     LAPAROSCOPIC SALPINGO-OOPHORECTOMY Bilateral 11/28/2017    Procedure: LAPAROSCOPIC SALPINGO-OOPHORECTOMY;  Laparoscopic bilateral salpingo oophorectomy;  Surgeon: Preeti Tirado MD;  Location: [facility]     RECONSTRUCT BREAST BILATERAL, IMPLANT PROSTHESIS BILATERAL, COMBINED Bilateral 3/16/2018    Procedure: COMBINED RECONSTRUCT BREAST BILATERAL, IMPLANT PROSTHESIS BILATERAL;  BILATERAL SECOND STAGE BREAST RECONSTRUCTION WITH SILICONE GEL IMPLANT;  Surgeon: Eugenio Ruelas MD;   Location: [facility]     SURGICAL HISTORY OF -   1997    Breast reduction     Current Outpatient Medications   Medication Sig Dispense Refill     acetaminophen (TYLENOL) 325 MG tablet Take 2 tablets (650 mg) by mouth every 4 hours as needed for other (mild pain) 100 tablet 0     albuterol (2.5 MG/3ML) 0.083% neb solution        albuterol (PROAIR HFA/PROVENTIL HFA/VENTOLIN HFA) 108 (90 BASE) MCG/ACT Inhaler Inhale 2 puffs into the lungs every 6 hours as needed for shortness of breath / dyspnea 1 Inhaler 11     beclomethasone HFA (QVAR REDIHALER) 40 MCG/ACT inhaler Inhale 1 puff into the lungs 2 times daily 31.8 g 3     cholecalciferol (VITAMIN D3) 1000 units (25 mcg) capsule Take 1 capsule by mouth every other day       Fluticasone Propionate (FLONASE NA)        gabapentin (NEURONTIN) 300 MG capsule TAKE ONE CAPSULE BY MOUTH THREE TIMES A DAY 90 capsule 3     ibuprofen (ADVIL/MOTRIN) 600 MG tablet Take 1 tablet (600 mg) by mouth every 6 hours as needed for pain (mild) 30 tablet 0     loratadine (CLARITIN) 10 MG tablet Take 10 mg by mouth daily       montelukast (SINGULAIR) 10 MG tablet Take 1 tablet (10 mg) by mouth At Bedtime 90 tablet 3     Multiple Vitamins-Minerals (MULTIVITAMIN PO)        omeprazole (PRILOSEC) 20 MG DR capsule TAKE ONE CAPSULE BY MOUTH EVERY MORNING 30 MINUTES BEFORE BREAKFAST 90 capsule 1     order for DME Equipment being ordered: x2 20/30mmHg UE compression sleeves 1 each 0     tamoxifen (NOLVADEX) 20 MG tablet Take 1 tablet (20 mg) by mouth daily 90 tablet 1     UNABLE TO FIND MEDICATION NAME: Calcium and vitamin D       venlafaxine (EFFEXOR-XR) 150 MG 24 hr capsule TAKE ONE CAPSULE BY MOUTH ONCE DAILY (ALONG WITH 75MG) 90 capsule 0     venlafaxine (EFFEXOR-XR) 75 MG 24 hr capsule TAKE ONE CAPSULE BY MOUTH ONCE DAILY (ALONG WITH 150MG) 90 capsule 0     OTC products: no recent use of OTC ASA, NSAIDS or Steroids and no use of herbal medications or other supplements    Allergies   Allergen Reactions  "    Carboplatin Shortness Of Breath, Rash and Anaphylaxis     Other reaction(s): Flushing, Tachycardia     Ambien      hallucinations     Amoxicillin GI Disturbance     Erythromycin GI Disturbance     Hydrocodone-Acetaminophen      Other reaction(s): Hallucinations     Penicillins Nausea and Vomiting and Hives     1-11-17 pt states this was a childhood reaction     Zolpidem      Other reaction(s): Hallucinations     Hydrocodone Other (See Comments)     Out of body experience, \"creepy-crawly\" skin       Latex Allergy: NO    Social History     Tobacco Use     Smoking status: Former Smoker     Packs/day: 0.50     Years: 10.00     Pack years: 5.00     Types: Cigarettes     Last attempt to quit: 10/1/2016     Years since quitting: 3.9     Smokeless tobacco: Never Used     Tobacco comment: Socially- quit smoking 10/01/2016   Substance Use Topics     Alcohol use: Yes     Alcohol/week: 0.0 standard drinks     Comment: Occassional     History   Drug Use No       REVIEW OF SYSTEMS:   Constitutional, neuro, ENT, endocrine, pulmonary, cardiac, gastrointestinal, genitourinary, musculoskeletal, integument and psychiatric systems are negative, except as otherwise noted.    EXAM:   /89   Pulse 79   Temp 97.8  F (36.6  C) (Tympanic)   Ht 1.626 m (5' 4\")   Wt 92.1 kg (203 lb)   LMP 08/05/2016 (Exact Date)   SpO2 97%   BMI 34.84 kg/m      GENERAL APPEARANCE: healthy, alert and no distress     EYES: EOMI, PERRL     HENT: ear canals and TM's normal and nose and mouth without ulcers or lesions     NECK: no adenopathy, no asymmetry, masses, or scars and thyroid normal to palpation     RESP: lungs clear to auscultation - no rales, rhonchi or wheezes     CV: regular rates and rhythm, normal S1 S2, no S3 or S4 and no murmur, click or rub     ABDOMEN:  soft, nontender, no HSM or masses and bowel sounds normal     MS: extremities normal- no gross deformities noted, no evidence of inflammation in joints, FROM in all extremities.    "  SKIN: no suspicious lesions or rashes     NEURO: Normal strength and tone, sensory exam grossly normal, mentation intact and speech normal     PSYCH: mentation appears normal. and affect normal/bright     LYMPHATICS: No cervical adenopathy    DIAGNOSTICS:   EKG: Not indicated due to non-vascular surgery and low risk of event (age <65 and without cardiac risk factors)    Results for orders placed or performed in visit on 09/01/20   CBC with platelets     Status: None   Result Value Ref Range    WBC 7.8 4.0 - 11.0 10e9/L    RBC Count 4.28 3.8 - 5.2 10e12/L    Hemoglobin 12.8 11.7 - 15.7 g/dL    Hematocrit 38.3 35.0 - 47.0 %    MCV 90 78 - 100 fl    MCH 29.9 26.5 - 33.0 pg    MCHC 33.4 31.5 - 36.5 g/dL    RDW 12.7 10.0 - 15.0 %    Platelet Count 256 150 - 450 10e9/L          Recent Labs   Lab Test 04/22/20  0904 10/23/19  0839  03/02/16  0759   HGB 13.9 13.1   < >  --     240   < >  --    INR  --   --   --  0.95    139   < >  --    POTASSIUM 3.8 3.9   < >  --    CR 0.69 0.78   < >  --     < > = values in this interval not displayed.        IMPRESSION:   Reason for surgery/procedure: history of breast cancer with reconstruction and failing skin over implant.     Proposed Surgery/ Procedure:REMOVAL OF BILATERAL BREAST RECONSTRUCTION IMPLANTS,  TRAM breast reconstruction    Diagnosis/reason for consult:   Pre op consultation  Breast cancer  H/o breast implants bilateral  Mild persistent asthma  MDD, recurrent mild  Anxiety   Obesity Body mass index is 34.84 kg/m .   Celiac sprue   TIFFANY due to sprue  Insomnia  RUE lymphedema  Osteopenia         The proposed surgical procedure is considered INTERMEDIATE risk.    REVISED CARDIAC RISK INDEX  The patient has the following serious cardiovascular risks for perioperative complications such as (MI, PE, VFib and 3  AV Block):  No serious cardiac risks  INTERPRETATION: 0 risks: Class I (very low risk - 0.4% complication rate)    The patient has the following additional  risks for perioperative complications:  obesity      RECOMMENDATIONS:       --Patient is to take all scheduled medications on the day of surgery    No nsaids for 7 days prior  Bring rescue inhaler to the hospital with you  Scopolamine patch given for blaine operative nausea     APPROVAL GIVEN to proceed with proposed procedure, without further diagnostic evaluation       Signed Electronically by: Yenifer Peters MD    Copy of this evaluation report is provided to requesting physician.    [facility] Preop Guidelines    Revised Cardiac Risk Index

## 2020-09-02 ASSESSMENT — ANXIETY QUESTIONNAIRES: GAD7 TOTAL SCORE: 5

## 2020-09-02 ASSESSMENT — ASTHMA QUESTIONNAIRES: ACT_TOTALSCORE: 22

## 2020-09-15 ENCOUNTER — VIRTUAL VISIT (OUTPATIENT)
Dept: FAMILY MEDICINE | Facility: CLINIC | Age: 40
End: 2020-09-15
Payer: COMMERCIAL

## 2020-09-15 DIAGNOSIS — C50.411 MALIGNANT NEOPLASM OF UPPER-OUTER QUADRANT OF RIGHT BREAST IN FEMALE, ESTROGEN RECEPTOR POSITIVE (H): ICD-10-CM

## 2020-09-15 DIAGNOSIS — F41.9 ANXIETY: ICD-10-CM

## 2020-09-15 DIAGNOSIS — F41.0 PANIC ATTACK: Primary | ICD-10-CM

## 2020-09-15 DIAGNOSIS — Z17.0 MALIGNANT NEOPLASM OF UPPER-OUTER QUADRANT OF RIGHT BREAST IN FEMALE, ESTROGEN RECEPTOR POSITIVE (H): ICD-10-CM

## 2020-09-15 PROCEDURE — 99214 OFFICE O/P EST MOD 30 MIN: CPT | Mod: TEL | Performed by: FAMILY MEDICINE

## 2020-09-15 RX ORDER — LORAZEPAM 0.5 MG/1
0.5 TABLET ORAL EVERY 6 HOURS PRN
Qty: 15 TABLET | Refills: 0 | Status: SHIPPED | OUTPATIENT
Start: 2020-09-15 | End: 2020-12-14

## 2020-09-15 NOTE — PROGRESS NOTES
"Diana Wilson is a 40 year old female who is being evaluated via a billable telephone visit.      The patient has been notified of following:     \"This telephone visit will be conducted via a call between you and your physician/provider. We have found that certain health care needs can be provided without the need for a physical exam.  This service lets us provide the care you need with a short phone conversation.  If a prescription is necessary we can send it directly to your pharmacy.  If lab work is needed we can place an order for that and you can then stop by our lab to have the test done at a later time.    Telephone visits are billed at different rates depending on your insurance coverage. During this emergency period, for some insurers they may be billed the same as an in-person visit.  Please reach out to your insurance provider with any questions.    If during the course of the call the physician/provider feels a telephone visit is not appropriate, you will not be charged for this service.\"    Patient has given verbal consent for Telephone visit?  Yes    What phone number would you like to be contacted at? [phone]    How would you like to obtain your AVS? [facility]    Subjective     Diana Wilson is a 40 year old female who presents via phone visit today for the following health issues:    HPI    Anxiety Follow-Up    How are you doing with your anxiety since your last visit? Worsened, has surgery on Thursday and had a panic attack the other day.  Surgeon recommended to reach out as he normally doesn't prescribe those types of meds.     Are you having other symptoms that might be associated with anxiety? No    Have you had a significant life event? No     Are you feeling depressed? No    Do you have any concerns with your use of alcohol or other drugs? No     On effexor 225mg XR daily for depression and anxiety and it has been working well for her in general.     Upcoming surgery  Had a major panic " "attack at home yesterday - thoughts were flying \"like popcorn\"  Took half a xanax and felt normal within an hour     She is \"barely sleeping\" x 2 weeks - worrying about the surgery     Is doing therapy - it helps     Anxiety is usually between 3-4, now its at a 6/10 - she knows it is due to her thinking about the upcoming procedure     Social History     Tobacco Use     Smoking status: Former Smoker     Packs/day: 0.50     Years: 10.00     Pack years: 5.00     Types: Cigarettes     Last attempt to quit: 10/1/2016     Years since quitting: 3.9     Smokeless tobacco: Never Used     Tobacco comment: Socially- quit smoking 10/01/2016   Substance Use Topics     Alcohol use: Yes     Alcohol/week: 0.0 standard drinks     Comment: Occassional     Drug use: No     PAOLA-7 SCORE 10/5/2018 2/21/2020 9/1/2020   Total Score - - -   Total Score 7 16 5     PHQ 10/5/2018 2/21/2020 9/1/2020   PHQ-9 Total Score 2 6 1   Q9: Thoughts of better off dead/self-harm past 2 weeks Not at all Not at all Not at all       Review of Systems   Constitutional, HEENT, cardiovascular, pulmonary, gi and gu systems are negative, except as otherwise noted.       Objective          Vitals:  No vitals were obtained today due to virtual visit.    healthy, alert and no distress  PSYCH: Alert and oriented times 3; coherent speech, normal   rate and volume, able to articulate logical thoughts, able   to abstract reason, no tangential thoughts, no hallucinations   or delusions  Her affect is normal  RESP: No cough, no audible wheezing, able to talk in full sentences  Remainder of exam unable to be completed due to telephone visits        Assessment/Plan:    (F41.0) Panic attack  (primary encounter diagnosis)  Comment: 2 days from major breast reconstruction surgery. Is on effexor daily, will add prn ativan to help with anxiety and sleep prior to surgery - #15 given. She shouldn't need a refill. Discussed how to take it. Discussed risks/benefits/side effects " with the patient. Contact me after surgery if her symptoms continue/worsen. The patient indicates understanding of these issues and agrees with the plan.   Plan: LORazepam (ATIVAN) 0.5 MG tablet            (F41.9) Anxiety  Comment: continue on effexor. Prn ativan given   Plan: LORazepam (ATIVAN) 0.5 MG tablet            (C50.411,  Z17.0) Malignant neoplasm of upper-outer quadrant of right breast in female, estrogen receptor positive (H)  Comment: reconstruction surgery on 9/17/2020  Plan:       Phone call duration:  15 minutes

## 2020-09-22 ENCOUNTER — TRANSFERRED RECORDS (OUTPATIENT)
Dept: HEALTH INFORMATION MANAGEMENT | Facility: CLINIC | Age: 40
End: 2020-09-22

## 2020-09-29 ENCOUNTER — APPOINTMENT (OUTPATIENT)
Dept: ULTRASOUND IMAGING | Facility: CLINIC | Age: 40
End: 2020-09-29
Attending: PHYSICIAN ASSISTANT
Payer: COMMERCIAL

## 2020-09-29 ENCOUNTER — HOSPITAL ENCOUNTER (EMERGENCY)
Facility: CLINIC | Age: 40
Discharge: ANOTHER HOSPITAL | End: 2020-09-29
Attending: PHYSICIAN ASSISTANT | Admitting: PHYSICIAN ASSISTANT
Payer: COMMERCIAL

## 2020-09-29 ENCOUNTER — APPOINTMENT (OUTPATIENT)
Dept: CT IMAGING | Facility: CLINIC | Age: 40
End: 2020-09-29
Attending: PHYSICIAN ASSISTANT
Payer: COMMERCIAL

## 2020-09-29 ENCOUNTER — TELEPHONE (OUTPATIENT)
Dept: FAMILY MEDICINE | Facility: CLINIC | Age: 40
End: 2020-09-29

## 2020-09-29 VITALS
BODY MASS INDEX: 33.64 KG/M2 | TEMPERATURE: 98 F | OXYGEN SATURATION: 94 % | SYSTOLIC BLOOD PRESSURE: 112 MMHG | DIASTOLIC BLOOD PRESSURE: 81 MMHG | WEIGHT: 196 LBS | RESPIRATION RATE: 16 BRPM | HEART RATE: 96 BPM

## 2020-09-29 DIAGNOSIS — I82.401 ACUTE DEEP VEIN THROMBOSIS (DVT) OF RIGHT LOWER EXTREMITY, UNSPECIFIED VEIN (H): ICD-10-CM

## 2020-09-29 DIAGNOSIS — D64.9 ANEMIA: ICD-10-CM

## 2020-09-29 DIAGNOSIS — I26.99 PULMONARY EMBOLISM (H): ICD-10-CM

## 2020-09-29 LAB
ABO + RH BLD: NORMAL
ABO + RH BLD: NORMAL
ALBUMIN SERPL-MCNC: 3.1 G/DL (ref 3.4–5)
ALP SERPL-CCNC: 81 U/L (ref 40–150)
ALT SERPL W P-5'-P-CCNC: 19 U/L (ref 0–50)
ANION GAP SERPL CALCULATED.3IONS-SCNC: 5 MMOL/L (ref 3–14)
APTT PPP: 27 SEC (ref 22–37)
AST SERPL W P-5'-P-CCNC: 10 U/L (ref 0–45)
BASE EXCESS BLDV CALC-SCNC: 1.3 MMOL/L
BASOPHILS # BLD AUTO: 0 10E9/L (ref 0–0.2)
BASOPHILS # BLD AUTO: 0 10E9/L (ref 0–0.2)
BASOPHILS NFR BLD AUTO: 0.4 %
BASOPHILS NFR BLD AUTO: 0.4 %
BILIRUB SERPL-MCNC: 0.4 MG/DL (ref 0.2–1.3)
BLD GP AB SCN SERPL QL: NORMAL
BLD PROD TYP BPU: NORMAL
BLD UNIT ID BPU: 0
BLD UNIT ID BPU: 0
BLOOD BANK CMNT PATIENT-IMP: NORMAL
BLOOD PRODUCT CODE: NORMAL
BLOOD PRODUCT CODE: NORMAL
BPU ID: NORMAL
BPU ID: NORMAL
BUN SERPL-MCNC: 14 MG/DL (ref 7–30)
CALCIUM SERPL-MCNC: 8.8 MG/DL (ref 8.5–10.1)
CHLORIDE SERPL-SCNC: 107 MMOL/L (ref 94–109)
CO2 SERPL-SCNC: 27 MMOL/L (ref 20–32)
CREAT SERPL-MCNC: 0.71 MG/DL (ref 0.52–1.04)
DIFFERENTIAL METHOD BLD: ABNORMAL
DIFFERENTIAL METHOD BLD: ABNORMAL
EOSINOPHIL # BLD AUTO: 0.6 10E9/L (ref 0–0.7)
EOSINOPHIL # BLD AUTO: 0.6 10E9/L (ref 0–0.7)
EOSINOPHIL NFR BLD AUTO: 5.1 %
EOSINOPHIL NFR BLD AUTO: 5.4 %
ERYTHROCYTE [DISTWIDTH] IN BLOOD BY AUTOMATED COUNT: 14.2 % (ref 10–15)
ERYTHROCYTE [DISTWIDTH] IN BLOOD BY AUTOMATED COUNT: 14.2 % (ref 10–15)
FERRITIN SERPL-MCNC: 63 NG/ML (ref 12–150)
GFR SERPL CREATININE-BSD FRML MDRD: >90 ML/MIN/{1.73_M2}
GLUCOSE SERPL-MCNC: 104 MG/DL (ref 70–99)
HCO3 BLDV-SCNC: 26 MMOL/L (ref 21–28)
HCT VFR BLD AUTO: 22.3 % (ref 35–47)
HCT VFR BLD AUTO: 22.5 % (ref 35–47)
HEMOCCULT STL QL: NORMAL
HGB BLD-MCNC: 6.8 G/DL (ref 11.7–15.7)
HGB BLD-MCNC: 6.9 G/DL (ref 11.7–15.7)
IMM GRANULOCYTES # BLD: 0.1 10E9/L (ref 0–0.4)
IMM GRANULOCYTES # BLD: 0.1 10E9/L (ref 0–0.4)
IMM GRANULOCYTES NFR BLD: 1 %
IMM GRANULOCYTES NFR BLD: 1.1 %
INR PPP: 1.07 (ref 0.86–1.14)
INTERNAL QC OK POCT: YES
IRON SATN MFR SERPL: 7 % (ref 15–46)
IRON SERPL-MCNC: 23 UG/DL (ref 35–180)
LACTATE BLD-SCNC: 1.7 MMOL/L (ref 0.7–2)
LDH SERPL L TO P-CCNC: 244 U/L (ref 81–234)
LMWH PPP CHRO-ACNC: <0.1 IU/ML
LYMPHOCYTES # BLD AUTO: 1.7 10E9/L (ref 0.8–5.3)
LYMPHOCYTES # BLD AUTO: 1.9 10E9/L (ref 0.8–5.3)
LYMPHOCYTES NFR BLD AUTO: 15.2 %
LYMPHOCYTES NFR BLD AUTO: 16.4 %
MCH RBC QN AUTO: 28.1 PG (ref 26.5–33)
MCH RBC QN AUTO: 28.3 PG (ref 26.5–33)
MCHC RBC AUTO-ENTMCNC: 30.5 G/DL (ref 31.5–36.5)
MCHC RBC AUTO-ENTMCNC: 30.7 G/DL (ref 31.5–36.5)
MCV RBC AUTO: 92 FL (ref 78–100)
MCV RBC AUTO: 92 FL (ref 78–100)
MONOCYTES # BLD AUTO: 0.5 10E9/L (ref 0–1.3)
MONOCYTES # BLD AUTO: 0.5 10E9/L (ref 0–1.3)
MONOCYTES NFR BLD AUTO: 4.7 %
MONOCYTES NFR BLD AUTO: 4.8 %
NEUTROPHILS # BLD AUTO: 8.2 10E9/L (ref 1.6–8.3)
NEUTROPHILS # BLD AUTO: 8.2 10E9/L (ref 1.6–8.3)
NEUTROPHILS NFR BLD AUTO: 72.4 %
NEUTROPHILS NFR BLD AUTO: 73.1 %
NRBC # BLD AUTO: 0.1 10*3/UL
NRBC # BLD AUTO: 0.1 10*3/UL
NRBC BLD AUTO-RTO: 1 /100
NRBC BLD AUTO-RTO: 1 /100
NT-PROBNP SERPL-MCNC: 43 PG/ML (ref 0–450)
NUM BPU REQUESTED: 2
O2/TOTAL GAS SETTING VFR VENT: NORMAL %
PCO2 BLDV: 41 MM HG (ref 40–50)
PH BLDV: 7.41 PH (ref 7.32–7.43)
PLATELET # BLD AUTO: 527 10E9/L (ref 150–450)
PLATELET # BLD AUTO: 536 10E9/L (ref 150–450)
PO2 BLDV: 34 MM HG (ref 25–47)
POTASSIUM SERPL-SCNC: 3.9 MMOL/L (ref 3.4–5.3)
PROT SERPL-MCNC: 6.7 G/DL (ref 6.8–8.8)
RBC # BLD AUTO: 2.42 10E12/L (ref 3.8–5.2)
RBC # BLD AUTO: 2.44 10E12/L (ref 3.8–5.2)
SARS-COV-2 RNA SPEC QL NAA+PROBE: NORMAL
SODIUM SERPL-SCNC: 139 MMOL/L (ref 133–144)
SPECIMEN EXP DATE BLD: NORMAL
SPECIMEN SOURCE: NORMAL
TEST CARD LOT NUMBER: NORMAL
TIBC SERPL-MCNC: 313 UG/DL (ref 240–430)
TRANSFUSION STATUS PATIENT QL: NORMAL
TROPONIN I SERPL-MCNC: <0.015 UG/L (ref 0–0.04)
WBC # BLD AUTO: 11.2 10E9/L (ref 4–11)
WBC # BLD AUTO: 11.4 10E9/L (ref 4–11)

## 2020-09-29 PROCEDURE — 93010 ELECTROCARDIOGRAM REPORT: CPT | Mod: Z6 | Performed by: EMERGENCY MEDICINE

## 2020-09-29 PROCEDURE — 71275 CT ANGIOGRAPHY CHEST: CPT

## 2020-09-29 PROCEDURE — 86923 COMPATIBILITY TEST ELECTRIC: CPT | Performed by: PHYSICIAN ASSISTANT

## 2020-09-29 PROCEDURE — 86900 BLOOD TYPING SEROLOGIC ABO: CPT | Performed by: PHYSICIAN ASSISTANT

## 2020-09-29 PROCEDURE — 25000125 ZZHC RX 250: Performed by: PHYSICIAN ASSISTANT

## 2020-09-29 PROCEDURE — 83605 ASSAY OF LACTIC ACID: CPT | Performed by: PHYSICIAN ASSISTANT

## 2020-09-29 PROCEDURE — 99285 EMERGENCY DEPT VISIT HI MDM: CPT | Mod: 25 | Performed by: EMERGENCY MEDICINE

## 2020-09-29 PROCEDURE — 85610 PROTHROMBIN TIME: CPT | Performed by: PHYSICIAN ASSISTANT

## 2020-09-29 PROCEDURE — 85025 COMPLETE CBC W/AUTO DIFF WBC: CPT | Mod: 91 | Performed by: PHYSICIAN ASSISTANT

## 2020-09-29 PROCEDURE — 93970 EXTREMITY STUDY: CPT

## 2020-09-29 PROCEDURE — 80053 COMPREHEN METABOLIC PANEL: CPT | Performed by: PHYSICIAN ASSISTANT

## 2020-09-29 PROCEDURE — 82272 OCCULT BLD FECES 1-3 TESTS: CPT | Performed by: PHYSICIAN ASSISTANT

## 2020-09-29 PROCEDURE — 99291 CRITICAL CARE FIRST HOUR: CPT | Mod: 25 | Performed by: EMERGENCY MEDICINE

## 2020-09-29 PROCEDURE — 82728 ASSAY OF FERRITIN: CPT | Performed by: PHYSICIAN ASSISTANT

## 2020-09-29 PROCEDURE — 25000132 ZZH RX MED GY IP 250 OP 250 PS 637: Performed by: PHYSICIAN ASSISTANT

## 2020-09-29 PROCEDURE — 93005 ELECTROCARDIOGRAM TRACING: CPT | Performed by: EMERGENCY MEDICINE

## 2020-09-29 PROCEDURE — 96374 THER/PROPH/DIAG INJ IV PUSH: CPT | Mod: 59 | Performed by: EMERGENCY MEDICINE

## 2020-09-29 PROCEDURE — 83550 IRON BINDING TEST: CPT | Performed by: PHYSICIAN ASSISTANT

## 2020-09-29 PROCEDURE — 86901 BLOOD TYPING SEROLOGIC RH(D): CPT | Performed by: PHYSICIAN ASSISTANT

## 2020-09-29 PROCEDURE — 83880 ASSAY OF NATRIURETIC PEPTIDE: CPT | Performed by: PHYSICIAN ASSISTANT

## 2020-09-29 PROCEDURE — 82803 BLOOD GASES ANY COMBINATION: CPT | Performed by: PHYSICIAN ASSISTANT

## 2020-09-29 PROCEDURE — U0003 INFECTIOUS AGENT DETECTION BY NUCLEIC ACID (DNA OR RNA); SEVERE ACUTE RESPIRATORY SYNDROME CORONAVIRUS 2 (SARS-COV-2) (CORONAVIRUS DISEASE [COVID-19]), AMPLIFIED PROBE TECHNIQUE, MAKING USE OF HIGH THROUGHPUT TECHNOLOGIES AS DESCRIBED BY CMS-2020-01-R: HCPCS | Performed by: PHYSICIAN ASSISTANT

## 2020-09-29 PROCEDURE — 83615 LACTATE (LD) (LDH) ENZYME: CPT | Performed by: PHYSICIAN ASSISTANT

## 2020-09-29 PROCEDURE — 83010 ASSAY OF HAPTOGLOBIN QUANT: CPT | Performed by: PHYSICIAN ASSISTANT

## 2020-09-29 PROCEDURE — P9016 RBC LEUKOCYTES REDUCED: HCPCS | Performed by: PHYSICIAN ASSISTANT

## 2020-09-29 PROCEDURE — 25000128 H RX IP 250 OP 636: Performed by: PHYSICIAN ASSISTANT

## 2020-09-29 PROCEDURE — 36430 TRANSFUSION BLD/BLD COMPNT: CPT | Performed by: EMERGENCY MEDICINE

## 2020-09-29 PROCEDURE — 83540 ASSAY OF IRON: CPT | Performed by: PHYSICIAN ASSISTANT

## 2020-09-29 PROCEDURE — 96375 TX/PRO/DX INJ NEW DRUG ADDON: CPT | Performed by: EMERGENCY MEDICINE

## 2020-09-29 PROCEDURE — 85520 HEPARIN ASSAY: CPT | Performed by: PHYSICIAN ASSISTANT

## 2020-09-29 PROCEDURE — 96361 HYDRATE IV INFUSION ADD-ON: CPT | Performed by: EMERGENCY MEDICINE

## 2020-09-29 PROCEDURE — C9803 HOPD COVID-19 SPEC COLLECT: HCPCS | Performed by: EMERGENCY MEDICINE

## 2020-09-29 PROCEDURE — 85730 THROMBOPLASTIN TIME PARTIAL: CPT | Performed by: PHYSICIAN ASSISTANT

## 2020-09-29 PROCEDURE — 86850 RBC ANTIBODY SCREEN: CPT | Performed by: PHYSICIAN ASSISTANT

## 2020-09-29 PROCEDURE — 84484 ASSAY OF TROPONIN QUANT: CPT | Performed by: PHYSICIAN ASSISTANT

## 2020-09-29 PROCEDURE — 25800030 ZZH RX IP 258 OP 636: Performed by: PHYSICIAN ASSISTANT

## 2020-09-29 RX ORDER — IOPAMIDOL 755 MG/ML
83 INJECTION, SOLUTION INTRAVASCULAR ONCE
Status: COMPLETED | OUTPATIENT
Start: 2020-09-29 | End: 2020-09-29

## 2020-09-29 RX ORDER — HEPARIN SODIUM 10000 [USP'U]/100ML
18 INJECTION, SOLUTION INTRAVENOUS CONTINUOUS
Status: DISCONTINUED | OUTPATIENT
Start: 2020-09-29 | End: 2020-09-29 | Stop reason: HOSPADM

## 2020-09-29 RX ORDER — METHOCARBAMOL 750 MG/1
750 TABLET, FILM COATED ORAL ONCE
Status: COMPLETED | OUTPATIENT
Start: 2020-09-29 | End: 2020-09-29

## 2020-09-29 RX ORDER — CETIRIZINE HYDROCHLORIDE 10 MG/1
10 TABLET ORAL EVERY MORNING
COMMUNITY

## 2020-09-29 RX ORDER — METHOCARBAMOL 750 MG/1
750 TABLET, FILM COATED ORAL EVERY 6 HOURS
COMMUNITY
Start: 2020-09-22 | End: 2021-01-28

## 2020-09-29 RX ORDER — ASPIRIN 81 MG/1
81 TABLET ORAL DAILY
COMMUNITY
End: 2020-12-14

## 2020-09-29 RX ADMIN — Medication 3500 UNITS: at 16:14

## 2020-09-29 RX ADMIN — IBUPROFEN 600 MG: 400 TABLET ORAL at 18:30

## 2020-09-29 RX ADMIN — HEPARIN SODIUM 18 UNITS/KG/HR: 10000 INJECTION, SOLUTION INTRAVENOUS at 16:14

## 2020-09-29 RX ADMIN — SODIUM CHLORIDE 1000 ML: 9 INJECTION, SOLUTION INTRAVENOUS at 12:43

## 2020-09-29 RX ADMIN — METHOCARBAMOL 750 MG: 750 TABLET, FILM COATED ORAL at 18:29

## 2020-09-29 RX ADMIN — SODIUM CHLORIDE 100 ML: 9 INJECTION, SOLUTION INTRAVENOUS at 13:23

## 2020-09-29 RX ADMIN — IOPAMIDOL 83 ML: 755 INJECTION, SOLUTION INTRAVENOUS at 13:23

## 2020-09-29 ASSESSMENT — ENCOUNTER SYMPTOMS
CARDIOVASCULAR NEGATIVE: 1
FATIGUE: 1
NEUROLOGICAL NEGATIVE: 1
CHEST TIGHTNESS: 1
ARTHRALGIAS: 1
SHORTNESS OF BREATH: 1
GASTROINTESTINAL NEGATIVE: 1
COUGH: 1

## 2020-09-29 NOTE — ED PROVIDER NOTES
"  History     Chief Complaint   Patient presents with     Shortness of Breath     pt had surgery 2 weeks ago, since then she's been SOA and has had a cough. Increasing SOA, no fever     Cough     The history is provided by the patient and medical records.     Diana Wilson is a 40 year old female with history of grade 3 invasive ductal cancer of the right breast (diagnosed in 2016), s/p double mastectomies (in 2017), mild intermittent asthma who presents with complaints of progressive shortness of breath over the past 2 weeks.  Patient also complains of associated cough and chest tightness.  Patient just had right breast surgery on 9/17/2020 at [facility] involving removal of bilateral breast reconstruction implants.  Patient reportedly developed tachycardia and dyspnea with oxygen requirement while in the hospital shortly after her surgery and had a CT scan of the chest at that time that was negative for PE.  Patient was weaned off of oxygen and was subsequently discharged on post-op day #5.  She states her post-op wound sites have been healing well.  Her shortness of breath has progressed and she is not experiencing any relief with using her inhaler.  She reports being unable to catch her breath even with minimal activity.  Patient also endorses associated fatigue and body aches.  Denies fevers, chills, nausea, vomiting, diarrhea, abdominal pain, urinary symptoms, leg pain/swelling, or black or tarry stools.  Patient denies any ill contacts.  She tested negative for covid-19 prior to her recent surgery.  Patient has a drain in place from her breast surgery.      I reviewed past medical records and pertinent information is copied below:  \"The patient was taken to the operating room on her day of admission and tolerated the procedure well. On postop day 1 she was noted had venous congestion in the left flap. This was explored urgently. There was a fair amount of intra-flap thrombosis but with TPA clot lysis and revision " "of the venous anastomosis we were able to regain flow and flap viability. She had a fair amount of discomfort in the abdominal area postop which resolved after couple of days. She then developed tachycardia and dyspnea and oxygen requirement. This was evaluated with CT scan of the chest showing atelectasis but no PE. Diet and activity were gradually advanced and she was discharged on POD# 5. By the day of admission she was comfortable, off of oxygen, and recuperating nicely. The flaps are viable with good Doppler and color and cap refill to the skin paddles.\"        Allergies:  Allergies   Allergen Reactions     Carboplatin Shortness Of Breath, Rash and Anaphylaxis     Other reaction(s): Flushing, Tachycardia     Ambien      hallucinations     Amoxicillin GI Disturbance     Erythromycin GI Disturbance     Hydrocodone-Acetaminophen      Other reaction(s): Hallucinations     Hydromorphone Headache     Penicillins Nausea and Vomiting and Hives     1-11-17 pt states this was a childhood reaction     Zolpidem      Other reaction(s): Hallucinations     Hydrocodone Other (See Comments)     Out of body experience, \"creepy-crawly\" skin        Problem List:    Patient Active Problem List    Diagnosis Date Noted     Osteopenia of multiple sites 04/23/2019     Priority: Medium     Malignant neoplasm of upper-outer quadrant of right breast in female, estrogen receptor positive (H) 03/08/2018     Priority: Medium     ER positive in breast  triple neg in lymph        Lymphedema of right upper extremity 11/24/2017     Priority: Medium     Invasive ductal carcinoma of breast, right (H) 12/30/2016     Priority: Medium     Intestinal malabsorption 02/16/2016     Priority: Medium     Anxiety 07/15/2014     Priority: Medium     Insomnia 07/17/2012     Priority: Medium     Iron deficiency anemia 03/14/2012     Priority: Medium     Major depressive disorder, recurrent episode, mild (H) 08/05/2011     Priority: Medium      mainly " postpartum, had tried Zoloft and anafranil    [facility]'s enrolled pt in 6 months pt ed program          CARDIOVASCULAR SCREENING; LDL GOAL LESS THAN 130 10/31/2010     Priority: Medium     Obesity 09/08/2008     Priority: Medium     Wt Readings from Last 5 Encounters:   07/11/11 172 lb (78.019 kg)   12/20/10 174 lb (78.926 kg)   07/26/10 166 lb (75.297 kg)   11/12/09 170 lb (77.111 kg)   11/03/09 169 lb (76.658 kg)     Body mass index is 29.99 kg/(m^2).        Mild intermittent asthma      Priority: Medium            never has had PFT's, MDI with colds and at times exercise       Health Care Home 05/14/2013     Priority: Low     EMERGENCY CARE PLAN  May 14, 2013: No current Care Coordination follow up planned. Please refer if Care Coordination services are needed.    Presenting Problem Signs and Symptoms Treatment Plan   Questions or concerns   during clinic hours   I will call my clinic directly:  [address] [address]  [phone].    Questions or concerns outside clinic hours   I will call the 24 hour nurse line at   [phone] or [facility].   Need to schedule an appointment   I will call the 24 hour scheduling team at [phone] or my clinic directly at [phone].    Same day treatment     I will call my clinic first, nurse line if after hours, urgent care and express care if needed.   Clinic care coordination services (regular clinic hours)     I will call a clinic care coordinator directly:     Darron Gaming RN  Mon, Tues, Fri - [phone]  Wed, Thurs - [phone]    Marlin Nelson SW:    [phone]    Or call my clinic at [phone] and ask to speak with care coordination.   Crisis Services: Behavioral or Mental Health  [facility] 24 Hour Phone Line  [phone]    [facility] 24 Hour Crisis Services  [phone]    [facility] [phone]    [facility]   [phone]       Emergency  treatment -- Immediately    CAll 911                 Past Medical History:    Past Medical History:   Diagnosis Date     Depressive disorder 1995     Encounter for insertion or removal of intrauterine contraceptive device 1/8/2008     Excessive or frequent menstruation 03/19/2003     Fibroadenosis of breast      Invasive ductal carcinoma of breast, right (H)      Malignant neoplasm of upper-outer quadrant of right female breast (H)      Neutropenia, drug-induced (H)      Nongonococcal urethritis (JALIL) due to chlamydia trachomatis 01/03/2002     Other and unspecified ovarian cyst 03/19/2003     PONV (postoperative nausea and vomiting)      Skin cancer      Transient hypertension of pregnancy, antepartum      Uncomplicated asthma 2002       Past Surgical History:    Past Surgical History:   Procedure Laterality Date     BIOPSY  2/2016    EDG with biopsy     BREAST SURGERY  1997    Bilateral reduction     C APPENDECTOMY  1991     DENTAL SURGERY  09/13/2010 and 10/12/2010    Root canals     DILATION AND CURETTAGE, HYSTEROSCOPY, ABLATE ENDOMETRIUM NOVASURE, COMBINED  3/20/2012    Procedure:COMBINED DILATION AND CURETTAGE, HYSTEROSCOPY, ABLATE ENDOMETRIUM NOVASURE; Hysteroscopy with Endometrial Ablation Novasure; Surgeon:GERRI PURCELL; Location:[facility]     ESOPHAGOSCOPY, GASTROSCOPY, DUODENOSCOPY (EGD), COMBINED N/A 2/18/2016    Procedure: COMBINED ESOPHAGOSCOPY, GASTROSCOPY, DUODENOSCOPY (EGD);  Surgeon: Jose L Wilson MD;  Location: [facility]     INSERT PORT VASCULAR ACCESS N/A 1/11/2017    Procedure: INSERT PORT VASCULAR ACCESS;  Surgeon: Michael Townsend MD;  Location: [facility]     LAPAROSCOPIC SALPINGO-OOPHORECTOMY Bilateral 11/28/2017    Procedure: LAPAROSCOPIC SALPINGO-OOPHORECTOMY;  Laparoscopic bilateral salpingo oophorectomy;  Surgeon: Preeti Tirado MD;  Location: [facility]     RECONSTRUCT BREAST BILATERAL, IMPLANT PROSTHESIS BILATERAL, COMBINED Bilateral 3/16/2018    Procedure: COMBINED  RECONSTRUCT BREAST BILATERAL, IMPLANT PROSTHESIS BILATERAL;  BILATERAL SECOND STAGE BREAST RECONSTRUCTION WITH SILICONE GEL IMPLANT;  Surgeon: Eugenio Ruelas MD;  Location: [facility]     SURGICAL HISTORY OF -       Breast reduction       Family History:    Family History   Problem Relation Age of Onset     Diabetes Mother         hypoglycemic     Allergies Mother      Arthritis Mother      Depression Mother      Gastrointestinal Disease Mother      Neurologic Disorder Mother      Psychotic Disorder Mother         panic disorder     Cancer Mother 56        pancreatic, colon, liver and skin.     Cancer - colorectal Mother      Other Cancer Mother         Pancreatic     Anxiety Disorder Mother      Mental Illness Mother      Hypertension Father      Diabetes Father      C.A.D. Paternal Grandfather         congestive heart failure     Cerebrovascular Disease Paternal Grandfather         age 79     Diabetes Paternal Grandfather      Hypertension Paternal Grandfather      Alzheimer Disease Paternal Grandfather      Arthritis Paternal Grandfather      Circulatory Paternal Grandfather      Cardiovascular Paternal Grandfather      Heart Disease Paternal Grandfather      Neurologic Disorder Paternal Grandfather      Unknown/Adopted Maternal Grandmother      Cerebrovascular Disease Maternal Grandmother      Unknown/Adopted Maternal Grandfather      Cancer Maternal Grandfather      Obesity Paternal Grandmother      Cancer Paternal Grandmother 94        coloangiocancinoma       Social History:  Marital Status:   [2]  Social History     Tobacco Use     Smoking status: Former Smoker     Packs/day: 0.50     Years: 10.00     Pack years: 5.00     Types: Cigarettes     Last attempt to quit: 10/1/2016     Years since quittin.0     Smokeless tobacco: Never Used     Tobacco comment: Socially- quit smoking 10/01/2016   Substance Use Topics     Alcohol use: Yes     Alcohol/week: 0.0 standard drinks     Comment:  Occassional     Drug use: No        Medications:    acetaminophen (TYLENOL) 325 MG tablet  albuterol (2.5 MG/3ML) 0.083% neb solution  albuterol (PROAIR HFA/PROVENTIL HFA/VENTOLIN HFA) 108 (90 Base) MCG/ACT inhaler  aspirin 81 MG EC tablet  beclomethasone HFA (QVAR REDIHALER) 40 MCG/ACT inhaler  cetirizine (ZYRTEC) 10 MG tablet  cholecalciferol (VITAMIN D3) 1000 units (25 mcg) capsule  Fluticasone Propionate (FLONASE NA)  gabapentin (NEURONTIN) 300 MG capsule  ibuprofen (ADVIL/MOTRIN) 600 MG tablet  LORazepam (ATIVAN) 0.5 MG tablet  methocarbamol (ROBAXIN) 750 MG tablet  Multiple Vitamins-Minerals (MULTIVITAMIN PO)  omeprazole (PRILOSEC) 20 MG DR capsule  tamoxifen (NOLVADEX) 20 MG tablet  UNABLE TO FIND  venlafaxine (EFFEXOR-XR) 150 MG 24 hr capsule  venlafaxine (EFFEXOR-XR) 75 MG 24 hr capsule  montelukast (SINGULAIR) 10 MG tablet  order for DME          Review of Systems   Constitutional: Positive for fatigue.   HENT: Negative.    Respiratory: Positive for cough, chest tightness and shortness of breath.    Cardiovascular: Negative.  Negative for chest pain.   Gastrointestinal: Negative.    Genitourinary: Negative.    Musculoskeletal: Positive for arthralgias.   Skin: Negative.    Neurological: Negative.    All other systems reviewed and are negative.      Physical Exam   BP: (!) 138/96  Pulse: 121  Temp: 98.6  F (37  C)  Resp: 20  Weight: 88.9 kg (196 lb)  SpO2: 91 %      Physical Exam  Vitals signs and nursing note reviewed.   Constitutional:       General: She is in acute distress.      Appearance: She is well-developed. She is not ill-appearing, toxic-appearing or diaphoretic.   HENT:      Head: Normocephalic and atraumatic.      Nose: Nose normal. No mucosal edema, congestion or rhinorrhea.      Mouth/Throat:      Lips: Pink.      Mouth: Mucous membranes are moist.   Eyes:      Conjunctiva/sclera: Conjunctivae normal.      Pupils: Pupils are equal, round, and reactive to light.   Neck:      Musculoskeletal:  Normal range of motion and neck supple. No neck rigidity.   Cardiovascular:      Rate and Rhythm: Regular rhythm. Tachycardia present.      Heart sounds: Normal heart sounds.   Pulmonary:      Effort: Tachypnea and respiratory distress present.      Breath sounds: Normal breath sounds. No stridor. No wheezing, rhonchi or rales.      Comments: Patient is speaking in short sentences  Abdominal:      General: There is no distension.      Palpations: Abdomen is soft.      Tenderness: There is no abdominal tenderness. There is no guarding or rebound.   Genitourinary:     Rectum: Guaiac result negative.   Musculoskeletal: Normal range of motion.         General: No swelling or tenderness.      Right lower leg: No edema.      Left lower leg: No edema.   Lymphadenopathy:      Cervical: No cervical adenopathy.   Skin:     General: Skin is warm and dry.      Comments: Surgical incision sites to bilateral breasts and right lower abdomen appear to be healing well and without evidence of infection or cellulitis.  Patient has drain in place.   Neurological:      General: No focal deficit present.      Mental Status: She is alert and oriented to person, place, and time.   Psychiatric:         Mood and Affect: Mood normal.         Behavior: Behavior normal.         ED Course        Procedures               EKG Interpretation:      Interpreted by Myriam Curiel PA-C. Jordy Lemus MD    Symptoms at time of EKG: Shortness of breath   Rhythm: Sinus tachycardia  Rate: 100-110  Axis: Normal  Ectopy: None  Conduction: Normal  ST Segments/ T Waves: No acute ischemic changes  Q Waves: None  Comparison to prior: No old EKG available  Clinical Impression: Sinus tachycardia      Results for orders placed or performed during the hospital encounter of 09/29/20 (from the past 24 hour(s))   CBC with platelets differential   Result Value Ref Range    WBC 11.2 (H) 4.0 - 11.0 10e9/L    RBC Count 2.44 (L) 3.8 - 5.2 10e12/L    Hemoglobin 6.9 (LL)  11.7 - 15.7 g/dL    Hematocrit 22.5 (L) 35.0 - 47.0 %    MCV 92 78 - 100 fl    MCH 28.3 26.5 - 33.0 pg    MCHC 30.7 (L) 31.5 - 36.5 g/dL    RDW 14.2 10.0 - 15.0 %    Platelet Count 536 (H) 150 - 450 10e9/L    Diff Method Automated Method     % Neutrophils 73.1 %    % Lymphocytes 15.2 %    % Monocytes 4.8 %    % Eosinophils 5.4 %    % Basophils 0.4 %    % Immature Granulocytes 1.1 %    Nucleated RBCs 1 (H) 0 /100    Absolute Neutrophil 8.2 1.6 - 8.3 10e9/L    Absolute Lymphocytes 1.7 0.8 - 5.3 10e9/L    Absolute Monocytes 0.5 0.0 - 1.3 10e9/L    Absolute Eosinophils 0.6 0.0 - 0.7 10e9/L    Absolute Basophils 0.0 0.0 - 0.2 10e9/L    Abs Immature Granulocytes 0.1 0 - 0.4 10e9/L    Absolute Nucleated RBC 0.1    Comprehensive metabolic panel   Result Value Ref Range    Sodium 139 133 - 144 mmol/L    Potassium 3.9 3.4 - 5.3 mmol/L    Chloride 107 94 - 109 mmol/L    Carbon Dioxide 27 20 - 32 mmol/L    Anion Gap 5 3 - 14 mmol/L    Glucose 104 (H) 70 - 99 mg/dL    Urea Nitrogen 14 7 - 30 mg/dL    Creatinine 0.71 0.52 - 1.04 mg/dL    GFR Estimate >90 >60 mL/min/[1.73_m2]    GFR Estimate If Black >90 >60 mL/min/[1.73_m2]    Calcium 8.8 8.5 - 10.1 mg/dL    Bilirubin Total 0.4 0.2 - 1.3 mg/dL    Albumin 3.1 (L) 3.4 - 5.0 g/dL    Protein Total 6.7 (L) 6.8 - 8.8 g/dL    Alkaline Phosphatase 81 40 - 150 U/L    ALT 19 0 - 50 U/L    AST 10 0 - 45 U/L   Lactic acid whole blood   Result Value Ref Range    Lactic Acid 1.7 0.7 - 2.0 mmol/L   Blood gas venous   Result Value Ref Range    Ph Venous 7.41 7.32 - 7.43 pH    PCO2 Venous 41 40 - 50 mm Hg    PO2 Venous 34 25 - 47 mm Hg    Bicarbonate Venous 26 21 - 28 mmol/L    Base Excess Venous 1.3 mmol/L    FIO2 HIDE    INR   Result Value Ref Range    INR 1.07 0.86 - 1.14   Partial thromboplastin time   Result Value Ref Range    PTT 27 22 - 37 sec   ABO/Rh type and screen   Result Value Ref Range    Units Ordered 2     ABO O     RH(D) Pos     Antibody Screen Neg     Test Valid Only At [place]  [facility]        Specimen Expires 10/02/2020     Crossmatch Red Blood Cells    Nt probnp inpatient   Result Value Ref Range    N-Terminal Pro BNP Inpatient 43 0 - 450 pg/mL   Troponin I   Result Value Ref Range    Troponin I ES <0.015 0.000 - 0.045 ug/L   Blood component   Result Value Ref Range    Unit Number [id number]     Blood Component Type Red Blood Cells Leukocyte Reduced     Division Number 00     Status of Unit Released to care unit     Blood Product Code [id number]     Unit Status ISS    Blood component   Result Value Ref Range    Unit Number [id number]     Blood Component Type Red Blood Cells LeukoReduced (Part 2)     Division Number 00     Status of Unit Released to care unit     Blood Product Code [id number]     Unit Status ISS    Iron and iron binding capacity   Result Value Ref Range    Iron 23 (L) 35 - 180 ug/dL    Iron Binding Cap 313 240 - 430 ug/dL    Iron Saturation Index 7 (L) 15 - 46 %   Ferritin   Result Value Ref Range    Ferritin 63 12 - 150 ng/mL   Haptoglobin   Result Value Ref Range    Haptoglobin 203 (H) 32 - 197 mg/dL   Lactate Dehydrogenase   Result Value Ref Range    Lactate Dehydrogenase 244 (H) 81 - 234 U/L   CT Chest Pulmonary Embolism w Contrast    Narrative    CT CHEST PULMONARY EMBOLISM WITH CONTRAST  9/29/2020 1:41 PM    HISTORY: Progressive dyspnea since right breast surgery 2 weeks ago,  chest tightness, tachypnea, tachycardic.    TECHNIQUE: Scans obtained from the apices through the diaphragm with  IV contrast. 83 mL Isovue-370 IV injected. Radiation dose for this  scan was reduced using automated exposure control, adjustment of the  mA and/or kV according to patient size, or iterative reconstruction  technique.    COMPARISON: CT chest, abdomen and pelvis on 6/3/2019    FINDINGS:  Chest/mediastinum: Suboptimal opacification of the pulmonary arteries  which decreases sensitivity for detection of small peripheral  pulmonary emboli. Multiple filling defect within the  subsegmental  pulmonary arteries bilaterally, for example in the left upper lobe  (series 4 image 45), in the right lower lobe (series 4 image 61),  consistent with pulmonary emboli. No cardiomegaly or significant  pericardial effusion. No significant mediastinal, hilar or axillary  lymphadenopathy.    Lungs and pleura: No pleural effusion or pneumothorax. Mild bibasilar  atelectasis. No suspicious focal pulmonary opacities.    Upper abdomen: Limited evaluation of the abdominal organs due to lack  of coverage and timing of contrast.    Bones and soft tissue: Post surgical changes of bilateral breast  reconstruction. Multiple hypoattenuating collection is predominantly  in the left breast, likely postsurgical hematoma/seroma. Foci of gas  within the medial aspect of the left breast, could also relate to the  recent surgery.      Impression    IMPRESSION:   1. Multiple filling defects within the subsegmental pulmonary  arteries, consistent with pulmonary embolism.  2. Postsurgical changes of bilateral breast reconstruction. Multiple  hypoattenuating collection predominantly in the left breast, could  represent postsurgical hematoma/seroma versus less likely an abscess.  Foci of gas within the medial aspect of the left breast could also be  related to recent surgery.    GRACIELA AYALA MD   Pharmacy to dose heparin    Melissa Mejia RPH     9/29/2020  3:32 PM  Patient to start the heparin DVT/PE protocol with a goal anti 10a   level of 0.3-0.7. Using a HT of 64 inches and a WT of 88.9 kg, a   dosing WT of 68.4 kg was calculated. Based on this dosing WT,   give patient a heparin bolus of 3500 units from the bag and then   start a drip at 1250 units/hr. Gave lower bolus dose due to hgb   of 6.9. Pt to receive blood transfusion. Check the patient's anti   10a level 6 hours after starting the drip at ~2130.   Per DVT/PE protocol.    Dalila Garcia PharmD       Symptomatic COVID-19 Virus (Coronavirus)  by PCR    Specimen: Nasopharyngeal   Result Value Ref Range    COVID-19 Virus PCR to [facility] - Source Nasopharyngeal     COVID-19 Virus PCR to [facility] - Result       Test received-See reflex to IDDL test SARS CoV2 (COVID-19) Virus RT-PCR   Occult blood stool POCT   Result Value Ref Range    Occult Blood neg neg    Internal QC OK Yes     Test Card Lot Number [id number]    CBC with platelets differential   Result Value Ref Range    WBC 11.4 (H) 4.0 - 11.0 10e9/L    RBC Count 2.42 (L) 3.8 - 5.2 10e12/L    Hemoglobin 6.8 (LL) 11.7 - 15.7 g/dL    Hematocrit 22.3 (L) 35.0 - 47.0 %    MCV 92 78 - 100 fl    MCH 28.1 26.5 - 33.0 pg    MCHC 30.5 (L) 31.5 - 36.5 g/dL    RDW 14.2 10.0 - 15.0 %    Platelet Count 527 (H) 150 - 450 10e9/L    Diff Method Automated Method     % Neutrophils 72.4 %    % Lymphocytes 16.4 %    % Monocytes 4.7 %    % Eosinophils 5.1 %    % Basophils 0.4 %    % Immature Granulocytes 1.0 %    Nucleated RBCs 1 (H) 0 /100    Absolute Neutrophil 8.2 1.6 - 8.3 10e9/L    Absolute Lymphocytes 1.9 0.8 - 5.3 10e9/L    Absolute Monocytes 0.5 0.0 - 1.3 10e9/L    Absolute Eosinophils 0.6 0.0 - 0.7 10e9/L    Absolute Basophils 0.0 0.0 - 0.2 10e9/L    Abs Immature Granulocytes 0.1 0 - 0.4 10e9/L    Absolute Nucleated RBC 0.1    Heparin 10a Level   Result Value Ref Range    Heparin 10A Level <0.10 IU/mL   US Lower Extremity Venous Duplex Bilateral    Narrative    ULTRASOUND LOWER EXTREMITY VENOUS DUPLEX BILATERAL 9/29/2020 5:22 PM    CLINICAL HISTORY: Patient has pulmonary embolus on CT, please evaluate  for clot burden.    TECHNIQUE: Venous Duplex ultrasound of bilateral lower extremities  with and without compression, augmentation and duplex. Color flow and  spectral Doppler with waveform analysis performed.    COMPARISON: None.    FINDINGS: Exam includes the common femoral, femoral, popliteal veins  as well as segmentally visualized deep calf veins and greater  saphenous vein.     RIGHT: Short-segment nonocclusive deep  venous thrombosis seen at the  right peroneal vein at the mid calf. No superficial thrombophlebitis.  No popliteal cyst.    LEFT: No deep vein thrombosis. No superficial thrombophlebitis. No  popliteal cyst.      Impression    IMPRESSION:  1.  Right leg short-segment nonocclusive deep venous thrombosis at the  peroneal vein localizing to the mid calf.  2.  Left leg shows no evidence for venous thrombosis.    JOSUE KRISHNAMURTHY MD       Medications   0.9% sodium chloride BOLUS (0 mLs Intravenous Stopped 9/29/20 1613)   iopamidol (ISOVUE-370) solution 83 mL (83 mLs Intravenous Given 9/29/20 1323)   sodium chloride 0.9 % bag 500mL for CT scan flush use (100 mLs As instructed Given 9/29/20 1323)   heparin ANTICOAGULANT  Loading Dose bolus dose from infusion pump 3,500 Units (3,500 Units Intravenous Given 9/29/20 1614)   methocarbamol (ROBAXIN) tablet 750 mg (750 mg Oral Given 9/29/20 1829)   ibuprofen (ADVIL/MOTRIN) tablet 600 mg (600 mg Oral Given 9/29/20 1830)       Assessments & Plan (with Medical Decision Making)   Pt is a 40 year old female with history of grade 3 invasive ductal cancer of the right breast (diagnosed in 2016), s/p double mastectomies (in 2017), mild intermittent asthma who presents with complaints of progressive shortness of breath over the past 2 weeks.  Patient also complains of associated cough and chest tightness.  Patient just had right breast surgery on 9/17/2020 at [facility] involving removal of bilateral breast reconstruction implants.  Patient reportedly developed tachycardia and dyspnea with oxygen requirement while in the hospital shortly after her surgery and had a CT scan of the chest at that time that was negative for PE.  Patient was weaned off of oxygen and was subsequently discharged on post-op day #5.  She states her post-op wound sites have been healing well.  Her shortness of breath has progressed, and she reports being unable to catch her breath even with minimal activity.      Pt is  afebrile on arrival.  Exam as above.  Patient is tachycardic on arrival and in respiratory distress.  She is tachypneic and speaking in short sentences.  Blood pressure has remained stable.  She is not hypoxic.  Oxygen has remained in the 90s on room air.  Patient's hemoglobin resulted at 6.9.  This is down significantly from her last hemoglobin of 12.8 obtained on 9/1/2020 during her preop physical.  Patient is Hemoccult negative and denies any bleeding or black or tarry stools.  Unsure what this drop in his hemoglobin is caused by.  She has history of iron deficiency anemia on her problem list but states she is not currently taking iron supplementation.  Repeat hemoglobin remained stable at 6.8.  She was transfused 2 units packed red blood cells.  Patient's lactic acid is not elevated.  VBG is normal.  Further complicating matters, patient is noted to have multiple filling defects within the sub-segmental pulmonary arteries consistent with PE on her CT chest evaluation.  Postsurgical changes of the bilateral breast reconstruction also noted with multiple hypoattenuating collection predominantly in the left breast which could represent postsurgical hematoma/seroma and less likely an abscess.  Patient does not have any evidence of infection on exam.  Patient was subsequently started on heparin.  Discussed with our hospitalist on-call, Dr. Aviles, who requests we obtain bilateral lower extremity ultrasounds in order to evaluate for patient's clot burden.  Ultrasound shows a nonocclusive thrombus in the right mid calf in the peroneal vein.  Upon call back, our hospitalist request that patient be transferred to a higher level of care in a facility with hematology available to further sort out why patient is anemic and how to balance her treatment of anemia in the context of her having a PE needing blood thinners and evaluate the need for IVC filter placement.  Patient does not want to return to [facility]  where her surgery was completed.  I therefore discussed patient's case with hospitalist on-call at [facility], Dr. Bronson, who accepts the transfer and care of the patient.  Discussed results and plan with patient and her  via the phone, and they are in agreement.  Patient was transferred in stable condition.    Discussed pt's case with the ED physician on staff (Dr. Lemus).  He also evaluated patient and was involved in the care of the patient throughout her ED visit including assessment, diagnosis, treatment, and plan of the patient.    I have reviewed the nursing notes.    I have reviewed the findings, diagnosis, plan with the patient.       ED to Inpatient Handoff:    Discussed with Dr. Bronson  Patient accepted for Inpatient Stay, Transfer  Pending studies include None  Code Status: Full Code           Discharge Medication List as of 9/29/2020  7:57 PM          Final diagnoses:   Pulmonary embolism (H)   Anemia   Acute deep vein thrombosis (DVT) of right lower extremity, unspecified vein (H)       9/29/2020   [facility] EMERGENCY DEPARTMENT      Disclaimer:  This note consists of symbols derived from keyboarding, dictation and/or voice recognition software.  As a result, there may be errors in the script that have gone undetected.  Please consider this when interpreting information found in this chart.     Myriam Curiel PA-C  09/29/20 8813    Physician Attestation   I, Jordy Lemus MD, saw and evaluated the patient as part of a shared visit.  I have reviewed and discussed with the advanced practice provider their history, physical and plan.    I discussed/reviewed the patient's evaluation, medical decision making and treatment plan.   I personally reviewed the vital signs, medications, labs and imaging.    My key history or physical exam findings: 40-year-old female with recent right breast surgery 9/17/2020 at [facility] with worsening shortness of breath in the past 2 weeks.   Vital signs remarkable for significant tachycardia with rate 121 and O2 saturation of 91% on room air with significant dyspnea exacerbated by normal conversation.    Key management decisions made by me: CT of the chest, laboratory evaluation, initiation of heparinization and transfusion of 2 units PRBC for symptomatic anemia in the setting of PE.  Admission was declined by our hospital due to concern for the complexity of her disease process.  Arranged for transfer to [facility].    I agree with the PA's evaluation, assessment and treatment plan.  Face to face time with the patient = 15 minutes.    Jordy Lemus MD  Date of Service (when I saw the patient): 9/29/20                 Jordy Lemus MD  09/30/20 0941

## 2020-09-29 NOTE — CONSULTS
Patient to start the heparin DVT/PE protocol with a goal anti 10a level of 0.3-0.7. Using a HT of 64 inches and a WT of 88.9 kg, a dosing WT of 68.4 kg was calculated. Based on this dosing WT, give patient a heparin bolus of 3500 units from the bag and then start a drip at 1250 units/hr. Gave lower bolus dose due to hgb of 6.9. Pt to receive blood transfusion. Check the patient's anti 10a level 6 hours after starting the drip at ~2130.   Per DVT/PE protocol.    Dalila Garcia, PharmD

## 2020-09-29 NOTE — TELEPHONE ENCOUNTER
"Red flag call received.  Patient reports feeling short of breath \"I can't catch my breath\", worse today.  Patient had breast surgery 9/17/20 discharged 9/22/20.  Patient states she contacted her surgeon and was advised to rena PCP.    Patient states she had felt short of breath during hospital stay.  She has been using incentive spirometer regularly and range is 9344-1703.  Patient coughs after use, non productive.  Reports shortness of breath with minimal activity, needing rest periods.  Reports pausing mid sentence due to shortness of breath.  Breathing during call slight labored, did need to pause.  No audible wheeze noted.  Patient reports incisional pain 2/10.  Afebrile.    Advised  to ED for assessment.  Patient agrees with plan.  Norma Lin RN       "

## 2020-09-29 NOTE — ED NOTES
Pt presents to ED with concerns of cough, shortness of breath. Pt had surgery on 9/17, has had trouble recovering/breathing since then, unable to catch her breath with any activity.

## 2020-09-29 NOTE — ED NOTES
2nd transfusion started, remained at bedside for 20 minutes with no signs of reaction, up to bedside commode, assisted with repositioning belly band, meds given as ordered, aware awaiting ambulance for transfer

## 2020-09-29 NOTE — ED NOTES
DATE:  9/29/2020   TIME OF RECEIPT FROM LAB:  8434  LAB TEST:  Hemoglobin  LAB VALUE:  6.8  RESULTS GIVEN WITH READ-BACK TO (PROVIDER):  Myriam Curiel PA-C  TIME LAB VALUE REPORTED TO PROVIDER:   7729

## 2020-09-30 LAB
HAPTOGLOB SERPL-MCNC: 203 MG/DL (ref 32–197)
LABORATORY COMMENT REPORT: NORMAL
SARS-COV-2 RNA SPEC QL NAA+PROBE: NEGATIVE
SPECIMEN SOURCE: NORMAL

## 2020-10-04 NOTE — PROGRESS NOTES
ONCOLOGY Follow up visit       COMPLAINT AND REASON FOR VISIT:    12/2016 diagnosed right breast cancer LN positive disease        HISTORY OF ONCOLOGY ILLNESS  She presented at age 36 breast lump in her right armpit and right breast in 10/2016.   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  ER/FL 99% positive, HER-2/kelle negative, associated with high-grade DCIS.    Right axillary ultrasound-guided biopsy indicating metastatic carcinoma positive for spread from breast primary, ER/FL both negative.  HER-2/kelle is negative.      She had clinical T2N1 disease. She made informed decision to proceed with neoadjuvant DDAC C1D1 1/16/2017. She has good clinical response post DD AC x4. Then went on to have wkly taxol + carbo  with informed decision in March.   She had carbo hypersensitivity reaction in early May (during C3). It was d/c after.   She finished chemo end of May 2017.     She was tested negative for BRCA1/2 with [employer].     7/2017 she had double mastectomies at [facility]. Right side found no residual invasive carcinoma, +grade III DCIS, 1mm in greatest dimension. 5 sLN all negative. She had ypTis(DCIS)pN0 disease. Left breast no cancer.     She finished post mastectomy RT in 11/2017.    She had BSO 11/2017. Then started Arimidex in 1/2018. She quit in Feb 2018 due to side effects and willing to try tamoxifen in 3/2018.     She was also on BWELL trial for weight loss.       INTERVAL HISTORY:  She had right breast surgery on 9/17/2020 at [facility] involving removal of bilateral breast reconstruction implants, and TRAMP reconstruction.   Developed tachycardia and dyspnea with oxygen requirement while in the hospital shortly after her surgery and had a CT scan of the chest at that time that was negative for PE.    Her SOB was progressive post op and was in ER 9/29/2020. Found to have severe anemia hb of 6.9 from baseline of 12.8. had 2  "units of PRBC.  A;lso found by CT to have multiple filling defects within the sub-segmental pulmonary arteries consistent with PE.  Ultrasound shows a nonocclusive thrombus in the right mid calf in the peroneal vein.   She was transferred to [facility] And on Lovenox injection       PAST MEDICAL HISTORY:  Restless legs, mild intermittent asthma, celiac disease diagnosed 2016, hx of TIFFANY.  3/2020 right areola skin pathology found atypical junctional melanocytic hyperplasia overlying scar.     SOCIAL HISTORY:  Works part-time.  Lives with her .  They have 2 kids, first pregnancy age 21.  She did not do breast feeding because of bilateral breast reduction.  She does office work.      FAMILY HISTORY:  \"Full of cancer\" from the mother's side including colon, lung, pancreatic, gastric cancer.  According to the patient none of them survive older than 60 years old.   Grandmother  of cholangial carcinoma at age 94 in 2019.        REVIEW OF SYSTEMS  She is very upbeat, denies any bleeding issues.   + hot flushes on effexor.  She still has neuropathy on right toes  She still has abd drainage tubes in      PHYSICAL EXAMINATION:   Last menstrual period 2016, not currently breastfeeding.    ECOG 0    GENERAL APPEARANCE:  He young woman, looks like her stated age, very upbeat, not in acute distress.   HEENT: The patient is normocephalic, atraumatic. Pupils are equally reactive to light.  Sclerae are anicteric. erythematous oral mucosa.  - pharynx.  No oral thrush.   NECK:  Supple.  No jugular venous distention.  Thyroid is not palpable.   LYMPH NODES:  Superficial lymphadenopathy is not appreciable in the bilateral cervical, supraclavicular, axillary or inguinal areas.   CARDIOVASCULAR:  S1, S2 regular with no murmurs or gallops.  No carotid or abdominal bruits.   PULMONARY:  Lungs are clear to auscultation and percussion bilaterally.  There is no wheezing or rhonchi.   GASTROINTESTINAL:  Abdomen is soft, " nontender.  No hepatosplenomegaly.  No signs of ascites.  No mass appreciable.   MUSCULOSKELETAL/EXTREMITIES:  No edema.  No cyanotic changes.  No signs of joint deformity.  No lymphedema.   NEUROLOGIC:  Cranial nerves II-XII are grossly intact.  Sensation intact.  Muscle strength and muscle tone symmetrical, 5/5 throughout.   BACK:  No spinal or paraspinal tenderness.  No CVA tenderness.   SKIN:  No petechiae.  No rash.  No signs of cellulitis.   BREASTS:  Bilateral breast exam reviewed.  Bilateral mastectomies scars well healed. With reconstructed breasts.        LABORATORY DATA REVIEWED TODAY  Hb 6.8 on 9/29/2020/       Current Image data reviewed today  US 9/29/2020 -   Right leg short-segment nonocclusive deep venous thrombosis at the peroneal vein localizing to the mid calf.    CT chest 9/29/2020   1. Multiple filling defect within the subsegmental  pulmonary arteries bilaterally, + for PE.  2. Postsurgical changes of bilateral breast reconstruction. Multiple hypoattenuating collection predominantly in the left breast, could represent postsurgical hematoma/seroma versus less likely an abscess.      Old data reviewed with summary today  dexa 4/2019 - mild osteopenia  CT body 6/2019 - negative  PET 12/2018 - negative    CT c/a/p 6/2018 - negative. A 1.2 cm cyst or nodule in the left lobe of thyroid gland is Unchanged. Abdomen: A 2 mm nonobstructing calculus in the left kidney is again  seen.    DEXA scan from 03/2016 identified mild osteopenia lumbar spine        ASSESSMENT AND PLAN:    1.   12/2016  dx breast cancer, cT2N1 disesae.      S/p DD AC and wkly taxol, carbo was used partially dropped due to hypersensitivity reaction.   She had pCR for invasive cancer with mastectomies in 7/2017.   S/p RT till 11/2017.     She had BSO 11/2017. She did not tolerate Arimdiex off it after 1 month.   She started tamoxifen in 3/2018.     Due to recent post op DVT and PE, advice change to AI.       She demands to have CT  scan or PET with her follow up visit.   I explained to her the ASCO guideline in terms how to follow up breast cancer.    She finally okays it with CT scan on a yearly basis.       2.  Post breast re construction severe anemia in 9/2002, with hb of 6.9  CT found multiple hypoattenuating collection predominantly in the left breast, could represent postsurgical hematoma/seroma versus less likely an abscess.    Exam finds no evidence of infection.   She denies bleeding.     Advice recheck hb today.       3. Post breast re construction bilateral seg mental PE 9/29/202 and RLE DVT.   Advice Lovenox 1 mg/kg subcutaneous bid.   May consider change to DOAC after 1 month.   Need minimal 3-6 months of anticoagulation.       4. hot flushes - advice Effexor, this has helped a lot.  She would love to stay on it long-term.      3. 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  She is to continue gabapentin.  She feels this has been helpful.      5. Osteopenia as baseline by dexa in 2016.   Adviced vit D with proper dosing.   dexa found osteopenia in 4/2019.   She is advised on IV zometa.   We discussed the bisphosphonate use with ASCO guideline.   She made his informed decision to try this April 2019.  She has good tolerance to it so far.  Actually she reports she feels better after Zometa infusion.  She is due repeat dexa 2011.

## 2020-10-05 ENCOUNTER — HOSPITAL ENCOUNTER (OUTPATIENT)
Dept: LAB | Facility: CLINIC | Age: 40
Discharge: HOME | End: 2020-10-05
Attending: INTERNAL MEDICINE | Admitting: INTERNAL MEDICINE
Payer: COMMERCIAL

## 2020-10-05 ENCOUNTER — ONCOLOGY VISIT (OUTPATIENT)
Dept: ONCOLOGY | Facility: CLINIC | Age: 40
End: 2020-10-05
Attending: INTERNAL MEDICINE
Payer: COMMERCIAL

## 2020-10-05 VITALS
BODY MASS INDEX: 33.51 KG/M2 | WEIGHT: 196.3 LBS | RESPIRATION RATE: 14 BRPM | HEIGHT: 64 IN | TEMPERATURE: 97.3 F | HEART RATE: 86 BPM | SYSTOLIC BLOOD PRESSURE: 118 MMHG | OXYGEN SATURATION: 94 % | DIASTOLIC BLOOD PRESSURE: 82 MMHG

## 2020-10-05 DIAGNOSIS — M85.89 OSTEOPENIA OF MULTIPLE SITES: ICD-10-CM

## 2020-10-05 DIAGNOSIS — D64.9 ANEMIA, UNSPECIFIED TYPE: ICD-10-CM

## 2020-10-05 DIAGNOSIS — Z17.0 MALIGNANT NEOPLASM OF UPPER-OUTER QUADRANT OF RIGHT BREAST IN FEMALE, ESTROGEN RECEPTOR POSITIVE (H): Primary | ICD-10-CM

## 2020-10-05 DIAGNOSIS — N95.1 HOT FLUSHES, PERIMENOPAUSAL: ICD-10-CM

## 2020-10-05 DIAGNOSIS — C50.411 MALIGNANT NEOPLASM OF UPPER-OUTER QUADRANT OF RIGHT BREAST IN FEMALE, ESTROGEN RECEPTOR POSITIVE (H): Primary | ICD-10-CM

## 2020-10-05 DIAGNOSIS — G62.9 NEUROPATHY: ICD-10-CM

## 2020-10-05 DIAGNOSIS — I26.94 MULTIPLE SUBSEGMENTAL PULMONARY EMBOLI WITHOUT ACUTE COR PULMONALE (H): ICD-10-CM

## 2020-10-05 LAB
BASOPHILS # BLD AUTO: 0 10E9/L (ref 0–0.2)
BASOPHILS NFR BLD AUTO: 0.5 %
DIFFERENTIAL METHOD BLD: ABNORMAL
EOSINOPHIL # BLD AUTO: 0.5 10E9/L (ref 0–0.7)
EOSINOPHIL NFR BLD AUTO: 6.2 %
ERYTHROCYTE [DISTWIDTH] IN BLOOD BY AUTOMATED COUNT: 13.2 % (ref 10–15)
HCT VFR BLD AUTO: 32.4 % (ref 35–47)
HGB BLD-MCNC: 10.2 G/DL (ref 11.7–15.7)
IMM GRANULOCYTES # BLD: 0 10E9/L (ref 0–0.4)
IMM GRANULOCYTES NFR BLD: 0.3 %
LYMPHOCYTES # BLD AUTO: 2.1 10E9/L (ref 0.8–5.3)
LYMPHOCYTES NFR BLD AUTO: 28.6 %
MCH RBC QN AUTO: 28.2 PG (ref 26.5–33)
MCHC RBC AUTO-ENTMCNC: 31.5 G/DL (ref 31.5–36.5)
MCV RBC AUTO: 90 FL (ref 78–100)
MONOCYTES # BLD AUTO: 0.5 10E9/L (ref 0–1.3)
MONOCYTES NFR BLD AUTO: 6.3 %
NEUTROPHILS # BLD AUTO: 4.3 10E9/L (ref 1.6–8.3)
NEUTROPHILS NFR BLD AUTO: 58.1 %
NRBC # BLD AUTO: 0 10*3/UL
NRBC BLD AUTO-RTO: 0 /100
PLATELET # BLD AUTO: 511 10E9/L (ref 150–450)
RBC # BLD AUTO: 3.62 10E12/L (ref 3.8–5.2)
WBC # BLD AUTO: 7.3 10E9/L (ref 4–11)

## 2020-10-05 PROCEDURE — G0463 HOSPITAL OUTPT CLINIC VISIT: HCPCS

## 2020-10-05 PROCEDURE — 36415 COLL VENOUS BLD VENIPUNCTURE: CPT | Performed by: INTERNAL MEDICINE

## 2020-10-05 PROCEDURE — 99215 OFFICE O/P EST HI 40 MIN: CPT | Performed by: INTERNAL MEDICINE

## 2020-10-05 PROCEDURE — 85025 COMPLETE CBC W/AUTO DIFF WBC: CPT | Performed by: INTERNAL MEDICINE

## 2020-10-05 RX ORDER — ANASTROZOLE 1 MG/1
1 TABLET ORAL DAILY
Qty: 90 TABLET | Refills: 1 | Status: SHIPPED | OUTPATIENT
Start: 2020-10-05 | End: 2020-10-21 | Stop reason: SINTOL

## 2020-10-05 RX ORDER — TRAMADOL HYDROCHLORIDE 50 MG/1
50 TABLET ORAL
COMMUNITY
Start: 2020-09-30 | End: 2020-12-21

## 2020-10-05 ASSESSMENT — MIFFLIN-ST. JEOR: SCORE: 1540.66

## 2020-10-05 ASSESSMENT — PAIN SCALES - GENERAL: PAINLEVEL: MILD PAIN (2)

## 2020-10-05 NOTE — PATIENT INSTRUCTIONS
Cbc today, will call her on result.   Continue Loveox.   Change Tamoxifen to AI.   Push end of Oct follow-up and CT and labs to Dec.

## 2020-10-05 NOTE — PROGRESS NOTES
"Oncology Rooming Note    October 5, 2020 3:40 PM   Diana Wilson is a 40 year old female who presents for:    Chief Complaint   Patient presents with     Hematology     blood clots     Initial Vitals: /82 (BP Location: Left arm, Patient Position: Sitting, Cuff Size: Adult Regular)   Pulse 86   Temp 97.3  F (36.3  C) (Oral)   Resp 14   Ht 1.626 m (5' 4.02\")   Wt 89 kg (196 lb 4.8 oz)   LMP 08/05/2016 (Exact Date)   SpO2 94%   BMI 33.68 kg/m   Estimated body mass index is 33.68 kg/m  as calculated from the following:    Height as of this encounter: 1.626 m (5' 4.02\").    Weight as of this encounter: 89 kg (196 lb 4.8 oz). Body surface area is 2 meters squared.  Mild Pain (2) Comment: Data Unavailable   Patient's last menstrual period was 08/05/2016 (exact date).  Allergies reviewed: Yes  Medications reviewed: Yes    Medications: Medication refills not needed today.  Pharmacy name entered into [facility]: [facility] [place], [place] - [id number] [address]    Clinical concerns: blood clots      Sondra Medina CMA              "

## 2020-10-05 NOTE — LETTER
10/5/2020         RE: Diana Wilson  [address] [facility] [id number]        Dear Colleague,    Thank you for referring your patient, Diana Wilson, to the [facility]. Please see a copy of my visit note below.    ONCOLOGY Follow up visit       COMPLAINT AND REASON FOR VISIT:    12/2016 diagnosed right breast cancer LN positive disease        HISTORY OF ONCOLOGY ILLNESS  She presented at age 36 breast lump in her right armpit and right breast in 10/2016.   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  ER/MT 99% positive, HER-2/kelle negative, associated with high-grade DCIS.    Right axillary ultrasound-guided biopsy indicating metastatic carcinoma positive for spread from breast primary, ER/MT both negative.  HER-2/kelle is negative.      She had clinical T2N1 disease. She made informed decision to proceed with neoadjuvant DDAC C1D1 1/16/2017. She has good clinical response post DD AC x4. Then went on to have wkly taxol + carbo  with informed decision in March.   She had carbo hypersensitivity reaction in early May (during C3). It was d/c after.   She finished chemo end of May 2017.     She was tested negative for BRCA1/2 with [employer].     7/2017 she had double mastectomies at [facility]. Right side found no residual invasive carcinoma, +grade III DCIS, 1mm in greatest dimension. 5 sLN all negative. She had ypTis(DCIS)pN0 disease. Left breast no cancer.     She finished post mastectomy RT in 11/2017.    She had BSO 11/2017. Then started Arimidex in 1/2018. She quit in Feb 2018 due to side effects and willing to try tamoxifen in 3/2018.     She was also on [employer] trial for weight loss.       INTERVAL HISTORY:  She had right breast surgery on 9/17/2020 at [facility] involving removal of bilateral breast reconstruction implants, and TRAMP reconstruction.   Developed tachycardia and dyspnea with oxygen  "requirement while in the hospital shortly after her surgery and had a CT scan of the chest at that time that was negative for PE.    Her SOB was progressive post op and was in ER 2020. Found to have severe anemia hb of 6.9 from baseline of 12.8. had 2 units of PRBC.  A;lso found by CT to have multiple filling defects within the sub-segmental pulmonary arteries consistent with PE.  Ultrasound shows a nonocclusive thrombus in the right mid calf in the peroneal vein.   She was transferred to [facility] And on Lovenox injection       PAST MEDICAL HISTORY:  Restless legs, mild intermittent asthma, celiac disease diagnosed 2016, hx of TIFFANY.  3/2020 right areola skin pathology found atypical junctional melanocytic hyperplasia overlying scar.     SOCIAL HISTORY:  Works part-time.  Lives with her .  They have 2 kids, first pregnancy age 22.  She did not do breast feeding because of bilateral breast reduction.  She does office work.      FAMILY HISTORY:  \"Full of cancer\" from the mother's side including colon, lung, pancreatic, gastric cancer.  According to the patient none of them survive older than 60 years old.   Grandmother  of cholangial carcinoma at age 94 in 2019.        REVIEW OF SYSTEMS  She is very upbeat, denies any bleeding issues.   + hot flushes on effexor.  She still has neuropathy on right toes  She still has abd drainage tubes in      PHYSICAL EXAMINATION:   Last menstrual period 2016, not currently breastfeeding.    ECOG 0    GENERAL APPEARANCE:  He young woman, looks like her stated age, very upbeat, not in acute distress.   HEENT: The patient is normocephalic, atraumatic. Pupils are equally reactive to light.  Sclerae are anicteric. erythematous oral mucosa.  - pharynx.  No oral thrush.   NECK:  Supple.  No jugular venous distention.  Thyroid is not palpable.   LYMPH NODES:  Superficial lymphadenopathy is not appreciable in the bilateral cervical, supraclavicular, axillary or " inguinal areas.   CARDIOVASCULAR:  S1, S2 regular with no murmurs or gallops.  No carotid or abdominal bruits.   PULMONARY:  Lungs are clear to auscultation and percussion bilaterally.  There is no wheezing or rhonchi.   GASTROINTESTINAL:  Abdomen is soft, nontender.  No hepatosplenomegaly.  No signs of ascites.  No mass appreciable.   MUSCULOSKELETAL/EXTREMITIES:  No edema.  No cyanotic changes.  No signs of joint deformity.  No lymphedema.   NEUROLOGIC:  Cranial nerves II-XII are grossly intact.  Sensation intact.  Muscle strength and muscle tone symmetrical, 5/5 throughout.   BACK:  No spinal or paraspinal tenderness.  No CVA tenderness.   SKIN:  No petechiae.  No rash.  No signs of cellulitis.   BREASTS:  Bilateral breast exam reviewed.  Bilateral mastectomies scars well healed. With reconstructed breasts.        LABORATORY DATA REVIEWED TODAY  Hb 6.8 on 9/29/2020/       Current Image data reviewed today  US 9/29/2020 -   Right leg short-segment nonocclusive deep venous thrombosis at the peroneal vein localizing to the mid calf.    CT chest 9/29/2020   1. Multiple filling defect within the subsegmental  pulmonary arteries bilaterally, + for PE.  2. Postsurgical changes of bilateral breast reconstruction. Multiple hypoattenuating collection predominantly in the left breast, could represent postsurgical hematoma/seroma versus less likely an abscess.      Old data reviewed with summary today  dexa 4/2019 - mild osteopenia  CT body 6/2019 - negative  PET 12/2018 - negative    CT c/a/p 6/2018 - negative. A 1.2 cm cyst or nodule in the left lobe of thyroid gland is Unchanged. Abdomen: A 2 mm nonobstructing calculus in the left kidney is again  seen.    DEXA scan from 03/2016 identified mild osteopenia lumbar spine        ASSESSMENT AND PLAN:    1. 12/2016  dx breast cancer, cT2N1 disesae.      S/p DD AC and wkly taxol, carbo was used partially dropped due to hypersensitivity reaction.   She had pCR for invasive  "cancer with mastectomies in 7/2017.   S/p RT till 11/2017.     She had BSO 11/2017. She did not tolerate Arimdiex off it after 1 month.   She started tamoxifen in 3/2018.     Due to recent post op DVT and PE, advice change to AI.       She demands to have CT scan or PET with her follow up visit.   I explained to her the ASCO guideline in terms how to follow up breast cancer.    She finally okays it with CT scan on a yearly basis.       2.  Post breast re construction severe anemia in 9/2002, with hb of 6.9  CT found multiple hypoattenuating collection predominantly in the left breast, could represent postsurgical hematoma/seroma versus less likely an abscess.    Exam finds no evidence of infection.   She denies bleeding.     Advice recheck hb today.       3. Post breast re construction bilateral seg mental PE 9/29/202 and RLE DVT.   Advice Lovenox 1 mg/kg subcutaneous bid.   May consider change to DOAC after 1 month.   Need minimal 3-6 months of anticoagulation.       4. hot flushes - advice Effexor, this has helped a lot.  She would love to stay on it long-term.      3. 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  She is to continue gabapentin.  She feels this has been helpful.      5. Osteopenia as baseline by dexa in 2016.   Adviced vit D with proper dosing.   dexa found osteopenia in 4/2019.   She is advised on IV zometa.   We discussed the bisphosphonate use with ASCO guideline.   She made his informed decision to try this April 2019.  She has good tolerance to it so far.  Actually she reports she feels better after Zometa infusion.  She is due repeat dexa 2011.    Oncology Rooming Note    October 5, 2020 3:40 PM   Diana Wilson is a 40 year old female who presents for:    Chief Complaint   Patient presents with     Hematology     blood clots     Initial Vitals: /82 (BP Location: Left arm, Patient Position: Sitting, Cuff Size: Adult Regular)   Pulse 86   Temp 97.3  F (36.3  C) (Oral)   Resp 14   Ht 1.626 m (5' 4.02\")   Wt " "89 kg (196 lb 4.8 oz)   LMP 08/05/2016 (Exact Date)   SpO2 94%   BMI 33.68 kg/m   Estimated body mass index is 33.68 kg/m  as calculated from the following:    Height as of this encounter: 1.626 m (5' 4.02\").    Weight as of this encounter: 89 kg (196 lb 4.8 oz). Body surface area is 2 meters squared.  Mild Pain (2) Comment: Data Unavailable   Patient's last menstrual period was 08/05/2016 (exact date).  Allergies reviewed: Yes  Medications reviewed: Yes    Medications: Medication refills not needed today.  Pharmacy name entered into [facility]: [facility] [place] [place] - [address]    Clinical concerns: blood clots      Sondra Medina [facility]                  Again, thank you for allowing me to participate in the care of your patient.        Sincerely,        Maribel Jin MD, MD    "

## 2020-10-16 ENCOUNTER — PATIENT OUTREACH (OUTPATIENT)
Dept: ONCOLOGY | Facility: CLINIC | Age: 40
End: 2020-10-16

## 2020-10-16 NOTE — PROGRESS NOTES
Pt  called to report pt is having SOB again like she was 2 weeks ago and where they should go. Advised they could go either place ([place] or [place]) but [place] was where she was admitted and would have the most recent work up regarding her PE/bloot clot admission.     Fabian says they are on their way now.     Josette Smalls RN on 10/16/2020 at 12:31 PM

## 2020-10-19 ENCOUNTER — COMMUNICATION - HEALTHEAST (OUTPATIENT)
Dept: SCHEDULING | Facility: CLINIC | Age: 40
End: 2020-10-19

## 2020-10-19 NOTE — PROGRESS NOTES
Per , pt went to the ER and they took her off the Arimidex after not finding another source of SOB. Tests all came back negative. Pt is on medication now for elevated HR () now with medication. Pt also might have an infection on the left side of her body but her PCP is handling that.     They are just curious if this could be related. Per Up to date there is a 8-10% side effect, and she stopped this on Thursday. Advised if the side effect was related to the Arimidex it could take 1-2 weeks to fully recover. Plan for pt to continue to hold this and to have a video visit on Thursday to discuss with Dr. Jin.     Josette Smalls RN on 10/19/2020 at 11:41 AM

## 2020-10-21 RX ORDER — EXEMESTANE 25 MG/1
25 TABLET ORAL DAILY
Qty: 30 TABLET | Refills: 3 | Status: SHIPPED | OUTPATIENT
Start: 2020-10-21 | End: 2021-02-22

## 2020-10-21 NOTE — PROGRESS NOTES
"Diana Wilson is a 40 year old female who is being evaluated via a billable video visit.      The patient has been notified of following:     \"This video visit will be conducted via a call between you and your physician/provider. We have found that certain health care needs can be provided without the need for an in-person physical exam.  This service lets us provide the care you need with a video conversation.  If a prescription is necessary we can send it directly to your pharmacy.  If lab work is needed we can place an order for that and you can then stop by our lab to have the test done at a later time.    Video visits are billed at different rates depending on your insurance coverage.  Please reach out to your insurance provider with any questions.    If during the course of the call the physician/provider feels a video visit is not appropriate, you will not be charged for this service.\"    Patient has given verbal consent for Video visit? Yes.   How would you like to obtain your AVS? My chart  If you are dropped from the video visit, the video invite should be resent to:cell phone [phone]    Will anyone else be joining your video visit? No            Type of Visit: Video Visit  Patient has given verbal consent for Video visit.     Video Start Time: 3:30 pm   Video End Time: 4 pm     Originating Location (pt. Location): Home     Distant Location (provider location):  [facility]      Platform used for Video Visit: [facility]    ONCOLOGY Follow up visit       COMPLAINT AND REASON FOR VISIT:    12/2016 diagnosed right breast cancer LN positive disease        HISTORY OF ONCOLOGY ILLNESS  She presented at age 36 breast lump in her right armpit and right breast in 10/2016.   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  ER/ND 99% positive, HER-2/kelle negative, associated with high-grade DCIS.    Right axillary " ultrasound-guided biopsy indicating metastatic carcinoma positive for spread from breast primary, ER/CA both negative.  HER-2/kelle is negative.      She had clinical T2N1 disease. She made informed decision to proceed with neoadjuvant DDAC C1D1 1/16/2017. She has good clinical response post DD AC x4. Then went on to have wkly taxol + carbo  with informed decision in March.   She had carbo hypersensitivity reaction in early May (during C3). It was d/c after.   She finished chemo end of May 2017.     She was tested negative for BRCA1/2 with [facility].     7/2017 she had double mastectomies at [facility]. Right side found no residual invasive carcinoma, +grade III DCIS, 1mm in greatest dimension. 5 sLN all negative. She had ypTis(DCIS)pN0 disease. Left breast no cancer.     She finished post mastectomy RT in 11/2017.    She had BSO 11/2017. Then started Arimidex in 1/2018. She quit in Feb 2018 due to side effects and willing to try tamoxifen in 3/2018.     She was also on SongAfter trial for weight loss.       INTERVAL HISTORY:  She had right breast surgery on 9/17/2020 at [facility] involving removal of bilateral breast reconstruction implants, and TRAMP reconstruction.   Developed tachycardia and dyspnea with oxygen requirement while in the hospital shortly after her surgery and had a CT scan of the chest at that time that was negative for PE.    Her SOB was progressive post op and was in ER 9/29/2020. Found to have severe anemia hb of 6.9 from baseline of 12.8. had 2 units of PRBC.  A;lso found by CT to have multiple filling defects within the sub-segmental pulmonary arteries consistent with PE.  Ultrasound shows a nonocclusive thrombus in the right mid calf in the peroneal vein.   She was transferred to [facility] And on Lovenox injection     Tamoxifen was changed to AI early Oct, and she presented to [facility] for SOB mid Oct. Arimidex was stopped.   PE resolved.   She also has infection on re constructed breast and  "is on Abx.       PAST MEDICAL HISTORY:  Restless legs, mild intermittent asthma, celiac disease diagnosed 2016, hx of TIFFANY.  3/2020 right areola skin pathology found atypical junctional melanocytic hyperplasia overlying scar.     SOCIAL HISTORY:  Works part-time.  Lives with her .  They have 2 kids, first pregnancy age 21.  She did not do breast feeding because of bilateral breast reduction.  She does office work.      FAMILY HISTORY:  \"Full of cancer\" from the mother's side including colon, lung, pancreatic, gastric cancer.  According to the patient none of them survive older than 60 years old.   Grandmother  of cholangial carcinoma at age 94 in 2019.        REVIEW OF SYSTEMS  She is very upbeat, denies any bleeding issues.   + hot flushes on effexor.  She still has neuropathy on right toes  She still has SOB sensation.       PHYSICAL EXAMINATION ATTAINABLE DURING VIDEO VISIT:  CONSTITUTIONAL - Pt looks like stated age, pleasant, not in acute distress. Not obese.  NEURO: Oriented to time, person, and places. No tremor. Normal gait.   ENT, MOUTH: Pupils are equal.  Sclerae are anicteric.  Moist oral mucosa. No oral thrush.   NECK:  No jugular venous distention.  No thyroid enlargement.   RESPIRATORY: talk nl, no sob during conversation, no cough.   MUSCULOSKELETAL/EXTREMITIES:  No edema.  No joint deformity. Normal range of motion.  SKIN:  No petechiae.  No rash.  No signs of cellulitis.   PSYCHIATRIC: Normal mood and affect. Good memory. Proper insight and judgement.   THE REST OF A COMPREHENSIVE PHYSICIAL EXAM IS DEFERRED DUE TO COVID-19 PUBLIC HEALTH EMERGENCY RELATED VIDEO VISIT RESCTRICTION.       LABORATORY DATA REVIEWED TODAY  Hb 6.8 on 2020/       Current Image data reviewed today  CT PE protocol from [facility] mid Oct - no PE  US left leg mid Oct - no DVT.       US 2020 -   Right leg short-segment nonocclusive deep venous thrombosis at the peroneal vein localizing to the mid " calf.    CT chest 9/29/2020   1. Multiple filling defect within the subsegmental  pulmonary arteries bilaterally, + for PE.  2. Postsurgical changes of bilateral breast reconstruction. Multiple hypoattenuating collection predominantly in the left breast, could represent postsurgical hematoma/seroma versus less likely an abscess.      Old data reviewed with summary today  dexa 4/2019 - mild osteopenia  CT body 6/2019 - negative  PET 12/2018 - negative    CT c/a/p 6/2018 - negative. A 1.2 cm cyst or nodule in the left lobe of thyroid gland is Unchanged. Abdomen: A 2 mm nonobstructing calculus in the left kidney is again  seen.    DEXA scan from 03/2016 identified mild osteopenia lumbar spine        ASSESSMENT AND PLAN:    1.   12/2016  dx breast cancer, cT2N1 disesae.      S/p DD AC and wkly taxol, carbo was used partially dropped due to hypersensitivity reaction.   She had pCR for invasive cancer with mastectomies in 7/2017.   S/p RT till 11/2017.     She had BSO 11/2017. She did not tolerate Arimdiex off it after 1 month.   She started tamoxifen in 3/2018.     Due to recent post op DVT and PE, advice change to Arimidex early Oct. Due to her re appeared SOB, and 8-10% of sob side effects from Arimidex, advice to change to Aromasin (no SOB side effects base on micromedex).        She demands to have CT scan or PET with her follow up visit.   I explained to her the ASCO guideline in terms how to follow up breast cancer.    She finally okays it with CT scan on a yearly basis.       2.  Post breast re construction severe anemia in 9/2002, with hb of 6.9  CT found multiple hypoattenuating collection predominantly in the left breast, could represent postsurgical hematoma/seroma versus less likely an abscess.    Exam finds no evidence of infection.   She denies bleeding.     Rcheck hb > 10.     She is advised on balanced diet. .       3. Post breast re construction bilateral seg mental PE 9/29/202 and RLE DVT.   Advice  Lovenox 1 mg/kg subcutaneous bid   May consider change to DOAC after 1 month.   Need minimal 3 months of anticoagulation despite the DVT and PE are dissolved.       4. hot flushes - advice Effexor, this has helped a lot.  She would love to stay on it long-term.      3. 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  She is to continue gabapentin.  She feels this has been helpful.      5. Osteopenia as baseline by dexa in 2016.   Adviced vit D with proper dosing.   dexa found osteopenia in 4/2019.   She is advised on IV zometa.   We discussed the bisphosphonate use with ASCO guideline.   She made his informed decision to try this April 2019.  She has good tolerance to it so far.  Actually she reports she feels

## 2020-10-22 ENCOUNTER — VIRTUAL VISIT (OUTPATIENT)
Dept: ONCOLOGY | Facility: CLINIC | Age: 40
End: 2020-10-22
Attending: INTERNAL MEDICINE
Payer: COMMERCIAL

## 2020-10-22 DIAGNOSIS — C50.411 MALIGNANT NEOPLASM OF UPPER-OUTER QUADRANT OF RIGHT BREAST IN FEMALE, ESTROGEN RECEPTOR POSITIVE (H): Primary | ICD-10-CM

## 2020-10-22 DIAGNOSIS — I26.94 MULTIPLE SUBSEGMENTAL PULMONARY EMBOLI WITHOUT ACUTE COR PULMONALE (H): ICD-10-CM

## 2020-10-22 DIAGNOSIS — M85.80 OSTEOPENIA, UNSPECIFIED LOCATION: ICD-10-CM

## 2020-10-22 DIAGNOSIS — Z17.0 MALIGNANT NEOPLASM OF UPPER-OUTER QUADRANT OF RIGHT BREAST IN FEMALE, ESTROGEN RECEPTOR POSITIVE (H): Primary | ICD-10-CM

## 2020-10-22 DIAGNOSIS — D64.9 ANEMIA, UNSPECIFIED TYPE: ICD-10-CM

## 2020-10-22 DIAGNOSIS — N95.1 HOT FLUSHES, PERIMENOPAUSAL: ICD-10-CM

## 2020-10-22 PROCEDURE — 999N001193 HC VIDEO/TELEPHONE VISIT; NO CHARGE

## 2020-10-22 PROCEDURE — 99214 OFFICE O/P EST MOD 30 MIN: CPT | Mod: GT | Performed by: INTERNAL MEDICINE

## 2020-10-22 NOTE — LETTER
"    10/22/2020         RE: Diana Wilson  [address] [facility] [id number]        Dear Colleague,    Thank you for referring your patient, Diana Wilson, to the [facility]. Please see a copy of my visit note below.    Diana Wilson is a 40 year old female who is being evaluated via a billable video visit.      The patient has been notified of following:     \"This video visit will be conducted via a call between you and your physician/provider. We have found that certain health care needs can be provided without the need for an in-person physical exam.  This service lets us provide the care you need with a video conversation.  If a prescription is necessary we can send it directly to your pharmacy.  If lab work is needed we can place an order for that and you can then stop by our lab to have the test done at a later time.    Video visits are billed at different rates depending on your insurance coverage.  Please reach out to your insurance provider with any questions.    If during the course of the call the physician/provider feels a video visit is not appropriate, you will not be charged for this service.\"    Patient has given verbal consent for Video visit? Yes.   How would you like to obtain your AVS? My chart  If you are dropped from the video visit, the video invite should be resent to:cell phone [phone]    Will anyone else be joining your video visit? No            Type of Visit: Video Visit  Patient has given verbal consent for Video visit.     Video Start Time: 3:30 pm   Video End Time: 4 pm     Originating Location (pt. Location): Home     Distant Location (provider location):  [facility]      [facility]    ONCOLOGY Follow up visit       COMPLAINT AND REASON FOR VISIT:    12/2016 diagnosed right breast cancer LN positive disease        HISTORY OF ONCOLOGY ILLNESS  She presented at age 36 breast lump in her right armpit and right " breast in 10/2016.   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  ER/NH 99% positive, HER-2/kelle negative, associated with high-grade DCIS.    Right axillary ultrasound-guided biopsy indicating metastatic carcinoma positive for spread from breast primary, ER/NH both negative.  HER-2/kelle is negative.      She had clinical T2N1 disease. She made informed decision to proceed with neoadjuvant DDAC C1D1 1/16/2017. She has good clinical response post DD AC x4. Then went on to have wkly taxol + carbo  with informed decision in March.   She had carbo hypersensitivity reaction in early May (during C3). It was d/c after.   She finished chemo end of May 2017.     She was tested negative for BRCA1/2 with [facility].     7/2017 she had double mastectomies at [facility]. Right side found no residual invasive carcinoma, +grade III DCIS, 1mm in greatest dimension. 5 sLN all negative. She had ypTis(DCIS)pN0 disease. Left breast no cancer.     She finished post mastectomy RT in 11/2017.    She had BSO 11/2017. Then started Arimidex in 1/2018. She quit in Feb 2018 due to side effects and willing to try tamoxifen in 3/2018.     She was also on [employer] trial for weight loss.       INTERVAL HISTORY:  She had right breast surgery on 9/17/2020 at [facility] involving removal of bilateral breast reconstruction implants, and TRAMP reconstruction.   Developed tachycardia and dyspnea with oxygen requirement while in the hospital shortly after her surgery and had a CT scan of the chest at that time that was negative for PE.    Her SOB was progressive post op and was in ER 9/29/2020. Found to have severe anemia hb of 6.9 from baseline of 12.8. had 2 units of PRBC.  A;lso found by CT to have multiple filling defects within the sub-segmental pulmonary arteries consistent with PE.  Ultrasound shows a nonocclusive thrombus in the right mid calf in the peroneal vein.   She was  "transferred to [facility]. And on Lovenox injection     Tamoxifen was changed to AI early Oct, and she presented to [facility] for SOB mid Oct. Arimidex was stopped.   PE resolved.   She also has infection on re constructed breast and is on Abx.       PAST MEDICAL HISTORY:  Restless legs, mild intermittent asthma, celiac disease diagnosed 2016, hx of TIFFANY.  3/2020 right areola skin pathology found atypical junctional melanocytic hyperplasia overlying scar.     SOCIAL HISTORY:  Works part-time.  Lives with her .  They have 2 kids, first pregnancy age 22.  She did not do breast feeding because of bilateral breast reduction.  She does office work.      FAMILY HISTORY:  \"Full of cancer\" from the mother's side including colon, lung, pancreatic, gastric cancer.  According to the patient none of them survive older than 60 years old.   Grandmother  of cholangial carcinoma at age 94 in .        REVIEW OF SYSTEMS  She is very upbeat, denies any bleeding issues.   + hot flushes on effexor.  She still has neuropathy on right toes  She still has SOB sensation.       PHYSICAL EXAMINATION ATTAINABLE DURING VIDEO VISIT:  CONSTITUTIONAL - Pt looks like stated age, pleasant, not in acute distress. Not obese.  NEURO: Oriented to time, person, and places. No tremor. Normal gait.   ENT, MOUTH: Pupils are equal.  Sclerae are anicteric.  Moist oral mucosa. No oral thrush.   NECK:  No jugular venous distention.  No thyroid enlargement.   RESPIRATORY: talk nl, no sob during conversation, no cough.   MUSCULOSKELETAL/EXTREMITIES:  No edema.  No joint deformity. Normal range of motion.  SKIN:  No petechiae.  No rash.  No signs of cellulitis.   PSYCHIATRIC: Normal mood and affect. Good memory. Proper insight and judgement.   THE REST OF A COMPREHENSIVE PHYSICIAL EXAM IS DEFERRED DUE TO COVID-19 PUBLIC HEALTH EMERGENCY RELATED VIDEO VISIT RESCTRICTION.       LABORATORY DATA REVIEWED TODAY  Hb 6.8 on 2020/ "       Current Image data reviewed today  CT PE protocol from [facility] mid Oct - no PE  US left leg mid Oct - no DVT.       US 9/29/2020 -   Right leg short-segment nonocclusive deep venous thrombosis at the peroneal vein localizing to the mid calf.    CT chest 9/29/2020   1. Multiple filling defect within the subsegmental  pulmonary arteries bilaterally, + for PE.  2. Postsurgical changes of bilateral breast reconstruction. Multiple hypoattenuating collection predominantly in the left breast, could represent postsurgical hematoma/seroma versus less likely an abscess.      Old data reviewed with summary today  dexa 4/2019 - mild osteopenia  CT body 6/2019 - negative  PET 12/2018 - negative    CT c/a/p 6/2018 - negative. A 1.2 cm cyst or nodule in the left lobe of thyroid gland is Unchanged. Abdomen: A 2 mm nonobstructing calculus in the left kidney is again  seen.    DEXA scan from 03/2016 identified mild osteopenia lumbar spine        ASSESSMENT AND PLAN:    1.   12/2016  dx breast cancer, cT2N1 disesae.      S/p DD AC and wkly taxol, carbo was used partially dropped due to hypersensitivity reaction.   She had pCR for invasive cancer with mastectomies in 7/2017.   S/p RT till 11/2017.     She had BSO 11/2017. She did not tolerate Arimdiex off it after 1 month.   She started tamoxifen in 3/2018.     Due to recent post op DVT and PE, advice change to Arimidex early Oct. Due to her re appeared SOB, and 8-10% of sob side effects from Arimidex, advice to change to Aromasin (no SOB side effects base on micromedex).        She demands to have CT scan or PET with her follow up visit.   I explained to her the ASCO guideline in terms how to follow up breast cancer.    She finally okays it with CT scan on a yearly basis.       2.  Post breast re construction severe anemia in 9/2002, with hb of 6.9  CT found multiple hypoattenuating collection predominantly in the left breast, could represent postsurgical hematoma/seroma  versus less likely an abscess.    Exam finds no evidence of infection.   She denies bleeding.     Rcheck hb > 10.     She is advised on balanced diet. .       3. Post breast re construction bilateral seg mental PE 9/29/202 and RLE DVT.   Advice Lovenox 1 mg/kg subcutaneous bid   May consider change to DOAC after 1 month.   Need minimal 3 months of anticoagulation despite the DVT and PE are dissolved.       4. hot flushes - advice Effexor, this has helped a lot.  She would love to stay on it long-term.      3. 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  She is to continue gabapentin.  She feels this has been helpful.      5. Osteopenia as baseline by dexa in 2016.   Adviced vit D with proper dosing.   dexa found osteopenia in 4/2019.   She is advised on IV zometa.   We discussed the bisphosphonate use with ASCO guideline.   She made his informed decision to try this April 2019.  She has good tolerance to it so far.  Actually she reports she feels       Again, thank you for allowing me to participate in the care of your patient.        Sincerely,        Maribel Jin MD, MD

## 2020-10-22 NOTE — LETTER
"    10/22/2020         RE: Diana Wilson  1972 [address] [facility] [address]        Dear Colleague,    Thank you for referring your patient, Diana Wilson, to the [facility]. Please see a copy of my visit note below.    Diana Wilson is a 40 year old female who is being evaluated via a billable video visit.      The patient has been notified of following:     \"This video visit will be conducted via a call between you and your physician/provider. We have found that certain health care needs can be provided without the need for an in-person physical exam.  This service lets us provide the care you need with a video conversation.  If a prescription is necessary we can send it directly to your pharmacy.  If lab work is needed we can place an order for that and you can then stop by our lab to have the test done at a later time.    Video visits are billed at different rates depending on your insurance coverage.  Please reach out to your insurance provider with any questions.    If during the course of the call the physician/provider feels a video visit is not appropriate, you will not be charged for this service.\"    Patient has given verbal consent for Video visit? Yes.   How would you like to obtain your AVS? My chart  If you are dropped from the video visit, the video invite should be resent to:cell phone [phone]    Will anyone else be joining your video visit? No            Type of Visit: Video Visit  Patient has given verbal consent for Video visit.     Video Start Time: 3:30 pm   Video End Time: 4 pm     Originating Location (pt. Location): Home     Distant Location (provider location):  [facility]      Platform [facility] Visit: [facility]    ONCOLOGY Follow up visit       COMPLAINT AND REASON FOR VISIT:    12/2016 diagnosed right breast cancer LN positive disease        HISTORY OF ONCOLOGY ILLNESS  She presented at age 36 breast lump in her right armpit and right " breast in 10/2016.   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  ER/OK 99% positive, HER-2/kelle negative, associated with high-grade DCIS.    Right axillary ultrasound-guided biopsy indicating metastatic carcinoma positive for spread from breast primary, ER/OK both negative.  HER-2/kelle is negative.      She had clinical T2N1 disease. She made informed decision to proceed with neoadjuvant DDAC C1D1 1/16/2017. She has good clinical response post DD AC x4. Then went on to have wkly taxol + carbo  with informed decision in March.   She had carbo hypersensitivity reaction in early May (during C3). It was d/c after.   She finished chemo end of May 2017.     She was tested negative for BRCA1/2 with [facility].     7/2017 she had double mastectomies at [facility]. Right side found no residual invasive carcinoma, +grade III DCIS, 1mm in greatest dimension. 5 sLN all negative. She had ypTis(DCIS)pN0 disease. Left breast no cancer.     She finished post mastectomy RT in 11/2017.    She had BSO 11/2017. Then started Arimidex in 1/2018. She quit in Feb 2018 due to side effects and willing to try tamoxifen in 3/2018.     She was also on Ancestry trial for weight loss.       INTERVAL HISTORY:  She had right breast surgery on 9/17/2020 at [facility] involving removal of bilateral breast reconstruction implants, and TRAMP reconstruction.   Developed tachycardia and dyspnea with oxygen requirement while in the hospital shortly after her surgery and had a CT scan of the chest at that time that was negative for PE.    Her SOB was progressive post op and was in ER 9/29/2020. Found to have severe anemia hb of 6.9 from baseline of 12.8. had 2 units of PRBC.  A;lso found by CT to have multiple filling defects within the sub-segmental pulmonary arteries consistent with PE.  Ultrasound shows a nonocclusive thrombus in the right mid calf in the peroneal vein.   She was  "transferred to [facility]. And on Lovenox injection     Tamoxifen was changed to AI early Oct, and she presented to [facility] for SOB mid Oct. Arimidex was stopped.   PE resolved.   She also has infection on re constructed breast and is on Abx.       PAST MEDICAL HISTORY:  Restless legs, mild intermittent asthma, celiac disease diagnosed 2016, hx of TIFFANY.  3/2020 right areola skin pathology found atypical junctional melanocytic hyperplasia overlying scar.     SOCIAL HISTORY:  Works part-time.  Lives with her .  They have 2 kids, first pregnancy age 21.  She did not do breast feeding because of bilateral breast reduction.  She does office work.      FAMILY HISTORY:  \"Full of cancer\" from the mother's side including colon, lung, pancreatic, gastric cancer.  According to the patient none of them survive older than 60 years old.   Grandmother  of cholangial carcinoma at age 94 in .        REVIEW OF SYSTEMS  She is very upbeat, denies any bleeding issues.   + hot flushes on effexor.  She still has neuropathy on right toes  She still has SOB sensation.       PHYSICAL EXAMINATION ATTAINABLE DURING VIDEO VISIT:  CONSTITUTIONAL - Pt looks like stated age, pleasant, not in acute distress. Not obese.  NEURO: Oriented to time, person, and places. No tremor. Normal gait.   ENT, MOUTH: Pupils are equal.  Sclerae are anicteric.  Moist oral mucosa. No oral thrush.   NECK:  No jugular venous distention.  No thyroid enlargement.   RESPIRATORY: talk nl, no sob during conversation, no cough.   MUSCULOSKELETAL/EXTREMITIES:  No edema.  No joint deformity. Normal range of motion.  SKIN:  No petechiae.  No rash.  No signs of cellulitis.   PSYCHIATRIC: Normal mood and affect. Good memory. Proper insight and judgement.   THE REST OF A COMPREHENSIVE PHYSICIAL EXAM IS DEFERRED DUE TO COVID-19 PUBLIC HEALTH EMERGENCY RELATED VIDEO VISIT RESCTRICTION.       LABORATORY DATA REVIEWED TODAY  Hb 6.8 on 2020/ "       Current Image data reviewed today  CT PE protocol from [facility] mid Oct - no PE  US left leg mid Oct - no DVT.       US 9/29/2020 -   Right leg short-segment nonocclusive deep venous thrombosis at the peroneal vein localizing to the mid calf.    CT chest 9/29/2020   1. Multiple filling defect within the subsegmental  pulmonary arteries bilaterally, + for PE.  2. Postsurgical changes of bilateral breast reconstruction. Multiple hypoattenuating collection predominantly in the left breast, could represent postsurgical hematoma/seroma versus less likely an abscess.      Old data reviewed with summary today  dexa 4/2019 - mild osteopenia  CT body 6/2019 - negative  PET 12/2018 - negative    CT c/a/p 6/2018 - negative. A 1.2 cm cyst or nodule in the left lobe of thyroid gland is Unchanged. Abdomen: A 2 mm nonobstructing calculus in the left kidney is again  seen.    DEXA scan from 03/2016 identified mild osteopenia lumbar spine        ASSESSMENT AND PLAN:    1.   12/2016  dx breast cancer, cT2N1 disesae.      S/p DD AC and wkly taxol, carbo was used partially dropped due to hypersensitivity reaction.   She had pCR for invasive cancer with mastectomies in 7/2017.   S/p RT till 11/2017.     She had BSO 11/2017. She did not tolerate Arimdiex off it after 1 month.   She started tamoxifen in 3/2018.     Due to recent post op DVT and PE, advice change to Arimidex early Oct. Due to her re appeared SOB, and 8-10% of sob side effects from Arimidex, advice to change to Aromasin (no SOB side effects base on micromedex).        She demands to have CT scan or PET with her follow up visit.   I explained to her the ASCO guideline in terms how to follow up breast cancer.    She finally okays it with CT scan on a yearly basis.       2.  Post breast re construction severe anemia in 9/2002, with hb of 6.9  CT found multiple hypoattenuating collection predominantly in the left breast, could represent postsurgical hematoma/seroma  versus less likely an abscess.    Exam finds no evidence of infection.   She denies bleeding.     Rcheck hb > 10.     She is advised on balanced diet. .       3. Post breast re construction bilateral seg mental PE 9/29/202 and RLE DVT.   Advice Lovenox 1 mg/kg subcutaneous bid   May consider change to DOAC after 1 month.   Need minimal 3 months of anticoagulation despite the DVT and PE are dissolved.       4. hot flushes - advice Effexor, this has helped a lot.  She would love to stay on it long-term.      3. 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  She is to continue gabapentin.  She feels this has been helpful.      5. Osteopenia as baseline by dexa in 2016.   Adviced vit D with proper dosing.   dexa found osteopenia in 4/2019.   She is advised on IV zometa.   We discussed the bisphosphonate use with ASCO guideline.   She made his informed decision to try this April 2019.  She has good tolerance to it so far.  Actually she reports she feels       Again, thank you for allowing me to participate in the care of your patient.        Sincerely,        Maribel Jin MD, MD

## 2020-10-26 ENCOUNTER — TELEPHONE (OUTPATIENT)
Dept: INTERVENTIONAL RADIOLOGY/VASCULAR | Facility: CLINIC | Age: 40
End: 2020-10-26

## 2020-10-26 ENCOUNTER — HOSPITAL ENCOUNTER (OUTPATIENT)
Facility: CLINIC | Age: 40
End: 2020-10-26
Payer: COMMERCIAL

## 2020-10-26 ENCOUNTER — PATIENT OUTREACH (OUTPATIENT)
Dept: ONCOLOGY | Facility: CLINIC | Age: 40
End: 2020-10-26

## 2020-10-26 DIAGNOSIS — Z11.59 ENCOUNTER FOR SCREENING FOR OTHER VIRAL DISEASES: Primary | ICD-10-CM

## 2020-10-26 NOTE — PROGRESS NOTES
Pt called to report she is having a US aspiration done and hey want her to hold her Lovenox prior. Ok needed from Dr. Jin.     Will review.     Josette Smalls RN on 10/26/2020 at 3:37 PM

## 2020-10-26 NOTE — TELEPHONE ENCOUNTER
Patient is approved for US guided seroma/hematoma/abcess abdomen aspiration by Abisai Jackson RN.  This is NOT a sedation procedure.  History and physical not needed.  Patient does need labs.  Patient is on blood thinners (Lovenox),  24 hour hold needed.    Patient in process of getting a COVID test.  Abisai Jackson, RN, BSN

## 2020-10-27 ENCOUNTER — TELEPHONE (OUTPATIENT)
Dept: MEDSURG UNIT | Facility: CLINIC | Age: 40
End: 2020-10-27

## 2020-10-27 DIAGNOSIS — Z11.59 ENCOUNTER FOR SCREENING FOR OTHER VIRAL DISEASES: ICD-10-CM

## 2020-10-27 PROCEDURE — U0003 INFECTIOUS AGENT DETECTION BY NUCLEIC ACID (DNA OR RNA); SEVERE ACUTE RESPIRATORY SYNDROME CORONAVIRUS 2 (SARS-COV-2) (CORONAVIRUS DISEASE [COVID-19]), AMPLIFIED PROBE TECHNIQUE, MAKING USE OF HIGH THROUGHPUT TECHNOLOGIES AS DESCRIBED BY CMS-2020-01-R: HCPCS | Performed by: PLASTIC SURGERY

## 2020-10-27 NOTE — TELEPHONE ENCOUNTER
Pre-Procedure COVID Test Results Pending    Results Reviewed  The patient has a pending COVID test result.  The patient had a COVID test scheduled for 10/27 at ~1100.      No COVID pre-call needed. RN to verify test results prior to procedure.     Priscilla Au, RN

## 2020-10-27 NOTE — PROGRESS NOTES
Called to update [place] faria for a return call.     Josette Smalls RN on 10/27/2020 at 8:54 AM

## 2020-10-28 LAB
SARS-COV-2 RNA SPEC QL NAA+PROBE: NOT DETECTED
SPECIMEN SOURCE: NORMAL

## 2020-10-28 NOTE — TELEPHONE ENCOUNTER
Spoke to Kim at [facility] lab and she stated the results will be posted sometime today but possibly this evening.

## 2020-11-09 NOTE — PATIENT INSTRUCTIONS
Dr. Jin would like to see you back in 3 months for a follow up appointment with labs prior.     When you are in need of a refill, please call your pharmacy and they will send us a request.      Copy of appointments, and after visit summary (AVS) given to patient.      If you have any questions please call Josette Smalls RN, BSN Oncology Hematology  [facility] [phone]. For questions after business hours, or on holidays/weekends, please call our after hours Nurse Triage line [phone]. Thank you.     3 months f/u with labs.    [Follow-Up] : a follow-up evaluation of

## 2020-12-01 DIAGNOSIS — I26.94 MULTIPLE SUBSEGMENTAL PULMONARY EMBOLI WITHOUT ACUTE COR PULMONALE (H): ICD-10-CM

## 2020-12-01 NOTE — PROGRESS NOTES
request received from patient requesting a refill of lovenox on behalf of Dr. Jin.  Last refill: 10/5/20  # 60 with 1 refills at [facility] pharmacy.  Last office visit:  10/22/20  Next office visit:  12/14/20    Brianna Agrawal RN

## 2020-12-07 ENCOUNTER — TELEPHONE (OUTPATIENT)
Dept: ONCOLOGY | Facility: CLINIC | Age: 40
End: 2020-12-07

## 2020-12-07 ENCOUNTER — OFFICE VISIT (OUTPATIENT)
Dept: DERMATOLOGY | Facility: CLINIC | Age: 40
End: 2020-12-07
Payer: COMMERCIAL

## 2020-12-07 ENCOUNTER — HOSPITAL ENCOUNTER (OUTPATIENT)
Dept: CT IMAGING | Facility: CLINIC | Age: 40
End: 2020-12-07
Attending: INTERNAL MEDICINE
Payer: COMMERCIAL

## 2020-12-07 ENCOUNTER — HOSPITAL ENCOUNTER (OUTPATIENT)
Dept: LAB | Facility: CLINIC | Age: 40
End: 2020-12-07
Attending: INTERNAL MEDICINE
Payer: COMMERCIAL

## 2020-12-07 VITALS — DIASTOLIC BLOOD PRESSURE: 86 MMHG | HEART RATE: 98 BPM | OXYGEN SATURATION: 96 % | SYSTOLIC BLOOD PRESSURE: 125 MMHG

## 2020-12-07 DIAGNOSIS — D22.9 NEVUS: ICD-10-CM

## 2020-12-07 DIAGNOSIS — L81.4 LENTIGO: ICD-10-CM

## 2020-12-07 DIAGNOSIS — L82.1 SEBORRHEIC KERATOSIS: ICD-10-CM

## 2020-12-07 DIAGNOSIS — D18.01 ANGIOMA OF SKIN: ICD-10-CM

## 2020-12-07 DIAGNOSIS — Z17.0 MALIGNANT NEOPLASM OF UPPER-OUTER QUADRANT OF RIGHT BREAST IN FEMALE, ESTROGEN RECEPTOR POSITIVE (H): ICD-10-CM

## 2020-12-07 DIAGNOSIS — C50.411 MALIGNANT NEOPLASM OF UPPER-OUTER QUADRANT OF RIGHT BREAST IN FEMALE, ESTROGEN RECEPTOR POSITIVE (H): ICD-10-CM

## 2020-12-07 DIAGNOSIS — L63.9 ALOPECIA AREATA: Primary | ICD-10-CM

## 2020-12-07 DIAGNOSIS — Z87.898 HISTORY OF ATYPICAL NEVUS: ICD-10-CM

## 2020-12-07 DIAGNOSIS — M85.89 OSTEOPENIA OF MULTIPLE SITES: Primary | ICD-10-CM

## 2020-12-07 LAB
ALBUMIN SERPL-MCNC: 3.8 G/DL (ref 3.4–5)
ALP SERPL-CCNC: 74 U/L (ref 40–150)
ALT SERPL W P-5'-P-CCNC: 56 U/L (ref 0–50)
ANION GAP SERPL CALCULATED.3IONS-SCNC: 6 MMOL/L (ref 3–14)
AST SERPL W P-5'-P-CCNC: 21 U/L (ref 0–45)
BASOPHILS # BLD AUTO: 0 10E9/L (ref 0–0.2)
BASOPHILS NFR BLD AUTO: 0.4 %
BILIRUB SERPL-MCNC: 0.3 MG/DL (ref 0.2–1.3)
BUN SERPL-MCNC: 9 MG/DL (ref 7–30)
CALCIUM SERPL-MCNC: 8.9 MG/DL (ref 8.5–10.1)
CANCER AG27-29 SERPL-ACNC: 20 U/ML (ref 0–39)
CHLORIDE SERPL-SCNC: 106 MMOL/L (ref 94–109)
CO2 SERPL-SCNC: 27 MMOL/L (ref 20–32)
CREAT SERPL-MCNC: 0.73 MG/DL (ref 0.52–1.04)
DIFFERENTIAL METHOD BLD: ABNORMAL
EOSINOPHIL # BLD AUTO: 0.3 10E9/L (ref 0–0.7)
EOSINOPHIL NFR BLD AUTO: 4.3 %
ERYTHROCYTE [DISTWIDTH] IN BLOOD BY AUTOMATED COUNT: 14.5 % (ref 10–15)
GFR SERPL CREATININE-BSD FRML MDRD: >90 ML/MIN/{1.73_M2}
GLUCOSE SERPL-MCNC: 64 MG/DL (ref 70–99)
HCT VFR BLD AUTO: 36 % (ref 35–47)
HGB BLD-MCNC: 11.5 G/DL (ref 11.7–15.7)
IMM GRANULOCYTES # BLD: 0.1 10E9/L (ref 0–0.4)
IMM GRANULOCYTES NFR BLD: 0.8 %
LYMPHOCYTES # BLD AUTO: 2.7 10E9/L (ref 0.8–5.3)
LYMPHOCYTES NFR BLD AUTO: 35.3 %
MCH RBC QN AUTO: 25.5 PG (ref 26.5–33)
MCHC RBC AUTO-ENTMCNC: 31.9 G/DL (ref 31.5–36.5)
MCV RBC AUTO: 80 FL (ref 78–100)
MONOCYTES # BLD AUTO: 0.5 10E9/L (ref 0–1.3)
MONOCYTES NFR BLD AUTO: 6.4 %
NEUTROPHILS # BLD AUTO: 4 10E9/L (ref 1.6–8.3)
NEUTROPHILS NFR BLD AUTO: 52.8 %
NRBC # BLD AUTO: 0 10*3/UL
NRBC BLD AUTO-RTO: 0 /100
PLATELET # BLD AUTO: 310 10E9/L (ref 150–450)
POTASSIUM SERPL-SCNC: 4.1 MMOL/L (ref 3.4–5.3)
PROT SERPL-MCNC: 7.2 G/DL (ref 6.8–8.8)
RBC # BLD AUTO: 4.51 10E12/L (ref 3.8–5.2)
SODIUM SERPL-SCNC: 139 MMOL/L (ref 133–144)
WBC # BLD AUTO: 7.5 10E9/L (ref 4–11)

## 2020-12-07 PROCEDURE — 86300 IMMUNOASSAY TUMOR CA 15-3: CPT | Performed by: INTERNAL MEDICINE

## 2020-12-07 PROCEDURE — 250N000011 HC RX IP 250 OP 636: Performed by: RADIOLOGY

## 2020-12-07 PROCEDURE — 85025 COMPLETE CBC W/AUTO DIFF WBC: CPT | Performed by: INTERNAL MEDICINE

## 2020-12-07 PROCEDURE — 36415 COLL VENOUS BLD VENIPUNCTURE: CPT | Performed by: INTERNAL MEDICINE

## 2020-12-07 PROCEDURE — 74177 CT ABD & PELVIS W/CONTRAST: CPT

## 2020-12-07 PROCEDURE — 250N000009 HC RX 250: Performed by: RADIOLOGY

## 2020-12-07 PROCEDURE — 80053 COMPREHEN METABOLIC PANEL: CPT | Performed by: INTERNAL MEDICINE

## 2020-12-07 PROCEDURE — 99214 OFFICE O/P EST MOD 30 MIN: CPT | Performed by: PHYSICIAN ASSISTANT

## 2020-12-07 RX ORDER — FLUOCINONIDE TOPICAL SOLUTION USP, 0.05% 0.5 MG/ML
SOLUTION TOPICAL
Qty: 50 ML | Refills: 3 | Status: SHIPPED | OUTPATIENT
Start: 2020-12-07 | End: 2022-09-30

## 2020-12-07 RX ORDER — IOPAMIDOL 755 MG/ML
96 INJECTION, SOLUTION INTRAVASCULAR ONCE
Status: COMPLETED | OUTPATIENT
Start: 2020-12-07 | End: 2020-12-07

## 2020-12-07 RX ADMIN — SODIUM CHLORIDE 64 ML: 9 INJECTION, SOLUTION INTRAVENOUS at 09:42

## 2020-12-07 RX ADMIN — IOPAMIDOL 96 ML: 755 INJECTION, SOLUTION INTRAVENOUS at 09:42

## 2020-12-07 NOTE — LETTER
12/7/2020         RE: Diana Wilson  1972 [address] [facility] [address]        Dear Colleague,    Thank you for referring your patient, Diana Wilson, to the [facility]. Please see a copy of my visit note below.    HPI:   Chief complaints: Diana Wilson is a 40 year old female who presents for Full skin cancer screening to rule out skin cancer   Last Skin Exam: 6 mo ago      1st Baseline: No  Personal HX of Skin Cancer: no   Personal HX of Malignant Melanoma: none   Family HX of Skin Cancer / Malignant Melanoma: yes mother with a h/o SCC  Personal HX of Atypical Moles:   Yes atypical nevus on the right breast   Risk factors: sun exposure and burns. History of breast CA.   New / Changing lesions: yes bald patch on the back of the scalp   Social History: Quite the year. Implant nearly fell out of her chest so she had the trans flap to both breasts and had subsequent multiple PEs.  Breast cancer w/ bilateral mastectomy.    On review of systems, there are no further skin complaints, patient is feeling otherwise well.  See patient intake sheet.  ROS of the following were done and are negative: Constitutional, Eyes, Ears, Nose,   Mouth, Throat, Cardiovascular, Respiratory, GI, Genitourinary, Musculoskeletal,   Psychiatric, Endocrine, Allergic/Immunologic.      PHYSICAL EXAM:   /86 (BP Location: Right arm, Cuff Size: Adult Large)   Pulse 98   LMP 08/05/2016 (Exact Date)   SpO2 96%   Skin exam performed as follows: Type 2 skin. Mood appropriate  Alert and Oriented X 3. Well developed, well nourished in no distress.  General appearance: Normal  Head including face: Normal  Eyes: conjunctiva and lids: Normal  Mouth: Lips, teeth, gums: Normal  Neck: Normal  Chest-breast/axillae: Normal  Back: Normal  Spleen and liver: Normal  Cardiovascular: Exam of peripheral vascular system by observation for swelling, varicosities, edema: Normal  Genitalia: groin, buttocks: Normal  Extremities:  digits/nails (clubbing): Normal  Eccrine and Apocrine glands: Normal  Right upper extremity: Normal  Left upper extremity: Normal  Right lower extremity: Normal  Left lower extremity: Normal  Skin: Scalp and body hair: See below    Pt deferred exam of breasts, groin, buttocks: No    Other physical findings:  1. Multiple pigmented macules on extremities and trunk  2. Multiple pigmented macules on face, trunk and extremities  3. Multiple vascular papules on trunk, arms and legs  4. Multiple scattered keratotic plaques  5. Bald patch with minimal hair growth on the right occipital scalp     Except as noted above, no other signs of skin cancer or melanoma.     ASSESSMENT/PLAN:   Benign Full skin cancer screening today. . Patient with history of atypical nevus  Advised on monthly self exams and 1 year  Patient Education: Appropriate brochures given.    1. Multiple benign appearing nevi on arms, legs and trunk. Discussed ABCDEs of melanoma and sunscreen.   2. Multiple lentigos on arms, legs and trunk. Advised benign, no treatment needed.  3. Multiple scattered angiomas. Advised benign, no treatment needed.   4. Seborrheic keratosis on arms, legs and trunk. Advised benign, no treatment needed.  5. Alopecia areata on the right occipital scalp - start lidex BID x 3-4 weeks then PRN. Discussed ILK if struggling.             Follow-up: yearly FSE/PRN sooner    1.) Patient was asked about new and changing moles. YES  2.) Patient received a complete physical skin examination: YES  3.) Patient was counseled to perform a monthly self skin examination: YES  Scribed By: Delicia Bell, MS, PA-C          Again, thank you for allowing me to participate in the care of your patient.        Sincerely,        Delicia Bell PA-C

## 2020-12-07 NOTE — PROGRESS NOTES
HPI:   Chief complaints: Diana Wilson is a 40 year old female who presents for Full skin cancer screening to rule out skin cancer   Last Skin Exam: 6 mo ago      1st Baseline: No  Personal HX of Skin Cancer: no   Personal HX of Malignant Melanoma: none   Family HX of Skin Cancer / Malignant Melanoma: yes mother with a h/o SCC  Personal HX of Atypical Moles:   Yes atypical nevus on the right breast   Risk factors: sun exposure and burns. History of breast CA.   New / Changing lesions: yes bald patch on the back of the scalp   Social History: Quite the year. Implant nearly fell out of her chest so she had the trans flap to both breasts and had subsequent multiple PEs.  Breast cancer w/ bilateral mastectomy.    On review of systems, there are no further skin complaints, patient is feeling otherwise well.  See patient intake sheet.  ROS of the following were done and are negative: Constitutional, Eyes, Ears, Nose,   Mouth, Throat, Cardiovascular, Respiratory, GI, Genitourinary, Musculoskeletal,   Psychiatric, Endocrine, Allergic/Immunologic.      PHYSICAL EXAM:   /86 (BP Location: Right arm, Cuff Size: Adult Large)   Pulse 98   LMP 08/05/2016 (Exact Date)   SpO2 96%   Skin exam performed as follows: Type 2 skin. Mood appropriate  Alert and Oriented X 3. Well developed, well nourished in no distress.  General appearance: Normal  Head including face: Normal  Eyes: conjunctiva and lids: Normal  Mouth: Lips, teeth, gums: Normal  Neck: Normal  Chest-breast/axillae: Normal  Back: Normal  Spleen and liver: Normal  Cardiovascular: Exam of peripheral vascular system by observation for swelling, varicosities, edema: Normal  Genitalia: groin, buttocks: Normal  Extremities: digits/nails (clubbing): Normal  Eccrine and Apocrine glands: Normal  Right upper extremity: Normal  Left upper extremity: Normal  Right lower extremity: Normal  Left lower extremity: Normal  Skin: Scalp and body hair: See below    Pt deferred exam of  breasts, groin, buttocks: No    Other physical findings:  1. Multiple pigmented macules on extremities and trunk  2. Multiple pigmented macules on face, trunk and extremities  3. Multiple vascular papules on trunk, arms and legs  4. Multiple scattered keratotic plaques  5. Bald patch with minimal hair growth on the right occipital scalp     Except as noted above, no other signs of skin cancer or melanoma.     ASSESSMENT/PLAN:   Benign Full skin cancer screening today. . Patient with history of atypical nevus  Advised on monthly self exams and 1 year  Patient Education: Appropriate brochures given.    1. Multiple benign appearing nevi on arms, legs and trunk. Discussed ABCDEs of melanoma and sunscreen.   2. Multiple lentigos on arms, legs and trunk. Advised benign, no treatment needed.  3. Multiple scattered angiomas. Advised benign, no treatment needed.   4. Seborrheic keratosis on arms, legs and trunk. Advised benign, no treatment needed.  5. Alopecia areata on the right occipital scalp - start lidex BID x 3-4 weeks then PRN. Discussed ILK if struggling.             Follow-up: yearly FSE/PRN sooner    1.) Patient was asked about new and changing moles. YES  2.) Patient received a complete physical skin examination: YES  3.) Patient was counseled to perform a monthly self skin examination: YES  Scribed By: Delicia Bell MS, PABINTA

## 2020-12-07 NOTE — TELEPHONE ENCOUNTER
Called and reviewed CT results with patient. Informed patient there was no evidence of metastatic disease. Patient verbalized understanding and had no further questions.     Stefania Julian RN BSN

## 2020-12-14 ENCOUNTER — INFUSION THERAPY VISIT (OUTPATIENT)
Dept: INFUSION THERAPY | Facility: CLINIC | Age: 40
End: 2020-12-14
Attending: INTERNAL MEDICINE
Payer: COMMERCIAL

## 2020-12-14 ENCOUNTER — TELEPHONE (OUTPATIENT)
Dept: ONCOLOGY | Facility: CLINIC | Age: 40
End: 2020-12-14

## 2020-12-14 ENCOUNTER — VIRTUAL VISIT (OUTPATIENT)
Dept: ONCOLOGY | Facility: CLINIC | Age: 40
End: 2020-12-14
Attending: INTERNAL MEDICINE
Payer: COMMERCIAL

## 2020-12-14 VITALS
DIASTOLIC BLOOD PRESSURE: 88 MMHG | HEIGHT: 64 IN | RESPIRATION RATE: 18 BRPM | HEART RATE: 96 BPM | TEMPERATURE: 97.4 F | BODY MASS INDEX: 34.16 KG/M2 | SYSTOLIC BLOOD PRESSURE: 128 MMHG | WEIGHT: 200.1 LBS | OXYGEN SATURATION: 94 %

## 2020-12-14 DIAGNOSIS — M85.80 OSTEOPENIA, UNSPECIFIED LOCATION: ICD-10-CM

## 2020-12-14 DIAGNOSIS — C50.411 MALIGNANT NEOPLASM OF UPPER-OUTER QUADRANT OF RIGHT BREAST IN FEMALE, ESTROGEN RECEPTOR POSITIVE (H): ICD-10-CM

## 2020-12-14 DIAGNOSIS — M85.89 OSTEOPENIA OF MULTIPLE SITES: Primary | ICD-10-CM

## 2020-12-14 DIAGNOSIS — N95.1 HOT FLUSHES, PERIMENOPAUSAL: Primary | ICD-10-CM

## 2020-12-14 DIAGNOSIS — Z86.711 HISTORY OF PULMONARY EMBOLISM: ICD-10-CM

## 2020-12-14 DIAGNOSIS — Z17.0 MALIGNANT NEOPLASM OF UPPER-OUTER QUADRANT OF RIGHT BREAST IN FEMALE, ESTROGEN RECEPTOR POSITIVE (H): ICD-10-CM

## 2020-12-14 DIAGNOSIS — G62.9 NEUROPATHY: ICD-10-CM

## 2020-12-14 PROCEDURE — 250N000011 HC RX IP 250 OP 636: Performed by: INTERNAL MEDICINE

## 2020-12-14 PROCEDURE — 99215 OFFICE O/P EST HI 40 MIN: CPT | Mod: GT | Performed by: INTERNAL MEDICINE

## 2020-12-14 PROCEDURE — 96365 THER/PROPH/DIAG IV INF INIT: CPT

## 2020-12-14 PROCEDURE — 258N000003 HC RX IP 258 OP 636: Performed by: INTERNAL MEDICINE

## 2020-12-14 PROCEDURE — 999N001193 HC VIDEO/TELEPHONE VISIT; NO CHARGE

## 2020-12-14 RX ORDER — ZOLEDRONIC ACID 0.04 MG/ML
4 INJECTION, SOLUTION INTRAVENOUS ONCE
Status: COMPLETED | OUTPATIENT
Start: 2020-12-14 | End: 2020-12-14

## 2020-12-14 RX ADMIN — ZOLEDRONIC ACID 4 MG: 0.04 INJECTION, SOLUTION INTRAVENOUS at 10:06

## 2020-12-14 RX ADMIN — SODIUM CHLORIDE 250 ML: 9 INJECTION, SOLUTION INTRAVENOUS at 10:02

## 2020-12-14 ASSESSMENT — MIFFLIN-ST. JEOR: SCORE: 1557.65

## 2020-12-14 ASSESSMENT — PAIN SCALES - GENERAL: PAINLEVEL: NO PAIN (0)

## 2020-12-14 NOTE — PROGRESS NOTES
"Diana Wilson is a 40 year old female who is being evaluated via a billable video visit.      The patient has been notified of following:     \"This video visit will be conducted via a call between you and your physician/provider. We have found that certain health care needs can be provided without the need for an in-person physical exam.  This service lets us provide the care you need with a video conversation.  If a prescription is necessary we can send it directly to your pharmacy.  If lab work is needed we can place an order for that and you can then stop by our lab to have the test done at a later time.    Video visits are billed at different rates depending on your insurance coverage.  Please reach out to your insurance provider with any questions.    If during the course of the call the physician/provider feels a video visit is not appropriate, you will not be charged for this service.\"    Patient has given verbal consent for Video visit? Yes  How would you like to obtain your AVS? MyChart  If you are dropped from the video visit, the video invite should be resent to: Other e-mail: Exam room #3, *[id number]  Will anyone else be joining your video visit? No        Video-Visit Details    Type of service:  Video Visit    Originating Location (pt. Location): Other Other e-mail: Exam room #3, *[id number]    Distant Location (provider location):  [facility]     Platform used for Video Visit: Nicholas Cuevas, [facility]      " yes/top full, bottom partial removed

## 2020-12-14 NOTE — PROGRESS NOTES
Type of Visit: Video Visit  Patient has given verbal consent for Video visit.     Video Start Time: 9:30 am  Video End Time: 9:45 am    Originating Location (pt. Location): On Site     Distant Location (provider location):  [facility]      Platform used for Video Visit: On site Video        ONCOLOGY Follow up visit       COMPLAINT AND REASON FOR VISIT:    12/2016 diagnosed right breast cancer LN positive disease        HISTORY OF ONCOLOGY ILLNESS  She presented at age 36 breast lump in her right armpit and right breast in 10/2016.   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  ER/HI 99% positive, HER-2/kelle negative, associated with high-grade DCIS.    Right axillary ultrasound-guided biopsy indicating metastatic carcinoma positive for spread from breast primary, ER/HI both negative.  HER-2/kelle is negative.      She had clinical T2N1 disease. She made informed decision to proceed with neoadjuvant DDAC C1D1 1/16/2017. She has good clinical response post DD AC x4. Then went on to have wkly taxol + carbo  with informed decision in March.   She had carbo hypersensitivity reaction in early May (during C3). It was d/c after.   She finished chemo end of May 2017.     She was tested negative for BRCA1/2 with [employer].     7/2017 she had double mastectomies at [facility]. Right side found no residual invasive carcinoma, +grade III DCIS, 1mm in greatest dimension. 5 sLN all negative. She had ypTis(DCIS)pN0 disease. Left breast no cancer.     She finished post mastectomy RT in 11/2017.    She had BSO 11/2017. Then started Arimidex in 1/2018. She quit in Feb 2018 due to side effects and willing to try tamoxifen in 3/2018.     She was  on fotopedia trial for weight loss.     She had right breast surgery on 9/17/2020 at [facility] involving removal of bilateral breast reconstruction implants, and TRAMP reconstruction.   Developed tachycardia and  "dyspnea with oxygen requirement while in the hospital shortly after her surgery and had a CT scan of the chest at that time that was negative for PE.    Her SOB was progressive post op and was in ER 2020. Found to have severe anemia hb of 6.9 from baseline of 12.8. had 2 units of PRBC.  Also found by CT to have multiple filling defects within the sub-segmental pulmonary arteries consistent with PE.  Ultrasound shows a nonocclusive thrombus in the right mid calf in the peroneal vein.   She was transferred to [facility] And on Lovenox injection     Tamoxifen was changed to AI early Oct, and she presented to [facility] for SOB mid Oct. Arimidex was stopped.   PE resolved.   She also had infection on re constructed breast s/p Abx.       INTERVAL HISTORY:  AI was changed to Aromasin after due to Arimidex associated SOB side effects.       PAST MEDICAL HISTORY/Meds: reviewed    SOCIAL HISTORY:  Lives with her .  They have 2 kids, first pregnancy age 21.  She did not do breast feeding because of bilateral breast reduction.       FAMILY HISTORY:  \"Full of cancer\" from the mother's side including colon, lung, pancreatic, gastric cancer.  According to the patient none of them survive older than 60 years old.   Grandmother  of cholangial carcinoma at age 94 in 2019.        REVIEW OF SYSTEMS  She is very upbeat, denies any bleeding issues.   + hot flushes on effexor.  She still has neuropathy on right toes  She finally feels recovered from her post op events    Blood pressure 128/88, pulse 96, temperature 97.4  F (36.3  C), temperature source Oral, resp. rate 18, height 1.626 m (5' 4\"), weight 90.8 kg (200 lb 1.6 oz), last menstrual period 2016, SpO2 94 %, not currently breastfeeding.      PHYSICAL EXAMINATION ATTAINABLE DURING VIDEO VISIT:  CONSTITUTIONAL - Pt looks like stated age, pleasant, not in acute distress. Not obese.  NEURO: Oriented to time, person, and places. No tremor. Normal gait. "   ENT, MOUTH: Pupils are equal.  Sclerae are anicteric.  Moist oral mucosa. No oral thrush.   NECK:  No jugular venous distention.  No thyroid enlargement.   RESPIRATORY: talk nl, no sob during conversation, no cough.   MUSCULOSKELETAL/EXTREMITIES:  No edema.  No joint deformity. Normal range of motion.  SKIN:  No petechiae.  No rash.  No signs of cellulitis.   PSYCHIATRIC: Normal mood and affect. Good memory. Proper insight and judgement.   THE REST OF A COMPREHENSIVE PHYSICIAL EXAM IS DEFERRED DUE TO COVID-19 PUBLIC HEALTH EMERGENCY RELATED VIDEO VISIT RESCTRICTION.       LABORATORY DATA REVIEWED TODAY  Cbc diff/cmp/marker are satisfactory      Current Image data reviewed today  CT body 12/2020 - no mets      CT PE protocol from [facility] mid Oct - no PE  US left leg mid Oct - no DVT.       US 9/29/2020 -   Right leg short-segment nonocclusive deep venous thrombosis at the peroneal vein localizing to the mid calf.    CT chest 9/29/2020   1. Multiple filling defect within the subsegmental  pulmonary arteries bilaterally, + for PE.  2. Postsurgical changes of bilateral breast reconstruction. Multiple hypoattenuating collection predominantly in the left breast, could represent postsurgical hematoma/seroma versus less likely an abscess.      Old data reviewed with summary today  dexa 4/2019 - mild osteopenia  CT body 6/2019 - negative  PET 12/2018 - negative    CT c/a/p 6/2018 - negative. A 1.2 cm cyst or nodule in the left lobe of thyroid gland is Unchanged. Abdomen: A 2 mm nonobstructing calculus in the left kidney is again  seen.    DEXA scan from 03/2016 identified mild osteopenia lumbar spine        ASSESSMENT AND PLAN:    1.   12/2016  dx breast cancer, cT2N1 disesae.      S/p DD AC and wkly taxol, carbo was used partially dropped due to hypersensitivity reaction.   She had pCR for invasive cancer with mastectomies in 7/2017.   S/p RT till 11/2017.     She had BSO 11/2017. She did not tolerate Arimdiex off it  after 1 month.   She started tamoxifen in 3/2018.     Due to recent post op DVT and PE, advice change to Arimidex early Oct. Due to her re appeared SOB, and 8-10% of sob side effects from Arimidex, adviced to change to Aromasin (no SOB side effects base on micromedex).      She is on 6 months follow-up. She requests annual CT.    2. Post breast re construction bilateral seg mental PE 9/29/202 and RLE DVT. Her PE resolved in Oct.   Adviced Lovenox 1 mg/kg subcutaneous bid   Advice to change to DOAC now with prophylactic dose to finish total 6 months of anticoagulation. After that, she can use ASA.       3. 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  She is to continue gabapentin.  She feels this has been helpful.      4. hot flushes - advice Effexor, this has helped a lot.  She would love to stay on it long-term.      5. Osteopenia as baseline by dexa in 2016.   Adviced vit D with proper dosing.   dexa found osteopenia in 4/2019.   We discussed the bisphosphonate use with ASCO guideline.   She made his informed decision to try this April 2019.  She has good tolerance to it so far.

## 2020-12-14 NOTE — PROGRESS NOTES
Infusion Nursing Note:  Diana Wilson presents today for Zometa.    Patient seen by provider today: Yes: Dr. Jin.   present during visit today: Not Applicable.    Note: N/A.    Intravenous Access:  Peripheral IV placed.    Treatment Conditions:  Results reviewed, labs MET treatment parameters, ok to proceed with treatment.  Ca 8.9    Post Infusion Assessment:  Patient tolerated infusion without incident.  Site patent and intact, free from redness, edema or discomfort.  No evidence of extravasations.  Access discontinued per protocol.       Discharge Plan:   [facility]  Patient will return in 6 months for next appointment.   Patient discharged in stable condition accompanied by: self.  Departure Mode: Ambulatory.    Sarah Fong RN

## 2020-12-14 NOTE — LETTER
"    12/14/2020         RE: Diana Wilson  [address] [facility] [id number]        Dear Colleague,    Thank you for referring your patient, Diana Wilson, to the [facility]. Please see a copy of my visit note below.    Diana Wilson is a 40 year old female who is being evaluated via a billable video visit.      The patient has been notified of following:     \"This video visit will be conducted via a call between you and your physician/provider. We have found that certain health care needs can be provided without the need for an in-person physical exam.  This service lets us provide the care you need with a video conversation.  If a prescription is necessary we can send it directly to your pharmacy.  If lab work is needed we can place an order for that and you can then stop by our lab to have the test done at a later time.    Video visits are billed at different rates depending on your insurance coverage.  Please reach out to your insurance provider with any questions.    If during the course of the call the physician/provider feels a video visit is not appropriate, you will not be charged for this service.\"    Patient has given verbal consent for Video visit? Yes  How would you like to obtain your AVS? MyChart  If you are dropped from the video visit, the video invite should be resent to: Other e-mail: Exam room #3, *[id number]  Will anyone else be joining your video visit? No        Video-Visit Details    Type of service:  Video Visit    Originating Location (pt. Location): Other Other e-mail: Exam room #3, *[id number]    Distant Location (provider location):  [facility]     Platform used for Video Visit: Nicholas Cuevas CMA          Type of Visit: Video Visit  Patient has given verbal consent for Video visit.     Video Start Time: 9:30 am  Video End Time: 9:45 am    Originating Location (pt. Location): On Site     Distant Location (provider " location):  [facility]      Platform used for Video Visit: On site Video        ONCOLOGY Follow up visit       COMPLAINT AND REASON FOR VISIT:    12/2016 diagnosed right breast cancer LN positive disease        HISTORY OF ONCOLOGY ILLNESS  She presented at age 36 breast lump in her right armpit and right breast in 10/2016.   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  ER/MA 99% positive, HER-2/kelle negative, associated with high-grade DCIS.    Right axillary ultrasound-guided biopsy indicating metastatic carcinoma positive for spread from breast primary, ER/MA both negative.  HER-2/kelle is negative.      She had clinical T2N1 disease. She made informed decision to proceed with neoadjuvant DDAC C1D1 1/16/2017. She has good clinical response post DD AC x4. Then went on to have wkly taxol + carbo  with informed decision in March.   She had carbo hypersensitivity reaction in early May (during C3). It was d/c after.   She finished chemo end of May 2017.     She was tested negative for BRCA1/2 with [employer].     7/2017 she had double mastectomies at [facility]. Right side found no residual invasive carcinoma, +grade III DCIS, 1mm in greatest dimension. 5 sLN all negative. She had ypTis(DCIS)pN0 disease. Left breast no cancer.     She finished post mastectomy RT in 11/2017.    She had BSO 11/2017. Then started Arimidex in 1/2018. She quit in Feb 2018 due to side effects and willing to try tamoxifen in 3/2018.     She was  on Pre[employer] trial for weight loss.     She had right breast surgery on 9/17/2020 at [facility] involving removal of bilateral breast reconstruction implants, and TRAMP reconstruction.   Developed tachycardia and dyspnea with oxygen requirement while in the hospital shortly after her surgery and had a CT scan of the chest at that time that was negative for PE.    Her SOB was progressive post op and was in ER 9/29/2020. Found  "to have severe anemia hb of 6.9 from baseline of 12.8. had 2 units of PRBC.  Also found by CT to have multiple filling defects within the sub-segmental pulmonary arteries consistent with PE.  Ultrasound shows a nonocclusive thrombus in the right mid calf in the peroneal vein.   She was transferred to [facility] And on Lovenox injection     Tamoxifen was changed to AI early Oct, and she presented to [facility] for SOB mid Oct. Arimidex was stopped.   PE resolved.   She also had infection on re constructed breast s/p Abx.       INTERVAL HISTORY:  AI was changed to Aromasin after due to Arimidex associated SOB side effects.       PAST MEDICAL HISTORY/Meds: reviewed    SOCIAL HISTORY:  Lives with her .  They have 2 kids, first pregnancy age 22.  She did not do breast feeding because of bilateral breast reduction.       FAMILY HISTORY:  \"Full of cancer\" from the mother's side including colon, lung, pancreatic, gastric cancer.  According to the patient none of them survive older than 60 years old.   Grandmother  of cholangial carcinoma at age 94 in 2019.        REVIEW OF SYSTEMS  She is very upbeat, denies any bleeding issues.   + hot flushes on effexor.  She still has neuropathy on right toes  She finally feels recovered from her post op events    Blood pressure 128/88, pulse 96, temperature 97.4  F (36.3  C), temperature source Oral, resp. rate 18, height 1.626 m (5' 4\"), weight 90.8 kg (200 lb 1.6 oz), last menstrual period 2016, SpO2 94 %, not currently breastfeeding.      PHYSICAL EXAMINATION ATTAINABLE DURING VIDEO VISIT:  CONSTITUTIONAL - Pt looks like stated age, pleasant, not in acute distress. Not obese.  NEURO: Oriented to time, person, and places. No tremor. Normal gait.   ENT, MOUTH: Pupils are equal.  Sclerae are anicteric.  Moist oral mucosa. No oral thrush.   NECK:  No jugular venous distention.  No thyroid enlargement.   RESPIRATORY: talk nl, no sob during conversation, no " cough.   MUSCULOSKELETAL/EXTREMITIES:  No edema.  No joint deformity. Normal range of motion.  SKIN:  No petechiae.  No rash.  No signs of cellulitis.   PSYCHIATRIC: Normal mood and affect. Good memory. Proper insight and judgement.   THE REST OF A COMPREHENSIVE PHYSICIAL EXAM IS DEFERRED DUE TO COVID-19 PUBLIC HEALTH EMERGENCY RELATED VIDEO VISIT RESCTRICTION.       LABORATORY DATA REVIEWED TODAY  Cbc diff/cmp/marker are satisfactory      Current Image data reviewed today  CT body 12/2020 - no mets      CT PE protocol from [facility] mid Oct - no PE  US left leg mid Oct - no DVT.       US 9/29/2020 -   Right leg short-segment nonocclusive deep venous thrombosis at the peroneal vein localizing to the mid calf.    CT chest 9/29/2020   1. Multiple filling defect within the subsegmental  pulmonary arteries bilaterally, + for PE.  2. Postsurgical changes of bilateral breast reconstruction. Multiple hypoattenuating collection predominantly in the left breast, could represent postsurgical hematoma/seroma versus less likely an abscess.      Old data reviewed with summary today  dexa 4/2019 - mild osteopenia  CT body 6/2019 - negative  PET 12/2018 - negative    CT c/a/p 6/2018 - negative. A 1.2 cm cyst or nodule in the left lobe of thyroid gland is Unchanged. Abdomen: A 2 mm nonobstructing calculus in the left kidney is again  seen.    DEXA scan from 03/2016 identified mild osteopenia lumbar spine        ASSESSMENT AND PLAN:    1. 12/2016  dx breast cancer, cT2N1 disesae.      S/p DD AC and wkly taxol, carbo was used partially dropped due to hypersensitivity reaction.   She had pCR for invasive cancer with mastectomies in 7/2017.   S/p RT till 11/2017.     She had BSO 11/2017. She did not tolerate Arimdiex off it after 1 month.   She started tamoxifen in 3/2018.     Due to recent post op DVT and PE, advice change to Arimidex early Oct. Due to her re appeared SOB, and 8-10% of sob side effects from Arimidex, adviced to  change to Aromasin (no SOB side effects base on micromedex).      She is on 6 months follow-up. She requests annual CT.    2. Post breast re construction bilateral seg mental PE 9/29/202 and RLE DVT. Her PE resolved in Oct.   Adviced Lovenox 1 mg/kg subcutaneous bid   Advice to change to DOAC now with prophylactic dose to finish total 6 months of anticoagulation. After that, she can use ASA.       3. 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  She is to continue gabapentin.  She feels this has been helpful.      4. hot flushes - advice Effexor, this has helped a lot.  She would love to stay on it long-term.      5. Osteopenia as baseline by dexa in 2016.   Adviced vit D with proper dosing.   dexa found osteopenia in 4/2019.   We discussed the bisphosphonate use with ASCO guideline.   She made his informed decision to try this April 2019.  She has good tolerance to it so far.      Again, thank you for allowing me to participate in the care of your patient.        Sincerely,        Maribel Jin MD, MD

## 2020-12-14 NOTE — LETTER
"    12/14/2020         RE: Diana Wilson  1972 [address] [facility] [id number]        Dear Colleague,    Thank you for referring your patient, Diana Wilson, to the [facility]. Please see a copy of my visit note below.    Diana Wilson is a 40 year old female who is being evaluated via a billable video visit.      The patient has been notified of following:     \"This video visit will be conducted via a call between you and your physician/provider. We have found that certain health care needs can be provided without the need for an in-person physical exam.  This service lets us provide the care you need with a video conversation.  If a prescription is necessary we can send it directly to your pharmacy.  If lab work is needed we can place an order for that and you can then stop by our lab to have the test done at a later time.    Video visits are billed at different rates depending on your insurance coverage.  Please reach out to your insurance provider with any questions.    If during the course of the call the physician/provider feels a video visit is not appropriate, you will not be charged for this service.\"    Patient has given verbal consent for Video visit? Yes  How would you like to obtain your AVS? MyChart  If you are dropped from the video visit, the video invite should be resent to: Other e-mail: Exam room #3, *[id number]  Will anyone else be joining your video visit? No        Video-Visit Details    Type of service:  Video Visit    Originating Location (pt. Location): Other Other e-mail: Exam room #3, *[id number]    Distant Location (provider location):  [facility]     Platform used for Video Visit: Nicholas Cuevas CMA          Type of Visit: Video Visit  Patient has given verbal consent for Video visit.     Video Start Time: 9:30 am  Video End Time: 9:45 am    Originating Location (pt. Location): On Site     Distant Location (provider " location):  [facility]      Platform used for Video Visit: On site Video        ONCOLOGY Follow up visit       COMPLAINT AND REASON FOR VISIT:    12/2016 diagnosed right breast cancer LN positive disease        HISTORY OF ONCOLOGY ILLNESS  She presented at age 36 breast lump in her right armpit and right breast in 10/2016.   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  ER/TN 99% positive, HER-2/kelle negative, associated with high-grade DCIS.    Right axillary ultrasound-guided biopsy indicating metastatic carcinoma positive for spread from breast primary, ER/TN both negative.  HER-2/kelle is negative.      She had clinical T2N1 disease. She made informed decision to proceed with neoadjuvant DDAC C1D1 1/16/2017. She has good clinical response post DD AC x4. Then went on to have wkly taxol + carbo  with informed decision in March.   She had carbo hypersensitivity reaction in early May (during C3). It was d/c after.   She finished chemo end of May 2017.     She was tested negative for BRCA1/2 with [facility].     7/2017 she had double mastectomies at [facility]. Right side found no residual invasive carcinoma, +grade III DCIS, 1mm in greatest dimension. 5 sLN all negative. She had ypTis(DCIS)pN0 disease. Left breast no cancer.     She finished post mastectomy RT in 11/2017.    She had BSO 11/2017. Then started Arimidex in 1/2018. She quit in Feb 2018 due to side effects and willing to try tamoxifen in 3/2018.     She was  on [employer] trial for weight loss.     She had right breast surgery on 9/17/2020 at [facility] involving removal of bilateral breast reconstruction implants, and TRAMP reconstruction.   Developed tachycardia and dyspnea with oxygen requirement while in the hospital shortly after her surgery and had a CT scan of the chest at that time that was negative for PE.    Her SOB was progressive post op and was in ER 9/29/2020. Found  "to have severe anemia hb of 6.9 from baseline of 12.8. had 2 units of PRBC.  Also found by CT to have multiple filling defects within the sub-segmental pulmonary arteries consistent with PE.  Ultrasound shows a nonocclusive thrombus in the right mid calf in the peroneal vein.   She was transferred to [facility] And on Lovenox injection     Tamoxifen was changed to AI early Oct, and she presented to [facility] for SOB mid Oct. Arimidex was stopped.   PE resolved.   She also had infection on re constructed breast s/p Abx.       INTERVAL HISTORY:  AI was changed to Aromasin after due to Arimidex associated SOB side effects.       PAST MEDICAL HISTORY/Meds: reviewed    SOCIAL HISTORY:  Lives with her .  They have 2 kids, first pregnancy age 22.  She did not do breast feeding because of bilateral breast reduction.       FAMILY HISTORY:  \"Full of cancer\" from the mother's side including colon, lung, pancreatic, gastric cancer.  According to the patient none of them survive older than 60 years old.   Grandmother  of cholangial carcinoma at age 94 in 2019.        REVIEW OF SYSTEMS  She is very upbeat, denies any bleeding issues.   + hot flushes on effexor.  She still has neuropathy on right toes  She finally feels recovered from her post op events    Blood pressure 128/88, pulse 96, temperature 97.4  F (36.3  C), temperature source Oral, resp. rate 18, height 1.626 m (5' 4\"), weight 90.8 kg (200 lb 1.6 oz), last menstrual period 2016, SpO2 94 %, not currently breastfeeding.      PHYSICAL EXAMINATION ATTAINABLE DURING VIDEO VISIT:  CONSTITUTIONAL - Pt looks like stated age, pleasant, not in acute distress. Not obese.  NEURO: Oriented to time, person, and places. No tremor. Normal gait.   ENT, MOUTH: Pupils are equal.  Sclerae are anicteric.  Moist oral mucosa. No oral thrush.   NECK:  No jugular venous distention.  No thyroid enlargement.   RESPIRATORY: talk nl, no sob during conversation, no " cough.   MUSCULOSKELETAL/EXTREMITIES:  No edema.  No joint deformity. Normal range of motion.  SKIN:  No petechiae.  No rash.  No signs of cellulitis.   PSYCHIATRIC: Normal mood and affect. Good memory. Proper insight and judgement.   THE REST OF A COMPREHENSIVE PHYSICIAL EXAM IS DEFERRED DUE TO COVID-19 PUBLIC HEALTH EMERGENCY RELATED VIDEO VISIT RESCTRICTION.       LABORATORY DATA REVIEWED TODAY  Cbc diff/cmp/marker are satisfactory      Current Image data reviewed today  CT body 12/2020 - no mets      CT PE protocol from [facility] mid Oct - no PE  US left leg mid Oct - no DVT.       US 9/29/2020 -   Right leg short-segment nonocclusive deep venous thrombosis at the peroneal vein localizing to the mid calf.    CT chest 9/29/2020   1. Multiple filling defect within the subsegmental  pulmonary arteries bilaterally, + for PE.  2. Postsurgical changes of bilateral breast reconstruction. Multiple hypoattenuating collection predominantly in the left breast, could represent postsurgical hematoma/seroma versus less likely an abscess.      Old data reviewed with summary today  dexa 4/2019 - mild osteopenia  CT body 6/2019 - negative  PET 12/2018 - negative    CT c/a/p 6/2018 - negative. A 1.2 cm cyst or nodule in the left lobe of thyroid gland is Unchanged. Abdomen: A 2 mm nonobstructing calculus in the left kidney is again  seen.    DEXA scan from 03/2016 identified mild osteopenia lumbar spine        ASSESSMENT AND PLAN:    1. 12/2016  dx breast cancer, cT2N1 disesae.      S/p DD AC and wkly taxol, carbo was used partially dropped due to hypersensitivity reaction.   She had pCR for invasive cancer with mastectomies in 7/2017.   S/p RT till 11/2017.     She had BSO 11/2017. She did not tolerate Arimdiex off it after 1 month.   She started tamoxifen in 3/2018.     Due to recent post op DVT and PE, advice change to Arimidex early Oct. Due to her re appeared SOB, and 8-10% of sob side effects from Arimidex, adviced to  change to Aromasin (no SOB side effects base on micromedex).      She is on 6 months follow-up. She requests annual CT.    2. Post breast re construction bilateral seg mental PE 9/29/202 and RLE DVT. Her PE resolved in Oct.   Adviced Lovenox 1 mg/kg subcutaneous bid   Advice to change to DOAC now with prophylactic dose to finish total 6 months of anticoagulation. After that, she can use ASA.       3. 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  She is to continue gabapentin.  She feels this has been helpful.      4. hot flushes - advice Effexor, this has helped a lot.  She would love to stay on it long-term.      5. Osteopenia as baseline by dexa in 2016.   Adviced vit D with proper dosing.   dexa found osteopenia in 4/2019.   We discussed the bisphosphonate use with ASCO guideline.   She made his informed decision to try this April 2019.  She has good tolerance to it so far.      Again, thank you for allowing me to participate in the care of your patient.        Sincerely,        Maribel Jin MD, MD

## 2020-12-14 NOTE — TELEPHONE ENCOUNTER
The 36 month [facility] weight loss study visit for Diana Wilson was conducted by phone due to COVID-19 precautions. An video visit with Dr. Jin was also conducted today, 12/14/2020. Study labs were not required for this visit. Waist and hip measurements were not collected as the pt was not in clinic at time of QOLs. Her most recent clinic weight was reported (90.8kg). Follow-up QOL was completed verbally by phone. Patient believes her weight has been stable over last few months and continues to feel well. Reviewed adverse events for the study; nothing to report at this visit No sprains or strains reported. Since last visit she did have breast implants x2 removed as well as bilateral TRAM flap reconstruction in September 2020. Complications can be viewed in Dr. Jin's clinic notes, however, per protocol, these do not need to be reported to clinical trial. We reviewed study schedule with next visit around December 2021. Writer will F/U with her in ~6 months to ensure visit is scheduled for December 2021. She has no other questions at this time.

## 2020-12-21 DIAGNOSIS — R30.0 DYSURIA: ICD-10-CM

## 2020-12-21 LAB
BACTERIA #/AREA URNS HPF: ABNORMAL /HPF
NON-SQ EPI CELLS #/AREA URNS LPF: ABNORMAL /LPF
RBC #/AREA URNS AUTO: ABNORMAL /HPF
WBC #/AREA URNS AUTO: ABNORMAL /HPF

## 2020-12-21 PROCEDURE — 81015 MICROSCOPIC EXAM OF URINE: CPT | Performed by: FAMILY MEDICINE

## 2021-01-04 DIAGNOSIS — G62.9 NEUROPATHY: ICD-10-CM

## 2021-01-04 NOTE — TELEPHONE ENCOUNTER
Routing refill request to provider for review/approval because:  Drug not on the [facility] refill protocol   Norma Lin RN

## 2021-01-05 RX ORDER — GABAPENTIN 300 MG/1
CAPSULE ORAL
Qty: 90 CAPSULE | Refills: 3 | Status: SHIPPED | OUTPATIENT
Start: 2021-01-05 | End: 2021-12-02

## 2021-01-10 DIAGNOSIS — K21.9 GASTROESOPHAGEAL REFLUX DISEASE, UNSPECIFIED WHETHER ESOPHAGITIS PRESENT: ICD-10-CM

## 2021-01-11 NOTE — TELEPHONE ENCOUNTER
Routing refill request to provider for review/approval because:  Would like Provider to decide.  Thank you.  Jeremias Raphael RN

## 2021-01-15 ENCOUNTER — HEALTH MAINTENANCE LETTER (OUTPATIENT)
Age: 41
End: 2021-01-15

## 2021-01-28 ENCOUNTER — VIRTUAL VISIT (OUTPATIENT)
Dept: FAMILY MEDICINE | Facility: CLINIC | Age: 41
End: 2021-01-28
Payer: COMMERCIAL

## 2021-01-28 ENCOUNTER — MYC MEDICAL ADVICE (OUTPATIENT)
Dept: FAMILY MEDICINE | Facility: CLINIC | Age: 41
End: 2021-01-28

## 2021-01-28 DIAGNOSIS — Z17.0 MALIGNANT NEOPLASM OF UPPER-OUTER QUADRANT OF RIGHT BREAST IN FEMALE, ESTROGEN RECEPTOR POSITIVE (H): Primary | ICD-10-CM

## 2021-01-28 DIAGNOSIS — C50.411 MALIGNANT NEOPLASM OF UPPER-OUTER QUADRANT OF RIGHT BREAST IN FEMALE, ESTROGEN RECEPTOR POSITIVE (H): Primary | ICD-10-CM

## 2021-01-28 DIAGNOSIS — I26.94 MULTIPLE SUBSEGMENTAL PULMONARY EMBOLI WITHOUT ACUTE COR PULMONALE (H): ICD-10-CM

## 2021-01-28 PROCEDURE — 99214 OFFICE O/P EST MOD 30 MIN: CPT | Mod: GT | Performed by: FAMILY MEDICINE

## 2021-01-28 NOTE — PROGRESS NOTES
"Diana is a 40 year old who is being evaluated via a billable video visit.      How would you like to obtain your AVS? Shirley Mae'shart  If the video visit is dropped, the invitation should be resent by: Text to cell phone: [phone]  Will anyone else be joining your video visit? No      Video Start Time: 9:11 AM  Assessment & Plan     (C50.411,  Z17.0) Malignant neoplasm of upper-outer quadrant of right breast in female, estrogen receptor positive (H)  (primary encounter diagnosis)  (I26.94) Multiple subsegmental pulmonary emboli without acute cor pulmonale (H)  Comment: I think she should be on xarelto x 6 months from provoked DVT. This upcoming trip is just under six months. I recommended she stay on the xarelto but will reach out to heme/onc too, to see what they think. The patient indicates understanding of these issues and agrees with the plan.   Plan:     IBTgamest message to her after the visit:   Robert Ortiz,   I just realized the CT scan showing clot was in sept, and clot had resolved by oct.   I'm going to reach out to the heme/onc clinic to see if they can get you in with a new oncologist in feb ( before your trip) to discuss the anticoagulation.   Personally, I want you to stay on it for the trip, but I acknowledge that you will be nearly at 6 months of treatment at that point.    I'll let you know what I hear.   Thanks, PHILIPPE Peters MD         Review of the result(s) of each unique test - ct scans last fall                    BMI:   Estimated body mass index is 34.35 kg/m  as calculated from the following:    Height as of 12/14/20: 1.626 m (5' 4\").    Weight as of 12/14/20: 90.8 kg (200 lb 1.6 oz).   Weight management plan: Discussed healthy diet and exercise guidelines      Work on weight loss  Regular exercise    Return in about 4 weeks (around 2/25/2021) for pre op.    Yenifer Peters MD  [facility]    Roger Ortiz is a 40 year old who presents to clinic today for the following health " "issues:     HPI     Had a PE following surgery last fall - 9/29/2020  Has been on Xarelto since then.  Leaving for a trip soon  - [place] with friends and her family. Would like to drink but knows she shouldn't do that with xarelto on board.     Feb 25 pre op for a surgery when she returns.   Next surgery  15 days after trip  - breast augmentation.     Returns march 4th March 19 surgery             9/29/2020    ct chest :                             IMPRESSION:   1. Multiple filling defects within the subsegmental pulmonary  arteries, consistent with pulmonary embolism.  2. Postsurgical changes of bilateral breast reconstruction. Multiple  hypoattenuating collection predominantly in the left breast, could  represent postsurgical hematoma/seroma versus less likely an abscess.  Foci of gas within the medial aspect of the left breast could also be  related to recent surgery.     GRACIELA AYALA MD      Oncology visit reviewed 12/14/2020 - note says \"minimal 3 months\"  Dr Jin has left [place] now. Diana doesn't have another oncologist yet           Objective         Vitals:  No vitals were obtained today due to virtual visit.    Physical Exam   GENERAL: Healthy, alert and no distress  EYES: Eyes grossly normal to inspection.  No discharge or erythema, or obvious scleral/conjunctival abnormalities.  RESP: No audible wheeze, cough, or visible cyanosis.  No visible retractions or increased work of breathing.    SKIN: Visible skin clear. No significant rash, abnormal pigmentation or lesions.  NEURO: Cranial nerves grossly intact.  Mentation and speech appropriate for age.  PSYCH: Mentation appears normal, affect normal/bright, judgement and insight intact, normal speech and appearance well-groomed.                Video-Visit Details    Type of service:  Video Visit    Video End Time:9:30 AM    Originating Location (pt. Location): Home    Distant Location (provider location):  [facility]     Platform " used for Video Visit: Russ

## 2021-02-02 NOTE — RESULT ENCOUNTER NOTE
Called and reviewed results with the patient per Dr. Jin. Patient had no further questions or concerns at this time and also verbalized understanding. Direct line provided if any further questions/concerns arise.    Josette Smalls RN on 10/6/2020 at 8:37 AM  
<--- Click to Launch ICDx for PreOp, PostOp and Procedure

## 2021-02-14 ENCOUNTER — HEALTH MAINTENANCE LETTER (OUTPATIENT)
Age: 41
End: 2021-02-14

## 2021-02-22 DIAGNOSIS — Z17.0 MALIGNANT NEOPLASM OF UPPER-OUTER QUADRANT OF RIGHT BREAST IN FEMALE, ESTROGEN RECEPTOR POSITIVE (H): ICD-10-CM

## 2021-02-22 DIAGNOSIS — C50.411 MALIGNANT NEOPLASM OF UPPER-OUTER QUADRANT OF RIGHT BREAST IN FEMALE, ESTROGEN RECEPTOR POSITIVE (H): ICD-10-CM

## 2021-02-22 RX ORDER — EXEMESTANE 25 MG/1
25 TABLET ORAL DAILY
Qty: 30 TABLET | Refills: 3 | Status: SHIPPED | OUTPATIENT
Start: 2021-02-22 | End: 2021-07-13

## 2021-02-25 ENCOUNTER — OFFICE VISIT (OUTPATIENT)
Dept: FAMILY MEDICINE | Facility: CLINIC | Age: 41
End: 2021-02-25
Payer: COMMERCIAL

## 2021-02-25 VITALS
HEART RATE: 98 BPM | TEMPERATURE: 98.4 F | DIASTOLIC BLOOD PRESSURE: 82 MMHG | HEIGHT: 64 IN | WEIGHT: 194.1 LBS | SYSTOLIC BLOOD PRESSURE: 120 MMHG | BODY MASS INDEX: 33.14 KG/M2 | OXYGEN SATURATION: 98 %

## 2021-02-25 DIAGNOSIS — J45.31 MILD PERSISTENT ASTHMA WITH EXACERBATION: ICD-10-CM

## 2021-02-25 DIAGNOSIS — I26.94 MULTIPLE SUBSEGMENTAL PULMONARY EMBOLI WITHOUT ACUTE COR PULMONALE (H): ICD-10-CM

## 2021-02-25 DIAGNOSIS — N65.1 BREAST RECONSTRUCTION DISPROPORTION: ICD-10-CM

## 2021-02-25 DIAGNOSIS — Z01.818 PREOP GENERAL PHYSICAL EXAM: Primary | ICD-10-CM

## 2021-02-25 DIAGNOSIS — D50.9 IRON DEFICIENCY ANEMIA, UNSPECIFIED IRON DEFICIENCY ANEMIA TYPE: ICD-10-CM

## 2021-02-25 DIAGNOSIS — C50.411 MALIGNANT NEOPLASM OF UPPER-OUTER QUADRANT OF RIGHT BREAST IN FEMALE, ESTROGEN RECEPTOR POSITIVE (H): ICD-10-CM

## 2021-02-25 DIAGNOSIS — Z17.0 MALIGNANT NEOPLASM OF UPPER-OUTER QUADRANT OF RIGHT BREAST IN FEMALE, ESTROGEN RECEPTOR POSITIVE (H): ICD-10-CM

## 2021-02-25 LAB
ALBUMIN SERPL-MCNC: 4.1 G/DL (ref 3.4–5)
ALP SERPL-CCNC: 86 U/L (ref 40–150)
ALT SERPL W P-5'-P-CCNC: 21 U/L (ref 0–50)
ANION GAP SERPL CALCULATED.3IONS-SCNC: 3 MMOL/L (ref 3–14)
AST SERPL W P-5'-P-CCNC: 9 U/L (ref 0–45)
BILIRUB SERPL-MCNC: 0.4 MG/DL (ref 0.2–1.3)
BUN SERPL-MCNC: 10 MG/DL (ref 7–30)
CALCIUM SERPL-MCNC: 9.3 MG/DL (ref 8.5–10.1)
CHLORIDE SERPL-SCNC: 107 MMOL/L (ref 94–109)
CO2 SERPL-SCNC: 31 MMOL/L (ref 20–32)
CREAT SERPL-MCNC: 0.72 MG/DL (ref 0.52–1.04)
ERYTHROCYTE [DISTWIDTH] IN BLOOD BY AUTOMATED COUNT: 17.4 % (ref 10–15)
GFR SERPL CREATININE-BSD FRML MDRD: >90 ML/MIN/{1.73_M2}
GLUCOSE SERPL-MCNC: 108 MG/DL (ref 70–99)
HCT VFR BLD AUTO: 39.9 % (ref 35–47)
HGB BLD-MCNC: 12.5 G/DL (ref 11.7–15.7)
MCH RBC QN AUTO: 24 PG (ref 26.5–33)
MCHC RBC AUTO-ENTMCNC: 31.3 G/DL (ref 31.5–36.5)
MCV RBC AUTO: 77 FL (ref 78–100)
PLATELET # BLD AUTO: 323 10E9/L (ref 150–450)
POTASSIUM SERPL-SCNC: 4.4 MMOL/L (ref 3.4–5.3)
PROT SERPL-MCNC: 7.7 G/DL (ref 6.8–8.8)
RBC # BLD AUTO: 5.2 10E12/L (ref 3.8–5.2)
SODIUM SERPL-SCNC: 141 MMOL/L (ref 133–144)
WBC # BLD AUTO: 8.7 10E9/L (ref 4–11)

## 2021-02-25 PROCEDURE — 99214 OFFICE O/P EST MOD 30 MIN: CPT | Performed by: FAMILY MEDICINE

## 2021-02-25 PROCEDURE — 80053 COMPREHEN METABOLIC PANEL: CPT | Performed by: FAMILY MEDICINE

## 2021-02-25 PROCEDURE — 85027 COMPLETE CBC AUTOMATED: CPT | Performed by: FAMILY MEDICINE

## 2021-02-25 PROCEDURE — 36415 COLL VENOUS BLD VENIPUNCTURE: CPT | Performed by: FAMILY MEDICINE

## 2021-02-25 RX ORDER — ALBUTEROL SULFATE 90 UG/1
2 AEROSOL, METERED RESPIRATORY (INHALATION) EVERY 6 HOURS PRN
Qty: 1 INHALER | Refills: 11 | Status: SHIPPED | OUTPATIENT
Start: 2021-02-25 | End: 2021-07-20

## 2021-02-25 ASSESSMENT — ANXIETY QUESTIONNAIRES
6. BECOMING EASILY ANNOYED OR IRRITABLE: SEVERAL DAYS
2. NOT BEING ABLE TO STOP OR CONTROL WORRYING: NOT AT ALL
GAD7 TOTAL SCORE: 3
5. BEING SO RESTLESS THAT IT IS HARD TO SIT STILL: NOT AT ALL
1. FEELING NERVOUS, ANXIOUS, OR ON EDGE: SEVERAL DAYS
3. WORRYING TOO MUCH ABOUT DIFFERENT THINGS: NOT AT ALL
7. FEELING AFRAID AS IF SOMETHING AWFUL MIGHT HAPPEN: NOT AT ALL

## 2021-02-25 ASSESSMENT — PATIENT HEALTH QUESTIONNAIRE - PHQ9
5. POOR APPETITE OR OVEREATING: SEVERAL DAYS
SUM OF ALL RESPONSES TO PHQ QUESTIONS 1-9: 2

## 2021-02-25 ASSESSMENT — MIFFLIN-ST. JEOR: SCORE: 1530.43

## 2021-02-25 NOTE — PROGRESS NOTES
[facility]  [address] [facility] [address]  Phone: [phone]  Primary Provider: Tyson Peters  Pre-op Performing Provider: TYSON PETERS      PREOPERATIVE EVALUATION:  Today's date: 2/25/2021    Diana Wilson is a 40 year old female who presents for a preoperative evaluation.    Surgical Information:  Surgery/Procedure:  Beast Cancer/Reconstruction   Surgery Location: [facility]   Surgeon: Dr. Ruelas  Surgery Date: 03/19/2021  Time of Surgery: 7:30 am   Where patient plans to recover: At home with family  Fax number for surgical facility: [phone]    Type of Anesthesia Anticipated: General    Assessment & Plan     The proposed surgical procedure is considered INTERMEDIATE risk.    Preop general physical exam  - Comprehensive metabolic panel (BMP + Alb, Alk Phos, ALT, AST, Total. Bili, TP)  - CBC with platelets    Mild persistent asthma with exacerbation  Wants to have albuterol inhaler available in case   - albuterol (PROAIR HFA/PROVENTIL HFA/VENTOLIN HFA) 108 (90 Base) MCG/ACT inhaler; Inhale 2 puffs into the lungs every 6 hours as needed for shortness of breath / dyspnea    Iron deficiency anemia, unspecified iron deficiency anemia type  - CBC with platelets    Malignant neoplasm of upper-outer quadrant of right breast in female, estrogen receptor positive (H)  - Comprehensive metabolic panel (BMP + Alb, Alk Phos, ALT, AST, Total. Bili, TP)  - CBC with platelets    Multiple subsegmental pulmonary emboli without acute cor pulmonale (H)  PE was 6 months ago and clots were fully resolved post PE within a month. Cancer status: in remission.  Bleeding risk of procedure: moderate.  I recommend stopping the DOAC dose 2 days prior to surgery and restarting 48hours after surgery with clinical vigilance for symptoms of DVT/PE     Risks and Recommendations:  The patient has the following additional risks and recommendations for perioperative complications:   - No  identified additional risk factors other than previously addressed    Medication Instructions:  Patient is to take all scheduled medications on the day of surgery EXCEPT for modifications listed below:   Hold DOAC for two days prior and one day after     RECOMMENDATION:  APPROVAL GIVEN to proceed with proposed procedure, without further diagnostic evaluation.        [id number]    Subjective     HPI related to upcoming procedure: has history of breast cancer, needs breast augmentation/reconstruction       Preop Questions 2/25/2021   1. Have you ever had a heart attack or stroke? No   2. Have you ever had surgery on your heart or blood vessels, such as a stent placement, a coronary artery bypass, or surgery on an artery in your head, neck, heart, or legs? No   3. Do you have chest pain with activity? No   4. Do you have a history of  heart failure? No   5. Do you currently have a cold, bronchitis or symptoms of other infection? No   6. Do you have a cough, shortness of breath, or wheezing? No   7. Do you or anyone in your family have previous history of blood clots? YES - personal history of PE 9/2020   8. Do you or does anyone in your family have a serious bleeding problem such as prolonged bleeding following surgeries or cuts? No   9. Have you ever had problems with anemia or been told to take iron pills? YES - mild anemia this past year    10. Have you had any abnormal blood loss such as black, tarry or bloody stools, or abnormal vaginal bleeding? No    11. Have you ever had a blood transfusion? YES    11a. Have you ever had a transfusion reaction? No   12. Are you willing to have a blood transfusion if it is medically needed before, during, or after your surgery? Yes   13. Have you or any of your relatives ever had problems with anesthesia? YES - she has trouble coming out of anesthesia    14. Do you have sleep apnea, excessive snoring or daytime drowsiness? No   15. Do you have any artifical heart valves or other  implanted medical devices like a pacemaker, defibrillator, or continuous glucose monitor? No   16. Do you have artificial joints? No   17. Are you allergic to latex? No   18. Is there any chance that you may be pregnant? No     Health Care Directive:  Patient does not have a Health Care Directive or Living Will: Discussed advance care planning with patient; however, patient declined at this time.    Preoperative Review of :   reviewed - no record of controlled substances prescribed.      Status of Chronic Conditions:  See problem list for active medical problems.  Problems all longstanding and stable, except as noted/documented.  See ROS for pertinent symptoms related to these conditions.      Review of Systems  CONSTITUTIONAL: NEGATIVE for fever, chills, change in weight  INTEGUMENTARY/SKIN: NEGATIVE for worrisome rashes, moles or lesions  EYES: NEGATIVE for vision changes or irritation  ENT/MOUTH: NEGATIVE for ear, mouth and throat problems  RESP: NEGATIVE for significant cough or SOB  BREAST: NEGATIVE for masses, tenderness or discharge  CV: NEGATIVE for chest pain, palpitations or peripheral edema  GI: NEGATIVE for nausea, abdominal pain, heartburn, or change in bowel habits  : NEGATIVE for frequency, dysuria, or hematuria  MUSCULOSKELETAL: NEGATIVE for significant arthralgias or myalgia  NEURO: NEGATIVE for weakness, dizziness or paresthesias  ENDOCRINE: NEGATIVE for temperature intolerance, skin/hair changes  HEME: NEGATIVE for bleeding problems  PSYCHIATRIC: NEGATIVE for changes in mood or affect    Patient Active Problem List    Diagnosis Date Noted     Multiple subsegmental pulmonary emboli without acute cor pulmonale (H) 01/28/2021     Priority: Medium     Diagnosed 9/2020 - was post op        Osteopenia of multiple sites 04/23/2019     Priority: Medium     Malignant neoplasm of upper-outer quadrant of right breast in female, estrogen receptor positive (H) 03/08/2018     Priority: Medium     ER  positive in breast  triple neg in lymph        Lymphedema of right upper extremity 11/24/2017     Priority: Medium     Intestinal malabsorption 02/16/2016     Priority: Medium     Anxiety 07/15/2014     Priority: Medium     Insomnia 07/17/2012     Priority: Medium     Iron deficiency anemia 03/14/2012     Priority: Medium     Major depressive disorder, recurrent episode, mild (H) 08/05/2011     Priority: Medium      mainly postpartum, had tried Zoloft and anafranil    [facility] enrolled pt in 6 months pt ed program          CARDIOVASCULAR SCREENING; LDL GOAL LESS THAN 130 10/31/2010     Priority: Medium     Obesity 09/08/2008     Priority: Medium     Wt Readings from Last 5 Encounters:   07/11/11 172 lb (78.019 kg)   12/20/10 174 lb (78.926 kg)   07/26/10 166 lb (75.297 kg)   11/12/09 170 lb (77.111 kg)   11/03/09 169 lb (76.658 kg)     Body mass index is 29.99 kg/(m^2).        Mild intermittent asthma      Priority: Medium            never has had PFT's, MDI with colds and at times exercise       Health Care Home 05/14/2013     Priority: Low     EMERGENCY CARE PLAN  May 14, 2013: No current Care Coordination follow up planned. Please refer if Care Coordination services are needed.    Presenting Problem Signs and Symptoms Treatment Plan   Questions or concerns   during clinic hours   I will call my clinic directly:  [address] [id number] [phone].    Questions or concerns outside clinic hours   I will call the 24 hour nurse line at   [phone] or [facility].   Need to schedule an appointment   I will call the 24 hour scheduling team at [phone] or my clinic directly at [phone].    Same day treatment     I will call my clinic first, nurse line if after hours, urgent care and express care if needed.   Clinic care coordination services (regular clinic hours)     I will call a clinic care coordinator directly:     Darron Gaming RN  Mon, Tues, Fri -  [phone]  Wed, Thurs - [phone]    Marlin Nelson SW:    [phone]    Or call my clinic at [phone] and ask to speak with care coordination.   Crisis Services: Behavioral or Mental Health  [facility] 24 Hour Phone Line  [phone]    [facility] 24 Hour Crisis Services  [phone]    [facility] (Behavioral Health Providers) Network [phone]    [facility]   [phone]       Emergency treatment -- Immediately    [facility]All 911               Past Medical History:   Diagnosis Date     Depressive disorder 1995    And Anxiety     Encounter for insertion or removal of intrauterine contraceptive device 1/8/2008     Excessive or frequent menstruation 03/19/2003     Fibroadenosis of breast      Invasive ductal carcinoma of breast, right (H)      Malignant neoplasm of upper-outer quadrant of right female breast (H)      Neutropenia, drug-induced (H)      Nongonococcal urethritis (JALIL) due to chlamydia trachomatis 01/03/2002     Other and unspecified ovarian cyst 03/19/2003    RIGHT ovarian cyst     PONV (postoperative nausea and vomiting)      Skin cancer      Transient hypertension of pregnancy, antepartum     PIH with last birth     Uncomplicated asthma 2002     Past Surgical History:   Procedure Laterality Date     BIOPSY  2/2016    EDG with biopsy     BREAST SURGERY  1997    Bilateral reduction     C APPENDECTOMY  1991     DENTAL SURGERY  09/13/2010 and 10/12/2010    Root canals     DILATION AND CURETTAGE, HYSTEROSCOPY, ABLATE ENDOMETRIUM NOVASURE, COMBINED  3/20/2012    Procedure:COMBINED DILATION AND CURETTAGE, HYSTEROSCOPY, ABLATE ENDOMETRIUM NOVASURE; Hysteroscopy with Endometrial Ablation Novasure; Surgeon:GERRI PURCELL; Location:[facility]     ESOPHAGOSCOPY, GASTROSCOPY, DUODENOSCOPY (EGD), COMBINED N/A 2/18/2016    Procedure: COMBINED ESOPHAGOSCOPY, GASTROSCOPY, DUODENOSCOPY (EGD);  Surgeon: Jose L Wilson MD;  Location: [facility]     INSERT PORT VASCULAR ACCESS N/A 1/11/2017     Procedure: INSERT PORT VASCULAR ACCESS;  Surgeon: Michael Townsend MD;  Location: [facility]     LAPAROSCOPIC SALPINGO-OOPHORECTOMY Bilateral 11/28/2017    Procedure: LAPAROSCOPIC SALPINGO-OOPHORECTOMY;  Laparoscopic bilateral salpingo oophorectomy;  Surgeon: Preeti Tirado MD;  Location: [facility]     RECONSTRUCT BREAST BILATERAL, IMPLANT PROSTHESIS BILATERAL, COMBINED Bilateral 3/16/2018    Procedure: COMBINED RECONSTRUCT BREAST BILATERAL, IMPLANT PROSTHESIS BILATERAL;  BILATERAL SECOND STAGE BREAST RECONSTRUCTION WITH SILICONE GEL IMPLANT;  Surgeon: Eugenio Ruelas MD;  Location: [facility]     SURGICAL HISTORY OF -   1997    Breast reduction     Current Outpatient Medications   Medication Sig Dispense Refill     acetaminophen (TYLENOL) 325 MG tablet Take 2 tablets (650 mg) by mouth every 4 hours as needed for other (mild pain) 100 tablet 0     albuterol (PROAIR HFA/PROVENTIL HFA/VENTOLIN HFA) 108 (90 Base) MCG/ACT inhaler Inhale 2 puffs into the lungs every 6 hours as needed for shortness of breath / dyspnea 1 Inhaler 11     Ascorbic Acid (VITAMIN C PO)        beclomethasone HFA (QVAR REDIHALER) 40 MCG/ACT inhaler Inhale 1 puff into the lungs 2 times daily 31.8 g 3     cetirizine (ZYRTEC) 10 MG tablet Take 10 mg by mouth every morning       cholecalciferol (VITAMIN D3) 1000 units (25 mcg) capsule Take 1 capsule by mouth every other day       exemestane (AROMASIN) 25 MG tablet Take 1 tablet (25 mg) by mouth daily 30 tablet 3     fluocinonide (LIDEX) 0.05 % external solution Apply to scalp BID x 3-4 weeks then PRN 50 mL 3     Fluticasone Propionate (FLONASE NA) Spray 1 spray into both nostrils daily        gabapentin (NEURONTIN) 300 MG capsule TAKE ONE CAPSULE BY MOUTH THREE TIMES A DAY 90 capsule 3     Multiple Vitamins-Minerals (MULTIVITAMIN PO) Take 1 tablet by mouth daily        omeprazole (PRILOSEC) 20 MG DR capsule TAKE ONE CAPSULE BY MOUTH EVERY MORNING 30 MINUTES BEFORE BREAKFAST 90  "capsule 1     order for DME Equipment being ordered: x2 20/30mmHg UE compression sleeves (Patient not taking: Reported on 10/5/2020) 1 each 0     rivaroxaban ANTICOAGULANT (XARELTO ANTICOAGULANT) 15 MG TABS tablet Take 1 tablet (15 mg) by mouth daily (with dinner) 90 tablet 0     venlafaxine (EFFEXOR-XR) 150 MG 24 hr capsule Take 1 capsule (150 mg) by mouth daily 90 capsule 3     venlafaxine (EFFEXOR-XR) 75 MG 24 hr capsule TAKE ONE CAPSULE BY MOUTH ONCE DAILY (ALONG WITH 150MG) 90 capsule 3       Allergies   Allergen Reactions     Carboplatin Shortness Of Breath, Rash and Anaphylaxis     Other reaction(s): Flushing, Tachycardia     Ambien      hallucinations     Amoxicillin GI Disturbance     Erythromycin GI Disturbance     Hydrocodone-Acetaminophen      Other reaction(s): Hallucinations     Hydromorphone Headache     Penicillins Nausea and Vomiting and Hives     --16 pt states this was a childhood reaction     Zolpidem      Other reaction(s): Hallucinations     Hydrocodone Other (See Comments)     Out of body experience, \"creepy-crawly\" skin         Social History     Tobacco Use     Smoking status: Former Smoker     Packs/day: 0.50     Years: 10.00     Pack years: 5.00     Types: Cigarettes     Quit date: 10/1/2016     Years since quittin.4     Smokeless tobacco: Never Used     Tobacco comment: Socially- quit smoking 10/01/2016   Substance Use Topics     Alcohol use: Yes     Alcohol/week: 0.0 standard drinks     Comment: Occassional       History   Drug Use No         Objective     /82   Pulse 98   Temp 98.4  F (36.9  C) (Tympanic)   Ht 1.626 m (5' 4\")   Wt 88 kg (194 lb 1.6 oz)   LMP 2016 (Exact Date)   SpO2 98%   BMI 33.32 kg/m      Physical Exam    GENERAL APPEARANCE: healthy, alert and no distress     EYES: EOMI, PERRL     HENT: ear canals and TM's normal and nose and mouth without ulcers or lesions     NECK: no adenopathy, no asymmetry, masses, or scars and thyroid normal to " palpation     RESP: lungs clear to auscultation - no rales, rhonchi or wheezes     CV: regular rates and rhythm, normal S1 S2, no S3 or S4 and no murmur, click or rub     ABDOMEN:  soft, nontender, no HSM or masses and bowel sounds normal     MS: extremities normal- no gross deformities noted, no evidence of inflammation in joints, FROM in all extremities.     NEURO: Normal strength and tone, sensory exam grossly normal, mentation intact and speech normal     PSYCH: mentation appears normal. and affect normal/bright     LYMPHATICS: No cervical adenopathy    Recent Labs   Lab Test 12/07/20  1025 10/05/20  1644 09/29/20  1240 09/29/20  1240   HGB 11.5* 10.2*   < > 6.9*    511*   < > 536*   INR  --   --   --  1.07     --   --  139   POTASSIUM 4.1  --   --  3.9   CR 0.73  --   --  0.71    < > = values in this interval not displayed.        Diagnostics:  Recent Results (from the past 24 hour(s))   Comprehensive metabolic panel (BMP + Alb, Alk Phos, ALT, AST, Total. Bili, TP)    Collection Time: 02/25/21  9:49 AM   Result Value Ref Range    Sodium 141 133 - 144 mmol/L    Potassium 4.4 3.4 - 5.3 mmol/L    Chloride 107 94 - 109 mmol/L    Carbon Dioxide 31 20 - 32 mmol/L    Anion Gap 3 3 - 14 mmol/L    Glucose 108 (H) 70 - 99 mg/dL    Urea Nitrogen 10 7 - 30 mg/dL    Creatinine 0.72 0.52 - 1.04 mg/dL    GFR Estimate >90 >60 mL/min/[1.73_m2]    GFR Estimate If Black >90 >60 mL/min/[1.73_m2]    Calcium 9.3 8.5 - 10.1 mg/dL    Bilirubin Total 0.4 0.2 - 1.3 mg/dL    Albumin 4.1 3.4 - 5.0 g/dL    Protein Total 7.7 6.8 - 8.8 g/dL    Alkaline Phosphatase 86 40 - 150 U/L    ALT 21 0 - 50 U/L    AST 9 0 - 45 U/L   CBC with platelets    Collection Time: 02/25/21  9:49 AM   Result Value Ref Range    WBC 8.7 4.0 - 11.0 10e9/L    RBC Count 5.20 3.8 - 5.2 10e12/L    Hemoglobin 12.5 11.7 - 15.7 g/dL    Hematocrit 39.9 35.0 - 47.0 %    MCV 77 (L) 78 - 100 fl    MCH 24.0 (L) 26.5 - 33.0 pg    MCHC 31.3 (L) 31.5 - 36.5 g/dL     RDW 17.4 (H) 10.0 - 15.0 %    Platelet Count 323 150 - 450 10e9/L      No EKG required, no history of coronary heart disease, significant arrhythmia, peripheral arterial disease or other structural heart disease.    Revised Cardiac Risk Index (RCRI):  The patient has the following serious cardiovascular risks for perioperative complications:   - No serious cardiac risks = 0 points     RCRI Interpretation: 0 points: Class I (very low risk - 0.4% complication rate)         Signed Electronically by: Yenifer Peters MD  Copy of this evaluation report is provided to requesting physician.    [facility] Guidelines    Revised Cardiac Risk Index

## 2021-02-25 NOTE — PATIENT INSTRUCTIONS
Multiple subsegmental pulmonary emboli without acute cor pulmonale (H)  PE was 6 months ago and clots were fully resolved post PE within a month. Cancer status  : in remission.  Bleeding risk of procedure: moderate.  I recommend stopping the DOAC dose 2 days prior to surgery and restarting 48hours after surgery with clinical vigilance for symptoms of DVT/PE      Preparing for Your Surgery  Getting started  A nurse will call you to review your health history and instructions. They will give you an arrival time based on your scheduled surgery time.  Please be ready to share the following:    Your doctor's clinic name and phone number    Your medical, surgical and anesthesia history    A list of allergies and sensitivities    A list of medicines, including herbal treatments and over-the-counter drugs    Whether the patient has a legal guardian (ask how to send us the papers in advance)  If you have a child who's having surgery, please ask for a copy of Preparing for Your Child's Surgery.    Preparing for surgery    Within 30 days of surgery: Have a pre-op exam (sometimes called an H&P, or History and Physical). This can be done at a clinic or pre-operative center.  ? If you're having a , you may not need this exam. Talk to your care team    At your pre-op exam, talk to your care team about all medicines you take. If you need to stop any medicines before surgery, ask when to start taking them again.  ? We do this for your safety. Many medicines can make you bleed too much during surgery. Some change how well surgery (anesthesia) drugs work.    Call your insurance company to let them know you're having surgery. (If you don't have insurance, call [phone].)    Call your clinic if there's any change in your health. This includes signs of a cold or flu (sore throat, runny nose, cough, rash, fever). It also includes a scrape or scratch near the surgery site.    If you have questions on the day of surgery, call  your hospital or surgery center.  Eating and drinking guidelines  For your safety: Unless your surgeon tells you otherwise, follow the guidelines below.    Eat and drink as usual until 8 hours before surgery. After that, no food or milk.    Drink clear liquids until 2 hours before surgery. These are liquids you can see through, like water, Gatorade and Propel Water. You may also have black coffee and tea (no cream or milk).    Nothing by mouth within 2 hours of surgery. This includes gum, candy and breath mints.    If you drink, stop drinking alcohol the night before surgery.    If your care team tells you to take medicine on the morning of surgery, it's okay to take it with a sip of water.  Preventing infection    Shower or bathe the night before and morning of your surgery. Follow the instructions your clinic gave you. (If no instructions, use regular soap.)    Don't shave or clip hair near your surgery site. We'll remove the hair if needed.    Don't smoke or vape the morning of surgery. You may chew nicotine gum up to 2 hours before surgery. A nicotine patch is okay.  ? Note: Some surgeries require you to completely quit smoking and nicotine. Check with your surgeon.    Your care team will make every effort to keep you safe from infection. We will:  ? Clean our hands often with soap and water (or an alcohol-based hand rub).  ? Clean the skin at your surgery site with a special soap that kills germs.  ? Give you a special gown to keep you warm. (Cold raises the risk of infection.)  ? Wear special hair covers, masks, gowns and gloves during surgery.  ? Give antibiotic medicine, if prescribed. Not all surgeries need antibiotics.  What to bring on the day of surgery    Photo ID and insurance card    Copy of your health care directive, if you have one    Glasses and hearing aides (bring cases)  ? You can't wear contacts during surgery    Inhaler and eye drops, if you use them (tell us about these when you  arrive)    CPAP machine or breathing device, if you use them    A few personal items, if spending the night    If you have . . .  ? A pacemaker or ICD (cardiac defibrillator): Bring the ID card.  ? An implanted stimulator: Bring the remote control.  ? A legal guardian: Bring a copy of the certified (court-stamped) guardianship papers.  Please remove any jewelry, including body piercings. Leave jewelry and other valuables at home.  If you're going home the day of surgery  Important: If you don't follow the rules below, we must cancel your surgery.     Arrange for someone to drive you home after surgery. You may not drive, take a taxi or take public transportation by yourself (unless you'll have local anesthesia only).    Arrange for a responsible adult to stay with you overnight. If you don't, we may keep you in the hospital overnight, and you may need to pay the costs yourself.  Questions?   If you have any questions for your care team, list them here: _________________________________________________________________________________________________________________________________________________________________________________________________________________________________________________________________________________________________________________________  For informational purposes only. Not to replace the advice of your health care provider. Copyright   2003, 2019 [facility]. All rights reserved. Clinically reviewed by Anel Muller MD. [employer] 911095 - REV 4/20.

## 2021-02-26 ASSESSMENT — ASTHMA QUESTIONNAIRES: ACT_TOTALSCORE: 25

## 2021-02-26 ASSESSMENT — ANXIETY QUESTIONNAIRES: GAD7 TOTAL SCORE: 3

## 2021-03-11 DIAGNOSIS — Z86.711 HISTORY OF PULMONARY EMBOLISM: ICD-10-CM

## 2021-03-11 NOTE — PROGRESS NOTES
Fax received from [facility] requesting a refill of Xarelto on behalf of self.  Last refill: 12/14/2020  # 90 with 0 refills at [facility].  Last office visit:  12/14/2020  Next office visit:  Due in May    Pt will need to take this until mid April (1 month after revision).     This is an appropriate refill, and has been e-prescribed. Josette Smalls RN, BSN, OCN

## 2021-04-23 NOTE — ANESTHESIA CARE TRANSFER NOTE
"Requested Prescriptions   Pending Prescriptions Disp Refills    methocarbamol (ROBAXIN) 500 MG tablet [Pharmacy Med Name: METHOCARBAMOL 500MG TABLETS] 40 tablet 1     Sig: TAKE 1-2 TABLETS BY MOUTH DAILY AS NEEDED FOR MUSCLE SPASM. FLEXERIL IS FOR BEDTIME.       There is no refill protocol information for this order       nicotine (NICORETTE) 2 MG gum [Pharmacy Med Name: NICOTINE 2MG COATED FRUIT 'S]       Sig: PLACE 1 EACH CHEEK AS NEEDED FOR SMOKING CESSATION.       Partial Cholinergic Nicotinic Agonist Agents Failed - 4/23/2021 11:31 AM        Failed - Blood pressure under 140/90 in past 12 months     BP Readings from Last 3 Encounters:   03/08/21 (!) 157/104   12/12/19 119/82   08/30/19 131/83                 Passed - Recent (12 mo) or future (30 days) visit within the authorizing provider's specialty     Patient has had an office visit with the authorizing provider or a provider within the authorizing providers department within the previous 12 mos or has a future within next 30 days. See \"Patient Info\" tab in inbasket, or \"Choose Columns\" in Meds & Orders section of the refill encounter.              Passed - Medication is active on med list        Passed - Patient is 18 years of age or older          cyclobenzaprine (FLEXERIL) 10 MG tablet [Pharmacy Med Name: CYCLOBENZAPRINE 10MG TABLETS] 30 tablet 1     Sig: TAKE 1/2 TO 1 TABLET(5 TO 10 MG) BY MOUTH EVERY NIGHT AS NEEDED FOR MUSCLE SPASMS       There is no refill protocol information for this order          " Patient: Diana Wilson    INSERT PORT VASCULAR ACCESS (N/A Neck)  Additional InformationProcedure(s):  Power Port Placement - Wound Class: I-Clean    Diagnosis: right breast cancer  Diagnosis Additional Information: No value filed.    Anesthesia Type:   MAC     Note:    Patient transferred to:Phase II        Vitals: (Last set prior to Anesthesia Care Transfer)              Electronically Signed By: Suad Howard CRNA, APRN CRNA  January 11, 2017  2:59 PM

## 2021-05-11 DIAGNOSIS — Z86.711 HISTORY OF PULMONARY EMBOLISM: ICD-10-CM

## 2021-05-13 RX ORDER — RIVAROXABAN 15 MG/1
TABLET, FILM COATED ORAL
Qty: 60 TABLET | Refills: 0 | OUTPATIENT
Start: 2021-05-13

## 2021-05-26 ENCOUNTER — RECORDS - HEALTHEAST (OUTPATIENT)
Dept: ADMINISTRATIVE | Facility: CLINIC | Age: 41
End: 2021-05-26

## 2021-05-28 ENCOUNTER — RECORDS - HEALTHEAST (OUTPATIENT)
Dept: ADMINISTRATIVE | Facility: CLINIC | Age: 41
End: 2021-05-28

## 2021-05-29 ENCOUNTER — RECORDS - HEALTHEAST (OUTPATIENT)
Dept: ADMINISTRATIVE | Facility: CLINIC | Age: 41
End: 2021-05-29

## 2021-06-02 ENCOUNTER — PATIENT OUTREACH (OUTPATIENT)
Dept: ONCOLOGY | Facility: CLINIC | Age: 41
End: 2021-06-02

## 2021-06-02 DIAGNOSIS — Z17.0 MALIGNANT NEOPLASM OF UPPER-OUTER QUADRANT OF RIGHT BREAST IN FEMALE, ESTROGEN RECEPTOR POSITIVE (H): Primary | ICD-10-CM

## 2021-06-02 DIAGNOSIS — C50.411 MALIGNANT NEOPLASM OF UPPER-OUTER QUADRANT OF RIGHT BREAST IN FEMALE, ESTROGEN RECEPTOR POSITIVE (H): Primary | ICD-10-CM

## 2021-06-24 ENCOUNTER — OFFICE VISIT (OUTPATIENT)
Dept: DERMATOLOGY | Facility: CLINIC | Age: 41
End: 2021-06-24
Payer: COMMERCIAL

## 2021-06-24 VITALS — SYSTOLIC BLOOD PRESSURE: 118 MMHG | HEART RATE: 94 BPM | DIASTOLIC BLOOD PRESSURE: 79 MMHG | OXYGEN SATURATION: 96 %

## 2021-06-24 DIAGNOSIS — L73.9 FOLLICULITIS: Primary | ICD-10-CM

## 2021-06-24 PROCEDURE — 99214 OFFICE O/P EST MOD 30 MIN: CPT | Performed by: PHYSICIAN ASSISTANT

## 2021-06-24 RX ORDER — CLINDAMYCIN PHOSPHATE 10 MG/G
GEL TOPICAL
Qty: 60 G | Refills: 3 | Status: SHIPPED | OUTPATIENT
Start: 2021-06-24 | End: 2022-09-30

## 2021-06-24 RX ORDER — DOXYCYCLINE 100 MG/1
CAPSULE ORAL
Qty: 60 CAPSULE | Refills: 0 | Status: SHIPPED | OUTPATIENT
Start: 2021-06-24 | End: 2021-11-01

## 2021-06-24 RX ORDER — FLUCONAZOLE 150 MG/1
TABLET ORAL
Qty: 2 TABLET | Refills: 5 | Status: SHIPPED | OUTPATIENT
Start: 2021-06-24 | End: 2021-07-20

## 2021-06-24 NOTE — LETTER
6/24/2021         RE: Diana Wilson  [address] [facility] [id number]        Dear Colleague,    Thank you for referring your patient, Diana Wilson, to the [facility]. Please see a copy of my visit note below.    HPI:   Chief complaints: Diana Wilson is a pleasant 40 year old female who presents for evaluation of recurrent sores on the scalp. She will get painful bumps on and off. This has been happening for the past 20 years. She does have a history of celiac but is very strictly gluten free. She has not tried any OTC medications yet.         PHYSICAL EXAM:    /79 (BP Location: Left arm, Patient Position: Sitting, Cuff Size: Adult Large)   Pulse 94   LMP 08/05/2016 (Exact Date)   SpO2 96%   Skin exam performed as follows: Type 2 skin. Mood appropriate  Alert and Oriented X 3. Well developed, well nourished in no distress.  General appearance: Normal  Head including face: Normal  Eyes: conjunctiva and lids: Normal  Mouth: Lips, teeth, gums: Normal  Neck: Normal  Chest-breast/axillae: Normal  Back: Normal  Spleen and liver: Normal  Cardiovascular: Exam of peripheral vascular system by observation for swelling, varicosities, edema: Normal  Genitalia: groin, buttocks: Normal  Extremities: digits/nails (clubbing): Normal  Eccrine and Apocrine glands: Normal  Right upper extremity: Normal  Left upper extremity: Normal  Right lower extremity: Normal  Left lower extremity: Normal  Skin: Scalp and body hair: See below    1. Excoriated papules on the scalp    ASSESSMENT/PLAN:     1. Favor folliculitis; discussed that dermatitis herpetiformis is on the differential. Discussed biopsy if struggling  --Start clindamycin gel BID to active lesions mixed 1:1 with OTC BPO gel  --Start doxycycline 100 mg BID x 1 month. Advised to take with food. Discussed risk of GI upset, esophagitis and photosensitivity.           Follow-up: 6-8 weeks  CC:   Scribed By: Delicia Bell MS,  THEA          Again, thank you for allowing me to participate in the care of your patient.        Sincerely,        Delicia Heard PA-C

## 2021-06-24 NOTE — PROGRESS NOTES
HPI:   Chief complaints: Diana Wilson is a pleasant 40 year old female who presents for evaluation of recurrent sores on the scalp. She will get painful bumps on and off. This has been happening for the past 20 years. She does have a history of celiac but is very strictly gluten free. She has not tried any OTC medications yet.         PHYSICAL EXAM:    /79 (BP Location: Left arm, Patient Position: Sitting, Cuff Size: Adult Large)   Pulse 94   LMP 08/05/2016 (Exact Date)   SpO2 96%   Skin exam performed as follows: Type 2 skin. Mood appropriate  Alert and Oriented X 3. Well developed, well nourished in no distress.  General appearance: Normal  Head including face: Normal  Eyes: conjunctiva and lids: Normal  Mouth: Lips, teeth, gums: Normal  Neck: Normal  Chest-breast/axillae: Normal  Back: Normal  Spleen and liver: Normal  Cardiovascular: Exam of peripheral vascular system by observation for swelling, varicosities, edema: Normal  Genitalia: groin, buttocks: Normal  Extremities: digits/nails (clubbing): Normal  Eccrine and Apocrine glands: Normal  Right upper extremity: Normal  Left upper extremity: Normal  Right lower extremity: Normal  Left lower extremity: Normal  Skin: Scalp and body hair: See below    1. Excoriated papules on the scalp    ASSESSMENT/PLAN:     1. Favor folliculitis; discussed that dermatitis herpetiformis is on the differential. Discussed biopsy if struggling  --Start clindamycin gel BID to active lesions mixed 1:1 with OTC BPO gel  --Start doxycycline 100 mg BID x 1 month. Advised to take with food. Discussed risk of GI upset, esophagitis and photosensitivity.           Follow-up: 6-8 weeks  CC:   Scribed By: Delicia Bell, MS, PA-C

## 2021-06-24 NOTE — NURSING NOTE
"Initial /79 (BP Location: Left arm, Patient Position: Sitting, Cuff Size: Adult Large)   Pulse 94   LMP 08/05/2016 (Exact Date)   SpO2 96%  Estimated body mass index is 33.32 kg/m  as calculated from the following:    Height as of 2/25/21: 1.626 m (5' 4\").    Weight as of 2/25/21: 88 kg (194 lb 1.6 oz). .      "

## 2021-06-24 NOTE — PATIENT INSTRUCTIONS
Start using clindamycin gel mixed 1:1 with an OTC benzoyl peroxide gel twice per day to any new areas as needed.     Start doxycycline twice per day with food and a full glass of water for 1 month    Follow up in 6-8 weeks

## 2021-06-28 ENCOUNTER — MYC MEDICAL ADVICE (OUTPATIENT)
Dept: FAMILY MEDICINE | Facility: CLINIC | Age: 41
End: 2021-06-28

## 2021-06-28 DIAGNOSIS — K08.89 TOOTH PAIN: Primary | ICD-10-CM

## 2021-06-28 RX ORDER — LORAZEPAM 0.5 MG/1
0.5 TABLET ORAL EVERY 6 HOURS PRN
Qty: 3 TABLET | Refills: 0 | Status: SHIPPED | OUTPATIENT
Start: 2021-06-28 | End: 2022-04-12

## 2021-07-09 DIAGNOSIS — Z17.0 MALIGNANT NEOPLASM OF UPPER-OUTER QUADRANT OF RIGHT BREAST IN FEMALE, ESTROGEN RECEPTOR POSITIVE (H): ICD-10-CM

## 2021-07-09 DIAGNOSIS — C50.411 MALIGNANT NEOPLASM OF UPPER-OUTER QUADRANT OF RIGHT BREAST IN FEMALE, ESTROGEN RECEPTOR POSITIVE (H): ICD-10-CM

## 2021-07-09 LAB
ALBUMIN SERPL-MCNC: 3.9 G/DL (ref 3.4–5)
ALP SERPL-CCNC: 85 U/L (ref 40–150)
ALT SERPL W P-5'-P-CCNC: 25 U/L (ref 0–50)
ANION GAP SERPL CALCULATED.3IONS-SCNC: 6 MMOL/L (ref 3–14)
AST SERPL W P-5'-P-CCNC: 14 U/L (ref 0–45)
BASOPHILS # BLD AUTO: 0 10E9/L (ref 0–0.2)
BASOPHILS NFR BLD AUTO: 0.4 %
BILIRUB SERPL-MCNC: 0.4 MG/DL (ref 0.2–1.3)
BUN SERPL-MCNC: 12 MG/DL (ref 7–30)
CALCIUM SERPL-MCNC: 9.1 MG/DL (ref 8.5–10.1)
CANCER AG27-29 SERPL-ACNC: 21 U/ML (ref 0–39)
CHLORIDE SERPL-SCNC: 106 MMOL/L (ref 94–109)
CO2 SERPL-SCNC: 27 MMOL/L (ref 20–32)
CREAT SERPL-MCNC: 0.77 MG/DL (ref 0.52–1.04)
DIFFERENTIAL METHOD BLD: ABNORMAL
EOSINOPHIL # BLD AUTO: 0.4 10E9/L (ref 0–0.7)
EOSINOPHIL NFR BLD AUTO: 5.2 %
ERYTHROCYTE [DISTWIDTH] IN BLOOD BY AUTOMATED COUNT: 16.8 % (ref 10–15)
GFR SERPL CREATININE-BSD FRML MDRD: >90 ML/MIN/{1.73_M2}
GLUCOSE SERPL-MCNC: 110 MG/DL (ref 70–99)
HCT VFR BLD AUTO: 37.7 % (ref 35–47)
HGB BLD-MCNC: 12.4 G/DL (ref 11.7–15.7)
LYMPHOCYTES # BLD AUTO: 2.2 10E9/L (ref 0.8–5.3)
LYMPHOCYTES NFR BLD AUTO: 29.3 %
MCH RBC QN AUTO: 25.4 PG (ref 26.5–33)
MCHC RBC AUTO-ENTMCNC: 32.9 G/DL (ref 31.5–36.5)
MCV RBC AUTO: 77 FL (ref 78–100)
MONOCYTES # BLD AUTO: 0.4 10E9/L (ref 0–1.3)
MONOCYTES NFR BLD AUTO: 5.5 %
NEUTROPHILS # BLD AUTO: 4.5 10E9/L (ref 1.6–8.3)
NEUTROPHILS NFR BLD AUTO: 59.6 %
PLATELET # BLD AUTO: 293 10E9/L (ref 150–450)
POTASSIUM SERPL-SCNC: 4.1 MMOL/L (ref 3.4–5.3)
PROT SERPL-MCNC: 7.4 G/DL (ref 6.8–8.8)
RBC # BLD AUTO: 4.89 10E12/L (ref 3.8–5.2)
SODIUM SERPL-SCNC: 139 MMOL/L (ref 133–144)
WBC # BLD AUTO: 7.5 10E9/L (ref 4–11)

## 2021-07-09 PROCEDURE — 86300 IMMUNOASSAY TUMOR CA 15-3: CPT | Performed by: NURSE PRACTITIONER

## 2021-07-09 PROCEDURE — 36415 COLL VENOUS BLD VENIPUNCTURE: CPT | Performed by: NURSE PRACTITIONER

## 2021-07-09 PROCEDURE — 80053 COMPREHEN METABOLIC PANEL: CPT | Performed by: NURSE PRACTITIONER

## 2021-07-09 PROCEDURE — 85025 COMPLETE CBC W/AUTO DIFF WBC: CPT | Performed by: NURSE PRACTITIONER

## 2021-07-12 ENCOUNTER — ONCOLOGY VISIT (OUTPATIENT)
Dept: ONCOLOGY | Facility: CLINIC | Age: 41
End: 2021-07-12
Attending: INTERNAL MEDICINE
Payer: COMMERCIAL

## 2021-07-12 VITALS — BODY MASS INDEX: 33.32 KG/M2 | HEIGHT: 64 IN

## 2021-07-12 DIAGNOSIS — G62.9 NEUROPATHY: ICD-10-CM

## 2021-07-12 DIAGNOSIS — Z17.0 MALIGNANT NEOPLASM OF UPPER-OUTER QUADRANT OF RIGHT BREAST IN FEMALE, ESTROGEN RECEPTOR POSITIVE (H): Primary | ICD-10-CM

## 2021-07-12 DIAGNOSIS — C50.411 MALIGNANT NEOPLASM OF UPPER-OUTER QUADRANT OF RIGHT BREAST IN FEMALE, ESTROGEN RECEPTOR POSITIVE (H): Primary | ICD-10-CM

## 2021-07-12 PROCEDURE — G0463 HOSPITAL OUTPT CLINIC VISIT: HCPCS

## 2021-07-12 PROCEDURE — 99214 OFFICE O/P EST MOD 30 MIN: CPT | Performed by: INTERNAL MEDICINE

## 2021-07-12 RX ORDER — GABAPENTIN 300 MG/1
300 CAPSULE ORAL 3 TIMES DAILY
Qty: 300 CAPSULE | Refills: 0 | Status: SHIPPED | OUTPATIENT
Start: 2021-07-12 | End: 2021-12-02

## 2021-07-12 ASSESSMENT — PAIN SCALES - GENERAL: PAINLEVEL: NO PAIN (0)

## 2021-07-12 NOTE — PATIENT INSTRUCTIONS
DX Hip/Pelvis/Spine overdue    Return in 12/2021 with CT CAP, MD visit and labs.     Previous PE s/p surgery: continue  mg po daily    Osteopenia: to put zometa on hold until after clearance by dentist ( patient will currently have dental work done)    Neuopathy: refill for gabapentin is requested

## 2021-07-12 NOTE — PROGRESS NOTES
"Oncology Rooming Note    July 12, 2021 12:12 PM   Diana Wilson is a 40 year old female who presents for:    Chief Complaint   Patient presents with     Oncology Clinic Visit     6 month follow up breast cancer. Review labs and Zometa plan.      Initial Vitals: Ht 1.626 m (5' 4\")   LMP 08/05/2016 (Exact Date)   Breastfeeding No   BMI 33.32 kg/m   Estimated body mass index is 33.32 kg/m  as calculated from the following:    Height as of this encounter: 1.626 m (5' 4\").    Weight as of 2/25/21: 88 kg (194 lb 1.6 oz). Body surface area is 1.99 meters squared.  No Pain (0) Comment: Data Unavailable   Patient's last menstrual period was 08/05/2016 (exact date).  Allergies reviewed: Yes  Medications reviewed: Yes    Medications: No refills needed at this time.     Pharmacy name entered into PEAK-IT: [facility] [place] - [id number]    Clinical concerns: 6 month follow up breast cancer. Review labs and Zometa plan.       Irma Cuevas CMA                  Heme/onc visit note  July 12, 2021  Oncology team:    Reason for visit:    12/2016 diagnosed right breast cancer LN positive disease        HISTORY OF ONCOLOGY ILLNESS  She presented at age 36 breast lump in her right armpit and right breast in 10/2016.   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  ER/RI 99% positive, HER-2/kelle negative, associated with high-grade DCIS.    Right axillary ultrasound-guided biopsy indicating metastatic carcinoma positive for spread from breast primary, ER/RI both negative.  HER-2/kelle is negative.       She had clinical T2N1 disease. She made informed decision to proceed with neoadjuvant DDAC C1D1 1/16/2017. She has good clinical response post DD AC x4. Then went on to have wkly taxol + carbo  with informed decision in March.   She had carbo hypersensitivity reaction in early May (during C3). It was d/c after.   She finished " "chemo end of May 2017.      She was tested negative for BRCA1/2 with [employer].      2017 she had double mastectomies at [facility]. Right side found no residual invasive carcinoma, +grade III DCIS, 1mm in greatest dimension. 5 sLN all negative. She had ypTis(DCIS)pN0 disease. Left breast no cancer.      She finished post mastectomy RT in 2017.    She had BSO 2017. Then started Arimidex in 2018. She quit in 2018 due to side effects and willing to try tamoxifen in 3/2018.      She was  on GlucoSentient trial for weight loss.      She had right breast surgery on 2020 at [facility] involving removal of bilateral breast reconstruction implants, and TRAMP reconstruction.   Developed tachycardia and dyspnea with oxygen requirement while in the hospital shortly after her surgery and had a CT scan of the chest at that time that was negative for PE.    Her SOB was progressive post op and was in ER 2020. Found to have severe anemia hb of 6.9 from baseline of 12.8. had 2 units of PRBC.  Also found by CT to have multiple filling defects within the sub-segmental pulmonary arteries consistent with PE.  Ultrasound shows a nonocclusive thrombus in the right mid calf in the peroneal vein.   She was transferred to [facility] And on Lovenox injection      Tamoxifen was changed to AI early Oct, and she presented to [facility] for SOB mid Oct. Arimidex was stopped.   PE resolved.   She also had infection on re constructed breast s/p Abx.            PAST MEDICAL HISTORY/Meds: reviewed     SOCIAL HISTORY:  Lives with her .  They have 2 kids, first pregnancy age 22.  She did not do breast feeding because of bilateral breast reduction.       FAMILY HISTORY:  \"Full of cancer\" from the mother's side including colon, lung, pancreatic, gastric cancer.  According to the patient none of them survive older than 60 years old.   Grandmother  of cholangial carcinoma at age 94 in 2019.    Interval history: 21  Denies " new symptoms, ROS as below    Past medical history:  Patient Active Problem List   Diagnosis     Mild intermittent asthma     Obesity     CARDIOVASCULAR SCREENING; LDL GOAL LESS THAN 130     Major depressive disorder, recurrent episode, mild (H)     Iron deficiency anemia     Insomnia     Health Care Home     Anxiety     Intestinal malabsorption     Lymphedema of right upper extremity     Malignant neoplasm of upper-outer quadrant of right breast in female, estrogen receptor positive (H)     Osteopenia of multiple sites     Multiple subsegmental pulmonary emboli without acute cor pulmonale (H)     Breast reconstruction disproportion       Medications:  acetaminophen (TYLENOL) 325 MG tablet, Take 2 tablets (650 mg) by mouth every 4 hours as needed for other (mild pain) (Patient not taking: Reported on 6/24/2021)  albuterol (PROAIR HFA/PROVENTIL HFA/VENTOLIN HFA) 108 (90 Base) MCG/ACT inhaler, Inhale 2 puffs into the lungs every 6 hours as needed for shortness of breath / dyspnea (Patient not taking: Reported on 6/24/2021)  Ascorbic Acid (VITAMIN C PO),   ASPIRIN 81 PO,   cetirizine (ZYRTEC) 10 MG tablet, Take 10 mg by mouth every morning  cholecalciferol (VITAMIN D3) 1000 units (25 mcg) capsule, Take 1 capsule by mouth every other day  clindamycin (CLINDAMAX) 1 % external gel, Apply to AA BID PRN  doxycycline monohydrate (MONODOX) 100 MG capsule, 1 cap PO BID with food and full glass of water  exemestane (AROMASIN) 25 MG tablet, Take 1 tablet (25 mg) by mouth daily  fluconazole (DIFLUCAN) 150 MG tablet, 1 tablet at sx onset; 1 additional tablet 3 days later if sx still present  fluocinonide (LIDEX) 0.05 % external solution, Apply to scalp BID x 3-4 weeks then PRN (Patient not taking: Reported on 6/24/2021)  Fluticasone Propionate (FLONASE NA), Spray 1 spray into both nostrils daily   gabapentin (NEURONTIN) 300 MG capsule, TAKE ONE CAPSULE BY MOUTH THREE TIMES A DAY  LORazepam (ATIVAN) 0.5 MG tablet, Take 1 tablet  "(0.5 mg) by mouth every 6 hours as needed for anxiety  Multiple Vitamins-Minerals (MULTIVITAMIN PO), Take 1 tablet by mouth daily   omeprazole (PRILOSEC) 20 MG DR capsule, TAKE ONE CAPSULE BY MOUTH EVERY MORNING 30 MINUTES BEFORE BREAKFAST  rivaroxaban ANTICOAGULANT (XARELTO ANTICOAGULANT) 15 MG TABS tablet, Take 1 tablet (15 mg) by mouth daily (with dinner) (Patient not taking: Reported on 6/24/2021)  venlafaxine (EFFEXOR-XR) 150 MG 24 hr capsule, Take 1 capsule (150 mg) by mouth daily  venlafaxine (EFFEXOR-XR) 75 MG 24 hr capsule, TAKE ONE CAPSULE BY MOUTH ONCE DAILY (ALONG WITH 150MG)    No current facility-administered medications on file prior to visit.      Allergy:     Allergies   Allergen Reactions     Carboplatin Shortness Of Breath, Rash and Anaphylaxis     Other reaction(s): Flushing, Tachycardia     Ambien      hallucinations     Amoxicillin GI Disturbance     Erythromycin GI Disturbance     Hydrocodone-Acetaminophen      Other reaction(s): Hallucinations     Hydromorphone Headache     Penicillins Nausea and Vomiting and Hives     1-11-17 pt states this was a childhood reaction     Zolpidem      Other reaction(s): Hallucinations     Hydrocodone Other (See Comments)     Out of body experience, \"creepy-crawly\" skin          Review of systems:    - CONSTITUTIONAL: Denies weight loss, fever and chills.    - HEENT: Denies changes in vision and hearing.    - ?RESPIRATORY: Denies SOB and cough.?    - CV: Denies palpitations and CP. ?    - GI: Denies abdominal pain, nausea, vomiting and diarrhea.?    - : Denies dysuria and urinary frequency.?    - MSK: Denies myalgia and joint pain.?    - SKIN: Denies rash and pruritus.    - ?NEUROLOGICAL: Denies headache and syncope. Neuropathy hands +     - PSYCHIATRIC: Denies recent changes in mood. Denies anxiety and depression.    - LYMPHATIC: no palpable lumps in the neck, axilla or groin areas.       Physical exam  LMP 08/05/2016 (Exact Date)   Wt Readings from Last 4 " "Encounters:   02/25/21 88 kg (194 lb 1.6 oz)   12/14/20 90.8 kg (200 lb 1.6 oz)   10/05/20 89 kg (196 lb 4.8 oz)   09/29/20 88.9 kg (196 lb)     Ht 1.626 m (5' 4\")   LMP 08/05/2016 (Exact Date)   Breastfeeding No   BMI 33.32 kg/m      Constitutional:       General: He is not in acute distress.     Appearance: Normal appearance. He is not toxic-appearing.   HENT:      Head: Normocephalic and atraumatic.      Right Ear: External ear normal.      Left Ear: External ear normal.   Eyes:      Extraocular Movements: Extraocular movements intact.      Conjunctiva/sclera: Conjunctivae normal.      Pupils: Pupils are equal, round, and reactive to light.   Neck:      Musculoskeletal: Normal range of motion and neck supple.   Cardiovascular:      Rate and Rhythm: Normal rate and regular rhythm.      Heart sounds: Normal heart sounds. No murmur. No gallop.    Pulmonary:      Effort: Pulmonary effort is normal.      Breath sounds: Normal breath sounds.   Abdominal:      General: Abdomen is flat. There is no distension.      Palpations: Abdomen is soft. There is no mass.      Tenderness: There is no abdominal tenderness. There is no guarding or rebound.      Hernia: No hernia is present.   Musculoskeletal: Normal range of motion.         General: No swelling or deformity.      Right lower leg: No edema.      Left lower leg: No edema.   Lymphadenopathy:      Cervical: No cervical adenopathy.   Skin:     General: Skin is warm and dry.         Labs:  Orders Only on 07/09/2021   Component Date Value Ref Range Status     CA 27-29 07/09/2021 21  0 - 39 U/mL Final    Assay Method:  Chemiluminescence using Siemens Centaur XP     Sodium 07/09/2021 139  133 - 144 mmol/L Final     Potassium 07/09/2021 4.1  3.4 - 5.3 mmol/L Final     Chloride 07/09/2021 106  94 - 109 mmol/L Final     Carbon Dioxide 07/09/2021 27  20 - 32 mmol/L Final     Anion Gap 07/09/2021 6  3 - 14 mmol/L Final     Glucose 07/09/2021 110* 70 - 99 mg/dL Final     Urea " Nitrogen 07/09/2021 12  7 - 30 mg/dL Final     Creatinine 07/09/2021 0.77  0.52 - 1.04 mg/dL Final     GFR Estimate 07/09/2021 >90  >60 mL/min/[1.73_m2] Final    Comment: Non  GFR Calc  Starting 12/18/2018, serum creatinine based estimated GFR (eGFR) will be   calculated using the Chronic Kidney Disease Epidemiology Collaboration   (CKD-EPI) equation.       GFR Estimate If Black 07/09/2021 >90  >60 mL/min/[1.73_m2] Final    Comment:  GFR Calc  Starting 12/18/2018, serum creatinine based estimated GFR (eGFR) will be   calculated using the Chronic Kidney Disease Epidemiology Collaboration   (CKD-EPI) equation.       Calcium 07/09/2021 9.1  8.5 - 10.1 mg/dL Final     Bilirubin Total 07/09/2021 0.4  0.2 - 1.3 mg/dL Final     Albumin 07/09/2021 3.9  3.4 - 5.0 g/dL Final     Protein Total 07/09/2021 7.4  6.8 - 8.8 g/dL Final     Alkaline Phosphatase 07/09/2021 85  40 - 150 U/L Final     ALT 07/09/2021 25  0 - 50 U/L Final     AST 07/09/2021 14  0 - 45 U/L Final     WBC 07/09/2021 7.5  4.0 - 11.0 10e9/L Final     RBC Count 07/09/2021 4.89  3.8 - 5.2 10e12/L Final     Hemoglobin 07/09/2021 12.4  11.7 - 15.7 g/dL Final     Hematocrit 07/09/2021 37.7  35.0 - 47.0 % Final     MCV 07/09/2021 77* 78 - 100 fl Final     MCH 07/09/2021 25.4* 26.5 - 33.0 pg Final     MCHC 07/09/2021 32.9  31.5 - 36.5 g/dL Final     RDW 07/09/2021 16.8* 10.0 - 15.0 % Final     Platelet Count 07/09/2021 293  150 - 450 10e9/L Final     % Neutrophils 07/09/2021 59.6  % Final     % Lymphocytes 07/09/2021 29.3  % Final     % Monocytes 07/09/2021 5.5  % Final     % Eosinophils 07/09/2021 5.2  % Final     % Basophils 07/09/2021 0.4  % Final     Absolute Neutrophil 07/09/2021 4.5  1.6 - 8.3 10e9/L Final     Absolute Lymphocytes 07/09/2021 2.2  0.8 - 5.3 10e9/L Final     Absolute Monocytes 07/09/2021 0.4  0.0 - 1.3 10e9/L Final     Absolute Eosinophils 07/09/2021 0.4  0.0 - 0.7 10e9/L Final     Absolute Basophils 07/09/2021 0.0   0.0 - 0.2 10e9/L Final     Diff Method 07/09/2021 Automated Method   Final         Imaging:    CT Chest/Abdomen/Pelvis w Contrast [[id number]] (Order [id number])  Exam Information    Exam Date Exam Time Accession # Performing Department Results    12/7/20 10:03 AM [id number] [facility]    PACS Images     Show images for CT Chest/Abdomen/Pelvis w Contrast   Study Result    Narrative & Impression   CT CHEST/ABDOMEN/PELVIS W CONTRAST 12/7/2020 10:03 AM     HISTORY: Breast cancer.     CONTRAST:  96 ml [id number].     TECHNIQUE: CT of the chest, abdomen, and pelvis is performed with IV  contrast.     Routine evaluated structures in the chest include the lungs,  mediastinal structures, pleura, and chest wall.     Routine assessed structures in the abdomen include the liver, spleen,  pancreas, adrenal glands, and kidneys. Also assessed are the  retroperitoneum, gastrointestinal tract, and the abdominal wall.     Intrapelvic anatomy is also assessed.     Radiation dose for this scan is reduced using automated exposure  control, adjustment of the mA and/or kV according to patient size, or  iterative reconstruction technique.     COMPARISON: Chest CT dated 9/29/2020. Abdomen/pelvis CT dated  6/3/2019.     FINDINGS:     Chest: Previously seen fluid collections involving the superficial  soft tissues all the left breast area have resolved in the interim.  Some overlying skin thickening medially is still present. A fluid  collection in the right breast area measures 8.8 cm.     Abdomen: Slight fatty infiltration of the liver is present. There is a  small subcentimeter cyst in the right kidney. Multiple nodules are  seen bilaterally involving the subcutaneous fat of the anterior  abdominal wall. There is associated superficial inflammation. They are  suspected to relate to subcutaneous injections.     Pelvis: No significant abnormality is demonstrated.                                                                       IMPRESSION:  No evidence of metastatic disease.     KOREY LILLY MD               Assessment and plan:  Diana Wilson is a 40 year old female with the followin.   2016  dx breast cancer, cT2N1 disesae.      S/p DD AC and wkly taxol, carbo was used partially dropped due to hypersensitivity reaction.   She had pCR for invasive cancer with mastectomies in 2017.   S/p RT till 2017.      She had BSO 2017. She did not tolerate Arimdiex off it after 1 month.   She started tamoxifen in 3/2018.      Due to recent post op DVT and PE, advice change to Arimidex early Oct. Due to her re appeared SOB, and 8-10% of sob side effects from Arimidex, adviced to change to Aromasin (no SOB side effects base on micromedex).       She is on 6 months follow-up. She requests annual CT.     2. Post breast re construction bilateral seg mental PE  and RLE DVT. Her PE resolved in Oct.   On  ASA.         3. 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   to continue gabapentin.  She feels this has been helpful.        4. hot flushes - advice Effexor, this has helped a lot.  She would love to stay on it long-term.        5. Osteopenia as baseline by dexa in .   Adviced vit D with proper dosing.   dexa found osteopenia in 2019.   We discussed the bisphosphonate use with ASCO guideline.   She made his informed decision to try this 2019.  She has good tolerance to it so far.      PLAN    DX Hip/Pelvis/Spine overdue    Return in 2021 with CT CAP, MD visit and labs.     Previous PE s/p surgery: continue  mg po daily    Osteopenia: to put zometa on hold until after clearance by dentist ( patient will currently have dental work done)    Neuopathy: refill for gabapentin is requested        The total time of this encounter amounted to 35 minutes.This time included face to face time spent with the patient, prep work, ordering tests, and performing post visit documentation.  The patient is ready to learn, no  apparent learning barriers were identified.  Diagnosis and treatment plans were explained to the patient. The patient expressed understanding of the content. The patient asked appropriate questions. The patient questions were answered to his satisfaction.        MARY JANE HAGEN MD

## 2021-07-12 NOTE — LETTER
"    7/12/2021         RE: Diana Wilson  1972 [address] [facility] [id number]        Dear Colleague,    Thank you for referring your patient, Diana Wilson, to the [facility]. Please see a copy of my visit note below.    Oncology Rooming Note    July 12, 2021 12:12 PM   Diana Wilson is a 40 year old female who presents for:    Chief Complaint   Patient presents with     Oncology Clinic Visit     6 month follow up breast cancer. Review labs and Zometa plan.      Initial Vitals: Ht 1.626 m (5' 4\")   LMP 08/05/2016 (Exact Date)   Breastfeeding No   BMI 33.32 kg/m   Estimated body mass index is 33.32 kg/m  as calculated from the following:    Height as of this encounter: 1.626 m (5' 4\").    Weight as of 2/25/21: 88 kg (194 lb 1.6 oz). Body surface area is 1.99 meters squared.  No Pain (0) Comment: Data Unavailable   Patient's last menstrual period was 08/05/2016 (exact date).  Allergies reviewed: Yes  Medications reviewed: Yes    Medications: No refills needed at this time.     Pharmacy name entered into Tactile: [facility] [place] - [address]    Clinical concerns: 6 month follow up breast cancer. Review labs and Zometa plan.       Irma Cuevas CMA                  Heme/onc visit note  July 12, 2021  Oncology team:    Reason for visit:    12/2016 diagnosed right breast cancer LN positive disease        HISTORY OF ONCOLOGY ILLNESS  She presented at age 36 breast lump in her right armpit and right breast in 10/2016.   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  ER/MS 99% positive, HER-2/kelle negative, associated with high-grade DCIS.    Right axillary ultrasound-guided biopsy indicating metastatic carcinoma positive for spread from breast primary, ER/MS both negative.  HER-2/kelle is negative.       She had clinical T2N1 disease. She made informed decision to proceed with " "neoadjuvant DDAC C1D1 1/16/2017. She has good clinical response post DD AC x4. Then went on to have wkly taxol + carbo  with informed decision in March.   She had carbo hypersensitivity reaction in early May (during C3). It was d/c after.   She finished chemo end of May 2017.      She was tested negative for BRCA1/2 with [employer].      7/2017 she had double mastectomies at [facility]. Right side found no residual invasive carcinoma, +grade III DCIS, 1mm in greatest dimension. 5 sLN all negative. She had ypTis(DCIS)pN0 disease. Left breast no cancer.      She finished post mastectomy RT in 11/2017.    She had BSO 11/2017. Then started Arimidex in 1/2018. She quit in Feb 2018 due to side effects and willing to try tamoxifen in 3/2018.      She was  on [employer] trial for weight loss.      She had right breast surgery on 9/17/2020 at [facility] involving removal of bilateral breast reconstruction implants, and TRAMP reconstruction.   Developed tachycardia and dyspnea with oxygen requirement while in the hospital shortly after her surgery and had a CT scan of the chest at that time that was negative for PE.    Her SOB was progressive post op and was in ER 9/29/2020. Found to have severe anemia hb of 6.9 from baseline of 12.8. had 2 units of PRBC.  Also found by CT to have multiple filling defects within the sub-segmental pulmonary arteries consistent with PE.  Ultrasound shows a nonocclusive thrombus in the right mid calf in the peroneal vein.   She was transferred to [facility] And on Lovenox injection      Tamoxifen was changed to AI early Oct, and she presented to [facility] for SOB mid Oct. Arimidex was stopped.   PE resolved.   She also had infection on re constructed breast s/p Abx.            PAST MEDICAL HISTORY/Meds: reviewed     SOCIAL HISTORY:  Lives with her .  They have 2 kids, first pregnancy age 22.  She did not do breast feeding because of bilateral breast reduction.       FAMILY HISTORY:  \"Full of " "cancer\" from the mother's side including colon, lung, pancreatic, gastric cancer.  According to the patient none of them survive older than 60 years old.   Grandmother  of cholangial carcinoma at age 94 in 2019.    Interval history: 21  Denies new symptoms, ROS as below    Past medical history:  Patient Active Problem List   Diagnosis     Mild intermittent asthma     Obesity     CARDIOVASCULAR SCREENING; LDL GOAL LESS THAN 130     Major depressive disorder, recurrent episode, mild (H)     Iron deficiency anemia     Insomnia     Health Care Home     Anxiety     Intestinal malabsorption     Lymphedema of right upper extremity     Malignant neoplasm of upper-outer quadrant of right breast in female, estrogen receptor positive (H)     Osteopenia of multiple sites     Multiple subsegmental pulmonary emboli without acute cor pulmonale (H)     Breast reconstruction disproportion       Medications:  acetaminophen (TYLENOL) 325 MG tablet, Take 2 tablets (650 mg) by mouth every 4 hours as needed for other (mild pain) (Patient not taking: Reported on 2021)  albuterol (PROAIR HFA/PROVENTIL HFA/VENTOLIN HFA) 108 (90 Base) MCG/ACT inhaler, Inhale 2 puffs into the lungs every 6 hours as needed for shortness of breath / dyspnea (Patient not taking: Reported on 2021)  Ascorbic Acid (VITAMIN C PO),   ASPIRIN 81 PO,   cetirizine (ZYRTEC) 10 MG tablet, Take 10 mg by mouth every morning  cholecalciferol (VITAMIN D3) 1000 units (25 mcg) capsule, Take 1 capsule by mouth every other day  clindamycin (CLINDAMAX) 1 % external gel, Apply to AA BID PRN  doxycycline monohydrate (MONODOX) 100 MG capsule, 1 cap PO BID with food and full glass of water  exemestane (AROMASIN) 25 MG tablet, Take 1 tablet (25 mg) by mouth daily  fluconazole (DIFLUCAN) 150 MG tablet, 1 tablet at sx onset; 1 additional tablet 3 days later if sx still present  fluocinonide (LIDEX) 0.05 % external solution, Apply to scalp BID x 3-4 weeks then PRN " "(Patient not taking: Reported on 6/24/2021)  Fluticasone Propionate (FLONASE NA), Spray 1 spray into both nostrils daily   gabapentin (NEURONTIN) 300 MG capsule, TAKE ONE CAPSULE BY MOUTH THREE TIMES A DAY  LORazepam (ATIVAN) 0.5 MG tablet, Take 1 tablet (0.5 mg) by mouth every 6 hours as needed for anxiety  Multiple Vitamins-Minerals (MULTIVITAMIN PO), Take 1 tablet by mouth daily   omeprazole (PRILOSEC) 20 MG DR capsule, TAKE ONE CAPSULE BY MOUTH EVERY MORNING 30 MINUTES BEFORE BREAKFAST  rivaroxaban ANTICOAGULANT (XARELTO ANTICOAGULANT) 15 MG TABS tablet, Take 1 tablet (15 mg) by mouth daily (with dinner) (Patient not taking: Reported on 6/24/2021)  venlafaxine (EFFEXOR-XR) 150 MG 24 hr capsule, Take 1 capsule (150 mg) by mouth daily  venlafaxine (EFFEXOR-XR) 75 MG 24 hr capsule, TAKE ONE CAPSULE BY MOUTH ONCE DAILY (ALONG WITH 150MG)    No current facility-administered medications on file prior to visit.      Allergy:     Allergies   Allergen Reactions     Carboplatin Shortness Of Breath, Rash and Anaphylaxis     Other reaction(s): Flushing, Tachycardia     Ambien      hallucinations     Amoxicillin GI Disturbance     Erythromycin GI Disturbance     Hydrocodone-Acetaminophen      Other reaction(s): Hallucinations     Hydromorphone Headache     Penicillins Nausea and Vomiting and Hives     1-11-17 pt states this was a childhood reaction     Zolpidem      Other reaction(s): Hallucinations     Hydrocodone Other (See Comments)     Out of body experience, \"creepy-crawly\" skin          Review of systems:    - CONSTITUTIONAL: Denies weight loss, fever and chills.    - HEENT: Denies changes in vision and hearing.    - ?RESPIRATORY: Denies SOB and cough.?    - CV: Denies palpitations and CP. ?    - GI: Denies abdominal pain, nausea, vomiting and diarrhea.?    - : Denies dysuria and urinary frequency.?    - MSK: Denies myalgia and joint pain.?    - SKIN: Denies rash and pruritus.    - ?NEUROLOGICAL: Denies headache and " "syncope. Neuropathy hands +     - PSYCHIATRIC: Denies recent changes in mood. Denies anxiety and depression.    - LYMPHATIC: no palpable lumps in the neck, axilla or groin areas.       Physical exam  LMP 08/05/2016 (Exact Date)   Wt Readings from Last 4 Encounters:   02/25/21 88 kg (194 lb 1.6 oz)   12/14/20 90.8 kg (200 lb 1.6 oz)   10/05/20 89 kg (196 lb 4.8 oz)   09/29/20 88.9 kg (196 lb)     Ht 1.626 m (5' 4\")   LMP 08/05/2016 (Exact Date)   Breastfeeding No   BMI 33.32 kg/m      Constitutional:       General: He is not in acute distress.     Appearance: Normal appearance. He is not toxic-appearing.   HENT:      Head: Normocephalic and atraumatic.      Right Ear: External ear normal.      Left Ear: External ear normal.   Eyes:      Extraocular Movements: Extraocular movements intact.      Conjunctiva/sclera: Conjunctivae normal.      Pupils: Pupils are equal, round, and reactive to light.   Neck:      Musculoskeletal: Normal range of motion and neck supple.   Cardiovascular:      Rate and Rhythm: Normal rate and regular rhythm.      Heart sounds: Normal heart sounds. No murmur. No gallop.    Pulmonary:      Effort: Pulmonary effort is normal.      Breath sounds: Normal breath sounds.   Abdominal:      General: Abdomen is flat. There is no distension.      Palpations: Abdomen is soft. There is no mass.      Tenderness: There is no abdominal tenderness. There is no guarding or rebound.      Hernia: No hernia is present.   Musculoskeletal: Normal range of motion.         General: No swelling or deformity.      Right lower leg: No edema.      Left lower leg: No edema.   Lymphadenopathy:      Cervical: No cervical adenopathy.   Skin:     General: Skin is warm and dry.         Labs:  Orders Only on 07/09/2021   Component Date Value Ref Range Status     CA 27-29 07/09/2021 21  0 - 39 U/mL Final    Assay Method:  Chemiluminescence using Siemens Centaur XP     Sodium 07/09/2021 139  133 - 144 mmol/L Final     " Potassium 07/09/2021 4.1  3.4 - 5.3 mmol/L Final     Chloride 07/09/2021 106  94 - 109 mmol/L Final     Carbon Dioxide 07/09/2021 27  20 - 32 mmol/L Final     Anion Gap 07/09/2021 6  3 - 14 mmol/L Final     Glucose 07/09/2021 110* 70 - 99 mg/dL Final     Urea Nitrogen 07/09/2021 12  7 - 30 mg/dL Final     Creatinine 07/09/2021 0.77  0.52 - 1.04 mg/dL Final     GFR Estimate 07/09/2021 >90  >60 mL/min/[1.73_m2] Final    Comment: Non  GFR Calc  Starting 12/18/2018, serum creatinine based estimated GFR (eGFR) will be   calculated using the Chronic Kidney Disease Epidemiology Collaboration   (CKD-EPI) equation.       GFR Estimate If Black 07/09/2021 >90  >60 mL/min/[1.73_m2] Final    Comment:  GFR Calc  Starting 12/18/2018, serum creatinine based estimated GFR (eGFR) will be   calculated using the Chronic Kidney Disease Epidemiology Collaboration   (CKD-EPI) equation.       Calcium 07/09/2021 9.1  8.5 - 10.1 mg/dL Final     Bilirubin Total 07/09/2021 0.4  0.2 - 1.3 mg/dL Final     Albumin 07/09/2021 3.9  3.4 - 5.0 g/dL Final     Protein Total 07/09/2021 7.4  6.8 - 8.8 g/dL Final     Alkaline Phosphatase 07/09/2021 85  40 - 150 U/L Final     ALT 07/09/2021 25  0 - 50 U/L Final     AST 07/09/2021 14  0 - 45 U/L Final     WBC 07/09/2021 7.5  4.0 - 11.0 10e9/L Final     RBC Count 07/09/2021 4.89  3.8 - 5.2 10e12/L Final     Hemoglobin 07/09/2021 12.4  11.7 - 15.7 g/dL Final     Hematocrit 07/09/2021 37.7  35.0 - 47.0 % Final     MCV 07/09/2021 77* 78 - 100 fl Final     MCH 07/09/2021 25.4* 26.5 - 33.0 pg Final     MCHC 07/09/2021 32.9  31.5 - 36.5 g/dL Final     RDW 07/09/2021 16.8* 10.0 - 15.0 % Final     Platelet Count 07/09/2021 293  150 - 450 10e9/L Final     % Neutrophils 07/09/2021 59.6  % Final     % Lymphocytes 07/09/2021 29.3  % Final     % Monocytes 07/09/2021 5.5  % Final     % Eosinophils 07/09/2021 5.2  % Final     % Basophils 07/09/2021 0.4  % Final     Absolute Neutrophil  07/09/2021 4.5  1.6 - 8.3 10e9/L Final     Absolute Lymphocytes 07/09/2021 2.2  0.8 - 5.3 10e9/L Final     Absolute Monocytes 07/09/2021 0.4  0.0 - 1.3 10e9/L Final     Absolute Eosinophils 07/09/2021 0.4  0.0 - 0.7 10e9/L Final     Absolute Basophils 07/09/2021 0.0  0.0 - 0.2 10e9/L Final     Diff Method 07/09/2021 Automated Method   Final         Imaging:    CT Chest/Abdomen/Pelvis w Contrast [[id number]] (Order [id number])  Exam Information    Exam Date Exam Time Accession # Performing Department Results    12/7/20 10:03 AM [id number] [facility]    PACS Images     Show images for CT Chest/Abdomen/Pelvis w Contrast   Study Result    Narrative & Impression   CT CHEST/ABDOMEN/PELVIS W CONTRAST 12/7/2020 10:03 AM     HISTORY: Breast cancer.     CONTRAST:  96 ml [id number].     TECHNIQUE: CT of the chest, abdomen, and pelvis is performed with IV  contrast.     Routine evaluated structures in the chest include the lungs,  mediastinal structures, pleura, and chest wall.     Routine assessed structures in the abdomen include the liver, spleen,  pancreas, adrenal glands, and kidneys. Also assessed are the  retroperitoneum, gastrointestinal tract, and the abdominal wall.     Intrapelvic anatomy is also assessed.     Radiation dose for this scan is reduced using automated exposure  control, adjustment of the mA and/or kV according to patient size, or  iterative reconstruction technique.     COMPARISON: Chest CT dated 9/29/2020. Abdomen/pelvis CT dated  6/3/2019.     FINDINGS:     Chest: Previously seen fluid collections involving the superficial  soft tissues all the left breast area have resolved in the interim.  Some overlying skin thickening medially is still present. A fluid  collection in the right breast area measures 8.8 cm.     Abdomen: Slight fatty infiltration of the liver is present. There is a  small subcentimeter cyst in the right kidney. Multiple nodules are  seen  bilaterally involving the subcutaneous fat of the anterior  abdominal wall. There is associated superficial inflammation. They are  suspected to relate to subcutaneous injections.     Pelvis: No significant abnormality is demonstrated.                                                                      IMPRESSION:  No evidence of metastatic disease.     KOREY LILLY MD               Assessment and plan:  Diana Wilson is a 40 year old female with the followin.   2016  dx breast cancer, cT2N1 disesae.      S/p DD AC and wkly taxol, carbo was used partially dropped due to hypersensitivity reaction.   She had pCR for invasive cancer with mastectomies in 2017.   S/p RT till 2017.      She had BSO 2017. She did not tolerate Arimdiex off it after 1 month.   She started tamoxifen in 3/2018.      Due to recent post op DVT and PE, advice change to Arimidex early Oct. Due to her re appeared SOB, and 8-10% of sob side effects from Arimidex, adviced to change to Aromasin (no SOB side effects base on micromedex).       She is on 6 months follow-up. She requests annual CT.     2. Post breast re construction bilateral seg mental PE  and RLE DVT. Her PE resolved in Oct.   On  ASA.         3. 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   to continue gabapentin.  She feels this has been helpful.        4. hot flushes - advice Effexor, this has helped a lot.  She would love to stay on it long-term.        5. Osteopenia as baseline by dexa in .   Adviced vit D with proper dosing.   dexa found osteopenia in 2019.   We discussed the bisphosphonate use with ASCO guideline.   She made his informed decision to try this 2019.  She has good tolerance to it so far.      PLAN    DX Hip/Pelvis/Spine overdue    Return in 2021 with CT CAP, MD visit and labs.     Previous PE s/p surgery: continue  mg po daily    Osteopenia: to put zometa on hold until after clearance by dentist ( patient will currently have dental work  done)    Neuopathy: refill for gabapentin is requested        The total time of this encounter amounted to 35 minutes.This time included face to face time spent with the patient, prep work, ordering tests, and performing post visit documentation.  The patient is ready to learn, no apparent learning barriers were identified.  Diagnosis and treatment plans were explained to the patient. The patient expressed understanding of the content. The patient asked appropriate questions. The patient questions were answered to his satisfaction.        MARY JANE GAYTAN MD        Again, thank you for allowing me to participate in the care of your patient.        Sincerely,        Cole Gaytan MD

## 2021-07-13 DIAGNOSIS — C50.411 MALIGNANT NEOPLASM OF UPPER-OUTER QUADRANT OF RIGHT BREAST IN FEMALE, ESTROGEN RECEPTOR POSITIVE (H): ICD-10-CM

## 2021-07-13 DIAGNOSIS — Z17.0 MALIGNANT NEOPLASM OF UPPER-OUTER QUADRANT OF RIGHT BREAST IN FEMALE, ESTROGEN RECEPTOR POSITIVE (H): ICD-10-CM

## 2021-07-13 RX ORDER — EXEMESTANE 25 MG/1
25 TABLET ORAL DAILY
Qty: 30 TABLET | Refills: 3 | Status: SHIPPED | OUTPATIENT
Start: 2021-07-13 | End: 2021-12-02

## 2021-07-13 RX ORDER — EXEMESTANE 25 MG/1
25 TABLET ORAL DAILY
Qty: 30 TABLET | Refills: 3 | Status: CANCELLED | OUTPATIENT
Start: 2021-07-13

## 2021-07-19 DIAGNOSIS — L73.9 FOLLICULITIS: ICD-10-CM

## 2021-07-19 RX ORDER — DOXYCYCLINE 100 MG/1
CAPSULE ORAL
Qty: 60 CAPSULE | Refills: 0 | Status: CANCELLED | OUTPATIENT
Start: 2021-07-19

## 2021-07-19 NOTE — TELEPHONE ENCOUNTER
Requested Prescriptions   Pending Prescriptions Disp Refills     doxycycline monohydrate (MONODOX) 100 MG capsule 60 capsule 0     Si cap PO BID with food and full glass of water       There is no refill protocol information for this order        Last office visit: 2021 with prescribing provider:  ONESIMO Bell   Future Office Visit:   Next 5 appointments (look out 90 days)    2021 11:30 AM  PHYSICAL with Yenifer Peters MD  [facility] ([facility] [id number] [address] [address]  [phone]               Denise Behrendt  Specialty CSS

## 2021-07-19 NOTE — TELEPHONE ENCOUNTER
Spoke to patient who did NOT request this refill as she was only to be on this medication for 1 month.     I did let the pharmacy know. Lucille Curtis RN

## 2021-07-20 ENCOUNTER — OFFICE VISIT (OUTPATIENT)
Dept: FAMILY MEDICINE | Facility: CLINIC | Age: 41
End: 2021-07-20
Payer: COMMERCIAL

## 2021-07-20 VITALS
HEART RATE: 98 BPM | DIASTOLIC BLOOD PRESSURE: 92 MMHG | BODY MASS INDEX: 34.16 KG/M2 | SYSTOLIC BLOOD PRESSURE: 129 MMHG | WEIGHT: 199 LBS | TEMPERATURE: 98.1 F

## 2021-07-20 DIAGNOSIS — F33.0 MAJOR DEPRESSIVE DISORDER, RECURRENT EPISODE, MILD (H): ICD-10-CM

## 2021-07-20 DIAGNOSIS — D50.9 IRON DEFICIENCY ANEMIA, UNSPECIFIED IRON DEFICIENCY ANEMIA TYPE: ICD-10-CM

## 2021-07-20 DIAGNOSIS — F41.9 ANXIETY: ICD-10-CM

## 2021-07-20 DIAGNOSIS — M25.50 MULTIPLE JOINT PAIN: ICD-10-CM

## 2021-07-20 DIAGNOSIS — C50.411 MALIGNANT NEOPLASM OF UPPER-OUTER QUADRANT OF RIGHT BREAST IN FEMALE, ESTROGEN RECEPTOR POSITIVE (H): ICD-10-CM

## 2021-07-20 DIAGNOSIS — Z12.4 SCREENING FOR CERVICAL CANCER: ICD-10-CM

## 2021-07-20 DIAGNOSIS — Z80.0 FAMILY HISTORY OF COLON CANCER: ICD-10-CM

## 2021-07-20 DIAGNOSIS — Z17.0 MALIGNANT NEOPLASM OF UPPER-OUTER QUADRANT OF RIGHT BREAST IN FEMALE, ESTROGEN RECEPTOR POSITIVE (H): ICD-10-CM

## 2021-07-20 DIAGNOSIS — Z00.01 ENCOUNTER FOR ROUTINE ADULT MEDICAL EXAM WITH ABNORMAL FINDINGS: Primary | ICD-10-CM

## 2021-07-20 LAB — FERRITIN SERPL-MCNC: 10 NG/ML (ref 12–150)

## 2021-07-20 PROCEDURE — 82728 ASSAY OF FERRITIN: CPT | Performed by: FAMILY MEDICINE

## 2021-07-20 PROCEDURE — 87624 HPV HI-RISK TYP POOLED RSLT: CPT | Performed by: FAMILY MEDICINE

## 2021-07-20 PROCEDURE — 99396 PREV VISIT EST AGE 40-64: CPT | Performed by: FAMILY MEDICINE

## 2021-07-20 PROCEDURE — 86431 RHEUMATOID FACTOR QUANT: CPT | Performed by: FAMILY MEDICINE

## 2021-07-20 PROCEDURE — G0145 SCR C/V CYTO,THINLAYER,RESCR: HCPCS | Performed by: FAMILY MEDICINE

## 2021-07-20 PROCEDURE — 86200 CCP ANTIBODY: CPT | Performed by: FAMILY MEDICINE

## 2021-07-20 PROCEDURE — 36415 COLL VENOUS BLD VENIPUNCTURE: CPT | Performed by: FAMILY MEDICINE

## 2021-07-20 RX ORDER — VENLAFAXINE HYDROCHLORIDE 75 MG/1
CAPSULE, EXTENDED RELEASE ORAL
Qty: 90 CAPSULE | Refills: 3 | Status: SHIPPED | OUTPATIENT
Start: 2021-07-20 | End: 2022-08-16

## 2021-07-20 RX ORDER — VENLAFAXINE HYDROCHLORIDE 150 MG/1
150 CAPSULE, EXTENDED RELEASE ORAL DAILY
Qty: 90 CAPSULE | Refills: 3 | Status: SHIPPED | OUTPATIENT
Start: 2021-07-20 | End: 2022-08-16

## 2021-07-20 ASSESSMENT — ENCOUNTER SYMPTOMS
ARTHRALGIAS: 1
NERVOUS/ANXIOUS: 1
HEARTBURN: 0
CONSTIPATION: 1
PALPITATIONS: 0
FREQUENCY: 0
ABDOMINAL PAIN: 0
PARESTHESIAS: 0
FEVER: 0
DIZZINESS: 0
MYALGIAS: 1
HEADACHES: 0
HEMATOCHEZIA: 0
SHORTNESS OF BREATH: 0
NAUSEA: 0
BREAST MASS: 0
DYSURIA: 0
COUGH: 0
JOINT SWELLING: 1
CHILLS: 0
HEMATURIA: 0
DIARRHEA: 0
EYE PAIN: 0
SORE THROAT: 0
WEAKNESS: 0

## 2021-07-20 ASSESSMENT — PATIENT HEALTH QUESTIONNAIRE - PHQ9
SUM OF ALL RESPONSES TO PHQ QUESTIONS 1-9: 1
5. POOR APPETITE OR OVEREATING: SEVERAL DAYS

## 2021-07-20 ASSESSMENT — ANXIETY QUESTIONNAIRES
5. BEING SO RESTLESS THAT IT IS HARD TO SIT STILL: NOT AT ALL
7. FEELING AFRAID AS IF SOMETHING AWFUL MIGHT HAPPEN: SEVERAL DAYS
GAD7 TOTAL SCORE: 6
1. FEELING NERVOUS, ANXIOUS, OR ON EDGE: SEVERAL DAYS
2. NOT BEING ABLE TO STOP OR CONTROL WORRYING: SEVERAL DAYS
3. WORRYING TOO MUCH ABOUT DIFFERENT THINGS: SEVERAL DAYS
6. BECOMING EASILY ANNOYED OR IRRITABLE: SEVERAL DAYS

## 2021-07-20 NOTE — PROGRESS NOTES
SUBJECTIVE:   CC: Diana Wilson is an 41 year old woman who presents for preventive health visit.       Patient has been advised of split billing requirements and indicates understanding: Yes  Healthy Habits:  Answers for HPI/ROS submitted by the patient on 7/20/2021  Frequency of exercise:: 4-5 days/week  Getting at least 3 servings of Calcium per day:: Yes  Diet:: Gluten-free/reduced  Taking medications regularly:: Yes  Medication side effects:: Significant flushing, Other  Bi-annual eye exam:: NO  Dental care twice a year:: Yes  Sleep apnea or symptoms of sleep apnea:: None  abdominal pain: No  Blood in stool: No  Blood in urine: No  chest pain: No  chills: No  congestion: No  constipation: Yes - intermittent   cough: No  diarrhea: No  dizziness: No  ear pain: No  eye pain: No  nervous/anxious: Yes - ongoing   fever: No  frequency: No  genital sores: No  headaches: No  hearing loss: No  heartburn: No  arthralgias: Yes - hand stiffness   joint swelling: Yes - hand stiffness   peripheral edema: No  mood changes: No  myalgias: Yes  nausea: No  dysuria: No  palpitations: No  Skin sensation changes: No  sore throat: No  urgency: No  rash: No  shortness of breath: No  visual disturbance: No  weakness: No  pelvic pain: No  vaginal bleeding: No  vaginal discharge: No  tenderness: No  breast mass: No  breast discharge: No  Additional concerns today:: Yes    Duration of exercise:: 45-60 minutes    Stiff AM hands - worse over  A year   Can't make a full fist   Father was just diagnosed with RA   No rash   Right knee occasionally hurts   Hard to  coffee       Today's PHQ-2 Score: 0  PHQ-2 ( 1999 [employer]) 7/20/2021 11/19/2019   Q1: Little interest or pleasure in doing things 0 0   Q2: Feeling down, depressed or hopeless 0 0   PHQ-2 Score 0 0   Q1: Little interest or pleasure in doing things Not at all -   Q2: Feeling down, depressed or hopeless Not at all -   PHQ-2 Score 0 -     On effexor and it is working well : 225mg      Stopped taking tamoxifen and mood is improved  On aromasin now - worried about weight gain     Sister is alcoholic  Mom pancreatic ca    Has been to genetic counselor - no known link     Many family members with colon ca     Abuse: Current or Past(Physical, Sexual or Emotional)- No  Do you feel safe in your environment? Yes    Have you ever done Advance Care Planning? (For example, a Health Directive, POLST, or a discussion with a medical provider or your loved ones about your wishes): No, advance care planning information given to patient to review.  Patient declined advance care planning discussion at this time.    Social History     Tobacco Use     Smoking status: Former Smoker     Packs/day: 0.50     Years: 10.00     Pack years: 5.00     Types: Cigarettes     Quit date: 10/1/2016     Years since quittin.8     Smokeless tobacco: Never Used     Tobacco comment: Socially- quit smoking 10/01/2016   Substance Use Topics     Alcohol use: Yes     Alcohol/week: 0.0 standard drinks     Comment: Occassional     If you drink alcohol do you typically have >3 drinks per day or >7 drinks per week? No                     Reviewed orders with patient.  Reviewed health maintenance and updated orders accordingly - Yes  Labs reviewed in EPIC    Pertinent mammograms are reviewed under the imaging tab.  History of abnormal Pap smear:   PAP / HPV Latest Ref Rng & Units 2017   PAP - NIL NIL NIL   HPV 16 DNA NEG:Negative Negative Negative -   HPV 18 DNA NEG:Negative Negative Negative -   OTHER HR HPV NEG:Negative Negative Negative -     Reviewed and updated as needed this visit by clinical staff  Tobacco  Allergies  Meds   Med Hx  Surg Hx  Fam Hx  Soc Hx        Reviewed and updated as needed this visit by Provider                    ROS:  CONSTITUTIONAL: NEGATIVE for fever, chills, change in weight  INTEGUMENTARY/SKIN: NEGATIVE for worrisome rashes, moles or lesions  EYES: NEGATIVE for vision  changes or irritation  ENT: NEGATIVE for ear, mouth and throat problems  RESP: NEGATIVE for significant cough or SOB  BREAST: NEGATIVE for masses, tenderness or discharge  CV: NEGATIVE for chest pain, palpitations or peripheral edema  GI: NEGATIVE for nausea, abdominal pain, heartburn, or change in bowel habits  : NEGATIVE for unusual urinary or vaginal symptoms. No vaginal bleeding.  MUSCULOSKELETAL: NEGATIVE for significant arthralgias or myalgia  NEURO: NEGATIVE for weakness, dizziness or paresthesias  PSYCHIATRIC: NEGATIVE for changes in mood or affect     OBJECTIVE:   BP (!) 129/92   Pulse 98   Temp 98.1  F (36.7  C) (Tympanic)   Wt 90.3 kg (199 lb)   LMP 08/05/2016 (Exact Date)   BMI 34.16 kg/m    EXAM:  GENERAL: healthy, alert and no distress  EYES: Eyes grossly normal to inspection, PERRL and conjunctivae and sclerae normal  HENT: ear canals and TM's normal, nose and mouth without ulcers or lesions  NECK: no adenopathy, no asymmetry, masses, or scars and thyroid normal to palpation  RESP: lungs clear to auscultation - no rales, rhonchi or wheezes  BREAST: normal without masses, tenderness or nipple discharge and no palpable axillary masses or adenopathy  CV: regular rate and rhythm, normal S1 S2, no S3 or S4, no murmur, click or rub, no peripheral edema and peripheral pulses strong  ABDOMEN: soft, nontender, no hepatosplenomegaly, no masses and bowel sounds normal   (female): normal female external genitalia, normal urethral meatus, vaginal mucosa pink, moist, well rugated, and normal cervix/adnexa/uterus without masses or discharge  MS: no gross musculoskeletal defects noted, no edema  SKIN: no suspicious lesions or rashes  NEURO: Normal strength and tone, mentation intact and speech normal  PSYCH: mentation appears normal, affect normal/bright      ASSESSMENT/PLAN:   (Z00.01) Encounter for routine adult medical exam with abnormal findings  (primary encounter diagnosis)  Comment: We discussed self  "breast exams, exercise 30mins/day, and calcium with vitamin D at 1200mg/day, preferably from dietary sources.  Diet, Weight loss, and Exercise were discussed as well .       (F33.0) Major depressive disorder, recurrent episode, mild (H)  Comment: discussed. Stable. meds refilled for her   Plan: venlafaxine (EFFEXOR-XR) 150 MG 24 hr capsule,         venlafaxine (EFFEXOR-XR) 75 MG 24 hr capsule            (F41.9) Anxiety  Comment: stable. Refilled meds   Plan: venlafaxine (EFFEXOR-XR) 150 MG 24 hr capsule,         venlafaxine (EFFEXOR-XR) 75 MG 24 hr capsule            (Z12.4) Screening for cervical cancer  Comment:   Plan: Pap imaged thin layer screen with HPV -         recommended age 30 - 65            (Z80.0) Family history of colon cancer  Comment: not clear when to start screening. Maybe in the next couple years. The patient indicates understanding of these issues and agrees with the plan.   Plan:     (M25.50) Multiple joint pain  Comment: no synovitis but doesn't report that she is unable to make a fist in the a.m.   Plan: Rheumatoid factor, Cyclic Citrullinated Peptide        Antibody IgG            (C50.411,  Z17.0) Malignant neoplasm of upper-outer quadrant of right breast in female, estrogen receptor positive (H)  Comment: stable.   Plan:     (D50.9) Iron deficiency anemia, unspecified iron deficiency anemia type  Comment: improved hgb   Plan:     Patient has been advised of split billing requirements and indicates understanding: Yes  COUNSELING:   Reviewed preventive health counseling, as reflected in patient instructions       Regular exercise       Healthy diet/nutrition    Estimated body mass index is 33.32 kg/m  as calculated from the following:    Height as of 7/12/21: 1.626 m (5' 4\").    Weight as of 2/25/21: 88 kg (194 lb 1.6 oz).    Weight management plan: Discussed healthy diet and exercise guidelines    She reports that she quit smoking about 4 years ago. Her smoking use included cigarettes. She " has a 5.00 pack-year smoking history. She has never used smokeless tobacco.      Counseling Resources:  ATP IV Guidelines  Pooled Cohorts Equation Calculator  Breast Cancer Risk Calculator  BRCA-Related Cancer Risk Assessment: FHS-7 Tool  FRAX Risk Assessment  ICSI Preventive Guidelines  Dietary Guidelines for Americans, 2010  USDA's MyPlate  ASA Prophylaxis  Lung CA Screening    Yenifer Peters MD  [facility]

## 2021-07-21 LAB
CCP AB SER IA-ACNC: 0.5 U/ML
RHEUMATOID FACT SER NEPH-ACNC: <7 IU/ML

## 2021-07-21 ASSESSMENT — ANXIETY QUESTIONNAIRES: GAD7 TOTAL SCORE: 6

## 2021-07-22 DIAGNOSIS — K21.9 GASTROESOPHAGEAL REFLUX DISEASE, UNSPECIFIED WHETHER ESOPHAGITIS PRESENT: ICD-10-CM

## 2021-07-22 LAB
BKR LAB AP GYN ADEQUACY: NORMAL
BKR LAB AP GYN INTERPRETATION: NORMAL
BKR LAB AP HPV REFLEX: NORMAL
BKR LAB AP PREVIOUS ABNORMAL: NORMAL
PATH REPORT.COMMENTS IMP SPEC: NORMAL
PATH REPORT.RELEVANT HX SPEC: NORMAL

## 2021-07-22 NOTE — TELEPHONE ENCOUNTER
Prescription approved per [facility] Refill Protocol.    Regino Swartz RN

## 2021-07-27 LAB
HUMAN PAPILLOMA VIRUS 16 DNA: NEGATIVE
HUMAN PAPILLOMA VIRUS 18 DNA: NEGATIVE
HUMAN PAPILLOMA VIRUS FINAL DIAGNOSIS: NORMAL
HUMAN PAPILLOMA VIRUS OTHER HR: NEGATIVE

## 2021-08-03 ENCOUNTER — ONCOLOGY VISIT (OUTPATIENT)
Dept: ONCOLOGY | Facility: CLINIC | Age: 41
End: 2021-08-03
Attending: INTERNAL MEDICINE
Payer: COMMERCIAL

## 2021-08-03 VITALS
RESPIRATION RATE: 18 BRPM | HEIGHT: 64 IN | HEART RATE: 95 BPM | DIASTOLIC BLOOD PRESSURE: 88 MMHG | BODY MASS INDEX: 33.97 KG/M2 | WEIGHT: 199 LBS | OXYGEN SATURATION: 96 % | TEMPERATURE: 98 F | SYSTOLIC BLOOD PRESSURE: 124 MMHG

## 2021-08-03 DIAGNOSIS — F33.0 MAJOR DEPRESSIVE DISORDER, RECURRENT EPISODE, MILD (H): ICD-10-CM

## 2021-08-03 DIAGNOSIS — E61.1 IRON DEFICIENCY: ICD-10-CM

## 2021-08-03 DIAGNOSIS — M85.89 OSTEOPENIA OF MULTIPLE SITES: ICD-10-CM

## 2021-08-03 DIAGNOSIS — C50.411 MALIGNANT NEOPLASM OF UPPER-OUTER QUADRANT OF RIGHT BREAST IN FEMALE, ESTROGEN RECEPTOR POSITIVE (H): Primary | ICD-10-CM

## 2021-08-03 DIAGNOSIS — Z17.0 MALIGNANT NEOPLASM OF UPPER-OUTER QUADRANT OF RIGHT BREAST IN FEMALE, ESTROGEN RECEPTOR POSITIVE (H): Primary | ICD-10-CM

## 2021-08-03 DIAGNOSIS — K90.0 CELIAC DISEASE: ICD-10-CM

## 2021-08-03 PROCEDURE — G0463 HOSPITAL OUTPT CLINIC VISIT: HCPCS

## 2021-08-03 PROCEDURE — 99215 OFFICE O/P EST HI 40 MIN: CPT | Performed by: INTERNAL MEDICINE

## 2021-08-03 RX ORDER — ALBUTEROL SULFATE 90 UG/1
1-2 AEROSOL, METERED RESPIRATORY (INHALATION)
Status: CANCELLED
Start: 2021-08-03

## 2021-08-03 RX ORDER — ALBUTEROL SULFATE 0.83 MG/ML
2.5 SOLUTION RESPIRATORY (INHALATION)
Status: CANCELLED | OUTPATIENT
Start: 2021-08-03

## 2021-08-03 RX ORDER — HEPARIN SODIUM (PORCINE) LOCK FLUSH IV SOLN 100 UNIT/ML 100 UNIT/ML
5 SOLUTION INTRAVENOUS
Status: CANCELLED | OUTPATIENT
Start: 2021-08-03

## 2021-08-03 RX ORDER — NALOXONE HYDROCHLORIDE 0.4 MG/ML
0.2 INJECTION, SOLUTION INTRAMUSCULAR; INTRAVENOUS; SUBCUTANEOUS
Status: CANCELLED | OUTPATIENT
Start: 2021-08-03

## 2021-08-03 RX ORDER — EPINEPHRINE 1 MG/ML
0.3 INJECTION, SOLUTION, CONCENTRATE INTRAVENOUS EVERY 5 MIN PRN
Status: CANCELLED | OUTPATIENT
Start: 2021-08-03

## 2021-08-03 RX ORDER — DIPHENHYDRAMINE HYDROCHLORIDE 50 MG/ML
50 INJECTION INTRAMUSCULAR; INTRAVENOUS
Status: CANCELLED
Start: 2021-08-03

## 2021-08-03 RX ORDER — HEPARIN SODIUM,PORCINE 10 UNIT/ML
5 VIAL (ML) INTRAVENOUS
Status: CANCELLED | OUTPATIENT
Start: 2021-08-03

## 2021-08-03 RX ORDER — METHYLPREDNISOLONE SODIUM SUCCINATE 125 MG/2ML
125 INJECTION, POWDER, LYOPHILIZED, FOR SOLUTION INTRAMUSCULAR; INTRAVENOUS
Status: CANCELLED
Start: 2021-08-03

## 2021-08-03 RX ORDER — MEPERIDINE HYDROCHLORIDE 25 MG/ML
25 INJECTION INTRAMUSCULAR; INTRAVENOUS; SUBCUTANEOUS EVERY 30 MIN PRN
Status: CANCELLED | OUTPATIENT
Start: 2021-08-03

## 2021-08-03 ASSESSMENT — MIFFLIN-ST. JEOR: SCORE: 1547.66

## 2021-08-03 ASSESSMENT — PAIN SCALES - GENERAL: PAINLEVEL: NO PAIN (0)

## 2021-08-03 NOTE — PROGRESS NOTES
Hematology/ Oncology Follow-up Visit:  Aug 3, 2021    Reason for Visit:   Chief Complaint   Patient presents with     Oncology Clinic Visit     Follow up history of breast cancer.      Hematology     Low Ferritin.        Oncologic History:  Diana Wilson presented at age 36 breast lump in her right armpit and right breast in 10/2016. 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  ER/KS 99% positive, HER-2/kelle negative, associated with high-grade DCIS.  Right axillary ultrasound-guided biopsy indicating metastatic carcinoma positive for spread from breast primary, ER/KS both negative.  HER-2/kelle is negative.  She had clinical T2N1 disease. She made informed decision to proceed with neoadjuvant DDAC C1D1 1/16/2017. She has good clinical response post DD AC x4. Then went on to have wkly taxol + carbo  with informed decision in March.   She had carbo hypersensitivity reaction in early May (during C3). It was d/c after. She finished chemo end of May 2017. She was tested negative for BRCA1/2 with [facility]. 7/2017 she had double mastectomies at [facility]. Right side found no residual invasive carcinoma, +grade III DCIS, 1mm in greatest dimension. 5 sLN all negative. She had ypTis(DCIS)pN0 disease. Left breast no cancer. She finished post mastectomy RT in 11/2017.  She had BSO 11/2017. Then started Arimidex in 1/2018. She quit in Feb 2018 due to side effects and willing to try tamoxifen in 3/2018.   She had right breast surgery on 9/17/2020 at [facility] involving removal of bilateral breast reconstruction implants, and TRAMP reconstruction.   Developed tachycardia and dyspnea with oxygen requirement while in the hospital shortly after her surgery and had a CT scan of the chest at that time that was negative for PE.  Her SOB was progressive post op and was in ER 9/29/2020. Found to have severe anemia hb of 6.9 from baseline of 12.8. had 2 units of  PRBC.Also found by CT to have multiple filling defects within the sub-segmental pulmonary arteries consistent with PE.  Ultrasound shows a nonocclusive thrombus in the right mid calf in the peroneal vein. Tamoxifen was changed to AI early Oct, and she presented to [facility] for SOB mid Oct. Arimidex was stopped.   PE resolved. She also had infection on re constructed breast s/p Abx.     Interval History:  And is here today for follow-up of her anemia.  Most recent hemoglobin has been normal.  Ferritin continues to be low.  Patient known with celiac disease.  She is strict with her celiac diet.  She denies any nausea or vomiting or diarrhea.  She denies any shortness of breath or cough or wheezing.  She denies any fever or chills.    Review Of Systems:  All other ROS negative unless mentioned in interval history.    Past medical, social, surgical, and family histories reviewed.    Allergies:  Allergies as of 08/03/2021 - Reviewed 08/03/2021   Allergen Reaction Noted     Carboplatin Shortness Of Breath, Rash, and Anaphylaxis 05/09/2017     Ambien  07/13/2012     Amoxicillin GI Disturbance 01/05/2006     Erythromycin GI Disturbance 01/05/2006     Hydrocodone-acetaminophen  01/16/2017     Hydromorphone Headache 09/29/2020     Penicillins Nausea and Vomiting and Hives 01/05/2006     Zolpidem  05/24/2017     Hydrocodone Other (See Comments) 09/01/2015       Current Medications:  Current Outpatient Medications   Medication Sig Dispense Refill     acetaminophen (TYLENOL) 325 MG tablet Take 2 tablets (650 mg) by mouth every 4 hours as needed for other (mild pain) 100 tablet 0     Ascorbic Acid (VITAMIN C PO)        aspirin (ASA) 325 MG EC tablet Take 325 mg by mouth every 6 hours as needed for moderate pain       cetirizine (ZYRTEC) 10 MG tablet Take 10 mg by mouth every morning       cholecalciferol (VITAMIN D3) 1000 units (25 mcg) capsule Take 1 capsule by mouth every other day       clindamycin (CLINDAMAX) 1 %  "external gel Apply to AA BID PRN 60 g 3     exemestane (AROMASIN) 25 MG tablet Take 1 tablet (25 mg) by mouth daily 30 tablet 3     fluocinonide (LIDEX) 0.05 % external solution Apply to scalp BID x 3-4 weeks then PRN 50 mL 3     Fluticasone Propionate (FLONASE NA) Spray 1 spray into both nostrils daily        gabapentin (NEURONTIN) 300 MG capsule Take 1 capsule (300 mg) by mouth 3 times daily 300 capsule 0     gabapentin (NEURONTIN) 300 MG capsule TAKE ONE CAPSULE BY MOUTH THREE TIMES A DAY 90 capsule 3     LORazepam (ATIVAN) 0.5 MG tablet Take 1 tablet (0.5 mg) by mouth every 6 hours as needed for anxiety 3 tablet 0     Multiple Vitamins-Minerals (MULTIVITAMIN PO) Take 1 tablet by mouth daily        omeprazole (PRILOSEC) 20 MG DR capsule TAKE ONE CAPSULE BY MOUTH EVERY MORNING 30 MINUTES BEFORE BREAKFAST 90 capsule 1     venlafaxine (EFFEXOR-XR) 150 MG 24 hr capsule Take 1 capsule (150 mg) by mouth daily 90 capsule 3     venlafaxine (EFFEXOR-XR) 75 MG 24 hr capsule TAKE ONE CAPSULE BY MOUTH ONCE DAILY (ALONG WITH 150MG) 90 capsule 3     doxycycline monohydrate (MONODOX) 100 MG capsule 1 cap PO BID with food and full glass of water (Patient not taking: Reported on 8/3/2021) 60 capsule 0        Physical Exam:  /88 (BP Location: Right arm, Patient Position: Sitting, Cuff Size: Adult Large)   Pulse 95   Temp 98  F (36.7  C) (Oral)   Resp 18   Ht 1.626 m (5' 4\")   Wt 90.3 kg (199 lb)   LMP 08/05/2016 (Exact Date)   SpO2 96%   Breastfeeding No   BMI 34.16 kg/m    Wt Readings from Last 12 Encounters:   08/03/21 90.3 kg (199 lb)   07/20/21 90.3 kg (199 lb)   02/25/21 88 kg (194 lb 1.6 oz)   12/14/20 90.8 kg (200 lb 1.6 oz)   10/05/20 89 kg (196 lb 4.8 oz)   09/29/20 88.9 kg (196 lb)   09/01/20 92.1 kg (203 lb)   04/27/20 91.9 kg (202 lb 11.2 oz)   02/21/20 89.4 kg (197 lb)   11/19/19 89.5 kg (197 lb 6.4 oz)   10/28/19 89.4 kg (197 lb)   06/11/19 89.4 kg (197 lb 1.6 oz)       GENERAL APPEARANCE: Healthy, " "alert and in no acute distress.  HEENT: Sclerae anicteric. PERRLA. Oropharynx without ulcers, lesions, or thrush.  NECK: Supple. No asymmetry or masses.  LYMPHATICS: No palpable cervical, supraclavicular, axillary, or inguinal lymphadenopathy.  RESP: Lungs clear to auscultation bilaterally without rales, rhonchi or wheezes.\",  BREAST: Scars of bilateral mastectomy and reconstruction.  \"CARDIOVASCULAR: Regular rate and rhythm. Normal S1, S2; no S3 or S4. No murmur, gallop, or rub.  ABDOMEN: Soft, nontender. Bowel sounds normal. No palpable organomegaly or masses.  MUSCULOSKELETAL: Extremities without gross deformities noted. No edema of bilateral lower extremities.  SKIN: No suspicious lesions or rashes.  NEURO: Alert and oriented x 3. Cranial nerves II-XII grossly intact.  PSYCHIATRIC: Mentation and affect appear normal.    Laboratory/Imaging Studies:  No visits with results within 2 Week(s) from this visit.   Latest known visit with results is:   Office Visit on 07/20/2021   Component Date Value Ref Range Status     Interpretation 07/20/2021 Negative for Intraepithelial Lesion or Malignancy (NILM)    Final     Specimen Adequacy 07/20/2021 Satisfactory for evaluation, endocervical/transformation zone component present   Final     Clinical Information 07/20/2021    Final                    Value:This result contains rich text formatting which cannot be displayed here.     HPV Reflex 07/20/2021 Yes regardless of result   Final     Previous Abnormal? 07/20/2021    Final                    Value:This result contains rich text formatting which cannot be displayed here.     Performing Labs 07/20/2021    Final                    Value:This result contains rich text formatting which cannot be displayed here.     Ferritin 07/20/2021 10* 12 - 150 ng/mL Final     Rheumatoid Factor 07/20/2021 <7  <20 IU/mL Final     Cyclic Citrullinated Peptide Antib* 07/20/2021 0.5  U/mL Final     Other HR HPV 07/20/2021 Negative   Final     " HPV16 DNA 07/20/2021 Negative   Final     HPV18 DNA 07/20/2021 Negative   Final     FINAL DIAGNOSIS 07/20/2021    Final                    Value:This result contains rich text formatting which cannot be displayed here.          Assessment and plan:    (C50.411,  Z17.0) Malignant neoplasm of upper-outer quadrant of right breast in female, estrogen receptor positive (H)  (primary encounter diagnosis)  I reviewed with the patient today most recent laboratory test.  There is no clinical evidence of breast cancer recurrence.    (F33.0) Major depressive disorder, recurrent episode, mild (H)  Patient currently on Effexor 150 mg every 24 hours  (M85.89) Osteopenia of multiple sites  Patient currently on Zometa.  She is also on calcium vitamin D supplements    (E61.1) Iron deficiency  Ferritin continues to be low.  We will recommend to proceed with IV iron infusion.  I discussed with the patient potential side effects.  We will recheck her ferritin and CBC again in 3 months time.    (K90.0) Celiac disease  Patient continues on celiac diet    The patient is ready to learn, no apparent learning barriers were identified.  Diagnosis and treatment plans were explained to the patient. The patient expressed understanding of the content. The patient asked appropriate questions. The patient questions were answered to her satisfaction.    Chart documentation with Dragon Voice recognition Software. Although reviewed after completion, some words and grammatical errors may remain.

## 2021-08-03 NOTE — PROGRESS NOTES
"Oncology Rooming Note    August 3, 2021 8:32 AM   Diana Wilson is a 41 year old female who presents for:    Chief Complaint   Patient presents with     Oncology Clinic Visit     Follow up history of breast cancer.      Hematology     Low Ferritin.      Initial Vitals: LMP 08/05/2016 (Exact Date)  Estimated body mass index is 34.16 kg/m  as calculated from the following:    Height as of 7/12/21: 1.626 m (5' 4\").    Weight as of 7/20/21: 90.3 kg (199 lb). There is no height or weight on file to calculate BSA.  Data Unavailable Comment: Data Unavailable   Patient's last menstrual period was 08/05/2016 (exact date).  Allergies reviewed: Yes  Medications reviewed: Yes    Medications: Medication refills not needed today.  Pharmacy name entered into [facility]: [facility] [place] - [id number]    Clinical concerns: Follow up history of breast cancer. Review lab results, low ferritin.       Irma Cuevas, MEENA            "

## 2021-08-03 NOTE — LETTER
"    8/3/2021         RE: Diana Wilson  [address] [facility] [id number]        Dear Colleague,    Thank you for referring your patient, Diana Wilson, to the [facility]. Please see a copy of my visit note below.    Oncology Rooming Note    August 3, 2021 8:32 AM   Diana Wilson is a 41 year old female who presents for:    Chief Complaint   Patient presents with     Oncology Clinic Visit     Follow up history of breast cancer.      Hematology     Low Ferritin.      Initial Vitals: LMP 08/05/2016 (Exact Date)  Estimated body mass index is 34.16 kg/m  as calculated from the following:    Height as of 7/12/21: 1.626 m (5' 4\").    Weight as of 7/20/21: 90.3 kg (199 lb). There is no height or weight on file to calculate BSA.  Data Unavailable Comment: Data Unavailable   Patient's last menstrual period was 08/05/2016 (exact date).  Allergies reviewed: Yes  Medications reviewed: Yes    Medications: Medication refills not needed today.  Pharmacy name entered into [facility]: [facility] [place], [place] - [address]    Clinical concerns: Follow up history of breast cancer. Review lab results, low ferritin.       Iram Cuevas, [facility]              Hematology/ Oncology Follow-up Visit:  Aug 3, 2021    Reason for Visit:   Chief Complaint   Patient presents with     Oncology Clinic Visit     Follow up history of breast cancer.      Hematology     Low Ferritin.        Oncologic History:  Diana Wilson presented at age 36 breast lump in her right armpit and right breast in 10/2016. 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  ER/MI 99% positive, HER-2/kelle negative, associated with high-grade DCIS.  Right axillary ultrasound-guided biopsy indicating metastatic carcinoma positive for spread from breast primary, ER/MI both negative.  HER-2/kelle is negative.  She had clinical T2N1 disease. She made " informed decision to proceed with neoadjuvant DDAC C1D1 1/16/2017. She has good clinical response post DD AC x4. Then went on to have wkly taxol + carbo  with informed decision in March.   She had carbo hypersensitivity reaction in early May (during C3). It was d/c after. She finished chemo end of May 2017. She was tested negative for BRCA1/2 with [facility]. 7/2017 she had double mastectomies at [facility]. Right side found no residual invasive carcinoma, +grade III DCIS, 1mm in greatest dimension. 5 sLN all negative. She had ypTis(DCIS)pN0 disease. Left breast no cancer. She finished post mastectomy RT in 11/2017.  She had BSO 11/2017. Then started Arimidex in 1/2018. She quit in Feb 2018 due to side effects and willing to try tamoxifen in 3/2018.   She had right breast surgery on 9/17/2020 at [facility] involving removal of bilateral breast reconstruction implants, and TRAMP reconstruction.   Developed tachycardia and dyspnea with oxygen requirement while in the hospital shortly after her surgery and had a CT scan of the chest at that time that was negative for PE.  Her SOB was progressive post op and was in ER 9/29/2020. Found to have severe anemia hb of 6.9 from baseline of 12.8. had 2 units of PRBC.Also found by CT to have multiple filling defects within the sub-segmental pulmonary arteries consistent with PE.  Ultrasound shows a nonocclusive thrombus in the right mid calf in the peroneal vein. Tamoxifen was changed to AI early Oct, and she presented to [facility] for SOB mid Oct. Arimidex was stopped.   PE resolved. She also had infection on re constructed breast s/p Abx.     Interval History:  And is here today for follow-up of her anemia.  Most recent hemoglobin has been normal.  Ferritin continues to be low.  Patient known with celiac disease.  She is strict with her celiac diet.  She denies any nausea or vomiting or diarrhea.  She denies any shortness of breath or cough or wheezing.  She denies any fever  or chills.    Review Of Systems:  All other ROS negative unless mentioned in interval history.    Past medical, social, surgical, and family histories reviewed.    Allergies:  Allergies as of 08/03/2021 - Reviewed 08/03/2021   Allergen Reaction Noted     Carboplatin Shortness Of Breath, Rash, and Anaphylaxis 05/09/2017     Ambien  07/13/2012     Amoxicillin GI Disturbance 01/05/2006     Erythromycin GI Disturbance 01/05/2006     Hydrocodone-acetaminophen  01/16/2017     Hydromorphone Headache 09/29/2020     Penicillins Nausea and Vomiting and Hives 01/05/2006     Zolpidem  05/24/2017     Hydrocodone Other (See Comments) 09/01/2015       Current Medications:  Current Outpatient Medications   Medication Sig Dispense Refill     acetaminophen (TYLENOL) 325 MG tablet Take 2 tablets (650 mg) by mouth every 4 hours as needed for other (mild pain) 100 tablet 0     Ascorbic Acid (VITAMIN C PO)        aspirin (ASA) 325 MG EC tablet Take 325 mg by mouth every 6 hours as needed for moderate pain       cetirizine (ZYRTEC) 10 MG tablet Take 10 mg by mouth every morning       cholecalciferol (VITAMIN D3) 1000 units (25 mcg) capsule Take 1 capsule by mouth every other day       clindamycin (CLINDAMAX) 1 % external gel Apply to AA BID PRN 60 g 3     exemestane (AROMASIN) 25 MG tablet Take 1 tablet (25 mg) by mouth daily 30 tablet 3     fluocinonide (LIDEX) 0.05 % external solution Apply to scalp BID x 3-4 weeks then PRN 50 mL 3     Fluticasone Propionate (FLONASE NA) Spray 1 spray into both nostrils daily        gabapentin (NEURONTIN) 300 MG capsule Take 1 capsule (300 mg) by mouth 3 times daily 300 capsule 0     gabapentin (NEURONTIN) 300 MG capsule TAKE ONE CAPSULE BY MOUTH THREE TIMES A DAY 90 capsule 3     LORazepam (ATIVAN) 0.5 MG tablet Take 1 tablet (0.5 mg) by mouth every 6 hours as needed for anxiety 3 tablet 0     Multiple Vitamins-Minerals (MULTIVITAMIN PO) Take 1 tablet by mouth daily        omeprazole (PRILOSEC) 20 MG  "DR capsule TAKE ONE CAPSULE BY MOUTH EVERY MORNING 30 MINUTES BEFORE BREAKFAST 90 capsule 1     venlafaxine (EFFEXOR-XR) 150 MG 24 hr capsule Take 1 capsule (150 mg) by mouth daily 90 capsule 3     venlafaxine (EFFEXOR-XR) 75 MG 24 hr capsule TAKE ONE CAPSULE BY MOUTH ONCE DAILY (ALONG WITH 150MG) 90 capsule 3     doxycycline monohydrate (MONODOX) 100 MG capsule 1 cap PO BID with food and full glass of water (Patient not taking: Reported on 8/3/2021) 60 capsule 0        Physical Exam:  /88 (BP Location: Right arm, Patient Position: Sitting, Cuff Size: Adult Large)   Pulse 95   Temp 98  F (36.7  C) (Oral)   Resp 18   Ht 1.626 m (5' 4\")   Wt 90.3 kg (199 lb)   LMP 08/05/2016 (Exact Date)   SpO2 96%   Breastfeeding No   BMI 34.16 kg/m    Wt Readings from Last 12 Encounters:   08/03/21 90.3 kg (199 lb)   07/20/21 90.3 kg (199 lb)   02/25/21 88 kg (194 lb 1.6 oz)   12/14/20 90.8 kg (200 lb 1.6 oz)   10/05/20 89 kg (196 lb 4.8 oz)   09/29/20 88.9 kg (196 lb)   09/01/20 92.1 kg (203 lb)   04/27/20 91.9 kg (202 lb 11.2 oz)   02/21/20 89.4 kg (197 lb)   11/19/19 89.5 kg (197 lb 6.4 oz)   10/28/19 89.4 kg (197 lb)   06/11/19 89.4 kg (197 lb 1.6 oz)       GENERAL APPEARANCE: Healthy, alert and in no acute distress.  HEENT: Sclerae anicteric. PERRLA. Oropharynx without ulcers, lesions, or thrush.  NECK: Supple. No asymmetry or masses.  LYMPHATICS: No palpable cervical, supraclavicular, axillary, or inguinal lymphadenopathy.  RESP: Lungs clear to auscultation bilaterally without rales, rhonchi or wheezes.\",  BREAST: Scars of bilateral mastectomy and reconstruction.  \"CARDIOVASCULAR: Regular rate and rhythm. Normal S1, S2; no S3 or S4. No murmur, gallop, or rub.  ABDOMEN: Soft, nontender. Bowel sounds normal. No palpable organomegaly or masses.  MUSCULOSKELETAL: Extremities without gross deformities noted. No edema of bilateral lower extremities.  SKIN: No suspicious lesions or rashes.  NEURO: Alert and oriented x " 3. Cranial nerves II-XII grossly intact.  PSYCHIATRIC: Mentation and affect appear normal.    Laboratory/Imaging Studies:  No visits with results within 2 Week(s) from this visit.   Latest known visit with results is:   Office Visit on 07/20/2021   Component Date Value Ref Range Status     Interpretation 07/20/2021 Negative for Intraepithelial Lesion or Malignancy (NILM)    Final     Specimen Adequacy 07/20/2021 Satisfactory for evaluation, endocervical/transformation zone component present   Final     Clinical Information 07/20/2021    Final                    Value:This result contains rich text formatting which cannot be displayed here.     HPV Reflex 07/20/2021 Yes regardless of result   Final     Previous Abnormal? 07/20/2021    Final                    Value:This result contains rich text formatting which cannot be displayed here.     Performing Labs 07/20/2021    Final                    Value:This result contains rich text formatting which cannot be displayed here.     Ferritin 07/20/2021 10* 12 - 150 ng/mL Final     Rheumatoid Factor 07/20/2021 <7  <20 IU/mL Final     Cyclic Citrullinated Peptide Antib* 07/20/2021 0.5  U/mL Final     Other HR HPV 07/20/2021 Negative   Final     HPV16 DNA 07/20/2021 Negative   Final     HPV18 DNA 07/20/2021 Negative   Final     FINAL DIAGNOSIS 07/20/2021    Final                    Value:This result contains rich text formatting which cannot be displayed here.          Assessment and plan:    (C50.411,  Z17.0) Malignant neoplasm of upper-outer quadrant of right breast in female, estrogen receptor positive (H)  (primary encounter diagnosis)  I reviewed with the patient today most recent laboratory test.  There is no clinical evidence of breast cancer recurrence.    (F33.0) Major depressive disorder, recurrent episode, mild (H)  Patient currently on Effexor 150 mg every 24 hours  (M85.89) Osteopenia of multiple sites  Patient currently on Zometa.  She is also on calcium  vitamin D supplements    (E61.1) Iron deficiency  Ferritin continues to be low.  We will recommend to proceed with IV iron infusion.  I discussed with the patient potential side effects.  We will recheck her ferritin and CBC again in 3 months time.    (K90.0) Celiac disease  Patient continues on celiac diet    The patient is ready to learn, no apparent learning barriers were identified.  Diagnosis and treatment plans were explained to the patient. The patient expressed understanding of the content. The patient asked appropriate questions. The patient questions were answered to her satisfaction.    Chart documentation with Dragon Voice recognition Software. Although reviewed after completion, some words and grammatical errors may remain.      Again, thank you for allowing me to participate in the care of your patient.        Sincerely,        Boris Millan MD

## 2021-08-13 ENCOUNTER — TELEPHONE (OUTPATIENT)
Dept: ONCOLOGY | Facility: CLINIC | Age: 41
End: 2021-08-13

## 2021-08-13 DIAGNOSIS — C50.411 MALIGNANT NEOPLASM OF UPPER-OUTER QUADRANT OF RIGHT BREAST IN FEMALE, ESTROGEN RECEPTOR POSITIVE (H): ICD-10-CM

## 2021-08-13 DIAGNOSIS — M85.89 OSTEOPENIA OF MULTIPLE SITES: Primary | ICD-10-CM

## 2021-08-13 DIAGNOSIS — Z17.0 MALIGNANT NEOPLASM OF UPPER-OUTER QUADRANT OF RIGHT BREAST IN FEMALE, ESTROGEN RECEPTOR POSITIVE (H): ICD-10-CM

## 2021-08-13 RX ORDER — ZOLEDRONIC ACID 0.04 MG/ML
4 INJECTION, SOLUTION INTRAVENOUS ONCE
Status: CANCELLED | OUTPATIENT
Start: 2021-08-13 | End: 2021-08-13

## 2021-08-13 NOTE — TELEPHONE ENCOUNTER
Diana is a patient on the [facility] clinical trial. She was contacted to let her know that a re-consent for the trial is needed. Research RN will meet with her during infusion on Monday to sign ICF. Pt is in agreement.

## 2021-08-16 ENCOUNTER — INFUSION THERAPY VISIT (OUTPATIENT)
Dept: INFUSION THERAPY | Facility: CLINIC | Age: 41
End: 2021-08-16
Attending: INTERNAL MEDICINE
Payer: COMMERCIAL

## 2021-08-16 ENCOUNTER — TRANSFERRED RECORDS (OUTPATIENT)
Dept: HEALTH INFORMATION MANAGEMENT | Facility: CLINIC | Age: 41
End: 2021-08-16

## 2021-08-16 ENCOUNTER — APPOINTMENT (OUTPATIENT)
Dept: LAB | Facility: CLINIC | Age: 41
End: 2021-08-16
Payer: COMMERCIAL

## 2021-08-16 ENCOUNTER — ALLIED HEALTH/NURSE VISIT (OUTPATIENT)
Dept: ONCOLOGY | Facility: CLINIC | Age: 41
End: 2021-08-16

## 2021-08-16 VITALS — DIASTOLIC BLOOD PRESSURE: 95 MMHG | SYSTOLIC BLOOD PRESSURE: 143 MMHG | TEMPERATURE: 98.9 F | HEART RATE: 78 BPM

## 2021-08-16 DIAGNOSIS — K90.0 CELIAC DISEASE: Primary | ICD-10-CM

## 2021-08-16 DIAGNOSIS — M85.89 OSTEOPENIA OF MULTIPLE SITES: ICD-10-CM

## 2021-08-16 DIAGNOSIS — Z17.0 MALIGNANT NEOPLASM OF UPPER-OUTER QUADRANT OF RIGHT BREAST IN FEMALE, ESTROGEN RECEPTOR POSITIVE (H): ICD-10-CM

## 2021-08-16 DIAGNOSIS — E61.1 IRON DEFICIENCY: ICD-10-CM

## 2021-08-16 DIAGNOSIS — Z00.6 PATIENT IN CANCER RELATED RESEARCH STUDY: Primary | ICD-10-CM

## 2021-08-16 DIAGNOSIS — C50.411 MALIGNANT NEOPLASM OF UPPER-OUTER QUADRANT OF RIGHT BREAST IN FEMALE, ESTROGEN RECEPTOR POSITIVE (H): ICD-10-CM

## 2021-08-16 LAB
ALBUMIN SERPL-MCNC: 3.9 G/DL (ref 3.4–5)
ALP SERPL-CCNC: 91 U/L (ref 40–150)
ALT SERPL W P-5'-P-CCNC: 22 U/L (ref 0–50)
ANION GAP SERPL CALCULATED.3IONS-SCNC: 7 MMOL/L (ref 3–14)
AST SERPL W P-5'-P-CCNC: 7 U/L (ref 0–45)
BASOPHILS # BLD AUTO: 0 10E3/UL (ref 0–0.2)
BASOPHILS NFR BLD AUTO: 0 %
BILIRUB SERPL-MCNC: 0.2 MG/DL (ref 0.2–1.3)
BUN SERPL-MCNC: 10 MG/DL (ref 7–30)
CALCIUM SERPL-MCNC: 9.3 MG/DL (ref 8.5–10.1)
CANCER AG27-29 SERPL-ACNC: 15 U/ML (ref 0–39)
CHLORIDE BLD-SCNC: 110 MMOL/L (ref 94–109)
CO2 SERPL-SCNC: 25 MMOL/L (ref 20–32)
CREAT SERPL-MCNC: 0.68 MG/DL (ref 0.52–1.04)
EOSINOPHIL # BLD AUTO: 0.3 10E3/UL (ref 0–0.7)
EOSINOPHIL NFR BLD AUTO: 3 %
ERYTHROCYTE [DISTWIDTH] IN BLOOD BY AUTOMATED COUNT: 16.7 % (ref 10–15)
GFR SERPL CREATININE-BSD FRML MDRD: >90 ML/MIN/1.73M2
GLUCOSE BLD-MCNC: 179 MG/DL (ref 70–99)
HCT VFR BLD AUTO: 39.4 % (ref 35–47)
HGB BLD-MCNC: 12.5 G/DL (ref 11.7–15.7)
IMM GRANULOCYTES # BLD: 0.1 10E3/UL
IMM GRANULOCYTES NFR BLD: 1 %
LYMPHOCYTES # BLD AUTO: 2 10E3/UL (ref 0.8–5.3)
LYMPHOCYTES NFR BLD AUTO: 24 %
MCH RBC QN AUTO: 24.9 PG (ref 26.5–33)
MCHC RBC AUTO-ENTMCNC: 31.7 G/DL (ref 31.5–36.5)
MCV RBC AUTO: 78 FL (ref 78–100)
MONOCYTES # BLD AUTO: 0.4 10E3/UL (ref 0–1.3)
MONOCYTES NFR BLD AUTO: 4 %
NEUTROPHILS # BLD AUTO: 5.7 10E3/UL (ref 1.6–8.3)
NEUTROPHILS NFR BLD AUTO: 68 %
NRBC # BLD AUTO: 0 10E3/UL
NRBC BLD AUTO-RTO: 0 /100
PLATELET # BLD AUTO: 365 10E3/UL (ref 150–450)
POTASSIUM BLD-SCNC: 4 MMOL/L (ref 3.4–5.3)
PROT SERPL-MCNC: 7.5 G/DL (ref 6.8–8.8)
RBC # BLD AUTO: 5.03 10E6/UL (ref 3.8–5.2)
SODIUM SERPL-SCNC: 142 MMOL/L (ref 133–144)
WBC # BLD AUTO: 8.5 10E3/UL (ref 4–11)

## 2021-08-16 PROCEDURE — 250N000011 HC RX IP 250 OP 636: Performed by: INTERNAL MEDICINE

## 2021-08-16 PROCEDURE — 96365 THER/PROPH/DIAG IV INF INIT: CPT

## 2021-08-16 PROCEDURE — 85025 COMPLETE CBC W/AUTO DIFF WBC: CPT | Performed by: INTERNAL MEDICINE

## 2021-08-16 PROCEDURE — 80053 COMPREHEN METABOLIC PANEL: CPT | Performed by: INTERNAL MEDICINE

## 2021-08-16 PROCEDURE — 36415 COLL VENOUS BLD VENIPUNCTURE: CPT

## 2021-08-16 PROCEDURE — 86300 IMMUNOASSAY TUMOR CA 15-3: CPT | Performed by: INTERNAL MEDICINE

## 2021-08-16 PROCEDURE — 258N000003 HC RX IP 258 OP 636: Performed by: INTERNAL MEDICINE

## 2021-08-16 PROCEDURE — 96367 TX/PROPH/DG ADDL SEQ IV INF: CPT

## 2021-08-16 RX ORDER — HEPARIN SODIUM,PORCINE 10 UNIT/ML
5 VIAL (ML) INTRAVENOUS
Status: CANCELLED | OUTPATIENT
Start: 2021-08-23

## 2021-08-16 RX ORDER — EPINEPHRINE 1 MG/ML
0.3 INJECTION, SOLUTION, CONCENTRATE INTRAVENOUS EVERY 5 MIN PRN
Status: CANCELLED | OUTPATIENT
Start: 2021-08-23

## 2021-08-16 RX ORDER — HEPARIN SODIUM (PORCINE) LOCK FLUSH IV SOLN 100 UNIT/ML 100 UNIT/ML
5 SOLUTION INTRAVENOUS
Status: CANCELLED | OUTPATIENT
Start: 2021-08-23

## 2021-08-16 RX ORDER — METHYLPREDNISOLONE SODIUM SUCCINATE 125 MG/2ML
125 INJECTION, POWDER, LYOPHILIZED, FOR SOLUTION INTRAMUSCULAR; INTRAVENOUS
Status: CANCELLED
Start: 2021-08-23

## 2021-08-16 RX ORDER — ALBUTEROL SULFATE 0.83 MG/ML
2.5 SOLUTION RESPIRATORY (INHALATION)
Status: CANCELLED | OUTPATIENT
Start: 2021-08-23

## 2021-08-16 RX ORDER — ZOLEDRONIC ACID 0.04 MG/ML
4 INJECTION, SOLUTION INTRAVENOUS ONCE
Status: COMPLETED | OUTPATIENT
Start: 2021-08-16 | End: 2021-08-16

## 2021-08-16 RX ORDER — ALBUTEROL SULFATE 90 UG/1
1-2 AEROSOL, METERED RESPIRATORY (INHALATION)
Status: CANCELLED
Start: 2021-08-23

## 2021-08-16 RX ORDER — DIPHENHYDRAMINE HYDROCHLORIDE 50 MG/ML
50 INJECTION INTRAMUSCULAR; INTRAVENOUS
Status: CANCELLED
Start: 2021-08-23

## 2021-08-16 RX ORDER — NALOXONE HYDROCHLORIDE 0.4 MG/ML
0.2 INJECTION, SOLUTION INTRAMUSCULAR; INTRAVENOUS; SUBCUTANEOUS
Status: CANCELLED | OUTPATIENT
Start: 2021-08-23

## 2021-08-16 RX ORDER — MEPERIDINE HYDROCHLORIDE 25 MG/ML
25 INJECTION INTRAMUSCULAR; INTRAVENOUS; SUBCUTANEOUS EVERY 30 MIN PRN
Status: CANCELLED | OUTPATIENT
Start: 2021-08-23

## 2021-08-16 RX ADMIN — SODIUM CHLORIDE 250 ML: 9 INJECTION, SOLUTION INTRAVENOUS at 13:31

## 2021-08-16 RX ADMIN — FERRIC CARBOXYMALTOSE INJECTION 750 MG: 50 INJECTION, SOLUTION INTRAVENOUS at 13:28

## 2021-08-16 RX ADMIN — ZOLEDRONIC ACID 4 MG: 0.04 INJECTION, SOLUTION INTRAVENOUS at 14:26

## 2021-08-16 NOTE — PROGRESS NOTES
Infusion Nursing Note:  Diana Wilson presents today for Injectafer and Zometa.    Patient seen by provider today: No   present during visit today: Not Applicable.    Note: N/A.      Intravenous Access:  Peripheral IV placed.    Treatment Conditions:  Lab Results   Component Value Date    HGB 12.5 08/16/2021    HGB 12.4 07/09/2021     Lab Results   Component Value Date    WBC 8.5 08/16/2021    WBC 7.5 07/09/2021      Lab Results   Component Value Date    ANEU 4.5 07/09/2021     Lab Results   Component Value Date     08/16/2021     07/09/2021      Lab Results   Component Value Date     08/16/2021     07/09/2021                   Lab Results   Component Value Date    POTASSIUM 4.0 08/16/2021    POTASSIUM 4.1 07/09/2021           Lab Results   Component Value Date    MAG 1.5 10/16/2020    MAG 2.3 03/02/2016            Lab Results   Component Value Date    CR 0.68 08/16/2021    CR 0.77 07/09/2021                   Lab Results   Component Value Date    DENY 9.3 08/16/2021    DENY 9.1 07/09/2021                Lab Results   Component Value Date    BILITOTAL 0.2 08/16/2021    BILITOTAL 0.4 07/09/2021           Lab Results   Component Value Date    ALBUMIN 3.9 08/16/2021    ALBUMIN 3.9 07/09/2021                    Lab Results   Component Value Date    ALT 22 08/16/2021    ALT 25 07/09/2021           Lab Results   Component Value Date    AST 7 08/16/2021    AST 14 07/09/2021       Results reviewed, labs MET treatment parameters, ok to proceed with treatment.      Post Infusion Assessment:  Patient tolerated infusion without incident.  Blood return noted pre and post infusion.  Site patent and intact, free from redness, edema or discomfort.  No evidence of extravasations.  Access discontinued per protocol.       Discharge Plan:   Patient discharged in stable condition accompanied by: self.      Ana Laura Christopher RN

## 2021-09-03 NOTE — PROGRESS NOTES
Met with Diana Wilson to re consent to the [facility] [id number] ([id number]) clinical trial.  A copy of the updated consent was previously provided to Diana and she reported that she did look over the new consent. I explained the new imaging sub study & de identification of the images that are submitted. Noemi did not have a PET/CT scan during the diagnostic evaluation of her breast scan, because of this, she had no questions about the new consent form. She did NOT consent to the new sub study but she did re consent to the main trial, including HIPAA authorization. A copy was given to Diana and a copy was scanned to her EMR.     I thanked Diana for her continued participation in the clinical study & reviewed time frames for her annual study visits (Next December 2021)

## 2021-10-03 ENCOUNTER — NURSE TRIAGE (OUTPATIENT)
Dept: NURSING | Facility: CLINIC | Age: 41
End: 2021-10-03

## 2021-10-03 ENCOUNTER — OFFICE VISIT (OUTPATIENT)
Dept: URGENT CARE | Facility: URGENT CARE | Age: 41
End: 2021-10-03
Payer: COMMERCIAL

## 2021-10-03 VITALS
OXYGEN SATURATION: 97 % | DIASTOLIC BLOOD PRESSURE: 89 MMHG | HEART RATE: 98 BPM | BODY MASS INDEX: 34.5 KG/M2 | RESPIRATION RATE: 18 BRPM | TEMPERATURE: 97.8 F | SYSTOLIC BLOOD PRESSURE: 131 MMHG | WEIGHT: 201 LBS

## 2021-10-03 DIAGNOSIS — R30.0 DYSURIA: ICD-10-CM

## 2021-10-03 DIAGNOSIS — N30.01 ACUTE CYSTITIS WITH HEMATURIA: Primary | ICD-10-CM

## 2021-10-03 LAB
BACTERIA #/AREA URNS HPF: ABNORMAL /HPF
RBC #/AREA URNS AUTO: ABNORMAL /HPF
SQUAMOUS #/AREA URNS AUTO: ABNORMAL /LPF
WBC #/AREA URNS AUTO: ABNORMAL /HPF

## 2021-10-03 PROCEDURE — 87086 URINE CULTURE/COLONY COUNT: CPT | Performed by: PHYSICIAN ASSISTANT

## 2021-10-03 PROCEDURE — 81015 MICROSCOPIC EXAM OF URINE: CPT

## 2021-10-03 PROCEDURE — 99213 OFFICE O/P EST LOW 20 MIN: CPT | Performed by: PHYSICIAN ASSISTANT

## 2021-10-03 RX ORDER — SULFAMETHOXAZOLE/TRIMETHOPRIM 800-160 MG
1 TABLET ORAL 2 TIMES DAILY
Qty: 14 TABLET | Refills: 0 | Status: SHIPPED | OUTPATIENT
Start: 2021-10-03 | End: 2021-10-10

## 2021-10-03 NOTE — PROGRESS NOTES
Assessment & Plan     Acute cystitis with hematuria  Will treat with sulfamethoxazole-trimethoprim (BACTRIM DS) 800-160 MG tablet; Take 1 tablet by mouth 2 times daily for 7 days. Push fluids. Return to clinic if symptoms worsen or do not improve; otherwise follow up as needed      Dysuria    - UA with Microscopic reflex to Culture; Future  - Urine Microscopic Exam; Future  - Urine Microscopic Exam  - Urine Culture; Future  - Urine Culture                 Return in about 2 days (around 10/5/2021), or if symptoms worsen or fail to improve.    THEA Adame [facility] [place]          Subjective   Chief Complaint   Patient presents with     UTI     had uti 2 weeks ago then went away then 10/3/21 has been having a lot of pain took azo has nausea, painful urination, low back pain       HPI     UTI    Onset of symptoms was 1 day.  Course of illness is same  Severity moderate  Current and associated symptoms dysuria and frequency  Treatment and measures tried Increase fluid intake, cranberry juice  Predisposing factors include recently treated with macrobid  Patient denies rigors, flank pain and temperature > 101 degrees F.                  Review of Systems   Constitutional, HEENT, cardiovascular, pulmonary, gi and gu systems are negative, except as otherwise noted.      Objective    /89   Pulse 98   Temp 97.8  F (36.6  C) (Tympanic)   Resp 18   Wt 91.2 kg (201 lb)   LMP 08/05/2016 (Exact Date)   SpO2 97%   BMI 34.50 kg/m    Body mass index is 34.5 kg/m .  Physical Exam  Constitutional:       General: She is not in acute distress.     Appearance: She is well-developed.   Abdominal:      Tenderness: There is no right CVA tenderness or left CVA tenderness.   Psychiatric:         Behavior: Behavior normal.

## 2021-10-03 NOTE — TELEPHONE ENCOUNTER
Two weeks ago had UTI treated via [facility]. Triggers is having sex or gets dehydrated. Did antibiotic and now today it's like a ton of bricks, pain with urination, sick to stomach. She has nausea. She will go to urgent care today.  Madelyn John RN  [facility]      Reason for Disposition    Side (flank) or lower back pain present    Additional Information    Negative: Shock suspected (e.g., cold/pale/clammy skin, too weak to stand, low BP, rapid pulse)    Negative: Sounds like a life-threatening emergency to the triager    Negative: Followed a genital area injury    Negative: Followed a genital area injury (penis, scrotum)    Negative: Vaginal discharge    Negative: Pus (white, yellow) or bloody discharge from end of penis    Negative: [1] Taking antibiotic for urinary tract infection (UTI) AND [2] female    Negative: [1] Taking antibiotic for urinary tract infection (UTI) AND [2] male    Negative: [1] Discomfort (pain, burning or stinging) when passing urine AND [2] pregnant    Negative: [1] Discomfort (pain, burning or stinging) when passing urine AND [2] postpartum (from 0 to 6 weeks after delivery)    Negative: [1] Discomfort (pain, burning or stinging) when passing urine AND [2] female    Negative: [1] Discomfort (pain, burning or stinging) when passing urine AND [2] male    Negative: Pain or itching in the vulvar area    Negative: Pain in scrotum is main symptom    Negative: Blood in the urine is main symptom    Negative: Symptoms arising from use of a urinary catheter (Simon or Coude)    Negative: [1] Unable to urinate (or only a few drops) > 4 hours AND     [2] bladder feels very full (e.g., palpable bladder or strong urge to urinate)    Negative: [1] Decreased urination and [2] drinking very little AND [2] dehydration suspected (e.g., dark urine, no urine > 12 hours, very dry mouth, very lightheaded)    Negative: Patient sounds very sick or weak to the triager    Negative: Fever > 100.4 F  (38.0 C)    Protocols used: URINARY SYMPTOMS-A-AH

## 2021-10-04 LAB — BACTERIA UR CULT: NORMAL

## 2021-10-04 NOTE — RESULT ENCOUNTER NOTE
[facility] Dept discharge antibiotic (if prescribed): Sulfamethoxazole-Trimethoprim (Bactrim DS, Septra DS) 800-160 mg PO tablet,  1 tablet by mouth 2 times daily for 7 days.  Date of Rx (if applicable):  10/3/21  No changes in treatment per [facility] ED Lab Result Urine culture protocol.

## 2021-11-01 ENCOUNTER — OFFICE VISIT (OUTPATIENT)
Dept: FAMILY MEDICINE | Facility: CLINIC | Age: 41
End: 2021-11-01
Payer: COMMERCIAL

## 2021-11-01 VITALS
WEIGHT: 202 LBS | HEIGHT: 64 IN | SYSTOLIC BLOOD PRESSURE: 114 MMHG | HEART RATE: 90 BPM | OXYGEN SATURATION: 96 % | BODY MASS INDEX: 34.49 KG/M2 | DIASTOLIC BLOOD PRESSURE: 84 MMHG | TEMPERATURE: 97.4 F

## 2021-11-01 DIAGNOSIS — N89.8 VAGINAL DISCHARGE: ICD-10-CM

## 2021-11-01 DIAGNOSIS — B37.31 RECURRENT CANDIDIASIS OF VAGINA: ICD-10-CM

## 2021-11-01 DIAGNOSIS — N30.90 RECURRENT CYSTITIS: Primary | ICD-10-CM

## 2021-11-01 DIAGNOSIS — R30.0 DYSURIA: ICD-10-CM

## 2021-11-01 LAB
BACTERIA #/AREA URNS HPF: ABNORMAL /HPF
CLUE CELLS: ABNORMAL
RBC #/AREA URNS AUTO: ABNORMAL /HPF
TRICHOMONAS, WET PREP: ABNORMAL
WBC #/AREA URNS AUTO: ABNORMAL /HPF
WBC'S/HIGH POWER FIELD, WET PREP: ABNORMAL
YEAST, WET PREP: PRESENT

## 2021-11-01 PROCEDURE — 81015 MICROSCOPIC EXAM OF URINE: CPT | Performed by: NURSE PRACTITIONER

## 2021-11-01 PROCEDURE — 99213 OFFICE O/P EST LOW 20 MIN: CPT | Performed by: NURSE PRACTITIONER

## 2021-11-01 PROCEDURE — 87210 SMEAR WET MOUNT SALINE/INK: CPT | Performed by: NURSE PRACTITIONER

## 2021-11-01 RX ORDER — CIPROFLOXACIN 250 MG/1
250 TABLET, FILM COATED ORAL 2 TIMES DAILY
Qty: 14 TABLET | Refills: 0 | Status: SHIPPED | OUTPATIENT
Start: 2021-11-01 | End: 2021-11-08

## 2021-11-01 RX ORDER — FLUCONAZOLE 150 MG/1
150 TABLET ORAL
Qty: 5 TABLET | Refills: 0 | Status: SHIPPED | OUTPATIENT
Start: 2021-11-01 | End: 2021-11-16

## 2021-11-01 ASSESSMENT — MIFFLIN-ST. JEOR: SCORE: 1561.27

## 2021-11-01 NOTE — PROGRESS NOTES
Assessment & Plan     Recurrent cystitis  Consider taking out foods with added sugar, increase fluids better fermented and with natural probiotics and prebiotic's  - ciprofloxacin (CIPRO) 250 MG tablet; Take 1 tablet (250 mg) by mouth 2 times daily for 7 days    Recurrent candidiasis of vagina  - fluconazole (DIFLUCAN) 150 MG tablet; Take 1 tablet (150 mg) by mouth every 3 days for 15 days    Dysuria  - Urine Microscopic Exam; Future  - Urine Microscopic Exam    Vaginal discharge  - Wet prep - Clinic Collect      No follow-ups on file.    COLLEEN Riley CNP  [facility] SARNAYA Ortiz is a 41 year old who presents for the following health issues     HPI     Genitourinary - Female  Onset/Duration: x September 17th  Description:   Painful urination (Dysuria): YES           Frequency: YES  Blood in urine (Hematuria): no  Delay in urine (Hesitency): YES  Intensity: moderate, severe  Progression of Symptoms:  worsening and constant  Accompanying Signs & Symptoms:  Fever/chills: YES  Flank pain: YES  Nausea and vomiting: no  Vaginal symptoms: discharge  Abdominal/Pelvic Pain: pressure  History:   History of frequent UTI s: YES  History of kidney stones: YES  Sexually Active: YES  Possibility of pregnancy: No  Precipitating or alleviating factors: None  Therapies tried and outcome: Pyridium  and  antibiotics and cream and pill for vaginal yeast infection     Patient has had several recent urinary tract infections and yeast infections..  Symptoms started over the last couple of days again with flank pain, suprapubic pain, dysuria at the end of her urination.  Also has itching and pain in the vaginal area.  She has been recently on Macrobid and then Bactrim.  Patient has a history of breast cancer.    Review of Systems   Constitutional, HEENT, cardiovascular, pulmonary, gi and gu systems are negative, except as otherwise noted.      Objective    /84 (BP Location: Right arm, Patient  "Position: Sitting, Cuff Size: Adult Large)   Pulse 90   Temp 97.4  F (36.3  C)   Ht 1.626 m (5' 4\")   Wt 91.6 kg (202 lb)   LMP 08/05/2016 (Exact Date)   SpO2 96%   Breastfeeding No   BMI 34.67 kg/m    Body mass index is 34.67 kg/m .  Physical Exam   GENERAL: healthy, alert and no distress  RESP: lungs clear to auscultation - no rales, rhonchi or wheezes  CV: regular rate and rhythm, normal S1 S2, no S3 or S4, no murmur, click or rub, no peripheral edema and peripheral pulses strong  ABDOMEN: soft, some tenderness  no hepatosplenomegaly, no masses and bowel sounds normal no Minimal CVA tenderness in right flank  PSYCH: mentation appears normal, affect n still last    Results for orders placed or performed in visit on 11/01/21 (from the past 24 hour(s))   Wet prep - Clinic Collect    Specimen: Vagina; Swab   Result Value Ref Range    Trichomonas Absent Absent    Yeast Present (A) Absent    Clue Cells Absent Absent    WBCs/high power field 1+ (A) None   Urine Microscopic Exam   Result Value Ref Range    Bacteria Urine Moderate (A) None Seen /HPF    RBC Urine 0-2 0-2 /HPF /HPF    WBC Urine 5-10 (A) 0-5 /HPF /HPF           "

## 2021-11-29 DIAGNOSIS — Z17.0 MALIGNANT NEOPLASM OF UPPER-OUTER QUADRANT OF RIGHT BREAST IN FEMALE, ESTROGEN RECEPTOR POSITIVE (H): ICD-10-CM

## 2021-11-29 DIAGNOSIS — C50.411 MALIGNANT NEOPLASM OF UPPER-OUTER QUADRANT OF RIGHT BREAST IN FEMALE, ESTROGEN RECEPTOR POSITIVE (H): ICD-10-CM

## 2021-11-29 DIAGNOSIS — G62.9 NEUROPATHY: ICD-10-CM

## 2021-11-29 RX ORDER — GABAPENTIN 300 MG/1
300 CAPSULE ORAL 3 TIMES DAILY
Qty: 300 CAPSULE | Refills: 0 | Status: CANCELLED | OUTPATIENT
Start: 2021-11-29

## 2021-12-01 ENCOUNTER — MYC MEDICAL ADVICE (OUTPATIENT)
Dept: FAMILY MEDICINE | Facility: CLINIC | Age: 41
End: 2021-12-01
Payer: COMMERCIAL

## 2021-12-02 ENCOUNTER — MYC MEDICAL ADVICE (OUTPATIENT)
Dept: FAMILY MEDICINE | Facility: CLINIC | Age: 41
End: 2021-12-02
Payer: COMMERCIAL

## 2021-12-02 DIAGNOSIS — Z17.0 MALIGNANT NEOPLASM OF UPPER-OUTER QUADRANT OF RIGHT BREAST IN FEMALE, ESTROGEN RECEPTOR POSITIVE (H): ICD-10-CM

## 2021-12-02 DIAGNOSIS — G62.9 NEUROPATHY: ICD-10-CM

## 2021-12-02 DIAGNOSIS — C50.411 MALIGNANT NEOPLASM OF UPPER-OUTER QUADRANT OF RIGHT BREAST IN FEMALE, ESTROGEN RECEPTOR POSITIVE (H): ICD-10-CM

## 2021-12-02 RX ORDER — EXEMESTANE 25 MG/1
25 TABLET ORAL DAILY
Qty: 30 TABLET | Refills: 0 | Status: SHIPPED | OUTPATIENT
Start: 2021-12-02 | End: 2021-12-20

## 2021-12-02 RX ORDER — GABAPENTIN 300 MG/1
300 CAPSULE ORAL 3 TIMES DAILY
Qty: 270 CAPSULE | Refills: 1 | Status: SHIPPED | OUTPATIENT
Start: 2021-12-02 | End: 2022-08-08

## 2021-12-06 ENCOUNTER — HOSPITAL ENCOUNTER (OUTPATIENT)
Dept: BONE DENSITY | Facility: CLINIC | Age: 41
Discharge: HOME | End: 2021-12-06
Attending: INTERNAL MEDICINE | Admitting: INTERNAL MEDICINE
Payer: COMMERCIAL

## 2021-12-06 DIAGNOSIS — Z17.0 MALIGNANT NEOPLASM OF UPPER-OUTER QUADRANT OF RIGHT BREAST IN FEMALE, ESTROGEN RECEPTOR POSITIVE (H): ICD-10-CM

## 2021-12-06 DIAGNOSIS — C50.411 MALIGNANT NEOPLASM OF UPPER-OUTER QUADRANT OF RIGHT BREAST IN FEMALE, ESTROGEN RECEPTOR POSITIVE (H): ICD-10-CM

## 2021-12-06 PROCEDURE — 77080 DXA BONE DENSITY AXIAL: CPT

## 2021-12-10 ENCOUNTER — PATIENT OUTREACH (OUTPATIENT)
Dept: ONCOLOGY | Facility: CLINIC | Age: 41
End: 2021-12-10
Payer: COMMERCIAL

## 2021-12-10 DIAGNOSIS — E61.1 IRON DEFICIENCY: Primary | ICD-10-CM

## 2021-12-13 ENCOUNTER — LAB (OUTPATIENT)
Dept: LAB | Facility: CLINIC | Age: 41
End: 2021-12-13
Attending: INTERNAL MEDICINE
Payer: COMMERCIAL

## 2021-12-13 ENCOUNTER — HOSPITAL ENCOUNTER (OUTPATIENT)
Dept: CT IMAGING | Facility: CLINIC | Age: 41
End: 2021-12-13
Attending: INTERNAL MEDICINE
Payer: COMMERCIAL

## 2021-12-13 DIAGNOSIS — E61.1 IRON DEFICIENCY: ICD-10-CM

## 2021-12-13 DIAGNOSIS — Z17.0 MALIGNANT NEOPLASM OF UPPER-OUTER QUADRANT OF RIGHT BREAST IN FEMALE, ESTROGEN RECEPTOR POSITIVE (H): ICD-10-CM

## 2021-12-13 DIAGNOSIS — C50.411 MALIGNANT NEOPLASM OF UPPER-OUTER QUADRANT OF RIGHT BREAST IN FEMALE, ESTROGEN RECEPTOR POSITIVE (H): ICD-10-CM

## 2021-12-13 LAB
BASOPHILS # BLD AUTO: 0 10E3/UL (ref 0–0.2)
BASOPHILS NFR BLD AUTO: 1 %
EOSINOPHIL # BLD AUTO: 0.5 10E3/UL (ref 0–0.7)
EOSINOPHIL NFR BLD AUTO: 6 %
ERYTHROCYTE [DISTWIDTH] IN BLOOD BY AUTOMATED COUNT: 14.3 % (ref 10–15)
FERRITIN SERPL-MCNC: 60 NG/ML (ref 12–150)
HCT VFR BLD AUTO: 40.1 % (ref 35–47)
HGB BLD-MCNC: 13.5 G/DL (ref 11.7–15.7)
IMM GRANULOCYTES # BLD: 0 10E3/UL
IMM GRANULOCYTES NFR BLD: 1 %
LYMPHOCYTES # BLD AUTO: 2 10E3/UL (ref 0.8–5.3)
LYMPHOCYTES NFR BLD AUTO: 24 %
MCH RBC QN AUTO: 29.2 PG (ref 26.5–33)
MCHC RBC AUTO-ENTMCNC: 33.7 G/DL (ref 31.5–36.5)
MCV RBC AUTO: 87 FL (ref 78–100)
MONOCYTES # BLD AUTO: 0.4 10E3/UL (ref 0–1.3)
MONOCYTES NFR BLD AUTO: 5 %
NEUTROPHILS # BLD AUTO: 5.5 10E3/UL (ref 1.6–8.3)
NEUTROPHILS NFR BLD AUTO: 63 %
NRBC # BLD AUTO: 0 10E3/UL
NRBC BLD AUTO-RTO: 0 /100
PLATELET # BLD AUTO: 288 10E3/UL (ref 150–450)
RBC # BLD AUTO: 4.62 10E6/UL (ref 3.8–5.2)
WBC # BLD AUTO: 8.6 10E3/UL (ref 4–11)

## 2021-12-13 PROCEDURE — 82728 ASSAY OF FERRITIN: CPT

## 2021-12-13 PROCEDURE — 250N000011 HC RX IP 250 OP 636: Performed by: RADIOLOGY

## 2021-12-13 PROCEDURE — 71260 CT THORAX DX C+: CPT

## 2021-12-13 PROCEDURE — 36415 COLL VENOUS BLD VENIPUNCTURE: CPT

## 2021-12-13 PROCEDURE — 85025 COMPLETE CBC W/AUTO DIFF WBC: CPT

## 2021-12-13 PROCEDURE — 250N000009 HC RX 250: Performed by: RADIOLOGY

## 2021-12-13 RX ORDER — IOPAMIDOL 755 MG/ML
99 INJECTION, SOLUTION INTRAVASCULAR ONCE
Status: COMPLETED | OUTPATIENT
Start: 2021-12-13 | End: 2021-12-13

## 2021-12-13 RX ADMIN — SODIUM CHLORIDE 65 ML: 9 INJECTION, SOLUTION INTRAVENOUS at 08:39

## 2021-12-13 RX ADMIN — IOPAMIDOL 99 ML: 755 INJECTION, SOLUTION INTRAVENOUS at 08:39

## 2021-12-20 ENCOUNTER — VIRTUAL VISIT (OUTPATIENT)
Dept: ONCOLOGY | Facility: CLINIC | Age: 41
End: 2021-12-20
Payer: COMMERCIAL

## 2021-12-20 DIAGNOSIS — Z86.711 HISTORY OF PULMONARY EMBOLISM: ICD-10-CM

## 2021-12-20 DIAGNOSIS — M85.89 OSTEOPENIA OF MULTIPLE SITES: ICD-10-CM

## 2021-12-20 DIAGNOSIS — E61.1 IRON DEFICIENCY: ICD-10-CM

## 2021-12-20 DIAGNOSIS — C50.411 MALIGNANT NEOPLASM OF UPPER-OUTER QUADRANT OF RIGHT BREAST IN FEMALE, ESTROGEN RECEPTOR POSITIVE (H): Primary | ICD-10-CM

## 2021-12-20 DIAGNOSIS — Z17.0 MALIGNANT NEOPLASM OF UPPER-OUTER QUADRANT OF RIGHT BREAST IN FEMALE, ESTROGEN RECEPTOR POSITIVE (H): Primary | ICD-10-CM

## 2021-12-20 DIAGNOSIS — Z80.3 FAMILY HISTORY OF MALIGNANT NEOPLASM OF BREAST: ICD-10-CM

## 2021-12-20 PROCEDURE — 99214 OFFICE O/P EST MOD 30 MIN: CPT | Mod: GT | Performed by: NURSE PRACTITIONER

## 2021-12-20 RX ORDER — EXEMESTANE 25 MG/1
25 TABLET ORAL DAILY
Qty: 90 TABLET | Refills: 1 | Status: SHIPPED | OUTPATIENT
Start: 2021-12-20 | End: 2022-03-03

## 2021-12-20 NOTE — LETTER
12/20/2021         RE: Diana Wilson  1972 [facility] [id number]        Dear Colleague,    Thank you for referring your patient, Diana Wilson, to the [facility]. Please see a copy of my visit note below.    Diana is a 41 year old who is being evaluated via a billable video visit.  Currently in [place].    How would you like to obtain your AVS? MyChart  If the video visit is dropped, the invitation should be resent by: Text to cell phone: [phone]  Will anyone else be joining your video visit? No    Rosy Meyers    Oncology Follow Up Visit: December 20, 2021    Oncologist: Dr Millan  PCP: Yenifer Peters    Diagnosis: Right breast cancer  Diana Wilson is a 40 yo female who presented at age 36 breast lump in her right armpit and right breast in 10/2016. 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  ER/AK 99% positive, HER-2/kelle negative, associated with high-grade DCIS.  Right axillary ultrasound-guided biopsy indicating metastatic carcinoma positive for spread from breast primary, ER/AK both negative.  HER-2/kelle is negative.  She had clinical T2N1 disease. She made informed decision to proceed with neoadjuvant DDAC C1D1 1/16/2017. She has good clinical response post DD AC x4. Then went on to have wkly taxol + carbo  with informed decision in March.   She had carbo hypersensitivity reaction in early May (during C3). It was d/c after. She finished chemo end of May 2017. She was tested negative for BRCA1/2 with [facility]. 7/2017 she had double mastectomies at [facility]. Right side found no residual invasive carcinoma, +grade III DCIS, 1mm in greatest dimension. 5 sLN all negative. She had ypTis(DCIS)pN0 disease. Left breast no cancer. She finished post mastectomy RT in 11/2017.  She had BSO 11/2017. Then started Arimidex in 1/2018. She quit in Feb 2018 due to side effects and  willing to try tamoxifen in 3/2018.   She had right breast surgery on 9/17/2020 at [facility] involving removal of bilateral breast reconstruction implants, and TRAMP reconstruction.   Developed tachycardia and dyspnea with oxygen requirement while in the hospital shortly after her surgery and had a CT scan of the chest at that time that was negative for PE.  Her SOB was progressive post op and was in ER 9/29/2020. Found to have severe anemia hb of 6.9 from baseline of 12.8. had 2 units of PRBC.Also found by CT to have multiple filling defects within the sub-segmental pulmonary arteries consistent with PE.  *Ultrasound shows a nonocclusive thrombus in the right mid calf in the peroneal vein. Tamoxifen was changed to AI early Oct, and she presented to [facility] for SOB mid Oct. Arimidex was stopped.   PE resolved. She also had infection on re constructed breast s/p Abx.   Treatment:   1/16- 3/1/2017 AC x 4 cycles  3/14- 5/31/2017caboplatin/ Taxol ( carboplatin discharge after cycle 3 due to reaction)  7/2017 Double Mastectomy  11/2017 finished post mastectomy RT .    11/2017 BSO 11/2017.   1/2018 started Arimidex. She quit in Feb 2018 due to side effects and willing to try tamoxifen in 3/2018   9/2020 removal of breast implants.   10/2020 Blood clot noted   Now on Aromasin after blood clot    Interval History: Ms Wilson is contacted for followup to her breast cancer and her history of PE. Previous lump is now gone. Feels she has had more joint pains with the use of Aromasin vs. Tamoxifen but states it is tolerable.  Admits she does get some fluid buildup to the upper back near her incisions but has seen lymphedema specialist has garments and  does help with massage.  She feels she is staying healthy however does get some anxiety around this holidays and she does work for a [employer] and this is her busy season.  She has not had any new fevers illnesses chest pain shortness of breath cough or new skin issues  and feels that the removal of the implants was a blessing.  Rest of comprehensive and complete ROS is reviewed and is negative.   Past Medical History:   Diagnosis Date     Anxiety      Breast cancer (H)      Depressive disorder 1995    And Anxiety     Encounter for insertion or removal of intrauterine contraceptive device 1/8/2008     Excessive or frequent menstruation 03/19/2003     Fibroadenosis of breast      GERD (gastroesophageal reflux disease)      Invasive ductal carcinoma of breast, right (H)      Malignant neoplasm of upper-outer quadrant of right female breast (H)      Mild intermittent asthma            never has had PFT's, MDI with colds and at times exercise     Neutropenia, drug-induced (H)      Nongonococcal urethritis (JALIL) due to chlamydia trachomatis 01/03/2002     Other and unspecified ovarian cyst 03/19/2003    RIGHT ovarian cyst     PONV (postoperative nausea and vomiting)      Skin cancer      Transient hypertension of pregnancy, antepartum     PIH with last birth     Uncomplicated asthma 2002     Current Outpatient Medications   Medication     acetaminophen (TYLENOL) 325 MG tablet     Ascorbic Acid (VITAMIN C PO)     aspirin (ASA) 325 MG EC tablet     cetirizine (ZYRTEC) 10 MG tablet     cholecalciferol (VITAMIN D3) 1000 units (25 mcg) capsule     clindamycin (CLINDAMAX) 1 % external gel     exemestane (AROMASIN) 25 MG tablet     fluocinonide (LIDEX) 0.05 % external solution     Fluticasone Propionate (FLONASE NA)     gabapentin (NEURONTIN) 300 MG capsule     LORazepam (ATIVAN) 0.5 MG tablet     Multiple Vitamins-Minerals (MULTIVITAMIN PO)     omeprazole (PRILOSEC) 20 MG DR capsule     venlafaxine (EFFEXOR-XR) 150 MG 24 hr capsule     venlafaxine (EFFEXOR-XR) 75 MG 24 hr capsule     No current facility-administered medications for this visit.     Allergies   Allergen Reactions     Carboplatin Shortness Of Breath, Rash and Anaphylaxis     Other reaction(s): Flushing, Tachycardia     Ambien   "    hallucinations     Amoxicillin GI Disturbance     Erythromycin GI Disturbance     Hydrocodone-Acetaminophen      Other reaction(s): Hallucinations     Hydromorphone Headache     Penicillins Nausea and Vomiting and Hives     1-11-17 pt states this was a childhood reaction     Zolpidem      Other reaction(s): Hallucinations     Hydrocodone Other (See Comments)     Out of body experience, \"creepy-crawly\" skin        Physical Exam:[facility] 08/05/2016 (Exact Date)    GENERAL: Healthy, alert and no distress  EYES: Eyes grossly normal to inspection.  No discharge or erythema, or obvious scleral/conjunctival abnormalities.  RESP: No audible wheeze, cough, or visible cyanosis.  No visible retractions or increased work of breathing.    SKIN: Visible skin clear. No significant rash, abnormal pigmentation or lesions.  NEURO: Cranial nerves grossly intact.  Mentation and speech appropriate for age.  PSYCH: Mentation appears normal, affect normal/bright, judgement and insight intact, normal speech and appearance well-groomed.  The rest of a comprehensive physical examination is deferred due to video visit restrictions     Laboratory Results:   Ref. Range 12/13/2021 08:10   Ferritin Latest Ref Range: 12 - 150 ng/mL 60   WBC Latest Ref Range: 4.0 - 11.0 10e3/uL 8.6   Hemoglobin Latest Ref Range: 11.7 - 15.7 g/dL 13.5   Hematocrit Latest Ref Range: 35.0 - 47.0 % 40.1   Platelet Count Latest Ref Range: 150 - 450 10e3/uL 288   RBC Count Latest Ref Range: 3.80 - 5.20 10e6/uL 4.62   MCV Latest Ref Range: 78 - 100 fL 87   MCH Latest Ref Range: 26.5 - 33.0 pg 29.2   MCHC Latest Ref Range: 31.5 - 36.5 g/dL 33.7   RDW Latest Ref Range: 10.0 - 15.0 % 14.3   % Neutrophils Latest Units: % 63   % Lymphocytes Latest Units: % 24   % Monocytes Latest Units: % 5   % Eosinophils Latest Units: % 6   % Basophils Latest Units: % 1   Absolute Basophils Latest Ref Range: 0.0 - 0.2 10e3/uL 0.0   Absolute Eosinophils Latest Ref Range: 0.0 - 0.7 10e3/uL 0.5 "   Absolute Immature Granulocytes Latest Ref Range: <=0.4 10e3/uL 0.0   Absolute Lymphocytes Latest Ref Range: 0.8 - 5.3 10e3/uL 2.0   Absolute Monocytes Latest Ref Range: 0.0 - 1.3 10e3/uL 0.4   % Immature Granulocytes Latest Units: % 1   Absolute Neutrophils Latest Ref Range: 1.6 - 8.3 10e3/uL 5.5   Absolute NRBCs Latest Units: 10e3/uL 0.0   NRBCs per 100 WBC Latest Ref Range: <1 /100 0       Assessment and Plan:   Right breast cancer-patient completed bilateral mastectomies with reconstruction after adjuvant chemotherapy with 4 doses of AC and 3 cycles of paclitaxel./ Carboplatin.  She completed radiation therapy and then began anastrozole which was changed to tamoxifen and because of the blood clot found last year she was changed to Aromasin.  At this point she is tolerating the Aromasin but noting more joint pains though feels she can tolerate this if this reduces her chance of breast cancer in the future.  We will renew the Aromasin 90 tabs x1 refill.  Asked that she be seen by oncologist or local staff at [place] for exam with next visit in 6 months.  Family history of breast cancer-patient has seen the genetic counselor and currently has no genetic predisposition to cancer however she has a sister with concerns as well which also tested negative.  Suggest repeat testing in future prior to discharge from oncology.  History of blood clot 10/2020-patient completed treatment with anticoagulant x3 months post provoked PE.  We had a discussion on prevention of further clotting.  She did use aspirin and her compression garments as well as compression socks on her flight out to [place] recently.  No new symptoms today.    History of iron deficiency-no anemia noted today with hemoglobin over 13 as well as ferritin level noted to equal 60  Health maintenance-patient is in the B well study.  She is currently not exercising since she returned from her vacation in [place] but given reminders for restart of  healthy habits in the near future after her busy week at [employer].  The total time of this encounter amounted to 30 minutes. This time included video time spent with the patient, prep work, ordering tests, and performing post visit documentation.  Olimpia Hammonds video visit  Start time 9:48 AM  End time 10:04 AM  SG      Again, thank you for allowing me to participate in the care of your patient.        Sincerely,        Maylin Crain NP, APRN CNP

## 2021-12-20 NOTE — PROGRESS NOTES
Diana is a 41 year old who is being evaluated via a billable video visit.  [place]    How would you like to obtain your AVS? MyChart  If the video visit is dropped, the invitation should be resent by: Text to cell phone: [phone]  Will anyone else be joining your video visit? No    Rosy Meyers    Oncology Follow Up Visit: December 20, 2021    Oncologist: Dr Millan  PCP: Yenifer Peters    Diagnosis: Right breast cancer  Diana Wilson is a 40 yo female who presented at age 36 breast lump in her right armpit and right breast in 10/2016. 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  ER/MN 99% positive, HER-2/kelle negative, associated with high-grade DCIS.  Right axillary ultrasound-guided biopsy indicating metastatic carcinoma positive for spread from breast primary, ER/MN both negative.  HER-2/kelle is negative.  She had clinical T2N1 disease. She made informed decision to proceed with neoadjuvant DDAC C1D1 1/16/2017. She has good clinical response post DD AC x4. Then went on to have wkly taxol + carbo  with informed decision in March.   She had carbo hypersensitivity reaction in early May (during C3). It was d/c after. She finished chemo end of May 2017. She was tested negative for BRCA1/2 with [facility]. 7/2017 she had double mastectomies at [facility]. Right side found no residual invasive carcinoma, +grade III DCIS, 1mm in greatest dimension. 5 sLN all negative. She had ypTis(DCIS)pN0 disease. Left breast no cancer. She finished post mastectomy RT in 11/2017.  She had BSO 11/2017. Then started Arimidex in 1/2018. She quit in Feb 2018 due to side effects and willing to try tamoxifen in 3/2018.   She had right breast surgery on 9/17/2020 at [facility] involving removal of bilateral breast reconstruction implants, and TRAMP reconstruction.   Developed tachycardia and dyspnea with oxygen requirement while in the hospital  shortly after her surgery and had a CT scan of the chest at that time that was negative for PE.  Her SOB was progressive post op and was in ER 9/29/2020. Found to have severe anemia hb of 6.9 from baseline of 12.8. had 2 units of PRBC.Also found by CT to have multiple filling defects within the sub-segmental pulmonary arteries consistent with PE.  *Ultrasound shows a nonocclusive thrombus in the right mid calf in the peroneal vein. Tamoxifen was changed to AI early Oct, and she presented to [facility] for SOB mid Oct. Arimidex was stopped.   PE resolved. She also had infection on re constructed breast s/p Abx.   Treatment:   1/16- 3/1/2017 AC x 4 cycles  3/14- 5/31/2017caboplatin/ Taxol ( carboplatin discharge after cycle 3 due to reaction)  7/2017 Double Mastectomy  11/2017 finished post mastectomy RT .    11/2017 BSO 11/2017.   1/2018 started Arimidex. She quit in Feb 2018 due to side effects and willing to try tamoxifen in 3/2018   9/2020 removal of breast implants.   10/2020 Blood clot noted   Now on Aromasin after blood clot    Interval History: Ms Wilson is contacted for followup to her breast cancer and her history of PE. Previous lump is now gone. Feels she has had more joint pains with the use of Aromasin vs. Tamoxifen but states it is tolerable.  Admits she does get some fluid buildup to the upper back near her incisions but has seen lymphedema specialist has garments and  does help with massage.  She feels she is staying healthy however does get some anxiety around this holidays and she does work for a [employer] and this is her busy season.  She has not had any new fevers illnesses chest pain shortness of breath cough or new skin issues and feels that the removal of the implants was a blessing.  Rest of comprehensive and complete ROS is reviewed and is negative.   Past Medical History:   Diagnosis Date     Anxiety      Breast cancer (H)      Depressive disorder 1995    And Anxiety      Encounter for insertion or removal of intrauterine contraceptive device 1/8/2008     Excessive or frequent menstruation 03/19/2003     Fibroadenosis of breast      GERD (gastroesophageal reflux disease)      Invasive ductal carcinoma of breast, right (H)      Malignant neoplasm of upper-outer quadrant of right female breast (H)      Mild intermittent asthma            never has had PFT's, MDI with colds and at times exercise     Neutropenia, drug-induced (H)      Nongonococcal urethritis (JALIL) due to chlamydia trachomatis 01/03/2002     Other and unspecified ovarian cyst 03/19/2003    RIGHT ovarian cyst     PONV (postoperative nausea and vomiting)      Skin cancer      Transient hypertension of pregnancy, antepartum     PIH with last birth     Uncomplicated asthma 2002     Current Outpatient Medications   Medication     acetaminophen (TYLENOL) 325 MG tablet     Ascorbic Acid (VITAMIN C PO)     aspirin (ASA) 325 MG EC tablet     cetirizine (ZYRTEC) 10 MG tablet     cholecalciferol (VITAMIN D3) 1000 units (25 mcg) capsule     clindamycin (CLINDAMAX) 1 % external gel     exemestane (AROMASIN) 25 MG tablet     fluocinonide (LIDEX) 0.05 % external solution     Fluticasone Propionate (FLONASE NA)     gabapentin (NEURONTIN) 300 MG capsule     LORazepam (ATIVAN) 0.5 MG tablet     Multiple Vitamins-Minerals (MULTIVITAMIN PO)     omeprazole (PRILOSEC) 20 MG DR capsule     venlafaxine (EFFEXOR-XR) 150 MG 24 hr capsule     venlafaxine (EFFEXOR-XR) 75 MG 24 hr capsule     No current facility-administered medications for this visit.     Allergies   Allergen Reactions     Carboplatin Shortness Of Breath, Rash and Anaphylaxis     Other reaction(s): Flushing, Tachycardia     Ambien      hallucinations     Amoxicillin GI Disturbance     Erythromycin GI Disturbance     Hydrocodone-Acetaminophen      Other reaction(s): Hallucinations     Hydromorphone Headache     Penicillins Nausea and Vomiting and Hives     1-11-17 pt states this was  "a childhood reaction     Zolpidem      Other reaction(s): Hallucinations     Hydrocodone Other (See Comments)     Out of body experience, \"creepy-crawly\" skin        Physical Exam:[facility] 08/05/2016 (Exact Date)    GENERAL: Healthy, alert and no distress  EYES: Eyes grossly normal to inspection.  No discharge or erythema, or obvious scleral/conjunctival abnormalities.  RESP: No audible wheeze, cough, or visible cyanosis.  No visible retractions or increased work of breathing.    SKIN: Visible skin clear. No significant rash, abnormal pigmentation or lesions.  NEURO: Cranial nerves grossly intact.  Mentation and speech appropriate for age.  PSYCH: Mentation appears normal, affect normal/bright, judgement and insight intact, normal speech and appearance well-groomed.  The rest of a comprehensive physical examination is deferred due to video visit restrictions     Laboratory Results:   Ref. Range 12/13/2021 08:10   Ferritin Latest Ref Range: 12 - 150 ng/mL 60   WBC Latest Ref Range: 4.0 - 11.0 10e3/uL 8.6   Hemoglobin Latest Ref Range: 11.7 - 15.7 g/dL 13.5   Hematocrit Latest Ref Range: 35.0 - 47.0 % 40.1   Platelet Count Latest Ref Range: 150 - 450 10e3/uL 288   RBC Count Latest Ref Range: 3.80 - 5.20 10e6/uL 4.62   MCV Latest Ref Range: 78 - 100 fL 87   MCH Latest Ref Range: 26.5 - 33.0 pg 29.2   MCHC Latest Ref Range: 31.5 - 36.5 g/dL 33.7   RDW Latest Ref Range: 10.0 - 15.0 % 14.3   % Neutrophils Latest Units: % 63   % Lymphocytes Latest Units: % 24   % Monocytes Latest Units: % 5   % Eosinophils Latest Units: % 6   % Basophils Latest Units: % 1   Absolute Basophils Latest Ref Range: 0.0 - 0.2 10e3/uL 0.0   Absolute Eosinophils Latest Ref Range: 0.0 - 0.7 10e3/uL 0.5   Absolute Immature Granulocytes Latest Ref Range: <=0.4 10e3/uL 0.0   Absolute Lymphocytes Latest Ref Range: 0.8 - 5.3 10e3/uL 2.0   Absolute Monocytes Latest Ref Range: 0.0 - 1.3 10e3/uL 0.4   % Immature Granulocytes Latest Units: % 1   Absolute " Neutrophils Latest Ref Range: 1.6 - 8.3 10e3/uL 5.5   Absolute NRBCs Latest Units: 10e3/uL 0.0   NRBCs per 100 WBC Latest Ref Range: <1 /100 0   12/6/2021 DEXA scan including osteopenia with T score of -1.3 at lumbar spine    Assessment and Plan:   Right breast cancer-patient completed bilateral mastectomies with reconstruction after adjuvant chemotherapy with 4 doses of AC and 3 cycles of paclitaxel./ Carboplatin.  She completed radiation therapy and then began anastrozole which was changed to tamoxifen and because of the blood clot found last year she was changed to Aromasin.  At this point she is tolerating the Aromasin but noting more joint pains though feels she can tolerate this if this reduces her chance of breast cancer in the future.  We will renew the Aromasin 90 tabs x1 refill.  Asked that she be seen by oncologist or local staff at [place] for exam with next visit in 6 months.  Osteopenia-noted with recent DEXA scan to have a T score of -1.3 at lumbar spine.  Patient should be using calcium plus vitamin D daily and is receiving Zometa every 6 months-last completed 10/19/2021  Family history of breast cancer-patient has seen the genetic counselor and currently has no genetic predisposition to cancer however she has a sister with concerns as well which also tested negative.  Suggest repeat testing in future prior to discharge from oncology.  History of blood clot 10/2020-patient completed treatment with anticoagulant x3 months post provoked PE.  We had a discussion on prevention of further clotting.  She did use aspirin and her compression garments as well as compression socks on her flight out to [place] recently.  No new symptoms today.    History of iron deficiency-no anemia noted today with hemoglobin over 13 as well as ferritin level noted to equal 60  Health maintenance-patient is in the B well study.  She is currently not exercising since she returned from her vacation in [place] but given  reminders for restart of healthy habits in the near future after her busy week at [employer].  The total time of this encounter amounted to 30 minutes. This time included video time spent with the patient, prep work, ordering tests, and performing post visit documentation.  Olimpia Hammonds video visit  Start time 9:48 AM  End time 10:04 AM  SISI

## 2021-12-22 ENCOUNTER — PATIENT OUTREACH (OUTPATIENT)
Dept: CARE COORDINATION | Facility: CLINIC | Age: 41
End: 2021-12-22
Payer: COMMERCIAL

## 2021-12-22 NOTE — PROGRESS NOTES
Oncology Distress Screening Follow-up  Clinical Social Work  [place]    Identified Concern and Score From Distress Screenin. How concerned are you about your ability to eat?  0       2. How concerned are you about unintended weight loss or your current weight?  6 Abnormal        3. How concerned are you about feeling depressed or very sad?  1       4. How concerned are you about feeling anxious or very scared?  6 Abnormal        5. Do you struggle with the loss of meaning and greg in your life?  Not at all       6. How concerned are you about work and home life issues that may be affected by your cancer?  0       7. How concerned are you about knowing what resources are available to help you?  0       8. Do you currently have what you would describe as Jewish or spiritual struggles?             Not at all                 Date of Distress Screenin21      Data: Diana seen virtually in follow up visit for breast cancer.       Intervention/Education Provided:: LAKESHIA called and spoke to Diana, introduced self and reason for call. Diana was pleasant on the phone. She acknowledges long history of anxiety, states a lot is not cancer related. Diana works with a therapist. She feels content with that support, not interested in additional services at this time. LAKESHIA encouraged Diana to reach out if she changes her mind about this at any point in the future.       Follow-up Required: LAKESHIA will remain available as needed and appropriate.           NICKY Sherman, [facility]  Clinical , Adult Oncology  Phone: [phone]

## 2021-12-30 ENCOUNTER — TELEPHONE (OUTPATIENT)
Dept: ONCOLOGY | Facility: CLINIC | Age: 41
End: 2021-12-30
Payer: COMMERCIAL

## 2021-12-30 DIAGNOSIS — Z17.0 MALIGNANT NEOPLASM OF UPPER-OUTER QUADRANT OF RIGHT BREAST IN FEMALE, ESTROGEN RECEPTOR POSITIVE (H): Primary | ICD-10-CM

## 2021-12-30 DIAGNOSIS — M85.89 OSTEOPENIA OF MULTIPLE SITES: ICD-10-CM

## 2021-12-30 DIAGNOSIS — E66.811 CLASS 1 OBESITY DUE TO EXCESS CALORIES WITHOUT SERIOUS COMORBIDITY WITH BODY MASS INDEX (BMI) OF 34.0 TO 34.9 IN ADULT: ICD-10-CM

## 2021-12-30 DIAGNOSIS — E66.09 CLASS 1 OBESITY DUE TO EXCESS CALORIES WITHOUT SERIOUS COMORBIDITY WITH BODY MASS INDEX (BMI) OF 34.0 TO 34.9 IN ADULT: ICD-10-CM

## 2021-12-30 DIAGNOSIS — C50.411 MALIGNANT NEOPLASM OF UPPER-OUTER QUADRANT OF RIGHT BREAST IN FEMALE, ESTROGEN RECEPTOR POSITIVE (H): Primary | ICD-10-CM

## 2021-12-30 NOTE — PROGRESS NOTES
CLINICAL NUTRITION SERVICES- ONCOLOGY DISTRESS SCREENING     Identified Concern and Score From Distress Screening: This patient has scored >= 6 on Nutrition question 1 and/or 2 on the Oncology Distress Screening questionaire. Nutritionist Referral recommended. See Onc Distress Screening Flowsheet     Date of Distress Screenin/20     Findings: Diana reports that her appetite has been good. She usually eats 3 meals a day, but for lunch she usually grazes. Pt reports that she is concerned about her weight and is interested in weight loss. She was previously enrolled in a [facility] program for weight loss.      Intervention: Sent a message to Diana's provider requesting a nutrition referral.      Education Provided: as above     Follow-up Required: MELANIE Mcclellan RD, LD  [phone]

## 2022-02-02 ENCOUNTER — MYC MEDICAL ADVICE (OUTPATIENT)
Dept: FAMILY MEDICINE | Facility: CLINIC | Age: 42
End: 2022-02-02
Payer: COMMERCIAL

## 2022-02-02 DIAGNOSIS — B37.31 YEAST VAGINITIS: ICD-10-CM

## 2022-02-02 DIAGNOSIS — N39.0 RECURRENT UTI: Primary | ICD-10-CM

## 2022-02-03 RX ORDER — FLUCONAZOLE 150 MG/1
150 TABLET ORAL ONCE
Qty: 3 TABLET | Refills: 0 | Status: SHIPPED | OUTPATIENT
Start: 2022-02-03 | End: 2022-02-03

## 2022-02-10 DIAGNOSIS — K21.9 GASTROESOPHAGEAL REFLUX DISEASE, UNSPECIFIED WHETHER ESOPHAGITIS PRESENT: ICD-10-CM

## 2022-02-10 RX ORDER — EXEMESTANE 25 MG/1
25 TABLET ORAL DAILY
Qty: 30 TABLET | Refills: 3 | Status: CANCELLED | OUTPATIENT
Start: 2022-02-10

## 2022-02-21 ENCOUNTER — PATIENT OUTREACH (OUTPATIENT)
Dept: ONCOLOGY | Facility: CLINIC | Age: 42
End: 2022-02-21
Payer: COMMERCIAL

## 2022-02-21 NOTE — PROGRESS NOTES
Called pt to follow up on her pain she related to the exemestane. Pt stopped on 02.10 - LM for a return call.     Josette Smalls RN on 2/21/2022 at 11:40 AM

## 2022-03-01 ENCOUNTER — VIRTUAL VISIT (OUTPATIENT)
Dept: ONCOLOGY | Facility: CLINIC | Age: 42
End: 2022-03-01
Payer: COMMERCIAL

## 2022-03-01 DIAGNOSIS — E66.811 CLASS 1 OBESITY DUE TO EXCESS CALORIES WITHOUT SERIOUS COMORBIDITY WITH BODY MASS INDEX (BMI) OF 34.0 TO 34.9 IN ADULT: ICD-10-CM

## 2022-03-01 DIAGNOSIS — Z17.0 MALIGNANT NEOPLASM OF UPPER-OUTER QUADRANT OF RIGHT BREAST IN FEMALE, ESTROGEN RECEPTOR POSITIVE (H): ICD-10-CM

## 2022-03-01 DIAGNOSIS — C50.411 MALIGNANT NEOPLASM OF UPPER-OUTER QUADRANT OF RIGHT BREAST IN FEMALE, ESTROGEN RECEPTOR POSITIVE (H): ICD-10-CM

## 2022-03-01 DIAGNOSIS — M85.89 OSTEOPENIA OF MULTIPLE SITES: ICD-10-CM

## 2022-03-01 DIAGNOSIS — E66.09 CLASS 1 OBESITY DUE TO EXCESS CALORIES WITHOUT SERIOUS COMORBIDITY WITH BODY MASS INDEX (BMI) OF 34.0 TO 34.9 IN ADULT: ICD-10-CM

## 2022-03-01 PROCEDURE — 97802 MEDICAL NUTRITION INDIV IN: CPT | Mod: GT | Performed by: DIETITIAN, REGISTERED

## 2022-03-01 NOTE — LETTER
3/1/2022         RE: Diana Wilson  1972 [address] [facility] [address]        Dear Colleague,    Thank you for referring your patient, Diana Wilson, to the [facility] [place]. Please see a copy of my visit note below.    Video-Visit Details     Type of service:  Video Visit     Video Start Time (time video started): 12:26pm     Video End Time (time video stopped): 12:56pm    Originating Location (pt. Location): Home     Distant Location (provider location):  [facility] NUTRITION SERVICES      Mode of Communication:  Video Conference via [employer]    CLINICAL NUTRITION SERVICES - ASSESSMENT NOTE    Diana Wilson 41 year old referred for MNT related to history of breast cancer and obesity    Time Spent: 30 minutes  Visit Type: video  Referring Physician: Breann 12/30/21  C50.411, Z17.0 (ICD-10-CM) - Malignant neoplasm of upper-outer quadrant of right breast in female, estrogen receptor positive (H)  M85.89 (ICD-10-CM) - Osteopenia of multiple sites  E66.09, Z68.34 (ICD-10-CM) - Class 1 obesity due to excess calories without serious comorbidity with body mass index (BMI) of 34.0 to 34.9 in adult    NUTRITION HISTORY  Current diet: GF diet for Celiac disease   Diana presents today with desire to improve her health through nutrition and exercise.   She has been part of the [employer] research study.  She recently started walking/running on the treadmill. She is trying to do 1 - 1 1/2 miles ~25 min of exercise a few days a week.   She admits to emotional eating and may tend to binge eat in the evenings.    She feels as though she does very well with her diet during the day.   Per diet recall, she makes very healthful choices and is congnizant of protein control.   She has been using [employer] guanaco for     Diet Recall  Breakfast Smoothie with blueberries, spinach, pro powder (organic pea pro), 1/4 cup spouted oats, 1T yogurt, coffee cream  Am snack - Kind bar   Lunch  "Michael 2 cups, 1/4 cup edemmae, 1/4 cup peas, 1 carrot, cuke, cauliflower, 2 T Williams's antoine slaw, +/- cheese stick  Pm snack - yogurt and granola (Two good yogurt)   Dinner Flank steak, broccoli, spinach, sweet potato   Snacks Frozen yogurt bar   Beverages Propel maguire > 64oz, one glass wine     ANTHROPOMETRICS  Height: 64\"  Weight: 202 lbs/91kg  BMI: 34  Weight Status:  Obesity Grade I BMI 30-34.9  Weight History:   Wt Readings from Last 8 Encounters:   11/01/21 91.6 kg (202 lb)   10/03/21 91.2 kg (201 lb)   08/03/21 90.3 kg (199 lb)   07/20/21 90.3 kg (199 lb)   02/25/21 88 kg (194 lb 1.6 oz)   12/14/20 90.8 kg (200 lb 1.6 oz)   10/05/20 89 kg (196 lb 4.8 oz)   09/29/20 88.9 kg (196 lb)     Dosing Weight: 64kg (adjusted for grade 2 obesity)    Medications/vitamins/minerals/herbals:   Reviewed    Labs:  Labs reviewed    Physical Findings:  Not observed with virtual visit    ASSESSED NUTRITION NEEDS:  Estimated Energy Needs: 8729-7060 kcals (20-25 Kcal/Kg)  Justification: obese  Estimated Protein Needs: 65 grams protein (1 g pro/Kg)  Justification: maintenance    NUTRITION DIAGNOSIS:  Obesity related to self-monitoring deficit, excessive caloric intake AEB diet recall BMI >30    INTERVENTIONS  Provided written & verbal education:   --Discussed dietary and behavioral modifications to promote weight loss (portion control, meal consistency, measuring foods, fiber sources, protein sources, eating pace, exercise and avoidance of disordered eating patterns (binging)   --Reviewed calorie goals for desired weight loss.  Encouraged to limit calories to 6176-8432/day. Encouraged to continue tracking as able for accountability.   --Reviewed food choices for cancer and disease prevention. Encouraged Mediterranean style diet. Answered questions regarding association of soy and breast cancer. Discussed that whole soy food products are acceptable and avoiding soy isolate is suggested.    --Reviewed rate of healthy weight loss " and setting small goals to prevent overwhelm.     Provided pt with corresponding education materials/handouts on:  1400kcal meal plan, fiber content of common foods, Protein Sources.      Pt verbalize understanding of materials provided during consult.   Patient Understanding: Excellent  Expected Compliance: Excellent     Goals  1.  Aim 1300-1600kcal /day   2. Weight loss (0.5-1.0 lb /week desirable)    Follow-Up Plans: Pt has RD contact information for questions.      Rosy Love RDN, LDN          Again, thank you for allowing me to participate in the care of your patient.        Sincerely,        Rosy Love RD

## 2022-03-01 NOTE — PROGRESS NOTES
Video-Visit Details     Type of service:  Video Visit     Video Start Time (time video started): 12:26pm     Video End Time (time video stopped): 12:56pm    Originating Location (pt. Location): Home     Distant Location (provider location):  [facility] NUTRITION SERVICES      Mode of Communication:  [facility]    CLINICAL NUTRITION SERVICES - ASSESSMENT NOTE    Diana Wilson 41 year old referred for MNT related to history of breast cancer and obesity    Time Spent: 30 minutes  Visit Type: video  Referring Physician: Breann 12/30/21  C50.411, Z17.0 (ICD-10-CM) - Malignant neoplasm of upper-outer quadrant of right breast in female, estrogen receptor positive (H)  M85.89 (ICD-10-CM) - Osteopenia of multiple sites  E66.09, Z68.34 (ICD-10-CM) - Class 1 obesity due to excess calories without serious comorbidity with body mass index (BMI) of 34.0 to 34.9 in adult    NUTRITION HISTORY  Current diet: GF diet for Celiac disease   Diana presents today with desire to improve her health through nutrition and exercise.   She has been part of the [employer] research study.  She recently started walking/running on the treadmill. She is trying to do 1 - 1 1/2 miles ~25 min of exercise a few days a week.   She admits to emotional eating and may tend to binge eat in the evenings.    She feels as though she does very well with her diet during the day.   Per diet recall, she makes very healthful choices and is congnizant of protein control.   She has been using [employer] guanaco for     Diet Recall  Breakfast Smoothie with blueberries, spinach, pro powder (organic pea pro), 1/4 cup spouted oats, 1T yogurt, coffee cream  Am snack - Kind bar   Lunch Michael 2 cups, 1/4 cup edemmae, 1/4 cup peas, 1 carrot, cuke, cauliflower, 2 T Williams's antoine slaw, +/- cheese stick  Pm snack - yogurt and granola (Two good yogurt)   Dinner Flank steak, broccoli, spinach, sweet potato   Snacks Frozen yogurt bar   Beverages  "Propel maguire > 64oz, one glass wine     ANTHROPOMETRICS  Height: 64\"  Weight: 202 lbs/91kg  BMI: 34  Weight Status:  Obesity Grade I BMI 30-34.9  Weight History:   Wt Readings from Last 8 Encounters:   11/01/21 91.6 kg (202 lb)   10/03/21 91.2 kg (201 lb)   08/03/21 90.3 kg (199 lb)   07/20/21 90.3 kg (199 lb)   02/25/21 88 kg (194 lb 1.6 oz)   12/14/20 90.8 kg (200 lb 1.6 oz)   10/05/20 89 kg (196 lb 4.8 oz)   09/29/20 88.9 kg (196 lb)     Dosing Weight: 64kg (adjusted for grade 2 obesity)    Medications/vitamins/minerals/herbals:   Reviewed    Labs:  Labs reviewed    Physical Findings:  Not observed with virtual visit    ASSESSED NUTRITION NEEDS:  Estimated Energy Needs: 1362-7780 kcals (20-25 Kcal/Kg)  Justification: obese  Estimated Protein Needs: 65 grams protein (1 g pro/Kg)  Justification: maintenance    NUTRITION DIAGNOSIS:  Obesity related to self-monitoring deficit, excessive caloric intake AEB diet recall BMI >30    INTERVENTIONS  Provided written & verbal education:   --Discussed dietary and behavioral modifications to promote weight loss (portion control, meal consistency, measuring foods, fiber sources, protein sources, eating pace, exercise and avoidance of disordered eating patterns (binging)   --Reviewed calorie goals for desired weight loss.  Encouraged to limit calories to 3124-1656/day. Encouraged to continue tracking as able for accountability.   --Reviewed food choices for cancer and disease prevention. Encouraged Mediterranean style diet. Answered questions regarding association of soy and breast cancer. Discussed that whole soy food products are acceptable and avoiding soy isolate is suggested.    --Reviewed rate of healthy weight loss and setting small goals to prevent overwhelm.     Provided pt with corresponding education materials/handouts on:  1400kcal meal plan, fiber content of common foods, Protein Sources.      Pt verbalize understanding of materials provided during consult. "   Patient Understanding: Excellent  Expected Compliance: Excellent     Goals  1.  Aim 1300-1600kcal /day   2. Weight loss (0.5-1.0 lb /week desirable)    Follow-Up Plans: Pt has RD contact information for questions.      Rosy Love, BRIANAN, LDN

## 2022-03-03 ENCOUNTER — OFFICE VISIT (OUTPATIENT)
Dept: UROLOGY | Facility: CLINIC | Age: 42
End: 2022-03-03
Attending: FAMILY MEDICINE
Payer: COMMERCIAL

## 2022-03-03 VITALS
DIASTOLIC BLOOD PRESSURE: 88 MMHG | HEART RATE: 89 BPM | TEMPERATURE: 97.5 F | OXYGEN SATURATION: 97 % | SYSTOLIC BLOOD PRESSURE: 139 MMHG

## 2022-03-03 DIAGNOSIS — N39.0 RECURRENT UTI: ICD-10-CM

## 2022-03-03 LAB
ALBUMIN UR-MCNC: NEGATIVE MG/DL
APPEARANCE UR: CLEAR
BILIRUB UR QL STRIP: NEGATIVE
COLOR UR AUTO: YELLOW
GLUCOSE UR STRIP-MCNC: NEGATIVE MG/DL
HGB UR QL STRIP: NEGATIVE
KETONES UR STRIP-MCNC: NEGATIVE MG/DL
LEUKOCYTE ESTERASE UR QL STRIP: NEGATIVE
NITRATE UR QL: NEGATIVE
PH UR STRIP: 7 [PH] (ref 5–7)
SP GR UR STRIP: 1.01 (ref 1–1.03)
UROBILINOGEN UR STRIP-ACNC: 0.2 E.U./DL

## 2022-03-03 PROCEDURE — 99205 OFFICE O/P NEW HI 60 MIN: CPT | Mod: 25 | Performed by: UROLOGY

## 2022-03-03 PROCEDURE — 81003 URINALYSIS AUTO W/O SCOPE: CPT | Performed by: UROLOGY

## 2022-03-03 PROCEDURE — 52000 CYSTOURETHROSCOPY: CPT | Performed by: UROLOGY

## 2022-03-03 NOTE — PROGRESS NOTES
"Diana Wilson is a 41 year old female seen in consultation for UTI and incontinence. Consult from Yenifer Peters      Pt with several yr hx lower tract UTI's. Sx's include bladder pain, dysuria, then nausea/emesis. Feels as though she has had 20+. Does not feel as though she has a UTI today.    Also some mixed UI, stress > urge, wears pad only when she expects to laugh or jump, about once per week.    Also concerned about 'cancer;' has personal hx of breast ca and ca runs in her family.      Denies dysuria, gross hematuria, daytime frequency; can sit in the car 3 hrs to go to the cabin. Noc x 2-3.    No prior personal stone hx; mother did have uric acid stones.    Denies prior  evaluation, hx bladder surgery, use of bladder meds. Kegel's of no help.    Hx 2 vag deliveries, hyster 2018.    Chronic intermittent constipation, seems linked to her celiac disease. Also gluten intolerance. Had protein shake for breakfast.    Drinks 3 cups of caf coffee, 2-4 giant Yetti jugs of Tetlin \"for my BM's and to stay hydrated.\"    Reviewed PMH in detail including breast cancer, depression, GERD, skin cancer, asthma, pulmonary emboli      Past Medical History:   Diagnosis Date     Anxiety      Breast cancer (H)      Depressive disorder 1995    And Anxiety     Encounter for insertion or removal of intrauterine contraceptive device 1/8/2008     Excessive or frequent menstruation 03/19/2003     Fibroadenosis of breast      GERD (gastroesophageal reflux disease)      Invasive ductal carcinoma of breast, right (H)      Malignant neoplasm of upper-outer quadrant of right female breast (H)      Mild intermittent asthma            never has had PFT's, MDI with colds and at times exercise     Neutropenia, drug-induced (H)      Nongonococcal urethritis (JALIL) due to chlamydia trachomatis 01/03/2002     Other and unspecified ovarian cyst 03/19/2003    RIGHT ovarian cyst     PONV (postoperative nausea and vomiting)      Skin cancer      " Transient hypertension of pregnancy, antepartum     PIH with last birth     Uncomplicated asthma        Past Surgical History:   Procedure Laterality Date     BIOPSY  2016    EDG with biopsy     BREAST SURGERY      Bilateral reduction      SECTION       DENTAL SURGERY  2010 and 10/12/2010    Root canals     DILATION AND CURETTAGE, HYSTEROSCOPY, ABLATE ENDOMETRIUM NOVASURE, COMBINED  3/20/2012    Procedure:COMBINED DILATION AND CURETTAGE, HYSTEROSCOPY, ABLATE ENDOMETRIUM NOVASURE; Hysteroscopy with Endometrial Ablation Novasure; Surgeon:GERRI PURCELL; Location:[facility]     ESOPHAGOSCOPY, GASTROSCOPY, DUODENOSCOPY (EGD), COMBINED N/A 2016    Procedure: COMBINED ESOPHAGOSCOPY, GASTROSCOPY, DUODENOSCOPY (EGD);  Surgeon: Jose L Wilson MD;  Location: [facility]     INSERT PORT VASCULAR ACCESS N/A 2017    Procedure: INSERT PORT VASCULAR ACCESS;  Surgeon: Michael Townsend MD;  Location: [facility]     LAPAROSCOPIC SALPINGO-OOPHORECTOMY Bilateral 2017    Procedure: LAPAROSCOPIC SALPINGO-OOPHORECTOMY;  Laparoscopic bilateral salpingo oophorectomy;  Surgeon: Preeti Tirado MD;  Location: [facility]     RECONSTRUCT BREAST BILATERAL, IMPLANT PROSTHESIS BILATERAL, COMBINED Bilateral 3/16/2018    Procedure: COMBINED RECONSTRUCT BREAST BILATERAL, IMPLANT PROSTHESIS BILATERAL;  BILATERAL SECOND STAGE BREAST RECONSTRUCTION WITH SILICONE GEL IMPLANT;  Surgeon: Eugenio Ruelas MD;  Location: [facility]     REVISE RECONSTRUCTED BREAST       SURGICAL HISTORY OF -       Breast reduction     ZZC APPENDECTOMY         Social History     Socioeconomic History     Marital status:      Spouse name: Alfredo Wilson     Number of children: 2     Years of education: 15     Highest education level: Not on file   Occupational History     Occupation:      Employer: OTHER     Comment: [employer]   Tobacco Use     Smoking status: Former Smoker     Packs/day: 0.50      Years: 10.00     Pack years: 5.00     Types: Cigarettes     Quit date: 10/1/2016     Years since quittin.4     Smokeless tobacco: Never Used     Tobacco comment: Socially- quit smoking 10/01/2016   Substance and Sexual Activity     Alcohol use: Yes     Alcohol/week: 0.0 standard drinks     Comment: Occassional     Drug use: No     Sexual activity: Yes     Partners: Male     Birth control/protection: Female Surgical     Comment:  has had a vasectomy   Other Topics Concern      Service Not Asked     Blood Transfusions Not Asked     Caffeine Concern Not Asked     Occupational Exposure Not Asked     Hobby Hazards Not Asked     Sleep Concern Not Asked     Stress Concern Not Asked     Weight Concern Not Asked     Special Diet Not Asked     Back Care Not Asked     Exercise Not Asked     Bike Helmet Not Asked     Seat Belt Yes     Self-Exams Not Asked     Parent/sibling w/ CABG, MI or angioplasty before 65F 55M? No   Social History Narrative     Not on file     Social Determinants of Health     Financial Resource Strain: Not on file   Food Insecurity: Not on file   Transportation Needs: Not on file   Physical Activity: Not on file   Stress: Not on file   Social Connections: Not on file   Intimate Partner Violence: Not on file   Housing Stability: Not on file       Current Outpatient Medications   Medication Sig Dispense Refill     acetaminophen (TYLENOL) 325 MG tablet Take 2 tablets (650 mg) by mouth every 4 hours as needed for other (mild pain) 100 tablet 0     Ascorbic Acid (VITAMIN C PO)        aspirin (ASA) 325 MG EC tablet Take 325 mg by mouth every 6 hours as needed for moderate pain       cetirizine (ZYRTEC) 10 MG tablet Take 10 mg by mouth every morning       cholecalciferol (VITAMIN D3) 1000 units (25 mcg) capsule Take 1 capsule by mouth every other day       clindamycin (CLINDAMAX) 1 % external gel Apply to AA BID PRN 60 g 3     exemestane (AROMASIN) 25 MG tablet Take 1 tablet (25 mg) by  "mouth daily 90 tablet 1     fluocinonide (LIDEX) 0.05 % external solution Apply to scalp BID x 3-4 weeks then PRN 50 mL 3     Fluticasone Propionate (FLONASE NA) Spray 1 spray into both nostrils daily        gabapentin (NEURONTIN) 300 MG capsule Take 1 capsule (300 mg) by mouth 3 times daily 270 capsule 1     LORazepam (ATIVAN) 0.5 MG tablet Take 1 tablet (0.5 mg) by mouth every 6 hours as needed for anxiety 3 tablet 0     Multiple Vitamins-Minerals (MULTIVITAMIN PO) Take 1 tablet by mouth daily        omeprazole (PRILOSEC) 20 MG DR capsule TAKE ONE CAPSULE BY MOUTH EVERY MORNING 30 MINUTES BEFORE BREAKFAST 90 capsule 0     venlafaxine (EFFEXOR-XR) 150 MG 24 hr capsule Take 1 capsule (150 mg) by mouth daily 90 capsule 3     venlafaxine (EFFEXOR-XR) 75 MG 24 hr capsule TAKE ONE CAPSULE BY MOUTH ONCE DAILY (ALONG WITH 150MG) 90 capsule 3       Physical Exam:    GENL: NAD.  5'4\"  203#   ABD: Soft, non-tender, no masses.    EG: Well-estrogenized, no masses.    VAGINA: Well-estrogenized, no masses.    BN HYPERMOBILITY: Moderate.    CYSTOCELE: Grade 1.    APICAL PROLAPSE: Minimal.    RECTOCELE: Minimal.    BIMANUAL: No mass or tenderness.    Cysto:    (Informed consent obtained. Pause for cause performed)   Sterile prep.    17 Fr scope inserted through urethra. Systematic examination w 70 degree lens.   PVR: 5 cc   MUCOSA: Normal without lesion   ORIFICES: Normal location and morphology   CAPACITY: 500 cc; no pain with filling   Scope withdrawn without untoward effect.    Valsalva:   Moderate hypermobility, moderate leakage noted.    (Pt tolerated procedure without difficulty).            12/21/20 UA: negative  10/3/21 UC: negative  11/1/21 UA: mod bacti, 5-10 WBC    12/13/21 CT chest/abd/pelvis for cancer staging: ABDIRIZAK     * 1-2 mm non obstructing stone in left kidney      Today:  Results for orders placed or performed in visit on 03/03/22   UA reflex to Microscopic     Status: Normal   Result Value Ref Range    Color " Urine Yellow Colorless, Straw, Light Yellow, Yellow    Appearance Urine Clear Clear    Glucose Urine Negative Negative mg/dL    Bilirubin Urine Negative Negative    Ketones Urine Negative Negative mg/dL    Specific Gravity Urine 1.015 1.003 - 1.035    Blood Urine Negative Negative    pH Urine 7.0 5.0 - 7.0    Protein Albumin Urine Negative Negative mg/dL    Urobilinogen Urine 0.2 0.2, 1.0 E.U./dL    Nitrite Urine Negative Negative    Leukocyte Esterase Urine Negative Negative    Narrative    Microscopic not indicated         IMP:  1. Hx UTI, clear today  2. Large fluid consumption  3. NICOLASA with hypermobility  4. Tiny non-obstructing kidney stone  5. Hx breast ca, now ABDIRIZAK      PLAN:  1. Discussed situation with patient in detail.    2. Stressed importance of UC prior to starting emperic therapy for presumed UTI; pt and  express understanding    3. Aggressive rx of bowel habits; might start with increased dietary fiber    4. Consider moderation of Qawalangin; pt expresses understanding    5. ALTIS SLING DISCUSSION: We discussed the patients situation in detail including her specific anatomy and conditions. Diagrams are drawn.    We discussed the surgical treatment including Altis pubovaginal sling utilizing mesh material. We addressed the technical aspects of the procedure as well as the potential risks and complications incuding, but not limited to bleeding, infection, damage to organs or other injury. We discussed the recovery process and the potential effect on sexual function in detail. She also understands the alternative forms of therapy (including no therapy and treatment without mesh), and the potential need for additional therapy. We discussed the FDA report on the use of surgical mesh. All questions are answered in detail.     Pt expresses understanding; elects to give it some consideration    6. Conservative rx of small renal stone for now    7. Offered pt f/u appointment vs prn; they elect later    8. Total  time spent in reviewing patient records, discussing history, performing exam, discussing diagnosis, outlining treatment plan and documentation: 60 minutes

## 2022-03-03 NOTE — BRIEF OP NOTE
[facility] Gynecology Brief Operative Note    Pre-operative diagnosis: Hx breast cancer with recommended salpingo oophorectomy   Post-operative diagnosis: Same   Procedure: Procedure(s):  LAPAROSCOPIC SALPINGO-OOPHORECTOMY   Surgeon: Preeti Tirado MD   Assistant(s): Jason Nickerson  A surgical assistant was required for this surgery for his experience with retraction, achievement of hemostasis, and wound closure     Anesthesia: General Endotracheal Anesthesia   Estimated blood loss: less than 50ml   Total IV fluids: (See anesthesia record)   Blood transfusion: No transfusion was given during surgery   Total urine output: (See anesthesia record)   Drains: None   Specimens: Bilateral tubes and ovaries   Findings: Normal pelvis   Complications: None   Condition: Stable   Comments: See dictated operative report for full [id number]     
36.6

## 2022-03-17 ENCOUNTER — OFFICE VISIT (OUTPATIENT)
Dept: DERMATOLOGY | Facility: CLINIC | Age: 42
End: 2022-03-17
Payer: COMMERCIAL

## 2022-03-17 VITALS — DIASTOLIC BLOOD PRESSURE: 87 MMHG | HEART RATE: 89 BPM | OXYGEN SATURATION: 97 % | SYSTOLIC BLOOD PRESSURE: 123 MMHG

## 2022-03-17 DIAGNOSIS — D48.5 NEOPLASM OF UNCERTAIN BEHAVIOR OF SKIN: ICD-10-CM

## 2022-03-17 PROCEDURE — 88350 IMFLUOR EA ADDL 1ANTB STN PX: CPT | Performed by: DERMATOLOGY

## 2022-03-17 PROCEDURE — 99212 OFFICE O/P EST SF 10 MIN: CPT | Mod: 25 | Performed by: PHYSICIAN ASSISTANT

## 2022-03-17 PROCEDURE — 88346 IMFLUOR 1ST 1ANTB STAIN PX: CPT | Performed by: DERMATOLOGY

## 2022-03-17 PROCEDURE — 88305 TISSUE EXAM BY PATHOLOGIST: CPT | Performed by: DERMATOLOGY

## 2022-03-17 PROCEDURE — 11104 PUNCH BX SKIN SINGLE LESION: CPT | Performed by: PHYSICIAN ASSISTANT

## 2022-03-17 NOTE — NURSING NOTE
"Initial /87 (BP Location: Left arm)   Pulse 89   LMP 08/05/2016 (Exact Date)   SpO2 97%  Estimated body mass index is 34.67 kg/m  as calculated from the following:    Height as of 11/1/21: 1.626 m (5' 4\").    Weight as of 11/1/21: 91.6 kg (202 lb). .      "

## 2022-03-17 NOTE — LETTER
3/17/2022         RE: Diana Wilson  1972 [address] [facility] [address]        Dear Colleague,    Thank you for referring your patient, Diana Wilson, to the [facility]. Please see a copy of my visit note below.    HPI:   Chief complaints: Diana Wilson is a pleasant 41 year old female who presents for recheck recurrent sores on the scalp. She is flaring today. She was using a new hairspray and found that there was wheat protein in the hairspray. She does have a history of celiac but is very strictly gluten free.         PHYSICAL EXAM:    /87 (BP Location: Left arm)   Pulse 89   LMP 08/05/2016 (Exact Date)   SpO2 97%   Skin exam performed as follows: Type 2 skin. Mood appropriate  Alert and Oriented X 3. Well developed, well nourished in no distress.  General appearance: Normal  Head including face: Normal  Eyes: conjunctiva and lids: Normal  Mouth: Lips, teeth, gums: Normal  Neck: Normal  Chest-breast/axillae: Normal  Back: Normal  Spleen and liver: Normal  Cardiovascular: Exam of peripheral vascular system by observation for swelling, varicosities, edema: Normal  Genitalia: groin, buttocks: Normal  Extremities: digits/nails (clubbing): Normal  Eccrine and Apocrine glands: Normal  Right upper extremity: Normal  Left upper extremity: Normal  Right lower extremity: Normal  Left lower extremity: Normal  Skin: Scalp and body hair: See below    1. Excoriated painful papules on the scalp    ASSESSMENT/PLAN:     1. R/o dermatitis herpetiformis vs folliculitis on the left occipital scalp.  1% Lidocaine with epinephrine for anesthetic, with sterile technique a 4 mm punch biopsy was used to obtain a biopsy specimen and  of the lesion. Hemostasis was obtained by pressure and wound was sutured with two 4-0 sutures. Antibiotic dressing was applied, and wound care instructions provided. The specimen is labeled and sent to pathology for evaluation. The procedure was well tolerated without  complications    --Discussed dapsone if struggling. Discussed risk of hemolytic anemia and routine blood monitoring.       Follow-up: 3 months  CC:   Scribed By: Delicia Bell, MS, PAJollyC          Again, thank you for allowing me to participate in the care of your patient.        Sincerely,        Delicia Bell PA-C

## 2022-03-17 NOTE — PROGRESS NOTES
HPI:   Chief complaints: Diana Wilson is a pleasant 41 year old female who presents for recheck recurrent sores on the scalp. She is flaring today. She was using a new hairspray and found that there was wheat protein in the hairspray. She does have a history of celiac but is very strictly gluten free.         PHYSICAL EXAM:    /87 (BP Location: Left arm)   Pulse 89   LMP 08/05/2016 (Exact Date)   SpO2 97%   Skin exam performed as follows: Type 2 skin. Mood appropriate  Alert and Oriented X 3. Well developed, well nourished in no distress.  General appearance: Normal  Head including face: Normal  Eyes: conjunctiva and lids: Normal  Mouth: Lips, teeth, gums: Normal  Neck: Normal  Chest-breast/axillae: Normal  Back: Normal  Spleen and liver: Normal  Cardiovascular: Exam of peripheral vascular system by observation for swelling, varicosities, edema: Normal  Genitalia: groin, buttocks: Normal  Extremities: digits/nails (clubbing): Normal  Eccrine and Apocrine glands: Normal  Right upper extremity: Normal  Left upper extremity: Normal  Right lower extremity: Normal  Left lower extremity: Normal  Skin: Scalp and body hair: See below    1. Excoriated painful papules on the scalp    ASSESSMENT/PLAN:     1. R/o dermatitis herpetiformis vs folliculitis on the left occipital scalp.  1% Lidocaine with epinephrine for anesthetic, with sterile technique a 4 mm punch biopsy was used to obtain a biopsy specimen and  of the lesion. Hemostasis was obtained by pressure and wound was sutured with two 4-0 sutures. Antibiotic dressing was applied, and wound care instructions provided. The specimen is labeled and sent to pathology for evaluation. The procedure was well tolerated without complications    --Discussed dapsone if struggling. Discussed risk of hemolytic anemia and routine blood monitoring.       Follow-up: 3 months  CC:   Scribed By: Delicia Bell, MS, PABINTA

## 2022-03-17 NOTE — PATIENT INSTRUCTIONS
Wound Care Instructions     [facility] [phone]    [facility] [phone]      Scalp  You have 2 stitches in your biopsy site. They can be removed in 8-10 days.                 AFTER 24 HOURS YOU SHOULD REMOVE THE BANDAGE AND BEGIN DAILY DRESSING CHANGES AS FOLLOWS:     1) Remove Dressing.     2) Clean and dry the area with tap water using a Q-tip or sterile gauze pad.     3) Apply Vaseline, Aquaphor, Polysporin ointment or Bacitracin ointment over entire wound.  Do NOT use Neosporin ointment.     4) Cover the wound with a band-aid, or a sterile non-stick gauze pad and micropore paper tape      REPEAT THESE INSTRUCTIONS AT LEAST ONCE A DAY UNTIL THE WOUND HAS COMPLETELY HEALED.    It is an old wives tale that a wound heals better when it is exposed to air and allowed to dry out. The wound will heal faster with a better cosmetic result if it is kept moist with ointment and covered with a bandage.    **Do not let the wound dry out.**      Supplies Needed:      *Cotton tipped applicators (Q-tips)    *Polysporin Ointment or Bacitracin Ointment (NOT NEOSPORIN)    *Band-aids or non-stick gauze pads and micropore paper tape.      PATIENT INFORMATION:    During the healing process you will notice a number of changes. All wounds develop a small halo of redness surrounding the wound.  This means healing is occurring. Severe itching with extensive redness usually indicates sensitivity to the ointment or bandage tape used to dress the wound.  You should call our office if this develops.      Swelling  and/or discoloration around your surgical site is common, particularly when performed around the eye.    All wounds normally drain.  The larger the wound the more drainage there will be.  After 7-10 days, you will notice the wound beginning to shrink and new skin will begin to grow.  The wound is healed when you can see skin has formed over the entire area.  A healed wound has a healthy, shiny look  to the surface and is red to dark pink in color to normalize.  Wounds may take approximately 4-6 weeks to heal.  Larger wounds may take 6-8 weeks.  After the wound is healed you may discontinue dressing changes.    You may experience a sensation of tightness as your wound heals. This is normal and will gradually subside.    Your healed wound may be sensitive to temperature changes. This sensitivity improves with time, but if you re having a lot of discomfort, try to avoid temperature extremes.    Patients frequently experience itching after their wound appears to have healed because of the continue healing under the skin.  Plain Vaseline will help relieve the itching.    POSSIBLE COMPLICATIONS    BLEEDIN. Leave the bandage in place.  2. Use tightly rolled up gauze or a cloth to apply direct pressure over the bandage for 30  minutes.  3. Reapply pressure for an additional 30 minutes if necessary  4. Use additional gauze and tape to maintain pressure once the bleeding has stopped.

## 2022-03-18 LAB
PATH REPORT.COMMENTS IMP SPEC: NORMAL
PATH REPORT.FINAL DX SPEC: NORMAL
PATH REPORT.GROSS SPEC: NORMAL
PATH REPORT.MICROSCOPIC SPEC OTHER STN: NORMAL
PATH REPORT.RELEVANT HX SPEC: NORMAL

## 2022-03-31 DIAGNOSIS — Z17.0 MALIGNANT NEOPLASM OF UPPER-OUTER QUADRANT OF RIGHT BREAST IN FEMALE, ESTROGEN RECEPTOR POSITIVE (H): ICD-10-CM

## 2022-03-31 DIAGNOSIS — M85.89 OSTEOPENIA OF MULTIPLE SITES: Primary | ICD-10-CM

## 2022-03-31 DIAGNOSIS — C50.411 MALIGNANT NEOPLASM OF UPPER-OUTER QUADRANT OF RIGHT BREAST IN FEMALE, ESTROGEN RECEPTOR POSITIVE (H): ICD-10-CM

## 2022-04-04 ENCOUNTER — ONCOLOGY VISIT (OUTPATIENT)
Dept: ONCOLOGY | Facility: CLINIC | Age: 42
End: 2022-04-04
Attending: FAMILY MEDICINE
Payer: COMMERCIAL

## 2022-04-04 ENCOUNTER — APPOINTMENT (OUTPATIENT)
Dept: LAB | Facility: CLINIC | Age: 42
End: 2022-04-04
Payer: COMMERCIAL

## 2022-04-04 ENCOUNTER — INFUSION THERAPY VISIT (OUTPATIENT)
Dept: INFUSION THERAPY | Facility: CLINIC | Age: 42
End: 2022-04-04
Attending: NURSE PRACTITIONER
Payer: COMMERCIAL

## 2022-04-04 VITALS
TEMPERATURE: 97.5 F | DIASTOLIC BLOOD PRESSURE: 101 MMHG | SYSTOLIC BLOOD PRESSURE: 132 MMHG | RESPIRATION RATE: 12 BRPM | OXYGEN SATURATION: 95 % | HEART RATE: 84 BPM

## 2022-04-04 DIAGNOSIS — Z17.0 MALIGNANT NEOPLASM OF UPPER-OUTER QUADRANT OF RIGHT BREAST IN FEMALE, ESTROGEN RECEPTOR POSITIVE (H): ICD-10-CM

## 2022-04-04 DIAGNOSIS — Z80.3 FAMILY HISTORY OF MALIGNANT NEOPLASM OF BREAST: ICD-10-CM

## 2022-04-04 DIAGNOSIS — C50.411 MALIGNANT NEOPLASM OF UPPER-OUTER QUADRANT OF RIGHT BREAST IN FEMALE, ESTROGEN RECEPTOR POSITIVE (H): ICD-10-CM

## 2022-04-04 DIAGNOSIS — E61.1 IRON DEFICIENCY: ICD-10-CM

## 2022-04-04 DIAGNOSIS — M85.89 OSTEOPENIA OF MULTIPLE SITES: Primary | ICD-10-CM

## 2022-04-04 DIAGNOSIS — M85.89 OSTEOPENIA OF MULTIPLE SITES: ICD-10-CM

## 2022-04-04 DIAGNOSIS — Z86.711 HISTORY OF PULMONARY EMBOLISM: ICD-10-CM

## 2022-04-04 LAB
ALBUMIN SERPL-MCNC: 4.1 G/DL (ref 3.4–5)
ALP SERPL-CCNC: 95 U/L (ref 40–150)
ALT SERPL W P-5'-P-CCNC: 31 U/L (ref 0–50)
ANION GAP SERPL CALCULATED.3IONS-SCNC: 6 MMOL/L (ref 3–14)
AST SERPL W P-5'-P-CCNC: 14 U/L (ref 0–45)
BASOPHILS # BLD AUTO: 0 10E3/UL (ref 0–0.2)
BASOPHILS NFR BLD AUTO: 1 %
BILIRUB SERPL-MCNC: 0.2 MG/DL (ref 0.2–1.3)
BUN SERPL-MCNC: 10 MG/DL (ref 7–30)
CALCIUM SERPL-MCNC: 9.6 MG/DL (ref 8.5–10.1)
CANCER AG27-29 SERPL-ACNC: 16 U/ML (ref 0–39)
CHLORIDE BLD-SCNC: 107 MMOL/L (ref 94–109)
CO2 SERPL-SCNC: 30 MMOL/L (ref 20–32)
CREAT SERPL-MCNC: 0.67 MG/DL (ref 0.52–1.04)
EOSINOPHIL # BLD AUTO: 0.6 10E3/UL (ref 0–0.7)
EOSINOPHIL NFR BLD AUTO: 6 %
ERYTHROCYTE [DISTWIDTH] IN BLOOD BY AUTOMATED COUNT: 13.2 % (ref 10–15)
GFR SERPL CREATININE-BSD FRML MDRD: >90 ML/MIN/1.73M2
GLUCOSE BLD-MCNC: 93 MG/DL (ref 70–99)
HCT VFR BLD AUTO: 39.3 % (ref 35–47)
HGB BLD-MCNC: 13.5 G/DL (ref 11.7–15.7)
IMM GRANULOCYTES # BLD: 0 10E3/UL
IMM GRANULOCYTES NFR BLD: 1 %
LYMPHOCYTES # BLD AUTO: 2.9 10E3/UL (ref 0.8–5.3)
LYMPHOCYTES NFR BLD AUTO: 34 %
MCH RBC QN AUTO: 29.5 PG (ref 26.5–33)
MCHC RBC AUTO-ENTMCNC: 34.4 G/DL (ref 31.5–36.5)
MCV RBC AUTO: 86 FL (ref 78–100)
MONOCYTES # BLD AUTO: 0.4 10E3/UL (ref 0–1.3)
MONOCYTES NFR BLD AUTO: 5 %
NEUTROPHILS # BLD AUTO: 4.6 10E3/UL (ref 1.6–8.3)
NEUTROPHILS NFR BLD AUTO: 53 %
NRBC # BLD AUTO: 0 10E3/UL
NRBC BLD AUTO-RTO: 0 /100
PLATELET # BLD AUTO: 303 10E3/UL (ref 150–450)
POTASSIUM BLD-SCNC: 4 MMOL/L (ref 3.4–5.3)
PROT SERPL-MCNC: 7.4 G/DL (ref 6.8–8.8)
RBC # BLD AUTO: 4.57 10E6/UL (ref 3.8–5.2)
SODIUM SERPL-SCNC: 143 MMOL/L (ref 133–144)
WBC # BLD AUTO: 8.5 10E3/UL (ref 4–11)

## 2022-04-04 PROCEDURE — 99214 OFFICE O/P EST MOD 30 MIN: CPT | Performed by: INTERNAL MEDICINE

## 2022-04-04 PROCEDURE — 86300 IMMUNOASSAY TUMOR CA 15-3: CPT | Performed by: INTERNAL MEDICINE

## 2022-04-04 PROCEDURE — 82947 ASSAY GLUCOSE BLOOD QUANT: CPT | Performed by: INTERNAL MEDICINE

## 2022-04-04 PROCEDURE — 258N000003 HC RX IP 258 OP 636: Performed by: INTERNAL MEDICINE

## 2022-04-04 PROCEDURE — 36415 COLL VENOUS BLD VENIPUNCTURE: CPT

## 2022-04-04 PROCEDURE — 85025 COMPLETE CBC W/AUTO DIFF WBC: CPT | Performed by: INTERNAL MEDICINE

## 2022-04-04 PROCEDURE — G0463 HOSPITAL OUTPT CLINIC VISIT: HCPCS | Mod: 25

## 2022-04-04 PROCEDURE — 96365 THER/PROPH/DIAG IV INF INIT: CPT

## 2022-04-04 PROCEDURE — 250N000011 HC RX IP 250 OP 636: Performed by: INTERNAL MEDICINE

## 2022-04-04 RX ORDER — ZOLEDRONIC ACID 0.04 MG/ML
4 INJECTION, SOLUTION INTRAVENOUS ONCE
Status: COMPLETED | OUTPATIENT
Start: 2022-04-04 | End: 2022-04-04

## 2022-04-04 RX ORDER — LETROZOLE 2.5 MG/1
2.5 TABLET, FILM COATED ORAL DAILY
Qty: 30 TABLET | Refills: 1 | Status: SHIPPED | OUTPATIENT
Start: 2022-04-04 | End: 2022-05-27

## 2022-04-04 RX ADMIN — ZOLEDRONIC ACID 4 MG: 0.04 INJECTION, SOLUTION INTRAVENOUS at 14:38

## 2022-04-04 RX ADMIN — SODIUM CHLORIDE 250 ML: 9 INJECTION, SOLUTION INTRAVENOUS at 14:38

## 2022-04-04 ASSESSMENT — PAIN SCALES - GENERAL: PAINLEVEL: MODERATE PAIN (4)

## 2022-04-04 NOTE — LETTER
"    4/4/2022         RE: Diana Wilson  [address] [facility] [address]        Dear Colleague,    Thank you for referring your patient, Diana Wilson, to the [facility]. Please see a copy of my visit note below.    Oncology Rooming Note    April 4, 2022 1:48 PM   Diana Wilson is a 41 year old female who presents for:    Chief Complaint   Patient presents with     Oncology Clinic Visit     Malignant neoplasm of upper-outer quadrant of right breast in female, estrogen receptor positive - Labs provider and infusion     Initial Vitals: BP (!) 132/101 (BP Location: Right arm, Patient Position: Sitting, Cuff Size: Adult Large)   Pulse 84   Temp 97.5  F (36.4  C) (Tympanic)   Resp 12   LMP 08/05/2016 (Exact Date)   SpO2 95%  Estimated body mass index is 34.67 kg/m  as calculated from the following:    Height as of 11/1/21: 1.626 m (5' 4\").    Weight as of 11/1/21: 91.6 kg (202 lb). There is no height or weight on file to calculate BSA.  Moderate Pain (4) Comment: Data Unavailable   Patient's last menstrual period was 08/05/2016 (exact date).  Allergies reviewed: Yes  Medications reviewed: Yes    Medications: Medication refills not needed today.  Pharmacy name entered into "[employer]:    [facility] [place] - [address] [id number] [address] AT [address]    Clinical concerns:  None      [facility] Hematology and Oncology Progress Note    Patient: Diana Wilson  MRN: [id number]  Date of Service: 04/04/2022        Reason for Visit    Chief Complaint   Patient presents with     Oncology Clinic Visit     Malignant neoplasm of upper-outer quadrant of right breast in female, estrogen receptor positive - Labs provider and infusion         Problem List Items Addressed This Visit        Digestive    RESOLVED: Iron deficiency    Relevant Orders    Ferritin    CBC with " platelets       Musculoskeletal and Integumentary    Osteopenia of multiple sites       Other    Malignant neoplasm of upper-outer quadrant of right breast in female, estrogen receptor positive (H)    Relevant Medications    letrozole (FEMARA) 2.5 MG tablet      Other Visit Diagnoses     History of pulmonary embolism        Family history of malignant neoplasm of breast                Assessment and Plan  Right-sided breast cancer.  Status post chemo and double mastectomy with radiation.  Her primary tumor was ER/IN positive.  Hence she was put on endocrine therapy following or other treatments.  Unfortunately she has not tolerated any of them really well.  Had blood clots with tamoxifen.  Has shortness of breath and tachycardia with Arimidex.  Now on Aromasin with significant joint pains and muscle aches.    Reviewed further options.  Considering that she had a node positive disease she is at risk for disease recurrence and would benefit from continued endocrine therapy.  He is almost 4 years out from therapy.  But since she had node positive disease he might benefit from extended therapy.  She is willing to do that but she is enjoying the quality of life due to side effects.  In situation I recommended switching from atorvastatin to letrozole.  Explained to her that all these therapies have similar side effects with some differences and may have to keep on titrating medications.  She understands this.  She is on venlafaxine and gabapentin which is helping a whole lot.  Potentially we could either add on duloxetine or increase the dose of one of the other medications.  She is willing to try letrozole and see how it works out for her.    Sexual dysfunction  Issues with vaginal dryness and decreased libido.  Unfortunately this is a known side effect of endocrine therapy.  Advised use of lubrication.  She may also need counseling.  We will try to get a brain to see one of our providers who can help with  that.    History of PE DVT  This was while on tamoxifen.  Was anticoagulated at the time.  Currently not on any anticoagulation.  Letrozole does have a very small risk of cardiovascular complications.  Continue to watch for any swelling or sudden onset shortness of breath.    Continue Zometa 6 monthly for bone health.     See her back in 3 months.  She will get Zometa today.    Patient Instructions   zometa today. RTC in 3 months with labs prior.                 Cancer Staging  No matching staging information was found for the patient.    ECOG Performance    0 - Independent         Problem List    Patient Active Problem List   Diagnosis     Obesity     CARDIOVASCULAR SCREENING; LDL GOAL LESS THAN 130     Major depressive disorder, recurrent episode, mild (H)     Iron deficiency anemia     Insomnia     Health Care Home     Anxiety     Intestinal malabsorption     Lymphedema of right upper extremity     Malignant neoplasm of upper-outer quadrant of right breast in female, estrogen receptor positive (H)     Osteopenia of multiple sites     Multiple subsegmental pulmonary emboli without acute cor pulmonale (H)     Breast reconstruction disproportion     Family history of colon cancer     Celiac disease     Recurrent UTI        Oncology history  Diana Wilson is a 42 yo female who presented at age 36 breast lump in her right armpit and right breast in 10/2016. 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  ER/WV 99% positive, HER-2/kelle negative, associated with high-grade DCIS.  Right axillary ultrasound-guided biopsy indicating metastatic carcinoma positive for spread from breast primary, ER/WV both negative.  HER-2/kelle is negative.  She had clinical T2N1 disease. She made informed decision to proceed with neoadjuvant DDAC C1D1 1/16/2017. She has good clinical response post DD AC x4. Then went on to have wkly taxol + carbo  with informed  decision in March.   She had carbo hypersensitivity reaction in early May (during C3). It was d/c after. She finished chemo end of May 2017. She was tested negative for BRCA1/2 with [employer]. 7/2017 she had double mastectomies at [facility]. Right side found no residual invasive carcinoma, +grade III DCIS, 1mm in greatest dimension. 5 sLN all negative. She had ypTis(DCIS)pN0 disease. Left breast no cancer. She finished post mastectomy RT in 11/2017.  She had BSO 11/2017. Then started Arimidex in 1/2018. She quit in Feb 2018 due to side effects and willing to try tamoxifen in 3/2018.   She had right breast surgery on 9/17/2020 at [facility] involving removal of bilateral breast reconstruction implants, and TRAMP reconstruction.     Developed tachycardia and dyspnea with oxygen requirement while in the hospital shortly after her surgery and had a CT scan of the chest at that time that was negative for PE.  Her SOB was progressive post op and was in ER 9/29/2020. Found to have severe anemia hb of 6.9 from baseline of 12.8. had 2 units of PRBC.Also found by CT to have multiple filling defects within the sub-segmental pulmonary arteries consistent with PE.    *Ultrasound shows a nonocclusive thrombus in the right mid calf in the peroneal vein. Tamoxifen was changed to AI early Oct, and she presented to [facility] for SOB mid Oct. Arimidex was stopped.   PE resolved. She also had infection on re constructed breast s/p Abx.     Treatment:   1/16- 3/1/2017 AC x 4 cycles  3/14- 5/31/2017caboplatin/ Taxol ( carboplatin discharge after cycle 3 due to reaction)  7/2017 Double Mastectomy  11/2017 finished post mastectomy RT .    11/2017 BSO 11/2017.   1/2018 started Arimidex. She quit in Feb 2018 due to side effects and willing to try tamoxifen in 3/2018   9/2020 removal of breast implants.   10/2020 Blood clot noted   Now on Aromasin after blood clot    Interval History   Diana Wlison is a 41 year old with history of  invasive breast cancer status postchemotherapy and double mastectomy currently on Aromasin who is here for follow-up.    She continues to have significant issues on Aromasin.  Mainly joint aches and pains which significantly affects her quality of life.  She is not able to exercise or do any physical activity without enduring significant pain.  She is using over-the-counter pain medications.  She also takes venlafaxine and gabapentin but is not helping a whole lot.  Also complains of some sexual dysfunction especially with vaginal dryness and decreased libido.  No new leg swelling or shortness of breath reported.    Denies any weight loss or new bone pain.  Labs reviewed today.    Review of Systems  A comprehensive review of systems was negative except for what is noted in the interval history    Current Outpatient Medications   Medication     Ascorbic Acid (VITAMIN C PO)     aspirin (ASA) 325 MG EC tablet     cetirizine (ZYRTEC) 10 MG tablet     cholecalciferol (VITAMIN D3) 1000 units (25 mcg) capsule     clindamycin (CLINDAMAX) 1 % external gel     fluocinonide (LIDEX) 0.05 % external solution     Fluticasone Propionate (FLONASE NA)     gabapentin (NEURONTIN) 300 MG capsule     letrozole (FEMARA) 2.5 MG tablet     Multiple Vitamins-Minerals (MULTIVITAMIN PO)     omeprazole (PRILOSEC) 20 MG DR capsule     venlafaxine (EFFEXOR-XR) 150 MG 24 hr capsule     venlafaxine (EFFEXOR-XR) 75 MG 24 hr capsule     No current facility-administered medications for this visit.        Physical Exam    No flowsheet data found.    General: alert and cooperative  HEENT: Head: Normal, normocephalic, atraumatic.  Eye: Normal external eye, conjunctiva, lids cornea, ALEX.  Chest: Clear to auscultation bilaterally  Cardiac: S1, S2 normal, regular rate and rhythm  Abdomen: abdomen is soft without significant tenderness, masses, organomegaly or guarding  Extremities: atraumatic, no peripheral edema  CNS: Alert and oriented x3, neurologic  exam grossly normal.  Lymphatics: No bilateral cervical, axillary, supraclavicular adenopathy noted    Lab Results    No results found for this or any previous visit (from the past 168 hour(s)).    Imaging    No results found.    A total of 30 min were spent today on this visit which included face to face conversation with the patient, EMR review, counseling and co-ordination of care and medical documentation.    Signed by: Layo Coulter MD          Again, thank you for allowing me to participate in the care of your patient.        Sincerely,        Layo Coulter MD

## 2022-04-04 NOTE — PROGRESS NOTES
Infusion Nursing Note:  Diana CROWLEY Wilson presents today for Zometa.    Patient seen by provider today: Yes   present during visit today: Not Applicable.    Note: Labs drawn peripherally.      Intravenous Access:  Peripheral IV placed.    Treatment Conditions:  Lab Results   Component Value Date     04/04/2022    POTASSIUM 4.0 04/04/2022    MAG 1.5 (L) 10/16/2020    CR 0.67 04/04/2022    DENY 9.6 04/04/2022    BILITOTAL 0.2 04/04/2022    ALBUMIN 4.1 04/04/2022    ALT 31 04/04/2022    AST 14 04/04/2022     Results reviewed, labs MET treatment parameters, ok to proceed with treatment.  Patient reports taking her supplements as prescribed.      Post Infusion Assessment:  Site patent and intact, free from redness, edema or discomfort.  No evidence of extravasations.  Access discontinued per protocol.       Discharge Plan:   Patient discharged in stable condition accompanied by: self.  Departure Mode: Ambulatory.      Kaylan Mukherjee RN

## 2022-04-04 NOTE — PROGRESS NOTES
"Oncology Rooming Note    April 4, 2022 1:48 PM   Diana Wilson is a 41 year old female who presents for:    Chief Complaint   Patient presents with     Oncology Clinic Visit     Malignant neoplasm of upper-outer quadrant of right breast in female, estrogen receptor positive - Labs provider and infusion     Initial Vitals: BP (!) 132/101 (BP Location: Right arm, Patient Position: Sitting, Cuff Size: Adult Large)   Pulse 84   Temp 97.5  F (36.4  C) (Tympanic)   Resp 12   LMP 08/05/2016 (Exact Date)   SpO2 95%  Estimated body mass index is 34.67 kg/m  as calculated from the following:    Height as of 11/1/21: 1.626 m (5' 4\").    Weight as of 11/1/21: 91.6 kg (202 lb). There is no height or weight on file to calculate BSA.  Moderate Pain (4) Comment: Data Unavailable   Patient's last menstrual period was 08/05/2016 (exact date).  Allergies reviewed: Yes  Medications reviewed: Yes    Medications: Medication refills not needed today.  Pharmacy name entered into [facility]:    [facility] - [id number] [id number] - [place], [place] - [address] AT [address]    Clinical concerns:  None      Nicole Costa CMA            "

## 2022-04-11 ENCOUNTER — NURSE TRIAGE (OUTPATIENT)
Dept: NURSING | Facility: CLINIC | Age: 42
End: 2022-04-11
Payer: COMMERCIAL

## 2022-04-12 ENCOUNTER — OFFICE VISIT (OUTPATIENT)
Dept: FAMILY MEDICINE | Facility: CLINIC | Age: 42
End: 2022-04-12
Payer: COMMERCIAL

## 2022-04-12 VITALS
HEART RATE: 87 BPM | HEIGHT: 64 IN | OXYGEN SATURATION: 96 % | BODY MASS INDEX: 34.67 KG/M2 | DIASTOLIC BLOOD PRESSURE: 81 MMHG | SYSTOLIC BLOOD PRESSURE: 129 MMHG | TEMPERATURE: 97.5 F | WEIGHT: 203.1 LBS

## 2022-04-12 DIAGNOSIS — N76.0 VAGINITIS AND VULVOVAGINITIS: ICD-10-CM

## 2022-04-12 DIAGNOSIS — N30.01 ACUTE CYSTITIS WITH HEMATURIA: Primary | ICD-10-CM

## 2022-04-12 DIAGNOSIS — I26.94 MULTIPLE SUBSEGMENTAL PULMONARY EMBOLI WITHOUT ACUTE COR PULMONALE (H): ICD-10-CM

## 2022-04-12 DIAGNOSIS — F33.0 MAJOR DEPRESSIVE DISORDER, RECURRENT EPISODE, MILD (H): ICD-10-CM

## 2022-04-12 DIAGNOSIS — R35.0 URINARY FREQUENCY: ICD-10-CM

## 2022-04-12 DIAGNOSIS — N39.0 RECURRENT UTI: ICD-10-CM

## 2022-04-12 DIAGNOSIS — C50.411 MALIGNANT NEOPLASM OF UPPER-OUTER QUADRANT OF RIGHT BREAST IN FEMALE, ESTROGEN RECEPTOR POSITIVE (H): ICD-10-CM

## 2022-04-12 DIAGNOSIS — Z17.0 MALIGNANT NEOPLASM OF UPPER-OUTER QUADRANT OF RIGHT BREAST IN FEMALE, ESTROGEN RECEPTOR POSITIVE (H): ICD-10-CM

## 2022-04-12 PROBLEM — E61.1 IRON DEFICIENCY: Status: RESOLVED | Noted: 2021-08-03 | Resolved: 2022-04-12

## 2022-04-12 LAB
BACTERIA #/AREA URNS HPF: ABNORMAL /HPF
RBC #/AREA URNS AUTO: ABNORMAL /HPF
WBC #/AREA URNS AUTO: ABNORMAL /HPF
WBC CLUMPS #/AREA URNS HPF: PRESENT /HPF

## 2022-04-12 PROCEDURE — 99214 OFFICE O/P EST MOD 30 MIN: CPT | Performed by: FAMILY MEDICINE

## 2022-04-12 PROCEDURE — 81015 MICROSCOPIC EXAM OF URINE: CPT | Performed by: FAMILY MEDICINE

## 2022-04-12 PROCEDURE — 87086 URINE CULTURE/COLONY COUNT: CPT | Performed by: FAMILY MEDICINE

## 2022-04-12 PROCEDURE — 87186 SC STD MICRODIL/AGAR DIL: CPT | Performed by: FAMILY MEDICINE

## 2022-04-12 RX ORDER — FLUCONAZOLE 150 MG/1
150 TABLET ORAL ONCE
Qty: 1 TABLET | Refills: 0 | Status: SHIPPED | OUTPATIENT
Start: 2022-04-12 | End: 2022-04-12

## 2022-04-12 RX ORDER — CIPROFLOXACIN 250 MG/1
250 TABLET, FILM COATED ORAL 2 TIMES DAILY
Qty: 6 TABLET | Refills: 0 | Status: SHIPPED | OUTPATIENT
Start: 2022-04-12 | End: 2022-04-15

## 2022-04-12 ASSESSMENT — ANXIETY QUESTIONNAIRES
5. BEING SO RESTLESS THAT IT IS HARD TO SIT STILL: SEVERAL DAYS
1. FEELING NERVOUS, ANXIOUS, OR ON EDGE: SEVERAL DAYS
7. FEELING AFRAID AS IF SOMETHING AWFUL MIGHT HAPPEN: SEVERAL DAYS
GAD7 TOTAL SCORE: 7
3. WORRYING TOO MUCH ABOUT DIFFERENT THINGS: SEVERAL DAYS
2. NOT BEING ABLE TO STOP OR CONTROL WORRYING: SEVERAL DAYS
6. BECOMING EASILY ANNOYED OR IRRITABLE: SEVERAL DAYS

## 2022-04-12 ASSESSMENT — PATIENT HEALTH QUESTIONNAIRE - PHQ9
5. POOR APPETITE OR OVEREATING: SEVERAL DAYS
SUM OF ALL RESPONSES TO PHQ QUESTIONS 1-9: 5

## 2022-04-12 ASSESSMENT — ASTHMA QUESTIONNAIRES: ACT_TOTALSCORE: 24

## 2022-04-12 NOTE — LETTER
My Asthma Action Plan    Name: Diana Wilson   YOB: 1980  Date: 4/12/2022   My doctor: Yenifer Peters MD   My clinic: [facility]        My Rescue Medicine:   Albuterol inhaler (Proair/Ventolin/Proventil HFA)  2-4 puffs EVERY 4 HOURS as needed. Use a spacer if recommended by your provider.   My Asthma Severity:   Intermittent / Exercise Induced  Know your asthma triggers:              GREEN ZONE   Good Control    I feel good    No cough or wheeze    Can work, sleep and play without asthma symptoms       Take your asthma control medicine every day.     1. If exercise triggers your asthma, take your rescue medication    15 minutes before exercise or sports, and    During exercise if you have asthma symptoms  2. Spacer to use with inhaler: If you have a spacer, make sure to use it with your inhaler             YELLOW ZONE Getting Worse  I have ANY of these:    I do not feel good    Cough or wheeze    Chest feels tight    Wake up at night   1. Keep taking your Green Zone medications  2. Start taking your rescue medicine:    every 20 minutes for up to 1 hour. Then every 4 hours for 24-48 hours.  3. If you stay in the Yellow Zone for more than 12-24 hours, contact your doctor.  4. If you do not return to the Green Zone in 12-24 hours or you get worse, start taking your oral steroid medicine if prescribed by your provider.           RED ZONE Medical Alert - Get Help  I have ANY of these:    I feel awful    Medicine is not helping    Breathing getting harder    Trouble walking or talking    Nose opens wide to breathe       1. Take your rescue medicine NOW  2. If your provider has prescribed an oral steroid medicine, start taking it NOW  3. Call your doctor NOW  4. If you are still in the Red Zone after 20 minutes and you have not reached your doctor:    Take your rescue medicine again and    Call 911 or go to the emergency room right away    See your regular doctor within 2 weeks of an  Emergency Room or Urgent Care visit for follow-up treatment.          Annual Reminders:  Meet with Asthma Educator,  Flu Shot in the Fall, consider Pneumonia Vaccination for patients with asthma (aged 19 and older).    Pharmacy:    [facility] [place] [place] - [address] [id number] - [place], [place] - [address] AT [address]    Electronically signed by Yenifer Peters MD   Date: 04/12/22                    Asthma Triggers  How To Control Things That Make Your Asthma Worse    Triggers are things that make your asthma worse.  Look at the list below to help you find your triggers and   what you can do about them. You can help prevent asthma flare-ups by staying away from your triggers.      Trigger                                                          What you can do   Cigarette Smoke  Tobacco smoke can make asthma worse. Do not allow smoking in your home, car or around you.  Be sure no one smokes at a child s day care or school.  If you smoke, ask your health care provider for ways to help you quit.  Ask family members to quit too.  Ask your health care provider for a referral to Quit Plan to help you quit smoking, or call 3-248-022-PLAN.     Colds, Flu, Bronchitis  These are common triggers of asthma. Wash your hands often.  Don t touch your eyes, nose or mouth.  Get a flu shot every year.     Dust Mites  These are tiny bugs that live in cloth or carpet. They are too small to see. Wash sheets and blankets in hot water every week.   Encase pillows and mattress in dust mite proof covers.  Avoid having carpet if you can. If you have carpet, vacuum weekly.   Use a dust mask and HEPA vacuum.   Pollen and Outdoor Mold  Some people are allergic to trees, grass, or weed pollen, or molds. Try to keep your windows closed.  Limit time out doors when pollen count is high.   Ask you health care provider about taking medicine during allergy season.     Animal  Dander  Some people are allergic to skin flakes, urine or saliva from pets with fur or feathers. Keep pets with fur or feathers out of your home.    If you can t keep the pet outdoors, then keep the pet out of your bedroom.  Keep the bedroom door closed.  Keep pets off cloth furniture and away from stuffed toys.     Mice, Rats, and Cockroaches  Some people are allergic to the waste from these pests.   Cover food and garbage.  Clean up spills and food crumbs.  Store grease in the refrigerator.   Keep food out of the bedroom.   Indoor Mold  This can be a trigger if your home has high moisture. Fix leaking faucets, pipes, or other sources of water.   Clean moldy surfaces.  Dehumidify basement if it is damp and smelly.   Smoke, Strong Odors, and Sprays  These can reduce air quality. Stay away from strong odors and sprays, such as perfume, powder, hair spray, paints, smoke incense, paint, cleaning products, candles and new carpet.   Exercise or Sports  Some people with asthma have this trigger. Be active!  Ask your doctor about taking medicine before sports or exercise to prevent symptoms.    Warm up for 5-10 minutes before and after sports or exercise.     Other Triggers of Asthma  Cold air:  Cover your nose and mouth with a scarf.  Sometimes laughing or crying can be a trigger.  Some medicines and food can trigger asthma.

## 2022-04-12 NOTE — TELEPHONE ENCOUNTER
"Caller sattes that she hs  Dysuria with hemturia asusual signof rcurring UTI; muriel Dr. Ochoa last month and told to hav  Urine culture donewith next  Symptoms;  Advised thaht no ordr is present   Caller wants to take AZO and inquiries if it would affect culture; advise it may effect a  \"dipstick\" reading due to color change but not  Culture.    Advised that urology  provider and staff will be message with this  encounter and request to  Obtain culture in morning at  Local  [address] [place] [address] clinic when order is placed.  Caller understands to call urology clinic if no response   Yohana Ambriz RN  FNA       Reason for Disposition    [1] Caller requesting NON-URGENT health information AND [2] PCP's office is the best resource    Protocols used: INFORMATION ONLY CALL - NO TRIAGE-A-      "

## 2022-04-12 NOTE — TELEPHONE ENCOUNTER
Yenifer Tejada has placed urine orders. Will wait for results.    Cornelia MARQUES RN Specialty Triage 4/12/2022 9:03 AM

## 2022-04-12 NOTE — TELEPHONE ENCOUNTER
Pt will be getting a 2nd urological opinion at [facility].    Dalila BOURNE RN Urology 4/12/2022 12:09 PM

## 2022-04-12 NOTE — PROGRESS NOTES
"  Assessment & Plan     Acute cystitis with hematuria  Discussed diagnosis and treatment. Culture pending.  Referral made for urology second opinion. The patient indicates understanding of these issues and agrees with the plan.   - ciprofloxacin (CIPRO) 250 MG tablet; Take 1 tablet (250 mg) by mouth in the morning and 1 tablet (250 mg) in the evening. Do all this for 3 days.  - Adult Urology Referral; Future    Vaginitis and vulvovaginitis  Tablet of diflucan given for end of antibiotics  - fluconazole (DIFLUCAN) 150 MG tablet; Take 1 tablet (150 mg) by mouth once for 1 dose  - Adult Urology Referral; Future    Urinary frequency    - Urine Microscopic; Future  - Urine Microscopic  - Urine Culture    Multiple subsegmental pulmonary emboli without acute cor pulmonale (H)  On daily full asa  Post op 2020, no recurrence     Major depressive disorder, recurrent episode, mild (H)  Doing ok on current meds     Malignant neoplasm of upper-outer quadrant of right breast in female, estrogen receptor positive (H)  Following with oncology   On meds     Recurrent UTI  Frustrated with these recurrent utis. Will get another opinion from urology. Wasn't satisfied with first consult .         BMI:   Estimated body mass index is 34.86 kg/m  as calculated from the following:    Height as of this encounter: 1.626 m (5' 4\").    Weight as of this encounter: 92.1 kg (203 lb 1.6 oz).   Weight management plan: Discussed healthy diet and exercise guidelines    Work on weight loss  Regular exercise    No follow-ups on file.    Yenifer Peters MD  [facility] SARANYA Ortiz is a 41 year old who presents for the following health issues       Answers for HPI/ROS submitted by the patient on 4/12/2022  How many servings of fruits and vegetables do you eat daily?: 2-3  On average, how many sweetened beverages do you drink each day (Examples: soda, juice, sweet tea, etc.  Do NOT count diet or artificially sweetened " "beverages)?: 0  How many minutes a day do you exercise enough to make your heart beat faster?: 20 to 29  How many days a week do you exercise enough to make your heart beat faster?: 4  How many days per week do you miss taking your medication?: 0      What is the reason for your visit today?: UTI  When did your symptoms begin?: 1-3 days ago  What are your symptoms?: Uti  How would you describe these symptoms?: Moderate  Are your symptoms:: Worsening  Have you had these symptoms before?: Yes  Have you tried or received treatment for these symptoms before?: Yes  Did that treatment work? : Yes  Please describe the treatment you had:: Antibiotics and AZO  Is there anything that makes you feel worse?: No  Is there anything that makes you feel better?: AZO    Lower abd \"heaviness\" x 2 days   Has been pushing fluids   Blood in urine yesterday    No vaginal discharge  No back pain  Felt warm last night   Some nausea     Oncology :   4/2022  Visit in [facility]     Review of Systems   Constitutional, HEENT, cardiovascular, pulmonary, gi and gu systems are negative, except as otherwise noted.      Objective    /81   Pulse 87   Temp 97.5  F (36.4  C) (Tympanic)   Ht 1.626 m (5' 4\")   Wt 92.1 kg (203 lb 1.6 oz)   LMP 08/05/2016 (Exact Date)   SpO2 96%   BMI 34.86 kg/m    Body mass index is 34.86 kg/m .  Physical Exam   GENERAL - Pt is alert and oriented in no acute distress.  Affect is appropriate. Good eye contact.  PSYCH - Pt makes good eye contact, is clean and well-dressed. Oriented to person,place,and time. Cooperative. No speech abnormalities. No psychomotor agitation or retardation.  Thought process was normal and thought content was free of suicidal/homicidal ideation, obsessions, and delusions. Insight and judgement were good.      Results for orders placed or performed in visit on 04/12/22   Urine Microscopic     Status: Abnormal   Result Value Ref Range    Bacteria Urine Moderate (A) None Seen /HPF    RBC " Urine 2-5 (A) 0-2 /HPF /HPF    WBC Urine 25-50 (A) 0-5 /HPF /HPF    WBC Clumps Urine Present (A) None Seen /HPF

## 2022-04-13 ASSESSMENT — ANXIETY QUESTIONNAIRES: GAD7 TOTAL SCORE: 7

## 2022-04-14 ENCOUNTER — MYC MEDICAL ADVICE (OUTPATIENT)
Dept: FAMILY MEDICINE | Facility: CLINIC | Age: 42
End: 2022-04-14
Payer: COMMERCIAL

## 2022-04-14 DIAGNOSIS — B37.31 YEAST VAGINITIS: Primary | ICD-10-CM

## 2022-04-14 LAB — BACTERIA UR CULT: ABNORMAL

## 2022-04-14 RX ORDER — FLUCONAZOLE 150 MG/1
150 TABLET ORAL DAILY
Qty: 3 TABLET | Refills: 0 | Status: SHIPPED | OUTPATIENT
Start: 2022-04-14 | End: 2022-04-17

## 2022-04-14 RX ORDER — NITROFURANTOIN 25; 75 MG/1; MG/1
100 CAPSULE ORAL 2 TIMES DAILY
Qty: 14 CAPSULE | Refills: 0 | Status: SHIPPED | OUTPATIENT
Start: 2022-04-14 | End: 2022-04-21

## 2022-04-21 NOTE — PROGRESS NOTES
CANCER SURVIVORSHIP VISIT-virtual  Apr 28, 2022    Care Team   Primary Care Provider Yenifer Peters Casandra ([facility]), Eugenio Ruelas (Plastic surgery)   Radiation Oncologist Aleksey Terrell MD, Sondra Mccollum MD, Caitlin Durham MD   Medical Oncologist  Maribel Jin MD,  Boris Millan MD       REASON FOR VISIT: survivorship visit for history of breast cancer    CANCER HISTORY AND TREATMENT:    History of right-sided stage T2N1 breast cancer, ER/GA positive, HER2 negative, diagnosed in 2016  *Diagnosis: 12/27/2016. Clinical stage T2N1 grade 3 invasive ductal cancer. She was 36 years old.   *Neoadjuvant chemotherapy: Dose dense doxorubicin/cyclophosphamide x4 then weekly paclitaxel/carboplatin x12 (7 weeks of carbo- anaphylaxis).   Lifetime Dose Tracking     Doxorubicin: 237.485 mg/m2 (472 mg) = 52.77 % of the maximum lifetime dose of 450 mg/m2   *Surgery: 07/12/2017 Bilateral mastectomy. 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 5 sLN all negative.ypTis(DCIS)pN0 disease. Left breast negative for malignancy.  *Radiation: 6040 cGy in 33 fractions to right breast completed 11/15/2017.   *Endocrine therapy: Arimidex (anastrozole) on and off in 2018. She was on Tamoxifen 2018 until her PE in 2020. On Aromasin 2020 until March 2022. Started letrozole 4/4/2022  *Genetic testing: Negative for mutations in the TYLER, BARD1, BRCA1, BRCA2, BRIP1, CDH1, CHEK2, MRE11A, MUTYH, NBN, NF1, PALB2, PTEN, RAD50, RAD51C, RAD51D, and TP53 genes by sequencing and deletion/duplication analysis. No mutations were found in any of the 17 genes analyzed. 2017  *Other information: She had BSO 11/2017. 9/17/2020 reconstruction at [facility] involving removal of bilateral breast reconstruction implants, and TRAM flap reconstruction. Developed tachycardia and dyspnea with oxygen requirement while in the hospital shortly after her surgery and was diagnosed with PE    Cardiovascular  risk screen:  Age >60 at cancer treatment: no   History of EF<55%: no  History of MI: no  Cumulative doxorubicin >250 mg/m2:  no  Radiotherapy to the left chest: no  History of trastuzumab: no  Smoking history: 20 years x 1 pack= 20 pack year  Quit in 2016  Hypertension: no  Hyperlididemia: no  Diabetes: no  Obesity: yes BMI 34      INTERVAL HISTORY:     She is here to discuss sexual health with her  Fabian. She has no libido and vaginal dryness/pain and frequent UTI and yeast infections. AUSTYN Smith mcdonald    She recently started letrozole and is tolerating it much better (no arthralgias).     She has seen a nutritionist in the past but didn't achieve weight loss. She is always hungry and wonders about an appetite suppressant. She has celiac. She is not exercising due to arthralgias with Aromasin but now that this is better she is motivated.     She has had issues with lymphedema in the past and has worked with PT. She has a sleeve and a machine.     She has neuropathy and is on gabapentin.     She is coping ok. She sees a therapist        Past Medical History:   Diagnosis Date     Anxiety      Breast cancer (H)      Depressive disorder 1995    And Anxiety     Encounter for insertion or removal of intrauterine contraceptive device 1/8/2008     Excessive or frequent menstruation 03/19/2003     Fibroadenosis of breast      GERD (gastroesophageal reflux disease)      Invasive ductal carcinoma of breast, right (H)      Malignant neoplasm of upper-outer quadrant of right female breast (H)      Mild intermittent asthma            never has had PFT's, MDI with colds and at times exercise     Neutropenia, drug-induced (H)      Nongonococcal urethritis (JALIL) due to chlamydia trachomatis 01/03/2002     Other and unspecified ovarian cyst 03/19/2003    RIGHT ovarian cyst     PONV (postoperative nausea and vomiting)      Skin cancer      Transient hypertension of pregnancy, antepartum     PIH with last birth      Uncomplicated asthma 2002       Current Outpatient Medications   Medication Sig Dispense Refill     Ascorbic Acid (VITAMIN C PO)        aspirin (ASA) 325 MG EC tablet Take 325 mg by mouth every 6 hours as needed for moderate pain       cetirizine (ZYRTEC) 10 MG tablet Take 10 mg by mouth every morning       cholecalciferol (VITAMIN D3) 1000 units (25 mcg) capsule Take 1 capsule by mouth every other day       clindamycin (CLINDAMAX) 1 % external gel Apply to AA BID PRN 60 g 3     fluocinonide (LIDEX) 0.05 % external solution Apply to scalp BID x 3-4 weeks then PRN 50 mL 3     Fluticasone Propionate (FLONASE NA) Spray 1 spray into both nostrils daily        gabapentin (NEURONTIN) 300 MG capsule Take 1 capsule (300 mg) by mouth 3 times daily 270 capsule 1     letrozole (FEMARA) 2.5 MG tablet Take 1 tablet (2.5 mg) by mouth daily 30 tablet 1     Multiple Vitamins-Minerals (MULTIVITAMIN PO) Take 1 tablet by mouth daily        nitroFURantoin macrocrystal-monohydrate (MACROBID) 100 MG capsule Take 1 capsule (100 mg) by mouth 2 times daily for 7 days 14 capsule 0     omeprazole (PRILOSEC) 20 MG DR capsule TAKE ONE CAPSULE BY MOUTH EVERY MORNING 30 MINUTES BEFORE BREAKFAST 90 capsule 0     venlafaxine (EFFEXOR-XR) 150 MG 24 hr capsule Take 1 capsule (150 mg) by mouth daily 90 capsule 3     venlafaxine (EFFEXOR-XR) 75 MG 24 hr capsule TAKE ONE CAPSULE BY MOUTH ONCE DAILY (ALONG WITH 150MG) 90 capsule 3          Allergies   Allergen Reactions     Carboplatin Shortness Of Breath, Rash and Anaphylaxis     Other reaction(s): Flushing, Tachycardia     Ambien      hallucinations     Amoxicillin GI Disturbance     Erythromycin GI Disturbance     Hydrocodone-Acetaminophen      Other reaction(s): Hallucinations     Hydromorphone Headache     Penicillins Nausea and Vomiting and Hives     1-11-17 pt states this was a childhood reaction     Zolpidem      Other reaction(s): Hallucinations     Hydrocodone Other (See Comments)     Out of  "body experience, \"creepy-crawly\" skin        Family History   Problem Relation Age of Onset     Diabetes Mother         hypoglycemic     Allergies Mother      Arthritis Mother      Depression Mother      Gastrointestinal Disease Mother      Neurologic Disorder Mother      Psychotic Disorder Mother         panic disorder     Cancer Mother 56        pancreatic, colon, liver and skin.     Cancer - colorectal Mother      Other Cancer Mother         Pancreatic     Anxiety Disorder Mother      Mental Illness Mother      Hypertension Father      Diabetes Father      Rheumatoid Arthritis Father      C.A.D. Paternal Grandfather         congestive heart failure     Cerebrovascular Disease Paternal Grandfather         age 79     Diabetes Paternal Grandfather      Hypertension Paternal Grandfather      Alzheimer Disease Paternal Grandfather      Arthritis Paternal Grandfather      Circulatory Paternal Grandfather      Cardiovascular Paternal Grandfather      Heart Disease Paternal Grandfather      Neurologic Disorder Paternal Grandfather      Unknown/Adopted Maternal Grandmother      Cerebrovascular Disease Maternal Grandmother      Unknown/Adopted Maternal Grandfather      Cancer Maternal Grandfather      Obesity Paternal Grandmother      Cancer Paternal Grandmother 94        coloangiocancinoma        Social history:  Living situation: , lives in [place]  Occupation: Works for [employer]  Tobacco use:   Tobacco Use      Smoking status: Former Smoker        Packs/day: 0.50        Years: 10.00        Pack years: 5        Types: Cigarettes        Quit date: 10/1/2016        Years since quittin.5      Smokeless tobacco: Never Used      Tobacco comment: Socially- quit smoking 10/01/2016  ETOH use: 1 drink a week  Exercise: none now  Diet: healthy     Physical exam  /85 (BP Location: Right arm, Patient Position: Sitting, Cuff Size: Adult Regular)   Pulse 96   Temp 97.6  F (36.4  C) (Tympanic)   Resp 12   " LMP 08/05/2016 (Exact Date)   SpO2 94%   General: No acute distress.    HEENT: Sclera anicteric. Conjunctiva non-injected.   Lymph: No lymphadenopathy in neck, supraclavicular, and axillary areas  Heart: RRR  Lungs: Clear to ascultation bilaterally  Breast: bilateral mastectomy  Extremities: no lower extremity edema  Skin: No rash on exposed skin  Neuro: Cranial nerves grossly intact      IMPRESSION/PLAN:    History of right-sided stage T2N1 breast cancer, ER/AK positive, HER2 negative, diagnosed in 2016  Treated with neoadjuvant chemo with complete pathologic response, bilateral mastectomy, radiation and endocrine therapy  She is 7 years from diagnosis without recurrence  Continue oncology follow-up as scheduled with Dr. Coulter. After she completes AI therapy, she could transition to my clinic if she would like.     Potential late effects related to surgery/radiation:  -Risk of lymphedema: If she notices worsening edema, she should be offered a referral to lymphedema physical therapy.     Potential late effects related to chemotherapy:  -Cardiotoxicity. Given her exposure to Adriamycin she may be at risk for cardiomyopathy, arrhythmias, and left ventricular dysfunction. A post-treatment echo has been performed.  She should continue to follow-up routinely with her PCP for lipid testing, and monitoring of blood pressure and blood glucose, with treatment as indicated.We discussed her BMI is a risk factor for cardiac disease and will refer her to a weight management program. She is interested in considering an appetite suppressant. She also has a smoking history.     -Peripheral neuropathy. She did develop peripheral neuropathy and takes gabapentin for this.     -Risk of hematologic malignancy. Rare risk of developing secondary hematologic malignancy. Annual CBC recommended.      Potential late effects related to radiation:  - Possible radiation side effects include cardiotoxicity, lung injury, fracture, and second  malignancies. She should seek medical attention if she notices any new skin lesions in the area of radiation. If she should develop any shortness of breath, hemoptysis, cough, or new pain, I would advise further evaluation with CT or X-ray.     Potential effects related to endocrine therapy:  Risk of developing osteoporosis: Last DEXA 12/06/2021 with a lowest T-score of -1.3, Recommend weightbearing exercises 2-3 days per week, and to supplement with 1200 mg of calcium, 1000 international units of vitamin D daily. She is on every 6 months Zometa since 4/2019.     General late effects screenings and recommendations    -Sexuality concerns. She has had issues with vaginal dryness and low libido.She also has recurrent UTI's (seeing urology coming up) and yeast infections. Recommend daily vaginal moisturizer and lubricant with intercourse. Gave her a handout with recommendations. If after 2-3 months, these aren't helping, she should return and we will discuss low dose topical estrogen. Also discussed spontaneous libido vs secondary and ways to work on this. I gave her several resources.     -Cancer screening. She should undergo routine screening for women in her age group.    -Healthy lifestyle. The goals is a healthy weight with a BMI between 20-25. We discussed exercising at least 150 minutes weekly at moderate intensity. She should see the eye doctor every 1-2 years, and dentist every 6 months for cleanings. She should not use any tobacco. She should minimize alcohol intake. If continuing to drink, should follow CDC recommendations for no more than 1 alcoholic drink/day for women.    Non-urgent scheduling should be complete within 7-10 business days. However, if you have not received a phone call from scheduling within 3 business days, you may call to schedule at [phone] option 5, option 2. This is the same number to call to make changes tor if you have questions about the schedule.    Gave resources for our  [facility] Cancer Survivorship class series and mailings list for educational opportunities. [email or web address]    Cancer treatment summary was sent electronically to the patient and her PCP.      The total time of this encounter amounted to 60 minutes.This time included time spent with the patient, prep work, ordering tests, creating a cancer treatment summary, and performing post visit documentation.    Jailene Thorne MPAS, PA-C  [facility] Surviv[facility]

## 2022-04-28 ENCOUNTER — TELEPHONE (OUTPATIENT)
Dept: ONCOLOGY | Facility: CLINIC | Age: 42
End: 2022-04-28

## 2022-04-28 ENCOUNTER — ONCOLOGY SURVIVORSHIP VISIT (OUTPATIENT)
Dept: ONCOLOGY | Facility: CLINIC | Age: 42
End: 2022-04-28
Attending: PHYSICIAN ASSISTANT
Payer: COMMERCIAL

## 2022-04-28 VITALS
TEMPERATURE: 97.6 F | OXYGEN SATURATION: 94 % | HEART RATE: 96 BPM | RESPIRATION RATE: 12 BRPM | DIASTOLIC BLOOD PRESSURE: 85 MMHG | SYSTOLIC BLOOD PRESSURE: 134 MMHG

## 2022-04-28 DIAGNOSIS — C50.411 MALIGNANT NEOPLASM OF UPPER-OUTER QUADRANT OF RIGHT BREAST IN FEMALE, ESTROGEN RECEPTOR POSITIVE (H): Primary | ICD-10-CM

## 2022-04-28 DIAGNOSIS — F52.0 HYPOACTIVE SEXUAL DESIRE DISORDER: ICD-10-CM

## 2022-04-28 DIAGNOSIS — Z17.0 MALIGNANT NEOPLASM OF UPPER-OUTER QUADRANT OF RIGHT BREAST IN FEMALE, ESTROGEN RECEPTOR POSITIVE (H): Primary | ICD-10-CM

## 2022-04-28 DIAGNOSIS — N89.8 VAGINAL DRYNESS: ICD-10-CM

## 2022-04-28 PROCEDURE — G0463 HOSPITAL OUTPT CLINIC VISIT: HCPCS

## 2022-04-28 PROCEDURE — 99215 OFFICE O/P EST HI 40 MIN: CPT | Performed by: PHYSICIAN ASSISTANT

## 2022-04-28 ASSESSMENT — PAIN SCALES - GENERAL: PAINLEVEL: NO PAIN (0)

## 2022-04-28 NOTE — PATIENT INSTRUCTIONS
Weight management referral  She is welcome to come back and see me for follow-up anytime for sexual health concerns

## 2022-04-28 NOTE — PROGRESS NOTES
[facility] Hematology and Oncology Progress Note    Patient: Diana Wilson  MRN: [id number]  Date of Service: 04/04/2022        Reason for Visit    Chief Complaint   Patient presents with     Oncology Clinic Visit     Malignant neoplasm of upper-outer quadrant of right breast in female, estrogen receptor positive - Labs provider and infusion         Problem List Items Addressed This Visit        Digestive    RESOLVED: Iron deficiency    Relevant Orders    Ferritin    CBC with platelets       Musculoskeletal and Integumentary    Osteopenia of multiple sites       Other    Malignant neoplasm of upper-outer quadrant of right breast in female, estrogen receptor positive (H)    Relevant Medications    letrozole (FEMARA) 2.5 MG tablet      Other Visit Diagnoses     History of pulmonary embolism        Family history of malignant neoplasm of breast                Assessment and Plan  Right-sided breast cancer.  Status post chemo and double mastectomy with radiation.  Her primary tumor was ER/LA positive.  Hence she was put on endocrine therapy following or other treatments.  Unfortunately she has not tolerated any of them really well.  Had blood clots with tamoxifen.  Has shortness of breath and tachycardia with Arimidex.  Now on Aromasin with significant joint pains and muscle aches.    Reviewed further options.  Considering that she had a node positive disease she is at risk for disease recurrence and would benefit from continued endocrine therapy.  He is almost 4 years out from therapy.  But since she had node positive disease he might benefit from extended therapy.  She is willing to do that but she is enjoying the quality of life due to side effects.  In situation I recommended switching from atorvastatin to letrozole.  Explained to her that all these therapies have similar side effects with some differences and may have to keep on titrating medications.  She understands this.  She is on venlafaxine and  gabapentin which is helping a whole lot.  Potentially we could either add on duloxetine or increase the dose of one of the other medications.  She is willing to try letrozole and see how it works out for her.    Sexual dysfunction  Issues with vaginal dryness and decreased libido.  Unfortunately this is a known side effect of endocrine therapy.  Advised use of lubrication.  She may also need counseling.  We will try to get a brain to see one of our providers who can help with that.    History of PE DVT  This was while on tamoxifen.  Was anticoagulated at the time.  Currently not on any anticoagulation.  Letrozole does have a very small risk of cardiovascular complications.  Continue to watch for any swelling or sudden onset shortness of breath.    Continue Zometa 6 monthly for bone health.     See her back in 3 months.  She will get Zometa today.    Patient Instructions   zometa today. RTC in 3 months with labs prior.                 Cancer Staging  No matching staging information was found for the patient.    ECOG Performance    0 - Independent         Problem List    Patient Active Problem List   Diagnosis     Obesity     CARDIOVASCULAR SCREENING; LDL GOAL LESS THAN 130     Major depressive disorder, recurrent episode, mild (H)     Iron deficiency anemia     Insomnia     Health Care Home     Anxiety     Intestinal malabsorption     Lymphedema of right upper extremity     Malignant neoplasm of upper-outer quadrant of right breast in female, estrogen receptor positive (H)     Osteopenia of multiple sites     Multiple subsegmental pulmonary emboli without acute cor pulmonale (H)     Breast reconstruction disproportion     Family history of colon cancer     Celiac disease     Recurrent UTI        Oncology history  Diana Wilson is a 42 yo female who presented at age 36 breast lump in her right armpit and right breast in 10/2016. Work up found upper outer quadrant of the right breast  grade 3 invasive ductal cancer,  angiolymphatic invasion not identified.  ER/UT 99% positive, HER-2/kelle negative, associated with high-grade DCIS.  Right axillary ultrasound-guided biopsy indicating metastatic carcinoma positive for spread from breast primary, ER/UT both negative.  HER-2/kelle is negative.  She had clinical T2N1 disease. She made informed decision to proceed with neoadjuvant DDAC C1D1 1/16/2017. She has good clinical response post DD AC x4. Then went on to have wkly taxol + carbo  with informed decision in March.   She had carbo hypersensitivity reaction in early May (during C3). It was d/c after. She finished chemo end of May 2017. She was tested negative for BRCA1/2 with [employer]. 7/2017 she had [facility] mastectomies at [facility]. Right side found no residual invasive carcinoma, +grade III DCIS, 1mm in greatest dimension. 5 sLN all negative. She had ypTis(DCIS)pN0 disease. Left breast no cancer. She finished post mastectomy RT in 11/2017.  She had BSO 11/2017. Then started Arimidex in 1/2018. She quit in Feb 2018 due to side effects and willing to try tamoxifen in 3/2018.   She had right breast surgery on 9/17/2020 at [facility] involving removal of bilateral breast reconstruction implants, and TRAMP reconstruction.     Developed tachycardia and dyspnea with oxygen requirement while in the hospital shortly after her surgery and had a CT scan of the chest at that time that was negative for PE.  Her SOB was progressive post op and was in ER 9/29/2020. Found to have severe anemia hb of 6.9 from baseline of 12.8. had 2 units of PRBC.Also found by CT to have multiple filling defects within the sub-segmental pulmonary arteries consistent with PE.    *Ultrasound shows a nonocclusive thrombus in the right mid calf in the peroneal vein. Tamoxifen was changed to AI early Oct, and she presented to [facility] for SOB mid Oct. Arimidex was stopped.   PE resolved. She also had infection on re constructed breast s/p Abx.     Treatment:   1/16-  3/1/2017 AC x 4 cycles  3/14- 5/31/2017caboplatin/ Taxol ( carboplatin discharge after cycle 3 due to reaction)  7/2017 Double Mastectomy  11/2017 finished post mastectomy RT .    11/2017 BSO 11/2017.   1/2018 started Arimidex. She quit in Feb 2018 due to side effects and willing to try tamoxifen in 3/2018   9/2020 removal of breast implants.   10/2020 Blood clot noted   Now on Aromasin after blood clot    Interval History   Diana Wilson is a 41 year old with history of invasive breast cancer status postchemotherapy and double mastectomy currently on Aromasin who is here for follow-up.    She continues to have significant issues on Aromasin.  Mainly joint aches and pains which significantly affects her quality of life.  She is not able to exercise or do any physical activity without enduring significant pain.  She is using over-the-counter pain medications.  She also takes venlafaxine and gabapentin but is not helping a whole lot.  Also complains of some sexual dysfunction especially with vaginal dryness and decreased libido.  No new leg swelling or shortness of breath reported.    Denies any weight loss or new bone pain.  Labs reviewed today.    Review of Systems  A comprehensive review of systems was negative except for what is noted in the interval history    Current Outpatient Medications   Medication     Ascorbic Acid (VITAMIN C PO)     aspirin (ASA) 325 MG EC tablet     cetirizine (ZYRTEC) 10 MG tablet     cholecalciferol (VITAMIN D3) 1000 units (25 mcg) capsule     clindamycin (CLINDAMAX) 1 % external gel     fluocinonide (LIDEX) 0.05 % external solution     Fluticasone Propionate (FLONASE NA)     gabapentin (NEURONTIN) 300 MG capsule     letrozole (FEMARA) 2.5 MG tablet     Multiple Vitamins-Minerals (MULTIVITAMIN PO)     omeprazole (PRILOSEC) 20 MG DR capsule     venlafaxine (EFFEXOR-XR) 150 MG 24 hr capsule     venlafaxine (EFFEXOR-XR) 75 MG 24 hr capsule     No current facility-administered medications  for this visit.        Physical Exam    No flowsheet data found.    General: alert and cooperative  HEENT: Head: Normal, normocephalic, atraumatic.  Eye: Normal external eye, conjunctiva, lids cornea, ALEX.  Chest: Clear to auscultation bilaterally  Cardiac: S1, S2 normal, regular rate and rhythm  Abdomen: abdomen is soft without significant tenderness, masses, organomegaly or guarding  Extremities: atraumatic, no peripheral edema  CNS: Alert and oriented x3, neurologic exam grossly normal.  Lymphatics: No bilateral cervical, axillary, supraclavicular adenopathy noted    Lab Results    No results found for this or any previous visit (from the past 168 hour(s)).    Imaging    No results found.    A total of 30 min were spent today on this visit which included face to face conversation with the patient, EMR review, counseling and co-ordination of care and medical documentation.    Signed by: Layo Coulter MD

## 2022-04-28 NOTE — TELEPHONE ENCOUNTER
Diana came in today to see Jailene Thorne. She wanted to let you know that the Letrozole is working great.     Nicole Costa, CMA on 4/28/2022 at 8:59 AM

## 2022-05-15 DIAGNOSIS — K21.9 GASTROESOPHAGEAL REFLUX DISEASE, UNSPECIFIED WHETHER ESOPHAGITIS PRESENT: ICD-10-CM

## 2022-05-16 ENCOUNTER — VIRTUAL VISIT (OUTPATIENT)
Dept: UROLOGY | Facility: CLINIC | Age: 42
End: 2022-05-16
Attending: FAMILY MEDICINE
Payer: COMMERCIAL

## 2022-05-16 DIAGNOSIS — N39.0 RECURRENT UTI: Primary | ICD-10-CM

## 2022-05-16 DIAGNOSIS — N95.2 ATROPHIC VAGINITIS: ICD-10-CM

## 2022-05-16 DIAGNOSIS — N76.0 VAGINITIS AND VULVOVAGINITIS: ICD-10-CM

## 2022-05-16 PROCEDURE — 99215 OFFICE O/P EST HI 40 MIN: CPT | Mod: GT | Performed by: UROLOGY

## 2022-05-16 RX ORDER — NITROFURANTOIN MACROCRYSTALS 50 MG/1
CAPSULE ORAL
Qty: 30 CAPSULE | Refills: 4 | Status: SHIPPED | OUTPATIENT
Start: 2022-05-16 | End: 2022-10-26

## 2022-05-16 NOTE — PROGRESS NOTES
Diana Wilson  who is being evaluated via a billable video visit.      How would you like to obtain your AVS? MyChart  If the video visit is dropped, the invitation should be resent by: Text to cell phone: [phone]  Will anyone else be joining your video visit? No    Video-Visit Details    Type of service:  Video Visit    Video Start Time: 9:33 AM    Video End Time:10:04 AM    Originating Location (pt. Location): Home    Distant Location (provider location):  [facility]     Platform used for Video Visit: CiarraWell

## 2022-05-16 NOTE — PATIENT INSTRUCTIONS
-continue to use cococnut oil, consider refined which does not smell like coconut  -pt. Will discuss with her oncologist whether she can be on small amount of vaginal estrogen cream or suppository.   -macrodantin 50 mg prn intercourse or swimming in lake.  -must stay well hydrated  -consume healthy sources of probiotic  -f/u in 3 months for exam

## 2022-05-16 NOTE — PROGRESS NOTES
HPI:  Diana Wilson is a 41 year old female being seen for recurrent utis.  She has had 8 since Sep of 2021 and her most recent one was at the end of April of 2022.  She was treated by Tamera the first couple of times with Macrobid.  She was also treated with cefalexin.  She has always been a little susceptible to uti's.  She would get them after intercourse or if on a lake.  Dehydration can also play a role.    She also has a hx of breast cancer and is sp double mastectomy and is s/o xrt and chemo.  She is Letrozole, estrogen blocker started in April 2022.  She was previously on another estrogen blocker.    Reviewed previous notes from Jailene Thorne PA-C on 4/28/22,  Dr. Tejada from 4/12/22, and Dr. Ochoa on 3/3/22 who performed cystoscopy that day and was normal.    Exam:  LMP 08/05/2016 (Exact Date)   GENERAL: Healthy, alert and no distress  EYES: Eyes grossly normal to inspection.  No discharge or erythema, or obvious scleral/conjunctival abnormalities.  RESP: No audible wheeze, cough, or visible cyanosis.  No visible retractions or increased work of breathing.    SKIN: Visible skin clear. No significant rash, abnormal pigmentation or lesions.  NEURO: Cranial nerves grossly intact.  Mentation and speech appropriate for age.  PSYCH: Mentation appears normal, affect normal/bright, judgement and insight intact, normal speech and appearance well-groomed.    Review of Imaging:  The following imaging exams were i reviewed by me:  12/13/21 CT chest/abd/pelvis:  FINDINGS:     Chest: Postop change is seen in both breast areas and both axillas.     Abdomen: There is a 1 cm benign cyst in the right kidney. There is a  stable 1-2 mm nonobstructing calculus in the left kidney.     Pelvis: No significant abnormality is demonstrated.                                                                      IMPRESSION:  No evidence of metastatic disease.    Review of Labs:  The following labs were reviewed by me and discussed with  the patient:  Color Urine (no units)   Date Value   03/03/2022 Yellow   08/16/2018 Yellow     Appearance Urine (no units)   Date Value   03/03/2022 Clear   08/16/2018 Clear     Glucose Urine (mg/dL)   Date Value   03/03/2022 Negative   08/16/2018 Negative     Bilirubin Urine (no units)   Date Value   03/03/2022 Negative   08/16/2018 Negative     Ketones Urine (mg/dL)   Date Value   03/03/2022 Negative   08/16/2018 Negative     Specific Gravity Urine (no units)   Date Value   03/03/2022 1.015   08/16/2018 1.025     pH Urine   Date Value   03/03/2022 7.0   08/16/2018 5.5 pH     Protein Albumin Urine (mg/dL)   Date Value   03/03/2022 Negative   08/16/2018 Trace (A)     Urobilinogen Urine   Date Value   03/03/2022 0.2 E.U./dL   08/16/2018 0.2 EU/dL     Nitrite Urine (no units)   Date Value   03/03/2022 Negative   08/16/2018 Positive (A)     Leukocyte Esterase Urine (no units)   Date Value   03/03/2022 Negative   08/16/2018 Trace (A)     Susceptibility from x on 4/12/22     Escherichia coli ESBL     COSME     Ampicillin >=32.0 ug/mL Resistant     Cefazolin >=64.0 ug/mL Resistant 1     Cefepime  Resistant     Cefoxitin <=4.0 ug/mL Susceptible     Ceftazidime  Resistant     Ceftriaxone  Resistant     Ciprofloxacin 1.0 ug/mL Resistant     Gentamicin <=1.0 ug/mL Susceptible     Levofloxacin 1.0 ug/mL Intermediate     Meropenem <=0.25 ug/mL Susceptible 2     Nitrofurantoin <=16.0 ug/mL Susceptible     Piperacillin/Tazobactam <=4.0 ug/mL Susceptible     Tobramycin <=1.0 ug/mL Susceptible     Trimethoprim/Sulfamethoxazole >16/304 ug/mL Resistant              Lab Results   Component Value Date    WBC 8.5 04/04/2022    WBC 7.5 07/09/2021     Lab Results   Component Value Date    RBC 4.57 04/04/2022    RBC 4.89 07/09/2021     Lab Results   Component Value Date    HGB 13.5 04/04/2022    HGB 12.4 07/09/2021     Lab Results   Component Value Date    HCT 39.3 04/04/2022    HCT 37.7 07/09/2021     Lab Results   Component Value Date     MCV 86 04/04/2022    MCV 77 07/09/2021     Lab Results   Component Value Date    MCH 29.5 04/04/2022    MCH 25.4 07/09/2021     Lab Results   Component Value Date    MCHC 34.4 04/04/2022    MCHC 32.9 07/09/2021     Lab Results   Component Value Date    RDW 13.2 04/04/2022    RDW 16.8 07/09/2021     Lab Results   Component Value Date     04/04/2022     07/09/2021         Assessment & Plan   40 y/o female with recurrent uti's in the setting of atrophic vaginitis and hx of breast cancer s/p xrt and chemotherapy. We discussed options of observation, estrgen cream, rephresh, and coconut oil as well as pericoital antibiotics vs. Methenamine.  She is interested in the estrogen cream but will discuss further with her oncologist.  In the meantime she is using coconut oil but is frustrated by smell.  She is also interested in pericoital antibiotics.   She also has stress urinary incontinence.  We discussed options of observation, pelvital flyte, PT, periurethral bulking, and midurethal sling.  She would like to address the infections first for now.  -continue to use cococnut oil, consider refined which does not smell like coconut  -pt. Will discuss with her oncologist whether she can be on small amount of vaginal estrogen cream or suppository.   -macrodantin 50 mg prn intercourse or swimming in lake.  -must stay well hydrated  -consume healthy sources of probiotic  -f/u in 3 months for an exam    Sixto Harris MD  [facility]      ==========================      Additional Coding Information:  Time spent:  60 minutes spent on the date of the encounter doing chart review, history and exam, documentation and further activities per the note

## 2022-05-17 ENCOUNTER — VIRTUAL VISIT (OUTPATIENT)
Dept: FAMILY MEDICINE | Facility: OTHER | Age: 42
End: 2022-05-17
Payer: COMMERCIAL

## 2022-05-17 DIAGNOSIS — U07.1 INFECTION DUE TO 2019 NOVEL CORONAVIRUS: Primary | ICD-10-CM

## 2022-05-17 PROCEDURE — 99214 OFFICE O/P EST MOD 30 MIN: CPT | Mod: GT | Performed by: PHYSICIAN ASSISTANT

## 2022-05-17 ASSESSMENT — PAIN SCALES - GENERAL: PAINLEVEL: SEVERE PAIN (6)

## 2022-05-17 NOTE — PROGRESS NOTES
Diana is a 41 year old who is being evaluated via a billable video visit.      How would you like to obtain your AVS? MyChart  If the video visit is dropped, the invitation should be resent by: Text to cell phone: [phone]  Will anyone else be joining your video visit? No    Video Start Time: 3:18 PM    Assessment & Plan     Infection due to 2019 novel coronavirus  We reviewed treatment options, she has no contraindications to use of Paxlovid, she is on Qvar which is the only steroid that requires no modifications.  Discussed OTC management of symptoms, continue on inhalers, discussed potential side effects of medication as well as risk for potential rebound off medication as well.  Reminded of symptoms which warrant emergent evaluation in the meantime.  Able to monitor O2 -   is getting pulse ox.   - nirmatrelvir and ritonavir (PAXLOVID) therapy pack; Take 3 tablets by mouth 2 times daily for 5 days Take 2 Nirmatrelvir tablets and 1 Ritonavir tablet twice daily for 5 days.      Return in about 5 days (around 5/22/2022) for If not improving, sooner if worse or new concerns.    Options for treatment and follow-up care were reviewed with the patient and/or guardian. Patient and/or guardian engaged in the decision making process and verbalized understanding of the options discussed and agreed with the final plan.    Boyd Chino PA-C  [facility]   Diana is a 41 year old who presents for the following health issues   HPI       COVID-19 Symptom Review  How many days ago did these symptoms start? 05/16/2022  Home test yesterday was negative, today was positive.   Are any of the following symptoms significant for you?    New or worsening difficulty breathing? Managing ok with her inhalers.     Worsening cough? Yes, it's a dry cough.     Fever or chills? Yes, the highest temperature was 100.8    Headache: YES    Sore throat: YES    Chest pain: no    Diarrhea: no    Body  aches? YES- severe in legs    Rashes: no    What treatments has patient tried? Vicks, Dayquil, Advil   Does patient live in a nursing home, group home, or shelter? no  Does patient have a way to get food/medications during quarantined? Yes, I have a friend or family member who can help me.    pulmonary embolism after surgery. Just takes daily aspirin.   No history of COVID  No history of liver or kidney disease.       Review of Systems   Constitutional, HEENT, cardiovascular, pulmonary, gi and gu systems are negative, except as otherwise noted.      Objective    Vitals - Patient Reported  Pain Score: Severe Pain (6)      Vitals:  No vitals were obtained today due to virtual visit.    Physical Exam   GENERAL: alert and no distress  EYES: Eyes grossly normal to inspection.  No discharge or erythema, or obvious scleral/conjunctival abnormalities.  RESP: intermittent dry sounding cough, No audible wheeze, or visible cyanosis.  No visible retractions or increased work of breathing.    SKIN: Visible skin clear. No significant rash, abnormal pigmentation or lesions.  NEURO: Cranial nerves grossly intact.  Mentation and speech appropriate for age.  PSYCH: Mentation appears normal, affect normal/bright, judgement and insight intact, normal speech and appearance well-groomed.                Video-Visit Details    Type of service:  Video Visit    Video End Time:3:28 PM    Originating Location (pt. Location): Home    Distant Location (provider location):  [facility]     Platform used for Video Visit: [employer]

## 2022-05-21 ENCOUNTER — E-VISIT (OUTPATIENT)
Dept: URGENT CARE | Facility: CLINIC | Age: 42
End: 2022-05-21
Payer: COMMERCIAL

## 2022-05-21 ENCOUNTER — NURSE TRIAGE (OUTPATIENT)
Dept: NURSING | Facility: CLINIC | Age: 42
End: 2022-05-21
Payer: COMMERCIAL

## 2022-05-21 DIAGNOSIS — N39.0 ACUTE UTI (URINARY TRACT INFECTION): Primary | ICD-10-CM

## 2022-05-21 PROCEDURE — 99421 OL DIG E/M SVC 5-10 MIN: CPT | Performed by: PHYSICIAN ASSISTANT

## 2022-05-21 RX ORDER — NITROFURANTOIN 25; 75 MG/1; MG/1
100 CAPSULE ORAL 2 TIMES DAILY
Qty: 10 CAPSULE | Refills: 0 | Status: SHIPPED | OUTPATIENT
Start: 2022-05-21 | End: 2022-05-26

## 2022-05-21 NOTE — TELEPHONE ENCOUNTER
Saw urologist last Monday (5/16/22) - prescribed Macrobid for prophylactic treatment of UTI's. Patient did not fill the prescription because she started to develop symptoms of Covid and tested positive the following day.    Patient calling today with symptoms of UTI including frequency, urgency, pain with urination and blood in urine.    Patient is also taking Paxlovid at this time and has questions about taking Antibiotic at the same time. Per protocol, patient should see PCP within 24 hrs. Virtual Visit advised and care advice given. Patient verbalized understanding and agreed with plan. Transferred patient to scheduling.    Shea Zavala RN  05/21/22 12:33 PM  [facility] Nurse Advisor        Reason for Disposition    Urinating more frequently than usual (i.e., frequency)    Additional Information    Negative: Followed a genital area injury    Negative: Followed a genital area injury (penis, scrotum)    Negative: Vaginal discharge    Negative: Pus (white, yellow) or bloody discharge from end of penis    Negative: [1] Taking antibiotic for urinary tract infection (UTI) AND [2] female    Negative: [1] Taking antibiotic for urinary tract infection (UTI) AND [2] male    Negative: [1] Discomfort (pain, burning or stinging) when passing urine AND [2] pregnant    Negative: [1] Discomfort (pain, burning or stinging) when passing urine AND [2] postpartum (from 0 to 6 weeks after delivery)    Negative: [1] Discomfort (pain, burning or stinging) when passing urine AND [2] female    Negative: [1] Discomfort (pain, burning or stinging) when passing urine AND [2] male    Negative: Pain or itching in the vulvar area    Negative: Pain in scrotum is main symptom    Negative: Blood in the urine is main symptom    Negative: Symptoms arising from use of a urinary catheter (Simon or Coude)    Negative: [1] Unable to urinate (or only a few drops) > 4 hours AND     [2] bladder feels very full (e.g., palpable bladder or strong  urge to urinate)    Negative: [1] Decreased urination and [2] drinking very little AND [2] dehydration suspected (e.g., dark urine, no urine > 12 hours, very dry mouth, very lightheaded)    Negative: Patient sounds very sick or weak to the triager    Negative: Fever > 100.4 F (38.0 C)    Negative: Side (flank) or lower back pain present    Negative: [1] Can't control passage of urine (i.e., urinary incontinence) AND [2] new onset (< 2 weeks) or worsening    Protocols used: URINARY SYMPTOMS-A-AH    COVID 19 Nurse Triage Plan/Patient Instructions    Please be aware that novel coronavirus (COVID-19) may be circulating in the community. If you develop symptoms such as fever, cough, or SOB or if you have concerns about the presence of another infection including coronavirus (COVID-19), please contact your health care provider or visit [email or web address].     Disposition/Instructions    Virtual Visit with provider recommended. Reference Visit Selection Guide.    Thank you for taking steps to prevent the spread of this virus.  o Limit your contact with others.  o Wear a simple mask to cover your cough.  o Wash your hands well and often.    Resources    [facility]: About COVID-19: [email or web address]    CDC: What to Do If You're Sick: www.cdc.gov/coronavirus/2019-ncov/about/steps-when-sick.html    CDC: Ending Home Isolation: www.cdc.gov/coronavirus/2019-ncov/hcp/disposition-in-home-patients.html     CDC: Caring for Someone: www.cdc.gov/coronavirus/2019-ncov/if-you-are-sick/care-for-someone.html     [facility]: Interim Guidance for Hospital Discharge to Home: www.[email or web address]/coronavirus/hcp/hospdischarge.pdf    [facility] clinical trials (COVID-19 research studies): [email or web address]     Below are the COVID-19 hotlines at the [place] [facility] ([facility]). Interpreters are available.   o For health questions: Call 776-017-4234 or  1-459.435.9867 (7 a.m. to 7 p.m.)  o For questions about schools and childcare: Call 702-649-2366 or 1-850.175.9666 (7 a.m. to 7 p.m.)

## 2022-05-21 NOTE — PATIENT INSTRUCTIONS
Dear Diana Wilson    After reviewing your responses, I've been able to diagnose you with a urinary tract infection, which is a common infection of the bladder with bacteria.  This is not a sexually transmitted infection, though urinating immediately after intercourse can help prevent infections.  Drinking lots of fluids is also helpful to clear your current infection and prevent the next one.      I have sent a prescription for antibiotics to your pharmacy to treat this infection.    It is important that you take all of your prescribed medication even if your symptoms are improving after a few doses.  Taking all of your medicine helps prevent the symptoms from returning.     If your symptoms worsen, you develop pain in your back or stomach, develop fevers, or are not improving in 5 days, please contact your primary care provider for an appointment or visit any of our convenient Walk-in or Urgent Care Centers to be seen, which can be found on our website here.    Thanks again for choosing us as your health care partner,    Jaspreet Bernal PA-C    Urinary Tract Infections in Women  Urinary tract infections (UTIs) are most often caused by bacteria. These bacteria enter the urinary tract. The bacteria may come from inside the body. Or they may travel from the skin outside the rectum or vagina into the urethra. Female anatomy makes it easy for bacteria from the bowel to enter a woman s urinary tract, which is the most common source of UTI. This means women develop UTIs more often than men. Pain in or around the urinary tract is a common UTI symptom. But the only way to know for sure if you have a UTI for the healthcare provider to test your urine. The two tests that may be done are the urinalysis and urine culture.     Types of UTIs    Cystitis. A bladder infection (cystitis) is the most common UTI in women. You may have urgent or frequent need to pee. You may also have pain, burning when you pee, and bloody  urine.    Urethritis. This is an inflamed urethra, which is the tube that carries urine from the bladder to outside the body. You may have lower stomach or back pain. You may also have urgent or frequent need to pee.    Pyelonephritis. This is a kidney infection. If not treated, it can be serious and damage your kidneys. In severe cases, you may need to stay in the hospital. You may have a fever and lower back pain.    Medicines to treat a UTI  Most UTIs are treated with antibiotics. These kill the bacteria. The length of time you need to take them depends on the type of infection. It may be as short as 3 days. If you have repeated UTIs, you may need a low-dose antibiotic for several months. Take antibiotics exactly as directed. Don t stop taking them until all of the medicine is gone. If you stop taking the antibiotic too soon, the infection may not go away. You may also develop a resistance to the antibiotic. This can make it much harder to treat.   Lifestyle changes to treat and prevent UTIs   The lifestyle changes below will help get rid of your UTI. They may also help prevent future UTIs.     Drink plenty of fluids. This includes water, juice, or other caffeine-free drinks. Fluids help flush bacteria out of your body.    Empty your bladder. Always empty your bladder when you feel the urge to pee. And always pee before going to sleep. Urine that stays in your bladder can lead to infection. Try to pee before and after sex as well.    Practice good personal hygiene. Wipe yourself from front to back after using the toilet. This helps keep bacteria from getting into the urethra.    Use condoms during sex. These help prevent UTIs caused by sexually transmitted bacteria. Also don't use spermicides during sex. These can increase the risk for UTIs. Choose other forms of birth control instead. For women who tend to get UTIs after sex, a low-dose of a preventive antibiotic may be used. Be sure to discuss this option with  your healthcare provider.    Follow up with your healthcare provider as directed. He or she may test to make sure the infection has cleared. If needed, more treatment may be started.  Helene last reviewed this educational content on 7/1/2019 2000-2021 [employer]. All rights reserved. This information is not intended as a substitute for professional medical care. Always follow your healthcare professional's instructions.

## 2022-05-25 DIAGNOSIS — Z17.0 MALIGNANT NEOPLASM OF UPPER-OUTER QUADRANT OF RIGHT BREAST IN FEMALE, ESTROGEN RECEPTOR POSITIVE (H): ICD-10-CM

## 2022-05-25 DIAGNOSIS — C50.411 MALIGNANT NEOPLASM OF UPPER-OUTER QUADRANT OF RIGHT BREAST IN FEMALE, ESTROGEN RECEPTOR POSITIVE (H): ICD-10-CM

## 2022-05-27 RX ORDER — LETROZOLE 2.5 MG/1
2.5 TABLET, FILM COATED ORAL DAILY
Qty: 30 TABLET | Refills: 1 | Status: SHIPPED | OUTPATIENT
Start: 2022-05-27 | End: 2022-07-29

## 2022-06-27 ENCOUNTER — OFFICE VISIT (OUTPATIENT)
Dept: DERMATOLOGY | Facility: CLINIC | Age: 42
End: 2022-06-27
Payer: COMMERCIAL

## 2022-06-27 DIAGNOSIS — L73.9 FOLLICULITIS: Primary | ICD-10-CM

## 2022-06-27 PROCEDURE — 99212 OFFICE O/P EST SF 10 MIN: CPT | Performed by: PHYSICIAN ASSISTANT

## 2022-06-27 ASSESSMENT — PAIN SCALES - GENERAL: PAINLEVEL: NO PAIN (0)

## 2022-06-27 NOTE — PROGRESS NOTES
HPI:   Chief complaints: Diana Wilson is a pleasant 41 year old female who presents for recheck of scalp folliculitis. biopsy performed at [facility] showed folliculitis; no dermatitis herpetiformis seen. She reports she is doing well today. She has been very diligent at checking ingredients of any hair products she uses and will avoid anything containing wheat.  She does have a history of celiac but is very strictly gluten free.         PHYSICAL EXAM:    LMP 08/05/2016 (Exact Date)   Skin exam performed as follows: Type 2 skin. Mood appropriate  Alert and Oriented X 3. Well developed, well nourished in no distress.  General appearance: Normal  Head including face: Normal  Eyes: conjunctiva and lids: Normal  Mouth: Lips, teeth, gums: Normal  Neck: Normal  Chest-breast/axillae: Normal  Back: Normal  Spleen and liver: Normal  Cardiovascular: Exam of peripheral vascular system by observation for swelling, varicosities, edema: Normal  Genitalia: groin, buttocks: Normal  Extremities: digits/nails (clubbing): Normal  Eccrine and Apocrine glands: Normal  Right upper extremity: Normal  Left upper extremity: Normal  Right lower extremity: Normal  Left lower extremity: Normal  Skin: Scalp and body hair: See below    1. Scalp clear    ASSESSMENT/PLAN:     1. Scalp folliculitis - clear today. She will continue present management; advise that she may call for antibiotics if flaring in the future,           Follow-up:PRN  CC:   Scribed By: Delicia Bell, MS, PA-C

## 2022-06-27 NOTE — LETTER
6/27/2022         RE: Diana Wilson  [address] [facility] [id number]        Dear Colleague,    Thank you for referring your patient, Diana Wilson, to the [facility]. Please see a copy of my visit note below.    HPI:   Chief complaints: Diana Wilson is a pleasant 41 year old female who presents for recheck of scalp folliculitis. biopsy performed at [facility] showed folliculitis; no dermatitis herpetiformis seen. She reports she is doing well today. She has been very diligent at checking ingredients of any hair products she uses and will avoid anything containing wheat.  She does have a history of celiac but is very strictly gluten free.         PHYSICAL EXAM:    LMP 08/05/2016 (Exact Date)   Skin exam performed as follows: Type 2 skin. Mood appropriate  Alert and Oriented X 3. Well developed, well nourished in no distress.  General appearance: Normal  Head including face: Normal  Eyes: conjunctiva and lids: Normal  Mouth: Lips, teeth, gums: Normal  Neck: Normal  Chest-breast/axillae: Normal  Back: Normal  Spleen and liver: Normal  Cardiovascular: Exam of peripheral vascular system by observation for swelling, varicosities, edema: Normal  Genitalia: groin, buttocks: Normal  Extremities: digits/nails (clubbing): Normal  Eccrine and Apocrine glands: Normal  Right upper extremity: Normal  Left upper extremity: Normal  Right lower extremity: Normal  Left lower extremity: Normal  Skin: Scalp and body hair: See below    1. Scalp clear    ASSESSMENT/PLAN:     1. Scalp folliculitis - clear today. She will continue present management; advise that she may call for antibiotics if flaring in the future,           Follow-up:PRN  CC:   Scribed By: Delicia Bell MS, PABINTA          Again, thank you for allowing me to participate in the care of your patient.        Sincerely,        Delicia Bell PA-C

## 2022-07-11 ENCOUNTER — LAB (OUTPATIENT)
Dept: LAB | Facility: CLINIC | Age: 42
End: 2022-07-11
Payer: COMMERCIAL

## 2022-07-11 ENCOUNTER — TELEPHONE (OUTPATIENT)
Dept: FAMILY MEDICINE | Facility: CLINIC | Age: 42
End: 2022-07-11

## 2022-07-11 DIAGNOSIS — Z13.220 LIPID SCREENING: Primary | ICD-10-CM

## 2022-07-11 DIAGNOSIS — C50.411 MALIGNANT NEOPLASM OF UPPER-OUTER QUADRANT OF RIGHT BREAST IN FEMALE, ESTROGEN RECEPTOR POSITIVE (H): ICD-10-CM

## 2022-07-11 DIAGNOSIS — E66.811 CLASS 1 OBESITY DUE TO EXCESS CALORIES WITH SERIOUS COMORBIDITY IN ADULT, UNSPECIFIED BMI: ICD-10-CM

## 2022-07-11 DIAGNOSIS — Z17.0 MALIGNANT NEOPLASM OF UPPER-OUTER QUADRANT OF RIGHT BREAST IN FEMALE, ESTROGEN RECEPTOR POSITIVE (H): ICD-10-CM

## 2022-07-11 DIAGNOSIS — E66.09 CLASS 1 OBESITY DUE TO EXCESS CALORIES WITH SERIOUS COMORBIDITY IN ADULT, UNSPECIFIED BMI: ICD-10-CM

## 2022-07-11 DIAGNOSIS — Z13.220 LIPID SCREENING: ICD-10-CM

## 2022-07-11 DIAGNOSIS — E61.1 IRON DEFICIENCY: ICD-10-CM

## 2022-07-11 LAB
ALBUMIN SERPL-MCNC: 3.9 G/DL (ref 3.4–5)
ALP SERPL-CCNC: 93 U/L (ref 40–150)
ALT SERPL W P-5'-P-CCNC: 25 U/L (ref 0–50)
ANION GAP SERPL CALCULATED.3IONS-SCNC: 4 MMOL/L (ref 3–14)
AST SERPL W P-5'-P-CCNC: 12 U/L (ref 0–45)
BILIRUB SERPL-MCNC: 0.4 MG/DL (ref 0.2–1.3)
BUN SERPL-MCNC: 12 MG/DL (ref 7–30)
CALCIUM SERPL-MCNC: 9.1 MG/DL (ref 8.5–10.1)
CHLORIDE BLD-SCNC: 106 MMOL/L (ref 94–109)
CHOLEST SERPL-MCNC: 179 MG/DL
CO2 SERPL-SCNC: 30 MMOL/L (ref 20–32)
CREAT SERPL-MCNC: 0.64 MG/DL (ref 0.52–1.04)
ERYTHROCYTE [DISTWIDTH] IN BLOOD BY AUTOMATED COUNT: 13.9 % (ref 10–15)
FERRITIN SERPL-MCNC: 49 NG/ML (ref 12–150)
GFR SERPL CREATININE-BSD FRML MDRD: >90 ML/MIN/1.73M2
GLUCOSE BLD-MCNC: 101 MG/DL (ref 70–99)
HCT VFR BLD AUTO: 42.3 % (ref 35–47)
HDLC SERPL-MCNC: 37 MG/DL
HGB BLD-MCNC: 13.9 G/DL (ref 11.7–15.7)
LDLC SERPL CALC-MCNC: 99 MG/DL
MCH RBC QN AUTO: 29.3 PG (ref 26.5–33)
MCHC RBC AUTO-ENTMCNC: 32.9 G/DL (ref 31.5–36.5)
MCV RBC AUTO: 89 FL (ref 78–100)
NONHDLC SERPL-MCNC: 142 MG/DL
PLATELET # BLD AUTO: 334 10E3/UL (ref 150–450)
POTASSIUM BLD-SCNC: 4 MMOL/L (ref 3.4–5.3)
PROT SERPL-MCNC: 7.6 G/DL (ref 6.8–8.8)
RBC # BLD AUTO: 4.74 10E6/UL (ref 3.8–5.2)
SODIUM SERPL-SCNC: 140 MMOL/L (ref 133–144)
TRIGL SERPL-MCNC: 213 MG/DL
WBC # BLD AUTO: 8.6 10E3/UL (ref 4–11)

## 2022-07-11 PROCEDURE — 84443 ASSAY THYROID STIM HORMONE: CPT

## 2022-07-11 PROCEDURE — 80053 COMPREHEN METABOLIC PANEL: CPT

## 2022-07-11 PROCEDURE — 80061 LIPID PANEL: CPT

## 2022-07-11 PROCEDURE — 82728 ASSAY OF FERRITIN: CPT

## 2022-07-11 PROCEDURE — 83036 HEMOGLOBIN GLYCOSYLATED A1C: CPT

## 2022-07-11 PROCEDURE — 36415 COLL VENOUS BLD VENIPUNCTURE: CPT

## 2022-07-11 PROCEDURE — 85027 COMPLETE CBC AUTOMATED: CPT

## 2022-07-11 NOTE — TELEPHONE ENCOUNTER
Patient here for lab draw.  Patient is fasting and would like her cholesterol/lipids checked,per Heidi-.    Last lipid was 12/16/19 and is not listed on [facility] to be checked at this time.  Patient is not on statins.  Will forward to Dr Peters to advise.  Norma Lin RN
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
## 2022-07-12 ENCOUNTER — OFFICE VISIT (OUTPATIENT)
Dept: SURGERY | Facility: CLINIC | Age: 42
End: 2022-07-12
Attending: PHYSICIAN ASSISTANT
Payer: COMMERCIAL

## 2022-07-12 VITALS
DIASTOLIC BLOOD PRESSURE: 80 MMHG | SYSTOLIC BLOOD PRESSURE: 118 MMHG | BODY MASS INDEX: 35 KG/M2 | HEIGHT: 64 IN | WEIGHT: 205 LBS

## 2022-07-12 DIAGNOSIS — E66.09 CLASS 1 OBESITY DUE TO EXCESS CALORIES WITH SERIOUS COMORBIDITY IN ADULT, UNSPECIFIED BMI: Primary | ICD-10-CM

## 2022-07-12 DIAGNOSIS — K21.9 GASTROESOPHAGEAL REFLUX DISEASE, UNSPECIFIED WHETHER ESOPHAGITIS PRESENT: ICD-10-CM

## 2022-07-12 DIAGNOSIS — C50.411 MALIGNANT NEOPLASM OF UPPER-OUTER QUADRANT OF RIGHT BREAST IN FEMALE, ESTROGEN RECEPTOR POSITIVE (H): ICD-10-CM

## 2022-07-12 DIAGNOSIS — E66.811 CLASS 1 OBESITY DUE TO EXCESS CALORIES WITH SERIOUS COMORBIDITY IN ADULT, UNSPECIFIED BMI: Primary | ICD-10-CM

## 2022-07-12 DIAGNOSIS — Z17.0 MALIGNANT NEOPLASM OF UPPER-OUTER QUADRANT OF RIGHT BREAST IN FEMALE, ESTROGEN RECEPTOR POSITIVE (H): ICD-10-CM

## 2022-07-12 LAB
HBA1C MFR BLD: 5.5 % (ref 0–5.6)
TSH SERPL DL<=0.005 MIU/L-ACNC: 3.9 MU/L (ref 0.4–4)

## 2022-07-12 PROCEDURE — 99215 OFFICE O/P EST HI 40 MIN: CPT | Performed by: FAMILY MEDICINE

## 2022-07-12 RX ORDER — PHENTERMINE HYDROCHLORIDE 37.5 MG/1
TABLET ORAL
Qty: 90 TABLET | Refills: 0 | Status: SHIPPED | OUTPATIENT
Start: 2022-07-12 | End: 2023-01-30

## 2022-07-12 NOTE — PATIENT INSTRUCTIONS
Eat Better ? Move More ? Live Well    Eat 3 nutrient-rich meals each day    Don t skip meals--it will cause you to overeat later in the day!    Eating fiber (vegetables/fruits/whole grains) and protein with meals helps you stay full longer    Choose foods with less than 10 grams of sugar and 5 grams of fat per serving to prevent excess calories and weight re-gain  Eat around the same times each day to develop a routine eating schedule   Avoid snacking unless physically hungry.   Planned snacks: 1-2 times per day and no more than 150 calories    Eat protein first   Protein helps with healing, maintaining adequate muscle mass, reducing hunger and optimizing nutritional status   Aim for 60-80 grams of protein per day   Fill up on Fiber   Fiber comes from plants--fruits, veggies, whole grains, nuts/seeds and beans   Fiber is low in calories, high in phytonutrients and helps you stay full longer   Aim for 25-35 grams per day by eating fiber with meals and snacks  Eat [id number]   Take 20-30 minutes to eat each meal by taking small bites, chewing foods to applesauce consistency or 20-30 times before you swallow   Eating foods too fast can delay satiety/fullness signals and increase overeating   Slow down your eating by using toddler utensils, putting your fork/spoon down between bites and not watching TV or emailing during meals!   Keep a Journal         Writing down what you eat, how you feel and when you are active helps you identify new changes to work on from week to week         Look for ways to cut 100 calories from your current diet 2-3 times per day  Drink 64 ounces of 0-Calorie drinks between meals   Water   Zero calorie Propel  or Vitamin Water     SoBe [place]  Zero Calories   Crystal Light , Sugar-Free Tobias-Aid , and other sugar-free lemonade or flavored maguire   Keep Caffeine to less than 300mg per day ie: 3-6oz cups coffee     Work up to 45-60 minutes of physical activity most days of the week   Helps  with losing weight and prevent regaining those extra pounds!    Do a combo of cardio (walking/water exercises) and strength training (lifting weights/Vinyasa yoga)    Avoid Mindless Eating   Be present when you eat--take note of the smell, taste and quality of your food   Make a list of alternative activities you could do to prevent eating out of boredom/stress  Go for a walk, call a friend, chew gum, paint your nails, re-organize the garage, etc       LEAN PROTEIN SOURCES      Protein Source Portion Calories Grams of Protein                           Nonfat, plain Greek yogurt    (10 grams sugar or less) 3/4 cup (6 oz)  12-17   Light Yogurt (10 grams sugar or less) 3/4 cup (6 oz)  6-8   Protein Shake 1 shake 110-180 15-30   Skim/1% Milk or lactose-free milk 1 cup ( 8 oz)  8   Plain or light, flavored soymilk 1 cup  7-8   Plain or light, hemp milk 1 cup 110 6   Fat Free or 1% Cottage Cheese 1/2 cup 90 15   Part skim ricotta cheese 1/2 cup 100 14   Part skim or reduced fat cheese slices 1 ounce 65-80 8     Mozzarella String Cheese 1 80 8   Canned tuna, chicken, crab or salmon  (canned in water)  1/2 cup 100 15-20   White fish (broiled, grilled, baked) 3 ounces 100 21   Troutman/Tuna (broiled, grilled, baked) 3 ounces 150-180 21   Shrimp, Scallops, Lobster, Crab 3 ounces 100 21   Pork loin, Pork Tenderloin 3 ounces 150 21   Boneless, skinless chicken /turkey breast                          (broiled, grilled, baked) 3 ounces 120 21   [place], [place], [place], and Venison 3 ounces 120 21   Lean cuts of red meat and pork (sirloin,   round, tenderloin, flank, ground 93%-96%) 3 ounces 170 21   Lean or Extra Lean Ground Turkey 1/2 cup 150 20   90-95% Lean [place] Burger 1 agnieszka 140-180 21   Low-fat casserole with lean meat 3/4 cup 200 17   Luncheon Meats                                                        (turkey, lean ham, roast beef, chicken) 3 ounces 100 21   Egg (boiled, poached, scrambled) 1 Egg 60 7    Egg Substitute 1/2 cup 70 10   Nuts (limit to 1 serving per day)  3 Tbsp. 150 7   Nut [place] (peanut, almond)  Limit to 1 serving or less daily 1 Tbsp. 90 4   Soy Burger (varies) 1  15   Garbanzo, Black, [place] Beans 1/2 cup 110 7   Refried Beans 1/2 cup 100 7   Kidney and Lima beans 1/2 cup 110 7   Tempeh 3 oz 175 18   Vegan crumbles 1/2 cup 100 14   Tofu 1/2 cup 110 14   Chili (beans and extra lean beef or turkey) 1 cup 200 23   Lentil Stew/Soup 1 cup 150 12   Black Bean Soup 1 cup 175 12
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
## 2022-07-19 ENCOUNTER — VIRTUAL VISIT (OUTPATIENT)
Dept: SURGERY | Facility: CLINIC | Age: 42
End: 2022-07-19
Payer: COMMERCIAL

## 2022-07-19 DIAGNOSIS — E66.9 OBESITY (BMI 30-39.9): Primary | ICD-10-CM

## 2022-07-19 PROCEDURE — 97802 MEDICAL NUTRITION INDIV IN: CPT | Mod: GT | Performed by: DIETITIAN, REGISTERED

## 2022-07-19 NOTE — LETTER
7/19/2022         RE: Diana Wilson  1972 [address] [facility] [address]        Dear Colleague,    Thank you for referring your patient, Diana Wilson, to the [facility] SURG[facility] CL[facility]IC AND BARIATRICS CARE [place]. Please see a copy of my visit note below.    Diana Wilson is a 42 year old who is being evaluated via a billable video visit.      How would you like to obtain your AVS? MyChart  If the video visit is dropped, the invitation should be resent by: Send to e-mail at: [email or web address]  Will anyone else be joining your video visit? No      Video Start Time: 3:30 PM      Medical Weight Loss Initial Diet Evaluation  Assessment:  Diana is presenting today for a new weight management nutrition consultation. Pt has had an initial appointment with Dr. Rosas.  Weight loss medication: Phentermine.    Personal Goals: Used to run, she would like to get to 200 lb, 190 lb is her next goal    Anthropometrics:    Pt's weight is 202 lb  Initial weight: 205 lb  Weight change: down 3 lbs in the past week  BMI: There is no height or weight on file to calculate BMI.   Ideal body weight: 55.1 kg (121 lb 7.5 oz)  Adjusted ideal body weight: 70.3 kg (154 lb 14.1 oz)  Estimated RMR (Sanford-St Jeor equation):  1555 kcals x 1.2 (sedentary) = 1865 kcals (for weight maintenance)  1555 kcals x 1.3 (light active) = 2137 kcals (for weight maintenance)    Recommended Protein Intake: 70-90 grams of protein/day    Medical History:  Patient Active Problem List   Diagnosis     Obesity     CARDIOVASCULAR SCREENING; LDL GOAL LESS THAN 130     Major depressive disorder, recurrent episode, mild (H)     Iron deficiency anemia     Insomnia     Health Care Home     Anxiety     Intestinal malabsorption     Lymphedema of right upper extremity     Malignant neoplasm of upper-outer quadrant of right breast in female, estrogen receptor positive (H)     Osteopenia of multiple sites     Multiple subsegmental pulmonary emboli without  acute cor pulmonale (H)     Breast reconstruction disproportion     Family history of colon cancer     Celiac disease     Recurrent UTI      Diabetes: No  HbA1c:  No results found for: HGBA1C    Nutrition History:   Food allergies/intolerances/cultural or religous food customs: Yes, celiac disease  Weight loss history: Watching Portions or Calories, Exercise, Weight Watchers, Meal Replacements, Fasting    Dietary Recall:  Breakfast: Fruit smoothie - 1 cup blueberries, vanilla protein powder OR banana, chocolate protein powder, spoon of PB, vanilla almond milk  Lunch: Chicken salad on salad, almonds, power, celery, onion  Dinner: Taco salads OR grilled pineapple pork chops, 3 bean salad, brussels sprouts   Typical Snacks: in the past would be very hungry by 3 pm, but not hungry with Phentermine    Beverages:   Coffee with creamer (regular)  Water: with propel water flavoring    Exercise:   Trying to go for a walk 3x per morning, 2-3 miles    Nutrition Diagnosis (PES statement):   Overweight/Obesity (NC 3.3) related to overeating and poor lifestyle habits as evidenced by BMI     Nutrition Intervention  1. Food and/or Nutrient Delivery   a. Placed emphasis on importance of developing a healthy meal routine, aiming for 3 meals a day  b. Discussed strategies for night time snacking and healthy snack options for weight loss  c. Discussed the importance of adequate hydration, with emphasis on drinking 64oz of water or zero calorie beverages per day.  2. Nutrition Counseling   a. Encouraged importance of developing routine exercise for health benefits and weight loss.    Goals established by patient:   1. Aim to eat 3 meals per day, protein focused, high fiber foods  2. Limit snacking -discussed snack ideas, if needed    Assessment/Plan:    Pt will follow up in 3 month(s) with bariatrician and 2 month(s) with dietitian.     Video-Visit Details    Type of service:  Video Visit    Video End Time:3:58 pm    Originating Location  (pt. Location): Home    Distant Location (provider location):  [facility] [place]     Platform used for Video Visit: Russ Vivas      Again, thank you for allowing me to participate in the care of your patient.        Sincerely,        Amanda Vivas

## 2022-07-21 NOTE — PROGRESS NOTES
CANCER SURVIVORSHIP VISIT-virtual  Jul 22, 2022    Care Team   Primary Care Provider Yenifer Peters Casandra ([facility]), Eugenio Ruelas (Plastic surgery)   Radiation Oncologist Aleksey Terrell MD, Sondra Mccollum MD, Caitlin Durham MD   Medical Oncologist  Maribel Jin MD,  Boris Millan MD       REASON FOR VISIT: survivorship visit to discuss sexual health.     CANCER HISTORY AND TREATMENT:    History of right-sided stage T2N1 breast cancer, ER/MN positive, HER2 negative, diagnosed in 2016  *Diagnosis: 12/27/2016. Clinical stage T2N1 grade 3 invasive ductal cancer. She was 36 years old.   *Neoadjuvant chemotherapy: Dose dense doxorubicin/cyclophosphamide x4 then weekly paclitaxel/carboplatin x12 (7 weeks of carbo- anaphylaxis).   Lifetime Dose Tracking     Doxorubicin: 237.485 mg/m2 (472 mg) = 52.77 % of the maximum lifetime dose of 450 mg/m2   *Surgery: 07/12/2017 Bilateral mastectomy. 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 5 sLN all negative.ypTis(DCIS)pN0 disease. Left breast negative for malignancy.  *Radiation: 6040 cGy in 33 fractions to right breast completed 11/15/2017.   *Endocrine therapy: Arimidex (anastrozole) on and off in 2018. She was on Tamoxifen 2018 until her PE in 2020. On Aromasin 2020 until March 2022. Started letrozole 4/4/2022  *Genetic testing: Negative for mutations in the TYLER, BARD1, BRCA1, BRCA2, BRIP1, CDH1, CHEK2, MRE11A, MUTYH, NBN, NF1, PALB2, PTEN, RAD50, RAD51C, RAD51D, and TP53 genes by sequencing and deletion/duplication analysis. No mutations were found in any of the 17 genes analyzed. 2017  *Other information: She had BSO 11/2017. 9/17/2020 reconstruction at [facility] involving removal of bilateral breast reconstruction implants, and TRAM flap reconstruction. Developed tachycardia and dyspnea with oxygen requirement while in the hospital shortly after her surgery and was diagnosed with PE    Cardiovascular risk  screen:  Age >60 at cancer treatment: no   History of EF<55%: no  History of MI: no  Cumulative doxorubicin >250 mg/m2:  no  Radiotherapy to the left chest: no  History of trastuzumab: no  Smoking history: 20 years x 1 pack= 20 pack year  Quit in 2016  Hypertension: no  Hyperlididemia: no  Diabetes: no  Obesity: yes BMI 34      INTERVAL HISTORY:     She is here follow up on her sexual health. She started coconut oil since our last visit and has not had another UTI. She met with Dr. Harris in urology and estrogen cream was discussed but not started. She was put on a post coital antibiotic. Libido continues to be an issue. She has tried stories and clitoral stimulation but feels like she is lacking blood flow and intercourse is still uncomfortable and she bleeds. She is on Effexor and not interested in trying to taper off it. One challenge has been lack of nipple stimulation since her mastectomy as well as teenagers in her house with reduces privacy. However soon her children will be leaving the home.     I referred her to medical weight management clinic and she is happy with this.       Mood- seeing a therapist and on effexor.         Past Medical History:   Diagnosis Date     Anxiety      Breast cancer (H)      Depressive disorder 1995    And Anxiety     Encounter for insertion or removal of intrauterine contraceptive device 1/8/2008     Excessive or frequent menstruation 03/19/2003     Fibroadenosis of breast      GERD (gastroesophageal reflux disease)      Invasive ductal carcinoma of breast, right (H)      Malignant neoplasm of upper-outer quadrant of right female breast (H)      Mild intermittent asthma            never has had PFT's, MDI with colds and at times exercise     Neutropenia, drug-induced (H)      Nongonococcal urethritis (JALIL) due to chlamydia trachomatis 01/03/2002     Other and unspecified ovarian cyst 03/19/2003    RIGHT ovarian cyst     PONV (postoperative nausea and vomiting)      Skin cancer       Transient hypertension of pregnancy, antepartum     PIH with last birth     Uncomplicated asthma 2002       Current Outpatient Medications   Medication Sig Dispense Refill     Ascorbic Acid (VITAMIN C PO)        aspirin (ASA) 325 MG EC tablet Take 325 mg by mouth every 6 hours as needed for moderate pain       beclomethasone HFA (QVAR REDIHALER) 40 MCG/ACT inhaler Inhale 1 puff into the lungs 2 times daily 10.6 g      cetirizine (ZYRTEC) 10 MG tablet Take 10 mg by mouth every morning       cholecalciferol (VITAMIN D3) 1000 units (25 mcg) capsule Take 1 capsule by mouth every other day       clindamycin (CLINDAMAX) 1 % external gel Apply to AA BID PRN 60 g 3     fluocinonide (LIDEX) 0.05 % external solution Apply to scalp BID x 3-4 weeks then PRN 50 mL 3     Fluticasone Propionate (FLONASE NA) Spray 1 spray into both nostrils daily        gabapentin (NEURONTIN) 300 MG capsule Take 1 capsule (300 mg) by mouth 3 times daily 270 capsule 1     letrozole (FEMARA) 2.5 MG tablet Take 1 tablet (2.5 mg) by mouth daily 30 tablet 1     Multiple Vitamins-Minerals (MULTIVITAMIN PO) Take 1 tablet by mouth daily        nitroFURantoin macrocrystal (MACRODANTIN) 50 MG capsule Take 1 within 30 min of intercourse as needed, either before or after. 30 capsule 4     omeprazole (PRILOSEC) 20 MG DR capsule TAKE ONE CAPSULE BY MOUTH EVERY MORNING 30 MINUTES BEFORE BREAKFAST 90 capsule 3     phentermine (ADIPEX-P) 37.5 MG tablet Take 1/2 tablet every morning with breakfast. Increase to 1 tablet after 1 week if needed. 90 tablet 0     venlafaxine (EFFEXOR-XR) 150 MG 24 hr capsule Take 1 capsule (150 mg) by mouth daily 90 capsule 3     venlafaxine (EFFEXOR-XR) 75 MG 24 hr capsule TAKE ONE CAPSULE BY MOUTH ONCE DAILY (ALONG WITH 150MG) 90 capsule 3          Allergies   Allergen Reactions     Carboplatin Shortness Of Breath, Rash and Anaphylaxis     Other reaction(s): Flushing, Tachycardia     Ambien      hallucinations     Amoxicillin GI  "Disturbance     Erythromycin GI Disturbance     Hydrocodone-Acetaminophen      Other reaction(s): Hallucinations     Hydromorphone Headache     Penicillins Nausea and Vomiting and Hives     1-11-17 pt states this was a childhood reaction     Zolpidem      Other reaction(s): Hallucinations     Hydrocodone Other (See Comments)     Out of body experience, \"creepy-crawly\" skin        Family History   Problem Relation Age of Onset     Diabetes Mother         hypoglycemic     Allergies Mother      Arthritis Mother      Depression Mother      Gastrointestinal Disease Mother      Neurologic Disorder Mother      Psychotic Disorder Mother         panic disorder     Cancer Mother 56        pancreatic, colon, liver and skin.     Cancer - colorectal Mother      Other Cancer Mother         Pancreatic     Anxiety Disorder Mother      Mental Illness Mother      Hypertension Father      Diabetes Father      Rheumatoid Arthritis Father      C.A.D. Paternal Grandfather         congestive heart failure     Cerebrovascular Disease Paternal Grandfather         age 79     Diabetes Paternal Grandfather      Hypertension Paternal Grandfather      Alzheimer Disease Paternal Grandfather      Arthritis Paternal Grandfather      Circulatory Paternal Grandfather      Cardiovascular Paternal Grandfather      Heart Disease Paternal Grandfather      Neurologic Disorder Paternal Grandfather      Unknown/Adopted Maternal Grandmother      Cerebrovascular Disease Maternal Grandmother      Unknown/Adopted Maternal Grandfather      Cancer Maternal Grandfather      Obesity Paternal Grandmother      Cancer Paternal Grandmother 94        coloangiocancinoma        Social history:  Living situation: , lives in [place]  Occupation: Works for [employer]  Tobacco use:   Tobacco Use      Smoking status: Former Smoker        Packs/day: 0.50        Years: 10.00        Pack years: 5        Types: Cigarettes        Quit date: 10/1/2016        Years " since quittin.8      Smokeless tobacco: Never Used      Tobacco comment: Socially- quit smoking 10/01/2016  ETOH use: 1 drink a week  Exercise: none now  Diet: healthy     Video physical exam  General: Patient appears well in no acute distress.   Skin: No visualized rash or lesions on visualized skin  Eyes: EOMI, no erythema, sclera icterus or discharge noted  Resp: Appears to be breathing comfortably without accessory muscle usage, speaking in full sentences, no cough  MSK: Appears to have normal range of motion based on visualized movements  Neurologic: No apparent tremors, facial movements symmetric  Psych: affect versatile, alert and oriented    IMPRESSION/PLAN:    History of right-sided stage T2N1 breast cancer, ER/MS positive, HER2 negative, diagnosed in 2016  Treated with neoadjuvant chemo with complete pathologic response, bilateral mastectomy, radiation and endocrine therapy  She is 7 years from diagnosis without recurrence  Continue oncology follow-up as scheduled with Dr. Coulter and Leola. After she completes AI therapy, she could transition to my clinic if she would like.     Sexuality concerns. She has had issues with recurrent UTI's, vaginal dryness, dyspareunia and low libido.     Recurrent UTI's have stopped with use of coconut oil. Further management per Dr. Harris.     Dyspareunia- Not improved with non-hormonal vaginal moisturizer. Discussed risks and benefits of topical estrogen and she would like to proceed with Vagifem 10 mcg vaginal pill daily X 2 weeks then twice a week. She has a history of DVT on Tamoxifen in the past. There is little to no systemic absorption with topical use but also discussed with Dr. Wright who agreed that there should be little to no risk of DVT.    Hypoactive sexual desire disorder- Discussed responsive desire and suggested she discuss different tool options with the sex educators at [facility]. Discussed scheduling date nights and intercourse. Suggested trying  Dipsea and Ristela, a plant based supplement for libido. Dicussed bremelanotide but not yet available for post menopausal women or those with a history of cancer. Also discussed sex therapy but she wasn't interested at this time. Effexor may also be playing a role but she     See me virtually in 1 year, sooner if issues or questions arise.     Non-urgent scheduling should be complete within 7-10 business days. However, if you have not received a phone call from scheduling within 3 business days, you may call to schedule at [phone] option 5, option 2. This is the same number to call to make changes tor if you have questions about the schedule.    Gave resources for our Thrive Cancer Survivorship class series and mailings list for educational opportunities. [email or web address]    Cancer treatment summary was sent electronically to the patient and her PCP.      The total time of this encounter amounted to 30 minutes.This time included time spent with the patient, prep work, ordering tests, creating a cancer treatment summary, and performing post visit documentation.    Jailene Thorne MPAS, PA-MORELIA  [facility]

## 2022-07-22 ENCOUNTER — VIRTUAL VISIT (OUTPATIENT)
Dept: ONCOLOGY | Facility: CLINIC | Age: 42
End: 2022-07-22
Attending: PHYSICIAN ASSISTANT
Payer: COMMERCIAL

## 2022-07-22 DIAGNOSIS — F52.0 HYPOACTIVE SEXUAL DESIRE DISORDER: ICD-10-CM

## 2022-07-22 DIAGNOSIS — N94.19 DYSPAREUNIA DUE TO MEDICAL CONDITION IN FEMALE PATIENT: ICD-10-CM

## 2022-07-22 DIAGNOSIS — Z17.0 MALIGNANT NEOPLASM OF UPPER-OUTER QUADRANT OF RIGHT BREAST IN FEMALE, ESTROGEN RECEPTOR POSITIVE (H): Primary | ICD-10-CM

## 2022-07-22 DIAGNOSIS — C50.411 MALIGNANT NEOPLASM OF UPPER-OUTER QUADRANT OF RIGHT BREAST IN FEMALE, ESTROGEN RECEPTOR POSITIVE (H): Primary | ICD-10-CM

## 2022-07-22 PROCEDURE — 99214 OFFICE O/P EST MOD 30 MIN: CPT | Mod: GT | Performed by: PHYSICIAN ASSISTANT

## 2022-07-22 RX ORDER — ESTRADIOL 10 UG/1
INSERT VAGINAL
Qty: 20 TABLET | Refills: 0 | Status: SHIPPED | OUTPATIENT
Start: 2022-07-22 | End: 2022-08-30

## 2022-07-22 NOTE — LETTER
7/22/2022         RE: Diana Wilson  1972 [address] [address]      CANCER SURVIVORSHIP VISIT-virtual  Jul 22, 2022    Care Team   Primary Care Provider Yenifer Peters Casandra ([facility]), Eugenio Ruelas (Plastic surgery)   Radiation Oncologist Aleksey Terrell MD, Sondra Mccollum MD, Caitlin Durham MD   Medical Oncologist  Maribel Jin MD,  Boris Millan MD       REASON FOR VISIT: survivorship visit to discuss sexual health.     CANCER HISTORY AND TREATMENT:    History of right-sided stage T2N1 breast cancer, ER/VT positive, HER2 negative, diagnosed in 2016  *Diagnosis: 12/27/2016. Clinical stage T2N1 grade 3 invasive ductal cancer. She was 36 years old.   *Neoadjuvant chemotherapy: Dose dense doxorubicin/cyclophosphamide x4 then weekly paclitaxel/carboplatin x12 (7 weeks of carbo- anaphylaxis).   Lifetime Dose Tracking     Doxorubicin: 237.485 mg/m2 (472 mg) = 52.77 % of the maximum lifetime dose of 450 mg/m2   *Surgery: 07/12/2017 Bilateral mastectomy. 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 5 sLN all negative.ypTis(DCIS)pN0 disease. Left breast negative for malignancy.  *Radiation: 6040 cGy in 33 fractions to right breast completed 11/15/2017.   *Endocrine therapy: Arimidex (anastrozole) on and off in 2018. She was on Tamoxifen 2018 until her PE in 2020. On Aromasin 2020 until March 2022. Started letrozole 4/4/2022  *Genetic testing: Negative for mutations in the TYLER, BARD1, BRCA1, BRCA2, BRIP1, CDH1, CHEK2, MRE11A, MUTYH, NBN, NF1, PALB2, PTEN, RAD50, RAD51C, RAD51D, and TP53 genes by sequencing and deletion/duplication analysis. No mutations were found in any of the 17 genes analyzed. 2017  *Other information: She had BSO 11/2017. 9/17/2020 reconstruction at [facility] involving removal of bilateral breast reconstruction implants, and TRAM flap reconstruction. Developed tachycardia and dyspnea with oxygen requirement while in  the hospital shortly after her surgery and was diagnosed with PE    Cardiovascular risk screen:  Age >60 at cancer treatment: no   History of EF<55%: no  History of MI: no  Cumulative doxorubicin >250 mg/m2:  no  Radiotherapy to the left chest: no  History of trastuzumab: no  Smoking history: 20 years x 1 pack= 20 pack year  Quit in 2016  Hypertension: no  Hyperlididemia: no  Diabetes: no  Obesity: yes BMI 34      INTERVAL HISTORY:     She is here follow up on her sexual health. She started coconut oil since our last visit and has not had another UTI. She met with Dr. Harris in urology and estrogen cream was discussed but not started. She was put on a post coital antibiotic. Libido continues to be an issue. She has tried stories and clitoral stimulation but feels like she is lacking blood flow and intercourse is still uncomfortable and she bleeds. She is on Effexor and not interested in trying to taper off it. One challenge has been lack of nipple stimulation since her mastectomy as well as teenagers in her house with reduces privacy. However soon her children will be leaving the home.     I referred her to medical weight management clinic and she is happy with this.       Mood- seeing a therapist and on effexor.         Past Medical History:   Diagnosis Date     Anxiety      Breast cancer (H)      Depressive disorder 1995    And Anxiety     Encounter for insertion or removal of intrauterine contraceptive device 1/8/2008     Excessive or frequent menstruation 03/19/2003     Fibroadenosis of breast      GERD (gastroesophageal reflux disease)      Invasive ductal carcinoma of breast, right (H)      Malignant neoplasm of upper-outer quadrant of right female breast (H)      Mild intermittent asthma            never has had PFT's, MDI with colds and at times exercise     Neutropenia, drug-induced (H)      Nongonococcal urethritis (JALIL) due to chlamydia trachomatis 01/03/2002     Other and unspecified ovarian cyst  03/19/2003    RIGHT ovarian cyst     PONV (postoperative nausea and vomiting)      Skin cancer      Transient hypertension of pregnancy, antepartum     PIH with last birth     Uncomplicated asthma 2002       Current Outpatient Medications   Medication Sig Dispense Refill     Ascorbic Acid (VITAMIN C PO)        aspirin (ASA) 325 MG EC tablet Take 325 mg by mouth every 6 hours as needed for moderate pain       beclomethasone HFA (QVAR REDIHALER) 40 MCG/ACT inhaler Inhale 1 puff into the lungs 2 times daily 10.6 g      cetirizine (ZYRTEC) 10 MG tablet Take 10 mg by mouth every morning       cholecalciferol (VITAMIN D3) 1000 units (25 mcg) capsule Take 1 capsule by mouth every other day       clindamycin (CLINDAMAX) 1 % external gel Apply to AA BID PRN 60 g 3     fluocinonide (LIDEX) 0.05 % external solution Apply to scalp BID x 3-4 weeks then PRN 50 mL 3     Fluticasone Propionate (FLONASE NA) Spray 1 spray into both nostrils daily        gabapentin (NEURONTIN) 300 MG capsule Take 1 capsule (300 mg) by mouth 3 times daily 270 capsule 1     letrozole (FEMARA) 2.5 MG tablet Take 1 tablet (2.5 mg) by mouth daily 30 tablet 1     Multiple Vitamins-Minerals (MULTIVITAMIN PO) Take 1 tablet by mouth daily        nitroFURantoin macrocrystal (MACRODANTIN) 50 MG capsule Take 1 within 30 min of intercourse as needed, either before or after. 30 capsule 4     omeprazole (PRILOSEC) 20 MG DR capsule TAKE ONE CAPSULE BY MOUTH EVERY MORNING 30 MINUTES BEFORE BREAKFAST 90 capsule 3     phentermine (ADIPEX-P) 37.5 MG tablet Take 1/2 tablet every morning with breakfast. Increase to 1 tablet after 1 week if needed. 90 tablet 0     venlafaxine (EFFEXOR-XR) 150 MG 24 hr capsule Take 1 capsule (150 mg) by mouth daily 90 capsule 3     venlafaxine (EFFEXOR-XR) 75 MG 24 hr capsule TAKE ONE CAPSULE BY MOUTH ONCE DAILY (ALONG WITH 150MG) 90 capsule 3          Allergies   Allergen Reactions     Carboplatin Shortness Of Breath, Rash and Anaphylaxis  "    Other reaction(s): Flushing, Tachycardia     Ambien      hallucinations     Amoxicillin GI Disturbance     Erythromycin GI Disturbance     Hydrocodone-Acetaminophen      Other reaction(s): Hallucinations     Hydromorphone Headache     Penicillins Nausea and Vomiting and Hives     1-11-17 pt states this was a childhood reaction     Zolpidem      Other reaction(s): Hallucinations     Hydrocodone Other (See Comments)     Out of body experience, \"creepy-crawly\" skin        Family History   Problem Relation Age of Onset     Diabetes Mother         hypoglycemic     Allergies Mother      Arthritis Mother      Depression Mother      Gastrointestinal Disease Mother      Neurologic Disorder Mother      Psychotic Disorder Mother         panic disorder     Cancer Mother 56        pancreatic, colon, liver and skin.     Cancer - colorectal Mother      Other Cancer Mother         Pancreatic     Anxiety Disorder Mother      Mental Illness Mother      Hypertension Father      Diabetes Father      Rheumatoid Arthritis Father      C.A.D. Paternal Grandfather         congestive heart failure     Cerebrovascular Disease Paternal Grandfather         age 79     Diabetes Paternal Grandfather      Hypertension Paternal Grandfather      Alzheimer Disease Paternal Grandfather      Arthritis Paternal Grandfather      Circulatory Paternal Grandfather      Cardiovascular Paternal Grandfather      Heart Disease Paternal Grandfather      Neurologic Disorder Paternal Grandfather      Unknown/Adopted Maternal Grandmother      Cerebrovascular Disease Maternal Grandmother      Unknown/Adopted Maternal Grandfather      Cancer Maternal Grandfather      Obesity Paternal Grandmother      Cancer Paternal Grandmother 94        coloangiocancinoma        Social history:  Living situation: , lives in [place]  Occupation: Works for [employer]  Tobacco use:   Tobacco Use      Smoking status: Former Smoker        Packs/day: 0.50        Years: " 10.00        Pack years: 5        Types: Cigarettes        Quit date: 10/1/2016        Years since quittin.8      Smokeless tobacco: Never Used      Tobacco comment: Socially- quit smoking 10/01/2016  ETOH use: 1 drink a week  Exercise: none now  Diet: healthy     Video physical exam  General: Patient appears well in no acute distress.   Skin: No visualized rash or lesions on visualized skin  Eyes: EOMI, no erythema, sclera icterus or discharge noted  Resp: Appears to be breathing comfortably without accessory muscle usage, speaking in full sentences, no cough  MSK: Appears to have normal range of motion based on visualized movements  Neurologic: No apparent tremors, facial movements symmetric  Psych: affect versatile, alert and oriented    IMPRESSION/PLAN:    History of right-sided stage T2N1 breast cancer, ER/AZ positive, HER2 negative, diagnosed in 2016  Treated with neoadjuvant chemo with complete pathologic response, bilateral mastectomy, radiation and endocrine therapy  She is 7 years from diagnosis without recurrence  Continue oncology follow-up as scheduled with Dr. Coulter and Leola. After she completes AI therapy, she could transition to my clinic if she would like.     Sexuality concerns. She has had issues with recurrent UTI's, vaginal dryness, dyspareunia and low libido.     Recurrent UTI's have stopped with use of coconut oil. Further management per Dr. Harris.     Dyspareunia- Not improved with non-hormonal vaginal moisturizer. Discussed risks and benefits of topical estrogen and she would like to proceed with Vagifem 10 mcg vaginal pill daily X 2 weeks then twice a week. She has a history of DVT on Tamoxifen in the past. There is little to no systemic absorption with topical use but also discussed with Dr. Wright who agreed that there should be little to no risk of DVT.    Hypoactive sexual desire disorder- Discussed responsive desire and suggested she discuss different tool options with the sex  educators at [facility]. Discussed scheduling date nights and intercourse. Suggested trying Dipsea and Ristela, a plant based supplement for libido. Dicussed bremelanotide but not yet available for post menopausal women or those with a history of cancer. Also discussed sex therapy but she wasn't interested at this time. Effexor may also be playing a role but she     See me virtually in 1 year, sooner if issues or questions arise.     Non-urgent scheduling should be complete within 7-10 business days. However, if you have not received a phone call from scheduling within 3 business days, you may call to schedule at [phone] option 5, option 2. This is the same number to call to make changes tor if you have questions about the schedule.    Gave resources for our Thrive Cancer Survivorship class series and mailings list for educational opportunities. [email or web address]    Cancer treatment summary was sent electronically to the patient and her PCP.      The total time of this encounter amounted to 30 minutes.This time included time spent with the patient, prep work, ordering tests, creating a cancer treatment summary, and performing post visit documentation.    JEMIMA Adan, THEA  [facility] Surviv[facility] Avis Ortiz is a 42 year old who is being evaluated via a billable video visit.      How would you like to obtain your AVS? MyChart  If the video visit is dropped, the invitation should be resent by: Text to cell phone: [phone]  Will anyone else be joining your video visit? No        Video-Visit Details    Type of service:  Video Visit    Originating Location (pt. Location): Home    Distant Location (provider location):  [facility]     Platform used for Video Visit: Russ Costa CMA on 7/22/2022 at 12:18 PM            Jailene Thorne PA-C

## 2022-07-22 NOTE — PROGRESS NOTES
Diana is a 42 year old who is being evaluated via a billable video visit.      How would you like to obtain your AVS? MyChart  If the video visit is dropped, the invitation should be resent by: Text to cell phone: [phone]  Will anyone else be joining your video visit? No        Video-Visit Details    Type of service:  Video Visit    Originating Location (pt. Location): Home    Distant Location (provider location):  [facility]     Platform used for Video Visit: Russ Costa CMA on 7/22/2022 at 12:18 PM

## 2022-07-29 DIAGNOSIS — Z17.0 MALIGNANT NEOPLASM OF UPPER-OUTER QUADRANT OF RIGHT BREAST IN FEMALE, ESTROGEN RECEPTOR POSITIVE (H): ICD-10-CM

## 2022-07-29 DIAGNOSIS — C50.411 MALIGNANT NEOPLASM OF UPPER-OUTER QUADRANT OF RIGHT BREAST IN FEMALE, ESTROGEN RECEPTOR POSITIVE (H): ICD-10-CM

## 2022-07-29 RX ORDER — LETROZOLE 2.5 MG/1
2.5 TABLET, FILM COATED ORAL DAILY
Qty: 120 TABLET | Refills: 1 | Status: SHIPPED | OUTPATIENT
Start: 2022-07-29 | End: 2022-10-18

## 2022-08-07 DIAGNOSIS — G62.9 NEUROPATHY: ICD-10-CM

## 2022-08-07 DIAGNOSIS — C50.411 MALIGNANT NEOPLASM OF UPPER-OUTER QUADRANT OF RIGHT BREAST IN FEMALE, ESTROGEN RECEPTOR POSITIVE (H): ICD-10-CM

## 2022-08-07 DIAGNOSIS — Z17.0 MALIGNANT NEOPLASM OF UPPER-OUTER QUADRANT OF RIGHT BREAST IN FEMALE, ESTROGEN RECEPTOR POSITIVE (H): ICD-10-CM

## 2022-08-08 RX ORDER — GABAPENTIN 300 MG/1
CAPSULE ORAL
Qty: 270 CAPSULE | Refills: 1 | Status: SHIPPED | OUTPATIENT
Start: 2022-08-08 | End: 2022-09-30

## 2022-08-16 DIAGNOSIS — F41.9 ANXIETY: ICD-10-CM

## 2022-08-16 DIAGNOSIS — F33.0 MAJOR DEPRESSIVE DISORDER, RECURRENT EPISODE, MILD (H): ICD-10-CM

## 2022-08-16 RX ORDER — VENLAFAXINE HYDROCHLORIDE 75 MG/1
CAPSULE, EXTENDED RELEASE ORAL
Qty: 90 CAPSULE | Refills: 3 | Status: SHIPPED | OUTPATIENT
Start: 2022-08-16 | End: 2022-09-30

## 2022-08-16 RX ORDER — VENLAFAXINE HYDROCHLORIDE 150 MG/1
CAPSULE, EXTENDED RELEASE ORAL
Qty: 90 CAPSULE | Refills: 3 | Status: SHIPPED | OUTPATIENT
Start: 2022-08-16 | End: 2022-09-30

## 2022-08-21 ENCOUNTER — HEALTH MAINTENANCE LETTER (OUTPATIENT)
Age: 42
End: 2022-08-21

## 2022-08-29 DIAGNOSIS — N94.19 DYSPAREUNIA DUE TO MEDICAL CONDITION IN FEMALE PATIENT: ICD-10-CM

## 2022-08-30 RX ORDER — ESTRADIOL 10 UG/1
TABLET VAGINAL
Qty: 20 TABLET | Refills: 0 | Status: SHIPPED | OUTPATIENT
Start: 2022-08-30 | End: 2022-11-02

## 2022-08-30 NOTE — TELEPHONE ENCOUNTER
YUVAFEM  Refill   Last prescribing provider: Jailene Thorne     Last clinic visit date: 7/22/22 Jailene Mati     Any missed appointments or no-shows since last clinic visit?: no     Recommendations for requested medication (if none, N/A): Copied from chart note 7/22/22 Jailene Thorne   Dyspareunia- Not improved with non-hormonal vaginal moisturizer. Discussed risks and benefits of topical estrogen and she would like to proceed with Vagifem 10 mcg vaginal pill daily X 2 weeks then twice a week. She has a history of DVT on Tamoxifen in the past. There is little to no systemic absorption with topical use but also discussed with Dr. Wright who agreed that there should be little to no risk of DVT.      Next clinic visit date: not yet scheduled     Any other pertinent information (if none, N/A): N/A

## 2022-09-12 ENCOUNTER — VIRTUAL VISIT (OUTPATIENT)
Dept: SURGERY | Facility: CLINIC | Age: 42
End: 2022-09-12
Payer: COMMERCIAL

## 2022-09-12 DIAGNOSIS — E66.9 OBESITY (BMI 30-39.9): Primary | ICD-10-CM

## 2022-09-12 PROCEDURE — 97803 MED NUTRITION INDIV SUBSEQ: CPT | Mod: GT | Performed by: DIETITIAN, REGISTERED

## 2022-09-12 NOTE — PROGRESS NOTES
Diana Wilson is a 42 year old who is being evaluated via a billable video visit.      How would you like to obtain your AVS? MyChart  If the video visit is dropped, the invitation should be resent by: Send to e-mail at: [email or web address]  Will anyone else be joining your video visit? No      Video Start Time: 10:31 am      Medical  Weight Loss Follow-Up Diet Evaluation  Assessment:  Diana is presenting today for a follow up weight management nutrition consultation. Pt has had an initial appointment with Dr. Rosas  Weight loss medication: Phentermine.  Pt's weight is Initial weight: 205 lb  Current weight 196.6 lb  Weight change: down 8.4 lbs    No flowsheet data found.  BMI: There is no height or weight on file to calculate BMI.  Ideal body weight: 55.1 kg (121 lb 7.5 oz)  Adjusted ideal body weight: 70.3 kg (154 lb 14.1 oz)    Estimated RMR ([place]-St Jeor equation):   1555 kcals x 1.2 (sedentary) = 1865 kcals (for weight maintenance)  1555 kcals x 1.3 (light active) = 2137 kcals (for weight maintenance)  Recommended Protein Intake: 70-90 grams of protein/day  Patient Active Problem List:  Patient Active Problem List   Diagnosis     Obesity     CARDIOVASCULAR SCREENING; LDL GOAL LESS THAN 130     Major depressive disorder, recurrent episode, mild (H)     Iron deficiency anemia     Insomnia     Health Care Home     Anxiety     Intestinal malabsorption     Lymphedema of right upper extremity     Malignant neoplasm of upper-outer quadrant of right breast in female, estrogen receptor positive (H)     Osteopenia of multiple sites     Multiple subsegmental pulmonary emboli without acute cor pulmonale (H)     Breast reconstruction disproportion     Family history of colon cancer     Celiac disease     Recurrent UTI     Diabetes: No    Progress on goals from last visit: She has done well with getting in to a routine with her nutrition intake. She does better with a couple small snacks throughout the day rather than  trying to avoid snacking. She has also started a walking routine.   1. Aim to eat 3 meals per day, protein focused, high fiber foods - met  2. Limit snacking - discussed snack ideas, if needed     Dietary Recall:  She has been getting up and walking 2-4 miles per day  Breakfast: 8:30 am banana, protein powder, almond milk, peanut butter protein powder  11:30 carrots and ranch dip  Lunch: two good yogurt, blueberry granola  Snack: cheese stick OR apple OR mini kind bar   Dinner: grilled meals with meat and veggies   Typical snacks: sometimes a yasso yogurt bar  Beverages:   Coffee with creamer (regular)  Water: with propel water flavoring  Exercise:   Going for a walk 3x per week in the morning, 2-4 miles  Nutrition Diagnosis:    Overweight/Obesity (NC 3.3) related to overeating and poor lifestyle habits as evidenced by BMI     Intervention:  1. Food and/or nutrient delivery: High protein  2. Nutrition counseling: reviewed progress with goals and discussed plan    Monitoring/Evaluation:    Goals:  1. Aim to eat 3 meals per day, protein focused, high fiber foods    Patient to follow up in 1 month(s) with bariatrician and 2 month(s) with RD    Video-Visit Details    Type of service:  Video Visit    Video End Time:10:55 am    Originating Location (pt. Location): Home    Distant Location (provider location):  [facility] [place]     Platform used for Video Visit: Russ Vivas

## 2022-09-12 NOTE — LETTER
9/12/2022         RE: Diana Wilson  [address] [facility] [id number]        Dear Colleague,    Thank you for referring your patient, Diana Wilson, to the [facility] [place]. Please see a copy of my visit note below.    Diana Wilson is a 42 year old who is being evaluated via a billable video visit.      How would you like to obtain your AVS? MyChart  If the video visit is dropped, the invitation should be resent by: Send to e-mail at: [email or web address]  Will anyone else be joining your video visit? No      Video Start Time: 10:31 am      Medical  Weight Loss Follow-Up Diet Evaluation  Assessment:  Diana is presenting today for a follow up weight management nutrition consultation. Pt has had an initial appointment with Dr. Rosas  Weight loss medication: Phentermine.  Pt's weight is Initial weight: 205 lb  Current weight 196.6 lb  Weight change: down 8.4 lbs    No flowsheet data found.  BMI: There is no height or weight on file to calculate BMI.  Ideal body weight: 55.1 kg (121 lb 7.5 oz)  Adjusted ideal body weight: 70.3 kg (154 lb 14.1 oz)    Estimated RMR ([place]-St Jeor equation):   1555 kcals x 1.2 (sedentary) = 1865 kcals (for weight maintenance)  1555 kcals x 1.3 (light active) = 2137 kcals (for weight maintenance)  Recommended Protein Intake: 70-90 grams of protein/day  Patient Active Problem List:  Patient Active Problem List   Diagnosis     Obesity     CARDIOVASCULAR SCREENING; LDL GOAL LESS THAN 130     Major depressive disorder, recurrent episode, mild (H)     Iron deficiency anemia     Insomnia     Health Care Home     Anxiety     Intestinal malabsorption     Lymphedema of right upper extremity     Malignant neoplasm of upper-outer quadrant of right breast in female, estrogen receptor positive (H)     Osteopenia of multiple sites     Multiple subsegmental pulmonary emboli without acute cor pulmonale (H)     Breast reconstruction disproportion      Family history of colon cancer     Celiac disease     Recurrent UTI     Diabetes: No    Progress on goals from last visit: She has done well with getting in to a routine with her nutrition intake. She does better with a couple small snacks throughout the day rather than trying to avoid snacking. She has also started a walking routine.   1. Aim to eat 3 meals per day, protein focused, high fiber foods - met  2. Limit snacking - discussed snack ideas, if needed     Dietary Recall:  She has been getting up and walking 2-4 miles per day  Breakfast: 8:30 am banana, protein powder, almond milk, peanut butter protein powder  11:30 carrots and ranch dip  Lunch: two good yogurt, blueberry granola  Snack: cheese stick OR apple OR mini kind bar   Dinner: grilled meals with meat and veggies   Typical snacks: sometimes a yasso yogurt bar  Beverages:   Coffee with creamer (regular)  Water: with propel water flavoring  Exercise:   Going for a walk 3x per week in the morning, 2-4 miles  Nutrition Diagnosis:    Overweight/Obesity (NC 3.3) related to overeating and poor lifestyle habits as evidenced by BMI     Intervention:  1. Food and/or nutrient delivery: High protein  2. Nutrition counseling: reviewed progress with goals and discussed plan    Monitoring/Evaluation:    Goals:  1. Aim to eat 3 meals per day, protein focused, high fiber foods    Patient to follow up in 1 month(s) with bariatrician and 2 month(s) with RD    Video-Visit Details    Type of service:  Video Visit    Video End Time:10:55 am    Originating Location (pt. Location): Home    Distant Location (provider location):  [facility] [place]     Platform used for Video Visit: Russ Vivas      Again, thank you for allowing me to participate in the care of your patient.        Sincerely,        Amanda Vivas

## 2022-09-26 ENCOUNTER — TELEPHONE (OUTPATIENT)
Dept: UROLOGY | Facility: CLINIC | Age: 42
End: 2022-09-26

## 2022-09-26 ENCOUNTER — VIRTUAL VISIT (OUTPATIENT)
Dept: UROLOGY | Facility: CLINIC | Age: 42
End: 2022-09-26
Payer: COMMERCIAL

## 2022-09-26 DIAGNOSIS — N95.2 ATROPHIC VAGINITIS: ICD-10-CM

## 2022-09-26 DIAGNOSIS — N39.3 STRESS INCONTINENCE: ICD-10-CM

## 2022-09-26 DIAGNOSIS — N39.0 RECURRENT UTI: Primary | ICD-10-CM

## 2022-09-26 PROCEDURE — 99214 OFFICE O/P EST MOD 30 MIN: CPT | Mod: GT | Performed by: UROLOGY

## 2022-09-26 NOTE — TELEPHONE ENCOUNTER
Called patient and scheduled surgery for 12/12 with Dr. Harris. Offered earlier date but it did not work for patient.     H&P with PCP.    COVID test will be done 1-2 days before the surgery and will bring a picture of the results.    Post-op scheduled for 1/2 and 1/23.    Writer will mail surg packet.    No further questions/concerns at this time.

## 2022-09-26 NOTE — PROGRESS NOTES
Diana is a 42 year old who is being evaluated via a billable video visit.      How would you like to obtain your AVS? [employer]  If the video visit is dropped, the invitation should be resent by: Text to cell phone: [phone]  Will anyone else be joining your video visit? No        Video-Visit Details    Video Start Time: 3:01 PM    Type of service:  Video Visit    Video End Time:3:12 PM    Originating Location (pt. Location): Home    Distant Location (provider location):  [facility]     Platform used for Video Visit: [employer]

## 2022-09-26 NOTE — NURSING NOTE
Allergies & Medications have been reviewed.  Pt stated she is currently taking Robitussin for her cold.    Nona Ashford VF

## 2022-09-26 NOTE — PROGRESS NOTES
Reason for Visit: f/u on urinary symptoms    Clinical data:  Ms. Wilson is a 42 year old female with hx of recurrent utis.  She is on vaginal estrogen now now.  She also has a hx of breast cancer and is sp double mastectomy and is s/o xrt and chemo.  She is Letrozole, estrogen blocker started in April 2022.  She was previously on another estrogen blocker.    She was using coconut oil in the vaginal area as well and that helps.  She has been doing very well with the prophylaxis when swimming and has  Not had any infections.    12/13/21 CT chest/abd/pelvis:  FINDINGS:     Chest: Postop change is seen in both breast areas and both axillas.     Abdomen: There is a 1 cm benign cyst in the right kidney. There is a  stable 1-2 mm nonobstructing calculus in the left kidney.     Pelvis: No significant abnormality is demonstrated.                                                                   IMPRESSION:  No evidence of metastatic disease.    Assessment & Plan   41 y/o female with recurrent uti's in the setting of atrophic vaginitis and hx of breast cancer s/p xrt and chemotherapy. She is doing very well on prophylaxis and would like to continue.    She also has stress urinary incontinence.  We discussed options of observation, pelvital flyte, PT, periurethral bulking, and midurethal sling.  She would like to have a sling.  We discussed the use of mesh in surgery and the risks which include but are not limited to infection, bleeding, exposure of mesh, vaginal pain, injury to the bladder/urethra/rectum or any structures in the abdomen or pelvis, as well as risk of incontinence or urinary retention. There is also risk of need for catheterization and possible further surgery.   The pt. Verbalized understanding and had a chance to ask her questions which were all answered.  She would like to proceed  -continue to use cococnut oil prn  -continue with vagifem  -continue macrodantin 50 mg prn intercourse or swimming in  [place].  -stay well hydrated  -schedule midurethal sling.    Sixto Harris MD  [facility]      ==========================      Additional Coding Information:  Time spent:  37 minutes spent on the date of the encounter doing chart review, history and exam, documentation and further activities per the note

## 2022-09-26 NOTE — LETTER
Ms.Diana CROWLEY Steve  [address] [place] [id number]    September 27, 2022    Dear ,    We are sending along an information to consent for the Bladder Sling Surgery discussed at your appointment with Dr. Harris. Once completed please either mail or drop off the consent form after you have initialed and signed all the pages. If you have any questions please give us a call at the clinic number listed above.     Thank you   Your Urology Care Team

## 2022-09-27 NOTE — TELEPHONE ENCOUNTER
Sending Sling consent with letter to patient to complete.     Deonna Steel LPN on 9/27/2022 at 11:14 AM

## 2022-09-30 ENCOUNTER — OFFICE VISIT (OUTPATIENT)
Dept: FAMILY MEDICINE | Facility: CLINIC | Age: 42
End: 2022-09-30
Payer: COMMERCIAL

## 2022-09-30 VITALS
HEART RATE: 92 BPM | SYSTOLIC BLOOD PRESSURE: 123 MMHG | BODY MASS INDEX: 34.6 KG/M2 | HEIGHT: 64 IN | WEIGHT: 202.7 LBS | OXYGEN SATURATION: 98 % | DIASTOLIC BLOOD PRESSURE: 91 MMHG | TEMPERATURE: 97.3 F

## 2022-09-30 DIAGNOSIS — Z00.01 ENCOUNTER FOR GENERAL ADULT MEDICAL EXAMINATION WITH ABNORMAL FINDINGS: Primary | ICD-10-CM

## 2022-09-30 DIAGNOSIS — F33.0 MAJOR DEPRESSIVE DISORDER, RECURRENT EPISODE, MILD (H): ICD-10-CM

## 2022-09-30 DIAGNOSIS — Z23 NEED FOR PROPHYLACTIC VACCINATION AND INOCULATION AGAINST INFLUENZA: ICD-10-CM

## 2022-09-30 DIAGNOSIS — F41.9 ANXIETY: ICD-10-CM

## 2022-09-30 DIAGNOSIS — Z17.0 MALIGNANT NEOPLASM OF UPPER-OUTER QUADRANT OF RIGHT BREAST IN FEMALE, ESTROGEN RECEPTOR POSITIVE (H): ICD-10-CM

## 2022-09-30 DIAGNOSIS — N63.0 LUMP OR MASS IN BREAST: ICD-10-CM

## 2022-09-30 DIAGNOSIS — C50.411 MALIGNANT NEOPLASM OF UPPER-OUTER QUADRANT OF RIGHT BREAST IN FEMALE, ESTROGEN RECEPTOR POSITIVE (H): ICD-10-CM

## 2022-09-30 DIAGNOSIS — G62.9 NEUROPATHY: ICD-10-CM

## 2022-09-30 PROCEDURE — 99396 PREV VISIT EST AGE 40-64: CPT | Mod: 25 | Performed by: FAMILY MEDICINE

## 2022-09-30 PROCEDURE — 90686 IIV4 VACC NO PRSV 0.5 ML IM: CPT | Performed by: FAMILY MEDICINE

## 2022-09-30 PROCEDURE — 90471 IMMUNIZATION ADMIN: CPT | Performed by: FAMILY MEDICINE

## 2022-09-30 PROCEDURE — 91312 COVID-19,PF,PFIZER BOOSTER BIVALENT: CPT | Performed by: FAMILY MEDICINE

## 2022-09-30 PROCEDURE — 0124A COVID-19,PF,PFIZER BOOSTER BIVALENT: CPT | Performed by: FAMILY MEDICINE

## 2022-09-30 PROCEDURE — 99214 OFFICE O/P EST MOD 30 MIN: CPT | Mod: 25 | Performed by: FAMILY MEDICINE

## 2022-09-30 RX ORDER — VENLAFAXINE HYDROCHLORIDE 75 MG/1
CAPSULE, EXTENDED RELEASE ORAL
Qty: 90 CAPSULE | Refills: 3 | Status: SHIPPED | OUTPATIENT
Start: 2022-09-30 | End: 2023-10-05

## 2022-09-30 RX ORDER — VENLAFAXINE HYDROCHLORIDE 150 MG/1
150 CAPSULE, EXTENDED RELEASE ORAL DAILY
Qty: 90 CAPSULE | Refills: 3 | Status: SHIPPED | OUTPATIENT
Start: 2022-09-30 | End: 2023-10-05

## 2022-09-30 RX ORDER — GABAPENTIN 300 MG/1
300 CAPSULE ORAL 3 TIMES DAILY
Qty: 270 CAPSULE | Refills: 3 | Status: SHIPPED | OUTPATIENT
Start: 2022-09-30 | End: 2023-10-05

## 2022-09-30 ASSESSMENT — ENCOUNTER SYMPTOMS
SHORTNESS OF BREATH: 0
HEMATOCHEZIA: 0
COUGH: 0
DIARRHEA: 0
MYALGIAS: 1
HEADACHES: 1
ARTHRALGIAS: 1
ABDOMINAL PAIN: 0
DYSURIA: 0
JOINT SWELLING: 0
CHILLS: 0
FEVER: 0
NERVOUS/ANXIOUS: 0
CONSTIPATION: 0
NAUSEA: 0
SORE THROAT: 0
EYE PAIN: 0
BREAST MASS: 1
PALPITATIONS: 0
FREQUENCY: 0
WEAKNESS: 0
HEMATURIA: 0
PARESTHESIAS: 0
DIZZINESS: 0
HEARTBURN: 0

## 2022-09-30 ASSESSMENT — ANXIETY QUESTIONNAIRES
5. BEING SO RESTLESS THAT IT IS HARD TO SIT STILL: NOT AT ALL
2. NOT BEING ABLE TO STOP OR CONTROL WORRYING: SEVERAL DAYS
GAD7 TOTAL SCORE: 6
7. FEELING AFRAID AS IF SOMETHING AWFUL MIGHT HAPPEN: SEVERAL DAYS
GAD7 TOTAL SCORE: 6
6. BECOMING EASILY ANNOYED OR IRRITABLE: SEVERAL DAYS
3. WORRYING TOO MUCH ABOUT DIFFERENT THINGS: SEVERAL DAYS
1. FEELING NERVOUS, ANXIOUS, OR ON EDGE: SEVERAL DAYS

## 2022-09-30 ASSESSMENT — PATIENT HEALTH QUESTIONNAIRE - PHQ9
5. POOR APPETITE OR OVEREATING: SEVERAL DAYS
SUM OF ALL RESPONSES TO PHQ QUESTIONS 1-9: 2

## 2022-09-30 ASSESSMENT — ASTHMA QUESTIONNAIRES: ACT_TOTALSCORE: 25
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
## 2022-10-05 ENCOUNTER — HOSPITAL ENCOUNTER (OUTPATIENT)
Dept: MAMMOGRAPHY | Facility: CLINIC | Age: 42
Discharge: HOME | End: 2022-10-05
Attending: FAMILY MEDICINE
Payer: COMMERCIAL

## 2022-10-05 DIAGNOSIS — N63.0 LUMP OR MASS IN BREAST: ICD-10-CM

## 2022-10-05 DIAGNOSIS — R22.31 AXILLARY MASS, RIGHT: ICD-10-CM

## 2022-10-05 PROCEDURE — 76882 US LMTD JT/FCL EVL NVASC XTR: CPT | Mod: RT

## 2022-10-05 PROCEDURE — 250N000009 HC RX 250: Performed by: FAMILY MEDICINE

## 2022-10-05 PROCEDURE — 38505 NEEDLE BIOPSY LYMPH NODES: CPT | Mod: RT

## 2022-10-05 PROCEDURE — 88305 TISSUE EXAM BY PATHOLOGIST: CPT | Mod: TC | Performed by: FAMILY MEDICINE

## 2022-10-05 PROCEDURE — 88305 TISSUE EXAM BY PATHOLOGIST: CPT | Mod: 26 | Performed by: PATHOLOGY

## 2022-10-05 RX ADMIN — LIDOCAINE HYDROCHLORIDE 5 ML: 10 INJECTION, SOLUTION INFILTRATION; PERINEURAL at 10:55

## 2022-10-05 NOTE — DISCHARGE INSTRUCTIONS

## 2022-10-07 ENCOUNTER — TELEPHONE (OUTPATIENT)
Dept: MAMMOGRAPHY | Facility: CLINIC | Age: 42
End: 2022-10-07

## 2022-10-07 LAB
PATH REPORT.COMMENTS IMP SPEC: NORMAL
PATH REPORT.FINAL DX SPEC: NORMAL
PATH REPORT.GROSS SPEC: NORMAL
PATH REPORT.MICROSCOPIC SPEC OTHER STN: NORMAL
PATH REPORT.RELEVANT HX SPEC: NORMAL
PHOTO IMAGE: NORMAL

## 2022-10-07 NOTE — TELEPHONE ENCOUNTER
Attempted to contact Diana regarding pathology results from Right Axilla biopsy performed on 10/5/2022 revealing:      [facility]  Diana Wilson [id number]  F, 1980  Surgical Pathology Report (Final result) [id number]  Authorizing Provider: Sera Espino MD Ordering Provider: Sera Espino MD  Ordering Location: [facility]  Collected: 10/05/2022 11:00 AM  Pathologist: Margi Brothers Received: 10/05/2022 11:35 AM  .  Specimens  A Axilla, Right  .  .  Final Diagnosis  A. Axilla, right (0.2 cm), ultrasound-guided needle core biopsy:  -Benign adipose tissue.  -No lymph node structure or breast parenchyma identified.  -See comment.  Electronically signed by Margi Brothers on 10/7/2022 at 11:23 AM          Per radiologist, Dr. Dwayne Brantley, results are concordant with imaging findings.     Recommendation: Clinical     Results and Recommendations released into My Chart.  Left message for Diana to call [phone] with any questions or concerns.      Andree Zamorano RN BSN  Procedure Nurse  [facility]  [phone]

## 2022-10-17 ENCOUNTER — VIRTUAL VISIT (OUTPATIENT)
Dept: SURGERY | Facility: CLINIC | Age: 42
End: 2022-10-17
Payer: COMMERCIAL

## 2022-10-17 VITALS — WEIGHT: 196 LBS | BODY MASS INDEX: 33.46 KG/M2 | HEIGHT: 64 IN

## 2022-10-17 DIAGNOSIS — K21.9 GASTROESOPHAGEAL REFLUX DISEASE, UNSPECIFIED WHETHER ESOPHAGITIS PRESENT: ICD-10-CM

## 2022-10-17 DIAGNOSIS — E66.9 OBESITY (BMI 30-39.9): Primary | ICD-10-CM

## 2022-10-17 DIAGNOSIS — Z17.0 MALIGNANT NEOPLASM OF UPPER-OUTER QUADRANT OF RIGHT BREAST IN FEMALE, ESTROGEN RECEPTOR POSITIVE (H): ICD-10-CM

## 2022-10-17 DIAGNOSIS — Z86.69 HISTORY OF MIGRAINE HEADACHES: ICD-10-CM

## 2022-10-17 DIAGNOSIS — C50.411 MALIGNANT NEOPLASM OF UPPER-OUTER QUADRANT OF RIGHT BREAST IN FEMALE, ESTROGEN RECEPTOR POSITIVE (H): ICD-10-CM

## 2022-10-17 PROCEDURE — 99214 OFFICE O/P EST MOD 30 MIN: CPT | Mod: GT | Performed by: FAMILY MEDICINE

## 2022-10-17 RX ORDER — PHENTERMINE HYDROCHLORIDE 37.5 MG/1
TABLET ORAL
Qty: 90 TABLET | Refills: 0 | Status: SHIPPED | OUTPATIENT
Start: 2022-10-17 | End: 2023-01-30

## 2022-10-17 RX ORDER — TOPIRAMATE 25 MG/1
TABLET, FILM COATED ORAL
Qty: 270 TABLET | Refills: 0 | Status: SHIPPED | OUTPATIENT
Start: 2022-10-17 | End: 2022-12-12

## 2022-10-17 NOTE — PROGRESS NOTES
Diana Wilson is 42 year old  female who presents for a billable video visit today.    How would you like to obtain your AVS? MyChart  If dropped from the video visit, the video invitation should be resent by: Text to cell phone: [phone]  Will anyone else be joining your video visit? No      Video Start Time: 11:43 am    Are there any specific questions or needs that you would like addressed at your visit today? Return MWM - no concerns or ?s, just wishes the weight loss was faster        Video-Visit Details    Type of service:  Video Visit    Platform used for Video Visit: [email or web address]    Video End Time (time video stopped): 12:02 PM    Originating Location (pt. Location): Home    Distant Location (provider location):  Home    Distant Location (provider location):  [facility] [place]

## 2022-10-17 NOTE — LETTER
10/17/2022         RE: Diana Wilson  1972 [address] [facility] [address]        Dear Colleague,    Thank you for referring your patient, Diana Wilson, to the [facility] [place]. Please see a copy of my visit note below.    Bariatric Care Clinic Non Surgical Follow up Visit   Date of visit: 10/17/2022  Physician: SHABBIR Rosas MD, MD  Primary Care is Yenifer Peters  Diana Wilson   42 year old  female     Initial Weight: 205#  Initial BMI: 35  Today's Weight:   Wt Readings from Last 1 Encounters:   10/17/22 88.9 kg (196 lb)     Body mass index is 33.64 kg/m .           Assessment and Plan   Assessment: Diana is a 42 year old year old female who presents for medical weight management.      Plan:    1. Obesity (BMI 30-39.9)  Patient was congratulated on her success thus far. Healthy habits to assist with further weight loss were discussed. She will continue the phentermine and we will try adding topamax. Risks, benefits and possible side effects discussed    2. Malignant neoplasm of upper-outer quadrant of right breast in female, estrogen receptor positive (H)  Her risk of recurrence may decrease with healthy habits and weight loss    3. Gastroesophageal reflux disease, unspecified whether esophagitis present  This may improve with healthy habits and weight loss.    Follow up in 1 month with our dietician and in 3 months with myself           INTERIM HISTORY  Patient started phentermine after her last visit in July. She thinks it worked better when she first started it. It is still helping to some degree.     Goals established by patient with dietician 7/19/22:   1. Aim to eat 3 meals per day, protein focused, high fiber foods  2. Limit snacking -discussed snack ideas, if needed    DIETARY HISTORY  Meals Per Day: 3  Eating Protein First?: yes  Food Diary: B:smoothie with bananas and peanut butter and chocolate protein powder L:carrots and dip, yogurt and  granola D:protein and 2 vegetables- working on doing most of the time  Snacks Per Day: kind bar  Fluid Intake: 64 oz  Portion Control: usually  Calorie Containing Beverages: none  Typical Protein Food Choices: meat, protein shake  Choosing Whole Grains: usually- celiac  Meals at Restaurant per week:0-2    Positive Changes Since Last Visit: protein first, portion control, exercise, fluid intake  Struggling With: none    Knowledgeable in Reading Food Labels: yes  Getting Adequate Protein: yes  Sleeping 7-8 hours/day yes  Stress management not discussed    PHYSICAL ACTIVITY PATTERNS:  Walking 3-4 morning per week, 2-4 miles, hopes to continue this in the winter, some weight lifting at home    REVIEW OF SYSTEMS  GENERAL/CONSTITUTIONAL:  Fatigue: no  HEENT:   glaucoma: no  CARDIOVASCULAR:  History of heart disease: no  PULMONARY:  Dyspnea on exertion: no  PSYCHIATRIC:  Moods: stable  ENDOCRINE:  Monitoring Blood Sugars: no  Sugars Well Controlled: na  No personal or family history of medullary thyroid cancer not discussed  :  Birth control: ovaries removed       Patient Profile   Social History     Social History Narrative     Not on file        Past Medical History   Past Medical History:   Diagnosis Date     Anxiety      Breast cancer (H)      Depressive disorder 1995    And Anxiety     Encounter for insertion or removal of intrauterine contraceptive device 1/8/2008     Excessive or frequent menstruation 03/19/2003     Fibroadenosis of breast      GERD (gastroesophageal reflux disease)      Invasive ductal carcinoma of breast, right (H)      Malignant neoplasm of upper-outer quadrant of right female breast (H)      Mild intermittent asthma            never has had PFT's, MDI with colds and at times exercise     Neutropenia, drug-induced (H)      Nongonococcal urethritis (JALIL) due to chlamydia trachomatis 01/03/2002     Other and unspecified ovarian cyst 03/19/2003    RIGHT ovarian cyst     PONV (postoperative nausea  "and vomiting)      Skin cancer      Transient hypertension of pregnancy, antepartum     PIH with last birth     Uncomplicated asthma      Patient Active Problem List   Diagnosis     Obesity     CARDIOVASCULAR SCREENING; LDL GOAL LESS THAN 130     Major depressive disorder, recurrent episode, mild (H)     Iron deficiency anemia     Insomnia     Health Care Home     Anxiety     Intestinal malabsorption     Lymphedema of right upper extremity     Malignant neoplasm of upper-outer quadrant of right breast in female, estrogen receptor positive (H)     Osteopenia of multiple sites     Multiple subsegmental pulmonary emboli without acute cor pulmonale (H)     Breast reconstruction disproportion     Family history of colon cancer     Celiac disease     Recurrent UTI     Stress incontinence       Past Surgical History  She has a past surgical history that includes surgical history of -  (); APPENDECTOMY (); Dental surgery (2010 and 10/12/2010); Dilation and curettage, hysteroscopy, ablate endometrium novasure, combined (3/20/2012); Esophagoscopy, gastroscopy, duodenoscopy (EGD), combined (N/A, 2016); Insert port vascular access (N/A, 2017); biopsy (2016); Breast surgery (); Laparoscopic salpingo-oophorectomy (Bilateral, 2017); Reconstruct breast bilateral, implant prosthesis bilateral, combined (Bilateral, 3/16/2018);  Section; and Revise reconstructed breast.     Examination   Ht 1.626 m (5' 4\")   Wt 88.9 kg (196 lb)   LMP 2016 (Exact Date)   BMI 33.64 kg/m    GENERAL: Healthy, alert and no distress  EYES: Eyes grossly normal to inspection.  No discharge or erythema, or obvious scleral/conjunctival abnormalities.  RESP: No audible wheeze, cough, or visible cyanosis.  No visible retractions or increased work of breathing.    SKIN: Visible skin clear. No significant rash, abnormal pigmentation or lesions.  NEURO: Cranial nerves grossly intact.  Mentation and speech " appropriate for age.  PSYCH: Mentation appears normal, affect normal/bright, judgement and insight intact, normal speech and appearance well-groomed.       Counseling:   We reviewed the important post op bariatric recommendations:  -eating 3 meals daily  -eating protein first, getting >60 gm protein daily  -eating slowly, chewing food well  -avoiding/limiting calorie containing beverages  -limiting starchy vegetables and carbohydrates, choosing wheat, not white with breads,   crackers, pastas, luis armando, bagels, tortillas, rice  -limiting restaurant or cafeteria eating to twice a week or less    We discussed the importance of restorative sleep and stress management in maintaining a healthy weight.  We discussed the National Weight Control Registry healthy weight maintenance strategies and ways to optimize metabolism.  We discussed the importance of physical activity including cardiovascular and strength training in maintaining a healthier weight.    Total time spent on the date of this encounter doing: chart review, review of test results, patient visit, physical exam, education, counseling, developing plan of care and documenting = 34 minutes.         SHABBIR maki MD  [facility]             Diana Wilson is 42 year old  female who presents for a billable video visit today.    How would you like to obtain your AVS? MyChart  If dropped from the video visit, the video invitation should be resent by: Text to cell phone: [phone]  Will anyone else be joining your video visit? No      Video Start Time: 11:43 am    Are there any specific questions or needs that you would like addressed at your visit today? Return MWM - no concerns or ?s, just wishes the weight loss was faster        Video-Visit Details    Type of service:  Video Visit    Platform used for Video Visit: Dogster    Video End Time (time video stopped): 12:02 PM    Originating Location (pt. Location): Home    Distant Location  (provider location):  Home    Distant Location (provider location):  [facility]ATR[facility] [place]       Again, thank you for allowing me to participate in the care of your patient.        Sincerely,        SHABBIR Rosas MD

## 2022-10-17 NOTE — PATIENT INSTRUCTIONS

## 2022-10-17 NOTE — PROGRESS NOTES
Bariatric Care Clinic Non Surgical Follow up Visit   Date of visit: 10/17/2022  Physician: SHABBIR Rosas MD, MD  Primary Care is Yenifer Petersjeremiah Wilson   42 year old  female     Initial Weight: 205#  Initial BMI: 35  Today's Weight:   Wt Readings from Last 1 Encounters:   10/17/22 88.9 kg (196 lb)     Body mass index is 33.64 kg/m .           Assessment and Plan   Assessment: Diana is a 42 year old year old female who presents for medical weight management.      Plan:    1. Obesity (BMI 30-39.9)  Patient was congratulated on her success thus far. Healthy habits to assist with further weight loss were discussed. She will continue the phentermine and we will try adding topamax. Risks, benefits and possible side effects discussed    2. Malignant neoplasm of upper-outer quadrant of right breast in female, estrogen receptor positive (H)  Her risk of recurrence may decrease with healthy habits and weight loss    3. Gastroesophageal reflux disease, unspecified whether esophagitis present  This may improve with healthy habits and weight loss.    Follow up in 1 month with our dietician and in 3 months with myself           INTERIM HISTORY  Patient started phentermine after her last visit in July. She thinks it worked better when she first started it. It is still helping to some degree.     Goals established by patient with dietician 7/19/22:   1. Aim to eat 3 meals per day, protein focused, high fiber foods  2. Limit snacking -discussed snack ideas, if needed    DIETARY HISTORY  Meals Per Day: 3  Eating Protein First?: yes  Food Diary: B:smoothie with bananas and peanut butter and chocolate protein powder L:carrots and dip, yogurt and granola D:protein and 2 vegetables- working on doing most of the time  Snacks Per Day: kind bar  Fluid Intake: 64 oz  Portion Control: usually  Calorie Containing Beverages: none  Typical Protein Food Choices: meat, protein shake  Choosing Whole Grains: usually- celiac  Meals at  Restaurant per week:0-2    Positive Changes Since Last Visit: protein first, portion control, exercise, fluid intake  Struggling With: none    Knowledgeable in Reading Food Labels: yes  Getting Adequate Protein: yes  Sleeping 7-8 hours/day yes  Stress management not discussed    PHYSICAL ACTIVITY PATTERNS:  Walking 3-4 morning per week, 2-4 miles, hopes to continue this in the winter, some weight lifting at home    REVIEW OF SYSTEMS  GENERAL/CONSTITUTIONAL:  Fatigue: no  HEENT:   glaucoma: no  CARDIOVASCULAR:  History of heart disease: no  PULMONARY:  Dyspnea on exertion: no  PSYCHIATRIC:  Moods: stable  ENDOCRINE:  Monitoring Blood Sugars: no  Sugars Well Controlled: na  No personal or family history of medullary thyroid cancer not discussed  :  Birth control: ovaries removed       Patient Profile   Social History     Social History Narrative     Not on file        Past Medical History   Past Medical History:   Diagnosis Date     Anxiety      Breast cancer (H)      Depressive disorder 1995    And Anxiety     Encounter for insertion or removal of intrauterine contraceptive device 1/8/2008     Excessive or frequent menstruation 03/19/2003     Fibroadenosis of breast      GERD (gastroesophageal reflux disease)      Invasive ductal carcinoma of breast, right (H)      Malignant neoplasm of upper-outer quadrant of right female breast (H)      Mild intermittent asthma            never has had PFT's, MDI with colds and at times exercise     Neutropenia, drug-induced (H)      Nongonococcal urethritis (JALIL) due to chlamydia trachomatis 01/03/2002     Other and unspecified ovarian cyst 03/19/2003    RIGHT ovarian cyst     PONV (postoperative nausea and vomiting)      Skin cancer      Transient hypertension of pregnancy, antepartum     PIH with last birth     Uncomplicated asthma 2002     Patient Active Problem List   Diagnosis     Obesity     CARDIOVASCULAR SCREENING; LDL GOAL LESS THAN 130     Major depressive disorder,  "recurrent episode, mild (H)     Iron deficiency anemia     Insomnia     Health Care Home     Anxiety     Intestinal malabsorption     Lymphedema of right upper extremity     Malignant neoplasm of upper-outer quadrant of right breast in female, estrogen receptor positive (H)     Osteopenia of multiple sites     Multiple subsegmental pulmonary emboli without acute cor pulmonale (H)     Breast reconstruction disproportion     Family history of colon cancer     Celiac disease     Recurrent UTI     Stress incontinence       Past Surgical History  She has a past surgical history that includes surgical history of -  (); APPENDECTOMY (); Dental surgery (2010 and 10/12/2010); Dilation and curettage, hysteroscopy, ablate endometrium novasure, combined (3/20/2012); Esophagoscopy, gastroscopy, duodenoscopy (EGD), combined (N/A, 2016); Insert port vascular access (N/A, 2017); biopsy (2016); Breast surgery (); Laparoscopic salpingo-oophorectomy (Bilateral, 2017); Reconstruct breast bilateral, implant prosthesis bilateral, combined (Bilateral, 3/16/2018);  Section; and Revise reconstructed breast.     Examination   Ht 1.626 m (5' 4\")   Wt 88.9 kg (196 lb)   LMP 2016 (Exact Date)   BMI 33.64 kg/m    GENERAL: Healthy, alert and no distress  EYES: Eyes grossly normal to inspection.  No discharge or erythema, or obvious scleral/conjunctival abnormalities.  RESP: No audible wheeze, cough, or visible cyanosis.  No visible retractions or increased work of breathing.    SKIN: Visible skin clear. No significant rash, abnormal pigmentation or lesions.  NEURO: Cranial nerves grossly intact.  Mentation and speech appropriate for age.  PSYCH: Mentation appears normal, affect normal/bright, judgement and insight intact, normal speech and appearance well-groomed.       Counseling:   We reviewed the important post op bariatric recommendations:  -eating 3 meals daily  -eating protein first, " getting >60 gm protein daily  -eating slowly, chewing food well  -avoiding/limiting calorie containing beverages  -limiting starchy vegetables and carbohydrates, choosing wheat, not white with breads,   crackers, pastas, luis armando, bagels, tortillas, rice  -limiting restaurant or cafeteria eating to twice a week or less    We discussed the importance of restorative sleep and stress management in maintaining a healthy weight.  We discussed the National Weight Control Registry healthy weight maintenance strategies and ways to optimize metabolism.  We discussed the importance of physical activity including cardiovascular and strength training in maintaining a healthier weight.    Total time spent on the date of this encounter doing: chart review, review of test results, patient visit, physical exam, education, counseling, developing plan of care and documenting = 34 minutes.         SHABBIR maki MD  [facility]

## 2022-10-18 ENCOUNTER — LAB (OUTPATIENT)
Dept: LAB | Facility: CLINIC | Age: 42
End: 2022-10-18
Payer: COMMERCIAL

## 2022-10-18 ENCOUNTER — ONCOLOGY VISIT (OUTPATIENT)
Dept: ONCOLOGY | Facility: CLINIC | Age: 42
End: 2022-10-18
Attending: NURSE PRACTITIONER
Payer: COMMERCIAL

## 2022-10-18 ENCOUNTER — INFUSION THERAPY VISIT (OUTPATIENT)
Dept: INFUSION THERAPY | Facility: CLINIC | Age: 42
End: 2022-10-18
Attending: NURSE PRACTITIONER
Payer: COMMERCIAL

## 2022-10-18 ENCOUNTER — RESEARCH ENCOUNTER (OUTPATIENT)
Dept: ONCOLOGY | Facility: CLINIC | Age: 42
End: 2022-10-18

## 2022-10-18 VITALS
DIASTOLIC BLOOD PRESSURE: 89 MMHG | OXYGEN SATURATION: 97 % | SYSTOLIC BLOOD PRESSURE: 130 MMHG | RESPIRATION RATE: 12 BRPM | HEART RATE: 94 BPM | TEMPERATURE: 97.4 F | BODY MASS INDEX: 34.35 KG/M2 | WEIGHT: 200.13 LBS

## 2022-10-18 DIAGNOSIS — Z17.0 MALIGNANT NEOPLASM OF UPPER-OUTER QUADRANT OF RIGHT BREAST IN FEMALE, ESTROGEN RECEPTOR POSITIVE (H): Primary | ICD-10-CM

## 2022-10-18 DIAGNOSIS — M85.89 OSTEOPENIA OF MULTIPLE SITES: Primary | ICD-10-CM

## 2022-10-18 DIAGNOSIS — C50.411 MALIGNANT NEOPLASM OF UPPER-OUTER QUADRANT OF RIGHT BREAST IN FEMALE, ESTROGEN RECEPTOR POSITIVE (H): ICD-10-CM

## 2022-10-18 DIAGNOSIS — Z17.0 MALIGNANT NEOPLASM OF UPPER-OUTER QUADRANT OF RIGHT BREAST IN FEMALE, ESTROGEN RECEPTOR POSITIVE (H): ICD-10-CM

## 2022-10-18 DIAGNOSIS — M85.89 OSTEOPENIA OF MULTIPLE SITES: ICD-10-CM

## 2022-10-18 DIAGNOSIS — C50.411 MALIGNANT NEOPLASM OF UPPER-OUTER QUADRANT OF RIGHT BREAST IN FEMALE, ESTROGEN RECEPTOR POSITIVE (H): Primary | ICD-10-CM

## 2022-10-18 DIAGNOSIS — N95.1 VAGINAL DRYNESS, MENOPAUSAL: ICD-10-CM

## 2022-10-18 LAB
ALBUMIN SERPL BCG-MCNC: 4.7 G/DL (ref 3.5–5.2)
ALP SERPL-CCNC: 100 U/L (ref 35–104)
ALT SERPL W P-5'-P-CCNC: 18 U/L (ref 10–35)
ANION GAP SERPL CALCULATED.3IONS-SCNC: 13 MMOL/L (ref 7–15)
AST SERPL W P-5'-P-CCNC: 15 U/L (ref 10–35)
BASOPHILS # BLD AUTO: 0.1 10E3/UL (ref 0–0.2)
BASOPHILS NFR BLD AUTO: 1 %
BILIRUB SERPL-MCNC: 0.3 MG/DL
BUN SERPL-MCNC: 10.2 MG/DL (ref 6–20)
CALCIUM SERPL-MCNC: 10 MG/DL (ref 8.6–10)
CHLORIDE SERPL-SCNC: 100 MMOL/L (ref 98–107)
CREAT SERPL-MCNC: 0.78 MG/DL (ref 0.51–0.95)
DEPRECATED HCO3 PLAS-SCNC: 28 MMOL/L (ref 22–29)
EOSINOPHIL # BLD AUTO: 0.3 10E3/UL (ref 0–0.7)
EOSINOPHIL NFR BLD AUTO: 3 %
ERYTHROCYTE [DISTWIDTH] IN BLOOD BY AUTOMATED COUNT: 13.2 % (ref 10–15)
GFR SERPL CREATININE-BSD FRML MDRD: >90 ML/MIN/1.73M2
GLUCOSE SERPL-MCNC: 85 MG/DL (ref 70–99)
HCT VFR BLD AUTO: 41.1 % (ref 35–47)
HGB BLD-MCNC: 13.9 G/DL (ref 11.7–15.7)
IMM GRANULOCYTES # BLD: 0 10E3/UL
IMM GRANULOCYTES NFR BLD: 0 %
LYMPHOCYTES # BLD AUTO: 2.8 10E3/UL (ref 0.8–5.3)
LYMPHOCYTES NFR BLD AUTO: 31 %
MCH RBC QN AUTO: 28.8 PG (ref 26.5–33)
MCHC RBC AUTO-ENTMCNC: 33.8 G/DL (ref 31.5–36.5)
MCV RBC AUTO: 85 FL (ref 78–100)
MONOCYTES # BLD AUTO: 0.4 10E3/UL (ref 0–1.3)
MONOCYTES NFR BLD AUTO: 5 %
NEUTROPHILS # BLD AUTO: 5.5 10E3/UL (ref 1.6–8.3)
NEUTROPHILS NFR BLD AUTO: 60 %
NRBC # BLD AUTO: 0 10E3/UL
NRBC BLD AUTO-RTO: 0 /100
PLATELET # BLD AUTO: 335 10E3/UL (ref 150–450)
POTASSIUM SERPL-SCNC: 4 MMOL/L (ref 3.4–5.3)
PROT SERPL-MCNC: 7.6 G/DL (ref 6.4–8.3)
RBC # BLD AUTO: 4.82 10E6/UL (ref 3.8–5.2)
SODIUM SERPL-SCNC: 141 MMOL/L (ref 136–145)
WBC # BLD AUTO: 9.1 10E3/UL (ref 4–11)

## 2022-10-18 PROCEDURE — 36415 COLL VENOUS BLD VENIPUNCTURE: CPT

## 2022-10-18 PROCEDURE — 96365 THER/PROPH/DIAG IV INF INIT: CPT

## 2022-10-18 PROCEDURE — 85025 COMPLETE CBC W/AUTO DIFF WBC: CPT

## 2022-10-18 PROCEDURE — G0463 HOSPITAL OUTPT CLINIC VISIT: HCPCS | Mod: 25

## 2022-10-18 PROCEDURE — 258N000003 HC RX IP 258 OP 636: Performed by: NURSE PRACTITIONER

## 2022-10-18 PROCEDURE — 99215 OFFICE O/P EST HI 40 MIN: CPT | Performed by: NURSE PRACTITIONER

## 2022-10-18 PROCEDURE — 80053 COMPREHEN METABOLIC PANEL: CPT

## 2022-10-18 PROCEDURE — 250N000011 HC RX IP 250 OP 636: Performed by: NURSE PRACTITIONER

## 2022-10-18 PROCEDURE — 86300 IMMUNOASSAY TUMOR CA 15-3: CPT

## 2022-10-18 RX ORDER — LETROZOLE 2.5 MG/1
2.5 TABLET, FILM COATED ORAL DAILY
Qty: 120 TABLET | Refills: 1 | Status: SHIPPED | OUTPATIENT
Start: 2022-10-18 | End: 2023-04-17

## 2022-10-18 RX ORDER — ZOLEDRONIC ACID 0.04 MG/ML
4 INJECTION, SOLUTION INTRAVENOUS ONCE
Status: CANCELLED | OUTPATIENT
Start: 2022-10-18 | End: 2022-10-18

## 2022-10-18 RX ORDER — ZOLEDRONIC ACID 0.04 MG/ML
4 INJECTION, SOLUTION INTRAVENOUS ONCE
Status: COMPLETED | OUTPATIENT
Start: 2022-10-18 | End: 2022-10-18

## 2022-10-18 RX ADMIN — SODIUM CHLORIDE 250 ML: 9 INJECTION, SOLUTION INTRAVENOUS at 14:46

## 2022-10-18 RX ADMIN — ZOLEDRONIC ACID 4 MG: 0.04 INJECTION, SOLUTION INTRAVENOUS at 14:46

## 2022-10-18 ASSESSMENT — PAIN SCALES - GENERAL: PAINLEVEL: NO PAIN (0)

## 2022-10-18 NOTE — PROGRESS NOTES
[facility] Hematology and Oncology Outpatient Progress Note    Patient: Diana Wilson  MRN: [id number]  Date of Service: Oct 18, 2022          Reason for Visit    1. History of breast cancer    Primary Hematologist/Oncologist: Dr. Coulter      Assessment/Plan  1. History ER/[place]+ right breast cancer (2016)  Now nearing 6 years post diagnosis. No recurrence to date. Continues long-term endocrine therapy (has been on total of 4.5 yrs), on various AIs due to intolerance and Tamoxifen stopped for PE. She's most recently been on letrozole for the past 6 months with better tolerance than the other AIs. Minimal arthralgias. Hot flashes are minimal, she is on Effexor.     She had a recent US and biopsy of a right lower axillary nodule she noted a month ago. US appearance consistent with 2 mm benign cyst. Biopsy is negative. Lesion has resolved.    No other new clinical concerns for recurrence.      Plan:  -Continue letrozole. Goal is to treat with endocrine therapy x 10 yr total given her arnold involvement and young age (if able to tolerate)  -Return in 6 months for clinic exam. Monitor right axillary lesion and repeat US if any progressive changes.   -No need for mammograms with bilateral mastectomy  -Patient had annual CT scans (through 12/2021) as part of her surveillance. We discussed this has been well-studied in breast cancer surveillance and has not been found to improve outcomes, so is not a recommended routine practice as risks>benefits. Risks include cost, cumulative radiation exposure in young patient, and false positive concerns. She is satisfied with this, but may want to consider imaging periodically and can discuss further over time at future appts. Discussed we carry a low threshold to image for any new symptoms/exam findings and we will continue following her every 6 months through 10 years from her diagnosis/while on endocrine therapy.  -Likewise, there is no added benefit in monitoring tumor markers  as part of breast cancer surveillance. This has been done historically in her surveillance and is pending today, but now that she is >5 yrs out we will not continue this routinely.   -Annual CBC due to prior chemotherapy to monitor for rare risk of MDS.  -Follow Oncology through 10 yrs/completion of endocrine therapy; can then transition to longer follow-up with Jailene Thorne in [facility]    2.  Chemo-induced peripheral neuropathy  On gabapentin 300 mg BID TID. Tends to get worse over cold weather and increases to TID during cold weather months.     3.   Osteopenia  Risk of developing osteoporosis with long-term AI use.   Last DEXA 12/06/2021 with a lowest T-score of -1.3.    Plan  -Continue weightbearing exercises 2-3 days per week  -Supplement with 1200 mg of calcium, 1000 international units of vitamin D daily.   -On 6 months Zometa since 2019. Next due today.   -Repeat DEXA late 2023/early 2024    4.   Sexuality concerns (vaginal dryness, low libido)   5.   Recurrent UTIs and yeast infections  Discussed with Jailene Thorne, Survivorship PA in April. She's been using routine coconut oil which has been helpful in reducing her UTI occurrence.  She was also started on low dose vaginal estrogen cream which has also been very effective in reducing her dyspareunia. She has Rx for post-coital antibiotic. She is got some various resources/recs on decreased libido.     Plan:  -Follows in Urology  -Follow-up with Jailene Thorne, [facility] annually    6.   Hx DVT/PE  Provoked after breast surgery and while on Tamoxifen. Completed anticoagulation, not on long-term.    7.  Hx iron def anemia  Hemoglobin normal. Ferritin was normal in July.    8.   Weight management  In the Comprehensive Weight Loss Program and started appetite suppressant. Meeting with a nutritionist and exercising routinely. Seeing success, but a bit frustrated with slow progress.     9.   Depression/anxiety  Well-managed on Effexor through PCP.    10.   Strong family hx cancer  Her maternal side has various cancers at young age (pancreatic, renal). She's had genetic testing with no hereditary genetic syndrome indentified. She wondered about the value of her getting annual CT scans to screen for not only her breast cancer recurring, but also for new early cancers. Explained cancer screening for breast (breast imaging), colon, lung cancer (high risk groups) is recommended based on high-sensitivity tests with good prognostic outcomes when catching those cancers early due to well-studied treatments. We don't have good screening studies that have demonstrated improved survival outcomes in pancreatic or renal cancers, unless someone carries a genetic mutation/syndrome that may better support that. Encouraged she continue her routine cancer screening with colonscopies, etc through PCP.     ______________________________________________________________________________    History of Present Illness/ Interval History    Ms. Diana Wilson  is a 42 year old with history of ER/[place]+ breast cancer, diagnosed almost 6 yr ago. S/P bilateral mastectomy (with reconstruction), RT, BSO and has been on adjuvant endocrine therapy ~4.5 yrs. She has had intolerance of anastrozole and Aromasin (significant arthralgias) and PE on Tamoxifen. She was changed to letrozole 6 months ago and is tolerating this the best, so far. Minimal/manageable arthralgias. Hot flashes are better. On Effexor for depression/anxiety, well managed.     She is using coconut oil vaginally and vaginal estrogen cream with improved vaginal dryness and decreased occurrence of UTIs. Improved dyspareunia and post-coital bleeding. She's feeling much better about this. Libido improved some.    She is in a comprehensive weight loss program, working with a Nutritionist and was started on an appetite suppressant. She is losing weight slowly. Walks routinely most days. Walked 3 mi today.    She noted a very small BB-size nodule  in inferior right axilla just superior to her mastectomy incision a month ago. She had US and biopsy with benign findings. She can no longer palpate the lesion. No other new chest wall/axillary concerns.   Chronic RUE lymphedema, wears sleeve.  No new symptoms of concern.  No pain. No headaches.       ECOG Performance     0    Oncology History/Treatment  Diagnosis/Stage:   12/2016: T2N1 invasive ductal right breast cancer, ER/[place]+ (HER2 neg) // ypTis(DCIS)pN0  -age 35 yo    -surgical path after neoadjuvant chemo: right breast - no residual invasive carcinoma; grade III DCIS 1 mm; 5 sLN all neg.   Left breast - negative.     -Genetic testing: negative for mutations in the TYLRE, BARD1, BRCA1, BRCA2, BRIP1, CDH1, CHEK2, MRE11A, MUTYH, NBN, NF1, PALB2, PTEN, RAD50, RAD51C, RAD51D, and TP53 genes by sequencing and deletion/duplication analysis. No mutations were found in any of the 17 genes analyzed. 2017 9/2020: PE (post-op breast reconstruction surgery)  -Tamoxifen stopped  -Anticoagulated 6 mths    Treatment:  2017: neoadjuvant ddAC x 4, then weekly carbo/taxol x 12  -stopped carbo after 7 weeks due to anaphylaxis    7/12/2017: bilateral mastectomies  (reconstruction 9/2020 with removal of implants and TRAM flap reconstruction; complicated by post-op PE)    11/2017: adjuvant RT completed (6040 cGy/33)    11/2017: BSO    2018 - present: adjuvant endocrine therapy  -Arimidex intermittently in 2018  -Tamoxifen [id number], stopped for PE  -Aromasin 2020 - 3/2022 (arthralgias)  -Letrozole 4/2022 - current      Physical Exam    GENERAL: Alert and oriented to time place and person. Seated comfortably. In no distress.  HEAD: Atraumatic and normocephalic. No alopecia.  EYES: ROMMEL, EOMI. No erythema. No icterus.  LYMPH NODES: No palpable cervical nor axillary adenopathy. Diffuse dense tissue right inferior axilla along incision.   BREASTS: Reconstructed breasts. No chest wall abnormalities noted.   CHEST: regular respiratory  effort.Clear lung sounds bilaterally.  HEART: RRR  SKIN: no rash, or bruising or purpura.   NEURO: No gross deficit noted. Non-antalgic gait.      Lab Results    10/5/2022 right axilla biopsy: benign adipose tissue    CBC, CMP WNL  CA 27.29 pending.    Imaging    US Breast Biopsy Core Lymph Node Right    Addendum Date: 10/7/2022    Pathology Results: Benign adipose tissue. Please see full pathology report for further details. Results are concordant with imaging findings. Recommendation: Clinical follow-up. CONNOR LEIVA MD   SYSTEM ID:  [id number]    Result Date: 10/7/2022  Ultrasound guided RIGHT breast/axilla biopsy. Comparisons: Ultrasound earlier on the same day. FINDINGS: Procedure, risks, benefits and alternatives were discussed with the patient and the patient gave written and verbal consent. Aseptic technique was utilized. 1% lidocaine was utilized for local anesthesia. Under ultrasound guidance, biopsy of mass was performed and marker placed as follows and images were archived: Size: 14 gauge core needle biopsy system Number of cores: 2 Position: Low RIGHT axilla Marker: HydroMark Less than 5 mL blood loss. Pressure was held over this area for approximately 10 minutes. A dressing was placed and care instructions were discussed with the patient. Post biopsy mammogram not performed given the axillary location.     IMPRESSION:    Uncomplicated ultrasound guided core needle biopsy of the RIGHT breast/axilla. CONNOR LEIVA MD   SYSTEM ID:  [id number]    US Axillary Right    Result Date: 10/5/2022  Right axillary ultrasound Comparisons: None History: History of RIGHT breast cancer status post bilateral mastectomy with TRAM reconstruction. Now with tiny lump in the low RIGHT axilla for the past 3 weeks. FINDINGS:   Focussed ultrasound by radiologist and technologist of the RIGHT axilla was performed. Oval hypoechoic mass is seen at the patient's palpable area of concern. This measures 0.2 cm in maximal dimension and  accounts for the patient's palpable area of concern. Imaging characteristics suggest a benign oil cyst, but follow-up is warranted given small size. After discussion, patient desires core needle biopsy in lieu of serial follow-up ultrasound.     IMPRESSION: BI-RADS CATEGORY: 3 - Probably Benign Finding-Short Interval Follow-Up Suggested RECOMMENDED FOLLOW-UP: Biopsy. Ultrasound-guided core needle biopsy of the RIGHT axilla. The patient was given the results of the examination. CONNOR LEIVA MD   SYSTEM ID:  [id number]      Billing  Total time 55 minutes, to include face to face visit, review of EMR, ordering, documentation and coordination of care on date of service.     Signed by: Leola Amin NP

## 2022-10-18 NOTE — LETTER
10/18/2022         RE: Diana Wilson  1972 [address] [facility] [id number]        Dear Colleague,    Thank you for referring your patient, Diana Wilson, to the [facility]. Please see a copy of my visit note below.    [facility] Hematology and Oncology Outpatient Progress Note    Patient: Diana Wilson  MRN: [id number]  Date of Service: Oct 18, 2022          Reason for Visit    1. History of breast cancer    Primary Hematologist/Oncologist: Dr. Coulter      Assessment/Plan  1. History ER/[place]+ right breast cancer (2016)  Now nearing 6 years post diagnosis. No recurrence to date. Continues long-term endocrine therapy (has been on total of 4.5 yrs), on various AIs due to intolerance and Tamoxifen stopped for PE. She's most recently been on letrozole for the past 6 months with better tolerance than the other AIs. Minimal arthralgias. Hot flashes are minimal, she is on Effexor.     She had a recent US and biopsy of a right lower axillary nodule she noted a month ago. US appearance consistent with 2 mm benign cyst. Biopsy is negative. Lesion has resolved.    No other new clinical concerns for recurrence.      Plan:  -Continue letrozole. Goal is to treat with endocrine therapy x 10 yr total given her arnold involvement and young age (if able to tolerate)  -Return in 6 months for clinic exam. Monitor right axillary lesion and repeat US if any progressive changes.   -No need for mammograms with bilateral mastectomy  -Patient had annual CT scans (through 12/2021) as part of her surveillance. We discussed this has been well-studied in breast cancer surveillance and has not been found to improve outcomes, so is not a recommended routine practice as risks>benefits. Risks include cost, cumulative radiation exposure in young patient, and false positive concerns. She is satisfied with this, but may want to consider imaging periodically and can discuss further over time at future appts.  Discussed we carry a low threshold to image for any new symptoms/exam findings and we will continue following her every 6 months through 10 years from her diagnosis/while on endocrine therapy.  -Likewise, there is no added benefit in monitoring tumor markers as part of breast cancer surveillance. This has been done historically in her surveillance and is pending today, but now that she is >5 yrs out we will not continue this routinely.   -Annual CBC due to prior chemotherapy to monitor for rare risk of MDS.  -Follow Oncology through 10 yrs/completion of endocrine therapy; can then transition to longer follow-up with Jailene Thorne in [facility]    2.  Chemo-induced peripheral neuropathy  On gabapentin 300 mg BID TID. Tends to get worse over cold weather and increases to TID during cold weather months.     3.   Osteopenia  Risk of developing osteoporosis with long-term AI use.   Last DEXA 12/06/2021 with a lowest T-score of -1.3.    Plan  -Continue weightbearing exercises 2-3 days per week  -Supplement with 1200 mg of calcium, 1000 international units of vitamin D daily.   -On 6 months Zometa since 2019. Next due today.   -Repeat DEXA late 2023/early 2024    4.   Sexuality concerns (vaginal dryness, low libido)   5.   Recurrent UTIs and yeast infections  Discussed with Jailene Thorne, Survivorship PA in April. She's been using routine coconut oil which has been helpful in reducing her UTI occurrence.  She was also started on low dose vaginal estrogen cream which has also been very effective in reducing her dyspareunia. She has Rx for post-coital antibiotic. She is got some various resources/recs on decreased libido.     Plan:  -Follows in Urology  -Follow-up with Jailene Thorne, [facility] annually    6.   Hx DVT/PE  Provoked after breast surgery and while on Tamoxifen. Completed anticoagulation, not on long-term.    7.  Hx iron def anemia  Hemoglobin normal. Ferritin was normal in July.    8.   Weight  management  In the Comprehensive Weight Loss Program and started appetite suppressant. Meeting with a nutritionist and exercising routinely. Seeing success, but a bit frustrated with slow progress.     9.   Depression/anxiety  Well-managed on Effexor through PCP.    10.  Strong family hx cancer  Her maternal side has various cancers at young age (pancreatic, renal). She's had genetic testing with no hereditary genetic syndrome indentified. She wondered about the value of her getting annual CT scans to screen for not only her breast cancer recurring, but also for new early cancers. Explained cancer screening for breast (breast imaging), colon, lung cancer (high risk groups) is recommended based on high-sensitivity tests with good prognostic outcomes when catching those cancers early due to well-studied treatments. We don't have good screening studies that have demonstrated improved survival outcomes in pancreatic or renal cancers, unless someone carries a genetic mutation/syndrome that may better support that. Encouraged she continue her routine cancer screening with colonscopies, etc through PCP.     ______________________________________________________________________________    History of Present Illness/ Interval History    Ms. Diana Wilson  is a 42 year old with history of ER/[place]+ breast cancer, diagnosed almost 6 yr ago. S/P bilateral mastectomy (with reconstruction), RT, BSO and has been on adjuvant endocrine therapy ~4.5 yrs. She has had intolerance of anastrozole and Aromasin (significant arthralgias) and PE on Tamoxifen. She was changed to letrozole 6 months ago and is tolerating this the best, so far. Minimal/manageable arthralgias. Hot flashes are better. On Effexor for depression/anxiety, well managed.     She is using coconut oil vaginally and vaginal estrogen cream with improved vaginal dryness and decreased occurrence of UTIs. Improved dyspareunia and post-coital bleeding. She's feeling much better  about this. Libido improved some.    She is in a comprehensive weight loss program, working with a Nutritionist and was started on an appetite suppressant. She is losing weight slowly. Walks routinely most days. Walked 3 mi today.    She noted a very small BB-size nodule in inferior right axilla just superior to her mastectomy incision a month ago. She had US and biopsy with benign findings. She can no longer palpate the lesion. No other new chest wall/axillary concerns.   Chronic RUE lymphedema, wears sleeve.  No new symptoms of concern.  No pain. No headaches.       ECOG Performance     0    Oncology History/Treatment  Diagnosis/Stage:   12/2016: T2N1 invasive ductal right breast cancer, ER/[place]+ (HER2 neg) // ypTis(DCIS)pN0  -age 37 yo    -surgical path after neoadjuvant chemo: right breast - no residual invasive carcinoma; grade III DCIS 1 mm; 5 sLN all neg.   Left breast - negative.     -Genetic testing: negative for mutations in the TYLER, BARD1, BRCA1, BRCA2, BRIP1, CDH1, CHEK2, MRE11A, MUTYH, NBN, NF1, PALB2, PTEN, RAD50, RAD51C, RAD51D, and TP53 genes by sequencing and deletion/duplication analysis. No mutations were found in any of the 17 genes analyzed. 2017 9/2020: PE (post-op breast reconstruction surgery)  -Tamoxifen stopped  -Anticoagulated 6 mths    Treatment:  2017: neoadjuvant ddAC x 4, then weekly carbo/taxol x 12  -stopped carbo after 7 weeks due to anaphylaxis    7/12/2017: bilateral mastectomies  (reconstruction 9/2020 with removal of implants and TRAM flap reconstruction; complicated by post-op PE)    11/2017: adjuvant RT completed (6040 cGy/33)    11/2017: BSO    2018 - present: adjuvant endocrine therapy  -Arimidex intermittently in 2018  -Tamoxifen [id number], stopped for PE  -Aromasin 2020 - 3/2022 (arthralgias)  -Letrozole 4/2022 - current      Physical Exam    GENERAL: Alert and oriented to time place and person. Seated comfortably. In no distress.  HEAD: Atraumatic and normocephalic. No  alopecia.  EYES: ROMMEL, EOMI. No erythema. No icterus.  LYMPH NODES: No palpable cervical nor axillary adenopathy. Diffuse dense tissue right inferior axilla along incision.   BREASTS: Reconstructed breasts. No chest wall abnormalities noted.   CHEST: regular respiratory effort.Clear lung sounds bilaterally.  HEART: RRR  SKIN: no rash, or bruising or purpura.   NEURO: No gross deficit noted. Non-antalgic gait.      Lab Results    10/5/2022 right axilla biopsy: benign adipose tissue    CBC, CMP WNL  CA 27.29 pending.    Imaging    US Breast Biopsy Core Lymph Node Right    Addendum Date: 10/7/2022    Pathology Results: Benign adipose tissue. Please see full pathology report for further details. Results are concordant with imaging findings. Recommendation: Clinical follow-up. CONNOR LEIVA MD   SYSTEM ID:  [id number]    Result Date: 10/7/2022  Ultrasound guided RIGHT breast/axilla biopsy. Comparisons: Ultrasound earlier on the same day. FINDINGS: Procedure, risks, benefits and alternatives were discussed with the patient and the patient gave written and verbal consent. Aseptic technique was utilized. 1% lidocaine was utilized for local anesthesia. Under ultrasound guidance, biopsy of mass was performed and marker placed as follows and images were archived: Size: 14 gauge core needle biopsy system Number of cores: 2 Position: Low RIGHT axilla Marker: HydroMark Less than 5 mL blood loss. Pressure was held over this area for approximately 10 minutes. A dressing was placed and care instructions were discussed with the patient. Post biopsy mammogram not performed given the axillary location.     IMPRESSION:    Uncomplicated ultrasound guided core needle biopsy of the RIGHT breast/axilla. CONNOR LEIVA MD   SYSTEM ID:  [id number]    US Axillary Right    Result Date: 10/5/2022  Right axillary ultrasound Comparisons: None History: History of RIGHT breast cancer status post bilateral mastectomy with TRAM reconstruction. Now with  "tiny lump in the low RIGHT axilla for the past 3 weeks. FINDINGS:   Focussed ultrasound by radiologist and technologist of the RIGHT axilla was performed. Oval hypoechoic mass is seen at the patient's palpable area of concern. This measures 0.2 cm in maximal dimension and accounts for the patient's palpable area of concern. Imaging characteristics suggest a benign oil cyst, but follow-up is warranted given small size. After discussion, patient desires core needle biopsy in lieu of serial follow-up ultrasound.     IMPRESSION: BI-RADS CATEGORY: 3 - Probably Benign Finding-Short Interval Follow-Up Suggested RECOMMENDED FOLLOW-UP: Biopsy. Ultrasound-guided core needle biopsy of the RIGHT axilla. The patient was given the results of the examination. CONNOR LEIVA MD   SYSTEM ID:  [id number]      Billing  Total time 55 minutes, to include face to face visit, review of EMR, ordering, documentation and coordination of care on date of service.     Signed by: Leola Amin NP      Oncology Rooming Note    October 18, 2022 1:19 PM   Diana Wilson is a 42 year old female who presents for:    Chief Complaint   Patient presents with     Oncology Clinic Visit     Malignant neoplasm of upper-outer quadrant of right breast in female, estrogen receptor positive - Labs provider and infusion     Initial Vitals: /89 (BP Location: Right arm, Patient Position: Sitting, Cuff Size: Adult Regular)   Pulse 94   Temp 97.4  F (36.3  C) (Tympanic)   Resp 12   Wt 90.8 kg (200 lb 2 oz)   LMP 08/05/2016 (Exact Date)   SpO2 97%   BMI 34.35 kg/m   Estimated body mass index is 34.35 kg/m  as calculated from the following:    Height as of 10/17/22: 1.626 m (5' 4\").    Weight as of this encounter: 90.8 kg (200 lb 2 oz). Body surface area is 2.02 meters squared.  No Pain (0) Comment: Data Unavailable   Patient's last menstrual period was 08/05/2016 (exact date).  Allergies reviewed: Yes  Medications reviewed: Yes    Medications: Medication " refills not needed today.  Pharmacy name entered into [facility]: [facility] [place], [place] - [address]    Clinical concerns:  None      Nicole Costa CMA                Again, thank you for allowing me to participate in the care of your patient.        Sincerely,        Leola Amin, NP

## 2022-10-18 NOTE — PROGRESS NOTES
Infusion Nursing Note:  Diana Wilson presents today for Zometa.    Patient seen by provider today: Yes: Leola Amin NP   present during visit today: Not Applicable.    Note: N/A.    Intravenous Access:  Peripheral IV placed.    Treatment Conditions:  Results reviewed, labs MET treatment parameters, ok to proceed with treatment.    Post Infusion Assessment:  Patient tolerated infusion without incident.  Blood return noted pre and post infusion.  Site patent and intact, free from redness, edema or discomfort.  No evidence of extravasations.  Access discontinued per protocol.     Discharge Plan:   Patient discharged in stable condition accompanied by: self.  Departure Mode: Ambulatory.      Rhonda Ovalles RN

## 2022-10-18 NOTE — PROGRESS NOTES
"Oncology Rooming Note    October 18, 2022 1:19 PM   Diana Wilson is a 42 year old female who presents for:    Chief Complaint   Patient presents with     Oncology Clinic Visit     Malignant neoplasm of upper-outer quadrant of right breast in female, estrogen receptor positive - Labs provider and infusion     Initial Vitals: /89 (BP Location: Right arm, Patient Position: Sitting, Cuff Size: Adult Regular)   Pulse 94   Temp 97.4  F (36.3  C) (Tympanic)   Resp 12   Wt 90.8 kg (200 lb 2 oz)   LMP 08/05/2016 (Exact Date)   SpO2 97%   BMI 34.35 kg/m   Estimated body mass index is 34.35 kg/m  as calculated from the following:    Height as of 10/17/22: 1.626 m (5' 4\").    Weight as of this encounter: 90.8 kg (200 lb 2 oz). Body surface area is 2.02 meters squared.  No Pain (0) Comment: Data Unavailable   Patient's last menstrual period was 08/05/2016 (exact date).  Allergies reviewed: Yes  Medications reviewed: Yes    Medications: Medication refills not needed today.  Pharmacy name entered into [facility]: [facility] - [id number]    Clinical concerns:  None      Nicole Costa CMA            "

## 2022-10-20 LAB — CANCER AG27-29 SERPL-ACNC: 16.9 U/ML

## 2022-10-23 DIAGNOSIS — N39.0 RECURRENT UTI: ICD-10-CM

## 2022-10-26 NOTE — TELEPHONE ENCOUNTER
NITROFURANTOIN MACROCRYSTAL 50 CAPS      Last Written Prescription Date:  5/16/22  Last Fill Quantity: 30,   # refills: 4  Last Office Visit : Urology  9/26/2022  [facility] Office visit:  1/2/23    Routing refill request to provider for review/approval because:  Drug not on urology refill protocol

## 2022-10-31 RX ORDER — NITROFURANTOIN MACROCRYSTALS 50 MG/1
CAPSULE ORAL
Qty: 30 CAPSULE | Refills: 4 | Status: SHIPPED | OUTPATIENT
Start: 2022-10-31 | End: 2023-03-27

## 2022-11-01 DIAGNOSIS — N94.19 DYSPAREUNIA DUE TO MEDICAL CONDITION IN FEMALE PATIENT: ICD-10-CM

## 2022-11-02 RX ORDER — ESTRADIOL 10 UG/1
TABLET VAGINAL
Qty: 20 TABLET | Refills: 0 | Status: SHIPPED | OUTPATIENT
Start: 2022-11-02 | End: 2022-12-23

## 2022-11-02 NOTE — TELEPHONE ENCOUNTER
[facility] Triage    Last prescribing provider:Jailene Thorne    Last clinic visit date: 7/22/22 Jailene Thorne    Any missed appointments or no-shows since last clinic visit?: No    Recommendations for requested medication (if none, N/A): NA    Next clinic visit date: None on calendar    Any other pertinent information (if none, N/A): NA    Routing to Jailene Thorne

## 2022-11-04 NOTE — NURSING NOTE
"Pt was in clinic for an already scheduled clinic visit and agreed to meet for re-consent to [facility] clinical trial. Consent ([facility] version 8/15/22) was mailed in advance to patient. It was reviewed and signed by pt and research RN on 10/18/22. Pt did not have any questions rt consent. A copy of the signed consent was provided to patient and one was scanned to her EMR.  She does answer \"yes\" to both questions; she agrees to additional samples (blood) and questionnaires for known future studies at year 5 or 6, and she agrees to allow stored samples (in biobank) to be used for unknown future studies. We will do this at her next appointment in late 2023.  Also at this visit we completed study visit #8 for the [facility] weight loss study. Study labs were not required for this visit. Weight, waist, & hip measurements (2x) were collected and follow-up QOLs were completed per protocol. Adverse events were not assessed at this visit. Reviewed study schedule with next visit in in approximately one year. She has no other questions at this time.   "

## 2022-11-29 ENCOUNTER — VIRTUAL VISIT (OUTPATIENT)
Dept: SURGERY | Facility: CLINIC | Age: 42
End: 2022-11-29
Payer: COMMERCIAL

## 2022-11-29 DIAGNOSIS — E66.9 OBESITY (BMI 30-39.9): Primary | ICD-10-CM

## 2022-11-29 PROCEDURE — 97803 MED NUTRITION INDIV SUBSEQ: CPT | Mod: GT | Performed by: DIETITIAN, REGISTERED

## 2022-11-29 NOTE — PROGRESS NOTES
Diana Wilson is a 42 year old who is being evaluated via a billable video visit.      How would you like to obtain your AVS? MyChart  If the video visit is dropped, the invitation should be resent by: Send to e-mail at: [email or web address]  Will anyone else be joining your video visit? No    Medical  Weight Loss Follow-Up Diet Evaluation  Assessment:  Diana is presenting today for a follow up weight management nutrition consultation. Pt has had an initial appointment with Dr. Rosas  Weight loss medication: Phentermine.  Pt's weight is Initial weight: 205 lb  Current weight 198.2 lb  Weight change: down 8.4 lbs    No flowsheet data found.  BMI: There is no height or weight on file to calculate BMI.  Ideal body weight: 55.1 kg (121 lb 7.5 oz)  Adjusted ideal body weight: 70.3 kg (154 lb 14.1 oz)    Estimated RMR (Pender-St Jeor equation):   1555 kcals x 1.2 (sedentary) = 1865 kcals (for weight maintenance)  1555 kcals x 1.3 (light active) = 2137 kcals (for weight maintenance)  Recommended Protein Intake: 70-90 grams of protein/day  Patient Active Problem List:  Patient Active Problem List   Diagnosis     Obesity     CARDIOVASCULAR SCREENING; LDL GOAL LESS THAN 130     Major depressive disorder, recurrent episode, mild (H)     Iron deficiency anemia     Insomnia     Health Care Home     Anxiety     Intestinal malabsorption     Lymphedema of right upper extremity     Malignant neoplasm of upper-outer quadrant of right breast in female, estrogen receptor positive (H)     Osteopenia of multiple sites     Multiple subsegmental pulmonary emboli without acute cor pulmonale (H)     Breast reconstruction disproportion     Family history of colon cancer     Celiac disease     Recurrent UTI     Stress incontinence     Diabetes: No    Struggles with: getting in fruit and vegetables    Progress on goals from last visit: Using small plate and plating meal not at the table. Weight has maintained since last visit. Her   has started on the same medicine and they are working on changes together.  1. Aim to eat 3 meals per day, protein focused, high fiber foods - working on this    Dietary Recall:  She continues to walk 2-4 miles per day  Intake/routine is the same as last visit:  Breakfast: 8:30 am banana, protein powder, almond milk, peanut butter protein powder  11:30 carrots and ranch dip  Lunch: two good yogurt, blueberry granola  Snack: cheese stick OR apple OR mini kind bar   Dinner: grilled meals with meat and veggies   Typical snacks: sometimes a yasso yogurt bar, hot chocolate  Beverages:   Coffee with creamer (regular)  Water: with propel water flavoring  Exercise:   Going for a walk 3x per week in the morning, 2-4 miles.   She has done yoga instead of walking on bad weather days.   Nutrition Diagnosis:    Overweight/Obesity (NC 3.3) related to overeating and poor lifestyle habits as evidenced by BMI 33.64    Intervention:  1. Food and/or nutrient delivery: High protein  2. Nutrition education: discussed calorie goal for weight loss  3. Nutrition counseling: reviewed progress with goals and discussed plan    Monitoring/Evaluation:    Goals:  1. Aim to eat 3 meals per day, protein focused, high fiber foods - start tracking intake and aiming for 1600 kcals daily  2. Have an additional vegetable in per day and 2 veggies at dinner    Patient to follow up in 1 month(s) with bariatrician and 2 month(s) with RD    Video-Visit Details    Type of service:  Video Visit    Video Start Time (time video started): 4:01 PM    Video End Time (time video stopped): 4:21 pm    Originating Location (pt. Location): Home        Distant Location (provider location):  On-site    Mode of Communication:  Video Conference via [employer]    Physician has received verbal consent for a Video Visit from the patient? Yes      Amanda Vivas

## 2022-12-06 ENCOUNTER — OFFICE VISIT (OUTPATIENT)
Dept: FAMILY MEDICINE | Facility: CLINIC | Age: 42
End: 2022-12-06
Payer: COMMERCIAL

## 2022-12-06 VITALS
DIASTOLIC BLOOD PRESSURE: 89 MMHG | TEMPERATURE: 98 F | SYSTOLIC BLOOD PRESSURE: 139 MMHG | HEART RATE: 92 BPM | WEIGHT: 200 LBS | BODY MASS INDEX: 34.15 KG/M2 | HEIGHT: 64 IN

## 2022-12-06 DIAGNOSIS — N39.3 STRESS INCONTINENCE: ICD-10-CM

## 2022-12-06 DIAGNOSIS — C50.411 MALIGNANT NEOPLASM OF UPPER-OUTER QUADRANT OF RIGHT BREAST IN FEMALE, ESTROGEN RECEPTOR POSITIVE (H): ICD-10-CM

## 2022-12-06 DIAGNOSIS — Z01.818 PRE-OP EXAM: Primary | ICD-10-CM

## 2022-12-06 DIAGNOSIS — N39.0 RECURRENT UTI: ICD-10-CM

## 2022-12-06 DIAGNOSIS — Z17.0 MALIGNANT NEOPLASM OF UPPER-OUTER QUADRANT OF RIGHT BREAST IN FEMALE, ESTROGEN RECEPTOR POSITIVE (H): ICD-10-CM

## 2022-12-06 DIAGNOSIS — D50.9 IRON DEFICIENCY ANEMIA, UNSPECIFIED IRON DEFICIENCY ANEMIA TYPE: ICD-10-CM

## 2022-12-06 DIAGNOSIS — J01.90 ACUTE SINUSITIS WITH SYMPTOMS > 10 DAYS: ICD-10-CM

## 2022-12-06 DIAGNOSIS — I26.94 MULTIPLE SUBSEGMENTAL PULMONARY EMBOLI WITHOUT ACUTE COR PULMONALE (H): ICD-10-CM

## 2022-12-06 LAB
ANION GAP SERPL CALCULATED.3IONS-SCNC: 12 MMOL/L (ref 7–15)
BUN SERPL-MCNC: 8.5 MG/DL (ref 6–20)
CALCIUM SERPL-MCNC: 10.2 MG/DL (ref 8.6–10)
CHLORIDE SERPL-SCNC: 101 MMOL/L (ref 98–107)
CREAT SERPL-MCNC: 0.75 MG/DL (ref 0.51–0.95)
DEPRECATED HCO3 PLAS-SCNC: 30 MMOL/L (ref 22–29)
ERYTHROCYTE [DISTWIDTH] IN BLOOD BY AUTOMATED COUNT: 13.8 % (ref 10–15)
GFR SERPL CREATININE-BSD FRML MDRD: >90 ML/MIN/1.73M2
GLUCOSE SERPL-MCNC: 105 MG/DL (ref 70–99)
HCT VFR BLD AUTO: 40.3 % (ref 35–47)
HGB BLD-MCNC: 13.1 G/DL (ref 11.7–15.7)
MCH RBC QN AUTO: 28.5 PG (ref 26.5–33)
MCHC RBC AUTO-ENTMCNC: 32.5 G/DL (ref 31.5–36.5)
MCV RBC AUTO: 88 FL (ref 78–100)
PLATELET # BLD AUTO: 346 10E3/UL (ref 150–450)
POTASSIUM SERPL-SCNC: 4.2 MMOL/L (ref 3.4–5.3)
RBC # BLD AUTO: 4.6 10E6/UL (ref 3.8–5.2)
SODIUM SERPL-SCNC: 143 MMOL/L (ref 136–145)
WBC # BLD AUTO: 10 10E3/UL (ref 4–11)

## 2022-12-06 PROCEDURE — 85027 COMPLETE CBC AUTOMATED: CPT | Performed by: FAMILY MEDICINE

## 2022-12-06 PROCEDURE — 36415 COLL VENOUS BLD VENIPUNCTURE: CPT | Performed by: FAMILY MEDICINE

## 2022-12-06 PROCEDURE — 99214 OFFICE O/P EST MOD 30 MIN: CPT | Performed by: FAMILY MEDICINE

## 2022-12-06 PROCEDURE — 80048 BASIC METABOLIC PNL TOTAL CA: CPT | Performed by: FAMILY MEDICINE

## 2022-12-06 RX ORDER — AZITHROMYCIN 250 MG/1
TABLET, FILM COATED ORAL
Qty: 6 TABLET | Refills: 0 | Status: SHIPPED | OUTPATIENT
Start: 2022-12-06 | End: 2022-12-12

## 2022-12-06 NOTE — H&P (VIEW-ONLY)
[facility]  [address] [address] [address]  Phone: [phone]  Primary Provider: Tyson Peters  Pre-op Performing Provider: TYSON PETERS      PREOPERATIVE EVALUATION:  Today's date: 12/6/2022    Diana Wilson is a 42 year old female who presents for a preoperative evaluation.    Surgical Information:  Surgery/Procedure: CREATION, VAGINAL SLING, WITH CYSTOSCOPY (support the urethra with mesh sling and look in the bladder)  Surgery Location: [facility]   Surgeon: Dr. Harris  Surgery Date: 12/12/2022  Time of Surgery: 1:55pm  Where patient plans to recover: At home with family  Fax number for surgical facility: Note does not need to be faxed, will be available electronically in Epic.    Type of Anesthesia Anticipated: MAC    Assessment & Plan     The proposed surgical procedure is considered INTERMEDIATE risk.    Pre-op exam      BP slightly high today - she will hold sudafed and phentermine and recheck home bps     - CBC with platelets; Future  - Basic metabolic panel  (Ca, Cl, CO2, Creat, Gluc, K, Na, BUN); Future  - CBC with platelets  - Basic metabolic panel  (Ca, Cl, CO2, Creat, Gluc, K, Na, BUN)    Stress incontinence    - CBC with platelets; Future  - Basic metabolic panel  (Ca, Cl, CO2, Creat, Gluc, K, Na, BUN); Future  - CBC with platelets  - Basic metabolic panel  (Ca, Cl, CO2, Creat, Gluc, K, Na, BUN)    Iron deficiency anemia, unspecified iron deficiency anemia type  History of     Malignant neoplasm of upper-outer quadrant of right breast in female, estrogen receptor positive (H)      Recurrent UTI      Multiple subsegmental pulmonary emboli without acute cor pulmonale (H)  Post op, two years ago     Acute sinusitis with symptoms > 10 days  Because she has an upcoming surgery, will go ahead and treat with antibiotics The patient indicates understanding of these issues and agrees with the plan.   - azithromycin (ZITHROMAX) 250 MG tablet; Take 2 tablets (500  mg) by mouth daily for 1 day, THEN 1 tablet (250 mg) daily for 4 days.        Risks and Recommendations:  The patient has the following additional risks and recommendations for perioperative complications:   - No identified additional risk factors other than previously addressed    Medication Instructions:  Patient is to take all scheduled medications on the day of surgery     Hold asa starting today.   No nsaids     RECOMMENDATION:  APPROVAL GIVEN to proceed with proposed procedure, without further diagnostic evaluation.        Subjective     HPI related to upcoming procedure: ongoing worsening stress incontinence    Preop Questions 12/6/2022   1. Have you ever had a heart attack or stroke? No   2. Have you ever had surgery on your heart or blood vessels, such as a stent placement, a coronary artery bypass, or surgery on an artery in your head, neck, heart, or legs? No   3. Do you have chest pain with activity? No   4. Do you have a history of  heart failure? No   5. Do you currently have a cold, bronchitis or symptoms of other infection? Sinus congestion x one month. Pressure, headache, cheek pain.    6. Do you have a cough, shortness of breath, or wheezing? No   7. Do you or anyone in your family have previous history of blood clots? YES - personal history of blood clot in 2020   8. Do you or does anyone in your family have a serious bleeding problem such as prolonged bleeding following surgeries or cuts? No   9. Have you ever had problems with anemia or been told to take iron pills? YES - history of    10. Have you had any abnormal blood loss such as black, tarry or bloody stools, or abnormal vaginal bleeding? No   11. Have you ever had a blood transfusion? YES - 2020   11a. Have you ever had a transfusion reaction? No   12. Are you willing to have a blood transfusion if it is medically needed before, during, or after your surgery? Yes    13. Have you or any of your relatives ever had problems with anesthesia?  YES -  Slow to wake up, says she got a bit adversarial after her last procedure   14. Do you have sleep apnea, excessive snoring or daytime drowsiness? No   15. Do you have any artifical heart valves or other implanted medical devices like a pacemaker, defibrillator, or continuous glucose monitor? No   16. Do you have artificial joints? No   17. Are you allergic to latex? No   18. Is there any chance that you may be pregnant? No       Health Care Directive:  Patient does not have a Health Care Directive or Living Will: Patient states has Advance Directive and will bring in a copy to clinic.    Preoperative Review of :   reviewed - controlled substances reflected in medication list. - phentermine    Blood pressure  Running high  Took phentermine and sudafed today     Sinus pressure x 3 weeks - persistent  Not resolving     Status of Chronic Conditions:  See problem list for active medical problems.  Problems all longstanding and stable, except as noted/documented.  See ROS for pertinent symptoms related to these conditions.      Review of Systems  Constitutional, neuro, ENT, endocrine, pulmonary, cardiac, gastrointestinal, genitourinary, musculoskeletal, integument and psychiatric systems are negative, except as otherwise noted.    Patient Active Problem List    Diagnosis Date Noted     Stress incontinence 09/26/2022     Priority: Medium     Added automatically from request for surgery [id number]       Recurrent UTI 04/12/2022     Priority: Medium     Celiac disease 08/03/2021     Priority: Medium     Family history of colon cancer 07/20/2021     Priority: Medium     Multiple extended family members with colon ca          Breast reconstruction disproportion 02/25/2021     Priority: Medium     Multiple subsegmental pulmonary emboli without acute cor pulmonale (H) 01/28/2021     Priority: Medium     Diagnosed 9/2020 - was post op        Osteopenia of multiple sites 04/23/2019     Priority: Medium     Malignant  neoplasm of upper-outer quadrant of right breast in female, estrogen receptor positive (H) 03/08/2018     Priority: Medium     ER positive in breast  triple neg in lymph        Lymphedema of right upper extremity 11/24/2017     Priority: Medium     Intestinal malabsorption 02/16/2016     Priority: Medium     Anxiety 07/15/2014     Priority: Medium     Insomnia 07/17/2012     Priority: Medium     Iron deficiency anemia 03/14/2012     Priority: Medium     Major depressive disorder, recurrent episode, mild (H) 08/05/2011     Priority: Medium      mainly postpartum, had tried Zoloft and anafranil    [facility] enrolled pt in 6 months pt ed program          CARDIOVASCULAR SCREENING; LDL GOAL LESS THAN 130 10/31/2010     Priority: Medium     Obesity 09/08/2008     Priority: Medium     Wt Readings from Last 5 Encounters:   07/11/11 172 lb (78.019 kg)   12/20/10 174 lb (78.926 kg)   07/26/10 166 lb (75.297 kg)   11/12/09 170 lb (77.111 kg)   11/03/09 169 lb (76.658 kg)     Body mass index is 29.99 kg/(m^2).        Health Care Home 05/14/2013     Priority: Low     EMERGENCY CARE PLAN  May 14, 2013: No current Care Coordination follow up planned. Please refer if Care Coordination services are needed.    Presenting Problem Signs and Symptoms Treatment Plan   Questions or concerns   during clinic hours   I will call my clinic directly:  [address] [id number] [phone].    Questions or concerns outside clinic hours   I will call the 24 hour nurse line at   [phone] or [facility].   Need to schedule an appointment   I will call the 24 hour scheduling team at [phone] or my clinic directly at [phone].    Same day treatment     I will call my clinic first, nurse line if after hours, urgent care and express care if needed.   Clinic care coordination services (regular clinic hours)     I will call a clinic care coordinator directly:     Darron Gaming RN  Mon, Tues, Fri -  [phone]  Wed, Thurs - [phone]    LAKESHIA Smith:    [phone]    Or call my clinic at [phone] and ask to speak with care coordination.   Crisis Services: Behavioral or Mental Health  [facility] 24 Hour Phone Line  [phone]    [facility] 24 Hour Crisis Services  [phone]    [facility] (Behavioral Health Providers) Network [phone]    [facility]   [phone]       Emergency treatment -- Immediately    [facility]All 911               Past Medical History:   Diagnosis Date     Anxiety      Breast cancer (H)      Depressive disorder     And Anxiety     Encounter for insertion or removal of intrauterine contraceptive device 2008     Excessive or frequent menstruation 2003     Fibroadenosis of breast      GERD (gastroesophageal reflux disease)      Invasive ductal carcinoma of breast, right (H)      Malignant neoplasm of upper-outer quadrant of right female breast (H)      Mild intermittent asthma            never has had PFT's, MDI with colds and at times exercise     Neutropenia, drug-induced (H)      Nongonococcal urethritis (JALIL) due to chlamydia trachomatis 2002     Other and unspecified ovarian cyst 2003    RIGHT ovarian cyst     PONV (postoperative nausea and vomiting)      Skin cancer      Transient hypertension of pregnancy, antepartum     PIH with last birth     Uncomplicated asthma      Past Surgical History:   Procedure Laterality Date     BIOPSY  2016    EDG with biopsy     BREAST SURGERY      Bilateral reduction      SECTION       DENTAL SURGERY  2010 and 10/12/2010    Root canals     DILATION AND CURETTAGE, HYSTEROSCOPY, ABLATE ENDOMETRIUM NOVASURE, COMBINED  3/20/2012    Procedure:COMBINED DILATION AND CURETTAGE, HYSTEROSCOPY, ABLATE ENDOMETRIUM NOVASURE; Hysteroscopy with Endometrial Ablation Novasure; Surgeon:GERRI PURCELL; Location:[facility]     ESOPHAGOSCOPY, GASTROSCOPY, DUODENOSCOPY (EGD), COMBINED N/A  2/18/2016    Procedure: COMBINED ESOPHAGOSCOPY, GASTROSCOPY, DUODENOSCOPY (EGD);  Surgeon: Jose L Wilson MD;  Location: [facility]     INSERT PORT VASCULAR ACCESS N/A 1/11/2017    Procedure: INSERT PORT VASCULAR ACCESS;  Surgeon: Michael Townsend MD;  Location: [facility]     LAPAROSCOPIC SALPINGO-OOPHORECTOMY Bilateral 11/28/2017    Procedure: LAPAROSCOPIC SALPINGO-OOPHORECTOMY;  Laparoscopic bilateral salpingo oophorectomy;  Surgeon: Preeti Tirado MD;  Location: [facility]     RECONSTRUCT BREAST BILATERAL, IMPLANT PROSTHESIS BILATERAL, COMBINED Bilateral 3/16/2018    Procedure: COMBINED RECONSTRUCT BREAST BILATERAL, IMPLANT PROSTHESIS BILATERAL;  BILATERAL SECOND STAGE BREAST RECONSTRUCTION WITH SILICONE GEL IMPLANT;  Surgeon: Eugenio Ruelas MD;  [facility]     REVISE RECONSTRUCTED BREAST       SURGICAL HISTORY OF -   1997    Breast reduction     ZZC APPENDECTOMY  1991     Current Outpatient Medications   Medication Sig Dispense Refill     Ascorbic Acid (VITAMIN C PO)        aspirin (ASA) 325 MG EC tablet Take 325 mg by mouth every 6 hours as needed for moderate pain       cetirizine (ZYRTEC) 10 MG tablet Take 10 mg by mouth every morning       cholecalciferol (VITAMIN D3) 1000 units (25 mcg) capsule Take 1 capsule by mouth daily       Fluticasone Propionate (FLONASE NA) Spray 1 spray into both nostrils daily        gabapentin (NEURONTIN) 300 MG capsule Take 1 capsule (300 mg) by mouth 3 times daily 270 capsule 3     letrozole (FEMARA) 2.5 MG tablet Take 1 tablet (2.5 mg) by mouth daily 120 tablet 1     Multiple Vitamins-Minerals (MULTIVITAMIN PO) Take 1 tablet by mouth daily        nitroFURantoin macrocrystal (MACRODANTIN) 50 MG capsule TAKE 1 CAPSULE WITHIN 30 MIN OF INTERCOURSE AS NEEDED, EITHER BEFORE OR AFTER. 30 capsule 4     omeprazole (PRILOSEC) 20 MG DR capsule TAKE ONE CAPSULE BY MOUTH EVERY MORNING 30 MINUTES BEFORE BREAKFAST 90 capsule 3     phentermine (ADIPEX-P) 37.5 MG  "tablet Take 1/2 - 1 tablet with breakfast every morning. 90 tablet 0     phentermine (ADIPEX-P) 37.5 MG tablet Take 1/2 tablet every morning with breakfast. Increase to 1 tablet after 1 week if needed. 90 tablet 0     topiramate (TOPAMAX) 25 MG tablet Take 1 tablet (25 mg) by mouth daily for 7 days, THEN 2 tablets (50 mg) daily for 7 days, THEN 3 tablets (75 mg) daily for 76 days. 270 tablet 0     venlafaxine (EFFEXOR XR) 150 MG 24 hr capsule Take 1 capsule (150 mg) by mouth daily 90 capsule 3     venlafaxine (EFFEXOR XR) 75 MG 24 hr capsule Take with 150mg capsule for total dose of 225mg 90 capsule 3     YUVAFEM 10 MCG TABS vaginal tablet INSERT ONCE TABLET VAGINALLY TWICE A WEEK 20 tablet 0       Allergies   Allergen Reactions     Carboplatin Shortness Of Breath, Rash and Anaphylaxis     Other reaction(s): Flushing, Tachycardia     Ambien      hallucinations     Amoxicillin GI Disturbance     Erythromycin GI Disturbance     Hydrocodone-Acetaminophen      Other reaction(s): Hallucinations     Hydromorphone Headache     Penicillins Nausea and Vomiting and Hives     --16 pt states this was a childhood reaction     Zolpidem      Other reaction(s): Hallucinations     Hydrocodone Other (See Comments)     Out of body experience, \"creepy-crawly\" skin         Social History     Tobacco Use     Smoking status: Former     Packs/day: 0.50     Years: 10.00     Pack years: 5.00     Types: Cigarettes     Quit date: 10/1/2016     Years since quittin.1     Smokeless tobacco: Never     Tobacco comments:     Socially- quit smoking 10/01/2016   Substance Use Topics     Alcohol use: Yes     Alcohol/week: 0.0 standard drinks     Comment: Occassional         Objective     /89   Pulse 92   Temp 98  F (36.7  C) (Tympanic)   Ht 1.626 m (5' 4\")   Wt 90.7 kg (200 lb)   LMP 2016 (Exact Date)   BMI 34.33 kg/m    BP Readings from Last 6 Encounters:   22 (!) 139/100   10/18/22 130/89   22 (!) 123/91 "   07/12/22 118/80   04/28/22 134/85   04/12/22 129/81       Physical Exam    GENERAL APPEARANCE: healthy, alert and no distress     EYES: EOMI, PERRL     HENT: ear canals and TM's normal and nose and mouth without ulcers or lesions     NECK: no adenopathy, no asymmetry, masses, or scars and thyroid normal to palpation     RESP: lungs clear to auscultation - no rales, rhonchi or wheezes     CV: regular rates and rhythm, normal S1 S2, no S3 or S4 and no murmur, click or rub     ABDOMEN:  soft, nontender, no HSM or masses and bowel sounds normal     MS: extremities normal- no gross deformities noted, no evidence of inflammation in joints, FROM in all extremities.     SKIN: no suspicious lesions or rashes     NEURO: Normal strength and tone, sensory exam grossly normal, mentation intact and speech normal     PSYCH: mentation appears normal. and affect normal/bright     LYMPHATICS: No cervical adenopathy    Recent Labs   Lab Test 10/18/22  1359 07/11/22  0800 07/11/22  0759   HGB 13.9  --  13.9     --  334    140  --    POTASSIUM 4.0 4.0  --    CR 0.78 0.64  --    A1C  --   --  5.5        Diagnostics:  Results for orders placed or performed in visit on 12/06/22   CBC with platelets     Status: Normal   Result Value Ref Range    WBC Count 10.0 4.0 - 11.0 10e3/uL    RBC Count 4.60 3.80 - 5.20 10e6/uL    Hemoglobin 13.1 11.7 - 15.7 g/dL    Hematocrit 40.3 35.0 - 47.0 %    MCV 88 78 - 100 fL    MCH 28.5 26.5 - 33.0 pg    MCHC 32.5 31.5 - 36.5 g/dL    RDW 13.8 10.0 - 15.0 %    Platelet Count 346 150 - 450 10e3/uL   Basic metabolic panel  (Ca, Cl, CO2, Creat, Gluc, K, Na, BUN)     Status: Abnormal   Result Value Ref Range    Sodium 143 136 - 145 mmol/L    Potassium 4.2 3.4 - 5.3 mmol/L    Chloride 101 98 - 107 mmol/L    Carbon Dioxide (CO2) 30 (H) 22 - 29 mmol/L    Anion Gap 12 7 - 15 mmol/L    Urea Nitrogen 8.5 6.0 - 20.0 mg/dL    Creatinine 0.75 0.51 - 0.95 mg/dL    Calcium 10.2 (H) 8.6 - 10.0 mg/dL    Glucose  105 (H) 70 - 99 mg/dL    GFR Estimate >90 >60 mL/min/1.73m2          No EKG required, no history of coronary heart disease, significant arrhythmia, peripheral arterial disease or other structural heart disease.    Revised Cardiac Risk Index (RCRI):  The patient has the following serious cardiovascular risks for perioperative complications:   - No serious cardiac risks = 0 points     RCRI Interpretation: 0 points: Class I (very low risk - 0.4% complication rate)           Signed Electronically by: Yenifer Peters MD  Copy of this evaluation report is provided to requesting physician.

## 2022-12-06 NOTE — PROGRESS NOTES
[facility]  [address] [facility] [address]  Phone: [phone]  Primary Provider: Tyson Peters  Pre-op Performing Provider: TYSON PETERS      PREOPERATIVE EVALUATION:  Today's date: 12/6/2022    Diana Wilson is a 42 year old female who presents for a preoperative evaluation.    Surgical Information:  Surgery/Procedure: CREATION, VAGINAL SLING, WITH CYSTOSCOPY (support the urethra with mesh sling and look in the bladder)  Surgery Location: [facility]   Surgeon: Dr. Harris  Surgery Date: 12/12/2022  Time of Surgery: 1:55pm  Where patient plans to recover: At home with family  Fax number for surgical facility: Note does not need to be faxed, will be available electronically in Epic.    Type of Anesthesia Anticipated: MAC    Assessment & Plan     The proposed surgical procedure is considered INTERMEDIATE risk.    Pre-op exam      BP slightly high today - she will hold sudafed and phentermine and recheck home bps     - CBC with platelets; Future  - Basic metabolic panel  (Ca, Cl, CO2, Creat, Gluc, K, Na, BUN); Future  - CBC with platelets  - Basic metabolic panel  (Ca, Cl, CO2, Creat, Gluc, K, Na, BUN)    Stress incontinence    - CBC with platelets; Future  - Basic metabolic panel  (Ca, Cl, CO2, Creat, Gluc, K, Na, BUN); Future  - CBC with platelets  - Basic metabolic panel  (Ca, Cl, CO2, Creat, Gluc, K, Na, BUN)    Iron deficiency anemia, unspecified iron deficiency anemia type  History of     Malignant neoplasm of upper-outer quadrant of right breast in female, estrogen receptor positive (H)      Recurrent UTI      Multiple subsegmental pulmonary emboli without acute cor pulmonale (H)  Post op, two years ago     Acute sinusitis with symptoms > 10 days  Because she has an upcoming surgery, will go ahead and treat with antibiotics The patient indicates understanding of these issues and agrees with the plan.   - azithromycin (ZITHROMAX) 250 MG tablet; Take 2 tablets (500  mg) by mouth daily for 1 day, THEN 1 tablet (250 mg) daily for 4 days.        Risks and Recommendations:  The patient has the following additional risks and recommendations for perioperative complications:   - No identified additional risk factors other than previously addressed    Medication Instructions:  Patient is to take all scheduled medications on the day of surgery     Hold asa starting today.   No nsaids     RECOMMENDATION:  APPROVAL GIVEN to proceed with proposed procedure, without further diagnostic evaluation.        Subjective     HPI related to upcoming procedure: ongoing worsening stress incontinence    Preop Questions 12/6/2022   1. Have you ever had a heart attack or stroke? No   2. Have you ever had surgery on your heart or blood vessels, such as a stent placement, a coronary artery bypass, or surgery on an artery in your head, neck, heart, or legs? No   3. Do you have chest pain with activity? No   4. Do you have a history of  heart failure? No   5. Do you currently have a cold, bronchitis or symptoms of other infection? Sinus congestion x one month. Pressure, headache, cheek pain.    6. Do you have a cough, shortness of breath, or wheezing? No   7. Do you or anyone in your family have previous history of blood clots? YES - personal history of blood clot in 2020   8. Do you or does anyone in your family have a serious bleeding problem such as prolonged bleeding following surgeries or cuts? No   9. Have you ever had problems with anemia or been told to take iron pills? YES - history of    10. Have you had any abnormal blood loss such as black, tarry or bloody stools, or abnormal vaginal bleeding? No   11. Have you ever had a blood transfusion? YES - 2020   11a. Have you ever had a transfusion reaction? No   12. Are you willing to have a blood transfusion if it is medically needed before, during, or after your surgery? Yes    13. Have you or any of your relatives ever had problems with anesthesia?  YES -  Slow to wake up, says she got a bit adversarial after her last procedure   14. Do you have sleep apnea, excessive snoring or daytime drowsiness? No   15. Do you have any artifical heart valves or other implanted medical devices like a pacemaker, defibrillator, or continuous glucose monitor? No   16. Do you have artificial joints? No   17. Are you allergic to latex? No   18. Is there any chance that you may be pregnant? No       Health Care Directive:  Patient does not have a Health Care Directive or Living Will: Patient states has Advance Directive and will bring in a copy to clinic.    Preoperative Review of :   reviewed - controlled substances reflected in medication list. - phentermine    Blood pressure  Running high  Took phentermine and sudafed today     Sinus pressure x 3 weeks - persistent  Not resolving     Status of Chronic Conditions:  See problem list for active medical problems.  Problems all longstanding and stable, except as noted/documented.  See ROS for pertinent symptoms related to these conditions.      Review of Systems  Constitutional, neuro, ENT, endocrine, pulmonary, cardiac, gastrointestinal, genitourinary, musculoskeletal, integument and psychiatric systems are negative, except as otherwise noted.    Patient Active Problem List    Diagnosis Date Noted     Stress incontinence 09/26/2022     Priority: Medium     Added automatically from request for surgery [id number]       Recurrent UTI 04/12/2022     Priority: Medium     Celiac disease 08/03/2021     Priority: Medium     Family history of colon cancer 07/20/2021     Priority: Medium     Multiple extended family members with colon ca          Breast reconstruction disproportion 02/25/2021     Priority: Medium     Multiple subsegmental pulmonary emboli without acute cor pulmonale (H) 01/28/2021     Priority: Medium     Diagnosed 9/2020 - was post op        Osteopenia of multiple sites 04/23/2019     Priority: Medium     Malignant  neoplasm of upper-outer quadrant of right breast in female, estrogen receptor positive (H) 03/08/2018     Priority: Medium     ER positive in breast  triple neg in lymph        Lymphedema of right upper extremity 11/24/2017     Priority: Medium     Intestinal malabsorption 02/16/2016     Priority: Medium     Anxiety 07/15/2014     Priority: Medium     Insomnia 07/17/2012     Priority: Medium     Iron deficiency anemia 03/14/2012     Priority: Medium     Major depressive disorder, recurrent episode, mild (H) 08/05/2011     Priority: Medium      mainly postpartum, had tried Zoloft and anafranil    [facility] enrolled pt in 6 months pt ed program          CARDIOVASCULAR SCREENING; LDL GOAL LESS THAN 130 10/31/2010     Priority: Medium     Obesity 09/08/2008     Priority: Medium     Wt Readings from Last 5 Encounters:   07/11/11 172 lb (78.019 kg)   12/20/10 174 lb (78.926 kg)   07/26/10 166 lb (75.297 kg)   11/12/09 170 lb (77.111 kg)   11/03/09 169 lb (76.658 kg)     Body mass index is 29.99 kg/(m^2).        Health Care Home 05/14/2013     Priority: Low     EMERGENCY CARE PLAN  May 14, 2013: No current Care Coordination follow up planned. Please refer if Care Coordination services are needed.    Presenting Problem Signs and Symptoms Treatment Plan   Questions or concerns   during clinic hours   I will call my clinic directly:  [address] [id number] [phone].    Questions or concerns outside clinic hours   I will call the 24 hour nurse line at   [phone] or [facility].   Need to schedule an appointment   I will call the 24 hour scheduling team at [phone] or my clinic directly at [phone].    Same day treatment     I will call my clinic first, nurse line if after hours, urgent care and express care if needed.   Clinic care coordination services (regular clinic hours)     I will call a clinic care coordinator directly:     Darron Gaming RN  Mon, Tues, Fri -  [phone]  Wed, Thurs - [phone]    LAKESHIA Smith:    [phone]    Or call my clinic at [phone] and ask to speak with care coordination.   Crisis Services: Behavioral or Mental Health  [facility] 24 Hour Phone Line  [phone]    [facility] 24 Hour Crisis Services  [phone]    [facility] (Behavioral Health Providers) Network [phone]    [facility]   [phone]       Emergency treatment -- Immediately    CAll 911               Past Medical History:   Diagnosis Date     Anxiety      Breast cancer (H)      Depressive disorder     And Anxiety     Encounter for insertion or removal of intrauterine contraceptive device 2008     Excessive or frequent menstruation 2003     Fibroadenosis of breast      GERD (gastroesophageal reflux disease)      Invasive ductal carcinoma of breast, right (H)      Malignant neoplasm of upper-outer quadrant of right female breast (H)      Mild intermittent asthma            never has had PFT's, MDI with colds and at times exercise     Neutropenia, drug-induced (H)      Nongonococcal urethritis (JALIL) due to chlamydia trachomatis 2002     Other and unspecified ovarian cyst 2003    RIGHT ovarian cyst     PONV (postoperative nausea and vomiting)      Skin cancer      Transient hypertension of pregnancy, antepartum     PIH with last birth     Uncomplicated asthma      Past Surgical History:   Procedure Laterality Date     BIOPSY  2016    EDG with biopsy     BREAST SURGERY      Bilateral reduction      SECTION       DENTAL SURGERY  2010 and 10/12/2010    Root canals     DILATION AND CURETTAGE, HYSTEROSCOPY, ABLATE ENDOMETRIUM NOVASURE, COMBINED  3/20/2012    Procedure:COMBINED DILATION AND CURETTAGE, HYSTEROSCOPY, ABLATE ENDOMETRIUM NOVASURE; Hysteroscopy with Endometrial Ablation Novasure; Surgeon:GERRI PURCELL; Location:[facility]     ESOPHAGOSCOPY, GASTROSCOPY, DUODENOSCOPY (EGD), COMBINED N/A  2/18/2016    Procedure: COMBINED ESOPHAGOSCOPY, GASTROSCOPY, DUODENOSCOPY (EGD);  Surgeon: Jose L Wilson MD;  Location: [facility]     INSERT PORT VASCULAR ACCESS N/A 1/11/2017    Procedure: INSERT PORT VASCULAR ACCESS;  Surgeon: Michael Townsend MD;  Location: [facility]     LAPAROSCOPIC SALPINGO-OOPHORECTOMY Bilateral 11/28/2017    Procedure: LAPAROSCOPIC SALPINGO-OOPHORECTOMY;  Laparoscopic bilateral salpingo oophorectomy;  Surgeon: Preeti Tirado MD;  Location: [facility]     RECONSTRUCT BREAST BILATERAL, IMPLANT PROSTHESIS BILATERAL, COMBINED Bilateral 3/16/2018    Procedure: COMBINED RECONSTRUCT BREAST BILATERAL, IMPLANT PROSTHESIS BILATERAL;  BILATERAL SECOND STAGE BREAST RECONSTRUCTION WITH SILICONE GEL IMPLANT;  Surgeon: Eugenio Ruelas MD;  Location: [facility]     REVISE RECONSTRUCTED BREAST       SURGICAL HISTORY OF -   1997    Breast reduction     ZZC APPENDECTOMY  1991     Current Outpatient Medications   Medication Sig Dispense Refill     Ascorbic Acid (VITAMIN C PO)        aspirin (ASA) 325 MG EC tablet Take 325 mg by mouth every 6 hours as needed for moderate pain       cetirizine (ZYRTEC) 10 MG tablet Take 10 mg by mouth every morning       cholecalciferol (VITAMIN D3) 1000 units (25 mcg) capsule Take 1 capsule by mouth daily       Fluticasone Propionate (FLONASE NA) Spray 1 spray into both nostrils daily        gabapentin (NEURONTIN) 300 MG capsule Take 1 capsule (300 mg) by mouth 3 times daily 270 capsule 3     letrozole (FEMARA) 2.5 MG tablet Take 1 tablet (2.5 mg) by mouth daily 120 tablet 1     Multiple Vitamins-Minerals (MULTIVITAMIN PO) Take 1 tablet by mouth daily        nitroFURantoin macrocrystal (MACRODANTIN) 50 MG capsule TAKE 1 CAPSULE WITHIN 30 MIN OF INTERCOURSE AS NEEDED, EITHER BEFORE OR AFTER. 30 capsule 4     omeprazole (PRILOSEC) 20 MG DR capsule TAKE ONE CAPSULE BY MOUTH EVERY MORNING 30 MINUTES BEFORE BREAKFAST 90 capsule 3     phentermine (ADIPEX-P) 37.5 MG  "tablet Take 1/2 - 1 tablet with breakfast every morning. 90 tablet 0     phentermine (ADIPEX-P) 37.5 MG tablet Take 1/2 tablet every morning with breakfast. Increase to 1 tablet after 1 week if needed. 90 tablet 0     topiramate (TOPAMAX) 25 MG tablet Take 1 tablet (25 mg) by mouth daily for 7 days, THEN 2 tablets (50 mg) daily for 7 days, THEN 3 tablets (75 mg) daily for 76 days. 270 tablet 0     venlafaxine (EFFEXOR XR) 150 MG 24 hr capsule Take 1 capsule (150 mg) by mouth daily 90 capsule 3     venlafaxine (EFFEXOR XR) 75 MG 24 hr capsule Take with 150mg capsule for total dose of 225mg 90 capsule 3     YUVAFEM 10 MCG TABS vaginal tablet INSERT ONCE TABLET VAGINALLY TWICE A WEEK 20 tablet 0       Allergies   Allergen Reactions     Carboplatin Shortness Of Breath, Rash and Anaphylaxis     Other reaction(s): Flushing, Tachycardia     Ambien      hallucinations     Amoxicillin GI Disturbance     Erythromycin GI Disturbance     Hydrocodone-Acetaminophen      Other reaction(s): Hallucinations     Hydromorphone Headache     Penicillins Nausea and Vomiting and Hives     --16 pt states this was a childhood reaction     Zolpidem      Other reaction(s): Hallucinations     Hydrocodone Other (See Comments)     Out of body experience, \"creepy-crawly\" skin         Social History     Tobacco Use     Smoking status: Former     Packs/day: 0.50     Years: 10.00     Pack years: 5.00     Types: Cigarettes     Quit date: 10/1/2016     Years since quittin.1     Smokeless tobacco: Never     Tobacco comments:     Socially- quit smoking 10/01/2016   Substance Use Topics     Alcohol use: Yes     Alcohol/week: 0.0 standard drinks     Comment: Occassional         Objective     /89   Pulse 92   Temp 98  F (36.7  C) (Tympanic)   Ht 1.626 m (5' 4\")   Wt 90.7 kg (200 lb)   LMP 2016 (Exact Date)   BMI 34.33 kg/m    BP Readings from Last 6 Encounters:   22 (!) 139/100   10/18/22 130/89   22 (!) 123/91 "   07/12/22 118/80   04/28/22 134/85   04/12/22 129/81       Physical Exam    GENERAL APPEARANCE: healthy, alert and no distress     EYES: EOMI, PERRL     HENT: ear canals and TM's normal and nose and mouth without ulcers or lesions     NECK: no adenopathy, no asymmetry, masses, or scars and thyroid normal to palpation     RESP: lungs clear to auscultation - no rales, rhonchi or wheezes     CV: regular rates and rhythm, normal S1 S2, no S3 or S4 and no murmur, click or rub     ABDOMEN:  soft, nontender, no HSM or masses and bowel sounds normal     MS: extremities normal- no gross deformities noted, no evidence of inflammation in joints, FROM in all extremities.     SKIN: no suspicious lesions or rashes     NEURO: Normal strength and tone, sensory exam grossly normal, mentation intact and speech normal     PSYCH: mentation appears normal. and affect normal/bright     LYMPHATICS: No cervical adenopathy    Recent Labs   Lab Test 10/18/22  1359 07/11/22  0800 07/11/22  0759   HGB 13.9  --  13.9     --  334    140  --    POTASSIUM 4.0 4.0  --    CR 0.78 0.64  --    A1C  --   --  5.5        Diagnostics:  Results for orders placed or performed in visit on 12/06/22   CBC with platelets     Status: Normal   Result Value Ref Range    WBC Count 10.0 4.0 - 11.0 10e3/uL    RBC Count 4.60 3.80 - 5.20 10e6/uL    Hemoglobin 13.1 11.7 - 15.7 g/dL    Hematocrit 40.3 35.0 - 47.0 %    MCV 88 78 - 100 fL    MCH 28.5 26.5 - 33.0 pg    MCHC 32.5 31.5 - 36.5 g/dL    RDW 13.8 10.0 - 15.0 %    Platelet Count 346 150 - 450 10e3/uL   Basic metabolic panel  (Ca, Cl, CO2, Creat, Gluc, K, Na, BUN)     Status: Abnormal   Result Value Ref Range    Sodium 143 136 - 145 mmol/L    Potassium 4.2 3.4 - 5.3 mmol/L    Chloride 101 98 - 107 mmol/L    Carbon Dioxide (CO2) 30 (H) 22 - 29 mmol/L    Anion Gap 12 7 - 15 mmol/L    Urea Nitrogen 8.5 6.0 - 20.0 mg/dL    Creatinine 0.75 0.51 - 0.95 mg/dL    Calcium 10.2 (H) 8.6 - 10.0 mg/dL    Glucose  105 (H) 70 - 99 mg/dL    GFR Estimate >90 >60 mL/min/1.73m2          No EKG required, no history of coronary heart disease, significant arrhythmia, peripheral arterial disease or other structural heart disease.    Revised Cardiac Risk Index (RCRI):  The patient has the following serious cardiovascular risks for perioperative complications:   - No serious cardiac risks = 0 points     RCRI Interpretation: 0 points: Class I (very low risk - 0.4% complication rate)           Signed Electronically by: Yenifer Peters MD  Copy of this evaluation report is provided to requesting physician.

## 2022-12-09 ENCOUNTER — ANESTHESIA EVENT (OUTPATIENT)
Dept: SURGERY | Facility: AMBULATORY SURGERY CENTER | Age: 42
End: 2022-12-09
Payer: COMMERCIAL

## 2022-12-09 RX ORDER — FENTANYL CITRATE 50 UG/ML
25 INJECTION, SOLUTION INTRAMUSCULAR; INTRAVENOUS EVERY 5 MIN PRN
Status: CANCELLED | OUTPATIENT
Start: 2022-12-09

## 2022-12-09 RX ORDER — FENTANYL CITRATE 50 UG/ML
25 INJECTION, SOLUTION INTRAMUSCULAR; INTRAVENOUS
Status: CANCELLED | OUTPATIENT
Start: 2022-12-09

## 2022-12-09 RX ORDER — SODIUM CHLORIDE, SODIUM LACTATE, POTASSIUM CHLORIDE, CALCIUM CHLORIDE 600; 310; 30; 20 MG/100ML; MG/100ML; MG/100ML; MG/100ML
INJECTION, SOLUTION INTRAVENOUS CONTINUOUS
Status: CANCELLED | OUTPATIENT
Start: 2022-12-09

## 2022-12-09 RX ORDER — OXYCODONE HYDROCHLORIDE 5 MG/1
5 TABLET ORAL EVERY 4 HOURS PRN
Status: CANCELLED | OUTPATIENT
Start: 2022-12-09

## 2022-12-09 RX ORDER — HYDRALAZINE HYDROCHLORIDE 20 MG/ML
2.5-5 INJECTION INTRAMUSCULAR; INTRAVENOUS EVERY 10 MIN PRN
Status: CANCELLED | OUTPATIENT
Start: 2022-12-09

## 2022-12-09 RX ORDER — FENTANYL CITRATE 50 UG/ML
50 INJECTION, SOLUTION INTRAMUSCULAR; INTRAVENOUS EVERY 5 MIN PRN
Status: CANCELLED | OUTPATIENT
Start: 2022-12-09

## 2022-12-09 RX ORDER — OXYCODONE HYDROCHLORIDE 5 MG/1
10 TABLET ORAL EVERY 4 HOURS PRN
Status: CANCELLED | OUTPATIENT
Start: 2022-12-09

## 2022-12-09 RX ORDER — ONDANSETRON 4 MG/1
4 TABLET, ORALLY DISINTEGRATING ORAL EVERY 30 MIN PRN
Status: CANCELLED | OUTPATIENT
Start: 2022-12-09

## 2022-12-09 RX ORDER — ONDANSETRON 2 MG/ML
4 INJECTION INTRAMUSCULAR; INTRAVENOUS EVERY 30 MIN PRN
Status: CANCELLED | OUTPATIENT
Start: 2022-12-09

## 2022-12-09 RX ORDER — METOPROLOL TARTRATE 1 MG/ML
1-2 INJECTION, SOLUTION INTRAVENOUS EVERY 5 MIN PRN
Status: CANCELLED | OUTPATIENT
Start: 2022-12-09

## 2022-12-12 ENCOUNTER — ANESTHESIA (OUTPATIENT)
Dept: SURGERY | Facility: AMBULATORY SURGERY CENTER | Age: 42
End: 2022-12-12
Payer: COMMERCIAL

## 2022-12-12 ENCOUNTER — HOSPITAL ENCOUNTER (OUTPATIENT)
Facility: AMBULATORY SURGERY CENTER | Age: 42
Discharge: HOME | End: 2022-12-12
Attending: UROLOGY | Admitting: UROLOGY
Payer: COMMERCIAL

## 2022-12-12 VITALS
TEMPERATURE: 98.1 F | DIASTOLIC BLOOD PRESSURE: 97 MMHG | RESPIRATION RATE: 16 BRPM | SYSTOLIC BLOOD PRESSURE: 124 MMHG | WEIGHT: 200 LBS | BODY MASS INDEX: 34.33 KG/M2 | OXYGEN SATURATION: 98 %

## 2022-12-12 DIAGNOSIS — G89.18 POST-OP PAIN: Primary | ICD-10-CM

## 2022-12-12 DIAGNOSIS — N39.3 STRESS INCONTINENCE: ICD-10-CM

## 2022-12-12 PROCEDURE — 57288 REPAIR BLADDER DEFECT: CPT | Performed by: UROLOGY

## 2022-12-12 PROCEDURE — G8918 PT W/O PREOP ORDER IV AB PRO: HCPCS

## 2022-12-12 PROCEDURE — 57288 REPAIR BLADDER DEFECT: CPT

## 2022-12-12 PROCEDURE — G8907 PT DOC NO EVENTS ON DISCHARG: HCPCS

## 2022-12-12 PROCEDURE — C1771 REP DEV, URINARY, W/SLING: HCPCS

## 2022-12-12 DEVICE — SIS SYSTEM
Type: IMPLANTABLE DEVICE | Site: VAGINA | Status: FUNCTIONAL
Brand: SOLYX™ SIS SYSTEM

## 2022-12-12 RX ORDER — FENTANYL CITRATE 50 UG/ML
INJECTION, SOLUTION INTRAMUSCULAR; INTRAVENOUS PRN
Status: DISCONTINUED | OUTPATIENT
Start: 2022-12-12 | End: 2022-12-12

## 2022-12-12 RX ORDER — PROPOFOL 10 MG/ML
INJECTION, EMULSION INTRAVENOUS PRN
Status: DISCONTINUED | OUTPATIENT
Start: 2022-12-12 | End: 2022-12-12

## 2022-12-12 RX ORDER — LIDOCAINE HYDROCHLORIDE 20 MG/ML
INJECTION, SOLUTION INFILTRATION; PERINEURAL PRN
Status: DISCONTINUED | OUTPATIENT
Start: 2022-12-12 | End: 2022-12-12

## 2022-12-12 RX ORDER — BUPIVACAINE HYDROCHLORIDE AND EPINEPHRINE 2.5; 5 MG/ML; UG/ML
INJECTION, SOLUTION INFILTRATION; PERINEURAL PRN
Status: DISCONTINUED | OUTPATIENT
Start: 2022-12-12 | End: 2022-12-12 | Stop reason: HOSPADM

## 2022-12-12 RX ORDER — SODIUM CHLORIDE, SODIUM LACTATE, POTASSIUM CHLORIDE, CALCIUM CHLORIDE 600; 310; 30; 20 MG/100ML; MG/100ML; MG/100ML; MG/100ML
INJECTION, SOLUTION INTRAVENOUS CONTINUOUS
Status: DISCONTINUED | OUTPATIENT
Start: 2022-12-12 | End: 2022-12-13 | Stop reason: HOSPADM

## 2022-12-12 RX ORDER — OXYCODONE HYDROCHLORIDE 5 MG/1
5-10 TABLET ORAL EVERY 4 HOURS PRN
Qty: 6 TABLET | Refills: 0 | Status: SHIPPED | OUTPATIENT
Start: 2022-12-12 | End: 2023-01-30

## 2022-12-12 RX ORDER — PROPOFOL 10 MG/ML
INJECTION, EMULSION INTRAVENOUS CONTINUOUS PRN
Status: DISCONTINUED | OUTPATIENT
Start: 2022-12-12 | End: 2022-12-12

## 2022-12-12 RX ORDER — CEFAZOLIN SODIUM 2 G/100ML
2 INJECTION, SOLUTION INTRAVENOUS SEE ADMIN INSTRUCTIONS
Status: DISCONTINUED | OUTPATIENT
Start: 2022-12-12 | End: 2022-12-13 | Stop reason: HOSPADM

## 2022-12-12 RX ORDER — LIDOCAINE 40 MG/G
CREAM TOPICAL
Status: DISCONTINUED | OUTPATIENT
Start: 2022-12-12 | End: 2022-12-13 | Stop reason: HOSPADM

## 2022-12-12 RX ORDER — ACETAMINOPHEN 325 MG/1
975 TABLET ORAL ONCE
Status: COMPLETED | OUTPATIENT
Start: 2022-12-12 | End: 2022-12-12

## 2022-12-12 RX ORDER — SCOLOPAMINE TRANSDERMAL SYSTEM 1 MG/1
1 PATCH, EXTENDED RELEASE TRANSDERMAL ONCE
Status: DISCONTINUED | OUTPATIENT
Start: 2022-12-12 | End: 2022-12-13 | Stop reason: HOSPADM

## 2022-12-12 RX ORDER — OXYCODONE HYDROCHLORIDE 5 MG/1
5 TABLET ORAL ONCE
Status: CANCELLED | OUTPATIENT
Start: 2022-12-12 | End: 2022-12-12

## 2022-12-12 RX ORDER — CEFAZOLIN SODIUM 2 G/100ML
2 INJECTION, SOLUTION INTRAVENOUS
Status: DISCONTINUED | OUTPATIENT
Start: 2022-12-12 | End: 2022-12-13 | Stop reason: HOSPADM

## 2022-12-12 RX ADMIN — ACETAMINOPHEN 975 MG: 325 TABLET ORAL at 12:02

## 2022-12-12 RX ADMIN — PROPOFOL 50 MG: 10 INJECTION, EMULSION INTRAVENOUS at 12:45

## 2022-12-12 RX ADMIN — PROPOFOL 50 MG: 10 INJECTION, EMULSION INTRAVENOUS at 12:35

## 2022-12-12 RX ADMIN — PROPOFOL 150 MCG/KG/MIN: 10 INJECTION, EMULSION INTRAVENOUS at 12:35

## 2022-12-12 RX ADMIN — SCOLOPAMINE TRANSDERMAL SYSTEM 1 PATCH: 1 PATCH, EXTENDED RELEASE TRANSDERMAL at 12:05

## 2022-12-12 RX ADMIN — FENTANYL CITRATE 50 MCG: 50 INJECTION, SOLUTION INTRAMUSCULAR; INTRAVENOUS at 12:49

## 2022-12-12 RX ADMIN — LIDOCAINE HYDROCHLORIDE 100 MG: 20 INJECTION, SOLUTION INFILTRATION; PERINEURAL at 12:31

## 2022-12-12 RX ADMIN — SODIUM CHLORIDE, SODIUM LACTATE, POTASSIUM CHLORIDE, CALCIUM CHLORIDE: 600; 310; 30; 20 INJECTION, SOLUTION INTRAVENOUS at 12:30

## 2022-12-12 NOTE — ANESTHESIA CARE TRANSFER NOTE
Patient: Diana Wilson    Procedure: Procedure(s):  CREATION, VAGINAL SLING, WITH CYSTOSCOPY (support the urethra with mesh sling and look in the bladder)       Diagnosis: Stress incontinence [N39.3]  Diagnosis Additional Information: No value filed.    Anesthesia Type:   MAC     Note:      Level of Consciousness: awake  Oxygen Supplementation: room air    Independent Airway: airway patency satisfactory and stable  Dentition: dentition unchanged  Vital Signs Stable: post-procedure vital signs reviewed and stable  Report to RN Given: handoff report given  Patient transferred to: PACU    Handoff Report: Identifed the Patient, Identified the Reponsible Provider, Reviewed the pertinent medical history, Discussed the surgical course, Reviewed Intra-OP anesthesia mangement and issues during anesthesia, Set expectations for post-procedure period and Allowed opportunity for questions and acknowledgement of understanding      Vitals:  Vitals Value Taken Time   /76    Temp 97    Pulse 76    Resp 12    SpO2 97        Electronically Signed By: COLLEEN Izquierdo CRNA  December 12, 2022  1:05 PM

## 2022-12-12 NOTE — ANESTHESIA PREPROCEDURE EVALUATION
Anesthesia Pre-Procedure Evaluation    Patient: Diana Wilson   MRN: [id number] : 1980        Procedure : Procedure(s):  CREATION, VAGINAL SLING, WITH CYSTOSCOPY (support the urethra with mesh sling and look in the bladder)          Past Medical History:   Diagnosis Date     Anxiety      Breast cancer (H)      Celiac disease      Depressive disorder     And Anxiety     Encounter for insertion or removal of intrauterine contraceptive device 2008     Excessive or frequent menstruation 2003     Fibroadenosis of breast      GERD (gastroesophageal reflux disease)      Invasive ductal carcinoma of breast, right (H)      Malignant neoplasm of upper-outer quadrant of right female breast (H)      Mild intermittent asthma            never has had PFT's, MDI with colds and at times exercise     Neutropenia, drug-induced (H)      Nongonococcal urethritis (JALIL) due to chlamydia trachomatis 2002     Other and unspecified ovarian cyst 2003    RIGHT ovarian cyst     PONV (postoperative nausea and vomiting)      Skin cancer      Transient hypertension of pregnancy, antepartum     PIH with last birth     Uncomplicated asthma       Past Surgical History:   Procedure Laterality Date     BIOPSY  2016    EDG with biopsy     BREAST SURGERY      Bilateral reduction      SECTION       DENTAL SURGERY  2010 and 10/12/2010    Root canals     DILATION AND CURETTAGE, HYSTEROSCOPY, ABLATE ENDOMETRIUM NOVASURE, COMBINED  3/20/2012    Procedure:COMBINED DILATION AND CURETTAGE, HYSTEROSCOPY, ABLATE ENDOMETRIUM NOVASURE; Hysteroscopy with Endometrial Ablation Novasure; Surgeon:GERRI PURCELL; Location:[facility]     ESOPHAGOSCOPY, GASTROSCOPY, DUODENOSCOPY (EGD), COMBINED N/A 2016    Procedure: COMBINED ESOPHAGOSCOPY, GASTROSCOPY, DUODENOSCOPY (EGD);  Surgeon: Jose L Wilson MD;  Location: [facility]     INSERT PORT VASCULAR ACCESS N/A 2017    Procedure: INSERT PORT VASCULAR ACCESS;   "Surgeon: Michael Townsend MD;  Location: [facility]     LAPAROSCOPIC SALPINGO-OOPHORECTOMY Bilateral 2017    Procedure: LAPAROSCOPIC SALPINGO-OOPHORECTOMY;  Laparoscopic bilateral salpingo oophorectomy;  Surgeon: Preeti Tirado MD;  Location: [facility]     RECONSTRUCT BREAST BILATERAL, IMPLANT PROSTHESIS BILATERAL, COMBINED Bilateral 3/16/2018    Procedure: COMBINED RECONSTRUCT BREAST BILATERAL, IMPLANT PROSTHESIS BILATERAL;  BILATERAL SECOND STAGE BREAST RECONSTRUCTION WITH SILICONE GEL IMPLANT;  Surgeon: Eugenio Ruelas MD;  Location: [facility]     REVISE RECONSTRUCTED BREAST       SURGICAL HISTORY OF -       Breast reduction     ZC APPENDECTOMY        Allergies   Allergen Reactions     Carboplatin Shortness Of Breath, Rash and Anaphylaxis     Other reaction(s): Flushing, Tachycardia     Ambien      hallucinations     Amoxicillin GI Disturbance     Erythromycin GI Disturbance     Gluten Meal      Celiac disease     Hydrocodone-Acetaminophen      Other reaction(s): Hallucinations     Hydromorphone Headache     Penicillins Nausea and Vomiting and Hives     16 pt states this was a childhood reaction     Zolpidem      Other reaction(s): Hallucinations     Hydrocodone Other (See Comments)     Out of body experience, \"creepy-crawly\" skin       Social History     Tobacco Use     Smoking status: Former     Packs/day: 0.50     Years: 10.00     Pack years: 5.00     Types: Cigarettes     Quit date: 10/1/2016     Years since quittin.2     Smokeless tobacco: Never     Tobacco comments:     Socially- quit smoking 10/01/2016   Substance Use Topics     Alcohol use: Yes     Alcohol/week: 0.0 standard drinks     Comment: Occassional      Wt Readings from Last 1 Encounters:   22 90.7 kg (200 lb)        Anesthesia Evaluation   Pt has had prior anesthetic. Type: General.    History of anesthetic complications  - PONV.      ROS/MED HX  ENT/Pulmonary:     (+) Intermittent, asthma   "   Neurologic:  - neg neurologic ROS     Cardiovascular:     (+) hypertension-----    METS/Exercise Tolerance: >4 METS    Hematologic:  - neg hematologic  ROS     Musculoskeletal:  - neg musculoskeletal ROS     GI/Hepatic:     (+) GERD,     Renal/Genitourinary:  - neg Renal ROS     Endo:     (+) Obesity,     Psychiatric/Substance Use:     (+) psychiatric history depression and anxiety     Infectious Disease:  - neg infectious disease ROS     Malignancy:  - neg malignancy ROS     Other:  - neg other ROS          Physical Exam    Airway  airway exam normal      Mallampati: II   TM distance: > 3 FB   Neck ROM: full   Mouth opening: > 3 cm    Respiratory Devices and Support         Dental  no notable dental history         Cardiovascular   cardiovascular exam normal       Rhythm and rate: regular and normal     Pulmonary   pulmonary exam normal        breath sounds clear to auscultation           OUTSIDE LABS:  CBC:   Lab Results   Component Value Date    WBC 10.0 12/06/2022    WBC 9.1 10/18/2022    HGB 13.1 12/06/2022    HGB 13.9 10/18/2022    HCT 40.3 12/06/2022    HCT 41.1 10/18/2022     12/06/2022     10/18/2022     BMP:   Lab Results   Component Value Date     12/06/2022     10/18/2022    POTASSIUM 4.2 12/06/2022    POTASSIUM 4.0 10/18/2022    CHLORIDE 101 12/06/2022    CHLORIDE 100 10/18/2022    CO2 30 (H) 12/06/2022    CO2 28 10/18/2022    BUN 8.5 12/06/2022    BUN 10.2 10/18/2022    CR 0.75 12/06/2022    CR 0.78 10/18/2022     (H) 12/06/2022    GLC 85 10/18/2022     COAGS:   Lab Results   Component Value Date    PTT 32 10/16/2020    INR 0.94 10/16/2020     POC:   Lab Results   Component Value Date    BGM 89 02/16/2006    HCG Negative 01/11/2017    HCGS Negative 10/16/2020     HEPATIC:   Lab Results   Component Value Date    ALBUMIN 4.7 10/18/2022    PROTTOTAL 7.6 10/18/2022    ALT 18 10/18/2022    AST 15 10/18/2022    ALKPHOS 100 10/18/2022    BILITOTAL 0.3 10/18/2022     OTHER:    Lab Results   Component Value Date    LACT 1.5 10/16/2020    A1C 5.5 07/11/2022    DENY 10.2 (H) 12/06/2022    PHOS 3.8 10/17/2020    MAG 1.5 (L) 10/16/2020    LIPASE 10 10/16/2020    TSH 3.90 07/11/2022    T4 7.8 03/19/2003    CRP 20.5 (H) 10/17/2020    SED 6 10/16/2020       Anesthesia Plan    ASA Status:  3   NPO Status:  NPO Appropriate    Anesthesia Type: MAC.     - Reason for MAC: Deep or markedly invasive procedure (G8)   Induction: Propofol.   Maintenance: N/A.        Consents    Anesthesia Plan(s) and associated risks, benefits, and realistic alternatives discussed. Questions answered and patient/representative(s) expressed understanding.    - Discussed:     - Discussed with:  Patient    Use of blood products discussed: No .     Postoperative Care    Pain management: Multi-modal analgesia, Oral pain medications.   PONV prophylaxis: Ondansetron (or other 5HT-3), Dexamethasone or Solumedrol     Comments:                Tarun Ridley DO

## 2022-12-12 NOTE — OR NURSING
Patient has celiac, brought own snacks, placed in locker for recovery.    Contact precautions for ESBL maintained while in pre-op.

## 2022-12-12 NOTE — OP NOTE
PREOPERATIVE DIAGNOSES: 1. Stress urinary incontinence. 2. Urethral hypermobility.  POSTOPERATIVE DIAGNOSES: Same  PROCEDURE PERFORMED: 1. Mid urethral sling ([employer] Solyx). 2. Cystoscopy.     ATTENDING PHYSICIAN: Sixot Harris MD   ANESTHESIA: Monitored anesthesia care with 5 mL of 1% lidocaine with epinephrine and 10 mL Xylocaine jelly per urethra.   ESTIMATED BLOOD LOSS: 10 mL.   FINDINGS:  No mesh in the bladder  SPECIMENS:  None      HISTORY OF PRESENT ILLNESS: Diana Wilson is a 42 year old year-old woman with a history of stress urinary incontinence. Differing management options were discussed with the patient and she has elected to proceed with a mid urethral sling.    DESCRIPTION OF PROCEDURE: After informed consent was obtained, the patient was identified, marked and taken to the operating room in her usual state of health. After induction with monitored anesthesia care, she was positioned in modified lithotomy position. Pneumo boots were placed. A timeout was performed to ensure proper patient, procedure and positioning. The patient received 1 gram of Ancef prior to initiation of surgery. She was prepped with Betadine. Xylocaine jelly of 10 mL were inserted into the urethra and a 18-Tajik Simon catheter was placed.     An Allis clamp was used to grasp the vaginal mucosa approximately 1 cm from the urethral meatus at the mid urethra. A 25-gauge needle, we injected 10 mL of 1% lidocaine with epinephrine to hydrodissect the tissues. We then made a 1-cm incision through the vaginal mucosa with a #15 blade. We then dissected this laterally out passed the fornices of the vagina in the pubocervical fascia, out laterally to the pelvic side wall, first on the right-hand side and then on the left hand side. We then placed the Solyx sling on the trocar and placed this first through the obturator membrane on the left side and then deployed it and then placed it on the right hand side. The sling lay  flat.  The sling was placed under appropriate tension.  We then placed a 20-Chinese cystoscope through the urethra and into the bladder, noting bilateral ureteral orifices. The floor of the bladder was intact as well as the bladder neck and the lateral edges, no mesh was seen. The urethra was without tape. We then withdrew the cystoscope with a full bladder and pressed on the suprapubic region and did not note any incontinence from the urethral meatus. At this time the sling on the right side was deployed and  the wound was irrigated.  A running 2-0 Vicryl was then used to reapproximate the mucosa of the vagina. The patient was then returned to the supine position and transported to recovery in stable condition.     ASSESSMENT AND PLAN: Diana Wilson is a 42 year old year-old woman who underwent a midurethral sling for stress urinary incontinence. We will plan to follow up with her in 2 weeks in [facility]. Obtain residual urine and evaluate for obstructive urinary symptoms.     Sixto Harris MD

## 2022-12-12 NOTE — ANESTHESIA POSTPROCEDURE EVALUATION
Patient: Diana Wilson    Procedure: Procedure(s):  CREATION, VAGINAL SLING, WITH CYSTOSCOPY (support the urethra with mesh sling and look in the bladder)       Anesthesia Type:  MAC    Note:  Disposition: Outpatient   Postop Pain Control: Uneventful            Sign Out: Well controlled pain   PONV: No   Neuro/Psych: Uneventful            Sign Out: Acceptable/Baseline neuro status   Airway/Respiratory: Uneventful            Sign Out: Acceptable/Baseline resp. status   CV/Hemodynamics: Uneventful            Sign Out: Acceptable CV status; No obvious hypovolemia; No obvious fluid overload   Other NRE: NONE   DID A NON-ROUTINE EVENT OCCUR? No           Last vitals:  Vitals Value Taken Time   /92 12/12/22 1330   Temp     Pulse     Resp 16 12/12/22 1330   SpO2 98 % 12/12/22 1330       Electronically Signed By: Tarun Ridley DO  December 12, 2022  2:14 PM

## 2022-12-12 NOTE — DISCHARGE INSTRUCTIONS
While you were at the hospital today you received 975 mg Tylenol at 12:00 pm. Maximum daily dosage is 4000 mg.      [facility]   Adult Discharge Orders & Instructions     For 24 hours after surgery    Get plenty of rest.  A responsible adult must stay with you for at least 24 hours after you leave the hospital.   Do not drive or use heavy equipment.  If you have weakness or tingling, don't drive or use heavy equipment until this feeling goes away.  Do not drink alcohol.  Avoid strenuous or risky activities.  Ask for help when climbing stairs.   You may feel lightheaded.  IF so, sit for a few minutes before standing.  Have someone help you get up.   If you have nausea (feel sick to your stomach): Drink only clear liquids such as apple juice, ginger ale, broth or 7-Up.  Rest may also help.  Be sure to drink enough fluids.  Move to a regular diet as you feel able.  You may have a slight fever. Call the doctor if your fever is over 100 F (37.7 C) (taken under the tongue) or lasts longer than 24 hours.  You may have a dry mouth, a sore throat, muscle aches or trouble sleeping.  These should go away after 24 hours.  Do not make important or legal decisions.     Call your doctor for any of the followin.  Signs of infection (fever, growing tenderness at the surgery site, a large amount of drainage or bleeding, severe pain, foul-smelling drainage, redness, swelling).    2. It has been over 8 to 10 hours since surgery and you are still not able to urinate (pass water).    3.  Headache for over 24 hours.    4.  Signs of Covid-19 infection (temperature over 100 degrees, shortness of breath, cough, loss of taste/smell, generalized body aches, persistent headache,                  chills, sore throat, nausea/vomiting/diarrhea).

## 2022-12-12 NOTE — INTERVAL H&P NOTE
"I have reviewed the surgical (or preoperative) H&P that is linked to this encounter, and examined the patient. There are no significant changes    Clinical Conditions Present on Arrival:  Clinically Significant Risk Factors Present on Admission            # Hypercalcemia: Highest Ca = 10.2 mg/dL in last 30 days, will monitor as appropriate         # Obesity: Estimated body mass index is 34.33 kg/m  as calculated from the following:    Height as of 12/6/22: 1.626 m (5' 4\").    Weight as of 12/6/22: 90.7 kg (200 lb).       "

## 2022-12-20 ENCOUNTER — HOSPITAL ENCOUNTER (EMERGENCY)
Facility: CLINIC | Age: 42
Discharge: HOME | End: 2022-12-20
Attending: NURSE PRACTITIONER | Admitting: NURSE PRACTITIONER
Payer: COMMERCIAL

## 2022-12-20 ENCOUNTER — APPOINTMENT (OUTPATIENT)
Dept: GENERAL RADIOLOGY | Facility: CLINIC | Age: 42
End: 2022-12-20
Attending: NURSE PRACTITIONER
Payer: COMMERCIAL

## 2022-12-20 ENCOUNTER — MYC MEDICAL ADVICE (OUTPATIENT)
Dept: UROLOGY | Facility: CLINIC | Age: 42
End: 2022-12-20

## 2022-12-20 VITALS
DIASTOLIC BLOOD PRESSURE: 81 MMHG | OXYGEN SATURATION: 96 % | HEIGHT: 64 IN | SYSTOLIC BLOOD PRESSURE: 113 MMHG | WEIGHT: 200 LBS | HEART RATE: 94 BPM | TEMPERATURE: 97.9 F | BODY MASS INDEX: 34.15 KG/M2

## 2022-12-20 DIAGNOSIS — R82.90 ABNORMAL URINE: ICD-10-CM

## 2022-12-20 DIAGNOSIS — R06.02 SHORTNESS OF BREATH: ICD-10-CM

## 2022-12-20 LAB
ALBUMIN UR-MCNC: NEGATIVE MG/DL
ANION GAP SERPL CALCULATED.3IONS-SCNC: 10 MMOL/L (ref 7–15)
APPEARANCE UR: CLEAR
BASOPHILS # BLD AUTO: 0 10E3/UL (ref 0–0.2)
BASOPHILS NFR BLD AUTO: 0 %
BILIRUB UR QL STRIP: NEGATIVE
BUN SERPL-MCNC: 9.6 MG/DL (ref 6–20)
CALCIUM SERPL-MCNC: 8.8 MG/DL (ref 8.6–10)
CHLORIDE SERPL-SCNC: 101 MMOL/L (ref 98–107)
COLOR UR AUTO: YELLOW
CREAT SERPL-MCNC: 0.64 MG/DL (ref 0.51–0.95)
D DIMER PPP FEU-MCNC: 0.49 UG/ML FEU (ref 0–0.5)
DEPRECATED HCO3 PLAS-SCNC: 29 MMOL/L (ref 22–29)
EOSINOPHIL # BLD AUTO: 0.2 10E3/UL (ref 0–0.7)
EOSINOPHIL NFR BLD AUTO: 2 %
ERYTHROCYTE [DISTWIDTH] IN BLOOD BY AUTOMATED COUNT: 14 % (ref 10–15)
GFR SERPL CREATININE-BSD FRML MDRD: >90 ML/MIN/1.73M2
GLUCOSE SERPL-MCNC: 90 MG/DL (ref 70–99)
GLUCOSE UR STRIP-MCNC: NEGATIVE MG/DL
HCT VFR BLD AUTO: 40.6 % (ref 35–47)
HGB BLD-MCNC: 13.5 G/DL (ref 11.7–15.7)
HGB UR QL STRIP: ABNORMAL
HOLD SPECIMEN: NORMAL
HOLD SPECIMEN: NORMAL
IMM GRANULOCYTES # BLD: 0 10E3/UL
IMM GRANULOCYTES NFR BLD: 0 %
KETONES UR STRIP-MCNC: NEGATIVE MG/DL
LEUKOCYTE ESTERASE UR QL STRIP: ABNORMAL
LYMPHOCYTES # BLD AUTO: 1.2 10E3/UL (ref 0.8–5.3)
LYMPHOCYTES NFR BLD AUTO: 11 %
MCH RBC QN AUTO: 29 PG (ref 26.5–33)
MCHC RBC AUTO-ENTMCNC: 33.3 G/DL (ref 31.5–36.5)
MCV RBC AUTO: 87 FL (ref 78–100)
MONOCYTES # BLD AUTO: 0.5 10E3/UL (ref 0–1.3)
MONOCYTES NFR BLD AUTO: 5 %
NEUTROPHILS # BLD AUTO: 9 10E3/UL (ref 1.6–8.3)
NEUTROPHILS NFR BLD AUTO: 82 %
NITRATE UR QL: NEGATIVE
NRBC # BLD AUTO: 0 10E3/UL
NRBC BLD AUTO-RTO: 0 /100
PH UR STRIP: 6 [PH] (ref 5–7)
PLATELET # BLD AUTO: 300 10E3/UL (ref 150–450)
POTASSIUM SERPL-SCNC: 3.9 MMOL/L (ref 3.4–5.3)
RBC # BLD AUTO: 4.65 10E6/UL (ref 3.8–5.2)
RBC URINE: 1 /HPF
SODIUM SERPL-SCNC: 140 MMOL/L (ref 136–145)
SP GR UR STRIP: 1 (ref 1–1.03)
SQUAMOUS EPITHELIAL: 1 /HPF
UROBILINOGEN UR STRIP-MCNC: NORMAL MG/DL
WBC # BLD AUTO: 10.9 10E3/UL (ref 4–11)
WBC URINE: 5 /HPF

## 2022-12-20 PROCEDURE — 87086 URINE CULTURE/COLONY COUNT: CPT | Performed by: NURSE PRACTITIONER

## 2022-12-20 PROCEDURE — 96361 HYDRATE IV INFUSION ADD-ON: CPT | Performed by: NURSE PRACTITIONER

## 2022-12-20 PROCEDURE — 71046 X-RAY EXAM CHEST 2 VIEWS: CPT

## 2022-12-20 PROCEDURE — 96360 HYDRATION IV INFUSION INIT: CPT | Performed by: NURSE PRACTITIONER

## 2022-12-20 PROCEDURE — 85379 FIBRIN DEGRADATION QUANT: CPT | Performed by: NURSE PRACTITIONER

## 2022-12-20 PROCEDURE — 81001 URINALYSIS AUTO W/SCOPE: CPT | Performed by: NURSE PRACTITIONER

## 2022-12-20 PROCEDURE — 36415 COLL VENOUS BLD VENIPUNCTURE: CPT | Performed by: NURSE PRACTITIONER

## 2022-12-20 PROCEDURE — 250N000013 HC RX MED GY IP 250 OP 250 PS 637: Performed by: NURSE PRACTITIONER

## 2022-12-20 PROCEDURE — 258N000003 HC RX IP 258 OP 636: Performed by: NURSE PRACTITIONER

## 2022-12-20 PROCEDURE — 99284 EMERGENCY DEPT VISIT MOD MDM: CPT | Mod: 25 | Performed by: NURSE PRACTITIONER

## 2022-12-20 PROCEDURE — 99284 EMERGENCY DEPT VISIT MOD MDM: CPT | Performed by: NURSE PRACTITIONER

## 2022-12-20 PROCEDURE — 80048 BASIC METABOLIC PNL TOTAL CA: CPT | Performed by: NURSE PRACTITIONER

## 2022-12-20 PROCEDURE — 85025 COMPLETE CBC W/AUTO DIFF WBC: CPT | Performed by: NURSE PRACTITIONER

## 2022-12-20 RX ORDER — CEFDINIR 300 MG/1
300 CAPSULE ORAL 2 TIMES DAILY
Qty: 14 CAPSULE | Refills: 0 | Status: SHIPPED | OUTPATIENT
Start: 2022-12-20 | End: 2022-12-27

## 2022-12-20 RX ORDER — ACETAMINOPHEN 500 MG
1000 TABLET ORAL ONCE
Status: COMPLETED | OUTPATIENT
Start: 2022-12-20 | End: 2022-12-20

## 2022-12-20 RX ADMIN — SODIUM CHLORIDE, POTASSIUM CHLORIDE, SODIUM LACTATE AND CALCIUM CHLORIDE 1000 ML: 600; 310; 30; 20 INJECTION, SOLUTION INTRAVENOUS at 14:58

## 2022-12-20 RX ADMIN — ACETAMINOPHEN 1000 MG: 500 TABLET, FILM COATED ORAL at 14:58

## 2022-12-20 ASSESSMENT — ACTIVITIES OF DAILY LIVING (ADL)
ADLS_ACUITY_SCORE: 35
ADLS_ACUITY_SCORE: 35

## 2022-12-20 NOTE — CONFIDENTIAL NOTE
Nithya Rodriguez PA-C  You 3 hours ago (10:29 AM)     Recommend getting UA/UC and PVR to ensure UTI has resolved and to r/o urinary retention. If PVR is WNL, encourage trying positional changes and double voiding techniques to help with symptoms. Advise pushing fluids and taking AZO as well. If symptoms change/worsen such as fevers/chills, gross hematuria, flank pain, advise going to urgent care or ER. Typically the recommendation for resuming estrogen products after surgeries is 2 weeks; please notify patient she can resume Vagifem and coconut oil next week Monday as that will be 2 weeks from her surgery.      Received the above message from Nithya Rodriguez PA-C. My chart message sent to patient.     Erendira Sun RN, BSN

## 2022-12-20 NOTE — DISCHARGE INSTRUCTIONS
Recommend using the incentive spirometer 3 times daily.  Chest x-ray shows no signs of pneumonia.  I recommend using the Tylenol 1000 mg 3 times daily for pain.  I recommend trying to walk around and making sure that you are getting plenty of exercise.  Follow-up with your surgeon as previously recommended.  Proceed to the emergency department if worsening symptoms.  Start the cefdinir today and take this twice daily for 7 days.

## 2022-12-20 NOTE — ED PROVIDER NOTES
"  History     Chief Complaint   Patient presents with     Shortness of Breath     HPI  Diana Wilson is a 42 year old female who presents to the emergency room with concerns of feeling short of breath.  Patient reports had a vaginal sling with cystoscopy 8 days ago.  Patient states she has been doing great until the past 24 hours she has felt increased fatigue and shortness of breath.  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  Patient concerned that she may have the PE again.  Patient denies any leg swelling presently.  Patient also reports that she was experiencing urinary symptoms/pelvic pressure and concerns of possible UTI on Saturday and was prescribed Macrobid.  Patient states she has been taking this as directed.  Patient denies any fevers, chills, sweats, nausea, vomiting.  Patient reports that she has a history of celiac disease.  Patient reports she is having her normal for bowel movements.  Patient reports she had a normal bowel movement within the past 24 hours.  Patient states she is not taking any narcotics for pain.  Patient reports that when she takes a big deep breath she elicits a cough and then she has discomfort in the vaginal area.  Patient states the bleeding is not worse in the vaginal area from the surgery.  Patient denies any worsening vaginal pain.    Allergies:  Allergies   Allergen Reactions     Carboplatin Shortness Of Breath, Rash and Anaphylaxis     Other reaction(s): Flushing, Tachycardia     Ambien      hallucinations     Amoxicillin GI Disturbance     Erythromycin GI Disturbance     Gluten Meal      Celiac disease     Hydrocodone-Acetaminophen      Other reaction(s): Hallucinations     Hydromorphone Headache     Penicillins Nausea and Vomiting and Hives     1-11-17 pt states this was a childhood reaction     Zolpidem      Other reaction(s): Hallucinations     Hydrocodone Other (See Comments)     Out of body experience, \"creepy-crawly\" skin        Problem List:    Patient Active " Problem List    Diagnosis Date Noted     Stress incontinence 09/26/2022     Priority: Medium     Added automatically from request for surgery [id number]       Recurrent UTI 04/12/2022     Priority: Medium     Celiac disease 08/03/2021     Priority: Medium     Family history of colon cancer 07/20/2021     Priority: Medium     Multiple extended family members with colon ca          Breast reconstruction disproportion 02/25/2021     Priority: Medium     Multiple subsegmental pulmonary emboli without acute cor pulmonale (H) 01/28/2021     Priority: Medium     Diagnosed 9/2020 - was post op        Osteopenia of multiple sites 04/23/2019     Priority: Medium     Malignant neoplasm of upper-outer quadrant of right breast in female, estrogen receptor positive (H) 03/08/2018     Priority: Medium     ER positive in breast  triple neg in lymph        Lymphedema of right upper extremity 11/24/2017     Priority: Medium     Intestinal malabsorption 02/16/2016     Priority: Medium     Anxiety 07/15/2014     Priority: Medium     Insomnia 07/17/2012     Priority: Medium     Iron deficiency anemia 03/14/2012     Priority: Medium     Major depressive disorder, recurrent episode, mild (H) 08/05/2011     Priority: Medium      mainly postpartum, had tried Zoloft and anafranil    [facility] enrolled pt in 6 months pt ed program          CARDIOVASCULAR SCREENING; LDL GOAL LESS THAN 130 10/31/2010     Priority: Medium     Obesity 09/08/2008     Priority: Medium     Wt Readings from Last 5 Encounters:   07/11/11 172 lb (78.019 kg)   12/20/10 174 lb (78.926 kg)   07/26/10 166 lb (75.297 kg)   11/12/09 170 lb (77.111 kg)   11/03/09 169 lb (76.658 kg)     Body mass index is 29.99 kg/(m^2).        Health Care Home 05/14/2013     Priority: Low     EMERGENCY CARE PLAN  May 14, 2013: No current Care Coordination follow up planned. Please refer if Care Coordination services are needed.    Presenting Problem Signs and Symptoms Treatment Plan    Questions or concerns   during clinic hours   I will call my clinic directly:  [facility]  [address], [place] [address]  [phone].    Questions or concerns outside clinic hours   I will call the 24 hour nurse line at   [phone] or [phone].   Need to schedule an appointment   I will call the 24 hour scheduling team at [phone] or my clinic directly at [phone].    Same day treatment     I will call my clinic first, nurse line if after hours, urgent care and express care if needed.   Clinic care coordination services (regular clinic hours)     I will call a clinic care coordinator directly:     Darron Gaming RN  Mon, Tues, Fri - [phone]  Wed, Thurs - [phone]    Marlin Nelson :    [phone]    Or call my clinic at [phone] and ask to speak with care coordination.   Crisis Services: Behavioral or Mental Health  [facility] 24 Hour Phone Line  [phone]    [facility] 24 Hour Crisis Services  [phone]    [facility] (Behavioral Health Providers) Network [phone]    [facility]   [phone]       Emergency treatment -- Immediately    CAll 242                 Past Medical History:    Past Medical History:   Diagnosis Date     Anxiety      Breast cancer (H)      Celiac disease      Depressive disorder 1995     Encounter for insertion or removal of intrauterine contraceptive device 01/08/2008     Excessive or frequent menstruation 03/19/2003     Fibroadenosis of breast      GERD (gastroesophageal reflux disease)      Invasive ductal carcinoma of breast, right (H)      Malignant neoplasm of upper-outer quadrant of right female breast (H)      Mild intermittent asthma      Neutropenia, drug-induced (H)      Nongonococcal urethritis (JALIL) due to chlamydia trachomatis 01/03/2002     Other and unspecified ovarian cyst 03/19/2003     PONV (postoperative nausea and vomiting)      Skin cancer      Transient hypertension of pregnancy,  antepartum      Uncomplicated asthma        Past Surgical History:    Past Surgical History:   Procedure Laterality Date     BIOPSY  2016    EDG with biopsy     BREAST SURGERY      Bilateral reduction      SECTION       CYSTOSCOPY, SLING TRANSVAGINAL N/A 2022    Procedure: CREATION, VAGINAL SLING, WITH CYSTOSCOPY (support the urethra with mesh sling and look in the bladder);  Surgeon: Sixto Harris MD;  Location:  OR     DENTAL SURGERY  2010 and 10/12/2010    Root canals     DILATION AND CURETTAGE, HYSTEROSCOPY, ABLATE ENDOMETRIUM NOVASURE, COMBINED  3/20/2012    Procedure:COMBINED DILATION AND CURETTAGE, HYSTEROSCOPY, ABLATE ENDOMETRIUM NOVASURE; Hysteroscopy with Endometrial Ablation Novasure; Surgeon:GERRI PURCELL; Location:[facility]     ESOPHAGOSCOPY, GASTROSCOPY, DUODENOSCOPY (EGD), COMBINED N/A 2016    Procedure: COMBINED ESOPHAGOSCOPY, GASTROSCOPY, DUODENOSCOPY (EGD);  Surgeon: Jose L Wilson MD;  Location: [facility] GI     INSERT PORT VASCULAR ACCESS N/A 2017    Procedure: INSERT PORT VASCULAR ACCESS;  Surgeon: Michael Townsend MD;  Location: [facility]     LAPAROSCOPIC SALPINGO-OOPHORECTOMY Bilateral 2017    Procedure: LAPAROSCOPIC SALPINGO-OOPHORECTOMY;  Laparoscopic bilateral salpingo oophorectomy;  Surgeon: Preeti Tirado MD;  Location: [facility] OR     RECONSTRUCT BREAST BILATERAL, IMPLANT PROSTHESIS BILATERAL, COMBINED Bilateral 3/16/2018    Procedure: COMBINED RECONSTRUCT BREAST BILATERAL, IMPLANT PROSTHESIS BILATERAL;  BILATERAL SECOND STAGE BREAST RECONSTRUCTION WITH SILICONE GEL IMPLANT;  Surgeon: Eugenio Ruelas MD;  Location: [facility]     REVISE RECONSTRUCTED BREAST       SURGICAL HISTORY OF -       Breast reduction     ZZC APPENDECTOMY         Family History:    Family History   Problem Relation Age of Onset     Diabetes Mother         hypoglycemic     Allergies Mother      Arthritis Mother      Depression Mother       Gastrointestinal Disease Mother      Neurologic Disorder Mother      Psychotic Disorder Mother         panic disorder     Cancer Mother 56        pancreatic, colon, liver and skin.     Cancer - colorectal Mother      Other Cancer Mother         Pancreatic     Anxiety Disorder Mother      Mental Illness Mother      Hypertension Father      Diabetes Father      Rheumatoid Arthritis Father      C.A.D. Paternal Grandfather         congestive heart failure     Cerebrovascular Disease Paternal Grandfather         age 79     Diabetes Paternal Grandfather      Hypertension Paternal Grandfather      Alzheimer Disease Paternal Grandfather      Arthritis Paternal Grandfather      Circulatory Paternal Grandfather      Cardiovascular Paternal Grandfather      Heart Disease Paternal Grandfather      Neurologic Disorder Paternal Grandfather      Unknown/Adopted Maternal Grandmother      Cerebrovascular Disease Maternal Grandmother      Unknown/Adopted Maternal Grandfather      Cancer Maternal Grandfather      Obesity Paternal Grandmother      Cancer Paternal Grandmother 94        coloangiocancinoma       Social History:  Marital Status:   [2]  Social History     Tobacco Use     Smoking status: Former     Packs/day: 0.50     Years: 10.00     Pack years: 5.00     Types: Cigarettes     Quit date: 10/1/2016     Years since quittin.2     Smokeless tobacco: Never     Tobacco comments:     Socially- quit smoking 10/01/2016   Substance Use Topics     Alcohol use: Yes     Alcohol/week: 0.0 standard drinks     Comment: Occassional     Drug use: No        Medications:    cefdinir (OMNICEF) 300 MG capsule  Ascorbic Acid (VITAMIN C PO)  aspirin (ASA) 325 MG EC tablet  cetirizine (ZYRTEC) 10 MG tablet  cholecalciferol (VITAMIN D3) 1000 units (25 mcg) capsule  Fluticasone Propionate (FLONASE NA)  gabapentin (NEURONTIN) 300 MG capsule  letrozole (FEMARA) 2.5 MG tablet  Multiple Vitamins-Minerals (MULTIVITAMIN PO)  nitroFURantoin  "macrocrystal (MACRODANTIN) 50 MG capsule  omeprazole (PRILOSEC) 20 MG DR capsule  oxyCODONE (ROXICODONE) 5 MG tablet  phentermine (ADIPEX-P) 37.5 MG tablet  phentermine (ADIPEX-P) 37.5 MG tablet  venlafaxine (EFFEXOR XR) 150 MG 24 hr capsule  venlafaxine (EFFEXOR XR) 75 MG 24 hr capsule  YUVAFEM 10 MCG TABS vaginal tablet      Review of Systems  As mentioned above in the history present illness. All other systems were reviewed and are negative.    Physical Exam   BP: (!) 141/85  Pulse: 118  Temp: 97.9  F (36.6  C)  Height: 162.6 cm (5' 4\")  Weight: 90.7 kg (200 lb)  SpO2: 98 %      Physical Exam  Vitals and nursing note reviewed.   Constitutional:       General: She is not in acute distress.     Appearance: She is well-developed and well-nourished. She is not ill-appearing, toxic-appearing or diaphoretic.   HENT:      Head: Normocephalic and atraumatic.      Right Ear: External ear normal.      Left Ear: External ear normal.   Eyes:      General:         Right eye: No discharge.         Left eye: No discharge.      Conjunctiva/sclera: Conjunctivae normal.   Cardiovascular:      Rate and Rhythm: Regular rhythm. Tachycardia present.      Heart sounds: Normal heart sounds. No murmur heard.    No friction rub.   Pulmonary:      Effort: Pulmonary effort is normal. No tachypnea, bradypnea, accessory muscle usage or respiratory distress.      Breath sounds: Normal breath sounds. No stridor. No wheezing or rales.   Chest:      Chest wall: No tenderness.   Abdominal:      General: Bowel sounds are normal.      Palpations: Abdomen is soft. There is no mass.      Tenderness: There is no abdominal tenderness. There is no guarding or rebound.   Skin:     General: Skin is warm and dry.      Capillary Refill: Capillary refill takes less than 2 seconds.      Coloration: Skin is not pale.      Findings: No erythema or rash.   Neurological:      General: No focal deficit present.      Mental Status: She is alert.   Psychiatric:       "   Mood and Affect: Mood and affect and mood normal.         ED Course                 Procedures      Results for orders placed or performed during the hospital encounter of 12/20/22 (from the past 24 hour(s))   Lavinia Draw    Narrative    The following orders were created for panel order Lavinia Draw.  Procedure                               Abnormality         Status                     ---------                               -----------         ------                     Extra Blue Top Tube[[id number]]                              Final result               Extra Red Top Tube[[id number]]                               Final result                 Please view results for these tests on the individual orders.   CBC with platelets, differential    Narrative    The following orders were created for panel order CBC with platelets, differential.  Procedure                               Abnormality         Status                     ---------                               -----------         ------                     CBC with platelets and d...[[id number]]  Abnormal            Final result                 Please view results for these tests on the individual orders.   Basic metabolic panel   Result Value Ref Range    Sodium 140 136 - 145 mmol/L    Potassium 3.9 3.4 - 5.3 mmol/L    Chloride 101 98 - 107 mmol/L    Carbon Dioxide (CO2) 29 22 - 29 mmol/L    Anion Gap 10 7 - 15 mmol/L    Urea Nitrogen 9.6 6.0 - 20.0 mg/dL    Creatinine 0.64 0.51 - 0.95 mg/dL    Calcium 8.8 8.6 - 10.0 mg/dL    Glucose 90 70 - 99 mg/dL    GFR Estimate >90 >60 mL/min/1.73m2   Extra Blue Top Tube   Result Value Ref Range    Hold Specimen JIC    Extra Red Top Tube   Result Value Ref Range    Hold Specimen JIC    CBC with platelets and differential   Result Value Ref Range    WBC Count 10.9 4.0 - 11.0 10e3/uL    RBC Count 4.65 3.80 - 5.20 10e6/uL    Hemoglobin 13.5 11.7 - 15.7 g/dL    Hematocrit 40.6 35.0 - 47.0 %    MCV 87 78 - 100 fL    MCH 29.0  26.5 - 33.0 pg    MCHC 33.3 31.5 - 36.5 g/dL    RDW 14.0 10.0 - 15.0 %    Platelet Count 300 150 - 450 10e3/uL    % Neutrophils 82 %    % Lymphocytes 11 %    % Monocytes 5 %    % Eosinophils 2 %    % Basophils 0 %    % Immature Granulocytes 0 %    NRBCs per 100 WBC 0 <1 /100    Absolute Neutrophils 9.0 (H) 1.6 - 8.3 10e3/uL    Absolute Lymphocytes 1.2 0.8 - 5.3 10e3/uL    Absolute Monocytes 0.5 0.0 - 1.3 10e3/uL    Absolute Eosinophils 0.2 0.0 - 0.7 10e3/uL    Absolute Basophils 0.0 0.0 - 0.2 10e3/uL    Absolute Immature Granulocytes 0.0 <=0.4 10e3/uL    Absolute NRBCs 0.0 10e3/uL   D dimer quantitative   Result Value Ref Range    D-Dimer Quantitative 0.49 0.00 - 0.50 ug/mL FEU    Narrative    This D-dimer assay is intended for use in conjunction with a clinical pretest probability assessment model to exclude pulmonary embolism (PE) and deep venous thrombosis (DVT) in outpatients suspected of PE or DVT. The cut-off value is 0.50 ug/mL FEU.   UA reflex to Microscopic   Result Value Ref Range    Color Urine Yellow Colorless, Straw, Light Yellow, Yellow    Appearance Urine Clear Clear    Glucose Urine Negative Negative mg/dL    Bilirubin Urine Negative Negative    Ketones Urine Negative Negative mg/dL    Specific Gravity Urine 1.005 1.003 - 1.035    Blood Urine Trace (A) Negative    pH Urine 6.0 5.0 - 7.0    Protein Albumin Urine Negative Negative mg/dL    Urobilinogen Urine Normal Normal, 2.0 mg/dL    Nitrite Urine Negative Negative    Leukocyte Esterase Urine Small (A) Negative    RBC Urine 1 <=2 /HPF    WBC Urine 5 <=5 /HPF    Squamous Epithelials Urine 1 <=1 /HPF   XR Chest 2 Views    Narrative    EXAM: XR CHEST 2 VIEWS  LOCATION: [facility]  DATE/TIME: 12/20/2022 4:29 PM    INDICATION: feeling short of breath  COMPARISON: CT chest 12/13/2021      Impression    IMPRESSION: Shallow inspiration. Lungs are clear. Heart and pulmonary vascularity are normal. No signs of acute  disease.    Clips are seen from prior breast reconstructions. No suspicious bone lesions.       Medications   lactated ringers BOLUS 1,000 mL (0 mLs Intravenous Stopped 12/20/22 1707)   acetaminophen (TYLENOL) tablet 1,000 mg (1,000 mg Oral Given 12/20/22 7788)       Assessments & Plan (with Medical Decision Making)     I have reviewed the nursing notes.    I have reviewed the findings, diagnosis, plan and need for follow up with the patient.  42-year-old female presents to emergency department with sensation of shortness of breath 8 days post vaginal sling and cystoscopy and history of PE/DVT.  Patient reports she feels like when she takes a deep breath it elicits a cough and subsequently causes vaginal pain but otherwise she denies having any vaginal pain.  Patient also reporting symptoms similar to urinary tract infection currently on Macrobid that was started on Saturday after discussion with urology.  Patient denying fever, chills, sweats.  Baseline patient is tachycardic blood pressure is noted to be 141/85.  Lung sounds are clear and heart sounds are regular.  Work-up to include possible PE or infectious etiology.  Abdominal exam is normal without guarding, rebound and normal bowel sounds are noted.  CBC obtained and is unremarkable basic metabolic panel obtained and unremarkable urinalysis obtained and reveals leukocyte Estrace and we will culture this and possible consideration for switching antibiotics and considering ongoing UTI as part of patient's symptoms.  Chest x-ray obtained after D-dimer ordered and was negative to evaluate for possible pneumothorax or infectious etiology and this revealed shallow inspiration.  Discussed with patient will switch over to cefdinir twice daily and discussed the importance of incentive spirometry and follow-up with urology for ongoing care and return to the ED if worsening symptoms.  Patient verbalized understanding regarding all of the above.  Patient discharged in  stable condition.    New Prescriptions    CEFDINIR (OMNICEF) 300 MG CAPSULE    Take 1 capsule (300 mg) by mouth 2 times daily for 7 days       Final diagnoses:   Shortness of breath   Abnormal urine       12/20/2022   [facility] EMERGENCY DEPT     Ana Laura Guerrero, COLLEEN CNP  12/20/22 2862

## 2022-12-20 NOTE — ED TRIAGE NOTES
Pt has hx DVT and PE, is currently only on ASA. Pt had surgery 1 week ago, developed a UTI that has mostly resolved, states today started having SOB/JACK and chest pain with inspiration.      Triage Assessment     Row Name 12/20/22 9248       Triage Assessment (Adult)    Airway WDL WDL       Respiratory WDL    Respiratory WDL X;expansion/retractions;rhythm/pattern    Rhythm/Pattern, Respiratory dyspnea on exertion       Skin Circulation/Temperature WDL    Skin Circulation/Temperature WDL WDL       Cardiac WDL    Cardiac WDL X;rhythm    Pulse Rate & Regularity tachycardic       Peripheral/Neurovascular WDL    Peripheral Neurovascular WDL WDL       Cognitive/Neuro/Behavioral WDL    Cognitive/Neuro/Behavioral WDL WDL

## 2022-12-21 NOTE — TELEPHONE ENCOUNTER
Pt did go to ER and was sent home with antibiotic. Pt did start taking it. Assisted to schedule PVR. No other questions noted.     Kathleen Hampton, MSN RN

## 2022-12-22 ENCOUNTER — ALLIED HEALTH/NURSE VISIT (OUTPATIENT)
Dept: NURSING | Facility: CLINIC | Age: 42
End: 2022-12-22
Payer: COMMERCIAL

## 2022-12-22 ENCOUNTER — TELEPHONE (OUTPATIENT)
Dept: UROLOGY | Facility: CLINIC | Age: 42
End: 2022-12-22

## 2022-12-22 DIAGNOSIS — N39.0 RECURRENT UTI: Primary | ICD-10-CM

## 2022-12-22 LAB — BACTERIA UR CULT: NORMAL

## 2022-12-22 PROCEDURE — 99024 POSTOP FOLLOW-UP VISIT: CPT

## 2022-12-22 RX ORDER — NITROFURANTOIN 25; 75 MG/1; MG/1
100 CAPSULE ORAL 2 TIMES DAILY
Qty: 6 CAPSULE | Refills: 0 | Status: SHIPPED | OUTPATIENT
Start: 2022-12-22 | End: 2022-12-25

## 2022-12-22 ASSESSMENT — PAIN SCALES - GENERAL: PAINLEVEL: NO PAIN (0)

## 2022-12-22 NOTE — TELEPHONE ENCOUNTER
[facility] Call Center    Phone Message    May a detailed message be left on voicemail: yes     Reason for Call: Other: .    Pt was in ER for a continued UTI, pt states the antibiotics she was prescribed gave her explosive diarrhea and would like to speak with care team regarding this. Please call pt      Action Taken: Message routed to:  Other: Uro    Travel Screening: Not Applicable

## 2022-12-22 NOTE — TELEPHONE ENCOUNTER
Spoke to pt. Pt reports explosive diarrhea started last night at 10 p after taking 4 doses of cefdinir. Advised to hold on cefdinir until culture results are finalized. Advised to drink increased amount of water and take probiotics. Pt verbalized understanding. Pt then expressed that she would like to re-schedule her nurse visit and come in today instead of Monday. Assisted to reschedule appointment.     Kathleen Hampton, MSN RN

## 2022-12-22 NOTE — NURSING NOTE
PVR: 0, 24, 34 ccs.    Discussed with Dr. Oropeza.  He reviewed pt's UC.  3 day course of Macrobid ordered and sent to pt's preferred pharmacy.      Renee Johnson RN

## 2022-12-22 NOTE — RESULT ENCOUNTER NOTE
Final urine culture report is negative.  Adult Negative Urine culture parameters per protocol: Any # Urogenital single or mixed organism, <10,000 col/ml single organism (cath/midstream), and > 3 organisms (No susceptibilities performed).  [facility] Emergency Dept discharge antibiotic prescribed (If applicable): Cefdinir  Treatment recommendations per [facility] ED Lab Result Urine Culture protocol.

## 2022-12-22 NOTE — NURSING NOTE
Diana Wilson comes into clinic today at the request of Dr. Harris Ordering Provider for PVR.        This service provided today was under the supervising provider of the day Dr. Oropeza, who was available if needed.    Renee Godinez RN

## 2022-12-23 DIAGNOSIS — N94.19 DYSPAREUNIA DUE TO MEDICAL CONDITION IN FEMALE PATIENT: ICD-10-CM

## 2022-12-23 RX ORDER — ESTRADIOL 10 UG/1
TABLET VAGINAL
Qty: 20 TABLET | Refills: 0 | Status: SHIPPED | OUTPATIENT
Start: 2022-12-23 | End: 2023-02-10

## 2022-12-23 NOTE — TELEPHONE ENCOUNTER
Medication: Yuvafem 10 MCG TABS vaginal tablet  Last prescribing provider: Jailene Thorne, 11/2/22    Last clinic visit date: 10/18/22 with Leola Amin NP    Any missed appointments or no-shows since last clinic visit?: No    Recommendations for requested medication (if none, N/A): NA    Next clinic visit date: No yet scheduled    Any other pertinent information (if none, N/A): NA    Pended and Routed to Provider.

## 2023-01-02 ENCOUNTER — VIRTUAL VISIT (OUTPATIENT)
Dept: UROLOGY | Facility: CLINIC | Age: 43
End: 2023-01-02
Payer: COMMERCIAL

## 2023-01-02 ENCOUNTER — MYC MEDICAL ADVICE (OUTPATIENT)
Dept: UROLOGY | Facility: CLINIC | Age: 43
End: 2023-01-02

## 2023-01-02 DIAGNOSIS — N39.3 STRESS INCONTINENCE: ICD-10-CM

## 2023-01-02 DIAGNOSIS — B37.31 CANDIDIASIS OF VAGINA: Primary | ICD-10-CM

## 2023-01-02 PROCEDURE — 99024 POSTOP FOLLOW-UP VISIT: CPT | Performed by: UROLOGY

## 2023-01-02 RX ORDER — FLUCONAZOLE 150 MG/1
150 TABLET ORAL
Qty: 3 TABLET | Refills: 0 | Status: SHIPPED | OUTPATIENT
Start: 2023-01-02 | End: 2023-01-09

## 2023-01-02 NOTE — PROGRESS NOTES
Video-Visit Details    Type of service:  Video Visit    Video Start Time (time video started): 9:46 AM      Video End Time (time video stopped): 9:54 AM      Originating Location (pt. Location): Home        Distant Location (provider location):  Off-site    Mode of Communication:  Video Conference via Owen Villalobos

## 2023-01-02 NOTE — PATIENT INSTRUCTIONS
-diflucan 150 mg X 3  -if symptoms do not improve then consider a DNA sequencing test  -continue to use cococnut oil prn  -continue with vagifem  -continue macrodantin 50 mg prn intercourse or [place] in lake.  -stay well hydrated  -f/u in a couple weeks in person for exam, already scheduled.

## 2023-01-02 NOTE — NURSING NOTE
Diana Wilson  is being evaluated via a billable video visit.      How would you like to obtain your AVS? iPling  For the video visit, send the invitation by: Text to cell phone: [phone]  Will anyone else be joining your video visit? Samantha Villalobos,   Virtual Visit Facilitator

## 2023-01-02 NOTE — CONFIDENTIAL NOTE
Sixto Harris MD Cox, Talia, LPN 1 hour ago (11:46 AM)     NN  Yes, as long as she isn't doing any major splitting.      Received the above message from Dr. Harris. My chart message sent to patient.    Erendira Sun RN, BSN

## 2023-01-13 ENCOUNTER — NURSE TRIAGE (OUTPATIENT)
Dept: UROLOGY | Facility: CLINIC | Age: 43
End: 2023-01-13

## 2023-01-13 ENCOUNTER — LAB (OUTPATIENT)
Dept: LAB | Facility: CLINIC | Age: 43
End: 2023-01-13
Payer: COMMERCIAL

## 2023-01-13 DIAGNOSIS — R39.15 URGENCY OF URINATION: Primary | ICD-10-CM

## 2023-01-13 DIAGNOSIS — R39.15 URGENCY OF URINATION: ICD-10-CM

## 2023-01-13 DIAGNOSIS — R82.90 MALODOROUS URINE: ICD-10-CM

## 2023-01-13 DIAGNOSIS — R30.0 DYSURIA: ICD-10-CM

## 2023-01-13 DIAGNOSIS — R35.0 URINARY FREQUENCY: ICD-10-CM

## 2023-01-13 LAB
ALBUMIN UR-MCNC: NEGATIVE MG/DL
APPEARANCE UR: ABNORMAL
BACTERIA #/AREA URNS HPF: ABNORMAL /HPF
BILIRUB UR QL STRIP: NEGATIVE
COLOR UR AUTO: YELLOW
GLUCOSE UR STRIP-MCNC: NEGATIVE MG/DL
HGB UR QL STRIP: NEGATIVE
KETONES UR STRIP-MCNC: NEGATIVE MG/DL
LEUKOCYTE ESTERASE UR QL STRIP: ABNORMAL
NITRATE UR QL: NEGATIVE
PH UR STRIP: 7.5 [PH] (ref 5–7)
RBC #/AREA URNS AUTO: ABNORMAL /HPF
SP GR UR STRIP: 1.01 (ref 1–1.03)
UROBILINOGEN UR STRIP-ACNC: 1 E.U./DL
WBC #/AREA URNS AUTO: ABNORMAL /HPF

## 2023-01-13 PROCEDURE — 87086 URINE CULTURE/COLONY COUNT: CPT

## 2023-01-13 PROCEDURE — 81001 URINALYSIS AUTO W/SCOPE: CPT

## 2023-01-13 PROCEDURE — 87088 URINE BACTERIA CULTURE: CPT

## 2023-01-13 PROCEDURE — 87186 SC STD MICRODIL/AGAR DIL: CPT

## 2023-01-13 NOTE — TELEPHONE ENCOUNTER
[facility] Call Center    Phone Message    May a detailed message be left on voicemail: yes     Reason for Call: Order(s): Other:   Reason for requested: UA test  Date needed: ASAP  Provider name: Sixto Harris MD    Pt stated if she had UTI symptoms again to call to get test ordered.   Please call pt to schedule once orders placed. Thank you     Action Taken: Message routed to:  Other: Uro    Travel Screening: Not Applicable

## 2023-01-13 NOTE — TELEPHONE ENCOUNTER
"Nurse Triage SBAR    Is this a 2nd Level Triage? NO    Situation: Spoke to pt. Pt reports UTI like symptoms.     Background: History of recurring UTI's.     Assessment: Symptoms started today around noon. She is experiencing increased urgency and frequency and foul smelling urine. Smells \"metalicy\". No hematuria. She also reports some lower back achiness. Not on sides. She is having some pain with urination, rated up to 5/10. She is drinking at least a liter of water daily. No difficulty with urination. Chart and problems list reviewed.    Protocol Recommended Disposition:   See in Office Today    Recommendation: UA/UC.   Increase water intake.   Tylenol, ibuprofen, and AZO as needed.      .    Does the patient meet one of the following criteria for ADS visit consideration? No    RAVI Queen RN    No orders of the defined types were placed in this encounter.      Reason for Disposition    Urinating more frequently than usual (i.e., frequency)    Protocols used: URINARY SYMPTOMS-A-OH      "

## 2023-01-15 LAB — BACTERIA UR CULT: ABNORMAL

## 2023-01-16 ENCOUNTER — TELEPHONE (OUTPATIENT)
Dept: UROLOGY | Facility: CLINIC | Age: 43
End: 2023-01-16

## 2023-01-16 DIAGNOSIS — N39.0 UTI (URINARY TRACT INFECTION): Primary | ICD-10-CM

## 2023-01-16 RX ORDER — SULFAMETHOXAZOLE/TRIMETHOPRIM 800-160 MG
1 TABLET ORAL 2 TIMES DAILY
Qty: 10 TABLET | Refills: 0 | Status: SHIPPED | OUTPATIENT
Start: 2023-01-16 | End: 2023-09-26

## 2023-01-16 NOTE — TELEPHONE ENCOUNTER
Spoke to pt. Pt confirmed ongoing symptoms. Informed pt of positive for infection. Order sent per protocol to preferred pharmacy. No other questions at this time.     Kathleen Montelongo, MSN RN    Signed Prescriptions:                        Disp   Refills    sulfamethoxazole-trimethoprim (BACTRIM DS)*10 tab*0        Sig: Take 1 tablet by mouth 2 times daily  Authorizing Provider: FERNIE REYES  Ordering User: KATHLEEN MONTELONGO

## 2023-01-23 ENCOUNTER — OFFICE VISIT (OUTPATIENT)
Dept: UROLOGY | Facility: CLINIC | Age: 43
End: 2023-01-23
Payer: COMMERCIAL

## 2023-01-23 DIAGNOSIS — N39.0 RECURRENT UTI: ICD-10-CM

## 2023-01-23 DIAGNOSIS — R39.15 URGENCY OF URINATION: Primary | ICD-10-CM

## 2023-01-23 DIAGNOSIS — N30.00 ACUTE CYSTITIS WITHOUT HEMATURIA: ICD-10-CM

## 2023-01-23 DIAGNOSIS — N39.3 STRESS INCONTINENCE: ICD-10-CM

## 2023-01-23 DIAGNOSIS — N95.2 ATROPHIC VAGINITIS: ICD-10-CM

## 2023-01-23 LAB
ALBUMIN UR-MCNC: NEGATIVE MG/DL
APPEARANCE UR: CLEAR
BACTERIA #/AREA URNS HPF: ABNORMAL /HPF
BILIRUB UR QL STRIP: NEGATIVE
COLOR UR AUTO: ABNORMAL
GLUCOSE UR STRIP-MCNC: NEGATIVE MG/DL
HGB UR QL STRIP: NEGATIVE
KETONES UR STRIP-MCNC: NEGATIVE MG/DL
LEUKOCYTE ESTERASE UR QL STRIP: ABNORMAL
NITRATE UR QL: NEGATIVE
PH UR STRIP: 6 [PH] (ref 5–7)
RBC #/AREA URNS AUTO: ABNORMAL /HPF
SKIP: ABNORMAL
SP GR UR STRIP: 1.01 (ref 1–1.03)
SQUAMOUS #/AREA URNS AUTO: ABNORMAL /LPF
UROBILINOGEN UR STRIP-MCNC: NORMAL MG/DL
WBC #/AREA URNS AUTO: ABNORMAL /HPF

## 2023-01-23 PROCEDURE — 81001 URINALYSIS AUTO W/SCOPE: CPT | Performed by: UROLOGY

## 2023-01-23 PROCEDURE — 99024 POSTOP FOLLOW-UP VISIT: CPT | Performed by: UROLOGY

## 2023-01-23 PROCEDURE — 87086 URINE CULTURE/COLONY COUNT: CPT | Performed by: UROLOGY

## 2023-01-23 RX ORDER — METHENAMINE HIPPURATE 1000 MG/1
1 TABLET ORAL 2 TIMES DAILY
Qty: 60 TABLET | Refills: 11 | Status: SHIPPED | OUTPATIENT
Start: 2023-01-23 | End: 2023-05-15

## 2023-01-23 NOTE — NURSING NOTE
Diana Wilson's goals for this visit include:   Chief Complaint   Patient presents with     RECHECK     Post-op - DOS 12/12       She requests these members of her care team be copied on today's visit information:       PCP: Yenifer Peters    Referring Provider:  Sixto Harris MD  [id number]  [place] [address]    [facility] 08/05/2016 (Exact Date)     Do you need any medication refills at today's visit?     Deonna Steel LPN on 1/23/2023 at 2:22 PM

## 2023-01-23 NOTE — PROGRESS NOTES
Reason for Visit: f/u on urinary symptoms    Clinical data:  Ms. Wilson is a 42 year old female with hx of recurrent utis.  She is on vaginal estrogen now now.  She also has a hx of breast cancer and is sp double mastectomy and is s/o xrt and chemo.  She is Letrozole, estrogen blocker started in April 2022.  She was previously on another estrogen blocker.    She was using coconut oil in the vaginal area as well and that helps.  She had been doing very well with the prophylaxis pericoitally but then started to get infections.    She had a sling on 12/12/22 for stress urinary incontinence, but then developed a uti.  She was treated with antibiotics but her symptoms never really went away but improved.      On exam urethra with minimal urethral mobility  No incontinence    CT abd/pelvis on 12/13/21  FINDINGS:     Chest: Postop change is seen in both breast areas and both axillas.     Abdomen: There is a 1 cm benign cyst in the right kidney. There is a  stable 1-2 mm nonobstructing calculus in the left kidney.    Pelvis: No significant abnormality is emonstrated.                                                                 IMPRESSION:  No evidence of metastatic disease.    PVR by nursing today is 11 ml    Assessment & Plan   41 y/o female with recurrent uti's in the setting of atrophic vaginitis and hx of breast cancer s/p xrt and chemotherapy. She was doing very well on prophylaxis that was intermittent but now getting more infections.  We discussed use of methenamine and vit C and she would like to do that.  Her sling is working well without any incontinence.  -continue to use cococnut oil prn  -continue with vagifem  -continue macrodantin 50 mg prn intercourse or swimming in lake.  -stay well hydrated  -methenamine and Vit C  -ok to discontinue restrictions  -f/u in 3 months virtually for symptoms check    Sixto Harris MD  [facility]      ==========================

## 2023-01-23 NOTE — PATIENT INSTRUCTIONS
-continue to use cococnut oil prn  -continue with vagifem  -continue macrodantin 50 mg prn intercourse or swimming in lake.  -stay well hydrated  -methenamine and Vit C  -ok to discontinue restrictions  -f/u in 3 months virtually for symptoms check

## 2023-01-26 LAB — BACTERIA UR CULT: NO GROWTH

## 2023-01-29 NOTE — PROGRESS NOTES
"Bariatric Care Clinic Non Surgical Follow up Visit   Date of visit: 1/30/2023  Physician: SHABBIR Rosas MD, MD  Primary Care is Yenifer Peters  Diana Wilson   42 year old  female     Initial Weight: 205#  Initial BMI: 35  Today's Weight:   Wt Readings from Last 1 Encounters:   01/30/23 91.8 kg (202 lb 6.4 oz)     Body mass index is 34.74 kg/m .           Assessment and Plan   Assessment: Diana is a 42 year old year old female who presents for medical weight management.      Plan:    1. Obesity (BMI 30-39.9)  Patient was congratulated on her success thus far. Healthy habits to assist with further weight loss were discussed. She will work on increasing her vegetables. She will try some [employer] exercise videos. She will try Wegovy. Risks, benefits and possible side effects discussed. If this is not covered by insurance she will consider generic contrave.     2. Gastroesophageal reflux disease, unspecified whether esophagitis present  This may improve with healthy habits and weight loss. She will continue omeprazole    3. History of migraine headaches  This may improve with healthy habits and weight loss.    Follow up in 1 month with our dietician and in 3 months with myself           INTERIM HISTORY  We added topamax to her phentermine after her last visit in October. She went in for a pre op for a vaginal sling procedure and her blood pressure was really elevated. She felt \"high\" from the topamax.     Goals set with dietician 11/29/22:  1. Aim to eat 3 meals per day, protein focused, high fiber foods - start tracking intake and aiming for 1600 kcals daily- not tracking  2. Have an additional vegetable in per day and 2 veggies at dinner- yes        DIETARY HISTORY  Meals Per Day: 3  Eating Protein First?: sometimes  Food Diary: B:almond cheerios or overnight oats L:carrots with ranch or hummus and yogurt and granola or kind bar or salad with hardboiled eggsD:pulled pork sandwiches with antoine slaw and [place] " fries, pork tenderloins with carrots and brussel sprouts and rice, occasional grilled cheese and soup  Snacks Per Day: evenings  Typical Snack: coffee and creamer or hot cocoa, frozen yogurt bars  Fluid Intake: yes  Portion Control: yes  Calorie Containing Beverages: coffee or hot chocolate  Meals at Restaurant per week:1    Positive Changes Since Last Visit: more vegetables and fiber, water intake  Struggling With: getting adequate protein, exercise    Knowledgeable in Reading Food Labels: yes  Getting Adequate Protein: not always  Sleeping 7-8 hours/day usually  Stress management not discussed    PHYSICAL ACTIVITY PATTERNS:  Recent surgery, just got cleared a few days ago, was walking outside when the weather is nice    REVIEW OF SYSTEMS  GENERAL/CONSTITUTIONAL:  Fatigue: no  HEENT:   glaucoma: no  CARDIOVASCULAR:  History of heart disease: no  PULMONARY:  Dyspnea on exertion: no  NEUROLOGIC:  Paresthesias: no  PSYCHIATRIC:  Moods: yes  MUSCULOSKELETAL/RHEUMATOLOGIC  Arthralgias: no  Myalgias: no  ENDOCRINE:  Monitoring Blood Sugars: no  Sugars Well Controlled: na  No personal or family history of medullary thyroid cancer no  :  Birth control: ovaries removed       Patient Profile   Social History     Social History Narrative     Not on file        Past Medical History   Past Medical History:   Diagnosis Date     Anxiety      Breast cancer (H)      Celiac disease      Depressive disorder 1995    And Anxiety     Encounter for insertion or removal of intrauterine contraceptive device 01/08/2008     Excessive or frequent menstruation 03/19/2003     Fibroadenosis of breast      GERD (gastroesophageal reflux disease)      Invasive ductal carcinoma of breast, right (H)      Malignant neoplasm of upper-outer quadrant of right female breast (H)      Mild intermittent asthma            never has had PFT's, MDI with colds and at times exercise     Neutropenia, drug-induced (H)      Nongonococcal urethritis (JALIL) due to  chlamydia trachomatis 2002     Other and unspecified ovarian cyst 2003    RIGHT ovarian cyst     PONV (postoperative nausea and vomiting)      Skin cancer      Transient hypertension of pregnancy, antepartum     PIH with last birth     Uncomplicated asthma      Patient Active Problem List   Diagnosis     Obesity     CARDIOVASCULAR SCREENING; LDL GOAL LESS THAN 130     Major depressive disorder, recurrent episode, mild (H)     Iron deficiency anemia     Insomnia     Health Care Home     Anxiety     Intestinal malabsorption     Lymphedema of right upper extremity     Malignant neoplasm of upper-outer quadrant of right breast in female, estrogen receptor positive (H)     Osteopenia of multiple sites     Multiple subsegmental pulmonary emboli without acute cor pulmonale (H)     Breast reconstruction disproportion     Family history of colon cancer     Celiac disease     Recurrent UTI     Stress incontinence       Past Surgical History  She has a past surgical history that includes surgical history of -  (); APPENDECTOMY (); Dental surgery (2010 and 10/12/2010); Dilation and curettage, hysteroscopy, ablate endometrium novasure, combined (3/20/2012); Esophagoscopy, gastroscopy, duodenoscopy (EGD), combined (N/A, 2016); Insert port vascular access (N/A, 2017); biopsy (2016); Breast surgery (); Laparoscopic salpingo-oophorectomy (Bilateral, 2017); Reconstruct breast bilateral, implant prosthesis bilateral, combined (Bilateral, 3/16/2018);  Section; Revise reconstructed breast; and Cystoscopy, Sling Transvaginal (N/A, 2022).     Examination   Wt 91.8 kg (202 lb 6.4 oz)   LMP 2016 (Exact Date)   BMI 34.74 kg/m    General:  Alert and ambulatory, NAD  Pscyh/Mood: stable         Counseling:   We reviewed the important post op bariatric recommendations:  -eating 3 meals daily  -eating protein first, getting >60gm protein daily  -eating slowly, chewing food  well  -avoiding/limiting calorie containing beverages  -limiting starchy vegetables and carbohydrates, choosing wheat, not white with breads,   crackers, pastas, luis armando, bagels, tortillas, rice  -limiting restaurant or cafeteria eating to twice a week or less    We discussed the importance of restorative sleep and stress management in maintaining a healthy weight.  We discussed the National Weight Control Registry healthy weight maintenance strategies and ways to optimize metabolism.  We discussed the importance of physical activity including cardiovascular and strength training in maintaining a healthier weight.    Total time spent on the date of this encounter doing: chart review, review of test results, patient visit, physical exam, education, counseling, developing plan of care and documenting = 42 minutes.         SHABBIR Rosas MD  [facility]

## 2023-01-30 ENCOUNTER — TELEPHONE (OUTPATIENT)
Dept: SURGERY | Facility: CLINIC | Age: 43
End: 2023-01-30

## 2023-01-30 ENCOUNTER — OFFICE VISIT (OUTPATIENT)
Dept: SURGERY | Facility: CLINIC | Age: 43
End: 2023-01-30
Payer: COMMERCIAL

## 2023-01-30 VITALS
BODY MASS INDEX: 34.56 KG/M2 | HEIGHT: 64 IN | SYSTOLIC BLOOD PRESSURE: 122 MMHG | DIASTOLIC BLOOD PRESSURE: 84 MMHG | WEIGHT: 202.4 LBS

## 2023-01-30 DIAGNOSIS — Z86.69 HISTORY OF MIGRAINE HEADACHES: ICD-10-CM

## 2023-01-30 DIAGNOSIS — E66.9 OBESITY (BMI 30-39.9): Primary | ICD-10-CM

## 2023-01-30 DIAGNOSIS — K21.9 GASTROESOPHAGEAL REFLUX DISEASE, UNSPECIFIED WHETHER ESOPHAGITIS PRESENT: ICD-10-CM

## 2023-01-30 PROCEDURE — 99215 OFFICE O/P EST HI 40 MIN: CPT | Performed by: FAMILY MEDICINE

## 2023-01-30 RX ORDER — SEMAGLUTIDE 0.25 MG/.5ML
0.25 INJECTION, SOLUTION SUBCUTANEOUS WEEKLY
Qty: 2 ML | Refills: 0 | Status: SHIPPED | OUTPATIENT
Start: 2023-01-30 | End: 2023-02-27

## 2023-01-30 NOTE — Clinical Note
"    1/30/2023         RE: Diana Wilson  [address] [facility] [address]        Dear Colleague,    Thank you for referring your patient, Diana Wilson, to the [facility] [place]. Please see a copy of my visit note below.    Bariatric Care Clinic Non Surgical Follow up Visit   Date of visit: 1/30/2023  Physician: SHABBIR Rosas MD, MD  Primary Care is Yenifer Peters  Diana Wilson   42 year old  female     Initial Weight: 205#  Initial BMI: 35  Today's Weight:   Wt Readings from Last 1 Encounters:   01/30/23 91.8 kg (202 lb 6.4 oz)     Body mass index is 34.74 kg/m .           Assessment and Plan   Assessment: Diana is a 42 year old year old female who presents for medical weight management.      Plan:    1. Obesity (BMI 30-39.9)  Patient was congratulated on her success thus far. Healthy habits to assist with further weight loss were discussed. She will work on increasing her vegetables. She will try some [employer] exercise videos. She will try Wegovy. Risks, benefits and possible side effects discussed. If this is not covered by insurance she will consider generic contrave.     2. Gastroesophageal reflux disease, unspecified whether esophagitis present  This may improve with healthy habits and weight loss. She will continue omeprazole    3. History of migraine headaches  This may improve with healthy habits and weight loss.    Follow up in 1 month with our dietician and in 3 months with myself           INTERIM HISTORY  We added topamax to her phentermine after her last visit in October. She went in for a pre op for a vaginal sling procedure and her blood pressure was really elevated. She felt \"high\" from the topamax.     Goals set with dietician 11/29/22:  1. Aim to eat 3 meals per day, protein focused, high fiber foods - start tracking intake and aiming for 1600 kcals daily- not tracking  2. Have an additional vegetable in per day and 2 veggies at dinner- " yes        DIETARY HISTORY  Meals Per Day: 3  Eating Protein First?: sometimes  Food Diary: B:almond cheerios or overnight oats L:carrots with ranch or hummus and yogurt and granola or kind bar or salad with hardboiled eggsD:pulled pork sandwiches with antoine slaw and french fries, pork tenderloins with carrots and brussel sprouts and rice, occasional grilled cheese and soup  Snacks Per Day: evenings  Typical Snack: coffee and creamer or hot cocoa, frozen yogurt bars  Fluid Intake: yes  Portion Control: yes  Calorie Containing Beverages: coffee or hot chocolate  Meals at Restaurant per week:1    Positive Changes Since Last Visit: more vegetables and fiber, water intake  Struggling With: getting adequate protein, exercise    Knowledgeable in Reading Food Labels: yes  Getting Adequate Protein: not always  Sleeping 7-8 hours/day usually  Stress management not discussed    PHYSICAL ACTIVITY PATTERNS:  Recent surgery, just got cleared a few days ago, was walking outside when the weather is nice    REVIEW OF SYSTEMS  GENERAL/CONSTITUTIONAL:  Fatigue: no  HEENT:   glaucoma: no  CARDIOVASCULAR:  History of heart disease: no  PULMONARY:  Dyspnea on exertion: no  NEUROLOGIC:  Paresthesias: no  PSYCHIATRIC:  Moods: yes  MUSCULOSKELETAL/RHEUMATOLOGIC  Arthralgias: no  Myalgias: no  ENDOCRINE:  Monitoring Blood Sugars: no  Sugars Well Controlled: na  No personal or family history of medullary thyroid cancer no  :  Birth control: ovaries removed       Patient Profile   Social History     Social History Narrative     Not on file        Past Medical History   Past Medical History:   Diagnosis Date     Anxiety      Breast cancer (H)      Celiac disease      Depressive disorder 1995    And Anxiety     Encounter for insertion or removal of intrauterine contraceptive device 01/08/2008     Excessive or frequent menstruation 03/19/2003     Fibroadenosis of breast      GERD (gastroesophageal reflux disease)      Invasive ductal carcinoma  of breast, right (H)      Malignant neoplasm of upper-outer quadrant of right female breast (H)      Mild intermittent asthma            never has had PFT's, MDI with colds and at times exercise     Neutropenia, drug-induced (H)      Nongonococcal urethritis (JALIL) due to chlamydia trachomatis 2002     Other and unspecified ovarian cyst 2003    RIGHT ovarian cyst     PONV (postoperative nausea and vomiting)      Skin cancer      Transient hypertension of pregnancy, antepartum     PIH with last birth     Uncomplicated asthma      Patient Active Problem List   Diagnosis     Obesity     CARDIOVASCULAR SCREENING; LDL GOAL LESS THAN 130     Major depressive disorder, recurrent episode, mild (H)     Iron deficiency anemia     Insomnia     Health Care Home     Anxiety     Intestinal malabsorption     Lymphedema of right upper extremity     Malignant neoplasm of upper-outer quadrant of right breast in female, estrogen receptor positive (H)     Osteopenia of multiple sites     Multiple subsegmental pulmonary emboli without acute cor pulmonale (H)     Breast reconstruction disproportion     Family history of colon cancer     Celiac disease     Recurrent UTI     Stress incontinence       Past Surgical History  She has a past surgical history that includes surgical history of -  (); APPENDECTOMY (); Dental surgery (2010 and 10/12/2010); Dilation and curettage, hysteroscopy, ablate endometrium novasure, combined (3/20/2012); Esophagoscopy, gastroscopy, duodenoscopy (EGD), combined (N/A, 2016); Insert port vascular access (N/A, 2017); biopsy (2016); Breast surgery (); Laparoscopic salpingo-oophorectomy (Bilateral, 2017); Reconstruct breast bilateral, implant prosthesis bilateral, combined (Bilateral, 3/16/2018);  Section; Revise reconstructed breast; and Cystoscopy, Sling Transvaginal (N/A, 2022).     Examination   Wt 91.8 kg (202 lb 6.4 oz)   LMP 2016 (Exact  Date)   BMI 34.74 kg/m    General:  Alert and ambulatory, NAD  Pscyh/Mood: stable         Counseling:   We reviewed the important post op bariatric recommendations:  -eating 3 meals daily  -eating protein first, getting >60gm protein daily  -eating slowly, chewing food well  -avoiding/limiting calorie containing beverages  -limiting starchy vegetables and carbohydrates, choosing wheat, not white with breads,   crackers, pastas, luis armando, bagels, tortillas, rice  -limiting restaurant or cafeteria eating to twice a week or less    We discussed the importance of restorative sleep and stress management in maintaining a healthy weight.  We discussed the National Weight Control Registry healthy weight maintenance strategies and ways to optimize metabolism.  We discussed the importance of physical activity including cardiovascular and strength training in maintaining a healthier weight.    Total time spent on the date of this encounter doing: chart review, review of test results, patient visit, physical exam, education, counseling, developing plan of care and documenting = *** minutes.         SHABBIR Rosas MD  [facility]               Again, thank you for allowing me to participate in the care of your patient.        Sincerely,        SHABBIR Rosas MD

## 2023-01-30 NOTE — PATIENT INSTRUCTIONS
Here are the [employer] exercise sites:    Yoga-https://www.Ctrip.com/user/yogawithadriene    Body strength activities, dance, high intensity interval training- https://www.Ctrip.com/user/popreginaldgartvfit    Strength training- https://www.Ctrip.com/user/EmekaportsPerform    Walking- https://www.Ctrip.com/user/walkathomemedia    Sit and Be Fit- https://www.Ctrip.[place]/channel/UCLgvL3aGzMByecNYtMcyK_g    Low impact cardio- Diana Josiah- https://www.Ctrip.[place]/channel/RMdmUVkCursYcnvG5nvmmrjG      LEAN PROTEIN SOURCES      Protein Source Portion Calories Grams of Protein                           Nonfat, plain Greek yogurt    (10 grams sugar or less) 3/4 cup (6 oz)  12-17   Light Yogurt (10 grams sugar or less) 3/4 cup (6 oz)  6-8   Protein Shake 1 shake 110-180 15-30   Skim/1% Milk or lactose-free milk 1 cup ( 8 oz)  8   Plain or light, flavored soymilk 1 cup  7-8   Plain or light, hemp milk 1 cup 110 6   Fat Free or 1% Cottage Cheese 1/2 cup 90 15   Part skim ricotta cheese 1/2 cup 100 14   Part skim or reduced fat cheese slices 1 ounce 65-80 8     Mozzarella String Cheese 1 80 8   Canned tuna, chicken, crab or salmon  (canned in water)  1/2 cup 100 15-20   White fish (broiled, grilled, baked) 3 ounces 100 21   [place]/Tuna (broiled, grilled, baked) 3 ounces 150-180 21   Shrimp, Scallops, Lobster, Crab 3 ounces 100 21   Pork loin, Pork Tenderloin 3 ounces 150 21   Boneless, skinless chicken /turkey breast                          (broiled, grilled, baked) 3 ounces 120 21   Combs, [place], [place], and Venison 3 ounces 120 21   Lean cuts of red meat and pork (sirloin,   round, tenderloin, flank, ground 93%-96%) 3 ounces 170 21   Lean or Extra Lean Ground Turkey 1/2 cup 150 20   90-95% Lean [place] Burger 1 agnieszka 140-180 21   Low-fat casserole with lean meat 3/4 cup 200 17   Luncheon Meats                                                        (turkey, lean ham, roast beef, chicken) 3 ounces  100 21   Egg (boiled, poached, scrambled) 1 Egg 60 7   Egg Substitute 1/2 cup 70 10   Nuts (limit to 1 serving per day)  3 Tbsp. 150 7   Nut [place] (peanut, almond)  Limit to 1 serving or less daily 1 Tbsp. 90 4   Soy Burger (varies) 1  15   Garbanzo, Black, [place] Beans 1/2 cup 110 7   Refried Beans 1/2 cup 100 7   Kidney and Lima beans 1/2 cup 110 7   Tempeh 3 oz 175 18   Vegan crumbles 1/2 cup 100 14   Tofu 1/2 cup 110 14   Chili (beans and extra lean beef or turkey) 1 cup 200 23   Lentil Stew/Soup 1 cup 150 12   Black Bean Soup 1 cup 175 12          Cardio Lucero Barraza- https://www.[employer]StreetHawkube.com/channel/[email or web address]    30 Min low impact cardio- https://www.youtube.com/watch?v=gC_L9qAHVJ8    Body Project- https://www.[email or web address]/channel/MLMqk0J77-EpPaLIiFhiK1AG

## 2023-01-30 NOTE — TELEPHONE ENCOUNTER
Prior Authorization Retail Medication Request    Medication/Dose: wegovy  ICD code (if different than what is on RX):    Previously Tried and Failed:    Rationale:      Insurance Name:   commercial  Insurance ID:  [id number]      Pharmacy Information (if different than what is on RX)  Name:  [facility]ilda [facility]  Phone:  [phone]

## 2023-02-01 NOTE — TELEPHONE ENCOUNTER
PA Initiation    Medication: Semaglutide-Weight Management (WEGOVY) 0.25 MG/0.5ML SOAJ   Insurance Company: [employer] - Phone [phone] Fax [phone]  Pharmacy Filling the Rx: [facility] [place] - [address]  Filling Pharmacy Phone: [phone]  Filling Pharmacy Fax: [phone]  Start Date: 2/1/2023

## 2023-02-02 NOTE — TELEPHONE ENCOUNTER
Prior Authorization Approval    Authorization Effective Date: 1/1/2023  Authorization Expiration Date: 8/1/2023  Medication: Semaglutide-Weight Management (WEGOVY) 0.25 MG/0.5ML SOAJ--APPROVED  Approved Dose/Quantity:   Reference #:     Insurance Company: [email or web address] - Phone [phone] Fax [phone]  Expected CoPay:       CoPay Card Available:      Foundation Assistance Needed:    Which Pharmacy is filling the prescription (Not needed for infusion/clinic administered): [facility] [place], [place] - [address]  Pharmacy Notified: Yes  Patient Notified: Yes **Instructed pharmacy to notify patient when script is ready to /ship.**

## 2023-02-10 DIAGNOSIS — N94.19 DYSPAREUNIA DUE TO MEDICAL CONDITION IN FEMALE PATIENT: ICD-10-CM

## 2023-02-10 RX ORDER — ESTRADIOL 10 UG/1
TABLET VAGINAL
Qty: 20 TABLET | Refills: 0 | Status: SHIPPED | OUTPATIENT
Start: 2023-02-10 | End: 2023-04-07

## 2023-02-10 NOTE — TELEPHONE ENCOUNTER
Medication:YUVAFEM  Last prescribing provider:Jailene Austin  Last clinic visit date: Leola Amin   Recommendations for requested medication:n/a  Any other pertinent information:routed to jailene austin and Leola Amin

## 2023-02-13 ENCOUNTER — OFFICE VISIT (OUTPATIENT)
Dept: UROLOGY | Facility: CLINIC | Age: 43
End: 2023-02-13
Payer: COMMERCIAL

## 2023-02-13 DIAGNOSIS — M62.838 LEVATOR SPASM: ICD-10-CM

## 2023-02-13 DIAGNOSIS — N39.0 RECURRENT UTI: Primary | ICD-10-CM

## 2023-02-13 DIAGNOSIS — N94.10 DYSPAREUNIA IN FEMALE: ICD-10-CM

## 2023-02-13 DIAGNOSIS — Z87.442 HISTORY OF KIDNEY STONES: ICD-10-CM

## 2023-02-13 DIAGNOSIS — M62.89 HIGH-TONE PELVIC FLOOR DYSFUNCTION: ICD-10-CM

## 2023-02-13 PROCEDURE — 99215 OFFICE O/P EST HI 40 MIN: CPT | Mod: 24 | Performed by: PHYSICIAN ASSISTANT

## 2023-02-13 RX ORDER — LIDOCAINE HYDROCHLORIDE 20 MG/ML
JELLY TOPICAL ONCE
Status: DISCONTINUED | OUTPATIENT
Start: 2023-02-13 | End: 2023-02-28

## 2023-02-13 NOTE — PROGRESS NOTES
Urology Clinic      Name: Diana Wilson    MRN: [id number]   YOB: 1980  Accompanied at today's visit by:self                 Chief Complaint:   Alex          History of Present Illness:   February 13, 2023    HISTORY:   We have been following 42 year old Diana Wilson for hx of urinary urgency, Alex. Her hx is complicated by hx of breast cancer s/p double mastectomy, radiation therapy and chemo. Currently on Letrozole since 4/2022. She is s/p sling procedure with cysto on 12/12/22 with Dr. Harris.  Last seen by Dr. Harris on 1/23/23 and doing well from post-op stand point. Was started on methenamine with vitamin C at last encounter for rUTIs. Patient here today for follow-up as she is frustrated by all of her UTIs. Per chart review, has had 3 UCs since her surgery. However 1 UC showed no growth, 1 showed mixed libertad and 1 UC showed very low colony count. Denies having UTIs since starting Methenamine with Vitamin C 2 weeks ago. Voids after intercourse. Does not use hygenic wipes. Does not think intercourse is associated to her most recent UTIs. Typical UTI symptoms include urinary frequency, dysuria, heaviness in pelvis, foul odor of urine. Of note, has had CTs in the past that show small kidney stones with no signs of hydro or obstruction; most recent CT 2021. Reports a shock in her left flank occasionally when has UTIs. Denies fevers, chills, flank pain or issues with kidney stones in the past. Has only had 1 episode of gross hematuria about 1 year go during a UTI. Takes probiotic daily. Hx of celiac disease. Takes metamucil for constipation. Reports vaginal dryness and dyspareunia since her chemo, xrt and bilateral mastectomy for breast cancer. Has pain with both penetration and thrusting. Follows with oncology; currently on letrozole. Using vaginal estrogen tablets prescribed by oncology, which helps. Uses good clean love for lubrication. Patient voices no other concerns at this time.            "Allergies:     Allergies   Allergen Reactions     Carboplatin Shortness Of Breath, Rash and Anaphylaxis     Other reaction(s): Flushing, Tachycardia     Ambien      hallucinations     Amoxicillin GI Disturbance     Erythromycin GI Disturbance     Gluten Meal      Celiac disease     Hydrocodone-Acetaminophen      Other reaction(s): Hallucinations     Hydromorphone Headache     Penicillins Nausea and Vomiting and Hives     1-11-17 pt states this was a childhood reaction     Phentermine Other (See Comments)     Elevated BP     Topamax [Topiramate] Other (See Comments)     Cloudy thinking     Zolpidem      Other reaction(s): Hallucinations     Hydrocodone Other (See Comments)     Out of body experience, \"creepy-crawly\" skin             Medications:     Current Outpatient Medications   Medication Sig     Ascorbic Acid (VITAMIN C PO)      aspirin (ASA) 325 MG EC tablet Take 325 mg by mouth daily     cetirizine (ZYRTEC) 10 MG tablet Take 10 mg by mouth every morning     cholecalciferol (VITAMIN D3) 1000 units (25 mcg) capsule Take 1 capsule by mouth daily     Fluticasone Propionate (FLONASE NA) Spray 1 spray into both nostrils daily      gabapentin (NEURONTIN) 300 MG capsule Take 1 capsule (300 mg) by mouth 3 times daily     letrozole (FEMARA) 2.5 MG tablet Take 1 tablet (2.5 mg) by mouth daily     methenamine hippurate (HIPREX) 1 g tablet Take 1 tablet (1 g) by mouth 2 times daily     Multiple Vitamins-Minerals (MULTIVITAMIN PO) Take 1 tablet by mouth daily      nitroFURantoin macrocrystal (MACRODANTIN) 50 MG capsule TAKE 1 CAPSULE WITHIN 30 MIN OF INTERCOURSE AS NEEDED, EITHER BEFORE OR AFTER.     omeprazole (PRILOSEC) 20 MG DR capsule TAKE ONE CAPSULE BY MOUTH EVERY MORNING 30 MINUTES BEFORE BREAKFAST     Semaglutide-Weight Management (WEGOVY) 0.25 MG/0.5ML SOAJ Inject 0.25 mg Subcutaneous once a week for 28 days     venlafaxine (EFFEXOR XR) 150 MG 24 hr capsule Take 1 capsule (150 mg) by mouth daily     venlafaxine " (EFFEXOR XR) 75 MG 24 hr capsule Take with 150mg capsule for total dose of 225mg     YUVAFEM 10 MCG TABS vaginal tablet INSERT ONE TABLET VAGINALLY TWICE A WEEK     sulfamethoxazole-trimethoprim (BACTRIM DS) 800-160 MG tablet Take 1 tablet by mouth 2 times daily (Patient not taking: Reported on 2023)     Current Facility-Administered Medications   Medication     lidocaine (XYLOCAINE) 2 % external gel             Past  Surgical History:     Past Surgical History:   Procedure Laterality Date     BIOPSY  2016    EDG with biopsy     BREAST SURGERY      Bilateral reduction      SECTION       CYSTOSCOPY, SLING TRANSVAGINAL N/A 2022    Procedure: CREATION, VAGINAL SLING, WITH CYSTOSCOPY (support the urethra with mesh sling and look in the bladder);  Surgeon: Sixto Harris MD;  Location:  OR     DENTAL SURGERY  2010 and 10/12/2010    Root canals     DILATION AND CURETTAGE, HYSTEROSCOPY, ABLATE ENDOMETRIUM NOVASURE, COMBINED  3/20/2012    Procedure:COMBINED DILATION AND CURETTAGE, HYSTEROSCOPY, ABLATE ENDOMETRIUM NOVASURE; Hysteroscopy with Endometrial Ablation Novasure; Surgeon:GERRI PURCELL; Location:[facility]     ESOPHAGOSCOPY, GASTROSCOPY, DUODENOSCOPY (EGD), COMBINED N/A 2016    Procedure: COMBINED ESOPHAGOSCOPY, GASTROSCOPY, DUODENOSCOPY (EGD);  Surgeon: Jose L Wilson MD;  Location: [facility]     INSERT PORT VASCULAR ACCESS N/A 2017    Procedure: INSERT PORT VASCULAR ACCESS;  Surgeon: Michael Townsend MD;  Location: [facility]     LAPAROSCOPIC SALPINGO-OOPHORECTOMY Bilateral 2017    Procedure: LAPAROSCOPIC SALPINGO-OOPHORECTOMY;  Laparoscopic bilateral salpingo oophorectomy;  Surgeon: Preeti Tirado MD;  Location: [facility]     RECONSTRUCT BREAST BILATERAL, IMPLANT PROSTHESIS BILATERAL, COMBINED Bilateral 3/16/2018    Procedure: COMBINED RECONSTRUCT BREAST BILATERAL, IMPLANT PROSTHESIS BILATERAL;  BILATERAL SECOND STAGE BREAST RECONSTRUCTION WITH  SILICONE GEL IMPLANT;  Surgeon: Eugenio Ruelas MD;  Location: [facility]     REVISE RECONSTRUCTED BREAST       SURGICAL HISTORY OF -   1997    Breast reduction     ZZC APPENDECTOMY  1991             Physical Exam:   There were no vitals filed for this visit.  PSYCH: NAD  EYES: EOMI  NEURO: AAO x3  : Vulva is normal in appearance. Urethra normal.. Negative for NICOLASA with reduction of speculum when instructed to cough. Vaginal mucosa atrophic. Pain to palpation along introitus and levator ani muscles (LEFT more than RIGHT). High tone pelvic floor. No prolapse appreciated. Strength 2/5. No obvious masses, lesions, ulcers, bleeding noted on internal or external exam.       LABS:   UC  1/23/23 No growth  1/13/23 10,000-50,000 Proteus mirbilis  12/20/22 urogenital libertad  4/12/22 50,000-100,000 E. Coli    Creatinine   Date Value Ref Range Status   12/20/2022 0.64 0.51 - 0.95 mg/dL Final   07/09/2021 0.77 0.52 - 1.04 mg/dL Final     CT chest/abd/pelvis 12/13/21  Abdomen: There is a 1 cm benign cyst in the right kidney. There is a  stable 1-2 mm nonobstructing calculus in the left kidney.         Assessment and Plan:   42 year old is a pleasant female who has hx of Alex, vaginal atrophy, dyspareunia, levator spasm, high tone pelvic floor dysfunction, hx of kidney stones    Plan:  -  Continue methenamine and vitamin C BID.  -  Continue Macrobid 50mg for intercourse and swiming.  -  Orders placed for UA/UC if develops s/s of UTI in the future.  -  Continue probiotic daily.  -  Discussed UTI prevention and good vulvar hygiene.  -  Discussed repeating imaging to assess for stones, but patient declined at this time. Advised to go to ER if develops s/s of acute kidney stone.  - Can try AZO prn for bladder pain or s/s of UTI in the future.   - Consider repeating cysto and imaging in the future if UTIs persist.   - Continue vaginal estrogen tablet per Oncology.  - Can try Hyalo-gyn on days she doesn't use estrogen tablet.   -  Will refer to PFPT.  - Discussed vaginal dilators and try using these; recommend discussing with PFPT first as may do exercises prior to starting dilators.   - Lidocaine gel ordered 10min prior to dilators or intercourse.  - continue good clean love for intercourse and encourage positional changes or even the ohnut.  - Consider valium suppositories for PT or after intercourse prn.    - Question if has a bladder pain syndrome component given not all of her UCs show true infection. Recommend IC diet.   - Follow-up in 3 months.   - Continue to follow with oncology.  - After discussing the assessment and plan with patient, patient verbalizes understanding and agrees to the above plan. All questions answered.       Other orders as below:  Orders Placed This Encounter   Procedures     UA without Microscopic [[id number]]     Physical Therapy Referral       42 minutes spent on the date of the encounter doing chart review, review of outside records, review of test results, interpretation of tests, patient visit and documentation.      Nithya Rodriguez PA-C  Urology  February 13, 2023      Patient Care Team:  Yenifer Peters MD as PCP - General (Family Practice)  Yenifer Peters MD as Assigned PCP  Eugenio Ruelas MD as MD (Plastic Surgery)  Jenifer Olson MD as MD (Surgery)  Josette Smalls, RN as Specialty Care Coordinator (Oncology)  Sixto Harris MD as MD (Urology)  Jailene Thorne PA-C as Assigned Cancer Care Provider  Sixto Harris MD as Assigned Surgical Provider

## 2023-02-13 NOTE — PATIENT INSTRUCTIONS
- will refer you to pelvic floor physical therapy; they will call you to schedule this.   - Continue methenamine with vitamin C for UTI prevention.   - Continue estrogen tablets. Try Hyalo-gyn over the counter on days you do not insert the vaginal tablets.  - If you develop UTI, go to nearest lab and drop off urine sample.  - will order lidocaine gel for intercourse and dilators. Apply to cotton ball 10min before.   - follow-up in 3 months.   - Continue daily metamucil.    ____________    Dietary recommendations:    Foods/Beverages to Avoid:  caffeinated beverages, carbonated beverages, coffee, decaffeinated coffee, tea, caffeine free tea, soda, alcoholic beverages, grapefruit, lemon, oranges, pineapple, cranberry juice, grapefruit juice, orange juice, pineapple juice, tomato or tomato based products, red dyed products, spicy foods, chili, horseradish, vinegar, MSG, artificial sweeteners, Mexican food, Scout food, Malaysian food, sugar, strawberries, cranberries, chocolate, mocha, key lime, salsa, spicy/hot chips/crackers, chili pepper flakes, hot/spicey wing sauces, jalapeno, citric acid, citrus fruits, soy, oil & vinegar salad dressings, Vitamin C & B6.    Foods/beverages that are least irritating:  water, milk, bananas, blueberries, honeydew melon, peers, raisins, watermelon, broccoli, [place] sprouts, cabbage, carrots, cauliflower, celery, cucumber, mushrooms, peas, radishes, squash, zucchini, white potatoes, sweet potatoes/yams, chicken, eggs, turkey, beef, pork, lamb, shrimp, tunafish, salmon, oat, rice, pretzels, popcorn, applesauce, apricots, artichokes, asparagus, avocado, basil, beans, fresh beats, bell peppers, nonprocessed or overly spiced cheeses, plain chips, corn, oatmeal, cottage cheese, garlic, basil, dill, mushrooms, creamers without soybean oil, nuts, oils, black olives, oregano, parsley, non-chocolate or fruit pastries, non-soy protein powders, pumpkin, quinoa, radish, fresh rhubarb, rice, rosemary,  bethany, ranch dressing, table salt, non-chocolate puddings, honey, thyme, corn or flour tortillas, vanilla, Vitamins (A, B1, B2, B12, D, E, K), flour, cool whip.     ________________________________________________  Locations for Pelvic Floor Physical Therapy:    [facility]   [facility] - [facility]   [facility]   [facility] - [facility] - [facility] - [facility]    _________________________________________________     Vaginal Dilator: Talk to PT first before starting.     A vaginal dilator is a smooth plastic non-latex cylinder about 5-6 inches in length; similar to a shape of a tampon. It comes in several width sizes. You will start with the smallest size by slowly inserting the dilator into the vagina with an over the counter vaginal lubricant. You will keep the dilator stationary in place for 20 minutes. This is NOT an in-out motion, but rather you are keeping the dilator in place. It is ok if you can't place the dilator in all the way inside your vagina. Just gently pass the dilator until you have discomfort, then slowly retract the vaginal dilator our of the vagina until you no  "longer feel discomfort. Then keep dilator in place for 20 minutes. Once you are able to smoothly pass the smallest size dilator without discomfort/pain, you can try the next size up. You can use your vaginal dilator up to twice daily. Sometimes taking a bath 10 minutes before insertion can help relax the pelvic floor muscles. Sometimes if you have scar tissue or vaginal atrophy, dilators can irritate the vagina causing spotting which should subside on its own. However, if you are bleeding through a pad an hour contact our clinic right away. If you are unable to use over the counter lubricants, you can try olive oil or coconut oil.    You can purchase vaginal dilators through [employer] by calling [phone] and ask for an \"introductory package of vaginal dilators\". If you are working with a pelvic floor physical therapist, they can also further instruct you on where to purchase vaginal dilators. If you wish to purchase dilators on sites such as [employer], avoid those that vibrate/move, are sharp, or have attachments. Also, be aware of what the dilators are made of especially if you have allergies such as a latex allergy.   __________________________________________   "

## 2023-02-13 NOTE — NURSING NOTE
"Diana Wilson's chief complaint for this visit includes:  Chief Complaint   Patient presents with     RECHECK     recurrent UTI - mychart encounter, undress waist down for exam per KB     PCP: Yenifer Peters    Referring Provider:  No referring provider defined for this encounter.    LMP 08/05/2016 (Exact Date)   Data Unavailable        Allergies   Allergen Reactions     Carboplatin Shortness Of Breath, Rash and Anaphylaxis     Other reaction(s): Flushing, Tachycardia     Ambien      hallucinations     Amoxicillin GI Disturbance     Erythromycin GI Disturbance     Gluten Meal      Celiac disease     Hydrocodone-Acetaminophen      Other reaction(s): Hallucinations     Hydromorphone Headache     Penicillins Nausea and Vomiting and Hives     1-11-17 pt states this was a childhood reaction     Phentermine Other (See Comments)     Elevated BP     Topamax [Topiramate] Other (See Comments)     Cloudy thinking     Zolpidem      Other reaction(s): Hallucinations     Hydrocodone Other (See Comments)     Out of body experience, \"creepy-crawly\" skin          Do you need any medication refills at today's visit? No    Jailene milan Clinic Visit Facilitator- Surgical Specialties       "

## 2023-02-28 ENCOUNTER — MYC MEDICAL ADVICE (OUTPATIENT)
Dept: UROLOGY | Facility: CLINIC | Age: 43
End: 2023-02-28

## 2023-02-28 ENCOUNTER — VIRTUAL VISIT (OUTPATIENT)
Dept: SURGERY | Facility: CLINIC | Age: 43
End: 2023-02-28
Payer: COMMERCIAL

## 2023-02-28 DIAGNOSIS — E66.09 CLASS 1 OBESITY DUE TO EXCESS CALORIES WITH SERIOUS COMORBIDITY IN ADULT, UNSPECIFIED BMI: Primary | ICD-10-CM

## 2023-02-28 DIAGNOSIS — N94.10 DYSPAREUNIA IN FEMALE: ICD-10-CM

## 2023-02-28 DIAGNOSIS — E66.811 CLASS 1 OBESITY DUE TO EXCESS CALORIES WITH SERIOUS COMORBIDITY IN ADULT, UNSPECIFIED BMI: Primary | ICD-10-CM

## 2023-02-28 DIAGNOSIS — E66.9 OBESITY (BMI 30-39.9): Primary | ICD-10-CM

## 2023-02-28 PROCEDURE — 97803 MED NUTRITION INDIV SUBSEQ: CPT | Mod: VID | Performed by: DIETITIAN, REGISTERED

## 2023-02-28 RX ORDER — NALTREXONE HYDROCHLORIDE 50 MG/1
TABLET, FILM COATED ORAL
Qty: 90 TABLET | Refills: 0 | Status: SHIPPED | OUTPATIENT
Start: 2023-02-28 | End: 2023-04-24 | Stop reason: SINTOL

## 2023-02-28 RX ORDER — LIDOCAINE HYDROCHLORIDE 20 MG/ML
JELLY TOPICAL ONCE
Status: ACTIVE | OUTPATIENT
Start: 2023-02-28

## 2023-02-28 RX ORDER — BUPROPION HYDROCHLORIDE 100 MG/1
TABLET ORAL
Qty: 180 TABLET | Refills: 0 | Status: SHIPPED | OUTPATIENT
Start: 2023-02-28 | End: 2023-04-24 | Stop reason: SINTOL

## 2023-02-28 NOTE — PROGRESS NOTES
Diana Wilson is a 42 year old who is being evaluated via a billable video visit.      How would you like to obtain your AVS? MyChart  If the video visit is dropped, the invitation should be resent by: Send to e-mail at: [email or web address]  Will anyone else be joining your video visit? No    Medical  Weight Loss Follow-Up Diet Evaluation  Assessment:  Diana is presenting today for a follow up weight management nutrition consultation. Pt has had an initial appointment with Dr. Rosas  Weight loss medication: Phentermine.  Pt's weight is 202 lb   Initial weight: 205 lb  Weight change: down 2 lbs    No flowsheet data found.  BMI: There is no height or weight on file to calculate BMI.  Ideal body weight: 55.1 kg (121 lb 7.5 oz)  Adjusted ideal body weight: 70.3 kg (154 lb 14.1 oz)    Estimated RMR ([place]-St Jeor equation):   1555 kcals x 1.2 (sedentary) = 1865 kcals (for weight maintenance)  1555 kcals x 1.3 (light active) = 2137 kcals (for weight maintenance)  Recommended Protein Intake: 70-90 grams of protein/day  Patient Active Problem List:  Patient Active Problem List   Diagnosis     Obesity     CARDIOVASCULAR SCREENING; LDL GOAL LESS THAN 130     Major depressive disorder, recurrent episode, mild (H)     Iron deficiency anemia     Insomnia     Health Care Home     Anxiety     Intestinal malabsorption     Lymphedema of right upper extremity     Malignant neoplasm of upper-outer quadrant of right breast in female, estrogen receptor positive (H)     Osteopenia of multiple sites     Multiple subsegmental pulmonary emboli without acute cor pulmonale (H)     Breast reconstruction disproportion     Family history of colon cancer     Celiac disease     Recurrent UTI     Stress incontinence     Diabetes: No    Struggles with: getting in fruit and vegetables    Progress on goals from last visit: Looked into other medications to help with weight. She restarted tracking her intake this past week - so far has been hitting  around 1800 kcals. She has been trying to be more strict about measuring portions lately  1. Aim to eat 3 meals per day, protein focused, high fiber foods - start tracking intake and aiming for 1600 kcals daily - started tracking last week, skin girl recipes for a few nights - around 1800 kcals  2. Have an additional vegetable in per day and 2 veggies at dinner    Dietary Recall:  She continues to walk 2-4 miles per day  Intake/routine is the same as last visit:   Breakfast: 8:30 am banana, protein powder, almond milk, peanut butter protein powder  11:30 carrots and ranch dip  Lunch: two good yogurt, blueberry granola  Snack: cheese stick OR apple OR mini kind bar   Dinner: grilled meals with meat and veggies   Typical snacks: sometimes a yasso yogurt bar, hot chocolate  Beverages:   Coffee with creamer (regular)  Water: with propel water flavoring  Exercise:   Walking video at home, but plans to start incorporating more strength training  Going to restart regular walks 3x per week in the morning, 2-4 miles.   Nutrition Diagnosis:    Overweight/Obesity (NC 3.3) related to overeating and poor lifestyle habits as evidenced by BMI 33.64    Intervention:  1. Food and/or nutrient delivery: tracking intake to 1600 kcals  2. Nutrition education: encouraged lower calorie, filling foods to assist in reaching calorie goal  3. Nutrition counseling: reviewed progress with goals, support for continued success    Monitoring/Evaluation:    Goals:  1. Aim to eat 3 meals per day, protein focused, high fiber foods - start tracking intake and aiming for 1600 kcals daily  2. Have an additional vegetable per day and 2 veggies at dinner    Patient to follow up in 2 month(s) with bariatrician and 3 month(s) with RD    Video-Visit Details    Type of service:  Video Visit    Video Start Time (time video started): 10:30 am    Video End Time (time video stopped): 10:47 am    Originating Location (pt. Location): Home        Distant Location  (provider location):  On-site    Mode of Communication:  Video Conference via [facility]    Physician has received verbal consent for a Video Visit from the patient? Yes      Amanda Vivas

## 2023-02-28 NOTE — LETTER
2/28/2023         RE: Diana Wilson  1972 [address] [facility] [id number]        Dear Colleague,    Thank you for referring your patient, Diana Wilson, to the [facility] [place]. Please see a copy of my visit note below.    Diana Wilson is a 42 year old who is being evaluated via a billable video visit.      How would you like to obtain your AVS? MyChart  If the video visit is dropped, the invitation should be resent by: Send to e-mail at: [email or web address]  Will anyone else be joining your video visit? No    Medical  Weight Loss Follow-Up Diet Evaluation  Assessment:  Diana is presenting today for a follow up weight management nutrition consultation. Pt has had an initial appointment with Dr. Rosas  Weight loss medication: Phentermine.  Pt's weight is 202 lb   Initial weight: 205 lb  Weight change: down 2 lbs    No flowsheet data found.  BMI: There is no height or weight on file to calculate BMI.  Ideal body weight: 55.1 kg (121 lb 7.5 oz)  Adjusted ideal body weight: 70.3 kg (154 lb 14.1 oz)    Estimated RMR ([place]-St Jeor equation):   1555 kcals x 1.2 (sedentary) = 1865 kcals (for weight maintenance)  1555 kcals x 1.3 (light active) = 2137 kcals (for weight maintenance)  Recommended Protein Intake: 70-90 grams of protein/day  Patient Active Problem List:  Patient Active Problem List   Diagnosis     Obesity     CARDIOVASCULAR SCREENING; LDL GOAL LESS THAN 130     Major depressive disorder, recurrent episode, mild (H)     Iron deficiency anemia     Insomnia     Health Care Home     Anxiety     Intestinal malabsorption     Lymphedema of right upper extremity     Malignant neoplasm of upper-outer quadrant of right breast in female, estrogen receptor positive (H)     Osteopenia of multiple sites     Multiple subsegmental pulmonary emboli without acute cor pulmonale (H)     Breast reconstruction disproportion     Family history of colon cancer     Celiac  disease     Recurrent UTI     Stress incontinence     Diabetes: No    Struggles with: getting in fruit and vegetables    Progress on goals from last visit: Looked into other medications to help with weight. She restarted tracking her intake this past week - so far has been hitting around 1800 kcals. She has been trying to be more strict about measuring portions lately  1. Aim to eat 3 meals per day, protein focused, high fiber foods - start tracking intake and aiming for 1600 kcals daily - started tracking last week, skin girl recipes for a few nights - around 1800 kcals  2. Have an additional vegetable in per day and 2 veggies at dinner    Dietary Recall:  She continues to walk 2-4 miles per day  Intake/routine is the same as last visit:   Breakfast: 8:30 am banana, protein powder, almond milk, peanut butter protein powder  11:30 carrots and ranch dip  Lunch: two good yogurt, blueberry granola  Snack: cheese stick OR apple OR mini kind bar   Dinner: grilled meals with meat and veggies   Typical snacks: sometimes a yasso yogurt bar, hot chocolate  Beverages:   Coffee with creamer (regular)  Water: with propel water flavoring  Exercise:   Walking video at home, but plans to start incorporating more strength training  Going to restart regular walks 3x per week in the morning, 2-4 miles.   Nutrition Diagnosis:    Overweight/Obesity (NC 3.3) related to overeating and poor lifestyle habits as evidenced by BMI 33.64    Intervention:  1. Food and/or nutrient delivery: tracking intake to 1600 kcals  2. Nutrition education: encouraged lower calorie, filling foods to assist in reaching calorie goal  3. Nutrition counseling: reviewed progress with goals, support for continued success    Monitoring/Evaluation:    Goals:  1. Aim to eat 3 meals per day, protein focused, high fiber foods - start tracking intake and aiming for 1600 kcals daily  2. Have an additional vegetable per day and 2 veggies at dinner    Patient to follow up  in 2 month(s) with bariatrician and 3 month(s) with RD    Video-Visit Details    Type of service:  Video Visit    Video Start Time (time video started): 10:30 am    Video End Time (time video stopped): 10:47 am    Originating Location (pt. Location): Home        Distant Location (provider location):  On-site    Mode of Communication:  Video Conference via [facility]    Physician has received verbal consent for a Video Visit from the patient? Yes      Amanda Vivas      Again, thank you for allowing me to participate in the care of your patient.        Sincerely,        Amanda Vivas

## 2023-03-14 ENCOUNTER — THERAPY VISIT (OUTPATIENT)
Dept: PHYSICAL THERAPY | Facility: CLINIC | Age: 43
End: 2023-03-14
Attending: PHYSICIAN ASSISTANT
Payer: COMMERCIAL

## 2023-03-14 DIAGNOSIS — R10.2 PELVIC PAIN IN FEMALE: ICD-10-CM

## 2023-03-14 DIAGNOSIS — R27.8 MUSCULAR INCOORDINATION: ICD-10-CM

## 2023-03-14 DIAGNOSIS — N94.10 DYSPAREUNIA IN FEMALE: ICD-10-CM

## 2023-03-14 PROCEDURE — 97535 SELF CARE MNGMENT TRAINING: CPT | Mod: GP | Performed by: PHYSICAL THERAPIST

## 2023-03-14 PROCEDURE — 97112 NEUROMUSCULAR REEDUCATION: CPT | Mod: GP | Performed by: PHYSICAL THERAPIST

## 2023-03-14 PROCEDURE — 97161 PT EVAL LOW COMPLEX 20 MIN: CPT | Mod: GP | Performed by: PHYSICAL THERAPIST

## 2023-03-14 NOTE — PROGRESS NOTES
Physical Therapy Initial Evaluation  Subjective:  The history is provided by the patient and medical records. No  was used.   Therapist Generated HPI Evaluation  Problem details: Pt is a breast cancer survivor, is post menopausal severe vaginal dryness (went a year without having sex because it was painful). In a [facility] survivorship course which has been wonderful. In sept 2021, son left for [employer] and she was having a lot of UTIs (did nelson). From then until December 2022 had them almost every month. Sling procedure with cysto on 12/12/22 with Dr. Harris and within a week had a UTI. Went to a PA within urology and felt blown off. Did do an exam and find PF to be very tight - thinks PF muscles are causing some irritation to the bladder. Using good clean love, also vaginal estrogen tablets.    Pt reports that they took abdominal mm for breast surgery -- all these pelvic pain issues started within a few months after that. Has numbness in abdominals. September 2020 had a TRAM surgery (portion of rectus abdominis)    What makes pelvic pain better? Urinating so not pressure on it, or stopping what she's doing, or doing little bridges to take pressure off...     History of LBP/SI pain    .         Type of problem:  Pelvic dysfunction.    This is a chronic condition.                         Patient Health History  Diana CROWLEY Steve being seen for Pelvic floor rehab .     Problem began: 9/14/2021 (worsened in Sept 2021).   Problem occurred: menopausel/childbirth     Health rating: fair-good.  Pertinent medical history includes: cancer, overweight, mental illness, depression, anemia, asthma and menopausal (osteopenia).   Red flags:  Changes in bowel and bladder habits.         Current medications:  Anti-depressants and bone density.    Current occupation is Director of [employer].   Primary job tasks include:  Computer work.                              SUBJECTIVE:    Urination:  Do you leak  on the way to the bathroom or with a strong urge to void? No   Do you leak with cough,sneeze, jumping, running?No   Any other activities that cause leaking? No   Do you have triggers that make you feel you can't wait to go to the bathroom? No.  How long can you delay the need to urinate? Long time now.   How many times do you get up to urinate at night? 2-3 (wakes up from the urge)  Can you stop the flow of urine when on the toilet? Yes  Is the volume of urine passed usually: average. (8sec rule= 250ml with average bladder storing 400-600ml)  Do you feel empty when you are done? Yes  Do you strain to pass urine? No  Do you have a slow or hesitant urinary stream? No  Do you have difficulty initiating the urine stream? No  Is urination painful? No  How many bladder infections have you had in last 12 months? 8  Fluid intake(one glass is 8oz or one cup) 8 glasses/day, x (pt reports does drink coffee but otherwise no caffeinated drinks) caffinated glasses/day  X (pt reports wine does irritate her bladder but occasionally drinks) alcohol glasses/day.    Bowel habits:  Frequency of bowel movements? Every other day.... times a week  Consistancy of stool? soft formed/hard  Do you ignore the urge to defecate? Yes does not pass stool in public, sometimes it'll start at noon and then it'll be 4-5pm  Do you strain to pass stool? Yes, (more like a have to relax sort of thing - notes Celiac makes stool very hard). No blood with stool, in the past with Percacet.     Aggrevating factors:  Are there any foods that increase or decrease your symptoms?  Yes (gluten for bowels, bladder: asparagus, wine, caffeine - does drink coffee in the monring)  Do you have any food allergies?  Yes, strawberries, Celiac      Sensation:   Can you tell if there is solid, liquid, or gas in the rectum?  Yes  Do you feel the urge to move your bowels?  Yes  Is the urge very strong and difficult to control? No Or weak/absent? no      Do you have abdominal  pain?  Yes (from center to back of pelvis, including LBP)  Describe the quality of the pain: chronic LBP, abdominal numbness area of scar  Do you ever have pain that wakes you at night? no      Pelvic Pain:  Do you have any pelvic pain with intercourse, exams, use of tampons? Yes  Is initial penetration during intercourse painful? Yes  Is deeper penetration painful? Yes  Do you use lubricant? Yes What kind? Good clean love  ?  Given birth? Yes Any complications? No but did have pre-eclampsia during pregnancy  # of vaginal delieveries?2  # of C-sections?no  # of episiotomies?2.  Are you sexually active?Yes  Have you ever been worried for your physical safety? No  Do you have any depression, anxiety, panic attacks, excessive stress?  Yes-Child in  deployed this summer   Any abdominal or pelvic surgeries? Yes - tram  Are you having any regular exercise? yoga  Have you practiced the PF(kegel) exercises for 4 or more weeks? no  Thyroid checked?na(related to hair loss, flu-like symptoms, wt gain/loss, fatigue, menopause)  Changed diet lately?na      OBSERVATION  Lumbar Posture: Negative  Pelvic symmetry: Negative  Introitus: unremarkable  Trendelenberg Sign:Negative    ROM  Single leg standing unilateral hip flexion PSIS:Positive - hypomobile R  Standing forward flexion PSIS:Positive R hypomobile  (Hypomobile w/ PA mob R PSIS  Passive Hip ROM:Negative  MARLO:Negative  MARLO with OP:Negative    MUSCLE PERFORMANCE  Active SLR:Negative  Baseline PF tone:hyper  PF Tone with cough: hyper  Valsalva: wnl  PF Response quality: moderate and slow return to baseline  PF Power: Center: 2 Stronger on right/left na.  Endurance: Maximum contraction in seconds: 1-2  # of endurance contractions before fatigue: NT high tone  Quick contraction repetitions prior to fatigue: NT high tone.  Specificity/accessory muscles: WNL/WFL, Synergy with abdominals: WNL/WFL.  Prolapse: Cyctocele/Rectocele/Uterine stgstrstastdstest:st st1st PALPATION: Pain:  levator ani and introitus         Objective:  System                                 Pelvic Dysfunction Evaluation:          Abdominal Wall:        Scar Mobility:  Scar lower abdomen, mild - moderate restrictions throughout  Pelvic Clock Exam:    Ischiocavernosis pain:  -  Bulbocavernosis pain:  -  Transverse Perineal:  -  Levator ANI:  +  Perineal Body:  -      External Assessment:      Scars:  Well healed and non-tender  Bearing Down/Coughing:  Normal      Muscle Contraction/Perineal Mobility:  Slight lift, no urogential triangle descent  Internal Assessment:  Internal assessment pelvic: ttp introitus stretch, levator internally, layer 3 mm discomfort L>R.    Contraction/Grade:  Weak squeeze, 2 second hold (2)                               General     ROS    Assessment/Plan:    Patient is a 42 year old female with pelvic complaints.    Patient has the following significant findings with corresponding treatment plan.                Diagnosis 1:  Dypareunia  Pain -  self management and home program  Decreased ROM/flexibility - manual therapy and therapeutic exercise  Impaired muscle performance - biofeedback, electric stimulation, neuro re-education and self care/education  Diagnosis 2:  LBP   Pain -  home program  Decreased ROM/flexibility - manual therapy and therapeutic exercise  Decreased joint mobility - manual therapy and therapeutic exercise  Decreased strength - therapeutic exercise and therapeutic activities    Therapy Evaluation Codes:   1) History comprised of:   Personal factors that impact the plan of care:      Past/current experiences and Time since onset of symptoms.    Comorbidity factors that impact the plan of care are:      Cancer.     Medications impacting care: pain, vaginal estrogen.  2) Examination of Body Systems comprised of:   Body structures and functions that impact the plan of care:      Lumbar spine, Pelvis and Sacral illiac joint.   Activity limitations that impact the plan of care  are:      Frequency, Pelvic Exam, Sulligent and Using a tampon.  3) Clinical presentation characteristics are:   Stable/Uncomplicated.  4) Decision-Making    Low complexity using standardized patient assessment instrument and/or measureable assessment of functional outcome.  Cumulative Therapy Evaluation is: Low complexity.    Previous and current functional limitations:  (See Goal Flow Sheet for this information)    Short term and Long term goals: (See Goal Flow Sheet for this information)     Communication ability:  Patient appears to be able to clearly communicate and understand verbal and written communication and follow directions correctly.  Treatment Explanation - The following has been discussed with the patient:   RX ordered/plan of care  Anticipated outcomes  Possible risks and side effects  This patient would benefit from PT intervention to resume normal activities.   Rehab potential is good.    Frequency:  1 X week, once daily  Duration:  for 10 weeks  Discharge Plan:  Achieve all LTG.  Independent in home treatment program.  Reach maximal therapeutic benefit.    Please refer to the daily flowsheet for treatment today, total treatment time and time spent performing 1:1 timed codes.

## 2023-03-21 ENCOUNTER — THERAPY VISIT (OUTPATIENT)
Dept: PHYSICAL THERAPY | Facility: CLINIC | Age: 43
End: 2023-03-21
Payer: COMMERCIAL

## 2023-03-21 DIAGNOSIS — R10.2 PELVIC PAIN IN FEMALE: Primary | ICD-10-CM

## 2023-03-21 DIAGNOSIS — R27.8 MUSCULAR INCOORDINATION: ICD-10-CM

## 2023-03-21 PROCEDURE — 97112 NEUROMUSCULAR REEDUCATION: CPT | Mod: GP | Performed by: PHYSICAL THERAPIST

## 2023-03-21 PROCEDURE — 97140 MANUAL THERAPY 1/> REGIONS: CPT | Mod: GP | Performed by: PHYSICAL THERAPIST

## 2023-03-24 DIAGNOSIS — N39.0 RECURRENT UTI: ICD-10-CM

## 2023-03-25 ENCOUNTER — LAB (OUTPATIENT)
Dept: LAB | Facility: CLINIC | Age: 43
End: 2023-03-25
Payer: COMMERCIAL

## 2023-03-25 DIAGNOSIS — N39.0 RECURRENT UTI: ICD-10-CM

## 2023-03-25 PROCEDURE — 87086 URINE CULTURE/COLONY COUNT: CPT

## 2023-03-25 PROCEDURE — 81015 MICROSCOPIC EXAM OF URINE: CPT

## 2023-03-27 NOTE — TELEPHONE ENCOUNTER
NITROFURANTOIN MACROCRYSTAL 50 CAPS      Last Written Prescription Date:  10/31/22  Last Fill Quantity: 30,   # refills: 4  Last Office Visit : 2/13/23  Future Office visit:  5/15/23    Routing refill request to provider for review/approval because:  Drug not on the [facility], [facility] or [facility] refill protocol or controlled substance

## 2023-03-28 ENCOUNTER — MYC MEDICAL ADVICE (OUTPATIENT)
Dept: UROLOGY | Facility: CLINIC | Age: 43
End: 2023-03-28

## 2023-03-28 ENCOUNTER — THERAPY VISIT (OUTPATIENT)
Dept: PHYSICAL THERAPY | Facility: CLINIC | Age: 43
End: 2023-03-28
Payer: COMMERCIAL

## 2023-03-28 DIAGNOSIS — Z87.442 HISTORY OF KIDNEY STONES: Primary | ICD-10-CM

## 2023-03-28 DIAGNOSIS — R27.8 MUSCULAR INCOORDINATION: ICD-10-CM

## 2023-03-28 DIAGNOSIS — R10.2 PELVIC PAIN IN FEMALE: Primary | ICD-10-CM

## 2023-03-28 DIAGNOSIS — N39.0 RECURRENT UTI: ICD-10-CM

## 2023-03-28 LAB — BACTERIA UR CULT: NO GROWTH

## 2023-03-28 PROCEDURE — 97110 THERAPEUTIC EXERCISES: CPT | Mod: GP | Performed by: PHYSICAL THERAPIST

## 2023-03-28 PROCEDURE — 97140 MANUAL THERAPY 1/> REGIONS: CPT | Mod: GP | Performed by: PHYSICAL THERAPIST

## 2023-03-28 NOTE — TELEPHONE ENCOUNTER
Patient has history of stones noted on last CT in 2021. I recommend CT scan of abdomen/pelvis without contrast to evaluate for stones as flank pain, blood in the urine can all be indicators of kidney stone. If develops gross hematuria, flank pain, fevers/chills again, recommend going to ER right away. Encourage pushing fluids.      Received the message above from Nithya Rodriguez PA-C. Sending mychart to patient now and orders have been placed for CT of abdomen/pelvis.  Josette Rao LPN on 3/28/2023 at 2:35 PM

## 2023-03-30 ENCOUNTER — HOSPITAL ENCOUNTER (OUTPATIENT)
Dept: CT IMAGING | Facility: CLINIC | Age: 43
Discharge: HOME | End: 2023-03-30
Attending: PHYSICIAN ASSISTANT | Admitting: PHYSICIAN ASSISTANT
Payer: COMMERCIAL

## 2023-03-30 DIAGNOSIS — N39.0 RECURRENT UTI: ICD-10-CM

## 2023-03-30 DIAGNOSIS — Z87.442 HISTORY OF KIDNEY STONES: ICD-10-CM

## 2023-03-30 PROCEDURE — 74176 CT ABD & PELVIS W/O CONTRAST: CPT

## 2023-03-30 NOTE — CONFIDENTIAL NOTE
Patient reviewed my chart message on 3/28/23 and is scheduled for CT scan. CT results will go to Nithya Rodriguez PA-C for review and then patient will be contacted with results. Will close encounter at this time.    Erendira Sun RN, BSN

## 2023-03-31 NOTE — TELEPHONE ENCOUNTER
3/31 Patient scheduled UA & Cysto for 5/22 with Nakib.     Emi milan Procedure   Dermatology, Surgery, Urology  [facility] [place]

## 2023-03-31 NOTE — TELEPHONE ENCOUNTER
Nithya Rodriguez, THEA  [facility] Urology Adult [place]  Please notify patient that her CT scan showed very small kidney stone that is not obstructing. Please see if hers symptoms have improved/resolved. If symptomatic recommend cathed UA/UC. If having gross hematuria with fevers/chills, flank pain advise going to ER. Given she had gross hematuria, think may benefit from repeating cystoscopy and discuss stones with urologist who specializes in stones. Please see if can get scheduled for cystoscopy with next available urologist that specializes in stones given gross hematuria and hx of sling procedure to ensure no mesh erosion.     Dianne

## 2023-04-03 ENCOUNTER — THERAPY VISIT (OUTPATIENT)
Dept: PHYSICAL THERAPY | Facility: CLINIC | Age: 43
End: 2023-04-03
Payer: COMMERCIAL

## 2023-04-03 DIAGNOSIS — R27.8 MUSCULAR INCOORDINATION: ICD-10-CM

## 2023-04-03 DIAGNOSIS — R10.2 PELVIC PAIN IN FEMALE: Primary | ICD-10-CM

## 2023-04-03 PROCEDURE — 90912 BFB TRAINING 1ST 15 MIN: CPT | Mod: GP | Performed by: PHYSICAL THERAPIST

## 2023-04-03 PROCEDURE — 97112 NEUROMUSCULAR REEDUCATION: CPT | Mod: GP | Performed by: PHYSICAL THERAPIST

## 2023-04-03 PROCEDURE — 97535 SELF CARE MNGMENT TRAINING: CPT | Mod: GP | Performed by: PHYSICAL THERAPIST

## 2023-04-03 RX ORDER — NITROFURANTOIN MACROCRYSTALS 50 MG/1
CAPSULE ORAL
Qty: 30 CAPSULE | Refills: 11 | Status: SHIPPED | OUTPATIENT
Start: 2023-04-03 | End: 2023-10-06

## 2023-04-06 DIAGNOSIS — N94.19 DYSPAREUNIA DUE TO MEDICAL CONDITION IN FEMALE PATIENT: ICD-10-CM

## 2023-04-07 DIAGNOSIS — N94.19 DYSPAREUNIA DUE TO MEDICAL CONDITION IN FEMALE PATIENT: ICD-10-CM

## 2023-04-07 RX ORDER — ESTRADIOL 10 UG/1
TABLET VAGINAL
Qty: 20 TABLET | Refills: 0 | OUTPATIENT
Start: 2023-04-07

## 2023-04-07 RX ORDER — ESTRADIOL 10 UG/1
INSERT VAGINAL
Qty: 20 TABLET | Refills: 0 | Status: SHIPPED | OUTPATIENT
Start: 2023-04-07 | End: 2023-06-07

## 2023-04-11 ENCOUNTER — THERAPY VISIT (OUTPATIENT)
Dept: PHYSICAL THERAPY | Facility: CLINIC | Age: 43
End: 2023-04-11
Payer: COMMERCIAL

## 2023-04-11 DIAGNOSIS — R27.8 MUSCULAR INCOORDINATION: ICD-10-CM

## 2023-04-11 DIAGNOSIS — R10.2 PELVIC PAIN IN FEMALE: Primary | ICD-10-CM

## 2023-04-11 PROCEDURE — 97140 MANUAL THERAPY 1/> REGIONS: CPT | Mod: GP | Performed by: PHYSICAL THERAPIST

## 2023-04-11 PROCEDURE — 90912 BFB TRAINING 1ST 15 MIN: CPT | Mod: GP | Performed by: PHYSICAL THERAPIST

## 2023-04-17 DIAGNOSIS — Z17.0 MALIGNANT NEOPLASM OF UPPER-OUTER QUADRANT OF RIGHT BREAST IN FEMALE, ESTROGEN RECEPTOR POSITIVE (H): ICD-10-CM

## 2023-04-17 DIAGNOSIS — C50.411 MALIGNANT NEOPLASM OF UPPER-OUTER QUADRANT OF RIGHT BREAST IN FEMALE, ESTROGEN RECEPTOR POSITIVE (H): ICD-10-CM

## 2023-04-17 RX ORDER — LETROZOLE 2.5 MG/1
2.5 TABLET, FILM COATED ORAL DAILY
Qty: 120 TABLET | Refills: 1 | Status: SHIPPED | OUTPATIENT
Start: 2023-04-17 | End: 2023-10-23

## 2023-04-24 ENCOUNTER — VIRTUAL VISIT (OUTPATIENT)
Dept: SURGERY | Facility: CLINIC | Age: 43
End: 2023-04-24
Payer: COMMERCIAL

## 2023-04-24 ENCOUNTER — ONCOLOGY VISIT (OUTPATIENT)
Dept: ONCOLOGY | Facility: CLINIC | Age: 43
End: 2023-04-24
Attending: INTERNAL MEDICINE
Payer: COMMERCIAL

## 2023-04-24 ENCOUNTER — APPOINTMENT (OUTPATIENT)
Dept: LAB | Facility: CLINIC | Age: 43
End: 2023-04-24
Payer: COMMERCIAL

## 2023-04-24 ENCOUNTER — INFUSION THERAPY VISIT (OUTPATIENT)
Dept: INFUSION THERAPY | Facility: CLINIC | Age: 43
End: 2023-04-24
Attending: INTERNAL MEDICINE
Payer: COMMERCIAL

## 2023-04-24 VITALS
HEIGHT: 64 IN | OXYGEN SATURATION: 98 % | TEMPERATURE: 97.5 F | RESPIRATION RATE: 12 BRPM | DIASTOLIC BLOOD PRESSURE: 95 MMHG | WEIGHT: 205 LBS | SYSTOLIC BLOOD PRESSURE: 140 MMHG | BODY MASS INDEX: 35 KG/M2 | HEART RATE: 78 BPM

## 2023-04-24 VITALS — BODY MASS INDEX: 34.31 KG/M2 | HEIGHT: 64 IN | WEIGHT: 201 LBS

## 2023-04-24 DIAGNOSIS — M85.89 OSTEOPENIA OF MULTIPLE SITES: Primary | ICD-10-CM

## 2023-04-24 DIAGNOSIS — Z17.0 MALIGNANT NEOPLASM OF UPPER-OUTER QUADRANT OF RIGHT BREAST IN FEMALE, ESTROGEN RECEPTOR POSITIVE (H): ICD-10-CM

## 2023-04-24 DIAGNOSIS — F52.9 SEXUAL DYSFUNCTION IN FEMALE: ICD-10-CM

## 2023-04-24 DIAGNOSIS — Z79.811 LONG TERM (CURRENT) USE OF AROMATASE INHIBITORS: ICD-10-CM

## 2023-04-24 DIAGNOSIS — C50.411 MALIGNANT NEOPLASM OF UPPER-OUTER QUADRANT OF RIGHT BREAST IN FEMALE, ESTROGEN RECEPTOR POSITIVE (H): ICD-10-CM

## 2023-04-24 DIAGNOSIS — K21.9 GASTROESOPHAGEAL REFLUX DISEASE, UNSPECIFIED WHETHER ESOPHAGITIS PRESENT: ICD-10-CM

## 2023-04-24 DIAGNOSIS — Z86.69 HISTORY OF MIGRAINE HEADACHES: ICD-10-CM

## 2023-04-24 DIAGNOSIS — E66.9 OBESITY (BMI 30-39.9): Primary | ICD-10-CM

## 2023-04-24 LAB
ALBUMIN SERPL BCG-MCNC: 4.7 G/DL (ref 3.5–5.2)
ALP SERPL-CCNC: 93 U/L (ref 35–104)
ALT SERPL W P-5'-P-CCNC: 18 U/L (ref 10–35)
ANION GAP SERPL CALCULATED.3IONS-SCNC: 13 MMOL/L (ref 7–15)
AST SERPL W P-5'-P-CCNC: 18 U/L (ref 10–35)
BASOPHILS # BLD AUTO: 0.1 10E3/UL (ref 0–0.2)
BASOPHILS NFR BLD AUTO: 1 %
BILIRUB SERPL-MCNC: 0.2 MG/DL
BUN SERPL-MCNC: 9.9 MG/DL (ref 6–20)
CALCIUM SERPL-MCNC: 9.6 MG/DL (ref 8.6–10)
CHLORIDE SERPL-SCNC: 103 MMOL/L (ref 98–107)
CREAT SERPL-MCNC: 0.68 MG/DL (ref 0.51–0.95)
DEPRECATED HCO3 PLAS-SCNC: 26 MMOL/L (ref 22–29)
EOSINOPHIL # BLD AUTO: 0.3 10E3/UL (ref 0–0.7)
EOSINOPHIL NFR BLD AUTO: 3 %
ERYTHROCYTE [DISTWIDTH] IN BLOOD BY AUTOMATED COUNT: 13.2 % (ref 10–15)
GFR SERPL CREATININE-BSD FRML MDRD: >90 ML/MIN/1.73M2
GLUCOSE SERPL-MCNC: 79 MG/DL (ref 70–99)
HCT VFR BLD AUTO: 39.3 % (ref 35–47)
HGB BLD-MCNC: 13 G/DL (ref 11.7–15.7)
IMM GRANULOCYTES # BLD: 0 10E3/UL
IMM GRANULOCYTES NFR BLD: 0 %
LYMPHOCYTES # BLD AUTO: 2.5 10E3/UL (ref 0.8–5.3)
LYMPHOCYTES NFR BLD AUTO: 31 %
MCH RBC QN AUTO: 28.4 PG (ref 26.5–33)
MCHC RBC AUTO-ENTMCNC: 33.1 G/DL (ref 31.5–36.5)
MCV RBC AUTO: 86 FL (ref 78–100)
MONOCYTES # BLD AUTO: 0.5 10E3/UL (ref 0–1.3)
MONOCYTES NFR BLD AUTO: 6 %
NEUTROPHILS # BLD AUTO: 4.7 10E3/UL (ref 1.6–8.3)
NEUTROPHILS NFR BLD AUTO: 59 %
NRBC # BLD AUTO: 0 10E3/UL
NRBC BLD AUTO-RTO: 0 /100
PLATELET # BLD AUTO: 333 10E3/UL (ref 150–450)
POTASSIUM SERPL-SCNC: 3.9 MMOL/L (ref 3.4–5.3)
PROT SERPL-MCNC: 7.5 G/DL (ref 6.4–8.3)
RBC # BLD AUTO: 4.58 10E6/UL (ref 3.8–5.2)
SODIUM SERPL-SCNC: 142 MMOL/L (ref 136–145)
WBC # BLD AUTO: 8 10E3/UL (ref 4–11)

## 2023-04-24 PROCEDURE — 258N000003 HC RX IP 258 OP 636: Performed by: INTERNAL MEDICINE

## 2023-04-24 PROCEDURE — 250N000011 HC RX IP 250 OP 636: Performed by: INTERNAL MEDICINE

## 2023-04-24 PROCEDURE — 86300 IMMUNOASSAY TUMOR CA 15-3: CPT | Performed by: INTERNAL MEDICINE

## 2023-04-24 PROCEDURE — 80053 COMPREHEN METABOLIC PANEL: CPT | Performed by: INTERNAL MEDICINE

## 2023-04-24 PROCEDURE — 36415 COLL VENOUS BLD VENIPUNCTURE: CPT

## 2023-04-24 PROCEDURE — 99214 OFFICE O/P EST MOD 30 MIN: CPT | Mod: VID | Performed by: FAMILY MEDICINE

## 2023-04-24 PROCEDURE — G0463 HOSPITAL OUTPT CLINIC VISIT: HCPCS | Mod: 25 | Performed by: INTERNAL MEDICINE

## 2023-04-24 PROCEDURE — 96365 THER/PROPH/DIAG IV INF INIT: CPT

## 2023-04-24 PROCEDURE — 85025 COMPLETE CBC W/AUTO DIFF WBC: CPT | Performed by: INTERNAL MEDICINE

## 2023-04-24 PROCEDURE — 99214 OFFICE O/P EST MOD 30 MIN: CPT | Performed by: INTERNAL MEDICINE

## 2023-04-24 RX ORDER — SEMAGLUTIDE 0.25 MG/.5ML
0.25 INJECTION, SOLUTION SUBCUTANEOUS WEEKLY
Qty: 2 ML | Refills: 0 | Status: SHIPPED | OUTPATIENT
Start: 2023-04-24 | End: 2023-06-16

## 2023-04-24 RX ORDER — ZOLEDRONIC ACID 0.04 MG/ML
4 INJECTION, SOLUTION INTRAVENOUS ONCE
Status: CANCELLED | OUTPATIENT
Start: 2023-04-24 | End: 2023-04-24

## 2023-04-24 RX ORDER — ZOLEDRONIC ACID 0.04 MG/ML
4 INJECTION, SOLUTION INTRAVENOUS ONCE
Status: COMPLETED | OUTPATIENT
Start: 2023-04-24 | End: 2023-04-24

## 2023-04-24 RX ADMIN — SODIUM CHLORIDE 250 ML: 9 INJECTION, SOLUTION INTRAVENOUS at 15:01

## 2023-04-24 RX ADMIN — ZOLEDRONIC ACID 4 MG: 0.04 INJECTION, SOLUTION INTRAVENOUS at 15:01

## 2023-04-24 ASSESSMENT — PAIN SCALES - GENERAL: PAINLEVEL: NO PAIN (0)

## 2023-04-24 NOTE — LETTER
11/29/2022         RE: Diana Wilson  [address] [facility] [id number]        Dear Colleague,    Thank you for referring your patient, Diana Wilson, to the [facility] [place]. Please see a copy of my visit note below.    Diana Wilson is a 42 year old who is being evaluated via a billable video visit.      How would you like to obtain your AVS? MyChart  If the video visit is dropped, the invitation should be resent by: Send to e-mail at: [email or web address]  Will anyone else be joining your video visit? No    Medical  Weight Loss Follow-Up Diet Evaluation  Assessment:  Diana is presenting today for a follow up weight management nutrition consultation. Pt has had an initial appointment with Dr. Rosas  Weight loss medication: Phentermine.  Pt's weight is Initial weight: 205 lb  Current weight 198.2 lb  Weight change: down 8.4 lbs    No flowsheet data found.  BMI: There is no height or weight on file to calculate BMI.  Ideal body weight: 55.1 kg (121 lb 7.5 oz)  Adjusted ideal body weight: 70.3 kg (154 lb 14.1 oz)    Estimated RMR (Ringgold-St Jeor equation):   1555 kcals x 1.2 (sedentary) = 1865 kcals (for weight maintenance)  1555 kcals x 1.3 (light active) = 2137 kcals (for weight maintenance)  Recommended Protein Intake: 70-90 grams of protein/day  Patient Active Problem List:  Patient Active Problem List   Diagnosis     Obesity     CARDIOVASCULAR SCREENING; LDL GOAL LESS THAN 130     Major depressive disorder, recurrent episode, mild (H)     Iron deficiency anemia     Insomnia     Health Care Home     Anxiety     Intestinal malabsorption     Lymphedema of right upper extremity     Malignant neoplasm of upper-outer quadrant of right breast in female, estrogen receptor positive (H)     Osteopenia of multiple sites     Multiple subsegmental pulmonary emboli without acute cor pulmonale (H)     Breast reconstruction disproportion     Family history of colon  Daily Note     Today's date: 2023  Patient name: Vanessa Liu  : 1967  MRN: [id number]  Referring provider: Helen Vila DO  Dx:   Encounter Diagnosis     ICD-10-CM    1  Primary osteoarthritis of right knee  M17 11       2  Chronic pain of right knee  M25 561     G89 29           Start Time: 0845  Stop Time: 5301  Total time in clinic (min): 40 minutes      Subjective: Patient reports being able to complete more ADLs without limitations  Slight ache reported with stair navigation  Reports tightness distal thigh entering session today  Objective: See treatment diary below    Assessment: Tolerated treatment well  Patient reported left knee discomfort when performing leg press exercise and modified to be performed with single leg  Challenged with single leg balance exercise today  Will be partaking in progressed exercises next session  Plan: Continue per plan of care  Progress treatment as tolerated  Insurance:  AMA/CMS Eval/ Re-eval POC expires Jabier Mondragon #/ Referral # Total   {Visits/Units) Start date  Expiration date Extension  Visit limitation? PT only or  PT+OT? Co-Insurance   AMA 2 27 5 15 23     50v                                            AUTH #:  Date  3 20  3 22  3 27  3 29 4 3 4 5 4 10 4 12 4 17 4 19     Used  6  7  8  9 10 11 12 13 14 15     Remaining   44  43  42  41 40 39 38 37 36 35       Precautions:   Medical History        Past Medical History:   Diagnosis Date   • Allergic rhinitis     • Asthma     • Fibroids     • Physiological ovarian cysts     • Sleep apnea       denatl device worn         Patient provided verbal consent to treatment plan and recommended interventions      HEP: glute set, quad set, SAQ, LAQ, ankle pumps, mini squat, heel slides     Date 4 10 4 12 4 17 4 19 4 24   Visit Number 12 13 14 15 16   IASTM quad     FB            PROM        Knee Flexion 117 PROM 119 PROM 119 PROM 115 PROM    Knee Extension -1 PROM 0 PROM 0 0 deg              TUG < "14 sec                 Manual        Patellar mobs        Term   Knee extn performed by patient at home                Neuro Re-ed         tandem stance        SLS 5*10'' 5*10''   5*15''            TherEx        Active w/u 7' bike, lvl 2 7' bike, lvl 2 8' bike lvl 2 8' bike lvl 2 8' bike, lvl 2   Mini squat 3*10, 9# KB 3*10, 9# KB TRX 3*12 TRX 3*12    Stand hip flexion 3*10 RLE 3*10 RLE np     Supine heel slides 2*10 peanut 2*10 peanut with slight overpressure 3*10 peanut with slight overpressure 3*10 peanut with slight overpressure 3*10 peanut with slight overpressure   Knee flexion on step 20x, 3\" hold, 12'' step 20x, 3\" hold, 12'' step np     Step up 3*10, 6\" 2*10, 6\" single UE support 3*10, 8\" single UE support 3*10, 8\" no UE support  3*10, 8\" no UE support    SLR flexion 3*10 3*10 3*10 3*10 3*10   Leg press 3*10, 55# 3*10, 65# 3*10, 85# 3*10, 85# 2*10, 105#   Leg press RLE     2*10, 55#   lunge   Pain LLE     Lateral step up   2*10, RLE onto BOSU 2*10, RLE onto BOSU 2*10, RLE onto BOSU   Side stepping   5 laps 12' at bar  5 laps 12' at bar           TherAct        Patient education        Stair negotiation        Car transfer                          Gait Training        With AD        No AD                 Modalities        CP           Skin check pre and post modality            " cancer     Celiac disease     Recurrent UTI     Stress incontinence     Diabetes: No    Struggles with: getting in fruit and vegetables    Progress on goals from last visit: Using small plate and plating meal not at the table. Weight has maintained since last visit. Her  has started on the same medicine and they are working on changes together.  1. Aim to eat 3 meals per day, protein focused, high fiber foods - working on this    Dietary Recall:  She continues to walk 2-4 miles per day  Intake/routine is the same as last visit:  Breakfast: 8:30 am banana, protein powder, almond milk, peanut butter protein powder  11:30 carrots and ranch dip  Lunch: two good yogurt, blueberry granola  Snack: cheese stick OR apple OR mini kind bar   Dinner: grilled meals with meat and veggies   Typical snacks: sometimes a yasso yogurt bar, hot chocolate  Beverages:   Coffee with creamer (regular)  Water: with propel water flavoring  Exercise:   Going for a walk 3x per week in the morning, 2-4 miles.   She has done yoga instead of walking on bad weather days.   Nutrition Diagnosis:    Overweight/Obesity (NC 3.3) related to overeating and poor lifestyle habits as evidenced by BMI 33.64    Intervention:  1. Food and/or nutrient delivery: High protein  2. Nutrition education: discussed calorie goal for weight loss  3. Nutrition counseling: reviewed progress with goals and discussed plan    Monitoring/Evaluation:    Goals:  1. Aim to eat 3 meals per day, protein focused, high fiber foods - start tracking intake and aiming for 1600 kcals daily  2. Have an additional vegetable in per day and 2 veggies at dinner    Patient to follow up in 1 month(s) with bariatrician and 2 month(s) with RD    Video-Visit Details    Type of service:  Video Visit    Video Start Time (time video started): 4:01 PM    Video End Time (time video stopped): 4:21 pm    Originating Location (pt. Location): Home        Distant Location (provider location):   On-site    Mode of Communication:  Video Conference via [facility]    Physician has received verbal consent for a Video Visit from the patient? Yes      Amanda Vivas        Again, thank you for allowing me to participate in the care of your patient.        Sincerely,        Amanda Vivas

## 2023-04-24 NOTE — PROGRESS NOTES
Infusion Nursing Note:  Diana CROWLEY Wilson presents today for Zometa.    Patient seen by provider today: Yes: Dr. Coulter; therefore, assessment deferred   present during visit today: Not Applicable.    Note: N/A.    Intravenous Access:  Peripheral IV placed.    Treatment Conditions:  Lab Results   Component Value Date     04/24/2023    POTASSIUM 3.9 04/24/2023    MAG 1.5 (L) 10/16/2020    CR 0.68 04/24/2023    DENY 9.6 04/24/2023    BILITOTAL 0.2 04/24/2023    ALBUMIN 4.7 04/24/2023    ALT 18 04/24/2023    AST 18 04/24/2023     Results reviewed, labs MET treatment parameters, ok to proceed with treatment.    Post Infusion Assessment:  Patient tolerated infusion without incident.  Site patent and intact, free from redness, edema or discomfort.  No evidence of extravasations.  Access discontinued per protocol.     Discharge Plan:   Discharge instructions reviewed with: Patient.  Patient and/or family verbalized understanding of discharge instructions and all questions answered.  AVS to patient via [employer]esaris[employer].  Patient will return 10/30/2023 for next appointment.   Patient discharged in stable condition accompanied by: self.  Departure Mode: Ambulatory.    Carin Matos RN

## 2023-04-24 NOTE — LETTER
"    4/24/2023         RE: Diana Wilson  1972 [address] [facility] [address]        Dear Colleague,    Thank you for referring your patient, Diana Wilson, to the [facility]. Please see a copy of my visit note below.    Oncology Rooming Note    April 24, 2023 1:59 PM   Diana Wilson is a 42 year old female who presents for:    Chief Complaint   Patient presents with     Oncology Clinic Visit     Malignant neoplasm of upper-outer quadrant of right breast in female, estrogen receptor positive - Lab provider and infusion     Initial Vitals: BP (!) 140/95 (BP Location: Right arm, Patient Position: Sitting, Cuff Size: Adult Regular)   Pulse 78   Temp 97.5  F (36.4  C) (Tympanic)   Resp 12   Ht 1.626 m (5' 4\")   Wt 93 kg (205 lb)   LMP 11/28/2012   SpO2 98%   BMI 35.19 kg/m   Estimated body mass index is 35.19 kg/m  as calculated from the following:    Height as of this encounter: 1.626 m (5' 4\").    Weight as of this encounter: 93 kg (205 lb). Body surface area is 2.05 meters squared.  No Pain (0) Comment: Data Unavailable   Patient's last menstrual period was 11/28/2012.  Allergies reviewed: Yes  Medications reviewed: Yes    Medications: Medication refills not needed today.  Pharmacy name entered into [facility]: [facility] [place], [place] - [address] [address]    Clinical concerns:  None      [facility]ology [facility] Oncology Outpatient Progress Note    Patient: Diana Wilson  MRN: [id number]  Date of Service: Apr 24, 2023          Reason for Visit    History of breast cancer    Primary Hematologist/Oncologist: Dr. Coulter      Assessment/Plan  History ER/NC+ right breast cancer (2016)  Now almost 6 years from diagnosis and treatment.  No recurrence to date. Continues long-term endocrine therapy (has been on total of 4.5 yrs), on various AIs due to intolerance and Tamoxifen stopped for PE. She's most recently been on letrozole " for the past 6 months with better tolerance than the other AIs. Minimal arthralgias. Hot flashes are minimal, she is on Effexor.     She is tolerating letrozole pretty well.  Side effect profile is much more tolerable compared to her previous endocrine therapies.  Sexual dysfunction symptoms are also better now.  She is currently on estrogen vaginal tablet.  Clinical exam is unremarkable today.  Reviewed her labs today.  CA 27-29 is normal at 16.5.  CBC and CMP are within normal limits.  Previously she was getting periodic imaging studies but we had discussed about pros and cons of doing this.  Routine imaging surveillance has never shown to improve survival in breast cancer patients.  But she wants to continue some sort of surveillance.  I am okay with doing labs periodically.      2.  Chemo-induced peripheral neuropathy  On gabapentin 300 mg TID.  Stable neuropathy.     3.   Osteopenia  Risk of developing osteoporosis with long-term AI use.   Last DEXA 12/06/2021 with a lowest T-score of -1.3.  Continue 6 monthly Zometa and vitamin D and calcium supplementations.    4.   Sexuality concerns (vaginal dryness, low libido)   5.   Recurrent UTIs and yeast infections  Currently on vaginal estrogen tablet.  Using lubricants.  Symptoms are much better now.  Follows with urology.  Recently had a CT scan which showed a small renal calculus.  Follows with urology.    For weight gain issues she is currently on semaglutide.  It is working well for her.    History of Present Illness/ Interval History    Ms. Diana Wilson  is a 42 year old with history of ER/TX+ breast cancer, diagnosed almost 6 yr ago. S/P bilateral mastectomy (with reconstruction), RT, BSO and has been on adjuvant endocrine therapy ~4.5 yrs. She has had intolerance of anastrozole and Aromasin (significant arthralgias) and PE on Tamoxifen. She was changed to letrozole 6 months ago and is tolerating this the best, so far. Minimal/manageable arthralgias. Hot  flashes are better. On Effexor for depression/anxiety, well managed.     She is tolerating letrozole pretty well.  Denies any new issues since last visit.  For weight gain she has been started on semaglutide.  Doing well.  Also was put on vaginal estrogen tablet which has helped her a lot.  No new lumps or bumps reported.  No new bone pain reported.       ECOG Performance     0    Oncology History/Treatment  Diagnosis/Stage:   12/2016: T2N1 invasive ductal right breast cancer, ER/NM+ (HER2 neg) // ypTis(DCIS)pN0  -age 37 yo    -surgical path after neoadjuvant chemo: right breast - no residual invasive carcinoma; grade III DCIS 1 mm; 5 sLN all neg.   Left breast - negative.     -Genetic testing: negative for mutations in the TYLER, BARD1, BRCA1, BRCA2, BRIP1, CDH1, CHEK2, MRE11A, MUTYH, NBN, NF1, PALB2, PTEN, RAD50, RAD51C, RAD51D, and TP53 genes by sequencing and deletion/duplication analysis. No mutations were found in any of the 17 genes analyzed. 2017 9/2020: PE (post-op breast reconstruction surgery)  -Tamoxifen stopped  -Anticoagulated 6 mths    Treatment:  2017: neoadjuvant ddAC x 4, then weekly carbo/taxol x 12  -stopped carbo after 7 weeks due to anaphylaxis    7/12/2017: bilateral mastectomies  (reconstruction 9/2020 with removal of implants and TRAM flap reconstruction; complicated by post-op PE)    11/2017: adjuvant RT completed (6040 cGy/33)    11/2017: BSO    2018 - present: adjuvant endocrine therapy  -Arimidex intermittently in 2018  -Tamoxifen [id number], stopped for PE  -Aromasin 2020 - 3/2022 (arthralgias)  -Letrozole 4/2022 - current      Physical Exam    GENERAL: Alert and oriented to time place and person. Seated comfortably. In no distress.  HEAD: Atraumatic and normocephalic. No alopecia.  EYES: ROMMEL, EOMI. No erythema. No icterus.  BREASTS: Reconstructed breasts.  No masses or lesions noted.  No bilateral axilla adenopathy.  CHEST: regular respiratory effort.Clear lung sounds  bilaterally.  HEART: RRR  SKIN: no rash, or bruising or purpura.   NEURO: No gross deficit noted. Non-antalgic gait.      Lab Results  Recent Results (from the past 840 hour(s))   Comprehensive metabolic panel    Collection Time: 04/24/23  1:42 PM   Result Value Ref Range    Sodium 142 136 - 145 mmol/L    Potassium 3.9 3.4 - 5.3 mmol/L    Chloride 103 98 - 107 mmol/L    Carbon Dioxide (CO2) 26 22 - 29 mmol/L    Anion Gap 13 7 - 15 mmol/L    Urea Nitrogen 9.9 6.0 - 20.0 mg/dL    Creatinine 0.68 0.51 - 0.95 mg/dL    Calcium 9.6 8.6 - 10.0 mg/dL    Glucose 79 70 - 99 mg/dL    Alkaline Phosphatase 93 35 - 104 U/L    AST 18 10 - 35 U/L    ALT 18 10 - 35 U/L    Protein Total 7.5 6.4 - 8.3 g/dL    Albumin 4.7 3.5 - 5.2 g/dL    Bilirubin Total 0.2 <=1.2 mg/dL    GFR Estimate >90 >60 mL/min/1.73m2   CA27.29  BREAST TUMOR MARKER    Collection Time: 04/24/23  1:42 PM   Result Value Ref Range    CA 27.29 16.5 <=39.0 U/mL   CBC with platelets and differential    Collection Time: 04/24/23  1:42 PM   Result Value Ref Range    WBC Count 8.0 4.0 - 11.0 10e3/uL    RBC Count 4.58 3.80 - 5.20 10e6/uL    Hemoglobin 13.0 11.7 - 15.7 g/dL    Hematocrit 39.3 35.0 - 47.0 %    MCV 86 78 - 100 fL    MCH 28.4 26.5 - 33.0 pg    MCHC 33.1 31.5 - 36.5 g/dL    RDW 13.2 10.0 - 15.0 %    Platelet Count 333 150 - 450 10e3/uL    % Neutrophils 59 %    % Lymphocytes 31 %    % Monocytes 6 %    % Eosinophils 3 %    % Basophils 1 %    % Immature Granulocytes 0 %    NRBCs per 100 WBC 0 <1 /100    Absolute Neutrophils 4.7 1.6 - 8.3 10e3/uL    Absolute Lymphocytes 2.5 0.8 - 5.3 10e3/uL    Absolute Monocytes 0.5 0.0 - 1.3 10e3/uL    Absolute Eosinophils 0.3 0.0 - 0.7 10e3/uL    Absolute Basophils 0.1 0.0 - 0.2 10e3/uL    Absolute Immature Granulocytes 0.0 <=0.4 10e3/uL    Absolute NRBCs 0.0 10e3/uL         Imaging    CT Abdomen Pelvis w/o Contrast [[id number]]    Result Date: 3/30/2023  CT ABDOMEN PELVIS WITHOUT CONTRAST 3/30/2023 2:46 PM CLINICAL HISTORY:  Recurrent urinary tract infection. Evaluate for stones due to flank pain. History of kidney stones. TECHNIQUE: CT scan of the abdomen and pelvis was performed without IV contrast. Multiplanar reformats were obtained. Dose reduction techniques were used. CONTRAST: None. COMPARISON: 12/13/2021 FINDINGS: LOWER CHEST: Normal. HEPATOBILIARY: Normal. PANCREAS: Normal. SPLEEN: Normal. ADRENAL GLANDS: Normal. KIDNEYS/BLADDER: 2 mm nonobstructing stone is seen in the mid left kidney. No other evidence of renal or ureteral calculi. No hydronephrosis or hydroureter on either side. No renal contour abnormalities. No bladder wall thickening or mass. BOWEL: Normal. LYMPH NODES: Normal. VASCULATURE: Unremarkable. PELVIC ORGANS: Normal. OTHER: None. MUSCULOSKELETAL: Normal.     IMPRESSION: A single 2 mm nonobstructing stone is present in the mid left kidney. No other renal or ureteral calculi. No hydronephrosis or hydroureter. GLENROY BAKER MD   SYSTEM ID:  [id number]        Signed by:   Layo Coulter MD  Hematology and Medical Oncology  [facility]        Again, thank you for allowing me to participate in the care of your patient.        Sincerely,        Layo Coulter MD

## 2023-04-24 NOTE — PROGRESS NOTES
Bariatric Care Clinic Non Surgical Follow up Visit   Date of visit: 4/24/2023  Physician: SHABBIR Rosas MD, MD  Primary Care is Yenifer Petersjeremiah Wilson   42 year old  female     Initial Weight: 205#  Initial BMI: 35  Today's Weight:   Wt Readings from Last 1 Encounters:   04/24/23 91.2 kg (201 lb)     Body mass index is 34.5 kg/m .           Assessment and Plan   Assessment: Diana is a 42 year old year old female who presents for medical weight management.      Plan:    1. Obesity (BMI 30-39.9)  Patient was congratulated on her success thus far. Healthy habits to assist with further weight loss were discussed. She will try to cut back on the granola she is adding to her yogurt. She will try to increase her exercise. She will try wegovy . Risks, benefits and possible side effects discussed.    2. Gastroesophageal reflux disease, unspecified whether esophagitis present  This may improve with healthy habits and weight loss.    3. History of migraine headaches  This may improve with healthy habits and weight loss.    Follow up in 3 months with myself           INTERIM HISTORY  We tried starting wellbutrin/naltrexone after her last visit and it was not tolerated. It made her feel high. She felt like her bones were floating. Wegovy would be too expensive for her. She has now met her deductible and would like to try it.    DIETARY HISTORY  Meals Per Day: 3  Eating Protein First?: working on it  Food Diary: B:egg bites (equivalent of 1 egg) L:protein shake and vegetables D:occasional pasta, protein and vegetables  Snacks Per Day: late afternoon  Typical Snack: yogurt and granola (1/2 C)  Fluid Intake: 64 oz  Portion Control: sometimes  Calorie Containing Beverages: none  Typical Protein Food Choices: meat, yogurt, eggs  Choosing Whole Grains: usually  Meals at Restaurant per week:not discussed    Positive Changes Since Last Visit: cut out hot cocoa  Struggling With: exercise, craving sugar after  Krystyna    Knowledgeable in Reading Food Labels: yes  Getting Adequate Protein: working on it      PHYSICAL ACTIVITY PATTERNS:  Plans to start walking once the weather, Vifit yoga 2-3 times per week, PT exercises    REVIEW OF SYSTEMS  HEENT:   glaucoma: no  CARDIOVASCULAR:  History of heart disease: no  PULMONARY:  Dyspnea on exertion: no  PSYCHIATRIC:  Moods: frustrated  ENDOCRINE:  Monitoring Blood Sugars: no  Sugars Well Controlled: na  No personal or family history of medullary thyroid cancer no  :  Birth control: not discussed       Patient Profile   Social History     Social History Narrative     Not on file        Past Medical History   Past Medical History:   Diagnosis Date     Anxiety      Breast cancer (H)      Celiac disease      Depressive disorder 1995    And Anxiety     Encounter for insertion or removal of intrauterine contraceptive device 01/08/2008     Excessive or frequent menstruation 03/19/2003     Fibroadenosis of breast      GERD (gastroesophageal reflux disease)      Invasive ductal carcinoma of breast, right (H)      Malignant neoplasm of upper-outer quadrant of right female breast (H)      Mild intermittent asthma            never has had PFT's, MDI with colds and at times exercise     Neutropenia, drug-induced (H)      Nongonococcal urethritis (JALIL) due to chlamydia trachomatis 01/03/2002     Other and unspecified ovarian cyst 03/19/2003    RIGHT ovarian cyst     PONV (postoperative nausea and vomiting)      Skin cancer      Transient hypertension of pregnancy, antepartum     PIH with last birth     Uncomplicated asthma 2002     Patient Active Problem List   Diagnosis     Obesity     CARDIOVASCULAR SCREENING; LDL GOAL LESS THAN 130     Major depressive disorder, recurrent episode, mild (H)     Iron deficiency anemia     Insomnia     Health Care Home     Anxiety     Intestinal malabsorption     Lymphedema of right upper extremity     Malignant neoplasm of upper-outer quadrant of right  "breast in female, estrogen receptor positive (H)     Osteopenia of multiple sites     Multiple subsegmental pulmonary emboli without acute cor pulmonale (H)     Breast reconstruction disproportion     Family history of colon cancer     Celiac disease     Recurrent UTI     Stress incontinence     Pelvic pain in female     Muscular incoordination       Past Surgical History  She has a past surgical history that includes surgical history of -  (); APPENDECTOMY (); Dental surgery (2010 and 10/12/2010); Dilation and curettage, hysteroscopy, ablate endometrium novasure, combined (3/20/2012); Esophagoscopy, gastroscopy, duodenoscopy (EGD), combined (N/A, 2016); Insert port vascular access (N/A, 2017); biopsy (2016); Breast surgery (); Laparoscopic salpingo-oophorectomy (Bilateral, 2017); Reconstruct breast bilateral, implant prosthesis bilateral, combined (Bilateral, 3/16/2018);  Section; Revise reconstructed breast; and Cystoscopy, Sling Transvaginal (N/A, 2022).     Examination   Ht 1.626 m (5' 4\")   Wt 91.2 kg (201 lb)   LMP 2012   BMI 34.50 kg/m    Wt Readings from Last 4 Encounters:   23 91.2 kg (201 lb)   23 91.8 kg (202 lb 6.4 oz)   22 90.7 kg (200 lb)   22 90.7 kg (200 lb)     GENERAL: Healthy, alert and no distress  EYES: Eyes grossly normal to inspection.  No discharge or erythema, or obvious scleral/conjunctival abnormalities.  RESP: No audible wheeze, cough, or visible cyanosis.  No visible retractions or increased work of breathing.    SKIN: Visible skin clear. No significant rash, abnormal pigmentation or lesions.  NEURO: Cranial nerves grossly intact.  Mentation and speech appropriate for age.  PSYCH: Mentation appears normal, affect normal/bright, judgement and insight intact, normal speech and appearance well-groomed.       Counseling:   We reviewed the important post op bariatric recommendations:  -eating 3 meals " daily  -eating protein first, getting >60gm protein daily  -eating slowly, chewing food well  -avoiding/limiting calorie containing beverages  -limiting starchy vegetables and carbohydrates, choosing wheat, not white with breads,   crackers, pastas, luis armando, bagels, tortillas, rice  -limiting restaurant or cafeteria eating to twice a week or less    We discussed the importance of restorative sleep and stress management in maintaining a healthy weight.  We discussed the National Weight Control Registry healthy weight maintenance strategies and ways to optimize metabolism.  We discussed the importance of physical activity including cardiovascular and strength training in maintaining a healthier weight.    Total time spent on the date of this encounter doing: chart review, review of test results, patient visit, physical exam, education, counseling, developing plan of care and documenting = 30 minutes.         SHABBIR Rosas MD  [facility]                 Diana Wilson is 42 year old  female who presents for a billable video visit today.    How would you like to obtain your AVS? MyChart  If dropped from the video visit, the video invitation should be resent by: Text to cell phone: [phone]  Will anyone else be joining your video visit? No      Video Start Time: 10:15AM    Are there any specific questions or needs that you would like addressed at your visit today? Weight management follow up. Patient has met deductible so would like to discuss Wegovy again. RD visit scheduled, MD follow up scheduled.         Video-Visit Details    Type of service:  Video Visit    Platform used for Video Visit: [employer]era    Video End Time (time video stopped): 10:37 AM    Originating Location (pt. Location): Home    Distant Location (provider location):  Off-site    Distant Location (provider location):  [facility] [place]

## 2023-04-24 NOTE — PROGRESS NOTES
[facility] Hematology and Oncology Outpatient Progress Note    Patient: Diana Wilson  MRN: [id number]  Date of Service: Apr 24, 2023          Reason for Visit    1. History of breast cancer    Primary Hematologist/Oncologist: Dr. Coulter      Assessment/Plan  1. History ER/DE+ right breast cancer (2016)  Now almost 6 years from diagnosis and treatment.  No recurrence to date. Continues long-term endocrine therapy (has been on total of 4.5 yrs), on various AIs due to intolerance and Tamoxifen stopped for PE. She's most recently been on letrozole for the past 6 months with better tolerance than the other AIs. Minimal arthralgias. Hot flashes are minimal, she is on Effexor.     She is tolerating letrozole pretty well.  Side effect profile is much more tolerable compared to her previous endocrine therapies.  Sexual dysfunction symptoms are also better now.  She is currently on estrogen vaginal tablet.  Clinical exam is unremarkable today.  Reviewed her labs today.  CA 27-29 is normal at 16.5.  CBC and CMP are within normal limits.  Previously she was getting periodic imaging studies but we had discussed about pros and cons of doing this.  Routine imaging surveillance has never shown to improve survival in breast cancer patients.  But she wants to continue some sort of surveillance.  I am okay with doing labs periodically.      2.  Chemo-induced peripheral neuropathy  On gabapentin 300 mg TID.  Stable neuropathy.     3.   Osteopenia  Risk of developing osteoporosis with long-term AI use.   Last DEXA 12/06/2021 with a lowest T-score of -1.3.  Continue 6 monthly Zometa and vitamin D and calcium supplementations.    4.   Sexuality concerns (vaginal dryness, low libido)   5.   Recurrent UTIs and yeast infections  Currently on vaginal estrogen tablet.  Using lubricants.  Symptoms are much better now.  Follows with urology.  Recently had a CT scan which showed a small renal calculus.  Follows with urology.    For  weight gain issues she is currently on semaglutide.  It is working well for her.    History of Present Illness/ Interval History    Ms. Diana Wilson  is a 42 year old with history of ER/DE+ breast cancer, diagnosed almost 6 yr ago. S/P bilateral mastectomy (with reconstruction), RT, BSO and has been on adjuvant endocrine therapy ~4.5 yrs. She has had intolerance of anastrozole and Aromasin (significant arthralgias) and PE on Tamoxifen. She was changed to letrozole 6 months ago and is tolerating this the best, so far. Minimal/manageable arthralgias. Hot flashes are better. On Effexor for depression/anxiety, well managed.     She is tolerating letrozole pretty well.  Denies any new issues since last visit.  For weight gain she has been started on semaglutide.  Doing well.  Also was put on vaginal estrogen tablet which has helped her a lot.  No new lumps or bumps reported.  No new bone pain reported.       ECOG Performance     0    Oncology History/Treatment  Diagnosis/Stage:   12/2016: T2N1 invasive ductal right breast cancer, ER/DE+ (HER2 neg) // ypTis(DCIS)pN0  -age 35 yo    -surgical path after neoadjuvant chemo: right breast - no residual invasive carcinoma; grade III DCIS 1 mm; 5 sLN all neg.   Left breast - negative.     -Genetic testing: negative for mutations in the TYLER, BARD1, BRCA1, BRCA2, BRIP1, CDH1, CHEK2, MRE11A, MUTYH, NBN, NF1, PALB2, PTEN, RAD50, RAD51C, RAD51D, and TP53 genes by sequencing and deletion/duplication analysis. No mutations were found in any of the 17 genes analyzed. 2017 9/2020: PE (post-op breast reconstruction surgery)  -Tamoxifen stopped  -Anticoagulated 6 mths    Treatment:  2017: neoadjuvant ddAC x 4, then weekly carbo/taxol x 12  -stopped carbo after 7 weeks due to anaphylaxis    7/12/2017: bilateral mastectomies  (reconstruction 9/2020 with removal of implants and TRAM flap reconstruction; complicated by post-op PE)    11/2017: adjuvant RT completed (6040 cGy/33)    11/2017:  BSO    2018 - present: adjuvant endocrine therapy  -Arimidex intermittently in 2018  -Tamoxifen [id number], stopped for PE  -Aromasin 2020 - 3/2022 (arthralgias)  -Letrozole 4/2022 - current      Physical Exam    GENERAL: Alert and oriented to time place and person. Seated comfortably. In no distress.  HEAD: Atraumatic and normocephalic. No alopecia.  EYES: ROMMEL, EOMI. No erythema. No icterus.  BREASTS: Reconstructed breasts.  No masses or lesions noted.  No bilateral axilla adenopathy.  CHEST: regular respiratory effort.Clear lung sounds bilaterally.  HEART: RRR  SKIN: no rash, or bruising or purpura.   NEURO: No gross deficit noted. Non-antalgic gait.      Lab Results  Recent Results (from the past 840 hour(s))   Comprehensive metabolic panel    Collection Time: 04/24/23  1:42 PM   Result Value Ref Range    Sodium 142 136 - 145 mmol/L    Potassium 3.9 3.4 - 5.3 mmol/L    Chloride 103 98 - 107 mmol/L    Carbon Dioxide (CO2) 26 22 - 29 mmol/L    Anion Gap 13 7 - 15 mmol/L    Urea Nitrogen 9.9 6.0 - 20.0 mg/dL    Creatinine 0.68 0.51 - 0.95 mg/dL    Calcium 9.6 8.6 - 10.0 mg/dL    Glucose 79 70 - 99 mg/dL    Alkaline Phosphatase 93 35 - 104 U/L    AST 18 10 - 35 U/L    ALT 18 10 - 35 U/L    Protein Total 7.5 6.4 - 8.3 g/dL    Albumin 4.7 3.5 - 5.2 g/dL    Bilirubin Total 0.2 <=1.2 mg/dL    GFR Estimate >90 >60 mL/min/1.73m2   CA27.29  BREAST TUMOR MARKER    Collection Time: 04/24/23  1:42 PM   Result Value Ref Range    CA 27.29 16.5 <=39.0 U/mL   CBC with platelets and differential    Collection Time: 04/24/23  1:42 PM   Result Value Ref Range    WBC Count 8.0 4.0 - 11.0 10e3/uL    RBC Count 4.58 3.80 - 5.20 10e6/uL    Hemoglobin 13.0 11.7 - 15.7 g/dL    Hematocrit 39.3 35.0 - 47.0 %    MCV 86 78 - 100 fL    MCH 28.4 26.5 - 33.0 pg    MCHC 33.1 31.5 - 36.5 g/dL    RDW 13.2 10.0 - 15.0 %    Platelet Count 333 150 - 450 10e3/uL    % Neutrophils 59 %    % Lymphocytes 31 %    % Monocytes 6 %    % Eosinophils 3 %    %  Basophils 1 %    % Immature Granulocytes 0 %    NRBCs per 100 WBC 0 <1 /100    Absolute Neutrophils 4.7 1.6 - 8.3 10e3/uL    Absolute Lymphocytes 2.5 0.8 - 5.3 10e3/uL    Absolute Monocytes 0.5 0.0 - 1.3 10e3/uL    Absolute Eosinophils 0.3 0.0 - 0.7 10e3/uL    Absolute Basophils 0.1 0.0 - 0.2 10e3/uL    Absolute Immature Granulocytes 0.0 <=0.4 10e3/uL    Absolute NRBCs 0.0 10e3/uL         Imaging    CT Abdomen Pelvis w/o Contrast [[id number]]    Result Date: 3/30/2023  CT ABDOMEN PELVIS WITHOUT CONTRAST 3/30/2023 2:46 PM CLINICAL HISTORY: Recurrent urinary tract infection. Evaluate for stones due to flank pain. History of kidney stones. TECHNIQUE: CT scan of the abdomen and pelvis was performed without IV contrast. Multiplanar reformats were obtained. Dose reduction techniques were used. CONTRAST: None. COMPARISON: 12/13/2021 FINDINGS: LOWER CHEST: Normal. HEPATOBILIARY: Normal. PANCREAS: Normal. SPLEEN: Normal. ADRENAL GLANDS: Normal. KIDNEYS/BLADDER: 2 mm nonobstructing stone is seen in the mid left kidney. No other evidence of renal or ureteral calculi. No hydronephrosis or hydroureter on either side. No renal contour abnormalities. No bladder wall thickening or mass. BOWEL: Normal. LYMPH NODES: Normal. VASCULATURE: Unremarkable. PELVIC ORGANS: Normal. OTHER: None. MUSCULOSKELETAL: Normal.     IMPRESSION: A single 2 mm nonobstructing stone is present in the mid left kidney. No other renal or ureteral calculi. No hydronephrosis or hydroureter. GLENROY BAKER MD   SYSTEM ID:  [id number]        Signed by:   Layo Coulter MD  Hematology and Medical Oncology  [facility]

## 2023-04-24 NOTE — PROGRESS NOTES
"Oncology Rooming Note    April 24, 2023 1:59 PM   Diana Wilson is a 42 year old female who presents for:    Chief Complaint   Patient presents with     Oncology Clinic Visit     Malignant neoplasm of upper-outer quadrant of right breast in female, estrogen receptor positive - Lab provider and infusion     Initial Vitals: BP (!) 140/95 (BP Location: Right arm, Patient Position: Sitting, Cuff Size: Adult Regular)   Pulse 78   Temp 97.5  F (36.4  C) (Tympanic)   Resp 12   Ht 1.626 m (5' 4\")   Wt 93 kg (205 lb)   LMP 11/28/2012   SpO2 98%   BMI 35.19 kg/m   Estimated body mass index is 35.19 kg/m  as calculated from the following:    Height as of this encounter: 1.626 m (5' 4\").    Weight as of this encounter: 93 kg (205 lb). Body surface area is 2.05 meters squared.  No Pain (0) Comment: Data Unavailable   Patient's last menstrual period was 11/28/2012.  Allergies reviewed: Yes  Medications reviewed: Yes    Medications: Medication refills not needed today.  Pharmacy name entered into [facility]: [facility]  [place] [id number]    Clinical concerns:  None      Nicole Costa CMA            "

## 2023-04-25 ENCOUNTER — THERAPY VISIT (OUTPATIENT)
Dept: PHYSICAL THERAPY | Facility: CLINIC | Age: 43
End: 2023-04-25
Payer: COMMERCIAL

## 2023-04-25 DIAGNOSIS — R27.8 MUSCULAR INCOORDINATION: ICD-10-CM

## 2023-04-25 DIAGNOSIS — R10.2 PELVIC PAIN IN FEMALE: Primary | ICD-10-CM

## 2023-04-25 PROCEDURE — 97140 MANUAL THERAPY 1/> REGIONS: CPT | Mod: GP | Performed by: PHYSICAL THERAPIST

## 2023-04-25 PROCEDURE — 97110 THERAPEUTIC EXERCISES: CPT | Mod: GP | Performed by: PHYSICAL THERAPIST

## 2023-04-26 LAB — CANCER AG27-29 SERPL-ACNC: 16.5 U/ML

## 2023-05-08 ENCOUNTER — THERAPY VISIT (OUTPATIENT)
Dept: PHYSICAL THERAPY | Facility: CLINIC | Age: 43
End: 2023-05-08
Payer: COMMERCIAL

## 2023-05-08 DIAGNOSIS — E66.09 CLASS 1 OBESITY DUE TO EXCESS CALORIES WITH SERIOUS COMORBIDITY AND BODY MASS INDEX (BMI) OF 34.0 TO 34.9 IN ADULT: Primary | ICD-10-CM

## 2023-05-08 DIAGNOSIS — R10.2 PELVIC PAIN IN FEMALE: Primary | ICD-10-CM

## 2023-05-08 DIAGNOSIS — R27.8 MUSCULAR INCOORDINATION: ICD-10-CM

## 2023-05-08 DIAGNOSIS — E66.811 CLASS 1 OBESITY DUE TO EXCESS CALORIES WITH SERIOUS COMORBIDITY AND BODY MASS INDEX (BMI) OF 34.0 TO 34.9 IN ADULT: Primary | ICD-10-CM

## 2023-05-08 PROCEDURE — 97110 THERAPEUTIC EXERCISES: CPT | Mod: GP | Performed by: PHYSICAL THERAPIST

## 2023-05-08 PROCEDURE — 97140 MANUAL THERAPY 1/> REGIONS: CPT | Mod: GP | Performed by: PHYSICAL THERAPIST

## 2023-05-08 RX ORDER — SEMAGLUTIDE 0.5 MG/.5ML
0.5 INJECTION, SOLUTION SUBCUTANEOUS WEEKLY
Qty: 2 ML | Refills: 0 | Status: SHIPPED | OUTPATIENT
Start: 2023-05-08 | End: 2023-06-05

## 2023-05-10 DIAGNOSIS — K21.9 GASTROESOPHAGEAL REFLUX DISEASE, UNSPECIFIED WHETHER ESOPHAGITIS PRESENT: ICD-10-CM

## 2023-05-10 NOTE — TELEPHONE ENCOUNTER
Routing refill request to provider for review/approval because:  PCP to determine refill    Regino Swartz RN

## 2023-05-15 ENCOUNTER — OFFICE VISIT (OUTPATIENT)
Dept: UROLOGY | Facility: CLINIC | Age: 43
End: 2023-05-15
Payer: COMMERCIAL

## 2023-05-15 DIAGNOSIS — R39.15 URGENCY OF URINATION: ICD-10-CM

## 2023-05-15 DIAGNOSIS — N94.10 DYSPAREUNIA IN FEMALE: ICD-10-CM

## 2023-05-15 DIAGNOSIS — R35.0 URINARY FREQUENCY: ICD-10-CM

## 2023-05-15 DIAGNOSIS — M62.838 LEVATOR SPASM: ICD-10-CM

## 2023-05-15 DIAGNOSIS — N95.2 ATROPHIC VAGINITIS: ICD-10-CM

## 2023-05-15 DIAGNOSIS — R31.0 GROSS HEMATURIA: ICD-10-CM

## 2023-05-15 DIAGNOSIS — M62.89 HIGH-TONE PELVIC FLOOR DYSFUNCTION: ICD-10-CM

## 2023-05-15 DIAGNOSIS — Z87.442 HISTORY OF KIDNEY STONES: ICD-10-CM

## 2023-05-15 DIAGNOSIS — N39.0 RECURRENT UTI: Primary | ICD-10-CM

## 2023-05-15 PROCEDURE — 99214 OFFICE O/P EST MOD 30 MIN: CPT | Performed by: PHYSICIAN ASSISTANT

## 2023-05-15 RX ORDER — METHENAMINE HIPPURATE 1000 MG/1
1 TABLET ORAL 2 TIMES DAILY
Qty: 60 TABLET | Refills: 3 | Status: SHIPPED | OUTPATIENT
Start: 2023-05-15 | End: 2023-08-28

## 2023-05-15 RX ORDER — LIDOCAINE 50 MG/G
OINTMENT TOPICAL PRN
Qty: 50 G | Refills: 3 | Status: SHIPPED | OUTPATIENT
Start: 2023-05-15

## 2023-05-15 NOTE — PROGRESS NOTES
"Urology Clinic      Name: Diana Wilson    MRN: [id number]   YOB: 1980  Accompanied at today's visit by:self                 Chief Complaint:   UTIs          History of Present Illness:   May 15, 2023    HISTORY:   We have been following 42 year old Diana Wilson for hx of Alex, vaginal atrophy, dyspareunia, levator spasm, high tone pelvic floor dysfunction, hx of kidney stones. She is s/p sling procedure with cysto on 12/12/22 with Dr. Harris. Her hx is complicated by hx of breast cancer s/p double mastectomy, radiation therapy and chemo. Currently on Letrozole since 4/2022.  Last seen on 2/13/23. Continues on methenamine with vitamin C BID and working well for her. Has not had any UTIs since last encounter, however did have an episode of gross hematuria at the end of March. Her UC showed no growth at the time. CT of abdomen/pelvis showed small non obstructing stones. Having a cysto done with Dr. Harris soon to ensure no mesh erosion or other possible etiologies that could be contributing to the episode of gross hematuria. Denies gross hematuria since that time. Has been going to PFPT which has been helping her symptoms and is very pleased by this. Working on breaking up scar tissue along lower abdomen. Using good clean love lubricant and estrogen cream which is working well for her. Esrogen was cleared by her oncology team.  Follows with oncology; currently on letrozole. Patient voices no other concerns at this time.           Allergies:     Allergies   Allergen Reactions     Carboplatin Shortness Of Breath, Rash and Anaphylaxis     Other reaction(s): Flushing, Tachycardia     Amoxicillin GI Disturbance     Erythromycin GI Disturbance     Gluten Meal      Celiac disease     Hydrocodone-Acetaminophen      Other reaction(s): Hallucinations     Hydromorphone Headache     Naltrexone Other (See Comments)     \"Felt high\"     Penicillins Nausea and Vomiting and Hives     1-11-17 pt states this was a " "childhood reaction     Phentermine Other (See Comments)     Elevated BP     Topamax [Topiramate] Other (See Comments)     Cloudy thinking     Wellbutrin [Bupropion] Other (See Comments)     \"Felt high\"     Zolpidem      Other reaction(s): Hallucinations     Zolpidem Tartrate      hallucinations     Hydrocodone Other (See Comments)     Out of body experience, \"creepy-crawly\" skin             Medications:     Current Outpatient Medications   Medication Sig     Ascorbic Acid (VITAMIN C PO)      aspirin (ASA) 325 MG EC tablet Take 325 mg by mouth daily     cetirizine (ZYRTEC) 10 MG tablet Take 10 mg by mouth every morning     cholecalciferol (VITAMIN D3) 1000 units (25 mcg) capsule Take 1 capsule by mouth daily     estradiol (YUVAFEM) 10 MCG TABS vaginal tablet INSERT ONE TABLET VAGINALLY TWICE A WEEK     Fluticasone Propionate (FLONASE NA) Spray 1 spray into both nostrils daily      gabapentin (NEURONTIN) 300 MG capsule Take 1 capsule (300 mg) by mouth 3 times daily     letrozole (FEMARA) 2.5 MG tablet Take 1 tablet (2.5 mg) by mouth daily     lidocaine (XYLOCAINE) 5 % external ointment Apply topically as needed for moderate pain Apply to cotton ball then apply to affected area 10min prior to PT exercises or intercourse.     methenamine hippurate (HIPREX) 1 g tablet Take 1 tablet (1 g) by mouth 2 times daily     Multiple Vitamins-Minerals (MULTIVITAMIN PO) Take 1 tablet by mouth daily      nitroFURantoin macrocrystal (MACRODANTIN) 50 MG capsule TAKE 1 CAPSULE BY MOUTH WITHIN 30 MIN OF INTERCOURSE AS NEEDED, EITHER BEFORE OR AFTER.     omeprazole (PRILOSEC) 20 MG DR capsule TAKE ONE CAPSULE BY MOUTH EVERY MORNING 30 MINUTES BEFORE BREAKFAST     Semaglutide-Weight Management (WEGOVY) 0.25 MG/0.5ML pen Inject 0.25 mg Subcutaneous once a week for 28 days     Semaglutide-Weight Management (WEGOVY) 0.5 MG/0.5ML pen Inject 0.5 mg Subcutaneous once a week for 28 days     venlafaxine (EFFEXOR XR) 150 MG 24 hr capsule Take 1 " capsule (150 mg) by mouth daily     venlafaxine (EFFEXOR XR) 75 MG 24 hr capsule Take with 150mg capsule for total dose of 225mg     sulfamethoxazole-trimethoprim (BACTRIM DS) 800-160 MG tablet Take 1 tablet by mouth 2 times daily (Patient not taking: Reported on 2023)     Current Facility-Administered Medications   Medication     lidocaine (XYLOCAINE) 2 % external gel               Past  Surgical History:     Past Surgical History:   Procedure Laterality Date     BIOPSY  2016    EDG with biopsy     BREAST SURGERY      Bilateral reduction      SECTION       CYSTOSCOPY, SLING TRANSVAGINAL N/A 2022    Procedure: CREATION, VAGINAL SLING, WITH CYSTOSCOPY (support the urethra with mesh sling and look in the bladder);  Surgeon: Sixto Harris MD;  Location:  OR     DENTAL SURGERY  2010 and 10/12/2010    Root canals     DILATION AND CURETTAGE, HYSTEROSCOPY, ABLATE ENDOMETRIUM NOVASURE, COMBINED  3/20/2012    Procedure:COMBINED DILATION AND CURETTAGE, HYSTEROSCOPY, ABLATE ENDOMETRIUM NOVASURE; Hysteroscopy with Endometrial Ablation Novasure; Surgeon:GERRI PURCELL; Location:[facility]     ESOPHAGOSCOPY, GASTROSCOPY, DUODENOSCOPY (EGD), COMBINED N/A 2016    Procedure: COMBINED ESOPHAGOSCOPY, GASTROSCOPY, DUODENOSCOPY (EGD);  Surgeon: Jose L Wilson MD;  Location: [facility] GI     INSERT PORT VASCULAR ACCESS N/A 2017    Procedure: INSERT PORT VASCULAR ACCESS;  Surgeon: Michael Townsend MD;  Location: [facility] OR     LAPAROSCOPIC SALPINGO-OOPHORECTOMY Bilateral 2017    Procedure: LAPAROSCOPIC SALPINGO-OOPHORECTOMY;  Laparoscopic bilateral salpingo oophorectomy;  Surgeon: Preeti Tirado MD;  Location: [facility] OR     RECONSTRUCT BREAST BILATERAL, IMPLANT PROSTHESIS BILATERAL, COMBINED Bilateral 3/16/2018    Procedure: COMBINED RECONSTRUCT BREAST BILATERAL, IMPLANT PROSTHESIS BILATERAL;  BILATERAL SECOND STAGE BREAST RECONSTRUCTION WITH SILICONE GEL IMPLANT;  Surgeon:  Eugenio Ruelas MD;  Location: [facility]     REVISE RECONSTRUCTED BREAST       SURGICAL HISTORY OF -   1997    Breast reduction     ZZC APPENDECTOMY  1991             Physical Exam:   There were no vitals filed for this visit.  PSYCH: NAD  EYES: EOMI  NEURO: AAO x3    LABS:  Creatinine   Date Value Ref Range Status   04/24/2023 0.68 0.51 - 0.95 mg/dL Final   07/09/2021 0.77 0.52 - 1.04 mg/dL Final     CT abdomen/pelvis 3/30/23  FINDINGS:   LOWER CHEST: Normal.     HEPATOBILIARY: Normal.     PANCREAS: Normal.     SPLEEN: Normal.     ADRENAL GLANDS: Normal.     KIDNEYS/BLADDER: 2 mm nonobstructing stone is seen in the mid left  kidney. No other evidence of renal or ureteral calculi. No  hydronephrosis or hydroureter on either side. No renal contour  abnormalities. No bladder wall thickening or mass.     BOWEL: Normal.     LYMPH NODES: Normal.     VASCULATURE: Unremarkable.     PELVIC ORGANS: Normal.     OTHER: None.     MUSCULOSKELETAL: Normal.                                                                      IMPRESSION: A single 2 mm nonobstructing stone is present in the mid  left kidney. No other renal or ureteral calculi. No hydronephrosis or  hydroureter.           Assessment and Plan:   42 year old is a pleasant female who has Alex, vaginal atrophy, dyspareunia, levator spasm, high tone pelvic floor dysfunction, gross hematuria, hx of kidney stones. She is s/p sling procedure with cysto on 12/12/22 with Dr. Harris. Her hx is complicated by hx of breast cancer.     Plan:  -  Continue methenamine and vitamin C for UTI prevention. Consider trialing off in about 3 months; renewed today.   - Keep cysto appointment with Dr. Harris as planned given her episode of gross hematuria.  - Continue PFPT.  - Lidocaine gel ordered today for dyspareunia.  - Continue good clean love.  - Plan to repeat imaging with [facility] in 1 year for hx of stones.   - If cystoscopy is unremarkable, can f/u with me in 3 months.   - After  discussing the assessment and plan with patient, patient verbalizes understanding and agrees to the above plan. All questions answered.     20 minutes spent on the date of the encounter doing chart review, review of outside records, review of test results, interpretation of tests, patient visit and documentation.      Nithya Rodriguez PA-C  Urology  May 15, 2023      Patient Care Team:  Yenifer Peters MD as PCP - General (Family Practice)  Yenifer Peters MD as Assigned PCP  Eugenio Ruelas MD as MD (Plastic Surgery)  Jenifer Olson MD as MD (Surgery)  Josette Smalls, RN as Specialty Care Coordinator (Oncology)  Sixto Harris MD as MD (Urology)  Jailene Thorne PA-C as Assigned Cancer Care Provider  Sixto Harris MD as Assigned Surgical Provider

## 2023-05-15 NOTE — NURSING NOTE
"Diana Wilson's chief complaint for this visit includes:  Chief Complaint   Patient presents with     RECHECK     Return in about 3 months (around 5/13/2023). recurrent UTI     PCP: Yenifer Peters    Referring Provider:  No referring provider defined for this encounter.    [facility] 11/28/2012   Data Unavailable        Allergies   Allergen Reactions     Carboplatin Shortness Of Breath, Rash and Anaphylaxis     Other reaction(s): Flushing, Tachycardia     Amoxicillin GI Disturbance     Erythromycin GI Disturbance     Gluten Meal      Celiac disease     Hydrocodone-Acetaminophen      Other reaction(s): Hallucinations     Hydromorphone Headache     Naltrexone Other (See Comments)     \"Felt high\"     Penicillins Nausea and Vomiting and Hives     1-11-17 pt states this was a childhood reaction     Phentermine Other (See Comments)     Elevated BP     Topamax [Topiramate] Other (See Comments)     Cloudy thinking     Wellbutrin [Bupropion] Other (See Comments)     \"Felt high\"     Zolpidem      Other reaction(s): Hallucinations     Zolpidem Tartrate      hallucinations     Hydrocodone Other (See Comments)     Out of body experience, \"creepy-crawly\" skin          Do you need any medication refills at today's visit? No    Jailene milan Clinic Visit Facilitator- Surgical Specialties       "

## 2023-05-16 DIAGNOSIS — Z87.442 HISTORY OF KIDNEY STONES: ICD-10-CM

## 2023-05-16 DIAGNOSIS — N39.0 RECURRENT UTI: Primary | ICD-10-CM

## 2023-05-17 ENCOUNTER — TELEPHONE (OUTPATIENT)
Dept: UROLOGY | Facility: CLINIC | Age: 43
End: 2023-05-17
Payer: COMMERCIAL

## 2023-05-17 DIAGNOSIS — Z53.9 ERRONEOUS ENCOUNTER--DISREGARD: Primary | ICD-10-CM

## 2023-05-22 ENCOUNTER — OFFICE VISIT (OUTPATIENT)
Dept: UROLOGY | Facility: CLINIC | Age: 43
End: 2023-05-22
Payer: COMMERCIAL

## 2023-05-22 ENCOUNTER — LAB (OUTPATIENT)
Dept: LAB | Facility: CLINIC | Age: 43
End: 2023-05-22
Payer: COMMERCIAL

## 2023-05-22 ENCOUNTER — THERAPY VISIT (OUTPATIENT)
Dept: PHYSICAL THERAPY | Facility: CLINIC | Age: 43
End: 2023-05-22
Payer: COMMERCIAL

## 2023-05-22 DIAGNOSIS — R10.2 PELVIC PAIN IN FEMALE: Primary | ICD-10-CM

## 2023-05-22 DIAGNOSIS — N39.0 RECURRENT UTI: ICD-10-CM

## 2023-05-22 DIAGNOSIS — R27.8 MUSCULAR INCOORDINATION: ICD-10-CM

## 2023-05-22 DIAGNOSIS — N39.0 RECURRENT UTI: Primary | ICD-10-CM

## 2023-05-22 LAB
ALBUMIN UR-MCNC: NEGATIVE MG/DL
APPEARANCE UR: CLEAR
BILIRUB UR QL STRIP: NEGATIVE
COLOR UR AUTO: NORMAL
GLUCOSE UR STRIP-MCNC: NEGATIVE MG/DL
HGB UR QL STRIP: NEGATIVE
KETONES UR STRIP-MCNC: NEGATIVE MG/DL
LEUKOCYTE ESTERASE UR QL STRIP: NEGATIVE
NITRATE UR QL: NEGATIVE
PH UR STRIP: 6.5 [PH] (ref 5–7)
SP GR UR STRIP: 1.02 (ref 1–1.03)
UROBILINOGEN UR STRIP-MCNC: NORMAL MG/DL

## 2023-05-22 PROCEDURE — 81003 URINALYSIS AUTO W/O SCOPE: CPT | Mod: QW

## 2023-05-22 PROCEDURE — 87086 URINE CULTURE/COLONY COUNT: CPT

## 2023-05-22 PROCEDURE — 52000 CYSTOURETHROSCOPY: CPT | Performed by: UROLOGY

## 2023-05-22 PROCEDURE — 97140 MANUAL THERAPY 1/> REGIONS: CPT | Mod: GP | Performed by: PHYSICAL THERAPIST

## 2023-05-22 PROCEDURE — 97110 THERAPEUTIC EXERCISES: CPT | Mod: GP | Performed by: PHYSICAL THERAPIST

## 2023-05-22 PROCEDURE — 97112 NEUROMUSCULAR REEDUCATION: CPT | Mod: GP | Performed by: PHYSICAL THERAPIST

## 2023-05-22 NOTE — PROGRESS NOTES
Reason for Visit:  Cystoscopy    Clinical Data: Ms. Diana Wilson is a 42 year old female with a hx of some recurrent uti's in the setting of previous sling on 12/12/22.  She has been doing well since starting methenamine and vit C.    Cystoscopy procedure:  Pt. Was consented and placed in the lithotomy position.  She was cleaned and preparred in the usual fashion.  Lidocain gel was inserted into the urethra and given time to take effect.  A 16 fr flexible cystoscope was then inserted through the urethra and into the bladder.  The urethra was wnl.  The bladder was with 1+ trabeculation.  No tumors, diverticulae, or stones.  Bilateral u/o's were effluxing clear urine.  The cystoscope was then withdrawn.  The pt. Tolerated the procedure well.    A/P:  42 year old female with hx of recurrent uti.  Doing well on Hiprex prophylaxis.  -f/u in with Dianne in 3 months    Thank you for allowing me to participate in the care of  Ms. Diana Wilson and I will keep you updated on her progress.    Sixto Harris MD

## 2023-05-22 NOTE — NURSING NOTE
"Diana Wilson's chief complaint for this visit includes:    Chief Complaint   Patient presents with     Cystoscopy     Gross Hematuria       PCP: Yenifer Peters         Referring Provider:    No referring provider defined for this encounter.         [facility] 11/28/2012     Data Unavailable            Allergies   Allergen Reactions     Carboplatin Shortness Of Breath, Rash and Anaphylaxis     Other reaction(s): Flushing, Tachycardia     Amoxicillin GI Disturbance     Erythromycin GI Disturbance     Gluten Meal      Celiac disease     Hydrocodone-Acetaminophen      Other reaction(s): Hallucinations     Hydromorphone Headache     Naltrexone Other (See Comments)     \"Felt high\"     Penicillins Nausea and Vomiting and Hives     1-11-17 pt states this was a childhood reaction     Phentermine Other (See Comments)     Elevated BP     Topamax [Topiramate] Other (See Comments)     Cloudy thinking     Wellbutrin [Bupropion] Other (See Comments)     \"Felt high\"     Zolpidem      Other reaction(s): Hallucinations     Zolpidem Tartrate      hallucinations     Hydrocodone Other (See Comments)     Out of body experience, \"creepy-crawly\" skin                  Do you need any medication refills at today's visit? No    Josette Rao LPN on 5/22/2023 at 1:35 PM    "

## 2023-05-22 NOTE — PATIENT INSTRUCTIONS
After Your Cystoscopy    What happens after the exam?    You may go back to your normal diet and activity as you feel ready, unless your doctor tells you not to.    For the next two days, you may notice:    Some blood in your urine  Some burning when you urinate (use the toilet)  An urge to urinate more often  Bladder spasms    These are normal after the procedure and should go away after a day or two.  To relieve these problems drink 6 to 8 large glasses of water each day (includes drinks at meals) as this will help clear the urine.  Take warm baths to relieve pain and bladder spasms.  Do not add anything to the bath water.  You may also take Tylenol (acetaminophen) for pain if needed.    When should I call my doctor?    A fever over 100F (38C) for more than a day. (Before you call the doctor, check your temperature under your tongue)  Chills  Failure to urinate: No urine comes out when you try to use the toilet. (Try soaking in a bathtub full of warm water. If still no urine, call your doctor)  A lot of blood in the urine, or blood clots larger than a nickel  Pain in the back or belly area (abdomen)  Pain or spasms that are not relieved by warm tub baths and pain medicine  Severe pain, burning or other problems while passing urine  Pain that gets worse after two days        -f/u in with Dianne in 3 months

## 2023-05-24 LAB — BACTERIA UR CULT: NO GROWTH

## 2023-06-05 DIAGNOSIS — N94.19 DYSPAREUNIA DUE TO MEDICAL CONDITION IN FEMALE PATIENT: ICD-10-CM

## 2023-06-05 DIAGNOSIS — E66.09 CLASS 1 OBESITY DUE TO EXCESS CALORIES WITH SERIOUS COMORBIDITY AND BODY MASS INDEX (BMI) OF 34.0 TO 34.9 IN ADULT: ICD-10-CM

## 2023-06-05 DIAGNOSIS — E66.811 CLASS 1 OBESITY DUE TO EXCESS CALORIES WITH SERIOUS COMORBIDITY AND BODY MASS INDEX (BMI) OF 34.0 TO 34.9 IN ADULT: ICD-10-CM

## 2023-06-05 NOTE — TELEPHONE ENCOUNTER
"yuvafem 10mcg vaginal tabs  Last prescribing provider: Jailene Thorne on 4/7/23     Last clinic visit date: 7/22/22     Recommendations for requested medication (if none, N/A): 7/22/22, \"Discussed risks and benefits of topical estrogen and she would like to proceed with Vagifem 10 mcg vaginal pill daily X 2 weeks then twice a week.\"    Any other pertinent information (if none, N/A): NA    Refilled: Y/N, if NO, why?    Routed to Jailene Thorne   " Problem: Pain, Acute (Adult)  Goal: Identify Related Risk Factors and Signs and Symptoms  Outcome: Ongoing (interventions implemented as appropriate)    Goal: Acceptable Pain Control/Comfort Level  Outcome: Ongoing (interventions implemented as appropriate)      Problem: Nausea/Vomiting (Adult)  Goal: Identify Related Risk Factors and Signs and Symptoms  Outcome: Ongoing (interventions implemented as appropriate)    Goal: Symptom Relief  Outcome: Ongoing (interventions implemented as appropriate)    Goal: Adequate Hydration  Outcome: Ongoing (interventions implemented as appropriate)      Problem: Bowel Obstruction (Adult)  Goal: Signs and Symptoms of Listed Potential Problems Will be Absent, Minimized or Managed (Bowel Obstruction)  Outcome: Ongoing (interventions implemented as appropriate)

## 2023-06-05 NOTE — TELEPHONE ENCOUNTER
Pt is ready to move up to the next dosage in a little over 2 week so next dose ordered.     Nona Mccormack RN, CBN  [facility]  P [phone]  F [phone]

## 2023-06-07 RX ORDER — ESTRADIOL 10 UG/1
TABLET VAGINAL
Qty: 32 TABLET | Refills: 0 | Status: SHIPPED | OUTPATIENT
Start: 2023-06-07 | End: 2023-09-06

## 2023-06-16 ENCOUNTER — MYC MEDICAL ADVICE (OUTPATIENT)
Dept: SURGERY | Facility: CLINIC | Age: 43
End: 2023-06-16
Payer: COMMERCIAL

## 2023-06-16 DIAGNOSIS — E66.9 OBESITY (BMI 30-39.9): ICD-10-CM

## 2023-06-16 RX ORDER — SEMAGLUTIDE 0.25 MG/.5ML
0.25 INJECTION, SOLUTION SUBCUTANEOUS WEEKLY
Qty: 2 ML | Refills: 0 | Status: SHIPPED | OUTPATIENT
Start: 2023-06-17 | End: 2023-08-07

## 2023-06-28 ENCOUNTER — MYC MEDICAL ADVICE (OUTPATIENT)
Dept: UROLOGY | Facility: CLINIC | Age: 43
End: 2023-06-28
Payer: COMMERCIAL

## 2023-06-28 ENCOUNTER — LAB (OUTPATIENT)
Dept: LAB | Facility: CLINIC | Age: 43
End: 2023-06-28
Payer: COMMERCIAL

## 2023-06-28 ENCOUNTER — NURSE TRIAGE (OUTPATIENT)
Dept: UROLOGY | Facility: CLINIC | Age: 43
End: 2023-06-28

## 2023-06-28 DIAGNOSIS — R30.0 DYSURIA: ICD-10-CM

## 2023-06-28 DIAGNOSIS — R30.0 DYSURIA: Primary | ICD-10-CM

## 2023-06-28 LAB
ALBUMIN UR-MCNC: NEGATIVE MG/DL
APPEARANCE UR: CLEAR
BACTERIA #/AREA URNS HPF: ABNORMAL /HPF
BILIRUB UR QL STRIP: NEGATIVE
COLOR UR AUTO: YELLOW
GLUCOSE UR STRIP-MCNC: NEGATIVE MG/DL
HGB UR QL STRIP: NEGATIVE
KETONES UR STRIP-MCNC: NEGATIVE MG/DL
LEUKOCYTE ESTERASE UR QL STRIP: ABNORMAL
NITRATE UR QL: POSITIVE
PH UR STRIP: 7 [PH] (ref 5–7)
RBC #/AREA URNS AUTO: ABNORMAL /HPF
SP GR UR STRIP: 1.01 (ref 1–1.03)
SQUAMOUS #/AREA URNS AUTO: ABNORMAL /LPF
UROBILINOGEN UR STRIP-ACNC: 0.2 E.U./DL
WBC #/AREA URNS AUTO: ABNORMAL /HPF

## 2023-06-28 PROCEDURE — 87086 URINE CULTURE/COLONY COUNT: CPT

## 2023-06-28 PROCEDURE — 81001 URINALYSIS AUTO W/SCOPE: CPT

## 2023-06-28 RX ORDER — NITROFURANTOIN 25; 75 MG/1; MG/1
100 CAPSULE ORAL 2 TIMES DAILY
Qty: 10 CAPSULE | Refills: 0 | Status: SHIPPED | OUTPATIENT
Start: 2023-06-28 | End: 2023-07-03

## 2023-06-28 NOTE — TELEPHONE ENCOUNTER
Called pt to let her know I was going to send a prescription to her preferred pharmacy.  Pt confirms [facility] will work for her.    Medication sent per protocol.  Informed pt we will call and let her know if the culture results show a different medication should be prescribed.  Pt verbalized understanding.    Renee Johnson RN

## 2023-06-28 NOTE — TELEPHONE ENCOUNTER
"Called and spoke to patient who reports of urinary urgency, urinary frequency, severe burning with urination, and blood in the urine that started this morning. Patient reports having similar feeling 1 week ago but did not have blood in the urine at the time. Last week the symptoms resolved with Azo but now have returned.     Patient reports feeling like she could void constantly and is voiding a small amount frequently. Patient reports severe burning with urination and rated pain 2/10 when Azo is on board and pain up to 5-6/10 at the end of the urine stream. Regarding blood in urine patient stated, \"I have Azo on board but I don't think it is a solid bloody stream.\" patient reports passing one string-like clot earlier this morning as pictured in the 6/28/23 my chart encounter. Patient reports not passing any more clots. Patient denies fevers, chills, vomiting, and flank pain. Patient reports lower bilateral back pain but is due for her monthly massage today. Per patient, she has had nausea when the pain is intense, but this has subsided.     Informed patient that it is recommended for her to complete a urine sample as soon as possible. Informed patient that the Azo may interact with the urine sample and reports that the last dose of Azo was earlier this morning. Future UA/UC ordered. Will watch for results and contact patient once available. Patient was comfortable with plan.    Reason for Disposition    Pain or burning with passing urine    Blood in urine  (Exception: could be normal menstrual bleeding)    Additional Information    Negative: Fever > 100.4 F (38.0 C)    Negative: [1] Unable to urinate (or only a few drops) > 4 hours AND [2] bladder feels very full (e.g., palpable bladder or strong urge to urinate)    Negative: Side (flank) or back pain present    Negative: Passing pure blood or large blood clots (i.e., size > a dime) (Exception: jin or small strands)    Protocols used: URINE - BLOOD " IN-A-    Erendira Sun RN, BSN

## 2023-06-28 NOTE — TELEPHONE ENCOUNTER
[facility] Call Center    Phone Message    May a detailed message be left on voicemail: yes     Reason for Call: Symptoms or Concerns     If patient has red-flag symptoms, warm transfer to triage line    Current symptom or concern: Woke up with UTI symptoms, felt like there was a blockage this morning that passed and was able to urinate a little but is having blood in urine. Heaviness in bladder last week with little pain in urination.    Symptoms have been present for: 7 hour(s)    Has patient previously been seen for this? Yes    By Nithya Rodriguez, PAC    Date: 5/15 and cysto 5/22 with Dr. Harris    Are there any new or worsening symptoms? Yes: worsening      Action Taken: Message routed to:  Adult Clinics: Urology p [id number]    Travel Screening: Not Applicable

## 2023-06-30 LAB — BACTERIA UR CULT: NO GROWTH

## 2023-07-03 NOTE — TELEPHONE ENCOUNTER
Chart reviewed. 6/28/23 final urine culture results now available and shows no growth.     Called and spoke to patient who is aware of the culture results and reviewed them via my chart. Patient reports that the pharmacy just called her now to  the macrobid prescription so she has not picked it up and is not planning on taking it. Informed patient that the macrobid is not needed given the normal culture results. Patient verbalized understanding.    Per patient, her mom has a history of ureteral stones and is wondering if this could possible be what she is experiencing. Per patient, she felt the pressure build, had the burning, urgency, frequency, and blood in the urine as previously reported. Per patient, once that passed then she was symptom free.     Informed patient that Nithya Rodriguez PA-C does not see for stones, however a message will be sent to her to review and advise on how to proceed. Informed patient that Nithya Rodriguez PA-C is out of the office at this time. Patient aware that she will be contacted with additional recommendations and to call with any questions or concerns in the meantime.    Erendira Sun RN, BSN

## 2023-07-05 NOTE — TELEPHONE ENCOUNTER
Could do a micro gen. However if symptoms have resolved, can wait on this and if happens again in the future can obtain at that time.     natalya        Received message above from Nithya Rodriguez PA-C. Patient stated that she is more concerned with stones at this time. Staff reverted  Back to Nithya Rodriguez PA-C original plan Could repeat CT scan without contrast since symptomatic; please see if patient interested in repeating imaging. If no changes to CT scan, could consider repeating cystoscopy. Patient stated she understood.     Deonna Steel LPN on 7/5/2023 at 1:51 PM

## 2023-07-05 NOTE — TELEPHONE ENCOUNTER
Patient has hx of stones. Last CT scan in 3/2023 showed small non-obstructing stone. Could repeat CT scan without contrast since symptomatic; please see if patient interested in repeating imaging. If no changes to CT scan, could consider repeating cystoscopy.     Dianne     Received message above from Nithya Rodriguez PA-C. Patient informed of recommendation. Patient stated that she just recently had both of those test completed. Staff stated that from the two months that it is possible for kidney stones to show up. Patient said symptoms right now have subsided but they usually come back in one week. Order placed for the CT scan. Routing to Nithya Rodriguez PA-C to advise.     Deonna Steel LPN on 7/5/2023 at 11:45 AM

## 2023-07-12 ENCOUNTER — TELEPHONE (OUTPATIENT)
Dept: SURGERY | Facility: CLINIC | Age: 43
End: 2023-07-12
Payer: COMMERCIAL

## 2023-07-12 DIAGNOSIS — E66.9 OBESITY (BMI 30-39.9): Primary | ICD-10-CM

## 2023-07-12 NOTE — TELEPHONE ENCOUNTER
Called pt to discuss a refill request that we got for Wegovy 0.25 mg. Pt says she is now on 1 mg so is ready to move up to 1.7 mg. Ordered both the 1.7 mg and the 2.4 mg doses for pt.      Nona Mccormack RN, CBN  [facility]  P [phone]  F [phone]

## 2023-07-18 ENCOUNTER — E-VISIT (OUTPATIENT)
Dept: FAMILY MEDICINE | Facility: CLINIC | Age: 43
End: 2023-07-18
Payer: COMMERCIAL

## 2023-07-18 DIAGNOSIS — N12 PYELONEPHRITIS: ICD-10-CM

## 2023-07-18 DIAGNOSIS — N76.0 VAGINITIS AND VULVOVAGINITIS: Primary | ICD-10-CM

## 2023-07-18 DIAGNOSIS — R30.0 DYSURIA: ICD-10-CM

## 2023-07-18 PROCEDURE — 99422 OL DIG E/M SVC 11-20 MIN: CPT | Performed by: FAMILY MEDICINE

## 2023-07-19 ENCOUNTER — LAB (OUTPATIENT)
Dept: LAB | Facility: CLINIC | Age: 43
End: 2023-07-19
Attending: FAMILY MEDICINE
Payer: COMMERCIAL

## 2023-07-19 DIAGNOSIS — R30.0 DYSURIA: ICD-10-CM

## 2023-07-19 DIAGNOSIS — N76.0 VAGINITIS AND VULVOVAGINITIS: ICD-10-CM

## 2023-07-19 LAB
ALBUMIN UR-MCNC: NEGATIVE MG/DL
APPEARANCE UR: CLEAR
BACTERIA #/AREA URNS HPF: ABNORMAL /HPF
BILIRUB UR QL STRIP: NEGATIVE
CLUE CELLS: ABNORMAL
COLOR UR AUTO: YELLOW
GLUCOSE UR STRIP-MCNC: NEGATIVE MG/DL
HGB UR QL STRIP: NEGATIVE
KETONES UR STRIP-MCNC: NEGATIVE MG/DL
LEUKOCYTE ESTERASE UR QL STRIP: NEGATIVE
NITRATE UR QL: POSITIVE
PH UR STRIP: 5.5 [PH] (ref 5–7)
RBC #/AREA URNS AUTO: ABNORMAL /HPF
SP GR UR STRIP: >=1.03 (ref 1–1.03)
SQUAMOUS #/AREA URNS AUTO: ABNORMAL /LPF
TRICHOMONAS, WET PREP: ABNORMAL
UROBILINOGEN UR STRIP-ACNC: 0.2 E.U./DL
WBC #/AREA URNS AUTO: ABNORMAL /HPF
WBC'S/HIGH POWER FIELD, WET PREP: ABNORMAL
YEAST, WET PREP: ABNORMAL

## 2023-07-19 PROCEDURE — 87210 SMEAR WET MOUNT SALINE/INK: CPT

## 2023-07-19 PROCEDURE — 81001 URINALYSIS AUTO W/SCOPE: CPT

## 2023-07-19 PROCEDURE — 87086 URINE CULTURE/COLONY COUNT: CPT

## 2023-07-19 PROCEDURE — 87186 SC STD MICRODIL/AGAR DIL: CPT

## 2023-07-19 RX ORDER — CIPROFLOXACIN 250 MG/1
250 TABLET, FILM COATED ORAL 2 TIMES DAILY
Qty: 6 TABLET | Refills: 0 | Status: SHIPPED | OUTPATIENT
Start: 2023-07-19 | End: 2023-07-22

## 2023-07-21 LAB — BACTERIA UR CULT: ABNORMAL

## 2023-08-06 NOTE — PROGRESS NOTES
Bariatric Care Clinic Non Surgical Follow up Visit   Date of visit: 8/7/2023  Physician: SHABBIR Rosas MD, MD  Primary Care is Yenifer Petersjeremiah Wilson   43 year old  female     Initial Weight: 205#  Initial BMI: 35  Today's Weight:   Wt Readings from Last 1 Encounters:   08/07/23 86.2 kg (190 lb)     Body mass index is 32.61 kg/m .           Assessment and Plan   Assessment: Diana is a 43 year old year old female who presents for medical weight management.      Plan:    1. Obesity (BMI 30-39.9)  Patient was congratulated on her success thus far. Healthy habits to assist with further weight loss were discussed. She will continue her healthy habits. She will continue the wegovy and will stay on the 1.7 mg dose for now.     2. Gastroesophageal reflux disease, unspecified whether esophagitis present  This may improve with healthy habits and weight loss.      Follow up in 1 month with the dietician and in 3 months with myself            INTERIM HISTORY  Patent started wegovy after her last visit in April. She is now taking the 1.7 mg dose. She is nauseated the first 24 hours after taking it but then feels better. She does also have fatigue for about 2 days. It is helping to control her appetite.     DIETARY HISTORY  Meals Per Day: 3  Eating Protein First?: working on it  Food Diary: B:protein shake or cereal L:protein bar  and vegetables D:protein and vegetables  Snacks Per Day: treat in the evenings  Typical Snack: clio frozen yogurt bar  Fluid Intake: 64 oz  Portion Control: significantly decreased  Calorie Containing Beverages: coffee with sugared creamer  Meals at Restaurant per week:0-1    Positive Changes Since Last Visit: decreased sugar cravings, decreased portion sizes  Struggling With: some side effects but they are manageable    Knowledgeable in Reading Food Labels: yes  Getting Adequate Protein: working on it  Sleeping 7-8 hours/day yes  Stress management not discussed    PHYSICAL ACTIVITY  PATTERNS:  Walking 3 mornings per week    REVIEW OF SYSTEMS  GENERAL/CONSTITUTIONAL:  Fatigue: the second day after injection  HEENT:   glaucoma: no  CARDIOVASCULAR:  History of heart disease: no  PULMONARY:  Dyspnea on exertion: sometimes  GI:  Pancreatitis: no  ENDOCRINE:  Monitoring Blood Sugars: no  Sugars Well Controlled: na  No personal or family history of medullary thyroid cancer no  :  Birth control: post menopausal (ovaries removed)       Patient Profile   Social History     Social History Narrative    Not on file        Past Medical History   Past Medical History:   Diagnosis Date    Anxiety     Breast cancer (H)     Celiac disease     Depressive disorder 1995    And Anxiety    Encounter for insertion or removal of intrauterine contraceptive device 01/08/2008    Excessive or frequent menstruation 03/19/2003    Fibroadenosis of breast     GERD (gastroesophageal reflux disease)     Invasive ductal carcinoma of breast, right (H)     Malignant neoplasm of upper-outer quadrant of right female breast (H)     Mild intermittent asthma            never has had PFT's, MDI with colds and at times exercise    Neutropenia, drug-induced (H)     Nongonococcal urethritis (JALIL) due to chlamydia trachomatis 01/03/2002    Other and unspecified ovarian cyst 03/19/2003    RIGHT ovarian cyst    PONV (postoperative nausea and vomiting)     Skin cancer     Transient hypertension of pregnancy, antepartum     PIH with last birth    Uncomplicated asthma 2002     Patient Active Problem List   Diagnosis    Obesity    CARDIOVASCULAR SCREENING; LDL GOAL LESS THAN 130    Major depressive disorder, recurrent episode, mild (H)    Iron deficiency anemia    Insomnia    Health Care Home    Anxiety    Intestinal malabsorption    Lymphedema of right upper extremity    Malignant neoplasm of upper-outer quadrant of right breast in female, estrogen receptor positive (H)    Osteopenia of multiple sites    Multiple subsegmental pulmonary emboli  "without acute cor pulmonale (H)    Breast reconstruction disproportion    Family history of colon cancer    Celiac disease    Recurrent UTI    Stress incontinence    Pelvic pain in female    Muscular incoordination       Past Surgical History  She has a past surgical history that includes surgical history of -  (); APPENDECTOMY (); Dental surgery (2010 and 10/12/2010); Dilation and curettage, hysteroscopy, ablate endometrium novasure, combined (3/20/2012); Esophagoscopy, gastroscopy, duodenoscopy (EGD), combined (N/A, 2016); Insert port vascular access (N/A, 2017); biopsy (2016); Breast surgery (); Laparoscopic salpingo-oophorectomy (Bilateral, 2017); Reconstruct breast bilateral, implant prosthesis bilateral, combined (Bilateral, 3/16/2018);  Section; Revise reconstructed breast; and Cystoscopy, Sling Transvaginal (N/A, 2022).     Examination   Ht 1.626 m (5' 4\")   Wt 86.2 kg (190 lb)   LMP 2012   BMI 32.61 kg/m    Wt Readings from Last 4 Encounters:   23 86.2 kg (190 lb)   23 93 kg (205 lb)   23 91.2 kg (201 lb)   23 91.8 kg (202 lb 6.4 oz)      GENERAL: Healthy, alert and no distress  EYES: Eyes grossly normal to inspection.  No discharge or erythema, or obvious scleral/conjunctival abnormalities.  RESP: No audible wheeze, cough, or visible cyanosis.  No visible retractions or increased work of breathing.    SKIN: Visible skin clear. No significant rash, abnormal pigmentation or lesions.  NEURO: Cranial nerves grossly intact.  Mentation and speech appropriate for age.  PSYCH: Mentation appears normal, affect normal/bright, judgement and insight intact, normal speech and appearance well-groomed.        Counseling:   We reviewed the important post op bariatric recommendations:  -eating 3 meals daily  -eating protein first, getting >60gm protein daily  -eating slowly, chewing food well  -avoiding/limiting calorie containing " beverages  -limiting starchy vegetables and carbohydrates, choosing wheat, not white with breads,   crackers, pastas, luis armando, bagels, tortillas, rice  -limiting restaurant or cafeteria eating to twice a week or less    We discussed the importance of restorative sleep and stress management in maintaining a healthy weight.  We discussed the National Weight Control Registry healthy weight maintenance strategies and ways to optimize metabolism.  We discussed the importance of physical activity including cardiovascular and strength training in maintaining a healthier weight.    Total time spent on the date of this encounter doing: chart review, review of test results, patient visit, physical exam, education, counseling, developing plan of care and documenting = 21 minutes.         SHABBIR Rosas MD  [facility]

## 2023-08-07 ENCOUNTER — VIRTUAL VISIT (OUTPATIENT)
Dept: SURGERY | Facility: CLINIC | Age: 43
End: 2023-08-07
Payer: COMMERCIAL

## 2023-08-07 VITALS — BODY MASS INDEX: 32.44 KG/M2 | HEIGHT: 64 IN | WEIGHT: 190 LBS

## 2023-08-07 DIAGNOSIS — E66.9 OBESITY (BMI 30-39.9): Primary | ICD-10-CM

## 2023-08-07 DIAGNOSIS — K21.9 GASTROESOPHAGEAL REFLUX DISEASE, UNSPECIFIED WHETHER ESOPHAGITIS PRESENT: ICD-10-CM

## 2023-08-07 PROCEDURE — 99213 OFFICE O/P EST LOW 20 MIN: CPT | Mod: VID | Performed by: FAMILY MEDICINE

## 2023-08-07 NOTE — LETTER
8/7/2023         RE: Diana Wilson  [address] [facility] [address]        Dear Colleague,    Thank you for referring your patient, Diana Wilson, to the [facility] [place]. Please see a copy of my visit note below.    Bariatric Care Clinic Non Surgical Follow up Visit   Date of visit: 8/7/2023  Physician: SHABBIR Rosas MD, MD  Primary Care is Yenifer Peters  Diana Wilson   43 year old  female     Initial Weight: 205#  Initial BMI: 35  Today's Weight:   Wt Readings from Last 1 Encounters:   08/07/23 86.2 kg (190 lb)     Body mass index is 32.61 kg/m .           Assessment and Plan   Assessment: Diana is a 43 year old year old female who presents for medical weight management.      Plan:    1. Obesity (BMI 30-39.9)  Patient was congratulated on her success thus far. Healthy habits to assist with further weight loss were discussed. She will continue her healthy habits. She will continue the wegovy and will stay on the 1.7 mg dose for now.     2. Gastroesophageal reflux disease, unspecified whether esophagitis present  This may improve with healthy habits and weight loss.      Follow up in 1 month with the dietician and in 3 months with myself            INTERIM HISTORY  Patent started wegovy after her last visit in April. She is now taking the 1.7 mg dose. She is nauseated the first 24 hours after taking it but then feels better. She does also have fatigue for about 2 days. It is helping to control her appetite.     DIETARY HISTORY  Meals Per Day: 3  Eating Protein First?: working on it  Food Diary: B:protein shake or cereal L:protein bar  and vegetables D:protein and vegetables  Snacks Per Day: treat in the evenings  Typical Snack: clio frozen yogurt bar  Fluid Intake: 64 oz  Portion Control: significantly decreased  Calorie Containing Beverages: coffee with sugared creamer  Meals at Restaurant per week:0-1    Positive Changes Since Last Visit: decreased  sugar cravings, decreased portion sizes  Struggling With: some side effects but they are manageable    Knowledgeable in Reading Food Labels: yes  Getting Adequate Protein: working on it  Sleeping 7-8 hours/day yes  Stress management not discussed    PHYSICAL ACTIVITY PATTERNS:  Walking 3 mornings per week    REVIEW OF SYSTEMS  GENERAL/CONSTITUTIONAL:  Fatigue: the second day after injection  HEENT:   glaucoma: no  CARDIOVASCULAR:  History of heart disease: no  PULMONARY:  Dyspnea on exertion: sometimes  GI:  Pancreatitis: no  ENDOCRINE:  Monitoring Blood Sugars: no  Sugars Well Controlled: na  No personal or family history of medullary thyroid cancer no  :  Birth control: post menopausal (ovaries removed)       Patient Profile   Social History     Social History Narrative     Not on file        Past Medical History   Past Medical History:   Diagnosis Date     Anxiety      Breast cancer (H)      Celiac disease      Depressive disorder 1995    And Anxiety     Encounter for insertion or removal of intrauterine contraceptive device 01/08/2008     Excessive or frequent menstruation 03/19/2003     Fibroadenosis of breast      GERD (gastroesophageal reflux disease)      Invasive ductal carcinoma of breast, right (H)      Malignant neoplasm of upper-outer quadrant of right female breast (H)      Mild intermittent asthma            never has had PFT's, MDI with colds and at times exercise     Neutropenia, drug-induced (H)      Nongonococcal urethritis (JALIL) due to chlamydia trachomatis 01/03/2002     Other and unspecified ovarian cyst 03/19/2003    RIGHT ovarian cyst     PONV (postoperative nausea and vomiting)      Skin cancer      Transient hypertension of pregnancy, antepartum     PIH with last birth     Uncomplicated asthma 2002     Patient Active Problem List   Diagnosis     Obesity     CARDIOVASCULAR SCREENING; LDL GOAL LESS THAN 130     Major depressive disorder, recurrent episode, mild (H)     Iron deficiency  "anemia     Insomnia     Health Care Home     Anxiety     Intestinal malabsorption     Lymphedema of right upper extremity     Malignant neoplasm of upper-outer quadrant of right breast in female, estrogen receptor positive (H)     Osteopenia of multiple sites     Multiple subsegmental pulmonary emboli without acute cor pulmonale (H)     Breast reconstruction disproportion     Family history of colon cancer     Celiac disease     Recurrent UTI     Stress incontinence     Pelvic pain in female     Muscular incoordination       Past Surgical History  She has a past surgical history that includes surgical history of -  (); APPENDECTOMY (); Dental surgery (2010 and 10/12/2010); Dilation and curettage, hysteroscopy, ablate endometrium novasure, combined (3/20/2012); Esophagoscopy, gastroscopy, duodenoscopy (EGD), combined (N/A, 2016); Insert port vascular access (N/A, 2017); biopsy (2016); Breast surgery (); Laparoscopic salpingo-oophorectomy (Bilateral, 2017); Reconstruct breast bilateral, implant prosthesis bilateral, combined (Bilateral, 3/16/2018);  Section; Revise reconstructed breast; and Cystoscopy, Sling Transvaginal (N/A, 2022).     Examination   Ht 1.626 m (5' 4\")   Wt 86.2 kg (190 lb)   LMP 2012   BMI 32.61 kg/m    Wt Readings from Last 4 Encounters:   23 86.2 kg (190 lb)   23 93 kg (205 lb)   23 91.2 kg (201 lb)   23 91.8 kg (202 lb 6.4 oz)      GENERAL: Healthy, alert and no distress  EYES: Eyes grossly normal to inspection.  No discharge or erythema, or obvious scleral/conjunctival abnormalities.  RESP: No audible wheeze, cough, or visible cyanosis.  No visible retractions or increased work of breathing.    SKIN: Visible skin clear. No significant rash, abnormal pigmentation or lesions.  NEURO: Cranial nerves grossly intact.  Mentation and speech appropriate for age.  PSYCH: Mentation appears normal, affect normal/bright, " judgement and insight intact, normal speech and appearance well-groomed.        Counseling:   We reviewed the important post op bariatric recommendations:  -eating 3 meals daily  -eating protein first, getting >60gm protein daily  -eating slowly, chewing food well  -avoiding/limiting calorie containing beverages  -limiting starchy vegetables and carbohydrates, choosing wheat, not white with breads,   crackers, pastas, luis armando, bagels, tortillas, rice  -limiting restaurant or cafeteria eating to twice a week or less    We discussed the importance of restorative sleep and stress management in maintaining a healthy weight.  We discussed the National Weight Control Registry healthy weight maintenance strategies and ways to optimize metabolism.  We discussed the importance of physical activity including cardiovascular and strength training in maintaining a healthier weight.    Total time spent on the date of this encounter doing: chart review, review of test results, patient visit, physical exam, education, counseling, developing plan of care and documenting = 21 minutes.         SHABBIR Rosas MD  [facility]             Diana Wilson is 43 year old  female who presents for a billable video visit today.    How would you like to obtain your AVS? MyChart  If dropped from the video visit, the video invitation should be resent by: Text to cell phone: [phone]  Will anyone else be joining your video visit? No      Video Start Time: 10:15 am    Are there any specific questions or needs that you would like addressed at your visit today? No      Video-Visit Details    Type of service:  Video Visit    Platform used for Video Visit: Bungolow    Video End Time (time video stopped): 10:29 AM    Originating Location (pt. Location): Home      Distant Location (provider location):  Off-site    Distant Location (provider location):  [facility] [place]        Again,  thank you for allowing me to participate in the care of your patient.        Sincerely,        SHABBIR Rosas MD

## 2023-08-07 NOTE — PROGRESS NOTES
Diana Wilson is 43 year old  female who presents for a billable video visit today.    How would you like to obtain your AVS? MyChart  If dropped from the video visit, the video invitation should be resent by: Text to cell phone: [phone]  Will anyone else be joining your video visit? No      Video Start Time: 10:15 am    Are there any specific questions or needs that you would like addressed at your visit today? No      Video-Visit Details    Type of service:  Video Visit    Platform used for Video Visit: PrivacyStar    Video End Time (time video stopped): 10:29 AM    Originating Location (pt. Location): Home      Distant Location (provider location):  Off-site    Distant Location (provider location):  [facility] SURGERY [facility] AND BARIATRICS CARE [place]

## 2023-08-22 NOTE — PROGRESS NOTES
----- Message from Winifred Hernandez MA sent at 8/21/2023  2:17 PM EDT -----  Regarding: FW: Spot on my breast  Contact: [phone]    ----- Message -----  From: Isabel Riley  Sent: 8/21/2023   1:56 PM EDT  To: [facility]  Subject: Spot on my breast                                Dr Salazar,      On Saturday evening, I was taking a shower and felt a pain on the lower part of my right breast.  When I got out of the shower, I looked under my breast and I had a spot there.  I had Gonzalo check it and he said it did not look like it had a head on it (like from an ingrown hair/pimple) and it is not highly sensitive.  It looks like a bruise, but I don't think it is.  If you do push on it, it is kind of sore but not highly.  I was wondering how I need to proceed.  I have attached a picture of what it looks like.      Thanks for always being there to help me.     Diana Wilson is a 42 year old who is being evaluated via a billable video visit.      How would you like to obtain your AVS? MyChart  If the video visit is dropped, the invitation should be resent by: Send to e-mail at: [email or web address]  Will anyone else be joining your video visit? No      Video Start Time: 3:30 PM      Medical Weight Loss Initial Diet Evaluation  Assessment:  Diana is presenting today for a new weight management nutrition consultation. Pt has had an initial appointment with Dr. Rosas.  Weight loss medication: Phentermine.    Personal Goals: Used to run, she would like to get to 200 lb, 190 lb is her next goal    Anthropometrics:    Pt's weight is 202 lb  Initial weight: 205 lb  Weight change: down 3 lbs in the past week  BMI: There is no height or weight on file to calculate BMI.   Ideal body weight: 55.1 kg (121 lb 7.5 oz)  Adjusted ideal body weight: 70.3 kg (154 lb 14.1 oz)  Estimated RMR (Highspire-St Jeor equation):  1555 kcals x 1.2 (sedentary) = 1865 kcals (for weight maintenance)  1555 kcals x 1.3 (light active) = 2137 kcals (for weight maintenance)    Recommended Protein Intake: 70-90 grams of protein/day    Medical History:  Patient Active Problem List   Diagnosis     Obesity     CARDIOVASCULAR SCREENING; LDL GOAL LESS THAN 130     Major depressive disorder, recurrent episode, mild (H)     Iron deficiency anemia     Insomnia     Health Care Home     Anxiety     Intestinal malabsorption     Lymphedema of right upper extremity     Malignant neoplasm of upper-outer quadrant of right breast in female, estrogen receptor positive (H)     Osteopenia of multiple sites     Multiple subsegmental pulmonary emboli without acute cor pulmonale (H)     Breast reconstruction disproportion     Family history of colon cancer     Celiac disease     Recurrent UTI      Diabetes: No  HbA1c:  No results found for: HGBA1C    Nutrition History:   Food allergies/intolerances/cultural or religous food  customs: Yes, celiac disease  Weight loss history: Watching Portions or Calories, Exercise, Weight Watchers, Meal Replacements, Fasting    Dietary Recall:  Breakfast: Fruit smoothie - 1 cup blueberries, vanilla protein powder OR banana, chocolate protein powder, spoon of PB, vanilla almond milk  Lunch: Chicken salad on salad, almonds, power, celery, onion  Dinner: Taco salads OR grilled pineapple pork chops, 3 bean salad, brussels sprouts   Typical Snacks: in the past would be very hungry by 3 pm, but not hungry with Phentermine    Beverages:   Coffee with creamer (regular)  Water: with propel water flavoring    Exercise:   Trying to go for a walk 3x per morning, 2-3 miles    Nutrition Diagnosis (PES statement):   Overweight/Obesity (NC 3.3) related to overeating and poor lifestyle habits as evidenced by BMI     Nutrition Intervention  1. Food and/or Nutrient Delivery   a. Placed emphasis on importance of developing a healthy meal routine, aiming for 3 meals a day  b. Discussed strategies for night time snacking and healthy snack options for weight loss  c. Discussed the importance of adequate hydration, with emphasis on drinking 64oz of water or zero calorie beverages per day.  2. Nutrition Counseling   a. Encouraged importance of developing routine exercise for health benefits and weight loss.    Goals established by patient:   1. Aim to eat 3 meals per day, protein focused, high fiber foods  2. Limit snacking -discussed snack ideas, if needed    Assessment/Plan:    Pt will follow up in 3 month(s) with bariatrician and 2 month(s) with dietitian.     Video-Visit Details    Type of service:  Video Visit    Video End Time:3:58 pm    Originating Location (pt. Location): Home    Distant Location (provider location):  [facility] [place]     Platform used for Video Visit: Russ Vivas

## 2023-08-28 ENCOUNTER — VIRTUAL VISIT (OUTPATIENT)
Dept: UROLOGY | Facility: CLINIC | Age: 43
End: 2023-08-28
Payer: COMMERCIAL

## 2023-08-28 VITALS — BODY MASS INDEX: 32.27 KG/M2 | HEIGHT: 64 IN | WEIGHT: 189 LBS

## 2023-08-28 DIAGNOSIS — N95.2 ATROPHIC VAGINITIS: ICD-10-CM

## 2023-08-28 DIAGNOSIS — M62.89 HIGH-TONE PELVIC FLOOR DYSFUNCTION: ICD-10-CM

## 2023-08-28 DIAGNOSIS — R35.0 URINARY FREQUENCY: ICD-10-CM

## 2023-08-28 DIAGNOSIS — N39.41 URGE INCONTINENCE: ICD-10-CM

## 2023-08-28 DIAGNOSIS — M62.838 LEVATOR SPASM: ICD-10-CM

## 2023-08-28 DIAGNOSIS — R39.15 URGENCY OF URINATION: ICD-10-CM

## 2023-08-28 DIAGNOSIS — N39.0 RECURRENT UTI: Primary | ICD-10-CM

## 2023-08-28 DIAGNOSIS — N94.10 DYSPAREUNIA IN FEMALE: ICD-10-CM

## 2023-08-28 DIAGNOSIS — Z87.442 HISTORY OF KIDNEY STONES: ICD-10-CM

## 2023-08-28 PROCEDURE — 99214 OFFICE O/P EST MOD 30 MIN: CPT | Mod: VID | Performed by: PHYSICIAN ASSISTANT

## 2023-08-28 RX ORDER — OXYBUTYNIN CHLORIDE 5 MG/1
5 TABLET, EXTENDED RELEASE ORAL DAILY
Qty: 30 TABLET | Refills: 3 | Status: SHIPPED | OUTPATIENT
Start: 2023-08-28 | End: 2023-10-23 | Stop reason: DRUGHIGH

## 2023-08-28 RX ORDER — METHENAMINE HIPPURATE 1000 MG/1
1 TABLET ORAL 2 TIMES DAILY
Qty: 60 TABLET | Refills: 3 | Status: SHIPPED | OUTPATIENT
Start: 2023-08-28 | End: 2024-04-08

## 2023-08-28 ASSESSMENT — PAIN SCALES - GENERAL: PAINLEVEL: NO PAIN (0)

## 2023-08-28 NOTE — PATIENT INSTRUCTIONS
Start oxybutynin 5mg once daily.    Continue methenamine with Vitamin C    Followup in 6 weeks    Continue estrogen cream.     Contact clinic if develop blood in your urine.

## 2023-08-28 NOTE — NURSING NOTE
Is the patient currently in the state of [place]? YES    Visit mode:VIDEO    If the visit is dropped, the patient can be reconnected by: VIDEO VISIT: Text to cell phone:   Telephone Information:   Mobile [phone]       Will anyone else be joining the visit? NO  (If patient encounters technical issues they should call [phone]    How would you like to obtain your AVS? MyChart    Are changes needed to the allergy or medication list? Yes pt has medication flagged for removal     Reason for visit: RECHECK (3 mo Follow up )    Naomi BURKS

## 2023-08-28 NOTE — PROGRESS NOTES
Virtual Visit Details    Type of service:  Video Visit     Originating Location (pt. Location): Home in [place]    Distant Location (provider location):  On-site  Platform used for Video Visit: Russ    Start: 9:28 AM  End: 9:36 AM      Urology Clinic    Name: Diana Wilson    MRN: [id number]   YOB: 1980  Accompanied at today's visit by:self              Assessment and Plan:   43 year old female with UTI, vaginal atrophy, dyspareunia, levator spasm, bladder spasms, urinary urgency/frequency UUI, high tone pelvic floor dysfunction, hx of gross hematuria and  hx of kidney stones. Is s/p sling procedure on 12/22/22. Hx is complicated by hx of breast cancer.     - Continue methenamine with vitamin C for now; renewed today.  - Given most of her recent Ucs were negative, think her symptoms are more from bladder spasms and OAB. Start Oxybutynin 5 mg daily. Discussed risks/benefits and potential side effects of medication. Discussed that it may take 4 weeks to take full effect.  - Continue IC diet.   - Follow-up in 6 weeks.   - Consider repeating UA   - continue estrogen cream.   - After discussing the assessment and plan with patient, patient verbalized understanding and agreed to the above plan. All questions answered.     33 minutes were spent today on the date of the encounter in reviewing the EMR, direct patient care, coordination of care and documentation in addition to ordering oxybutynin, reviewing labs, reviewing cysto note from Dr. Harris.     Nithya Rodriguez PA-C  August 28, 2023    Patient Care Team:  Yenifer Peters MD as PCP - General (Family Practice)  Yenifer Peters MD as Assigned PCP  Eugenio Ruelas MD as MD (Plastic Surgery)  Jenifer Olson MD as MD (Surgery)  Josette Smalls RN as Specialty Care Coordinator (Oncology)  Sixto Harris MD as MD (Urology)  Sixto Harris MD as Assigned Surgical Provider  Layo Coulter MD as Assigned Cancer Care Provider             Chief Complaint:   Alex          History of Present Illness:   August 28, 2023    HISTORY: Diana Wilson is a 43 year old female is here for follow-up. We have been following her for Alex, vaginal atrophy, dyspareunia, levator spasm, high tone pelvic floor dysfunction, gross hematuria, hx of kidney stones. She is s/p sling procedure with cysto on 12/12/22 with Dr. Harris. Her hx is complicated by hx of breast cancer; on Latrozole.  Of note, estrogen cream was cleared by her oncologist. Last seen on 5/22/23 by Dr. Harris for cysto given hx of gross hematuria in 3/2023. Her cystoscopy showed 1+ trabeculation but no other abnormalities. Advised to continue on hiprex and follow-up in 3 months. Here for follow-up. Per chart review, has had 2 Ucs since last encounter showing no growth and 1 UTI on 7/19/23 of E. Coli. CT from 3/30/23 showed 2mm nonostructing stone of left kidney. Of note, patient states that this kidney stone has bee there for over 6 years with no changes. Voiding eery 1-2 hours during the day and getting- up every hour at night. Reports UUI 1-2x/week. Follows IC diet. Hx of levator spasm, dyspareunia. PFPT has helped her in the past. Patient voices no other concerns at this time.           Past Medical History:     Past Medical History:   Diagnosis Date    Anxiety     Breast cancer (H)     Celiac disease     Depressive disorder 1995    And Anxiety    Encounter for insertion or removal of intrauterine contraceptive device 01/08/2008    Excessive or frequent menstruation 03/19/2003    Fibroadenosis of breast     GERD (gastroesophageal reflux disease)     Invasive ductal carcinoma of breast, right (H)     Malignant neoplasm of upper-outer quadrant of right female breast (H)     Mild intermittent asthma            never has had PFT's, MDI with colds and at times exercise    Neutropenia, drug-induced (H)     Nongonococcal urethritis (JALIL) due to chlamydia trachomatis 01/03/2002    Other and unspecified  ovarian cyst 2003    RIGHT ovarian cyst    PONV (postoperative nausea and vomiting)     Skin cancer     Transient hypertension of pregnancy, antepartum     PIH with last birth    Uncomplicated asthma             Past Surgical History:     Past Surgical History:   Procedure Laterality Date    BIOPSY  2016    EDG with biopsy    BREAST SURGERY      Bilateral reduction     SECTION      CYSTOSCOPY, SLING TRANSVAGINAL N/A 2022    Procedure: CREATION, VAGINAL SLING, WITH CYSTOSCOPY (support the urethra with mesh sling and look in the bladder);  Surgeon: Sixto Harris MD;  Location:  OR    DENTAL SURGERY  2010 and 10/12/2010    Root canals    DILATION AND CURETTAGE, HYSTEROSCOPY, ABLATE ENDOMETRIUM NOVASURE, COMBINED  3/20/2012    Procedure:COMBINED DILATION AND CURETTAGE, HYSTEROSCOPY, ABLATE ENDOMETRIUM NOVASURE; Hysteroscopy with Endometrial Ablation Novasure; Surgeon:GERRI PURCELL; Location:[facility]    ESOPHAGOSCOPY, GASTROSCOPY, DUODENOSCOPY (EGD), COMBINED N/A 2016    Procedure: COMBINED ESOPHAGOSCOPY, GASTROSCOPY, DUODENOSCOPY (EGD);  Surgeon: Jose L Wilson MD;  Location: [facility]    INSERT PORT VASCULAR ACCESS N/A 2017    Procedure: INSERT PORT VASCULAR ACCESS;  Surgeon: Michael Townsend MD;  Location: [facility]    LAPAROSCOPIC SALPINGO-OOPHORECTOMY Bilateral 2017    Procedure: LAPAROSCOPIC SALPINGO-OOPHORECTOMY;  Laparoscopic bilateral salpingo oophorectomy;  Surgeon: Preeti Tirado MD;  Location: [facility]    RECONSTRUCT BREAST BILATERAL, IMPLANT PROSTHESIS BILATERAL, COMBINED Bilateral 3/16/2018    Procedure: COMBINED RECONSTRUCT BREAST BILATERAL, IMPLANT PROSTHESIS BILATERAL;  BILATERAL SECOND STAGE BREAST RECONSTRUCTION WITH SILICONE GEL IMPLANT;  Surgeon: Eugenio Ruelas MD;  Location: [facility]    REVISE RECONSTRUCTED BREAST      SURGICAL HISTORY OF -       Breast reduction    ZZC APPENDECTOMY              Social History:      Social History     Tobacco Use    Smoking status: Former     Packs/day: 0.50     Years: 10.00     Pack years: 5.00     Types: Cigarettes     Quit date: 10/1/2016     Years since quittin.9    Smokeless tobacco: Never    Tobacco comments:     Socially- quit smoking 10/01/2016   Substance Use Topics    Alcohol use: Yes     Alcohol/week: 0.0 standard drinks of alcohol     Comment: Occassional            Family History:     Family History   Problem Relation Age of Onset    Diabetes Mother         hypoglycemic    Allergies Mother     Arthritis Mother     Depression Mother     Gastrointestinal Disease Mother     Neurologic Disorder Mother     Psychotic Disorder Mother         panic disorder    Cancer Mother 56        pancreatic, colon, liver and skin.    Cancer - colorectal Mother     Other Cancer Mother         Pancreatic    Anxiety Disorder Mother     Mental Illness Mother     Hypertension Father     Diabetes Father     Rheumatoid Arthritis Father     C.A.D. Paternal Grandfather         congestive heart failure    Cerebrovascular Disease Paternal Grandfather         age 79    Diabetes Paternal Grandfather     Hypertension Paternal Grandfather     Alzheimer Disease Paternal Grandfather     Arthritis Paternal Grandfather     Circulatory Paternal Grandfather     Cardiovascular Paternal Grandfather     Heart Disease Paternal Grandfather     Neurologic Disorder Paternal Grandfather     Unknown/Adopted Maternal Grandmother     Cerebrovascular Disease Maternal Grandmother     Unknown/Adopted Maternal Grandfather     Cancer Maternal Grandfather     Obesity Paternal Grandmother     Cancer Paternal Grandmother 94        coloangiocancinoma              Allergies:     Allergies   Allergen Reactions    Carboplatin Shortness Of Breath, Rash and Anaphylaxis     Other reaction(s): Flushing, Tachycardia    Amoxicillin GI Disturbance    Erythromycin GI Disturbance    Gluten Meal      Celiac disease    Hydrocodone-Acetaminophen      " Other reaction(s): Hallucinations    Hydromorphone Headache    Naltrexone Other (See Comments)     \"Felt high\"    Penicillins Nausea and Vomiting and Hives     1-11-17 pt states this was a childhood reaction    Phentermine Other (See Comments)     Elevated BP    Topamax [Topiramate] Other (See Comments)     Cloudy thinking    Wellbutrin [Bupropion] Other (See Comments)     \"Felt high\"    Zolpidem      Other reaction(s): Hallucinations    Zolpidem Tartrate      hallucinations    Hydrocodone Other (See Comments)     Out of body experience, \"creepy-crawly\" skin             Medications:     Current Outpatient Medications   Medication Sig    methenamine hippurate (HIPREX) 1 g tablet Take 1 tablet (1 g) by mouth 2 times daily    Ascorbic Acid (VITAMIN C PO)     aspirin (ASA) 325 MG EC tablet Take 325 mg by mouth daily    cetirizine (ZYRTEC) 10 MG tablet Take 10 mg by mouth every morning    cholecalciferol (VITAMIN D3) 1000 units (25 mcg) capsule Take 1 capsule by mouth daily    Fluticasone Propionate (FLONASE NA) Spray 1 spray into both nostrils daily     gabapentin (NEURONTIN) 300 MG capsule Take 1 capsule (300 mg) by mouth 3 times daily    letrozole (FEMARA) 2.5 MG tablet Take 1 tablet (2.5 mg) by mouth daily    lidocaine (XYLOCAINE) 5 % external ointment Apply topically as needed for moderate pain Apply to cotton ball then apply to affected area 10min prior to PT exercises or intercourse.    Multiple Vitamins-Minerals (MULTIVITAMIN PO) Take 1 tablet by mouth daily     nitroFURantoin macrocrystal (MACRODANTIN) 50 MG capsule TAKE 1 CAPSULE BY MOUTH WITHIN 30 MIN OF INTERCOURSE AS NEEDED, EITHER BEFORE OR AFTER.    omeprazole (PRILOSEC) 20 MG DR capsule TAKE ONE CAPSULE BY MOUTH EVERY MORNING 30 MINUTES BEFORE BREAKFAST    Semaglutide-Weight Management (WEGOVY) 2.4 MG/0.75ML pen Inject 2.4 mg Subcutaneous once a week    sulfamethoxazole-trimethoprim (BACTRIM DS) 800-160 MG tablet Take 1 tablet by mouth 2 times daily " "(Patient not taking: Reported on 4/24/2023)    venlafaxine (EFFEXOR XR) 150 MG 24 hr capsule Take 1 capsule (150 mg) by mouth daily    venlafaxine (EFFEXOR XR) 75 MG 24 hr capsule Take with 150mg capsule for total dose of 225mg    WEGOVY 1.7 MG/0.75ML pen INJECT 1.7 MG SUBCUTANEOUS ONCE A WEEK FOR 28 DAYS    YUVAFEM 10 MCG TABS vaginal tablet INSERT ONE TABLET VAGINALLY TWICE A WEEK     Current Facility-Administered Medications   Medication    lidocaine (XYLOCAINE) 2 % external gel             Review of Systems:    ROS: 14 point ROS neg other than the symptoms noted above in the HPI.          Physical Exam:   Height 1.626 m (5' 4\"), weight 85.7 kg (189 lb), last menstrual period 11/28/2012, not currently breastfeeding.  5' 4\", Body mass index is 32.44 kg/m ., 189 lbs 0 oz  Gen appearance: Age-appropriate appearing female in NAD.   HEENT:  EOMI, conjunctiva clear/white. Normal ROM of neck for age.   Psych:  alert , In no acute distress.  Neuro:  A&Ox3  Resp:  Normal respiratory effort; not in acute respiratory distress.          Data:    Labs:  Creatinine   Date Value Ref Range Status   04/24/2023 0.68 0.51 - 0.95 mg/dL Final   07/09/2021 0.77 0.52 - 1.04 mg/dL Final     UC  7/19/23 >100,000 E. Coli (resistant to ampicillin and first generation cephalosporin)  6/28/23 No growth  5/22/23 No growth    5/22/23   Cystoscopy procedure:  Pt. Was consented and placed in the lithotomy position.  She was cleaned and preparred in the usual fashion.  Lidocain gel was inserted into the urethra and given time to take effect.  A 16 fr flexible cystoscope was then inserted through the urethra and into the bladder.  The urethra was wnl.  The bladder was with 1+ trabeculation.  No tumors, diverticulae, or stones.  Bilateral u/o's were effluxing clear urine.  The cystoscope was then withdrawn.  The pt. Tolerated the procedure well.    3/30/23 CT urogram  FINDINGS:   LOWER CHEST: Normal.     HEPATOBILIARY: Normal.     PANCREAS: " Normal.     SPLEEN: Normal.     ADRENAL GLANDS: Normal.     KIDNEYS/BLADDER: 2 mm nonobstructing stone is seen in the mid left  kidney. No other evidence of renal or ureteral calculi. No  hydronephrosis or hydroureter on either side. No renal contour  abnormalities. No bladder wall thickening or mass.     BOWEL: Normal.     LYMPH NODES: Normal.     VASCULATURE: Unremarkable.     PELVIC ORGANS: Normal.     OTHER: None.     MUSCULOSKELETAL: Normal.                                                                      IMPRESSION: A single 2 mm nonobstructing stone is present in the mid  left kidney. No other renal or ureteral calculi. No hydronephrosis or  hydroureter.

## 2023-09-01 DIAGNOSIS — N94.19 DYSPAREUNIA DUE TO MEDICAL CONDITION IN FEMALE PATIENT: ICD-10-CM

## 2023-09-01 NOTE — TELEPHONE ENCOUNTER
"Yuvafem 10mcg vaginal tabs  Last prescribing provider: Jailene Thorne on 6/7/23     Last clinic visit date: 04/24/23 w/    Recommendations for requested medication (if none, N/A): 7/22/22, \"Discussed risks and benefits of topical estrogen and she would like to proceed with Vagifem 10 mcg vaginal pill daily X 2 weeks then twice a week.\"    Any other pertinent information (if none, N/A): NA    Refilled: Y/N, if NO, why?     Routed to Jailene Thorne  "

## 2023-09-06 RX ORDER — ESTRADIOL 10 UG/1
TABLET VAGINAL
Qty: 32 TABLET | Refills: 0 | Status: SHIPPED | OUTPATIENT
Start: 2023-09-06 | End: 2023-12-05

## 2023-09-10 ENCOUNTER — HOSPITAL ENCOUNTER (EMERGENCY)
Facility: CLINIC | Age: 43
Discharge: HOME | End: 2023-09-10
Attending: NURSE PRACTITIONER | Admitting: NURSE PRACTITIONER
Payer: COMMERCIAL

## 2023-09-10 ENCOUNTER — APPOINTMENT (OUTPATIENT)
Dept: CT IMAGING | Facility: CLINIC | Age: 43
End: 2023-09-10
Attending: NURSE PRACTITIONER
Payer: COMMERCIAL

## 2023-09-10 VITALS
TEMPERATURE: 98.2 F | WEIGHT: 189 LBS | HEIGHT: 64 IN | DIASTOLIC BLOOD PRESSURE: 97 MMHG | RESPIRATION RATE: 16 BRPM | SYSTOLIC BLOOD PRESSURE: 130 MMHG | BODY MASS INDEX: 32.27 KG/M2 | HEART RATE: 98 BPM | OXYGEN SATURATION: 97 %

## 2023-09-10 DIAGNOSIS — R82.998 LEUKOCYTES IN URINE: ICD-10-CM

## 2023-09-10 DIAGNOSIS — N28.1 RENAL CYST: ICD-10-CM

## 2023-09-10 DIAGNOSIS — M54.41 ACUTE RIGHT-SIDED LOW BACK PAIN WITH RIGHT-SIDED SCIATICA: ICD-10-CM

## 2023-09-10 LAB
ALBUMIN UR-MCNC: NEGATIVE MG/DL
APPEARANCE UR: ABNORMAL
BILIRUB UR QL STRIP: NEGATIVE
COLOR UR AUTO: YELLOW
GLUCOSE UR STRIP-MCNC: NEGATIVE MG/DL
HGB UR QL STRIP: NEGATIVE
HOLD SPECIMEN: NORMAL
HOLD SPECIMEN: NORMAL
KETONES UR STRIP-MCNC: 5 MG/DL
LEUKOCYTE ESTERASE UR QL STRIP: ABNORMAL
MUCOUS THREADS #/AREA URNS LPF: PRESENT /LPF
NITRATE UR QL: NEGATIVE
PH UR STRIP: 5 [PH] (ref 5–7)
RBC URINE: 21 /HPF
SP GR UR STRIP: 1.03 (ref 1–1.03)
SQUAMOUS EPITHELIAL: 2 /HPF
UROBILINOGEN UR STRIP-MCNC: NORMAL MG/DL
WBC URINE: 10 /HPF

## 2023-09-10 PROCEDURE — 87086 URINE CULTURE/COLONY COUNT: CPT | Performed by: NURSE PRACTITIONER

## 2023-09-10 PROCEDURE — 99285 EMERGENCY DEPT VISIT HI MDM: CPT | Mod: 25 | Performed by: NURSE PRACTITIONER

## 2023-09-10 PROCEDURE — 96361 HYDRATE IV INFUSION ADD-ON: CPT | Performed by: NURSE PRACTITIONER

## 2023-09-10 PROCEDURE — 258N000003 HC RX IP 258 OP 636: Performed by: NURSE PRACTITIONER

## 2023-09-10 PROCEDURE — 81001 URINALYSIS AUTO W/SCOPE: CPT | Performed by: NURSE PRACTITIONER

## 2023-09-10 PROCEDURE — 250N000011 HC RX IP 250 OP 636: Mod: JZ | Performed by: NURSE PRACTITIONER

## 2023-09-10 PROCEDURE — 96374 THER/PROPH/DIAG INJ IV PUSH: CPT | Performed by: NURSE PRACTITIONER

## 2023-09-10 PROCEDURE — 74176 CT ABD & PELVIS W/O CONTRAST: CPT

## 2023-09-10 PROCEDURE — 99284 EMERGENCY DEPT VISIT MOD MDM: CPT | Performed by: NURSE PRACTITIONER

## 2023-09-10 PROCEDURE — 96375 TX/PRO/DX INJ NEW DRUG ADDON: CPT | Performed by: NURSE PRACTITIONER

## 2023-09-10 RX ORDER — DIPHENHYDRAMINE HYDROCHLORIDE 50 MG/ML
12.5 INJECTION INTRAMUSCULAR; INTRAVENOUS ONCE
Status: COMPLETED | OUTPATIENT
Start: 2023-09-10 | End: 2023-09-10

## 2023-09-10 RX ORDER — METHYLPREDNISOLONE 4 MG
TABLET, DOSE PACK ORAL
Qty: 21 TABLET | Refills: 0 | Status: SHIPPED | OUTPATIENT
Start: 2023-09-10 | End: 2023-10-06

## 2023-09-10 RX ORDER — METHYLPREDNISOLONE 4 MG
TABLET, DOSE PACK ORAL
Qty: 21 TABLET | Refills: 0 | Status: SHIPPED | OUTPATIENT
Start: 2023-09-10 | End: 2023-09-10

## 2023-09-10 RX ORDER — OXYCODONE HYDROCHLORIDE 5 MG/1
5 TABLET ORAL EVERY 6 HOURS PRN
Qty: 12 TABLET | Refills: 0 | Status: SHIPPED | OUTPATIENT
Start: 2023-09-10 | End: 2023-09-13

## 2023-09-10 RX ORDER — METHOCARBAMOL 500 MG/1
1000 TABLET, FILM COATED ORAL ONCE
Status: DISCONTINUED | OUTPATIENT
Start: 2023-09-10 | End: 2023-09-10

## 2023-09-10 RX ORDER — METHOCARBAMOL 500 MG/1
500-1000 TABLET, FILM COATED ORAL 3 TIMES DAILY PRN
Qty: 30 TABLET | Refills: 0 | Status: SHIPPED | OUTPATIENT
Start: 2023-09-10 | End: 2023-10-06

## 2023-09-10 RX ORDER — KETOROLAC TROMETHAMINE 15 MG/ML
15 INJECTION, SOLUTION INTRAMUSCULAR; INTRAVENOUS ONCE
Status: COMPLETED | OUTPATIENT
Start: 2023-09-10 | End: 2023-09-10

## 2023-09-10 RX ORDER — FENTANYL CITRATE 50 UG/ML
75 INJECTION, SOLUTION INTRAMUSCULAR; INTRAVENOUS ONCE
Status: COMPLETED | OUTPATIENT
Start: 2023-09-10 | End: 2023-09-10

## 2023-09-10 RX ORDER — OXYCODONE HYDROCHLORIDE 5 MG/1
5 TABLET ORAL EVERY 6 HOURS PRN
Qty: 10 TABLET | Refills: 0 | Status: SHIPPED | OUTPATIENT
Start: 2023-09-10 | End: 2023-09-10

## 2023-09-10 RX ADMIN — KETOROLAC TROMETHAMINE 15 MG: 15 INJECTION, SOLUTION INTRAMUSCULAR; INTRAVENOUS at 14:58

## 2023-09-10 RX ADMIN — FENTANYL CITRATE 75 MCG: 50 INJECTION, SOLUTION INTRAMUSCULAR; INTRAVENOUS at 17:24

## 2023-09-10 RX ADMIN — DIPHENHYDRAMINE HYDROCHLORIDE 12.5 MG: 50 INJECTION, SOLUTION INTRAMUSCULAR; INTRAVENOUS at 15:00

## 2023-09-10 RX ADMIN — SODIUM CHLORIDE, POTASSIUM CHLORIDE, SODIUM LACTATE AND CALCIUM CHLORIDE 1000 ML: 600; 310; 30; 20 INJECTION, SOLUTION INTRAVENOUS at 14:58

## 2023-09-10 ASSESSMENT — ACTIVITIES OF DAILY LIVING (ADL)
ADLS_ACUITY_SCORE: 35
ADLS_ACUITY_SCORE: 35

## 2023-09-10 NOTE — ED TRIAGE NOTES
Pt arrives with c/o R sided mid-low back pain x 1 week that radiates down her R thigh and up her back. Pt has known back issues, has been to massage, has stretched, used ice and heat as well as OTC pain medications w/o relief of sx. No loss of bowel or bladder function.     Triage Assessment       Row Name 09/10/23 [id number]       Triage Assessment (Adult)    Airway WDL WDL       Respiratory WDL    Respiratory WDL WDL       Skin Circulation/Temperature WDL    Skin Circulation/Temperature WDL WDL       Cardiac WDL    Cardiac WDL WDL       Peripheral/Neurovascular WDL    Peripheral Neurovascular WDL WDL       Cognitive/Neuro/Behavioral WDL    Cognitive/Neuro/Behavioral WDL WDL

## 2023-09-10 NOTE — DISCHARGE INSTRUCTIONS
Today in the emergency room, the urine showed some leukocytes and 10 white blood cells.  We plan to culture this and if there is bacteria growing someone will call you no later than Tuesday to get you started on antibiotics.  The CT today showed a lesion/possible cyst on your kidney.  I have ordered an ultrasound to further evaluate this.  You may call tomorrow to schedule the ultrasound at our radiology department.  After you have this ultrasound please discuss or make an appointment to discuss the results of the ultrasound with your urologist.    For the back pain that you are experiencing I recommend starting the Medrol Dosepak either tonight or tomorrow morning.  Potential side effects can be anxiety and/or difficulty sleeping.  This may help decrease the swelling and thus decrease pain.  I have also prescribed some oxycodone.  This is a narcotic.  This is highly addictive well but importantly can cause severe constipation.  The CT scan did show a large volume of stool.  I recommend MiraLAX 1 capful by mouth twice daily for the next 7 days.  You may discontinue this or decrease to once daily if you should develop diarrhea.  I have sent a prescription for Robaxin to the [facility] in [place].  You may pick this up tonight.  +The pharmacy is open until 6 PM.  You may take Tylenol 1000 mg up to 3 times a day or ibuprofen 600 mg up to 3 times a day for pain management.  The Robaxin is a muscle relaxer and may cause drowsiness.  Robaxin and narcotics should not be intermixed and you should have a minimum of 3 hours between Robaxin and a narcotic.    I have also sent an order for an MRI of your lumbar spine.  You may call tomorrow to see if the order is approved by your insurance company.  Once the MRI is scheduled, please then schedule a visit with the spine specialist.  You can call the number noted on your discharge paperwork and ask for spine specialist from [facility] if you so desire.  Please return  to the emergency department if you have loss of bowel or bladder function.

## 2023-09-10 NOTE — ED PROVIDER NOTES
"  History     Chief Complaint   Patient presents with    Back Pain     HPI  Diana Wilson is a 43 year old female who presents emergency room with the following concerns:  right flank pain/ low back and radiates down right leg.  Patient states onset of symptoms approximately 1 week ago.  Patient states she was laying on a floaty in her pool and got off the floaty and felt sudden onset of pain.  Patient has a history of recurrent UTIs.  Patient states she sees urology for this.  Pt states the CT scan was ordered by urology about one month in case of a \"flare up\".   Pt denies dysuria, hematuria, fever, aches, chills, sweats.    Pt states she is worried about ureteral stones.  She does have \"issues with this for the past two years\".  Pt states the back pain started on week ago.  Pt states the pain is stabbing with movement, shooting down the front side of her leg.  Pt states sitting is aggravating.  Patient rates her pain 9-10 out of 10.  There was no trauma/falls.  Pt denies pelvic girdle numbness/loss of bowel or bladder function.  Pt has tried all her physical therapy instructions, personal massage, tylenol, advil, heat, cold, position changes, yoga without improvement.    Allergies:  Allergies   Allergen Reactions    Carboplatin Shortness Of Breath, Rash and Anaphylaxis     Other reaction(s): Flushing, Tachycardia    Amoxicillin GI Disturbance    Erythromycin GI Disturbance    Gluten Meal      Celiac disease    Hydrocodone-Acetaminophen      Other reaction(s): Hallucinations    Hydromorphone Headache    Naltrexone Other (See Comments)     \"Felt high\"    Penicillins Nausea and Vomiting and Hives     1-11-17 pt states this was a childhood reaction    Phentermine Other (See Comments)     Elevated BP    Topamax [Topiramate] Other (See Comments)     Cloudy thinking    Wellbutrin [Bupropion] Other (See Comments)     \"Felt high\"    Zolpidem      Other reaction(s): Hallucinations    Zolpidem Tartrate      hallucinations    " "Hydrocodone Other (See Comments)     Out of body experience, \"creepy-crawly\" skin        Problem List:    Patient Active Problem List    Diagnosis Date Noted    Pelvic pain in female 03/14/2023     Priority: Medium    Muscular incoordination 03/14/2023     Priority: Medium    Stress incontinence 09/26/2022     Priority: Medium     Added automatically from request for surgery [id number]      Recurrent UTI 04/12/2022     Priority: Medium    Celiac disease 08/03/2021     Priority: Medium    Family history of colon cancer 07/20/2021     Priority: Medium     Multiple extended family members with colon ca         Breast reconstruction disproportion 02/25/2021     Priority: Medium    Multiple subsegmental pulmonary emboli without acute cor pulmonale (H) 01/28/2021     Priority: Medium     Diagnosed 9/2020 - was post op       Osteopenia of multiple sites 04/23/2019     Priority: Medium    Malignant neoplasm of upper-outer quadrant of right breast in female, estrogen receptor positive (H) 03/08/2018     Priority: Medium     ER positive in breast  triple neg in lymph       Lymphedema of right upper extremity 11/24/2017     Priority: Medium    Intestinal malabsorption 02/16/2016     Priority: Medium    Anxiety 07/15/2014     Priority: Medium    Insomnia 07/17/2012     Priority: Medium    Iron deficiency anemia 03/14/2012     Priority: Medium    Major depressive disorder, recurrent episode, mild (H) 08/05/2011     Priority: Medium      mainly postpartum, had tried Zoloft and anafranil    [facility] enrolled pt in 6 months pt ed program         CARDIOVASCULAR SCREENING; LDL GOAL LESS THAN 130 10/31/2010     Priority: Medium    Obesity 09/08/2008     Priority: Medium     Wt Readings from Last 5 Encounters:   07/11/11 172 lb (78.019 kg)   12/20/10 174 lb (78.926 kg)   07/26/10 166 lb (75.297 kg)   11/12/09 170 lb (77.111 kg)   11/03/09 169 lb (76.658 kg)     Body mass index is 29.99 kg/(m^2).       Health [facility] Home 05/14/2013    "  Priority: Low     EMERGENCY CARE PLAN  May 14, 2013: No current Care Coordination follow up planned. Please refer if Care Coordination services are needed.    Presenting Problem Signs and Symptoms Treatment Plan   Questions or concerns   during clinic hours   I will call my clinic directly:  [facility] [place] [address] [place] [address]  [phone].    Questions or concerns outside clinic hours   I will call the 24 hour nurse line at   [phone] or [phone].   Need to schedule an appointment   I will call the 24 hour scheduling team at [phone] or my clinic directly at [phone].    Same day treatment     I will call my clinic first, nurse line if after hours, urgent care and express care if needed.     Clinic care coordination services (regular clinic hours)     I will call a clinic care coordinator directly:     Darron Gaming RN  Mon, Tues, Fri - [phone]  Wed, Thurs - [phone]    Marlin Nelson :    [phone]    Or call my clinic at [phone] and ask to speak with care coordination.     Crisis Services: Behavioral or Mental Health  [facility] 24 Hour Phone Line  [phone]    [facility] 24 Hour Crisis Services  [phone]    [facility] (Behavioral Health Providers) [facility] [phone]    [facility]   [phone]         Emergency treatment -- Immediately    [facility]All 911               Past Medical History:    Past Medical History:   Diagnosis Date    Anxiety     Breast cancer (H)     Celiac disease     Depressive disorder 1995    Encounter for insertion or removal of intrauterine contraceptive device 01/08/2008    Excessive or frequent menstruation 03/19/2003    Fibroadenosis of breast     GERD (gastroesophageal reflux disease)     Invasive ductal carcinoma of breast, right (H)     Malignant neoplasm of upper-outer quadrant of right female breast (H)     Mild intermittent asthma     Neutropenia, drug-induced (H)     Nongonococcal urethritis  (JALIL) due to chlamydia trachomatis 2002    Other and unspecified ovarian cyst 2003    PONV (postoperative nausea and vomiting)     Skin cancer     Transient hypertension of pregnancy, antepartum     Uncomplicated asthma        Past Surgical History:    Past Surgical History:   Procedure Laterality Date    BIOPSY  2016    EDG with biopsy    BREAST SURGERY      Bilateral reduction     SECTION      CYSTOSCOPY, SLING TRANSVAGINAL N/A 2022    Procedure: CREATION, VAGINAL SLING, WITH CYSTOSCOPY (support the urethra with mesh sling and look in the bladder);  Surgeon: Sixto Harris MD;  Location:  OR    DENTAL SURGERY  2010 and 10/12/2010    Root canals    DILATION AND CURETTAGE, HYSTEROSCOPY, ABLATE ENDOMETRIUM NOVASURE, COMBINED  3/20/2012    Procedure:COMBINED DILATION AND CURETTAGE, HYSTEROSCOPY, ABLATE ENDOMETRIUM NOVASURE; Hysteroscopy with Endometrial Ablation Novasure; Surgeon:GERRI PURCELL; Location:[facility]    ESOPHAGOSCOPY, GASTROSCOPY, DUODENOSCOPY (EGD), COMBINED N/A 2016    Procedure: COMBINED ESOPHAGOSCOPY, GASTROSCOPY, DUODENOSCOPY (EGD);  Surgeon: Jose L Wilson MD;  Location: [facility] GI    INSERT PORT VASCULAR ACCESS N/A 2017    Procedure: INSERT PORT VASCULAR ACCESS;  Surgeon: Michael Townsend MD;  Location: [facility]    LAPAROSCOPIC SALPINGO-OOPHORECTOMY Bilateral 2017    Procedure: LAPAROSCOPIC SALPINGO-OOPHORECTOMY;  Laparoscopic bilateral salpingo oophorectomy;  Surgeon: Preeti Tirado MD;  Location: [facility] OR    RECONSTRUCT BREAST BILATERAL, IMPLANT PROSTHESIS BILATERAL, COMBINED Bilateral 3/16/2018    Procedure: COMBINED RECONSTRUCT BREAST BILATERAL, IMPLANT PROSTHESIS BILATERAL;  BILATERAL SECOND STAGE BREAST RECONSTRUCTION WITH SILICONE GEL IMPLANT;  Surgeon: Eugenio Ruelas MD;  [facility]    REVISE RECONSTRUCTED BREAST      SURGICAL HISTORY OF -       Breast reduction    ZZC APPENDECTOMY          Family History:    Family History   Problem Relation Age of Onset    Diabetes Mother         hypoglycemic    Allergies Mother     Arthritis Mother     Depression Mother     Gastrointestinal Disease Mother     Neurologic Disorder Mother     Psychotic Disorder Mother         panic disorder    Cancer Mother 56        pancreatic, colon, liver and skin.    Cancer - colorectal Mother     Other Cancer Mother         Pancreatic    Anxiety Disorder Mother     Mental Illness Mother     Hypertension Father     Diabetes Father     Rheumatoid Arthritis Father     C.A.D. Paternal Grandfather         congestive heart failure    Cerebrovascular Disease Paternal Grandfather         age 79    Diabetes Paternal Grandfather     Hypertension Paternal Grandfather     Alzheimer Disease Paternal Grandfather     Arthritis Paternal Grandfather     Circulatory Paternal Grandfather     Cardiovascular Paternal Grandfather     Heart Disease Paternal Grandfather     Neurologic Disorder Paternal Grandfather     Unknown/Adopted Maternal Grandmother     Cerebrovascular Disease Maternal Grandmother     Unknown/Adopted Maternal Grandfather     Cancer Maternal Grandfather     Obesity Paternal Grandmother     Cancer Paternal Grandmother 94        coloangiocancinoma       Social History:  Marital Status:   [2]  Social History     Tobacco Use    Smoking status: Former     Packs/day: 0.50     Years: 10.00     Pack years: 5.00     Types: Cigarettes     Quit date: 10/1/2016     Years since quittin.9    Smokeless tobacco: Never    Tobacco comments:     Socially- quit smoking 10/01/2016   Substance Use Topics    Alcohol use: Yes     Alcohol/week: 0.0 standard drinks of alcohol     Comment: Occassional    Drug use: No        Medications:    methocarbamol (ROBAXIN) 500 MG tablet  methylPREDNISolone (MEDROL DOSEPAK) 4 MG tablet therapy pack  oxyCODONE (ROXICODONE) 5 MG tablet  Ascorbic Acid (VITAMIN C PO)  aspirin (ASA) 325 MG EC  "tablet  cetirizine (ZYRTEC) 10 MG tablet  cholecalciferol (VITAMIN D3) 1000 units (25 mcg) capsule  Fluticasone Propionate (FLONASE NA)  gabapentin (NEURONTIN) 300 MG capsule  letrozole (FEMARA) 2.5 MG tablet  lidocaine (XYLOCAINE) 5 % external ointment  methenamine hippurate (HIPREX) 1 g tablet  Multiple Vitamins-Minerals (MULTIVITAMIN PO)  nitroFURantoin macrocrystal (MACRODANTIN) 50 MG capsule  omeprazole (PRILOSEC) 20 MG DR capsule  oxyBUTYnin ER (DITROPAN XL) 5 MG 24 hr tablet  Semaglutide-Weight Management (WEGOVY) 2.4 MG/0.75ML pen  sulfamethoxazole-trimethoprim (BACTRIM DS) 800-160 MG tablet  venlafaxine (EFFEXOR XR) 150 MG 24 hr capsule  venlafaxine (EFFEXOR XR) 75 MG 24 hr capsule  WEGOVY 1.7 MG/0.75ML pen  YUVAFEM 10 MCG TABS vaginal tablet          Review of Systems  As mentioned above in the history present illness. All other systems were reviewed and are negative.    Physical Exam   BP: (!) 130/97  Pulse: 98  Temp: 98.2  F (36.8  C)  Resp: 16  Height: 162.6 cm (5' 4\")  Weight: 85.7 kg (189 lb)  SpO2: 97 %      Physical Exam  Vitals and nursing note reviewed.   Constitutional:       General: She is in acute distress.      Appearance: Normal appearance. She is well-developed. She is not ill-appearing, toxic-appearing or diaphoretic.   HENT:      Head: Normocephalic and atraumatic.      Right Ear: External ear normal.      Left Ear: External ear normal.   Eyes:      General:         Right eye: No discharge.         Left eye: No discharge.      Conjunctiva/sclera: Conjunctivae normal.   Cardiovascular:      Rate and Rhythm: Normal rate and regular rhythm.      Heart sounds: Normal heart sounds. No murmur heard.     No friction rub.   Pulmonary:      Effort: Pulmonary effort is normal. No respiratory distress.      Breath sounds: Normal breath sounds. No stridor. No wheezing or rales.   Chest:      Chest wall: No tenderness.   Abdominal:      General: Bowel sounds are normal. There is no distension.      " Palpations: Abdomen is soft.      Tenderness: There is no abdominal tenderness.   Musculoskeletal:        Back:       Comments: Lumbar and right flank pain is reported.  There is no appreciable swelling, bruising, contusions noted.  There is no bony crepitus or step-offs noted.  Pain radiates down through the SI joint area consistent with musculoskeletal etiology.   Skin:     General: Skin is warm and dry.      Coloration: Skin is not pale.      Findings: No erythema or rash.   Neurological:      Mental Status: She is alert.   Psychiatric:         Mood and Affect: Mood normal.         ED Course                 Procedures      Results for orders placed or performed during the hospital encounter of 09/10/23 (from the past 24 hour(s))   UA with Microscopic reflex to Culture    Specimen: Urine, Midstream   Result Value Ref Range    Color Urine Yellow Colorless, Straw, Light Yellow, Yellow    Appearance Urine Cloudy (A) Clear    Glucose Urine Negative Negative mg/dL    Bilirubin Urine Negative Negative    Ketones Urine 5 (A) Negative mg/dL    Specific Gravity Urine 1.027 1.003 - 1.035    Blood Urine Negative Negative    pH Urine 5.0 5.0 - 7.0    Protein Albumin Urine Negative Negative mg/dL    Urobilinogen Urine Normal Normal, 2.0 mg/dL    Nitrite Urine Negative Negative    Leukocyte Esterase Urine Large (A) Negative    Mucus Urine Present (A) None Seen /LPF    RBC Urine 21 (H) <=2 /HPF    WBC Urine 10 (H) <=5 /HPF    Squamous Epithelials Urine 2 (H) <=1 /HPF    Narrative    Urine Culture ordered based on laboratory criteria   [place] Draw    Narrative    The following orders were created for panel order [place] Draw.  Procedure                               Abnormality         Status                     ---------                               -----------         ------                     Extra Green Top (Lithium...[[id number]]                      Final result               Extra Purple Top Tube[[id number]]                             Final result                 Please view results for these tests on the individual orders.   Extra Green Top (Lithium Heparin) Tube   Result Value Ref Range    Hold Specimen JIC    Extra Purple Top Tube   Result Value Ref Range    Hold Specimen JIC    CT Abdomen Pelvis w/o Contrast    Narrative    EXAM: CT ABDOMEN PELVIS W/O CONTRAST  LOCATION: [facility]  DATE: 9/10/2023    INDICATION: Flank pain.  COMPARISON: CT abdomen and pelvis, 03/30/2023.  TECHNIQUE: CT scan of the abdomen and pelvis was performed without IV contrast. Multiplanar reformats were obtained. Dose reduction techniques were used.  CONTRAST: None.    FINDINGS:   LOWER CHEST: Normal.    HEPATOBILIARY: Normal.    PANCREAS: Normal.    SPLEEN: Normal.    ADRENAL GLANDS: Normal.    KIDNEYS/BLADDER: 2 mm stone involves the inferior aspect of the left kidney. There is no hydronephrosis. An 11 mm low dense lesion involves the midportion of the right kidney which is unchanged and likely reflects a simple cyst though incompletely   characterized.    BOWEL: There is a large amount of stool within the ascending colon. No bowel obstruction or bowel inflammation.    LYMPH NODES: Normal.    VASCULATURE: Unremarkable.    PELVIC ORGANS: Normal.    MUSCULOSKELETAL: Normal.      Impression    IMPRESSION:   1.  2 mm left renal stone without hydronephrosis.  2.  11 mm right renal cysts, stable though incompletely characterized. Renal ultrasound could be performed for further evaluation if warranted.         Medications   lactated ringers BOLUS 1,000 mL (0 mLs Intravenous Stopped 9/10/23 1815)   ketorolac (TORADOL) injection 15 mg (15 mg Intravenous $Given 9/10/23 1458)   diphenhydrAMINE (BENADRYL) injection 12.5 mg (12.5 mg Intravenous $Given 9/10/23 1500)   fentaNYL (PF) (SUBLIMAZE) injection 75 mcg (75 mcg Intravenous $Given 9/10/23 1724)       Assessments & Plan (with Medical Decision Making)     I have reviewed the nursing  notes.    I have reviewed the findings, diagnosis, plan and need for follow up with the patient.  43-year-old female presents emergency department with a 1 week history of back pain and flank pain and a history of chronic low back pain per patient no recent spine work-up and no spine surgery no trauma no pelvic girdle numbness or loss of bowel or bladder function and history of recurrent UTIs noted on chart review patient reporting today that her urologist ordered a CT for her and she is here today to rule out any  ureteral stones.  Patient is requesting CT.  UA reveals leukocytes positive and RBCs positive and CT stone protocol obtained with no ureteral stone process, renal cyst/lesion appreciated and reviewed this with patient.  Will order ultrasound on an outpatient basis and recommend patient follow-up with urology for ongoing evaluation.  Suspect that patient's primary pain is low back with some sciatica.  Offered MRI on an outpatient basis.  concern for spinal stenosis given patient's symptoms  For pain management given fentanyl and ibuprofen and Benadryl here and prescribed oxycodone, Medrol Dosepak, and Robaxin on an outpatient basis.  Discussed worrisome reasons to return.  Patient and  verbalized understanding regarding all of the above.  Patient discharged in stable condition peer    New Prescriptions    METHOCARBAMOL (ROBAXIN) 500 MG TABLET    Take 1-2 tablets (500-1,000 mg) by mouth 3 times daily as needed for muscle spasms    METHYLPREDNISOLONE (MEDROL DOSEPAK) 4 MG TABLET THERAPY PACK    Follow Package Directions    OXYCODONE (ROXICODONE) 5 MG TABLET    Take 1 tablet (5 mg) by mouth every 6 hours as needed for pain       Final diagnoses:   Acute right-sided low back pain with right-sided sciatica   Renal cyst   Leukocytes in urine       9/10/2023   [facility] EMERGENCY DEPT       Ana Laura Guerrero, COLLEEN CNP  09/10/23 [id number]

## 2023-09-12 ENCOUNTER — PATIENT OUTREACH (OUTPATIENT)
Dept: CARE COORDINATION | Facility: CLINIC | Age: 43
End: 2023-09-12
Payer: COMMERCIAL

## 2023-09-12 LAB — BACTERIA UR CULT: NO GROWTH

## 2023-09-12 NOTE — LETTER
[facility] CARE COORDINATION  [id number]  [facility] [address]    September 12, 2023    Diana CROWLEY Steve  [address] [facility] [address]      Dear Diana,        I am a  clinic community health worker who works with Yenifer Peters MD with the [facility]. I wanted to thank you for spending the time to talk with me.  Below is a description of clinic care coordination and how we can further assist you.       The clinic care coordination team is made up of a registered nurse, , financial resource worker and community health worker who understand the health care system. The goal of clinic care coordination is to help you manage your health and improve access to the health care system. Our team works alongside your provider to assist you in determining your health and social needs. We can help you obtain health care and community resources, providing you with necessary information and education. We can work with you through any barriers and develop a care plan that helps coordinate and strengthen the communication between you and your care team.  Our services are voluntary and are offered without charge to you personally.    If you wish to connect with the Clinic Care Coordination Team, please let your [facility] Primary Care Provider or Clinic Care Team know and they can place a referral. The Clinic Care Coordination team will then reach out by phone to further support you.    We are focused on providing you with the highest-quality healthcare experience possible.    Sincerely,   Your care team with [facility]

## 2023-09-12 NOTE — PROGRESS NOTES
Clinic Care Coordination Contact  Community Health Worker Initial Outreach    CHW Initial Information Gathering:  Referral Source: ED Follow-Up  Preferred Hospital: [facility], [place]  [phone]  Preferred Urgent Care: [facility], [phone]  No PCP office visit in Past Year: No       Patient accepts CC: No, due to the patient stating that at this time she is not needing assistance from CCC. Patient will be sent Care Coordination introduction letter for future reference.     BRANDT Mandel  [phone]  [facility]  [facility]

## 2023-09-14 ENCOUNTER — HOSPITAL ENCOUNTER (OUTPATIENT)
Dept: MRI IMAGING | Facility: CLINIC | Age: 43
Discharge: HOME | End: 2023-09-14
Attending: NURSE PRACTITIONER
Payer: COMMERCIAL

## 2023-09-14 ENCOUNTER — HOSPITAL ENCOUNTER (OUTPATIENT)
Dept: ULTRASOUND IMAGING | Facility: CLINIC | Age: 43
Discharge: HOME | End: 2023-09-14
Attending: NURSE PRACTITIONER
Payer: COMMERCIAL

## 2023-09-14 DIAGNOSIS — R82.998 LEUKOCYTES IN URINE: ICD-10-CM

## 2023-09-14 DIAGNOSIS — M54.41 ACUTE RIGHT-SIDED LOW BACK PAIN WITH RIGHT-SIDED SCIATICA: ICD-10-CM

## 2023-09-14 DIAGNOSIS — N28.1 RENAL CYST: ICD-10-CM

## 2023-09-14 PROCEDURE — 72148 MRI LUMBAR SPINE W/O DYE: CPT

## 2023-09-14 PROCEDURE — 76770 US EXAM ABDO BACK WALL COMP: CPT

## 2023-09-25 ASSESSMENT — ENCOUNTER SYMPTOMS
LOSS OF CONSCIOUSNESS: 0
SEIZURES: 0
NAUSEA: 1
DYSURIA: 1
ARTHRALGIAS: 0
MYALGIAS: 1
DIZZINESS: 0
WEAKNESS: 1
ABDOMINAL PAIN: 1
SPEECH CHANGE: 0
DIARRHEA: 0
NUMBNESS: 1
MUSCLE WEAKNESS: 1
TREMORS: 0
DIFFICULTY URINATING: 0
JAUNDICE: 0
BLOATING: 1
BACK PAIN: 1
DISTURBANCES IN COORDINATION: 1
PARALYSIS: 0
RECTAL PAIN: 0
HEARTBURN: 1
TINGLING: 1
HEMATURIA: 0
BOWEL INCONTINENCE: 0
CONSTIPATION: 1
MUSCLE CRAMPS: 1
BLOOD IN STOOL: 0
HEADACHES: 1
STIFFNESS: 1
NECK PAIN: 0
JOINT SWELLING: 0
FLANK PAIN: 1
MEMORY LOSS: 0

## 2023-09-26 ENCOUNTER — OFFICE VISIT (OUTPATIENT)
Dept: PHYSICAL MEDICINE AND REHAB | Facility: CLINIC | Age: 43
End: 2023-09-26
Attending: NURSE PRACTITIONER
Payer: COMMERCIAL

## 2023-09-26 VITALS
SYSTOLIC BLOOD PRESSURE: 134 MMHG | WEIGHT: 188 LBS | DIASTOLIC BLOOD PRESSURE: 91 MMHG | BODY MASS INDEX: 32.1 KG/M2 | HEIGHT: 64 IN | HEART RATE: 101 BPM

## 2023-09-26 DIAGNOSIS — M54.41 ACUTE RIGHT-SIDED LOW BACK PAIN WITH RIGHT-SIDED SCIATICA: ICD-10-CM

## 2023-09-26 PROCEDURE — 99204 OFFICE O/P NEW MOD 45 MIN: CPT | Performed by: NURSE PRACTITIONER

## 2023-09-26 RX ORDER — GABAPENTIN 300 MG/1
600 CAPSULE ORAL 3 TIMES DAILY
Qty: 180 CAPSULE | Refills: 1 | Status: SHIPPED | OUTPATIENT
Start: 2023-09-26 | End: 2023-11-03

## 2023-09-26 ASSESSMENT — PAIN SCALES - GENERAL: PAINLEVEL: MILD PAIN (2)

## 2023-09-26 NOTE — PROGRESS NOTES
ASSESSMENT: Diana Wilson is a 43 year old female who presents for consultation at the request of PCP Yenifer Peters, who presents today for new patient evaluation of:    -Right lumbar radiculopathy    Patient has some mild sensory loss of the sole of the right foot, potentially related to right where disc bulge at L5-S1, however her pain is more consistent with right L3-4 distribution at which level she has a disc bulge with mild mass effect on the exiting right L3 nerve root. No myelopathic or red flag symptoms.  Given the severity of her pain, I recommended proceeding with a right L3-4 transforaminal epidural steroid injection.  We discussed the risks and benefits of this, and she elects to proceed.  We will also increase her gabapentin, she will continue anti-inflammatories, and hopes that she can start physical therapy.  She does not have any red flag symptoms on exam, and disc bulges small.  I think this will likely be a temporary flare of pain for her which will respond well to physical therapy.        9/25/2023     5:18 PM   OSWESTRY DISABILITY INDEX   Count 10   Sum 24   Oswestry Score (%) 48 %            Diagnoses and all orders for this visit:  Acute right-sided low back pain with right-sided sciatica  -     Spine  Referral  -     Physical Therapy Referral; Future  -     gabapentin (NEURONTIN) 300 MG capsule; Take 2 capsules (600 mg) by mouth 3 times daily  -     PAIN Transforaminal MAGDALENE Inj Lumbosacral One Level Right; Future      PLAN:  Reviewed spine anatomy and disease process. Discussed diagnosis and treatment options with the patient today. A shared decision making model was used.  The patient's values and choices were respected. The following represents what was discussed and decided upon by the provider and the patient.      -DIAGNOSTIC TESTS:  Images were personally reviewed and interpreted and explained to patient today using a spine model.   --I did not order any new imaging studies  today.    -PHYSICAL THERAPY:    -I referred her for full course of physical therapy, which hopefully she can participate in after pain is under better control  Discussed the importance of core strengthening, ROM, stretching exercises with the patient and how each of these entities is important in decreasing pain.  Explained to the patient that the purpose of physical therapy is to teach the patient a home exercise program.  These exercises need to be performed every day in order to decrease pain and prevent future occurrences of pain.        -MEDICATIONS:    -Recommended continuing over-the-counter ibuprofen as needed for pain control  -Recommended resuming Robaxin 500 to 1000 mg 3 times a day as needed  -Increase gabapentin per titration schedule to 600 mg 3 times daily  -We discussed repeating Medrol Dosepak, however given limited overall relief once off recently, would not expect additional relief with additional Dosepak at this point.  Discussed multiple medication options today with patient. Discussed risks, side effects, and proper use of medications. Patient verbalized understanding.    -INTERVENTIONS:    -I recommended a right transforaminal L3-4 epidural steroid injection.  Discussed risks and benefits of injections with patient today.  She wishes to proceed    -PATIENT EDUCATION:  Total time of 40 minutes, on the day of service, spent with the patient, reviewing the chart, placing orders, and documenting.   -Today we also discussed the pros and cons of the current treatment plan.    -FOLLOW-UP:   Follow-up after injection    Advised patient to call the [facility] if symptoms worsen, new numbness or weakness develops in the legs, or if they develop new or worsening problems controlling bladder or bowel function.   ______________________________________________________________________    SUBJECTIVE:    HPI:  Diana Wilson  Is a 43 year old female history of kidney stones, breast cancer, GERD, depression,  with remote history of postoperative related DVT on aspirin 325mg who presents today for new patient evaluation of right low back pain     Diana endorses a history of right sided sciatica in 2014, which resolved with physical therapy and medication at the time and she did well until approximately 3 weeks ago.    Patient's current symptoms started suddenly 3 weeks ago when she tried to get off a pool floaty.  She does not endorse starting a couch to 5K program over the last couple of weeks, but did not have any symptoms related until in her pool on Labor Day weekend at which point she experienced immediate right lower back discomfort and soreness, which worsened the next day and radiated into her right leg. The pain radiates into her right thigh and knee, and she has numbness and tingling in the right second toe and foot. She initially tried resuming doing physical therapy exercises that she learned in 2014, massage, Tylenol, Advil, heat, cold, yoga exercises without improvement.  She was then seen in the emergency room on September 10.   A CT abdomen and pelvis demonstrated a left kidney stone and a likely cyst of the right kidney.  She was discharged home with Robaxin, oxycodone, Medrol Dosepak.  She felt better while on the Medrol Dosepak, but after it ended, the pain returned and remains severe.  The pain is worse with walking, twisting, sitting.  It is somewhat better when she stands and moves. She is not able to walk more than a block or so. She feels like her right leg is weak.      She has continued to take ibuprofen twice a day without improvement.  She is also taking gabapentin 300 mg 3 times a day, and using lidocaine.  She denies any bowel or bladder control symptoms, or left leg symptoms.  She has not had any lumbar epidural injections, had any chiropractic care, previous spine surgeries.    She had a lumbar MRI on September 14, 2023.      -Treatment to Date:  "    -Medications:  Tylenol  -Ibuprofen  -Robaxin  -Medrol Dosepak  -Oxycodone  Gabapentin  Lidocaine    Current Outpatient Medications   Medication    Ascorbic Acid (VITAMIN C PO)    aspirin (ASA) 325 MG EC tablet    cetirizine (ZYRTEC) 10 MG tablet    cholecalciferol (VITAMIN D3) 1000 units (25 mcg) capsule    Fluticasone Propionate (FLONASE NA)    gabapentin (NEURONTIN) 300 MG capsule    gabapentin (NEURONTIN) 300 MG capsule    letrozole (FEMARA) 2.5 MG tablet    lidocaine (XYLOCAINE) 5 % external ointment    methenamine hippurate (HIPREX) 1 g tablet    methocarbamol (ROBAXIN) 500 MG tablet    methylPREDNISolone (MEDROL DOSEPAK) 4 MG tablet therapy pack    Multiple Vitamins-Minerals (MULTIVITAMIN PO)    nitroFURantoin macrocrystal (MACRODANTIN) 50 MG capsule    omeprazole (PRILOSEC) 20 MG DR capsule    oxyBUTYnin ER (DITROPAN XL) 5 MG 24 hr tablet    Semaglutide-Weight Management (WEGOVY) 2.4 MG/0.75ML pen    venlafaxine (EFFEXOR XR) 150 MG 24 hr capsule    venlafaxine (EFFEXOR XR) 75 MG 24 hr capsule    YUVAFEM 10 MCG TABS vaginal tablet    sulfamethoxazole-trimethoprim (BACTRIM DS) 800-160 MG tablet    WEGOVY 1.7 MG/0.75ML pen     Current Facility-Administered Medications   Medication    lidocaine (XYLOCAINE) 2 % external gel       Allergies   Allergen Reactions    Carboplatin Shortness Of Breath, Rash and Anaphylaxis     Other reaction(s): Flushing, Tachycardia    Amoxicillin GI Disturbance    Erythromycin GI Disturbance    Gluten Meal      Celiac disease    Hydrocodone-Acetaminophen      Other reaction(s): Hallucinations    Hydromorphone Headache    Naltrexone Other (See Comments)     \"Felt high\"    Penicillins Nausea and Vomiting and Hives     1-11-17 pt states this was a childhood reaction    Phentermine Other (See Comments)     Elevated BP    Topamax [Topiramate] Other (See Comments)     Cloudy thinking    Wellbutrin [Bupropion] Other (See Comments)     \"Felt high\"    Zolpidem      Other reaction(s): " "Hallucinations    Zolpidem Tartrate      hallucinations    Hydrocodone Other (See Comments)     Out of body experience, \"creepy-crawly\" skin        Past Medical History:   Diagnosis Date    Anxiety     Breast cancer (H)     Celiac disease     Depressive disorder     And Anxiety    Encounter for insertion or removal of intrauterine contraceptive device 2008    Excessive or frequent menstruation 2003    Fibroadenosis of breast     GERD (gastroesophageal reflux disease)     Invasive ductal carcinoma of breast, right (H)     Malignant neoplasm of upper-outer quadrant of right female breast (H)     Mild intermittent asthma            never has had PFT's, MDI with colds and at times exercise    Neutropenia, drug-induced (H)     Nongonococcal urethritis (JALIL) due to chlamydia trachomatis 2002    Other and unspecified ovarian cyst 2003    RIGHT ovarian cyst    PONV (postoperative nausea and vomiting)     Skin cancer     Transient hypertension of pregnancy, antepartum     PIH with last birth    Uncomplicated asthma         Patient Active Problem List   Diagnosis    Obesity    CARDIOVASCULAR SCREENING; LDL GOAL LESS THAN 130    Major depressive disorder, recurrent episode, mild (H)    Iron deficiency anemia    Insomnia    Health Care Home    Anxiety    Intestinal malabsorption    Lymphedema of right upper extremity    Malignant neoplasm of upper-outer quadrant of right breast in female, estrogen receptor positive (H)    Osteopenia of multiple sites    Multiple subsegmental pulmonary emboli without acute cor pulmonale (H)    Breast reconstruction disproportion    Family history of colon cancer    Celiac disease    Recurrent UTI    Stress incontinence    Pelvic pain in female    Muscular incoordination       Past Surgical History:   Procedure Laterality Date    BIOPSY  2016    EDG with biopsy    BREAST SURGERY      Bilateral reduction     SECTION      CYSTOSCOPY, SLING TRANSVAGINAL " N/A 12/12/2022    Procedure: CREATION, VAGINAL SLING, WITH CYSTOSCOPY (support the urethra with mesh sling and look in the bladder);  Surgeon: Sixto Harris MD;  Location:  OR    DENTAL SURGERY  09/13/2010 and 10/12/2010    Root canals    DILATION AND CURETTAGE, HYSTEROSCOPY, ABLATE ENDOMETRIUM NOVASURE, COMBINED  3/20/2012    Procedure:COMBINED DILATION AND CURETTAGE, HYSTEROSCOPY, ABLATE ENDOMETRIUM NOVASURE; Hysteroscopy with Endometrial Ablation Novasure; Surgeon:GERRI PURCELL; Location:[facility]    ESOPHAGOSCOPY, GASTROSCOPY, DUODENOSCOPY (EGD), COMBINED N/A 2/18/2016    Procedure: COMBINED ESOPHAGOSCOPY, GASTROSCOPY, DUODENOSCOPY (EGD);  Surgeon: Jose L Wilson MD;  Location: [facility]    INSERT PORT VASCULAR ACCESS N/A 1/11/2017    Procedure: INSERT PORT VASCULAR ACCESS;  Surgeon: Michael Townsend MD;  Location: [facility]    LAPAROSCOPIC SALPINGO-OOPHORECTOMY Bilateral 11/28/2017    Procedure: LAPAROSCOPIC SALPINGO-OOPHORECTOMY;  Laparoscopic bilateral salpingo oophorectomy;  Surgeon: Preeti Tirado MD;  Location: [facility]    RECONSTRUCT BREAST BILATERAL, IMPLANT PROSTHESIS BILATERAL, COMBINED Bilateral 3/16/2018    Procedure: COMBINED RECONSTRUCT BREAST BILATERAL, IMPLANT PROSTHESIS BILATERAL;  BILATERAL SECOND STAGE BREAST RECONSTRUCTION WITH SILICONE GEL IMPLANT;  Surgeon: Eugenio Ruelas MD;  Location: [facility]    REVISE RECONSTRUCTED BREAST      SURGICAL HISTORY OF -   1997    Breast reduction    ZZC APPENDECTOMY  1991       Family History   Problem Relation Age of Onset    Diabetes Mother         hypoglycemic    Allergies Mother     Arthritis Mother     Depression Mother     Gastrointestinal Disease Mother     Neurologic Disorder Mother     Psychotic Disorder Mother         panic disorder    Cancer Mother 56        pancreatic, colon, liver and skin.    Cancer - colorectal Mother     Other Cancer Mother         Pancreatic    Anxiety Disorder Mother     Mental Illness Mother      Hypertension Father     Diabetes Father     Rheumatoid Arthritis Father     C.A.D. Paternal Grandfather         congestive heart failure    Cerebrovascular Disease Paternal Grandfather         age 79    Diabetes Paternal Grandfather     Hypertension Paternal Grandfather     Alzheimer Disease Paternal Grandfather     Arthritis Paternal Grandfather     Circulatory Paternal Grandfather     Cardiovascular Paternal Grandfather     Heart Disease Paternal Grandfather     Neurologic Disorder Paternal Grandfather     Unknown/Adopted Maternal Grandmother     Cerebrovascular Disease Maternal Grandmother     Unknown/Adopted Maternal Grandfather     Cancer Maternal Grandfather     Obesity Paternal Grandmother     Cancer Paternal Grandmother 94        coloangiocancinoma       Reviewed past medical, surgical, and family history with patient found on new patient intake packet located in [employer]MR [employer] tab.     SOCIAL HX: Former smoker.    ROS: per below Specifically negative for bowel/bladder dysfunction, balance changes, headache, dizziness, foot drop, fevers, chills, appetite changes, nausea/vomiting, unexplained weight loss. Otherwise 13 systems reviewed are negative.     Answers submitted by the patient for this visit:  Symptoms you have experienced in the last 30 days (Submitted on 9/25/2023)  General Symptoms: No  Skin Symptoms: No  HENT Symptoms: No  EYE SYMPTOMS: No  HEART SYMPTOMS: No  LUNG SYMPTOMS: No  INTESTINAL SYMPTOMS: Yes  URINARY SYMPTOMS: Yes  GYNECOLOGIC SYMPTOMS: No  BREAST SYMPTOMS: No  SKELETAL SYMPTOMS: Yes  BLOOD SYMPTOMS: No  NERVOUS SYSTEM SYMPTOMS: Yes  MENTAL HEALTH SYMPTOMS: No  Please answer the questions below to tell us what conditions you are experiencing: (Submitted on 9/25/2023)  Heart burn or indigestion: Yes  Nausea: Yes  Abdominal pain: Yes  Bloating: Yes  Constipation: Yes  Diarrhea: No  Blood in stool: No  Black stools: No  Rectal or Anal pain: No  Fecal incontinence: No  Yellowing of skin or  "eyes: No  Vomit with blood: No  Change in stools: No  Please answer the questions below to tell us what condition you are experiencing: (Submitted on 9/25/2023)  Trouble holding urine or incontinence: No  Pain or burning: Yes  Trouble starting or stopping: No  Increased frequency of urination: Yes  Blood in urine: No  Decreased frequency of urination: No  Frequent nighttime urination: Yes  Flank pain: Yes  Difficulty emptying bladder: No  Please answer the questions below to tell us what condition you are experiencing: (Submitted on 9/25/2023)  Back pain: Yes  Muscle aches: Yes  Neck pain: No  Swollen joints: No  Joint pain: No  Bone pain: No  Muscle cramps: Yes  Muscle weakness: Yes  Joint stiffness: Yes  Bone fracture: No  Please answer the questions below to tell us what condition you are experiencing: (Submitted on 9/25/2023)  Trouble with coordination: Yes  Dizziness or trouble with balance: No  Fainting or black-out spells: No  Memory loss: No  Headache: Yes  Seizures: No  Speech problems: No  Tingling: Yes  Tremor: No  Weakness: Yes  Difficulty walking: Yes  Paralysis: No  Numbness: Yes    OBJECTIVE:  BP (!) 134/91   Pulse 101   Ht 5' 4\" (1.626 m)   Wt 188 lb (85.3 kg)   LMP 11/28/2012   BMI 32.27 kg/m      PHYSICAL EXAMINATION:    --CONSTITUTIONAL:  Vital signs as above.  No acute distress.  The patient is well nourished and well groomed.  --PSYCHIATRIC:  Appropriate mood and affect. The patient is awake, alert, oriented to person, place, time and answering questions appropriately with clear speech.    --SKIN:  Skin over the face, bilateral lower extremities, and posterior torso is clean, dry, intact without rashes.    --RESPIRATORY: Normal rhythm and effort. No abnormal accessory muscle breathing patterns noted.   --ABDOMINAL:  Non-distended.    --GROSS MOTOR: Gait is careful yet coordinated, with a mild right limp.  Carefully arises from a seated position.  Some hesitation of right calf with heel " walking.  Intact toe walking and heel walking otherwise normal without significant difficulty.      --LOWER EXTREMITY MOTOR TESTING:  Hip flexion: right 5/5, left 5/5  Hip abduction: right 5/5  Hip adduction: right 5/5   Quads: right 5/5, left 5/5  Hamstrings: right 5/5, left 5/5  Dorsiflexion: right 5/5, left 5/5  Plantar flexion: right 5/5, left 5/5    Great toe MTP extension/EHL: right 5/5, left 5/5    --NEUROLOGICAL:  2/4 patellar and achilles reflexes bilaterally. Sensation to light touch is mildly decreased in the sole of the right foot, otherwise intact throughout both lower extremities. Babinski is negative. No clonus.    --MUSCULOSKELETAL: Lumbar spine inspection reveals no evidence of deformity. Range of motion is mildly limited due to pain with rotation.  Otherwise range of motion is intact in lumbar flexion, extension, lateral rotation. No point tenderness to palpation lumbar spine. No paraspinal musculature tenderness.     Straight leg raising is negative.    --HIPS: Decreased internal and external rotation of right hip due to function rather than pain.  Positive right MARLO and FADIR. Negative hip joint tenderness to palp.    --SACROILIAC JOINT: One finger point test was positive.  No significant SI joint tenderness to palpation bilaterally.    --VASCULAR:  Bilateral lower extremities are warm without any discoloration.  There is no pitting edema of the bilateral lower extremities.    RESULTS:   Prior medical records from [facility]where were reviewed today.    Imaging: Spine imaging was personally reviewed and interpreted today. The images were shown to the patient and the findings were explained using a spine model.      MRI Lumbar spine w/o contrast    Result Date: 9/15/2023  MRI LUMBAR SPINE WITHOUT CONTRAST   9/14/2023 4:09 PM HISTORY: Low back pain; rule out spondylolysis. Low back pain with standing/walking/extension. No known/automatically detected potential  contraindications to CT. Acute right-sided low back pain with right-sided sciatica. TECHNIQUE: Multiplanar multisequence MRI of the lumbar spine without contrast. COMPARISON: MRI lumbar spine 11/23/2014. Correlation also made with CT abdomen and pelvis 9/10/2023. FINDINGS: Nomenclature is based on 5 lumbar vertebral bodies. Normal vertebral body heights and sagittal alignment. Bone marrow signal appears normal. Partial disc desiccation at L4-L5 and L5-S1. Partial disc desiccation at L4-L5 appears new from the prior MRI. The other intervertebral disks demonstrate essentially normal signal on the fluid sensitive sequences. There is a normal appearance of the distal spinal cord with the conus terminating at L2. Unchanged probable Tarlov cyst along the left posterior aspect of the S2 vertebra. There is a cystic lesion in the anterior aspect of the right kidney measuring approximately 15 mm, not grossly changed accounting for differences in technique compared to the prior CT exam. This is incompletely characterized on this study, but statistically likely represents a benign renal cyst. The visualized paraspinous soft tissues and bony pelvis appear unremarkable. Segmental analysis: T12-L1: Normal disc height. No herniation. Normal facets. No spinal canal or neural foraminal stenosis. Probable small perineural cyst in the right neural foramen. Overall, no significant change. L1-L2: Normal disc height. No herniation. Normal facets. No spinal canal or neural foraminal stenosis. Overall, no significant change. L2-L3: Normal disc height. No herniation. Normal facets. No significant spinal canal or neural foraminal stenosis. Overall, no significant change. L3-L4: Normal disc height. There is a shallow disc bulge, slightly eccentric to the right. There appears to be a superimposed small right foraminal/extraforaminal disc protrusion with mild mass effect on the exiting/exited right L3 nerve root (series 6 image 32). Normal  facets. No spinal canal stenosis. Mild right neural foraminal stenosis. The left neural foramen is patent. Degenerative disc disease including the right foraminal disc herniation appear new compared to prior. L4-L5: Normal disc height. Shallow symmetric disc bulge is new. Posterior central annular fissure is new. Normal facets. Mild left lateral recess narrowing. No central spinal canal stenosis. Mild bilateral neural foraminal stenosis is new. L5-S1: Normal disc height. There is a right central disc protrusion in the setting of an annular fissure, unchanged from prior. Mild facet arthropathy. No spinal canal stenosis. No significant lateral recess narrowing. No significant neural foraminal narrowing. Overall, no significant change.     IMPRESSION: 1. New degenerative findings at L3-L4 and L4-L5 compared to MRI lumbar spine 11/23/2014. Please see the body of the report for details. 2. Unchanged degenerative findings at L5-S1. CORONA MAURER MD   SYSTEM ID:  [id number]    US Renal Complete Non-Vascular    Result Date: 9/15/2023  EXAM: US RENAL COMPLETE NON-VASCULAR LOCATION: [facility] DATE: 9/14/2023 INDICATION: renal cyst noted on CT further evaluation please COMPARISON: None. TECHNIQUE: Routine Bilateral Renal and Bladder Ultrasound. FINDINGS: RIGHT KIDNEY: 10.6 x 5.2 x 4.8 cm. The renal cortex measures 2 cm in thickness. The renal parenchyma is otherwise sonographically unremarkable without hydronephrosis. An anechoic 1.4 x 1.2 x 1.1 cm cyst is seen within the midpole of the right kidney, consistent with a parapelvic cyst. LEFT KIDNEY: 11.6 x 5.3 x 4.3 cm. The renal cortex measures 1.7 cm in thickness. The renal parenchyma is normal without hydronephrosis or masses. A 4 mm nonobstructive collecting system calculi seen in the lower pole of left kidney. BLADDER: Normal. Bilateral ureteral jets were visualized in the bladder.     IMPRESSION: 1.  Benign 1.4 x 1.2 x 1.1 cm parapelvic right  mid pole renal cyst, no follow-up imaging is required 2.  4 mm nonobstructive left lower pole collecting system calculi 3.  No sonographic evidence of hydronephrosis.    CT Abdomen Pelvis w/o Contrast    Result Date: 9/10/2023  EXAM: CT ABDOMEN PELVIS W/O CONTRAST LOCATION: [facility] DATE: 9/10/2023 INDICATION: Flank pain. COMPARISON: CT abdomen and pelvis, 03/30/2023. TECHNIQUE: CT scan of the abdomen and pelvis was performed without IV contrast. Multiplanar reformats were obtained. Dose reduction techniques were used. CONTRAST: None. FINDINGS: LOWER CHEST: Normal. HEPATOBILIARY: Normal. PANCREAS: Normal. SPLEEN: Normal. ADRENAL GLANDS: Normal. KIDNEYS/BLADDER: 2 mm stone involves the inferior aspect of the left kidney. There is no hydronephrosis. An 11 mm low dense lesion involves the midportion of the right kidney which is unchanged and likely reflects a simple cyst though incompletely characterized. BOWEL: There is a large amount of stool within the ascending colon. No bowel obstruction or bowel inflammation. LYMPH NODES: Normal. VASCULATURE: Unremarkable. PELVIC ORGANS: Normal. MUSCULOSKELETAL: Normal.     IMPRESSION: 1.  2 mm left renal stone without hydronephrosis. 2.  11 mm right renal cysts, stable though incompletely characterized. Renal ultrasound could be performed for further evaluation if warranted.           Lisa CRANDALL-C  [facility]  O. [phone]

## 2023-09-26 NOTE — LETTER
9/26/2023         RE: Diana Wilson  1972 [address] [facility] [id number]        Dear Colleague,    Thank you for referring your patient, Diana Wilson, to the [facility]. Please see a copy of my visit note below.    ASSESSMENT: Diana Wilson is a 43 year old female who presents for consultation at the request of PCP Yenifer Peters, who presents today for new patient evaluation of:    -Right lumbar radiculopathy    Patient has some mild sensory loss of the sole of the right foot, potentially related to right where disc bulge at L5-S1, however her pain is more consistent with right L3-4 distribution at which level she has a disc bulge with mild mass effect on the exiting right L3 nerve root. No myelopathic or red flag symptoms.  Given the severity of her pain, I recommended proceeding with a right L3-4 transforaminal epidural steroid injection.  We discussed the risks and benefits of this, and she elects to proceed.  We will also increase her gabapentin, she will continue anti-inflammatories, and hopes that she can start physical therapy.  She does not have any red flag symptoms on exam, and disc bulges small.  I think this will likely be a temporary flare of pain for her which will respond well to physical therapy.        9/25/2023     5:18 PM   OSWESTRY DISABILITY INDEX   Count 10   Sum 24   Oswestry Score (%) 48 %            Diagnoses and all orders for this visit:  Acute right-sided low back pain with right-sided sciatica  -     Spine  Referral  -     Physical Therapy Referral; Future  -     gabapentin (NEURONTIN) 300 MG capsule; Take 2 capsules (600 mg) by mouth 3 times daily  -     PAIN Transforaminal MAGDALENE Inj Lumbosacral One Level Right; Future      PLAN:  Reviewed spine anatomy and disease process. Discussed diagnosis and treatment options with the patient today. A shared decision making model was used.  The patient's values and choices were respected. The  following represents what was discussed and decided upon by the provider and the patient.      -DIAGNOSTIC TESTS:  Images were personally reviewed and interpreted and explained to patient today using a spine model.   --I did not order any new imaging studies today.    -PHYSICAL THERAPY:    -I referred her for full course of physical therapy, which hopefully she can participate in after pain is under better control  Discussed the importance of core strengthening, ROM, stretching exercises with the patient and how each of these entities is important in decreasing pain.  Explained to the patient that the purpose of physical therapy is to teach the patient a home exercise program.  These exercises need to be performed every day in order to decrease pain and prevent future occurrences of pain.        -MEDICATIONS:    -Recommended continuing over-the-counter ibuprofen as needed for pain control  -Recommended resuming Robaxin 500 to 1000 mg 3 times a day as needed  -Increase gabapentin per titration schedule to 600 mg 3 times daily  -We discussed repeating Medrol Dosepak, however given limited overall relief once off recently, would not expect additional relief with additional Dosepak at this point.  Discussed multiple medication options today with patient. Discussed risks, side effects, and proper use of medications. Patient verbalized understanding.    -INTERVENTIONS:    -I recommended a right transforaminal L3-4 epidural steroid injection.  Discussed risks and benefits of injections with patient today.  She wishes to proceed    -PATIENT EDUCATION:  Total time of 40 minutes, on the day of service, spent with the patient, reviewing the chart, placing orders, and documenting.   -Today we also discussed the pros and cons of the current treatment plan.    -FOLLOW-UP:   Follow-up after injection    Advised patient to call the [facility] if symptoms worsen, new numbness or weakness develops in the legs, or if they develop  new or worsening problems controlling bladder or bowel function.   ______________________________________________________________________    SUBJECTIVE:    HPI:  Diana Wilson  Is a 43 year old female history of kidney stones, breast cancer, GERD, depression, with remote history of postoperative related DVT on aspirin 325mg who presents today for new patient evaluation of right low back pain     Diana endorses a history of right sided sciatica in 2014, which resolved with physical therapy and medication at the time and she did well until approximately 3 weeks ago.    Patient's current symptoms started suddenly 3 weeks ago when she tried to get off a pool floaty.  She does not endorse starting a couch to 5K program over the last couple of weeks, but did not have any symptoms related until in her pool on Labor Day weekend at which point she experienced immediate right lower back discomfort and soreness, which worsened the next day and radiated into her right leg. The pain radiates into her right thigh and knee, and she has numbness and tingling in the right second toe and foot. She initially tried resuming doing physical therapy exercises that she learned in 2014, massage, Tylenol, Advil, heat, cold, yoga exercises without improvement.  She was then seen in the emergency room on September 10.   A CT abdomen and pelvis demonstrated a left kidney stone and a likely cyst of the right kidney.  She was discharged home with Robaxin, oxycodone, Medrol Dosepak.  She felt better while on the Medrol Dosepak, but after it ended, the pain returned and remains severe.  The pain is worse with walking, twisting, sitting.  It is somewhat better when she stands and moves. She is not able to walk more than a block or so. She feels like her right leg is weak.      She has continued to take ibuprofen twice a day without improvement.  She is also taking gabapentin 300 mg 3 times a day, and using lidocaine.  She denies any bowel or bladder  "control symptoms, or left leg symptoms.  She has not had any lumbar epidural injections, had any chiropractic care, previous spine surgeries.    She had a lumbar MRI on September 14, 2023.      -Treatment to Date:     -Medications:  Tylenol  -Ibuprofen  -Robaxin  -Medrol Dosepak  -Oxycodone  Gabapentin  Lidocaine    Current Outpatient Medications   Medication     Ascorbic Acid (VITAMIN C PO)     aspirin (ASA) 325 MG EC tablet     cetirizine (ZYRTEC) 10 MG tablet     cholecalciferol (VITAMIN D3) 1000 units (25 mcg) capsule     Fluticasone Propionate (FLONASE NA)     gabapentin (NEURONTIN) 300 MG capsule     gabapentin (NEURONTIN) 300 MG capsule     letrozole (FEMARA) 2.5 MG tablet     lidocaine (XYLOCAINE) 5 % external ointment     methenamine hippurate (HIPREX) 1 g tablet     methocarbamol (ROBAXIN) 500 MG tablet     methylPREDNISolone (MEDROL DOSEPAK) 4 MG tablet therapy pack     Multiple Vitamins-Minerals (MULTIVITAMIN PO)     nitroFURantoin macrocrystal (MACRODANTIN) 50 MG capsule     omeprazole (PRILOSEC) 20 MG DR capsule     oxyBUTYnin ER (DITROPAN XL) 5 MG 24 hr tablet     Semaglutide-Weight Management (WEGOVY) 2.4 MG/0.75ML pen     venlafaxine (EFFEXOR XR) 150 MG 24 hr capsule     venlafaxine (EFFEXOR XR) 75 MG 24 hr capsule     YUVAFEM 10 MCG TABS vaginal tablet     sulfamethoxazole-trimethoprim (BACTRIM DS) 800-160 MG tablet     WEGOVY 1.7 MG/0.75ML pen     Current Facility-Administered Medications   Medication     lidocaine (XYLOCAINE) 2 % external gel       Allergies   Allergen Reactions     Carboplatin Shortness Of Breath, Rash and Anaphylaxis     Other reaction(s): Flushing, Tachycardia     Amoxicillin GI Disturbance     Erythromycin GI Disturbance     Gluten Meal      Celiac disease     Hydrocodone-Acetaminophen      Other reaction(s): Hallucinations     Hydromorphone Headache     Naltrexone Other (See Comments)     \"Felt high\"     Penicillins Nausea and Vomiting and Hives     1-11-17 pt states this " "was a childhood reaction     Phentermine Other (See Comments)     Elevated BP     Topamax [Topiramate] Other (See Comments)     Cloudy thinking     Wellbutrin [Bupropion] Other (See Comments)     \"Felt high\"     Zolpidem      Other reaction(s): Hallucinations     Zolpidem Tartrate      hallucinations     Hydrocodone Other (See Comments)     Out of body experience, \"creepy-crawly\" skin        Past Medical History:   Diagnosis Date     Anxiety      Breast cancer (H)      Celiac disease      Depressive disorder 1995    And Anxiety     Encounter for insertion or removal of intrauterine contraceptive device 01/08/2008     Excessive or frequent menstruation 03/19/2003     Fibroadenosis of breast      GERD (gastroesophageal reflux disease)      Invasive ductal carcinoma of breast, right (H)      Malignant neoplasm of upper-outer quadrant of right female breast (H)      Mild intermittent asthma            never has had PFT's, MDI with colds and at times exercise     Neutropenia, drug-induced (H)      Nongonococcal urethritis (JALIL) due to chlamydia trachomatis 01/03/2002     Other and unspecified ovarian cyst 03/19/2003    RIGHT ovarian cyst     PONV (postoperative nausea and vomiting)      Skin cancer      Transient hypertension of pregnancy, antepartum     PIH with last birth     Uncomplicated asthma 2002        Patient Active Problem List   Diagnosis     Obesity     CARDIOVASCULAR SCREENING; LDL GOAL LESS THAN 130     Major depressive disorder, recurrent episode, mild (H)     Iron deficiency anemia     Insomnia     Health Care Home     Anxiety     Intestinal malabsorption     Lymphedema of right upper extremity     Malignant neoplasm of upper-outer quadrant of right breast in female, estrogen receptor positive (H)     Osteopenia of multiple sites     Multiple subsegmental pulmonary emboli without acute cor pulmonale (H)     Breast reconstruction disproportion     Family history of colon cancer     Celiac disease     " Recurrent UTI     Stress incontinence     Pelvic pain in female     Muscular incoordination       Past Surgical History:   Procedure Laterality Date     BIOPSY  2016    EDG with biopsy     BREAST SURGERY      Bilateral reduction      SECTION       CYSTOSCOPY, SLING TRANSVAGINAL N/A 2022    Procedure: CREATION, VAGINAL SLING, WITH CYSTOSCOPY (support the urethra with mesh sling and look in the bladder);  Surgeon: Sixto Harris MD;  Location:  OR     DENTAL SURGERY  2010 and 10/12/2010    Root canals     DILATION AND CURETTAGE, HYSTEROSCOPY, ABLATE ENDOMETRIUM NOVASURE, COMBINED  3/20/2012    Procedure:COMBINED DILATION AND CURETTAGE, HYSTEROSCOPY, ABLATE ENDOMETRIUM NOVASURE; Hysteroscopy with Endometrial Ablation Novasure; Surgeon:GERRI PURCELL; Location:[facility]     ESOPHAGOSCOPY, GASTROSCOPY, DUODENOSCOPY (EGD), COMBINED N/A 2016    Procedure: COMBINED ESOPHAGOSCOPY, GASTROSCOPY, DUODENOSCOPY (EGD);  Surgeon: Jose L Wilson MD;  Location: [facility]     INSERT PORT VASCULAR ACCESS N/A 2017    Procedure: INSERT PORT VASCULAR ACCESS;  Surgeon: Michael Townsend MD;  Location: [facility]     LAPAROSCOPIC SALPINGO-OOPHORECTOMY Bilateral 2017    Procedure: LAPAROSCOPIC SALPINGO-OOPHORECTOMY;  Laparoscopic bilateral salpingo oophorectomy;  Surgeon: Preeti Tirado MD;  Location: [facility] OR     RECONSTRUCT BREAST BILATERAL, IMPLANT PROSTHESIS BILATERAL, COMBINED Bilateral 3/16/2018    Procedure: COMBINED RECONSTRUCT BREAST BILATERAL, IMPLANT PROSTHESIS BILATERAL;  BILATERAL SECOND STAGE BREAST RECONSTRUCTION WITH SILICONE GEL IMPLANT;  Surgeon: Eugenio Ruelas MD;  Location: [facility]     REVISE RECONSTRUCTED BREAST       SURGICAL HISTORY OF -       Breast reduction     ZZC APPENDECTOMY         Family History   Problem Relation Age of Onset     Diabetes Mother         hypoglycemic     Allergies Mother      Arthritis Mother      Depression Mother       Gastrointestinal Disease Mother      Neurologic Disorder Mother      Psychotic Disorder Mother         panic disorder     Cancer Mother 56        pancreatic, colon, liver and skin.     Cancer - colorectal Mother      Other Cancer Mother         Pancreatic     Anxiety Disorder Mother      Mental Illness Mother      Hypertension Father      Diabetes Father      Rheumatoid Arthritis Father      C.A.D. Paternal Grandfather         congestive heart failure     Cerebrovascular Disease Paternal Grandfather         age 79     Diabetes Paternal Grandfather      Hypertension Paternal Grandfather      Alzheimer Disease Paternal Grandfather      Arthritis Paternal Grandfather      Circulatory Paternal Grandfather      Cardiovascular Paternal Grandfather      Heart Disease Paternal Grandfather      Neurologic Disorder Paternal Grandfather      Unknown/Adopted Maternal Grandmother      Cerebrovascular Disease Maternal Grandmother      Unknown/Adopted Maternal Grandfather      Cancer Maternal Grandfather      Obesity Paternal Grandmother      Cancer Paternal Grandmother 94        coloangiocancinoma       Reviewed past medical, surgical, and family history with patient found on new patient intake packet located in EMR [employer] tab.     SOCIAL HX: Former smoker.    ROS: per below Specifically negative for bowel/bladder dysfunction, balance changes, headache, dizziness, foot drop, fevers, chills, appetite changes, nausea/vomiting, unexplained weight loss. Otherwise 13 systems reviewed are negative.     Answers submitted by the patient for this visit:  Symptoms you have experienced in the last 30 days (Submitted on 9/25/2023)  General Symptoms: No  Skin Symptoms: No  HENT Symptoms: No  EYE SYMPTOMS: No  HEART SYMPTOMS: No  LUNG SYMPTOMS: No  INTESTINAL SYMPTOMS: Yes  URINARY SYMPTOMS: Yes  GYNECOLOGIC SYMPTOMS: No  BREAST SYMPTOMS: No  SKELETAL SYMPTOMS: Yes  BLOOD SYMPTOMS: No  NERVOUS SYSTEM SYMPTOMS: Yes  MENTAL HEALTH SYMPTOMS:  "No  Please answer the questions below to tell us what conditions you are experiencing: (Submitted on 9/25/2023)  Heart burn or indigestion: Yes  Nausea: Yes  Abdominal pain: Yes  Bloating: Yes  Constipation: Yes  Diarrhea: No  Blood in stool: No  Black stools: No  Rectal or Anal pain: No  Fecal incontinence: No  Yellowing of skin or eyes: No  Vomit with blood: No  Change in stools: No  Please answer the questions below to tell us what condition you are experiencing: (Submitted on 9/25/2023)  Trouble holding urine or incontinence: No  Pain or burning: Yes  Trouble starting or stopping: No  Increased frequency of urination: Yes  Blood in urine: No  Decreased frequency of urination: No  Frequent nighttime urination: Yes  Flank pain: Yes  Difficulty emptying bladder: No  Please answer the questions below to tell us what condition you are experiencing: (Submitted on 9/25/2023)  Back pain: Yes  Muscle aches: Yes  Neck pain: No  Swollen joints: No  Joint pain: No  Bone pain: No  Muscle cramps: Yes  Muscle weakness: Yes  Joint stiffness: Yes  Bone fracture: No  Please answer the questions below to tell us what condition you are experiencing: (Submitted on 9/25/2023)  Trouble with coordination: Yes  Dizziness or trouble with balance: No  Fainting or black-out spells: No  Memory loss: No  Headache: Yes  Seizures: No  Speech problems: No  Tingling: Yes  Tremor: No  Weakness: Yes  Difficulty walking: Yes  Paralysis: No  Numbness: Yes    OBJECTIVE:  BP (!) 134/91   Pulse 101   Ht 5' 4\" (1.626 m)   Wt 188 lb (85.3 kg)   LMP 11/28/2012   BMI 32.27 kg/m      PHYSICAL EXAMINATION:    --CONSTITUTIONAL:  Vital signs as above.  No acute distress.  The patient is well nourished and well groomed.  --PSYCHIATRIC:  Appropriate mood and affect. The patient is awake, alert, oriented to person, place, time and answering questions appropriately with clear speech.    --SKIN:  Skin over the face, bilateral lower extremities, and posterior " torso is clean, dry, intact without rashes.    --RESPIRATORY: Normal rhythm and effort. No abnormal accessory muscle breathing patterns noted.   --ABDOMINAL:  Non-distended.    --GROSS MOTOR: Gait is careful yet coordinated, with a mild right limp.  Carefully arises from a seated position.  Some hesitation of right calf with heel walking.  Intact toe walking and heel walking otherwise normal without significant difficulty.      --LOWER EXTREMITY MOTOR TESTING:  Hip flexion: right 5/5, left 5/5  Hip abduction: right 5/5  Hip adduction: right 5/5   Quads: right 5/5, left 5/5  Hamstrings: right 5/5, left 5/5  Dorsiflexion: right 5/5, left 5/5  Plantar flexion: right 5/5, left 5/5    Great toe MTP extension/EHL: right 5/5, left 5/5    --NEUROLOGICAL:  2/4 patellar and achilles reflexes bilaterally. Sensation to light touch is mildly decreased in the sole of the right foot, otherwise intact throughout both lower extremities. Babinski is negative. No clonus.    --MUSCULOSKELETAL: Lumbar spine inspection reveals no evidence of deformity. Range of motion is mildly limited due to pain with rotation.  Otherwise range of motion is intact in lumbar flexion, extension, lateral rotation. No point tenderness to palpation lumbar spine. No paraspinal musculature tenderness.     Straight leg raising is negative.    --HIPS: Decreased internal and external rotation of right hip due to function rather than pain.  Positive right MARLO and FADIR. Negative hip joint tenderness to palp.    --SACROILIAC JOINT: One finger point test was positive.  No significant SI joint tenderness to palpation bilaterally.    --VASCULAR:  Bilateral lower extremities are warm without any discoloration.  There is no pitting edema of the bilateral lower extremities.    RESULTS:   Prior medical records from [facility] and [facility] [place] Everywhere were reviewed today.    Imaging: Spine imaging was personally reviewed and interpreted today. The images were shown to  the patient and the findings were explained using a spine model.      MRI Lumbar spine w/o contrast    Result Date: 9/15/2023  MRI LUMBAR SPINE WITHOUT CONTRAST   9/14/2023 4:09 PM HISTORY: Low back pain; rule out spondylolysis. Low back pain with standing/walking/extension. No known/automatically detected potential contraindications to CT. Acute right-sided low back pain with right-sided sciatica. TECHNIQUE: Multiplanar multisequence MRI of the lumbar spine without contrast. COMPARISON: MRI lumbar spine 11/23/2014. Correlation also made with CT abdomen and pelvis 9/10/2023. FINDINGS: Nomenclature is based on 5 lumbar vertebral bodies. Normal vertebral body heights and sagittal alignment. Bone marrow signal appears normal. Partial disc desiccation at L4-L5 and L5-S1. Partial disc desiccation at L4-L5 appears new from the prior MRI. The other intervertebral disks demonstrate essentially normal signal on the fluid sensitive sequences. There is a normal appearance of the distal spinal cord with the conus terminating at L2. Unchanged probable Tarlov cyst along the left posterior aspect of the S2 vertebra. There is a cystic lesion in the anterior aspect of the right kidney measuring approximately 15 mm, not grossly changed accounting for differences in technique compared to the prior CT exam. This is incompletely characterized on this study, but statistically likely represents a benign renal cyst. The visualized paraspinous soft tissues and bony pelvis appear unremarkable. Segmental analysis: T12-L1: Normal disc height. No herniation. Normal facets. No spinal canal or neural foraminal stenosis. Probable small perineural cyst in the right neural foramen. Overall, no significant change. L1-L2: Normal disc height. No herniation. Normal facets. No spinal canal or neural foraminal stenosis. Overall, no significant change. L2-L3: Normal disc height. No herniation. Normal facets. No significant spinal canal or neural foraminal  stenosis. Overall, no significant change. L3-L4: Normal disc height. There is a shallow disc bulge, slightly eccentric to the right. There appears to be a superimposed small right foraminal/extraforaminal disc protrusion with mild mass effect on the exiting/exited right L3 nerve root (series 6 image 32). Normal facets. No spinal canal stenosis. Mild right neural foraminal stenosis. The left neural foramen is patent. Degenerative disc disease including the right foraminal disc herniation appear new compared to prior. L4-L5: Normal disc height. Shallow symmetric disc bulge is new. Posterior central annular fissure is new. Normal facets. Mild left lateral recess narrowing. No central spinal canal stenosis. Mild bilateral neural foraminal stenosis is new. L5-S1: Normal disc height. There is a right central disc protrusion in the setting of an annular fissure, unchanged from prior. Mild facet arthropathy. No spinal canal stenosis. No significant lateral recess narrowing. No significant neural foraminal narrowing. Overall, no significant change.     IMPRESSION: 1. New degenerative findings at L3-L4 and L4-L5 compared to MRI lumbar spine 11/23/2014. Please see the body of the report for details. 2. Unchanged degenerative findings at L5-S1. CORONA MAURER MD   SYSTEM ID:  [id number]    US Renal Complete Non-Vascular    Result Date: 9/15/2023  EXAM: US RENAL COMPLETE NON-VASCULAR LOCATION: [facility] DATE: 9/14/2023 INDICATION: renal cyst noted on CT further evaluation please COMPARISON: None. TECHNIQUE: Routine Bilateral Renal and Bladder Ultrasound. FINDINGS: RIGHT KIDNEY: 10.6 x 5.2 x 4.8 cm. The renal cortex measures 2 cm in thickness. The renal parenchyma is otherwise sonographically unremarkable without hydronephrosis. An anechoic 1.4 x 1.2 x 1.1 cm cyst is seen within the midpole of the right kidney, consistent with a parapelvic cyst. LEFT KIDNEY: 11.6 x 5.3 x 4.3 cm. The renal cortex  measures 1.7 cm in thickness. The renal parenchyma is normal without hydronephrosis or masses. A 4 mm nonobstructive collecting system calculi seen in the lower pole of left kidney. BLADDER: Normal. Bilateral ureteral jets were visualized in the bladder.     IMPRESSION: 1.  Benign 1.4 x 1.2 x 1.1 cm parapelvic right mid pole renal cyst, no follow-up imaging is required 2.  4 mm nonobstructive left lower pole collecting system calculi 3.  No sonographic evidence of hydronephrosis.    CT Abdomen Pelvis w/o Contrast    Result Date: 9/10/2023  EXAM: CT ABDOMEN PELVIS W/O CONTRAST LOCATION: [facility] DATE: 9/10/2023 INDICATION: Flank pain. COMPARISON: CT abdomen and pelvis, 03/30/2023. TECHNIQUE: CT scan of the abdomen and pelvis was performed without IV contrast. Multiplanar reformats were obtained. Dose reduction techniques were used. CONTRAST: None. FINDINGS: LOWER CHEST: Normal. HEPATOBILIARY: Normal. PANCREAS: Normal. SPLEEN: Normal. ADRENAL GLANDS: Normal. KIDNEYS/BLADDER: 2 mm stone involves the inferior aspect of the left kidney. There is no hydronephrosis. An 11 mm low dense lesion involves the midportion of the right kidney which is unchanged and likely reflects a simple cyst though incompletely characterized. BOWEL: There is a large amount of stool within the ascending colon. No bowel obstruction or bowel inflammation. LYMPH NODES: Normal. VASCULATURE: Unremarkable. PELVIC ORGANS: Normal. MUSCULOSKELETAL: Normal.     IMPRESSION: 1.  2 mm left renal stone without hydronephrosis. 2.  11 mm right renal cysts, stable though incompletely characterized. Renal ultrasound could be performed for further evaluation if warranted.           Lisa Naranjo FNP-C  [facility]  O. [phone]             Again, thank you for allowing me to participate in the care of your patient.        Sincerely,        Lisa Naranjo, COLLEEN CNP

## 2023-09-26 NOTE — PATIENT INSTRUCTIONS
Prescribed Gabapentin today, 300 mg tablets, to be titrated up to 2 tablets 3 times a day as tolerated for your nerve pain. Please follow Gabapentin dosing chart below.    Gabapentin 300mg Dosing Chart    DATE  MORNING AFTERNOON BEDTIME    Day 1 1 1 2    Day 2 1 1 2    Day 3 1 1 2    Day 4 2 1 2    Day 5 2 1 2    Day 6 2 1 2    Day 7 2 2 2    Day 8 2 2 2    Day 9 2 2 2     Continue medication, taking 2 capsules three times daily    Please call if you have any questions regarding how to take your medication  Clinic Phone # [phone]        ~You have been referred for Physical Therapy to [facility]. They will call you to schedule an appointment.      Scheduling phone number is [phone] for [facility] [place], or [place] location.  If you have not heard from the scheduling office within 2 business days, please call [phone] for ALL other locations.    Discussed the importance of core strengthening, ROM, stretching exercises and how each of these entities is important in decreasing pain and improving long term spine health.  The purpose of physical therapy is to teach you an individualized home exercise program.  These exercises need to be performed every day in order to decrease pain and prevent future occurrences of pain.           OK to continue over the counter ibuprofen as directed as needed for pain  OK to continue your robaxin 500mg-1000mg three times daily as needed for muscle spasms      A RIGHT L3-4 TF MAGDALENE  has been ordered today.      Please schedule this injection at least 2 weeks from now to allow time for insurance prior authorization.     Please note that this injection uses cortisone.  The cortisone may somewhat weaken the immune system.  It is unknown how much the immune system is weakened.  It is unknown if it is weakened to the point that you may be more likely to get the COVID-19 virus, or if you do get the COVID-19 virus, if you would be sicker  than you would have been if you had not had the cortisone injection.  If you do not wish to proceed with the injection, please let the nurse/physician know and do NOT schedule the injection.    Please note that since your immune system is weakened from the cortisone, having any vaccine/shot may be less effective if you have this vaccine within 2 weeks from your cortisone injection.  It is advised to wait 2 weeks after your cortisone injection to have any vaccine (or if you have a vaccine first, wait 2 weeks before you have the cortisone injection).    Please schedule this injection at least 1 week from now to allow time for insurance prior authorization.  On the day of your injection, you cannot be sick or taking antibiotics.  If you become sick and are prescribed antibiotics, please call the clinic as soon as possible so your injection can be rescheduled.  You will need to bring a  with you for your injection.   If you have any questions or concerns prior to your injection, please do not hesitate to call the nurse navigation line at [phone] or contact Lisa Naranjo through [employer].     An injection has been ordered today to potentially help with your pain symptoms. These injections do not fix what is going on in your back, therefore they typically do not take away the pain completely, however they can many times help improve symptoms. Injections should always be completed along with other modalities such as physical therapy for the best long term outcomes. If injections alone are done, then pain will likely return.       [facility] Injection Requirements:    A  is required for all fluoroscopically-guided injections.  Injection appointments may be cancelled if there are signs/symptoms of an active infection or if the patient is being actively treated with antibiotics for a diagnosed infection.  Patients may have their steroid injection cancelled if they have had another  steroid injection within 2 weeks.  Diabetic patients will have their blood glucose levels checked the day of their injection and the appointment will be rescheduled if the blood glucose level is 300 or higher.  Patients with allergies to cortisone, local anesthetics, iodine, or contrast dye should contact the Spine Center to further discuss these considerations.  Patients scheduled for medial branch block diagnostic injections should refrain from taking pain medication the day of the procedure.  The medial branch block injection appointment will be rescheduled if the patient's pain rating is not 5/10 or greater at the time of the procedure.  Patients taking warfarin/Coumadin will have their INR checked the day of the procedure and the procedure may be rescheduled if the INR is greater than 3.0.  Please contact the Spine Center (#[phone]) if you are taking any prescription blood-thinning medications (warfarin, Plavix, Lovenox, Eliquis, Brilinta, Effient, etc.) as special dosing adjustments may need to be made depending on the type of injection you are scheduled to receive.  It is recommended that you delay having your steroid injection if you have received a flu shot or shingles vaccine within 2 weeks.     ~Please call our [facility] Nurse Navigation line [phone] with any questions or concerns about your treatment plan, if symptoms worsen and you would like to be seen urgently, or if you have any new or worsening numbness, weakness, or problems controlling bladder and bowel function.  ~You are also welcome to contact Lisa Naranjo via [employer], but please be aware that responses to [employer] message may take 2-3 days due to the high volume of patients seen in clinic.

## 2023-10-02 ENCOUNTER — THERAPY VISIT (OUTPATIENT)
Dept: PHYSICAL THERAPY | Facility: CLINIC | Age: 43
End: 2023-10-02
Attending: NURSE PRACTITIONER
Payer: COMMERCIAL

## 2023-10-02 DIAGNOSIS — M54.41 ACUTE RIGHT-SIDED LOW BACK PAIN WITH RIGHT-SIDED SCIATICA: ICD-10-CM

## 2023-10-02 PROCEDURE — 97161 PT EVAL LOW COMPLEX 20 MIN: CPT | Mod: GP | Performed by: PHYSICAL THERAPIST

## 2023-10-02 PROCEDURE — 97110 THERAPEUTIC EXERCISES: CPT | Mod: GP | Performed by: PHYSICAL THERAPIST

## 2023-10-02 PROCEDURE — 97140 MANUAL THERAPY 1/> REGIONS: CPT | Mod: GP | Performed by: PHYSICAL THERAPIST

## 2023-10-02 NOTE — PROGRESS NOTES
PHYSICAL THERAPY EVALUATION  Type of Visit: Evaluation    See electronic medical record for Abuse and Falls Screening details.    Subjective       Presenting condition or subjective complaint: Low back pain L 3 and below  Notes gabapentin causes a lot of fatigue so only taking in PM.  Would like to return to running.   Date of onset: 09/02/23    Relevant medical history: Anemia; Cancer; Depression; Dizziness; DVT (blood clot); Menopause; Migraines or headaches; Osteoporosis; Overweight; Radiation treatment; Smoking (history of)  Dates & types of surgery:  see chart    Prior diagnostic imaging/testing results: MRI; CT scan (see chart)     Prior therapy history for the same diagnosis, illness or injury: Yes 2014    Prior Level of Function  Transfers:   Ambulation:   ADL:   IADL:     Living Environment  Social support: With a significant other or spouse   Type of home: House; Multi-level   Stairs to enter the home: Yes       Ramp: No   Stairs inside the home: Yes       Help at home: None  Equipment owned:       Employment: Yes [employer]  Hobbies/Interests: Running, Yoga, Reading    Patient goals for therapy: walk long distances, run and sit    Pain assessment: See objective evaluation for additional pain details     Objective   LUMBAR SPINE EVALUATION  PAIN: Pain Level at Rest: 2/10  Pain Level with Use: 8/10  Pain is Relieved By: flat on back  Location: R anterior thigh, wrapping to medial knee and to great toe  INTEGUMENTARY (edema, incisions):   POSTURE:   GAIT: Guarded w/ gait, mildly antalgic gait. Prefer standinig vs sitting.   Weightbearing Status:   Assistive Device(s):   Gait Deviations:   BALANCE/PROPRIOCEPTION:   WEIGHTBEARING ALIGNMENT:   NON-WEIGHTBEARING ALIGNMENT:    ROM:   Flexion fingertips to knee joint line w/ increased symptoms  Extension, near full ROM no radicular symptoms  SB to R limited to neutral   SB to L min limitation   Hip ROM WNL  PELVIC/SI SCREEN:   STRENGTH:    Able heel/toe  walk  MYOTOMES: WNL  DTR S:   CORD SIGNS:   DERMATOMES:  intact to light touch, noting mild decrease R great toe and plantar surface foot  NEURAL TENSION:  + tension R SLR  FLEXIBILITY:   LUMBAR/HIP Special Tests:    PELVIS/SI SPECIAL TESTS:   FUNCTIONAL TESTS:   PALPATION:  increased tenderness to R gluteals, pirifoformis  SPINAL SEGMENTAL CONCLUSIONS:       Assessment & Plan   CLINICAL IMPRESSIONS  Medical Diagnosis: Acute right-sided low back pain with right-sided sciatica (M54.41)    Treatment Diagnosis: Acute right sided low back pain with right sided radiculopathy   Impression/Assessment: Patient is a 43 year old female with acute low back pain and right radiculopathy complaints.  The following significant findings have been identified: Pain, Decreased ROM/flexibility, Decreased joint mobility, Decreased strength, Impaired gait, Impaired muscle performance, and Impaired posture. These impairments interfere with their ability to perform self care tasks, work tasks, recreational activities, household chores, driving , household mobility, and community mobility as compared to previous level of function.     Clinical Decision Making (Complexity):  Clinical Presentation: Stable/Uncomplicated  Clinical Presentation Rationale: based on medical and personal factors listed in PT evaluation  Clinical Decision Making (Complexity): Low complexity    PLAN OF CARE  Treatment Interventions:  Modalities: Biofeedback, Cryotherapy, Ultrasound  Interventions: Gait Training, Manual Therapy, Neuromuscular Re-education, Therapeutic Activity, Therapeutic Exercise, Self-Care/Home Management    Long Term Goals     PT Goal 1  Goal Identifier: 1  Goal Description: Pt will be able to sit 30 minutes without increased symptoms  Rationale: to maximize safety and independence with performance of ADLs and functional tasks;to maximize safety and independence within the home;to maximize safety and independence within the community;to maximize  safety and independence with transportation;to maximize safety and independence with self cares  Target Date: 11/27/23  PT Goal 2  Goal Identifier: 1  Goal Description: Pt will be able to walk 1 mile without increased symptoms  Rationale: to maximize safety and independence within the community;to maximize safety and independence within the home (and to promote return to running)  Target Date: 11/27/23      Frequency of Treatment: 1x/week  Duration of Treatment: 8 weeks    Recommended Referrals to Other Professionals:   Education Assessment:        Risks and benefits of evaluation/treatment have been explained.   Patient/Family/caregiver agrees with Plan of Care.     Evaluation Time:     PT Eval, Low Complexity Minutes (90042): 15       Signing Clinician: Anitra Ritter PT

## 2023-10-05 NOTE — PROGRESS NOTES
SUBJECTIVE:   CC: Diana is an 43 year old who presents for preventive health visit.       10/5/2023    12:52 PM   Additional Questions   Roomed by ISIAH Pabon   Accompanied by self       Healthy Habits:     Getting at least 3 servings of Calcium per day:  Yes    Bi-annual eye exam:  Yes    Dental care twice a year:  Yes    Sleep apnea or symptoms of sleep apnea:  None    Diet:  Gluten-free/reduced    Frequency of exercise:  2-3 days/week    Duration of exercise:  15-30 minutes    Taking medications regularly:  Yes    Medication side effects:  Other    Additional concerns today:  Yes     Would like to postpone flu and covid injection due to spinal injection she will be receiving next week   Has been having back pain     Losing weight on wegovy. It is helping and she is feeling good     Some stressors in life.  Effexor is helping she is managing     Depression  Follow-Up  How are you doing with your depression since your last visit? stable  Are you having other symptoms that might be associated with depression or anxiety? No mostly anxiety symptoms  Have you had a significant life event? OTHER: child is serving in  [employer] and father dx colon cancer     Do you have any concerns with your use of alcohol or other drugs? No        Social History     Tobacco Use    Smoking status: Former     Packs/day: 0.50     Years: 10.00     Pack years: 5.00     Types: Cigarettes     Quit date: 10/1/2016     Years since quittin.0    Smokeless tobacco: Never    Tobacco comments:     Socially- quit smoking 10/01/2016   Substance Use Topics    Alcohol use: Yes     Alcohol/week: 0.0 standard drinks of alcohol     Comment: Occassional    Drug use: No         2022     8:37 AM 2022     3:05 PM 10/6/2023     8:09 AM   PHQ   PHQ-9 Total Score 5 2 3   Q9: Thoughts of better off dead/self-harm past 2 weeks Not at all Not at all Not at all         2022     8:37 AM 2022     3:05 PM 10/6/2023     8:10 AM   PAOLA-7  SCORE   Total Score   7 (mild anxiety)   Total Score 7 6 7         10/6/2023     8:09 AM   Last PHQ-9   1.  Little interest or pleasure in doing things 0   2.  Feeling down, depressed, or hopeless 1   3.  Trouble falling or staying asleep, or sleeping too much 1   4.  Feeling tired or having little energy 0   5.  Poor appetite or overeating 1   6.  Feeling bad about yourself 0   7.  Trouble concentrating 0   8.  Moving slowly or restless 0   Q9: Thoughts of better off dead/self-harm past 2 weeks 0   PHQ-9 Total Score 3         Social History     Tobacco Use    Smoking status: Former     Packs/day: 0.50     Years: 10.00     Pack years: 5.00     Types: Cigarettes     Quit date: 10/1/2016     Years since quittin.0    Smokeless tobacco: Never    Tobacco comments:     Socially- quit smoking 10/01/2016   Substance Use Topics    Alcohol use: Yes     Alcohol/week: 0.0 standard drinks of alcohol     Comment: Occassional           2022     2:06 PM   Alcohol Use   Prescreen: >3 drinks/day or >7 drinks/week? No          No data to display              Reviewed orders with patient.  Reviewed health maintenance and updated orders accordingly - Yes  Labs reviewed in EPIC    Breast Cancer Screening:    FHS-7:        No data to display              click delete button to remove this line now  Mammogram Screening - Alternate mammogram schedule due to breast cancer history  Pertinent mammograms are reviewed under the imaging tab.    History of abnormal Pap smear:       2021    11:59 AM 2017    11:48 AM 2017    11:45 AM   PAP / HPV   PAP Negative for Intraepithelial Lesion or Malignancy (NILM)      PAP (Historical)  NIL     HPV 16 DNA Negative   Negative    HPV 18 DNA Negative   Negative    Other HR HPV Negative   Negative      Reviewed and updated as needed this visit by clinical staff                  Reviewed and updated as needed this visit by Provider                 Review of Systems   Constitutional:   "Negative for chills and fever.   HENT:  Negative for congestion, ear pain, hearing loss and sore throat.    Eyes:  Negative for pain and visual disturbance.   Respiratory:  Negative for cough and shortness of breath.    Cardiovascular:  Negative for chest pain, palpitations and peripheral edema.   Gastrointestinal:  Positive for constipation and heartburn. Negative for abdominal pain, diarrhea, hematochezia and nausea.   Breasts:  Negative for tenderness, breast mass and discharge.   Genitourinary:  Positive for frequency. Negative for dysuria, genital sores, hematuria, pelvic pain, urgency, vaginal bleeding and vaginal discharge.   Musculoskeletal:  Negative for arthralgias, joint swelling and myalgias.   Neurological:  Positive for headaches. Negative for dizziness, weakness and paresthesias.   Psychiatric/Behavioral:  Negative for mood changes. The patient is nervous/anxious.         OBJECTIVE:   BP (!) 126/90   Pulse 88   Temp 97.5  F (36.4  C) (Tympanic)   Resp 18   Ht 1.62 m (5' 3.78\")   Wt 85.2 kg (187 lb 12.8 oz)   LMP 11/28/2012   SpO2 97%   BMI 32.46 kg/m    Physical Exam  GENERAL: healthy, alert and no distress  EYES: Eyes grossly normal to inspection, PERRL and conjunctivae and sclerae normal  HENT: ear canals and TM's normal, nose and mouth without ulcers or lesions  NECK: no adenopathy, no asymmetry, masses, or scars and thyroid normal to palpation  RESP: lungs clear to auscultation - no rales, rhonchi or wheezes  BREAST: normal without masses, tenderness or nipple discharge and no palpable axillary masses or adenopathy  CV: regular rate and rhythm, normal S1 S2, no S3 or S4, no murmur, click or rub, no peripheral edema and peripheral pulses strong  ABDOMEN: soft, nontender, no hepatosplenomegaly, no masses and bowel sounds normal  MS: no gross musculoskeletal defects noted, no edema  SKIN: no suspicious lesions or rashes  NEURO: Normal strength and tone, mentation intact and speech " "normal  PSYCH: mentation appears normal, affect normal/bright      ASSESSMENT/PLAN:   (Z00.01) Encounter for routine adult medical exam with abnormal findings  (primary encounter diagnosis)  Comment: We discussed self breast exams, exercise 30mins/day, and calcium with vitamin D at 1200mg/day, preferably from dietary sources.  Diet, Weight loss, and Exercise were discussed as well.       (F33.0) Major depressive disorder, recurrent episode, mild (H24)  Comment: doing well despite stressors. Refilled   Plan: venlafaxine (EFFEXOR XR) 150 MG 24 hr capsule,         venlafaxine (EFFEXOR XR) 75 MG 24 hr capsule            (F41.9) Anxiety  Comment: refilled   Plan: venlafaxine (EFFEXOR XR) 150 MG 24 hr capsule,         venlafaxine (EFFEXOR XR) 75 MG 24 hr capsule            (C50.411,  Z17.0) Malignant neoplasm of upper-outer quadrant of right breast in female, estrogen receptor positive (H)  Comment: in remission   Plan: gabapentin (NEURONTIN) 300 MG capsule            (G62.9) Neuropathy  Comment: dose stable. Filled by ortho right now   Plan: gabapentin (NEURONTIN) 300 MG capsule            (G62.0) Drug-induced polyneuropathy (H24)  Comment: on gabapentin   Plan:     (I26.94) Multiple subsegmental pulmonary emboli without acute cor pulmonale (H)  Comment: history of blood clots. No recurrence   Plan:     (Z13.220) Screening for cholesterol level  Comment: discussed that she will do when she has lost more weight   Plan: Lipid panel reflex to direct LDL Fasting              COUNSELING:  Reviewed preventive health counseling, as reflected in patient instructions       Regular exercise       Healthy diet/nutrition      BMI:   Estimated body mass index is 32.27 kg/m  as calculated from the following:    Height as of 9/26/23: 1.626 m (5' 4\").    Weight as of 9/26/23: 85.3 kg (188 lb).   Weight management plan: on wegovy      She reports that she quit smoking about 7 years ago. Her smoking use included cigarettes. She has a 5.00 " pack-year smoking history. She has never used smokeless tobacco.          Yenifer Peters MD  [facility]

## 2023-10-06 ENCOUNTER — OFFICE VISIT (OUTPATIENT)
Dept: FAMILY MEDICINE | Facility: CLINIC | Age: 43
End: 2023-10-06
Payer: COMMERCIAL

## 2023-10-06 VITALS
HEART RATE: 88 BPM | HEIGHT: 64 IN | WEIGHT: 187.8 LBS | SYSTOLIC BLOOD PRESSURE: 126 MMHG | OXYGEN SATURATION: 97 % | RESPIRATION RATE: 18 BRPM | TEMPERATURE: 97.5 F | DIASTOLIC BLOOD PRESSURE: 90 MMHG | BODY MASS INDEX: 32.06 KG/M2

## 2023-10-06 DIAGNOSIS — Z13.220 SCREENING FOR CHOLESTEROL LEVEL: ICD-10-CM

## 2023-10-06 DIAGNOSIS — Z00.01 ENCOUNTER FOR ROUTINE ADULT MEDICAL EXAM WITH ABNORMAL FINDINGS: Primary | ICD-10-CM

## 2023-10-06 DIAGNOSIS — C50.411 MALIGNANT NEOPLASM OF UPPER-OUTER QUADRANT OF RIGHT BREAST IN FEMALE, ESTROGEN RECEPTOR POSITIVE (H): ICD-10-CM

## 2023-10-06 DIAGNOSIS — G62.0 DRUG-INDUCED POLYNEUROPATHY (H): ICD-10-CM

## 2023-10-06 DIAGNOSIS — I26.94 MULTIPLE SUBSEGMENTAL PULMONARY EMBOLI WITHOUT ACUTE COR PULMONALE (H): ICD-10-CM

## 2023-10-06 DIAGNOSIS — F41.9 ANXIETY: ICD-10-CM

## 2023-10-06 DIAGNOSIS — F33.0 MAJOR DEPRESSIVE DISORDER, RECURRENT EPISODE, MILD (H): ICD-10-CM

## 2023-10-06 DIAGNOSIS — Z17.0 MALIGNANT NEOPLASM OF UPPER-OUTER QUADRANT OF RIGHT BREAST IN FEMALE, ESTROGEN RECEPTOR POSITIVE (H): ICD-10-CM

## 2023-10-06 DIAGNOSIS — G62.9 NEUROPATHY: ICD-10-CM

## 2023-10-06 PROCEDURE — 99396 PREV VISIT EST AGE 40-64: CPT | Performed by: FAMILY MEDICINE

## 2023-10-06 RX ORDER — VENLAFAXINE HYDROCHLORIDE 75 MG/1
CAPSULE, EXTENDED RELEASE ORAL
Qty: 90 CAPSULE | Refills: 3 | Status: SHIPPED | OUTPATIENT
Start: 2023-10-06

## 2023-10-06 RX ORDER — VENLAFAXINE HYDROCHLORIDE 150 MG/1
150 CAPSULE, EXTENDED RELEASE ORAL DAILY
Qty: 90 CAPSULE | Refills: 3 | Status: SHIPPED | OUTPATIENT
Start: 2023-10-06

## 2023-10-06 RX ORDER — GABAPENTIN 300 MG/1
CAPSULE ORAL
Qty: 270 CAPSULE | Refills: 3 | COMMUNITY
Start: 2023-10-06 | End: 2024-02-06

## 2023-10-06 ASSESSMENT — ENCOUNTER SYMPTOMS
FREQUENCY: 1
ARTHRALGIAS: 0
HEMATURIA: 0
MYALGIAS: 0
PALPITATIONS: 0
JOINT SWELLING: 0
DIZZINESS: 0
ABDOMINAL PAIN: 0
DYSURIA: 0
EYE PAIN: 0
HEMATOCHEZIA: 0
SORE THROAT: 0
SHORTNESS OF BREATH: 0
HEADACHES: 1
FEVER: 0
CHILLS: 0
HEARTBURN: 1
NERVOUS/ANXIOUS: 1
WEAKNESS: 0
COUGH: 0
DIARRHEA: 0
BREAST MASS: 0
NAUSEA: 0
PARESTHESIAS: 0
CONSTIPATION: 1

## 2023-10-06 ASSESSMENT — ANXIETY QUESTIONNAIRES
GAD7 TOTAL SCORE: 7
1. FEELING NERVOUS, ANXIOUS, OR ON EDGE: SEVERAL DAYS
2. NOT BEING ABLE TO STOP OR CONTROL WORRYING: SEVERAL DAYS
IF YOU CHECKED OFF ANY PROBLEMS ON THIS QUESTIONNAIRE, HOW DIFFICULT HAVE THESE PROBLEMS MADE IT FOR YOU TO DO YOUR WORK, TAKE CARE OF THINGS AT HOME, OR GET ALONG WITH OTHER PEOPLE: NOT DIFFICULT AT ALL
7. FEELING AFRAID AS IF SOMETHING AWFUL MIGHT HAPPEN: SEVERAL DAYS
6. BECOMING EASILY ANNOYED OR IRRITABLE: SEVERAL DAYS
3. WORRYING TOO MUCH ABOUT DIFFERENT THINGS: SEVERAL DAYS
4. TROUBLE RELAXING: SEVERAL DAYS
5. BEING SO RESTLESS THAT IT IS HARD TO SIT STILL: SEVERAL DAYS
GAD7 TOTAL SCORE: 7

## 2023-10-06 ASSESSMENT — PATIENT HEALTH QUESTIONNAIRE - PHQ9
SUM OF ALL RESPONSES TO PHQ QUESTIONS 1-9: 3
10. IF YOU CHECKED OFF ANY PROBLEMS, HOW DIFFICULT HAVE THESE PROBLEMS MADE IT FOR YOU TO DO YOUR WORK, TAKE CARE OF THINGS AT HOME, OR GET ALONG WITH OTHER PEOPLE: NOT DIFFICULT AT ALL
SUM OF ALL RESPONSES TO PHQ QUESTIONS 1-9: 3

## 2023-10-06 ASSESSMENT — ASTHMA QUESTIONNAIRES: ACT_TOTALSCORE: 25

## 2023-10-09 ENCOUNTER — THERAPY VISIT (OUTPATIENT)
Dept: PHYSICAL THERAPY | Facility: CLINIC | Age: 43
End: 2023-10-09
Attending: NURSE PRACTITIONER
Payer: COMMERCIAL

## 2023-10-09 DIAGNOSIS — M54.41 ACUTE LOW BACK PAIN WITH RIGHT-SIDED SCIATICA: Primary | ICD-10-CM

## 2023-10-09 PROCEDURE — 97140 MANUAL THERAPY 1/> REGIONS: CPT | Mod: GP | Performed by: PHYSICAL THERAPIST

## 2023-10-09 PROCEDURE — 97110 THERAPEUTIC EXERCISES: CPT | Mod: GP | Performed by: PHYSICAL THERAPIST

## 2023-10-12 ENCOUNTER — RADIOLOGY INJECTION OFFICE VISIT (OUTPATIENT)
Dept: PHYSICAL MEDICINE AND REHAB | Facility: CLINIC | Age: 43
End: 2023-10-12
Attending: NURSE PRACTITIONER
Payer: COMMERCIAL

## 2023-10-12 VITALS
OXYGEN SATURATION: 96 % | TEMPERATURE: 98.1 F | HEART RATE: 92 BPM | RESPIRATION RATE: 16 BRPM | DIASTOLIC BLOOD PRESSURE: 60 MMHG | SYSTOLIC BLOOD PRESSURE: 114 MMHG

## 2023-10-12 DIAGNOSIS — M54.41 ACUTE RIGHT-SIDED LOW BACK PAIN WITH RIGHT-SIDED SCIATICA: ICD-10-CM

## 2023-10-12 PROCEDURE — 64483 NJX AA&/STRD TFRM EPI L/S 1: CPT | Mod: RT | Performed by: PHYSICAL MEDICINE & REHABILITATION

## 2023-10-12 RX ORDER — LIDOCAINE HYDROCHLORIDE 10 MG/ML
INJECTION, SOLUTION EPIDURAL; INFILTRATION; INTRACAUDAL; PERINEURAL
Status: COMPLETED | OUTPATIENT
Start: 2023-10-12 | End: 2023-10-12

## 2023-10-12 RX ORDER — DEXAMETHASONE SODIUM PHOSPHATE 10 MG/ML
INJECTION, SOLUTION INTRAMUSCULAR; INTRAVENOUS
Status: COMPLETED | OUTPATIENT
Start: 2023-10-12 | End: 2023-10-12

## 2023-10-12 RX ADMIN — DEXAMETHASONE SODIUM PHOSPHATE 10 MG: 10 INJECTION, SOLUTION INTRAMUSCULAR; INTRAVENOUS at 08:22

## 2023-10-12 RX ADMIN — LIDOCAINE HYDROCHLORIDE 2 ML: 10 INJECTION, SOLUTION EPIDURAL; INFILTRATION; INTRACAUDAL; PERINEURAL at 08:20

## 2023-10-12 ASSESSMENT — PAIN SCALES - GENERAL
PAINLEVEL: MILD PAIN (3)
PAINLEVEL: MILD PAIN (3)

## 2023-10-12 NOTE — PATIENT INSTRUCTIONS
DISCHARGE INSTRUCTIONS    During office hours (8:00 a.m.- 4:00 p.m.) questions or concerns may be answered  by calling Spine Center Navigation Nurses at  [phone].  Messages received after hours will be returned the following business day.      In the case of an emergency, please dial 911 or seek assistance at the nearest Emergency Room/Urgent Care facility.     All Patients:    You may experience an increase in your symptoms for the first 2 days (It may take anywhere between 2 days- 2 weeks for the steroid to have maximum effect).    You may use ice on the injection site, as frequently as 20 minutes each hour if needed.    You may take your pain medicine.    You may continue taking your regular medication after your injection. If you have had a Medial Branch Block you may resume pain medication once your pain diary is completed.    You may shower. No swimming, tub bath or hot tub for 48 hours.  You may remove your bandaid/bandage as soon as you are home.    You may resume light activities, as tolerated.    Resume your usual diet as tolerated.    It is strongly advised that you do not drive for 1-3 hours post injection.    If you have had oral sedation:  Do not drive for 8 hours post injection.      If you have had IV sedation:  Do not drive for 24 hours post injection.  Do not operate hazardous machinery or make important personal/business decisions for 24 hours.      POSSIBLE STEROID SIDE EFFECTS (If steroid/cortisone was used for your procedure)    -If you experience these symptoms, it should only last for a short period    Swelling of the legs              Skin redness (flushing)     Mouth (oral) irritation   Blood sugar (glucose) levels            Sweats                    Mood changes  Headache  Sleeplessness  Weakened immune system for up to 14 days, which could increase the risk of beba the COVID-19 virus and/or experiencing more severe symptoms of the disease, if exposed.  Decreased  effectiveness of the flu vaccine if given within 2 weeks of the steroid.         POSSIBLE PROCEDURE SIDE EFFECTS  -Call the Spine Center if you are concerned  Increased Pain           Increased numbness/tingling      Nausea/Vomiting          Bruising/bleeding at site      Redness or swelling                                              Difficulty walking      Weakness           Fever greater than 100.5    *In the event of a severe headache after an epidural steroid injection that is relieved by lying down, please call the [facility] to speak with a clinical staff member*

## 2023-10-23 ENCOUNTER — VIRTUAL VISIT (OUTPATIENT)
Dept: UROLOGY | Facility: CLINIC | Age: 43
End: 2023-10-23
Payer: COMMERCIAL

## 2023-10-23 DIAGNOSIS — R35.0 URINARY FREQUENCY: Primary | ICD-10-CM

## 2023-10-23 DIAGNOSIS — N39.41 URGE INCONTINENCE: ICD-10-CM

## 2023-10-23 DIAGNOSIS — Z17.0 MALIGNANT NEOPLASM OF UPPER-OUTER QUADRANT OF RIGHT BREAST IN FEMALE, ESTROGEN RECEPTOR POSITIVE (H): ICD-10-CM

## 2023-10-23 DIAGNOSIS — C50.411 MALIGNANT NEOPLASM OF UPPER-OUTER QUADRANT OF RIGHT BREAST IN FEMALE, ESTROGEN RECEPTOR POSITIVE (H): ICD-10-CM

## 2023-10-23 PROCEDURE — 99214 OFFICE O/P EST MOD 30 MIN: CPT | Mod: VID | Performed by: PHYSICIAN ASSISTANT

## 2023-10-23 RX ORDER — LETROZOLE 2.5 MG/1
2.5 TABLET, FILM COATED ORAL DAILY
Qty: 120 TABLET | Refills: 1 | Status: SHIPPED | OUTPATIENT
Start: 2023-10-23 | End: 2024-04-17

## 2023-10-23 RX ORDER — OXYBUTYNIN CHLORIDE 10 MG/1
10 TABLET, EXTENDED RELEASE ORAL DAILY
Qty: 90 TABLET | Refills: 3 | Status: SHIPPED | OUTPATIENT
Start: 2023-10-23 | End: 2024-04-08

## 2023-10-23 ASSESSMENT — PAIN SCALES - GENERAL: PAINLEVEL: NO PAIN (0)

## 2023-10-23 NOTE — PATIENT INSTRUCTIONS
Follow-up in 6 months    Continue oxybutynin; increased to 10mg daily.     Continue off methenamine.     Contact clinic if you develop symptoms of a UTI in the future or if you see blood in your urine. Recommend going to the ER if you develop fevers/chills, flank pain and/or blood in your urine. These could be signs of UTI or kidney stone.

## 2023-10-23 NOTE — PROGRESS NOTES
"Virtual Visit Details    Type of service:  Video Visit     Originating Location (pt. Location): {video visit patient location:914698::\"Home\"}  {PROVIDER LOCATION On-site should be selected for visits conducted from your clinic location or adjoining [facility] hospital, academic office, or other nearby [facility] building. Off-site should be selected for all other provider locations, including home:[id number]}  Distant Location (provider location):  {virtual location provider:903569}  Platform used for Video Visit: {Virtual Visit Platforms:833659::\"[employer]\"}    "

## 2023-10-23 NOTE — NURSING NOTE
Pt would like to discuss medications and possible refills at today's visit     Is the patient currently in the state of [place]? YES    Visit mode:VIDEO    If the visit is dropped, the patient can be reconnected by: VIDEO VISIT: Text to cell phone:   Telephone Information:   Mobile [phone]       Will anyone else be joining the visit? NO  (If patient encounters technical issues they should call [phone])    How would you like to obtain your AVS? MyChart    Are changes needed to the allergy or medication list? Pt stated no changes to allergies and Pt stated no med changes    Reason for visit: RECHECK (6 week follow-up )    Carin BURKS

## 2023-10-23 NOTE — PROGRESS NOTES
Video-Visit Details    Type of service:  Video Visit    Video Start Time: 11:22 AM    Video End Time:11:30 AM    Originating Location (pt. Location): Home in [place]    Distant Location (provider location): onsite    Platform used for Video Visit: [facility]      Urology Clinic      Name: Diana Wilson    MRN: [id number]   YOB: 1980  Accompanied at today's visit by:self                 Chief Complaint:   Alex          History of Present Illness:   October 23, 2023    HISTORY:   We have been following 43 year old Diana Wilson for UTI, vaginal atrophy, dyspareunia, levator spasm, bladder spasms, urinary urgency/frequency UUI, high tone pelvic floor dysfunction, hx of gross hematuria and  hx of kidney stones. She had a sling on 12/12/22. Had cysto in 3/2022 by Dr. Ochoa that was unremarkable. She also had a recent Cysto in 5/2023 showed trabeculations but otherwise unremarkable. Had repeat CT in 9/2023 showing 2mm stone and right renal cyst. Had renal US following CT later in 9/2023 that was unremarkable. Is s/p sling procedure on 12/22/22. Hx is complicated by hx of breast cancer. Last seen on 8/28/23 via VV. Was advised to continue methenamine with vitamin C and estrogen cream. Started on oxybutyin 5mg daily and advised to continue IC diet. States Oxybutynin is helping symptoms. Ran out of methenamine about 1 week ago and been doing well off of it. Does have the macrobid prn for post coital or swimming. Would like to stay off of methenamine for now. Denies gross hematuria or kidney stone symptoms. Per EMR, no recent UTIs. UC showing RBC but stable from previous UAs in the past. [place] has improved with estrogen cream. Planning to see oncologist next week. Patient voices no other concerns at this time.           Allergies:     Allergies   Allergen Reactions    Carboplatin Shortness Of Breath, Rash and Anaphylaxis     Other reaction(s): Flushing, Tachycardia    Amoxicillin GI Disturbance     "Erythromycin GI Disturbance    Gluten Meal      Celiac disease    Hydrocodone-Acetaminophen      Other reaction(s): Hallucinations    Hydromorphone Headache    Naltrexone Other (See Comments)     \"Felt high\"    Penicillins Nausea and Vomiting and Hives     1-11-17 pt states this was a childhood reaction    Phentermine Other (See Comments)     Elevated BP    Topamax [Topiramate] Other (See Comments)     Cloudy thinking    Wellbutrin [Bupropion] Other (See Comments)     \"Felt high\"    Zolpidem      Other reaction(s): Hallucinations    Zolpidem Tartrate      hallucinations    Hydrocodone Other (See Comments)     Out of body experience, \"creepy-crawly\" skin             Medications:     Current Outpatient Medications   Medication Sig    oxyBUTYnin ER (DITROPAN XL) 10 MG 24 hr tablet Take 1 tablet (10 mg) by mouth daily    Ascorbic Acid (VITAMIN C PO)     aspirin (ASA) 325 MG EC tablet Take 325 mg by mouth daily    cetirizine (ZYRTEC) 10 MG tablet Take 10 mg by mouth every morning    cholecalciferol (VITAMIN D3) 1000 units (25 mcg) capsule Take 1 capsule by mouth daily    Fluticasone Propionate (FLONASE NA) Spray 1 spray into both nostrils daily     gabapentin (NEURONTIN) 300 MG capsule 300mg in a.m. and 300mg in afternoon and 600mg in evening    gabapentin (NEURONTIN) 300 MG capsule Take 2 capsules (600 mg) by mouth 3 times daily    letrozole (FEMARA) 2.5 MG tablet Take 1 tablet (2.5 mg) by mouth daily    lidocaine (XYLOCAINE) 5 % external ointment Apply topically as needed for moderate pain Apply to cotton ball then apply to affected area 10min prior to PT exercises or intercourse.    methenamine hippurate (HIPREX) 1 g tablet Take 1 tablet (1 g) by mouth 2 times daily    Multiple Vitamins-Minerals (MULTIVITAMIN PO) Take 1 tablet by mouth daily     omeprazole (PRILOSEC) 20 MG DR capsule TAKE ONE CAPSULE BY MOUTH EVERY MORNING 30 MINUTES BEFORE BREAKFAST    Semaglutide-Weight Management (WEGOVY) 2.4 MG/0.75ML pen Inject " 2.4 mg Subcutaneous once a week    venlafaxine (EFFEXOR XR) 150 MG 24 hr capsule Take 1 capsule (150 mg) by mouth daily    venlafaxine (EFFEXOR XR) 75 MG 24 hr capsule Take with 150mg capsule for total dose of 225mg    YUVAFEM 10 MCG TABS vaginal tablet INSERT ONE TABLET VAGINALLY TWICE A WEEK     Current Facility-Administered Medications   Medication    lidocaine (XYLOCAINE) 2 % external gel          Past  Surgical History:     Past Surgical History:   Procedure Laterality Date    BIOPSY  2016    EDG with biopsy    BREAST SURGERY      Bilateral reduction     SECTION      CYSTOSCOPY, SLING TRANSVAGINAL N/A 2022    Procedure: CREATION, VAGINAL SLING, WITH CYSTOSCOPY (support the urethra with mesh sling and look in the bladder);  Surgeon: Sixto Harris MD;  Location:  OR    DENTAL SURGERY  2010 and 10/12/2010    Root canals    DILATION AND CURETTAGE, HYSTEROSCOPY, ABLATE ENDOMETRIUM NOVASURE, COMBINED  3/20/2012    Procedure:COMBINED DILATION AND CURETTAGE, HYSTEROSCOPY, ABLATE ENDOMETRIUM NOVASURE; Hysteroscopy with Endometrial Ablation Novasure; Surgeon:GERRI PURCELL; Location:[facility]    ESOPHAGOSCOPY, GASTROSCOPY, DUODENOSCOPY (EGD), COMBINED N/A 2016    Procedure: COMBINED ESOPHAGOSCOPY, GASTROSCOPY, DUODENOSCOPY (EGD);  Surgeon: Jose L Wilson MD;  Location: [facility]    INSERT PORT VASCULAR ACCESS N/A 2017    Procedure: INSERT PORT VASCULAR ACCESS;  Surgeon: Michael Townsend MD;  Location: [facility]    LAPAROSCOPIC SALPINGO-OOPHORECTOMY Bilateral 2017    Procedure: LAPAROSCOPIC SALPINGO-OOPHORECTOMY;  Laparoscopic bilateral salpingo oophorectomy;  Surgeon: Preeti Tirado MD;  Location: [facility]    RECONSTRUCT BREAST BILATERAL, IMPLANT PROSTHESIS BILATERAL, COMBINED Bilateral 3/16/2018    Procedure: COMBINED RECONSTRUCT BREAST BILATERAL, IMPLANT PROSTHESIS BILATERAL;  BILATERAL SECOND STAGE BREAST RECONSTRUCTION WITH SILICONE GEL IMPLANT;   Surgeon: Eugenio Ruelas MD;  Location: [facility]    REVISE RECONSTRUCTED BREAST      SURGICAL HISTORY OF -   1997    Breast reduction    ZZC APPENDECTOMY  1991             Physical Exam:   There were no vitals filed for this visit.  PSYCH: NAD  EYES: EOMI  NEURO: AAO x3    LABS:   UC  9/10/23 no growth  7/19/23 >100,000 E. Coli (resistant to ampicillin and cefazolin)   6/28/23 No growth  5/22/23 No growth  3/25/23  No growth  1/23/23 No growth  1/13/23 10,000-50,000 Proteus mirabilis (resistant to macorbid)     Creatinine   Date Value Ref Range Status   04/24/2023 0.68 0.51 - 0.95 mg/dL Final   07/09/2021 0.77 0.52 - 1.04 mg/dL Final     Renal US 9/14/23  FINDINGS:     RIGHT KIDNEY: 10.6 x 5.2 x 4.8 cm. The renal cortex measures 2 cm in thickness. The renal parenchyma is otherwise sonographically unremarkable without hydronephrosis. An anechoic 1.4 x 1.2 x 1.1 cm cyst is seen within the midpole of the right kidney,   consistent with a parapelvic cyst.      LEFT KIDNEY: 11.6 x 5.3 x 4.3 cm. The renal cortex measures 1.7 cm in thickness. The renal parenchyma is normal without hydronephrosis or masses. A 4 mm nonobstructive collecting system calculi seen in the lower pole of left kidney.     BLADDER: Normal. Bilateral ureteral jets were visualized in the bladder.                                                                   IMPRESSION:  1.  Benign 1.4 x 1.2 x 1.1 cm parapelvic right mid pole renal cyst, no follow-up imaging is required   2.  4 mm nonobstructive left lower pole collecting system calculi   3.  No sonographic evidence of hydronephrosis.      CT a/p 9/10/23  FINDINGS:   LOWER CHEST: Normal.     HEPATOBILIARY: Normal.     PANCREAS: Normal.     SPLEEN: Normal.     ADRENAL GLANDS: Normal.     KIDNEYS/BLADDER: 2 mm stone involves the inferior aspect of the left kidney. There is no hydronephrosis. An 11 mm low dense lesion involves the midportion of the right kidney which is unchanged and likely  reflects a simple cyst though incompletely   characterized.     BOWEL: There is a large amount of stool within the ascending colon. No bowel obstruction or bowel inflammation.     LYMPH NODES: Normal.     VASCULATURE: Unremarkable.     PELVIC ORGANS: Normal.     MUSCULOSKELETAL: Normal.                                                                      IMPRESSION:   1.  2 mm left renal stone without hydronephrosis.  2.  11 mm right renal cysts, stable though incompletely characterized. Renal ultrasound could be performed for further evaluation if warranted.      5/22/23  Cystoscopy procedure:  Pt. Was consented and placed in the lithotomy position.  She was cleaned and preparred in the usual fashion.  Lidocaine gel was inserted into the urethra and given time to take effect.  A 16 fr flexible cystoscope was then inserted through the urethra and into the bladder.  The urethra was wnl.  The bladder was with 1+ trabeculation.  No tumors, diverticulae, or stones.  Bilateral u/o's were effluxing clear urine.  The cystoscope was then withdrawn.  The pt. Tolerated the procedure well.          Assessment and Plan:   43 year old is a pleasant female who has UUI, vaginal atrophy, dyspareunia, hx of Alex, high tone pelvic floor dysfunction, nephrolithiasis, hx of gross hematuria, hx of breast cancer.     Plan:  -  Continue macrodantin 50 mg prn intercourse or swimming [place].   - Continue off methenamine.  - Contact clinic if develops s/s of UTI or gross hematuria in the future.  - Continue estrogen   - Keep appointment with oncologist next week.   - Increased oxybutynin to 10mg daily per patient's request.   - Plan to follow-up in 6 months or sooner if needed.   - If has worsening microscopic hematuria or has gross hematuria, consider repeating CT and cysto.   - Offered referral to stone specialist; patient declined.   - After discussing the assessment and plan with patient, patient verbalizes understanding and agrees to  the above plan. All questions answered.     27 minutes spent on the date of the encounter doing chart review, review of  Dr. Harris's notes, review of labs, ordering oxybutynin, review of test results, interpretation of tests, patient visit and documentation.      Nithya Rodriguez PA-C  Urology  October 23, 2023      Patient Care Team:  Yenifer Peters MD as PCP - General (Family Practice)  Yenifer Peters MD as Assigned PCP  Eugenio Ruelas MD as MD (Plastic Surgery)  Jenifer Olson MD as MD (Surgery)  Josette Smalls RN as Specialty Care Coordinator (Oncology)  Sixto Harris MD as MD (Urology)  Sixto Harris MD as Assigned Surgical Provider  Layo Coulter MD as Assigned Cancer Care Provider  Lisa Naranjo APRN CNP as Assigned Neuroscience Provider

## 2023-10-30 ENCOUNTER — ONCOLOGY VISIT (OUTPATIENT)
Dept: ONCOLOGY | Facility: CLINIC | Age: 43
End: 2023-10-30
Attending: NURSE PRACTITIONER
Payer: COMMERCIAL

## 2023-10-30 ENCOUNTER — INFUSION THERAPY VISIT (OUTPATIENT)
Dept: INFUSION THERAPY | Facility: CLINIC | Age: 43
End: 2023-10-30
Attending: NURSE PRACTITIONER
Payer: COMMERCIAL

## 2023-10-30 ENCOUNTER — LAB (OUTPATIENT)
Dept: LAB | Facility: CLINIC | Age: 43
End: 2023-10-30
Attending: NURSE PRACTITIONER
Payer: COMMERCIAL

## 2023-10-30 ENCOUNTER — ALLIED HEALTH/NURSE VISIT (OUTPATIENT)
Dept: ONCOLOGY | Facility: CLINIC | Age: 43
End: 2023-10-30

## 2023-10-30 VITALS
SYSTOLIC BLOOD PRESSURE: 130 MMHG | TEMPERATURE: 97.8 F | DIASTOLIC BLOOD PRESSURE: 87 MMHG | WEIGHT: 189 LBS | OXYGEN SATURATION: 94 % | HEIGHT: 64 IN | BODY MASS INDEX: 32.27 KG/M2 | RESPIRATION RATE: 12 BRPM | HEART RATE: 99 BPM

## 2023-10-30 VITALS — SYSTOLIC BLOOD PRESSURE: 121 MMHG | HEART RATE: 84 BPM | DIASTOLIC BLOOD PRESSURE: 89 MMHG

## 2023-10-30 DIAGNOSIS — Z17.0 MALIGNANT NEOPLASM OF UPPER-OUTER QUADRANT OF RIGHT BREAST IN FEMALE, ESTROGEN RECEPTOR POSITIVE (H): Primary | ICD-10-CM

## 2023-10-30 DIAGNOSIS — M85.89 OSTEOPENIA OF MULTIPLE SITES: ICD-10-CM

## 2023-10-30 DIAGNOSIS — C50.411 MALIGNANT NEOPLASM OF UPPER-OUTER QUADRANT OF RIGHT BREAST IN FEMALE, ESTROGEN RECEPTOR POSITIVE (H): Primary | ICD-10-CM

## 2023-10-30 DIAGNOSIS — Z00.6 EXAMINATION OF PARTICIPANT IN CLINICAL TRIAL: Primary | ICD-10-CM

## 2023-10-30 DIAGNOSIS — Z00.6 EXAMINATION OF PARTICIPANT IN CLINICAL TRIAL: ICD-10-CM

## 2023-10-30 LAB
ALBUMIN SERPL BCG-MCNC: 4.7 G/DL (ref 3.5–5.2)
ALP SERPL-CCNC: 91 U/L (ref 35–104)
ALT SERPL W P-5'-P-CCNC: 15 U/L (ref 0–50)
ANION GAP SERPL CALCULATED.3IONS-SCNC: 12 MMOL/L (ref 7–15)
AST SERPL W P-5'-P-CCNC: 15 U/L (ref 0–45)
BASOPHILS # BLD AUTO: 0.1 10E3/UL (ref 0–0.2)
BASOPHILS NFR BLD AUTO: 1 %
BILIRUB SERPL-MCNC: 0.3 MG/DL
BUN SERPL-MCNC: 11.5 MG/DL (ref 6–20)
CALCIUM SERPL-MCNC: 9.8 MG/DL (ref 8.6–10)
CHLORIDE SERPL-SCNC: 103 MMOL/L (ref 98–107)
CREAT SERPL-MCNC: 0.75 MG/DL (ref 0.51–0.95)
DEPRECATED HCO3 PLAS-SCNC: 25 MMOL/L (ref 22–29)
EGFRCR SERPLBLD CKD-EPI 2021: >90 ML/MIN/1.73M2
EOSINOPHIL # BLD AUTO: 0.2 10E3/UL (ref 0–0.7)
EOSINOPHIL NFR BLD AUTO: 3 %
ERYTHROCYTE [DISTWIDTH] IN BLOOD BY AUTOMATED COUNT: 13.6 % (ref 10–15)
GLUCOSE SERPL-MCNC: 82 MG/DL (ref 70–99)
HCT VFR BLD AUTO: 39.4 % (ref 35–47)
HGB BLD-MCNC: 13.5 G/DL (ref 11.7–15.7)
IMM GRANULOCYTES # BLD: 0.1 10E3/UL
IMM GRANULOCYTES NFR BLD: 1 %
LYMPHOCYTES # BLD AUTO: 2.4 10E3/UL (ref 0.8–5.3)
LYMPHOCYTES NFR BLD AUTO: 26 %
MCH RBC QN AUTO: 29.2 PG (ref 26.5–33)
MCHC RBC AUTO-ENTMCNC: 34.3 G/DL (ref 31.5–36.5)
MCV RBC AUTO: 85 FL (ref 78–100)
MONOCYTES # BLD AUTO: 0.6 10E3/UL (ref 0–1.3)
MONOCYTES NFR BLD AUTO: 6 %
NEUTROPHILS # BLD AUTO: 5.9 10E3/UL (ref 1.6–8.3)
NEUTROPHILS NFR BLD AUTO: 63 %
NRBC # BLD AUTO: 0 10E3/UL
NRBC BLD AUTO-RTO: 0 /100
PLATELET # BLD AUTO: 330 10E3/UL (ref 150–450)
POTASSIUM SERPL-SCNC: 3.7 MMOL/L (ref 3.4–5.3)
PROT SERPL-MCNC: 7.3 G/DL (ref 6.4–8.3)
RBC # BLD AUTO: 4.63 10E6/UL (ref 3.8–5.2)
SODIUM SERPL-SCNC: 140 MMOL/L (ref 135–145)
WBC # BLD AUTO: 9.1 10E3/UL (ref 4–11)

## 2023-10-30 PROCEDURE — 99214 OFFICE O/P EST MOD 30 MIN: CPT | Performed by: NURSE PRACTITIONER

## 2023-10-30 PROCEDURE — 99213 OFFICE O/P EST LOW 20 MIN: CPT | Performed by: NURSE PRACTITIONER

## 2023-10-30 PROCEDURE — 99001 SPECIMEN HANDLING PT-LAB: CPT | Performed by: NURSE PRACTITIONER

## 2023-10-30 PROCEDURE — 36415 COLL VENOUS BLD VENIPUNCTURE: CPT

## 2023-10-30 PROCEDURE — 80053 COMPREHEN METABOLIC PANEL: CPT

## 2023-10-30 PROCEDURE — 250N000011 HC RX IP 250 OP 636: Mod: JZ | Performed by: NURSE PRACTITIONER

## 2023-10-30 PROCEDURE — 96365 THER/PROPH/DIAG IV INF INIT: CPT

## 2023-10-30 PROCEDURE — 85004 AUTOMATED DIFF WBC COUNT: CPT

## 2023-10-30 PROCEDURE — 86300 IMMUNOASSAY TUMOR CA 15-3: CPT

## 2023-10-30 PROCEDURE — 258N000003 HC RX IP 258 OP 636: Performed by: NURSE PRACTITIONER

## 2023-10-30 RX ORDER — ZOLEDRONIC ACID 0.04 MG/ML
4 INJECTION, SOLUTION INTRAVENOUS ONCE
Status: CANCELLED | OUTPATIENT
Start: 2023-11-07 | End: 2023-11-07

## 2023-10-30 RX ORDER — ZOLEDRONIC ACID 0.04 MG/ML
4 INJECTION, SOLUTION INTRAVENOUS ONCE
Qty: 100 ML | Refills: 0 | Status: COMPLETED | OUTPATIENT
Start: 2023-10-30 | End: 2023-10-30

## 2023-10-30 RX ADMIN — SODIUM CHLORIDE 250 ML: 9 INJECTION, SOLUTION INTRAVENOUS at 14:06

## 2023-10-30 RX ADMIN — ZOLEDRONIC ACID 4 MG: 0.04 INJECTION, SOLUTION INTRAVENOUS at 14:05

## 2023-10-30 ASSESSMENT — PAIN SCALES - GENERAL: PAINLEVEL: NO PAIN (0)

## 2023-10-30 NOTE — PROGRESS NOTES
[facility] Hematology and Oncology Outpatient Progress Note    Patient: Diana Wilson  MRN: [id number]  Date of Service: Oct 30, 2023          Reason for Visit    History of breast cancer    Primary Oncologist: Dr. Coulter      Assessment/Plan  History ER/IN+ right breast cancer (2016)  Now almost 7 years from diagnosis and 6.5 years completion of treatment.      Continues long-term endocrine therapy (has been on total of 5.5 yrs); on various AIs due to intolerance and Tamoxifen stopped for PE. She's most recently been on letrozole for the past 1.5 yr with better tolerance than the other AIs. Minimal arthralgias, hot flashes are minimal on Effexor, vaginal dryness and difficulty with weight loss have been side effects.    Clinical exam is unremarkable today.  No recurrence to date.     Labwork: CBC WNL, CMP pending, CA 27.29 pending.     CT abd/pelvis  and MRI L spine 9/2023 (for low back/flank pain), negative for cancer.     Plan:  -Continue letrozole. Planning 10 yrs of endocrine treatment, if tolerating (2028)  -Every 6 visit for surveillance.   -Annual CBC, hepatic panel and CA 27.29. Although labwork has not demonstrated improved survival when incorporated into breast cancer surveillance, she has preferred continuing this for now. We are not doing routine imaging, only for symptoms/exam findings.   -No role for mammograms, post bilateral mastectomies    2.  Chemo-induced peripheral neuropathy  On gabapentin 300 mg TID.  Stable neuropathy.     3.   Osteopenia  Risk of developing osteoporosis with long-term AI use.   Last DEXA 12/06/2021 with a lowest T-score of -1.3.  Has been on Zometa since 2019 (4.5 yr)    Plan:  -Continue 6 monthly Zometa   -Continue vitamin D and calcium supplementations.  -Repeat DEXA ahead of 6-month return visit  -Pending next DEXA results, consider holiday in next 1-2 yrs    4.   Sexuality concerns (vaginal dryness, low libido)   5.   Recurrent UTIs and yeast  infections  Currently on vaginal estrogen tablet.  Using lubricants.  Symptoms are much better now.  On PRN antibiotic prophylaxis. Follows with Urology.      6.  Renal cysts  7.  Non-obstructing left renal stone  CT for flank pain noted 11 mm right renal cysts. Renal US showed benign 1.4 cm right renal cyst and non-obstructive renal stone.  No follow-up was recommended by Urology.     8.  Lumbar back pain, benign  Scans show benign degenerative changes. Undergoing mgmt through PCP.    9.  Fam hx colon cancer  Father just diagnosed with colon cancer (age 76).   Reviewed she should initiate colon cancer screening at age 45 (2024) per updated guidelines0.    History of Present Illness/ Interval History    Ms. Diana Wilson is a 43 year old with history of ER/MA+ breast cancer, diagnosed almost 7 yr ago. S/P bilateral mastectomy (with reconstruction), RT, BSO and has been on adjuvant endocrine therapy ~5 yrs. She has had intolerance of anastrozole and Aromasin (significant arthralgias) and PE on Tamoxifen. She was changed to letrozole 1.5 yr ago and is tolerating this the best, so far.     Minimal/manageable arthralgias. Hot flashes are better. On Effexor for depression/anxiety, well-managed.  Vaginal dryness and recurrent UTIs, somewhat improved with vaginal estrogen but taking a break off this.     Denies any new issues since last visit.    For weight gain she has been started on semaglutide.     No new lumps or bumps reported.    No new bone pain reported. Imaging for flank/low back pain in Sept negative; degenerative changes in lower spine reported  No headaches.    Her father was just diagnosed with colon cancer; undergoing resection next month and will see Dr. Coulter in December for adjuvant recs.      ECOG Performance     0    Oncology History/Treatment  Diagnosis/Stage:   12/2016: T2N1 invasive ductal right breast cancer, ER/MA+ (HER2 neg) // ypTis(DCIS)pN0  -age 37 yo    -surgical path after neoadjuvant  chemo: right breast - no residual invasive carcinoma; grade III DCIS 1 mm; 5 sLN all neg.   Left breast - negative.     -Genetic testing: negative for mutations in the TYLER, BARD1, BRCA1, BRCA2, BRIP1, CDH1, CHEK2, MRE11A, MUTYH, NBN, NF1, PALB2, PTEN, RAD50, RAD51C, RAD51D, and TP53 genes by sequencing and deletion/duplication analysis. No mutations were found in any of the 17 genes analyzed. 2017 9/2020: PE (post-op breast reconstruction surgery)  -Tamoxifen stopped  -Anticoagulated 6 mths    Treatment:  2017: neoadjuvant ddAC x 4, then weekly carbo/taxol x 12  -stopped carbo after 7 weeks due to anaphylaxis    7/12/2017: bilateral mastectomies  (reconstruction 9/2020 with removal of implants and TRAM flap reconstruction; complicated by post-op PE)    11/2017: adjuvant RT completed (6040 cGy/33)    11/2017: BSO    2018 - present: adjuvant endocrine therapy  -Arimidex intermittently in 2018  -Tamoxifen [id number], stopped for PE  -Aromasin 2020 - 3/2022 (arthralgias)  -Letrozole 4/2022 - current      Physical Exam    GENERAL: Alert and oriented to time place and person. Seated comfortably. In no distress.  HEAD: Atraumatic and normocephalic. No alopecia.  EYES: ROMMEL, EOMI. No erythema. No icterus.  BREASTS: Reconstructed breasts.  No masses or lesions noted.  No bilateral axilla adenopathy.  CHEST: regular respiratory effort. Clear lung sounds bilaterally.  HEART: RRR  ABD: Soft, non-distended  SKIN: no rash, or bruising or purpura.   NEURO: No gross deficit noted. Non-antalgic gait.      Lab Results  Recent Results (from the past 840 hour(s))   CBC with platelets and differential    Collection Time: 10/30/23 12:49 PM   Result Value Ref Range    WBC Count 9.1 4.0 - 11.0 10e3/uL    RBC Count 4.63 3.80 - 5.20 10e6/uL    Hemoglobin 13.5 11.7 - 15.7 g/dL    Hematocrit 39.4 35.0 - 47.0 %    MCV 85 78 - 100 fL    MCH 29.2 26.5 - 33.0 pg    MCHC 34.3 31.5 - 36.5 g/dL    RDW 13.6 10.0 - 15.0 %    Platelet Count 330 150 -  450 10e3/uL    % Neutrophils 63 %    % Lymphocytes 26 %    % Monocytes 6 %    % Eosinophils 3 %    % Basophils 1 %    % Immature Granulocytes 1 %    NRBCs per 100 WBC 0 <1 /100    Absolute Neutrophils 5.9 1.6 - 8.3 10e3/uL    Absolute Lymphocytes 2.4 0.8 - 5.3 10e3/uL    Absolute Monocytes 0.6 0.0 - 1.3 10e3/uL    Absolute Eosinophils 0.2 0.0 - 0.7 10e3/uL    Absolute Basophils 0.1 0.0 - 0.2 10e3/uL    Absolute Immature Granulocytes 0.1 <=0.4 10e3/uL    Absolute NRBCs 0.0 10e3/uL           Imaging    PAIN Transforaminal MAGDALENE Inj Lumbosacral One Level Right    Result Date: 10/12/2023  PROCEDURE NOTE: Lumbar Transforaminal Epidural Steroid Injection Under Fluoroscopic Guidance PROCEDURE DATE: 10/12/2023 PATIENT NAME: Diana Wilson YOB: 1980 ATTENDING PHYSICIAN: Derek Narvaez MD RESIDENT/FELLOW PHYSICIAN: None PREOPERATIVE DIAGNOSIS: Acute right-sided low back pain with right-sided sciatica POSTOPERATIVE DIAGNOSIS: same PROCEDURE PERFORMED: Right  Lumbar Transforaminal Epidural Steroid Injection at the L3-4 level FLUOROSCOPY WAS USED. INDICATIONS FOR PROCEDURE: Diana Wilson is a 43 year old female with a clinical picture consistent with the above-mentioned diagnosis, resulting in radicular pain to the right lower extremity. PROCEDURE AND FINDINGS:  She was greeted in the pre-procedure holding area. The risk, benefits and alternatives to the procedure were again reviewed with the patient and written informed consent was placed in the chart. An IV line was not placed.  A 500 mL bag of NS was not connected to the patient. she was taken to the procedure room and positioned prone on the fluoroscopy table.  Routine monitors were applied including EKG leads (with sedation), blood pressure cuff, and pulse oximetry. Prior to the procedure a time out was completed, verifying correct patient, procedure, site, positioning, and implants and/or special equipment. An AP fluoroscopic  film was taken to identify  the L3 pedicle and the L4 superior articulating process. The skin was prepped with chlorhexidine and draped in the usual sterile fashion. The skin and subcutaneous tissue overlying the aforementioned anatomic targets were anesthetized using a 27-gauge 1-1/4 inch needle with 1% preservative-free lidocaine for a total volume of 2mls.  Then a 22-gauge 3.5 inch Quincke spinal needle was advanced under fluoroscopic guidance using an oblique view just inferior to the pedicle of the L3 level(s) on the right side(s).  Then, utilizing AP and lateral fluoroscopic views, we confirmed the position of the needle tip to be within the neural foramen. After negative aspiration, 1 mls of Omnipaque-300 contrast was injected under AP view at each level and confirmed adequate spread along the nerve root and in the epidural space. There was no evidence of intravascular uptake or intrathecal spread on imaging. At this point, after negative aspiration, a total 2mL volume of treatment injectate, consisting of 1mL Dexamethasone (10mg/mL), and 1mL of 1% Lidocaine, was injected easily on the right.  The needle was then flushed with 0.5 ml of local anesthetic and removed. The needle insertion site was dressed appropriately. Diana was taken to the recovery room where she was monitored for a brief period of time. She tolerated the procedure well and was discharged home in stable condition with post procedural instructions. Before the procedure, she reported a pain score of 3/10. After the procedure, she reported a pain score of 3/10.   Follow-up will be in [facility] with COLLEEN Vines, CNP COMPLICATIONS:  None Comment(s):  None                                                     Total time 35 minutes, to include face to face visit, review of EMR, ordering, documentation and coordination of care on date of service    Signed by:   Leola Amin NP

## 2023-10-30 NOTE — PROGRESS NOTES
Infusion Nursing Note:  Diana Wilson presents today for Zometa.    Patient seen by provider today: No   present during visit today: Not Applicable.    Note: N/A.    Intravenous Access:  Peripheral IV placed.    Treatment Conditions:  Lab Results   Component Value Date     10/30/2023    POTASSIUM 3.7 10/30/2023    MAG 1.5 (L) 10/16/2020    CR 0.75 10/30/2023    DENY 9.8 10/30/2023    BILITOTAL 0.3 10/30/2023    ALBUMIN 4.7 10/30/2023    ALT 15 10/30/2023    AST 15 10/30/2023       Results reviewed, labs MET treatment parameters, ok to proceed with treatment.    Post Infusion Assessment:  Patient tolerated infusion without incident.  Blood return noted pre and post infusion.  Site patent and intact, free from redness, edema or discomfort.  No evidence of extravasations.  Access discontinued per protocol.     Discharge Plan:   Patient discharged in stable condition accompanied by: self.  Departure Mode: Ambulatory.    Demetrio Mccormack RN

## 2023-10-30 NOTE — LETTER
10/30/2023         RE: Diana Wilson  1972 [address] [facility] [address]        Dear Colleague,    Thank you for referring your patient, Diana Wilson, to the [facility]. Please see a copy of my visit note below.    [facility] Hematology and Oncology Outpatient Progress Note    Patient: Diana Wilson  MRN: [id number]  Date of Service: Oct 30, 2023          Reason for Visit    History of breast cancer    Primary Oncologist: Dr. Coulter      Assessment/Plan  History ER/OR+ right breast cancer (2016)  Now almost 7 years from diagnosis and 6.5 years completion of treatment.      Continues long-term endocrine therapy (has been on total of 5.5 yrs); on various AIs due to intolerance and Tamoxifen stopped for PE. She's most recently been on letrozole for the past 1.5 yr with better tolerance than the other AIs. Minimal arthralgias, hot flashes are minimal on Effexor, vaginal dryness and difficulty with weight loss have been side effects.    Clinical exam is unremarkable today.  No recurrence to date.     Labwork: CBC WNL, CMP pending, CA 27.29 pending.     CT abd/pelvis  and MRI L spine 9/2023 (for low back/flank pain), negative for cancer.     Plan:  -Continue letrozole. Planning 10 yrs of endocrine treatment, if tolerating (2028)  -Every 6 visit for surveillance.   -Annual CBC, hepatic panel and CA 27.29. Although labwork has not demonstrated improved survival when incorporated into breast cancer surveillance, she has preferred continuing this for now. We are not doing routine imaging, only for symptoms/exam findings.   -No role for mammograms, post bilateral mastectomies    2.  Chemo-induced peripheral neuropathy  On gabapentin 300 mg TID.  Stable neuropathy.     3.   Osteopenia  Risk of developing osteoporosis with long-term AI use.   Last DEXA 12/06/2021 with a lowest T-score of -1.3.  Has been on Zometa since 2019 (4.5 yr)    Plan:  -Continue 6 monthly Zometa   -Continue  vitamin D and calcium supplementations.  -Repeat DEXA ahead of 6-month return visit  -Pending next DEXA results, consider holiday in next 1-2 yrs    4.   Sexuality concerns (vaginal dryness, low libido)   5.   Recurrent UTIs and yeast infections  Currently on vaginal estrogen tablet.  Using lubricants.  Symptoms are much better now.  On PRN antibiotic prophylaxis. Follows with Urology.      6.  Renal cysts  7.  Non-obstructing left renal stone  CT for flank pain noted 11 mm right renal cysts. Renal US showed benign 1.4 cm right renal cyst and non-obstructive renal stone.  No follow-up was recommended by Urology.     8.  Lumbar back pain, benign  Scans show benign degenerative changes. Undergoing mgmt through PCP.    9.  Fam hx colon cancer  Father just diagnosed with colon cancer (age 76).   Reviewed she should initiate colon cancer screening at age 45 (2024) per updated guidelines0.    History of Present Illness/ Interval History    Ms. Diana Wilson is a 43 year old with history of ER/WI+ breast cancer, diagnosed almost 7 yr ago. S/P bilateral mastectomy (with reconstruction), RT, BSO and has been on adjuvant endocrine therapy ~5 yrs. She has had intolerance of anastrozole and Aromasin (significant arthralgias) and PE on Tamoxifen. She was changed to letrozole 1.5 yr ago and is tolerating this the best, so far.     Minimal/manageable arthralgias. Hot flashes are better. On Effexor for depression/anxiety, well-managed.  Vaginal dryness and recurrent UTIs, somewhat improved with vaginal estrogen but taking a break off this.     Denies any new issues since last visit.    For weight gain she has been started on semaglutide.     No new lumps or bumps reported.    No new bone pain reported. Imaging for flank/low back pain in Sept negative; degenerative changes in lower spine reported  No headaches.    Her father was just diagnosed with colon cancer; undergoing resection next month and will see Dr. Coulter in December  for adjuvant recs.      ECOG Performance     0    Oncology History/Treatment  Diagnosis/Stage:   12/2016: T2N1 invasive ductal right breast cancer, ER/MA+ (HER2 neg) // ypTis(DCIS)pN0  -age 37 yo    -surgical path after neoadjuvant chemo: right breast - no residual invasive carcinoma; grade III DCIS 1 mm; 5 sLN all neg.   Left breast - negative.     -Genetic testing: negative for mutations in the TYLER, BARD1, BRCA1, BRCA2, BRIP1, CDH1, CHEK2, MRE11A, MUTYH, NBN, NF1, PALB2, PTEN, RAD50, RAD51C, RAD51D, and TP53 genes by sequencing and deletion/duplication analysis. No mutations were found in any of the 17 genes analyzed. 2017 9/2020: PE (post-op breast reconstruction surgery)  -Tamoxifen stopped  -Anticoagulated 6 mths    Treatment:  2017: neoadjuvant ddAC x 4, then weekly carbo/taxol x 12  -stopped carbo after 7 weeks due to anaphylaxis    7/12/2017: bilateral mastectomies  (reconstruction 9/2020 with removal of implants and TRAM flap reconstruction; complicated by post-op PE)    11/2017: adjuvant RT completed (6040 cGy/33)    11/2017: BSO    2018 - present: adjuvant endocrine therapy  -Arimidex intermittently in 2018  -Tamoxifen [id number], stopped for PE  -Aromasin 2020 - 3/2022 (arthralgias)  -Letrozole 4/2022 - current      Physical Exam    GENERAL: Alert and oriented to time place and person. Seated comfortably. In no distress.  HEAD: Atraumatic and normocephalic. No alopecia.  EYES: ROMMEL, EOMI. No erythema. No icterus.  BREASTS: Reconstructed breasts.  No masses or lesions noted.  No bilateral axilla adenopathy.  CHEST: regular respiratory effort. Clear lung sounds bilaterally.  HEART: RRR  ABD: Soft, non-distended  SKIN: no rash, or bruising or purpura.   NEURO: No gross deficit noted. Non-antalgic gait.      Lab Results  Recent Results (from the past 840 hour(s))   CBC with platelets and differential    Collection Time: 10/30/23 12:49 PM   Result Value Ref Range    WBC Count 9.1 4.0 - 11.0 10e3/uL    RBC  Count 4.63 3.80 - 5.20 10e6/uL    Hemoglobin 13.5 11.7 - 15.7 g/dL    Hematocrit 39.4 35.0 - 47.0 %    MCV 85 78 - 100 fL    MCH 29.2 26.5 - 33.0 pg    MCHC 34.3 31.5 - 36.5 g/dL    RDW 13.6 10.0 - 15.0 %    Platelet Count 330 150 - 450 10e3/uL    % Neutrophils 63 %    % Lymphocytes 26 %    % Monocytes 6 %    % Eosinophils 3 %    % Basophils 1 %    % Immature Granulocytes 1 %    NRBCs per 100 WBC 0 <1 /100    Absolute Neutrophils 5.9 1.6 - 8.3 10e3/uL    Absolute Lymphocytes 2.4 0.8 - 5.3 10e3/uL    Absolute Monocytes 0.6 0.0 - 1.3 10e3/uL    Absolute Eosinophils 0.2 0.0 - 0.7 10e3/uL    Absolute Basophils 0.1 0.0 - 0.2 10e3/uL    Absolute Immature Granulocytes 0.1 <=0.4 10e3/uL    Absolute NRBCs 0.0 10e3/uL           Imaging    PAIN Transforaminal MAGDALENE Inj Lumbosacral One Level Right    Result Date: 10/12/2023  PROCEDURE NOTE: Lumbar Transforaminal Epidural Steroid Injection Under Fluoroscopic Guidance PROCEDURE DATE: 10/12/2023 PATIENT NAME: Diana Wilson YOB: 1980 ATTENDING PHYSICIAN: Derek Narvaez MD RESIDENT/FELLOW PHYSICIAN: None PREOPERATIVE DIAGNOSIS: Acute right-sided low back pain with right-sided sciatica POSTOPERATIVE DIAGNOSIS: same PROCEDURE PERFORMED: Right  Lumbar Transforaminal Epidural Steroid Injection at the L3-4 level FLUOROSCOPY WAS USED. INDICATIONS FOR PROCEDURE: Diana Wilson is a 43 year old female with a clinical picture consistent with the above-mentioned diagnosis, resulting in radicular pain to the right lower extremity. PROCEDURE AND FINDINGS:  She was greeted in the pre-procedure holding area. The risk, benefits and alternatives to the procedure were again reviewed with the patient and written informed consent was placed in the chart. An IV line was not placed.  A 500 mL bag of NS was not connected to the patient. she was taken to the procedure room and positioned prone on the fluoroscopy table.  Routine monitors were applied including EKG leads (with sedation), blood  pressure cuff, and pulse oximetry. Prior to the procedure a time out was completed, verifying correct patient, procedure, site, positioning, and implants and/or special equipment. An AP fluoroscopic  film was taken to identify the L3 pedicle and the L4 superior articulating process. The skin was prepped with chlorhexidine and draped in the usual sterile fashion. The skin and subcutaneous tissue overlying the aforementioned anatomic targets were anesthetized using a 27-gauge 1-1/4 inch needle with 1% preservative-free lidocaine for a total volume of 2mls.  Then a 22-gauge 3.5 inch Quincke spinal needle was advanced under fluoroscopic guidance using an oblique view just inferior to the pedicle of the L3 level(s) on the right side(s).  Then, utilizing AP and lateral fluoroscopic views, we confirmed the position of the needle tip to be within the neural foramen. After negative aspiration, 1 mls of Omnipaque-300 contrast was injected under AP view at each level and confirmed adequate spread along the nerve root and in the epidural space. There was no evidence of intravascular uptake or intrathecal spread on imaging. At this point, after negative aspiration, a total 2mL volume of treatment injectate, consisting of 1mL Dexamethasone (10mg/mL), and 1mL of 1% Lidocaine, was injected easily on the right.  The needle was then flushed with 0.5 ml of local anesthetic and removed. The needle insertion site was dressed appropriately. Diana was taken to the recovery room where she was monitored for a brief period of time. She tolerated the procedure well and was discharged home in stable condition with post procedural instructions. Before the procedure, she reported a pain score of 3/10. After the procedure, she reported a pain score of 3/10.   Follow-up will be in [facility] with COLLEEN Vines CNP COMPLICATIONS:  None Comment(s):  None                                                     Total time 35  "minutes, to include face to face visit, review of EMR, ordering, documentation and coordination of care on date of service    Signed by:   Leola Amin NP        Oncology Rooming Note    October 30, 2023 1:06 PM   Diana Wilson is a 43 year old female who presents for:    Chief Complaint   Patient presents with     Oncology Clinic Visit     Malignant neoplasm of upper-outer quadrant of right breast in female, estrogen receptor positive - Labs provider and infusion     Initial Vitals: /87 (BP Location: Right arm, Patient Position: Sitting, Cuff Size: Adult Regular)   Pulse 99   Temp 97.8  F (36.6  C) (Tympanic)   Resp 12   Ht 1.62 m (5' 3.78\")   Wt 85.7 kg (189 lb)   LMP 11/28/2012   SpO2 94%   BMI 32.67 kg/m   Estimated body mass index is 32.67 kg/m  as calculated from the following:    Height as of this encounter: 1.62 m (5' 3.78\").    Weight as of this encounter: 85.7 kg (189 lb). Body surface area is 1.96 meters squared.  No Pain (0) Comment: Data Unavailable   Patient's last menstrual period was 11/28/2012.  Allergies reviewed: Yes  Medications reviewed: Yes    Medications: Medication refills not needed today.  Pharmacy name entered into [facility]:    [facility] [place] [id number] [address]  [facility]    Clinical concerns:  None      Nicole Costa CMA                Again, thank you for allowing me to participate in the care of your patient.        Sincerely,        Leola Amin NP  "

## 2023-10-30 NOTE — PROGRESS NOTES
"Oncology Rooming Note    October 30, 2023 1:06 PM   Diana Wilson is a 43 year old female who presents for:    Chief Complaint   Patient presents with    Oncology Clinic Visit     Malignant neoplasm of upper-outer quadrant of right breast in female, estrogen receptor positive - Labs provider and infusion     Initial Vitals: /87 (BP Location: Right arm, Patient Position: Sitting, Cuff Size: Adult Regular)   Pulse 99   Temp 97.8  F (36.6  C) (Tympanic)   Resp 12   Ht 1.62 m (5' 3.78\")   Wt 85.7 kg (189 lb)   LMP 11/28/2012   SpO2 94%   BMI 32.67 kg/m   Estimated body mass index is 32.67 kg/m  as calculated from the following:    Height as of this encounter: 1.62 m (5' 3.78\").    Weight as of this encounter: 85.7 kg (189 lb). Body surface area is 1.96 meters squared.  No Pain (0) Comment: Data Unavailable   Patient's last menstrual period was 11/28/2012.  Allergies reviewed: Yes  Medications reviewed: Yes    Medications: Medication refills not needed today.  Pharmacy name entered into [facility]:    [facility] [place] [id number] [address]  [facility]    Clinical concerns:  None      Nicole Costa CMA              "

## 2023-10-31 ENCOUNTER — THERAPY VISIT (OUTPATIENT)
Dept: PHYSICAL THERAPY | Facility: CLINIC | Age: 43
End: 2023-10-31
Payer: COMMERCIAL

## 2023-10-31 DIAGNOSIS — M54.41 ACUTE LOW BACK PAIN WITH RIGHT-SIDED SCIATICA: Primary | ICD-10-CM

## 2023-10-31 PROCEDURE — 97112 NEUROMUSCULAR REEDUCATION: CPT | Mod: GP | Performed by: PHYSICAL THERAPIST

## 2023-10-31 PROCEDURE — 97110 THERAPEUTIC EXERCISES: CPT | Mod: GP | Performed by: PHYSICAL THERAPIST

## 2023-11-01 LAB — CANCER AG27-29 SERPL-ACNC: 18.7 U/ML

## 2023-11-03 ENCOUNTER — OFFICE VISIT (OUTPATIENT)
Dept: PHYSICAL MEDICINE AND REHAB | Facility: CLINIC | Age: 43
End: 2023-11-03
Payer: COMMERCIAL

## 2023-11-03 VITALS — HEART RATE: 96 BPM | SYSTOLIC BLOOD PRESSURE: 125 MMHG | DIASTOLIC BLOOD PRESSURE: 82 MMHG

## 2023-11-03 DIAGNOSIS — M54.41 ACUTE RIGHT-SIDED LOW BACK PAIN WITH RIGHT-SIDED SCIATICA: Primary | ICD-10-CM

## 2023-11-03 PROCEDURE — 99215 OFFICE O/P EST HI 40 MIN: CPT | Performed by: NURSE PRACTITIONER

## 2023-11-03 ASSESSMENT — PAIN SCALES - GENERAL: PAINLEVEL: NO PAIN (1)

## 2023-11-03 NOTE — PROGRESS NOTES
ASSESSMENT: Diana Wilson is a 43 year old female who presents for consultation at the request of PCP Yenifer Peters, who presents today for follow up patient evaluation of:    -Right lumbar radiculopathy    Diana has done great following her right L3-4 transforaminal epidural steroid injection.  She has 1 out of 10 back pain only.  She is full strength on exam.  We discussed her tingling, which hopefully will improve with time. If the nerve had sustained damage however, the numbness may persist. She will continue physical therapy and as needed NSAIDs over-the-counter.  She will follow up on a PRN basis and contact us if symptoms return.         10/31/2023     8:00 AM   OSWESTRY DISABILITY INDEX   Count 10   Sum 10   Oswestry Score (%) 20 %            Diagnoses and all orders for this visit:  Acute right-sided low back pain with right-sided sciatica        PLAN:  Reviewed spine anatomy and disease process. Discussed diagnosis and treatment options with the patient today. A shared decision making model was used.  The patient's values and choices were respected. The following represents what was discussed and decided upon by the provider and the patient.      -DIAGNOSTIC TESTS:  Images were personally reviewed and interpreted and explained to patient today using a spine model.   --I did not order any new imaging studies today.    -PHYSICAL THERAPY:    -Recommended continuing PT  Discussed the importance of core strengthening, ROM, stretching exercises with the patient and how each of these entities is important in decreasing pain.  Explained to the patient that the purpose of physical therapy is to teach the patient a home exercise program.  These exercises need to be performed every day in order to decrease pain and prevent future occurrences of pain.        -MEDICATIONS:    -OK to continue as needed ibuprofen and tylenol over the counter as directed.  -We discussed repeating Medrol Dosepak, however given limited  overall relief once off recently, would not expect additional relief with additional Dosepak at this point.  Discussed multiple medication options today with patient. Discussed risks, side effects, and proper use of medications. Patient verbalized understanding.    -INTERVENTIONS:    -No additional interventions recommended today. Could repeat a right transforaminal L3-4 epidural steroid injection in the future if does well.    -PATIENT EDUCATION:  Total time of 40 minutes, on the day of service, spent with the patient, reviewing the chart, placing orders, and documenting.   -Today we also discussed the pros and cons of the current treatment plan.    -FOLLOW-UP:   PRN    Advised patient to call the [facility] if symptoms worsen, new numbness or weakness develops in the legs, or if they develop new or worsening problems controlling bladder or bowel function.   ______________________________________________________________________    SUBJECTIVE:  Diana reports drastic improvement since her injection.  She is at a 1 out of 10 in terms of back pain today.  Her leg pain is gone.  She reports 90% improvement. Her pain at worst is a 4/10 now. Worse with car rides. Improves with rest, lying flat on her back.  She has done 3 physical therapy sessions so far.  She is taking ibuprofen sparingly, only once in the last week. She is taking gabapentin as well. She has some residual tingling in her right anterior thigh radiating into her right big toe.  She denies any leg weakness, bowel or bladder changes.    Per previous visit:  HPI:  Diana Wilson  Is a 43 year old female history of kidney stones, breast cancer, GERD, depression, with remote history of postoperative related DVT on aspirin 325mg who presents today for new patient evaluation of right low back pain     Diana endorses a history of right sided sciatica in 2014, which resolved with physical therapy and medication at the time and she did well until approximately 3  weeks ago.    Patient's current symptoms started suddenly 3 weeks ago when she tried to get off a pool floaty.  She does not endorse starting a couch to 5K program over the last couple of weeks, but did not have any symptoms related until in her pool on Labor Day weekend at which point she experienced immediate right lower back discomfort and soreness, which worsened the next day and radiated into her right leg. The pain radiates into her right thigh and knee, and she has numbness and tingling in the right second toe and foot. She initially tried resuming doing physical therapy exercises that she learned in 2014, massage, Tylenol, Advil, heat, cold, yoga exercises without improvement.  She was then seen in the emergency room on September 10.   A CT abdomen and pelvis demonstrated a left kidney stone and a likely cyst of the right kidney.  She was discharged home with Robaxin, oxycodone, Medrol Dosepak.  She felt better while on the Medrol Dosepak, but after it ended, the pain returned and remains severe.  The pain is worse with walking, twisting, sitting.  It is somewhat better when she stands and moves. She is not able to walk more than a block or so. She feels like her right leg is weak.      She has continued to take ibuprofen twice a day without improvement.  She is also taking gabapentin 300 mg 3 times a day, and using lidocaine.  She denies any bowel or bladder control symptoms, or left leg symptoms.  She has not had any lumbar epidural injections, had any chiropractic care, previous spine surgeries.    She had a lumbar MRI on September 14, 2023.      -Treatment to Date:     -Medications:  Tylenol  -Ibuprofen  -Robaxin  -Medrol Dosepak  -Oxycodone  Gabapentin  Lidocaine    Current Outpatient Medications   Medication    Ascorbic Acid (VITAMIN C PO)    aspirin (ASA) 325 MG EC tablet    cetirizine (ZYRTEC) 10 MG tablet    cholecalciferol (VITAMIN D3) 1000 units (25 mcg) capsule    Fluticasone Propionate (FLONASE  "NA)    gabapentin (NEURONTIN) 300 MG capsule    letrozole (FEMARA) 2.5 MG tablet    lidocaine (XYLOCAINE) 5 % external ointment    methenamine hippurate (HIPREX) 1 g tablet    Multiple Vitamins-Minerals (MULTIVITAMIN PO)    omeprazole (PRILOSEC) 20 MG DR capsule    oxyBUTYnin ER (DITROPAN XL) 10 MG 24 hr tablet    Semaglutide-Weight Management (WEGOVY) 2.4 MG/0.75ML pen    venlafaxine (EFFEXOR XR) 150 MG 24 hr capsule    venlafaxine (EFFEXOR XR) 75 MG 24 hr capsule    YUVAFEM 10 MCG TABS vaginal tablet     Current Facility-Administered Medications   Medication    lidocaine (XYLOCAINE) 2 % external gel       Allergies   Allergen Reactions    Carboplatin Shortness Of Breath, Rash and Anaphylaxis     Other reaction(s): Flushing, Tachycardia    Amoxicillin GI Disturbance    Erythromycin GI Disturbance    Gluten Meal      Celiac disease    Hydrocodone-Acetaminophen      Other reaction(s): Hallucinations    Hydromorphone Headache    Naltrexone Other (See Comments)     \"Felt high\"    Penicillins Nausea and Vomiting and Hives     1-11-17 pt states this was a childhood reaction    Phentermine Other (See Comments)     Elevated BP    Topamax [Topiramate] Other (See Comments)     Cloudy thinking    Wellbutrin [Bupropion] Other (See Comments)     \"Felt high\"    Zolpidem      Other reaction(s): Hallucinations    Zolpidem Tartrate      hallucinations    Hydrocodone Other (See Comments)     Out of body experience, \"creepy-crawly\" skin        Past Medical History:   Diagnosis Date    Anxiety     Breast cancer (H)     Celiac disease     Depressive disorder 1995    And Anxiety    Encounter for insertion or removal of intrauterine contraceptive device 01/08/2008    Excessive or frequent menstruation 03/19/2003    Fibroadenosis of breast     GERD (gastroesophageal reflux disease)     Invasive ductal carcinoma of breast, right (H)     Malignant neoplasm of upper-outer quadrant of right female breast (H)     Mild intermittent asthma       "      never has had PFT's, MDI with colds and at times exercise    Neutropenia, drug-induced (H24)     Nongonococcal urethritis (JALIL) due to chlamydia trachomatis 01/03/2002    Other and unspecified ovarian cyst 03/19/2003    RIGHT ovarian cyst    PONV (postoperative nausea and vomiting)     Skin cancer     Transient hypertension of pregnancy, antepartum     PIH with last birth    Uncomplicated asthma 2002        Patient Active Problem List   Diagnosis    Obesity    CARDIOVASCULAR SCREENING; LDL GOAL LESS THAN 130    Major depressive disorder, recurrent episode, mild (H24)    Iron deficiency anemia    Insomnia    Health Care Home    Anxiety    Intestinal malabsorption    Lymphedema of right upper extremity    Malignant neoplasm of upper-outer quadrant of right breast in female, estrogen receptor positive (H)    Osteopenia of multiple sites    Multiple subsegmental pulmonary emboli without acute cor pulmonale (H)    Breast reconstruction disproportion    Family history of colon cancer    Celiac disease    Recurrent UTI    Stress incontinence    Pelvic pain in female    Muscular incoordination    Acute low back pain with right-sided sciatica    Drug-induced polyneuropathy (H24)       SOCIAL HX: Former smoker.      OBJECTIVE:  /82   Pulse 96   LMP 11/28/2012     PHYSICAL EXAMINATION:    --CONSTITUTIONAL:  Vital signs as above.  No acute distress.  The patient is well nourished and well groomed.  --PSYCHIATRIC:  Appropriate mood and affect. The patient is awake, alert, oriented to person, place, time and answering questions appropriately with clear speech.    --SKIN:  Skin over the face, bilateral lower extremities, and posterior torso is clean, dry, intact without rashes.    --RESPIRATORY: Normal rhythm and effort. No abnormal accessory muscle breathing patterns noted.   --ABDOMINAL:  Non-distended.    --GROSS MOTOR: Gait is normal without hesitation today     --LOWER EXTREMITY MOTOR TESTING:  Hip flexion: right  5/5, left 5/5   Quads: right 5/5, left 5/5  Hamstrings: right 5/5, left 5/5  Dorsiflexion: right 5/5, left 5/5  Plantar flexion: right 5/5, left 5/5    Great toe MTP extension/EHL: right 5/5, left 5/5    --NEUROLOGICAL:  Sensation to light touch is mildly decreased in the sole of the right foot, otherwise intact throughout both lower extremities. Babinski is negative. No clonus.    --MUSCULOSKELETAL: Lumbar spine inspection reveals no evidence of deformity. Range of motion is intact, all planes. No point tenderness to palpation lumbar spine. No paraspinal musculature tenderness.     Straight leg raising is negative.    --SACROILIAC JOINT: One finger point test was positive.  No significant SI joint tenderness to palpation bilaterally.    --VASCULAR:  Bilateral lower extremities are warm without any discoloration.  There is no pitting edema of the bilateral lower extremities.    RESULTS:   Prior medical records from [facility] Everywhere were reviewed today.  No new results         Lisa CRANDALL-C  [facility]  O. [phone]

## 2023-11-03 NOTE — PATIENT INSTRUCTIONS
OK to continue as-needed ibuprofen and tylenol over the counter as directed  Continue your PT and exercise regimen to maintain the health of your spine  Core strengthening, ROM, and stretching exercises are key in decreasing pain.   These exercises need to be performed every day in order to decrease pain and prevent future occurrences of pain.      ~Please call our [facility] Nurse Navigation line [phone] with any questions or concerns about your treatment plan, if symptoms worsen and you would like to be seen urgently, or if you have any new or worsening numbness, weakness, or problems controlling bladder and bowel function.  ~You are also welcome to contact Lisa Naranjo via [employer], but please be aware that responses to [employer] message may take 2-3 days due to the high volume of patients seen in clinic.     Glad to hear you're doing much better!

## 2023-11-03 NOTE — LETTER
11/3/2023         RE: Diana Wilson  [address] [facility] [address]        Dear Colleague,    Thank you for referring your patient, Diana Wilson, to the [facility]. Please see a copy of my visit note below.    ASSESSMENT: Diana Wilson is a 43 year old female who presents for consultation at the request of PCP Yenifer Peters, who presents today for follow up patient evaluation of:    -Right lumbar radiculopathy    Diana has done great following her right L3-4 transforaminal epidural steroid injection.  She has 1 out of 10 back pain only.  She is full strength on exam.  We discussed her tingling, which hopefully will improve with time. If the nerve had sustained damage however, the numbness may persist. She will continue physical therapy and as needed NSAIDs over-the-counter.  She will follow up on a PRN basis and contact us if symptoms return.         10/31/2023     8:00 AM   OSWESTRY DISABILITY INDEX   Count 10   Sum 10   Oswestry Score (%) 20 %            Diagnoses and all orders for this visit:  Acute right-sided low back pain with right-sided sciatica        PLAN:  Reviewed spine anatomy and disease process. Discussed diagnosis and treatment options with the patient today. A shared decision making model was used.  The patient's values and choices were respected. The following represents what was discussed and decided upon by the provider and the patient.      -DIAGNOSTIC TESTS:  Images were personally reviewed and interpreted and explained to patient today using a spine model.   --I did not order any new imaging studies today.    -PHYSICAL THERAPY:    -Recommended continuing PT  Discussed the importance of core strengthening, ROM, stretching exercises with the patient and how each of these entities is important in decreasing pain.  Explained to the patient that the purpose of physical therapy is to teach the patient a home exercise program.  These exercises need to  be performed every day in order to decrease pain and prevent future occurrences of pain.        -MEDICATIONS:    -OK to continue as needed ibuprofen and tylenol over the counter as directed.  -We discussed repeating Medrol Dosepak, however given limited overall relief once off recently, would not expect additional relief with additional Dosepak at this point.  Discussed multiple medication options today with patient. Discussed risks, side effects, and proper use of medications. Patient verbalized understanding.    -INTERVENTIONS:    -No additional interventions recommended today. Could repeat a right transforaminal L3-4 epidural steroid injection in the future if does well.    -PATIENT EDUCATION:  Total time of 40 minutes, on the day of service, spent with the patient, reviewing the chart, placing orders, and documenting.   -Today we also discussed the pros and cons of the current treatment plan.    -FOLLOW-UP:   PRN    Advised patient to call the Spine Center if symptoms worsen, new numbness or weakness develops in the legs, or if they develop new or worsening problems controlling bladder or bowel function.   ______________________________________________________________________    SUBJECTIVE:  Diana reports drastic improvement since her injection.  She is at a 1 out of 10 in terms of back pain today.  Her leg pain is gone.  She reports 90% improvement. Her pain at worst is a 4/10 now. Worse with car rides. Improves with rest, lying flat on her back.  She has done 3 physical therapy sessions so far.  She is taking ibuprofen sparingly, only once in the last week. She is taking gabapentin as well. She has some residual tingling in her right anterior thigh radiating into her right big toe.  She denies any leg weakness, bowel or bladder changes.    Per previous visit:  HPI:  Diana Wilson  Is a 43 year old female history of kidney stones, breast cancer, GERD, depression, with remote history of postoperative related DVT  on aspirin 325mg who presents today for new patient evaluation of right low back pain     Diana endorses a history of right sided sciatica in 2014, which resolved with physical therapy and medication at the time and she did well until approximately 3 weeks ago.    Patient's current symptoms started suddenly 3 weeks ago when she tried to get off a pool floaty.  She does not endorse starting a couch to 5K program over the last couple of weeks, but did not have any symptoms related until in her pool on Labor Day weekend at which point she experienced immediate right lower back discomfort and soreness, which worsened the next day and radiated into her right leg. The pain radiates into her right thigh and knee, and she has numbness and tingling in the right second toe and foot. She initially tried resuming doing physical therapy exercises that she learned in 2014, massage, Tylenol, Advil, heat, cold, yoga exercises without improvement.  She was then seen in the emergency room on September 10.   A CT abdomen and pelvis demonstrated a left kidney stone and a likely cyst of the right kidney.  She was discharged home with Robaxin, oxycodone, Medrol Dosepak.  She felt better while on the Medrol Dosepak, but after it ended, the pain returned and remains severe.  The pain is worse with walking, twisting, sitting.  It is somewhat better when she stands and moves. She is not able to walk more than a block or so. She feels like her right leg is weak.      She has continued to take ibuprofen twice a day without improvement.  She is also taking gabapentin 300 mg 3 times a day, and using lidocaine.  She denies any bowel or bladder control symptoms, or left leg symptoms.  She has not had any lumbar epidural injections, had any chiropractic care, previous spine surgeries.    She had a lumbar MRI on September 14, 2023.      -Treatment to Date:     -Medications:  Tylenol  -Ibuprofen  -Robaxin  -Medrol  "Dosepak  -Oxycodone  Gabapentin  Lidocaine    Current Outpatient Medications   Medication     Ascorbic Acid (VITAMIN C PO)     aspirin (ASA) 325 MG EC tablet     cetirizine (ZYRTEC) 10 MG tablet     cholecalciferol (VITAMIN D3) 1000 units (25 mcg) capsule     Fluticasone Propionate (FLONASE NA)     gabapentin (NEURONTIN) 300 MG capsule     letrozole (FEMARA) 2.5 MG tablet     lidocaine (XYLOCAINE) 5 % external ointment     methenamine hippurate (HIPREX) 1 g tablet     Multiple Vitamins-Minerals (MULTIVITAMIN PO)     omeprazole (PRILOSEC) 20 MG DR capsule     oxyBUTYnin ER (DITROPAN XL) 10 MG 24 hr tablet     Semaglutide-Weight Management (WEGOVY) 2.4 MG/0.75ML pen     venlafaxine (EFFEXOR XR) 150 MG 24 hr capsule     venlafaxine (EFFEXOR XR) 75 MG 24 hr capsule     YUVAFEM 10 MCG TABS vaginal tablet     Current Facility-Administered Medications   Medication     lidocaine (XYLOCAINE) 2 % external gel       Allergies   Allergen Reactions     Carboplatin Shortness Of Breath, Rash and Anaphylaxis     Other reaction(s): Flushing, Tachycardia     Amoxicillin GI Disturbance     Erythromycin GI Disturbance     Gluten Meal      Celiac disease     Hydrocodone-Acetaminophen      Other reaction(s): Hallucinations     Hydromorphone Headache     Naltrexone Other (See Comments)     \"Felt high\"     Penicillins Nausea and Vomiting and Hives     1-11-17 pt states this was a childhood reaction     Phentermine Other (See Comments)     Elevated BP     Topamax [Topiramate] Other (See Comments)     Cloudy thinking     Wellbutrin [Bupropion] Other (See Comments)     \"Felt high\"     Zolpidem      Other reaction(s): Hallucinations     Zolpidem Tartrate      hallucinations     Hydrocodone Other (See Comments)     Out of body experience, \"creepy-crawly\" skin        Past Medical History:   Diagnosis Date     Anxiety      Breast cancer (H)      Celiac disease      Depressive disorder 1995    And Anxiety     Encounter for insertion or removal of " intrauterine contraceptive device 01/08/2008     Excessive or frequent menstruation 03/19/2003     Fibroadenosis of breast      GERD (gastroesophageal reflux disease)      Invasive ductal carcinoma of breast, right (H)      Malignant neoplasm of upper-outer quadrant of right female breast (H)      Mild intermittent asthma            never has had PFT's, MDI with colds and at times exercise     Neutropenia, drug-induced (H24)      Nongonococcal urethritis (JALIL) due to chlamydia trachomatis 01/03/2002     Other and unspecified ovarian cyst 03/19/2003    RIGHT ovarian cyst     PONV (postoperative nausea and vomiting)      Skin cancer      Transient hypertension of pregnancy, antepartum     PIH with last birth     Uncomplicated asthma 2002        Patient Active Problem List   Diagnosis     Obesity     CARDIOVASCULAR SCREENING; LDL GOAL LESS THAN 130     Major depressive disorder, recurrent episode, mild (H24)     Iron deficiency anemia     Insomnia     Health Care Home     Anxiety     Intestinal malabsorption     Lymphedema of right upper extremity     Malignant neoplasm of upper-outer quadrant of right breast in female, estrogen receptor positive (H)     Osteopenia of multiple sites     Multiple subsegmental pulmonary emboli without acute cor pulmonale (H)     Breast reconstruction disproportion     Family history of colon cancer     Celiac disease     Recurrent UTI     Stress incontinence     Pelvic pain in female     Muscular incoordination     Acute low back pain with right-sided sciatica     Drug-induced polyneuropathy (H24)       SOCIAL HX: Former smoker.      OBJECTIVE:  /82   Pulse 96   LMP 11/28/2012     PHYSICAL EXAMINATION:    --CONSTITUTIONAL:  Vital signs as above.  No acute distress.  The patient is well nourished and well groomed.  --PSYCHIATRIC:  Appropriate mood and affect. The patient is awake, alert, oriented to person, place, time and answering questions appropriately with clear speech.     --SKIN:  Skin over the face, bilateral lower extremities, and posterior torso is clean, dry, intact without rashes.    --RESPIRATORY: Normal rhythm and effort. No abnormal accessory muscle breathing patterns noted.   --ABDOMINAL:  Non-distended.    --GROSS MOTOR: Gait is normal without hesitation today     --LOWER EXTREMITY MOTOR TESTING:  Hip flexion: right 5/5, left 5/5   Quads: right 5/5, left 5/5  Hamstrings: right 5/5, left 5/5  Dorsiflexion: right 5/5, left 5/5  Plantar flexion: right 5/5, left 5/5    Great toe MTP extension/EHL: right 5/5, left 5/5    --NEUROLOGICAL:  Sensation to light touch is mildly decreased in the sole of the right foot, otherwise intact throughout both lower extremities. Babinski is negative. No clonus.    --MUSCULOSKELETAL: Lumbar spine inspection reveals no evidence of deformity. Range of motion is intact, all planes. No point tenderness to palpation lumbar spine. No paraspinal musculature tenderness.     Straight leg raising is negative.    --SACROILIAC JOINT: One finger point test was positive.  No significant SI joint tenderness to palpation bilaterally.    --VASCULAR:  Bilateral lower extremities are warm without any discoloration.  There is no pitting edema of the bilateral lower extremities.    RESULTS:   Prior medical records from [facility] and Care Everywhere were reviewed today.  No new results         Lisa CRANDALL-C  [facility]  O. [phone]             Again, thank you for allowing me to participate in the care of your patient.        Sincerely,        COLLEEN Guerra CNP

## 2023-11-07 NOTE — NURSING NOTE
Diana Wilson completed her 72 Month visit for the [facility] BWEL weight loss study. Study labs were not required for this visit, however, she did consent to an additional sub study ([id number]) so those blood tubes were collected, processed, and sent to [facility] per protocol on 10/30/2023. Pt was not fasting.  Follow up QOL and sub study questionnaire were completed in clinic.    Weight, and hip/waste measurements were recorded at this visit.  ECOG = 0  See provider notes from 10/30/2023 for additional information.      Adverse events were not assessed at this visit. Reviewed study schedule with next visit in October 2023. She has no other questions at this time.

## 2023-11-16 NOTE — PROGRESS NOTES
[facility] Non Surgical Follow up Visit   Date of visit: 11/27/2023  Physician: SHABBIR Rosas MD, MD  Primary Care is Yenifer Peters  Diana Wilson   43 year old  female     Initial Weight: 205#  Initial BMI: 35  Today's Weight:   Wt Readings from Last 1 Encounters:   11/27/23 84.4 kg (186 lb 1.6 oz)     Body mass index is 31.94 kg/m .           Assessment and Plan   Assessment: Diana is a 43 year old year old female who presents for medical weight management.      Plan:    1. Obesity (BMI 30-39.9)  Patient was congratulated on her success thus far. Healthy habits to assist with further weight loss were discussed. She will exercise as tolerated. She will continue the Wegovy for now. She got a letter stating it will no longer be covered in 2024. We could try doing an appeal given her hx of cancer, intolerance of all other weight loss meds and her success with the wegovy. We discussed the patient's co-morbid conditions including GERD, migraine headaches and estrogen receptor positive breast cancer.. These likely will improve with healthy habits and weight loss.     2. Gastroesophageal reflux disease, unspecified whether esophagitis present  This may improve with healthy habits and weight loss.     3. History of migraine headaches  This may improve with healthy habits and weight loss.      Follow up in 3 months with myself           INTERIM HISTORY  Patient increased her wegovy dose to 2.4 mg in September. She is having some nausea immediately after the injection. She feels that it is working to affect her appetite. She had a chaotic month with covid and her dad was in the hospital.    DIETARY HISTORY  Meals Per Day: 3  Eating Protein First?: yes  Food Diary: B:kind granola and protein yogurt L:quest bar D:lean meat and fruit and vegetables,  Snacks Per Day: rare  Typical Snack: cheese stick  Fluid Intake: 64 oz  Portion Control: yes  Calorie Containing Beverages: coffee with creamer, rare hot  cocoa  Meals at Restaurant per week:1    Positive Changes Since Last Visit: on track with protein first, water intake, portion control  Struggling With: exercise lately    Knowledgeable in Reading Food Labels: yes  Getting Adequate Protein: yes  Sleeping 7-8 hours/day yes  Stress management used to be exercise, now cleaning    PHYSICAL ACTIVITY PATTERNS:  She started couch to 5 K in August. She since has had back pain and found out she has some disc issues. She went to PT and is back to walking after getting an injection,    REVIEW OF SYSTEMS  GENERAL/CONSTITUTIONAL:  Fatigue: yes  HEENT:   glaucoma: no  CARDIOVASCULAR:  History of heart disease: no  GI:  Pancreatitis: no  NEUROLOGIC:  History of migraine headaches: possible history of  PSYCHIATRIC:  Moods: stable  MUSCULOSKELETAL/RHEUMATOLOGIC  Arthralgias: yes  Myalgias: no  ENDOCRINE:  Monitoring Blood Sugars: no  Sugars Well Controlled: na  No personal or family history of medullary thyroid cancer no  :  Birth control: not discussed  History of kidney stones: not discussed     Patient Profile   Social History     Social History Narrative    Not on file        Past Medical History   Past Medical History:   Diagnosis Date    Anxiety     Breast cancer (H)     Celiac disease     Depressive disorder 1995    And Anxiety    Encounter for insertion or removal of intrauterine contraceptive device 01/08/2008    Excessive or frequent menstruation 03/19/2003    Fibroadenosis of breast     GERD (gastroesophageal reflux disease)     Invasive ductal carcinoma of breast, right (H)     Malignant neoplasm of upper-outer quadrant of right female breast (H)     Mild intermittent asthma            never has had PFT's, MDI with colds and at times exercise    Neutropenia, drug-induced (H24)     Nongonococcal urethritis (JALIL) due to chlamydia trachomatis 01/03/2002    Other and unspecified ovarian cyst 03/19/2003    RIGHT ovarian cyst    PONV (postoperative nausea and vomiting)      "Skin cancer     Transient hypertension of pregnancy, antepartum     PIH with last birth    Uncomplicated asthma 2002     Patient Active Problem List   Diagnosis    Obesity    CARDIOVASCULAR SCREENING; LDL GOAL LESS THAN 130    Major depressive disorder, recurrent episode, mild (H24)    Iron deficiency anemia    Insomnia    Health Care Home    Anxiety    Intestinal malabsorption    Lymphedema of right upper extremity    Malignant neoplasm of upper-outer quadrant of right breast in female, estrogen receptor positive (H)    Osteopenia of multiple sites    Multiple subsegmental pulmonary emboli without acute cor pulmonale (H)    Breast reconstruction disproportion    Family history of colon cancer    Celiac disease    Recurrent UTI    Stress incontinence    Pelvic pain in female    Muscular incoordination    Acute low back pain with right-sided sciatica    Drug-induced polyneuropathy (H24)       Past Surgical History  She has a past surgical history that includes surgical history of -  (); APPENDECTOMY (); Dental surgery (2010 and 10/12/2010); Dilation and curettage, hysteroscopy, ablate endometrium novasure, combined (3/20/2012); Esophagoscopy, gastroscopy, duodenoscopy (EGD), combined (N/A, 2016); Insert port vascular access (N/A, 2017); biopsy (2016); Breast surgery (); Laparoscopic salpingo-oophorectomy (Bilateral, 2017); Reconstruct breast bilateral, implant prosthesis bilateral, combined (Bilateral, 3/16/2018);  Section; Revise reconstructed breast; and Cystoscopy, Sling Transvaginal (N/A, 2022).     Examination   Ht 1.626 m (5' 4\")   Wt 84.4 kg (186 lb 1.6 oz)   LMP 2012   BMI 31.94 kg/m    Wt Readings from Last 4 Encounters:   23 84.4 kg (186 lb 1.6 oz)   10/30/23 85.7 kg (189 lb)   10/06/23 85.2 kg (187 lb 12.8 oz)   23 85.3 kg (188 lb)      GENERAL: Healthy, alert and no distress  EYES: Eyes grossly normal to inspection.  No discharge or " erythema, or obvious scleral/conjunctival abnormalities.  RESP: No audible wheeze, cough, or visible cyanosis.  No visible retractions or increased work of breathing.    SKIN: Visible skin clear. No significant rash, abnormal pigmentation or lesions.  NEURO: Cranial nerves grossly intact.  Mentation and speech appropriate for age.  PSYCH: Mentation appears normal, affect normal/bright, judgement and insight intact, normal speech and appearance well-groomed.        Counseling:   We reviewed the important post op bariatric recommendations:  -eating 3 meals daily  -eating protein first, getting >60gm protein daily  -eating slowly, chewing food well  -avoiding/limiting calorie containing beverages  -limiting starchy vegetables and carbohydrates, choosing wheat, not white with breads,   crackers, pastas, luis armando, bagels, tortillas, rice  -limiting restaurant or cafeteria eating to twice a week or less    We discussed the importance of restorative sleep and stress management in maintaining a healthy weight.  We discussed the National Weight Control Registry healthy weight maintenance strategies and ways to optimize metabolism.  We discussed the importance of physical activity including cardiovascular and strength training in maintaining a healthier weight.    Total time spent on the date of this encounter doing: chart review, review of test results, patient visit, physical exam, education, counseling, developing plan of care and documenting = 31 minutes.         SHABBIR Rossa MD  [facility]

## 2023-11-18 ENCOUNTER — VIRTUAL VISIT (OUTPATIENT)
Dept: URGENT CARE | Facility: CLINIC | Age: 43
End: 2023-11-18
Payer: COMMERCIAL

## 2023-11-18 DIAGNOSIS — U07.1 CLINICAL DIAGNOSIS OF COVID-19: Primary | ICD-10-CM

## 2023-11-18 PROCEDURE — 99213 OFFICE O/P EST LOW 20 MIN: CPT | Mod: VID

## 2023-11-18 NOTE — PROGRESS NOTES
"Diana is a 43 year old who is being evaluated via a billable phone visit.      Tested + this AM.  Sx started overnight.  11/17.  Cough and fever.    Assessment & Plan     Clinical diagnosis of COVID-19    - nirmatrelvir and ritonavir (PAXLOVID) 300 mg/100 mg therapy pack; Take 3 tablets by mouth 2 times daily for 5 days (Take 2 Nirmatrelvir tablets and 1 Ritonavir tablet twice daily for 5 days)     COVID-19 positive patient.  Encounter for consideration of medication intervention. Patient does qualify for a prescription. Full discussion with patient including medication options, risks and benefits. Potential drug interactions reviewed with patient.     Treatment Planned Paxlovid RX sent to [place] [phone] & [place]    Temporary change to home medications: None    Estimated body mass index is 32.67 kg/m  as calculated from the following:    Height as of 10/30/23: 1.62 m (5' 3.78\").    Weight as of 10/30/23: 85.7 kg (189 lb).  GFR Estimate   Date Value Ref Range Status   10/30/2023 >90 >60 mL/min/1.73m2 Final   07/09/2021 >90 >60 mL/min/[1.73_m2] Final     Comment:     Non  GFR Calc  Starting 12/18/2018, serum creatinine based estimated GFR (eGFR) will be   calculated using the Chronic Kidney Disease Epidemiology Collaboration   (CKD-EPI) equation.       Diana Duke MD  Virtual Urgent Care  [facility] VIRTUAL URGENT CARE    Subjective   Diana is a 43 year old, presenting for the following health issues:  No chief complaint on file.      HPI       Tested + this AM.  Sx started overnight.  11/17.  Cough and fever.  Has used Paxlovid in past.      Review of Systems   Constitutional, HEENT, cardiovascular, pulmonary, GI, , musculoskeletal, neuro, skin, endocrine and psych systems are negative, except as otherwise noted.      Objective           Vitals:  No vitals were obtained today due to virtual visit.    Physical Exam   GENERAL: Healthy, alert and no distress  PSYCH: mentation appears " normal and affect normal/bright    Phone call duration # 10 minutes

## 2023-11-27 ENCOUNTER — VIRTUAL VISIT (OUTPATIENT)
Dept: SURGERY | Facility: CLINIC | Age: 43
End: 2023-11-27
Payer: COMMERCIAL

## 2023-11-27 VITALS — WEIGHT: 186.1 LBS | BODY MASS INDEX: 31.77 KG/M2 | HEIGHT: 64 IN

## 2023-11-27 DIAGNOSIS — K21.9 GASTROESOPHAGEAL REFLUX DISEASE, UNSPECIFIED WHETHER ESOPHAGITIS PRESENT: ICD-10-CM

## 2023-11-27 DIAGNOSIS — Z86.69 HISTORY OF MIGRAINE HEADACHES: ICD-10-CM

## 2023-11-27 DIAGNOSIS — E66.9 OBESITY (BMI 30-39.9): Primary | ICD-10-CM

## 2023-11-27 PROCEDURE — 99214 OFFICE O/P EST MOD 30 MIN: CPT | Mod: VID | Performed by: FAMILY MEDICINE

## 2023-11-27 ASSESSMENT — PAIN SCALES - GENERAL: PAINLEVEL: NO PAIN (0)

## 2023-11-27 NOTE — NURSING NOTE
Patient declined individual allergy and medication review by support staff because patient said no changes.       Is the patient currently in the state of [place]? YES    Visit mode:VIDEO    If the visit is dropped, the patient can be reconnected by: VIDEO VISIT: Text to cell phone:   Telephone Information:   Mobile [phone]       Will anyone else be joining the visit? NO  (If patient encounters technical issues they should call [phone])    How would you like to obtain your AVS? MyChart    Are changes needed to the allergy or medication list? No and Pt stated no med changes    Reason for visit: RECHECK (Patient said, insurance sent a letter saying starting January 1st, 2024 will no longer cover WEGOVY.)    Wendy BURKS

## 2023-11-27 NOTE — PROGRESS NOTES
Virtual Visit Details    Type of service:  Video Visit   Video start time: 9:13 am  Video end time: 9:31 am  Originating Location (pt. Location): Home    Distant Location (provider location):  Off-site  Platform used for Video Visit: Nicholas

## 2023-11-27 NOTE — LETTER
11/27/2023         RE: Diana Wilson  1972 [address] [facility] [address]        Dear Colleague,    Thank you for referring your patient, Diana Wilson, to the [facility] [place]. Please see a copy of my visit note below.    Bariatric Care Clinic Non Surgical Follow up Visit   Date of visit: 11/27/2023  Physician: SHABBIR Rosas MD, MD  Primary Care is Yenifer Peters  Diana Wilson   43 year old  female     Initial Weight: 205#  Initial BMI: 35  Today's Weight:   Wt Readings from Last 1 Encounters:   11/27/23 84.4 kg (186 lb 1.6 oz)     Body mass index is 31.94 kg/m .           Assessment and Plan   Assessment: Diana is a 43 year old year old female who presents for medical weight management.      Plan:    1. Obesity (BMI 30-39.9)  Patient was congratulated on her success thus far. Healthy habits to assist with further weight loss were discussed. She will exercise as tolerated. She will continue the Wegovy for now. She got a letter stating it will no longer be covered in 2024. We could try doing an appeal given her hx of cancer, intolerance of all other weight loss meds and her success with the wegovy. We discussed the patient's co-morbid conditions including GERD, migraine headaches and estrogen receptor positive breast cancer.. These likely will improve with healthy habits and weight loss.     2. Gastroesophageal reflux disease, unspecified whether esophagitis present  This may improve with healthy habits and weight loss.     3. History of migraine headaches  This may improve with healthy habits and weight loss.      Follow up in 3 months with myself           INTERIM HISTORY  Patient increased her wegovy dose to 2.4 mg in September. She is having some nausea immediately after the injection. She feels that it is working to affect her appetite. She had a chaotic month with covid and her dad was in the hospital.    DIETARY HISTORY  Meals Per Day: 3  Eating  Protein First?: yes  Food Diary: B:kind granola and protein yogurt L:quest bar D:lean meat and fruit and vegetables,  Snacks Per Day: rare  Typical Snack: cheese stick  Fluid Intake: 64 oz  Portion Control: yes  Calorie Containing Beverages: coffee with creamer, rare hot cocoa  Meals at Restaurant per week:1    Positive Changes Since Last Visit: on track with protein first, water intake, portion control  Struggling With: exercise lately    Knowledgeable in Reading Food Labels: yes  Getting Adequate Protein: yes  Sleeping 7-8 hours/day yes  Stress management used to be exercise, now cleaning    PHYSICAL ACTIVITY PATTERNS:  She started couch to 5 K in August. She since has had back pain and found out she has some disc issues. She went to PT and is back to walking after getting an injection,    REVIEW OF SYSTEMS  GENERAL/CONSTITUTIONAL:  Fatigue: yes  HEENT:   glaucoma: no  CARDIOVASCULAR:  History of heart disease: no  GI:  Pancreatitis: no  NEUROLOGIC:  History of migraine headaches: possible history of  PSYCHIATRIC:  Moods: stable  MUSCULOSKELETAL/RHEUMATOLOGIC  Arthralgias: yes  Myalgias: no  ENDOCRINE:  Monitoring Blood Sugars: no  Sugars Well Controlled: na  No personal or family history of medullary thyroid cancer no  :  Birth control: not discussed  History of kidney stones: not discussed     Patient Profile   Social History     Social History Narrative     Not on file        Past Medical History   Past Medical History:   Diagnosis Date     Anxiety      Breast cancer (H)      Celiac disease      Depressive disorder 1995    And Anxiety     Encounter for insertion or removal of intrauterine contraceptive device 01/08/2008     Excessive or frequent menstruation 03/19/2003     Fibroadenosis of breast      GERD (gastroesophageal reflux disease)      Invasive ductal carcinoma of breast, right (H)      Malignant neoplasm of upper-outer quadrant of right female breast (H)      Mild intermittent asthma             "never has had PFT's, MDI with colds and at times exercise     Neutropenia, drug-induced (H24)      Nongonococcal urethritis (JALIL) due to chlamydia trachomatis 2002     Other and unspecified ovarian cyst 2003    RIGHT ovarian cyst     PONV (postoperative nausea and vomiting)      Skin cancer      Transient hypertension of pregnancy, antepartum     PIH with last birth     Uncomplicated asthma      Patient Active Problem List   Diagnosis     Obesity     CARDIOVASCULAR SCREENING; LDL GOAL LESS THAN 130     Major depressive disorder, recurrent episode, mild (H24)     Iron deficiency anemia     Insomnia     Health Care Home     Anxiety     Intestinal malabsorption     Lymphedema of right upper extremity     Malignant neoplasm of upper-outer quadrant of right breast in female, estrogen receptor positive (H)     Osteopenia of multiple sites     Multiple subsegmental pulmonary emboli without acute cor pulmonale (H)     Breast reconstruction disproportion     Family history of colon cancer     Celiac disease     Recurrent UTI     Stress incontinence     Pelvic pain in female     Muscular incoordination     Acute low back pain with right-sided sciatica     Drug-induced polyneuropathy (H24)       Past Surgical History  She has a past surgical history that includes surgical history of -  (); APPENDECTOMY (); Dental surgery (2010 and 10/12/2010); Dilation and curettage, hysteroscopy, ablate endometrium novasure, combined (3/20/2012); Esophagoscopy, gastroscopy, duodenoscopy (EGD), combined (N/A, 2016); Insert port vascular access (N/A, 2017); biopsy (2016); Breast surgery (); Laparoscopic salpingo-oophorectomy (Bilateral, 2017); Reconstruct breast bilateral, implant prosthesis bilateral, combined (Bilateral, 3/16/2018);  Section; Revise reconstructed breast; and Cystoscopy, Sling Transvaginal (N/A, 2022).     Examination   Ht 1.626 m (5' 4\")   Wt 84.4 kg (186 lb " 1.6 oz)   LMP 11/28/2012   BMI 31.94 kg/m    Wt Readings from Last 4 Encounters:   11/27/23 84.4 kg (186 lb 1.6 oz)   10/30/23 85.7 kg (189 lb)   10/06/23 85.2 kg (187 lb 12.8 oz)   09/26/23 85.3 kg (188 lb)      GENERAL: Healthy, alert and no distress  EYES: Eyes grossly normal to inspection.  No discharge or erythema, or obvious scleral/conjunctival abnormalities.  RESP: No audible wheeze, cough, or visible cyanosis.  No visible retractions or increased work of breathing.    SKIN: Visible skin clear. No significant rash, abnormal pigmentation or lesions.  NEURO: Cranial nerves grossly intact.  Mentation and speech appropriate for age.  PSYCH: Mentation appears normal, affect normal/bright, judgement and insight intact, normal speech and appearance well-groomed.        Counseling:   We reviewed the important post op bariatric recommendations:  -eating 3 meals daily  -eating protein first, getting >60gm protein daily  -eating slowly, chewing food well  -avoiding/limiting calorie containing beverages  -limiting starchy vegetables and carbohydrates, choosing wheat, not white with breads,   crackers, pastas, luis armando, bagels, tortillas, rice  -limiting restaurant or cafeteria eating to twice a week or less    We discussed the importance of restorative sleep and stress management in maintaining a healthy weight.  We discussed the National Weight Control Registry healthy weight maintenance strategies and ways to optimize metabolism.  We discussed the importance of physical activity including cardiovascular and strength training in maintaining a healthier weight.    Total time spent on the date of this encounter doing: chart review, review of test results, patient visit, physical exam, education, counseling, developing plan of care and documenting = 31 minutes.         SHABBIR Rosas MD  [facility]           Virtual Visit Details    Type of service:  Video Visit   Video start time: 9:13 am  Video  end time: 9:31 am  Originating Location (pt. Location): Home    Distant Location (provider location):  Off-site  Platform used for Video Visit: Nicholas      Again, thank you for allowing me to participate in the care of your patient.        Sincerely,        SHABBIR Rosas MD

## 2023-11-29 ENCOUNTER — THERAPY VISIT (OUTPATIENT)
Dept: PHYSICAL THERAPY | Facility: CLINIC | Age: 43
End: 2023-11-29
Payer: COMMERCIAL

## 2023-11-29 DIAGNOSIS — M54.41 ACUTE LOW BACK PAIN WITH RIGHT-SIDED SCIATICA: Primary | ICD-10-CM

## 2023-11-29 PROCEDURE — 97110 THERAPEUTIC EXERCISES: CPT | Mod: GP | Performed by: PHYSICAL THERAPIST

## 2023-11-29 PROCEDURE — 97112 NEUROMUSCULAR REEDUCATION: CPT | Mod: GP | Performed by: PHYSICAL THERAPIST

## 2023-12-05 DIAGNOSIS — N94.19 DYSPAREUNIA DUE TO MEDICAL CONDITION IN FEMALE PATIENT: ICD-10-CM

## 2023-12-05 RX ORDER — ESTRADIOL 10 UG/1
INSERT VAGINAL
Qty: 32 TABLET | Refills: 0 | Status: SHIPPED | OUTPATIENT
Start: 2023-12-05 | End: 2024-03-04

## 2023-12-18 ENCOUNTER — THERAPY VISIT (OUTPATIENT)
Dept: PHYSICAL THERAPY | Facility: CLINIC | Age: 43
End: 2023-12-18
Payer: COMMERCIAL

## 2023-12-18 DIAGNOSIS — M54.41 ACUTE LOW BACK PAIN WITH RIGHT-SIDED SCIATICA: Primary | ICD-10-CM

## 2023-12-18 PROBLEM — R10.2 PELVIC PAIN IN FEMALE: Status: RESOLVED | Noted: 2023-03-14 | Resolved: 2023-12-18

## 2023-12-18 PROBLEM — R27.8 MUSCULAR INCOORDINATION: Status: RESOLVED | Noted: 2023-03-14 | Resolved: 2023-12-18

## 2023-12-18 PROCEDURE — 97110 THERAPEUTIC EXERCISES: CPT | Mod: GP | Performed by: PHYSICAL THERAPIST

## 2023-12-18 PROCEDURE — 97530 THERAPEUTIC ACTIVITIES: CPT | Mod: GP | Performed by: PHYSICAL THERAPIST

## 2023-12-18 NOTE — PROGRESS NOTES
12/18/23 0500   Appointment Info   Signing clinician's name / credentials Anitra Ritter PT DPT   Total/Authorized Visits 8   Visits Used 5   Medical Diagnosis Acute right-sided low back pain with right-sided sciatica (M54.41)   PT Tx Diagnosis Acute right sided low back pain with right sided radiculopathy   Other pertinent information goal jogging   Progress Note/Certification   Onset of illness/injury or Date of Surgery 09/02/23   Therapy Frequency 1x/week   Predicted Duration 8 weeks   GOALS   PT Goals 2   PT Goal 1   Goal Identifier 1   Goal Description Pt will be able to sit 30 minutes without increased symptoms   Rationale to maximize safety and independence with performance of ADLs and functional tasks;to maximize safety and independence within the home;to maximize safety and independence within the community;to maximize safety and independence with transportation;to maximize safety and independence with self cares   Goal Progress up to 2 hours   Target Date 11/27/23   Date Met 10/31/23   PT Goal 2   Goal Identifier 1   Goal Description Pt will be able to walk 1 mile without increased symptoms   Rationale to maximize safety and independence within the community;to maximize safety and independence within the home  (and to promote return to running)   Goal Progress 30 minutes   Target Date 11/27/23   Date Met 12/18/23   Subjective Report   Subjective Report Diana reports able to increase to 30 minute walks without increased symptoms. Able to walk 5 days in a row this week.   Objective Measures   Objective Measures Objective Measure 1   Treatment Interventions (PT)   Interventions Therapeutic Procedure/Exercise;Neuromuscular Re-education;Therapeutic Activity   Therapeutic Procedure/Exercise   Therapeutic Procedures: strength, endurance, ROM, flexibillity minutes (08544) 25   PTRx Ther Proc 1 HEP   PTRx Ther Proc 1 - Details Comprehensive verbal review of HEP, parameters, duration, frequency to promote  compliance and success with HEP and home managemnet independantly   PTRx Ther Proc 2 Standing Extension   PTRx Ther Proc 2 - Details review   PTRx Ther Proc 7 Supine Abdominal Exercise #3B (Two Leg Marching)   PTRx Ther Proc 7 - Details LE extension, TrA engagement, marching and progression toward 3B   PTRx Ther Proc 8 Bridging   PTRx Ther Proc 8 - Details DL w/ narrow and normal TOMER. Add marching, vs butterflys. discus progressions   PTRx Ther Proc 10 Side step/ponce walk   PTRx Ther Proc 10 - Details verbal review, HEP   PTRx Ther Proc 11 Standing Fire Hydrant   PTRx Ther Proc 11 - Details red TB   PTRx Ther Proc 12 Donkey Kick #2 Standing   PTRx Ther Proc 12 - Details red TB   Skilled Intervention guided stretching and strengthening   Patient Response/Progress appropriate fatigue   Therapeutic Activity   PTRx Ther Act 1 Treadmill   PTRx Ther Act 1 - Details 2% incline, walking, jogging, cueing kameron   Therapeutic Activities: dynamic activities to improve functional performance minutes (22125) 10   Ther Act 1 Return to run   Ther Act 1 - Details discuss return to jogging indications, protocol   Skilled Intervention return to dynamic and functional activities of choice   Patient Response/Progress demonstrates well   Plan   Home program set up online ptrx today   Updates to plan of care DC to HEP   Total Session Time   Timed Code Treatment Minutes 35   Total Treatment Time (sum of timed and untimed services) 35         DISCHARGE  Reason for Discharge: Patient has met all goals.    Equipment Issued: na    Discharge Plan: Patient to continue home program.    Referring Provider:  Lisa Naranjo

## 2024-02-06 ENCOUNTER — MYC MEDICAL ADVICE (OUTPATIENT)
Dept: PHYSICAL MEDICINE AND REHAB | Facility: CLINIC | Age: 44
End: 2024-02-06
Payer: COMMERCIAL

## 2024-02-06 DIAGNOSIS — G62.9 NEUROPATHY: ICD-10-CM

## 2024-02-06 DIAGNOSIS — Z17.0 MALIGNANT NEOPLASM OF UPPER-OUTER QUADRANT OF RIGHT BREAST IN FEMALE, ESTROGEN RECEPTOR POSITIVE (H): ICD-10-CM

## 2024-02-06 DIAGNOSIS — C50.411 MALIGNANT NEOPLASM OF UPPER-OUTER QUADRANT OF RIGHT BREAST IN FEMALE, ESTROGEN RECEPTOR POSITIVE (H): ICD-10-CM

## 2024-02-06 RX ORDER — GABAPENTIN 300 MG/1
CAPSULE ORAL
Qty: 270 CAPSULE | Refills: 1 | Status: SHIPPED | OUTPATIENT
Start: 2024-02-06 | End: 2024-09-20

## 2024-02-06 NOTE — TELEPHONE ENCOUNTER
Please let Diana know I sent refill today. Prev rx Dr Peters, but she can contact me for future refills

## 2024-03-04 DIAGNOSIS — N94.19 DYSPAREUNIA DUE TO MEDICAL CONDITION IN FEMALE PATIENT: ICD-10-CM

## 2024-03-04 RX ORDER — ESTRADIOL 10 UG/1
INSERT VAGINAL
Qty: 32 TABLET | Refills: 0 | Status: SHIPPED | OUTPATIENT
Start: 2024-03-04 | End: 2024-07-29

## 2024-03-06 ENCOUNTER — MYC MEDICAL ADVICE (OUTPATIENT)
Dept: SURGERY | Facility: CLINIC | Age: 44
End: 2024-03-06
Payer: COMMERCIAL

## 2024-03-07 ENCOUNTER — TELEPHONE (OUTPATIENT)
Dept: SURGERY | Facility: CLINIC | Age: 44
End: 2024-03-07
Payer: COMMERCIAL

## 2024-03-07 NOTE — TELEPHONE ENCOUNTER
Reason for Call:  Other prescription    Detailed comments: Abisai saucedo/[facility] appeal dept calling regarding Wegovy request marked urgent. Please advise reason why this is marked urgent.    Phone Number Patient can be reached at: Other phone number:  [phone]*    Best Time: any      Can we leave a detailed message on this number? YES      Call taken on 3/7/2024 at 3:02 PM by Kendy Delatorre

## 2024-03-09 NOTE — PROGRESS NOTES
Bariatric Care Clinic Non Surgical Follow up Visit   Date of visit: 3/11/2024  Physician: SHABBIR Rosas MD, MD  Primary Care is Yenifer Petersjeremiah Wilson   43 year old  female     Initial Weight: 205  Initial BMI: 35  Today's Weight:   Wt Readings from Last 1 Encounters:   03/11/24 81.4 kg (179 lb 8 oz)     Body mass index is 30.8 kg/m .           Assessment and Plan   Assessment: Diana is a 43 year old year old female who presents for medical weight management.      Plan:    1. Obesity (BMI 30-39.9)  Patient was congratulated on her success thus far. Healthy habits to assist with further weight loss were discussed. She will try to maintain her healthy habits. She will continue the wegovy as long as she can. Hopefully the appeal letter will work. We briefly discussed metformin. We discussed the patient's co-morbid conditions including breast cancer and GERD. The risk for recurrence of breast cancer will decrease with healthy habits and weight loss.    2. History of migraine headaches  This may improve with healthy habits and weight loss.     3. Gastroesophageal reflux disease, unspecified whether esophagitis present  This may improve with healthy habits and weight loss.      Follow up in 3 months with myself           INTERIM HISTORY  Patient has done well with weight loss with the help of wegovy. Unfortunately her insurance denied coverage in 2024. We are waiting on an appeal. She feels that it is helping. She does have inconsistent side effects: headache, fatigue, constipation. She is happy with her weight loss so far.     DIETARY HISTORY  Meals Per Day: 3  Eating Protein First?: yes  Food Diary: B:protein bar or yogurt or fairlife protein shake L:similar to breakfast D:bowl of cereal or protein  Snacks Per Day: late afternoon  Typical Snack:yogurt, cheese stick  Fluid Intake: 64 oz plus  Portion Control: generally good  Calorie Containing Beverages: shamrock shake once a week during [place] Shake  season  Choosing Whole Grains: not discussed  Meals at Restaurant per week:1    Positive Changes Since Last Visit: increased protein, exercise  Struggling With: consistency her back hurts on the treadmill    Knowledgeable in Reading Food Labels: yes  Getting Adequate Protein: yes  Sleeping 7-8 hours/day yes    PHYSICAL ACTIVITY PATTERNS:  Exercise videos 3-4 days a week, sometimes yoga, 30 minutes    REVIEW OF SYSTEMS  GENERAL/CONSTITUTIONAL:  Fatigue: sometimes  HEENT:   glaucoma: no  CARDIOVASCULAR:  History of heart disease: no  GI:  Pancreatitis: no  NEUROLOGIC:  History of migraine headaches: yes  ENDOCRINE:  Monitoring Blood Sugars: no  Sugars Well Controlled: na  No personal or family history of medullary thyroid cancer no  :  Birth control: ovaries removed  History of kidney stones:yes     Patient Profile   Social History     Social History Narrative    Not on file        Past Medical History   Past Medical History:   Diagnosis Date    Anxiety     Breast cancer (H)     Celiac disease     Depressive disorder 1995    And Anxiety    Encounter for insertion or removal of intrauterine contraceptive device 01/08/2008    Excessive or frequent menstruation 03/19/2003    Fibroadenosis of breast     GERD (gastroesophageal reflux disease)     Invasive ductal carcinoma of breast, right (H)     Malignant neoplasm of upper-outer quadrant of right female breast (H)     Mild intermittent asthma            never has had PFT's, MDI with colds and at times exercise    Neutropenia, drug-induced (H24)     Nongonococcal urethritis (JALIL) due to chlamydia trachomatis 01/03/2002    Other and unspecified ovarian cyst 03/19/2003    RIGHT ovarian cyst    PONV (postoperative nausea and vomiting)     Skin cancer     Transient hypertension of pregnancy, antepartum     PIH with last birth    Uncomplicated asthma 2002     Patient Active Problem List   Diagnosis    Obesity    CARDIOVASCULAR SCREENING; LDL GOAL LESS THAN 130    Major  "depressive disorder, recurrent episode, mild (H24)    Iron deficiency anemia    Insomnia    Health Care Home    Anxiety    Intestinal malabsorption    Lymphedema of right upper extremity    Malignant neoplasm of upper-outer quadrant of right breast in female, estrogen receptor positive (H)    Osteopenia of multiple sites    Multiple subsegmental pulmonary emboli without acute cor pulmonale (H)    Breast reconstruction disproportion    Family history of colon cancer    Celiac disease    Recurrent UTI    Stress incontinence    Drug-induced polyneuropathy (H24)       Past Surgical History  She has a past surgical history that includes surgical history of -  (); APPENDECTOMY (); Dental surgery (2010 and 10/12/2010); Dilation and curettage, hysteroscopy, ablate endometrium novasure, combined (3/20/2012); Esophagoscopy, gastroscopy, duodenoscopy (EGD), combined (N/A, 2016); Insert port vascular access (N/A, 2017); biopsy (2016); Breast surgery (); Laparoscopic salpingo-oophorectomy (Bilateral, 2017); Reconstruct breast bilateral, implant prosthesis bilateral, combined (Bilateral, 3/16/2018);  Section; Revise reconstructed breast; and Cystoscopy, Sling Transvaginal (N/A, 2022).     Examination   Ht 1.626 m (5' 4.02\")   Wt 81.4 kg (179 lb 8 oz)   LMP 2012   BMI 30.80 kg/m    Wt Readings from Last 4 Encounters:   24 81.4 kg (179 lb 8 oz)   23 84.4 kg (186 lb 1.6 oz)   10/30/23 85.7 kg (189 lb)   10/06/23 85.2 kg (187 lb 12.8 oz)      BP Readings from Last 3 Encounters:   23 125/82   10/30/23 121/89   10/30/23 130/87    GENERAL: alert and no distress  EYES: Eyes grossly normal to inspection.  No discharge or erythema, or obvious scleral/conjunctival abnormalities.  RESP: No audible wheeze, cough, or visible cyanosis.    SKIN: Visible skin clear. No significant rash, abnormal pigmentation or lesions.  NEURO: Cranial nerves grossly intact.  Mentation " and speech appropriate for age.  PSYCH: Appropriate affect, tone, and pace of words        Counseling:   We reviewed the important post op bariatric recommendations:  -eating 3 meals daily  -eating protein first, getting >60gm protein daily  -eating slowly, chewing food well  -avoiding/limiting calorie containing beverages  -limiting starchy vegetables and carbohydrates, choosing wheat, not white with breads,   crackers, pastas, luis armando, bagels, tortillas, rice  -limiting restaurant or cafeteria eating to twice a week or less    We discussed the importance of restorative sleep and stress management in maintaining a healthy weight.  We discussed the National Weight Control Registry healthy weight maintenance strategies and ways to optimize metabolism.  We discussed the importance of physical activity including cardiovascular and strength training in maintaining a healthier weight.    Total time spent on the date of this encounter doing: chart review, review of test results, patient visit, physical exam, education, counseling, developing plan of care and documenting = 40 minutes.         SHABBIR Rosas MD  [facility]

## 2024-03-11 ENCOUNTER — VIRTUAL VISIT (OUTPATIENT)
Dept: SURGERY | Facility: CLINIC | Age: 44
End: 2024-03-11
Payer: COMMERCIAL

## 2024-03-11 VITALS — WEIGHT: 179.5 LBS | BODY MASS INDEX: 30.64 KG/M2 | HEIGHT: 64 IN

## 2024-03-11 DIAGNOSIS — Z86.69 HISTORY OF MIGRAINE HEADACHES: ICD-10-CM

## 2024-03-11 DIAGNOSIS — E66.9 OBESITY (BMI 30-39.9): Primary | ICD-10-CM

## 2024-03-11 DIAGNOSIS — K21.9 GASTROESOPHAGEAL REFLUX DISEASE, UNSPECIFIED WHETHER ESOPHAGITIS PRESENT: ICD-10-CM

## 2024-03-11 PROCEDURE — 99215 OFFICE O/P EST HI 40 MIN: CPT | Mod: 95 | Performed by: FAMILY MEDICINE

## 2024-03-11 NOTE — PROGRESS NOTES
Virtual Visit Details    Type of service:  Video Visit     Originating Location (pt. Location): Home    Distant Location (provider location):  Off-site  Platform used for Video Visit: RetailVector  Video start time: 8:40 am  Video end time: 9:02 am

## 2024-03-11 NOTE — NURSING NOTE
Is the patient currently in the state of [place]? YES    Visit mode:VIDEO    If the visit is dropped, the patient can be reconnected by: VIDEO VISIT: Text to cell phone:   Telephone Information:   Mobile [phone]       Will anyone else be joining the visit? NO  (If patient encounters technical issues they should call [phone]    How would you like to obtain your AVS? MyChart    Are changes needed to the allergy or medication list? Pt stated no changes to allergies and Pt stated no med changes    Reason for visit: Follow Up    Tanna BURKS

## 2024-03-11 NOTE — LETTER
3/11/2024         RE: Diana Wilson  1972 [address] [facility] [id number]        Dear Colleague,    Thank you for referring your patient, Diana Wilson, to the [facility] AND [facility] [place]. Please see a copy of my visit note below.    Bariatric Care Clinic Non Surgical Follow up Visit   Date of visit: 3/11/2024  Physician: SHABBIR Rosas MD, MD  Primary Care is Yenifer Peters  Diana Wilson   43 year old  female     Initial Weight: 205  Initial BMI: 35  Today's Weight:   Wt Readings from Last 1 Encounters:   03/11/24 81.4 kg (179 lb 8 oz)     Body mass index is 30.8 kg/m .           Assessment and Plan   Assessment: Diana is a 43 year old year old female who presents for medical weight management.      Plan:    1. Obesity (BMI 30-39.9)  Patient was congratulated on her success thus far. Healthy habits to assist with further weight loss were discussed. She will try to maintain her healthy habits. She will continue the wegovy as long as she can. Hopefully the appeal letter will work. We briefly discussed metformin. We discussed the patient's co-morbid conditions including breast cancer and GERD. The risk for recurrence of breast cancer will decrease with healthy habits and weight loss.    2. History of migraine headaches  This may improve with healthy habits and weight loss.     3. Gastroesophageal reflux disease, unspecified whether esophagitis present  This may improve with healthy habits and weight loss.      Follow up in 3 months with myself           INTERIM HISTORY  Patient has done well with weight loss with the help of wegovy. Unfortunately her insurance denied coverage in 2024. We are waiting on an appeal. She feels that it is helping. She does have inconsistent side effects: headache, fatigue, constipation. She is happy with her weight loss so far.     DIETARY HISTORY  Meals Per Day: 3  Eating Protein First?: yes  Food Diary: B:protein bar or yogurt or fairlife  protein shake L:similar to breakfast D:bowl of cereal or protein  Snacks Per Day: late afternoon  Typical Snack:yogurt, cheese stick  Fluid Intake: 64 oz plus  Portion Control: generally good  Calorie Containing Beverages: shamrock shake once a week during [place] season  Choosing Whole Grains: not discussed  Meals at Restaurant per week:1    Positive Changes Since Last Visit: increased protein, exercise  Struggling With: consistency her back hurts on the treadmill    Knowledgeable in Reading Food Labels: yes  Getting Adequate Protein: yes  Sleeping 7-8 hours/day yes    PHYSICAL ACTIVITY PATTERNS:  Exercise videos 3-4 days a week, sometimes yoga, 30 minutes    REVIEW OF SYSTEMS  GENERAL/CONSTITUTIONAL:  Fatigue: sometimes  HEENT:   glaucoma: no  CARDIOVASCULAR:  History of heart disease: no  GI:  Pancreatitis: no  NEUROLOGIC:  History of migraine headaches: yes  ENDOCRINE:  Monitoring Blood Sugars: no  Sugars Well Controlled: na  No personal or family history of medullary thyroid cancer no  :  Birth control: ovaries removed  History of kidney stones:yes     Patient Profile   Social History     Social History Narrative     Not on file        Past Medical History   Past Medical History:   Diagnosis Date     Anxiety      Breast cancer (H)      Celiac disease      Depressive disorder 1995    And Anxiety     Encounter for insertion or removal of intrauterine contraceptive device 01/08/2008     Excessive or frequent menstruation 03/19/2003     Fibroadenosis of breast      GERD (gastroesophageal reflux disease)      Invasive ductal carcinoma of breast, right (H)      Malignant neoplasm of upper-outer quadrant of right female breast (H)      Mild intermittent asthma            never has had PFT's, MDI with colds and at times exercise     Neutropenia, drug-induced (H24)      Nongonococcal urethritis (JALIL) due to chlamydia trachomatis 01/03/2002     Other and unspecified ovarian cyst 03/19/2003    RIGHT ovarian cyst  "    PONV (postoperative nausea and vomiting)      Skin cancer      Transient hypertension of pregnancy, antepartum     PIH with last birth     Uncomplicated asthma      Patient Active Problem List   Diagnosis     Obesity     CARDIOVASCULAR SCREENING; LDL GOAL LESS THAN 130     Major depressive disorder, recurrent episode, mild (H24)     Iron deficiency anemia     Insomnia     Health Care Home     Anxiety     Intestinal malabsorption     Lymphedema of right upper extremity     Malignant neoplasm of upper-outer quadrant of right breast in female, estrogen receptor positive (H)     Osteopenia of multiple sites     Multiple subsegmental pulmonary emboli without acute cor pulmonale (H)     Breast reconstruction disproportion     Family history of colon cancer     Celiac disease     Recurrent UTI     Stress incontinence     Drug-induced polyneuropathy (H24)       Past Surgical History  She has a past surgical history that includes surgical history of -  (); APPENDECTOMY (); Dental surgery (2010 and 10/12/2010); Dilation and curettage, hysteroscopy, ablate endometrium novasure, combined (3/20/2012); Esophagoscopy, gastroscopy, duodenoscopy (EGD), combined (N/A, 2016); Insert port vascular access (N/A, 2017); biopsy (2016); Breast surgery (); Laparoscopic salpingo-oophorectomy (Bilateral, 2017); Reconstruct breast bilateral, implant prosthesis bilateral, combined (Bilateral, 3/16/2018);  Section; Revise reconstructed breast; and Cystoscopy, Sling Transvaginal (N/A, 2022).     Examination   Ht 1.626 m (5' 4.02\")   Wt 81.4 kg (179 lb 8 oz)   LMP 2012   BMI 30.80 kg/m    Wt Readings from Last 4 Encounters:   24 81.4 kg (179 lb 8 oz)   23 84.4 kg (186 lb 1.6 oz)   10/30/23 85.7 kg (189 lb)   10/06/23 85.2 kg (187 lb 12.8 oz)      BP Readings from Last 3 Encounters:   23 125/82   10/30/23 121/89   10/30/23 130/87    GENERAL: alert and no " distress  EYES: Eyes grossly normal to inspection.  No discharge or erythema, or obvious scleral/conjunctival abnormalities.  RESP: No audible wheeze, cough, or visible cyanosis.    SKIN: Visible skin clear. No significant rash, abnormal pigmentation or lesions.  NEURO: Cranial nerves grossly intact.  Mentation and speech appropriate for age.  PSYCH: Appropriate affect, tone, and pace of words        Counseling:   We reviewed the important post op bariatric recommendations:  -eating 3 meals daily  -eating protein first, getting >60gm protein daily  -eating slowly, chewing food well  -avoiding/limiting calorie containing beverages  -limiting starchy vegetables and carbohydrates, choosing wheat, not white with breads,   crackers, pastas, luis armando, bagels, tortillas, rice  -limiting restaurant or cafeteria eating to twice a week or less    We discussed the importance of restorative sleep and stress management in maintaining a healthy weight.  We discussed the National Weight Control Registry healthy weight maintenance strategies and ways to optimize metabolism.  We discussed the importance of physical activity including cardiovascular and strength training in maintaining a healthier weight.    Total time spent on the date of this encounter doing: chart review, review of test results, patient visit, physical exam, education, counseling, developing plan of care and documenting = 40 minutes.         SHABBIR Rosas MD  [facility]           Virtual Visit Details    Type of service:  Video Visit     Originating Location (pt. Location): Home    Distant Location (provider location):  Off-site  Platform used for Video Visit: TrustYou  Video start time: 8:40 am  Video end time: 9:02 am      Again, thank you for allowing me to participate in the care of your patient.        Sincerely,        SHABBIR Rosas MD

## 2024-03-19 ENCOUNTER — OFFICE VISIT (OUTPATIENT)
Dept: FAMILY MEDICINE | Facility: CLINIC | Age: 44
End: 2024-03-19
Payer: COMMERCIAL

## 2024-03-19 VITALS
WEIGHT: 181.8 LBS | OXYGEN SATURATION: 98 % | SYSTOLIC BLOOD PRESSURE: 118 MMHG | DIASTOLIC BLOOD PRESSURE: 84 MMHG | HEART RATE: 80 BPM | BODY MASS INDEX: 31.19 KG/M2 | TEMPERATURE: 97 F | RESPIRATION RATE: 16 BRPM

## 2024-03-19 DIAGNOSIS — A63.0 GENITAL WARTS: Primary | ICD-10-CM

## 2024-03-19 DIAGNOSIS — G62.0 DRUG-INDUCED POLYNEUROPATHY (H): ICD-10-CM

## 2024-03-19 DIAGNOSIS — C50.411 MALIGNANT NEOPLASM OF UPPER-OUTER QUADRANT OF RIGHT BREAST IN FEMALE, ESTROGEN RECEPTOR POSITIVE (H): ICD-10-CM

## 2024-03-19 DIAGNOSIS — F33.0 MAJOR DEPRESSIVE DISORDER, RECURRENT EPISODE, MILD (H): ICD-10-CM

## 2024-03-19 DIAGNOSIS — Z17.0 MALIGNANT NEOPLASM OF UPPER-OUTER QUADRANT OF RIGHT BREAST IN FEMALE, ESTROGEN RECEPTOR POSITIVE (H): ICD-10-CM

## 2024-03-19 DIAGNOSIS — I26.94 MULTIPLE SUBSEGMENTAL PULMONARY EMBOLI WITHOUT ACUTE COR PULMONALE (H): ICD-10-CM

## 2024-03-19 PROCEDURE — 17110 DESTRUCTION B9 LES UP TO 14: CPT | Performed by: FAMILY MEDICINE

## 2024-03-19 PROCEDURE — 99213 OFFICE O/P EST LOW 20 MIN: CPT | Mod: 25 | Performed by: FAMILY MEDICINE

## 2024-03-19 RX ORDER — IMIQUIMOD 12.5 MG/.25G
CREAM TOPICAL
Qty: 6 PACKET | Refills: 1 | Status: SHIPPED | OUTPATIENT
Start: 2024-03-19

## 2024-03-19 ASSESSMENT — ANXIETY QUESTIONNAIRES
GAD7 TOTAL SCORE: 9
7. FEELING AFRAID AS IF SOMETHING AWFUL MIGHT HAPPEN: SEVERAL DAYS
IF YOU CHECKED OFF ANY PROBLEMS ON THIS QUESTIONNAIRE, HOW DIFFICULT HAVE THESE PROBLEMS MADE IT FOR YOU TO DO YOUR WORK, TAKE CARE OF THINGS AT HOME, OR GET ALONG WITH OTHER PEOPLE: SOMEWHAT DIFFICULT
5. BEING SO RESTLESS THAT IT IS HARD TO SIT STILL: SEVERAL DAYS
4. TROUBLE RELAXING: SEVERAL DAYS
6. BECOMING EASILY ANNOYED OR IRRITABLE: SEVERAL DAYS
8. IF YOU CHECKED OFF ANY PROBLEMS, HOW DIFFICULT HAVE THESE MADE IT FOR YOU TO DO YOUR WORK, TAKE CARE OF THINGS AT HOME, OR GET ALONG WITH OTHER PEOPLE?: SOMEWHAT DIFFICULT
2. NOT BEING ABLE TO STOP OR CONTROL WORRYING: SEVERAL DAYS
1. FEELING NERVOUS, ANXIOUS, OR ON EDGE: NEARLY EVERY DAY
7. FEELING AFRAID AS IF SOMETHING AWFUL MIGHT HAPPEN: SEVERAL DAYS
GAD7 TOTAL SCORE: 9
3. WORRYING TOO MUCH ABOUT DIFFERENT THINGS: SEVERAL DAYS
GAD7 TOTAL SCORE: 9

## 2024-03-19 ASSESSMENT — PATIENT HEALTH QUESTIONNAIRE - PHQ9
10. IF YOU CHECKED OFF ANY PROBLEMS, HOW DIFFICULT HAVE THESE PROBLEMS MADE IT FOR YOU TO DO YOUR WORK, TAKE CARE OF THINGS AT HOME, OR GET ALONG WITH OTHER PEOPLE: SOMEWHAT DIFFICULT
SUM OF ALL RESPONSES TO PHQ QUESTIONS 1-9: 4
SUM OF ALL RESPONSES TO PHQ QUESTIONS 1-9: 4

## 2024-03-19 NOTE — PROGRESS NOTES
Assessment & Plan     (A63.0) Genital warts  (primary encounter diagnosis)  Comment: discussed with her. She wanted cryo and then will do topical aldara if lesion persists. The patient indicates understanding of these issues and agrees with the plan.   Plan: imiquimod (ALDARA) 5 % external cream, DESTRUCT        BENIGN LESION, UP TO 14            (C50.411,  Z17.0) Malignant neoplasm of upper-outer quadrant of right breast in female, estrogen receptor positive (H)  Comment: in remission, sees onc  Plan:     (G62.0) Drug-induced polyneuropathy (H24)  Comment: remains somewhat post treatment   Plan:     (I26.94) Multiple subsegmental pulmonary emboli without acute cor pulmonale (H)  Comment: history of, during her cancer treatment   Plan:     (F33.0) Major depressive disorder, recurrent episode, mild (H24)  Comment: doing well, mood is stable   Plan:         Regular exercise    Roger Ortiz is a 43 year old, presenting for the following health issues:  Derm Problem      3/19/2024    10:31 AM   Additional Questions   Roomed by ISIAH Pabon   Accompanied by self     History of Present Illness       Reason for visit:  Genital warts?    She eats 2-3 servings of fruits and vegetables daily.She consumes 1 sweetened beverage(s) daily.She exercises with enough effort to increase her heart rate 20 to 29 minutes per day.  She exercises with enough effort to increase her heart rate 3 or less days per week.   She is taking medications regularly.     Genital warts flare x6 weeks   Last she had this was about 20 years ago   One at 6oclock on anus and the other feels like it is on the perineum         Objective    /84   Pulse 80   Temp 97  F (36.1  C) (Tympanic)   Resp 16   Wt 82.5 kg (181 lb 12.8 oz)   LMP 11/28/2012   SpO2 98%   BMI 31.19 kg/m    Body mass index is 31.19 kg/m .  Physical Exam   Pt is alert and oriented in no acute distress.  Affect is appropriate. Good eye contact.  Small verrucous lesion on  the anus at 6 oclock, approximately 3mm in length  No other lesions are seen       Procedure - With her permission, and after discussion of possible side effects including infection, hypo and hyperpigmentation, and lesion persistence, the lesion was frozen using liquid nitrogen and qtips.  she tolerated the procedure well.  After-cares were discussed.      Signed Electronically by: Yenifer Peters MD

## 2024-03-19 NOTE — PATIENT INSTRUCTIONS
Patients should apply imiquimod 5% cream to anogenital warts three times per week (eg, Monday, Wednesday, Friday) until there is total clearance of the anogenital warts or for a maximum of 16 weeks. A thin layer of imiquimod 5% cream should be applied to each wart and rubbed in until the cream is no longer visible. Imiquimod 5% cream should be applied prior to normal sleeping hours and left on the skin for 6 to 10 hours, after which the cream should be removed by washing the area with mild soap and water.

## 2024-04-08 ENCOUNTER — VIRTUAL VISIT (OUTPATIENT)
Dept: UROLOGY | Facility: CLINIC | Age: 44
End: 2024-04-08
Payer: COMMERCIAL

## 2024-04-08 DIAGNOSIS — R35.0 URINARY FREQUENCY: ICD-10-CM

## 2024-04-08 DIAGNOSIS — N39.0 RECURRENT UTI: Primary | ICD-10-CM

## 2024-04-08 DIAGNOSIS — N39.41 URGE INCONTINENCE: ICD-10-CM

## 2024-04-08 PROCEDURE — 99214 OFFICE O/P EST MOD 30 MIN: CPT | Mod: 95 | Performed by: PHYSICIAN ASSISTANT

## 2024-04-08 RX ORDER — NITROFURANTOIN MACROCRYSTALS 50 MG/1
50 CAPSULE ORAL PRN
Qty: 30 CAPSULE | Refills: 2 | Status: SHIPPED | OUTPATIENT
Start: 2024-04-08

## 2024-04-08 RX ORDER — OXYBUTYNIN CHLORIDE 10 MG/1
10 TABLET, EXTENDED RELEASE ORAL DAILY
Qty: 90 TABLET | Refills: 3 | Status: SHIPPED | OUTPATIENT
Start: 2024-04-08 | End: 2024-07-29

## 2024-04-08 ASSESSMENT — PAIN SCALES - GENERAL: PAINLEVEL: NO PAIN (0)

## 2024-04-08 NOTE — NURSING NOTE
Is the patient currently in the state of [place]? YES    Visit mode:VIDEO    If the visit is dropped, the patient can be reconnected by: VIDEO VISIT: Text to cell phone:   Telephone Information:   Mobile [phone]       Will anyone else be joining the visit? NO  (If patient encounters technical issues they should call [phone]    How would you like to obtain your AVS? MyChart    Are changes needed to the allergy or medication list? No    Are refills needed on medications prescribed by this physician?     Reason for visit: RECHECK    Maria Luz BURKS

## 2024-04-08 NOTE — PROGRESS NOTES
Video-Visit Details    Type of service:  Video Visit    Video Start Time: 9:26    Video End Time:9:26 AM    Originating Location (pt. Location): Home in [place]    Distant Location (provider location): onsite    Platform used for Video Visit: [facility]    Urology Clinic    Name: Diana Wilson    MRN: [id number]   YOB: 1980  Accompanied at today's visit by:self              Assessment and Plan:   43 year old female with UTI, vaginal atrophy, dyspareunia, levator spasm, bladder spasms, urinary urgency/frequency UUI, high tone pelvic floor dysfunction, hx of sling,  hx of gross hematuria and  hx of kidney stones. Hx is complicated by breast cancer.     - renewed macrobid today prn for swimming and intercourse.  - renewed oxybutynin as working well for her.  - follow-up in 6 months and consider repeat UA/micro at that time.  - contact clinic if develops gross hematuria in the future or UTIs.   - continue vaginal estrogen tablets. Continuie to follow with oncology.  - will refer to stone specialist for hx of kidney stones as she thinks she passed one in 2/2024.   - continue to follow oncology.  - After discussing the assessment and plan with patient, patient verbalized understanding and agreed to the above plan. All questions answered.     16 minutes were spent today on the date of the encounter in reviewing the EMR, direct patient care, coordination of care and documentation in addition to renewing medications, reviewing labs.     Nithya Rodriguez PA-C  April 8, 2024    Patient Care Team:  Yenifer Peters MD as PCP - General (Family Practice)  Yenifer Peters MD as Assigned PCP  Eugenio Ruelas MD as MD (Plastic Surgery)  Jenifer Olson MD as MD (Surgery)  Josette Smalls, RN as Specialty Care Coordinator (Oncology)  Sixto Harris MD as MD (Urology)  Lisa Naranjo APRN CNP as Assigned Neuroscience Provider  Nithya Rodriguez PA-C as Physician Assistant  Leola Amin NP as  Assigned Cancer Care Provider  Nithya Rodriguez PA-C as Assigned Surgical Provider  SELF, REFERRED          Chief Complaint:   Alex          History of Present Illness:   April 8, 2024    HISTORY: Diana Wilson is a 43 year old female is here for follow-up. We have been following her for UTI, vaginal atrophy, dyspareunia, levator spasm, bladder spasms, urinary urgency/frequency UUI, high tone pelvic floor dysfunction, hx of gross hematuria and  hx of kidney stones. She had a sling on 12/12/22. Had cysto in 3/2022 by Dr. Ochoa that was unremarkable. She also had a recent Cysto in 5/2023 showed trabeculations but otherwise unremarkable. Had repeat CT in 9/2023 showing 2mm stone and right renal cyst. Had renal US following CT later in 9/2023 that was unremarkable. Hx is complicated by hx of breast cancer. Last seen on 10/23/23 and and was doing well of methenamine. Still has 50mg prn macrobid for intercourse and swimming. Oxybutynin 5mg was also helping symptoms and advised dto continue this. Here today for follow-up. Per EMR, has not had UTI since 7/19/23. Statse she has been doing well with no recent UTIs. Requesting refill on macdrobid prn. Oxybutynin continues to work well for her and requesting refill. Follows with oncology; currently on letrozole. Hx of kidney stones. Thinks she passed a stone in 2/2024 on her own. Denies flank pain or s/s of kidney stone currently. Patient voices no other concerns at this time.            Past Medical History:     Past Medical History:   Diagnosis Date    Anxiety     Breast cancer (H)     Celiac disease     Depressive disorder 1995    And Anxiety    Encounter for insertion or removal of intrauterine contraceptive device 01/08/2008    Excessive or frequent menstruation 03/19/2003    Fibroadenosis of breast     GERD (gastroesophageal reflux disease)     Invasive ductal carcinoma of breast, right (H)     Malignant neoplasm of upper-outer quadrant of right female breast (H)      Mild intermittent asthma            never has had PFT's, MDI with colds and at times exercise    Neutropenia, drug-induced (H24)     Nongonococcal urethritis (JALIL) due to chlamydia trachomatis 2002    Other and unspecified ovarian cyst 2003    RIGHT ovarian cyst    PONV (postoperative nausea and vomiting)     Skin cancer     Transient hypertension of pregnancy, antepartum     PIH with last birth    Uncomplicated asthma             Past Surgical History:     Past Surgical History:   Procedure Laterality Date    BIOPSY  2016    EDG with biopsy    BREAST SURGERY      Bilateral reduction     SECTION      CYSTOSCOPY, SLING TRANSVAGINAL N/A 2022    Procedure: CREATION, VAGINAL SLING, WITH CYSTOSCOPY (support the urethra with mesh sling and look in the bladder);  Surgeon: Sixto Harris MD;  Location:  OR    DENTAL SURGERY  2010 and 10/12/2010    Root canals    DILATION AND CURETTAGE, HYSTEROSCOPY, ABLATE ENDOMETRIUM NOVASURE, COMBINED  3/20/2012    Procedure:COMBINED DILATION AND CURETTAGE, HYSTEROSCOPY, ABLATE ENDOMETRIUM NOVASURE; Hysteroscopy with Endometrial Ablation Novasure; Surgeon:GERRI PURCELL; Location:[facility]    ESOPHAGOSCOPY, GASTROSCOPY, DUODENOSCOPY (EGD), COMBINED N/A 2016    Procedure: COMBINED ESOPHAGOSCOPY, GASTROSCOPY, DUODENOSCOPY (EGD);  Surgeon: Jose L Wilson MD;  Location: [facility]    INSERT PORT VASCULAR ACCESS N/A 2017    Procedure: INSERT PORT VASCULAR ACCESS;  Surgeon: Michael Townsend MD;  Location: [facility]    LAPAROSCOPIC SALPINGO-OOPHORECTOMY Bilateral 2017    Procedure: LAPAROSCOPIC SALPINGO-OOPHORECTOMY;  Laparoscopic bilateral salpingo oophorectomy;  Surgeon: Preeti Tirado MD;  Location: [facility] OR    RECONSTRUCT BREAST BILATERAL, IMPLANT PROSTHESIS BILATERAL, COMBINED Bilateral 3/16/2018    Procedure: COMBINED RECONSTRUCT BREAST BILATERAL, IMPLANT PROSTHESIS BILATERAL;  BILATERAL SECOND STAGE BREAST  RECONSTRUCTION WITH SILICONE GEL IMPLANT;  Surgeon: Eugenio Ruelas MD;  Location: [facility]    REVISE RECONSTRUCTED BREAST      SURGICAL HISTORY OF -       Breast reduction    ZZC APPENDECTOMY              Social History:     Social History     Tobacco Use    Smoking status: Former     Packs/day: 0.50     Years: 10.00     Additional pack years: 0.00     Total pack years: 5.00     Types: Cigarettes     Quit date: 10/1/2016     Years since quittin.5    Smokeless tobacco: Never    Tobacco comments:     Socially- quit smoking 10/01/2016   Substance Use Topics    Alcohol use: Yes     Alcohol/week: 0.0 standard drinks of alcohol     Comment: Occassional            Family History:     Family History   Problem Relation Age of Onset    Diabetes Mother         hypoglycemic    Allergies Mother     Arthritis Mother     Depression Mother     Gastrointestinal Disease Mother     Neurologic Disorder Mother     Psychotic Disorder Mother         panic disorder    Cancer Mother 56        pancreatic, colon, liver and skin.    Cancer - colorectal Mother     Other Cancer Mother         Pancreatic    Anxiety Disorder Mother     Mental Illness Mother     Hypertension Father     Diabetes Father     Rheumatoid Arthritis Father     Colon Cancer Father 76    Unknown/Adopted Maternal Grandmother     Cerebrovascular Disease Maternal Grandmother     Unknown/Adopted Maternal Grandfather     Cancer Maternal Grandfather     Obesity Paternal Grandmother     Cancer Paternal Grandmother 94        coloangiocancinoma    C.A.D. Paternal Grandfather         congestive heart failure    Cerebrovascular Disease Paternal Grandfather         age 79    Diabetes Paternal Grandfather     Hypertension Paternal Grandfather     Alzheimer Disease Paternal Grandfather     Arthritis Paternal Grandfather     Circulatory Paternal Grandfather     Cardiovascular Paternal Grandfather     Heart Disease Paternal Grandfather     Neurologic Disorder Paternal  "Grandfather               Allergies:     Allergies   Allergen Reactions    Carboplatin Shortness Of Breath, Rash and Anaphylaxis     Other reaction(s): Flushing, Tachycardia    Amoxicillin GI Disturbance    Erythromycin GI Disturbance    Gluten Meal      Celiac disease    Hydrocodone-Acetaminophen      Other reaction(s): Hallucinations    Hydromorphone Headache    Naltrexone Other (See Comments)     \"Felt high\"    Penicillins Nausea and Vomiting and Hives     1-11-17 pt states this was a childhood reaction    Phentermine Other (See Comments)     Elevated BP    Topamax [Topiramate] Other (See Comments)     Cloudy thinking    Wellbutrin [Bupropion] Other (See Comments)     \"Felt high\"    Zolpidem      Other reaction(s): Hallucinations    Zolpidem Tartrate      hallucinations    Hydrocodone Other (See Comments)     Out of body experience, \"creepy-crawly\" skin             Medications:     Current Outpatient Medications   Medication Sig Dispense Refill    Ascorbic Acid (VITAMIN C PO)       aspirin (ASA) 325 MG EC tablet Take 325 mg by mouth daily      cetirizine (ZYRTEC) 10 MG tablet Take 10 mg by mouth every morning      cholecalciferol (VITAMIN D3) 1000 units (25 mcg) capsule Take 1 capsule by mouth daily      estradiol (YUVAFEM) 10 MCG TABS vaginal tablet INSERT ONE TABLET VAGINALLY TWICE A WEEK 32 tablet 0    Fluticasone Propionate (FLONASE NA) Spray 1 spray into both nostrils daily       gabapentin (NEURONTIN) 300 MG capsule 300mg in a.m. and 300mg in afternoon and 600mg in evening 270 capsule 1    imiquimod (ALDARA) 5 % external cream Apply a small sized amount to warts or molluscum three times weekly at bedtime.   Wash off after 8 hours.   May use for up to 12 weeks. 6 packet 1    letrozole (FEMARA) 2.5 MG tablet Take 1 tablet (2.5 mg) by mouth daily 120 tablet 1    lidocaine (XYLOCAINE) 5 % external ointment Apply topically as needed for moderate pain Apply to cotton ball then apply to affected area 10min prior " to PT exercises or intercourse. 50 g 3    Multiple Vitamins-Minerals (MULTIVITAMIN PO) Take 1 tablet by mouth daily       nitroFURantoin macrocrystal (MACRODANTIN) 50 MG capsule Take 1 capsule (50 mg) by mouth as needed (for UTI symptoms) 30 capsule 2    omeprazole (PRILOSEC) 20 MG DR capsule TAKE ONE CAPSULE BY MOUTH EVERY MORNING 30 MINUTES BEFORE BREAKFAST 90 capsule 3    oxyBUTYnin ER (DITROPAN XL) 10 MG 24 hr tablet Take 1 tablet (10 mg) by mouth daily 90 tablet 3    Semaglutide-Weight Management (WEGOVY) 2.4 MG/0.75ML pen Inject 2.4 mg Subcutaneous once a week 9 mL 1    venlafaxine (EFFEXOR XR) 150 MG 24 hr capsule Take 1 capsule (150 mg) by mouth daily 90 capsule 3    venlafaxine (EFFEXOR XR) 75 MG 24 hr capsule Take with 150mg capsule for total dose of 225mg 90 capsule 3     Current Facility-Administered Medications   Medication Dose Route Frequency Provider Last Rate Last Admin    lidocaine (XYLOCAINE) 2 % external gel   Urethral Once Nithya Rodriguez, THEA                 Review of Systems:    ROS: 14 point ROS neg other than the symptoms noted above in the HPI.          Physical Exam:   Last menstrual period 11/28/2012, not currently breastfeeding.  Data Unavailable, There is no height or weight on file to calculate BMI., 0 lbs 0 oz  Gen appearance: Age-appropriate appearing female in NAD.   HEENT:  EOMI, conjunctiva clear/white. Normal ROM of neck for age.   Psych:  alert , In no acute distress.  Neuro:  A&Ox3  Resp:  Normal respiratory effort; not in acute respiratory distress.          Data:    Labs:    Creatinine   Date Value Ref Range Status   10/30/2023 0.75 0.51 - 0.95 mg/dL Final   07/09/2021 0.77 0.52 - 1.04 mg/dL Final       Imaging:    Renal US 9/14/23  FINDINGS:     RIGHT KIDNEY: 10.6 x 5.2 x 4.8 cm. The renal cortex measures 2 cm in thickness. The renal parenchyma is otherwise sonographically unremarkable without hydronephrosis. An anechoic 1.4 x 1.2 x 1.1 cm cyst is seen within the  midpole of the right kidney,   consistent with a parapelvic cyst.      LEFT KIDNEY: 11.6 x 5.3 x 4.3 cm. The renal cortex measures 1.7 cm in thickness. The renal parenchyma is normal without hydronephrosis or masses. A 4 mm nonobstructive collecting system calculi seen in the lower pole of left kidney.     BLADDER: Normal. Bilateral ureteral jets were visualized in the bladder.                                                                   IMPRESSION:  1.  Benign 1.4 x 1.2 x 1.1 cm parapelvic right mid pole renal cyst, no follow-up imaging is required   2.  4 mm nonobstructive left lower pole collecting system calculi   3.  No sonographic evidence of hydronephrosis.

## 2024-04-09 ENCOUNTER — TELEPHONE (OUTPATIENT)
Dept: UROLOGY | Facility: CLINIC | Age: 44
End: 2024-04-09
Payer: COMMERCIAL

## 2024-04-09 NOTE — TELEPHONE ENCOUNTER
Left Voicemail (1st Attempt) for the patient to call back and schedule the following:    Appointment type: Return  Specialty phone number: [phone]  Additonal Notes:  Follow-up with Darline Hussein or Myriam Olson for stone management/prevention. Followup with me in 6 months     Emi milan Complex   Dermatology, Surgery, Urology  [facility]

## 2024-04-17 DIAGNOSIS — Z17.0 MALIGNANT NEOPLASM OF UPPER-OUTER QUADRANT OF RIGHT BREAST IN FEMALE, ESTROGEN RECEPTOR POSITIVE (H): ICD-10-CM

## 2024-04-17 DIAGNOSIS — C50.411 MALIGNANT NEOPLASM OF UPPER-OUTER QUADRANT OF RIGHT BREAST IN FEMALE, ESTROGEN RECEPTOR POSITIVE (H): ICD-10-CM

## 2024-04-17 RX ORDER — LETROZOLE 2.5 MG/1
2.5 TABLET, FILM COATED ORAL DAILY
Qty: 120 TABLET | Refills: 1 | Status: SHIPPED | OUTPATIENT
Start: 2024-04-17

## 2024-04-17 NOTE — PROGRESS NOTES
refill received from patient requesting a refill of letrozole on behalf of Leola Amin NP.  Last refill: 10/23/23  # 120 with 1 refills at [facility].  Last office visit:  10/30/23  Next office visit:  4/30/24    Brianna Agrawal RN

## 2024-04-22 ENCOUNTER — HOSPITAL ENCOUNTER (OUTPATIENT)
Dept: BONE DENSITY | Facility: CLINIC | Age: 44
Discharge: HOME | End: 2024-04-22
Attending: NURSE PRACTITIONER | Admitting: NURSE PRACTITIONER
Payer: COMMERCIAL

## 2024-04-22 DIAGNOSIS — M85.89 OSTEOPENIA OF MULTIPLE SITES: ICD-10-CM

## 2024-04-22 PROCEDURE — 77080 DXA BONE DENSITY AXIAL: CPT

## 2024-04-30 ENCOUNTER — LAB (OUTPATIENT)
Dept: LAB | Facility: CLINIC | Age: 44
End: 2024-04-30
Payer: COMMERCIAL

## 2024-04-30 ENCOUNTER — INFUSION THERAPY VISIT (OUTPATIENT)
Dept: INFUSION THERAPY | Facility: CLINIC | Age: 44
End: 2024-04-30
Attending: INTERNAL MEDICINE
Payer: COMMERCIAL

## 2024-04-30 ENCOUNTER — ONCOLOGY VISIT (OUTPATIENT)
Dept: ONCOLOGY | Facility: CLINIC | Age: 44
End: 2024-04-30
Attending: INTERNAL MEDICINE
Payer: COMMERCIAL

## 2024-04-30 VITALS
RESPIRATION RATE: 12 BRPM | BODY MASS INDEX: 31.41 KG/M2 | TEMPERATURE: 98.4 F | HEART RATE: 101 BPM | DIASTOLIC BLOOD PRESSURE: 94 MMHG | SYSTOLIC BLOOD PRESSURE: 131 MMHG | WEIGHT: 184 LBS | HEIGHT: 64 IN | OXYGEN SATURATION: 96 %

## 2024-04-30 DIAGNOSIS — Z17.0 MALIGNANT NEOPLASM OF UPPER-OUTER QUADRANT OF RIGHT BREAST IN FEMALE, ESTROGEN RECEPTOR POSITIVE (H): Primary | ICD-10-CM

## 2024-04-30 DIAGNOSIS — M85.89 OSTEOPENIA OF MULTIPLE SITES: ICD-10-CM

## 2024-04-30 DIAGNOSIS — C50.411 MALIGNANT NEOPLASM OF UPPER-OUTER QUADRANT OF RIGHT BREAST IN FEMALE, ESTROGEN RECEPTOR POSITIVE (H): Primary | ICD-10-CM

## 2024-04-30 DIAGNOSIS — C50.411 MALIGNANT NEOPLASM OF UPPER-OUTER QUADRANT OF RIGHT BREAST IN FEMALE, ESTROGEN RECEPTOR POSITIVE (H): ICD-10-CM

## 2024-04-30 DIAGNOSIS — Z12.11 ENCOUNTER FOR SCREENING COLONOSCOPY: Primary | ICD-10-CM

## 2024-04-30 DIAGNOSIS — Z17.0 MALIGNANT NEOPLASM OF UPPER-OUTER QUADRANT OF RIGHT BREAST IN FEMALE, ESTROGEN RECEPTOR POSITIVE (H): ICD-10-CM

## 2024-04-30 LAB
ALBUMIN SERPL BCG-MCNC: 4.4 G/DL (ref 3.5–5.2)
ALP SERPL-CCNC: 77 U/L (ref 40–150)
ALT SERPL W P-5'-P-CCNC: 17 U/L (ref 0–50)
ANION GAP SERPL CALCULATED.3IONS-SCNC: 10 MMOL/L (ref 7–15)
AST SERPL W P-5'-P-CCNC: 13 U/L (ref 0–45)
BASOPHILS # BLD AUTO: 0 10E3/UL (ref 0–0.2)
BASOPHILS NFR BLD AUTO: 0 %
BILIRUB SERPL-MCNC: 0.3 MG/DL
BUN SERPL-MCNC: 11.1 MG/DL (ref 6–20)
CALCIUM SERPL-MCNC: 9.1 MG/DL (ref 8.6–10)
CHLORIDE SERPL-SCNC: 103 MMOL/L (ref 98–107)
CREAT SERPL-MCNC: 0.72 MG/DL (ref 0.51–0.95)
DEPRECATED HCO3 PLAS-SCNC: 26 MMOL/L (ref 22–29)
EGFRCR SERPLBLD CKD-EPI 2021: >90 ML/MIN/1.73M2
EOSINOPHIL # BLD AUTO: 0.2 10E3/UL (ref 0–0.7)
EOSINOPHIL NFR BLD AUTO: 3 %
ERYTHROCYTE [DISTWIDTH] IN BLOOD BY AUTOMATED COUNT: 13.8 % (ref 10–15)
GLUCOSE SERPL-MCNC: 178 MG/DL (ref 70–99)
HCT VFR BLD AUTO: 41.2 % (ref 35–47)
HGB BLD-MCNC: 13.8 G/DL (ref 11.7–15.7)
IMM GRANULOCYTES # BLD: 0 10E3/UL
IMM GRANULOCYTES NFR BLD: 0 %
LYMPHOCYTES # BLD AUTO: 2.4 10E3/UL (ref 0.8–5.3)
LYMPHOCYTES NFR BLD AUTO: 31 %
MCH RBC QN AUTO: 28.5 PG (ref 26.5–33)
MCHC RBC AUTO-ENTMCNC: 33.5 G/DL (ref 31.5–36.5)
MCV RBC AUTO: 85 FL (ref 78–100)
MONOCYTES # BLD AUTO: 0.3 10E3/UL (ref 0–1.3)
MONOCYTES NFR BLD AUTO: 4 %
NEUTROPHILS # BLD AUTO: 4.8 10E3/UL (ref 1.6–8.3)
NEUTROPHILS NFR BLD AUTO: 61 %
NRBC # BLD AUTO: 0 10E3/UL
NRBC BLD AUTO-RTO: 0 /100
PLATELET # BLD AUTO: 299 10E3/UL (ref 150–450)
POTASSIUM SERPL-SCNC: 4 MMOL/L (ref 3.4–5.3)
PROT SERPL-MCNC: 7 G/DL (ref 6.4–8.3)
RBC # BLD AUTO: 4.84 10E6/UL (ref 3.8–5.2)
SODIUM SERPL-SCNC: 139 MMOL/L (ref 135–145)
WBC # BLD AUTO: 7.8 10E3/UL (ref 4–11)

## 2024-04-30 PROCEDURE — 250N000011 HC RX IP 250 OP 636: Mod: JZ | Performed by: INTERNAL MEDICINE

## 2024-04-30 PROCEDURE — 99214 OFFICE O/P EST MOD 30 MIN: CPT | Mod: 25 | Performed by: INTERNAL MEDICINE

## 2024-04-30 PROCEDURE — 99214 OFFICE O/P EST MOD 30 MIN: CPT | Performed by: INTERNAL MEDICINE

## 2024-04-30 PROCEDURE — 258N000003 HC RX IP 258 OP 636: Performed by: INTERNAL MEDICINE

## 2024-04-30 PROCEDURE — 80053 COMPREHEN METABOLIC PANEL: CPT

## 2024-04-30 PROCEDURE — 36415 COLL VENOUS BLD VENIPUNCTURE: CPT

## 2024-04-30 PROCEDURE — G2211 COMPLEX E/M VISIT ADD ON: HCPCS | Performed by: INTERNAL MEDICINE

## 2024-04-30 PROCEDURE — 85049 AUTOMATED PLATELET COUNT: CPT | Performed by: INTERNAL MEDICINE

## 2024-04-30 PROCEDURE — 96365 THER/PROPH/DIAG IV INF INIT: CPT

## 2024-04-30 RX ORDER — ZOLEDRONIC ACID 0.04 MG/ML
4 INJECTION, SOLUTION INTRAVENOUS ONCE
Status: COMPLETED | OUTPATIENT
Start: 2024-04-30 | End: 2024-04-30

## 2024-04-30 RX ADMIN — ZOLEDRONIC ACID 4 MG: 0.04 INJECTION, SOLUTION INTRAVENOUS at 11:25

## 2024-04-30 RX ADMIN — SODIUM CHLORIDE 250 ML: 9 INJECTION, SOLUTION INTRAVENOUS at 11:25

## 2024-04-30 ASSESSMENT — PAIN SCALES - GENERAL: PAINLEVEL: NO PAIN (0)

## 2024-04-30 NOTE — PROGRESS NOTES
"Oncology Rooming Note    April 30, 2024 10:13 AM   Diana Wilson is a 43 year old female who presents for:    Chief Complaint   Patient presents with    Oncology Clinic Visit     Malignant neoplasm of upper-outer quadrant of right breast in female, estrogen receptor positive - Labs provider and infusion     Initial Vitals: BP (!) 131/94 (BP Location: Right arm, Patient Position: Sitting, Cuff Size: Adult Regular)   Pulse 101   Temp 98.4  F (36.9  C) (Tympanic)   Resp 12   Ht 1.626 m (5' 4\")   Wt 83.5 kg (184 lb)   LMP 11/28/2012   SpO2 96%   BMI 31.58 kg/m   Estimated body mass index is 31.58 kg/m  as calculated from the following:    Height as of this encounter: 1.626 m (5' 4\").    Weight as of this encounter: 83.5 kg (184 lb). Body surface area is 1.94 meters squared.  No Pain (0) Comment: Data Unavailable   Patient's last menstrual period was 11/28/2012.  Allergies reviewed: Yes  Medications reviewed: Yes    Medications: Medication refills not needed today.  Pharmacy name entered into [facility]:    [facility] [place]  [place] - [id number]  [facility] AND [facility]    Frailty Screening:   Is the patient here for a new oncology consult visit in cancer care? 2. No      Clinical concerns:  None      Nicole Costa, MEENA              "

## 2024-04-30 NOTE — PROGRESS NOTES
[facility] Hematology and Oncology Outpatient Progress Note    Patient: Diana Wilson  MRN: [id number]  Date of Service: Apr 30, 2024          Reason for Visit    History of breast cancer    Primary Oncologist: Dr. Coulter      Assessment/Plan  History ER/PA+ right breast cancer (2016)  Now almost 7 years from diagnosis and 6.5 years completion of treatment.      Continues long-term endocrine therapy (has been on total of 5.5 yrs); on various AIs due to intolerance and Tamoxifen stopped for PE. She's most recently been on letrozole for the past 2yrs with better tolerance than the other AIs. Minimal arthralgias, hot flashes are minimal on Effexor.  Vaginal dryness and difficulty with weight loss have been side effects.  She is on a very low-dose vaginal estrogen.    Exam unremarkable today.  Status post bilateral mastectomy..     Labs are pretty stable.  Normal serum chemistry.  Normal CBC.    Plan:  -Continue letrozole. Planning 10 yrs of endocrine treatment, if tolerating (2028)  -Every 6 visit for surveillance.   -Annual CBC and serum chemistry.    2.  Chemo-induced peripheral neuropathy  On gabapentin 300 mg TID.  Stable neuropathy.     3.   Osteopenia  Risk of developing osteoporosis with long-term AI use.   DEXA scan done on 4/22/2024 showed low bone density.    Plan:  -Continue 6 monthly Zometa   -Continue vitamin D and calcium supplementations.      4.   Sexuality concerns (vaginal dryness, low libido)   Currently on vaginal estrogen tablet.  Using lubricants.  Symptoms are much better now.      5.  Fam hx colon cancer  Father just diagnosed with colon cancer (age 76).   She will be almost 44 in a couple of months.  Recommended starting colonoscopy screening.  Referral placed.    History of Present Illness/ Interval History    Ms. Diana Wilson is a 43 year old with history of ER/PA+ breast cancer, diagnosed almost 7 yr ago. S/P bilateral mastectomy (with reconstruction), RT, BSO and has been on  adjuvant endocrine therapy ~5 yrs. She has had intolerance of anastrozole and Aromasin (significant arthralgias) and PE on Tamoxifen. She was changed to letrozole 2 yrs ago and is tolerating this the best, so far.     Overall has done fairly well on letrozole.  Minimal arthralgias.  Some hot flashes.  Biggest issue is sexual dysfunction and weight gain.  On vaginal estrogen.  Denies any new lumps or bumps.  No new bone pain reported.      ECOG Performance     0    Oncology History/Treatment  Diagnosis/Stage:   12/2016: T2N1 invasive ductal right breast cancer, ER/DE+ (HER2 neg) // ypTis(DCIS)pN0  -age 35 yo    -surgical path after neoadjuvant chemo: right breast - no residual invasive carcinoma; grade III DCIS 1 mm; 5 sLN all neg.   Left breast - negative.     -Genetic testing: negative for mutations in the TYLER, BARD1, BRCA1, BRCA2, BRIP1, CDH1, CHEK2, MRE11A, MUTYH, NBN, NF1, PALB2, PTEN, RAD50, RAD51C, RAD51D, and TP53 genes by sequencing and deletion/duplication analysis. No mutations were found in any of the 17 genes analyzed. 2017 9/2020: PE (post-op breast reconstruction surgery)  -Tamoxifen stopped  -Anticoagulated 6 mths    Treatment:  2017: neoadjuvant ddAC x 4, then weekly carbo/taxol x 12  -stopped carbo after 7 weeks due to anaphylaxis    7/12/2017: bilateral mastectomies  (reconstruction 9/2020 with removal of implants and TRAM flap reconstruction; complicated by post-op PE)    11/2017: adjuvant RT completed (6040 cGy/33)    11/2017: BSO    2018 - present: adjuvant endocrine therapy  -Arimidex intermittently in 2018  -Tamoxifen [id number], stopped for PE  -Aromasin 2020 - 3/2022 (arthralgias)  -Letrozole 4/2022 - current      Physical Exam    GENERAL: Alert and oriented to time place and person. Seated comfortably. In no distress.  HEAD: Atraumatic and normocephalic. No alopecia.  EYES: ROMMEL, EOMI. No erythema. No icterus.  BREASTS: Reconstructed breasts.  No masses or lesions noted.  No bilateral  axilla adenopathy.  CHEST: regular respiratory effort. Clear lung sounds bilaterally.  HEART: RRR  ABD: Soft, non-distended  SKIN: no rash, or bruising or purpura.   NEURO: No gross deficit noted. Non-antalgic gait.      Lab Results  Recent Results (from the past 840 hour(s))   Comprehensive metabolic panel    Collection Time: 04/30/24  9:13 AM   Result Value Ref Range    Sodium 139 135 - 145 mmol/L    Potassium 4.0 3.4 - 5.3 mmol/L    Carbon Dioxide (CO2) 26 22 - 29 mmol/L    Anion Gap 10 7 - 15 mmol/L    Urea Nitrogen 11.1 6.0 - 20.0 mg/dL    Creatinine 0.72 0.51 - 0.95 mg/dL    GFR Estimate >90 >60 mL/min/1.73m2    Calcium 9.1 8.6 - 10.0 mg/dL    Chloride 103 98 - 107 mmol/L    Glucose 178 (H) 70 - 99 mg/dL    Alkaline Phosphatase 77 40 - 150 U/L    AST 13 0 - 45 U/L    ALT 17 0 - 50 U/L    Protein Total 7.0 6.4 - 8.3 g/dL    Albumin 4.4 3.5 - 5.2 g/dL    Bilirubin Total 0.3 <=1.2 mg/dL   CBC with platelets and differential    Collection Time: 04/30/24  9:13 AM   Result Value Ref Range    WBC Count 7.8 4.0 - 11.0 10e3/uL    RBC Count 4.84 3.80 - 5.20 10e6/uL    Hemoglobin 13.8 11.7 - 15.7 g/dL    Hematocrit 41.2 35.0 - 47.0 %    MCV 85 78 - 100 fL    MCH 28.5 26.5 - 33.0 pg    MCHC 33.5 31.5 - 36.5 g/dL    RDW 13.8 10.0 - 15.0 %    Platelet Count 299 150 - 450 10e3/uL    % Neutrophils 61 %    % Lymphocytes 31 %    % Monocytes 4 %    % Eosinophils 3 %    % Basophils 0 %    % Immature Granulocytes 0 %    NRBCs per 100 WBC 0 <1 /100    Absolute Neutrophils 4.8 1.6 - 8.3 10e3/uL    Absolute Lymphocytes 2.4 0.8 - 5.3 10e3/uL    Absolute Monocytes 0.3 0.0 - 1.3 10e3/uL    Absolute Eosinophils 0.2 0.0 - 0.7 10e3/uL    Absolute Basophils 0.0 0.0 - 0.2 10e3/uL    Absolute Immature Granulocytes 0.0 <=0.4 10e3/uL    Absolute NRBCs 0.0 10e3/uL             Imaging    DX Hip/Pelvis/Spine    Result Date: 4/22/2024  EXAM: DX AXIAL HIPS/SPINE LOCATION: [facility] DATE: 4/22/2024 INDICATION: Osteopenia  of multiple sites, , postmenopausal, chronic malnutrition or malabsorption, bisphosphonate use. DEMOGRAPHICS: Age- 43 years. Gender- Female. Menopausal status- Postmenopausal. COMPARISON: 12/06/2021 TECHNIQUE: Dual-energy x-ray absorptiometry (DXA) performed with routine technique. FINDINGS: DXA RESULTS -Lumbar Spine: L1-L4: BMD: 1.107 g/cm2. T-score: -0.7. Z-score: -1.3. Degenerative change may artifactually increase BMD. -RIGHT Hip Total: BMD: 1.031 g/cm2. T-score: 0.2. Z-score: 0.1. -RIGHT Hip Femoral neck: BMD: 0.925 g/cm2. T-score: -0.8. Z-score: -0.6. -LEFT Hip Total: BMD: 0.976 g/cm2. T-score: -0.3. Z-score: -0.4. -LEFT Hip Femoral neck: BMD: 0.886 g/cm2. T-score: -1.1. Z-score: -0.9. WHO T-SCORE CRITERIA -Normal: T score at or above -1 SD -Osteopenia: T score between -1 and -2.5 SD -Osteoporosis: T score at or below -2.5 SD The World Health Organization (WHO) criteria is applicable to perimenopausal females, postmenopausal females, and men aged 50 years or older. INTERVAL CHANGE -There has been a 6.6% increase in lumbar spine BMD, which may be due to arthritic/sclerotic changes. -There has been a 2.1% decrease in bilateral hip BMD. FRACTURE RISK -The FRAX risk calculator is not applicable due to medication that is used for treatment of osteopenia/osteoporosis or can otherwise affect bone mineral density.     IMPRESSION: Low bone density (OSTEOPENIA). T score meets the WHO criteria for low bone density (osteopenia) at one or more measured sites. The risk of osteoporotic fracture increases approximately two-fold for each standard deviation decrease in T-score.     The longitudinal plan of care for the diagnosis(es)/condition(s) as documented were addressed during this visit. Due to the added complexity in care, I will continue to support Diana in the subsequent management and with ongoing continuity of care.      Signed by:   Layo Coulter MD  Hematology and Medical Oncology  [facility]  [facility]

## 2024-04-30 NOTE — LETTER
4/30/2024         RE: Diana Wilson  [address] [facility] [address]        Dear Colleague,    Thank you for referring your patient, Diana Wilson, to the [facility] CANC[facility]. Please see a copy of my visit note below.    [facility] Hematology and Oncology Outpatient Progress Note    Patient: Diana Wilson  MRN: [id number]  Date of Service: Apr 30, 2024          Reason for Visit    History of breast cancer    Primary Oncologist: Dr. Coulter      Assessment/Plan  History ER/FL+ right breast cancer (2016)  Now almost 7 years from diagnosis and 6.5 years completion of treatment.      Continues long-term endocrine therapy (has been on total of 5.5 yrs); on various AIs due to intolerance and Tamoxifen stopped for PE. She's most recently been on letrozole for the past 2yrs with better tolerance than the other AIs. Minimal arthralgias, hot flashes are minimal on Effexor.  Vaginal dryness and difficulty with weight loss have been side effects.  She is on a very low-dose vaginal estrogen.    Exam unremarkable today.  Status post bilateral mastectomy..     Labs are pretty stable.  Normal serum chemistry.  Normal CBC.    Plan:  -Continue letrozole. Planning 10 yrs of endocrine treatment, if tolerating (2028)  -Every 6 visit for surveillance.   -Annual CBC and serum chemistry.    2.  Chemo-induced peripheral neuropathy  On gabapentin 300 mg TID.  Stable neuropathy.     3.   Osteopenia  Risk of developing osteoporosis with long-term AI use.   DEXA scan done on 4/22/2024 showed low bone density.    Plan:  -Continue 6 monthly Zometa   -Continue vitamin D and calcium supplementations.      4.   Sexuality concerns (vaginal dryness, low libido)   Currently on vaginal estrogen tablet.  Using lubricants.  Symptoms are much better now.      5.  Fam hx colon cancer  Father just diagnosed with colon cancer (age 76).   She will be almost 44 in a couple of months.  Recommended starting colonoscopy  screening.  Referral placed.    History of Present Illness/ Interval History    Ms. Diana Wilson is a 43 year old with history of ER/VA+ breast cancer, diagnosed almost 7 yr ago. S/P bilateral mastectomy (with reconstruction), RT, BSO and has been on adjuvant endocrine therapy ~5 yrs. She has had intolerance of anastrozole and Aromasin (significant arthralgias) and PE on Tamoxifen. She was changed to letrozole 2 yrs ago and is tolerating this the best, so far.     Overall has done fairly well on letrozole.  Minimal arthralgias.  Some hot flashes.  Biggest issue is sexual dysfunction and weight gain.  On vaginal estrogen.  Denies any new lumps or bumps.  No new bone pain reported.      ECOG Performance     0    Oncology History/Treatment  Diagnosis/Stage:   12/2016: T2N1 invasive ductal right breast cancer, ER/VA+ (HER2 neg) // ypTis(DCIS)pN0  -age 37 yo    -surgical path after neoadjuvant chemo: right breast - no residual invasive carcinoma; grade III DCIS 1 mm; 5 sLN all neg.   Left breast - negative.     -Genetic testing: negative for mutations in the TYLER, BARD1, BRCA1, BRCA2, BRIP1, CDH1, CHEK2, MRE11A, MUTYH, NBN, NF1, PALB2, PTEN, RAD50, RAD51C, RAD51D, and TP53 genes by sequencing and deletion/duplication analysis. No mutations were found in any of the 17 genes analyzed. 2017 9/2020: PE (post-op breast reconstruction surgery)  -Tamoxifen stopped  -Anticoagulated 6 mths    Treatment:  2017: neoadjuvant ddAC x 4, then weekly carbo/taxol x 12  -stopped carbo after 7 weeks due to anaphylaxis    7/12/2017: bilateral mastectomies  (reconstruction 9/2020 with removal of implants and TRAM flap reconstruction; complicated by post-op PE)    11/2017: adjuvant RT completed (6040 cGy/33)    11/2017: BSO    2018 - present: adjuvant endocrine therapy  -Arimidex intermittently in 2018  -Tamoxifen [id number], stopped for PE  -Aromasin 2020 - 3/2022 (arthralgias)  -Letrozole 4/2022 - current      Physical Exam    GENERAL:  Alert and oriented to time place and person. Seated comfortably. In no distress.  HEAD: Atraumatic and normocephalic. No alopecia.  EYES: ROMMEL, EOMI. No erythema. No icterus.  BREASTS: Reconstructed breasts.  No masses or lesions noted.  No bilateral axilla adenopathy.  CHEST: regular respiratory effort. Clear lung sounds bilaterally.  HEART: RRR  ABD: Soft, non-distended  SKIN: no rash, or bruising or purpura.   NEURO: No gross deficit noted. Non-antalgic gait.      Lab Results  Recent Results (from the past 840 hour(s))   Comprehensive metabolic panel    Collection Time: 04/30/24  9:13 AM   Result Value Ref Range    Sodium 139 135 - 145 mmol/L    Potassium 4.0 3.4 - 5.3 mmol/L    Carbon Dioxide (CO2) 26 22 - 29 mmol/L    Anion Gap 10 7 - 15 mmol/L    Urea Nitrogen 11.1 6.0 - 20.0 mg/dL    Creatinine 0.72 0.51 - 0.95 mg/dL    GFR Estimate >90 >60 mL/min/1.73m2    Calcium 9.1 8.6 - 10.0 mg/dL    Chloride 103 98 - 107 mmol/L    Glucose 178 (H) 70 - 99 mg/dL    Alkaline Phosphatase 77 40 - 150 U/L    AST 13 0 - 45 U/L    ALT 17 0 - 50 U/L    Protein Total 7.0 6.4 - 8.3 g/dL    Albumin 4.4 3.5 - 5.2 g/dL    Bilirubin Total 0.3 <=1.2 mg/dL   CBC with platelets and differential    Collection Time: 04/30/24  9:13 AM   Result Value Ref Range    WBC Count 7.8 4.0 - 11.0 10e3/uL    RBC Count 4.84 3.80 - 5.20 10e6/uL    Hemoglobin 13.8 11.7 - 15.7 g/dL    Hematocrit 41.2 35.0 - 47.0 %    MCV 85 78 - 100 fL    MCH 28.5 26.5 - 33.0 pg    MCHC 33.5 31.5 - 36.5 g/dL    RDW 13.8 10.0 - 15.0 %    Platelet Count 299 150 - 450 10e3/uL    % Neutrophils 61 %    % Lymphocytes 31 %    % Monocytes 4 %    % Eosinophils 3 %    % Basophils 0 %    % Immature Granulocytes 0 %    NRBCs per 100 WBC 0 <1 /100    Absolute Neutrophils 4.8 1.6 - 8.3 10e3/uL    Absolute Lymphocytes 2.4 0.8 - 5.3 10e3/uL    Absolute Monocytes 0.3 0.0 - 1.3 10e3/uL    Absolute Eosinophils 0.2 0.0 - 0.7 10e3/uL    Absolute Basophils 0.0 0.0 - 0.2 10e3/uL    Absolute  Immature Granulocytes 0.0 <=0.4 10e3/uL    Absolute NRBCs 0.0 10e3/uL             Imaging    DX Hip/Pelvis/Spine    Result Date: 4/22/2024  EXAM: DX AXIAL HIPS/SPINE LOCATION: [facility] DATE: 4/22/2024 INDICATION: Osteopenia of multiple sites, , postmenopausal, chronic malnutrition or malabsorption, bisphosphonate use. DEMOGRAPHICS: Age- 43 years. Gender- Female. Menopausal status- Postmenopausal. COMPARISON: 12/06/2021 TECHNIQUE: Dual-energy x-ray absorptiometry (DXA) performed with routine technique. FINDINGS: DXA RESULTS -Lumbar Spine: L1-L4: BMD: 1.107 g/cm2. T-score: -0.7. Z-score: -1.3. Degenerative change may artifactually increase BMD. -RIGHT Hip Total: BMD: 1.031 g/cm2. T-score: 0.2. Z-score: 0.1. -RIGHT Hip Femoral neck: BMD: 0.925 g/cm2. T-score: -0.8. Z-score: -0.6. -LEFT Hip Total: BMD: 0.976 g/cm2. T-score: -0.3. Z-score: -0.4. -LEFT Hip Femoral neck: BMD: 0.886 g/cm2. T-score: -1.1. Z-score: -0.9. WHO T-SCORE CRITERIA -Normal: T score at or above -1 SD -Osteopenia: T score between -1 and -2.5 SD -Osteoporosis: T score at or below -2.5 SD The World Health Organization (WHO) criteria is applicable to perimenopausal females, postmenopausal females, and men aged 50 years or older. INTERVAL CHANGE -There has been a 6.6% increase in lumbar spine BMD, which may be due to arthritic/sclerotic changes. -There has been a 2.1% decrease in bilateral hip BMD. FRACTURE RISK -The FRAX risk calculator is not applicable due to medication that is used for treatment of osteopenia/osteoporosis or can otherwise affect bone mineral density.     IMPRESSION: Low bone density (OSTEOPENIA). T score meets the WHO criteria for low bone density (osteopenia) at one or more measured sites. The risk of osteoporotic fracture increases approximately two-fold for each standard deviation decrease in T-score.     The longitudinal plan of care for the diagnosis(es)/condition(s) as documented were  "addressed during this visit. Due to the added complexity in care, I will continue to support Diana in the subsequent management and with ongoing continuity of care.      Signed by:   Layo Coulter MD  Hematology and Medical Oncology  [facility]          Oncology Rooming Note    April 30, 2024 10:13 AM   Diana Wilson is a 43 year old female who presents for:    Chief Complaint   Patient presents with     Oncology Clinic Visit     Malignant neoplasm of upper-outer quadrant of right breast in female, estrogen receptor positive - Labs provider and infusion     Initial Vitals: BP (!) 131/94 (BP Location: Right arm, Patient Position: Sitting, Cuff Size: Adult Regular)   Pulse 101   Temp 98.4  F (36.9  C) (Tympanic)   Resp 12   Ht 1.626 m (5' 4\")   Wt 83.5 kg (184 lb)   LMP 11/28/2012   SpO2 96%   BMI 31.58 kg/m   Estimated body mass index is 31.58 kg/m  as calculated from the following:    Height as of this encounter: 1.626 m (5' 4\").    Weight as of this encounter: 83.5 kg (184 lb). Body surface area is 1.94 meters squared.  No Pain (0) Comment: Data Unavailable   Patient's last menstrual period was 11/28/2012.  Allergies reviewed: Yes  Medications reviewed: Yes    Medications: Medication refills not needed today.  Pharmacy name entered into [facility]:    [facility] [place], [place] - [address] [facility] AND [facility]    Frailty Screening:   Is the patient here for a new oncology consult visit in cancer care? 2. No      Clinical concerns:  None      Nicole Costa CMA                Again, thank you for allowing me to participate in the care of your patient.        Sincerely,        Layo Coulter MD  "

## 2024-04-30 NOTE — PROGRESS NOTES
Infusion Nursing Note:  Diana Wilson presents today for Zometa.    Patient seen by provider today: Yes: Dr. Coulter   present during visit today: Not Applicable.    Note: N/A.      Intravenous Access:  Labs drawn without difficulty.  Peripheral IV placed.    Treatment Conditions:  Lab Results   Component Value Date    HGB 13.8 04/30/2024    WBC 7.8 04/30/2024    ANEU 4.5 07/09/2021    ANEUTAUTO 4.8 04/30/2024     04/30/2024        Lab Results   Component Value Date     04/30/2024    POTASSIUM 4.0 04/30/2024    MAG 1.5 (L) 10/16/2020    CR 0.72 04/30/2024    DENY 9.1 04/30/2024    BILITOTAL 0.3 04/30/2024    ALBUMIN 4.4 04/30/2024    ALT 17 04/30/2024    AST 13 04/30/2024       Results reviewed, labs MET treatment parameters, ok to proceed with treatment.      Post Infusion Assessment:  Patient tolerated infusion without incident.  Blood return noted pre and post infusion.  Site patent and intact, free from redness, edema or discomfort.  No evidence of extravasations.  Access discontinued per protocol.       Discharge Plan:   Patient discharged in stable condition accompanied by: self.  Departure Mode: Ambulatory.  Pt to return on 11/5/24 at 11:00 am for labs followed by clinic visit with Dr. Coulter and then Kristal.     Nora Isaac RN

## 2024-05-06 ENCOUNTER — VIRTUAL VISIT (OUTPATIENT)
Dept: UROLOGY | Facility: CLINIC | Age: 44
End: 2024-05-06
Payer: COMMERCIAL

## 2024-05-06 DIAGNOSIS — N20.0 NEPHROLITHIASIS: Primary | ICD-10-CM

## 2024-05-06 DIAGNOSIS — K21.9 GASTROESOPHAGEAL REFLUX DISEASE, UNSPECIFIED WHETHER ESOPHAGITIS PRESENT: ICD-10-CM

## 2024-05-06 PROCEDURE — 99214 OFFICE O/P EST MOD 30 MIN: CPT | Mod: 95 | Performed by: NURSE PRACTITIONER

## 2024-05-06 NOTE — PROGRESS NOTES
Urology Video Office Visit    Video-Visit Details    Type of service:  Video Visit    Video Start Time: 1328    Video End Time: 1347    Originating Location (pt. Location): Home    Distant Location (provider location):  Off-site     Platform used for Video Visit: [employer]           Assessment and Plan:     Assessment:43 year old female with nephrolithiasis.    Plan:  -Reviewed CT scan with patient. Noted a 2mm nonobstructing left renal stones.  Discussed option of repeating imaging for further evaluation. Will wait until Sept 2024 to repeat.   -The patient and I discussed the diagnosis and natural history of urolithiasis. Discussed option of 24 hour urine study for further evaluation for risks of stones.   -Discussed that left stone is unlikely to be the cause of her LUTS at this time. Discussed concerns of gravel/sand-like substance in her urine.   -RTC in 6-8 weeks to review 24 hour urine study.    Myriam Olson CNP  Department of Urology  May 6, 2024    I spent a total of 30 minutes spent on the date of the encounter doing chart review, history and exam, documentation, and further activities as noted above.          Chief Complaint:   Nephrolithiasis         History of Present Illness:    Diana Wilson is a pleasant 43 year old female who presents with concerns of a nephrolithiasis.  PMHx: Breast CA, Osteopenia, and chemo-induced peripheral neuropathy.    Ms. Wilson follows with [facility] for concerns of UTI, vaginal atrophy, dyspareunia, levator spasm, bladder spasms, urinary urgency/frequency UUI, high tone pelvic floor dysfunction, hx of gross hematuria and hx of nephrolithiasis.     She notes at the end of last week she did have a pressure like sensation in her kidney. This resolved with increase in fluid. She notes gravel/sand-like substance which passes in her urine with intermittent gross hematuria.    Questionable stone passage in Feb 2024.     CT scan performed on 9/10/23 (images personally  reviewed) revealed a left 2mm nonobstructing renal stone. No noted left hydronephrosis. No noted right nephrolithiasis or hydronephrosis.     Family History Nephrolithasis: mother (? Uric Acid stones)    Stone Risk Factors: none    She notes she does have a high fluid intake of 100oz per day. Will flavor water but limits to 2 times per day due to sodium content.           Past Medical History:     Past Medical History:   Diagnosis Date    Anxiety     Breast cancer (H)     Celiac disease     Depressive disorder     And Anxiety    Encounter for insertion or removal of intrauterine contraceptive device 2008    Excessive or frequent menstruation 2003    Fibroadenosis of breast     GERD (gastroesophageal reflux disease)     Invasive ductal carcinoma of breast, right (H)     Malignant neoplasm of upper-outer quadrant of right female breast (H)     Mild intermittent asthma            never has had PFT's, MDI with colds and at times exercise    Neutropenia, drug-induced (H24)     Nongonococcal urethritis (JALIL) due to chlamydia trachomatis 2002    Other and unspecified ovarian cyst 2003    RIGHT ovarian cyst    PONV (postoperative nausea and vomiting)     Skin cancer     Transient hypertension of pregnancy, antepartum     PIH with last birth    Uncomplicated asthma             Past Surgical History:     Past Surgical History:   Procedure Laterality Date    BIOPSY  2016    EDG with biopsy    BREAST SURGERY      Bilateral reduction     SECTION      CYSTOSCOPY, SLING TRANSVAGINAL N/A 2022    Procedure: CREATION, VAGINAL SLING, WITH CYSTOSCOPY (support the urethra with mesh sling and look in the bladder);  Surgeon: Sixto Harris MD;  Location: [facility]    DENTAL SURGERY  2010 and 10/12/2010    Root canals    DILATION AND CURETTAGE, HYSTEROSCOPY, ABLATE ENDOMETRIUM NOVASURE, COMBINED  3/20/2012    Procedure:COMBINED DILATION AND CURETTAGE, HYSTEROSCOPY, ABLATE  ENDOMETRIUM NOVASURE; Hysteroscopy with Endometrial Ablation Novasure; Surgeon:GERRI PURCELL; Location:[facility]    ESOPHAGOSCOPY, GASTROSCOPY, DUODENOSCOPY (EGD), COMBINED N/A 2/18/2016    Procedure: COMBINED ESOPHAGOSCOPY, GASTROSCOPY, DUODENOSCOPY (EGD);  Surgeon: Jose L Wilson MD;  Location: [facility]    INSERT PORT VASCULAR ACCESS N/A 1/11/2017    Procedure: INSERT PORT VASCULAR ACCESS;  Surgeon: Michael Townsend MD;  Location: [facility]    LAPAROSCOPIC SALPINGO-OOPHORECTOMY Bilateral 11/28/2017    Procedure: LAPAROSCOPIC SALPINGO-OOPHORECTOMY;  Laparoscopic bilateral salpingo oophorectomy;  Surgeon: Preeti Tirado MD;  Location: [facility]    RECONSTRUCT BREAST BILATERAL, IMPLANT PROSTHESIS BILATERAL, COMBINED Bilateral 3/16/2018    Procedure: COMBINED RECONSTRUCT BREAST BILATERAL, IMPLANT PROSTHESIS BILATERAL;  BILATERAL SECOND STAGE BREAST RECONSTRUCTION WITH SILICONE GEL IMPLANT;  Surgeon: Eugenio Ruelas MD;  Location:  [facility]    REVISE RECONSTRUCTED BREAST      SURGICAL HISTORY OF -   1997    Breast reduction    ZZC APPENDECTOMY  1991            Medications     Current Outpatient Medications   Medication Sig Dispense Refill    Ascorbic Acid (VITAMIN C PO)       aspirin (ASA) 325 MG EC tablet Take 325 mg by mouth daily      cetirizine (ZYRTEC) 10 MG tablet Take 10 mg by mouth every morning      cholecalciferol (VITAMIN D3) 1000 units (25 mcg) capsule Take 1 capsule by mouth daily      estradiol (YUVAFEM) 10 MCG TABS vaginal tablet INSERT ONE TABLET VAGINALLY TWICE A WEEK 32 tablet 0    Fluticasone Propionate (FLONASE NA) Spray 1 spray into both nostrils daily       gabapentin (NEURONTIN) 300 MG capsule 300mg in a.m. and 300mg in afternoon and 600mg in evening 270 capsule 1    imiquimod (ALDARA) 5 % external cream Apply a small sized amount to warts or molluscum three times weekly at bedtime.   Wash off after 8 hours.   May use for up to 12 weeks. 6 packet 1    letrozole (FEMARA) 2.5 MG  tablet Take 1 tablet (2.5 mg) by mouth daily 120 tablet 1    lidocaine (XYLOCAINE) 5 % external ointment Apply topically as needed for moderate pain Apply to cotton ball then apply to affected area 10min prior to PT exercises or intercourse. 50 g 3    Multiple Vitamins-Minerals (MULTIVITAMIN PO) Take 1 tablet by mouth daily       nitroFURantoin macrocrystal (MACRODANTIN) 50 MG capsule Take 1 capsule (50 mg) by mouth as needed (for UTI symptoms) 30 capsule 2    omeprazole (PRILOSEC) 20 MG DR capsule TAKE ONE CAPSULE BY MOUTH EVERY MORNING 30 MINUTES BEFORE BREAKFAST 90 capsule 3    oxyBUTYnin ER (DITROPAN XL) 10 MG 24 hr tablet Take 1 tablet (10 mg) by mouth daily 90 tablet 3    Semaglutide-Weight Management (WEGOVY) 2.4 MG/0.75ML pen Inject 2.4 mg Subcutaneous once a week (Patient not taking: Reported on 4/30/2024) 9 mL 1    venlafaxine (EFFEXOR XR) 150 MG 24 hr capsule Take 1 capsule (150 mg) by mouth daily 90 capsule 3    venlafaxine (EFFEXOR XR) 75 MG 24 hr capsule Take with 150mg capsule for total dose of 225mg 90 capsule 3     Current Facility-Administered Medications   Medication Dose Route Frequency Provider Last Rate Last Admin    lidocaine (XYLOCAINE) 2 % external gel   Urethral Once Nithya Rodriguez, THEA                Family History:     Family History   Problem Relation Age of Onset    Diabetes Mother         hypoglycemic    Allergies Mother     Arthritis Mother     Depression Mother     Gastrointestinal Disease Mother     Neurologic Disorder Mother     Psychotic Disorder Mother         panic disorder    Cancer Mother 56        pancreatic, colon, liver and skin.    Cancer - colorectal Mother     Other Cancer Mother         Pancreatic    Anxiety Disorder Mother     Mental Illness Mother     Hypertension Father     Diabetes Father     Rheumatoid Arthritis Father     Colon Cancer Father 76    Unknown/Adopted Maternal Grandmother     Cerebrovascular Disease Maternal Grandmother     Unknown/Adopted  Maternal Grandfather     Cancer Maternal Grandfather     Obesity Paternal Grandmother     Cancer Paternal Grandmother 94        coloangiocancinoma    C.A.D. Paternal Grandfather         congestive heart failure    Cerebrovascular Disease Paternal Grandfather         age 79    Diabetes Paternal Grandfather     Hypertension Paternal Grandfather     Alzheimer Disease Paternal Grandfather     Arthritis Paternal Grandfather     Circulatory Paternal Grandfather     Cardiovascular Paternal Grandfather     Heart Disease Paternal Grandfather     Neurologic Disorder Paternal Grandfather             Social History:     Social History     Socioeconomic History    Marital status:      Spouse name: Alfredo Wilson    Number of children: 2    Years of education: 15    Highest education level: Not on file   Occupational History    Occupation:      Employer: OTHER     Comment: [employer]   Tobacco Use    Smoking status: Former     Current packs/day: 0.00     Average packs/day: 0.5 packs/day for 10.0 years (5.0 ttl pk-yrs)     Types: Cigarettes     Start date: 10/1/2006     Quit date: 10/1/2016     Years since quittin.6    Smokeless tobacco: Never    Tobacco comments:     Socially- quit smoking 10/01/2016   Substance and Sexual Activity    Alcohol use: Yes     Alcohol/week: 0.0 standard drinks of alcohol     Comment: Occassional    Drug use: No    Sexual activity: Yes     Partners: Male     Birth control/protection: Female Surgical     Comment:  has had a vasectomy   Other Topics Concern     Service Not Asked    Blood Transfusions Not Asked    Caffeine Concern Not Asked    Occupational Exposure Not Asked    Hobby Hazards Not Asked    Sleep Concern Not Asked    Stress Concern Not Asked    Weight Concern Not Asked    Special Diet Not Asked    Back Care Not Asked    Exercise Not Asked    Bike Helmet Not Asked    Seat Belt Yes    Self-Exams Not Asked    Parent/sibling w/ CABG, MI or angioplasty  before 65F 55M? No   Social History Narrative    Not on file     Social Determinants of Health     Financial Resource Strain: Low Risk  (10/6/2023)    Financial Resource Strain     Within the past 12 months, have you or your family members you live with been unable to get utilities (heat, electricity) when it was really needed?: No   Food Insecurity: Low Risk  (10/6/2023)    Food Insecurity     Within the past 12 months, did you worry that your food would run out before you got money to buy more?: No     Within the past 12 months, did the food you bought just not last and you didn t have money to get more?: No   Transportation Needs: Low Risk  (10/6/2023)    Transportation Needs     Within the past 12 months, has lack of transportation kept you from medical appointments, getting your medicines, non-medical meetings or appointments, work, or from getting things that you need?: No   Physical Activity: Not on file   Stress: Not on file   Social Connections: Not on file   Interpersonal Safety: Low Risk  (10/6/2023)    Interpersonal Safety     Do you feel physically and emotionally safe where you currently live?: Yes     Within the past 12 months, have you been hit, slapped, kicked or otherwise physically hurt by someone?: No     Within the past 12 months, have you been humiliated or emotionally abused in other ways by your partner or ex-partner?: No   Housing Stability: Low Risk  (10/6/2023)    Housing Stability     Do you have housing? : Yes     Are you worried about losing your housing?: No            Allergies:   Carboplatin, Amoxicillin, Erythromycin, Gluten meal, Hydrocodone-acetaminophen, Hydromorphone, Naltrexone, Penicillins, Phentermine, Topamax [topiramate], Wellbutrin [bupropion], Zolpidem, Zolpidem tartrate, and Hydrocodone         Review of Systems:  From intake questionnaire   Negative 14 system review except as noted on HPI, nurse's note.         Physical Exam:   General Appearance: Well groomed,  hygenic  Eyes: No redness, discharge  Respiratory: No cough, no respiratory distress or labored breathing  Musculoskeletal: Grossly normal, full range of motion in upper extremities, no gross deficits  Skin: No discoloration or apparent rashes  Neurologic - No tremors  Psychiatric - Alert and oriented  The rest of a comprehensive physical examination is deferred due to video visit restrictions        Labs:    I personally reviewed all applicable laboratory data and went over findings with patient  Significant for:    CBC RESULTS:  Recent Labs   Lab Test 04/30/24  0913 10/30/23  1249 04/24/23  1342 12/20/22  1358   WBC 7.8 9.1 8.0 10.9   HGB 13.8 13.5 13.0 13.5    330 333 300        BMP RESULTS:  Recent Labs   Lab Test 04/30/24  0913 10/30/23  1249 04/24/23  1342 12/20/22  1358 08/16/21  1324 07/09/21  0755 02/25/21  0949 12/07/20  1025 10/17/20  0834    140 142 140   < > 139 141 139 139   POTASSIUM 4.0 3.7 3.9 3.9   < > 4.1 4.4 4.1 3.9   CHLORIDE 103 103 103 101   < > 106 107 106 105   CO2 26 25 26 29   < > 27 31 27 28   ANIONGAP 10 12 13 10   < > 6 3 6 6   * 82 79 90   < > 110* 108* 64* 106   BUN 11.1 11.5 9.9 9.6   < > 12 10 9 7*   CR 0.72 0.75 0.68 0.64   < > 0.77 0.72 0.73 0.77   GFRESTIMATED >90 >90 >90 >90   < > >90 >90 >90 >60   GFRESTBLACK  --   --   --   --   --  >90 >90 >90 >60   DENY 9.1 9.8 9.6 8.8   < > 9.1 9.3 8.9 8.2*    < > = values in this interval not displayed.       UA RESULTS:   Recent Labs   Lab Test 09/10/23  1457 07/19/23  1141 06/28/23  1303   SG 1.027 >=1.030 1.015   URINEPH 5.0 5.5 7.0   NITRITE Negative Positive* Positive*   RBCU 21* None Seen 0-2   WBCU 10* 5-10* 0-5       CALCIUM RESULTS  Lab Results   Component Value Date    DENY 9.1 04/30/2024    DENY 9.8 10/30/2023    DENY 9.6 04/24/2023    DENY 9.1 07/09/2021    DENY 9.3 02/25/2021    DENY 8.9 12/07/2020           Imaging:    I personally reviewed all applicable imaging and went over the below findings with  patient.    EXAM: CT ABDOMEN PELVIS W/O CONTRAST  LOCATION: [facility]  DATE: 9/10/2023     INDICATION: Flank pain.  COMPARISON: CT abdomen and pelvis, 03/30/2023.  TECHNIQUE: CT scan of the abdomen and pelvis was performed without IV contrast. Multiplanar reformats were obtained. Dose reduction techniques were used.  CONTRAST: None.     FINDINGS:   LOWER CHEST: Normal.     HEPATOBILIARY: Normal.     PANCREAS: Normal.     SPLEEN: Normal.     ADRENAL GLANDS: Normal.     KIDNEYS/BLADDER: 2 mm stone involves the inferior aspect of the left kidney. There is no hydronephrosis. An 11 mm low dense lesion involves the midportion of the right kidney which is unchanged and likely reflects a simple cyst though incompletely   characterized.     BOWEL: There is a large amount of stool within the ascending colon. No bowel obstruction or bowel inflammation.     LYMPH NODES: Normal.     VASCULATURE: Unremarkable.     PELVIC ORGANS: Normal.     MUSCULOSKELETAL: Normal.                                                                      IMPRESSION:   1.  2 mm left renal stone without hydronephrosis.  2.  11 mm right renal cysts, stable though incompletely characterized. Renal ultrasound could be performed for further evaluation if warranted.

## 2024-05-06 NOTE — NURSING NOTE
Is the patient currently in the state of [place]? YES    Visit mode:VIDEO    If the visit is dropped, the patient can be reconnected by: VIDEO VISIT: Text to cell phone:   Telephone Information:   Mobile [phone]       Will anyone else be joining the visit? NO  (If patient encounters technical issues they should call [phone])    How would you like to obtain your AVS? MyChart    Are changes needed to the allergy or medication list? No, Pt stated no changes to allergies, and Pt stated no med changes    Are refills needed on medications prescribed by this physician? NO    Reason for visit: LUNA BURKS

## 2024-05-29 ENCOUNTER — TELEPHONE (OUTPATIENT)
Dept: UROLOGY | Facility: CLINIC | Age: 44
End: 2024-05-29
Payer: COMMERCIAL

## 2024-06-20 NOTE — PROGRESS NOTES
Virtual Visit Details    Type of service:  Video Visit     Originating Location (pt. Location): Home    Distant Location (provider location):  Off-site  Platform used for Video Visit: [employer]    Video start time: 2:17 pm  Video end time: 2:39 pm    [facility] Non Surgical Follow up Visit   Date of visit: 6/26/2024  Physician: SHABBIR Rosas MD, MD  Primary Care is Yenifer Peters  Diana Wilson   43 year old  female     Initial Weight: 205#  Initial BMI: 35  Today's Weight:   Wt Readings from Last 1 Encounters:   06/26/24 83.9 kg (185 lb)     Body mass index is 31.76 kg/m .           Assessment and Plan   Assessment: Diana is a 43 year old year old female who presents for medical weight management.      Plan:    1. Obesity (BMI 30-39.9)  Patient was congratulated on her success thus far. Healthy habits to assist with further weight loss were discussed. She is making good choices but struggling with hunger. She would like to try wegovy again and since insurance doesn't cover it [facility]  Risks, benefits and possible side effects discussed . We discussed the patient's co-morbid conditions including migraine headaches, and GERD. These likely will improve with healthy habits and weight loss.     2. History of migraine headaches  This may improve with healthy habits and weight loss.     3. Gastroesophageal reflux disease, unspecified whether esophagitis present  This may improve with healthy habits and weight loss.      Follow up in 3-4 months with myself           INTERIM HISTORY  Patient did well on wegovy in the past. Unfortunately her insurance stopped covering it and appeal was denied. She is struggling with hunger    DIETARY HISTORY  Meals Per Day: 3  Eating Protein First?: yes  Food Diary: B:protein shake or protein yogurt L:protein bar or protein yogurt D:lean meat and vegetable and starch most of the time  Snacks Per Day: late afternoon  Typical  Snack: protein bar  Fluid Intake: 64 oz  Portion Control: yes  Calorie Containing Beverages: none      Positive Changes Since Last Visit: protein intake, eliptical  Struggling With: hunger, fatigue, vegetable intake    Knowledgeable in Reading Food Labels: yes  Getting Adequate Protein: yes    PHYSICAL ACTIVITY PATTERNS:  Elipitical every day for 1 mile, she is in a 40 day challenge    REVIEW OF SYSTEMS  GENERAL/CONSTITUTIONAL:  Fatigue: sometimes  HEENT:   glaucoma: no  CARDIOVASCULAR:  History of heart disease: no  GI:  Pancreatitis: no  NEUROLOGIC:  Paresthesias: no  History of migraine headaches: yes  PSYCHIATRIC:  Moods: frustrated  ENDOCRINE:  Monitoring Blood Sugars: no  Sugars Well Controlled: na  No personal or family history of medullary thyroid cancer no  No personal or family history of MEN2        Patient Profile   Social History     Social History Narrative    Not on file        Past Medical History   Past Medical History:   Diagnosis Date    Anxiety     Breast cancer (H)     Celiac disease     Depressive disorder 1995    And Anxiety    Encounter for insertion or removal of intrauterine contraceptive device 01/08/2008    Excessive or frequent menstruation 03/19/2003    Fibroadenosis of breast     GERD (gastroesophageal reflux disease)     Invasive ductal carcinoma of breast, right (H)     Malignant neoplasm of upper-outer quadrant of right female breast (H)     Mild intermittent asthma            never has had PFT's, MDI with colds and at times exercise    Neutropenia, drug-induced (H24)     Nongonococcal urethritis (JALIL) due to chlamydia trachomatis 01/03/2002    Other and unspecified ovarian cyst 03/19/2003    RIGHT ovarian cyst    PONV (postoperative nausea and vomiting)     Skin cancer     Transient hypertension of pregnancy, antepartum     PIH with last birth    Uncomplicated asthma 2002     Patient Active Problem List   Diagnosis    Obesity    CARDIOVASCULAR SCREENING; LDL GOAL LESS THAN 130     Major depressive disorder, recurrent episode, mild (H24)    Iron deficiency anemia    Insomnia    Anxiety    Intestinal malabsorption    Lymphedema of right upper extremity    Malignant neoplasm of upper-outer quadrant of right breast in female, estrogen receptor positive (H)    Osteopenia of multiple sites    Multiple subsegmental pulmonary emboli without acute cor pulmonale (H)    Breast reconstruction disproportion    Family history of colon cancer    Celiac disease    Recurrent UTI    Stress incontinence    Drug-induced polyneuropathy (H24)       Past Surgical History  She has a past surgical history that includes surgical history of -  (); APPENDECTOMY (); Dental surgery (2010 and 10/12/2010); Dilation and curettage, hysteroscopy, ablate endometrium novasure, combined (3/20/2012); Esophagoscopy, gastroscopy, duodenoscopy (EGD), combined (N/A, 2016); Insert port vascular access (N/A, 2017); biopsy (2016); Breast surgery (); Laparoscopic salpingo-oophorectomy (Bilateral, 2017); Reconstruct breast bilateral, implant prosthesis bilateral, combined (Bilateral, 3/16/2018);  Section; Revise reconstructed breast; and Cystoscopy, Sling Transvaginal (N/A, 2022).     Examination   Wt 83.9 kg (185 lb)   LMP 2012   BMI 31.76 kg/m    Wt Readings from Last 4 Encounters:   24 83.9 kg (185 lb)   24 83.5 kg (184 lb)   24 82.5 kg (181 lb 12.8 oz)   24 81.4 kg (179 lb 8 oz)      BP Readings from Last 3 Encounters:   24 (!) 131/94   24 118/84   23 125/82      GENERAL: alert and no distress  EYES: Eyes grossly normal to inspection.  No discharge or erythema, or obvious scleral/conjunctival abnormalities.  RESP: No audible wheeze, cough, or visible cyanosis.    SKIN: Visible skin clear. No significant rash, abnormal pigmentation or lesions.  NEURO: Cranial nerves grossly intact.  Mentation and speech appropriate for age.  PSYCH:  Appropriate affect, tone, and pace of words        Counseling:   We reviewed the important post op bariatric recommendations:  -eating 3 meals daily  -eating protein first, getting >60gm protein daily  -eating slowly, chewing food well  -avoiding/limiting calorie containing beverages  -limiting starchy vegetables and carbohydrates, choosing wheat, not white with breads,   crackers, pastas, luis armando, bagels, tortillas, rice  -limiting restaurant or cafeteria eating to twice a week or less    We discussed the importance of restorative sleep and stress management in maintaining a healthy weight.  We discussed the National Weight Control Registry healthy weight maintenance strategies and ways to optimize metabolism.  We discussed the importance of physical activity including cardiovascular and strength training in maintaining a healthier weight.    Total time spent on the date of this encounter doing: chart review, review of test results, patient visit, physical exam, education, counseling, developing plan of care and documenting = 32 minutes.         SHABBIR Rosas MD  [facility]

## 2024-06-26 ENCOUNTER — VIRTUAL VISIT (OUTPATIENT)
Dept: SURGERY | Facility: CLINIC | Age: 44
End: 2024-06-26
Payer: COMMERCIAL

## 2024-06-26 VITALS — BODY MASS INDEX: 31.76 KG/M2 | WEIGHT: 185 LBS

## 2024-06-26 DIAGNOSIS — Z86.69 HISTORY OF MIGRAINE HEADACHES: ICD-10-CM

## 2024-06-26 DIAGNOSIS — K21.9 GASTROESOPHAGEAL REFLUX DISEASE, UNSPECIFIED WHETHER ESOPHAGITIS PRESENT: ICD-10-CM

## 2024-06-26 DIAGNOSIS — E66.9 OBESITY (BMI 30-39.9): Primary | ICD-10-CM

## 2024-06-26 PROCEDURE — 99214 OFFICE O/P EST MOD 30 MIN: CPT | Mod: 95 | Performed by: FAMILY MEDICINE

## 2024-06-26 ASSESSMENT — PAIN SCALES - GENERAL: PAINLEVEL: NO PAIN (0)

## 2024-06-26 NOTE — NURSING NOTE
Is the patient currently in the state of [place]? YES    Visit mode:VIDEO    If the visit is dropped, the patient can be reconnected by: VIDEO VISIT: Text to cell phone:   Telephone Information:   Mobile [phone]    and VIDEO VISIT: Send to e-mail at: [email or web address]    Will anyone else be joining the visit? NO  (If patient encounters technical issues they should call [phone]    How would you like to obtain your AVS? MyChart    Are changes needed to the allergy or medication list? No    Are refills needed on medications prescribed by this physician? NO    Reason for visit: LUNA BURKS

## 2024-06-26 NOTE — LETTER
6/26/2024      Diana Wilson  1972 [address] [facility] [id number]      Dear Colleague,    Thank you for referring your patient, Diana Wilson, to the [facility] [place]. Please see a copy of my visit note below.    Virtual Visit Details    Type of service:  Video Visit     Originating Location (pt. Location): Home    Distant Location (provider location):  Off-site  Platform used for Video Visit: [employer]    Video start time: 2:17 pm  Video end time: 2:39 pm    Bariatric Care Clinic Non Surgical Follow up Visit   Date of visit: 6/26/2024  Physician: SHABBIR Rosas MD, MD  Primary Care is Yenifer Peters  Diana Wilson   43 year old  female     Initial Weight: 205#  Initial BMI: 35  Today's Weight:   Wt Readings from Last 1 Encounters:   06/26/24 83.9 kg (185 lb)     Body mass index is 31.76 kg/m .           Assessment and Plan   Assessment: Diana is a 43 year old year old female who presents for medical weight management.      Plan:    1. Obesity (BMI 30-39.9)  Patient was congratulated on her success thus far. Healthy habits to assist with further weight loss were discussed. She is making good choices but struggling with hunger. She would like to try wegovy again and since insurance doesn't cover it she will get it through [facility] compounding pharmacy.  Risks, benefits and possible side effects discussed . We discussed the patient's co-morbid conditions including migraine headaches, and GERD. These likely will improve with healthy habits and weight loss.     2. History of migraine headaches  This may improve with healthy habits and weight loss.     3. Gastroesophageal reflux disease, unspecified whether esophagitis present  This may improve with healthy habits and weight loss.      Follow up in 3-4 months with myself           INTERIM HISTORY  Patient did well on wegovy in the past. Unfortunately her insurance stopped covering it and appeal was denied. She is  struggling with hunger    DIETARY HISTORY  Meals Per Day: 3  Eating Protein First?: yes  Food Diary: B:protein shake or protein yogurt L:protein bar or protein yogurt D:lean meat and vegetable and starch most of the time  Snacks Per Day: late afternoon  Typical Snack: protein bar  Fluid Intake: 64 oz  Portion Control: yes  Calorie Containing Beverages: none      Positive Changes Since Last Visit: protein intake, eliptical  Struggling With: hunger, fatigue, vegetable intake    Knowledgeable in Reading Food Labels: yes  Getting Adequate Protein: yes    PHYSICAL ACTIVITY PATTERNS:  Elipitical every day for 1 mile, she is in a 40 day challenge    REVIEW OF SYSTEMS  GENERAL/CONSTITUTIONAL:  Fatigue: sometimes  HEENT:   glaucoma: no  CARDIOVASCULAR:  History of heart disease: no  GI:  Pancreatitis: no  NEUROLOGIC:  Paresthesias: no  History of migraine headaches: yes  PSYCHIATRIC:  Moods: frustrated  ENDOCRINE:  Monitoring Blood Sugars: no  Sugars Well Controlled: na  No personal or family history of medullary thyroid cancer no  No personal or family history of MEN2        Patient Profile   Social History     Social History Narrative     Not on file        Past Medical History   Past Medical History:   Diagnosis Date     Anxiety      Breast cancer (H)      Celiac disease      Depressive disorder 1995    And Anxiety     Encounter for insertion or removal of intrauterine contraceptive device 01/08/2008     Excessive or frequent menstruation 03/19/2003     Fibroadenosis of breast      GERD (gastroesophageal reflux disease)      Invasive ductal carcinoma of breast, right (H)      Malignant neoplasm of upper-outer quadrant of right female breast (H)      Mild intermittent asthma            never has had PFT's, MDI with colds and at times exercise     Neutropenia, drug-induced (H24)      Nongonococcal urethritis (JALIL) due to chlamydia trachomatis 01/03/2002     Other and unspecified ovarian cyst 03/19/2003    RIGHT ovarian cyst      PONV (postoperative nausea and vomiting)      Skin cancer      Transient hypertension of pregnancy, antepartum     PIH with last birth     Uncomplicated asthma      Patient Active Problem List   Diagnosis     Obesity     CARDIOVASCULAR SCREENING; LDL GOAL LESS THAN 130     Major depressive disorder, recurrent episode, mild (H24)     Iron deficiency anemia     Insomnia     Anxiety     Intestinal malabsorption     Lymphedema of right upper extremity     Malignant neoplasm of upper-outer quadrant of right breast in female, estrogen receptor positive (H)     Osteopenia of multiple sites     Multiple subsegmental pulmonary emboli without acute cor pulmonale (H)     Breast reconstruction disproportion     Family history of colon cancer     Celiac disease     Recurrent UTI     Stress incontinence     Drug-induced polyneuropathy (H24)       Past Surgical History  She has a past surgical history that includes surgical history of -  (); APPENDECTOMY (); Dental surgery (2010 and 10/12/2010); Dilation and curettage, hysteroscopy, ablate endometrium novasure, combined (3/20/2012); Esophagoscopy, gastroscopy, duodenoscopy (EGD), combined (N/A, 2016); Insert port vascular access (N/A, 2017); biopsy (2016); Breast surgery (); Laparoscopic salpingo-oophorectomy (Bilateral, 2017); Reconstruct breast bilateral, implant prosthesis bilateral, combined (Bilateral, 3/16/2018);  Section; Revise reconstructed breast; and Cystoscopy, Sling Transvaginal (N/A, 2022).     Examination   Wt 83.9 kg (185 lb)   LMP 2012   BMI 31.76 kg/m    Wt Readings from Last 4 Encounters:   24 83.9 kg (185 lb)   24 83.5 kg (184 lb)   24 82.5 kg (181 lb 12.8 oz)   24 81.4 kg (179 lb 8 oz)      BP Readings from Last 3 Encounters:   24 (!) 131/94   24 118/84   23 125/82      GENERAL: alert and no distress  EYES: Eyes grossly normal to inspection.  No  discharge or erythema, or obvious scleral/conjunctival abnormalities.  RESP: No audible wheeze, cough, or visible cyanosis.    SKIN: Visible skin clear. No significant rash, abnormal pigmentation or lesions.  NEURO: Cranial nerves grossly intact.  Mentation and speech appropriate for age.  PSYCH: Appropriate affect, tone, and pace of words        Counseling:   We reviewed the important post op bariatric recommendations:  -eating 3 meals daily  -eating protein first, getting >60gm protein daily  -eating slowly, chewing food well  -avoiding/limiting calorie containing beverages  -limiting starchy vegetables and carbohydrates, choosing wheat, not white with breads,   crackers, pastas, luis armando, bagels, tortillas, rice  -limiting restaurant or cafeteria eating to twice a week or less    We discussed the importance of restorative sleep and stress management in maintaining a healthy weight.  We discussed the National Weight Control Registry healthy weight maintenance strategies and ways to optimize metabolism.  We discussed the importance of physical activity including cardiovascular and strength training in maintaining a healthier weight.    Total time spent on the date of this encounter doing: chart review, review of test results, patient visit, physical exam, education, counseling, developing plan of care and documenting = 32 minutes.         SHABBIR Rosas MD  [facility]               Again, thank you for allowing me to participate in the care of your patient.        Sincerely,        SHABBIR Rosas MD

## 2024-06-26 NOTE — PATIENT INSTRUCTIONS
[facility] is now offering compounded semaglutide during the time of Wegovy national shortage/limited supply. Semaglutide is the generic name of Wegovy. [facility] is following the highest standards for sterility and compounding practices. Not all compounding practices are equal. Therefore, [facility] will not be prescribing compounded semaglutide outside of the [facility]. Compounding of semaglutide is legal for as long as Wegovy is on the FDA's national shortage list. When/if Wegovy is taken off the FDA's shortage list, compounded semaglutide will no longer be legal to manufacture. When this occurs, patients will have to turn to acquiring Wegovy via its available manufactured pen, look into alternative weight loss medication(s), or stop the medication. Compound semaglutide will be available as a pre-filled syringe. Due to high demand of compounded semaglutide, orders may take 1-2 weeks to obtain from time of prescribing. Each dose of the medication will require a separate prescription.     As with any weight loss medication(s), there is a risk of weight regain should you stop semaglutide. It is important to be aware of this risk should you stop compounded semaglutide with no plans to transition back to an alternative injectable option as the use of semaglutide is intended for long term weight management with the intention of remaining on this injectable long term.        Obtaining Medication and Storage:   The pharmacy must speak to the patient directly prior to shipping medication to walk through administration, shipping and cost. Pre-filled syringes of compounded semaglutide and needles will be mailed from the compounding pharmacy to your home in a refrigerated box. The pre-filled syringes should be stored in the refrigerator until time of injection. The medication is good for at least 30 days in refrigerator.  "      Administration:   Wash your hands.   Obtain compound semaglutide syringe, needle and alcohol swab. Remove pre-filled syringe from refrigerator. Keep all other unused syringes in the refrigerator until time of use.   Inspect the syringe to ensure medication in syringe is clear/colorless and the clear shell cap on top of red syringe cap is intact.   Unlock the cap by pulling the clear outer shell straight off and remove the red syringe cap by twisting counter clockwise.   Attach needle to syringe by twisting needle onto syringe clockwise. Do not remove needle cap.   Choose injection site. Clean injection site with alcohol swab.    Appropriate areas of injection are: abdomen (at least 2 inches away from belly button) or front middle thigh.   Remove needle cap from syringe/needle.   Hold the syringe like a pencil. Insert the needle into skin at a 90-degree angle.   Using your pointer finger, push the syringe plunger down to inject the medicine.   Count to six. Then, remove the syringe from your skin.   Immediately place the syringe in a sharps container.   You can purchase a sharps container from your local pharmacy or [employer]. If you don't have a sharps container, you can use a plastic detergent container with a lid. The container should seal tightly, hold objects without leaking, breaking or cracking, and clearly be labelled \"sharps\".     Compounded Semaglutide monthly (4 pre-filled syringes) cost:   0.25 mg ~$222   0.5 mg ~$260   1 mg ~$306   1.5 mg ~$342   2 mg ~$395   2.5 mg ~$438     [facility] Phone:  [phone]    "

## 2024-07-22 ENCOUNTER — TELEPHONE (OUTPATIENT)
Dept: UROLOGY | Facility: CLINIC | Age: 44
End: 2024-07-22
Payer: COMMERCIAL

## 2024-07-22 NOTE — TELEPHONE ENCOUNTER
Call placed to patient   Relayed Dr. Peters's message    Patient verbalized understanding  No further questions/concerns    Regino Swartz, Clinic RN  [facility]

## 2024-07-22 NOTE — TELEPHONE ENCOUNTER
Patient's call transferred to author     Patient states she reached out to her Urology team, but was recommended to contact PCP for next steps    Patient states she noticed symptoms on Wednesday 7/17/2024   States she noticed a prolapse from her vagina  States the prolapse is the size of a tomato - patient attempted to push the prolapse back in, but only a portion stayed in place    Patient has a history of urinary incontinence that has been treated with a urinary sling a few years ago  Patient states now with the prolapse, she is constantly leaking urine and sore in the vaginal area  Patient took a dose of AZO for urinary discomfort and had some relief     Denies vaginal discharge  Denies a foul odor  Denies vaginal bleeding     Patient also reporting a new onset right sided pelvic bulge   States she cannot touch the bulge to determine how big or consistency of bulge due to severe pain     Denies nausea/vomiting  Denies abdominal pain    Advised to be evaluated at an ED for symptoms  Patient states she has has medical trauma and cannot see male providers - patient does not want to go the ED for this reason and does not feel it is needed  Patient would like to see Dr. Peters for this concern as she is comfortable with provider    Regino Swartz, Clinic RN  [facility]

## 2024-07-22 NOTE — TELEPHONE ENCOUNTER
7/22/24 Spoke with patient and let her know I spoke with Nithya Rodriguez PA-C and she recommends the ER if the pain is unbearable for her or to reach out to her primary care doctor to see if they are able to do anything for her prior to appointment on 7/29/24 - patient stated that she will call her primary today and keep appointment on the schedule for 7/29/24 at 10 am with Nithya Rodriguez PA-C.    Patient will call back into clinic if any further questions should arise.     Closing encounter.  Rosina Vargas MA on 7/22/2024 at 11:30 AM

## 2024-07-22 NOTE — TELEPHONE ENCOUNTER
She can request a female provider in the ER and she could possibly get a consult with gyn and/or urology.  I think, given the reported pain is severe, this is what I would recommend   PHILIPPE Peters MD

## 2024-07-22 NOTE — TELEPHONE ENCOUNTER
Nithya Rodriguez, Rosina Rainey MA  Caller: Unspecified (Today,  7:10 AM)  Can evaluate prolapse at time of encounter on 7/29/24. If she thinks she has a UTI can obtain a UA/UC prior to the encounter. Last time a saw her she her oxybutynin was working well. See if has been taking her oxybutynin. If patient still wanting to be seen sooner can see if [facility] has any open time slots.    natalya    Received above message from provider.  Rosina Vargas MA on 7/22/2024 at 10:14 AM

## 2024-07-22 NOTE — TELEPHONE ENCOUNTER
[facility] Call Center    Phone Message    May a detailed message be left on voicemail: yes     Reason for Call: Symptoms or Concerns     If patient has red-flag symptoms, warm transfer to triage line    Current symptom or concern: Possible uterine prolapse - incontinence- writer got the patient scheduled for the 29th, patient does not think she can wait this long    Symptoms have been present for:  5 day(s)    Has patient previously been seen for this? No      Are there any new or worsening symptoms? No    Action Taken: Message routed to:  Other: uro    Travel Screening: Not Applicable     Date of Service:

## 2024-07-29 ENCOUNTER — OFFICE VISIT (OUTPATIENT)
Dept: UROLOGY | Facility: CLINIC | Age: 44
End: 2024-07-29
Payer: COMMERCIAL

## 2024-07-29 DIAGNOSIS — N81.6 RECTOCELE: Primary | ICD-10-CM

## 2024-07-29 DIAGNOSIS — R35.0 URINARY FREQUENCY: ICD-10-CM

## 2024-07-29 DIAGNOSIS — N39.41 URGE INCONTINENCE: ICD-10-CM

## 2024-07-29 DIAGNOSIS — N20.0 NEPHROLITHIASIS: ICD-10-CM

## 2024-07-29 DIAGNOSIS — M62.838 LEVATOR SPASM: ICD-10-CM

## 2024-07-29 DIAGNOSIS — N39.0 RECURRENT UTI: ICD-10-CM

## 2024-07-29 DIAGNOSIS — N95.2 ATROPHIC VAGINITIS: ICD-10-CM

## 2024-07-29 DIAGNOSIS — N94.10 DYSPAREUNIA IN FEMALE: ICD-10-CM

## 2024-07-29 PROCEDURE — 99214 OFFICE O/P EST MOD 30 MIN: CPT | Performed by: PHYSICIAN ASSISTANT

## 2024-07-29 PROCEDURE — 81001 URINALYSIS AUTO W/SCOPE: CPT | Performed by: PHYSICIAN ASSISTANT

## 2024-07-29 RX ORDER — OXYBUTYNIN CHLORIDE 10 MG/1
10 TABLET, EXTENDED RELEASE ORAL DAILY
Qty: 90 TABLET | Refills: 3 | Status: SHIPPED | OUTPATIENT
Start: 2024-07-29

## 2024-07-29 NOTE — PATIENT INSTRUCTIONS
Recommend starting daily fiber supplement. If become constipated despite daily fiber supplement can take Miralax as needed.     Plan to follow-up in 4 months    Recommend following up with Myriam Olson for your history of kidney stones.     If you develop any symptoms of kidney stones in the future such as but not limited to fevers/chills, blood in urine that you see, flank pain, abdominal pain to contact Myriam or go to ER for further evaluation.

## 2024-07-29 NOTE — NURSING NOTE
UA/UC ordered per protocol for patient's report of burning with urination, cloudy urine, and malodorous urine. Nithya Rodriguez PA-C will plan to discuss further with patient during visit.    Erendira Sun RN, BSN

## 2024-07-29 NOTE — PROGRESS NOTES
Urology Clinic      Name: Diana Wilson    MRN: [id number]   YOB: 1980  Accompanied at today's visit by:                 Chief Complaint:   Concerns for prolapse          History of Present Illness:   July 29, 2024    HISTORY:   We have been following 44 year old Diana Wilson for  UTI, vaginal atrophy, dyspareunia, levator spasm, bladder spasms, urinary urgency/frequency UUI, high tone pelvic floor dysfunction, hx of sling,  hx of gross hematuria and  hx of kidney stones. Hx is complicated by breast cancer. Had cysto in 3/2022 by Dr. Ochoa that was unremarkable. She then had repeat cysto in 5/2023 showed trabeculations but otherwise unremarkable. Had repeat CT in 9/2023 showing 2mm stone and right renal cyst. Had renal US following CT later in 9/2023 that was unremarkable. Last seen on 4/8/24 and renewed macrobid prn for swimming and intercourse as well as oxybutynin for OAB sx. Saw Myriam Olson for nephrolithiasis and plan to follow-up for review of 24hour urine study. Here today for concerns of prolapse. States she started feeling incomplete bladder emptying, abdominal discomfort, lumps in right groin, and noted a vaginal bulge. States she was concerned she was passing a stone. States these symptoms have improved and now the bulge has improved but is still noticeable. Also has been constipated the past few days. Today reports concern of UTI reporting burning with urination, foul odor, cloudy urine. Continues on methenamine, prn macrobid. Per EMR, no UTIs over past 12 months. No longer on vaginal estrogen suppositories prescribed by oncology. Using good clean love instead. PFPT did help with her dyspareunias and levator spasms. Oxybutynin 5mg daily continues to help with bladder symptoms. Patient voices no other concerns at this time.            Allergies:     Allergies   Allergen Reactions    Carboplatin Shortness Of Breath, Rash and Anaphylaxis     Other reaction(s): Flushing,  "Tachycardia    Amoxicillin GI Disturbance    Erythromycin GI Disturbance    Gluten Meal      Celiac disease    Hydrocodone-Acetaminophen      Other reaction(s): Hallucinations    Hydromorphone Headache    Naltrexone Other (See Comments)     \"Felt high\"    Penicillins Nausea and Vomiting and Hives     1-11-17 pt states this was a childhood reaction    Phentermine Other (See Comments)     Elevated BP    Topamax [Topiramate] Other (See Comments)     Cloudy thinking    Wellbutrin [Bupropion] Other (See Comments)     \"Felt high\"    Zolpidem      Other reaction(s): Hallucinations    Zolpidem Tartrate      hallucinations    Hydrocodone Other (See Comments)     Out of body experience, \"creepy-crawly\" skin             Medications:     Current Outpatient Medications   Medication Sig Dispense Refill    Ascorbic Acid (VITAMIN C PO)       aspirin (ASA) 325 MG EC tablet Take 325 mg by mouth daily      cetirizine (ZYRTEC) 10 MG tablet Take 10 mg by mouth every morning      cholecalciferol (VITAMIN D3) 1000 units (25 mcg) capsule Take 1 capsule by mouth daily      COMPOUNDED NON-CONTROLLED SUBSTANCE (CMPD RX) - PHARMACY TO MIX COMPOUNDED MEDICATION Compounded semaglutide 0.25 mg subcutaneously weekly 4 each 0    COMPOUNDED NON-CONTROLLED SUBSTANCE (CMPD RX) - PHARMACY TO MIX COMPOUNDED MEDICATION Compounded semaglutide 0.5 mg subcutaneously weekly 4 each 1    COMPOUNDED NON-CONTROLLED SUBSTANCE (CMPD RX) - PHARMACY TO MIX COMPOUNDED MEDICATION Compounded semaglutide 1.0 mg subcutaneously weekly 4 each 2    Fluticasone Propionate (FLONASE NA) Spray 1 spray into both nostrils daily       gabapentin (NEURONTIN) 300 MG capsule 300mg in a.m. and 300mg in afternoon and 600mg in evening 270 capsule 1    imiquimod (ALDARA) 5 % external cream Apply a small sized amount to warts or molluscum three times weekly at bedtime.   Wash off after 8 hours.   May use for up to 12 weeks. 6 packet 1    letrozole (FEMARA) 2.5 MG tablet Take 1 tablet (2.5 " mg) by mouth daily 120 tablet 1    lidocaine (XYLOCAINE) 5 % external ointment Apply topically as needed for moderate pain Apply to cotton ball then apply to affected area 10min prior to PT exercises or intercourse. 50 g 3    Multiple Vitamins-Minerals (MULTIVITAMIN PO) Take 1 tablet by mouth daily       nitroFURantoin macrocrystal (MACRODANTIN) 50 MG capsule Take 1 capsule (50 mg) by mouth as needed (for UTI symptoms) 30 capsule 2    omeprazole (PRILOSEC) 20 MG DR capsule TAKE ONE CAPSULE BY MOUTH EVERY MORNING 30 MINUTES BEFORE BREAKFAST 90 capsule 3    oxyBUTYnin ER (DITROPAN XL) 10 MG 24 hr tablet Take 1 tablet (10 mg) by mouth daily 90 tablet 3    venlafaxine (EFFEXOR XR) 150 MG 24 hr capsule Take 1 capsule (150 mg) by mouth daily 90 capsule 3    venlafaxine (EFFEXOR XR) 75 MG 24 hr capsule Take with 150mg capsule for total dose of 225mg 90 capsule 3    Semaglutide-Weight Management (WEGOVY) 2.4 MG/0.75ML pen Inject 2.4 mg Subcutaneous once a week (Patient not taking: Reported on 2024) 9 mL 1     Current Facility-Administered Medications   Medication Dose Route Frequency Provider Last Rate Last Admin    lidocaine (XYLOCAINE) 2 % external gel   Urethral Once Nithya Rodriguez, PAJollyC                 Past  Surgical History:     Past Surgical History:   Procedure Laterality Date    BIOPSY  2016    EDG with biopsy    BREAST SURGERY      Bilateral reduction     SECTION      CYSTOSCOPY, SLING TRANSVAGINAL N/A 2022    Procedure: CREATION, VAGINAL SLING, WITH CYSTOSCOPY (support the urethra with mesh sling and look in the bladder);  Surgeon: Sixto Harris MD;  Location:  OR    DENTAL SURGERY  2010 and 10/12/2010    Root canals    DILATION AND CURETTAGE, HYSTEROSCOPY, ABLATE ENDOMETRIUM NOVASURE, COMBINED  3/20/2012    Procedure:COMBINED DILATION AND CURETTAGE, HYSTEROSCOPY, ABLATE ENDOMETRIUM NOVASURE; Hysteroscopy with Endometrial Ablation Novasure; Surgeon:GERRI PURCELL;  Location:[facility]    ESOPHAGOSCOPY, GASTROSCOPY, DUODENOSCOPY (EGD), COMBINED N/A 2/18/2016    Procedure: COMBINED ESOPHAGOSCOPY, GASTROSCOPY, DUODENOSCOPY (EGD);  Surgeon: Jose L Wilson MD;  Location: [facility]    INSERT PORT VASCULAR ACCESS N/A 1/11/2017    Procedure: INSERT PORT VASCULAR ACCESS;  Surgeon: Michael Townsend MD;  Location: [facility]    LAPAROSCOPIC SALPINGO-OOPHORECTOMY Bilateral 11/28/2017    Procedure: LAPAROSCOPIC SALPINGO-OOPHORECTOMY;  Laparoscopic bilateral salpingo oophorectomy;  Surgeon: Preeti Tirado MD;  Location: [facility]    RECONSTRUCT BREAST BILATERAL, IMPLANT PROSTHESIS BILATERAL, COMBINED Bilateral 3/16/2018    Procedure: COMBINED RECONSTRUCT BREAST BILATERAL, IMPLANT PROSTHESIS BILATERAL;  BILATERAL SECOND STAGE BREAST RECONSTRUCTION WITH SILICONE GEL IMPLANT;  Surgeon: Eugenio Ruelas MD;  Location: [facility]    REVISE RECONSTRUCTED BREAST      SURGICAL HISTORY OF -   1997    Breast reduction    Z APPENDECTOMY  1991             Physical Exam:   There were no vitals filed for this visit.  PSYCH: NAD  EYES: EOMI  NEURO: AAO x3. Tearful.   : Vulva is normal in appearance. Urethra normal.. negative for NICOLASA with reduction of speculum when instructed to cough. Vaginal mucosa atrophic. Rectocele grade 2. No obvious masses, lesions, ulcers, bleeding noted on internal or external exam.      LABS:     UC  9/10/23 no growth  7/19/23 >100k E. Coli (resistant to ampicillin and cefazolin)    Creatinine   Date Value Ref Range Status   04/30/2024 0.72 0.51 - 0.95 mg/dL Final   07/09/2021 0.77 0.52 - 1.04 mg/dL Final     Cysto 5/22/23  Cystoscopy procedure:  Pt. Was consented and placed in the lithotomy position.  She was cleaned and preparred in the usual fashion.  Lidocain gel was inserted into the urethra and given time to take effect.  A 16 fr flexible cystoscope was then inserted through the urethra and into the bladder.  The urethra was wnl.  The bladder was with 1+  trabeculation.  No tumors, diverticulae, or stones.  Bilateral u/o's were effluxing clear urine.  The cystoscope was then withdrawn.  The pt. Tolerated the procedure well.            Assessment and Plan:   44 year old is a pleasant female who has  UTI, vaginal atrophy, dyspareunia, levator spasm, bladder spasms, urinary urgency/frequency UUI, high tone pelvic floor dysfunction, hx of sling,  hx of gross hematuria and  hx of kidney stones. Hx is complicated by breast cancer    Plan:  - Patient reports needing refill on oxybutynin; renewed for 1 year.  - continue on methenamine and prn macrobid.  - educated patient about prolapse. Discussed improving constipation and how constipation affects prolapse. Also discussed referral back to PFPT, pessary and surgical repair. Plan to see how patient does with improving bowels. If continues to be bothered by bulge, plan to follow-up with Dr. Harris in the future.   - UA/UC pending; will wait for final culture results and treat based on those results.   - continue to follow with oncology.   - recommend following up with Myriam Olson for stones. Advised to notify Myriam or go to ER if develops s/s of acute kidney stone in the future such as but not limited to hematuria, flank pain, fevers/chills, abdominal pain.   - Plan to follow-up in 4 months with repeat exam.    - After discussing the assessment and plan with patient, patient verbalizes understanding and agrees to the above plan. All questions answered.     Other orders as below:  Orders Placed This Encounter   Procedures    UA Macroscopic with reflex to Microscopic and Culture    UA Microscopic with Reflex to Culture     49 minutes spent on the date of the encounter doing chart review, exam, renewing oxybutynin, review of Myriam Olson's note, review of Dr. Monson cysto note, review of labs, review of test results, interpretation of tests, patient visit and documentation.      Nithya Rodriguez PA-C  Urology  July 29,  2024      Patient Care Team:  Yenifer Peters MD as PCP - General (Family Practice)  Yenifer Peters MD as Assigned PCP  Eugenio Ruelas MD as MD (Plastic Surgery)  Jenifer Olson MD as MD (Surgery)  Josette Smalls, RN as Specialty Care Coordinator (Oncology)  Sixto Harris MD as MD (Urology)  Lisa Naranjo APRN CNP as Assigned Neuroscience Provider  Nithya Rodriguez PA-C as Physician Assistant  Nithya Rodriguez PA-C as Assigned Surgical Provider  Layo Coulter MD as Assigned Cancer Care Provider  SELF, REFERRED

## 2024-07-29 NOTE — NURSING NOTE
Diana Wilson's goals for this visit include:   Chief Complaint   Patient presents with    RECHECK     Uterine Prolapse; self ref, med rec in epic, per pt       She requests these members of her care team be copied on today's visit information:     PCP: Yenifer Peters    Referring Provider:  Referred Self, MD  No address on file    LMP 11/28/2012     Do you need any medication refills at today's visit?     Rosina Vargas MA on 7/29/2024 at 9:40 AM

## 2024-07-30 ENCOUNTER — NURSE TRIAGE (OUTPATIENT)
Dept: FAMILY MEDICINE | Facility: CLINIC | Age: 44
End: 2024-07-30
Payer: COMMERCIAL

## 2024-07-30 DIAGNOSIS — R39.15 URINARY URGENCY: Primary | ICD-10-CM

## 2024-07-30 NOTE — TELEPHONE ENCOUNTER
"Nurse Triage SBAR    Is this a 2nd Level Triage? YES, LICENSED PRACTITIONER REVIEW IS REQUIRED    Situation: Pt calling re: hematuria.    Background: Pt was seen yesterday in the urology clinic for evaluation of a new rectocele as well as UTI sx of burning with urination, foul odor, cloudy urine. Nothing mentioned in visit notes yesterday, but appears did not treat pt for a UTI based on her UA. Pt does note that she had some blood in her urine similar to this about 2 weeks after this shortly after she first noticed the rectocele (which she refers to as a \"pelvic prolapse.\")     Assessment: Says she went to the bathroom about 30 min ago and had blood in her urine. Says the toilet bowl \"turned pink\" when she urinated. Denies any significant back pain \"no more than normal.\" Is having constant burning in her urethral/vaginal area, especially when she urinates. Denies fever, weakness or inability to urinate.    Protocol Recommended Disposition:   See in Office Today    Recommendation: Routing to pt's PCP for Second Level Triage.     Routed to provider    Does the patient meet one of the following criteria for ADS visit consideration? 16+ years old, with an [facility] PCP     TIP  Providers, please consider if this condition is appropriate for management at one of our Acute and Diagnostic Services sites.     If patient is a good candidate, please use dotphrase <dot>triageresponse and select Refer to ADS to document.    Kendy Mace RN, BSN  [facility]       Reason for Disposition   Pain or burning with passing urine (urination)    Additional Information   Negative: Shock suspected (e.g., cold/pale/clammy skin, too weak to stand, low BP, rapid pulse)   Negative: Sounds like a life-threatening emergency to the triager   Negative: Urinary catheter, questions about   Negative: Recent back or abdominal injury   Negative: Recent genital injury   Negative: Unable to urinate (or only a few drops) > 4 hours and bladder feels " very full (e.g., palpable bladder or strong urge to urinate)   Negative: Diffuse (all over) muscle pains in the shoulders, arms, legs, and back and dark (cola or tea-colored) or red-colored urine   Negative: Passing pure blood or large blood clots (i.e., larger than a dime or grape)   Negative: Fever > 100.4 F (38.0 C)   Negative: Patient sounds very sick or weak to the triager   Negative: Known sickle cell disease   Negative: Taking Coumadin (warfarin) or other strong blood thinner, or known bleeding disorder (e.g., thrombocytopenia)   Negative: Side (flank) or back pain present    Protocols used: Urine - Blood In-A-OH

## 2024-07-30 NOTE — TELEPHONE ENCOUNTER
Looks like intent was to wait on urinary culture result. The ua doesn't look infected.     I know Diana pretty well. Please call her.  If urine is barely pink, can monitor. If resolves, great. If worsens, note from urology says to go to CADENCE Peters MD

## 2024-07-30 NOTE — TELEPHONE ENCOUNTER
RN called patient. Culture was not indicated.   Patient stated it wasn't pure blood but it was slightly pink.   She stated she has the urge to go to the bathroom again. Last time she went was an hour ago.   Patient stated she was calling to see if she could leave another specimen since today it is pink.     Dr. Peters, would you be okay ordering a UA/UC?    Josette Tesfaye RN on 7/30/2024 at 1:32 PM

## 2024-08-05 NOTE — TELEPHONE ENCOUNTER
I am sorry. I didn't see this on Friday.   I can order this today. Please let her know  PHILIPPE Peters MD

## 2024-08-05 NOTE — TELEPHONE ENCOUNTER
Patient states she ended up going elsewhere to be seen. Was very unhappy with how she was treated and the delay in response. She is very worried about what is going on.    She also did request to speak to nurse supervisor. I gave Sera her Information.    Shanna Flynn,[facility]

## 2024-08-06 NOTE — TELEPHONE ENCOUNTER
I sent her a patient message directly.  Will put in an order for a ua that she can get in a week  PHILIPPE Peters MD

## 2024-08-06 NOTE — TELEPHONE ENCOUNTER
Called and spoke with patient. Gave her the number for patient relations in the future if she needs it. Patient states she has lost trust with us due to the lack of call back regarding a UA/UC. She does not want to switch providers as she appreciates Dr Peters's care. Offered active listening and patient stated she was willing to work on regaining trust with the system.    Patient had bleeding in her urine with pink tinged urine output and grain of rice sized stringy blood clots on Tuesday, 7/30/24.   Patient then began taking her prophlactic antibiotic Macrodantin 50mg, 2 capsules in AM and two capsules in PM on Wednesday 7/31/24. Took last dose this AM 8/6/24. Has 24 hour urine test kit at home, needs to know how long she must be off antibiotics to start that test kit.    Requesting to perform UA/UC as well, please advise on when that should be completed due to recent antibiotic intake.

## 2024-09-05 ENCOUNTER — TRANSFERRED RECORDS (OUTPATIENT)
Dept: HEALTH INFORMATION MANAGEMENT | Facility: CLINIC | Age: 44
End: 2024-09-05
Payer: COMMERCIAL

## 2024-09-06 ENCOUNTER — PATIENT OUTREACH (OUTPATIENT)
Dept: CARE COORDINATION | Facility: CLINIC | Age: 44
End: 2024-09-06
Payer: COMMERCIAL

## 2024-09-10 ENCOUNTER — VIRTUAL VISIT (OUTPATIENT)
Dept: UROLOGY | Facility: CLINIC | Age: 44
End: 2024-09-10
Payer: COMMERCIAL

## 2024-09-10 VITALS — BODY MASS INDEX: 31.93 KG/M2 | WEIGHT: 186 LBS

## 2024-09-10 DIAGNOSIS — R82.992 HYPEROXALURIA: ICD-10-CM

## 2024-09-10 DIAGNOSIS — N20.0 NEPHROLITHIASIS: Primary | ICD-10-CM

## 2024-09-10 DIAGNOSIS — R82.994 HYPERCALCIURIA: ICD-10-CM

## 2024-09-10 PROCEDURE — 99215 OFFICE O/P EST HI 40 MIN: CPT | Mod: 95 | Performed by: NURSE PRACTITIONER

## 2024-09-10 NOTE — NURSING NOTE
Current patient location:  [place]    Is the patient currently in the state of [place]? YES    Visit mode:VIDEO    If the visit is dropped, the patient can be reconnected by: VIDEO VISIT: Text to cell phone:   Telephone Information:   Mobile [phone]       Will anyone else be joining the visit? NO  (If patient encounters technical issues they should call [phone] :[phone])    How would you like to obtain your AVS? MyChart    Are changes needed to the allergy or medication list? No    Are refills needed on medications prescribed by this physician? NO    Rooming Documentation:  Questionnaire(s) completed      Reason for visit: Video Visit and RECHECK    Pepper BURKS

## 2024-09-10 NOTE — PROGRESS NOTES
Urology Video Office Visit    Video-Visit Details    Type of service:  Video Visit    Video Start Time: 1600    Video End Time: 1633    Originating Location (pt. Location): Home    Distant Location (provider location):  Off-site     Platform used for Video Visit: [employer]           Assessment and Plan:     Assessment:44 year old female with nephrolithiasis    Plan:  -Reviewed 24 hour urine study with patient. Noted hypercalcuria, hyperoxaluria, hypernaturia, and high urinary pH. Discussed option of starting on thiazide to decrease hypercalcuria. Discussed option of chlorthalidone with possible need to potassium replacement. She would like to hold off at this time. Recommend to continue with adequate fluid intake of 90-120oz per day, low sodium diet, and low oxalate diet. Recommend to continue to with low animal protein intake and high fruit/veg intake. Recommend to get adequate sources of calcium especially at meal times.   -Discussed option for repeat CT scan in 6 months for re-evaluation of renal stones. Pt amenable to plan of care.   -Recommend to stop by Urology clinic to  a urinary strain and sample cup. If able to catch a stone, please notify Urology and will send for stone analysis  -If having severe flank pain, fevers, chills, nausea, or vomiting please notify Urology clinic or be seen in the ER.   -RTC in 6 months with CT scan.     Myriam Olson CNP  Department of Urology  September 10, 2024    I spent a total of 40 minutes spent on the date of the encounter doing chart review, history and exam, documentation, and further activities as noted above.          Chief Complaint:   Nephrolithiasis         History of Present Illness:    Diana Wilson is a pleasant 44 year old female who presents with concerns of a nephrolithiasis. PMHx: Breast CA, Osteopenia, and chemo-induced peripheral neuropathy.    Ms. Wilson was last seen with Urology on 05/06/24.  She notes she had a UTI with gross hematuria at the  end of July 2024 with a possible kidney stone. She notes she was unable to void and felt a pressure like sensation. She did not see a stone pass at that time.     Questionable stone passage in Feb 2024.      CT scan performed on 9/10/23 (images personally reviewed) revealed a left 2mm nonobstructing renal stone. No noted left hydronephrosis. No noted right nephrolithiasis or hydronephrosis. Stone has remained stable for several years.     Family History Nephrolithasis: mother (? Uric Acid stones)     Stone Risk Factors: none     She notes she does have a high fluid intake of 100oz per day. Will flavor water (Propel) but limits to 2 times per day due to sodium content.     She does like to eat nuts and spinach. Notes with high urinary sodium she did have Propel electrolyte drink along with increase in high oxalate foods that day. Does tolerate dairy (doesn't drink milk) but does eat yogurt and cheeses.     24 urine study revealed hypercalcuria, hyperoxaluria, hypernaturia, and high urinary pH.          Past Medical History:     Past Medical History:   Diagnosis Date    Anxiety     Breast cancer (H)     Celiac disease     Depressive disorder 1995    And Anxiety    Encounter for insertion or removal of intrauterine contraceptive device 01/08/2008    Excessive or frequent menstruation 03/19/2003    Fibroadenosis of breast     GERD (gastroesophageal reflux disease)     Invasive ductal carcinoma of breast, right (H)     Malignant neoplasm of upper-outer quadrant of right female breast (H)     Mild intermittent asthma            never has had PFT's, MDI with colds and at times exercise    Neutropenia, drug-induced (H24)     Nongonococcal urethritis (JALIL) due to chlamydia trachomatis 01/03/2002    Other and unspecified ovarian cyst 03/19/2003    RIGHT ovarian cyst    PONV (postoperative nausea and vomiting)     Skin cancer     Transient hypertension of pregnancy, antepartum     PIH with last birth    Uncomplicated asthma              Past Surgical History:     Past Surgical History:   Procedure Laterality Date    BIOPSY  2016    EDG with biopsy    BREAST SURGERY      Bilateral reduction     SECTION      CYSTOSCOPY, SLING TRANSVAGINAL N/A 2022    Procedure: CREATION, VAGINAL SLING, WITH CYSTOSCOPY (support the urethra with mesh sling and look in the bladder);  Surgeon: Sixto Harris MD;  Location:  OR    DENTAL SURGERY  2010 and 10/12/2010    Root canals    DILATION AND CURETTAGE, HYSTEROSCOPY, ABLATE ENDOMETRIUM NOVASURE, COMBINED  3/20/2012    Procedure:COMBINED DILATION AND CURETTAGE, HYSTEROSCOPY, ABLATE ENDOMETRIUM NOVASURE; Hysteroscopy with Endometrial Ablation Novasure; Surgeon:GERRI PURCELL; Location:[facility]    ESOPHAGOSCOPY, GASTROSCOPY, DUODENOSCOPY (EGD), COMBINED N/A 2016    Procedure: COMBINED ESOPHAGOSCOPY, GASTROSCOPY, DUODENOSCOPY (EGD);  Surgeon: Jose L Wilson MD;  Location: [facility]    INSERT PORT VASCULAR ACCESS N/A 2017    Procedure: INSERT PORT VASCULAR ACCESS;  Surgeon: Michael Townsend MD;  Location: [facility]    LAPAROSCOPIC SALPINGO-OOPHORECTOMY Bilateral 2017    Procedure: LAPAROSCOPIC SALPINGO-OOPHORECTOMY;  Laparoscopic bilateral salpingo oophorectomy;  Surgeon: Preeti Tirado MD;  Location: [facility]    RECONSTRUCT BREAST BILATERAL, IMPLANT PROSTHESIS BILATERAL, COMBINED Bilateral 3/16/2018    Procedure: COMBINED RECONSTRUCT BREAST BILATERAL, IMPLANT PROSTHESIS BILATERAL;  BILATERAL SECOND STAGE BREAST RECONSTRUCTION WITH SILICONE GEL IMPLANT;  Surgeon: Eugenio Ruelas MD;  Location:  [facility]    REVISE RECONSTRUCTED BREAST      SURGICAL HISTORY OF -       Breast reduction    ZZC APPENDECTOMY              Medications     Current Outpatient Medications   Medication Sig Dispense Refill    Ascorbic Acid (VITAMIN C PO)       aspirin (ASA) 325 MG EC tablet Take 325 mg by mouth daily      cetirizine (ZYRTEC) 10 MG tablet Take 10  mg by mouth every morning      cholecalciferol (VITAMIN D3) 1000 units (25 mcg) capsule Take 1 capsule by mouth daily      COMPOUNDED NON-CONTROLLED SUBSTANCE (CMPD RX) - PHARMACY TO MIX COMPOUNDED MEDICATION Compounded semaglutide 0.25 mg subcutaneously weekly 4 each 0    COMPOUNDED NON-CONTROLLED SUBSTANCE (CMPD RX) - PHARMACY TO MIX COMPOUNDED MEDICATION Compounded semaglutide 0.5 mg subcutaneously weekly 4 each 1    COMPOUNDED NON-CONTROLLED SUBSTANCE (CMPD RX) - PHARMACY TO MIX COMPOUNDED MEDICATION Compounded semaglutide 1.0 mg subcutaneously weekly 4 each 2    Fluticasone Propionate (FLONASE NA) Spray 1 spray into both nostrils daily       gabapentin (NEURONTIN) 300 MG capsule 300mg in a.m. and 300mg in afternoon and 600mg in evening 270 capsule 1    imiquimod (ALDARA) 5 % external cream Apply a small sized amount to warts or molluscum three times weekly at bedtime.   Wash off after 8 hours.   May use for up to 12 weeks. 6 packet 1    letrozole (FEMARA) 2.5 MG tablet Take 1 tablet (2.5 mg) by mouth daily 120 tablet 1    lidocaine (XYLOCAINE) 5 % external ointment Apply topically as needed for moderate pain Apply to cotton ball then apply to affected area 10min prior to PT exercises or intercourse. 50 g 3    Multiple Vitamins-Minerals (MULTIVITAMIN PO) Take 1 tablet by mouth daily       nitroFURantoin macrocrystal (MACRODANTIN) 50 MG capsule Take 1 capsule (50 mg) by mouth as needed (for UTI symptoms) 30 capsule 2    omeprazole (PRILOSEC) 20 MG DR capsule TAKE ONE CAPSULE BY MOUTH EVERY MORNING 30 MINUTES BEFORE BREAKFAST 90 capsule 3    oxyBUTYnin ER (DITROPAN XL) 10 MG 24 hr tablet Take 1 tablet (10 mg) by mouth daily 90 tablet 3    Semaglutide-Weight Management (WEGOVY) 2.4 MG/0.75ML pen Inject 2.4 mg Subcutaneous once a week (Patient not taking: Reported on 4/30/2024) 9 mL 1    venlafaxine (EFFEXOR XR) 150 MG 24 hr capsule Take 1 capsule (150 mg) by mouth daily 90 capsule 3    venlafaxine (EFFEXOR XR) 75  MG 24 hr capsule Take with 150mg capsule for total dose of 225mg 90 capsule 3     Current Facility-Administered Medications   Medication Dose Route Frequency Provider Last Rate Last Admin    lidocaine (XYLOCAINE) 2 % external gel   Urethral Once Nithya Rodriguez PA-C                Family History:     Family History   Problem Relation Age of Onset    Diabetes Mother         hypoglycemic    Allergies Mother     Arthritis Mother     Depression Mother     Gastrointestinal Disease Mother     Neurologic Disorder Mother     Psychotic Disorder Mother         panic disorder    Cancer Mother 56        pancreatic, colon, liver and skin.    Cancer - colorectal Mother     Other Cancer Mother         Pancreatic    Anxiety Disorder Mother     Mental Illness Mother     Hypertension Father     Diabetes Father     Rheumatoid Arthritis Father     Colon Cancer Father 76    Unknown/Adopted Maternal Grandmother     Cerebrovascular Disease Maternal Grandmother     Unknown/Adopted Maternal Grandfather     Cancer Maternal Grandfather     Obesity Paternal Grandmother     Cancer Paternal Grandmother 94        coloangiocancinoma    C.A.D. Paternal Grandfather         congestive heart failure    Cerebrovascular Disease Paternal Grandfather         age 79    Diabetes Paternal Grandfather     Hypertension Paternal Grandfather     Alzheimer Disease Paternal Grandfather     Arthritis Paternal Grandfather     Circulatory Paternal Grandfather     Cardiovascular Paternal Grandfather     Heart Disease Paternal Grandfather     Neurologic Disorder Paternal Grandfather             Social History:     Social History     Socioeconomic History    Marital status:      Spouse name: Alfredo Wilson    Number of children: 2    Years of education: 15    Highest education level: Not on file   Occupational History    Occupation:      Employer: OTHER     Comment: [employer]   Tobacco Use    Smoking status: Former     Current packs/day:  0.00     Average packs/day: 0.5 packs/day for 10.0 years (5.0 ttl pk-yrs)     Types: Cigarettes     Start date: 10/1/2006     Quit date: 10/1/2016     Years since quittin.9    Smokeless tobacco: Never    Tobacco comments:     Socially- quit smoking 10/01/2016   Substance and Sexual Activity    Alcohol use: Yes     Alcohol/week: 0.0 standard drinks of alcohol     Comment: Occassional    Drug use: No    Sexual activity: Yes     Partners: Male     Birth control/protection: Female Surgical     Comment:  has had a vasectomy   Other Topics Concern     Service Not Asked    Blood Transfusions Not Asked    Caffeine Concern Not Asked    Occupational Exposure Not Asked    Hobby Hazards Not Asked    Sleep Concern Not Asked    Stress Concern Not Asked    Weight Concern Not Asked    Special Diet Not Asked    Back Care Not Asked    Exercise Not Asked    Bike Helmet Not Asked    Seat Belt Yes    Self-Exams Not Asked    Parent/sibling w/ CABG, MI or angioplasty before 65F 55M? No   Social History Narrative    Not on file     Social Determinants of Health     Financial Resource Strain: Low Risk  (10/6/2023)    Financial Resource Strain     Within the past 12 months, have you or your family members you live with been unable to get utilities (heat, electricity) when it was really needed?: No   Food Insecurity: Low Risk  (10/6/2023)    Food Insecurity     Within the past 12 months, did you worry that your food would run out before you got money to buy more?: No     Within the past 12 months, did the food you bought just not last and you didn t have money to get more?: No   Transportation Needs: Low Risk  (10/6/2023)    Transportation Needs     Within the past 12 months, has lack of transportation kept you from medical appointments, getting your medicines, non-medical meetings or appointments, work, or from getting things that you need?: No   Physical Activity: Not on file   Stress: Not on file   Social Connections:  Not on file   Interpersonal Safety: Low Risk  (10/6/2023)    Interpersonal Safety     Do you feel physically and emotionally safe where you currently live?: Yes     Within the past 12 months, have you been hit, slapped, kicked or otherwise physically hurt by someone?: No     Within the past 12 months, have you been humiliated or emotionally abused in other ways by your partner or ex-partner?: No   Housing Stability: Low Risk  (10/6/2023)    Housing Stability     Do you have housing? : Yes     Are you worried about losing your housing?: No            Allergies:   Carboplatin, Amoxicillin, Erythromycin, Gluten meal, Hydrocodone-acetaminophen, Hydromorphone, Naltrexone, Penicillins, Phentermine, Topamax [topiramate], Wellbutrin [bupropion], Zolpidem, Zolpidem tartrate, and Hydrocodone         Review of Systems:  From intake questionnaire   Negative 14 system review except as noted on HPI, nurse's note.         Physical Exam:   General Appearance: Well groomed, hygenic  Eyes: No redness, discharge  Respiratory: No cough, no respiratory distress or labored breathing  Musculoskeletal: Grossly normal, full range of motion in upper extremities, no gross deficits  Skin: No discoloration or apparent rashes  Neurologic - No tremors  Psychiatric - Alert and oriented  The rest of a comprehensive physical examination is deferred due to video visit restrictions        Labs:    I personally reviewed all applicable laboratory data and went over findings with patient  Significant for:    CBC RESULTS:  Recent Labs   Lab Test 04/30/24  0913 10/30/23  1249 04/24/23  1342 12/20/22  1358   WBC 7.8 9.1 8.0 10.9   HGB 13.8 13.5 13.0 13.5    330 333 300        BMP RESULTS:  Recent Labs   Lab Test 04/30/24  0913 10/30/23  1249 04/24/23  1342 12/20/22  1358 08/16/21  1324 07/09/21  0755 02/25/21  0949 12/07/20  1025 10/17/20  0834    140 142 140   < > 139 141 139 139   POTASSIUM 4.0 3.7 3.9 3.9   < > 4.1 4.4 4.1 3.9   CHLORIDE 103  103 103 101   < > 106 107 106 105   CO2 26 25 26 29   < > 27 31 27 28   ANIONGAP 10 12 13 10   < > 6 3 6 6   * 82 79 90   < > 110* 108* 64* 106   BUN 11.1 11.5 9.9 9.6   < > 12 10 9 7*   CR 0.72 0.75 0.68 0.64   < > 0.77 0.72 0.73 0.77   GFRESTIMATED >90 >90 >90 >90   < > >90 >90 >90 >60   GFRESTBLACK  --   --   --   --   --  >90 >90 >90 >60   DENY 9.1 9.8 9.6 8.8   < > 9.1 9.3 8.9 8.2*    < > = values in this interval not displayed.       UA RESULTS:   Recent Labs   Lab Test 07/29/24  0949 09/10/23  1457 07/19/23  1141   SG 1.014 1.027 >=1.030   URINEPH 6.0 5.0 5.5   NITRITE Negative Negative Positive*   RBCU 0-2 21* None Seen   WBCU 5-10* 10* 5-10*       CALCIUM RESULTS  Lab Results   Component Value Date    DENY 9.1 04/30/2024    DENY 9.8 10/30/2023    DENY 9.6 04/24/2023    DENY 9.1 07/09/2021    DENY 9.3 02/25/2021    DENY 8.9 12/07/2020           Imaging:    I personally reviewed all applicable imaging and went over the below findings with patient.    EXAM: US RENAL COMPLETE NON-VASCULAR  LOCATION: [facility]  DATE: 9/14/2023     INDICATION: renal cyst noted on CT further evaluation please  COMPARISON: None.  TECHNIQUE: Routine Bilateral Renal and Bladder Ultrasound.     FINDINGS:     RIGHT KIDNEY: 10.6 x 5.2 x 4.8 cm. The renal cortex measures 2 cm in thickness. The renal parenchyma is otherwise sonographically unremarkable without hydronephrosis. An anechoic 1.4 x 1.2 x 1.1 cm cyst is seen within the midpole of the right kidney,   consistent with a parapelvic cyst.      LEFT KIDNEY: 11.6 x 5.3 x 4.3 cm. The renal cortex measures 1.7 cm in thickness. The renal parenchyma is normal without hydronephrosis or masses. A 4 mm nonobstructive collecting system calculi seen in the lower pole of left kidney.     BLADDER: Normal. Bilateral ureteral jets were visualized in the bladder.                                                                      IMPRESSION:  1.  Benign 1.4 x 1.2 x 1.1  cm parapelvic right mid pole renal cyst, no follow-up imaging is required   2.  4 mm nonobstructive left lower pole collecting system calculi   3.  No sonographic evidence of hydronephrosis.

## 2024-09-10 NOTE — PATIENT INSTRUCTIONS
"DIET & LIFESTYLE CHANGES FOR PATIENTS WITH KIDNEY STONES    If you've had a kidney stone, you are likely to form another. Risk of recurrence is 15% at 1 year, 35% to 40% at 2 years, and 50% at 10 years. Therefore, prevention is very important.  Because of the chemical analysis of both your stone & urine, some changes in your diet & lifestyle are recommended. These recommendations have shown to be effective.    CALCIUM STONES (Oxalate and Phosphate)    Fluid intake is the most important prevention measure to help prevent stones. Fluid intake should be at least 2.5 liters per day or 90-120oz per day. With goal of urine output of >2.5L per day.   Increasing liquids that have citric acid may help such as low calorie orange juice, lemonade (Crystal Light Lemonade or True Lemon/Lime), or adding a citrus to your water.  You can add lemon juice or fresh julien to your water daily.  Lemon juice increases the citrate in your urine, and helps decrease the formation of stone and even breakdown certain types of stones. Add a cap full/teaspoon of pure lemon juice to each glass.   Try to limit sugar, especially if you have diabetes.    Helpful Fluid Measurements:  1 liter = 34oz  1 quart = 32 oz  24 pack water: Each bottle 16.9 oz     Low Oxalate Diet: Limit your consumption of OXALATE-rich foods including:  All nuts and nut products including peanuts, almonds,peanut butter, almond milk  Spinach  Rhubarb  Beets  Chocolate  Soybeans and soy products     Website:   [email or web address] [employer]    Below is a link to a PDF that is based on [employer] research. Please stick to pages 6-9 of this document. My suggestion is to review the list of food that is OK. The \"avoid\" list can be overwhelming.  [email or web address]_[email or web address]_[id number]_[email or web address]    Low Sodium Diet: Salt (sodium chloride) is found in abundance in many foods. High " sodium levels in the urine can interfere with the kidney's handling of calcium.   Trying a DASH (Dietary Approaches to Stop Hypertension) diet which is eating more fruits and vegetables, limiting salt intake, moderate in low-fat diary products, and low in animal protein.   Try to decrease salt intake to <2000 mg of sodium daily.     Tips for reducing the salt in your diet:  Don't use salt at the table  Reduce the salt used in food preparation. Try 1/2 teaspoon when recipes call for 1 teaspoon.  Use herbs and spices for flavoring instead of salt.  Avoid salty foods.  Check the label before you buy or use a product. Note sodium and portion size information.  Try to consume less than 2,000 mg/day. (1 teaspoon = 2,000 mg)    Foods with high sodium content include:  Processed meat (including luncheon meats, sausage)   Crackers   Instant cereal   Processed cheese   Canned soups   Chips and snack foods   Soy sauce    Low Animal Protein: Reduce animal protein (meat) intake to no more than 8-10 ounces per day. Increase fruit and vegetables to 5 servings per day.    Maintain a normal calcium diet: Calcium rich foods are encouraged, but no more than 1000 - 1200 mg per day. Researches have found that people with low calcium intakes tend to have more stones. Foods with high calcium content are acceptable and include:  Calcium rich foods include:   Diary (cheese, milk, and yogurt)  Enriched cereals  Meat and fish  Dark green vegetables  Non-Dairy Calcium Sources:  Fortified Coconut, Rice, Flax, Oat milk  (avoid almond milk)  Calcium fortified Orange Juice  - look for a low sugar/light variety  Canned sardines, canned pink salmon with bones  - look for low sodium options  Fortified cereals  Oatmeal  Broccoli, peas, chinese cabbage/bok jason, Kale, mustard greens, pistachio nuts, mung beans, red kidney beans     Limit Vitamin C intake to < 1000 mg daily.    Consultation with a dietician may be helpful as well.  Please let our staff  know if you are interested in this helpful option so a consult may be placed for you.

## 2024-09-19 ENCOUNTER — TELEPHONE (OUTPATIENT)
Dept: NEUROSURGERY | Facility: CLINIC | Age: 44
End: 2024-09-19
Payer: COMMERCIAL

## 2024-09-19 DIAGNOSIS — C50.411 MALIGNANT NEOPLASM OF UPPER-OUTER QUADRANT OF RIGHT BREAST IN FEMALE, ESTROGEN RECEPTOR POSITIVE (H): ICD-10-CM

## 2024-09-19 DIAGNOSIS — Z17.0 MALIGNANT NEOPLASM OF UPPER-OUTER QUADRANT OF RIGHT BREAST IN FEMALE, ESTROGEN RECEPTOR POSITIVE (H): ICD-10-CM

## 2024-09-19 DIAGNOSIS — G62.9 NEUROPATHY: ICD-10-CM

## 2024-09-20 RX ORDER — GABAPENTIN 300 MG/1
CAPSULE ORAL
Qty: 270 CAPSULE | Refills: 1 | Status: SHIPPED | OUTPATIENT
Start: 2024-09-20

## 2024-10-07 ENCOUNTER — PATIENT OUTREACH (OUTPATIENT)
Dept: ONCOLOGY | Facility: CLINIC | Age: 44
End: 2024-10-07
Payer: COMMERCIAL

## 2024-10-07 NOTE — PROGRESS NOTES
[facility]: Cancer Care                                                                                          Enrollment status: maintenance  Follow up scheduled: 11/5/24    Signature:  JACQUIE GARCIA RN

## 2024-10-10 ENCOUNTER — LAB (OUTPATIENT)
Dept: LAB | Facility: CLINIC | Age: 44
End: 2024-10-10
Payer: COMMERCIAL

## 2024-10-10 ENCOUNTER — TELEPHONE (OUTPATIENT)
Dept: UROLOGY | Facility: CLINIC | Age: 44
End: 2024-10-10

## 2024-10-10 DIAGNOSIS — N20.0 NEPHROLITHIASIS: Primary | ICD-10-CM

## 2024-10-10 DIAGNOSIS — N30.00 ACUTE CYSTITIS WITHOUT HEMATURIA: Primary | ICD-10-CM

## 2024-10-10 DIAGNOSIS — R35.0 URINARY FREQUENCY: ICD-10-CM

## 2024-10-10 DIAGNOSIS — N20.0 NEPHROLITHIASIS: ICD-10-CM

## 2024-10-10 LAB
ALBUMIN UR-MCNC: 100 MG/DL
APPEARANCE UR: ABNORMAL
BACTERIA #/AREA URNS HPF: ABNORMAL /HPF
BILIRUB UR QL STRIP: NEGATIVE
COLOR UR AUTO: YELLOW
GLUCOSE UR STRIP-MCNC: NEGATIVE MG/DL
HGB UR QL STRIP: ABNORMAL
KETONES UR STRIP-MCNC: NEGATIVE MG/DL
LEUKOCYTE ESTERASE UR QL STRIP: ABNORMAL
MUCOUS THREADS #/AREA URNS LPF: PRESENT /LPF
NITRATE UR QL: POSITIVE
PH UR STRIP: 6.5 [PH] (ref 5–7)
RBC #/AREA URNS AUTO: ABNORMAL /HPF
SP GR UR STRIP: >=1.03 (ref 1–1.03)
SQUAMOUS #/AREA URNS AUTO: ABNORMAL /LPF
UROBILINOGEN UR STRIP-ACNC: 0.2 E.U./DL
WBC #/AREA URNS AUTO: ABNORMAL /HPF
WBC CLUMPS #/AREA URNS HPF: PRESENT /HPF

## 2024-10-10 PROCEDURE — 87086 URINE CULTURE/COLONY COUNT: CPT

## 2024-10-10 PROCEDURE — 81001 URINALYSIS AUTO W/SCOPE: CPT

## 2024-10-10 PROCEDURE — 87186 SC STD MICRODIL/AGAR DIL: CPT

## 2024-10-10 RX ORDER — SULFAMETHOXAZOLE AND TRIMETHOPRIM 800; 160 MG/1; MG/1
1 TABLET ORAL 2 TIMES DAILY
Qty: 14 TABLET | Refills: 0 | Status: SHIPPED | OUTPATIENT
Start: 2024-10-10 | End: 2024-10-17

## 2024-10-10 NOTE — TELEPHONE ENCOUNTER
Spoke with patient to let her know that orders have been placed for urine testing.  She will go to [facility] lab this afternoon. Karina Cha RN

## 2024-10-10 NOTE — TELEPHONE ENCOUNTER
"[facility] Call Center    Phone Message    May a detailed message be left on voicemail: yes     Reason for Call: Symptoms or Concerns     If patient has red-flag symptoms, warm transfer to triage line    Current symptom or concern: frequent urination but does not go much, reports up every hour during the night to use the bathroom, this morning patient reports feeling like there are \"rocks down there\", it feels \"very heavy\", burning with urination and foul odor.    Symptoms have been present for:  1 day(s)    Has patient previously been seen for this? Yes    By : COLLEEN Purvis CNP    Date: 9/10/24    Are there any new or worsening symptoms? Yes: see above, patient is concerned that she either has a uti or that perhaps she has a stone that may be causing a partial blockage. Patient is wondering if a UA/UC can be done.    Action Taken: Message routed to:  Other: Urology    Travel Screening: Not Applicable                                                                       "

## 2024-10-10 NOTE — TELEPHONE ENCOUNTER
Patient advised of urinalysis results and will start antibiotic.  She is aware that when the culture comes back, she may need to change treatment. Karina Cha RN

## 2024-10-11 LAB — BACTERIA UR CULT: ABNORMAL

## 2024-10-30 ENCOUNTER — TELEPHONE (OUTPATIENT)
Dept: ONCOLOGY | Facility: CLINIC | Age: 44
End: 2024-10-30
Payer: COMMERCIAL

## 2024-10-30 NOTE — TELEPHONE ENCOUNTER
Diana is on the [facility] clinical trial and has a SOC visit next week on 11/5.   A message was left that a research RN will accompany her at this visit to do questionnaires and measurements at that visit.   No action is needed from her at this time, but if she wishes to opt out she is aware she can contact this writer. A phone number was provided.

## 2024-11-05 ENCOUNTER — ALLIED HEALTH/NURSE VISIT (OUTPATIENT)
Dept: ONCOLOGY | Facility: CLINIC | Age: 44
End: 2024-11-05

## 2024-11-05 ENCOUNTER — ONCOLOGY VISIT (OUTPATIENT)
Dept: ONCOLOGY | Facility: CLINIC | Age: 44
End: 2024-11-05
Attending: INTERNAL MEDICINE
Payer: COMMERCIAL

## 2024-11-05 ENCOUNTER — LAB (OUTPATIENT)
Dept: LAB | Facility: CLINIC | Age: 44
End: 2024-11-05
Attending: NURSE PRACTITIONER
Payer: COMMERCIAL

## 2024-11-05 ENCOUNTER — INFUSION THERAPY VISIT (OUTPATIENT)
Dept: INFUSION THERAPY | Facility: CLINIC | Age: 44
End: 2024-11-05
Attending: NURSE PRACTITIONER
Payer: COMMERCIAL

## 2024-11-05 VITALS
OXYGEN SATURATION: 95 % | HEIGHT: 64 IN | SYSTOLIC BLOOD PRESSURE: 146 MMHG | TEMPERATURE: 98 F | DIASTOLIC BLOOD PRESSURE: 85 MMHG | HEART RATE: 88 BPM | RESPIRATION RATE: 16 BRPM | BODY MASS INDEX: 32.32 KG/M2 | WEIGHT: 189.31 LBS

## 2024-11-05 DIAGNOSIS — M85.89 OSTEOPENIA OF MULTIPLE SITES: ICD-10-CM

## 2024-11-05 DIAGNOSIS — C50.411 MALIGNANT NEOPLASM OF UPPER-OUTER QUADRANT OF RIGHT BREAST IN FEMALE, ESTROGEN RECEPTOR POSITIVE (H): Primary | ICD-10-CM

## 2024-11-05 DIAGNOSIS — Z17.0 MALIGNANT NEOPLASM OF UPPER-OUTER QUADRANT OF RIGHT BREAST IN FEMALE, ESTROGEN RECEPTOR POSITIVE (H): Primary | ICD-10-CM

## 2024-11-05 DIAGNOSIS — Z00.6 EXAMINATION OF PARTICIPANT IN CLINICAL TRIAL: Primary | ICD-10-CM

## 2024-11-05 LAB
ALBUMIN SERPL BCG-MCNC: 4.4 G/DL (ref 3.5–5.2)
ALP SERPL-CCNC: 98 U/L (ref 40–150)
ALT SERPL W P-5'-P-CCNC: 16 U/L (ref 0–50)
ANION GAP SERPL CALCULATED.3IONS-SCNC: 11 MMOL/L (ref 7–15)
AST SERPL W P-5'-P-CCNC: 17 U/L (ref 0–45)
BILIRUB SERPL-MCNC: 0.3 MG/DL
BUN SERPL-MCNC: 10 MG/DL (ref 6–20)
CALCIUM SERPL-MCNC: 9.5 MG/DL (ref 8.8–10.4)
CHLORIDE SERPL-SCNC: 102 MMOL/L (ref 98–107)
CREAT SERPL-MCNC: 0.72 MG/DL (ref 0.51–0.95)
EGFRCR SERPLBLD CKD-EPI 2021: >90 ML/MIN/1.73M2
GLUCOSE SERPL-MCNC: 185 MG/DL (ref 70–99)
HCO3 SERPL-SCNC: 28 MMOL/L (ref 22–29)
HOLD SPECIMEN: NORMAL
POTASSIUM SERPL-SCNC: 4 MMOL/L (ref 3.4–5.3)
PROT SERPL-MCNC: 7.3 G/DL (ref 6.4–8.3)
SODIUM SERPL-SCNC: 141 MMOL/L (ref 135–145)

## 2024-11-05 PROCEDURE — 99214 OFFICE O/P EST MOD 30 MIN: CPT | Mod: 25 | Performed by: INTERNAL MEDICINE

## 2024-11-05 PROCEDURE — 250N000011 HC RX IP 250 OP 636: Performed by: INTERNAL MEDICINE

## 2024-11-05 PROCEDURE — 80053 COMPREHEN METABOLIC PANEL: CPT

## 2024-11-05 PROCEDURE — 36415 COLL VENOUS BLD VENIPUNCTURE: CPT

## 2024-11-05 RX ORDER — ZOLEDRONIC ACID 0.04 MG/ML
4 INJECTION, SOLUTION INTRAVENOUS ONCE
Status: COMPLETED | OUTPATIENT
Start: 2024-11-05 | End: 2024-11-05

## 2024-11-05 RX ORDER — ZOLEDRONIC ACID 0.04 MG/ML
4 INJECTION, SOLUTION INTRAVENOUS ONCE
Status: CANCELLED | OUTPATIENT
Start: 2024-11-05 | End: 2024-11-05

## 2024-11-05 RX ADMIN — ZOLEDRONIC ACID 4 MG: 0.04 INJECTION, SOLUTION INTRAVENOUS at 12:03

## 2024-11-05 ASSESSMENT — PAIN SCALES - GENERAL: PAINLEVEL_OUTOF10: NO PAIN (0)

## 2024-11-05 NOTE — PROGRESS NOTES
"Oncology Rooming Note    November 5, 2024 11:15 AM   Diana Wilson is a 44 year old female who presents for:    Chief Complaint   Patient presents with    Oncology Clinic Visit     Malignant neoplasm of upper-outer quadrant of right breast in female, estrogen receptor positive - Labs provider and infusion     Initial Vitals: BP (!) 146/85 (BP Location: Right arm, Patient Position: Sitting, Cuff Size: Adult Regular)   Pulse 88   Temp 98  F (36.7  C) (Tympanic)   Resp 16   Ht 1.626 m (5' 4\")   Wt 85.9 kg (189 lb 5 oz)   LMP 11/28/2012   SpO2 95%   BMI 32.50 kg/m   Estimated body mass index is 32.5 kg/m  as calculated from the following:    Height as of this encounter: 1.626 m (5' 4\").    Weight as of this encounter: 85.9 kg (189 lb 5 oz). Body surface area is 1.97 meters squared.  No Pain (0) Comment: Data Unavailable   Patient's last menstrual period was 11/28/2012.  Allergies reviewed: Yes  Medications reviewed: Yes    Medications: Medication refills not needed today.  Pharmacy name entered into [facility] [employer]:    [facility] [place], [place] - [address]  [facility] - [place], [place] - [address]    Frailty Screening:   Is the patient here for a new oncology consult visit in cancer care? 2. No      Clinical concerns:  None today.       Nicole Costa, MEENA            "

## 2024-11-05 NOTE — PATIENT INSTRUCTIONS
[facility] Main Line: [phone]    Call triage nurse with chills and/or temperature greater than or equal to 100.4, uncontrolled nausea/vomiting, diarrhea, constipation, dizziness, shortness of breath, chest pain, bleeding, unexplained bruising, or any new/concerning symptoms, questions/concerns.   If you are having any concerning symptoms or wish to speak to a provider before your next infusion visit, please call your care coordinator or triage to notify them so we can adequately serve you.   Nurse Triage line:  [phone]    If after hours, weekends, or holidays, call [facility]  and ask for Oncology doctor on call @ [phone]     November 2024 Sunday Monday Tuesday Wednesday Thursday Friday Saturday                            1     2       3     4     5    LAB  11:00 AM   (10 min.)   [facility]    RETURN CCSL  11:30 AM   (30 min.)   Layo Coulter MD   [facility]    INFUSION 1 HR (60 MIN)  12:00 PM   (60 min.)   [place] [facility] INFUSION NURSE   [facility] 6     7     8     9       10     11     12     13     14     15     16       17     18     19     20     21     22     23       24     25     26     27     28     29 30 December 2024 Sunday Monday Tuesday Wednesday Thursday Friday Saturday   1     2     3    MYC PREVENTATIVE ADULT VISIT   4:10 PM   (30 min.)   Yenifer Peters MD   [facility] 4     5     6     7       8     9     10     11     12     13     14       15     16     17     18     19     20     21       22     23     24     25     26     27     28       29     30     31                                         Lab Results:  Recent Results (from the past 12 hours)   Comprehensive metabolic panel    Collection Time: 11/05/24 11:11 AM   Result Value Ref Range    Sodium 141 135 - 145 mmol/L    Potassium 4.0 3.4 - 5.3 mmol/L     Carbon Dioxide (CO2) 28 22 - 29 mmol/L    Anion Gap 11 7 - 15 mmol/L    Urea Nitrogen 10.0 6.0 - 20.0 mg/dL    Creatinine 0.72 0.51 - 0.95 mg/dL    GFR Estimate >90 >60 mL/min/1.73m2    Calcium 9.5 8.8 - 10.4 mg/dL    Chloride 102 98 - 107 mmol/L    Glucose 185 (H) 70 - 99 mg/dL    Alkaline Phosphatase 98 40 - 150 U/L    AST 17 0 - 45 U/L    ALT 16 0 - 50 U/L    Protein Total 7.3 6.4 - 8.3 g/dL    Albumin 4.4 3.5 - 5.2 g/dL    Bilirubin Total 0.3 <=1.2 mg/dL

## 2024-11-05 NOTE — PROGRESS NOTES
Infusion Nursing Note:  Diana Wilson presents today for Zometa.    Patient seen by provider today: Yes: Dr Coulter   present during visit today: Not Applicable.    Note: NA.    Intravenous Access:  Peripheral IV placed.    Treatment Conditions:  Lab Results   Component Value Date    HGB 13.8 04/30/2024    WBC 7.8 04/30/2024    ANEU 4.5 07/09/2021    ANEUTAUTO 4.8 04/30/2024     04/30/2024     Lab Results   Component Value Date     11/05/2024    POTASSIUM 4.0 11/05/2024    MAG 1.5 (L) 10/16/2020    CR 0.72 11/05/2024    DENY 9.5 11/05/2024    BILITOTAL 0.3 11/05/2024    ALBUMIN 4.4 11/05/2024    ALT 16 11/05/2024    AST 17 11/05/2024     Results reviewed, labs MET treatment parameters, ok to proceed with treatment.    Post Infusion Assessment:  Patient tolerated infusion without incident.  Blood return noted pre and post infusion.  Site patent and intact, free from redness, edema or discomfort.  No evidence of extravasations.  Access discontinued per protocol.     Discharge Plan:   Discharge instructions reviewed with: Patient.  Patient and/or family verbalized understanding of discharge instructions and all questions answered.  AVS to patient via [employer].  Patient will return 12/3/24 annual visit for next appointment.   Patient discharged in stable condition accompanied by: father.  Departure Mode: Ambulatory.    Jaspreet Byrne RN

## 2024-11-05 NOTE — LETTER
"11/5/2024      Diana Wilson  1972 [address] [facility] [address]      Dear Colleague,    Thank you for referring your patient, Diana Wilson, to the [facility]. Please see a copy of my visit note below.    Oncology Rooming Note    November 5, 2024 11:15 AM   Diana Wilson is a 44 year old female who presents for:    Chief Complaint   Patient presents with     Oncology Clinic Visit     Malignant neoplasm of upper-outer quadrant of right breast in female, estrogen receptor positive - Labs provider and infusion     Initial Vitals: BP (!) 146/85 (BP Location: Right arm, Patient Position: Sitting, Cuff Size: Adult Regular)   Pulse 88   Temp 98  F (36.7  C) (Tympanic)   Resp 16   Ht 1.626 m (5' 4\")   Wt 85.9 kg (189 lb 5 oz)   LMP 11/28/2012   SpO2 95%   BMI 32.50 kg/m   Estimated body mass index is 32.5 kg/m  as calculated from the following:    Height as of this encounter: 1.626 m (5' 4\").    Weight as of this encounter: 85.9 kg (189 lb 5 oz). Body surface area is 1.97 meters squared.  No Pain (0) Comment: Data Unavailable   Patient's last menstrual period was 11/28/2012.  Allergies reviewed: Yes  Medications reviewed: Yes    Medications: Medication refills not needed today.  Pharmacy name entered into [employer]:    [facility] [place], [place] [facility] [address] [facility] [place], [place] - [address]    Frailty Screening:   Is the patient here for a new oncology consult visit in cancer care? 2. No      Clinical concerns:  None today.       Nicole Costa [facility] Hematology and Oncology Outpatient Progress Note    Patient: Diana Wilson  MRN: [id number]  Date of Service: Nov 5, 2024          Reason for Visit    History of breast cancer    Primary Oncologist: Dr. Coulter      Assessment/Plan  History ER/[place]+ right breast cancer (2016)  Now almost 7 years from diagnosis and " 6.5 years completion of treatment.      Continues long-term endocrine therapy (has been on total of 6 yrs); on various AIs due to intolerance and Tamoxifen stopped for PE. She's most recently been on letrozole for the past 2 and half with better tolerance than the other AIs.   She is on a very low-dose vaginal estrogen.    Status post bilateral mastectomy.  No new issues reported today.  Clinical exam unremarkable today.  Plan is to continue endocrine therapy for a total of 10 years as tolerated.    2.  Chemo-induced peripheral neuropathy  On gabapentin 300 mg TID.  Stable neuropathy.     3.   Osteopenia  Risk of developing osteoporosis with long-term AI use.   She will receive Zometa today.  Will continue 6-month Zometa as long as she is on AI's.        History of Present Illness/ Interval History    Ms. Diana Wilson is a 43 year old with history of ER/[place]+ breast cancer, diagnosed almost 7 yr ago. S/P bilateral mastectomy (with reconstruction), RT, BSO and has been on adjuvant endocrine therapy ~5 yrs. She has had intolerance of anastrozole and Aromasin (significant arthralgias) and PE on Tamoxifen. She was changed to letrozole 2.5 yrs ago and is tolerating this the best, so far.     Doing fairly well on letrozole.  No significant worsening of side effects.  Postmenopausal symptoms are all pretty stable.  No new issues reported today.  No new bone pain.  No significant unintentional weight loss.    ECOG Performance     0    Oncology History/Treatment  Diagnosis/Stage:   12/2016: T2N1 invasive ductal right breast cancer, ER/[place]+ (HER2 neg) // ypTis(DCIS)pN0  -age 35 yo    -surgical path after neoadjuvant chemo: right breast - no residual invasive carcinoma; grade III DCIS 1 mm; 5 sLN all neg.   Left breast - negative.     -Genetic testing: negative for mutations in the TYLER, BARD1, BRCA1, BRCA2, BRIP1, CDH1, CHEK2, MRE11A, MUTYH, NBN, NF1, PALB2, PTEN, RAD50, RAD51C, RAD51D, and TP53 genes by sequencing and  deletion/duplication analysis. No mutations were found in any of the 17 genes analyzed. 2017 9/2020: PE (post-op breast reconstruction surgery)  -Tamoxifen stopped  -Anticoagulated 6 mths    Treatment:  2017: neoadjuvant ddAC x 4, then weekly carbo/taxol x 12  -stopped carbo after 7 weeks due to anaphylaxis    7/12/2017: bilateral mastectomies  (reconstruction 9/2020 with removal of implants and TRAM flap reconstruction; complicated by post-op PE)    11/2017: adjuvant RT completed (6040 cGy/33)    11/2017: BSO    2018 - present: adjuvant endocrine therapy  -Arimidex intermittently in 2018  -Tamoxifen [id number], stopped for PE  -Aromasin 2020 - 3/2022 (arthralgias)  -Letrozole 4/2022 - current      Physical Exam    GENERAL: Alert and oriented to time place and person. Seated comfortably. In no distress.  HEAD: Atraumatic and normocephalic. No alopecia.  EYES: ROMMEL, EOMI. No erythema. No icterus.  BREASTS: Reconstructed breasts.  No abnormal lesions or masses noted  SKIN: no rash, or bruising or purpura.   NEURO: No gross deficit noted. Non-antalgic gait.      Lab Results  Recent Results (from the past 5 weeks)   Urine Culture Aerobic Bacterial [[id number]]    Collection Time: 10/10/24  1:29 PM    Specimen: Urine, Clean Catch   Result Value Ref Range    Culture >100,000 CFU/mL Escherichia coli (A)        Susceptibility    Escherichia coli - COSME     Ampicillin <=2 Susceptible ug/mL     Ampicillin/ Sulbactam <=2 Susceptible ug/mL     Piperacillin/Tazobactam <=4 Susceptible ug/mL     Cefazolin* <=4 Susceptible ug/mL      * Cefazolin COSME breakpoints are for the treatment of uncomplicated urinary tract infections. For the treatment of systemic infections, please contact the laboratory for additional testing.     Cefoxitin <=4 Susceptible ug/mL     Ceftazidime <=1 Susceptible ug/mL     Ceftriaxone <=1 Susceptible ug/mL     Cefepime <=1 Susceptible ug/mL     Gentamicin <=1 Susceptible ug/mL     Tobramycin <=1 Susceptible  ug/mL     Ciprofloxacin <=0.25 Susceptible ug/mL     Levofloxacin <=0.12 Susceptible ug/mL     Nitrofurantoin <=16 Susceptible ug/mL     Trimethoprim/Sulfamethoxazole <=1/19 Susceptible ug/mL   UA with Microscopic    Collection Time: 10/10/24  1:30 PM   Result Value Ref Range    Color Urine Yellow Colorless, Straw, Light Yellow, Yellow    Appearance Urine Cloudy (A) Clear    Glucose Urine Negative Negative mg/dL    Bilirubin Urine Negative Negative    Ketones Urine Negative Negative mg/dL    Specific Gravity Urine >=1.030 1.003 - 1.035    Blood Urine Large (A) Negative    pH Urine 6.5 5.0 - 7.0    Protein Albumin Urine 100 (A) Negative mg/dL    Urobilinogen Urine 0.2 0.2, 1.0 E.U./dL    Nitrite Urine Positive (A) Negative    Leukocyte Esterase Urine Moderate (A) Negative   Urine Microscopic Exam    Collection Time: 10/10/24  1:30 PM   Result Value Ref Range    Bacteria Urine Few (A) None Seen /HPF    RBC Urine 25-50 (A) 0-2 /HPF /HPF    WBC Urine 10-25 (A) 0-5 /HPF /HPF    Squamous Epithelials Urine Few (A) None Seen /LPF    WBC Clumps Urine Present (A) None Seen /HPF    Mucus Urine Present (A) None Seen /LPF             Imaging    No results found.    The longitudinal plan of care for the diagnosis(es)/condition(s) as documented were addressed during this visit. Due to the added complexity in care, I will continue to support Diana in the subsequent management and with ongoing continuity of care.      Signed by:   Layo Coulter MD  Hematology and Medical Oncology  [facility]          Again, thank you for allowing me to participate in the care of your patient.        Sincerely,        Layo Coulter MD

## 2024-11-05 NOTE — PROGRESS NOTES
[facility] Hematology and Oncology Outpatient Progress Note    Patient: Diana Wilson  MRN: [id number]  Date of Service: Nov 5, 2024          Reason for Visit    History of breast cancer    Primary Oncologist: Dr. Coulter      Assessment/Plan  History ER/[place]+ right breast cancer (2016)  Now almost 7 years from diagnosis and 6.5 years completion of treatment.      Continues long-term endocrine therapy (has been on total of 6 yrs); on various AIs due to intolerance and Tamoxifen stopped for PE. She's most recently been on letrozole for the past 2 and half with better tolerance than the other AIs.   She is on a very low-dose vaginal estrogen.    Status post bilateral mastectomy.  No new issues reported today.  Clinical exam unremarkable today.  Plan is to continue endocrine therapy for a total of 10 years as tolerated.    2.  Chemo-induced peripheral neuropathy  On gabapentin 300 mg TID.  Stable neuropathy.     3.   Osteopenia  Risk of developing osteoporosis with long-term AI use.   She will receive Zometa today.  Will continue 6-month Zometa as long as she is on AI's.        History of Present Illness/ Interval History    Ms. Diana Wilson is a 43 year old with history of ER/IA+ breast cancer, diagnosed almost 7 yr ago. S/P bilateral mastectomy (with reconstruction), RT, BSO and has been on adjuvant endocrine therapy ~5 yrs. She has had intolerance of anastrozole and Aromasin (significant arthralgias) and PE on Tamoxifen. She was changed to letrozole 2.5 yrs ago and is tolerating this the best, so far.     Doing fairly well on letrozole.  No significant worsening of side effects.  Postmenopausal symptoms are all pretty stable.  No new issues reported today.  No new bone pain.  No significant unintentional weight loss.    ECOG Performance     0    Oncology History/Treatment  Diagnosis/Stage:   12/2016: T2N1 invasive ductal right breast cancer, ER/[place]+ (HER2 neg) // ypTis(DCIS)pN0  -age 37 yo    -surgical path  after neoadjuvant chemo: right breast - no residual invasive carcinoma; grade III DCIS 1 mm; 5 sLN all neg.   Left breast - negative.     -Genetic testing: negative for mutations in the TYLER, BARD1, BRCA1, BRCA2, BRIP1, CDH1, CHEK2, MRE11A, MUTYH, NBN, NF1, PALB2, PTEN, RAD50, RAD51C, RAD51D, and TP53 genes by sequencing and deletion/duplication analysis. No mutations were found in any of the 17 genes analyzed. 2017 9/2020: PE (post-op breast reconstruction surgery)  -Tamoxifen stopped  -Anticoagulated 6 mths    Treatment:  2017: neoadjuvant ddAC x 4, then weekly carbo/taxol x 12  -stopped carbo after 7 weeks due to anaphylaxis    7/12/2017: bilateral mastectomies  (reconstruction 9/2020 with removal of implants and TRAM flap reconstruction; complicated by post-op PE)    11/2017: adjuvant RT completed (6040 cGy/33)    11/2017: BSO    2018 - present: adjuvant endocrine therapy  -Arimidex intermittently in 2018  -Tamoxifen [id number], stopped for PE  -Aromasin 2020 - 3/2022 (arthralgias)  -Letrozole 4/2022 - current      Physical Exam    GENERAL: Alert and oriented to time place and person. Seated comfortably. In no distress.  HEAD: Atraumatic and normocephalic. No alopecia.  EYES: ROMMEL, EOMI. No erythema. No icterus.  BREASTS: Reconstructed breasts.  No abnormal lesions or masses noted  SKIN: no rash, or bruising or purpura.   NEURO: No gross deficit noted. Non-antalgic gait.      Lab Results  Recent Results (from the past 5 weeks)   Urine Culture Aerobic Bacterial [[id number]]    Collection Time: 10/10/24  1:29 PM    Specimen: Urine, Clean Catch   Result Value Ref Range    Culture >100,000 CFU/mL Escherichia coli (A)        Susceptibility    Escherichia coli - COSME     Ampicillin <=2 Susceptible ug/mL     Ampicillin/ Sulbactam <=2 Susceptible ug/mL     Piperacillin/Tazobactam <=4 Susceptible ug/mL     Cefazolin* <=4 Susceptible ug/mL      * Cefazolin COSME breakpoints are for the treatment of uncomplicated urinary tract  infections. For the treatment of systemic infections, please contact the laboratory for additional testing.     Cefoxitin <=4 Susceptible ug/mL     Ceftazidime <=1 Susceptible ug/mL     Ceftriaxone <=1 Susceptible ug/mL     Cefepime <=1 Susceptible ug/mL     Gentamicin <=1 Susceptible ug/mL     Tobramycin <=1 Susceptible ug/mL     Ciprofloxacin <=0.25 Susceptible ug/mL     Levofloxacin <=0.12 Susceptible ug/mL     Nitrofurantoin <=16 Susceptible ug/mL     Trimethoprim/Sulfamethoxazole <=1/19 Susceptible ug/mL   UA with Microscopic    Collection Time: 10/10/24  1:30 PM   Result Value Ref Range    Color Urine Yellow Colorless, Straw, Light Yellow, Yellow    Appearance Urine Cloudy (A) Clear    Glucose Urine Negative Negative mg/dL    Bilirubin Urine Negative Negative    Ketones Urine Negative Negative mg/dL    Specific Gravity Urine >=1.030 1.003 - 1.035    Blood Urine Large (A) Negative    pH Urine 6.5 5.0 - 7.0    Protein Albumin Urine 100 (A) Negative mg/dL    Urobilinogen Urine 0.2 0.2, 1.0 E.U./dL    Nitrite Urine Positive (A) Negative    Leukocyte Esterase Urine Moderate (A) Negative   Urine Microscopic Exam    Collection Time: 10/10/24  1:30 PM   Result Value Ref Range    Bacteria Urine Few (A) None Seen /HPF    RBC Urine 25-50 (A) 0-2 /HPF /HPF    WBC Urine 10-25 (A) 0-5 /HPF /HPF    Squamous Epithelials Urine Few (A) None Seen /LPF    WBC Clumps Urine Present (A) None Seen /HPF    Mucus Urine Present (A) None Seen /LPF             Imaging    No results found.    The longitudinal plan of care for the diagnosis(es)/condition(s) as documented were addressed during this visit. Due to the added complexity in care, I will continue to support Diana in the subsequent management and with ongoing continuity of care.      Signed by:   Layo Coulter MD  Hematology and Medical Oncology  [facility]

## 2024-11-05 NOTE — PROGRESS NOTES
Diana Wilson completed her 84 Month visit for the [facility] BWEL weight loss study. Study labs were not required for this visit. Follow up QOL was completed in clinic.     Weight, and hip/waste measurements were recorded at this visit.  ECOG = 0  See provider notes from 11/5/2024 for additional information.      Adverse events were not assessed at this visit as they are not required per protocol. Reviewed study schedule with next visit 12 months (~Oct/Nov 2025). She has no other questions at this time.

## 2024-11-10 ENCOUNTER — HEALTH MAINTENANCE LETTER (OUTPATIENT)
Age: 44
End: 2024-11-10

## 2024-12-03 ENCOUNTER — OFFICE VISIT (OUTPATIENT)
Dept: FAMILY MEDICINE | Facility: CLINIC | Age: 44
End: 2024-12-03
Attending: FAMILY MEDICINE
Payer: COMMERCIAL

## 2024-12-03 VITALS
OXYGEN SATURATION: 97 % | BODY MASS INDEX: 32.66 KG/M2 | DIASTOLIC BLOOD PRESSURE: 84 MMHG | WEIGHT: 191.3 LBS | RESPIRATION RATE: 16 BRPM | HEART RATE: 99 BPM | HEIGHT: 64 IN | TEMPERATURE: 97.3 F | SYSTOLIC BLOOD PRESSURE: 124 MMHG

## 2024-12-03 DIAGNOSIS — F33.0 MAJOR DEPRESSIVE DISORDER, RECURRENT EPISODE, MILD (H): ICD-10-CM

## 2024-12-03 DIAGNOSIS — M85.89 OSTEOPENIA OF MULTIPLE SITES: ICD-10-CM

## 2024-12-03 DIAGNOSIS — Z23 NEED FOR VACCINATION: ICD-10-CM

## 2024-12-03 DIAGNOSIS — Z13.220 LIPID SCREENING: ICD-10-CM

## 2024-12-03 DIAGNOSIS — G62.9 NEUROPATHY: ICD-10-CM

## 2024-12-03 DIAGNOSIS — C50.411 MALIGNANT NEOPLASM OF UPPER-OUTER QUADRANT OF RIGHT BREAST IN FEMALE, ESTROGEN RECEPTOR POSITIVE (H): ICD-10-CM

## 2024-12-03 DIAGNOSIS — Z00.01 ENCOUNTER FOR GENERAL ADULT MEDICAL EXAMINATION WITH ABNORMAL FINDINGS: Primary | ICD-10-CM

## 2024-12-03 DIAGNOSIS — G62.0 DRUG-INDUCED POLYNEUROPATHY (H): ICD-10-CM

## 2024-12-03 DIAGNOSIS — Z17.0 MALIGNANT NEOPLASM OF UPPER-OUTER QUADRANT OF RIGHT BREAST IN FEMALE, ESTROGEN RECEPTOR POSITIVE (H): ICD-10-CM

## 2024-12-03 DIAGNOSIS — F41.9 ANXIETY: ICD-10-CM

## 2024-12-03 PROCEDURE — 99396 PREV VISIT EST AGE 40-64: CPT | Mod: 25 | Performed by: FAMILY MEDICINE

## 2024-12-03 PROCEDURE — 90471 IMMUNIZATION ADMIN: CPT | Performed by: FAMILY MEDICINE

## 2024-12-03 PROCEDURE — 90746 HEPB VACCINE 3 DOSE ADULT IM: CPT | Performed by: FAMILY MEDICINE

## 2024-12-03 PROCEDURE — 90472 IMMUNIZATION ADMIN EACH ADD: CPT | Performed by: FAMILY MEDICINE

## 2024-12-03 PROCEDURE — 90715 TDAP VACCINE 7 YRS/> IM: CPT | Performed by: FAMILY MEDICINE

## 2024-12-03 PROCEDURE — 99214 OFFICE O/P EST MOD 30 MIN: CPT | Mod: 25 | Performed by: FAMILY MEDICINE

## 2024-12-03 PROCEDURE — 96127 BRIEF EMOTIONAL/BEHAV ASSMT: CPT | Performed by: FAMILY MEDICINE

## 2024-12-03 RX ORDER — GABAPENTIN 300 MG/1
CAPSULE ORAL
Qty: 360 CAPSULE | Refills: 3 | Status: SHIPPED | OUTPATIENT
Start: 2024-12-03

## 2024-12-03 RX ORDER — VENLAFAXINE HYDROCHLORIDE 75 MG/1
CAPSULE, EXTENDED RELEASE ORAL
Qty: 90 CAPSULE | Refills: 3 | Status: SHIPPED | OUTPATIENT
Start: 2024-12-03

## 2024-12-03 RX ORDER — VENLAFAXINE HYDROCHLORIDE 150 MG/1
150 CAPSULE, EXTENDED RELEASE ORAL DAILY
Qty: 90 CAPSULE | Refills: 3 | Status: SHIPPED | OUTPATIENT
Start: 2024-12-03

## 2024-12-03 SDOH — HEALTH STABILITY: PHYSICAL HEALTH: ON AVERAGE, HOW MANY MINUTES DO YOU ENGAGE IN EXERCISE AT THIS LEVEL?: 30 MIN

## 2024-12-03 SDOH — HEALTH STABILITY: PHYSICAL HEALTH: ON AVERAGE, HOW MANY DAYS PER WEEK DO YOU ENGAGE IN MODERATE TO STRENUOUS EXERCISE (LIKE A BRISK WALK)?: 2 DAYS

## 2024-12-03 ASSESSMENT — ANXIETY QUESTIONNAIRES
GAD7 TOTAL SCORE: 11
6. BECOMING EASILY ANNOYED OR IRRITABLE: MORE THAN HALF THE DAYS
7. FEELING AFRAID AS IF SOMETHING AWFUL MIGHT HAPPEN: MORE THAN HALF THE DAYS
1. FEELING NERVOUS, ANXIOUS, OR ON EDGE: MORE THAN HALF THE DAYS
5. BEING SO RESTLESS THAT IT IS HARD TO SIT STILL: SEVERAL DAYS
2. NOT BEING ABLE TO STOP OR CONTROL WORRYING: SEVERAL DAYS
GAD7 TOTAL SCORE: 11
IF YOU CHECKED OFF ANY PROBLEMS ON THIS QUESTIONNAIRE, HOW DIFFICULT HAVE THESE PROBLEMS MADE IT FOR YOU TO DO YOUR WORK, TAKE CARE OF THINGS AT HOME, OR GET ALONG WITH OTHER PEOPLE: VERY DIFFICULT
3. WORRYING TOO MUCH ABOUT DIFFERENT THINGS: SEVERAL DAYS

## 2024-12-03 ASSESSMENT — PATIENT HEALTH QUESTIONNAIRE - PHQ9
SUM OF ALL RESPONSES TO PHQ QUESTIONS 1-9: 3
5. POOR APPETITE OR OVEREATING: MORE THAN HALF THE DAYS
10. IF YOU CHECKED OFF ANY PROBLEMS, HOW DIFFICULT HAVE THESE PROBLEMS MADE IT FOR YOU TO DO YOUR WORK, TAKE CARE OF THINGS AT HOME, OR GET ALONG WITH OTHER PEOPLE: SOMEWHAT DIFFICULT
SUM OF ALL RESPONSES TO PHQ QUESTIONS 1-9: 3

## 2024-12-03 ASSESSMENT — SOCIAL DETERMINANTS OF HEALTH (SDOH): HOW OFTEN DO YOU GET TOGETHER WITH FRIENDS OR RELATIVES?: PATIENT DECLINED

## 2024-12-03 NOTE — PATIENT INSTRUCTIONS
Patient Education   Preventive Care Advice   This is general advice given by our system to help you stay healthy. However, your care team may have specific advice just for you. Please talk to your care team about your preventive care needs.  Nutrition  Eat 5 or more servings of fruits and vegetables each day.  Try wheat bread, brown rice and whole grain pasta (instead of white bread, rice, and pasta).  Get enough calcium and vitamin D. Check the label on foods and aim for 100% of the RDA (recommended daily allowance).  Lifestyle  Exercise at least 150 minutes each week  (30 minutes a day, 5 days a week).  Do muscle strengthening activities 2 days a week. These help control your weight and prevent disease.  No smoking.  Wear sunscreen to prevent skin cancer.  Have a dental exam and cleaning every 6 months.  Yearly exams  See your health care team every year to talk about:  Any changes in your health.  Any medicines your care team has prescribed.  Preventive care, family planning, and ways to prevent chronic diseases.  Shots (vaccines)   HPV shots (up to age 26), if you've never had them before.  Hepatitis B shots (up to age 59), if you've never had them before.  COVID-19 shot: Get this shot when it's due.  Flu shot: Get a flu shot every year.  Tetanus shot: Get a tetanus shot every 10 years.  Pneumococcal, hepatitis A, and RSV shots: Ask your care team if you need these based on your risk.  Shingles shot (for age 50 and up)  General health tests  Diabetes screening:  Starting at age 35, Get screened for diabetes at least every 3 years.  If you are younger than age 35, ask your care team if you should be screened for diabetes.  Cholesterol test: At age 39, start having a cholesterol test every 5 years, or more often if advised.  Bone density scan (DEXA): At age 50, ask your care team if you should have this scan for osteoporosis (brittle bones).  Hepatitis C: Get tested at least once in your life.  STIs (sexually  transmitted infections)  Before age 24: Ask your care team if you should be screened for STIs.  After age 24: Get screened for STIs if you're at risk. You are at risk for STIs (including HIV) if:  You are sexually active with more than one person.  You don't use condoms every time.  You or a partner was diagnosed with a sexually transmitted infection.  If you are at risk for HIV, ask about PrEP medicine to prevent HIV.  Get tested for HIV at least once in your life, whether you are at risk for HIV or not.  Cancer screening tests  Cervical cancer screening: If you have a cervix, begin getting regular cervical cancer screening tests starting at age 21.  Breast cancer scan (mammogram): If you've ever had breasts, begin having regular mammograms starting at age 40. This is a scan to check for breast cancer.  Colon cancer screening: It is important to start screening for colon cancer at age 45.  Have a colonoscopy test every 10 years (or more often if you're at risk) Or, ask your provider about stool tests like a FIT test every year or Cologuard test every 3 years.  To learn more about your testing options, visit:   .  For help making a decision, visit:   https://bit.ly/vs30583.  Prostate cancer screening test: If you have a prostate, ask your care team if a prostate cancer screening test (PSA) at age 55 is right for you.  Lung cancer screening: If you are a current or former smoker ages 50 to 80, ask your care team if ongoing lung cancer screenings are right for you.  For informational purposes only. Not to replace the advice of your health care provider. Copyright   2023 [facility]. All rights reserved. Clinically reviewed by the [facility]. [employer] [id number] - REV 01/24.  Learning About Stress  What is stress?     Stress is your body's response to a hard situation. Your body can have a physical, emotional, or mental response. Stress is a fact of life for most people, and it  affects everyone differently. What causes stress for you may not be stressful for someone else.  A lot of things can cause stress. You may feel stress when you go on a job interview, take a test, or run a race. This kind of short-term stress is normal and even useful. It can help you if you need to work hard or react quickly. For example, stress can help you finish an important job on time.  Long-term stress is caused by ongoing stressful situations or events. Examples of long-term stress include long-term health problems, ongoing problems at work, or conflicts in your family. Long-term stress can harm your health.  How does stress affect your health?  When you are stressed, your body responds as though you are in danger. It makes hormones that speed up your heart, make you breathe faster, and give you a burst of energy. This is called the fight-or-flight stress response. If the stress is over quickly, your body goes back to normal and no harm is done.  But if stress happens too often or lasts too long, it can have bad effects. Long-term stress can make you more likely to get sick, and it can make symptoms of some diseases worse. If you tense up when you are stressed, you may develop neck, shoulder, or low back pain. Stress is linked to high blood pressure and heart disease.  Stress also harms your emotional health. It can make you vital, tense, or depressed. Your relationships may suffer, and you may not do well at work or school.  What can you do to manage stress?  You can try these things to help manage stress:   Do something active. Exercise or activity can help reduce stress. Walking is a great way to get started. Even everyday activities such as housecleaning or yard work can help.  Try yoga or brannon chi. These techniques combine exercise and meditation. You may need some training at first to learn them.  Do something you enjoy. For example, listen to music or go to a movie. Practice your hobby or do volunteer  "work.  Meditate. This can help you relax, because you are not worrying about what happened before or what may happen in the future.  Do guided imagery. Imagine yourself in any setting that helps you feel calm. You can use online videos, books, or a teacher to guide you.  Do breathing exercises. For example:  From a standing position, bend forward from the waist with your knees slightly bent. Let your arms dangle close to the floor.  Breathe in slowly and deeply as you return to a standing position. Roll up slowly and lift your head last.  Hold your breath for just a few seconds in the standing position.  Breathe out slowly and bend forward from the waist.  Let your feelings out. Talk, laugh, cry, and express anger when you need to. Talking with supportive friends or family, a counselor, or a girma leader about your feelings is a healthy way to relieve stress. Avoid discussing your feelings with people who make you feel worse.  Write. It may help to write about things that are bothering you. This helps you find out how much stress you feel and what is causing it. When you know this, you can find better ways to cope.  What can you do to prevent stress?  You might try some of these things to help prevent stress:  Manage your time. This helps you find time to do the things you want and need to do.  Get enough sleep. Your body recovers from the stresses of the day while you are sleeping.  Get support. Your family, friends, and community can make a difference in how you experience stress.  Limit your news feed. Avoid or limit time on social media or news that may make you feel stressed.  Do something active. Exercise or activity can help reduce stress. Walking is a great way to get started.  Where can you learn more?  Go to https://www.Clinicient.net/patiented  Enter N032 in the search box to learn more about \"Learning About Stress.\"  Current as of: October 24, 2023  Content Version: 14.2 2024 [facility]. "   Care instructions adapted under license by your healthcare professional. If you have questions about a medical condition or this instruction, always ask your healthcare professional. [employer] disclaims any warranty or liability for your use of this information.

## 2024-12-03 NOTE — PROGRESS NOTES
Preventive Care Visit  [facility] SARANYA Peters MD, Family Medicine  Dec 3, 2024      Assessment & Plan     (Z00.01) Encounter for general adult medical examination with abnormal findings  (primary encounter diagnosis)  Comment: We discussed self breast exams, exercise 30mins/day, and calcium with vitamin D at 1200mg/day, preferably from dietary sources.  Diet, Weight loss, and Exercise were discussed as well.  Discussed vaccines and screenings       (F33.0) Major depressive disorder, recurrent episode, mild (H)  Comment: doing well on this medication and would like to continue it. Prescriptions sent in.   Plan: venlafaxine (EFFEXOR XR) 150 MG 24 hr capsule,         venlafaxine (EFFEXOR XR) 75 MG 24 hr capsule            (F41.9) Anxiety  Comment: continue on effexor  Plan: venlafaxine (EFFEXOR XR) 150 MG 24 hr capsule,         venlafaxine (EFFEXOR XR) 75 MG 24 hr capsule            (C50.411,  Z17.0) Malignant neoplasm of upper-outer quadrant of right breast in female, estrogen receptor positive (H)  Comment: stable and in remission. Follows with oncology. On letrozole and getting zometa  Plan: gabapentin (NEURONTIN) 300 MG capsule            (G62.9) Neuropathy  Comment: gapapentin works somewhat. We talked about switching to lyrica but she doesn't want to do that at this time because she has a lot of family home for the holidays. Will consider next year. The patient indicates understanding of these issues and agrees with the plan.   Plan: gabapentin (NEURONTIN) 300 MG capsule            (G62.0) Drug-induced polyneuropathy (H)  Comment: ongoing. On gapapentin   Plan:     (M85.89) Osteopenia of multiple sites  Comment: on zometa   Plan:     (Z23) Need for vaccination  Comment: discussed with her. 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   Plan: HEPATITIS B, ADULT 20+ (ENGERIX-B/RECOMBIVAX         HB), TDAP 7+ (ADACEL,BOOSTRIX)            (Z13.220) Lipid screening  Comment: discussed  "  Plan: Lipid panel reflex to direct LDL Fasting              BMI  Estimated body mass index is 32.86 kg/m  as calculated from the following:    Height as of this encounter: 1.625 m (5' 3.98\").    Weight as of this encounter: 86.8 kg (191 lb 4.8 oz).   Weight management plan: Discussed healthy diet and exercise guidelines    Counseling  Appropriate preventive services were addressed with this patient via screening, questionnaire, or discussion as appropriate for fall prevention, nutrition, physical activity, Tobacco-use cessation, social engagement, weight loss and cognition.  Checklist reviewing preventive services available has been given to the patient.  Reviewed patient's diet, addressing concerns and/or questions.   She is at risk for lack of exercise and has been provided with information to increase physical activity for the benefit of her well-being.   She is at risk for psychosocial distress and has been provided with information to reduce risk.       Roger Ortiz is a 44 year old, presenting for the following:  Physical        12/3/2024     4:09 PM   Additional Questions   Roomed by ISIAH Pabon   Accompanied by Self           HPI    History ER/OR+ right breast cancer (2016)  Now almost 7 years from diagnosis and 6.5 years completion of treatment.       Continues long-term endocrine therapy (has been on total of 6 yrs); on various AIs due to intolerance and Tamoxifen stopped for PE. She's most recently been on letrozole for the past 2 and half with better tolerance than the other AIs.   She is on a very low-dose vaginal estrogen.     Status post bilateral mastectomy.  No new issues reported today.  Clinical exam unremarkable today.  Plan is to continue endocrine therapy for a total of 10 years as tolerated.     2.  Chemo-induced peripheral neuropathy  On gabapentin 300 mg TID.  Stable neuropathy.     3.   Osteopenia  Risk of developing osteoporosis with long-term AI use.   She will receive Zometa " today.  Will continue 6-month Zometa as long as she is on AI's.      Health Care Directive  Patient does not have a Health Care Directive: Patient states has Advance Directive and will bring in a copy to clinic.      12/3/2024   General Health   How would you rate your overall physical health? Good   Feel stress (tense, anxious, or unable to sleep) Rather much      (!) STRESS CONCERN      12/3/2024   Nutrition   Three or more servings of calcium each day? Yes   Diet: Gluten-free/reduced   How many servings of fruit and vegetables per day? (!) 2-3   How many sweetened beverages each day? (!) 2            12/3/2024   Exercise   Days per week of moderate/strenous exercise 2 days   Average minutes spent exercising at this level 30 min      (!) EXERCISE CONCERN      12/3/2024   Social Factors   Frequency of gathering with friends or relatives Patient declined   Worry food won't last until get money to buy more No   Food not last or not have enough money for food? No   Do you have housing? (Housing is defined as stable permanent housing and does not include staying ouside in a car, in a tent, in an abandoned building, in an overnight shelter, or couch-surfing.) Yes   Are you worried about losing your housing? No   Lack of transportation? No   Unable to get utilities (heat,electricity)? No            12/3/2024   Dental   Dentist two times every year? Yes            12/3/2024   TB Screening   Were you born outside of the [place]? No          Today's PHQ-9 Score:       12/3/2024     3:29 PM   PHQ-9 SCORE   PHQ-9 Total Score MyChart 3 (Minimal depression)   PHQ-9 Total Score 3        Patient-reported         12/3/2024   Substance Use   Alcohol more than 3/day or more than 7/wk No   Do you use any other substances recreationally? No        Social History     Tobacco Use    Smoking status: Former     Current packs/day: 0.00     Average packs/day: 0.5 packs/day for 10.0 years (5.0 ttl pk-yrs)     Types: Cigarettes     Start date:  "10/1/2006     Quit date: 10/1/2016     Years since quittin.1    Smokeless tobacco: Never    Tobacco comments:     Socially- quit smoking 10/01/2016   Vaping Use    Vaping status: Never Used   Substance Use Topics    Alcohol use: Yes     Alcohol/week: 0.0 standard drinks of alcohol     Comment: Occassional    Drug use: No                12/3/2024   STI Screening   New sexual partner(s) since last STI/HIV test? No      History of abnormal Pap smear:         2021    11:59 AM 2017    11:48 AM 2017    11:45 AM   PAP / HPV   PAP Negative for Intraepithelial Lesion or Malignancy (NILM)      PAP (Historical)  NIL     HPV 16 DNA Negative   Negative    HPV 18 DNA Negative   Negative    Other HR HPV Negative   Negative      ASCVD Risk   The 10-year ASCVD risk score (Mihai SOLO, et al., 2019) is: 1.2%    Values used to calculate the score:      Age: 44 years      Sex: Female      Is Non- : No      Diabetic: No      Tobacco smoker: No      Systolic Blood Pressure: 124 mmHg      Is BP treated: No      HDL Cholesterol: 37 mg/dL      Total Cholesterol: 179 mg/dL       Reviewed and updated as needed this visit by Provider                          Review of Systems  Constitutional, neuro, ENT, endocrine, pulmonary, cardiac, gastrointestinal, genitourinary, musculoskeletal, integument and psychiatric systems are negative, except as otherwise noted.     Objective    Exam  /84   Pulse 99   Temp 97.3  F (36.3  C) (Tympanic)   Resp 16   Ht 1.625 m (5' 3.98\")   Wt 86.8 kg (191 lb 4.8 oz)   LMP 2012   SpO2 97%   BMI 32.86 kg/m     Estimated body mass index is 32.86 kg/m  as calculated from the following:    Height as of this encounter: 1.625 m (5' 3.98\").    Weight as of this encounter: 86.8 kg (191 lb 4.8 oz).    Physical Exam  GENERAL: alert and no distress  EYES: Eyes grossly normal to inspection, PERRL and conjunctivae and sclerae normal  HENT: ear canals and TM's " normal, nose and mouth without ulcers or lesions  NECK: no adenopathy, no asymmetry, masses, or scars  RESP: lungs clear to auscultation - no rales, rhonchi or wheezes  BREAST: reconstruction makes exam difficult. She has a lot of scar tissue. No obvious lesions/masses   CV: regular rate and rhythm, normal S1 S2, no S3 or S4, no murmur, click or rub, no peripheral edema  ABDOMEN: soft, nontender, no hepatosplenomegaly, no masses and bowel sounds normal  MS: no gross musculoskeletal defects noted, no edema  SKIN: no suspicious lesions or rashes  NEURO: Normal strength and tone, mentation intact and speech normal  PSYCH: mentation appears normal, affect normal/bright        Signed Electronically by: Yenifer Peters MD

## 2024-12-19 DIAGNOSIS — Z17.0 MALIGNANT NEOPLASM OF UPPER-OUTER QUADRANT OF RIGHT BREAST IN FEMALE, ESTROGEN RECEPTOR POSITIVE (H): ICD-10-CM

## 2024-12-19 DIAGNOSIS — C50.411 MALIGNANT NEOPLASM OF UPPER-OUTER QUADRANT OF RIGHT BREAST IN FEMALE, ESTROGEN RECEPTOR POSITIVE (H): ICD-10-CM

## 2024-12-19 RX ORDER — LETROZOLE 2.5 MG/1
2.5 TABLET, FILM COATED ORAL DAILY
Qty: 120 TABLET | Refills: 1 | Status: SHIPPED | OUTPATIENT
Start: 2024-12-19

## 2025-04-02 ENCOUNTER — TELEPHONE (OUTPATIENT)
Dept: NEUROSURGERY | Facility: CLINIC | Age: 45
End: 2025-04-02
Payer: COMMERCIAL

## 2025-04-02 NOTE — TELEPHONE ENCOUNTER
Order(s): Other:   Reason for requested: Patient is requesting a repeat lumbar injection  Date needed: As soon as possible   Provider name: Lisa MACIAS CNP    Could we send this information to you in [facility] or would you prefer to receive a phone call?:   No preference   Okay to leave a detailed message?: Yes at Cell number on file:    Telephone Information:   Mobile [phone]

## 2025-04-03 NOTE — TELEPHONE ENCOUNTER
Chart reviewed. Last seen per PSP 11/3/23. Phone call to patient to explain she will need to return for re-evaluation. Stated understanding. Transferred to scheduling to make appointment.

## 2025-04-11 ENCOUNTER — HOSPITAL ENCOUNTER (OUTPATIENT)
Dept: MRI IMAGING | Facility: CLINIC | Age: 45
Discharge: HOME | End: 2025-04-11
Attending: NURSE PRACTITIONER | Admitting: NURSE PRACTITIONER
Payer: COMMERCIAL

## 2025-04-11 DIAGNOSIS — M54.41 ACUTE RIGHT-SIDED LOW BACK PAIN WITH RIGHT-SIDED SCIATICA: ICD-10-CM

## 2025-04-11 PROCEDURE — A9585 GADOBUTROL INJECTION: HCPCS | Performed by: NURSE PRACTITIONER

## 2025-04-11 PROCEDURE — 72158 MRI LUMBAR SPINE W/O & W/DYE: CPT

## 2025-04-11 PROCEDURE — 255N000002 HC RX 255 OP 636: Performed by: NURSE PRACTITIONER

## 2025-04-11 RX ORDER — GADOBUTROL 604.72 MG/ML
9 INJECTION INTRAVENOUS ONCE
Status: COMPLETED | OUTPATIENT
Start: 2025-04-11 | End: 2025-04-11

## 2025-04-11 RX ADMIN — GADOBUTROL 9 ML: 604.72 INJECTION INTRAVENOUS at 19:50

## 2025-04-29 ENCOUNTER — RADIOLOGY INJECTION OFFICE VISIT (OUTPATIENT)
Dept: PHYSICAL MEDICINE AND REHAB | Facility: CLINIC | Age: 45
End: 2025-04-29
Attending: NURSE PRACTITIONER
Payer: COMMERCIAL

## 2025-04-29 VITALS
TEMPERATURE: 97.2 F | HEART RATE: 90 BPM | OXYGEN SATURATION: 97 % | RESPIRATION RATE: 16 BRPM | SYSTOLIC BLOOD PRESSURE: 132 MMHG | DIASTOLIC BLOOD PRESSURE: 82 MMHG

## 2025-04-29 DIAGNOSIS — M54.16 LUMBAR RADICULOPATHY: ICD-10-CM

## 2025-04-29 PROCEDURE — 64483 NJX AA&/STRD TFRM EPI L/S 1: CPT | Mod: RT | Performed by: PAIN MEDICINE

## 2025-04-29 RX ORDER — DEXAMETHASONE SODIUM PHOSPHATE 10 MG/ML
INJECTION, SOLUTION INTRAMUSCULAR; INTRAVENOUS
Status: COMPLETED | OUTPATIENT
Start: 2025-04-29 | End: 2025-04-29

## 2025-04-29 RX ORDER — LIDOCAINE HYDROCHLORIDE 10 MG/ML
INJECTION, SOLUTION EPIDURAL; INFILTRATION; INTRACAUDAL; PERINEURAL
Status: COMPLETED | OUTPATIENT
Start: 2025-04-29 | End: 2025-04-29

## 2025-04-29 RX ADMIN — DEXAMETHASONE SODIUM PHOSPHATE 10 MG: 10 INJECTION, SOLUTION INTRAMUSCULAR; INTRAVENOUS at 14:48

## 2025-04-29 RX ADMIN — LIDOCAINE HYDROCHLORIDE 5 ML: 10 INJECTION, SOLUTION EPIDURAL; INFILTRATION; INTRACAUDAL; PERINEURAL at 14:48

## 2025-04-29 ASSESSMENT — PAIN SCALES - GENERAL
PAINLEVEL_OUTOF10: MODERATE PAIN (4)
PAINLEVEL_OUTOF10: MODERATE PAIN (4)

## 2025-04-29 NOTE — PATIENT INSTRUCTIONS
DISCHARGE INSTRUCTIONS    During office hours (8:00 a.m.- 4:00 p.m.) questions or concerns may be answered  by calling [facility] Navigation Nurses at  [phone].  Messages received after hours will be returned the following business day.      In the case of an emergency, please dial 911 or seek assistance at the nearest Emergency Room/Urgent Care facility.     All Patients:    You may experience an increase in your symptoms for the first 2 days (It may take anywhere between 2 days - 2 weeks for the steroid to have maximum effect).    You may use ice on the injection site, as frequently as 20 minutes each hour if needed.    You may take your pain medicine.    You may continue taking your regular medication after your injection. If you have had a medial branch block you may resume pain medication once your pain diary is completed.    You may shower. No swimming, tub bath, or hot tub for 48 hours.  You may remove your bandaid/bandage as soon as you are home.    You may resume light activities, as tolerated.    Resume your usual diet as tolerated.    If you were told to hold any blood thinning medications you may resume taking them 24 hours after your procedure as prescribed.    It is strongly advised that you do not drive for 1-3 hours post injection.    If you have had oral sedation:  Do not drive for 8 hours post injection.        POSSIBLE STEROID SIDE EFFECTS (If steroid/cortisone was used for your procedure    Swelling of the legs              Skin redness (flushing)     Mouth (oral) irritation   Increased blood sugar (glucose) levels            Sweats                    Mood changes  Headache  Sleeplessness  Weakened immune system for up to 14 days, which could increase the risk of beba the COVID-19 virus and/or experiencing more severe symptoms of the disease, if exposed.  Decreased effectiveness of vaccines if given within 2 weeks of the steroid.    -If you experience these symptoms, it should  only last for a short period         POSSIBLE PROCEDURE SIDE EFFECTS    Increased Pain           Increased numbness/tingling      Nausea/Vomiting          Bruising/bleeding at site      Redness or swelling                                              Difficulty walking      Weakness           Fever greater than 100.5    -Call the [facility] Center if you are concerned    *In the event of a severe headache after an epidural steroid injection that is relieved by lying down, please call the [facility] to speak with a clinical staff member*

## 2025-05-06 RX ORDER — ZOLEDRONIC ACID 0.04 MG/ML
4 INJECTION, SOLUTION INTRAVENOUS ONCE
OUTPATIENT
Start: 2025-05-22 | End: 2025-05-22

## 2025-05-21 NOTE — PROGRESS NOTES
[facility] Outpatient Progress Note    Patient: Diana Wilson  MRN: [id number]  Date of Service: May 22, 2025          Reason for Visit    History of breast cancer    Primary Oncologist: Dr. Coulter      Assessment/Plan  History ER/OK+ right breast cancer (2016)  Now almost 9.5 years from diagnosis and 7.5 years completion of treatment.      Continues long-term endocrine therapy (has been on total of 7 yrs); on various AIs due to intolerance and Tamoxifen stopped for PE. She's most recently been on letrozole for the past 3 yrs with better tolerance than the other AI drugs.   She is on a very low-dose vaginal estrogen.    Status post bilateral mastectomy.  Clinical exam unremarkable today.  No concerns for recurrence.     CMP: WNL    Plan:  -Continue endocrine therapy (letrozole) for a total of 10 years as tolerated (2028)  -Return every 6 mths for exam  -Annual CBC post chemo  -No role for mammograms post bilateral mastectomies  -Survivorship visit at 10 yrs    2.  Chemo-induced peripheral neuropathy  On gabapentin 300 mg AM/600 mg PM.  Stable neuropathy.     3.   Osteopenia  Risk of developing osteoporosis with long-term AI use.   Has been on Zometa every 6 mths since 4/2022 (3 yr).   On Ca/Vit D.    DEXA 4/2024 showed osteopenia with improving BMD in L spine and hips. Lowest T sore -1.3 (L spine)    Plan:  -Proceed with Zometa today.  -Continue Zometa every 6 mths while on AI, then plan bisphosphonate holiday  -Continue Ca + Vit D  -Weight bearing exercises  -Repeat DEXA 4/2026    4.   Fatigue  3-mth increased fatigue. No other symptoms. Has had some weight gain over the year. No thyroid hx. Hx TIFFANY related to celiac disease, but has not required iron replacement for several years.     Last CBC 1 yr ago WNL. TSH 2 yrs ago WNL.     Plan:  -Check TSH, CBC, ferritin, iron. Follow-up with patient on any abnormalities    ADDENDUM:  TSH mildly elevated (4.8) and free T4 suppressed,  consistent with hypothyroidism and likely contributing to some degree of fatigue and weight gain. Will send Rx for Synthroid 25 mcg. She should follow-up in 6 weeks with PCP to reassess and titrate dose if needed.     CBC, ferritin and iron/iron sat WNL    5.   L3-4 disc herniation with RLE sciatica  MRI L spine showed disc herniation. No mets.   Underwent epidural injection 3 weeks ago with improved RLE pain but persistent low back pain.     Plan:  -Management per Neurosurgery    6.  Low transverse abd tightness, muscular  4-week onset. Positional low mid/right abd tightness/pulling discomfort with movement (particularly lying to sitting). She feels it's in location of her prior Tram flap harvest site and wonders if scarring related to that. No GI symptoms, recent colonoscopy unremarkable (a few benign polyps).   Exam is unremarkable for hernia or mass. Mild reproducible muscular wall discomfort.    Plan:  -Treat symptomatically with Tylenol/Ibuprofen, heat, stretches. Offered Flexeril PRN for a few week qty, she declined  -Follow-up with PCP if not improving  -CT abd/pelvis if progressive    7.   Colon cancer screening  Family hx colorectal cancer (father). Started screening colonoscopies this year; had a few benign polyps.     Plan:  -Due for 3-yr screening colonoscopy 2028. Managed through PCP      History of Present Illness/ Interval History    Ms. Diana Wilson is a 44 year old with history of ER/AR+ breast cancer, diagnosed 9.5 yr ago. S/P neoadjuvant chemo, bilateral mastectomy (with reconstruction), RT, BSO and has been on adjuvant endocrine therapy 7.5 yrs. She has had intolerance of anastrozole and Aromasin (significant arthralgias) and PE on Tamoxifen. She was changed to letrozole 3 yrs ago and is tolerating this the best, so far.     Doing fairly well on letrozole.  No significant worsening of side effects.  Postmenopausal symptoms are all pretty stable.    More fatigue x 3 mths. Weight gain over the  year. Hx TIFFANY/Celiac, has required iron supplementation in past (not recently).   Experiencing RLE sciatic pain and MRI L spine last month showed increased R L 3-4 disc herniation, in which she underwent epidural steroid injection for. The RLE pain is significantly improved, but still having residual back pain. No new sites of pain.   On gabapentin 300 mg AM/600 mg PM for chronic post-chemo neuropathy, this is stable.    ECOG Performance     0    Oncology History/Treatment  Diagnosis/Stage:   12/2016: T2N1 invasive ductal right breast cancer, ER/OR+ (HER2 neg) // ypTis(DCIS)pN0  -age 37 yo    -surgical path after neoadjuvant chemo: right breast - no residual invasive carcinoma; grade III DCIS 1 mm; 5 sLN all neg.   Left breast - negative.     -Genetic testing: negative for mutations in the TYLER, BARD1, BRCA1, BRCA2, BRIP1, CDH1, CHEK2, MRE11A, MUTYH, NBN, NF1, PALB2, PTEN, RAD50, RAD51C, RAD51D, and TP53 genes by sequencing and deletion/duplication analysis. No mutations were found in any of the 17 genes analyzed. 2017 9/2020: PE (post-op breast reconstruction surgery)  -Tamoxifen stopped  -Anticoagulated 6 mths    Treatment:  2017: neoadjuvant ddAC x 4, then weekly carbo/taxol x 12  -stopped carbo after 7 weeks due to anaphylaxis    7/12/2017: bilateral mastectomies  (reconstruction 9/2020 with removal of implants and TRAM flap reconstruction; complicated by post-op PE)    11/2017: adjuvant RT completed (6040 cGy/33)    11/2017: BSO    2018 - present: adjuvant endocrine therapy  -Arimidex intermittently in 2018  -Tamoxifen [id number], stopped for PE  -Aromasin 2020 - 3/2022 (arthralgias)  -Letrozole 4/2022 - current      Physical Exam    GENERAL: Alert and oriented to time place and person. Seated comfortably. In no distress. Alone.  HEAD: Atraumatic and normocephalic. No alopecia.  EYES: ROMMEL, EOMI. No erythema. No icterus.  LYMPH: No palpable cervical, axillary, pelvic nodes.   BREASTS: Reconstructed breasts.   No abnormal lesions or masses noted  ABD: Soft, non-distended, non-tender. No hepatomegaly. Focus of reproducible mild discomfort over low right/mid transverse abd without hernia or mass; exacerbated by movement. No rebound tenderness.  SKIN: no rash, or bruising or purpura.   NEURO: No gross deficit noted. Non-antalgic gait.      Lab Results  Recent Results (from the past 5 weeks)   Comprehensive metabolic panel    Collection Time: 05/22/25  8:02 AM   Result Value Ref Range    Sodium 140 135 - 145 mmol/L    Potassium 4.0 3.4 - 5.3 mmol/L    Carbon Dioxide (CO2) 22 22 - 29 mmol/L    Anion Gap 16 (H) 7 - 15 mmol/L    Urea Nitrogen 10.6 6.0 - 20.0 mg/dL    Creatinine 0.69 0.51 - 0.95 mg/dL    GFR Estimate >90 >60 mL/min/1.73m2    Calcium 9.7 8.8 - 10.4 mg/dL    Chloride 102 98 - 107 mmol/L    Glucose 152 (H) 70 - 99 mg/dL    Alkaline Phosphatase 104 40 - 150 U/L    AST 18 0 - 45 U/L    ALT 15 0 - 50 U/L    Protein Total 7.0 6.4 - 8.3 g/dL    Albumin 4.3 3.5 - 5.2 g/dL    Bilirubin Total 0.3 <=1.2 mg/dL           Imaging    PAIN Transforaminal MAGDALENE Inj Lumbosacral Right  Result Date: 4/29/2025  LUMBAR EPIDURAL STEROID INJECTION TRANSFORAMINAL APPROACH WITH FLUOROSCOPIC GUIDANCE Performed on: 4/29/25 Pre Procedure Diagnosis:  LBP, Lumbar radiculitis Post Procedure Diagnosis:  Same Procedure Performed:  Lumbar Transforaminal Epidural Steroid Injection with Fluoroscopic Guidance Clinical Scenario:  As per office notes Anesthesia/Fluids:  As per intra-procedure documentation Vital Signs:  As per intra-procedure documentation Level Injected: L3-4 Side Injected: Right CC: Diana Wilson  is a 44 year-old female who presents today for a right L3-4 transforaminal epidural steroid injection as ordered by Lisa Naranjo.  The patient complains of right lower extremity pain.   Lumbar MRIwas reviewed today.  The patient wanted to proceed with the injection today.  The patient denies feeling ill today or having an active infection  (denies taking antibiotics).  The patient does not take any prescription blood thinning medications.  The patient denies any allergies to contrast.    The procedure of epidural steroid injection was discussed in detail along with the attendant risks, including but not limited to: infection, bleeding, nerve injury, paralysis.   The patient's questions were answered and they understood the information given.   The patient elected to proceed and signed informed consent.  . The patient was placed in a prone position on the fluoroscopy table. A procedural pause was conducted to verify: correct patient identity, procedure to be performed and as applicable, correct side and site, correct patient position, and availability of implants, special equipment and special requirements.  The lower back was prepped and draped in usual fashion.  After anesthetizing the skin with 1% lidocaine, a 3.5 inch, 22 gauge needle was introduced at the Right L3 pedicle under fluoroscopic guidance.  After aspiration was negative, 1 mL of  Omnipaque 300 was injected under continuous fluoroscopy.  Epidural flow without vascular uptake was seen.  1 mL of 1% lidocaine was injected as a test dose. After an appropriate period of observation, a directed neurological exam was performed which revealed no new neurologic deficits.    Subsequently, 10 mgs of Dexamethasone was slowly injected. Following the injection the needle was withdrawn slightly and flushed with local anesthetic as it was fully extracted.  The patient tolerated the procedure well and there were no apparent complications.  The patient did not develop any new neurologic deficits.  After appropriate observation, the patient was dismissed in good condition under their own power. PRE-PROCEDURE PAIN SCORE:  4/10 POST-PROCEDURE PAIN SCORE:  4/10 The patient tolerated the procedure well.  The patient was instructed to call the [facility] if any questions or concerns arise after the procedure.  After a short period of observation the patient was discharged.  Patient will follow-up with Lisa Naranjo in 2 to 4 weeks.       Total time 35 minutes, to include face to face visit, review of EMR, ordering, documentation and coordination of care on date of service.    complexity modifier for longitudinal care.       Signed by: Leola Amin, NP

## 2025-05-22 ENCOUNTER — ONCOLOGY VISIT (OUTPATIENT)
Dept: ONCOLOGY | Facility: CLINIC | Age: 45
End: 2025-05-22
Attending: INTERNAL MEDICINE
Payer: COMMERCIAL

## 2025-05-22 ENCOUNTER — LAB (OUTPATIENT)
Dept: LAB | Facility: CLINIC | Age: 45
End: 2025-05-22
Attending: INTERNAL MEDICINE
Payer: COMMERCIAL

## 2025-05-22 ENCOUNTER — RESULTS FOLLOW-UP (OUTPATIENT)
Dept: ONCOLOGY | Facility: CLINIC | Age: 45
End: 2025-05-22

## 2025-05-22 ENCOUNTER — INFUSION THERAPY VISIT (OUTPATIENT)
Dept: INFUSION THERAPY | Facility: CLINIC | Age: 45
End: 2025-05-22
Attending: INTERNAL MEDICINE
Payer: COMMERCIAL

## 2025-05-22 VITALS
HEIGHT: 64 IN | BODY MASS INDEX: 33.97 KG/M2 | RESPIRATION RATE: 16 BRPM | OXYGEN SATURATION: 98 % | SYSTOLIC BLOOD PRESSURE: 134 MMHG | WEIGHT: 199 LBS | DIASTOLIC BLOOD PRESSURE: 83 MMHG | HEART RATE: 83 BPM | TEMPERATURE: 97.7 F

## 2025-05-22 VITALS — DIASTOLIC BLOOD PRESSURE: 91 MMHG | SYSTOLIC BLOOD PRESSURE: 130 MMHG

## 2025-05-22 DIAGNOSIS — M85.89 OSTEOPENIA OF MULTIPLE SITES: ICD-10-CM

## 2025-05-22 DIAGNOSIS — R53.83 OTHER FATIGUE: ICD-10-CM

## 2025-05-22 DIAGNOSIS — C50.411 MALIGNANT NEOPLASM OF UPPER-OUTER QUADRANT OF RIGHT BREAST IN FEMALE, ESTROGEN RECEPTOR POSITIVE (H): Primary | ICD-10-CM

## 2025-05-22 DIAGNOSIS — Z17.0 MALIGNANT NEOPLASM OF UPPER-OUTER QUADRANT OF RIGHT BREAST IN FEMALE, ESTROGEN RECEPTOR POSITIVE (H): Primary | ICD-10-CM

## 2025-05-22 DIAGNOSIS — M85.89 OSTEOPENIA OF MULTIPLE SITES: Primary | ICD-10-CM

## 2025-05-22 DIAGNOSIS — C50.411 MALIGNANT NEOPLASM OF UPPER-OUTER QUADRANT OF RIGHT BREAST IN FEMALE, ESTROGEN RECEPTOR POSITIVE (H): ICD-10-CM

## 2025-05-22 DIAGNOSIS — Z17.0 MALIGNANT NEOPLASM OF UPPER-OUTER QUADRANT OF RIGHT BREAST IN FEMALE, ESTROGEN RECEPTOR POSITIVE (H): ICD-10-CM

## 2025-05-22 DIAGNOSIS — E03.9 HYPOTHYROIDISM, UNSPECIFIED TYPE: Primary | ICD-10-CM

## 2025-05-22 DIAGNOSIS — E03.9 HYPOTHYROIDISM, UNSPECIFIED TYPE: ICD-10-CM

## 2025-05-22 LAB
ALBUMIN SERPL BCG-MCNC: 4.3 G/DL (ref 3.5–5.2)
ALP SERPL-CCNC: 104 U/L (ref 40–150)
ALT SERPL W P-5'-P-CCNC: 15 U/L (ref 0–50)
ANION GAP SERPL CALCULATED.3IONS-SCNC: 16 MMOL/L (ref 7–15)
AST SERPL W P-5'-P-CCNC: 18 U/L (ref 0–45)
BASOPHILS # BLD AUTO: 0.1 10E3/UL (ref 0–0.2)
BASOPHILS NFR BLD AUTO: 1 %
BILIRUB SERPL-MCNC: 0.3 MG/DL
BUN SERPL-MCNC: 10.6 MG/DL (ref 6–20)
CALCIUM SERPL-MCNC: 9.7 MG/DL (ref 8.8–10.4)
CHLORIDE SERPL-SCNC: 102 MMOL/L (ref 98–107)
CREAT SERPL-MCNC: 0.69 MG/DL (ref 0.51–0.95)
EGFRCR SERPLBLD CKD-EPI 2021: >90 ML/MIN/1.73M2
EOSINOPHIL # BLD AUTO: 0.4 10E3/UL (ref 0–0.7)
EOSINOPHIL NFR BLD AUTO: 4 %
ERYTHROCYTE [DISTWIDTH] IN BLOOD BY AUTOMATED COUNT: 13.4 % (ref 10–15)
FERRITIN SERPL-MCNC: 37 NG/ML (ref 6–175)
GLUCOSE SERPL-MCNC: 152 MG/DL (ref 70–99)
HCO3 SERPL-SCNC: 22 MMOL/L (ref 22–29)
HCT VFR BLD AUTO: 39.7 % (ref 35–47)
HGB BLD-MCNC: 13.4 G/DL (ref 11.7–15.7)
IMM GRANULOCYTES # BLD: 0 10E3/UL
IMM GRANULOCYTES NFR BLD: 0 %
IRON BINDING CAPACITY (ROCHE): 346 UG/DL (ref 240–430)
IRON SATN MFR SERPL: 20 % (ref 15–46)
IRON SERPL-MCNC: 69 UG/DL (ref 37–145)
LYMPHOCYTES # BLD AUTO: 2.7 10E3/UL (ref 0.8–5.3)
LYMPHOCYTES NFR BLD AUTO: 30 %
MCH RBC QN AUTO: 28.4 PG (ref 26.5–33)
MCHC RBC AUTO-ENTMCNC: 33.8 G/DL (ref 31.5–36.5)
MCV RBC AUTO: 84 FL (ref 78–100)
MONOCYTES # BLD AUTO: 0.5 10E3/UL (ref 0–1.3)
MONOCYTES NFR BLD AUTO: 5 %
NEUTROPHILS # BLD AUTO: 5.4 10E3/UL (ref 1.6–8.3)
NEUTROPHILS NFR BLD AUTO: 60 %
NRBC # BLD AUTO: 0 10E3/UL
NRBC BLD AUTO-RTO: 0 /100
PLATELET # BLD AUTO: 365 10E3/UL (ref 150–450)
POTASSIUM SERPL-SCNC: 4 MMOL/L (ref 3.4–5.3)
PROT SERPL-MCNC: 7 G/DL (ref 6.4–8.3)
RBC # BLD AUTO: 4.72 10E6/UL (ref 3.8–5.2)
SODIUM SERPL-SCNC: 140 MMOL/L (ref 135–145)
T4 FREE SERPL-MCNC: 0.83 NG/DL (ref 0.9–1.7)
TSH SERPL DL<=0.005 MIU/L-ACNC: 4.27 UIU/ML (ref 0.3–4.2)
WBC # BLD AUTO: 9 10E3/UL (ref 4–11)

## 2025-05-22 PROCEDURE — 99213 OFFICE O/P EST LOW 20 MIN: CPT | Performed by: NURSE PRACTITIONER

## 2025-05-22 PROCEDURE — 82728 ASSAY OF FERRITIN: CPT

## 2025-05-22 PROCEDURE — 80053 COMPREHEN METABOLIC PANEL: CPT

## 2025-05-22 PROCEDURE — 84443 ASSAY THYROID STIM HORMONE: CPT

## 2025-05-22 PROCEDURE — 83540 ASSAY OF IRON: CPT

## 2025-05-22 PROCEDURE — 84439 ASSAY OF FREE THYROXINE: CPT

## 2025-05-22 PROCEDURE — 36415 COLL VENOUS BLD VENIPUNCTURE: CPT

## 2025-05-22 PROCEDURE — 85004 AUTOMATED DIFF WBC COUNT: CPT | Performed by: NURSE PRACTITIONER

## 2025-05-22 RX ORDER — LETROZOLE 2.5 MG/1
2.5 TABLET, FILM COATED ORAL DAILY
Qty: 120 TABLET | Refills: 2 | Status: SHIPPED | OUTPATIENT
Start: 2025-05-22

## 2025-05-22 RX ORDER — ZOLEDRONIC ACID 0.04 MG/ML
4 INJECTION, SOLUTION INTRAVENOUS ONCE
Status: COMPLETED | OUTPATIENT
Start: 2025-05-22 | End: 2025-05-22

## 2025-05-22 RX ORDER — LEVOTHYROXINE SODIUM 25 UG/1
25 TABLET ORAL
Qty: 30 TABLET | Refills: 1 | Status: SHIPPED | OUTPATIENT
Start: 2025-05-22

## 2025-05-22 RX ADMIN — ZOLEDRONIC ACID 4 MG: 0.04 INJECTION, SOLUTION INTRAVENOUS at 08:55

## 2025-05-22 ASSESSMENT — PAIN SCALES - GENERAL: PAINLEVEL_OUTOF10: NO PAIN (0)

## 2025-05-22 NOTE — PROGRESS NOTES
Infusion Nursing Note:  Diana Wilson presents today for Zometa.    Patient seen by provider today: Yes: NP Leola Amin therefore assessment deferred   present during visit today: Not Applicable.    Note: N/A.      Intravenous Access:  Peripheral IV placed.    Treatment Conditions:  Lab Results   Component Value Date     05/22/2025    POTASSIUM 4.0 05/22/2025    MAG 1.5 (L) 10/16/2020    CR 0.69 05/22/2025    DENY 9.7 05/22/2025    BILITOTAL 0.3 05/22/2025    ALBUMIN 4.3 05/22/2025    ALT 15 05/22/2025    AST 18 05/22/2025       Results reviewed, labs MET treatment parameters, ok to proceed with treatment.      Post Infusion Assessment:  Patient tolerated infusion without incident.  Blood return noted pre and post infusion.  Site patent and intact, free from redness, edema or discomfort.  No evidence of extravasations.  Access discontinued per protocol.       Discharge Plan:   Copy of AVS reviewed with patient and/or family.  Patient will return 11/20/25 for next appointment.  Patient discharged in stable condition accompanied by: self.  Departure Mode: Ambulatory.      JACQUIE GARCIA RN

## 2025-05-22 NOTE — PROGRESS NOTES
"Oncology Rooming Note    May 22, 2025 8:11 AM   Diana Wilson is a 44 year old female who presents for:    Chief Complaint   Patient presents with    Oncology Clinic Visit     Malignant neoplasm of upper-outer quadrant of right breast in female, estrogen receptor positive - Labs provider and infusion     Initial Vitals: /83 (BP Location: Right arm, Patient Position: Sitting, Cuff Size: Adult Large)   Pulse 83   Temp 97.7  F (36.5  C) (Tympanic)   Resp 16   Ht 1.626 m (5' 4\")   Wt 90.3 kg (199 lb)   LMP 11/28/2012   SpO2 98%   BMI 34.16 kg/m   Estimated body mass index is 34.16 kg/m  as calculated from the following:    Height as of this encounter: 1.626 m (5' 4\").    Weight as of this encounter: 90.3 kg (199 lb). Body surface area is 2.02 meters squared.  No Pain (0) Comment: Data Unavailable   Patient's last menstrual period was 11/28/2012.  Allergies reviewed: Yes  Medications reviewed: Yes    Medications: Medication refills not needed today.  Pharmacy name entered into [employer]:    [place], [place] - [id number]  [facility] - [place], [place] - [address]    Frailty Screening:   Is the patient here for a new oncology consult visit in cancer care? 2. No    PHQ9:  Did this patient require a PHQ9?: No      Clinical concerns:  None today      Nicole Costa CMA              "

## 2025-05-22 NOTE — LETTER
5/22/2025      Diana Wilson  1972 [address] [facility] [id number]      Dear Colleague,    Thank you for referring your patient, Diana Wilson, to the [facility] [place]. Please see a copy of my visit note below.    [facility]ology [facility] Oncology Outpatient Progress Note    Patient: Diana Wilson  MRN: [id number]  Date of Service: May 22, 2025          Reason for Visit    History of breast cancer    Primary Oncologist: Dr. Coulter      Assessment/Plan  History ER/WV+ right breast cancer (2016)  Now almost 9.5 years from diagnosis and 7.5 years completion of treatment.      Continues long-term endocrine therapy (has been on total of 7 yrs); on various AIs due to intolerance and Tamoxifen stopped for PE. She's most recently been on letrozole for the past 3 yrs with better tolerance than the other AI drugs.   She is on a very low-dose vaginal estrogen.    Status post bilateral mastectomy.  Clinical exam unremarkable today.  No concerns for recurrence.     CMP: WNL    Plan:  -Continue endocrine therapy (letrozole) for a total of 10 years as tolerated (2028)  -Return every 6 mths for exam  -Annual CBC post chemo  -No role for mammograms post bilateral mastectomies  -Survivorship visit at 10 yrs    2.  Chemo-induced peripheral neuropathy  On gabapentin 300 mg AM/600 mg PM.  Stable neuropathy.     3.   Osteopenia  Risk of developing osteoporosis with long-term AI use.   Has been on Zometa every 6 mths since 4/2022 (3 yr).   On Ca/Vit D.    DEXA 4/2024 showed osteopenia with improving BMD in L spine and hips. Lowest T sore -1.3 (L spine)    Plan:  -Proceed with Zometa today.  -Continue Zometa every 6 mths while on AI, then plan bisphosphonate holiday  -Continue Ca + Vit D  -Weight bearing exercises  -Repeat DEXA 4/2026    4.   Fatigue  3-mth increased fatigue. No other symptoms. Has had some weight gain over the year. No thyroid hx. Hx TIFFANY related to celiac disease, but has not required  iron replacement for several years.     Last CBC 1 yr ago WNL. TSH 2 yrs ago WNL.     Plan:  -Check TSH, CBC, ferritin, iron. Follow-up with patient on any abnormalities    5.   L3-4 disc herniation with RLE sciatica  MRI L spine showed disc herniation. No mets.   Underwent epidural injection 3 weeks ago with improved RLE pain but persistent low back pain.     Plan:  -Management per Neurosurgery    6.  Low transverse abd tightness, muscular  4-week onset. Positional low mid/right abd tightness/pulling discomfort with movement (particularly lying to sitting). She feels it's in location of her prior Tram flap harvest site and wonders if scarring related to that. No GI symptoms, recent colonoscopy unremarkable (a few benign polyps).   Exam is unremarkable for hernia or mass. Mild reproducible muscular wall discomfort.    Plan:  -Treat symptomatically with Tylenol/Ibuprofen, heat, stretches. Offered Flexeril PRN for a few week qty, she declined  -Follow-up with PCP if not improving  -CT abd/pelvis if progressive    7.   Colon cancer screening  Family hx colorectal cancer (father). Started screening colonoscopies this year; had a few benign polyps.     Plan:  -Due for 3-yr screening colonoscopy 2028. Managed through PCP      History of Present Illness/ Interval History    Ms. Diana Wilson is a 44 year old with history of ER/NM+ breast cancer, diagnosed 9.5 yr ago. S/P neoadjuvant chemo, bilateral mastectomy (with reconstruction), RT, BSO and has been on adjuvant endocrine therapy 7.5 yrs. She has had intolerance of anastrozole and Aromasin (significant arthralgias) and PE on Tamoxifen. She was changed to letrozole 3 yrs ago and is tolerating this the best, so far.     Doing fairly well on letrozole.  No significant worsening of side effects.  Postmenopausal symptoms are all pretty stable.    More fatigue x 3 mths. Weight gain over the year. Hx TIFFANY/Celiac, has required iron supplementation in past (not recently).    Experiencing RLE sciatic pain and MRI L spine last month showed increased R L 3-4 disc herniation, in which she underwent epidural steroid injection for. The RLE pain is significantly improved, but still having residual back pain. No new sites of pain.   On gabapentin 300 mg AM/600 mg PM for chronic post-chemo neuropathy, this is stable.    EC[employer] Performance     0    Oncology History/Treatment  Diagnosis/Stage:   12/2016: T2N1 invasive ductal right breast cancer, ER/MD+ (HER2 neg) // ypTis(DCIS)pN0  -age 37 yo    -surgical path after neoadjuvant chemo: right breast - no residual invasive carcinoma; grade III DCIS 1 mm; 5 sLN all neg.   Left breast - negative.     -Genetic testing: negative for mutations in the TYLER, BARD1, BRCA1, BRCA2, BRIP1, CDH1, CHEK2, MRE11A, MUTYH, NBN, NF1, PALB2, PTEN, RAD50, RAD51C, RAD51D, and TP53 genes by sequencing and deletion/duplication analysis. No mutations were found in any of the 17 genes analyzed. 2017 9/2020: PE (post-op breast reconstruction surgery)  -Tamoxifen stopped  -Anticoagulated 6 mths    Treatment:  2017: neoadjuvant ddAC x 4, then weekly carbo/taxol x 12  -stopped carbo after 7 weeks due to anaphylaxis    7/12/2017: bilateral mastectomies  (reconstruction 9/2020 with removal of implants and TRAM flap reconstruction; complicated by post-op PE)    11/2017: adjuvant RT completed (6040 cGy/33)    11/2017: BSO    2018 - present: adjuvant endocrine therapy  -Arimidex intermittently in 2018  -Tamoxifen [id number], stopped for PE  -Aromasin 2020 - 3/2022 (arthralgias)  -Letrozole 4/2022 - current      Physical Exam    GENERAL: Alert and oriented to time place and person. Seated comfortably. In no distress. Alone.  HEAD: Atraumatic and normocephalic. No alopecia.  EYES: ROMMEL, EOMI. No erythema. No icterus.  LYMPH: No palpable cervical, axillary, pelvic nodes.   BREASTS: Reconstructed breasts.  No abnormal lesions or masses noted  ABD: Soft, non-distended, non-tender. No  hepatomegaly. Focus of reproducible mild discomfort over low right/mid transverse abd without hernia or mass; exacerbated by movement. No rebound tenderness.  SKIN: no rash, or bruising or purpura.   NEURO: No gross deficit noted. Non-antalgic gait.      Lab Results  Recent Results (from the past 5 weeks)   Comprehensive metabolic panel    Collection Time: 05/22/25  8:02 AM   Result Value Ref Range    Sodium 140 135 - 145 mmol/L    Potassium 4.0 3.4 - 5.3 mmol/L    Carbon Dioxide (CO2) 22 22 - 29 mmol/L    Anion Gap 16 (H) 7 - 15 mmol/L    Urea Nitrogen 10.6 6.0 - 20.0 mg/dL    Creatinine 0.69 0.51 - 0.95 mg/dL    GFR Estimate >90 >60 mL/min/1.73m2    Calcium 9.7 8.8 - 10.4 mg/dL    Chloride 102 98 - 107 mmol/L    Glucose 152 (H) 70 - 99 mg/dL    Alkaline Phosphatase 104 40 - 150 U/L    AST 18 0 - 45 U/L    ALT 15 0 - 50 U/L    Protein Total 7.0 6.4 - 8.3 g/dL    Albumin 4.3 3.5 - 5.2 g/dL    Bilirubin Total 0.3 <=1.2 mg/dL           Imaging    PAIN Transforaminal MAGDALENE Inj Lumbosacral Right  Result Date: 4/29/2025  LUMBAR EPIDURAL STEROID INJECTION TRANSFORAMINAL APPROACH WITH FLUOROSCOPIC GUIDANCE Performed on: 4/29/25 Pre Procedure Diagnosis:  LBP, Lumbar radiculitis Post Procedure Diagnosis:  Same Procedure Performed:  Lumbar Transforaminal Epidural Steroid Injection with Fluoroscopic Guidance Clinical Scenario:  As per office notes Anesthesia/Fluids:  As per intra-procedure documentation Vital Signs:  As per intra-procedure documentation Level Injected: L3-4 Side Injected: Right CC: Diana Wilson  is a 44 year-old female who presents today for a right L3-4 transforaminal epidural steroid injection as ordered by Lisa Naranjo.  The patient complains of right lower extremity pain.   Lumbar MRIwas reviewed today.  The patient wanted to proceed with the injection today.  The patient denies feeling ill today or having an active infection (denies taking antibiotics).  The patient does not take any prescription blood  thinning medications.  The patient denies any allergies to contrast.    The procedure of epidural steroid injection was discussed in detail along with the attendant risks, including but not limited to: infection, bleeding, nerve injury, paralysis.   The patient's questions were answered and they understood the information given.   The patient elected to proceed and signed informed consent.  . The patient was placed in a prone position on the fluoroscopy table. A procedural pause was conducted to verify: correct patient identity, procedure to be performed and as applicable, correct side and site, correct patient position, and availability of implants, special equipment and special requirements.  The lower back was prepped and draped in usual fashion.  After anesthetizing the skin with 1% lidocaine, a 3.5 inch, 22 gauge needle was introduced at the Right L3 pedicle under fluoroscopic guidance.  After aspiration was negative, 1 mL of  Omnipaque 300 was injected under continuous fluoroscopy.  Epidural flow without vascular uptake was seen.  1 mL of 1% lidocaine was injected as a test dose. After an appropriate period of observation, a directed neurological exam was performed which revealed no new neurologic deficits.    Subsequently, 10 mgs of Dexamethasone was slowly injected. Following the injection the needle was withdrawn slightly and flushed with local anesthetic as it was fully extracted.  The patient tolerated the procedure well and there were no apparent complications.  The patient did not develop any new neurologic deficits.  After appropriate observation, the patient was dismissed in good condition under their own power. PRE-PROCEDURE PAIN SCORE:  4/10 POST-PROCEDURE PAIN SCORE:  4/10 The patient tolerated the procedure well.  The patient was instructed to call the [facility] if any questions or concerns arise after the procedure. After a short period of observation the patient was discharged.  Patient will  "follow-up with Lisa Naranjo in 2 to 4 weeks.       Total time 35 minutes, to include face to face visit, review of EMR, ordering, documentation and coordination of care on date of service.    complexity modifier for longitudinal care.       Signed by: Leola Amin NP          Oncology Rooming Note    May 22, 2025 8:11 AM   Diana Wilson is a 44 year old female who presents for:    Chief Complaint   Patient presents with     Oncology Clinic Visit     Malignant neoplasm of upper-outer quadrant of right breast in female, estrogen receptor positive - Labs provider and infusion     Initial Vitals: /83 (BP Location: Right arm, Patient Position: Sitting, Cuff Size: Adult Large)   Pulse 83   Temp 97.7  F (36.5  C) (Tympanic)   Resp 16   Ht 1.626 m (5' 4\")   Wt 90.3 kg (199 lb)   LMP 11/28/2012   SpO2 98%   BMI 34.16 kg/m   Estimated body mass index is 34.16 kg/m  as calculated from the following:    Height as of this encounter: 1.626 m (5' 4\").    Weight as of this encounter: 90.3 kg (199 lb). Body surface area is 2.02 meters squared.  No Pain (0) Comment: Data Unavailable   Patient's last menstrual period was 11/28/2012.  Allergies reviewed: Yes  Medications reviewed: Yes    Medications: Medication refills not needed today.  Pharmacy name entered into [employer]:    [place], [place] [facility] [address] [facility] [place], [place] - [address]    Frailty Screening:   Is the patient here for a new oncology consult visit in cancer care? 2. No    PHQ9:  Did this patient require a PHQ9?: No      Clinical concerns:  None today      Nicole Costa CMA                Again, thank you for allowing me to participate in the care of your patient.        Sincerely,        Leola Amin NP    Electronically signed"

## 2025-06-03 ENCOUNTER — OFFICE VISIT (OUTPATIENT)
Dept: FAMILY MEDICINE | Facility: CLINIC | Age: 45
End: 2025-06-03
Payer: COMMERCIAL

## 2025-06-03 VITALS
WEIGHT: 199.8 LBS | TEMPERATURE: 97.6 F | SYSTOLIC BLOOD PRESSURE: 118 MMHG | OXYGEN SATURATION: 95 % | RESPIRATION RATE: 16 BRPM | HEART RATE: 84 BPM | HEIGHT: 64 IN | BODY MASS INDEX: 34.11 KG/M2 | DIASTOLIC BLOOD PRESSURE: 84 MMHG

## 2025-06-03 DIAGNOSIS — K21.9 GASTROESOPHAGEAL REFLUX DISEASE, UNSPECIFIED WHETHER ESOPHAGITIS PRESENT: ICD-10-CM

## 2025-06-03 DIAGNOSIS — R10.31 ABDOMINAL PAIN, RIGHT LOWER QUADRANT: Primary | ICD-10-CM

## 2025-06-03 DIAGNOSIS — E03.9 HYPOTHYROIDISM, UNSPECIFIED TYPE: ICD-10-CM

## 2025-06-03 DIAGNOSIS — Z23 NEED FOR VACCINATION: ICD-10-CM

## 2025-06-03 PROCEDURE — 3079F DIAST BP 80-89 MM HG: CPT | Performed by: FAMILY MEDICINE

## 2025-06-03 PROCEDURE — 99214 OFFICE O/P EST MOD 30 MIN: CPT | Mod: 25 | Performed by: FAMILY MEDICINE

## 2025-06-03 PROCEDURE — 90471 IMMUNIZATION ADMIN: CPT | Performed by: FAMILY MEDICINE

## 2025-06-03 PROCEDURE — 3074F SYST BP LT 130 MM HG: CPT | Performed by: FAMILY MEDICINE

## 2025-06-03 PROCEDURE — 90746 HEPB VACCINE 3 DOSE ADULT IM: CPT | Performed by: FAMILY MEDICINE

## 2025-06-03 RX ORDER — OMEPRAZOLE 20 MG/1
20 CAPSULE, DELAYED RELEASE ORAL DAILY
Qty: 90 CAPSULE | Refills: 3 | Status: SHIPPED | OUTPATIENT
Start: 2025-06-03

## 2025-06-03 RX ORDER — LEVOTHYROXINE SODIUM 25 UG/1
25 TABLET ORAL
Qty: 30 TABLET | Refills: 1 | Status: SHIPPED | OUTPATIENT
Start: 2025-06-03

## 2025-06-03 ASSESSMENT — PATIENT HEALTH QUESTIONNAIRE - PHQ9
10. IF YOU CHECKED OFF ANY PROBLEMS, HOW DIFFICULT HAVE THESE PROBLEMS MADE IT FOR YOU TO DO YOUR WORK, TAKE CARE OF THINGS AT HOME, OR GET ALONG WITH OTHER PEOPLE: SOMEWHAT DIFFICULT
SUM OF ALL RESPONSES TO PHQ QUESTIONS 1-9: 7
SUM OF ALL RESPONSES TO PHQ QUESTIONS 1-9: 7

## 2025-06-03 ASSESSMENT — ANXIETY QUESTIONNAIRES
2. NOT BEING ABLE TO STOP OR CONTROL WORRYING: SEVERAL DAYS
GAD7 TOTAL SCORE: 9
4. TROUBLE RELAXING: MORE THAN HALF THE DAYS
5. BEING SO RESTLESS THAT IT IS HARD TO SIT STILL: MORE THAN HALF THE DAYS
1. FEELING NERVOUS, ANXIOUS, OR ON EDGE: SEVERAL DAYS
3. WORRYING TOO MUCH ABOUT DIFFERENT THINGS: SEVERAL DAYS
IF YOU CHECKED OFF ANY PROBLEMS ON THIS QUESTIONNAIRE, HOW DIFFICULT HAVE THESE PROBLEMS MADE IT FOR YOU TO DO YOUR WORK, TAKE CARE OF THINGS AT HOME, OR GET ALONG WITH OTHER PEOPLE: SOMEWHAT DIFFICULT
7. FEELING AFRAID AS IF SOMETHING AWFUL MIGHT HAPPEN: SEVERAL DAYS
8. IF YOU CHECKED OFF ANY PROBLEMS, HOW DIFFICULT HAVE THESE MADE IT FOR YOU TO DO YOUR WORK, TAKE CARE OF THINGS AT HOME, OR GET ALONG WITH OTHER PEOPLE?: SOMEWHAT DIFFICULT
GAD7 TOTAL SCORE: 9
7. FEELING AFRAID AS IF SOMETHING AWFUL MIGHT HAPPEN: SEVERAL DAYS
6. BECOMING EASILY ANNOYED OR IRRITABLE: SEVERAL DAYS
GAD7 TOTAL SCORE: 9

## 2025-06-03 NOTE — PROGRESS NOTES
"  Assessment & Plan     (R10.31) Abdominal pain, right lower quadrant  (primary encounter diagnosis)  Comment: discussed that this is possibly adhesion given history of two surgeries in this area, as well as positional aspect of pain and lack of evolution. We reviewed recent labwork that was normal - cmp, cbc. Offered addition of lipase and ua - she declines. Discussed imaging to r/o other etiologies - we agreed on ultrasound. Discussed ct and she declines today. The patient indicates understanding of these issues and agrees with the plan.   Plan: US Abdomen Complete            (K21.9) Gastroesophageal reflux disease, unspecified whether esophagitis present  Comment: med is effective. Refilled   Plan: omeprazole (PRILOSEC) 20 MG DR capsule            (E03.9) Hypothyroidism, unspecified type  Comment: discussed new diagnosis. Recheck lab in a month.   Plan: levothyroxine (SYNTHROID/LEVOTHROID) 25 MCG         tablet, TSH, T4, free            (Z23) Need for vaccination  Comment: discussed   Plan: HEPATITIS B VACCINE (20 YEARS AND OLDER)         (ENGERIX-B/RECOMBIVAX)            BMI  Estimated body mass index is 34.3 kg/m  as calculated from the following:    Height as of this encounter: 1.626 m (5' 4\").    Weight as of this encounter: 90.6 kg (199 lb 12.8 oz).   Weight management plan: Discussed healthy diet and exercise guidelines      Roger Ortiz is a 44 year old, presenting for the following health issues:  Abdominal Pain      6/3/2025     7:46 AM   Additional Questions   Roomed by ISIAH Pabon   Accompanied by Self      History of Present Illness       Reason for visit:  Thyroid and pain  Symptom onset:  More than a month  Symptoms include:  Pain and tired  Symptom intensity:  Moderate  Symptom progression:  Worsening  Had these symptoms before:  No  What makes it worse:  Pain: bending  What makes it better:  Rest   She is taking medications regularly.      Was started on thyroid medication 3 weeks ago.  " "  Would like to discuss new dx and may need further refills on medication.     Tsh elevated   T4 low   - started 3 weeks ago     Abdominal pain- x 6 months, intermittent   Positional - worse with forward fold, twisting  No change in 6 months   2 inches below and to right of belly button   Feels tightness in abdomen.   Has scar tissue in this area from 2020 surgery.   Had appendix removed as a child.  Flap removed from this area for breast augmentation     Review of systems:   No f/c   No weight loss/night sweats   No dysuria/urgency/frequency   No vaginal symptoms   No n/v   No diarrhea/constipation         Objective    /84   Pulse 84   Temp 97.6  F (36.4  C) (Tympanic)   Resp 16   Ht 1.626 m (5' 4\")   Wt 90.6 kg (199 lb 12.8 oz)   LMP 11/28/2012   SpO2 95%   BMI 34.30 kg/m    Body mass index is 34.3 kg/m .  Physical Exam   GENERAL: alert and no distress  EYES: Eyes grossly normal to inspection, PERRL and conjunctivae and sclerae normal  ABDOMEN: soft, nontender, no hepatosplenomegaly, no masses and bowel sounds normal  MS: no gross musculoskeletal defects noted, no edema  NEURO: Normal strength and tone, mentation intact and speech normal  PSYCH: mentation appears normal, affect normal/bright          Signed Electronically by: Yenifer Peters MD    "

## 2025-06-04 ENCOUNTER — HOSPITAL ENCOUNTER (OUTPATIENT)
Dept: ULTRASOUND IMAGING | Facility: CLINIC | Age: 45
Discharge: HOME | End: 2025-06-04
Attending: FAMILY MEDICINE
Payer: COMMERCIAL

## 2025-06-04 DIAGNOSIS — R10.31 ABDOMINAL PAIN, RIGHT LOWER QUADRANT: ICD-10-CM

## 2025-06-04 PROCEDURE — 76857 US EXAM PELVIC LIMITED: CPT

## 2025-06-07 ENCOUNTER — RESULTS FOLLOW-UP (OUTPATIENT)
Dept: FAMILY MEDICINE | Facility: CLINIC | Age: 45
End: 2025-06-07

## 2025-07-14 ENCOUNTER — LAB (OUTPATIENT)
Dept: LAB | Facility: CLINIC | Age: 45
End: 2025-07-14
Payer: COMMERCIAL

## 2025-07-14 DIAGNOSIS — E03.9 HYPOTHYROIDISM, UNSPECIFIED TYPE: ICD-10-CM

## 2025-07-14 LAB
T4 FREE SERPL-MCNC: 0.83 NG/DL (ref 0.9–1.7)
TSH SERPL DL<=0.005 MIU/L-ACNC: 2.69 UIU/ML (ref 0.3–4.2)

## 2025-07-14 PROCEDURE — 84443 ASSAY THYROID STIM HORMONE: CPT

## 2025-07-14 PROCEDURE — 84439 ASSAY OF FREE THYROXINE: CPT

## 2025-07-14 PROCEDURE — 36415 COLL VENOUS BLD VENIPUNCTURE: CPT

## 2025-07-15 ENCOUNTER — MYC MEDICAL ADVICE (OUTPATIENT)
Dept: FAMILY MEDICINE | Facility: CLINIC | Age: 45
End: 2025-07-15
Payer: COMMERCIAL

## 2025-07-15 DIAGNOSIS — E03.9 HYPOTHYROIDISM, UNSPECIFIED TYPE: Primary | ICD-10-CM

## 2025-07-16 RX ORDER — LEVOTHYROXINE SODIUM 50 UG/1
50 TABLET ORAL
Qty: 90 TABLET | Refills: 1 | Status: SHIPPED | OUTPATIENT
Start: 2025-07-16

## 2025-07-16 NOTE — TELEPHONE ENCOUNTER
Component      Latest Ref Rng 7/11/2022  8:00 AM 5/22/2025  8:02 AM 7/14/2025  1:16 PM   TSH      0.40 - 4.00 mU/L 3.90      TSH      0.30 - 4.20 uIU/mL  4.27 (H)  2.69    T4 Free      0.90 - 1.70 ng/dL  0.83 (L)  0.83 (L)       Legend:  (H) High  (L) Low    Started on synthroid 25mcg on 5/22/25

## 2025-07-23 ENCOUNTER — OFFICE VISIT (OUTPATIENT)
Dept: PHYSICAL MEDICINE AND REHAB | Facility: CLINIC | Age: 45
End: 2025-07-23
Payer: COMMERCIAL

## 2025-07-23 VITALS
SYSTOLIC BLOOD PRESSURE: 126 MMHG | BODY MASS INDEX: 33.97 KG/M2 | WEIGHT: 199 LBS | HEIGHT: 64 IN | HEART RATE: 108 BPM | DIASTOLIC BLOOD PRESSURE: 86 MMHG

## 2025-07-23 DIAGNOSIS — M54.16 LUMBAR RADICULOPATHY: ICD-10-CM

## 2025-07-23 DIAGNOSIS — M54.6 ACUTE RIGHT-SIDED THORACIC BACK PAIN: Primary | ICD-10-CM

## 2025-07-23 ASSESSMENT — PAIN SCALES - GENERAL: PAINLEVEL_OUTOF10: MILD PAIN (1)

## 2025-07-23 NOTE — PROGRESS NOTES
ASSESSMENT: Diana Wilson is a 43 year old female who presents for consultation at the request of PCP Yenifer Peters, who presents today for follow up patient evaluation of:    -Right lumbar radiculopathy    Diana's leg pain is gone and back pain is 75% better. We talked about the role of repeat lumbar MAGDALENE if back leg symptoms were to return/worsen. She has some R sided mid thoracic back pain w paraspinal musculature tenderness in this area which is low grade and responds to po medication and stretching. We reviewed a remote ct chest abdomen pelvis from 2020 at which time it does appear she has some mild wear and tear changes of the mid thoracic region. No point tenderness or red flag s/s indicative of narrowing around the spinal cord today. If pain worsens she will let us know, otherwise does not wish to pursue any imaging of this area currently and I see no indications to do so based on symptoms and exam. She will continue stretching, otc ibuprofen and tylenol prn. Follow up prn.           10/31/2023     8:00 AM   OSWESTRY DISABILITY INDEX   Count 10   Sum 10   Oswestry Score (%) 20 %        Data saved with a previous flowsheet row definition            Diagnoses and all orders for this visit:  Acute right-sided thoracic back pain  Lumbar radiculopathy            PLAN:  Reviewed spine anatomy and disease process. Discussed diagnosis and treatment options with the patient today. A shared decision making model was used.  The patient's values and choices were respected. The following represents what was discussed and decided upon by the provider and the patient.      -DIAGNOSTIC TESTS:  Images were personally reviewed and interpreted and explained to patient today using a spine model.   -no new imaging ordered    -PHYSICAL THERAPY:    -Recommended continuing PT exercises  Discussed the importance of core strengthening, ROM, stretching exercises with the patient and how each of these entities is important in  decreasing pain.  Explained to the patient that the purpose of physical therapy is to teach the patient a home exercise program.  These exercises need to be performed every day in order to decrease pain and prevent future occurrences of pain.        -MEDICATIONS:    -OK to continue tylenol over the counter as directed.      -INTERVENTIONS:    -No additional interventions recommended today. Could repeat a right transforaminal L3-4 epidural steroid injection in the future or MAGDALENE at another level depending on results    -PATIENT EDUCATION:  Total time of 20 minutes, on the day of service, spent with the patient, reviewing the chart, placing orders, and documenting.   -Today we also discussed the pros and cons of the current treatment plan.    -FOLLOW-UP:   PRN    Advised patient to call the [facility] if symptoms worsen, new numbness or weakness develops in the legs, or if they develop new or worsening problems controlling bladder or bowel function.   ______________________________________________________________________    SUBJECTIVE:    Follow up-return on LBP.   She had a right L3-4 TF MAGDALENE on 4/29/25.  1/10, pain level now  Leg pain and numbness is gone  75% back pain is gone  She reports newer thoracic back pain to the right side and central, tightness, sharp. Feels like it needs to crack. It helps a lot to lean forward  She has tried massage, this hasn't helped when typically it does.  It does improve with medication otc tylenol, advil not often maybe taking 1 x per week  No leg pain, numbness, weakness, or changes in bowel or bladder control        Per previous visit with updated meds/inj list:    HPI:  Diana Wilson  Is a 43 year old female history of kidney stones, breast cancer, GERD, depression, with remote history of postoperative related DVT on aspirin 325mg who presents today for new patient evaluation of right low back pain     Diana endorses a history of right sided sciatica in 2014, which resolved  with physical therapy and medication at the time and she did well until approximately 3 weeks ago.    Patient's current symptoms started suddenly 3 weeks ago when she tried to get off a pool floaty.  She does not endorse starting a couch to 5K program over the last couple of weeks, but did not have any symptoms related until in her pool on Labor Day weekend at which point she experienced immediate right lower back discomfort and soreness, which worsened the next day and radiated into her right leg. The pain radiates into her right thigh and knee, and she has numbness and tingling in the right second toe and foot. She initially tried resuming doing physical therapy exercises that she learned in 2014, massage, Tylenol, Advil, heat, cold, yoga exercises without improvement.  She was then seen in the emergency room on September 10.   A CT abdomen and pelvis demonstrated a left kidney stone and a likely cyst of the right kidney.  She was discharged home with Robaxin, oxycodone, Medrol Dosepak.  She felt better while on the Medrol Dosepak, but after it ended, the pain returned and remains severe.  The pain is worse with walking, twisting, sitting.  It is somewhat better when she stands and moves. She is not able to walk more than a block or so. She feels like her right leg is weak.      She has continued to take ibuprofen twice a day without improvement.  She is also taking gabapentin 300 mg 3 times a day, and using lidocaine.  She denies any bowel or bladder control symptoms, or left leg symptoms.  She has not had any lumbar epidural injections, had any chiropractic care, previous spine surgeries.    She had a lumbar MRI on September 14, 2023.      -Treatment to Date:     -Medications:  Tylenol  -Ibuprofen  -Robaxin  flexeril  -Medrol Dosepak  -Oxycodone  Gabapentin  Lidocaine    Injections:  Right L3-4 TF MAGDALENE  , 90% improvement x >1yr  Right L3-4 TF MAGDALENE 4/29/25, 100% relief leg pain, 75% back pain    Current Outpatient  Medications   Medication Sig Dispense Refill    Ascorbic Acid (VITAMIN C PO)       aspirin (ASA) 325 MG EC tablet Take 81 mg by mouth daily.      calcium carbonate 750 MG CHEW Take 750 mg by mouth daily.      cetirizine (ZYRTEC) 10 MG tablet Take 10 mg by mouth every morning      cholecalciferol (VITAMIN D3) 1000 units (25 mcg) capsule Take 1 capsule by mouth daily      diphenhydrAMINE HCl (BENADRYL PO) Take 50 mg by mouth nightly as needed for sleep.      gabapentin (NEURONTIN) 300 MG capsule 300mg in a.m. and 300mg in afternoon and 600mg in evening 360 capsule 3    letrozole (FEMARA) 2.5 MG tablet Take 1 tablet (2.5 mg) by mouth daily. 120 tablet 2    levothyroxine (SYNTHROID/LEVOTHROID) 50 MCG tablet Take 1 tablet (50 mcg) by mouth every morning (before breakfast). 90 tablet 1    lidocaine (XYLOCAINE) 5 % external ointment Apply topically as needed for moderate pain Apply to cotton ball then apply to affected area 10min prior to PT exercises or intercourse. 50 g 3    Multiple Vitamins-Minerals (MULTIVITAMIN PO) Take 1 tablet by mouth daily       nitroFURantoin macrocrystal (MACRODANTIN) 50 MG capsule Take 1 capsule (50 mg) by mouth as needed (for UTI symptoms) 30 capsule 2    omeprazole (PRILOSEC) 20 MG DR capsule Take 1 capsule (20 mg) by mouth daily. 90 capsule 3    venlafaxine (EFFEXOR XR) 150 MG 24 hr capsule Take 1 capsule (150 mg) by mouth daily. Take with 75mg capsule for total dose of 225mg 90 capsule 3    venlafaxine (EFFEXOR XR) 75 MG 24 hr capsule Take with 150mg capsule for total dose of 225mg 90 capsule 3     No current facility-administered medications for this visit.       Allergies   Allergen Reactions    Carboplatin Shortness Of Breath, Rash and Anaphylaxis     Other reaction(s): Flushing, Tachycardia    Amoxicillin GI Disturbance    Erythromycin GI Disturbance    Gluten Meal      Celiac disease    Hydrocodone-Acetaminophen      Other reaction(s): Hallucinations    Hydromorphone Headache     "Naltrexone Other (See Comments)     \"Felt high\"    Penicillins Nausea and Vomiting and Hives     1-11-17 pt states this was a childhood reaction    Phentermine Other (See Comments)     Elevated BP    Topamax [Topiramate] Other (See Comments)     Cloudy thinking    Wellbutrin [Bupropion] Other (See Comments)     \"Felt high\"    Zolpidem      Other reaction(s): Hallucinations    Zolpidem Tartrate      hallucinations    Hydrocodone Other (See Comments)     Out of body experience, \"creepy-crawly\" skin        Past Medical History:   Diagnosis Date    Anxiety     Breast cancer (H)     Celiac disease     Depressive disorder 1995    And Anxiety    Encounter for insertion or removal of intrauterine contraceptive device 01/08/2008    Excessive or frequent menstruation 03/19/2003    Fibroadenosis of breast     GERD (gastroesophageal reflux disease)     Hypothyroidism, unspecified type 05/22/2025    Invasive ductal carcinoma of breast, right (H)     Malignant neoplasm of upper-outer quadrant of right female breast (H)     Mild intermittent asthma            never has had PFT's, MDI with colds and at times exercise    Neutropenia, drug-induced     Nongonococcal urethritis (JALIL) due to chlamydia trachomatis 01/03/2002    Other and unspecified ovarian cyst 03/19/2003    RIGHT ovarian cyst    PONV (postoperative nausea and vomiting)     Skin cancer     Spider veins 2022    Transient hypertension of pregnancy, antepartum     PIH with last birth    Uncomplicated asthma 2002        Patient Active Problem List   Diagnosis    Obesity    CARDIOVASCULAR SCREENING; LDL GOAL LESS THAN 130    Major depressive disorder, recurrent episode, mild    Iron deficiency anemia    Insomnia    Anxiety    Intestinal malabsorption    Lymphedema of right upper extremity    Malignant neoplasm of upper-outer quadrant of right breast in female, estrogen receptor positive (H)    Osteopenia of multiple sites    Multiple subsegmental pulmonary emboli without " "acute cor pulmonale (H)    Breast reconstruction disproportion    Family history of colon cancer    Celiac disease    Recurrent UTI    Stress incontinence    Drug-induced polyneuropathy    Hypothyroidism, unspecified type    Lumbar radiculopathy    Acute right-sided thoracic back pain       SOCIAL HX: Former smoker.      OBJECTIVE:  /86 (BP Location: Left arm, Patient Position: Sitting, Cuff Size: Adult Large)   Pulse 108   Ht 5' 4\" (1.626 m)   Wt 199 lb (90.3 kg)   LMP 11/28/2012   BMI 34.16 kg/m      PHYSICAL EXAMINATION:    --CONSTITUTIONAL:  Vital signs as above.  No acute distress.  The patient is well nourished and well groomed  --PSYCHIATRIC:  Appropriate mood and affect. The patient is awake, alert, oriented to person, place, time and answering questions appropriately with clear speech.    --SKIN:  Skin over the face, bilateral lower extremities, and posterior mid and lower torso is clean, dry, intact without rashes.    --RESPIRATORY: Normal rhythm and effort. No abnormal accessory muscle breathing patterns noted.   --ABDOMINAL:  Non-distended.    --GROSS MOTOR: Gait is normal, smooth and coordinated     --LOWER EXTREMITY MOTOR TESTING:  Hip flexion: right 5/5, left 5/5   Quads: right 5/5, left 5/5  Hamstrings: right 5/5, left 5/5  Dorsiflexion: right 5/5, left 5/5  Plantar flexion: right 5/5, left 5/5    Great toe MTP extension/EHL: right 5/5, left 5/5    --NEUROLOGICAL:  Sensation to light touch is intact to light touch ivory lower extremities. Babinski is negative. No clonus.    --MUSCULOSKELETAL: Lumbar and thoracic spine inspection reveals no evidence of deformity. No point tenderness to palpation of thoracic or lumbar spine. R mid thoracic paraspinal musculature tenderness. No lumbar paraspinal muscle tenderness    Straight leg raising is negative    --VASCULAR:  Bilateral lower extremities are warm without any discoloration.  There is no pitting edema of the bilateral lower " extremities.    RESULTS:   Prior medical records from [facility] and [facility]where were reviewed today.    MRI LUMBAR SPINE WITHOUT CONTRAST   9/14/2023 4:09 PM      HISTORY: Low back pain; rule out spondylolysis. Low back pain with  standing/walking/extension. No known/automatically detected potential  contraindications to CT. Acute right-sided low back pain with  right-sided sciatica.     TECHNIQUE: Multiplanar multisequence MRI of the lumbar spine without  contrast.      COMPARISON: MRI lumbar spine 11/23/2014. Correlation also made with CT  abdomen and pelvis 9/10/2023.      FINDINGS: Nomenclature is based on 5 lumbar vertebral bodies. Normal  vertebral body heights and sagittal alignment. Bone marrow signal  appears normal. Partial disc desiccation at L4-L5 and L5-S1. Partial  disc desiccation at L4-L5 appears new from the prior MRI. The other  intervertebral disks demonstrate essentially normal signal on the  fluid sensitive sequences. There is a normal appearance of the distal  spinal cord with the conus terminating at L2. Unchanged probable  Tarlov cyst along the left posterior aspect of the S2 vertebra. There  is a cystic lesion in the anterior aspect of the right kidney  measuring approximately 15 mm, not grossly changed accounting for  differences in technique compared to the prior CT exam. This is  incompletely characterized on this study, but statistically likely  represents a benign renal cyst. The visualized paraspinous soft  tissues and bony pelvis appear unremarkable.     Segmental analysis:  T12-L1: Normal disc height. No herniation. Normal facets. No spinal  canal or neural foraminal stenosis. Probable small perineural cyst in  the right neural foramen. Overall, no significant change.     L1-L2: Normal disc height. No herniation. Normal facets. No spinal  canal or neural foraminal stenosis. Overall, no significant change.     L2-L3: Normal disc height. No herniation. Normal facets.  No  significant spinal canal or neural foraminal stenosis. Overall, no  significant change.     L3-L4: Normal disc height. There is a shallow disc bulge, slightly  eccentric to the right. There appears to be a superimposed small right  foraminal/extraforaminal disc protrusion with mild mass effect on the  exiting/exited right L3 nerve root (series 6 image 32). Normal facets.  No spinal canal stenosis. Mild right neural foraminal stenosis. The  left neural foramen is patent. Degenerative disc disease including the  right foraminal disc herniation appear new compared to prior.     L4-L5: Normal disc height. Shallow symmetric disc bulge is new.  Posterior central annular fissure is new. Normal facets. Mild left  lateral recess narrowing. No central spinal canal stenosis. Mild  bilateral neural foraminal stenosis is new.     L5-S1: Normal disc height. There is a right central disc protrusion in  the setting of an annular fissure, unchanged from prior. Mild facet  arthropathy. No spinal canal stenosis. No significant lateral recess  narrowing. No significant neural foraminal narrowing. Overall, no  significant change.                                                                      IMPRESSION:  1. New degenerative findings at L3-L4 and L4-L5 compared to MRI lumbar  spine 11/23/2014. Please see the body of the report for details.  2. Unchanged degenerative findings at L5-S1.     CORONA MAURER MD         SYSTEM ID:  [id number]    Narrative & Impression   CT CHEST/ABDOMEN/PELVIS W CONTRAST 12/7/2020 10:03 AM     HISTORY: Breast cancer.     CONTRAST:  96 ml [id number].     TECHNIQUE: CT of the chest, abdomen, and pelvis is performed with IV  contrast.     Routine evaluated structures in the chest include the lungs,  mediastinal structures, pleura, and chest wall.     Routine assessed structures in the abdomen include the liver, spleen,  pancreas, adrenal glands, and kidneys. Also assessed are the  retroperitoneum,  gastrointestinal tract, and the abdominal wall.     Intrapelvic anatomy is also assessed.     Radiation dose for this scan is reduced using automated exposure  control, adjustment of the mA and/or kV according to patient size, or  iterative reconstruction technique.     COMPARISON: Chest CT dated 9/29/2020. Abdomen/pelvis CT dated  6/3/2019.     FINDINGS:     Chest: Previously seen fluid collections involving the superficial  soft tissues all the left breast area have resolved in the interim.  Some overlying skin thickening medially is still present. A fluid  collection in the right breast area measures 8.8 cm.     Abdomen: Slight fatty infiltration of the liver is present. There is a  small subcentimeter cyst in the right kidney. Multiple nodules are  seen bilaterally involving the subcutaneous fat of the anterior  abdominal wall. There is associated superficial inflammation. They are  suspected to relate to subcutaneous injections.     Pelvis: No significant abnormality is demonstrated.                                                                      IMPRESSION:  No evidence of metastatic disease.     MD Lisa CLINTON [facility]  O. [phone]

## 2025-07-23 NOTE — LETTER
7/23/2025      Diana Wilson  1972 [address] [facility] [id number]      Dear Colleague,    Thank you for referring your patient, Diana Wilson, to the [facility]. Please see a copy of my visit note below.    ASSESSMENT: Diana Wilson is a 43 year old female who presents for consultation at the request of PCP Yenifer Peters, who presents today for follow up patient evaluation of:    -Right lumbar radiculopathy    Diana's leg pain is gone and back pain is 75% better. We talked about the role of repeat lumbar MAGDALENE if back leg symptoms were to return/worsen. She has some R sided mid thoracic back pain w paraspinal musculature tenderness in this area which is low grade and responds to po medication and stretching. We reviewed a remote ct chest abdomen pelvis from 2020 at which time it does appear she has some mild wear and tear changes of the mid thoracic region. No point tenderness or red flag s/s indicative of narrowing around the spinal cord today. If pain worsens she will let us know, otherwise does not wish to pursue any imaging of this area currently and I see no indications to do so based on symptoms and exam. She will continue stretching, otc ibuprofen and tylenol prn. Follow up prn.           10/31/2023     8:00 AM   OSWESTRY DISABILITY INDEX   Count 10   Sum 10   Oswestry Score (%) 20 %        Data saved with a previous flowsheet row definition            Diagnoses and all orders for this visit:  Acute right-sided thoracic back pain  Lumbar radiculopathy            PLAN:  Reviewed spine anatomy and disease process. Discussed diagnosis and treatment options with the patient today. A shared decision making model was used.  The patient's values and choices were respected. The following represents what was discussed and decided upon by the provider and the patient.      -DIAGNOSTIC TESTS:  Images were personally reviewed and interpreted and explained to patient today using a spine  model.   -no new imaging ordered    -PHYSICAL THERAPY:    -Recommended continuing PT exercises  Discussed the importance of core strengthening, ROM, stretching exercises with the patient and how each of these entities is important in decreasing pain.  Explained to the patient that the purpose of physical therapy is to teach the patient a home exercise program.  These exercises need to be performed every day in order to decrease pain and prevent future occurrences of pain.        -MEDICATIONS:    -OK to continue tylenol over the counter as directed.      -INTERVENTIONS:    -No additional interventions recommended today. Could repeat a right transforaminal L3-4 epidural steroid injection in the future or MAGDALENE at another level depending on results    -PATIENT EDUCATION:  Total time of 20 minutes, on the day of service, spent with the patient, reviewing the chart, placing orders, and documenting.   -Today we also discussed the pros and cons of the current treatment plan.    -FOLLOW-UP:   PRN    Advised patient to call the [facility]ine Center if symptoms worsen, new numbness or weakness develops in the legs, or if they develop new or worsening problems controlling bladder or bowel function.   ______________________________________________________________________    SUBJECTIVE:    Follow up-return on LBP.   She had a right L3-4 TF MAGDALENE on 4/29/25.  1/10, pain level now  Leg pain and numbness is gone  75% back pain is gone  She reports newer thoracic back pain to the right side and central, tightness, sharp. Feels like it needs to crack. It helps a lot to lean forward  She has tried massage, this hasn't helped when typically it does.  It does improve with medication otc tylenol, advil not often maybe taking 1 x per week  No leg pain, numbness, weakness, or changes in bowel or bladder control        Per previous visit with updated meds/inj list:    HPI:  Diana Wilson  Is a 43 year old female history of kidney stones, breast cancer,  GERD, depression, with remote history of postoperative related DVT on aspirin 325mg who presents today for new patient evaluation of right low back pain     Diana endorses a history of right sided sciatica in 2014, which resolved with physical therapy and medication at the time and she did well until approximately 3 weeks ago.    Patient's current symptoms started suddenly 3 weeks ago when she tried to get off a pool floaty.  She does not endorse starting a couch to 5K program over the last couple of weeks, but did not have any symptoms related until in her pool on Labor Day weekend at which point she experienced immediate right lower back discomfort and soreness, which worsened the next day and radiated into her right leg. The pain radiates into her right thigh and knee, and she has numbness and tingling in the right second toe and foot. She initially tried resuming doing physical therapy exercises that she learned in 2014, massage, Tylenol, Advil, heat, cold, yoga exercises without improvement.  She was then seen in the emergency room on September 10.   A CT abdomen and pelvis demonstrated a left kidney stone and a likely cyst of the right kidney.  She was discharged home with Robaxin, oxycodone, Medrol Dosepak.  She felt better while on the Medrol Dosepak, but after it ended, the pain returned and remains severe.  The pain is worse with walking, twisting, sitting.  It is somewhat better when she stands and moves. She is not able to walk more than a block or so. She feels like her right leg is weak.      She has continued to take ibuprofen twice a day without improvement.  She is also taking gabapentin 300 mg 3 times a day, and using lidocaine.  She denies any bowel or bladder control symptoms, or left leg symptoms.  She has not had any lumbar epidural injections, had any chiropractic care, previous spine surgeries.    She had a lumbar MRI on September 14, 2023.      -Treatment to Date:      -Medications:  Tylenol  -Ibuprofen  -Robaxin  flexeril  -Medrol Dosepak  -Oxycodone  Gabapentin  Lidocaine    Injections:  Right L3-4 TF MAGDALENE  , 90% improvement x >1yr  Right L3-4 TF MAGDALENE 4/29/25, 100% relief leg pain, 75% back pain    Current Outpatient Medications   Medication Sig Dispense Refill     Ascorbic Acid (VITAMIN C PO)        aspirin (ASA) 325 MG EC tablet Take 81 mg by mouth daily.       calcium carbonate 750 MG CHEW Take 750 mg by mouth daily.       cetirizine (ZYRTEC) 10 MG tablet Take 10 mg by mouth every morning       cholecalciferol (VITAMIN D3) 1000 units (25 mcg) capsule Take 1 capsule by mouth daily       diphenhydrAMINE HCl (BENADRYL PO) Take 50 mg by mouth nightly as needed for sleep.       gabapentin (NEURONTIN) 300 MG capsule 300mg in a.m. and 300mg in afternoon and 600mg in evening 360 capsule 3     letrozole (FEMARA) 2.5 MG tablet Take 1 tablet (2.5 mg) by mouth daily. 120 tablet 2     levothyroxine (SYNTHROID/LEVOTHROID) 50 MCG tablet Take 1 tablet (50 mcg) by mouth every morning (before breakfast). 90 tablet 1     lidocaine (XYLOCAINE) 5 % external ointment Apply topically as needed for moderate pain Apply to cotton ball then apply to affected area 10min prior to PT exercises or intercourse. 50 g 3     Multiple Vitamins-Minerals (MULTIVITAMIN PO) Take 1 tablet by mouth daily        nitroFURantoin macrocrystal (MACRODANTIN) 50 MG capsule Take 1 capsule (50 mg) by mouth as needed (for UTI symptoms) 30 capsule 2     omeprazole (PRILOSEC) 20 MG DR capsule Take 1 capsule (20 mg) by mouth daily. 90 capsule 3     venlafaxine (EFFEXOR XR) 150 MG 24 hr capsule Take 1 capsule (150 mg) by mouth daily. Take with 75mg capsule for total dose of 225mg 90 capsule 3     venlafaxine (EFFEXOR XR) 75 MG 24 hr capsule Take with 150mg capsule for total dose of 225mg 90 capsule 3     No current facility-administered medications for this visit.       Allergies   Allergen Reactions     Carboplatin Shortness Of  "Breath, Rash and Anaphylaxis     Other reaction(s): Flushing, Tachycardia     Amoxicillin GI Disturbance     Erythromycin GI Disturbance     Gluten Meal      Celiac disease     Hydrocodone-Acetaminophen      Other reaction(s): Hallucinations     Hydromorphone Headache     Naltrexone Other (See Comments)     \"Felt high\"     Penicillins Nausea and Vomiting and Hives     1-11-17 pt states this was a childhood reaction     Phentermine Other (See Comments)     Elevated BP     Topamax [Topiramate] Other (See Comments)     Cloudy thinking     Wellbutrin [Bupropion] Other (See Comments)     \"Felt high\"     Zolpidem      Other reaction(s): Hallucinations     Zolpidem Tartrate      hallucinations     Hydrocodone Other (See Comments)     Out of body experience, \"creepy-crawly\" skin        Past Medical History:   Diagnosis Date     Anxiety      Breast cancer (H)      Celiac disease      Depressive disorder 1995    And Anxiety     Encounter for insertion or removal of intrauterine contraceptive device 01/08/2008     Excessive or frequent menstruation 03/19/2003     Fibroadenosis of breast      GERD (gastroesophageal reflux disease)      Hypothyroidism, unspecified type 05/22/2025     Invasive ductal carcinoma of breast, right (H)      Malignant neoplasm of upper-outer quadrant of right female breast (H)      Mild intermittent asthma            never has had PFT's, MDI with colds and at times exercise     Neutropenia, drug-induced      Nongonococcal urethritis (JALIL) due to chlamydia trachomatis 01/03/2002     Other and unspecified ovarian cyst 03/19/2003    RIGHT ovarian cyst     PONV (postoperative nausea and vomiting)      Skin cancer      Spider veins 2022     Transient hypertension of pregnancy, antepartum     PIH with last birth     Uncomplicated asthma 2002        Patient Active Problem List   Diagnosis     Obesity     CARDIOVASCULAR SCREENING; LDL GOAL LESS THAN 130     Major depressive disorder, recurrent episode, mild " "    Iron deficiency anemia     Insomnia     Anxiety     Intestinal malabsorption     Lymphedema of right upper extremity     Malignant neoplasm of upper-outer quadrant of right breast in female, estrogen receptor positive (H)     Osteopenia of multiple sites     Multiple subsegmental pulmonary emboli without acute cor pulmonale (H)     Breast reconstruction disproportion     Family history of colon cancer     Celiac disease     Recurrent UTI     Stress incontinence     Drug-induced polyneuropathy     Hypothyroidism, unspecified type     Lumbar radiculopathy     Acute right-sided thoracic back pain       SOCIAL HX: Former smoker.      OBJECTIVE:  /86 (BP Location: Left arm, Patient Position: Sitting, Cuff Size: Adult Large)   Pulse 108   Ht 5' 4\" (1.626 m)   Wt 199 lb (90.3 kg)   LMP 11/28/2012   BMI 34.16 kg/m      PHYSICAL EXAMINATION:    --CONSTITUTIONAL:  Vital signs as above.  No acute distress.  The patient is well nourished and well groomed  --PSYCHIATRIC:  Appropriate mood and affect. The patient is awake, alert, oriented to person, place, time and answering questions appropriately with clear speech.    --SKIN:  Skin over the face, bilateral lower extremities, and posterior mid and lower torso is clean, dry, intact without rashes.    --RESPIRATORY: Normal rhythm and effort. No abnormal accessory muscle breathing patterns noted.   --ABDOMINAL:  Non-distended.    --GROSS MOTOR: Gait is normal, smooth and coordinated     --LOWER EXTREMITY MOTOR TESTING:  Hip flexion: right 5/5, left 5/5   Quads: right 5/5, left 5/5  Hamstrings: right 5/5, left 5/5  Dorsiflexion: right 5/5, left 5/5  Plantar flexion: right 5/5, left 5/5    Great toe MTP extension/EHL: right 5/5, left 5/5    --NEUROLOGICAL:  Sensation to light touch is intact to light touch ivory lower extremities. Babinski is negative. No clonus.    --MUSCULOSKELETAL: Lumbar and thoracic spine inspection reveals no evidence of deformity. No point " tenderness to palpation of thoracic or lumbar spine. R mid thoracic paraspinal musculature tenderness. No lumbar paraspinal muscle tenderness    Straight leg raising is negative    --VASCULAR:  Bilateral lower extremities are warm without any discoloration.  There is no pitting edema of the bilateral lower extremities.    RESULTS:   Prior medical records from [facility] were reviewed today.    MRI LUMBAR SPINE WITHOUT CONTRAST   9/14/2023 4:09 PM      HISTORY: Low back pain; rule out spondylolysis. Low back pain with  standing/walking/extension. No known/automatically detected potential  contraindications to CT. Acute right-sided low back pain with  right-sided sciatica.     TECHNIQUE: Multiplanar multisequence MRI of the lumbar spine without  contrast.      COMPARISON: MRI lumbar spine 11/23/2014. Correlation also made with CT  abdomen and pelvis 9/10/2023.      FINDINGS: Nomenclature is based on 5 lumbar vertebral bodies. Normal  vertebral body heights and sagittal alignment. Bone marrow signal  appears normal. Partial disc desiccation at L4-L5 and L5-S1. Partial  disc desiccation at L4-L5 appears new from the prior MRI. The other  intervertebral disks demonstrate essentially normal signal on the  fluid sensitive sequences. There is a normal appearance of the distal  spinal cord with the conus terminating at L2. Unchanged probable  Tarlov cyst along the left posterior aspect of the S2 vertebra. There  is a cystic lesion in the anterior aspect of the right kidney  measuring approximately 15 mm, not grossly changed accounting for  differences in technique compared to the prior CT exam. This is  incompletely characterized on this study, but statistically likely  represents a benign renal cyst. The visualized paraspinous soft  tissues and bony pelvis appear unremarkable.     Segmental analysis:  T12-L1: Normal disc height. No herniation. Normal facets. No spinal  canal or neural foraminal  stenosis. Probable small perineural cyst in  the right neural foramen. Overall, no significant change.     L1-L2: Normal disc height. No herniation. Normal facets. No spinal  canal or neural foraminal stenosis. Overall, no significant change.     L2-L3: Normal disc height. No herniation. Normal facets. No  significant spinal canal or neural foraminal stenosis. Overall, no  significant change.     L3-L4: Normal disc height. There is a shallow disc bulge, slightly  eccentric to the right. There appears to be a superimposed small right  foraminal/extraforaminal disc protrusion with mild mass effect on the  exiting/exited right L3 nerve root (series 6 image 32). Normal facets.  No spinal canal stenosis. Mild right neural foraminal stenosis. The  left neural foramen is patent. Degenerative disc disease including the  right foraminal disc herniation appear new compared to prior.     L4-L5: Normal disc height. Shallow symmetric disc bulge is new.  Posterior central annular fissure is new. Normal facets. Mild left  lateral recess narrowing. No central spinal canal stenosis. Mild  bilateral neural foraminal stenosis is new.     L5-S1: Normal disc height. There is a right central disc protrusion in  the setting of an annular fissure, unchanged from prior. Mild facet  arthropathy. No spinal canal stenosis. No significant lateral recess  narrowing. No significant neural foraminal narrowing. Overall, no  significant change.                                                                      IMPRESSION:  1. New degenerative findings at L3-L4 and L4-L5 compared to MRI lumbar  spine 11/23/2014. Please see the body of the report for details.  2. Unchanged degenerative findings at L5-S1.     CORONA MAURER MD         SYSTEM ID:  [id number]    Narrative & Impression   CT CHEST/ABDOMEN/PELVIS W CONTRAST 12/7/2020 10:03 AM     HISTORY: Breast cancer.     CONTRAST:  96 ml [id number].     TECHNIQUE: CT of the chest, abdomen, and pelvis is  performed with IV  contrast.     Routine evaluated structures in the chest include the lungs,  mediastinal structures, pleura, and chest wall.     Routine assessed structures in the abdomen include the liver, spleen,  pancreas, adrenal glands, and kidneys. Also assessed are the  retroperitoneum, gastrointestinal tract, and the abdominal wall.     Intrapelvic anatomy is also assessed.     Radiation dose for this scan is reduced using automated exposure  control, adjustment of the mA and/or kV according to patient size, or  iterative reconstruction technique.     COMPARISON: Chest CT dated 9/29/2020. Abdomen/pelvis CT dated  6/3/2019.     FINDINGS:     Chest: Previously seen fluid collections involving the superficial  soft tissues all the left breast area have resolved in the interim.  Some overlying skin thickening medially is still present. A fluid  collection in the right breast area measures 8.8 cm.     Abdomen: Slight fatty infiltration of the liver is present. There is a  small subcentimeter cyst in the right kidney. Multiple nodules are  seen bilaterally involving the subcutaneous fat of the anterior  abdominal wall. There is associated superficial inflammation. They are  suspected to relate to subcutaneous injections.     Pelvis: No significant abnormality is demonstrated.                                                                      IMPRESSION:  No evidence of metastatic disease.     MD Lisa CLINTON P-C  [facility]  O. [phone]             Again, thank you for allowing me to participate in the care of your patient.        Sincerely,        COLLEEN Guerra CNP    Electronically signed

## 2025-07-24 PROBLEM — M54.16 LUMBAR RADICULOPATHY: Status: ACTIVE | Noted: 2025-07-24

## 2025-07-24 PROBLEM — M54.6 ACUTE RIGHT-SIDED THORACIC BACK PAIN: Status: ACTIVE | Noted: 2025-07-24

## 2025-08-25 ASSESSMENT — ANXIETY QUESTIONNAIRES
1. FEELING NERVOUS, ANXIOUS, OR ON EDGE: NEARLY EVERY DAY
IF YOU CHECKED OFF ANY PROBLEMS ON THIS QUESTIONNAIRE, HOW DIFFICULT HAVE THESE PROBLEMS MADE IT FOR YOU TO DO YOUR WORK, TAKE CARE OF THINGS AT HOME, OR GET ALONG WITH OTHER PEOPLE: SOMEWHAT DIFFICULT
4. TROUBLE RELAXING: MORE THAN HALF THE DAYS
7. FEELING AFRAID AS IF SOMETHING AWFUL MIGHT HAPPEN: SEVERAL DAYS
GAD7 TOTAL SCORE: 10
3. WORRYING TOO MUCH ABOUT DIFFERENT THINGS: SEVERAL DAYS
6. BECOMING EASILY ANNOYED OR IRRITABLE: SEVERAL DAYS
5. BEING SO RESTLESS THAT IT IS HARD TO SIT STILL: SEVERAL DAYS
GAD7 TOTAL SCORE: 10
2. NOT BEING ABLE TO STOP OR CONTROL WORRYING: SEVERAL DAYS
7. FEELING AFRAID AS IF SOMETHING AWFUL MIGHT HAPPEN: SEVERAL DAYS
8. IF YOU CHECKED OFF ANY PROBLEMS, HOW DIFFICULT HAVE THESE MADE IT FOR YOU TO DO YOUR WORK, TAKE CARE OF THINGS AT HOME, OR GET ALONG WITH OTHER PEOPLE?: SOMEWHAT DIFFICULT
GAD7 TOTAL SCORE: 10

## 2025-08-25 ASSESSMENT — PATIENT HEALTH QUESTIONNAIRE - PHQ9
10. IF YOU CHECKED OFF ANY PROBLEMS, HOW DIFFICULT HAVE THESE PROBLEMS MADE IT FOR YOU TO DO YOUR WORK, TAKE CARE OF THINGS AT HOME, OR GET ALONG WITH OTHER PEOPLE: SOMEWHAT DIFFICULT
SUM OF ALL RESPONSES TO PHQ QUESTIONS 1-9: 5
SUM OF ALL RESPONSES TO PHQ QUESTIONS 1-9: 5

## 2025-08-26 ENCOUNTER — RESULTS FOLLOW-UP (OUTPATIENT)
Dept: FAMILY MEDICINE | Facility: CLINIC | Age: 45
End: 2025-08-26

## 2025-08-26 ENCOUNTER — ORDERS ONLY (AUTO-RELEASED) (OUTPATIENT)
Dept: FAMILY MEDICINE | Facility: CLINIC | Age: 45
End: 2025-08-26

## 2025-08-26 ENCOUNTER — OFFICE VISIT (OUTPATIENT)
Dept: FAMILY MEDICINE | Facility: CLINIC | Age: 45
End: 2025-08-26
Payer: COMMERCIAL

## 2025-08-26 VITALS
OXYGEN SATURATION: 98 % | SYSTOLIC BLOOD PRESSURE: 132 MMHG | BODY MASS INDEX: 33.92 KG/M2 | RESPIRATION RATE: 18 BRPM | HEART RATE: 96 BPM | WEIGHT: 198.7 LBS | TEMPERATURE: 97.7 F | HEIGHT: 64 IN | DIASTOLIC BLOOD PRESSURE: 94 MMHG

## 2025-08-26 DIAGNOSIS — R42 DIZZINESS: ICD-10-CM

## 2025-08-26 DIAGNOSIS — R00.0 RACING HEART BEAT: Primary | ICD-10-CM

## 2025-08-26 DIAGNOSIS — D50.9 IRON DEFICIENCY ANEMIA, UNSPECIFIED IRON DEFICIENCY ANEMIA TYPE: ICD-10-CM

## 2025-08-26 DIAGNOSIS — R00.0 RACING HEART BEAT: ICD-10-CM

## 2025-08-26 DIAGNOSIS — R53.83 OTHER FATIGUE: ICD-10-CM

## 2025-08-26 DIAGNOSIS — E03.9 HYPOTHYROIDISM, UNSPECIFIED TYPE: ICD-10-CM

## 2025-08-26 LAB
ALBUMIN SERPL BCG-MCNC: 4.6 G/DL (ref 3.5–5.2)
ALP SERPL-CCNC: 96 U/L (ref 40–150)
ALT SERPL W P-5'-P-CCNC: 20 U/L (ref 0–50)
ANION GAP SERPL CALCULATED.3IONS-SCNC: 12 MMOL/L (ref 7–15)
AST SERPL W P-5'-P-CCNC: 21 U/L (ref 0–45)
B BURGDOR IGG+IGM SER QL: 0.1
BILIRUB SERPL-MCNC: 0.3 MG/DL
BUN SERPL-MCNC: 11.5 MG/DL (ref 6–20)
CALCIUM SERPL-MCNC: 9.6 MG/DL (ref 8.8–10.4)
CHLORIDE SERPL-SCNC: 102 MMOL/L (ref 98–107)
CREAT SERPL-MCNC: 0.69 MG/DL (ref 0.51–0.95)
EGFRCR SERPLBLD CKD-EPI 2021: >90 ML/MIN/1.73M2
ERYTHROCYTE [DISTWIDTH] IN BLOOD BY AUTOMATED COUNT: 13.4 % (ref 10–15)
FERRITIN SERPL-MCNC: 33 NG/ML (ref 6–175)
GLUCOSE SERPL-MCNC: 65 MG/DL (ref 70–99)
HCO3 SERPL-SCNC: 28 MMOL/L (ref 22–29)
HCT VFR BLD AUTO: 40.8 % (ref 35–47)
HGB BLD-MCNC: 13.7 G/DL (ref 11.7–15.7)
IRON BINDING CAPACITY (ROCHE): 361 UG/DL (ref 240–430)
IRON SATN MFR SERPL: 19 % (ref 15–46)
IRON SERPL-MCNC: 67 UG/DL (ref 37–145)
MCH RBC QN AUTO: 27.8 PG (ref 26.5–33)
MCHC RBC AUTO-ENTMCNC: 33.6 G/DL (ref 31.5–36.5)
MCV RBC AUTO: 82.8 FL (ref 78–100)
PLATELET # BLD AUTO: 334 10E3/UL (ref 150–450)
POTASSIUM SERPL-SCNC: 4.2 MMOL/L (ref 3.4–5.3)
PROT SERPL-MCNC: 7.5 G/DL (ref 6.4–8.3)
RBC # BLD AUTO: 4.93 10E6/UL (ref 3.8–5.2)
SODIUM SERPL-SCNC: 142 MMOL/L (ref 135–145)
TSH SERPL DL<=0.005 MIU/L-ACNC: 3.35 UIU/ML (ref 0.3–4.2)
WBC # BLD AUTO: 8.63 10E3/UL (ref 4–11)

## 2025-08-26 PROCEDURE — 84443 ASSAY THYROID STIM HORMONE: CPT | Performed by: FAMILY MEDICINE

## 2025-08-26 PROCEDURE — 82728 ASSAY OF FERRITIN: CPT | Performed by: FAMILY MEDICINE

## 2025-08-26 PROCEDURE — 93000 ELECTROCARDIOGRAM COMPLETE: CPT | Performed by: FAMILY MEDICINE

## 2025-08-26 PROCEDURE — 86618 LYME DISEASE ANTIBODY: CPT | Performed by: FAMILY MEDICINE

## 2025-08-26 PROCEDURE — 99214 OFFICE O/P EST MOD 30 MIN: CPT | Performed by: FAMILY MEDICINE

## 2025-08-26 PROCEDURE — 83550 IRON BINDING TEST: CPT | Performed by: FAMILY MEDICINE

## 2025-08-26 PROCEDURE — 85027 COMPLETE CBC AUTOMATED: CPT | Performed by: FAMILY MEDICINE

## 2025-08-26 PROCEDURE — 83540 ASSAY OF IRON: CPT | Performed by: FAMILY MEDICINE

## 2025-08-26 PROCEDURE — 36415 COLL VENOUS BLD VENIPUNCTURE: CPT | Performed by: FAMILY MEDICINE

## 2025-08-26 PROCEDURE — 3080F DIAST BP >= 90 MM HG: CPT | Performed by: FAMILY MEDICINE

## 2025-08-26 PROCEDURE — 80053 COMPREHEN METABOLIC PANEL: CPT | Performed by: FAMILY MEDICINE

## 2025-08-26 PROCEDURE — G2211 COMPLEX E/M VISIT ADD ON: HCPCS | Performed by: FAMILY MEDICINE

## 2025-08-26 PROCEDURE — 3075F SYST BP GE 130 - 139MM HG: CPT | Performed by: FAMILY MEDICINE

## 2025-08-26 RX ORDER — LEVOTHYROXINE SODIUM 25 UG/1
25 TABLET ORAL
Qty: 90 TABLET | Refills: 1 | Status: SHIPPED | OUTPATIENT
Start: 2025-08-26

## 2025-08-29 ENCOUNTER — HOSPITAL ENCOUNTER (OUTPATIENT)
Dept: CARDIOLOGY | Facility: CLINIC | Age: 45
Discharge: HOME | End: 2025-08-29
Attending: FAMILY MEDICINE | Admitting: FAMILY MEDICINE
Payer: COMMERCIAL

## 2025-08-29 DIAGNOSIS — R00.0 RACING HEART BEAT: ICD-10-CM

## 2025-08-29 DIAGNOSIS — R42 DIZZINESS: ICD-10-CM

## 2025-08-29 DIAGNOSIS — R53.83 OTHER FATIGUE: ICD-10-CM

## 2025-08-29 LAB — LVEF ECHO: NORMAL

## 2025-08-29 PROCEDURE — 255N000002 HC RX 255 OP 636: Performed by: FAMILY MEDICINE

## 2025-08-29 PROCEDURE — 93321 DOPPLER ECHO F-UP/LMTD STD: CPT | Mod: TC

## 2025-08-29 PROCEDURE — 93016 CV STRESS TEST SUPVJ ONLY: CPT | Performed by: STUDENT IN AN ORGANIZED HEALTH CARE EDUCATION/TRAINING PROGRAM

## 2025-08-29 RX ADMIN — HUMAN ALBUMIN MICROSPHERES AND PERFLUTREN 2 ML (DILUTED): 10; .22 INJECTION, SOLUTION INTRAVENOUS at 09:09

## (undated) DEVICE — PREP SKIN SCRUB TRAY 4461A

## (undated) DEVICE — RETR RING LONE STAR 28.3X18.3CM W/CATH CLIPSX2 3308G

## (undated) DEVICE — ADHESIVE SWIFTSET 0.8ML OCTYL SS6

## (undated) DEVICE — SUCTION CANISTER MEDIVAC LINER 3000ML W/LID 65651-530

## (undated) DEVICE — ESU HOLSTER PLASTIC DISP E2400

## (undated) DEVICE — CATH FOLEY 18FR 5ML SIL165818

## (undated) DEVICE — BASIN SET MINOR DISP

## (undated) DEVICE — SU VICRYL 2-0 SH 27" J317H

## (undated) DEVICE — GLOVE PROTEXIS BLUE W/NEU-THERA 8.0  2D73EB80

## (undated) DEVICE — NDL SPINAL 22GA 3.5" QUINCKE 405181

## (undated) DEVICE — GLOVE PROTEXIS MICRO 8.0  2D73PM80

## (undated) DEVICE — TUBING IRRIG TUR Y TYPE 96" LF 6543-01

## (undated) DEVICE — SOL WATER IRRIG 1000ML BOTTLE 07139-09

## (undated) DEVICE — ENDO POUCH 10MM POUCH

## (undated) DEVICE — BLADE KNIFE SURG 15 371115

## (undated) DEVICE — LINEN TOWEL PACK X5 5464

## (undated) DEVICE — SU VICRYL 0 TIE 54" UND J287G

## (undated) DEVICE — SU VICRYL 3-0 SH 27" UND J416H

## (undated) DEVICE — DECANTER VIAL 2006S

## (undated) DEVICE — PACK MINOR SBA15MIFSE

## (undated) DEVICE — BNDG ELASTIC 6" DBL LENGTH UNSTERILE 6611-16

## (undated) DEVICE — LABEL MEDICATION SYSTEM  3304

## (undated) DEVICE — DEVICE SUTURE GRASPER TROCAR CLOSURE 14GA PMITCSG

## (undated) DEVICE — GLOVE PROTEXIS BLUE W/NEU-THERA 6.5  2D73EB65

## (undated) DEVICE — PAD CHUX UNDERPAD 23X24" 7136

## (undated) DEVICE — ENDO TROCAR OPTICAL 05MM VERSAPORT PLUS W/FIX CAN ONB5STF

## (undated) DEVICE — PACK LAPAROSCOPY/PELVISCOPY STD

## (undated) DEVICE — ESU LIGASURE MARYLAND JAW OPEN SEALER/DVDR 5MMX37CM LF1737

## (undated) DEVICE — SU VICRYL 3-0 PS-1 18" UND J683

## (undated) DEVICE — GLOVE PROTEXIS W/NEU-THERA 7.0  2D73TE70

## (undated) DEVICE — TUBING SUCTION LIPECTOMY

## (undated) DEVICE — SU VICRYL 4-0 FS-2 27" J422-H

## (undated) DEVICE — TUBING INSUFFLATION W/FILTER CPC TO LUER 620-030-301

## (undated) DEVICE — PACK MAJOR SBA15MAFSI

## (undated) DEVICE — BLADE CLIPPER 4406

## (undated) DEVICE — SYR 10ML LL W/O NDL

## (undated) DEVICE — DECANTER TRANSFER DEVICE 2008S

## (undated) DEVICE — DRAPE BREAST/CHEST 29420

## (undated) DEVICE — ANTIFOG SOLUTION W/FOAM PAD 31142527

## (undated) DEVICE — SYR EAR BULB 3OZ 0035830

## (undated) DEVICE — SUCTION CANISTER MEDIVAC LINER 1500ML W/LID 65651-515

## (undated) DEVICE — CATH TRAY FOLEY SURESTEP 16FR W/URINE MTR STATLK LF A303416A

## (undated) DEVICE — ENDO TROCAR BLADED 11MM STD VERSAONE B11STF

## (undated) DEVICE — SU VICRYL 2-0 CT-1 27" UND J259H

## (undated) DEVICE — DRAPE C-ARM 60X42" 1013

## (undated) DEVICE — PAD PERI INDIV WRAP 11" 2022

## (undated) DEVICE — NDL 19GA 1.5"

## (undated) DEVICE — DRAPE GYN/UROLOGY FLUID POUCH TUR 29455

## (undated) DEVICE — NDL 25GA 1.5" 305127

## (undated) DEVICE — DRSG KERLIX 4 1/2"X4YDS ROLL 6715

## (undated) DEVICE — RETR ELASTIC STAYS 3311-1G

## (undated) DEVICE — LIGHT HANDLE X2

## (undated) DEVICE — SUCTION TIP YANKAUER W/O VENT K86

## (undated) DEVICE — DRAPE SHEET REV FOLD 3/4 9349

## (undated) DEVICE — UTERINE MANIPULATOR HUMI KRONER 6003

## (undated) DEVICE — NDL COUNTER 20CT 31142493

## (undated) DEVICE — SU MONOCRYL 4-0 PS-2 18" UND Y496G

## (undated) DEVICE — GLOVE PROTEXIS W/NEU-THERA 6.5  2D73TE65

## (undated) DEVICE — PAD PERI W/WINGS 1580A

## (undated) DEVICE — SU VICRYL 4-0 PS-2 18" UND J496H

## (undated) DEVICE — DRSG STERI STRIP 1/2X4" R1547

## (undated) DEVICE — ENDO CANNULA FIXATION OPTICAL 05MM VERSAPORT PLUS ONBFCA5ST

## (undated) DEVICE — SOL NACL 0.9% IRRIG 1000ML BOTTLE 07138-09

## (undated) DEVICE — GLOVE PROTEXIS BLUE W/NEU-THERA 7.5  2D73EB75

## (undated) DEVICE — DRAPE POUCH INSTRUMENT 3 POCKET 1018L

## (undated) DEVICE — ESU PENCIL W/COATED BLADE E2450H

## (undated) DEVICE — SOL WATER INJ 1000ML BAG 07990-09

## (undated) DEVICE — SYR 10ML FINGER CONTROL W/O NDL 309695

## (undated) DEVICE — PREP CHLORAPREP 26ML TINTED ORANGE  260815

## (undated) DEVICE — SPONGE RAY-TEC 4X8" 7318

## (undated) DEVICE — GLOVE PROTEXIS W/NEU-THERA 7.5  2D73TE75

## (undated) DEVICE — SU VICRYL 0 CT-2 27" UND J270H

## (undated) DEVICE — GOWN LG DISP 9515

## (undated) DEVICE — PACK LAP TRANSVERSE STD

## (undated) RX ORDER — KETOROLAC TROMETHAMINE 30 MG/ML
INJECTION, SOLUTION INTRAMUSCULAR; INTRAVENOUS
Status: DISPENSED
Start: 2018-03-16

## (undated) RX ORDER — OXYCODONE HYDROCHLORIDE 5 MG/1
TABLET ORAL
Status: DISPENSED
Start: 2022-12-12

## (undated) RX ORDER — ONDANSETRON 2 MG/ML
INJECTION INTRAMUSCULAR; INTRAVENOUS
Status: DISPENSED
Start: 2017-11-28

## (undated) RX ORDER — FENTANYL CITRATE 50 UG/ML
INJECTION, SOLUTION INTRAMUSCULAR; INTRAVENOUS
Status: DISPENSED
Start: 2018-03-16

## (undated) RX ORDER — FENTANYL CITRATE 50 UG/ML
INJECTION, SOLUTION INTRAMUSCULAR; INTRAVENOUS
Status: DISPENSED
Start: 2017-11-28

## (undated) RX ORDER — HYDROMORPHONE HYDROCHLORIDE 1 MG/ML
INJECTION, SOLUTION INTRAMUSCULAR; INTRAVENOUS; SUBCUTANEOUS
Status: DISPENSED
Start: 2017-11-28

## (undated) RX ORDER — SCOLOPAMINE TRANSDERMAL SYSTEM 1 MG/1
PATCH, EXTENDED RELEASE TRANSDERMAL
Status: DISPENSED
Start: 2022-12-12

## (undated) RX ORDER — PROPOFOL 10 MG/ML
INJECTION, EMULSION INTRAVENOUS
Status: DISPENSED
Start: 2018-03-16

## (undated) RX ORDER — FENTANYL CITRATE 50 UG/ML
INJECTION, SOLUTION INTRAMUSCULAR; INTRAVENOUS
Status: DISPENSED
Start: 2017-01-11

## (undated) RX ORDER — OXYCODONE HYDROCHLORIDE 5 MG/1
TABLET ORAL
Status: DISPENSED
Start: 2017-11-28

## (undated) RX ORDER — CEFAZOLIN SODIUM 1 G/3ML
INJECTION, POWDER, FOR SOLUTION INTRAMUSCULAR; INTRAVENOUS
Status: DISPENSED
Start: 2017-01-11

## (undated) RX ORDER — ONDANSETRON 2 MG/ML
INJECTION INTRAMUSCULAR; INTRAVENOUS
Status: DISPENSED
Start: 2018-03-16

## (undated) RX ORDER — KETOROLAC TROMETHAMINE 15 MG/ML
INJECTION, SOLUTION INTRAMUSCULAR; INTRAVENOUS
Status: DISPENSED
Start: 2018-03-16

## (undated) RX ORDER — OXYCODONE AND ACETAMINOPHEN 5; 325 MG/1; MG/1
TABLET ORAL
Status: DISPENSED
Start: 2018-03-16

## (undated) RX ORDER — ACETAMINOPHEN 325 MG/1
TABLET ORAL
Status: DISPENSED
Start: 2017-11-28

## (undated) RX ORDER — LIDOCAINE HYDROCHLORIDE 20 MG/ML
INJECTION, SOLUTION EPIDURAL; INFILTRATION; INTRACAUDAL; PERINEURAL
Status: DISPENSED
Start: 2018-03-16

## (undated) RX ORDER — KETOROLAC TROMETHAMINE 30 MG/ML
INJECTION, SOLUTION INTRAMUSCULAR; INTRAVENOUS
Status: DISPENSED
Start: 2017-11-28

## (undated) RX ORDER — FENTANYL CITRATE 50 UG/ML
INJECTION, SOLUTION INTRAMUSCULAR; INTRAVENOUS
Status: DISPENSED
Start: 2022-12-12

## (undated) RX ORDER — BUPIVACAINE HYDROCHLORIDE AND EPINEPHRINE 2.5; 5 MG/ML; UG/ML
INJECTION, SOLUTION EPIDURAL; INFILTRATION; INTRACAUDAL; PERINEURAL
Status: DISPENSED
Start: 2022-12-12

## (undated) RX ORDER — CEFAZOLIN SODIUM 1 G/3ML
INJECTION, POWDER, FOR SOLUTION INTRAMUSCULAR; INTRAVENOUS
Status: DISPENSED
Start: 2022-12-12

## (undated) RX ORDER — ACETAMINOPHEN 325 MG/1
TABLET ORAL
Status: DISPENSED
Start: 2022-12-12

## (undated) RX ORDER — DEXAMETHASONE SODIUM PHOSPHATE 4 MG/ML
INJECTION, SOLUTION INTRA-ARTICULAR; INTRALESIONAL; INTRAMUSCULAR; INTRAVENOUS; SOFT TISSUE
Status: DISPENSED
Start: 2018-03-16

## (undated) RX ORDER — OXYCODONE AND ACETAMINOPHEN 5; 325 MG/1; MG/1
TABLET ORAL
Status: DISPENSED
Start: 2017-01-11

## (undated) RX ORDER — LIDOCAINE HYDROCHLORIDE 10 MG/ML
INJECTION, SOLUTION EPIDURAL; INFILTRATION; INTRACAUDAL; PERINEURAL
Status: DISPENSED
Start: 2017-01-11

## (undated) RX ORDER — OXYCODONE HCL 10 MG/1
TABLET, FILM COATED, EXTENDED RELEASE ORAL
Status: DISPENSED
Start: 2017-11-28

## (undated) RX ORDER — PROPOFOL 10 MG/ML
INJECTION, EMULSION INTRAVENOUS
Status: DISPENSED
Start: 2022-12-12

## (undated) RX ORDER — PROPOFOL 10 MG/ML
INJECTION, EMULSION INTRAVENOUS
Status: DISPENSED
Start: 2017-01-11

## (undated) RX ORDER — CLINDAMYCIN PHOSPHATE 900 MG/50ML
INJECTION, SOLUTION INTRAVENOUS
Status: DISPENSED
Start: 2018-03-16

## (undated) RX ORDER — BUPIVACAINE HYDROCHLORIDE AND EPINEPHRINE 2.5; 5 MG/ML; UG/ML
INJECTION, SOLUTION INFILTRATION; PERINEURAL
Status: DISPENSED
Start: 2017-11-28